# Patient Record
Sex: FEMALE | Race: WHITE | Employment: OTHER | URBAN - METROPOLITAN AREA
[De-identification: names, ages, dates, MRNs, and addresses within clinical notes are randomized per-mention and may not be internally consistent; named-entity substitution may affect disease eponyms.]

---

## 2017-08-15 ENCOUNTER — HOSPITAL ENCOUNTER (OUTPATIENT)
Dept: GENERAL RADIOLOGY | Age: 57
Discharge: HOME | End: 2017-08-15
Attending: INTERNAL MEDICINE | Admitting: INTERNAL MEDICINE

## 2017-08-15 DIAGNOSIS — R06.02 SOB (SHORTNESS OF BREATH): ICD-10-CM

## 2017-08-16 PROBLEM — G47.33 OBSTRUCTIVE SLEEP APNEA: Status: ACTIVE | Noted: 2017-08-16

## 2017-08-16 PROBLEM — J44.9 COPD, SEVERE (HCC): Status: ACTIVE | Noted: 2017-08-16

## 2017-08-16 PROBLEM — J96.10 CHRONIC RESPIRATORY FAILURE (HCC): Status: ACTIVE | Noted: 2017-08-16

## 2017-09-11 ENCOUNTER — HOSPITAL ENCOUNTER (OUTPATIENT)
Dept: PULMONOLOGY | Age: 57
Discharge: HOME | End: 2017-09-11
Attending: INTERNAL MEDICINE | Admitting: INTERNAL MEDICINE

## 2017-10-19 ENCOUNTER — HOSPITAL ENCOUNTER (OUTPATIENT)
Dept: PULMONOLOGY | Age: 57
Discharge: HOME | End: 2017-10-19
Attending: INTERNAL MEDICINE | Admitting: INTERNAL MEDICINE

## 2017-10-19 DIAGNOSIS — J44.9 CHRONIC OBSTRUCTIVE PULMONARY DISEASE (HCC): ICD-10-CM

## 2017-10-24 ENCOUNTER — HOSPITAL ENCOUNTER (OUTPATIENT)
Dept: OTHER | Age: 57
Discharge: HOME | End: 2017-12-02
Attending: INTERNAL MEDICINE | Admitting: INTERNAL MEDICINE

## 2018-01-18 ENCOUNTER — HOSPITAL ENCOUNTER (OUTPATIENT)
Dept: OTHER | Age: 58
Discharge: HOME | End: 2018-03-01
Attending: INTERNAL MEDICINE | Admitting: INTERNAL MEDICINE

## 2019-05-22 ENCOUNTER — HOSPITAL ENCOUNTER (EMERGENCY)
Age: 59
Discharge: HOME | End: 2019-05-22
Attending: EMERGENCY MEDICINE
Payer: COMMERCIAL

## 2019-05-22 ENCOUNTER — APPOINTMENT (OUTPATIENT)
Dept: GENERAL RADIOLOGY | Age: 59
End: 2019-05-22
Payer: COMMERCIAL

## 2019-05-22 VITALS
WEIGHT: 150 LBS | OXYGEN SATURATION: 92 % | RESPIRATION RATE: 18 BRPM | TEMPERATURE: 98.5 F | BODY MASS INDEX: 25.61 KG/M2 | SYSTOLIC BLOOD PRESSURE: 119 MMHG | DIASTOLIC BLOOD PRESSURE: 84 MMHG | HEART RATE: 101 BPM | HEIGHT: 64 IN

## 2019-05-22 DIAGNOSIS — J90 PLEURAL EFFUSION: ICD-10-CM

## 2019-05-22 DIAGNOSIS — J44.1 COPD EXACERBATION (HCC): Primary | ICD-10-CM

## 2019-05-22 LAB
ANION GAP SERPL CALCULATED.3IONS-SCNC: 9 MMOL/L (ref 4–16)
BASOPHILS ABSOLUTE: 0 K/CU MM
BASOPHILS RELATIVE PERCENT: 0.3 % (ref 0–1)
BUN BLDV-MCNC: 8 MG/DL (ref 6–23)
CALCIUM SERPL-MCNC: 9 MG/DL (ref 8.3–10.6)
CHLORIDE BLD-SCNC: 90 MMOL/L (ref 99–110)
CO2: 39 MMOL/L (ref 21–32)
CREAT SERPL-MCNC: 0.4 MG/DL (ref 0.6–1.1)
DIFFERENTIAL TYPE: ABNORMAL
EOSINOPHILS ABSOLUTE: 0.1 K/CU MM
EOSINOPHILS RELATIVE PERCENT: 1 % (ref 0–3)
GFR AFRICAN AMERICAN: >60 ML/MIN/1.73M2
GFR NON-AFRICAN AMERICAN: >60 ML/MIN/1.73M2
GLUCOSE BLD-MCNC: 100 MG/DL (ref 70–99)
HCT VFR BLD CALC: 42.2 % (ref 37–47)
HEMOGLOBIN: 12.6 GM/DL (ref 12.5–16)
IMMATURE NEUTROPHIL %: 0.3 % (ref 0–0.43)
LYMPHOCYTES ABSOLUTE: 1.9 K/CU MM
LYMPHOCYTES RELATIVE PERCENT: 19.1 % (ref 24–44)
MCH RBC QN AUTO: 28.5 PG (ref 27–31)
MCHC RBC AUTO-ENTMCNC: 29.9 % (ref 32–36)
MCV RBC AUTO: 95.5 FL (ref 78–100)
MONOCYTES ABSOLUTE: 1.2 K/CU MM
MONOCYTES RELATIVE PERCENT: 12.7 % (ref 0–4)
NUCLEATED RBC %: 0 %
PDW BLD-RTO: 11.7 % (ref 11.7–14.9)
PLATELET # BLD: 210 K/CU MM (ref 140–440)
PMV BLD AUTO: 10.2 FL (ref 7.5–11.1)
POTASSIUM SERPL-SCNC: 3.8 MMOL/L (ref 3.5–5.1)
RBC # BLD: 4.42 M/CU MM (ref 4.2–5.4)
SEGMENTED NEUTROPHILS ABSOLUTE COUNT: 6.5 K/CU MM
SEGMENTED NEUTROPHILS RELATIVE PERCENT: 66.6 % (ref 36–66)
SODIUM BLD-SCNC: 138 MMOL/L (ref 135–145)
TOTAL IMMATURE NEUTOROPHIL: 0.03 K/CU MM
TOTAL NUCLEATED RBC: 0 K/CU MM
WBC # BLD: 9.7 K/CU MM (ref 4–10.5)

## 2019-05-22 PROCEDURE — 71045 X-RAY EXAM CHEST 1 VIEW: CPT

## 2019-05-22 PROCEDURE — 93005 ELECTROCARDIOGRAM TRACING: CPT | Performed by: EMERGENCY MEDICINE

## 2019-05-22 PROCEDURE — 93010 ELECTROCARDIOGRAM REPORT: CPT | Performed by: INTERNAL MEDICINE

## 2019-05-22 PROCEDURE — 99285 EMERGENCY DEPT VISIT HI MDM: CPT

## 2019-05-22 PROCEDURE — 6370000000 HC RX 637 (ALT 250 FOR IP): Performed by: EMERGENCY MEDICINE

## 2019-05-22 PROCEDURE — 94640 AIRWAY INHALATION TREATMENT: CPT

## 2019-05-22 PROCEDURE — 80048 BASIC METABOLIC PNL TOTAL CA: CPT

## 2019-05-22 PROCEDURE — 85025 COMPLETE CBC W/AUTO DIFF WBC: CPT

## 2019-05-22 RX ORDER — IPRATROPIUM BROMIDE AND ALBUTEROL SULFATE 2.5; .5 MG/3ML; MG/3ML
1 SOLUTION RESPIRATORY (INHALATION) ONCE
Status: COMPLETED | OUTPATIENT
Start: 2019-05-22 | End: 2019-05-22

## 2019-05-22 RX ORDER — PREDNISONE 20 MG/1
60 TABLET ORAL ONCE
Status: COMPLETED | OUTPATIENT
Start: 2019-05-22 | End: 2019-05-22

## 2019-05-22 RX ORDER — PREDNISONE 20 MG/1
60 TABLET ORAL DAILY
Qty: 12 TABLET | Refills: 0 | Status: SHIPPED | OUTPATIENT
Start: 2019-05-22 | End: 2019-05-26

## 2019-05-22 RX ADMIN — IPRATROPIUM BROMIDE AND ALBUTEROL SULFATE 1 AMPULE: .5; 3 SOLUTION RESPIRATORY (INHALATION) at 02:45

## 2019-05-22 RX ADMIN — PREDNISONE 60 MG: 20 TABLET ORAL at 02:39

## 2019-05-22 NOTE — ED PROVIDER NOTES
per week: Not on file     Minutes per session: Not on file    Stress: Not on file   Relationships    Social connections:     Talks on phone: Not on file     Gets together: Not on file     Attends Amish service: Not on file     Active member of club or organization: Not on file     Attends meetings of clubs or organizations: Not on file     Relationship status: Not on file    Intimate partner violence:     Fear of current or ex partner: Not on file     Emotionally abused: Not on file     Physically abused: Not on file     Forced sexual activity: Not on file   Other Topics Concern    Not on file   Social History Narrative    Not on file     No current facility-administered medications for this encounter.       Current Outpatient Medications   Medication Sig Dispense Refill    predniSONE (DELTASONE) 20 MG tablet Take 3 tablets by mouth daily for 4 days 12 tablet 0    albuterol sulfate HFA (VENTOLIN HFA) 108 (90 Base) MCG/ACT inhaler Inhale 2 puffs into the lungs every 6 hours 1 Inhaler 5    doxycycline hyclate (VIBRAMYCIN) 100 MG capsule Take 1 capsule by mouth daily 10 capsule 0    tiotropium (SPIRIVA RESPIMAT) 2.5 MCG/ACT AERS inhaler Inhale 2 puffs into the lungs daily 1 Inhaler 5    mometasone-formoterol (DULERA) 100-5 MCG/ACT inhaler INHALE 2 PUFFS BY MOUTH TWICE DAILY 13 g 5    albuterol (PROVENTIL) (2.5 MG/3ML) 0.083% nebulizer solution Take 3 mLs by nebulization every 6 hours 120 vial 5    albuterol sulfate HFA (VENTOLIN HFA) 108 (90 Base) MCG/ACT inhaler Inhale 2 puffs into the lungs every 6 hours as needed for Wheezing 1 Inhaler 5    albuterol (PROVENTIL) (2.5 MG/3ML) 0.083% nebulizer solution Take 3 mLs by nebulization every 6 hours as needed for Wheezing 120 each 5    traZODone (DESYREL) 150 MG tablet       albuterol sulfate HFA (PROAIR HFA) 108 (90 Base) MCG/ACT inhaler Inhale 2 puffs into the lungs every 6 hours as needed for Wheezing      OXYGEN Inhale 2 L into the lungs continuous  CPAP Machine MISC by CPAP route nightly      buprenorphine-naloxone (SUBOXONE) 8-2 MG FILM SL film Place 1 Film under the tongue daily       No Known Allergies    Nursing Notes Reviewed    Physical Exam:  ED Triage Vitals [05/22/19 0221]   Enc Vitals Group      /84      Pulse 101      Resp 18      Temp 98.5 °F (36.9 °C)      Temp Source Oral      SpO2 92 %      Weight 150 lb (68 kg)      Height 5' 4\" (1.626 m)      Head Circumference       Peak Flow       Pain Score       Pain Loc       Pain Edu? Excl. in [address]? GENERAL APPEARANCE: Awake and alert. Cooperative. No acute distress. HEAD: Normocephalic. Atraumatic. EYES: EOM's grossly intact. Sclera anicteric. ENT: Mucous membranes are moist. Tolerates saliva. No trismus. NECK: Supple. Trachea midline. HEART: RRR. Radial pulses 2+. LUNGS: Respirations unlabored. Lung sounds diminished bilaterally  ABDOMEN: Soft. Non-tender. No guarding or rebound. EXTREMITIES: No acute deformities. No edema  SKIN: Warm and dry. NEUROLOGICAL: No gross facial drooping. Moves all 4 extremities spontaneously. PSYCHIATRIC: Normal mood.     I have reviewed and interpreted all of the currently available lab results from this visit (if applicable):  Results for orders placed or performed during the hospital encounter of 05/22/19   CBC Auto Differential   Result Value Ref Range    WBC 9.7 4.0 - 10.5 K/CU MM    RBC 4.42 4.2 - 5.4 M/CU MM    Hemoglobin 12.6 12.5 - 16.0 GM/DL    Hematocrit 42.2 37 - 47 %    MCV 95.5 78 - 100 FL    MCH 28.5 27 - 31 PG    MCHC 29.9 (L) 32.0 - 36.0 %    RDW 11.7 11.7 - 14.9 %    Platelets 514 154 - 857 K/CU MM    MPV 10.2 7.5 - 11.1 FL    Differential Type AUTOMATED DIFFERENTIAL     Segs Relative 66.6 (H) 36 - 66 %    Lymphocytes % 19.1 (L) 24 - 44 %    Monocytes % 12.7 (H) 0 - 4 %    Eosinophils % 1.0 0 - 3 %    Basophils % 0.3 0 - 1 %    Segs Absolute 6.5 K/CU MM    Lymphocytes # 1.9 K/CU MM    Monocytes # 1.2 K/CU MM    Eosinophils # 0.1 K/CU MM    Basophils # 0.0 K/CU MM    Nucleated RBC % 0.0 %    Total Nucleated RBC 0.0 K/CU MM    Total Immature Neutrophil 0.03 K/CU MM    Immature Neutrophil % 0.3 0 - 0.43 %   BMP   Result Value Ref Range    Sodium 138 135 - 145 MMOL/L    Potassium 3.8 3.5 - 5.1 MMOL/L    Chloride 90 (L) 99 - 110 mMol/L    CO2 39 (H) 21 - 32 MMOL/L    Anion Gap 9 4 - 16    BUN 8 6 - 23 MG/DL    CREATININE 0.4 (L) 0.6 - 1.1 MG/DL    Glucose 100 (H) 70 - 99 MG/DL    Calcium 9.0 8.3 - 10.6 MG/DL    GFR Non-African American >60 >60 mL/min/1.73m2    GFR African American >60 >60 mL/min/1.73m2      Radiographs (if obtained):  [] The following radiograph was interpreted by myself in the absence of a radiologist:  [x] Radiologist's Report Reviewed:    EKG (if obtained): (All EKG's are interpreted by myself in the absence of a cardiologist)12 lead EKG as interpreted by me reveals normal sinus rhythm. Axis is normal. There are no ischemic ST elevations or other suspicious ST changes;  QRS interval is narrow, QT interval is not prolonged. Final interpretation: Normal sinus rhythm. MDM:  Plan of care is discussed thoroughly with the patient and family if present. If performed, all imaging and lab work also discussed with patient. All relevant prior results and chart reviewed if available. Patient with above presentation consistent with likely COPD exacerbation. Vital signs are normal.  She is in no acute distress. Chest x-ray does show small bilateral pleural effusions but she otherwise does not have any signs or symptoms of acute heart failure. She feels better with DuoNeb and prednisone. Metabolic workup is unremarkable. Plan to discharge, follow-up with pulmonologist to discuss further care. She'll be prescribed prednisone and is agreeable with this plan of care. Clinical Impression:  1. COPD exacerbation ([address]    2.  Pleural effusion      (Please note that portions of this note may have been completed with a voice recognition program. Efforts were made to edit the dictations but occasionally words are mis-transcribed.)    MD Debbie Holloway MD  05/22/19 5364

## 2019-05-22 NOTE — ED NOTES
Pt presents to ED for c/o cough and SOB. Reports cough is productive with green sputum. Pt states cough has been present for 2 weeks but that SOB has worsened over the last 2 days. Reports she is unable to hardly walk or do ADLS due to the SOB. Pt also states has had fevers and chills over the last 2 weeks. Denies CP. Denies n/v/d. Pt has hx of COPD and is on 3L NC . Pt is on 4L NC at this time. Pt is a&ox4. GCS 15 speaking clear complete sentences. ECG complete and CCM and CPOX placed at this time.       Iona Mondragon RN  05/22/19 5185

## 2019-05-22 NOTE — ED NOTES
Bed: ED-30  Expected date:   Expected time:   Means of arrival:   Comments:  Terry Duckworth, [facility]  05/22/19 0142

## 2019-05-24 LAB
EKG ATRIAL RATE: 99 BPM
EKG DIAGNOSIS: NORMAL
EKG P AXIS: 81 DEGREES
EKG P-R INTERVAL: 144 MS
EKG Q-T INTERVAL: 342 MS
EKG QRS DURATION: 84 MS
EKG QTC CALCULATION (BAZETT): 438 MS
EKG R AXIS: 71 DEGREES
EKG T AXIS: 73 DEGREES
EKG VENTRICULAR RATE: 99 BPM

## 2019-08-31 ENCOUNTER — HOSPITAL ENCOUNTER (INPATIENT)
Age: 59
LOS: 2 days | Discharge: HOME | DRG: 133 | End: 2019-09-02
Attending: FAMILY MEDICINE | Admitting: STUDENT IN AN ORGANIZED HEALTH CARE EDUCATION/TRAINING PROGRAM
Payer: COMMERCIAL

## 2019-08-31 ENCOUNTER — APPOINTMENT (OUTPATIENT)
Dept: GENERAL RADIOLOGY | Age: 59
DRG: 133 | End: 2019-08-31
Payer: COMMERCIAL

## 2019-08-31 DIAGNOSIS — J96.21 ACUTE ON CHRONIC RESPIRATORY FAILURE WITH HYPOXIA AND HYPERCAPNIA (HCC): ICD-10-CM

## 2019-08-31 DIAGNOSIS — J44.1 COPD EXACERBATION (HCC): Primary | ICD-10-CM

## 2019-08-31 DIAGNOSIS — J96.22 ACUTE ON CHRONIC RESPIRATORY FAILURE WITH HYPOXIA AND HYPERCAPNIA (HCC): ICD-10-CM

## 2019-08-31 PROBLEM — J96.02 ACUTE RESPIRATORY FAILURE WITH HYPOXIA AND HYPERCAPNIA (HCC): Status: ACTIVE | Noted: 2019-08-31

## 2019-08-31 PROBLEM — J96.01 ACUTE RESPIRATORY FAILURE WITH HYPOXIA AND HYPERCAPNIA (HCC): Status: ACTIVE | Noted: 2019-08-31

## 2019-08-31 LAB
ALBUMIN SERPL-MCNC: 4.3 GM/DL (ref 3.4–5)
ALP BLD-CCNC: 86 IU/L (ref 40–129)
ALT SERPL-CCNC: 10 U/L (ref 10–40)
ANION GAP SERPL CALCULATED.3IONS-SCNC: 3 MMOL/L (ref 4–16)
AST SERPL-CCNC: 15 IU/L (ref 15–37)
BASE EXCESS MIXED: ABNORMAL (ref 0–2.3)
BASE EXCESS MIXED: ABNORMAL (ref 0–2.3)
BASOPHILS ABSOLUTE: 0.1 K/CU MM
BASOPHILS RELATIVE PERCENT: 0.8 % (ref 0–1)
BILIRUB SERPL-MCNC: 0.2 MG/DL (ref 0–1)
BUN BLDV-MCNC: 5 MG/DL (ref 6–23)
CALCIUM SERPL-MCNC: 9.6 MG/DL (ref 8.3–10.6)
CARBON MONOXIDE, BLOOD: 4.7 % (ref 0–5)
CHLORIDE BLD-SCNC: 90 MMOL/L (ref 99–110)
CO2 CONTENT: 55.9 MMOL/L (ref 19–24)
CO2: 48 MMOL/L (ref 21–32)
COMMENT: ABNORMAL
COMMENT: ABNORMAL
CREAT SERPL-MCNC: 0.4 MG/DL (ref 0.6–1.1)
DIFFERENTIAL TYPE: ABNORMAL
EKG ATRIAL RATE: 88 BPM
EKG DIAGNOSIS: NORMAL
EKG P AXIS: 84 DEGREES
EKG P-R INTERVAL: 144 MS
EKG Q-T INTERVAL: 382 MS
EKG QRS DURATION: 90 MS
EKG QTC CALCULATION (BAZETT): 462 MS
EKG R AXIS: 90 DEGREES
EKG T AXIS: 85 DEGREES
EKG VENTRICULAR RATE: 88 BPM
EOSINOPHILS ABSOLUTE: 0.2 K/CU MM
EOSINOPHILS RELATIVE PERCENT: 2.9 % (ref 0–3)
GFR AFRICAN AMERICAN: >60 ML/MIN/1.73M2
GFR NON-AFRICAN AMERICAN: >60 ML/MIN/1.73M2
GLUCOSE BLD-MCNC: 136 MG/DL (ref 70–99)
HCO3 ARTERIAL: 52.6 MMOL/L (ref 18–23)
HCO3 VENOUS: 53.5 MMOL/L (ref 19–25)
HCT VFR BLD CALC: 46.7 % (ref 37–47)
HEMOGLOBIN: 13.4 GM/DL (ref 12.5–16)
IMMATURE NEUTROPHIL %: 0.5 % (ref 0–0.43)
LIPASE: 17 IU/L (ref 13–60)
LYMPHOCYTES ABSOLUTE: 1.7 K/CU MM
LYMPHOCYTES RELATIVE PERCENT: 25.9 % (ref 24–44)
MAGNESIUM: 1.7 MG/DL (ref 1.8–2.4)
MCH RBC QN AUTO: 28.2 PG (ref 27–31)
MCHC RBC AUTO-ENTMCNC: 28.7 % (ref 32–36)
MCV RBC AUTO: 98.1 FL (ref 78–100)
METHEMOGLOBIN ARTERIAL: 1.2 %
MONOCYTES ABSOLUTE: 0.8 K/CU MM
MONOCYTES RELATIVE PERCENT: 11.3 % (ref 0–4)
NUCLEATED RBC %: 0 %
O2 SAT, VEN: 67.8 % (ref 50–70)
O2 SATURATION: 94.2 % (ref 96–97)
PCO2 ARTERIAL: 107 MMHG (ref 32–45)
PCO2, VEN: 122 MMHG (ref 38–52)
PDW BLD-RTO: 12.5 % (ref 11.7–14.9)
PH BLOOD: 7.3 (ref 7.34–7.45)
PH VENOUS: 7.25 (ref 7.32–7.42)
PLATELET # BLD: 169 K/CU MM (ref 140–440)
PMV BLD AUTO: 10.3 FL (ref 7.5–11.1)
PO2 ARTERIAL: 223 MMHG (ref 75–100)
PO2, VEN: 33 MMHG (ref 28–48)
POTASSIUM SERPL-SCNC: 4 MMOL/L (ref 3.5–5.1)
PRO-BNP: 77.93 PG/ML
RBC # BLD: 4.76 M/CU MM (ref 4.2–5.4)
SEGMENTED NEUTROPHILS ABSOLUTE COUNT: 3.9 K/CU MM
SEGMENTED NEUTROPHILS RELATIVE PERCENT: 58.6 % (ref 36–66)
SODIUM BLD-SCNC: 141 MMOL/L (ref 135–145)
TOTAL CK: 57 IU/L (ref 26–140)
TOTAL IMMATURE NEUTOROPHIL: 0.03 K/CU MM
TOTAL NUCLEATED RBC: 0 K/CU MM
TOTAL PROTEIN: 7.5 GM/DL (ref 6.4–8.2)
TROPONIN T: <0.01 NG/ML
WBC # BLD: 6.6 K/CU MM (ref 4–10.5)

## 2019-08-31 PROCEDURE — 84484 ASSAY OF TROPONIN QUANT: CPT

## 2019-08-31 PROCEDURE — 93010 ELECTROCARDIOGRAM REPORT: CPT | Performed by: INTERNAL MEDICINE

## 2019-08-31 PROCEDURE — 85025 COMPLETE CBC W/AUTO DIFF WBC: CPT

## 2019-08-31 PROCEDURE — 96361 HYDRATE IV INFUSION ADD-ON: CPT

## 2019-08-31 PROCEDURE — 82803 BLOOD GASES ANY COMBINATION: CPT

## 2019-08-31 PROCEDURE — 6370000000 HC RX 637 (ALT 250 FOR IP): Performed by: PHYSICIAN ASSISTANT

## 2019-08-31 PROCEDURE — 6370000000 HC RX 637 (ALT 250 FOR IP): Performed by: STUDENT IN AN ORGANIZED HEALTH CARE EDUCATION/TRAINING PROGRAM

## 2019-08-31 PROCEDURE — 6360000002 HC RX W HCPCS: Performed by: FAMILY MEDICINE

## 2019-08-31 PROCEDURE — 82805 BLOOD GASES W/O2 SATURATION: CPT

## 2019-08-31 PROCEDURE — 93005 ELECTROCARDIOGRAM TRACING: CPT | Performed by: PHYSICIAN ASSISTANT

## 2019-08-31 PROCEDURE — 2700000000 HC OXYGEN THERAPY PER DAY

## 2019-08-31 PROCEDURE — 96365 THER/PROPH/DIAG IV INF INIT: CPT

## 2019-08-31 PROCEDURE — 93005 ELECTROCARDIOGRAM TRACING: CPT | Performed by: EMERGENCY MEDICINE

## 2019-08-31 PROCEDURE — 2580000003 HC RX 258: Performed by: STUDENT IN AN ORGANIZED HEALTH CARE EDUCATION/TRAINING PROGRAM

## 2019-08-31 PROCEDURE — 94660 CPAP INITIATION&MGMT: CPT

## 2019-08-31 PROCEDURE — 99285 EMERGENCY DEPT VISIT HI MDM: CPT

## 2019-08-31 PROCEDURE — 94761 N-INVAS EAR/PLS OXIMETRY MLT: CPT

## 2019-08-31 PROCEDURE — 82550 ASSAY OF CK (CPK): CPT

## 2019-08-31 PROCEDURE — 80053 COMPREHEN METABOLIC PANEL: CPT

## 2019-08-31 PROCEDURE — 6360000002 HC RX W HCPCS: Performed by: STUDENT IN AN ORGANIZED HEALTH CARE EDUCATION/TRAINING PROGRAM

## 2019-08-31 PROCEDURE — 96375 TX/PRO/DX INJ NEW DRUG ADDON: CPT

## 2019-08-31 PROCEDURE — 2580000003 HC RX 258: Performed by: FAMILY MEDICINE

## 2019-08-31 PROCEDURE — 94640 AIRWAY INHALATION TREATMENT: CPT

## 2019-08-31 PROCEDURE — 83690 ASSAY OF LIPASE: CPT

## 2019-08-31 PROCEDURE — 71045 X-RAY EXAM CHEST 1 VIEW: CPT

## 2019-08-31 PROCEDURE — 83880 ASSAY OF NATRIURETIC PEPTIDE: CPT

## 2019-08-31 PROCEDURE — 83735 ASSAY OF MAGNESIUM: CPT

## 2019-08-31 PROCEDURE — 2060000000 HC ICU INTERMEDIATE R&B

## 2019-08-31 RX ORDER — METHYLPREDNISOLONE SODIUM SUCCINATE 125 MG/2ML
40 INJECTION, POWDER, LYOPHILIZED, FOR SOLUTION INTRAMUSCULAR; INTRAVENOUS EVERY 12 HOURS
Status: DISCONTINUED | OUTPATIENT
Start: 2019-08-31 | End: 2019-09-01

## 2019-08-31 RX ORDER — SODIUM CHLORIDE 0.9 % (FLUSH) 0.9 %
10 SYRINGE (ML) INJECTION EVERY 12 HOURS SCHEDULED
Status: DISCONTINUED | OUTPATIENT
Start: 2019-08-31 | End: 2019-08-31 | Stop reason: SDUPTHER

## 2019-08-31 RX ORDER — ONDANSETRON 2 MG/ML
4 INJECTION INTRAMUSCULAR; INTRAVENOUS EVERY 6 HOURS PRN
Status: DISCONTINUED | OUTPATIENT
Start: 2019-08-31 | End: 2019-08-31 | Stop reason: SDUPTHER

## 2019-08-31 RX ORDER — SODIUM CHLORIDE 0.9 % (FLUSH) 0.9 %
10 SYRINGE (ML) INJECTION PRN
Status: DISCONTINUED | OUTPATIENT
Start: 2019-08-31 | End: 2019-08-31 | Stop reason: SDUPTHER

## 2019-08-31 RX ORDER — IPRATROPIUM BROMIDE AND ALBUTEROL SULFATE 2.5; .5 MG/3ML; MG/3ML
2 SOLUTION RESPIRATORY (INHALATION) ONCE
Status: COMPLETED | OUTPATIENT
Start: 2019-08-31 | End: 2019-08-31

## 2019-08-31 RX ORDER — MAGNESIUM SULFATE IN WATER 40 MG/ML
2 INJECTION, SOLUTION INTRAVENOUS ONCE
Status: COMPLETED | OUTPATIENT
Start: 2019-08-31 | End: 2019-08-31

## 2019-08-31 RX ORDER — SODIUM CHLORIDE 0.9 % (FLUSH) 0.9 %
10 SYRINGE (ML) INJECTION PRN
Status: DISCONTINUED | OUTPATIENT
Start: 2019-08-31 | End: 2019-09-02 | Stop reason: HOSPADM

## 2019-08-31 RX ORDER — DIPHENHYDRAMINE HYDROCHLORIDE 50 MG/ML
25 INJECTION INTRAMUSCULAR; INTRAVENOUS ONCE
Status: DISCONTINUED | OUTPATIENT
Start: 2019-08-31 | End: 2019-08-31

## 2019-08-31 RX ORDER — 0.9 % SODIUM CHLORIDE 0.9 %
1000 INTRAVENOUS SOLUTION INTRAVENOUS ONCE
Status: COMPLETED | OUTPATIENT
Start: 2019-08-31 | End: 2019-08-31

## 2019-08-31 RX ORDER — IPRATROPIUM BROMIDE AND ALBUTEROL SULFATE 2.5; .5 MG/3ML; MG/3ML
1 SOLUTION RESPIRATORY (INHALATION)
Status: DISCONTINUED | OUTPATIENT
Start: 2019-08-31 | End: 2019-09-02 | Stop reason: HOSPADM

## 2019-08-31 RX ORDER — SODIUM CHLORIDE 0.9 % (FLUSH) 0.9 %
10 SYRINGE (ML) INJECTION EVERY 12 HOURS SCHEDULED
Status: DISCONTINUED | OUTPATIENT
Start: 2019-08-31 | End: 2019-09-02 | Stop reason: HOSPADM

## 2019-08-31 RX ORDER — DIPHENHYDRAMINE HYDROCHLORIDE 50 MG/ML
50 INJECTION INTRAMUSCULAR; INTRAVENOUS ONCE
Status: DISCONTINUED | OUTPATIENT
Start: 2019-08-31 | End: 2019-08-31

## 2019-08-31 RX ORDER — DEXAMETHASONE SODIUM PHOSPHATE 10 MG/ML
10 INJECTION, SOLUTION INTRAMUSCULAR; INTRAVENOUS ONCE
Status: COMPLETED | OUTPATIENT
Start: 2019-08-31 | End: 2019-08-31

## 2019-08-31 RX ORDER — TRAZODONE HYDROCHLORIDE 50 MG/1
150 TABLET ORAL NIGHTLY
Status: DISCONTINUED | OUTPATIENT
Start: 2019-08-31 | End: 2019-09-02 | Stop reason: HOSPADM

## 2019-08-31 RX ORDER — BUPRENORPHINE AND NALOXONE 8; 2 MG/1; MG/1
1 FILM, SOLUBLE BUCCAL; SUBLINGUAL DAILY
Status: DISCONTINUED | OUTPATIENT
Start: 2019-08-31 | End: 2019-08-31 | Stop reason: CLARIF

## 2019-08-31 RX ORDER — BUPRENORPHINE HYDROCHLORIDE AND NALOXONE HYDROCHLORIDE DIHYDRATE 8; 2 MG/1; MG/1
1 TABLET SUBLINGUAL DAILY
Status: DISCONTINUED | OUTPATIENT
Start: 2019-08-31 | End: 2019-09-02 | Stop reason: HOSPADM

## 2019-08-31 RX ORDER — ONDANSETRON 2 MG/ML
4 INJECTION INTRAMUSCULAR; INTRAVENOUS EVERY 6 HOURS PRN
Status: DISCONTINUED | OUTPATIENT
Start: 2019-08-31 | End: 2019-09-02 | Stop reason: HOSPADM

## 2019-08-31 RX ORDER — IPRATROPIUM BROMIDE AND ALBUTEROL SULFATE 2.5; .5 MG/3ML; MG/3ML
1 SOLUTION RESPIRATORY (INHALATION)
Status: DISCONTINUED | OUTPATIENT
Start: 2019-08-31 | End: 2019-08-31 | Stop reason: SDUPTHER

## 2019-08-31 RX ADMIN — METHYLPREDNISOLONE SODIUM SUCCINATE 40 MG: 125 INJECTION, POWDER, LYOPHILIZED, FOR SOLUTION INTRAMUSCULAR; INTRAVENOUS at 21:39

## 2019-08-31 RX ADMIN — IPRATROPIUM BROMIDE AND ALBUTEROL SULFATE 1 AMPULE: .5; 3 SOLUTION RESPIRATORY (INHALATION) at 16:31

## 2019-08-31 RX ADMIN — Medication 10 ML: at 22:08

## 2019-08-31 RX ADMIN — CEFTRIAXONE 1 G: 1 INJECTION, POWDER, FOR SOLUTION INTRAMUSCULAR; INTRAVENOUS at 11:58

## 2019-08-31 RX ADMIN — IPRATROPIUM BROMIDE AND ALBUTEROL SULFATE 2 AMPULE: .5; 3 SOLUTION RESPIRATORY (INHALATION) at 05:49

## 2019-08-31 RX ADMIN — ENOXAPARIN SODIUM 40 MG: 100 INJECTION SUBCUTANEOUS at 10:43

## 2019-08-31 RX ADMIN — IPRATROPIUM BROMIDE AND ALBUTEROL SULFATE 1 AMPULE: .5; 3 SOLUTION RESPIRATORY (INHALATION) at 12:29

## 2019-08-31 RX ADMIN — IPRATROPIUM BROMIDE AND ALBUTEROL SULFATE 1 AMPULE: .5; 3 SOLUTION RESPIRATORY (INHALATION) at 20:50

## 2019-08-31 RX ADMIN — SODIUM CHLORIDE, PRESERVATIVE FREE 10 ML: 5 INJECTION INTRAVENOUS at 10:44

## 2019-08-31 RX ADMIN — Medication 2 PUFF: at 20:59

## 2019-08-31 RX ADMIN — BUPRENORPHINE AND NALOXONE 1 TABLET: 8; 2 TABLET SUBLINGUAL at 11:58

## 2019-08-31 RX ADMIN — ONDANSETRON 4 MG: 2 INJECTION INTRAMUSCULAR; INTRAVENOUS at 22:08

## 2019-08-31 RX ADMIN — METHYLPREDNISOLONE SODIUM SUCCINATE 40 MG: 125 INJECTION, POWDER, LYOPHILIZED, FOR SOLUTION INTRAMUSCULAR; INTRAVENOUS at 10:43

## 2019-08-31 RX ADMIN — MAGNESIUM SULFATE HEPTAHYDRATE 2 G: 40 INJECTION, SOLUTION INTRAVENOUS at 06:00

## 2019-08-31 RX ADMIN — SODIUM CHLORIDE, PRESERVATIVE FREE 10 ML: 5 INJECTION INTRAVENOUS at 21:39

## 2019-08-31 RX ADMIN — SODIUM CHLORIDE 1000 ML: 9 INJECTION, SOLUTION INTRAVENOUS at 05:53

## 2019-08-31 RX ADMIN — TRAZODONE HYDROCHLORIDE 100 MG: 50 TABLET ORAL at 21:40

## 2019-08-31 RX ADMIN — DEXAMETHASONE SODIUM PHOSPHATE 10 MG: 10 INJECTION, SOLUTION INTRAMUSCULAR; INTRAVENOUS at 05:53

## 2019-08-31 ASSESSMENT — PAIN SCALES - GENERAL
PAINLEVEL_OUTOF10: 1
PAINLEVEL_OUTOF10: 0

## 2019-08-31 NOTE — ED NOTES
Dr. Sweetie Harrison at bedside      Peggy Cunningham, [facility]  08/31/19 3551

## 2019-08-31 NOTE — ED PROVIDER NOTES
name: Not on file    Number of children: Not on file    Years of education: Not on file    Highest education level: Not on file   Occupational History    Not on file   Social Needs    Financial resource strain: Not on file    Food insecurity:     Worry: Not on file     Inability: Not on file    Transportation needs:     Medical: Not on file     Non-medical: Not on file   Tobacco Use    Smoking status: Former Smoker     Last attempt to quit: 8/16/2016     Years since quitting: 3.0    Smokeless tobacco: Never Used   Substance and Sexual Activity    Alcohol use: Not Currently    Drug use: Not Currently    Sexual activity: Not on file   Lifestyle    Physical activity:     Days per week: Not on file     Minutes per session: Not on file    Stress: Not on file   Relationships    Social connections:     Talks on phone: Not on file     Gets together: Not on file     Attends [employer] service: Not on file     Active member of club or organization: Not on file     Attends meetings of clubs or organizations: Not on file     Relationship status: Not on file    Intimate partner violence:     Fear of current or ex partner: Not on file     Emotionally abused: Not on file     Physically abused: Not on file     Forced sexual activity: Not on file   Other Topics Concern    Not on file   Social History Narrative    Not on file     No current facility-administered medications for this encounter.       Current Outpatient Medications   Medication Sig Dispense Refill    predniSONE (DELTASONE) 10 MG tablet Take 1 tablet by mouth daily 40mg daily for 2 days, 20 mg daily for 2 days, 10 mg daily for 2 days, 5 mg daily for 2 days 15 tablet 0    mometasone-formoterol (DULERA) 100-5 MCG/ACT inhaler Inhale 2 puffs into the lungs 2 times daily 1 Inhaler 5    doxycycline hyclate (VIBRAMYCIN) 100 MG capsule Take 1 capsule by mouth daily 10 capsule 0    albuterol sulfate  (90 Base) MCG/ACT inhaler INHALE 2 PUFFS BY MOUTH Oropharynx with no exudate or erythema. Neck:  Trachea midline. Supple. No cervical lymphadenopathy  Extremity:  No swelling. Normal ROM. No calf pain or asymmetric swelling. No lower extremity edema  Heart:  Regular rate and rhythm, normal S1 & S2, no extra heart sounds. Perfusion:  Intact, capillary refill less than 2 seconds  Respiratory: Is maturing expiratory wheezing, poor air exchange, appears surprisingly comfortable despite increased oxygen requirement. Abdominal:  Normal bowel sounds. Soft. No peritoneal signs. No hepatosplenomegaly. Back:  No CVA tenderness to palpation. No bruising. No CTL tenderness to palpation or step-off  Neurological:  Alert and oriented times 3. No focal neuro deficits. Cranial nerves II through XII are grossly intact.   Perhaps a little sleepy but not somnolent or obtunded          Psychiatric:  Appropriate    I have reviewed and interpreted all of the currently available lab results from this visit (if applicable):  Results for orders placed or performed during the hospital encounter of 08/31/19   CBC Auto Differential   Result Value Ref Range    WBC 6.6 4.0 - 10.5 K/CU MM    RBC 4.76 4.2 - 5.4 M/CU MM    Hemoglobin 13.4 12.5 - 16.0 GM/DL    Hematocrit 46.7 37 - 47 %    MCV 98.1 78 - 100 FL    MCH 28.2 27 - 31 PG    MCHC 28.7 (L) 32.0 - 36.0 %    RDW 12.5 11.7 - 14.9 %    Platelets 841 841 - 346 K/CU MM    MPV 10.3 7.5 - 11.1 FL    Differential Type AUTOMATED DIFFERENTIAL     Segs Relative 58.6 36 - 66 %    Lymphocytes % 25.9 24 - 44 %    Monocytes % 11.3 (H) 0 - 4 %    Eosinophils % 2.9 0 - 3 %    Basophils % 0.8 0 - 1 %    Segs Absolute 3.9 K/CU MM    Lymphocytes Absolute 1.7 K/CU MM    Monocytes Absolute 0.8 K/CU MM    Eosinophils Absolute 0.2 K/CU MM    Basophils Absolute 0.1 K/CU MM    Nucleated RBC % 0.0 %    Total Nucleated RBC 0.0 K/CU MM    Total Immature Neutrophil 0.03 K/CU MM    Immature Neutrophil % 0.5 (H) 0 - 0.43 %   Comprehensive Metabolic

## 2019-08-31 NOTE — ED NOTES
Care assumed at this time.   Report from Jake curiel, Briana. [address], RN  08/31/19 7416

## 2019-08-31 NOTE — ED NOTES
Narrative   EXAMINATION:   ONE XRAY VIEW OF THE CHEST       8/31/2019 6:10 am       COMPARISON:   05/22/2019       HISTORY:   ORDERING SYSTEM PROVIDED HISTORY: chest pain   TECHNOLOGIST PROVIDED HISTORY:   Reason for exam:->chest pain   Reason for Exam: sob/cp       FINDINGS:   Monitor wires overlie the patient.  The lungs are without acute focal   process.  There is no effusion or pneumothorax. The cardiomediastinal   silhouette is stable.  Unchanged prominence of the pulmonary vasculature.    The osseous structures are stable.           Impression   No acute process.            Debbie Hermosillo RN  08/31/19 9162

## 2019-08-31 NOTE — H&P
History and Physical      Name:  Chery Lorenzana /Age/Sex: 1960  (66 y.o. female)   MRN & CSN:  [id number] & [id number] Admission Date/Time: 2019  5:12 AM   Location:  -A PCP: No primary care provider on file. Hospital Day: 1    Assessment and Plan:   Chery Lorenzana is a 66 y.o.  female  who presents with:     Acute on chronic respiratory failure/ COPDE   Chronic oxygen dependency, 2L    Continue bipap and supplemental oxygen  Continue abx and iv steroids  NPO while on bipap    Diet Diet NPO Effective Now Exceptions are: Ice Chips, Sips with Meds   DVT Prophylaxis [] Lovenox, []  Heparin, [] SCDs, [] No VTE prophylaxis, patient ambulating   GI Prophylaxis [] PPI, [] H2 Blocker, [] No GI prophylaxis, patient is receiving diet/Tube Feeds   Code Status Full Code   Disposition Patient requires continued admission due to bipap dependency    MDM [] Low, [] Moderate,[]  High  Patient's risk as above due to acute exacerbation of chronic illness     History of Present Illness:     Chief Complaint:   Chery Lorenzana is a 66 y.o.  female  who presents with sob. Pt S&E on ICUSD. She is on bipap and tolerating it well. She presented with worsening sob, associated wheezing and coughing. Started several days ago and has progressively worsened. No improvement with home breathing tx and exacerbated by activity. She is undergoing OP work up for need of bipap.      10-14 point ROS reviewed negative, unless as noted above    Objective: Intake/Output Summary (Last 24 hours) at 2019 1606  Last data filed at 2019 0954  Gross per 24 hour   Intake 1110 ml   Output --   Net 1110 ml      Vitals:   Vitals:    19 1500   BP: 97/62   Pulse: 78   Resp: 18   Temp: 98 °F (36.7 °C)   SpO2: 95%     Physical Exam:    GEN Awake female, laying in bed in no apparent distress. Appears given age. EYES Pupils are equally round.   No scleral discharge  HENT Atraumatic and symmetric head  NECK No apparent Marital status: Single     Spouse name: None    Number of children: None    Years of education: None    Highest education level: None   Occupational History    None   Social Needs    Financial resource strain: None    Food insecurity:     Worry: None     Inability: None    Transportation needs:     Medical: None     Non-medical: None   Tobacco Use    Smoking status: Former Smoker     Last attempt to quit: 8/16/2016     Years since quitting: 3.0    Smokeless tobacco: Never Used   Substance and Sexual Activity    Alcohol use: Not Currently    Drug use: Not Currently    Sexual activity: None   Lifestyle    Physical activity:     Days per week: None     Minutes per session: None    Stress: None   Relationships    Social connections:     Talks on phone: None     Gets together: None     Attends Restorationist service: None     Active member of club or organization: None     Attends meetings of clubs or organizations: None     Relationship status: None    Intimate partner violence:     Fear of current or ex partner: None     Emotionally abused: None     Physically abused: None     Forced sexual activity: None   Other Topics Concern    None   Social History Narrative    None       Medications:   Medications:    Infusions:   PRN Meds:       Electronically signed by Mirian Castellon DO on 8/31/2019 at 4:06 PM

## 2019-09-01 LAB
ADENOVIRUS DETECTION BY PCR: NOT DETECTED
ANION GAP SERPL CALCULATED.3IONS-SCNC: 5 MMOL/L (ref 4–16)
BASOPHILS ABSOLUTE: 0 K/CU MM
BASOPHILS RELATIVE PERCENT: 0.1 % (ref 0–1)
BORDETELLA PERTUSSIS PCR: NOT DETECTED
BUN BLDV-MCNC: 7 MG/DL (ref 6–23)
CALCIUM SERPL-MCNC: 9.1 MG/DL (ref 8.3–10.6)
CHLAMYDOPHILA PNEUMONIA PCR: NOT DETECTED
CHLORIDE BLD-SCNC: 90 MMOL/L (ref 99–110)
CO2: 42 MMOL/L (ref 21–32)
CORONAVIRUS 229E PCR: NOT DETECTED
CORONAVIRUS HKU1 PCR: NOT DETECTED
CORONAVIRUS NL63 PCR: NOT DETECTED
CORONAVIRUS OC43 PCR: NOT DETECTED
CREAT SERPL-MCNC: 0.4 MG/DL (ref 0.6–1.1)
DIFFERENTIAL TYPE: ABNORMAL
EKG ATRIAL RATE: 67 BPM
EKG DIAGNOSIS: NORMAL
EKG P AXIS: 63 DEGREES
EKG P-R INTERVAL: 162 MS
EKG Q-T INTERVAL: 412 MS
EKG QRS DURATION: 84 MS
EKG QTC CALCULATION (BAZETT): 435 MS
EKG R AXIS: 66 DEGREES
EKG T AXIS: 60 DEGREES
EKG VENTRICULAR RATE: 67 BPM
EOSINOPHILS ABSOLUTE: 0 K/CU MM
EOSINOPHILS RELATIVE PERCENT: 0 % (ref 0–3)
GFR AFRICAN AMERICAN: >60 ML/MIN/1.73M2
GFR NON-AFRICAN AMERICAN: >60 ML/MIN/1.73M2
GLUCOSE BLD-MCNC: 152 MG/DL (ref 70–99)
HCT VFR BLD CALC: 42.9 % (ref 37–47)
HEMOGLOBIN: 12.7 GM/DL (ref 12.5–16)
HUMAN METAPNEUMOVIRUS PCR: NOT DETECTED
IMMATURE NEUTROPHIL %: 0.3 % (ref 0–0.43)
INFLUENZA A BY PCR: NOT DETECTED
INFLUENZA A H1 (2009) PCR: NOT DETECTED
INFLUENZA A H1 PANDEMIC PCR: NOT DETECTED
INFLUENZA A H3 PCR: NOT DETECTED
INFLUENZA B BY PCR: NOT DETECTED
LACTATE: 0.5 MMOL/L (ref 0.4–2)
LEGIONELLA URINARY AG: NEGATIVE
LYMPHOCYTES ABSOLUTE: 0.7 K/CU MM
LYMPHOCYTES RELATIVE PERCENT: 9.9 % (ref 24–44)
MCH RBC QN AUTO: 28 PG (ref 27–31)
MCHC RBC AUTO-ENTMCNC: 29.6 % (ref 32–36)
MCV RBC AUTO: 94.5 FL (ref 78–100)
MONOCYTES ABSOLUTE: 0.1 K/CU MM
MONOCYTES RELATIVE PERCENT: 0.9 % (ref 0–4)
MYCOPLASMA PNEUMONIAE PCR: NOT DETECTED
NUCLEATED RBC %: 0 %
PARAINFLUENZA 1 PCR: NOT DETECTED
PARAINFLUENZA 2 PCR: NOT DETECTED
PARAINFLUENZA 3 PCR: NOT DETECTED
PARAINFLUENZA 4 PCR: NOT DETECTED
PDW BLD-RTO: 12.2 % (ref 11.7–14.9)
PLATELET # BLD: 168 K/CU MM (ref 140–440)
PMV BLD AUTO: 10.5 FL (ref 7.5–11.1)
POTASSIUM SERPL-SCNC: 4.4 MMOL/L (ref 3.5–5.1)
RBC # BLD: 4.54 M/CU MM (ref 4.2–5.4)
RHINOVIRUS ENTEROVIRUS PCR: NOT DETECTED
RSV PCR: NOT DETECTED
SEGMENTED NEUTROPHILS ABSOLUTE COUNT: 6 K/CU MM
SEGMENTED NEUTROPHILS RELATIVE PERCENT: 88.8 % (ref 36–66)
SODIUM BLD-SCNC: 137 MMOL/L (ref 135–145)
STREP PNEUMONIAE ANTIGEN: NORMAL
TOTAL IMMATURE NEUTOROPHIL: 0.02 K/CU MM
TOTAL NUCLEATED RBC: 0 K/CU MM
WBC # BLD: 6.8 K/CU MM (ref 4–10.5)

## 2019-09-01 PROCEDURE — 87449 NOS EACH ORGANISM AG IA: CPT

## 2019-09-01 PROCEDURE — 87581 M.PNEUMON DNA AMP PROBE: CPT

## 2019-09-01 PROCEDURE — 83605 ASSAY OF LACTIC ACID: CPT

## 2019-09-01 PROCEDURE — 6370000000 HC RX 637 (ALT 250 FOR IP): Performed by: STUDENT IN AN ORGANIZED HEALTH CARE EDUCATION/TRAINING PROGRAM

## 2019-09-01 PROCEDURE — 2700000000 HC OXYGEN THERAPY PER DAY

## 2019-09-01 PROCEDURE — 87486 CHLMYD PNEUM DNA AMP PROBE: CPT

## 2019-09-01 PROCEDURE — 2580000003 HC RX 258: Performed by: STUDENT IN AN ORGANIZED HEALTH CARE EDUCATION/TRAINING PROGRAM

## 2019-09-01 PROCEDURE — 94640 AIRWAY INHALATION TREATMENT: CPT

## 2019-09-01 PROCEDURE — 87899 AGENT NOS ASSAY W/OPTIC: CPT

## 2019-09-01 PROCEDURE — 87205 SMEAR GRAM STAIN: CPT

## 2019-09-01 PROCEDURE — 93010 ELECTROCARDIOGRAM REPORT: CPT | Performed by: INTERNAL MEDICINE

## 2019-09-01 PROCEDURE — 94660 CPAP INITIATION&MGMT: CPT

## 2019-09-01 PROCEDURE — 87633 RESP VIRUS 12-25 TARGETS: CPT

## 2019-09-01 PROCEDURE — 94761 N-INVAS EAR/PLS OXIMETRY MLT: CPT

## 2019-09-01 PROCEDURE — 1200000000 HC SEMI PRIVATE

## 2019-09-01 PROCEDURE — 80048 BASIC METABOLIC PNL TOTAL CA: CPT

## 2019-09-01 PROCEDURE — 87798 DETECT AGENT NOS DNA AMP: CPT

## 2019-09-01 PROCEDURE — 6360000002 HC RX W HCPCS: Performed by: STUDENT IN AN ORGANIZED HEALTH CARE EDUCATION/TRAINING PROGRAM

## 2019-09-01 PROCEDURE — 85025 COMPLETE CBC W/AUTO DIFF WBC: CPT

## 2019-09-01 PROCEDURE — 36415 COLL VENOUS BLD VENIPUNCTURE: CPT

## 2019-09-01 RX ORDER — VARENICLINE TARTRATE 0.5 MG/1
0.5 TABLET, FILM COATED ORAL 2 TIMES DAILY
Status: DISCONTINUED | OUTPATIENT
Start: 2019-09-01 | End: 2019-09-02 | Stop reason: HOSPADM

## 2019-09-01 RX ADMIN — SODIUM CHLORIDE, PRESERVATIVE FREE 10 ML: 5 INJECTION INTRAVENOUS at 20:57

## 2019-09-01 RX ADMIN — TRAZODONE HYDROCHLORIDE 150 MG: 50 TABLET ORAL at 20:56

## 2019-09-01 RX ADMIN — BUPRENORPHINE AND NALOXONE 1 TABLET: 8; 2 TABLET SUBLINGUAL at 08:45

## 2019-09-01 RX ADMIN — VARENICLINE TARTRATE 0.5 MG: 0.5 TABLET, FILM COATED ORAL at 15:50

## 2019-09-01 RX ADMIN — Medication 2 PUFF: at 23:14

## 2019-09-01 RX ADMIN — Medication 2 PUFF: at 08:17

## 2019-09-01 RX ADMIN — IPRATROPIUM BROMIDE AND ALBUTEROL SULFATE 1 AMPULE: .5; 3 SOLUTION RESPIRATORY (INHALATION) at 08:14

## 2019-09-01 RX ADMIN — METHYLPREDNISOLONE SODIUM SUCCINATE 40 MG: 125 INJECTION, POWDER, LYOPHILIZED, FOR SOLUTION INTRAMUSCULAR; INTRAVENOUS at 08:44

## 2019-09-01 RX ADMIN — SODIUM CHLORIDE, PRESERVATIVE FREE 10 ML: 5 INJECTION INTRAVENOUS at 08:46

## 2019-09-01 RX ADMIN — CEFTRIAXONE 1 G: 1 INJECTION, POWDER, FOR SOLUTION INTRAMUSCULAR; INTRAVENOUS at 08:45

## 2019-09-01 RX ADMIN — IPRATROPIUM BROMIDE AND ALBUTEROL SULFATE 1 AMPULE: .5; 3 SOLUTION RESPIRATORY (INHALATION) at 15:27

## 2019-09-01 RX ADMIN — IPRATROPIUM BROMIDE AND ALBUTEROL SULFATE 1 AMPULE: .5; 3 SOLUTION RESPIRATORY (INHALATION) at 11:37

## 2019-09-01 RX ADMIN — IPRATROPIUM BROMIDE AND ALBUTEROL SULFATE 1 AMPULE: .5; 3 SOLUTION RESPIRATORY (INHALATION) at 23:13

## 2019-09-01 RX ADMIN — ENOXAPARIN SODIUM 40 MG: 100 INJECTION SUBCUTANEOUS at 08:43

## 2019-09-01 ASSESSMENT — PAIN SCALES - GENERAL
PAINLEVEL_OUTOF10: 0

## 2019-09-02 VITALS
HEIGHT: 64 IN | HEART RATE: 80 BPM | OXYGEN SATURATION: 95 % | BODY MASS INDEX: 26.51 KG/M2 | SYSTOLIC BLOOD PRESSURE: 99 MMHG | WEIGHT: 155.3 LBS | RESPIRATION RATE: 18 BRPM | TEMPERATURE: 97.8 F | DIASTOLIC BLOOD PRESSURE: 59 MMHG

## 2019-09-02 LAB
GRAM SMEAR: NORMAL
Lab: NORMAL
SPECIMEN: NORMAL

## 2019-09-02 PROCEDURE — 6370000000 HC RX 637 (ALT 250 FOR IP): Performed by: STUDENT IN AN ORGANIZED HEALTH CARE EDUCATION/TRAINING PROGRAM

## 2019-09-02 PROCEDURE — 6360000002 HC RX W HCPCS: Performed by: STUDENT IN AN ORGANIZED HEALTH CARE EDUCATION/TRAINING PROGRAM

## 2019-09-02 PROCEDURE — 94660 CPAP INITIATION&MGMT: CPT

## 2019-09-02 PROCEDURE — 2700000000 HC OXYGEN THERAPY PER DAY

## 2019-09-02 PROCEDURE — 94640 AIRWAY INHALATION TREATMENT: CPT

## 2019-09-02 PROCEDURE — 2580000003 HC RX 258: Performed by: STUDENT IN AN ORGANIZED HEALTH CARE EDUCATION/TRAINING PROGRAM

## 2019-09-02 PROCEDURE — 94761 N-INVAS EAR/PLS OXIMETRY MLT: CPT

## 2019-09-02 RX ORDER — VARENICLINE TARTRATE 0.5 MG/1
0.5 TABLET, FILM COATED ORAL 2 TIMES DAILY
Qty: 60 TABLET | Refills: 0 | Status: SHIPPED | OUTPATIENT
Start: 2019-09-02 | End: 2021-04-14

## 2019-09-02 RX ADMIN — IPRATROPIUM BROMIDE AND ALBUTEROL SULFATE 1 AMPULE: .5; 3 SOLUTION RESPIRATORY (INHALATION) at 11:21

## 2019-09-02 RX ADMIN — ENOXAPARIN SODIUM 40 MG: 100 INJECTION SUBCUTANEOUS at 09:57

## 2019-09-02 RX ADMIN — SODIUM CHLORIDE, PRESERVATIVE FREE 10 ML: 5 INJECTION INTRAVENOUS at 09:58

## 2019-09-02 RX ADMIN — CEFTRIAXONE 1 G: 1 INJECTION, POWDER, FOR SOLUTION INTRAMUSCULAR; INTRAVENOUS at 09:57

## 2019-09-02 RX ADMIN — BUPRENORPHINE AND NALOXONE 1 TABLET: 8; 2 TABLET SUBLINGUAL at 09:57

## 2019-09-02 RX ADMIN — IPRATROPIUM BROMIDE AND ALBUTEROL SULFATE 1 AMPULE: .5; 3 SOLUTION RESPIRATORY (INHALATION) at 07:32

## 2019-09-02 RX ADMIN — VARENICLINE TARTRATE 0.5 MG: 0.5 TABLET, FILM COATED ORAL at 09:57

## 2019-09-02 ASSESSMENT — PAIN SCALES - GENERAL
PAINLEVEL_OUTOF10: 0

## 2019-09-02 NOTE — PROGRESS NOTES
AM assessment completed. Patient resting in bed. Bipap now off. States wore it all night. Denies pain or other needs. Call light in reach.
Discharge instructions reviewed with pt and all questions were answered. Pt was made aware to follow up with PCP and Dr Adenike Oconnell. Information to schedule appointments was provided. Education was also provided on chantix, smoking cessation, copd exacerbation, oxygen therapy, and chronic conditions. Pt verbalized understanding. IV was removed and pt was instructed to call when her ride arrives.
Placed on BIPAP per order tolerating well will continue to monitor.
peripheral edema. GI Abdomen is not distended. Rectal exam deferred.  Fraser catheter is not present. HEME/LYMPH No petechiae or ecchymoses. MSK Spontaneous movement of BL upper extremities  SKIN Normal coloration, warm, dry. NEURO Cranial nerves appear grossly intact  PSYCH Awake, alert.  Oriented x4    Medications:   Medications:    traZODone  150 mg Oral Nightly    mometasone-formoterol  2 puff Inhalation BID    enoxaparin  40 mg Subcutaneous Daily    ipratropium-albuterol  1 ampule Inhalation Q4H WA    methylPREDNISolone  40 mg Intravenous Q12H    sodium chloride flush  10 mL Intravenous 2 times per day    cefTRIAXone (ROCEPHIN) IV  1 g Intravenous Q24H    buprenorphine-naloxone  1 tablet Sublingual Daily      Infusions:   PRN Meds:   magnesium hydroxide 30 mL Daily PRN   sodium chloride flush 10 mL PRN   ondansetron 4 mg Q6H PRN         Electronically signed by Deondre Serrano DO on 9/1/2019 at 9:47 AM

## 2019-09-02 NOTE — DISCHARGE SUMMARY
Discharge Summary    Name:  Cara Springer /Age/Sex: 1960  (66 y.o. female)   MRN & CSN:  [id number] & [id number] Admission Date/Time: 2019  5:12 AM   Attending:  Brittani Johnson DO Discharging Physician: Brittani Johnson DO     HPI and Hospital Course:   Cara Springer is a 66 y.o. female who presents with: hypercapnia with acute encephalopathy   · Acute on chronic respiratory failure/ COPDE  · Chronic oxygen dependency, 3L  · Insomnia, trazodone  · KING, not yet on bipap routinely. She is to finish her OP work up for a new bipap and getting the settings established. OP f/u with abe. · Chronic pain syndrome, controlled with suboxone  · Chronic tobacco abuse, continued, wrote for welbutrin. She is going to f/u with her pcp to prior auth, if needed. The patient expressed appropriate understanding of and agreement with the discharge recommendations, medications, and plan. Medications as below.      Consults this admission:  IP CONSULT TO HOSPITALIST    Discharge Instruction:   Follow up appointments: pulm  Primary care physician:  within 2 weeks    Diet:  General/cardiac/ADA/as tolerated  Activity: {discharge activity: as tolerated  Disposition: Discharged to:   [x]Home, []HHC, []SNF, []Acute Rehab, []Hospice   Condition on discharge: Stable    Discharge Medications:      Gretchen Roa   [place] Medication Instructions [facility]:[id number]    Printed on:19 1139   Medication Information                      albuterol (PROVENTIL) (2.5 MG/3ML) 0.083% nebulizer solution  Take 3 mLs by nebulization every 6 hours as needed for Wheezing             albuterol sulfate  (90 Base) MCG/ACT inhaler  INHALE 2 PUFFS BY MOUTH EVERY 6 HOURS             buprenorphine-naloxone (SUBOXONE) 8-2 MG FILM SL film  Place 1 Film under the tongue daily             CPAP Machine MISC  by CPAP route nightly             mometasone-formoterol (DULERA) 100-5 MCG/ACT inhaler  Inhale 2 puffs into the lungs 2 times

## 2019-09-04 ENCOUNTER — HOSPITAL ENCOUNTER (OUTPATIENT)
Dept: SLEEP CENTER | Age: 59
Discharge: HOME | End: 2019-09-04
Payer: COMMERCIAL

## 2019-09-04 VITALS — HEIGHT: 64 IN | WEIGHT: 154 LBS | BODY MASS INDEX: 26.29 KG/M2

## 2019-09-04 DIAGNOSIS — G47.33 OBSTRUCTIVE SLEEP APNEA: Primary | ICD-10-CM

## 2019-09-04 PROCEDURE — 95810 POLYSOM 6/> YRS 4/> PARAM: CPT

## 2019-09-04 ASSESSMENT — SLEEP AND FATIGUE QUESTIONNAIRES
HOW LIKELY ARE YOU TO NOD OFF OR FALL ASLEEP IN A CAR, WHILE STOPPED FOR A FEW MINUTES IN TRAFFIC: 0
HOW LIKELY ARE YOU TO NOD OFF OR FALL ASLEEP WHILE LYING DOWN TO REST IN THE AFTERNOON WHEN CIRCUMSTANCES PERMIT: 3
HOW LIKELY ARE YOU TO NOD OFF OR FALL ASLEEP WHILE SITTING AND TALKING TO SOMEONE: 0
HOW LIKELY ARE YOU TO NOD OFF OR FALL ASLEEP WHEN YOU ARE A PASSENGER IN A CAR FOR AN HOUR WITHOUT A BREAK: 2
HOW LIKELY ARE YOU TO NOD OFF OR FALL ASLEEP WHILE WATCHING TV: 3
HOW LIKELY ARE YOU TO NOD OFF OR FALL ASLEEP WHILE SITTING INACTIVE IN A PUBLIC PLACE: 0
ESS TOTAL SCORE: 9
HOW LIKELY ARE YOU TO NOD OFF OR FALL ASLEEP WHILE SITTING AND READING: 1
HOW LIKELY ARE YOU TO NOD OFF OR FALL ASLEEP WHILE SITTING QUIETLY AFTER LUNCH WITHOUT ALCOHOL: 0

## 2019-09-05 NOTE — PROGRESS NOTES
9/5/2019  sleep study  for Gallo Garvey  1960 is complete. Results are pending physician review.     Electronically signed by Elizabeth Rangel on 9/5/2019 at 7:27 AM

## 2019-09-10 LAB — STATUS: NORMAL

## 2019-11-30 ENCOUNTER — APPOINTMENT (OUTPATIENT)
Dept: GENERAL RADIOLOGY | Age: 59
End: 2019-11-30
Payer: COMMERCIAL

## 2019-11-30 ENCOUNTER — HOSPITAL ENCOUNTER (EMERGENCY)
Age: 59
Discharge: HOME | End: 2019-11-30
Attending: EMERGENCY MEDICINE
Payer: COMMERCIAL

## 2019-11-30 VITALS
SYSTOLIC BLOOD PRESSURE: 110 MMHG | RESPIRATION RATE: 18 BRPM | OXYGEN SATURATION: 94 % | HEART RATE: 82 BPM | WEIGHT: 150 LBS | DIASTOLIC BLOOD PRESSURE: 67 MMHG | TEMPERATURE: 98.8 F | HEIGHT: 64 IN | BODY MASS INDEX: 25.61 KG/M2

## 2019-11-30 DIAGNOSIS — R06.02 SHORTNESS OF BREATH: Primary | ICD-10-CM

## 2019-11-30 DIAGNOSIS — R05.9 COUGH: ICD-10-CM

## 2019-11-30 LAB
ALBUMIN SERPL-MCNC: 3.5 GM/DL (ref 3.4–5)
ALP BLD-CCNC: 72 IU/L (ref 40–128)
ALT SERPL-CCNC: 10 U/L (ref 10–40)
ANION GAP SERPL CALCULATED.3IONS-SCNC: 6 MMOL/L (ref 4–16)
AST SERPL-CCNC: 19 IU/L (ref 15–37)
BASE EXCESS MIXED: ABNORMAL (ref 0–2.3)
BASOPHILS ABSOLUTE: 0 K/CU MM
BASOPHILS RELATIVE PERCENT: 0.7 % (ref 0–1)
BILIRUB SERPL-MCNC: 0.2 MG/DL (ref 0–1)
BUN BLDV-MCNC: 7 MG/DL (ref 6–23)
CALCIUM SERPL-MCNC: 8.8 MG/DL (ref 8.3–10.6)
CHLORIDE BLD-SCNC: 95 MMOL/L (ref 99–110)
CO2: 38 MMOL/L (ref 21–32)
COMMENT: ABNORMAL
CREAT SERPL-MCNC: 0.4 MG/DL (ref 0.6–1.1)
DIFFERENTIAL TYPE: ABNORMAL
EOSINOPHILS ABSOLUTE: 0.2 K/CU MM
EOSINOPHILS RELATIVE PERCENT: 4.4 % (ref 0–3)
GFR AFRICAN AMERICAN: >60 ML/MIN/1.73M2
GFR NON-AFRICAN AMERICAN: >60 ML/MIN/1.73M2
GLUCOSE BLD-MCNC: 105 MG/DL (ref 70–99)
HCO3 VENOUS: 47.5 MMOL/L (ref 19–25)
HCT VFR BLD CALC: 41.3 % (ref 37–47)
HEMOGLOBIN: 11.9 GM/DL (ref 12.5–16)
IMMATURE NEUTROPHIL %: 0.4 % (ref 0–0.43)
LYMPHOCYTES ABSOLUTE: 1.6 K/CU MM
LYMPHOCYTES RELATIVE PERCENT: 28.8 % (ref 24–44)
MCH RBC QN AUTO: 28.3 PG (ref 27–31)
MCHC RBC AUTO-ENTMCNC: 28.8 % (ref 32–36)
MCV RBC AUTO: 98.3 FL (ref 78–100)
MONOCYTES ABSOLUTE: 0.7 K/CU MM
MONOCYTES RELATIVE PERCENT: 11.9 % (ref 0–4)
NUCLEATED RBC %: 0 %
O2 SAT, VEN: 92.7 % (ref 50–70)
PCO2, VEN: 90 MMHG (ref 38–52)
PDW BLD-RTO: 12.7 % (ref 11.7–14.9)
PH VENOUS: 7.33 (ref 7.32–7.42)
PLATELET # BLD: 140 K/CU MM (ref 140–440)
PMV BLD AUTO: 10.5 FL (ref 7.5–11.1)
PO2, VEN: 144 MMHG (ref 28–48)
POTASSIUM SERPL-SCNC: 4.9 MMOL/L (ref 3.5–5.1)
PRO-BNP: 140.8 PG/ML
RBC # BLD: 4.2 M/CU MM (ref 4.2–5.4)
SEGMENTED NEUTROPHILS ABSOLUTE COUNT: 3 K/CU MM
SEGMENTED NEUTROPHILS RELATIVE PERCENT: 53.8 % (ref 36–66)
SODIUM BLD-SCNC: 139 MMOL/L (ref 135–145)
TOTAL IMMATURE NEUTOROPHIL: 0.02 K/CU MM
TOTAL NUCLEATED RBC: 0 K/CU MM
TOTAL PROTEIN: 6 GM/DL (ref 6.4–8.2)
TROPONIN T: <0.01 NG/ML
WBC # BLD: 5.5 K/CU MM (ref 4–10.5)

## 2019-11-30 PROCEDURE — 85025 COMPLETE CBC W/AUTO DIFF WBC: CPT

## 2019-11-30 PROCEDURE — 6370000000 HC RX 637 (ALT 250 FOR IP): Performed by: EMERGENCY MEDICINE

## 2019-11-30 PROCEDURE — 82805 BLOOD GASES W/O2 SATURATION: CPT

## 2019-11-30 PROCEDURE — 83880 ASSAY OF NATRIURETIC PEPTIDE: CPT

## 2019-11-30 PROCEDURE — 99285 EMERGENCY DEPT VISIT HI MDM: CPT

## 2019-11-30 PROCEDURE — 93005 ELECTROCARDIOGRAM TRACING: CPT | Performed by: EMERGENCY MEDICINE

## 2019-11-30 PROCEDURE — 84484 ASSAY OF TROPONIN QUANT: CPT

## 2019-11-30 PROCEDURE — 80053 COMPREHEN METABOLIC PANEL: CPT

## 2019-11-30 PROCEDURE — 71046 X-RAY EXAM CHEST 2 VIEWS: CPT

## 2019-11-30 RX ORDER — IPRATROPIUM BROMIDE AND ALBUTEROL SULFATE 2.5; .5 MG/3ML; MG/3ML
1 SOLUTION RESPIRATORY (INHALATION) ONCE
Status: COMPLETED | OUTPATIENT
Start: 2019-11-30 | End: 2019-11-30

## 2019-11-30 RX ORDER — AZITHROMYCIN 250 MG/1
TABLET, FILM COATED ORAL
Qty: 1 PACKET | Refills: 0 | Status: SHIPPED | OUTPATIENT
Start: 2019-11-30 | End: 2019-12-10

## 2019-11-30 RX ORDER — PREDNISONE 10 MG/1
40 TABLET ORAL DAILY
Qty: 16 TABLET | Refills: 0 | Status: SHIPPED | OUTPATIENT
Start: 2019-11-30 | End: 2019-12-04

## 2019-11-30 RX ADMIN — IPRATROPIUM BROMIDE AND ALBUTEROL SULFATE 1 AMPULE: .5; 3 SOLUTION RESPIRATORY (INHALATION) at 18:18

## 2019-12-01 PROCEDURE — 93010 ELECTROCARDIOGRAM REPORT: CPT | Performed by: INTERNAL MEDICINE

## 2019-12-03 LAB
EKG ATRIAL RATE: 79 BPM
EKG DIAGNOSIS: NORMAL
EKG P AXIS: 81 DEGREES
EKG P-R INTERVAL: 152 MS
EKG Q-T INTERVAL: 388 MS
EKG QRS DURATION: 86 MS
EKG QTC CALCULATION (BAZETT): 444 MS
EKG R AXIS: 78 DEGREES
EKG T AXIS: 74 DEGREES
EKG VENTRICULAR RATE: 79 BPM

## 2019-12-27 ENCOUNTER — APPOINTMENT (OUTPATIENT)
Dept: GENERAL RADIOLOGY | Age: 59
DRG: 140 | End: 2019-12-27
Payer: COMMERCIAL

## 2019-12-27 ENCOUNTER — HOSPITAL ENCOUNTER (INPATIENT)
Age: 59
LOS: 3 days | Discharge: HOME | DRG: 140 | End: 2019-12-30
Attending: EMERGENCY MEDICINE | Admitting: INTERNAL MEDICINE
Payer: COMMERCIAL

## 2019-12-27 DIAGNOSIS — J44.1 COPD EXACERBATION (HCC): Primary | ICD-10-CM

## 2019-12-27 DIAGNOSIS — J44.1 COPD WITH ACUTE EXACERBATION (HCC): ICD-10-CM

## 2019-12-27 DIAGNOSIS — S49.92XA INJURY OF LEFT UPPER ARM, INITIAL ENCOUNTER: ICD-10-CM

## 2019-12-27 LAB
ADENOVIRUS DETECTION BY PCR: NOT DETECTED
ALBUMIN SERPL-MCNC: 3.9 GM/DL (ref 3.4–5)
ALP BLD-CCNC: 78 IU/L (ref 40–129)
ALT SERPL-CCNC: 13 U/L (ref 10–40)
ANION GAP SERPL CALCULATED.3IONS-SCNC: 4 MMOL/L (ref 4–16)
AST SERPL-CCNC: 22 IU/L (ref 15–37)
BASE EXCESS MIXED: ABNORMAL (ref 0–2.3)
BASOPHILS ABSOLUTE: 0.1 K/CU MM
BASOPHILS RELATIVE PERCENT: 0.6 % (ref 0–1)
BILIRUB SERPL-MCNC: 0.2 MG/DL (ref 0–1)
BORDETELLA PERTUSSIS PCR: NOT DETECTED
BUN BLDV-MCNC: 6 MG/DL (ref 6–23)
CALCIUM SERPL-MCNC: 8.8 MG/DL (ref 8.3–10.6)
CHLAMYDOPHILA PNEUMONIA PCR: NOT DETECTED
CHLORIDE BLD-SCNC: 88 MMOL/L (ref 99–110)
CO2: 41 MMOL/L (ref 21–32)
COMMENT: ABNORMAL
CORONAVIRUS 229E PCR: NOT DETECTED
CORONAVIRUS HKU1 PCR: NOT DETECTED
CORONAVIRUS NL63 PCR: NOT DETECTED
CORONAVIRUS OC43 PCR: NOT DETECTED
CREAT SERPL-MCNC: 0.4 MG/DL (ref 0.6–1.1)
DIFFERENTIAL TYPE: ABNORMAL
EOSINOPHILS ABSOLUTE: 0.1 K/CU MM
EOSINOPHILS RELATIVE PERCENT: 1.5 % (ref 0–3)
GFR AFRICAN AMERICAN: >60 ML/MIN/1.73M2
GFR NON-AFRICAN AMERICAN: >60 ML/MIN/1.73M2
GLUCOSE BLD-MCNC: 149 MG/DL (ref 70–99)
HCO3 VENOUS: 45.6 MMOL/L (ref 19–25)
HCT VFR BLD CALC: 43.8 % (ref 37–47)
HEMOGLOBIN: 13.2 GM/DL (ref 12.5–16)
HUMAN METAPNEUMOVIRUS PCR: NOT DETECTED
IMMATURE NEUTROPHIL %: 0.2 % (ref 0–0.43)
INFLUENZA A BY PCR: NOT DETECTED
INFLUENZA A H1 (2009) PCR: NOT DETECTED
INFLUENZA A H1 PANDEMIC PCR: NOT DETECTED
INFLUENZA A H3 PCR: NOT DETECTED
INFLUENZA B BY PCR: NOT DETECTED
LYMPHOCYTES ABSOLUTE: 2.3 K/CU MM
LYMPHOCYTES RELATIVE PERCENT: 26.5 % (ref 24–44)
MCH RBC QN AUTO: 28.8 PG (ref 27–31)
MCHC RBC AUTO-ENTMCNC: 30.1 % (ref 32–36)
MCV RBC AUTO: 95.4 FL (ref 78–100)
MONOCYTES ABSOLUTE: 1.1 K/CU MM
MONOCYTES RELATIVE PERCENT: 12.7 % (ref 0–4)
MYCOPLASMA PNEUMONIAE PCR: NOT DETECTED
NUCLEATED RBC %: 0 %
O2 SAT, VEN: 85.3 % (ref 50–70)
PARAINFLUENZA 1 PCR: NOT DETECTED
PARAINFLUENZA 2 PCR: NOT DETECTED
PARAINFLUENZA 3 PCR: NOT DETECTED
PARAINFLUENZA 4 PCR: NOT DETECTED
PCO2, VEN: 97 MMHG (ref 38–52)
PDW BLD-RTO: 12.3 % (ref 11.7–14.9)
PH VENOUS: 7.28 (ref 7.32–7.42)
PLATELET # BLD: 172 K/CU MM (ref 140–440)
PMV BLD AUTO: 10.6 FL (ref 7.5–11.1)
PO2, VEN: 51 MMHG (ref 28–48)
POTASSIUM SERPL-SCNC: 3.8 MMOL/L (ref 3.5–5.1)
PRO-BNP: 257.9 PG/ML
RBC # BLD: 4.59 M/CU MM (ref 4.2–5.4)
RHINOVIRUS ENTEROVIRUS PCR: NOT DETECTED
RSV PCR: NOT DETECTED
SEGMENTED NEUTROPHILS ABSOLUTE COUNT: 5.2 K/CU MM
SEGMENTED NEUTROPHILS RELATIVE PERCENT: 58.5 % (ref 36–66)
SODIUM BLD-SCNC: 133 MMOL/L (ref 135–145)
TOTAL IMMATURE NEUTOROPHIL: 0.02 K/CU MM
TOTAL NUCLEATED RBC: 0 K/CU MM
TOTAL PROTEIN: 7.1 GM/DL (ref 6.4–8.2)
TROPONIN T: <0.01 NG/ML
WBC # BLD: 8.8 K/CU MM (ref 4–10.5)

## 2019-12-27 PROCEDURE — 93005 ELECTROCARDIOGRAM TRACING: CPT | Performed by: PHYSICIAN ASSISTANT

## 2019-12-27 PROCEDURE — 84484 ASSAY OF TROPONIN QUANT: CPT

## 2019-12-27 PROCEDURE — 71045 X-RAY EXAM CHEST 1 VIEW: CPT

## 2019-12-27 PROCEDURE — 2060000000 HC ICU INTERMEDIATE R&B

## 2019-12-27 PROCEDURE — 87486 CHLMYD PNEUM DNA AMP PROBE: CPT

## 2019-12-27 PROCEDURE — 94660 CPAP INITIATION&MGMT: CPT

## 2019-12-27 PROCEDURE — 82805 BLOOD GASES W/O2 SATURATION: CPT

## 2019-12-27 PROCEDURE — 87449 NOS EACH ORGANISM AG IA: CPT

## 2019-12-27 PROCEDURE — 94640 AIRWAY INHALATION TREATMENT: CPT

## 2019-12-27 PROCEDURE — 80053 COMPREHEN METABOLIC PANEL: CPT

## 2019-12-27 PROCEDURE — 6370000000 HC RX 637 (ALT 250 FOR IP): Performed by: EMERGENCY MEDICINE

## 2019-12-27 PROCEDURE — 6370000000 HC RX 637 (ALT 250 FOR IP): Performed by: INTERNAL MEDICINE

## 2019-12-27 PROCEDURE — 99285 EMERGENCY DEPT VISIT HI MDM: CPT

## 2019-12-27 PROCEDURE — 93010 ELECTROCARDIOGRAM REPORT: CPT | Performed by: INTERNAL MEDICINE

## 2019-12-27 PROCEDURE — 6360000002 HC RX W HCPCS: Performed by: INTERNAL MEDICINE

## 2019-12-27 PROCEDURE — 87633 RESP VIRUS 12-25 TARGETS: CPT

## 2019-12-27 PROCEDURE — 87581 M.PNEUMON DNA AMP PROBE: CPT

## 2019-12-27 PROCEDURE — 6370000000 HC RX 637 (ALT 250 FOR IP): Performed by: PHYSICIAN ASSISTANT

## 2019-12-27 PROCEDURE — 85025 COMPLETE CBC W/AUTO DIFF WBC: CPT

## 2019-12-27 PROCEDURE — 2580000003 HC RX 258: Performed by: INTERNAL MEDICINE

## 2019-12-27 PROCEDURE — 87798 DETECT AGENT NOS DNA AMP: CPT

## 2019-12-27 PROCEDURE — 73060 X-RAY EXAM OF HUMERUS: CPT

## 2019-12-27 PROCEDURE — 83880 ASSAY OF NATRIURETIC PEPTIDE: CPT

## 2019-12-27 PROCEDURE — 94761 N-INVAS EAR/PLS OXIMETRY MLT: CPT

## 2019-12-27 PROCEDURE — 2700000000 HC OXYGEN THERAPY PER DAY

## 2019-12-27 PROCEDURE — 87899 AGENT NOS ASSAY W/OPTIC: CPT

## 2019-12-27 RX ORDER — VARENICLINE TARTRATE 0.5 MG/1
0.5 TABLET, FILM COATED ORAL 2 TIMES DAILY
Status: DISCONTINUED | OUTPATIENT
Start: 2019-12-27 | End: 2019-12-30 | Stop reason: HOSPADM

## 2019-12-27 RX ORDER — ONDANSETRON 2 MG/ML
4 INJECTION INTRAMUSCULAR; INTRAVENOUS EVERY 6 HOURS PRN
Status: DISCONTINUED | OUTPATIENT
Start: 2019-12-27 | End: 2019-12-30 | Stop reason: HOSPADM

## 2019-12-27 RX ORDER — PREDNISONE 10 MG/1
40 TABLET ORAL DAILY
Status: DISCONTINUED | OUTPATIENT
Start: 2019-12-29 | End: 2019-12-30 | Stop reason: HOSPADM

## 2019-12-27 RX ORDER — SODIUM CHLORIDE 0.9 % (FLUSH) 0.9 %
10 SYRINGE (ML) INJECTION EVERY 12 HOURS SCHEDULED
Status: DISCONTINUED | OUTPATIENT
Start: 2019-12-27 | End: 2019-12-30 | Stop reason: HOSPADM

## 2019-12-27 RX ORDER — GUAIFENESIN 600 MG/1
600 TABLET, EXTENDED RELEASE ORAL 2 TIMES DAILY
Status: DISCONTINUED | OUTPATIENT
Start: 2019-12-27 | End: 2019-12-30 | Stop reason: HOSPADM

## 2019-12-27 RX ORDER — ALBUTEROL SULFATE 2.5 MG/3ML
2.5 SOLUTION RESPIRATORY (INHALATION)
Status: DISCONTINUED | OUTPATIENT
Start: 2019-12-27 | End: 2019-12-30 | Stop reason: HOSPADM

## 2019-12-27 RX ORDER — NICOTINE 21 MG/24HR
1 PATCH, TRANSDERMAL 24 HOURS TRANSDERMAL DAILY
Status: DISCONTINUED | OUTPATIENT
Start: 2019-12-27 | End: 2019-12-30 | Stop reason: HOSPADM

## 2019-12-27 RX ORDER — LEVOFLOXACIN 5 MG/ML
500 INJECTION, SOLUTION INTRAVENOUS EVERY 24 HOURS
Status: DISCONTINUED | OUTPATIENT
Start: 2019-12-27 | End: 2019-12-30 | Stop reason: HOSPADM

## 2019-12-27 RX ORDER — BUPRENORPHINE AND NALOXONE 8; 2 MG/1; MG/1
1 FILM, SOLUBLE BUCCAL; SUBLINGUAL DAILY
Status: DISCONTINUED | OUTPATIENT
Start: 2019-12-27 | End: 2019-12-27 | Stop reason: CLARIF

## 2019-12-27 RX ORDER — IPRATROPIUM BROMIDE AND ALBUTEROL SULFATE 2.5; .5 MG/3ML; MG/3ML
2 SOLUTION RESPIRATORY (INHALATION) ONCE
Status: COMPLETED | OUTPATIENT
Start: 2019-12-27 | End: 2019-12-27

## 2019-12-27 RX ORDER — PREDNISONE 20 MG/1
60 TABLET ORAL ONCE
Status: COMPLETED | OUTPATIENT
Start: 2019-12-27 | End: 2019-12-27

## 2019-12-27 RX ORDER — BUPRENORPHINE HYDROCHLORIDE AND NALOXONE HYDROCHLORIDE DIHYDRATE 8; 2 MG/1; MG/1
1 TABLET SUBLINGUAL DAILY
Status: DISCONTINUED | OUTPATIENT
Start: 2019-12-28 | End: 2019-12-30 | Stop reason: HOSPADM

## 2019-12-27 RX ORDER — ACETAMINOPHEN 325 MG/1
650 TABLET ORAL EVERY 4 HOURS PRN
Status: DISCONTINUED | OUTPATIENT
Start: 2019-12-27 | End: 2019-12-30 | Stop reason: HOSPADM

## 2019-12-27 RX ORDER — IPRATROPIUM BROMIDE AND ALBUTEROL SULFATE 2.5; .5 MG/3ML; MG/3ML
1 SOLUTION RESPIRATORY (INHALATION) ONCE
Status: COMPLETED | OUTPATIENT
Start: 2019-12-27 | End: 2019-12-27

## 2019-12-27 RX ORDER — IPRATROPIUM BROMIDE AND ALBUTEROL SULFATE 2.5; .5 MG/3ML; MG/3ML
1 SOLUTION RESPIRATORY (INHALATION)
Status: DISCONTINUED | OUTPATIENT
Start: 2019-12-27 | End: 2019-12-30 | Stop reason: HOSPADM

## 2019-12-27 RX ORDER — SODIUM CHLORIDE 0.9 % (FLUSH) 0.9 %
10 SYRINGE (ML) INJECTION PRN
Status: DISCONTINUED | OUTPATIENT
Start: 2019-12-27 | End: 2019-12-30 | Stop reason: HOSPADM

## 2019-12-27 RX ORDER — PREGABALIN 100 MG/1
100 CAPSULE ORAL 3 TIMES DAILY
Status: ON HOLD | COMMUNITY
End: 2022-02-14 | Stop reason: HOSPADM

## 2019-12-27 RX ORDER — METHYLPREDNISOLONE SODIUM SUCCINATE 40 MG/ML
40 INJECTION, POWDER, LYOPHILIZED, FOR SOLUTION INTRAMUSCULAR; INTRAVENOUS EVERY 6 HOURS
Status: COMPLETED | OUTPATIENT
Start: 2019-12-27 | End: 2019-12-29

## 2019-12-27 RX ORDER — TRAZODONE HYDROCHLORIDE 50 MG/1
150 TABLET ORAL NIGHTLY
Status: DISCONTINUED | OUTPATIENT
Start: 2019-12-27 | End: 2019-12-30 | Stop reason: HOSPADM

## 2019-12-27 RX ORDER — PREGABALIN 25 MG/1
25 CAPSULE ORAL 2 TIMES DAILY
Status: DISCONTINUED | OUTPATIENT
Start: 2019-12-27 | End: 2019-12-30 | Stop reason: HOSPADM

## 2019-12-27 RX ADMIN — TRAZODONE HYDROCHLORIDE 150 MG: 50 TABLET ORAL at 20:35

## 2019-12-27 RX ADMIN — METHYLPREDNISOLONE SODIUM SUCCINATE 40 MG: 40 INJECTION, POWDER, FOR SOLUTION INTRAMUSCULAR; INTRAVENOUS at 11:16

## 2019-12-27 RX ADMIN — IPRATROPIUM BROMIDE AND ALBUTEROL SULFATE 1 AMPULE: .5; 3 SOLUTION RESPIRATORY (INHALATION) at 08:55

## 2019-12-27 RX ADMIN — METHYLPREDNISOLONE SODIUM SUCCINATE 40 MG: 40 INJECTION, POWDER, FOR SOLUTION INTRAMUSCULAR; INTRAVENOUS at 18:04

## 2019-12-27 RX ADMIN — SODIUM CHLORIDE, PRESERVATIVE FREE 10 ML: 5 INJECTION INTRAVENOUS at 11:16

## 2019-12-27 RX ADMIN — METHYLPREDNISOLONE SODIUM SUCCINATE 40 MG: 40 INJECTION, POWDER, FOR SOLUTION INTRAMUSCULAR; INTRAVENOUS at 23:40

## 2019-12-27 RX ADMIN — PREDNISONE 60 MG: 20 TABLET ORAL at 07:34

## 2019-12-27 RX ADMIN — IPRATROPIUM BROMIDE AND ALBUTEROL SULFATE 1 AMPULE: .5; 3 SOLUTION RESPIRATORY (INHALATION) at 12:29

## 2019-12-27 RX ADMIN — GUAIFENESIN 600 MG: 600 TABLET, EXTENDED RELEASE ORAL at 11:16

## 2019-12-27 RX ADMIN — IPRATROPIUM BROMIDE AND ALBUTEROL SULFATE 2 AMPULE: .5; 3 SOLUTION RESPIRATORY (INHALATION) at 07:12

## 2019-12-27 RX ADMIN — PREGABALIN 25 MG: 25 CAPSULE ORAL at 20:35

## 2019-12-27 RX ADMIN — VARENICLINE TARTRATE 0.5 MG: 0.5 TABLET, FILM COATED ORAL at 20:35

## 2019-12-27 RX ADMIN — ENOXAPARIN SODIUM 40 MG: 40 INJECTION SUBCUTANEOUS at 11:16

## 2019-12-27 RX ADMIN — LEVOFLOXACIN 500 MG: 5 INJECTION, SOLUTION INTRAVENOUS at 11:16

## 2019-12-27 RX ADMIN — SODIUM CHLORIDE, PRESERVATIVE FREE 10 ML: 5 INJECTION INTRAVENOUS at 20:35

## 2019-12-27 RX ADMIN — IPRATROPIUM BROMIDE AND ALBUTEROL SULFATE 1 AMPULE: .5; 3 SOLUTION RESPIRATORY (INHALATION) at 21:13

## 2019-12-27 RX ADMIN — GUAIFENESIN 600 MG: 600 TABLET, EXTENDED RELEASE ORAL at 20:35

## 2019-12-27 RX ADMIN — IPRATROPIUM BROMIDE AND ALBUTEROL SULFATE 1 AMPULE: .5; 3 SOLUTION RESPIRATORY (INHALATION) at 16:30

## 2019-12-27 ASSESSMENT — PAIN SCALES - GENERAL: PAINLEVEL_OUTOF10: 0

## 2019-12-28 LAB
ANION GAP SERPL CALCULATED.3IONS-SCNC: 6 MMOL/L (ref 4–16)
BASE EXCESS MIXED: ABNORMAL (ref 0–2.3)
BASOPHILS ABSOLUTE: 0 K/CU MM
BASOPHILS RELATIVE PERCENT: 0 % (ref 0–1)
BUN BLDV-MCNC: 10 MG/DL (ref 6–23)
CALCIUM SERPL-MCNC: 9.2 MG/DL (ref 8.3–10.6)
CARBON MONOXIDE, BLOOD: 2.8 % (ref 0–5)
CHLORIDE BLD-SCNC: 96 MMOL/L (ref 99–110)
CO2 CONTENT: 40.8 MMOL/L (ref 19–24)
CO2: 39 MMOL/L (ref 21–32)
COMMENT: ABNORMAL
CREAT SERPL-MCNC: 0.4 MG/DL (ref 0.6–1.1)
DIFFERENTIAL TYPE: ABNORMAL
EOSINOPHILS ABSOLUTE: 0 K/CU MM
EOSINOPHILS RELATIVE PERCENT: 0 % (ref 0–3)
GFR AFRICAN AMERICAN: >60 ML/MIN/1.73M2
GFR NON-AFRICAN AMERICAN: >60 ML/MIN/1.73M2
GLUCOSE BLD-MCNC: 164 MG/DL (ref 70–99)
HCO3 ARTERIAL: 38.7 MMOL/L (ref 18–23)
HCT VFR BLD CALC: 40.4 % (ref 37–47)
HEMOGLOBIN: 12.5 GM/DL (ref 12.5–16)
IMMATURE NEUTROPHIL %: 0.5 % (ref 0–0.43)
LACTATE: 0.6 MMOL/L (ref 0.4–2)
LEGIONELLA URINARY AG: NEGATIVE
LYMPHOCYTES ABSOLUTE: 0.8 K/CU MM
LYMPHOCYTES RELATIVE PERCENT: 7.4 % (ref 24–44)
MCH RBC QN AUTO: 28.8 PG (ref 27–31)
MCHC RBC AUTO-ENTMCNC: 30.9 % (ref 32–36)
MCV RBC AUTO: 93.1 FL (ref 78–100)
METHEMOGLOBIN ARTERIAL: 1.3 %
MONOCYTES ABSOLUTE: 0.2 K/CU MM
MONOCYTES RELATIVE PERCENT: 2 % (ref 0–4)
NUCLEATED RBC %: 0 %
O2 SATURATION: 94.7 % (ref 96–97)
PCO2 ARTERIAL: 67 MMHG (ref 32–45)
PDW BLD-RTO: 12 % (ref 11.7–14.9)
PH BLOOD: 7.37 (ref 7.34–7.45)
PLATELET # BLD: 169 K/CU MM (ref 140–440)
PMV BLD AUTO: 10.7 FL (ref 7.5–11.1)
PO2 ARTERIAL: 75 MMHG (ref 75–100)
POTASSIUM SERPL-SCNC: 4.2 MMOL/L (ref 3.5–5.1)
RBC # BLD: 4.34 M/CU MM (ref 4.2–5.4)
SEGMENTED NEUTROPHILS ABSOLUTE COUNT: 10 K/CU MM
SEGMENTED NEUTROPHILS RELATIVE PERCENT: 90.1 % (ref 36–66)
SODIUM BLD-SCNC: 141 MMOL/L (ref 135–145)
STREP PNEUMONIAE ANTIGEN: NORMAL
TOTAL IMMATURE NEUTOROPHIL: 0.06 K/CU MM
TOTAL NUCLEATED RBC: 0 K/CU MM
WBC # BLD: 11.1 K/CU MM (ref 4–10.5)

## 2019-12-28 PROCEDURE — 6360000002 HC RX W HCPCS: Performed by: INTERNAL MEDICINE

## 2019-12-28 PROCEDURE — 80048 BASIC METABOLIC PNL TOTAL CA: CPT

## 2019-12-28 PROCEDURE — 2060000000 HC ICU INTERMEDIATE R&B

## 2019-12-28 PROCEDURE — 6370000000 HC RX 637 (ALT 250 FOR IP): Performed by: INTERNAL MEDICINE

## 2019-12-28 PROCEDURE — 94660 CPAP INITIATION&MGMT: CPT

## 2019-12-28 PROCEDURE — 82803 BLOOD GASES ANY COMBINATION: CPT

## 2019-12-28 PROCEDURE — 2700000000 HC OXYGEN THERAPY PER DAY

## 2019-12-28 PROCEDURE — 2580000003 HC RX 258: Performed by: INTERNAL MEDICINE

## 2019-12-28 PROCEDURE — 94640 AIRWAY INHALATION TREATMENT: CPT

## 2019-12-28 PROCEDURE — 83605 ASSAY OF LACTIC ACID: CPT

## 2019-12-28 PROCEDURE — 85025 COMPLETE CBC W/AUTO DIFF WBC: CPT

## 2019-12-28 PROCEDURE — 36415 COLL VENOUS BLD VENIPUNCTURE: CPT

## 2019-12-28 RX ADMIN — IPRATROPIUM BROMIDE AND ALBUTEROL SULFATE 1 AMPULE: .5; 3 SOLUTION RESPIRATORY (INHALATION) at 07:28

## 2019-12-28 RX ADMIN — IPRATROPIUM BROMIDE AND ALBUTEROL SULFATE 1 AMPULE: .5; 3 SOLUTION RESPIRATORY (INHALATION) at 11:03

## 2019-12-28 RX ADMIN — SALINE NASAL SPRAY 1 SPRAY: 1.5 SOLUTION NASAL at 16:43

## 2019-12-28 RX ADMIN — METHYLPREDNISOLONE SODIUM SUCCINATE 40 MG: 40 INJECTION, POWDER, FOR SOLUTION INTRAMUSCULAR; INTRAVENOUS at 17:34

## 2019-12-28 RX ADMIN — IPRATROPIUM BROMIDE AND ALBUTEROL SULFATE 1 AMPULE: .5; 3 SOLUTION RESPIRATORY (INHALATION) at 15:23

## 2019-12-28 RX ADMIN — Medication 2 PUFF: at 09:05

## 2019-12-28 RX ADMIN — PREGABALIN 25 MG: 25 CAPSULE ORAL at 20:16

## 2019-12-28 RX ADMIN — METHYLPREDNISOLONE SODIUM SUCCINATE 40 MG: 40 INJECTION, POWDER, FOR SOLUTION INTRAMUSCULAR; INTRAVENOUS at 05:54

## 2019-12-28 RX ADMIN — GUAIFENESIN 600 MG: 600 TABLET, EXTENDED RELEASE ORAL at 20:16

## 2019-12-28 RX ADMIN — VARENICLINE TARTRATE 0.5 MG: 0.5 TABLET, FILM COATED ORAL at 20:17

## 2019-12-28 RX ADMIN — SODIUM CHLORIDE, PRESERVATIVE FREE 10 ML: 5 INJECTION INTRAVENOUS at 20:17

## 2019-12-28 RX ADMIN — IPRATROPIUM BROMIDE AND ALBUTEROL SULFATE 1 AMPULE: .5; 3 SOLUTION RESPIRATORY (INHALATION) at 22:16

## 2019-12-28 RX ADMIN — THEOPHYLLINE ANHYDROUS 100 MG: 100 CAPSULE, EXTENDED RELEASE ORAL at 14:22

## 2019-12-28 RX ADMIN — THEOPHYLLINE ANHYDROUS 100 MG: 100 CAPSULE, EXTENDED RELEASE ORAL at 20:16

## 2019-12-28 RX ADMIN — METHYLPREDNISOLONE SODIUM SUCCINATE 40 MG: 40 INJECTION, POWDER, FOR SOLUTION INTRAMUSCULAR; INTRAVENOUS at 23:10

## 2019-12-28 RX ADMIN — METHYLPREDNISOLONE SODIUM SUCCINATE 40 MG: 40 INJECTION, POWDER, FOR SOLUTION INTRAMUSCULAR; INTRAVENOUS at 11:24

## 2019-12-28 RX ADMIN — TRAZODONE HYDROCHLORIDE 150 MG: 50 TABLET ORAL at 20:16

## 2019-12-28 RX ADMIN — GUAIFENESIN 600 MG: 600 TABLET, EXTENDED RELEASE ORAL at 08:48

## 2019-12-28 RX ADMIN — LEVOFLOXACIN 500 MG: 5 INJECTION, SOLUTION INTRAVENOUS at 11:24

## 2019-12-28 RX ADMIN — BUPRENORPHINE AND NALOXONE 1 TABLET: 8; 2 TABLET SUBLINGUAL at 08:48

## 2019-12-28 RX ADMIN — VARENICLINE TARTRATE 0.5 MG: 0.5 TABLET, FILM COATED ORAL at 08:48

## 2019-12-28 RX ADMIN — ENOXAPARIN SODIUM 40 MG: 40 INJECTION SUBCUTANEOUS at 08:48

## 2019-12-28 RX ADMIN — PREGABALIN 25 MG: 25 CAPSULE ORAL at 08:48

## 2019-12-28 ASSESSMENT — PAIN SCALES - GENERAL
PAINLEVEL_OUTOF10: 0
PAINLEVEL_OUTOF10: 1

## 2019-12-29 LAB
ANION GAP SERPL CALCULATED.3IONS-SCNC: 8 MMOL/L (ref 4–16)
BASE EXCESS MIXED: ABNORMAL (ref 0–2.3)
BUN BLDV-MCNC: 12 MG/DL (ref 6–23)
CALCIUM SERPL-MCNC: 9.1 MG/DL (ref 8.3–10.6)
CARBON MONOXIDE, BLOOD: 1.5 % (ref 0–5)
CHLORIDE BLD-SCNC: 96 MMOL/L (ref 99–110)
CO2 CONTENT: 40.3 MMOL/L (ref 19–24)
CO2: 36 MMOL/L (ref 21–32)
COMMENT: ABNORMAL
CREAT SERPL-MCNC: 0.4 MG/DL (ref 0.6–1.1)
GFR AFRICAN AMERICAN: >60 ML/MIN/1.73M2
GFR NON-AFRICAN AMERICAN: >60 ML/MIN/1.73M2
GLUCOSE BLD-MCNC: 161 MG/DL (ref 70–99)
HCO3 ARTERIAL: 38.4 MMOL/L (ref 18–23)
HCT VFR BLD CALC: 40.1 % (ref 37–47)
HEMOGLOBIN: 12.4 GM/DL (ref 12.5–16)
MCH RBC QN AUTO: 28.5 PG (ref 27–31)
MCHC RBC AUTO-ENTMCNC: 30.9 % (ref 32–36)
MCV RBC AUTO: 92.2 FL (ref 78–100)
METHEMOGLOBIN ARTERIAL: 1.3 %
O2 SATURATION: 95.7 % (ref 96–97)
PCO2 ARTERIAL: 62 MMHG (ref 32–45)
PDW BLD-RTO: 12.4 % (ref 11.7–14.9)
PH BLOOD: 7.4 (ref 7.34–7.45)
PLATELET # BLD: 209 K/CU MM (ref 140–440)
PMV BLD AUTO: 10.6 FL (ref 7.5–11.1)
PO2 ARTERIAL: 91 MMHG (ref 75–100)
POTASSIUM SERPL-SCNC: 4.1 MMOL/L (ref 3.5–5.1)
RBC # BLD: 4.35 M/CU MM (ref 4.2–5.4)
SODIUM BLD-SCNC: 140 MMOL/L (ref 135–145)
WBC # BLD: 14.5 K/CU MM (ref 4–10.5)

## 2019-12-29 PROCEDURE — 94761 N-INVAS EAR/PLS OXIMETRY MLT: CPT

## 2019-12-29 PROCEDURE — 80048 BASIC METABOLIC PNL TOTAL CA: CPT

## 2019-12-29 PROCEDURE — 36600 WITHDRAWAL OF ARTERIAL BLOOD: CPT

## 2019-12-29 PROCEDURE — 6360000002 HC RX W HCPCS: Performed by: INTERNAL MEDICINE

## 2019-12-29 PROCEDURE — 94640 AIRWAY INHALATION TREATMENT: CPT

## 2019-12-29 PROCEDURE — 36415 COLL VENOUS BLD VENIPUNCTURE: CPT

## 2019-12-29 PROCEDURE — 2060000000 HC ICU INTERMEDIATE R&B

## 2019-12-29 PROCEDURE — 85027 COMPLETE CBC AUTOMATED: CPT

## 2019-12-29 PROCEDURE — 82803 BLOOD GASES ANY COMBINATION: CPT

## 2019-12-29 PROCEDURE — 6370000000 HC RX 637 (ALT 250 FOR IP): Performed by: INTERNAL MEDICINE

## 2019-12-29 PROCEDURE — 2700000000 HC OXYGEN THERAPY PER DAY

## 2019-12-29 PROCEDURE — 2580000003 HC RX 258: Performed by: INTERNAL MEDICINE

## 2019-12-29 RX ORDER — LEVOFLOXACIN 500 MG/1
500 TABLET, FILM COATED ORAL DAILY
Qty: 10 TABLET | Refills: 0 | Status: SHIPPED | OUTPATIENT
Start: 2019-12-29 | End: 2020-01-08

## 2019-12-29 RX ORDER — METHYLPREDNISOLONE 4 MG/1
TABLET ORAL
Qty: 21 TABLET | Refills: 0 | Status: SHIPPED | OUTPATIENT
Start: 2019-12-29 | End: 2020-01-04

## 2019-12-29 RX ORDER — GUAIFENESIN 600 MG/1
600 TABLET, EXTENDED RELEASE ORAL 2 TIMES DAILY
Qty: 60 TABLET | Refills: 0 | Status: SHIPPED | OUTPATIENT
Start: 2019-12-29 | End: 2020-09-24 | Stop reason: ALTCHOICE

## 2019-12-29 RX ADMIN — GUAIFENESIN 600 MG: 600 TABLET, EXTENDED RELEASE ORAL at 21:16

## 2019-12-29 RX ADMIN — Medication 2 PUFF: at 08:58

## 2019-12-29 RX ADMIN — BUPRENORPHINE AND NALOXONE 1 TABLET: 8; 2 TABLET SUBLINGUAL at 08:51

## 2019-12-29 RX ADMIN — LEVOFLOXACIN 500 MG: 5 INJECTION, SOLUTION INTRAVENOUS at 12:33

## 2019-12-29 RX ADMIN — SODIUM CHLORIDE, PRESERVATIVE FREE 10 ML: 5 INJECTION INTRAVENOUS at 21:19

## 2019-12-29 RX ADMIN — SODIUM CHLORIDE, PRESERVATIVE FREE 10 ML: 5 INJECTION INTRAVENOUS at 08:51

## 2019-12-29 RX ADMIN — THEOPHYLLINE ANHYDROUS 100 MG: 100 CAPSULE, EXTENDED RELEASE ORAL at 21:16

## 2019-12-29 RX ADMIN — Medication 2 PUFF: at 20:32

## 2019-12-29 RX ADMIN — VARENICLINE TARTRATE 0.5 MG: 0.5 TABLET, FILM COATED ORAL at 08:50

## 2019-12-29 RX ADMIN — ENOXAPARIN SODIUM 40 MG: 40 INJECTION SUBCUTANEOUS at 08:52

## 2019-12-29 RX ADMIN — METHYLPREDNISOLONE SODIUM SUCCINATE 40 MG: 40 INJECTION, POWDER, FOR SOLUTION INTRAMUSCULAR; INTRAVENOUS at 06:02

## 2019-12-29 RX ADMIN — IPRATROPIUM BROMIDE AND ALBUTEROL SULFATE 1 AMPULE: .5; 3 SOLUTION RESPIRATORY (INHALATION) at 20:32

## 2019-12-29 RX ADMIN — PREGABALIN 25 MG: 25 CAPSULE ORAL at 08:50

## 2019-12-29 RX ADMIN — VARENICLINE TARTRATE 0.5 MG: 0.5 TABLET, FILM COATED ORAL at 21:16

## 2019-12-29 RX ADMIN — THEOPHYLLINE ANHYDROUS 100 MG: 100 CAPSULE, EXTENDED RELEASE ORAL at 08:50

## 2019-12-29 RX ADMIN — PREGABALIN 25 MG: 25 CAPSULE ORAL at 21:16

## 2019-12-29 RX ADMIN — IPRATROPIUM BROMIDE AND ALBUTEROL SULFATE 1 AMPULE: .5; 3 SOLUTION RESPIRATORY (INHALATION) at 12:27

## 2019-12-29 RX ADMIN — IPRATROPIUM BROMIDE AND ALBUTEROL SULFATE 1 AMPULE: .5; 3 SOLUTION RESPIRATORY (INHALATION) at 16:38

## 2019-12-29 RX ADMIN — GUAIFENESIN 600 MG: 600 TABLET, EXTENDED RELEASE ORAL at 08:51

## 2019-12-29 RX ADMIN — PREDNISONE 40 MG: 10 TABLET ORAL at 12:34

## 2019-12-29 RX ADMIN — IPRATROPIUM BROMIDE AND ALBUTEROL SULFATE 1 AMPULE: .5; 3 SOLUTION RESPIRATORY (INHALATION) at 08:56

## 2019-12-29 RX ADMIN — TRAZODONE HYDROCHLORIDE 150 MG: 50 TABLET ORAL at 21:16

## 2019-12-29 ASSESSMENT — PAIN SCALES - GENERAL
PAINLEVEL_OUTOF10: 0
PAINLEVEL_OUTOF10: 3
PAINLEVEL_OUTOF10: 0
PAINLEVEL_OUTOF10: 3

## 2019-12-30 VITALS
BODY MASS INDEX: 17.95 KG/M2 | TEMPERATURE: 98.7 F | HEART RATE: 69 BPM | HEIGHT: 64 IN | WEIGHT: 105.16 LBS | DIASTOLIC BLOOD PRESSURE: 48 MMHG | RESPIRATION RATE: 16 BRPM | OXYGEN SATURATION: 94 % | SYSTOLIC BLOOD PRESSURE: 135 MMHG

## 2019-12-30 LAB
ANION GAP SERPL CALCULATED.3IONS-SCNC: 7 MMOL/L (ref 4–16)
BUN BLDV-MCNC: 19 MG/DL (ref 6–23)
CALCIUM SERPL-MCNC: 8.3 MG/DL (ref 8.3–10.6)
CHLORIDE BLD-SCNC: 95 MMOL/L (ref 99–110)
CO2: 36 MMOL/L (ref 21–32)
CREAT SERPL-MCNC: 0.5 MG/DL (ref 0.6–1.1)
GFR AFRICAN AMERICAN: >60 ML/MIN/1.73M2
GFR NON-AFRICAN AMERICAN: >60 ML/MIN/1.73M2
GLUCOSE BLD-MCNC: 308 MG/DL (ref 70–99)
HCT VFR BLD CALC: 39.2 % (ref 37–47)
HEMOGLOBIN: 12 GM/DL (ref 12.5–16)
LV EF: 50 %
LVEF MODALITY: NORMAL
MCH RBC QN AUTO: 28.4 PG (ref 27–31)
MCHC RBC AUTO-ENTMCNC: 30.6 % (ref 32–36)
MCV RBC AUTO: 92.9 FL (ref 78–100)
PDW BLD-RTO: 12.7 % (ref 11.7–14.9)
PLATELET # BLD: 188 K/CU MM (ref 140–440)
PMV BLD AUTO: 10.4 FL (ref 7.5–11.1)
POTASSIUM SERPL-SCNC: 4.3 MMOL/L (ref 3.5–5.1)
RBC # BLD: 4.22 M/CU MM (ref 4.2–5.4)
SODIUM BLD-SCNC: 138 MMOL/L (ref 135–145)
WBC # BLD: 10.6 K/CU MM (ref 4–10.5)

## 2019-12-30 PROCEDURE — 94640 AIRWAY INHALATION TREATMENT: CPT

## 2019-12-30 PROCEDURE — 93306 TTE W/DOPPLER COMPLETE: CPT

## 2019-12-30 PROCEDURE — 2700000000 HC OXYGEN THERAPY PER DAY

## 2019-12-30 PROCEDURE — 36415 COLL VENOUS BLD VENIPUNCTURE: CPT

## 2019-12-30 PROCEDURE — 6370000000 HC RX 637 (ALT 250 FOR IP): Performed by: INTERNAL MEDICINE

## 2019-12-30 PROCEDURE — 2580000003 HC RX 258: Performed by: INTERNAL MEDICINE

## 2019-12-30 PROCEDURE — 94761 N-INVAS EAR/PLS OXIMETRY MLT: CPT

## 2019-12-30 PROCEDURE — 85027 COMPLETE CBC AUTOMATED: CPT

## 2019-12-30 PROCEDURE — 6360000002 HC RX W HCPCS: Performed by: INTERNAL MEDICINE

## 2019-12-30 PROCEDURE — 80048 BASIC METABOLIC PNL TOTAL CA: CPT

## 2019-12-30 RX ORDER — FLUCONAZOLE 100 MG/1
150 TABLET ORAL ONCE
Status: COMPLETED | OUTPATIENT
Start: 2019-12-30 | End: 2019-12-30

## 2019-12-30 RX ADMIN — IPRATROPIUM BROMIDE AND ALBUTEROL SULFATE 1 AMPULE: .5; 3 SOLUTION RESPIRATORY (INHALATION) at 07:56

## 2019-12-30 RX ADMIN — LEVOFLOXACIN 500 MG: 5 INJECTION, SOLUTION INTRAVENOUS at 13:28

## 2019-12-30 RX ADMIN — ENOXAPARIN SODIUM 40 MG: 40 INJECTION SUBCUTANEOUS at 09:14

## 2019-12-30 RX ADMIN — GUAIFENESIN 600 MG: 600 TABLET, EXTENDED RELEASE ORAL at 09:14

## 2019-12-30 RX ADMIN — PREGABALIN 25 MG: 25 CAPSULE ORAL at 09:14

## 2019-12-30 RX ADMIN — THEOPHYLLINE ANHYDROUS 100 MG: 100 CAPSULE, EXTENDED RELEASE ORAL at 09:14

## 2019-12-30 RX ADMIN — SODIUM CHLORIDE, PRESERVATIVE FREE 10 ML: 5 INJECTION INTRAVENOUS at 09:18

## 2019-12-30 RX ADMIN — FLUCONAZOLE 150 MG: 100 TABLET ORAL at 11:41

## 2019-12-30 RX ADMIN — PREDNISONE 40 MG: 10 TABLET ORAL at 09:14

## 2019-12-30 RX ADMIN — VARENICLINE TARTRATE 0.5 MG: 0.5 TABLET, FILM COATED ORAL at 09:14

## 2019-12-30 RX ADMIN — BUPRENORPHINE AND NALOXONE 1 TABLET: 8; 2 TABLET SUBLINGUAL at 09:14

## 2019-12-30 RX ADMIN — Medication 2 PUFF: at 07:59

## 2019-12-30 RX ADMIN — IPRATROPIUM BROMIDE AND ALBUTEROL SULFATE 1 AMPULE: .5; 3 SOLUTION RESPIRATORY (INHALATION) at 16:03

## 2019-12-30 RX ADMIN — MAGNESIUM HYDROXIDE 30 ML: 400 SUSPENSION ORAL at 09:13

## 2019-12-30 RX ADMIN — IPRATROPIUM BROMIDE AND ALBUTEROL SULFATE 1 AMPULE: .5; 3 SOLUTION RESPIRATORY (INHALATION) at 11:59

## 2019-12-30 ASSESSMENT — PAIN SCALES - GENERAL
PAINLEVEL_OUTOF10: 0

## 2019-12-31 LAB
EKG ATRIAL RATE: 108 BPM
EKG DIAGNOSIS: NORMAL
EKG P AXIS: 78 DEGREES
EKG P-R INTERVAL: 150 MS
EKG Q-T INTERVAL: 360 MS
EKG QRS DURATION: 86 MS
EKG QTC CALCULATION (BAZETT): 482 MS
EKG R AXIS: 71 DEGREES
EKG T AXIS: 72 DEGREES
EKG VENTRICULAR RATE: 108 BPM

## 2020-09-24 ENCOUNTER — APPOINTMENT (OUTPATIENT)
Dept: GENERAL RADIOLOGY | Age: 60
DRG: 140 | End: 2020-09-24
Payer: COMMERCIAL

## 2020-09-24 ENCOUNTER — HOSPITAL ENCOUNTER (INPATIENT)
Age: 60
LOS: 3 days | Discharge: HOME | DRG: 140 | End: 2020-09-27
Attending: INTERNAL MEDICINE | Admitting: INTERNAL MEDICINE
Payer: COMMERCIAL

## 2020-09-24 PROBLEM — J96.02 ACUTE RESPIRATORY FAILURE WITH HYPERCAPNIA (HCC): Status: ACTIVE | Noted: 2020-09-24

## 2020-09-24 LAB
ABO/RH: NORMAL
ADENOVIRUS DETECTION BY PCR: NOT DETECTED
ALBUMIN SERPL-MCNC: 3.8 GM/DL (ref 3.4–5)
ALP BLD-CCNC: 73 IU/L (ref 40–129)
ALT SERPL-CCNC: 12 U/L (ref 10–40)
ANION GAP SERPL CALCULATED.3IONS-SCNC: 7 MMOL/L (ref 4–16)
ANTIBODY SCREEN: NEGATIVE
APTT: 27.6 SECONDS (ref 25.1–37.1)
AST SERPL-CCNC: 19 IU/L (ref 15–37)
BASE EXCESS MIXED: 12.5 (ref 0–2.3)
BASE EXCESS MIXED: 13.8 (ref 0–2.3)
BASOPHILS ABSOLUTE: 0.1 K/CU MM
BASOPHILS RELATIVE PERCENT: 0.7 % (ref 0–1)
BILIRUB SERPL-MCNC: 0.2 MG/DL (ref 0–1)
BORDETELLA PARAPERTUSSIS BY PCR: NOT DETECTED
BORDETELLA PERTUSSIS PCR: NOT DETECTED
BUN BLDV-MCNC: 5 MG/DL (ref 6–23)
CALCIUM SERPL-MCNC: 9.1 MG/DL (ref 8.3–10.6)
CARBON MONOXIDE, BLOOD: 2 % (ref 0–5)
CHLAMYDOPHILA PNEUMONIA PCR: NOT DETECTED
CHLORIDE BLD-SCNC: 94 MMOL/L (ref 99–110)
CO2 CONTENT: 44.7 MMOL/L (ref 19–24)
CO2: 37 MMOL/L (ref 21–32)
COMMENT: ABNORMAL
COMMENT: ABNORMAL
CORONAVIRUS 229E PCR: NOT DETECTED
CORONAVIRUS HKU1 PCR: NOT DETECTED
CORONAVIRUS NL63 PCR: NOT DETECTED
CORONAVIRUS OC43 PCR: NOT DETECTED
CREAT SERPL-MCNC: 0.5 MG/DL (ref 0.6–1.1)
D DIMER: <200 NG/ML(DDU)
DIFFERENTIAL TYPE: ABNORMAL
EOSINOPHILS ABSOLUTE: 0.2 K/CU MM
EOSINOPHILS RELATIVE PERCENT: 2.2 % (ref 0–3)
FERRITIN: 69 NG/ML (ref 15–150)
FIBRINOGEN LEVEL: 382 MG/DL (ref 196.9–442.1)
GFR AFRICAN AMERICAN: >60 ML/MIN/1.73M2
GFR NON-AFRICAN AMERICAN: >60 ML/MIN/1.73M2
GLUCOSE BLD-MCNC: 147 MG/DL (ref 70–99)
HCO3 ARTERIAL: 42.2 MMOL/L (ref 18–23)
HCO3 VENOUS: 44.3 MMOL/L (ref 19–25)
HCT VFR BLD CALC: 38.8 % (ref 37–47)
HEMOGLOBIN: 12.1 GM/DL (ref 12.5–16)
HIGH SENSITIVE C-REACTIVE PROTEIN: 1 MG/L
HIGH SENSITIVE C-REACTIVE PROTEIN: 1.1 MG/L
HUMAN METAPNEUMOVIRUS PCR: NOT DETECTED
IMMATURE NEUTROPHIL %: 0.2 % (ref 0–0.43)
INFLUENZA A BY PCR: NOT DETECTED
INFLUENZA A H1 (2009) PCR: NOT DETECTED
INFLUENZA A H1 PANDEMIC PCR: NOT DETECTED
INFLUENZA A H3 PCR: NOT DETECTED
INFLUENZA B BY PCR: NOT DETECTED
INR BLD: 1.01 INDEX
LACTATE DEHYDROGENASE: 310 IU/L (ref 120–246)
LYMPHOCYTES ABSOLUTE: 1.6 K/CU MM
LYMPHOCYTES RELATIVE PERCENT: 18.7 % (ref 24–44)
MCH RBC QN AUTO: 29.5 PG (ref 27–31)
MCHC RBC AUTO-ENTMCNC: 31.2 % (ref 32–36)
MCV RBC AUTO: 94.6 FL (ref 78–100)
METHEMOGLOBIN ARTERIAL: 1.4 %
MONOCYTES ABSOLUTE: 0.8 K/CU MM
MONOCYTES RELATIVE PERCENT: 9.4 % (ref 0–4)
MYCOPLASMA PNEUMONIAE PCR: NOT DETECTED
NUCLEATED RBC %: 0 %
O2 SAT, VEN: 89.4 % (ref 50–70)
O2 SATURATION: 95.7 % (ref 96–97)
PARAINFLUENZA 1 PCR: NOT DETECTED
PARAINFLUENZA 2 PCR: NOT DETECTED
PARAINFLUENZA 3 PCR: NOT DETECTED
PARAINFLUENZA 4 PCR: NOT DETECTED
PCO2 ARTERIAL: 80 MMHG (ref 32–45)
PCO2, VEN: 88 MMHG (ref 38–52)
PDW BLD-RTO: 11.6 % (ref 11.7–14.9)
PH BLOOD: 7.33 (ref 7.34–7.45)
PH VENOUS: 7.31 (ref 7.32–7.42)
PLATELET # BLD: 202 K/CU MM (ref 140–440)
PMV BLD AUTO: 9.4 FL (ref 7.5–11.1)
PO2 ARTERIAL: 139 MMHG (ref 75–100)
PO2, VEN: 74 MMHG (ref 28–48)
POTASSIUM SERPL-SCNC: 3.6 MMOL/L (ref 3.5–5.1)
PRO-BNP: 120.2 PG/ML
PROCALCITONIN: 0.04
PROCALCITONIN: 0.04
PROTHROMBIN TIME: 12.2 SECONDS (ref 11.7–14.5)
RBC # BLD: 4.1 M/CU MM (ref 4.2–5.4)
RHINOVIRUS ENTEROVIRUS PCR: NOT DETECTED
RSV PCR: NOT DETECTED
SARS-COV-2, NAAT: NOT DETECTED
SEGMENTED NEUTROPHILS ABSOLUTE COUNT: 5.9 K/CU MM
SEGMENTED NEUTROPHILS RELATIVE PERCENT: 68.8 % (ref 36–66)
SODIUM BLD-SCNC: 138 MMOL/L (ref 135–145)
SOURCE: NORMAL
TOTAL IMMATURE NEUTOROPHIL: 0.02 K/CU MM
TOTAL NUCLEATED RBC: 0 K/CU MM
TOTAL PROTEIN: 6.8 GM/DL (ref 6.4–8.2)
TROPONIN T: <0.01 NG/ML
WBC # BLD: 8.5 K/CU MM (ref 4–10.5)

## 2020-09-24 PROCEDURE — 6360000002 HC RX W HCPCS: Performed by: PHYSICIAN ASSISTANT

## 2020-09-24 PROCEDURE — 87633 RESP VIRUS 12-25 TARGETS: CPT

## 2020-09-24 PROCEDURE — 85610 PROTHROMBIN TIME: CPT

## 2020-09-24 PROCEDURE — 86901 BLOOD TYPING SEROLOGIC RH(D): CPT

## 2020-09-24 PROCEDURE — 82805 BLOOD GASES W/O2 SATURATION: CPT

## 2020-09-24 PROCEDURE — 85025 COMPLETE CBC W/AUTO DIFF WBC: CPT

## 2020-09-24 PROCEDURE — 82803 BLOOD GASES ANY COMBINATION: CPT

## 2020-09-24 PROCEDURE — 87798 DETECT AGENT NOS DNA AMP: CPT

## 2020-09-24 PROCEDURE — 84484 ASSAY OF TROPONIN QUANT: CPT

## 2020-09-24 PROCEDURE — 2700000000 HC OXYGEN THERAPY PER DAY

## 2020-09-24 PROCEDURE — 87486 CHLMYD PNEUM DNA AMP PROBE: CPT

## 2020-09-24 PROCEDURE — 6370000000 HC RX 637 (ALT 250 FOR IP): Performed by: NURSE PRACTITIONER

## 2020-09-24 PROCEDURE — 86141 C-REACTIVE PROTEIN HS: CPT

## 2020-09-24 PROCEDURE — 2580000003 HC RX 258: Performed by: NURSE PRACTITIONER

## 2020-09-24 PROCEDURE — 83615 LACTATE (LD) (LDH) ENZYME: CPT

## 2020-09-24 PROCEDURE — 96374 THER/PROPH/DIAG INJ IV PUSH: CPT

## 2020-09-24 PROCEDURE — 82728 ASSAY OF FERRITIN: CPT

## 2020-09-24 PROCEDURE — 99285 EMERGENCY DEPT VISIT HI MDM: CPT

## 2020-09-24 PROCEDURE — 87899 AGENT NOS ASSAY W/OPTIC: CPT

## 2020-09-24 PROCEDURE — 85730 THROMBOPLASTIN TIME PARTIAL: CPT

## 2020-09-24 PROCEDURE — 86900 BLOOD TYPING SEROLOGIC ABO: CPT

## 2020-09-24 PROCEDURE — 87449 NOS EACH ORGANISM AG IA: CPT

## 2020-09-24 PROCEDURE — 87581 M.PNEUMON DNA AMP PROBE: CPT

## 2020-09-24 PROCEDURE — 6370000000 HC RX 637 (ALT 250 FOR IP): Performed by: PHYSICIAN ASSISTANT

## 2020-09-24 PROCEDURE — 85379 FIBRIN DEGRADATION QUANT: CPT

## 2020-09-24 PROCEDURE — 2140000000 HC CCU INTERMEDIATE R&B

## 2020-09-24 PROCEDURE — 84145 PROCALCITONIN (PCT): CPT

## 2020-09-24 PROCEDURE — U0002 COVID-19 LAB TEST NON-CDC: HCPCS

## 2020-09-24 PROCEDURE — 94640 AIRWAY INHALATION TREATMENT: CPT

## 2020-09-24 PROCEDURE — 85384 FIBRINOGEN ACTIVITY: CPT

## 2020-09-24 PROCEDURE — 94761 N-INVAS EAR/PLS OXIMETRY MLT: CPT

## 2020-09-24 PROCEDURE — 71045 X-RAY EXAM CHEST 1 VIEW: CPT

## 2020-09-24 PROCEDURE — 86850 RBC ANTIBODY SCREEN: CPT

## 2020-09-24 PROCEDURE — 83880 ASSAY OF NATRIURETIC PEPTIDE: CPT

## 2020-09-24 PROCEDURE — 80053 COMPREHEN METABOLIC PANEL: CPT

## 2020-09-24 PROCEDURE — 36415 COLL VENOUS BLD VENIPUNCTURE: CPT

## 2020-09-24 PROCEDURE — 94660 CPAP INITIATION&MGMT: CPT

## 2020-09-24 PROCEDURE — 6360000002 HC RX W HCPCS: Performed by: NURSE PRACTITIONER

## 2020-09-24 PROCEDURE — 93005 ELECTROCARDIOGRAM TRACING: CPT | Performed by: PHYSICIAN ASSISTANT

## 2020-09-24 RX ORDER — SODIUM CHLORIDE 0.9 % (FLUSH) 0.9 %
10 SYRINGE (ML) INJECTION PRN
Status: DISCONTINUED | OUTPATIENT
Start: 2020-09-24 | End: 2020-09-27 | Stop reason: HOSPADM

## 2020-09-24 RX ORDER — NICOTINE 21 MG/24HR
1 PATCH, TRANSDERMAL 24 HOURS TRANSDERMAL EVERY EVENING
Status: DISCONTINUED | OUTPATIENT
Start: 2020-09-24 | End: 2020-09-27 | Stop reason: HOSPADM

## 2020-09-24 RX ORDER — ALBUTEROL SULFATE 90 UG/1
2 AEROSOL, METERED RESPIRATORY (INHALATION) 4 TIMES DAILY
Status: DISCONTINUED | OUTPATIENT
Start: 2020-09-24 | End: 2020-09-27 | Stop reason: HOSPADM

## 2020-09-24 RX ORDER — PROMETHAZINE HYDROCHLORIDE 25 MG/1
12.5 TABLET ORAL EVERY 6 HOURS PRN
Status: DISCONTINUED | OUTPATIENT
Start: 2020-09-24 | End: 2020-09-27 | Stop reason: HOSPADM

## 2020-09-24 RX ORDER — BUDESONIDE AND FORMOTEROL FUMARATE DIHYDRATE 80; 4.5 UG/1; UG/1
2 AEROSOL RESPIRATORY (INHALATION) 2 TIMES DAILY
Status: DISCONTINUED | OUTPATIENT
Start: 2020-09-24 | End: 2020-09-27 | Stop reason: HOSPADM

## 2020-09-24 RX ORDER — TRAZODONE HYDROCHLORIDE 50 MG/1
150 TABLET ORAL NIGHTLY
Status: DISCONTINUED | OUTPATIENT
Start: 2020-09-24 | End: 2020-09-27 | Stop reason: HOSPADM

## 2020-09-24 RX ORDER — TRAMADOL HYDROCHLORIDE 50 MG/1
50 TABLET ORAL 2 TIMES DAILY
Status: ON HOLD | COMMUNITY
Start: 2020-08-17 | End: 2021-07-14 | Stop reason: HOSPADM

## 2020-09-24 RX ORDER — BUPROPION HYDROCHLORIDE 150 MG/1
300 TABLET ORAL DAILY
Status: DISCONTINUED | OUTPATIENT
Start: 2020-09-24 | End: 2020-09-27 | Stop reason: HOSPADM

## 2020-09-24 RX ORDER — FAMOTIDINE 20 MG/1
20 TABLET, FILM COATED ORAL 2 TIMES DAILY
Status: DISCONTINUED | OUTPATIENT
Start: 2020-09-24 | End: 2020-09-27 | Stop reason: HOSPADM

## 2020-09-24 RX ORDER — BUPROPION HYDROCHLORIDE 300 MG/1
300 TABLET ORAL DAILY
COMMUNITY
Start: 2020-08-28 | End: 2021-04-14

## 2020-09-24 RX ORDER — VARENICLINE TARTRATE 0.5 MG/1
0.5 TABLET, FILM COATED ORAL 2 TIMES DAILY
Status: DISCONTINUED | OUTPATIENT
Start: 2020-09-24 | End: 2020-09-27 | Stop reason: HOSPADM

## 2020-09-24 RX ORDER — ALBUTEROL SULFATE 90 UG/1
4 AEROSOL, METERED RESPIRATORY (INHALATION)
Status: DISCONTINUED | OUTPATIENT
Start: 2020-09-24 | End: 2020-09-27 | Stop reason: HOSPADM

## 2020-09-24 RX ORDER — ACETAMINOPHEN 325 MG/1
650 TABLET ORAL EVERY 6 HOURS PRN
Status: DISCONTINUED | OUTPATIENT
Start: 2020-09-24 | End: 2020-09-27 | Stop reason: HOSPADM

## 2020-09-24 RX ORDER — TRAMADOL HYDROCHLORIDE 50 MG/1
50 TABLET ORAL 2 TIMES DAILY PRN
Status: DISCONTINUED | OUTPATIENT
Start: 2020-09-24 | End: 2020-09-27 | Stop reason: HOSPADM

## 2020-09-24 RX ORDER — PREGABALIN 25 MG/1
25 CAPSULE ORAL 2 TIMES DAILY
Status: DISCONTINUED | OUTPATIENT
Start: 2020-09-24 | End: 2020-09-27 | Stop reason: HOSPADM

## 2020-09-24 RX ORDER — POLYETHYLENE GLYCOL 3350 17 G/17G
17 POWDER, FOR SOLUTION ORAL DAILY PRN
Status: DISCONTINUED | OUTPATIENT
Start: 2020-09-24 | End: 2020-09-27 | Stop reason: HOSPADM

## 2020-09-24 RX ORDER — BUPRENORPHINE AND NALOXONE 8; 2 MG/1; MG/1
1 FILM, SOLUBLE BUCCAL; SUBLINGUAL DAILY
Status: DISCONTINUED | OUTPATIENT
Start: 2020-09-24 | End: 2020-09-27 | Stop reason: HOSPADM

## 2020-09-24 RX ORDER — DEXTROAMPHETAMINE SACCHARATE, AMPHETAMINE ASPARTATE MONOHYDRATE, DEXTROAMPHETAMINE SULFATE AND AMPHETAMINE SULFATE 7.5; 7.5; 7.5; 7.5 MG/1; MG/1; MG/1; MG/1
30 CAPSULE, EXTENDED RELEASE ORAL DAILY
COMMUNITY
Start: 2020-09-08

## 2020-09-24 RX ORDER — METHYLPREDNISOLONE SODIUM SUCCINATE 40 MG/ML
40 INJECTION, POWDER, LYOPHILIZED, FOR SOLUTION INTRAMUSCULAR; INTRAVENOUS EVERY 12 HOURS
Status: DISCONTINUED | OUTPATIENT
Start: 2020-09-24 | End: 2020-09-27

## 2020-09-24 RX ORDER — SODIUM CHLORIDE 0.9 % (FLUSH) 0.9 %
10 SYRINGE (ML) INJECTION EVERY 12 HOURS SCHEDULED
Status: DISCONTINUED | OUTPATIENT
Start: 2020-09-24 | End: 2020-09-27 | Stop reason: HOSPADM

## 2020-09-24 RX ORDER — METHYLPREDNISOLONE SODIUM SUCCINATE 125 MG/2ML
60 INJECTION, POWDER, LYOPHILIZED, FOR SOLUTION INTRAMUSCULAR; INTRAVENOUS ONCE
Status: COMPLETED | OUTPATIENT
Start: 2020-09-24 | End: 2020-09-24

## 2020-09-24 RX ORDER — ACETAMINOPHEN 650 MG/1
650 SUPPOSITORY RECTAL EVERY 6 HOURS PRN
Status: DISCONTINUED | OUTPATIENT
Start: 2020-09-24 | End: 2020-09-27 | Stop reason: HOSPADM

## 2020-09-24 RX ORDER — ONDANSETRON 2 MG/ML
4 INJECTION INTRAMUSCULAR; INTRAVENOUS EVERY 6 HOURS PRN
Status: DISCONTINUED | OUTPATIENT
Start: 2020-09-24 | End: 2020-09-27 | Stop reason: HOSPADM

## 2020-09-24 RX ORDER — DEXTROAMPHETAMINE SACCHARATE, AMPHETAMINE ASPARTATE MONOHYDRATE, DEXTROAMPHETAMINE SULFATE AND AMPHETAMINE SULFATE 7.5; 7.5; 7.5; 7.5 MG/1; MG/1; MG/1; MG/1
30 CAPSULE, EXTENDED RELEASE ORAL DAILY
Status: DISCONTINUED | OUTPATIENT
Start: 2020-09-25 | End: 2020-09-27 | Stop reason: HOSPADM

## 2020-09-24 RX ADMIN — FAMOTIDINE 20 MG: 20 TABLET ORAL at 22:58

## 2020-09-24 RX ADMIN — SODIUM CHLORIDE, PRESERVATIVE FREE 10 ML: 5 INJECTION INTRAVENOUS at 22:58

## 2020-09-24 RX ADMIN — ENOXAPARIN SODIUM 40 MG: 40 INJECTION SUBCUTANEOUS at 23:00

## 2020-09-24 RX ADMIN — AZITHROMYCIN MONOHYDRATE 500 MG: 500 INJECTION, POWDER, LYOPHILIZED, FOR SOLUTION INTRAVENOUS at 22:58

## 2020-09-24 RX ADMIN — BUDESONIDE AND FORMOTEROL FUMARATE DIHYDRATE 2 PUFF: 80; 4.5 AEROSOL RESPIRATORY (INHALATION) at 20:41

## 2020-09-24 RX ADMIN — TRAZODONE HYDROCHLORIDE 150 MG: 50 TABLET ORAL at 22:59

## 2020-09-24 RX ADMIN — BUPROPION HYDROCHLORIDE 300 MG: 150 TABLET, FILM COATED, EXTENDED RELEASE ORAL at 22:59

## 2020-09-24 RX ADMIN — METHYLPREDNISOLONE SODIUM SUCCINATE 40 MG: 40 INJECTION, POWDER, FOR SOLUTION INTRAMUSCULAR; INTRAVENOUS at 23:01

## 2020-09-24 RX ADMIN — THEOPHYLLINE ANHYDROUS 100 MG: 100 CAPSULE, EXTENDED RELEASE ORAL at 22:59

## 2020-09-24 RX ADMIN — BUPRENORPHINE AND NALOXONE 1 FILM: 8; 2 FILM BUCCAL; SUBLINGUAL at 23:00

## 2020-09-24 RX ADMIN — VARENICLINE TARTRATE 0.5 MG: 0.5 TABLET, FILM COATED ORAL at 22:59

## 2020-09-24 RX ADMIN — ALBUTEROL SULFATE 2 PUFF: 90 AEROSOL, METERED RESPIRATORY (INHALATION) at 20:40

## 2020-09-24 RX ADMIN — PREGABALIN 25 MG: 25 CAPSULE ORAL at 22:59

## 2020-09-24 RX ADMIN — METHYLPREDNISOLONE SODIUM SUCCINATE 60 MG: 125 INJECTION, POWDER, FOR SOLUTION INTRAMUSCULAR; INTRAVENOUS at 12:09

## 2020-09-24 ASSESSMENT — PAIN SCALES - GENERAL
PAINLEVEL_OUTOF10: 0

## 2020-09-24 NOTE — ED NOTES
Bed: ED-15  Expected date:   Expected time:   Means of arrival:   Comments:  Pato Lara RN  09/24/20 0364

## 2020-09-24 NOTE — ED NOTES
Report attempted to RN for bed 3110. Charge RN off floor and will call back when she has a nurse assigned to that admission.       Jamarcus Wild RN  09/24/20 8875

## 2020-09-24 NOTE — ED NOTES
Bed: ED-15  Expected date:   Expected time:   Means of arrival:   Comments:  Left at [address], RN  09/24/20 5940

## 2020-09-24 NOTE — ACP (ADVANCE CARE PLANNING)
Advance Care Planning     Advance Care Planning Activator (Inpatient)  Conversation Note      Date of ACP Conversation: 9/24/2020    Conversation Conducted with: Patient with Decision Making Capacity    ACP Activator: Susan He    *When Decision Maker makes decisions on behalf of the incapacitated patient: Decision Maker is asked to consider and make decisions based on patient values, known preferences, or best interests. Health Care Decision Maker:     Current Designated Health Care Decision Maker:   Primary Decision Maker: Erica Chicas - Child - [phone] Pt did note she does not think this # is accurate but could not locate the new # on her cell phone and explained she will look for it later. (If there is a valid [place] named in the [employer]\" box in the ACP activity, but it is not visible above, be sure to open that field and then select the health care decision maker relationship (ie \"primary\") in the blank space to the right of the name.) Validate  this information as still accurate & up-to-date; edit [place] field as needed.)    Note: Assess and validate information in current ACP documents, as indicated. Care Preferences    Ventilation: \"If you were in your present state of health and suddenly became very ill and were unable to breathe on your own, what would your preference be about the use of a ventilator (breathing machine) if it were available to you? \"      Would the patient desire the use of ventilator (breathing machine)?: yes    \"If your health worsens and it becomes clear that your chance of recovery is unlikely, what would your preference be about the use of a ventilator (breathing machine) if it were available to you? \"     Would the patient desire the use of ventilator (breathing machine)?: Yes      Resuscitation  \"CPR works best to restart the heart when there is a sudden event, like a heart attack, in someone who is otherwise healthy. Unfortunately, CPR does not typically restart the heart for people who have serious health conditions or who are very sick. \"    \"In the event your heart stopped as a result of an underlying serious health condition, would you want attempts to be made to restart your heart (answer \"yes\" for attempt to resuscitate) or would you prefer a natural death (answer \"no\" for do not attempt to resuscitate)? \" yes      NOTE: If the patient has a valid advance directive AND now provides care preference(s) that are inconsistent with that prior directive, advise the patient to consider either: creating a new advance directive that complies with state-specific requirements; or, if that is not possible, orally revoking that prior directive in accordance with state-specific requirements, which must be documented in the EHR. [] Yes   [] No   Educated Patient / Moreno Neal regarding differences between Advance Directives and portable DNR orders.     Length of ACP Conversation in minutes:  9    Conversation Outcomes:  [x] ACP discussion completed  [] Existing advance directive reviewed with patient; no changes to patient's previously recorded wishes  [] New Advance Directive completed  [] Portable Do Not Rescitate prepared for Provider review and signature  [] POLST/POST/MOLST/MOST prepared for Provider review and signature      Follow-up plan:    [] Schedule follow-up conversation to continue planning  [] Referred individual to Provider for additional questions/concerns   [] Advised patient/agent/surrogate to review completed ACP document and update if needed with changes in condition, patient preferences or care setting    [x] This note routed to one or more involved healthcare providers

## 2020-09-24 NOTE — ED NOTES
Report received for pt to return to  ED at this time from [facility]. Pt refused COVID unit and hospitalist ordered rapid covid to determine admission floor.       Penny Mott RN  09/24/20 8316

## 2020-09-24 NOTE — ED PROVIDER NOTES
Patient Identification  Agata Patel is a 64 y.o. female    Chief Complaint  Shortness of Breath      HPI  (History provided by patient)  This is a 64 y.o. female who was brought in by EMS for chief complaint of shortness of breath. Onset is chronic, worse since yesterday. Patient reports yesterday she also had a fever of 102F. She has not taken any antipyretics. Has history of severe COPD, on 5 L home O2 at all times. Reports being compliant with all respiratory medications. No sick contacts. Cough is worse than usual.  No chest pain. No sputum production. REVIEW OF SYSTEMS    Constitutional:  Denies chills. + fever  HENT:  Denies sore throat or ear pain   Eyes: Denies vision changes, eye pain  Cardiovascular:  Denies chest pain, syncope  Respiratory:  + shortness of breath, cough   GI:  Denies abdominal pain, nausea, vomiting  :  Denies dysuria, discharge  Musculoskeletal:  Denies back pain, joint pain  Skin:  Denies rash, pruritis  Neurologic:  Denies headache, focal weakness, or sensory changes     See HPI and nursing notes for additional information     I have reviewed the following nursing documentation:  Allergies: No Known Allergies    Past medical history:  has a past medical history of COPD (chronic obstructive pulmonary disease) ([facility] [address] Drug addiction in remission ([facility] [address] Hep C w/o coma, chronic ([facility] [address] Pacemaker, Sleep apnea, and TIA (transient ischemic attack). Past surgical history:  has a past surgical history that includes  section and Cholecystectomy. Home medications:   Prior to Admission medications    Medication Sig Start Date End Date Taking? Authorizing Provider   amphetamine-dextroamphetamine (ADDERALL XR) 30 MG extended release capsule Take 30 mg by mouth daily.  20  Yes Historical Provider, MD   buPROPion (WELLBUTRIN XL) 300 MG extended release tablet Take 300 mg by mouth daily 20  Yes Historical Provider, MD   traMADol (ULTRAM) 50 MG tablet Take 50 mg by mouth 2 times daily. 8/17/20  Yes Historical Provider, MD   DULERA 100-5 MCG/ACT inhaler INHALE 2 PUFFS BY MOUTH 2 TIMES A DAY SHAKE WELL 8/7/20  Yes Elis Rico MD   SPIRIVA RESPIMAT 2.5 MCG/ACT AERS inhaler INHALE 2 PUFFS BY MOUTH ONCE DAILY 4/14/20  Yes Elis Rico MD   albuterol (PROVENTIL) (2.5 MG/3ML) 0.083% nebulizer solution Take 3 mLs by nebulization every 6 hours as needed for Wheezing 2/18/20  Yes Elis Rico MD   albuterol sulfate  (90 Base) MCG/ACT inhaler INHALE 2 PUFFS BY MOUTH EVERY 6 HOURS SHAKE WELL 1/20/20  Yes Elis Rico MD   theophylline (FRANCO-24) 100 MG extended release capsule Take 1 capsule by mouth 2 times daily 12/29/19  Yes Johnny Jorge MD   pregabalin (LYRICA) 100 MG capsule Take 100 mg by mouth 3 times daily. Yes Historical Provider, MD   varenicline (CHANTIX) 0.5 MG tablet Take 1 tablet by mouth 2 times daily 9/2/19  Yes Keri Bacon DO   traZODone (DESYREL) 150 MG tablet Take 150 mg by mouth nightly  7/24/17  Yes Historical Provider, MD   OXYGEN Inhale 4 L into the lungs continuous    Yes Historical Provider, MD   CPAP Machine MISC by CPAP route nightly   Yes Historical Provider, MD   buprenorphine-naloxone (SUBOXONE) 8-2 MG FILM SL film Place 1 Film under the tongue daily   Yes Historical Provider, MD       Social history:  reports that she quit smoking about 4 years ago. She has never used smokeless tobacco. She reports previous alcohol use. She reports previous drug use. Family history:    Family History   Problem Relation Age of Onset    Heart Disease Mother          Exam  /76   Pulse 82   Temp 98.1 °F (36.7 °C) (Axillary)   Resp 13   Ht 5' 4\" (1.626 m)   Wt 154 lb (69.9 kg)   SpO2 98%   BMI 26.43 kg/m²   Nursing note and vitals reviewed. Constitutional: Well developed, well nourished. No acute distress. HENT:      Head: Normocephalic and atraumatic.       Ears: External ears normal.      Nose: Nose normal.     Mouth: Membrane mucosa moist and pink. No posterior oropharynx erythema or tonsillar edema  Eyes: Anicteric sclera. No discharge, PERRL  Neck: Supple. Trachea midline. Cardiovascular: RRR, no murmurs, rubs, or gallops, radial pulses 2+ bilaterally. Pulmonary/Chest: Mildly increased effort. Occasional wheezes noted but lung sounds severely diminished throughout. No rales or stridor. Abdominal: Soft. Nontender to palpation. No distension. No guarding, rebound tenderness, or evidence of ascites. : No CVA tenderness. Musculoskeletal: Moves all extremities. No gross deformity. Neurological: Alert and oriented to person, place, and time. Normal muscle tone. Skin: Warm and dry. No rash. Psychiatric: Normal mood and affect. Behavior is normal.      EKG   Please see Dr. Meryle Edis note for EKG read. Radiographs (if obtained):  [] The following radiograph was interpreted by myself in the absence of a radiologist:   [x] Radiologist's Report Reviewed:  XR CHEST PORTABLE   Final Result   1. No radiographic finding to account for patient's shortness of breath.                 Labs  Results for orders placed or performed during the hospital encounter of 09/24/20   CBC Auto Differential   Result Value Ref Range    WBC 8.5 4.0 - 10.5 K/CU MM    RBC 4.10 (L) 4.2 - 5.4 M/CU MM    Hemoglobin 12.1 (L) 12.5 - 16.0 GM/DL    Hematocrit 38.8 37 - 47 %    MCV 94.6 78 - 100 FL    MCH 29.5 27 - 31 PG    MCHC 31.2 (L) 32.0 - 36.0 %    RDW 11.6 (L) 11.7 - 14.9 %    Platelets 558 118 - 274 K/CU MM    MPV 9.4 7.5 - 11.1 FL    Differential Type AUTOMATED DIFFERENTIAL     Segs Relative 68.8 (H) 36 - 66 %    Lymphocytes % 18.7 (L) 24 - 44 %    Monocytes % 9.4 (H) 0 - 4 %    Eosinophils % 2.2 0 - 3 %    Basophils % 0.7 0 - 1 %    Segs Absolute 5.9 K/CU MM    Lymphocytes Absolute 1.6 K/CU MM    Monocytes Absolute 0.8 K/CU MM    Eosinophils Absolute 0.2 K/CU MM    Basophils Absolute 0.1 K/CU MM    Nucleated RBC % 0.0 %    Total Nucleated RBC 0.0 K/CU MM    Total Immature Neutrophil 0.02 K/CU MM    Immature Neutrophil % 0.2 0 - 0.43 %   CMP   Result Value Ref Range    Sodium 138 135 - 145 MMOL/L    Potassium 3.6 3.5 - 5.1 MMOL/L    Chloride 94 (L) 99 - 110 mMol/L    CO2 37 (H) 21 - 32 MMOL/L    BUN 5 (L) 6 - 23 MG/DL    CREATININE 0.5 (L) 0.6 - 1.1 MG/DL    Glucose 147 (H) 70 - 99 MG/DL    Calcium 9.1 8.3 - 10.6 MG/DL    Alb 3.8 3.4 - 5.0 GM/DL    Total Protein 6.8 6.4 - 8.2 GM/DL    Total Bilirubin 0.2 0.0 - 1.0 MG/DL    ALT 12 10 - 40 U/L    AST 19 15 - 37 IU/L    Alkaline Phosphatase 73 40 - 129 IU/L    GFR Non-African American >60 >60 mL/min/1.73m2    GFR African American >60 >60 mL/min/1.73m2    Anion Gap 7 4 - 16   Troponin   Result Value Ref Range    Troponin T <0.010 <0.01 NG/ML   Blood Gas, Venous   Result Value Ref Range    pH, Navdeep 7.31 (L) 7.32 - 7.42    pCO2, Navdeep 88 (H) 38 - 52 mmHG    pO2, Navdeep 74 (H) 28 - 48 mmHG    Base Exc, Mixed 13.8 (H) 0 - 2.3    HCO3, Venous 44.3 (H) 19 - 25 MMOL/L    O2 Sat, Navdeep 89.4 (H) 50 - 70 %    Comment VBG    Brain Natriuretic Peptide   Result Value Ref Range    Pro-.2 <300 PG/ML   C-reactive protein   Result Value Ref Range    CRP, High Sensitivity 1.0 mg/L   Procalcitonin   Result Value Ref Range    Procalcitonin 0.039    COVID-19    Specimen: Nasopharyngeal Swab   Result Value Ref Range    Source UNKNOWN     SARS-CoV-2, NAAT NOT DETECTED    EKG 12 Lead   Result Value Ref Range    Ventricular Rate 94 BPM    Atrial Rate 94 BPM    P-R Interval 162 ms    QRS Duration 96 ms    Q-T Interval 376 ms    QTc Calculation (Bazett) 470 ms    P Axis 78 degrees    R Axis 67 degrees    T Axis 74 degrees    Diagnosis       Normal sinus rhythm  Normal ECG  When compared with ECG of 27-DEC-2019 07:13,  No significant change was found           MDM  Patient presents for shortness of breath, severe COPD, on 5 L constantly at home.   Here in ED she has mildly increased work of breathing, given steroids, breathing treatments, obtain VBG which reveals significant hypercarbia, placed on BiPAP with improvement. Plan is to admit for severe COPD exacerbation, chest x-ray shows no acute findings, EKG shows normal sinus rhythm, white blood cell count is normal.  Consult placed to Sammie Dean, [address] [place] [address] with hospitalist service who agreed to admit. COVID pending. In consideration of current COVID19 pandemic, with effort to minimize unnecessary provider exposure, this patient was seen at bedside by me independently. However, in compliance with current hospital [facility]/ED protocol, prior to admission I did discuss this patient case with emergency department physician, Dr. Hilda Longo. Of note, this Pt was NOT admitted to the ICU. Final Impression  1. COPD exacerbation (HCC)        Blood pressure 127/76, pulse 82, temperature 98.1 °F (36.7 °C), temperature source Axillary, resp. rate 13, height 5' 4\" (1.626 m), weight 154 lb (69.9 kg), SpO2 98 %. Disposition:  Admit to telemetry in stable condition. Patient was given scripts for the following medications. I counseled patient how to take these medications. Current Discharge Medication List          This chart was generated using the [address] [place] [address] dictation system. I created this record but it may contain dictation errors given the limitations of this technology.        Dimitrios Anne PA-C  09/24/20 8798

## 2020-09-24 NOTE — PROGRESS NOTES
Met Briana DE LEÓN at outside of [facility] and pt stated that she refused to enter the COVID unit and will leave AMA if not given another option. Dr. Joseph Lang called and alerted to pt's statements. Awaiting new orders. Since monitoring the pt is not safe to do without a room to care for the pt in, Yue Montes RN charge nurse called and she stated to send the pt back down to the ED until a decision can be made on placement.

## 2020-09-24 NOTE — CARE COORDINATION
LSW completed chart review. This pt here for sob. Pt has Hx of COPD. Pt wears 5L of NC. Pt has two breathing tx this morning. Pt has been tested for Covid-19 today. LSW spoke to this pt from door wearing mask/PPE and explained CM role. Pt wearing bi-pap and very hard to hear. Pt does not want to appear to talk and was possibly sleeping but did report she lives alone in a 1 story home. Pt does have insurance and can afford her medications. Pt does have a PCP. Pt has the following DME: seated walker w/wheels. Pt reports she quit smoking about 3 years ago. Pt does use 5L 02 @ home, inhalers and nebulizer. LSW completed ACP w/pt. Pt does not anticipate any needs upon d/c. Pt did not want to speak any longer and again it was quite difficult to hear her d/t Bi-Pap. Pt found to have Respiratory failure with hypercapnea and is being admitted. Pt requiring Empiric Azithromycin IV.

## 2020-09-24 NOTE — ED NOTES
Pt arrives via EMS for sob. Hx of COPD. Pt wears 5L of NC. Pt has two breathing tx this morning. Pt is A&O and asking for something to drink.       Noy Washington RN  09/24/20 8525

## 2020-09-24 NOTE — H&P
History and Physical      Name:  Vandana Miller /Age/Sex: 1960  (64 y.o. female)   MRN & CSN:  [id number] & [id number] Admission Date/Time: 2020 10:38 AM   Location:  ED15/ED-15 PCP: Florencia Juarez MD       Discussed patient with Dr. Lenora Ballesteros and Plan:     Vandana Miller is a 64 y.o.  female  who presents with shortness of breath, fever, cough     - Respiratory failure with hypercapnea  Acute COPD- is on 5 L at home continuous  CXR non-acute  Reports fever 102 yesterday with increased cough  Rule out covid- check covid, covid labs, resp pcr, procal, crp  Empiric Azithromycin IV  Solumedrol 40 BID IV  Cont Bipap; check ABG  Consult to PULM- follows with Dr Roper Player home regimen- theophylline, dulera, spiriva      Chronic  - Chronic pain- Cont lyrica, tramadol; has current rxs per PDMP  - Opioid abuse hx- Cont suboxone- has current rx  - HCV hx    Discussed patient with ER physician     Diet NPO while on bipap   DVT Prophylaxis [x] Lovenox, []  Heparin, [] SCDs, [] Ambulation   GI Prophylaxis [] PPI,  [] H2 Blocker,  [] Carafate,  [] Diet/Tube Feeds   Code Status Full   Disposition Patient requires continued admission due to respiratory failure   MDM [] Low, [x] Moderate,[]  High  Patient's risk as above due to      [] One or more chronic illnesses with exacerbation progression      [x] Two or more stable chronic illnesses      [x] Undiagnosed new problem with uncertain prognosis      [] Elective major surgery      []Prescription drug management       History of Present Illness:     Chief Complaint: shortness of breath, fever, cough    Vandana Miller is a 64 y.o.  female  who presents with the above complaints, onset 2 days ago. Reports fever of 102 yesterday. She as afebrile today w/o antipyretics. Denies neck stiffness, headache. Has hx of COPD- 5 L O2 dependent at home 24 hrs/day. Also reports increased cough over baseline.   At time of exam, she denies chest pain/pressure, abdominal pain, new back pain, n/v, diarrhea. Denies regular alcohol use, illicit drug use. Ten point ROS reviewed negative, unless as noted above    Objective:     No intake or output data in the 24 hours ending 20 1426     Vitals:   Vitals:    20 1042   BP: 124/86   Pulse: 90   Resp: 20   Temp: 98.8   SpO2: 95       Physical Exam:     GEN Awake female, sitting upright in bed in no apparent distress. Appears given age. EYES Pupils are equally round. No scleral erythema, discharge, or conjunctivitis. HENT Mucous membranes are moist. Oral pharynx without exudates, no evidence of thrush. NECK Supple, no apparent thyromegaly or masses. RESP Mild expiratory wheezes; no rales or rhonchi. Symmetric chest movement while on bipap  CARDIO/VASC S1/S2 auscultated. Regular rate without appreciable murmurs, rubs, or gallops. No JVD or carotid bruits. Peripheral pulses equal bilaterally and palpable. No peripheral edema. GI Abdomen is soft without significant tenderness, masses, or guarding. Bowel sounds are normoactive. Rectal exam deferred.  No costovertebral angle tenderness. Fraser catheter is not present. HEME/LYMPH No palpable cervical lymphadenopathy and no hepatosplenomegaly. No petechiae or ecchymoses. MSK No gross joint deformities. Strength equal in BLE and BUE  SKIN Normal coloration, warm, dry. NEURO Cranial nerves appear grossly intact, normal speech, no lateralizing weakness. PSYCH Awake, alert, oriented x 4. Affect appropriate. Past Medical History:        Past Medical History:   Diagnosis Date    COPD (chronic obstructive pulmonary disease) ([facility] [address]     Drug addiction in remission ([facility] [address]     recovering addict    Hep C w/o coma, chronic (HCC)     Pacemaker     Sleep apnea     TIA (transient ischemic attack)     per patient \"several mini strokes\"       PSHX:  has a past surgical history that includes  section and Cholecystectomy.     Allergies: No Known shortness of breath.  Initial encounter. FINDINGS:   Cardiac leads overlie the RA and RV.  No evidence of lead discontinuity. The   cardiomediastinal silhouette is within normal range. Lungs are clear. There   is no focal pulmonary consolidation, pleural effusion, pneumothorax, or   evidence of airspace pulmonary edema.       Impression:         1.  No radiographic finding to account for patient's shortness of breath.           Electronically signed by Anthonette Canavan, APRN - NP on 9/24/2020 at 2:26 PM

## 2020-09-24 NOTE — ED PROVIDER NOTES
ED attending EKG interpretation (I otherwise did not participate in the care of this patient)     EKG Interpretation  Interpreted by me  Compared to 12/27/19  Rhythm: normal sinus   Rate: normal 94  Axis: normal  Ectopy: none  Conduction: normal  ST Segments: no acute change  T Waves: no acute change  Clinical Impression: normal sinus rhythm, no acute change     Natalya Conway MD  09/24/20 1916

## 2020-09-24 NOTE — ED NOTES
Kieran  to pt room to lock up large amount of cash. This nurse and pt witnessed  to count and remove cash, to take cash to safe and lock up. Will return with paperwork for this.       Dontea Fernando RN  09/24/20 8756

## 2020-09-24 NOTE — ED NOTES
Pt covered with transport tent and aware she is going to covid floor. Pt removed from bipap and transport on 6 liters nasal cannula after ok from [facility] for this, pt tolerating well with sat remaining at 95-96%. Pt belongings placed onto bed and chart on back of cart with tele monitor. Upon entering ante room and waiting for [address] to assume care, pt states \"I decided I am not going into the covid unit to get exposed to that. It will kill me. I will leave against medical advise if you put me in there. \" [address] [facility] nurse to bedside, updated to pt refusal to be admitted to covid unit, calling admitting dr for new plan of care. Taylor spoke with Nupur Bowling ed charge nurse and pt to return to ed until further plan of care to be established. Pt updated.       Dontae Fernando, RN  09/24/20 4262

## 2020-09-24 NOTE — PROGRESS NOTES
Medication History  [facility]    Patient Name: Camilla Estrada 1960     Medication history has been completed by: Magy Caraballo CPhT    Source(s) of information: insurance claims, [facility]     Primary Care Physician: Mame Yeh MD     Pharmacy: [facility]    Allergies as of 09/24/2020    (No Known Allergies)        Prior to Admission medications    Medication Sig Start Date End Date Taking? Authorizing Provider   DULERA 100-5 MCG/ACT inhaler INHALE 2 PUFFS BY MOUTH 2 TIMES A DAY SHAKE [facility] 8/7/20  Yes Sadia Garcia MD   SPIRIVA RESPIMAT 2.5 MCG/ACT AERS inhaler INHALE 2 PUFFS BY MOUTH ONCE DAILY 4/14/20  Yes Sadia Garcia MD   albuterol (PROVENTIL) (2.5 MG/3ML) 0.083% nebulizer solution Take 3 mLs by nebulization every 6 hours as needed for Wheezing 2/18/20  Yes Sadia Garcia MD   albuterol sulfate  (90 Base) MCG/ACT inhaler INHALE 2 PUFFS BY MOUTH EVERY 6 HOURS [facility] 1/20/20  Yes Sadia Garcia MD   theophylline (FRANCO-24) 100 MG extended release capsule Take 1 capsule by mouth 2 times daily 12/29/19  Yes Nicole Sweeney MD   pregabalin (LYRICA) 100 MG capsule Take 100 mg by mouth 3 times daily. Yes Historical Provider, MD   varenicline (CHANTIX) 0.5 MG tablet Take 1 tablet by mouth 2 times daily 9/2/19  Yes Agustin Huizar DO   traZODone (DESYREL) 150 MG tablet Take 150 mg by mouth nightly  7/24/17  Yes Historical Provider, MD   OXYGEN Inhale 4 L into the lungs continuous    Yes Historical Provider, MD   CPAP Machine MISC by CPAP route nightly   Yes Historical Provider, MD   buprenorphine-naloxone (SUBOXONE) 8-2 MG FILM SL film Place 1 Film under the tongue daily   Yes Historical Provider, MD   amphetamine-dextroamphetamine (ADDERALL XR) 30 MG extended release capsule Take 30 mg by mouth daily.  9/8/20   Historical Provider, MD   buPROPion (WELLBUTRIN XL) 300 MG extended release tablet Take 300 mg by mouth daily 8/28/20   Historical Provider, MD   traMADol Nola Balzarine) 50

## 2020-09-25 LAB
ANION GAP SERPL CALCULATED.3IONS-SCNC: 4 MMOL/L (ref 4–16)
APTT: 31.6 SECONDS (ref 25.1–37.1)
BASOPHILS ABSOLUTE: 0 K/CU MM
BASOPHILS RELATIVE PERCENT: 0.3 % (ref 0–1)
BUN BLDV-MCNC: 7 MG/DL (ref 6–23)
CALCIUM SERPL-MCNC: 9.2 MG/DL (ref 8.3–10.6)
CHLORIDE BLD-SCNC: 98 MMOL/L (ref 99–110)
CO2: 38 MMOL/L (ref 21–32)
CREAT SERPL-MCNC: 0.5 MG/DL (ref 0.6–1.1)
D DIMER: <200 NG/ML(DDU)
DIFFERENTIAL TYPE: ABNORMAL
EOSINOPHILS ABSOLUTE: 0 K/CU MM
EOSINOPHILS RELATIVE PERCENT: 0.1 % (ref 0–3)
FIBRINOGEN LEVEL: 367 MG/DL (ref 196.9–442.1)
GFR AFRICAN AMERICAN: >60 ML/MIN/1.73M2
GFR NON-AFRICAN AMERICAN: >60 ML/MIN/1.73M2
GLUCOSE BLD-MCNC: 95 MG/DL (ref 70–99)
HCT VFR BLD CALC: 39.2 % (ref 37–47)
HEMOGLOBIN: 12.3 GM/DL (ref 12.5–16)
IMMATURE NEUTROPHIL %: 0.4 % (ref 0–0.43)
INR BLD: 1.05 INDEX
LEGIONELLA URINARY AG: NEGATIVE
LYMPHOCYTES ABSOLUTE: 1 K/CU MM
LYMPHOCYTES RELATIVE PERCENT: 13.9 % (ref 24–44)
MCH RBC QN AUTO: 29.7 PG (ref 27–31)
MCHC RBC AUTO-ENTMCNC: 31.4 % (ref 32–36)
MCV RBC AUTO: 94.7 FL (ref 78–100)
MONOCYTES ABSOLUTE: 0.2 K/CU MM
MONOCYTES RELATIVE PERCENT: 2.9 % (ref 0–4)
NUCLEATED RBC %: 0 %
PDW BLD-RTO: 11.5 % (ref 11.7–14.9)
PLATELET # BLD: 208 K/CU MM (ref 140–440)
PMV BLD AUTO: 9.6 FL (ref 7.5–11.1)
POTASSIUM SERPL-SCNC: 4.5 MMOL/L (ref 3.5–5.1)
PROTHROMBIN TIME: 12.7 SECONDS (ref 11.7–14.5)
RBC # BLD: 4.14 M/CU MM (ref 4.2–5.4)
SARS-COV-2: NOT DETECTED
SEGMENTED NEUTROPHILS ABSOLUTE COUNT: 5.9 K/CU MM
SEGMENTED NEUTROPHILS RELATIVE PERCENT: 82.4 % (ref 36–66)
SODIUM BLD-SCNC: 140 MMOL/L (ref 135–145)
SOURCE: NORMAL
STREP PNEUMONIAE ANTIGEN: NORMAL
TOTAL IMMATURE NEUTOROPHIL: 0.03 K/CU MM
TOTAL NUCLEATED RBC: 0 K/CU MM
WBC # BLD: 7.2 K/CU MM (ref 4–10.5)

## 2020-09-25 PROCEDURE — 94761 N-INVAS EAR/PLS OXIMETRY MLT: CPT

## 2020-09-25 PROCEDURE — 6370000000 HC RX 637 (ALT 250 FOR IP): Performed by: NURSE PRACTITIONER

## 2020-09-25 PROCEDURE — 2580000003 HC RX 258: Performed by: NURSE PRACTITIONER

## 2020-09-25 PROCEDURE — 85384 FIBRINOGEN ACTIVITY: CPT

## 2020-09-25 PROCEDURE — 94762 N-INVAS EAR/PLS OXIMTRY CONT: CPT

## 2020-09-25 PROCEDURE — 36415 COLL VENOUS BLD VENIPUNCTURE: CPT

## 2020-09-25 PROCEDURE — 2140000000 HC CCU INTERMEDIATE R&B

## 2020-09-25 PROCEDURE — 94640 AIRWAY INHALATION TREATMENT: CPT

## 2020-09-25 PROCEDURE — 85025 COMPLETE CBC W/AUTO DIFF WBC: CPT

## 2020-09-25 PROCEDURE — 6360000002 HC RX W HCPCS: Performed by: NURSE PRACTITIONER

## 2020-09-25 PROCEDURE — 80048 BASIC METABOLIC PNL TOTAL CA: CPT

## 2020-09-25 PROCEDURE — 2700000000 HC OXYGEN THERAPY PER DAY

## 2020-09-25 PROCEDURE — 85730 THROMBOPLASTIN TIME PARTIAL: CPT

## 2020-09-25 PROCEDURE — 85379 FIBRIN DEGRADATION QUANT: CPT

## 2020-09-25 PROCEDURE — 6370000000 HC RX 637 (ALT 250 FOR IP): Performed by: INTERNAL MEDICINE

## 2020-09-25 PROCEDURE — 85610 PROTHROMBIN TIME: CPT

## 2020-09-25 RX ADMIN — VARENICLINE TARTRATE 0.5 MG: 0.5 TABLET, FILM COATED ORAL at 08:37

## 2020-09-25 RX ADMIN — ALBUTEROL SULFATE 2 PUFF: 90 AEROSOL, METERED RESPIRATORY (INHALATION) at 11:54

## 2020-09-25 RX ADMIN — VARENICLINE TARTRATE 0.5 MG: 0.5 TABLET, FILM COATED ORAL at 20:16

## 2020-09-25 RX ADMIN — FAMOTIDINE 20 MG: 20 TABLET ORAL at 08:32

## 2020-09-25 RX ADMIN — PREGABALIN 25 MG: 25 CAPSULE ORAL at 08:32

## 2020-09-25 RX ADMIN — TIOTROPIUM BROMIDE INHALATION SPRAY 2 PUFF: 3.12 SPRAY, METERED RESPIRATORY (INHALATION) at 08:10

## 2020-09-25 RX ADMIN — FAMOTIDINE 20 MG: 20 TABLET ORAL at 20:16

## 2020-09-25 RX ADMIN — BUPRENORPHINE AND NALOXONE 1 FILM: 8; 2 FILM BUCCAL; SUBLINGUAL at 08:31

## 2020-09-25 RX ADMIN — METHYLPREDNISOLONE SODIUM SUCCINATE 40 MG: 40 INJECTION, POWDER, FOR SOLUTION INTRAMUSCULAR; INTRAVENOUS at 08:31

## 2020-09-25 RX ADMIN — SODIUM CHLORIDE, PRESERVATIVE FREE 10 ML: 5 INJECTION INTRAVENOUS at 20:18

## 2020-09-25 RX ADMIN — SODIUM CHLORIDE, PRESERVATIVE FREE 10 ML: 5 INJECTION INTRAVENOUS at 08:33

## 2020-09-25 RX ADMIN — BUDESONIDE AND FORMOTEROL FUMARATE DIHYDRATE 2 PUFF: 80; 4.5 AEROSOL RESPIRATORY (INHALATION) at 08:10

## 2020-09-25 RX ADMIN — PREGABALIN 25 MG: 25 CAPSULE ORAL at 20:16

## 2020-09-25 RX ADMIN — BUDESONIDE AND FORMOTEROL FUMARATE DIHYDRATE 2 PUFF: 80; 4.5 AEROSOL RESPIRATORY (INHALATION) at 20:56

## 2020-09-25 RX ADMIN — BUPROPION HYDROCHLORIDE 300 MG: 150 TABLET, FILM COATED, EXTENDED RELEASE ORAL at 08:31

## 2020-09-25 RX ADMIN — THEOPHYLLINE ANHYDROUS 100 MG: 100 CAPSULE, EXTENDED RELEASE ORAL at 20:16

## 2020-09-25 RX ADMIN — METHYLPREDNISOLONE SODIUM SUCCINATE 40 MG: 40 INJECTION, POWDER, FOR SOLUTION INTRAMUSCULAR; INTRAVENOUS at 20:16

## 2020-09-25 RX ADMIN — ALBUTEROL SULFATE 2 PUFF: 90 AEROSOL, METERED RESPIRATORY (INHALATION) at 20:55

## 2020-09-25 RX ADMIN — AZITHROMYCIN MONOHYDRATE 250 MG: 500 INJECTION, POWDER, LYOPHILIZED, FOR SOLUTION INTRAVENOUS at 20:17

## 2020-09-25 RX ADMIN — TRAZODONE HYDROCHLORIDE 150 MG: 50 TABLET ORAL at 20:16

## 2020-09-25 RX ADMIN — ALBUTEROL SULFATE 2 PUFF: 90 AEROSOL, METERED RESPIRATORY (INHALATION) at 08:11

## 2020-09-25 RX ADMIN — ENOXAPARIN SODIUM 40 MG: 40 INJECTION SUBCUTANEOUS at 17:21

## 2020-09-25 RX ADMIN — THEOPHYLLINE ANHYDROUS 100 MG: 100 CAPSULE, EXTENDED RELEASE ORAL at 08:32

## 2020-09-25 ASSESSMENT — PAIN SCALES - GENERAL
PAINLEVEL_OUTOF10: 0
PAINLEVEL_OUTOF10: 0

## 2020-09-25 NOTE — PLAN OF CARE
Problem: Falls - Risk of:  Goal: Will remain free from falls  Description: Will remain free from falls  9/25/2020 1010 by Elio Hylton RN  Outcome: Ongoing  9/25/2020 0757 by Gabbi Estevez RN  Outcome: Ongoing  9/25/2020 0753 by Gabbi Estevez RN  Outcome: Ongoing  9/25/2020 0100 by Stepan Roper RN  Outcome: Ongoing  Goal: Absence of physical injury  Description: Absence of physical injury  9/25/2020 1010 by Elio Hylton RN  Outcome: Ongoing  9/25/2020 0757 by Gabbi Estevez RN  Outcome: Ongoing  9/25/2020 0753 by Gabbi Estevez RN  Outcome: Ongoing  9/25/2020 0100 by Stepan Roper RN  Outcome: Ongoing

## 2020-09-25 NOTE — PROGRESS NOTES
Patient Room #: 4556/2967-M  Patient Name: Sivan Coffman NIV Evaluation / Orders  1. Notify Pulmonary Diagnostics of order to evaluate for home NIV. 2. Perform the following tests at any point before discharge. [x] Perform an Overnight Oximetry while the patient is on at least                  2 lpm of oxygen. The saturation level must be <  88% for > 5                   cumulative minutes to qualify. [x] Arterial puncture (ABG) for blood gas analysis done while awake. ·    Qualifying result is a PaCO2 >   52 mmHg    3. [x]  I have documented in a progress note that KING is not the primary cause of hypercapnia in this patient. CPAP will not effectively treat this patient hypercapnia. BIPA Therapy will benefit and more effectively treat the hypercapnia. Results Documentation    (Attach a copy of Reports)  1. ABG Results       Date _________  PaCO2 __________ mmHg on ___________ lpm oxygen    2. Oximetry Level _________% for ___________ minutes on ________ lpm oxygen    3. [] Patient Qualifies      [] Patient Does NOT qualify      Complete order below:  Diagnosis _Acute on Chronic Hypoxemic and Hypercapnic Respiratory Failure, Severe COPD_           Length of Need: [] Lifetime  Home NIV () at:      Inspiratory Setting _12_ cm H2O         Expiratory Setting __6_ cm H2O    Therapist Signature: __________________________________Date: _____________  Physician Signature:   Electronically Signed in EMR_______  Date: _____________  Physician Printed Name: Ordering User: Andrew Horton MD  Provider ID: [id number]  NPI:  [id number]

## 2020-09-25 NOTE — PROGRESS NOTES
Hospitalist Progress Note      Name:  Gianna Peterson /Age/Sex: 1960  (64 y.o. female)   MRN & CSN:  [id number] & [id number] Admission Date/Time: 2020 10:38 AM   Location:  [facility]0/3110-A PCP: Ariane Ramos MD         Hospital Day: 2    Assessment and Plan:   Gianna Peterson is a 64 y.o.  female  who presents with shortness of breath     1) Acute on chronic hypoxic and hypercapnic respiratory failure   -uses 4 L of O2 at home  -CXR: No infiltrate  -COVID 19 neg  -CRP and Procal not elevated  -continue breathing treatment, steroid and Azithromycin  -Continue BiPAP QHS  -Pulmo on board        Other Chronic conditions; medication resumed unless contraindicated     - Chronic pain- Cont lyrica, tramadol; has current rxs per PDMP  - Opioid abuse hx- Cont suboxone- has current rx  - HCV hx       Diet DIET GENERAL;   DVT Prophylaxis [] Lovenox, []  Heparin, [] SCDs, [] Ambulation   GI Prophylaxis [] PPI,  [] H2 Blocker,  [] Carafate,  [] Diet/Tube Feeds   Code Status Full Code   Disposition Home   MDM      History of Present Illness:     Patient was seen and examined  Denied any worsening SOB or cough  No fever, chills, N/V/D  No dizziness, chest pain    Ten point ROS reviewed negative, unless as noted above    Objective: Intake/Output Summary (Last 24 hours) at 2020 1008  Last data filed at 2020 0833  Gross per 24 hour   Intake 10 ml   Output 300 ml   Net -290 ml      Vitals:   Vitals:    20 0814   BP:    Pulse:    Resp: 21   Temp:    SpO2:      Physical Exam:   GEN Awake female, sitting upright in bed in no apparent distress. Appears given age. EYES Pupils are equally round. No scleral erythema, discharge, or conjunctivitis. HENT Mucous membranes are moist. Oral pharynx without exudates, no evidence of thrush. NECK Supple, no apparent thyromegaly or masses. RESP Clear to auscultation, no wheezes, rales or rhonchi.   Symmetric chest movement while on 5 L of O2.  CARDIO/VASC S1/S2 auscultated. Regular rate without appreciable murmurs, rubs, or gallops. No JVD or carotid bruits. Peripheral pulses equal bilaterally and palpable. No peripheral edema. GI Abdomen is soft without significant tenderness, masses, or guarding. Bowel sounds are normoactive. Rectal exam deferred.  No costovertebral angle tenderness. Normal appearing external genitalia. Fraser catheter is not present. HEME/LYMPH No palpable cervical lymphadenopathy and no hepatosplenomegaly. No petechiae or ecchymoses. MSK No gross joint deformities. SKIN Normal coloration, warm, dry. NEURO Cranial nerves appear grossly intact, normal speech, no lateralizing weakness. PSYCH Awake, alert, oriented x 4. Affect appropriate.     Medications:   Medications:    sodium chloride flush  10 mL Intravenous 2 times per day    enoxaparin  40 mg Subcutaneous QPM    famotidine  20 mg Oral BID    azithromycin  250 mg Intravenous Q24H    nicotine  1 patch Transdermal QPM    albuterol sulfate HFA  2 puff Inhalation 4x daily    buprenorphine-naloxone  1 Film Sublingual Daily    budesonide-formoterol  2 puff Inhalation BID    pregabalin  25 mg Oral BID    theophylline  100 mg Oral BID    traZODone  150 mg Oral Nightly    varenicline  0.5 mg Oral BID    methylPREDNISolone  40 mg Intravenous Q12H    amphetamine-dextroamphetamine  30 mg Oral Daily    buPROPion  300 mg Oral Daily    tiotropium  2 puff Inhalation Daily      Infusions:   PRN Meds: albuterol sulfate HFA, 4 puff, Q1H PRN  sodium chloride flush, 10 mL, PRN  acetaminophen, 650 mg, Q6H PRN    Or  acetaminophen, 650 mg, Q6H PRN  polyethylene glycol, 17 g, Daily PRN  promethazine, 12.5 mg, Q6H PRN    Or  ondansetron, 4 mg, Q6H PRN  traMADol, 50 mg, BID PRN          Electronically signed by Vesta Gould MD on 9/25/2020 at 10:08 AM

## 2020-09-25 NOTE — PROGRESS NOTES
09/25/20 0019   NIV Type   NIV Started/Stopped On   Equipment Type v60   Mode Bilevel   Mask Type Full face mask   Mask Size Small   Settings/Measurements   IPAP 18 cmH20   CPAP/EPAP 6 cmH2O   Resp 9   FiO2  35 %   I Time/ I Time % 1 s   Vt Exhaled 771 mL   Mask Leak (lpm) 0 lpm   Alarm Settings   Alarms On Y   Press Low Alarm 5 cmH2O   High Pressure Alarm 25 cmH2O   Delay Alarm 20 sec(s)   Apnea (secs) 20 secs   Resp Rate Low Alarm 12   High Respiratory Rate 40 br/min

## 2020-09-25 NOTE — PROGRESS NOTES
Please run overnight oxygen trend on pt tonight for Home Bipap Evaluation. Pt wears 5 LPM at home so trend must be ran on Home Oxygen order. ABG will need to be drawn after overnight trend on 5 LPM. Please document the liter flow on the ABG results.

## 2020-09-25 NOTE — CONSULTS
Inhalation, BID  pregabalin (LYRICA) capsule 25 mg, 25 mg, Oral, BID  theophylline (FRANCO-24) extended release capsule 100 mg, 100 mg, Oral, BID  traZODone (DESYREL) tablet 150 mg, 150 mg, Oral, Nightly  varenicline (CHANTIX) tablet 0.5 mg, 0.5 mg, Oral, BID  methylPREDNISolone sodium (SOLU-MEDROL) injection 40 mg, 40 mg, Intravenous, Q12H  amphetamine-dextroamphetamine (ADDERALL XR) extended release capsule 30 mg, 30 mg, Oral, Daily  buPROPion (WELLBUTRIN XL) extended release tablet 300 mg, 300 mg, Oral, Daily  traMADol (ULTRAM) tablet 50 mg, 50 mg, Oral, BID PRN  tiotropium (SPIRIVA RESPIMAT) 2.5 MCG/ACT inhaler 2 puff, 2 puff, Inhalation, Daily    No Known Allergies    Social History:    Social History     Socioeconomic History    Marital status: Single     Spouse name: Not on file    Number of children: Not on file    Years of education: Not on file    Highest education level: Not on file   Occupational History    Not on file   Social Needs    Financial resource strain: Not on file    Food insecurity     Worry: Not on file     Inability: Not on file    Transportation needs     Medical: Not on file     Non-medical: Not on file   Tobacco Use    Smoking status: Former Smoker     Last attempt to quit: 2016     Years since quittin.1    Smokeless tobacco: Never Used   Substance and Sexual Activity    Alcohol use: Not Currently    Drug use: Not Currently    Sexual activity: Not on file   Lifestyle    Physical activity     Days per week: Not on file     Minutes per session: Not on file    Stress: Not on file   Relationships    Social connections     Talks on phone: Not on file     Gets together: Not on file     Attends [employer] service: Not on file     Active member of club or organization: Not on file     Attends meetings of clubs or organizations: Not on file     Relationship status: Not on file    Intimate partner violence     Fear of current or ex partner: Not on file     Emotionally appears stated age  NECK:  Supple, symmetrical, trachea midline, no adenopathy, thyroid symmetric, not enlarged and no tenderness  CHEST: Chest expansion equal and symmetrical, no intercostal retraction. LUNGS:  no increased work of breathing, has expiratory wheezes both lungs, no crackles. CARDIOVASCULAR: S1 and S2, no edema and no JVD  ABDOMEN:  normal bowel sounds, non-distended and no masses palpated, and no tenderness to palpation. No hepatospleenomegaly  LYMPHADENOPATHY:  no axillary or supraclavicular adenopathy. No cervical adnenopathy  PSYCHIATRIC: Oriented to person place and time. No obvious depression or anxiety. MUSCULOSKELETAL: No obvious misalignment or effusion of the joints. No clubbing, cyanosis of the digits. RIGHT AND LEFT LOWER EXTREMITIES: No edema, no inflammation, no tenderness. SKIN:  normal skin color, texture, turgor and no redness, warmth, or swelling. No palpable nodules     EXAMINATION:    ONE XRAY VIEW OF THE CHEST         9/24/2020 10:59 am         COMPARISON:    December 27, 2019         HISTORY:    ORDERING SYSTEM PROVIDED HISTORY: SOB    TECHNOLOGIST PROVIDED HISTORY:    Reason for exam:->SOB    Reason for Exam: SOB    Acuity: Acute    Type of Exam: Initial    Additional signs and symptoms: na    Relevant Medical/Surgical History: COPD         Acute shortness of breath.  Initial encounter.         FINDINGS:    Cardiac leads overlie the RA and RV.  No evidence of lead discontinuity. The    cardiomediastinal silhouette is within normal range. Lungs are clear. There    is no focal pulmonary consolidation, pleural effusion, pneumothorax, or    evidence of airspace pulmonary edema.              Impression    1. No radiographic finding to account for patient's shortness of breath.           DATA:            7.33/80/139            Assessment:     1. Ac on ch hypoxemic and hypercapneic resp failure  2. Ac exac of severe copd  3. Ac bronchitis          Plan:     1. D/w pt  2.  Bd

## 2020-09-25 NOTE — PLAN OF CARE
Problem: Falls - Risk of:  Goal: Will remain free from falls  Description: Will remain free from falls  9/25/2020 0100 by Sean Handley RN  Outcome: Ongoing  9/24/2020 1902 by Gian Morales RN  Outcome: Ongoing  Goal: Absence of physical injury  Description: Absence of physical injury  9/25/2020 0100 by Sean Handley RN  Outcome: Ongoing  9/24/2020 1902 by Gian Morales RN  Outcome: Ongoing

## 2020-09-25 NOTE — PROGRESS NOTES
This note also relates to the following rows which could not be included:  Resp - Cannot attach notes to unvalidated device data       09/25/20 0428   NIV Type   NIV Started/Stopped Off   Equipment Type standby v60

## 2020-09-25 NOTE — PLAN OF CARE
Problem: Falls - Risk of:  Goal: Will remain free from falls  Description: Will remain free from falls  9/25/2020 0753 by Shane Nails RN  Outcome: Ongoing  9/25/2020 0100 by Kathia Mosley RN  Outcome: Ongoing  9/24/2020 1902 by Veronika Mistry RN  Outcome: Ongoing  Goal: Absence of physical injury  Description: Absence of physical injury  9/25/2020 0753 by Shane Nails RN  Outcome: Ongoing  9/25/2020 0100 by Kathia Mosley RN  Outcome: Ongoing  9/24/2020 1902 by Veronika Mistry RN  Outcome: Ongoing

## 2020-09-26 PROCEDURE — 6360000002 HC RX W HCPCS: Performed by: NURSE PRACTITIONER

## 2020-09-26 PROCEDURE — 2140000000 HC CCU INTERMEDIATE R&B

## 2020-09-26 PROCEDURE — 94660 CPAP INITIATION&MGMT: CPT

## 2020-09-26 PROCEDURE — 2700000000 HC OXYGEN THERAPY PER DAY

## 2020-09-26 PROCEDURE — 94761 N-INVAS EAR/PLS OXIMETRY MLT: CPT

## 2020-09-26 PROCEDURE — 2580000003 HC RX 258: Performed by: NURSE PRACTITIONER

## 2020-09-26 PROCEDURE — 93010 ELECTROCARDIOGRAM REPORT: CPT | Performed by: INTERNAL MEDICINE

## 2020-09-26 PROCEDURE — 94640 AIRWAY INHALATION TREATMENT: CPT

## 2020-09-26 PROCEDURE — 6370000000 HC RX 637 (ALT 250 FOR IP): Performed by: NURSE PRACTITIONER

## 2020-09-26 RX ADMIN — METHYLPREDNISOLONE SODIUM SUCCINATE 40 MG: 40 INJECTION, POWDER, FOR SOLUTION INTRAMUSCULAR; INTRAVENOUS at 20:00

## 2020-09-26 RX ADMIN — AZITHROMYCIN MONOHYDRATE 250 MG: 500 INJECTION, POWDER, LYOPHILIZED, FOR SOLUTION INTRAVENOUS at 21:06

## 2020-09-26 RX ADMIN — SODIUM CHLORIDE, PRESERVATIVE FREE 10 ML: 5 INJECTION INTRAVENOUS at 09:23

## 2020-09-26 RX ADMIN — ALBUTEROL SULFATE 2 PUFF: 90 AEROSOL, METERED RESPIRATORY (INHALATION) at 12:23

## 2020-09-26 RX ADMIN — THEOPHYLLINE ANHYDROUS 100 MG: 100 CAPSULE, EXTENDED RELEASE ORAL at 20:00

## 2020-09-26 RX ADMIN — TRAZODONE HYDROCHLORIDE 150 MG: 50 TABLET ORAL at 20:00

## 2020-09-26 RX ADMIN — FAMOTIDINE 20 MG: 20 TABLET ORAL at 09:09

## 2020-09-26 RX ADMIN — BUPRENORPHINE AND NALOXONE 1 FILM: 8; 2 FILM BUCCAL; SUBLINGUAL at 09:23

## 2020-09-26 RX ADMIN — ALBUTEROL SULFATE 2 PUFF: 90 AEROSOL, METERED RESPIRATORY (INHALATION) at 07:50

## 2020-09-26 RX ADMIN — THEOPHYLLINE ANHYDROUS 100 MG: 100 CAPSULE, EXTENDED RELEASE ORAL at 09:09

## 2020-09-26 RX ADMIN — VARENICLINE TARTRATE 0.5 MG: 0.5 TABLET, FILM COATED ORAL at 20:00

## 2020-09-26 RX ADMIN — FAMOTIDINE 20 MG: 20 TABLET ORAL at 20:00

## 2020-09-26 RX ADMIN — PREGABALIN 25 MG: 25 CAPSULE ORAL at 20:00

## 2020-09-26 RX ADMIN — SODIUM CHLORIDE, PRESERVATIVE FREE 10 ML: 5 INJECTION INTRAVENOUS at 19:59

## 2020-09-26 RX ADMIN — ENOXAPARIN SODIUM 40 MG: 40 INJECTION SUBCUTANEOUS at 18:45

## 2020-09-26 RX ADMIN — PREGABALIN 25 MG: 25 CAPSULE ORAL at 09:23

## 2020-09-26 RX ADMIN — TIOTROPIUM BROMIDE INHALATION SPRAY 2 PUFF: 3.12 SPRAY, METERED RESPIRATORY (INHALATION) at 07:52

## 2020-09-26 RX ADMIN — BUDESONIDE AND FORMOTEROL FUMARATE DIHYDRATE 2 PUFF: 80; 4.5 AEROSOL RESPIRATORY (INHALATION) at 07:51

## 2020-09-26 RX ADMIN — BUDESONIDE AND FORMOTEROL FUMARATE DIHYDRATE 2 PUFF: 80; 4.5 AEROSOL RESPIRATORY (INHALATION) at 20:24

## 2020-09-26 RX ADMIN — METHYLPREDNISOLONE SODIUM SUCCINATE 40 MG: 40 INJECTION, POWDER, FOR SOLUTION INTRAMUSCULAR; INTRAVENOUS at 09:17

## 2020-09-26 RX ADMIN — BUPROPION HYDROCHLORIDE 300 MG: 150 TABLET, FILM COATED, EXTENDED RELEASE ORAL at 09:16

## 2020-09-26 RX ADMIN — VARENICLINE TARTRATE 0.5 MG: 0.5 TABLET, FILM COATED ORAL at 09:09

## 2020-09-26 RX ADMIN — ALBUTEROL SULFATE 2 PUFF: 90 AEROSOL, METERED RESPIRATORY (INHALATION) at 16:02

## 2020-09-26 RX ADMIN — ALBUTEROL SULFATE 2 PUFF: 90 AEROSOL, METERED RESPIRATORY (INHALATION) at 20:24

## 2020-09-26 ASSESSMENT — PAIN SCALES - GENERAL
PAINLEVEL_OUTOF10: 0

## 2020-09-26 NOTE — PROGRESS NOTES
Hospitalist Progress Note      Name:  Cathi Mcguire /Age/Sex: 1960  (64 y.o. female)   MRN & CSN:  [id number] & [id number] Admission Date/Time: 2020 10:38 AM   Location:  [facility]0/3110-A PCP: Mary Jones, Kar[facility] Platform Day: 3    Assessment and Plan:   Glory Hyman a 64 y.o.  female  who presents with shortness of breath     1) Acute on chronic hypoxic and hypercapnic respiratory failure   -uses 4 L of O2 at home  -CXR: No infiltrate  -COVID 19 neg  -CRP and Procal not elevated  -continue breathing treatment, steroid and Azithromycin  -Continue BiPAP QHS (Patient has BiPAP at home)  -Pulmo on board        Other Chronic conditions; medication resumed unless contraindicated      - Chronic pain- Cont lyrica, tramadol; has current rxs per PDMP  - Opioid abuse hx- Cont suboxone- has current rx  - HCV hx       Diet DIET GENERAL;   DVT Prophylaxis [] Lovenox, []  Heparin, [] SCDs, [] Ambulation   GI Prophylaxis [] PPI,  [] H2 Blocker,  [] Carafate,  [] Diet/Tube Feeds   Code Status Full Code   Disposition home   MDM      History of Present Illness:     Patient was seen and examined  Denied any worsening SOB  No dizziness, palpitations  No fever, chills, N/V/D       Ten point ROS reviewed negative, unless as noted above    Objective: Intake/Output Summary (Last 24 hours) at 2020 0911  Last data filed at 2020 0142  Gross per 24 hour   Intake --   Output 550 ml   Net -550 ml      Vitals:   Vitals:    20 0815   BP: (!) 99/54   Pulse: 72   Resp: 11   Temp:    SpO2: 92%     Physical Exam:   GEN Awake female, sitting upright in bed in no apparent distress. Appears given age. EYES Pupils are equally round. No scleral erythema, discharge, or conjunctivitis. HENT Mucous membranes are moist. Oral pharynx without exudates, no evidence of thrush. NECK Supple, no apparent thyromegaly or masses. RESP Clear to auscultation, no wheezes, rales or rhonchi.   Symmetric chest movement while on 4 L of O2.  CARDIO/VASC S1/S2 auscultated. Regular rate without appreciable murmurs, rubs, or gallops. No JVD or carotid bruits. Peripheral pulses equal bilaterally and palpable. No peripheral edema. GI Abdomen is soft without significant tenderness, masses, or guarding. Bowel sounds are normoactive. Rectal exam deferred.  No costovertebral angle tenderness. Normal appearing external genitalia. Fraser catheter is not present. HEME/LYMPH No palpable cervical lymphadenopathy and no hepatosplenomegaly. No petechiae or ecchymoses. MSK No gross joint deformities. SKIN Normal coloration, warm, dry. NEURO Cranial nerves appear grossly intact, normal speech, no lateralizing weakness. PSYCH Awake, alert, oriented x 4. Affect appropriate.     Medications:   Medications:    sodium chloride flush  10 mL Intravenous 2 times per day    enoxaparin  40 mg Subcutaneous QPM    famotidine  20 mg Oral BID    azithromycin  250 mg Intravenous Q24H    nicotine  1 patch Transdermal QPM    albuterol sulfate HFA  2 puff Inhalation 4x daily    buprenorphine-naloxone  1 Film Sublingual Daily    budesonide-formoterol  2 puff Inhalation BID    pregabalin  25 mg Oral BID    theophylline  100 mg Oral BID    traZODone  150 mg Oral Nightly    varenicline  0.5 mg Oral BID    methylPREDNISolone  40 mg Intravenous Q12H    amphetamine-dextroamphetamine  30 mg Oral Daily    buPROPion  300 mg Oral Daily    tiotropium  2 puff Inhalation Daily      Infusions:   PRN Meds: albuterol sulfate HFA, 4 puff, Q1H PRN  sodium chloride flush, 10 mL, PRN  acetaminophen, 650 mg, Q6H PRN    Or  acetaminophen, 650 mg, Q6H PRN  polyethylene glycol, 17 g, Daily PRN  promethazine, 12.5 mg, Q6H PRN    Or  ondansetron, 4 mg, Q6H PRN  traMADol, 50 mg, BID PRN          Electronically signed by Jackie Patel MD on 9/26/2020 at 9:11 AM

## 2020-09-26 NOTE — PROGRESS NOTES
pulmonary      SUBJECTIVE:  She feels much better     OBJECTIVE    VITALS:  BP (!) 99/54   Pulse 72   Temp 98.5 °F (36.9 °C) (Oral)   Resp 9   Ht 5' 4\" (1.626 m)   Wt 161 lb (73 kg)   SpO2 92%   BMI 27.64 kg/m²   HEAD AND FACE EXAM:  No throat injection, no active exudate,no thrush  NECK EXAM;No JVD, no masses, symmetrical  CHEST EXAM; Expansion equal and symmetrical, no masses  LUNG EXAM; Good breath sounds bilaterally. There are expiratory wheezes both lungs, there are crackles at both lung bases  CARDIOVASCULAR EXAM: Positive S1 and S2, no S3 or S4, no clicks ,no murmurs  RIGHT AND LEFT LOWER EXTRIMITY EXAM: No edema, no swelling, no inflamation  CNS EXAM: Alert and oriented X3          LABS   Lab Results   Component Value Date    WBC 7.2 09/25/2020    HGB 12.3 (L) 09/25/2020    HCT 39.2 09/25/2020    MCV 94.7 09/25/2020     09/25/2020     Lab Results   Component Value Date    CREATININE 0.5 (L) 09/25/2020    BUN 7 09/25/2020     09/25/2020    K 4.5 09/25/2020    CL 98 (L) 09/25/2020    CO2 38 (H) 09/25/2020     Lab Results   Component Value Date    INR 1.05 09/25/2020    PROTIME 12.7 09/25/2020        No results found for: PHOS     Recent Labs     09/24/20  1930   PH 7.33*   PO2ART 139*   [id number] 80.0*   O2SAT 95.7*         Wt Readings from Last 3 Encounters:   09/26/20 161 lb (73 kg)   05/20/20 154 lb (69.9 kg)   12/30/19 105 lb 2.6 oz (47.7 kg)               ASSESMENT  Ac on ch resp failure  Ac copd        PLAN  1. Bd rx  2. Steroids  3.  Home bipap eval in progress    9/26/2020  Anand Garces M.D.

## 2020-09-27 VITALS
BODY MASS INDEX: 27.1 KG/M2 | HEART RATE: 81 BPM | RESPIRATION RATE: 23 BRPM | HEIGHT: 64 IN | SYSTOLIC BLOOD PRESSURE: 109 MMHG | OXYGEN SATURATION: 97 % | TEMPERATURE: 97.9 F | WEIGHT: 158.7 LBS | DIASTOLIC BLOOD PRESSURE: 45 MMHG

## 2020-09-27 PROCEDURE — 6360000002 HC RX W HCPCS: Performed by: NURSE PRACTITIONER

## 2020-09-27 PROCEDURE — 2580000003 HC RX 258: Performed by: NURSE PRACTITIONER

## 2020-09-27 PROCEDURE — 94640 AIRWAY INHALATION TREATMENT: CPT

## 2020-09-27 PROCEDURE — 6370000000 HC RX 637 (ALT 250 FOR IP): Performed by: NURSE PRACTITIONER

## 2020-09-27 PROCEDURE — 2700000000 HC OXYGEN THERAPY PER DAY

## 2020-09-27 PROCEDURE — 94761 N-INVAS EAR/PLS OXIMETRY MLT: CPT

## 2020-09-27 RX ORDER — METHYLPREDNISOLONE SODIUM SUCCINATE 40 MG/ML
20 INJECTION, POWDER, LYOPHILIZED, FOR SOLUTION INTRAMUSCULAR; INTRAVENOUS EVERY 12 HOURS
Status: DISCONTINUED | OUTPATIENT
Start: 2020-09-27 | End: 2020-09-27 | Stop reason: HOSPADM

## 2020-09-27 RX ORDER — METHYLPREDNISOLONE 4 MG/1
TABLET ORAL
Qty: 1 KIT | Refills: 0 | Status: SHIPPED | OUTPATIENT
Start: 2020-09-27 | End: 2020-10-03

## 2020-09-27 RX ORDER — NEBULIZER ACCESSORIES
1 KIT MISCELLANEOUS DAILY PRN
Qty: 1 KIT | Refills: 0 | Status: ON HOLD | OUTPATIENT
Start: 2020-09-27 | End: 2022-01-17 | Stop reason: HOSPADM

## 2020-09-27 RX ORDER — AZITHROMYCIN 500 MG/1
500 TABLET, FILM COATED ORAL DAILY
Qty: 3 TABLET | Refills: 0 | Status: SHIPPED | OUTPATIENT
Start: 2020-09-27 | End: 2020-09-30

## 2020-09-27 RX ORDER — METHYLPREDNISOLONE 4 MG/1
TABLET ORAL
Qty: 1 KIT | Refills: 0 | Status: SHIPPED | OUTPATIENT
Start: 2020-09-27 | End: 2020-09-27 | Stop reason: SDUPTHER

## 2020-09-27 RX ORDER — AZITHROMYCIN 500 MG/1
500 TABLET, FILM COATED ORAL DAILY
Qty: 3 TABLET | Refills: 0 | Status: SHIPPED | OUTPATIENT
Start: 2020-09-27 | End: 2020-09-27 | Stop reason: SDUPTHER

## 2020-09-27 RX ADMIN — PREGABALIN 25 MG: 25 CAPSULE ORAL at 10:30

## 2020-09-27 RX ADMIN — THEOPHYLLINE ANHYDROUS 100 MG: 100 CAPSULE, EXTENDED RELEASE ORAL at 10:30

## 2020-09-27 RX ADMIN — TIOTROPIUM BROMIDE INHALATION SPRAY 2 PUFF: 3.12 SPRAY, METERED RESPIRATORY (INHALATION) at 07:34

## 2020-09-27 RX ADMIN — FAMOTIDINE 20 MG: 20 TABLET ORAL at 10:30

## 2020-09-27 RX ADMIN — ALBUTEROL SULFATE 2 PUFF: 90 AEROSOL, METERED RESPIRATORY (INHALATION) at 07:32

## 2020-09-27 RX ADMIN — SODIUM CHLORIDE, PRESERVATIVE FREE 10 ML: 5 INJECTION INTRAVENOUS at 10:31

## 2020-09-27 RX ADMIN — VARENICLINE TARTRATE 0.5 MG: 0.5 TABLET, FILM COATED ORAL at 10:37

## 2020-09-27 RX ADMIN — BUPRENORPHINE AND NALOXONE 1 FILM: 8; 2 FILM BUCCAL; SUBLINGUAL at 10:30

## 2020-09-27 RX ADMIN — METHYLPREDNISOLONE SODIUM SUCCINATE 40 MG: 40 INJECTION, POWDER, FOR SOLUTION INTRAMUSCULAR; INTRAVENOUS at 10:30

## 2020-09-27 RX ADMIN — POLYETHYLENE GLYCOL (3350) 17 G: 17 POWDER, FOR SOLUTION ORAL at 10:37

## 2020-09-27 RX ADMIN — BUDESONIDE AND FORMOTEROL FUMARATE DIHYDRATE 2 PUFF: 80; 4.5 AEROSOL RESPIRATORY (INHALATION) at 07:33

## 2020-09-27 RX ADMIN — BUPROPION HYDROCHLORIDE 300 MG: 150 TABLET, FILM COATED, EXTENDED RELEASE ORAL at 10:30

## 2020-09-27 ASSESSMENT — PAIN SCALES - GENERAL
PAINLEVEL_OUTOF10: 0

## 2020-09-27 NOTE — DISCHARGE SUMMARY
Discharge Summary    Name:  Roxana HOGAN/Age/Sex: 1960  (64 y.o. female)   MRN & CSN:  [id number] & [id number] Admission Date/Time: 2020 10:38 AM   Attending:  Fidel Thomson MD Discharging Physician: Ronnie Bullock MD     Hospital Course:   Ursula Sam a 64 y.o.  female  who presents with shortness of breath    Patient was seen and examined  Denied any worsening SOB  No fever, chills, N/V/D  No chest pain, palpitations     Assessment and Plan  1) Acute on chronic hypoxic and hypercapnic respiratory failure   -uses 4 L of O2 at home; back to baseline  -CXR: No infiltrate  -COVID 19 neg  -CRP and Procal not elevated  -Continue breathing treatment, steroid and Azithromycin  -Continue BiPAP QHS; home BiPAP done but patient did not qualify  -Pulmo on board; for OP follow up        Other Chronic conditions; medication resumed unless contraindicated      - Chronic pain- Cont lyrica, tramadol; has current rxs per PDMP  - Opioid abuse hx- Cont suboxone- has current rx  - HCV hx  -KING on CPAP    The patient expressed appropriate understanding of and agreement with the discharge recommendations, medications, and plan.      Consults this admission:  IP CONSULT TO HOSPITALIST  IP CONSULT TO PULMONOLOGY    Discharge Instruction:   Follow up appointments: Pulm in 2 weeks  Primary care physician:  within 2 weeks    Diet:  cardiac diet   Activity: activity as tolerated  Disposition: Discharged to:   [x]Home, [][facility], []SNF, []Acute Rehab, []Hospice   Condition on discharge: Stable    Discharge Medications:      Paris Yancey   Home Medication Instructions PQ    Printed on:20 0749   Medication Information                      albuterol (PROVENTIL) (2.5 MG/3ML) 0.083% nebulizer solution  Take 3 mLs by nebulization every 6 hours as needed for Wheezing             albuterol sulfate  (90 Base) MCG/ACT inhaler  INHALE 2 PUFFS BY MOUTH EVERY 6 HOURS SHAKE WELL amphetamine-dextroamphetamine (ADDERALL XR) 30 MG extended release capsule  Take 30 mg by mouth daily. azithromycin (ZITHROMAX) 500 MG tablet  Take 1 tablet by mouth daily for 3 days             buprenorphine-naloxone (SUBOXONE) 8-2 MG FILM SL film  Place 1 Film under the tongue daily             buPROPion (WELLBUTRIN XL) 300 MG extended release tablet  Take 300 mg by mouth daily             CPAP Machine MISC  by CPAP route nightly             DULERA 100-5 MCG/ACT inhaler  INHALE 2 PUFFS BY MOUTH 2 TIMES A DAY SHAKE WELL             methylPREDNISolone (MEDROL DOSEPACK) 4 MG tablet  Take by mouth. Nebulizer MISC  Use nebulizer with medication as directed. OXYGEN  Inhale 4 L into the lungs continuous              pregabalin (LYRICA) 100 MG capsule  Take 100 mg by mouth 3 times daily. Respiratory Therapy Supplies (NEBULIZER/TUBING/MOUTHPIECE) KIT  Inhale 1 kit into the lungs daily as needed (as directed)             SPIRIVA RESPIMAT 2.5 MCG/ACT AERS inhaler  INHALE 2 PUFFS BY MOUTH ONCE DAILY             theophylline (FRANCO-24) 100 MG extended release capsule  Take 1 capsule by mouth 2 times daily             traMADol (ULTRAM) 50 MG tablet  Take 50 mg by mouth 2 times daily. traZODone (DESYREL) 150 MG tablet  Take 150 mg by mouth nightly              varenicline (CHANTIX) 0.5 MG tablet  Take 1 tablet by mouth 2 times daily                 Objective Findings at Discharge:   BP (!) 109/56   Pulse 60   Temp 98 °F (36.7 °C) (Oral)   Resp 9   Ht 5' 4\" (1.626 m)   Wt 158 lb 11.2 oz (72 kg)   SpO2 97%   BMI 27.24 kg/m²            PHYSICAL EXAM   GEN Awake female, sitting upright in bed in no apparent distress. Appears given age. EYES Pupils are equally round. No scleral erythema, discharge, or conjunctivitis. HENT Mucous membranes are moist. Oral pharynx without exudates, no evidence of thrush. NECK Supple, no apparent thyromegaly or masses.   RESP Clear

## 2020-09-27 NOTE — PROGRESS NOTES
pulmonary      SUBJECTIVE: sleeping and on bipap     OBJECTIVE    VITALS:  /76   Pulse 75   Temp 98.3 °F (36.8 °C) (Oral)   Resp 14   Ht 5' 4\" (1.626 m)   Wt 158 lb 11.2 oz (72 kg)   SpO2 97%   BMI 27.24 kg/m²   HEAD AND FACE EXAM:  No throat injection, no active exudate,no thrush  NECK EXAM;No JVD, no masses, symmetrical  CHEST EXAM; Expansion equal and symmetrical, no masses  LUNG EXAM; Good breath sounds bilaterally. There are expiratory wheezes both lungs, there are crackles at both lung bases  CARDIOVASCULAR EXAM: Positive S1 and S2, no S3 or S4, no clicks ,no murmurs  RIGHT AND LEFT LOWER EXTRIMITY EXAM: No edema, no swelling, no inflamation            LABS   Lab Results   Component Value Date    WBC 7.2 09/25/2020    HGB 12.3 (L) 09/25/2020    HCT 39.2 09/25/2020    MCV 94.7 09/25/2020     09/25/2020     Lab Results   Component Value Date    CREATININE 0.5 (L) 09/25/2020    BUN 7 09/25/2020     09/25/2020    K 4.5 09/25/2020    CL 98 (L) 09/25/2020    CO2 38 (H) 09/25/2020     Lab Results   Component Value Date    INR 1.05 09/25/2020    PROTIME 12.7 09/25/2020        No results found for: PHOS     Recent Labs     09/24/20  1930   PH 7.33*   PO2ART 139*   [id number] 80.0*   O2SAT 95.7*         Wt Readings from Last 3 Encounters:   09/27/20 158 lb 11.2 oz (72 kg)   05/20/20 154 lb (69.9 kg)   12/30/19 105 lb 2.6 oz (47.7 kg)               ASSESMENT  Ac on ch resp failure  Ac copd        PLAN  1. Cont pap  2.  Taper steroids    9/27/2020  Velia Jolley M.D.

## 2020-09-27 NOTE — PLAN OF CARE
Problem: Falls - Risk of:  Goal: Will remain free from falls  Description: Will remain free from falls  9/27/2020 1101 by Kb Sosa RN  Outcome: Completed  9/27/2020 0534 by Vicente Zamora LPN  Outcome: Ongoing  Goal: Absence of physical injury  Description: Absence of physical injury  9/27/2020 1101 by Kb Sosa RN  Outcome: Completed  9/27/2020 0534 by Vicente Zamora LPN  Outcome: Ongoing

## 2020-10-01 LAB
EKG ATRIAL RATE: 94 BPM
EKG DIAGNOSIS: NORMAL
EKG P AXIS: 78 DEGREES
EKG P-R INTERVAL: 162 MS
EKG Q-T INTERVAL: 376 MS
EKG QRS DURATION: 96 MS
EKG QTC CALCULATION (BAZETT): 470 MS
EKG R AXIS: 67 DEGREES
EKG T AXIS: 74 DEGREES
EKG VENTRICULAR RATE: 94 BPM

## 2020-11-17 ENCOUNTER — HOSPITAL ENCOUNTER (INPATIENT)
Age: 60
LOS: 2 days | Discharge: HOME | DRG: 140 | End: 2020-11-19
Attending: FAMILY MEDICINE | Admitting: INTERNAL MEDICINE
Payer: COMMERCIAL

## 2020-11-17 ENCOUNTER — APPOINTMENT (OUTPATIENT)
Dept: GENERAL RADIOLOGY | Age: 60
DRG: 140 | End: 2020-11-17
Payer: COMMERCIAL

## 2020-11-17 ENCOUNTER — APPOINTMENT (OUTPATIENT)
Dept: CT IMAGING | Age: 60
DRG: 140 | End: 2020-11-17
Payer: COMMERCIAL

## 2020-11-17 LAB
ABO/RH: NORMAL
ALBUMIN SERPL-MCNC: 3.8 GM/DL (ref 3.4–5)
ALP BLD-CCNC: 65 IU/L (ref 40–128)
ALT SERPL-CCNC: 11 U/L (ref 10–40)
ANION GAP SERPL CALCULATED.3IONS-SCNC: 6 MMOL/L (ref 4–16)
ANTIBODY SCREEN: NEGATIVE
APTT: 25.7 SECONDS (ref 25.1–37.1)
AST SERPL-CCNC: 15 IU/L (ref 15–37)
BASOPHILS ABSOLUTE: 0.1 K/CU MM
BASOPHILS RELATIVE PERCENT: 0.4 % (ref 0–1)
BILIRUB SERPL-MCNC: 0.4 MG/DL (ref 0–1)
BUN BLDV-MCNC: 8 MG/DL (ref 6–23)
CALCIUM SERPL-MCNC: 8.8 MG/DL (ref 8.3–10.6)
CHLORIDE BLD-SCNC: 95 MMOL/L (ref 99–110)
CO2: 37 MMOL/L (ref 21–32)
CREAT SERPL-MCNC: 0.5 MG/DL (ref 0.6–1.1)
D DIMER: <200 NG/ML(DDU)
DIFFERENTIAL TYPE: ABNORMAL
EOSINOPHILS ABSOLUTE: 0.2 K/CU MM
EOSINOPHILS RELATIVE PERCENT: 1.5 % (ref 0–3)
FERRITIN: 87 NG/ML (ref 15–150)
FIBRINOGEN LEVEL: 381 MG/DL (ref 196.9–442.1)
GFR AFRICAN AMERICAN: >60 ML/MIN/1.73M2
GFR NON-AFRICAN AMERICAN: >60 ML/MIN/1.73M2
GLUCOSE BLD-MCNC: 130 MG/DL (ref 70–99)
HCT VFR BLD CALC: 40.1 % (ref 37–47)
HEMOGLOBIN: 12.3 GM/DL (ref 12.5–16)
HIGH SENSITIVE C-REACTIVE PROTEIN: 4.2 MG/L
IMMATURE NEUTROPHIL %: 0.5 % (ref 0–0.43)
INR BLD: 1.01 INDEX
LACTATE DEHYDROGENASE: 213 IU/L (ref 120–246)
LACTATE: 1 MMOL/L (ref 0.4–2)
LYMPHOCYTES ABSOLUTE: 1.3 K/CU MM
LYMPHOCYTES RELATIVE PERCENT: 9.2 % (ref 24–44)
MAGNESIUM: 1.6 MG/DL (ref 1.8–2.4)
MCH RBC QN AUTO: 29 PG (ref 27–31)
MCHC RBC AUTO-ENTMCNC: 30.7 % (ref 32–36)
MCV RBC AUTO: 94.6 FL (ref 78–100)
MONOCYTES ABSOLUTE: 1.4 K/CU MM
MONOCYTES RELATIVE PERCENT: 9.9 % (ref 0–4)
NUCLEATED RBC %: 0 %
PDW BLD-RTO: 11.9 % (ref 11.7–14.9)
PLATELET # BLD: 183 K/CU MM (ref 140–440)
PMV BLD AUTO: 9.9 FL (ref 7.5–11.1)
POTASSIUM SERPL-SCNC: 3.3 MMOL/L (ref 3.5–5.1)
PROCALCITONIN: 0.04
PROTHROMBIN TIME: 12.2 SECONDS (ref 11.7–14.5)
RBC # BLD: 4.24 M/CU MM (ref 4.2–5.4)
SARS-COV-2, NAAT: NOT DETECTED
SEGMENTED NEUTROPHILS ABSOLUTE COUNT: 11.2 K/CU MM
SEGMENTED NEUTROPHILS RELATIVE PERCENT: 78.5 % (ref 36–66)
SODIUM BLD-SCNC: 138 MMOL/L (ref 135–145)
SOURCE: NORMAL
TOTAL IMMATURE NEUTOROPHIL: 0.07 K/CU MM
TOTAL NUCLEATED RBC: 0 K/CU MM
TOTAL PROTEIN: 6.5 GM/DL (ref 6.4–8.2)
TROPONIN T: <0.01 NG/ML
VITAMIN D 25-HYDROXY: 17.22 NG/ML
WBC # BLD: 14.3 K/CU MM (ref 4–10.5)

## 2020-11-17 PROCEDURE — 86850 RBC ANTIBODY SCREEN: CPT

## 2020-11-17 PROCEDURE — 93005 ELECTROCARDIOGRAM TRACING: CPT | Performed by: FAMILY MEDICINE

## 2020-11-17 PROCEDURE — 85610 PROTHROMBIN TIME: CPT

## 2020-11-17 PROCEDURE — 86141 C-REACTIVE PROTEIN HS: CPT

## 2020-11-17 PROCEDURE — 85384 FIBRINOGEN ACTIVITY: CPT

## 2020-11-17 PROCEDURE — 96375 TX/PRO/DX INJ NEW DRUG ADDON: CPT

## 2020-11-17 PROCEDURE — 85025 COMPLETE CBC W/AUTO DIFF WBC: CPT

## 2020-11-17 PROCEDURE — 6370000000 HC RX 637 (ALT 250 FOR IP): Performed by: INTERNAL MEDICINE

## 2020-11-17 PROCEDURE — 84145 PROCALCITONIN (PCT): CPT

## 2020-11-17 PROCEDURE — 96365 THER/PROPH/DIAG IV INF INIT: CPT

## 2020-11-17 PROCEDURE — 6360000004 HC RX CONTRAST MEDICATION: Performed by: FAMILY MEDICINE

## 2020-11-17 PROCEDURE — 94640 AIRWAY INHALATION TREATMENT: CPT

## 2020-11-17 PROCEDURE — 86900 BLOOD TYPING SEROLOGIC ABO: CPT

## 2020-11-17 PROCEDURE — 71046 X-RAY EXAM CHEST 2 VIEWS: CPT

## 2020-11-17 PROCEDURE — 96367 TX/PROPH/DG ADDL SEQ IV INF: CPT

## 2020-11-17 PROCEDURE — G0378 HOSPITAL OBSERVATION PER HR: HCPCS

## 2020-11-17 PROCEDURE — 2580000003 HC RX 258: Performed by: FAMILY MEDICINE

## 2020-11-17 PROCEDURE — 87449 NOS EACH ORGANISM AG IA: CPT

## 2020-11-17 PROCEDURE — 83615 LACTATE (LD) (LDH) ENZYME: CPT

## 2020-11-17 PROCEDURE — 85730 THROMBOPLASTIN TIME PARTIAL: CPT

## 2020-11-17 PROCEDURE — 83735 ASSAY OF MAGNESIUM: CPT

## 2020-11-17 PROCEDURE — 1200000000 HC SEMI PRIVATE

## 2020-11-17 PROCEDURE — 86901 BLOOD TYPING SEROLOGIC RH(D): CPT

## 2020-11-17 PROCEDURE — 80053 COMPREHEN METABOLIC PANEL: CPT

## 2020-11-17 PROCEDURE — 36415 COLL VENOUS BLD VENIPUNCTURE: CPT

## 2020-11-17 PROCEDURE — 6360000002 HC RX W HCPCS: Performed by: FAMILY MEDICINE

## 2020-11-17 PROCEDURE — 82306 VITAMIN D 25 HYDROXY: CPT

## 2020-11-17 PROCEDURE — 71260 CT THORAX DX C+: CPT

## 2020-11-17 PROCEDURE — 84484 ASSAY OF TROPONIN QUANT: CPT

## 2020-11-17 PROCEDURE — 85379 FIBRIN DEGRADATION QUANT: CPT

## 2020-11-17 PROCEDURE — 87040 BLOOD CULTURE FOR BACTERIA: CPT

## 2020-11-17 PROCEDURE — 6360000002 HC RX W HCPCS: Performed by: INTERNAL MEDICINE

## 2020-11-17 PROCEDURE — 96366 THER/PROPH/DIAG IV INF ADDON: CPT

## 2020-11-17 PROCEDURE — 2580000003 HC RX 258: Performed by: INTERNAL MEDICINE

## 2020-11-17 PROCEDURE — 6370000000 HC RX 637 (ALT 250 FOR IP): Performed by: FAMILY MEDICINE

## 2020-11-17 PROCEDURE — 87899 AGENT NOS ASSAY W/OPTIC: CPT

## 2020-11-17 PROCEDURE — U0002 COVID-19 LAB TEST NON-CDC: HCPCS

## 2020-11-17 PROCEDURE — 83605 ASSAY OF LACTIC ACID: CPT

## 2020-11-17 PROCEDURE — 99285 EMERGENCY DEPT VISIT HI MDM: CPT

## 2020-11-17 PROCEDURE — 82728 ASSAY OF FERRITIN: CPT

## 2020-11-17 RX ORDER — POLYETHYLENE GLYCOL 3350 17 G/17G
17 POWDER, FOR SOLUTION ORAL DAILY PRN
Status: DISCONTINUED | OUTPATIENT
Start: 2020-11-17 | End: 2020-11-19 | Stop reason: HOSPADM

## 2020-11-17 RX ORDER — POTASSIUM CHLORIDE 20 MEQ/1
40 TABLET, EXTENDED RELEASE ORAL ONCE
Status: COMPLETED | OUTPATIENT
Start: 2020-11-17 | End: 2020-11-17

## 2020-11-17 RX ORDER — ONDANSETRON 2 MG/ML
4 INJECTION INTRAMUSCULAR; INTRAVENOUS EVERY 6 HOURS PRN
Status: DISCONTINUED | OUTPATIENT
Start: 2020-11-17 | End: 2020-11-19 | Stop reason: HOSPADM

## 2020-11-17 RX ORDER — TRAMADOL HYDROCHLORIDE 50 MG/1
50 TABLET ORAL 2 TIMES DAILY PRN
Status: DISCONTINUED | OUTPATIENT
Start: 2020-11-17 | End: 2020-11-19 | Stop reason: HOSPADM

## 2020-11-17 RX ORDER — BUPROPION HYDROCHLORIDE 150 MG/1
300 TABLET ORAL DAILY
Status: DISCONTINUED | OUTPATIENT
Start: 2020-11-18 | End: 2020-11-19 | Stop reason: HOSPADM

## 2020-11-17 RX ORDER — ALBUTEROL SULFATE 90 UG/1
2 AEROSOL, METERED RESPIRATORY (INHALATION) 4 TIMES DAILY
Status: DISCONTINUED | OUTPATIENT
Start: 2020-11-17 | End: 2020-11-19 | Stop reason: HOSPADM

## 2020-11-17 RX ORDER — BUDESONIDE AND FORMOTEROL FUMARATE DIHYDRATE 80; 4.5 UG/1; UG/1
2 AEROSOL RESPIRATORY (INHALATION) 2 TIMES DAILY
Status: DISCONTINUED | OUTPATIENT
Start: 2020-11-17 | End: 2020-11-19 | Stop reason: HOSPADM

## 2020-11-17 RX ORDER — PANTOPRAZOLE SODIUM 40 MG/1
40 TABLET, DELAYED RELEASE ORAL
Status: DISCONTINUED | OUTPATIENT
Start: 2020-11-18 | End: 2020-11-19 | Stop reason: HOSPADM

## 2020-11-17 RX ORDER — ACETAMINOPHEN 325 MG/1
650 TABLET ORAL EVERY 6 HOURS PRN
Status: DISCONTINUED | OUTPATIENT
Start: 2020-11-17 | End: 2020-11-19 | Stop reason: HOSPADM

## 2020-11-17 RX ORDER — PREDNISONE 20 MG/1
40 TABLET ORAL DAILY
Status: DISCONTINUED | OUTPATIENT
Start: 2020-11-20 | End: 2020-11-19 | Stop reason: HOSPADM

## 2020-11-17 RX ORDER — IPRATROPIUM BROMIDE AND ALBUTEROL SULFATE 2.5; .5 MG/3ML; MG/3ML
3 SOLUTION RESPIRATORY (INHALATION) ONCE
Status: COMPLETED | OUTPATIENT
Start: 2020-11-17 | End: 2020-11-17

## 2020-11-17 RX ORDER — METHYLPREDNISOLONE SODIUM SUCCINATE 40 MG/ML
40 INJECTION, POWDER, LYOPHILIZED, FOR SOLUTION INTRAMUSCULAR; INTRAVENOUS EVERY 6 HOURS
Status: COMPLETED | OUTPATIENT
Start: 2020-11-17 | End: 2020-11-19

## 2020-11-17 RX ORDER — TRAZODONE HYDROCHLORIDE 50 MG/1
150 TABLET ORAL NIGHTLY PRN
Status: DISCONTINUED | OUTPATIENT
Start: 2020-11-17 | End: 2020-11-19 | Stop reason: HOSPADM

## 2020-11-17 RX ORDER — MAGNESIUM SULFATE IN WATER 40 MG/ML
2 INJECTION, SOLUTION INTRAVENOUS ONCE
Status: COMPLETED | OUTPATIENT
Start: 2020-11-17 | End: 2020-11-17

## 2020-11-17 RX ORDER — BUPRENORPHINE AND NALOXONE 8; 2 MG/1; MG/1
1 FILM, SOLUBLE BUCCAL; SUBLINGUAL DAILY
Status: DISCONTINUED | OUTPATIENT
Start: 2020-11-18 | End: 2020-11-19 | Stop reason: HOSPADM

## 2020-11-17 RX ORDER — 0.9 % SODIUM CHLORIDE 0.9 %
1000 INTRAVENOUS SOLUTION INTRAVENOUS ONCE
Status: COMPLETED | OUTPATIENT
Start: 2020-11-17 | End: 2020-11-17

## 2020-11-17 RX ORDER — ACETAMINOPHEN 650 MG/1
650 SUPPOSITORY RECTAL EVERY 6 HOURS PRN
Status: DISCONTINUED | OUTPATIENT
Start: 2020-11-17 | End: 2020-11-17 | Stop reason: SDUPTHER

## 2020-11-17 RX ORDER — MORPHINE SULFATE 4 MG/ML
4 INJECTION, SOLUTION INTRAMUSCULAR; INTRAVENOUS EVERY 30 MIN PRN
Status: DISCONTINUED | OUTPATIENT
Start: 2020-11-17 | End: 2020-11-17 | Stop reason: ALTCHOICE

## 2020-11-17 RX ORDER — PREGABALIN 100 MG/1
100 CAPSULE ORAL 3 TIMES DAILY
Status: DISCONTINUED | OUTPATIENT
Start: 2020-11-17 | End: 2020-11-19 | Stop reason: HOSPADM

## 2020-11-17 RX ORDER — PROMETHAZINE HYDROCHLORIDE 12.5 MG/1
12.5 TABLET ORAL EVERY 6 HOURS PRN
Status: DISCONTINUED | OUTPATIENT
Start: 2020-11-17 | End: 2020-11-19 | Stop reason: HOSPADM

## 2020-11-17 RX ORDER — ACETAMINOPHEN 325 MG/1
650 TABLET ORAL EVERY 6 HOURS PRN
Status: DISCONTINUED | OUTPATIENT
Start: 2020-11-17 | End: 2020-11-17 | Stop reason: SDUPTHER

## 2020-11-17 RX ORDER — KETOROLAC TROMETHAMINE 30 MG/ML
30 INJECTION, SOLUTION INTRAMUSCULAR; INTRAVENOUS ONCE
Status: COMPLETED | OUTPATIENT
Start: 2020-11-17 | End: 2020-11-17

## 2020-11-17 RX ORDER — DEXAMETHASONE SODIUM PHOSPHATE 10 MG/ML
10 INJECTION, SOLUTION INTRAMUSCULAR; INTRAVENOUS ONCE
Status: COMPLETED | OUTPATIENT
Start: 2020-11-17 | End: 2020-11-17

## 2020-11-17 RX ORDER — SODIUM CHLORIDE 0.9 % (FLUSH) 0.9 %
10 SYRINGE (ML) INJECTION PRN
Status: DISCONTINUED | OUTPATIENT
Start: 2020-11-17 | End: 2020-11-19 | Stop reason: HOSPADM

## 2020-11-17 RX ORDER — DEXTROAMPHETAMINE SACCHARATE, AMPHETAMINE ASPARTATE MONOHYDRATE, DEXTROAMPHETAMINE SULFATE AND AMPHETAMINE SULFATE 7.5; 7.5; 7.5; 7.5 MG/1; MG/1; MG/1; MG/1
30 CAPSULE, EXTENDED RELEASE ORAL DAILY
Status: DISCONTINUED | OUTPATIENT
Start: 2020-11-18 | End: 2020-11-19 | Stop reason: HOSPADM

## 2020-11-17 RX ORDER — ACETAMINOPHEN 650 MG/1
650 SUPPOSITORY RECTAL EVERY 6 HOURS PRN
Status: DISCONTINUED | OUTPATIENT
Start: 2020-11-17 | End: 2020-11-19 | Stop reason: HOSPADM

## 2020-11-17 RX ORDER — SODIUM CHLORIDE 0.9 % (FLUSH) 0.9 %
10 SYRINGE (ML) INJECTION EVERY 12 HOURS SCHEDULED
Status: DISCONTINUED | OUTPATIENT
Start: 2020-11-17 | End: 2020-11-19 | Stop reason: HOSPADM

## 2020-11-17 RX ADMIN — POTASSIUM CHLORIDE 40 MEQ: 1500 TABLET, EXTENDED RELEASE ORAL at 19:52

## 2020-11-17 RX ADMIN — AZITHROMYCIN MONOHYDRATE 500 MG: 500 INJECTION, POWDER, LYOPHILIZED, FOR SOLUTION INTRAVENOUS at 22:42

## 2020-11-17 RX ADMIN — TRAZODONE HYDROCHLORIDE 150 MG: 50 TABLET ORAL at 22:46

## 2020-11-17 RX ADMIN — METHYLPREDNISOLONE SODIUM SUCCINATE 40 MG: 40 INJECTION, POWDER, FOR SOLUTION INTRAMUSCULAR; INTRAVENOUS at 22:32

## 2020-11-17 RX ADMIN — SODIUM CHLORIDE 1000 ML: 9 INJECTION, SOLUTION INTRAVENOUS at 18:02

## 2020-11-17 RX ADMIN — CEFTRIAXONE 1 G: 1 INJECTION, POWDER, FOR SOLUTION INTRAMUSCULAR; INTRAVENOUS at 18:03

## 2020-11-17 RX ADMIN — PREGABALIN 100 MG: 100 CAPSULE ORAL at 22:32

## 2020-11-17 RX ADMIN — SODIUM CHLORIDE, PRESERVATIVE FREE 10 ML: 5 INJECTION INTRAVENOUS at 22:32

## 2020-11-17 RX ADMIN — IOPAMIDOL 75 ML: 755 INJECTION, SOLUTION INTRAVENOUS at 19:50

## 2020-11-17 RX ADMIN — DEXAMETHASONE SODIUM PHOSPHATE 10 MG: 10 INJECTION, SOLUTION INTRAMUSCULAR; INTRAVENOUS at 18:02

## 2020-11-17 RX ADMIN — IPRATROPIUM BROMIDE AND ALBUTEROL SULFATE 3 AMPULE: .5; 3 SOLUTION RESPIRATORY (INHALATION) at 20:00

## 2020-11-17 RX ADMIN — MAGNESIUM SULFATE HEPTAHYDRATE 2 G: 40 INJECTION, SOLUTION INTRAVENOUS at 19:52

## 2020-11-17 RX ADMIN — KETOROLAC TROMETHAMINE 30 MG: 30 INJECTION, SOLUTION INTRAMUSCULAR; INTRAVENOUS at 18:02

## 2020-11-17 ASSESSMENT — PAIN DESCRIPTION - LOCATION: LOCATION: OTHER (COMMENT)

## 2020-11-17 ASSESSMENT — PAIN DESCRIPTION - PAIN TYPE: TYPE: ACUTE PAIN

## 2020-11-17 ASSESSMENT — PAIN DESCRIPTION - DESCRIPTORS: DESCRIPTORS: ACHING

## 2020-11-17 ASSESSMENT — PAIN SCALES - GENERAL
PAINLEVEL_OUTOF10: 9
PAINLEVEL_OUTOF10: 9

## 2020-11-17 ASSESSMENT — PAIN DESCRIPTION - ORIENTATION: ORIENTATION: LEFT

## 2020-11-17 NOTE — ED PROVIDER NOTES
Triage Chief Complaint:   Other (pt states she has pain in her left lung)    [place]:  Tiana Camp is a 64 y.o. female that presents with history of oxygen dependent COPD who was started on antibiotics and prednisone 4 days ago for increased work of breathing presents with left-sided pleuritic chest pain. Patient denies fevers chills. Patient denies coryza. Patient denies anterior chest pain. She notes increased dyspnea on exertion. Denies lower extremity edema or calf pain.     ROS:  General:  No fevers, no chills, generalized weakness  Eyes:  No recent vison changes, no discharge  ENT:  No sore throat, no nasal congestion, no hearing changes  Cardiovascular: As above  Respiratory: As above  Gastrointestinal:  No pain, no nausea, no vomiting, no diarrhea  Musculoskeletal:  No muscle pain, no joint pain  Skin:  No rash, no pruritis, no easy bruising  Neurologic:  No speech problems, no headache, no extremity numbness, no extremity tingling, no extremity weakness  Psychiatric:  No anxiety, no hallucinations or delusions, no suicidal or homicidal ideation  Genitourinary:  No dysuria, no hematuria  Endocrine:  No unexpected weight gain, no unexpected weight loss  Extremities:  no edema, no pain    Past Medical History:   Diagnosis Date    COPD (chronic obstructive pulmonary disease) ([facility])     Drug addiction in remission ([facility] [address]     recovering addict    Hep C w/o coma, chronic (HCC)     Pacemaker     Sleep apnea     TIA (transient ischemic attack)     per patient \"several mini strokes\"     Past Surgical History:   Procedure Laterality Date     SECTION      CHOLECYSTECTOMY       Family History   Problem Relation Age of Onset    Heart Disease Mother      Social History     Socioeconomic History    Marital status: Single     Spouse name: Not on file    Number of children: Not on file    Years of education: Not on file    Highest education level: Not on file   Occupational History    Not on file Dispense Refill    predniSONE (DELTASONE) 10 MG tablet Take 1 tablet by mouth daily 40mg daily for 2 days, 20 mg daily for 2 days, 10 mg daily for 2 days, 5 mg daily for 2 days 15 tablet 0    doxycycline hyclate (VIBRAMYCIN) 100 MG capsule Take 1 capsule by mouth daily 10 capsule 0    Nebulizer MISC Use nebulizer with medication as directed. 1 each 0    Respiratory Therapy Supplies (NEBULIZER/TUBING/MOUTHPIECE) KIT Inhale 1 kit into the lungs daily as needed (as directed) 1 kit 0    amphetamine-dextroamphetamine (ADDERALL XR) 30 MG extended release capsule Take 30 mg by mouth daily.  buPROPion (WELLBUTRIN XL) 300 MG extended release tablet Take 300 mg by mouth daily      traMADol (ULTRAM) 50 MG tablet Take 50 mg by mouth 2 times daily.  DULERA 100-5 MCG/ACT inhaler INHALE 2 PUFFS BY MOUTH 2 TIMES A DAY SHAKE WELL 13 g 5    SPIRIVA RESPIMAT 2.5 MCG/ACT AERS inhaler INHALE 2 PUFFS BY MOUTH ONCE DAILY 4 g 5    albuterol (PROVENTIL) (2.5 MG/3ML) 0.083% nebulizer solution Take 3 mLs by nebulization every 6 hours as needed for Wheezing 120 each 5    albuterol sulfate  (90 Base) MCG/ACT inhaler INHALE 2 PUFFS BY MOUTH EVERY 6 HOURS SHAKE WELL 18 g 5    theophylline (FRANCO-24) 100 MG extended release capsule Take 1 capsule by mouth 2 times daily 60 capsule 0    pregabalin (LYRICA) 100 MG capsule Take 100 mg by mouth 3 times daily.        varenicline (CHANTIX) 0.5 MG tablet Take 1 tablet by mouth 2 times daily 60 tablet 0    traZODone (DESYREL) 150 MG tablet Take 150 mg by mouth nightly       OXYGEN Inhale 4 L into the lungs continuous       CPAP Machine MISC by CPAP route nightly      buprenorphine-naloxone (SUBOXONE) 8-2 MG FILM SL film Place 1 Film under the tongue daily       No Known Allergies    Nursing Notes Reviewed    Physical Exam:  ED Triage Vitals   Enc Vitals Group      BP 11/17/20 1606 104/68      Pulse 11/17/20 1515 99      Resp 11/17/20 1606 18      Temp 11/17/20 1515 98.1 °F (36.7 °C)      Temp Source 11/17/20 1515 Oral      SpO2 11/17/20 1515 100 %      Weight --       Height --       Head Circumference --       Peak Flow --       Pain Score --       Pain Loc --       Pain Edu? --       Excl. in [address]? --          GENERAL APPEARANCE: Awake and alert. Cooperative. No acute distress. HEAD: Normocephalic. Atraumatic. EYES: EOM's grossly intact. Sclera anicteric. PERRLA  ENT: Mucous membranes are moist. Tolerates saliva. No trismus. Dry mucous membranes. Tympanic membranes normal.  NECK: Supple. No meningismus. Trachea midline. HEART: RRR. Radial pulses 2+. LUNGS: Expiratory wheezing bilaterally. Mildly tachypneic. No retractions. On 4 L 100% oxygen saturation. ABDOMEN: Soft. Non-tender. No guarding or rebound. EXTREMITIES: No acute deformities. No lower extremity edema  SKIN: Warm and dry. NEUROLOGICAL: No gross facial drooping. Moves all 4 extremities spontaneously. PSYCHIATRIC: Normal mood.     I have reviewed and interpreted all of the currently available lab results from this visit (if applicable):  Results for orders placed or performed during the hospital encounter of 11/17/20   COVID-19    Specimen: Nasopharyngeal Swab   Result Value Ref Range    Source THROAT     SARS-CoV-2, NAAT NOT DETECTED    CBC Auto Differential   Result Value Ref Range    WBC 14.3 (H) 4.0 - 10.5 K/CU MM    RBC 4.24 4.2 - 5.4 M/CU MM    Hemoglobin 12.3 (L) 12.5 - 16.0 GM/DL    Hematocrit 40.1 37 - 47 %    MCV 94.6 78 - 100 FL    MCH 29.0 27 - 31 PG    MCHC 30.7 (L) 32.0 - 36.0 %    RDW 11.9 11.7 - 14.9 %    Platelets 777 096 - 909 K/CU MM    MPV 9.9 7.5 - 11.1 FL    Differential Type AUTOMATED DIFFERENTIAL     Segs Relative 78.5 (H) 36 - 66 %    Lymphocytes % 9.2 (L) 24 - 44 %    Monocytes % 9.9 (H) 0 - 4 %    Eosinophils % 1.5 0 - 3 %    Basophils % 0.4 0 - 1 %    Segs Absolute 11.2 K/CU MM    Lymphocytes Absolute 1.3 K/CU MM    Monocytes Absolute 1.4 K/CU MM    Eosinophils Absolute 0.2 K/CU MM Basophils Absolute 0.1 K/CU MM    Nucleated RBC % 0.0 %    Total Nucleated RBC 0.0 K/CU MM    Total Immature Neutrophil 0.07 K/CU MM    Immature Neutrophil % 0.5 (H) 0 - 0.43 %   Comprehensive Metabolic Panel w/ Reflex to MG   Result Value Ref Range    Sodium 138 135 - 145 MMOL/L    Potassium 3.3 (L) 3.5 - 5.1 MMOL/L    Chloride 95 (L) 99 - 110 mMol/L    CO2 37 (H) 21 - 32 MMOL/L    BUN 8 6 - 23 MG/DL    CREATININE 0.5 (L) 0.6 - 1.1 MG/DL    Glucose 130 (H) 70 - 99 MG/DL    Calcium 8.8 8.3 - 10.6 MG/DL    Alb 3.8 3.4 - 5.0 GM/DL    Total Protein 6.5 6.4 - 8.2 GM/DL    Total Bilirubin 0.4 0.0 - 1.0 MG/DL    ALT 11 10 - 40 U/L    AST 15 15 - 37 IU/L    Alkaline Phosphatase 65 40 - 128 IU/L    GFR Non-African American >60 >60 mL/min/1.73m2    GFR African American >60 >60 mL/min/1.73m2    Anion Gap 6 4 - 16   Protime-INR   Result Value Ref Range    Protime 12.2 11.7 - 14.5 SECONDS    INR 1.01 INDEX   APTT   Result Value Ref Range    aPTT 25.7 25.1 - 37.1 SECONDS   Fibrinogen   Result Value Ref Range    Fibrinogen 381 196.9 - 442.1 MG/DL   Procalcitonin   Result Value Ref Range    Procalcitonin 0.041    C-Reactive Protein   Result Value Ref Range    CRP, High Sensitivity 4.2 mg/L   Lactate Dehydrogenase   Result Value Ref Range     120 - 246 IU/L   Ferritin   Result Value Ref Range    Ferritin 87 15 - 150 NG/ML   D-Dimer, Quantitative   Result Value Ref Range    D-Dimer, Quant <200 <230 NG/mL(DDU)   Troponin   Result Value Ref Range    Troponin T <0.010 <0.01 NG/ML   Lactic Acid, Plasma   Result Value Ref Range    Lactate 1.0 0.4 - 2.0 mMOL/L   Vitamin D 25 Hydroxy   Result Value Ref Range    Vit D, 25-Hydroxy 17.22 (L) >20 NG/ML   Magnesium   Result Value Ref Range    Magnesium 1.6 (L) 1.8 - 2.4 mg/dl   EKG 12 lead   Result Value Ref Range    Ventricular Rate 88 BPM    Atrial Rate 88 BPM    P-R Interval 158 ms    QRS Duration 96 ms    Q-T Interval 380 ms    QTc Calculation (Bazett) 459 ms    P Axis 79 degrees R Axis 70 degrees    T Axis 77 degrees    Diagnosis       Normal sinus rhythm  Normal ECG  When compared with ECG of 24-SEP-2020 11:12,  No significant change was found        Radiographs (if obtained):  [] The following radiograph was interpreted by myself in the absence of a radiologist:   [x] Radiologist's Report Reviewed:  XR CHEST (2 VW)   Final Result   Minimal lingular atelectasis/scarring. Otherwise, no acute cardiopulmonary   abnormality. CT CHEST PULMONARY EMBOLISM W CONTRAST    (Results Pending)         EKG (if obtained): (All EKG's are interpreted by myself in the absence of a cardiologist) EKG with rate of 88. Normal sinus rhythm. Normal EKG. . QRS 96. QTc 459. Normal R wave progression. No acute ST elevation or depression. No flipped T waves. No Q waves. Normal EKG. Chart review shows recent radiographs:  Xr Chest (2 Vw)    Result Date: 11/17/2020  EXAMINATION: TWO XRAY VIEWS OF THE CHEST 11/17/2020 3:28 pm COMPARISON: 09/24/2020 HISTORY: ORDERING SYSTEM PROVIDED HISTORY: left lung pain TECHNOLOGIST PROVIDED HISTORY: Reason for exam:->left lung pain Reason for Exam: left lung pain Acuity: Unknown Type of Exam: Unknown Relevant Medical/Surgical History: copd FINDINGS: The lungs are clear other than minimal lingular atelectasis/scarring. No pneumothorax or pleural effusion is seen. Cardiac and mediastinal contours are normal.  2 lead left subclavian transvenous pacemaker appears unchanged. No acute osseous abnormality is evident. There is a chronic calcifications superolateral to the right humeral head, which can be seen in the setting of rotator cuff tendinopathy. Minimal lingular atelectasis/scarring. Otherwise, no acute cardiopulmonary abnormality. MDM:  82-year-old female with history and physical as above. Patient with concerning symptoms for potential COPD exacerbation versus Covid versus PE.   Chest x-ray with no obvious infiltrate pneumothorax pneumothorax pleural effusion cardiomegaly or other acute abnormality. Patient hemodynamically stable on 4 L nasal cannula oxygen. 1920: Patient still with left-sided inspiratory pain after Toradol. D-dimer is negative but index of suspicion is high enough so CT PE study has been ordered. Chest x-ray with no obvious infiltrate, pneumothorax, pulmonary edema, pneumothorax, pleural effusion, cardiomegaly, widened mediastinum. Patient hypokalemic. Treated with p.o. potassium and IV magnesium. On outpatient doxycycline. IV ceftriaxone added for COPD exacerbation that is failed outpatient management. Failed COPD exacerbation with outpatient treatment trial.  Toradol, fluids, Decadron, DuoNeb's will be ordered here now that Covid is negative. In total critical care time of 35 minutes with pulmonary, cardiac, infectious disease systems acute risk of decompensation and collapse as well as requiring IV electrolyte replenishment and frequent bedside repeat evaluations. This is exclusionary of any billable procedures. Clinical Impression:  1. COPD exacerbation ([address]    2. Hypokalemia    3. Hypomagnesemia      Disposition referral (if applicable):  No follow-up provider specified. Disposition medications (if applicable):  New Prescriptions    No medications on file       Comment: Please note this report has been produced using speech recognition software and may contain errors related to that system including errors in grammar, punctuation, and spelling, as well as words and phrases that may be inappropriate. If there are any questions or concerns please feel free to contact the dictating provider for clarification.       Musa Crowell MD  11/17/20 1921       Musa Crowell MD  11/17/20 [id number]

## 2020-11-17 NOTE — ED TRIAGE NOTES
Pt presents to ED by EMS from home for pain in her left lung, pt states she is currently taking a steroid and antibiotic. Pt wears oxygen 4L continuous at home.  Pt states she has a hx of these episodes

## 2020-11-18 LAB
ALBUMIN SERPL-MCNC: 3.8 GM/DL (ref 3.4–5)
ALP BLD-CCNC: 64 IU/L (ref 40–128)
ALT SERPL-CCNC: 10 U/L (ref 10–40)
ANION GAP SERPL CALCULATED.3IONS-SCNC: 5 MMOL/L (ref 4–16)
APTT: 25.6 SECONDS (ref 25.1–37.1)
AST SERPL-CCNC: 12 IU/L (ref 15–37)
BASOPHILS ABSOLUTE: 0 K/CU MM
BASOPHILS RELATIVE PERCENT: 0.1 % (ref 0–1)
BILIRUB SERPL-MCNC: 0.2 MG/DL (ref 0–1)
BUN BLDV-MCNC: 9 MG/DL (ref 6–23)
CALCIUM SERPL-MCNC: 8.7 MG/DL (ref 8.3–10.6)
CHLORIDE BLD-SCNC: 100 MMOL/L (ref 99–110)
CO2: 34 MMOL/L (ref 21–32)
CREAT SERPL-MCNC: 0.5 MG/DL (ref 0.6–1.1)
D DIMER: 250 NG/ML(DDU)
DIFFERENTIAL TYPE: ABNORMAL
EOSINOPHILS ABSOLUTE: 0 K/CU MM
EOSINOPHILS RELATIVE PERCENT: 0 % (ref 0–3)
FIBRINOGEN LEVEL: 471 MG/DL (ref 196.9–442.1)
GFR AFRICAN AMERICAN: >60 ML/MIN/1.73M2
GFR NON-AFRICAN AMERICAN: >60 ML/MIN/1.73M2
GLUCOSE BLD-MCNC: 152 MG/DL (ref 70–99)
HCT VFR BLD CALC: 39.2 % (ref 37–47)
HEMOGLOBIN: 12.3 GM/DL (ref 12.5–16)
IMMATURE NEUTROPHIL %: 0.7 % (ref 0–0.43)
INR BLD: 1.05 INDEX
LEGIONELLA URINARY AG: NEGATIVE
LYMPHOCYTES ABSOLUTE: 0.8 K/CU MM
LYMPHOCYTES RELATIVE PERCENT: 7 % (ref 24–44)
MCH RBC QN AUTO: 29.2 PG (ref 27–31)
MCHC RBC AUTO-ENTMCNC: 31.4 % (ref 32–36)
MCV RBC AUTO: 93.1 FL (ref 78–100)
MONOCYTES ABSOLUTE: 0.2 K/CU MM
MONOCYTES RELATIVE PERCENT: 1.9 % (ref 0–4)
NUCLEATED RBC %: 0 %
PDW BLD-RTO: 11.9 % (ref 11.7–14.9)
PLATELET # BLD: 185 K/CU MM (ref 140–440)
PMV BLD AUTO: 9.4 FL (ref 7.5–11.1)
POTASSIUM SERPL-SCNC: 4.9 MMOL/L (ref 3.5–5.1)
PROTHROMBIN TIME: 12.7 SECONDS (ref 11.7–14.5)
RBC # BLD: 4.21 M/CU MM (ref 4.2–5.4)
SEGMENTED NEUTROPHILS ABSOLUTE COUNT: 10.7 K/CU MM
SEGMENTED NEUTROPHILS RELATIVE PERCENT: 90.3 % (ref 36–66)
SODIUM BLD-SCNC: 139 MMOL/L (ref 135–145)
STREP PNEUMONIAE ANTIGEN: NORMAL
TOTAL IMMATURE NEUTOROPHIL: 0.08 K/CU MM
TOTAL NUCLEATED RBC: 0 K/CU MM
TOTAL PROTEIN: 6.1 GM/DL (ref 6.4–8.2)
WBC # BLD: 11.8 K/CU MM (ref 4–10.5)

## 2020-11-18 PROCEDURE — 85610 PROTHROMBIN TIME: CPT

## 2020-11-18 PROCEDURE — 6370000000 HC RX 637 (ALT 250 FOR IP): Performed by: INTERNAL MEDICINE

## 2020-11-18 PROCEDURE — 93010 ELECTROCARDIOGRAM REPORT: CPT | Performed by: INTERNAL MEDICINE

## 2020-11-18 PROCEDURE — 6360000002 HC RX W HCPCS: Performed by: INTERNAL MEDICINE

## 2020-11-18 PROCEDURE — 94640 AIRWAY INHALATION TREATMENT: CPT

## 2020-11-18 PROCEDURE — 80053 COMPREHEN METABOLIC PANEL: CPT

## 2020-11-18 PROCEDURE — G0378 HOSPITAL OBSERVATION PER HR: HCPCS

## 2020-11-18 PROCEDURE — 85379 FIBRIN DEGRADATION QUANT: CPT

## 2020-11-18 PROCEDURE — 96376 TX/PRO/DX INJ SAME DRUG ADON: CPT

## 2020-11-18 PROCEDURE — 36415 COLL VENOUS BLD VENIPUNCTURE: CPT

## 2020-11-18 PROCEDURE — 96372 THER/PROPH/DIAG INJ SC/IM: CPT

## 2020-11-18 PROCEDURE — 1200000000 HC SEMI PRIVATE

## 2020-11-18 PROCEDURE — 85025 COMPLETE CBC W/AUTO DIFF WBC: CPT

## 2020-11-18 PROCEDURE — 85730 THROMBOPLASTIN TIME PARTIAL: CPT

## 2020-11-18 PROCEDURE — 85384 FIBRINOGEN ACTIVITY: CPT

## 2020-11-18 PROCEDURE — 2580000003 HC RX 258: Performed by: INTERNAL MEDICINE

## 2020-11-18 PROCEDURE — 96366 THER/PROPH/DIAG IV INF ADDON: CPT

## 2020-11-18 RX ADMIN — SODIUM CHLORIDE, PRESERVATIVE FREE 10 ML: 5 INJECTION INTRAVENOUS at 09:07

## 2020-11-18 RX ADMIN — METHYLPREDNISOLONE SODIUM SUCCINATE 40 MG: 40 INJECTION, POWDER, FOR SOLUTION INTRAMUSCULAR; INTRAVENOUS at 21:25

## 2020-11-18 RX ADMIN — METHYLPREDNISOLONE SODIUM SUCCINATE 40 MG: 40 INJECTION, POWDER, FOR SOLUTION INTRAMUSCULAR; INTRAVENOUS at 18:04

## 2020-11-18 RX ADMIN — PREGABALIN 100 MG: 100 CAPSULE ORAL at 13:50

## 2020-11-18 RX ADMIN — PREGABALIN 100 MG: 100 CAPSULE ORAL at 09:09

## 2020-11-18 RX ADMIN — PANTOPRAZOLE SODIUM 40 MG: 40 TABLET, DELAYED RELEASE ORAL at 05:08

## 2020-11-18 RX ADMIN — SODIUM CHLORIDE, PRESERVATIVE FREE 10 ML: 5 INJECTION INTRAVENOUS at 21:26

## 2020-11-18 RX ADMIN — BUPROPION HYDROCHLORIDE 300 MG: 150 TABLET, FILM COATED, EXTENDED RELEASE ORAL at 09:08

## 2020-11-18 RX ADMIN — Medication 2 PUFF: at 11:43

## 2020-11-18 RX ADMIN — TRAZODONE HYDROCHLORIDE 150 MG: 50 TABLET ORAL at 21:26

## 2020-11-18 RX ADMIN — BUPRENORPHINE HYDROCHLORIDE, NALOXONE HYDROCHLORIDE 1 FILM: 8; 2 FILM, SOLUBLE BUCCAL; SUBLINGUAL at 09:08

## 2020-11-18 RX ADMIN — ENOXAPARIN SODIUM 40 MG: 40 INJECTION SUBCUTANEOUS at 09:09

## 2020-11-18 RX ADMIN — ALBUTEROL SULFATE 2 PUFF: 90 AEROSOL, METERED RESPIRATORY (INHALATION) at 16:15

## 2020-11-18 RX ADMIN — PREGABALIN 100 MG: 100 CAPSULE ORAL at 21:26

## 2020-11-18 RX ADMIN — AZITHROMYCIN MONOHYDRATE 500 MG: 500 INJECTION, POWDER, LYOPHILIZED, FOR SOLUTION INTRAVENOUS at 21:26

## 2020-11-18 RX ADMIN — TRAMADOL HYDROCHLORIDE 50 MG: 50 TABLET, FILM COATED ORAL at 21:26

## 2020-11-18 RX ADMIN — Medication 2 PUFF: at 16:15

## 2020-11-18 RX ADMIN — ALBUTEROL SULFATE 2 PUFF: 90 AEROSOL, METERED RESPIRATORY (INHALATION) at 11:43

## 2020-11-18 RX ADMIN — BUDESONIDE AND FORMOTEROL FUMARATE DIHYDRATE 2 PUFF: 80; 4.5 AEROSOL RESPIRATORY (INHALATION) at 11:43

## 2020-11-18 RX ADMIN — METHYLPREDNISOLONE SODIUM SUCCINATE 40 MG: 40 INJECTION, POWDER, FOR SOLUTION INTRAMUSCULAR; INTRAVENOUS at 05:08

## 2020-11-18 RX ADMIN — METHYLPREDNISOLONE SODIUM SUCCINATE 40 MG: 40 INJECTION, POWDER, FOR SOLUTION INTRAMUSCULAR; INTRAVENOUS at 09:07

## 2020-11-18 ASSESSMENT — PAIN SCALES - GENERAL
PAINLEVEL_OUTOF10: 8
PAINLEVEL_OUTOF10: 0
PAINLEVEL_OUTOF10: 0

## 2020-11-18 NOTE — ED NOTES
Report given to [facility]. Pt to be transferred to floor.       Chucky Conteh, RN  11/17/20 [id number]

## 2020-11-18 NOTE — CARE COORDINATION
CM into see pt to initiate a safe discharge plan. Cm into see, introduced self and explained role of CM. Pt is alone in room. Pt is kind, alert and oriented. Pt lives alone but until Dec she will have her sister stay with her. Pt has children that she describes as supportive. Transportation is per family. DME includes a Bipap at [facility]. Pt has home O2 per [employer], nebulizer and cane. Pt has a PCP. Pt has insurance and is able to afford her medications. Pt shared that she is on Suboxone for an opiate addiction but has been \"clean\" for 15 yrs. Pt also quit smoking 1 yr ago. CM affirmed and congratulated her on her success. Pt is working with a CM from  for an aide but she can not remember what agency. She is to call CM when she is able to get information for documentation. Discharge plan at this time is for pt to return home with her sister and pending aide care. CM provided card and encouraged to call for any needs or concern. CM is available if any needs arise.  [address]

## 2020-11-18 NOTE — ED NOTES
This nurse assuming care at this time. Pt refusing to wear gown as she came in her [facility].  Placed on cardiac monitor and food provided per pt request.      Karyn Toney RN  11/17/20 2006

## 2020-11-18 NOTE — PROGRESS NOTES
Hospitalist Progress Note      Name:  Austin Sainz /Age/Sex: 1960  (64 y.o. female)   MRN & CSN:  [id number] & [id number] Admission Date/Time: 2020  3:06 PM   Location:  2758/9809-[id number] PCP: Adelina Blackwood MD         Hospital Day: 2    Assessment and Plan:   Austin Sainz is a 64 y.o. female who presents with COPD exacerbation      #. COPD with acute exacerbation:  -pt continues to have scattered wheezes  -IV steroids, bronchodilator therapy, azithromycin.  -currently on 4LNC which is baseline home O2 .  -Sputum culture pending  -Rapid Covid negative  -CTA chest- no central filling defects, but small distal pulmonary emboli cannot be excluded. 1.9 and 1.5 cm left medial lower lobe mass, uncertain etiology. Recommended follow-up in 3 months with repeat CT.  -Consult pulmonology-Dr. Marce Wilcox, will follow with out patient for PET can  -Continue BiPAP. -consider discharge tomorrow       #.  Leukocytosis:improved  -Patient was recently treated with the p.o. prednisone.  -Monitor with repeat CBC.     #. severe COPD  -Patient is on albuterol, Spiriva, Dulera     #. chronic respiratory failure on home oxygen at 4 L/min     #. Chronic hypercapnic respiratory failure on BiPAP     #. H/o pacemaker placement-details unknown.     #. Insomnia-trazodone as needed     #. chronic pain syndrome/history of drug abuse on Suboxone     #. history of chronic hepatitis C      Diet DIET GENERAL;   DVT Prophylaxis [] Lovenox, []  Heparin, [] SCDs, [] Ambulation   GI Prophylaxis [] PPI,  [] H2 Blocker,  [] Carafate,  [] Diet/Tube Feeds   Code Status Full Code   Disposition Patient requires continued admission due to copd exacerbation         History of Present Illness:     Chief Complaint: <principal problem not specified>  Austin Sainz is a 64 y.o.  female  who presents with COPD       Ten point ROS reviewed negative, unless as noted above    Objective:        Intake/Output Summary (Last 24 hours) at 2020 1250  Last data filed at 11/18/2020 0926  Gross per 24 hour   Intake 605 ml   Output --   Net 605 ml      Vitals:   Vitals:    11/18/20 0745   BP: 119/60   Pulse: 67   Resp: 16   Temp: 98.5 °F (36.9 °C)   SpO2:      Physical Exam:   GEN Awake female, sitting upright in bed in no apparent distress. Appears given age. EYES Pupils are equally round. No scleral erythema, discharge, or conjunctivitis. HENT Mucous membranes are moist. Oral pharynx without exudates, no evidence of thrush. NECK Supple, no apparent thyromegaly or masses. RESP Scattered wheezes on all lung fields. Symmetric chest movement while on room air. CARDIO/VASC S1/S2 auscultated. Regular rate without appreciable murmurs, rubs, or gallops. No JVD or carotid bruits. Peripheral pulses equal bilaterally and palpable. No peripheral edema. GI Abdomen is soft without significant tenderness, masses, or guarding. Bowel sounds are normoactive. Rectal exam deferred.  No costovertebral angle tenderness. Normal appearing external genitalia. Fraser catheter is not present. HEME/LYMPH No palpable cervical lymphadenopathy and no hepatosplenomegaly. No petechiae or ecchymoses. MSK No gross joint deformities. SKIN Normal coloration, warm, dry. NEURO Cranial nerves appear grossly intact, normal speech, no lateralizing weakness. PSYCH Awake, alert, oriented x 4. Affect appropriate.     Medications:   Medications:    sodium chloride flush  10 mL Intravenous 2 times per day    enoxaparin  40 mg Subcutaneous Daily    methylPREDNISolone  40 mg Intravenous Q6H    Followed by   Dileep Jay ON 11/20/2020] predniSONE  40 mg Oral Daily    azithromycin  500 mg Intravenous Q24H    albuterol sulfate HFA  2 puff Inhalation 4x daily    And    ipratropium  2 puff Inhalation 4x daily    amphetamine-dextroamphetamine  30 mg Oral Daily    buprenorphine-naloxone  1 Film Sublingual Daily    buPROPion  300 mg Oral Daily    budesonide-formoterol  2 puff Inhalation BID  pregabalin  100 mg Oral TID    pantoprazole  40 mg Oral QAM AC      Infusions:   PRN Meds: sodium chloride flush, 10 mL, PRN  acetaminophen, 650 mg, Q6H PRN    Or  acetaminophen, 650 mg, Q6H PRN  polyethylene glycol, 17 g, Daily PRN  promethazine, 12.5 mg, Q6H PRN    Or  ondansetron, 4 mg, Q6H PRN  traMADol, 50 mg, BID PRN  traZODone, 150 mg, Nightly PRN          Electronically signed by Georgette Lopez MD on 11/18/2020 at 12:50 PM

## 2020-11-18 NOTE — ED NOTES
[facility] and [facility].      Oscar Lundborg, RN  11/17/20 [id number]

## 2020-11-18 NOTE — H&P
HISTORY AND PHYSICAL  (Hospitalist, Internal Medicine)  IDENTIFYING INFORMATION   PATIENT:  Domenico Vivas  MRN:  [id number]  ADMIT DATE: 11/17/2020      CHIEF COMPLAINT   Shortness of breath    HISTORY OF PRESENT ILLNESS   Domenico Vivas is a 64 y.o. female with severe COPD, chronic respiratory failure on home oxygen at 4 L/min, Chronic hypercapnic respiratory failure on BiPAP, insomnia, chronic pain syndrome, history of drug abuse on Suboxone, history of chronic hepatitis C presented to ED with complaints of shortness of breath progressively getting worse since last 1 week. Patient reported that she was treated with prednisone, doxycycline by her pulmonologist, but did not get any better. Reported cough with productive sputum. She denied any fever, chills, chest pain. Pt reported having lower back pain, left-sided, denied any weakness in bilateral lower extremities, denied radiating pain, denied any urinary complaints, denied any constipation or diarrhea. At presentation patient was noted to have /68, HR 99, RR 18, temperature 98.1, saturating 100% on 4 L of oxygen. Lab work significant for potassium 3.3, chloride 95, CO2 37, troponin less than 0.010, WBC 14.3, hemoglobin 12.3. Rapid Covid negative, chest x-ray-no acute process. CTA chest-no central filling defects, but small distal pulmonary emboli cannot be excluded. 1.9 and 1.5 cm left medial lower lobe mass, uncertain etiology. Recommended follow-up in 3 months with repeat CT. Patient received ceftriaxone, IV Decadron, DuoNeb, Toradol, magnesium sulfate, potassium chloride, NS bolus in the ER. Patient reported feeling better with the treatment, but not completely back to baseline.       PAST MEDICAL HISTORY PAST SURGICAL HISTORY   severe COPD, chronic respiratory failure on home oxygen at 4 L/min, Chronic hypercapnic respiratory failure on BiPAP, insomnia, chronic pain syndrome, history of drug abuse on Suboxone, history of chronic hepatitis C , cholecystectomy   FAMILY HISTORY SOCIAL HISTORY    Reviewed and noncontributory remote history of smoking, denies any alcohol or illicit drug abuse   MEDICATIONS ALLERGIES    Reviewed with patient-is on Adderall 30 mg daily, bupropion 300 mg daily, tramadol 50 mg twice daily, Dulera 100-5 twice daily, Spiriva daily, Lyrica 100 mg 3 times daily, Chantix, trazodone 150 mg nightly, Suboxone daily   no known drug allergies       PAST MEDICAL, SURGICAL, FAMILY, and SOCIAL HISTORY         Past Medical History:   Diagnosis Date    [facility]     Drug addiction in remission ([facility]     recovering addict    Hep C w/o coma, chronic ([facility]     Pacemaker     Sleep apnea     TIA (transient ischemic attack)     per patient \"several mini strokes\"     Past Surgical History:   Procedure Laterality Date     SECTION      CHOLECYSTECTOMY       Family History   Problem Relation Age of Onset    Heart Disease Mother      Family Hx of HTN  Family Hx as reviewed above, otherwise non-contributory  Social History     Socioeconomic History    Marital status: Single     Spouse name: None    Number of children: None    Years of education: None    Highest education level: None   Occupational History    None   Social Needs    Financial resource strain: None    Food insecurity     Worry: None     Inability: None    Transportation needs     Medical: None     Non-medical: None   Tobacco Use    Smoking status: Former Smoker     Last attempt to quit: 2016     Years since quittin.2    Smokeless tobacco: Never Used   Substance and Sexual Activity    Alcohol use: Not Currently    Drug use: Not Currently    Sexual activity: None   Lifestyle    Physical activity     Days per week: None     Minutes per session: None    Stress: None   Relationships    Social connections     Talks on phone: None     Gets together: None     Attends Hindu service: None     Active member of club or organization: None     Attends meetings of clubs or organizations: None     Relationship status: None    Intimate partner violence     Fear of current or ex partner: None     Emotionally abused: None     Physically abused: None     Forced sexual activity: None   Other Topics Concern    None   Social History Narrative    None       MEDICATIONS   Medications Prior to Admission  Not in a hospital admission. Current Medications  Current Facility-Administered Medications   Medication Dose Route Frequency Provider Last Rate Last Dose    acetaminophen (TYLENOL) tablet 650 mg  650 mg Oral Q6H PRN Amauri Vazquez MD        Or    acetaminophen (TYLENOL) suppository 650 mg  650 mg Rectal Q6H PRN Amauri Vazquez MD        morphine sulfate (PF) injection 4 mg  4 mg Intravenous Q30 Min PRN Amauri Vazquez MD        potassium chloride (KLOR-CON M) extended release tablet 40 mEq  40 mEq Oral Once Amauri Vazquez MD        magnesium sulfate 2 g in 50 mL IVPB premix  2 g Intravenous Once Amauri Vazquez MD        ipratropium-albuterol (DUONEB) nebulizer solution 3 ampule  3 ampule Inhalation Once Amauri Vazquez MD         Current Outpatient Medications   Medication Sig Dispense Refill    predniSONE (DELTASONE) 10 MG tablet Take 1 tablet by mouth daily 40mg daily for 2 days, 20 mg daily for 2 days, 10 mg daily for 2 days, 5 mg daily for 2 days 15 tablet 0    doxycycline hyclate (VIBRAMYCIN) 100 MG capsule Take 1 capsule by mouth daily 10 capsule 0    Nebulizer MISC Use nebulizer with medication as directed. 1 each 0    Respiratory Therapy Supplies (NEBULIZER/TUBING/MOUTHPIECE) KIT Inhale 1 kit into the lungs daily as needed (as directed) 1 kit 0    amphetamine-dextroamphetamine (ADDERALL XR) 30 MG extended release capsule Take 30 mg by mouth daily.       buPROPion (WELLBUTRIN XL) 300 MG extended release tablet Take 300 mg by mouth daily      traMADol (ULTRAM) 50 MG tablet Take 50 mg by mouth 2 times daily.  DULERA 100-5 MCG/ACT inhaler INHALE 2 PUFFS BY MOUTH 2 TIMES A DAY SHAKE WELL 13 g 5    SPIRIVA RESPIMAT 2.5 MCG/ACT AERS inhaler INHALE 2 PUFFS BY MOUTH ONCE DAILY 4 g 5    albuterol (PROVENTIL) (2.5 MG/3ML) 0.083% nebulizer solution Take 3 mLs by nebulization every 6 hours as needed for Wheezing 120 each 5    albuterol sulfate  (90 Base) MCG/ACT inhaler INHALE 2 PUFFS BY MOUTH EVERY 6 HOURS SHAKE WELL 18 g 5    theophylline (FRANCO-24) 100 MG extended release capsule Take 1 capsule by mouth 2 times daily 60 capsule 0    pregabalin (LYRICA) 100 MG capsule Take 100 mg by mouth 3 times daily.  varenicline (CHANTIX) 0.5 MG tablet Take 1 tablet by mouth 2 times daily 60 tablet 0    traZODone (DESYREL) 150 MG tablet Take 150 mg by mouth nightly       OXYGEN Inhale 4 L into the lungs continuous       CPAP Machine MISC by CPAP route nightly      buprenorphine-naloxone (SUBOXONE) 8-2 MG FILM SL film Place 1 Film under the tongue daily           Allergies  No Known Allergies    REVIEW OF SYSTEMS   Within above limitations. 14 point review of systems reviewed. Pertinent positive or negative as per HPI or otherwise negative per 14 point systems review. PHYSICAL EXAM     Wt Readings from Last 3 Encounters:   10/19/20 170 lb (77.1 kg)   09/27/20 158 lb 11.2 oz (72 kg)   05/20/20 154 lb (69.9 kg)       Blood pressure 119/69, pulse 99, temperature 98.1 °F (36.7 °C), temperature source Oral, resp. rate 18, SpO2 99 %. GEN  -Awake, alert, NAD.   EYES   -PERRL. HENT  -MM are moist.   RESP  -LS CTA equal bilat, no wheezes, rales or rhonchi. Symmetric chest movement. No respiratory distress noted. C/V  -S1/S2 auscultated. RRR without appreciable M/R/G. No peripheral edema. No reproducible chest wall tenderness. GI  -Abdomen is soft, non-distended, no significant tenderness.   -No CVA tenderness. Fraser catheter is not present.   MS  -B/L extremities- No gross joint radiation dose to as low as reasonably achievable. COMPARISON:   None. HISTORY:   ORDERING SYSTEM PROVIDED HISTORY: left sided pleuritic pain   TECHNOLOGIST PROVIDED HISTORY:   Reason for exam:->left sided pleuritic pain   Reason for Exam: left sided pleuritic pain   Acuity: Acute   Type of Exam: Initial     FINDINGS:   Pulmonary Arteries: This study is not optimized for evaluation of pulmonary   arteries food bolus timing.  No central filling defects are demonstrated   however the possible small distal pulmonary emboli cannot be excluded. .     Mediastinum: No evidence of mediastinal lymphadenopathy.  The heart and   pericardium demonstrate no acute abnormality.  There is no acute abnormality   of the thoracic aorta. Lungs/pleura: Calcified granuloma left lung base. .  1.9 x 1.5 cm subpleural   mass superomedial portion of the left lower lobe.  This may represent a area   of pneumonic consolidation from infectious process however of the etiologies   not excluded.  Follow-up evaluation in 3 months with a noncontrast CT of the   chest versus a PET-CT or tissue sampling as clinically indicated is   recommended     Upper Abdomen: Limited images of the upper abdomen are unremarkable. Soft Tissues/Bones: No acute bone or soft tissue abnormality.       Impression:         Study is not optimized for evaluation of pulmonary arteries.  No central   filling defects are noted but small distal pulmonary emboli cannot be   excluded. 1.9 x 1.5 cm left medial lower lobe mass.  This of uncertain etiology.    Follow-up with noncontrast CT in 3 months is recommended.  Alternatively   PET-CT or tissue sampling in high risk patients should be considered       XR CHEST (2 VW) [[id number]]  Collected: 11/17/20 1544       Order Status: Completed  Specimen: Chest  Updated: 11/17/20 1548       Narrative:         EXAMINATION:   TWO XRAY VIEWS OF THE CHEST     11/17/2020 3:28 pm     COMPARISON:   09/24/2020     HISTORY: ORDERING SYSTEM PROVIDED HISTORY: left lung pain   TECHNOLOGIST PROVIDED HISTORY:   Reason for exam:->left lung pain   Reason for Exam: left lung pain   Acuity: Unknown   Type of Exam: Unknown   Relevant Medical/Surgical History: copd     FINDINGS:   The lungs are clear other than minimal lingular atelectasis/scarring.  No   pneumothorax or pleural effusion is seen.  Cardiac and mediastinal contours   are normal.  2 lead left subclavian transvenous pacemaker appears unchanged. No acute osseous abnormality is evident. Delma Hernandez is a chronic calcifications   superolateral to the right humeral head, which can be seen in the setting of   rotator cuff tendinopathy.       Impression:         Minimal lingular atelectasis/scarring. Jonathan Hipps, no acute cardiopulmonary   abnormality.             Relevant labs and imaging reviewed    ASSESSMENT AND PLAN     #. COPD with acute exacerbation:  -IV steroids, bronchodilator therapy, azithromycin.  -Patient is at baseline requirement of oxygen.  -Sputum culture  -Rapid Covid negative  -CTA chest- no central filling defects, but small distal pulmonary emboli cannot be excluded. 1.9 and 1.5 cm left medial lower lobe mass, uncertain etiology. Recommended follow-up in 3 months with repeat CT.  -Consult pulmonology-Dr. Tootie Metcalf  -Continue BiPAP. #.  Leukocytosis:  -Patient was recently treated with the p.o. prednisone.  -Monitor with repeat CBC. #. severe COPD  -Patient is on albuterol, Spiriva, Dulera    #. chronic respiratory failure on home oxygen at 4 L/min    #. Chronic hypercapnic respiratory failure on BiPAP    #. H/o pacemaker placement-details unknown. #. Insomnia-trazodone as needed    #. chronic pain syndrome/history of drug abuse on Suboxone    #. history of chronic hepatitis C     DVT Prophylaxis: Lovenox  GI Prophylaxis: Protonix  Code Status: FULL.       Case d/w ED physician    Nahun Hinojosa MD  Hospitalist, Internal Medicine  11/17/2020 at 7:40 PM

## 2020-11-18 NOTE — CONSULTS
Subjective:   CHIEF COMPLAINT / HPI:  64year old female with severe copd and on NIV at home admitted with left chest pain and increasing sob and this is accompanied by wheeze. She has cough without any sputum production.  Her rapid covid test in negative      Past Medical History:  Past Medical History:   Diagnosis Date    [facility]     [facility]     recovering addict    Hep C w/o coma, chronic (H[facility])     Pacemaker     Sleep apnea     TIA (transient ischemic attack)     per patient \"several mini strokes\"       Past Surgical History:        Procedure Laterality Date     SECTION      CHOLECYSTECTOMY         Current Medications:    Current Facility-Administered Medications: sodium chloride flush 0.9 % injection 10 mL, 10 mL, Intravenous, 2 times per day  sodium chloride flush 0.9 % injection 10 mL, 10 mL, Intravenous, PRN  acetaminophen (TYLENOL) tablet 650 mg, 650 mg, Oral, Q6H PRN **OR** acetaminophen (TYLENOL) suppository 650 mg, 650 mg, Rectal, Q6H PRN  polyethylene glycol (GLYCOLAX) packet 17 g, 17 g, Oral, Daily PRN  promethazine (PHENERGAN) tablet 12.5 mg, 12.5 mg, Oral, Q6H PRN **OR** ondansetron (ZOFRAN) injection 4 mg, 4 mg, Intravenous, Q6H PRN  enoxaparin (LOVENOX) injection 40 mg, 40 mg, Subcutaneous, Daily  methylPREDNISolone sodium (SOLU-MEDROL) injection 40 mg, 40 mg, Intravenous, Q6H **FOLLOWED BY** [START ON 2020] predniSONE (DELTASONE) tablet 40 mg, 40 mg, Oral, Daily  azithromycin (ZITHROMAX) 500 mg in dextrose 5 % 250 mL IVPB, 500 mg, Intravenous, Q24H  albuterol sulfate  (90 Base) MCG/ACT inhaler 2 puff, 2 puff, Inhalation, 4x daily **AND** ipratropium (ATROVENT HFA) 17 MCG/ACT inhaler 2 puff, 2 puff, Inhalation, 4x daily **AND** MDI Treatment, , , 4x daily  amphetamine-dextroamphetamine (ADDERALL XR) extended release capsule 30 mg, 30 mg, Oral, Daily  buprenorphine-naloxone (SUBOXONE) 8-2 MG SL film 1 Film, 1 Film, Sublingual, Daily  buPROPion (WELLBUTRIN XL) extended release tablet 300 mg, 300 mg, Oral, Daily  budesonide-formoterol (SYMBICORT) 80-4.5 MCG/ACT inhaler 2 puff, 2 puff, Inhalation, BID  pregabalin (LYRICA) capsule 100 mg, 100 mg, Oral, TID  traMADol (ULTRAM) tablet 50 mg, 50 mg, Oral, BID PRN  traZODone (DESYREL) tablet 150 mg, 150 mg, Oral, Nightly PRN  pantoprazole (PROTONIX) tablet 40 mg, 40 mg, Oral, QAM AC    No Known Allergies    Social History:    Social History     Socioeconomic History    Marital status: Single     Spouse name: None    Number of children: None    Years of education: None    Highest education level: None   Occupational History    None   Social Needs    Financial resource strain: None    Food insecurity     Worry: None     Inability: None    Transportation needs     Medical: None     Non-medical: None   Tobacco Use    Smoking status: Former Smoker     Last attempt to quit: 2016     Years since quittin.2    Smokeless tobacco: Never Used   Substance and Sexual Activity    Alcohol use: Not Currently    Drug use: Not Currently    Sexual activity: None   Lifestyle    Physical activity     Days per week: None     Minutes per session: None    Stress: None   Relationships    Social connections     Talks on phone: None     Gets together: None     Attends Judaism service: None     Active member of club or organization: None     Attends meetings of clubs or organizations: None     Relationship status: None    Intimate partner violence     Fear of current or ex partner: None     Emotionally abused: None     Physically abused: None     Forced sexual activity: None   Other Topics Concern    None   Social History Narrative    None       Family History:   Family History   Problem Relation Age of Onset    Heart Disease Mother        Immunization:    There is no immunization history on file for this patient.       REVIEW OF SYSTEMS:    CONSTITUTIONAL:  negative for fevers, chills, diaphoresis, activity change, appetite change, fatigue, night sweats and unexpected weight change. EYES:  negative for blurred vision, eye discharge, visual disturbance and icterus  HEENT:  negative for hearing loss, tinnitus, ear drainage, sinus pressure, nasal congestion, epistaxis and snoring  RESPIRATORY:  See HPI  CARDIOVASCULAR:  negative for chest pain, palpitations, exertional chest pressure/discomfort, edema, syncope  GASTROINTESTINAL:  negative for nausea, vomiting, diarrhea, constipation, blood in stool and abdominal pain  GENITOURINARY:  negative for frequency, dysuria and hematuria  HEMATOLOGIC/LYMPHATIC:  negative for easy bruising, bleeding and lymphadenopathy  ALLERGIC/IMMUNOLOGIC:  negative for recurrent infections, angioedema, anaphylaxis and drug reactions  ENDOCRINE:  negative for weight changes and diabetic symptoms including polyuria, polydipsia and polyphagia    MUSCULOSKELETAL:  negative for  pain, joint swelling, decreased range of motion and muscle weakness  NEUROLOGICAL:  negative for headaches, slurred speech, unilateral weakness  PSYCHIATRIC/BEHAVIORAL: negative for hallucinations, behavioral problems, confusion and agitation. Objective:   PHYSICAL EXAM:      VITALS:    Vitals:    11/17/20 1831 11/17/20 2145 11/17/20 2200 11/18/20 0500   BP: 119/69 110/61  (!) 98/57   Pulse:  83  73   Resp:  18  16   Temp:  98.4 °F (36.9 °C)  98.5 °F (36.9 °C)   TempSrc:  Oral  Oral   SpO2: 99% 97%  97%   Weight:   170 lb (77.1 kg)    Height:   5' 4\" (1.626 m)          CONSTITUTIONAL:  awake, alert, cooperative, no apparent distress, and appears stated age  NECK:  Supple, symmetrical, trachea midline, no adenopathy, thyroid symmetric, not enlarged and no tenderness  CHEST: Chest expansion equal and symmetrical, no intercostal retraction. LUNGS:  no increased work of breathing, has expiratory wheezes both lungs, no crackles.   CARDIOVASCULAR: S1 and S2, no edema and no JVD  ABDOMEN: normal bowel sounds, non-distended and no masses palpated, and no tenderness to palpation. No hepatospleenomegaly  LYMPHADENOPATHY:  no axillary or supraclavicular adenopathy. No cervical adnenopathy  PSYCHIATRIC: Oriented to person place and time. No obvious depression or anxiety. MUSCULOSKELETAL: No obvious misalignment or effusion of the joints. No clubbing, cyanosis of the digits. RIGHT AND LEFT LOWER EXTREMITIES: No edema, no inflammation, no tenderness. SKIN:  normal skin color, texture, turgor and no redness, warmth, or swelling. No palpable nodules    DATA:       EXAMINATION:    CTA OF THE CHEST 11/17/2020 7:49 pm         TECHNIQUE:    CTA of the chest was performed after the administration of intravenous    contrast.  Multiplanar reformatted images are provided for review.  MIP    images are provided for review. Dose modulation, iterative reconstruction,    and/or weight based adjustment of the mA/kV was utilized to reduce the    radiation dose to as low as reasonably achievable.         COMPARISON:    None.         HISTORY:    ORDERING SYSTEM PROVIDED HISTORY: left sided pleuritic pain    TECHNOLOGIST PROVIDED HISTORY:    Reason for exam:->left sided pleuritic pain    Reason for Exam: left sided pleuritic pain    Acuity: Acute    Type of Exam: Initial         FINDINGS:    Pulmonary Arteries: This study is not optimized for evaluation of pulmonary    arteries food bolus timing.  No central filling defects are demonstrated    however the possible small distal pulmonary emboli cannot be excluded. .         Mediastinum: No evidence of mediastinal lymphadenopathy.  The heart and    pericardium demonstrate no acute abnormality.  There is no acute abnormality    of the thoracic aorta.         Lungs/pleura: Calcified granuloma left lung base. .  1.9 x 1.5 cm subpleural    mass superomedial portion of the left lower lobe.  This may represent a area    of pneumonic consolidation from infectious process however of

## 2020-11-19 VITALS
BODY MASS INDEX: 29.02 KG/M2 | OXYGEN SATURATION: 94 % | WEIGHT: 170 LBS | TEMPERATURE: 98.5 F | HEART RATE: 83 BPM | HEIGHT: 64 IN | DIASTOLIC BLOOD PRESSURE: 62 MMHG | SYSTOLIC BLOOD PRESSURE: 120 MMHG | RESPIRATION RATE: 15 BRPM

## 2020-11-19 LAB
ALBUMIN SERPL-MCNC: 3.6 GM/DL (ref 3.4–5)
ALP BLD-CCNC: 67 IU/L (ref 40–129)
ALT SERPL-CCNC: 9 U/L (ref 10–40)
ANION GAP SERPL CALCULATED.3IONS-SCNC: 9 MMOL/L (ref 4–16)
APTT: 24.8 SECONDS (ref 25.1–37.1)
AST SERPL-CCNC: 13 IU/L (ref 15–37)
BASOPHILS ABSOLUTE: 0 K/CU MM
BASOPHILS RELATIVE PERCENT: 0.1 % (ref 0–1)
BILIRUB SERPL-MCNC: 0.2 MG/DL (ref 0–1)
BUN BLDV-MCNC: 15 MG/DL (ref 6–23)
CALCIUM SERPL-MCNC: 9 MG/DL (ref 8.3–10.6)
CHLORIDE BLD-SCNC: 97 MMOL/L (ref 99–110)
CO2: 34 MMOL/L (ref 21–32)
CREAT SERPL-MCNC: 0.5 MG/DL (ref 0.6–1.1)
D DIMER: 237 NG/ML(DDU)
DIFFERENTIAL TYPE: ABNORMAL
EOSINOPHILS ABSOLUTE: 0 K/CU MM
EOSINOPHILS RELATIVE PERCENT: 0 % (ref 0–3)
FIBRINOGEN LEVEL: 396 MG/DL (ref 196.9–442.1)
GFR AFRICAN AMERICAN: >60 ML/MIN/1.73M2
GFR NON-AFRICAN AMERICAN: >60 ML/MIN/1.73M2
GLUCOSE BLD-MCNC: 146 MG/DL (ref 70–99)
HCT VFR BLD CALC: 38.9 % (ref 37–47)
HEMOGLOBIN: 12.4 GM/DL (ref 12.5–16)
IMMATURE NEUTROPHIL %: 0.9 % (ref 0–0.43)
INR BLD: 1.02 INDEX
LYMPHOCYTES ABSOLUTE: 1.2 K/CU MM
LYMPHOCYTES RELATIVE PERCENT: 5.3 % (ref 24–44)
MCH RBC QN AUTO: 30 PG (ref 27–31)
MCHC RBC AUTO-ENTMCNC: 31.9 % (ref 32–36)
MCV RBC AUTO: 94 FL (ref 78–100)
MONOCYTES ABSOLUTE: 1 K/CU MM
MONOCYTES RELATIVE PERCENT: 4.6 % (ref 0–4)
NUCLEATED RBC %: 0 %
PDW BLD-RTO: 11.9 % (ref 11.7–14.9)
PLATELET # BLD: 219 K/CU MM (ref 140–440)
PMV BLD AUTO: 10 FL (ref 7.5–11.1)
POTASSIUM SERPL-SCNC: 4.4 MMOL/L (ref 3.5–5.1)
PROTHROMBIN TIME: 12.3 SECONDS (ref 11.7–14.5)
RBC # BLD: 4.14 M/CU MM (ref 4.2–5.4)
SEGMENTED NEUTROPHILS ABSOLUTE COUNT: 19.4 K/CU MM
SEGMENTED NEUTROPHILS RELATIVE PERCENT: 89.1 % (ref 36–66)
SODIUM BLD-SCNC: 140 MMOL/L (ref 135–145)
TOTAL IMMATURE NEUTOROPHIL: 0.19 K/CU MM
TOTAL NUCLEATED RBC: 0 K/CU MM
TOTAL PROTEIN: 5.9 GM/DL (ref 6.4–8.2)
WBC # BLD: 21.8 K/CU MM (ref 4–10.5)

## 2020-11-19 PROCEDURE — 96372 THER/PROPH/DIAG INJ SC/IM: CPT

## 2020-11-19 PROCEDURE — 85730 THROMBOPLASTIN TIME PARTIAL: CPT

## 2020-11-19 PROCEDURE — G0378 HOSPITAL OBSERVATION PER HR: HCPCS

## 2020-11-19 PROCEDURE — 85379 FIBRIN DEGRADATION QUANT: CPT

## 2020-11-19 PROCEDURE — 2700000000 HC OXYGEN THERAPY PER DAY

## 2020-11-19 PROCEDURE — 94640 AIRWAY INHALATION TREATMENT: CPT

## 2020-11-19 PROCEDURE — 94761 N-INVAS EAR/PLS OXIMETRY MLT: CPT

## 2020-11-19 PROCEDURE — 6370000000 HC RX 637 (ALT 250 FOR IP): Performed by: INTERNAL MEDICINE

## 2020-11-19 PROCEDURE — 2580000003 HC RX 258: Performed by: INTERNAL MEDICINE

## 2020-11-19 PROCEDURE — 85384 FIBRINOGEN ACTIVITY: CPT

## 2020-11-19 PROCEDURE — 85610 PROTHROMBIN TIME: CPT

## 2020-11-19 PROCEDURE — 85025 COMPLETE CBC W/AUTO DIFF WBC: CPT

## 2020-11-19 PROCEDURE — 36415 COLL VENOUS BLD VENIPUNCTURE: CPT

## 2020-11-19 PROCEDURE — 80053 COMPREHEN METABOLIC PANEL: CPT

## 2020-11-19 PROCEDURE — 96376 TX/PRO/DX INJ SAME DRUG ADON: CPT

## 2020-11-19 PROCEDURE — 6360000002 HC RX W HCPCS: Performed by: INTERNAL MEDICINE

## 2020-11-19 RX ORDER — PANTOPRAZOLE SODIUM 40 MG/1
40 TABLET, DELAYED RELEASE ORAL
Qty: 30 TABLET | Refills: 3 | Status: SHIPPED | OUTPATIENT
Start: 2020-11-20 | End: 2021-05-18

## 2020-11-19 RX ORDER — AZITHROMYCIN 500 MG/1
500 TABLET, FILM COATED ORAL DAILY
Qty: 3 TABLET | Refills: 0 | Status: SHIPPED | OUTPATIENT
Start: 2020-11-19 | End: 2020-11-22

## 2020-11-19 RX ORDER — AMOXICILLIN AND CLAVULANATE POTASSIUM 875; 125 MG/1; MG/1
1 TABLET, FILM COATED ORAL 2 TIMES DAILY
Qty: 14 TABLET | Refills: 0 | Status: SHIPPED | OUTPATIENT
Start: 2020-11-19 | End: 2020-11-26

## 2020-11-19 RX ORDER — PREDNISONE 20 MG/1
20 TABLET ORAL 2 TIMES DAILY
Qty: 10 TABLET | Refills: 0 | Status: SHIPPED | OUTPATIENT
Start: 2020-11-19 | End: 2020-11-24

## 2020-11-19 RX ADMIN — METHYLPREDNISOLONE SODIUM SUCCINATE 40 MG: 40 INJECTION, POWDER, FOR SOLUTION INTRAMUSCULAR; INTRAVENOUS at 12:13

## 2020-11-19 RX ADMIN — PANTOPRAZOLE SODIUM 40 MG: 40 TABLET, DELAYED RELEASE ORAL at 06:32

## 2020-11-19 RX ADMIN — Medication 2 PUFF: at 08:13

## 2020-11-19 RX ADMIN — ENOXAPARIN SODIUM 40 MG: 40 INJECTION SUBCUTANEOUS at 08:32

## 2020-11-19 RX ADMIN — METHYLPREDNISOLONE SODIUM SUCCINATE 40 MG: 40 INJECTION, POWDER, FOR SOLUTION INTRAMUSCULAR; INTRAVENOUS at 16:01

## 2020-11-19 RX ADMIN — PREGABALIN 100 MG: 100 CAPSULE ORAL at 08:31

## 2020-11-19 RX ADMIN — BUPRENORPHINE HYDROCHLORIDE, NALOXONE HYDROCHLORIDE 1 FILM: 8; 2 FILM, SOLUBLE BUCCAL; SUBLINGUAL at 08:32

## 2020-11-19 RX ADMIN — METHYLPREDNISOLONE SODIUM SUCCINATE 40 MG: 40 INJECTION, POWDER, FOR SOLUTION INTRAMUSCULAR; INTRAVENOUS at 06:32

## 2020-11-19 RX ADMIN — BUDESONIDE AND FORMOTEROL FUMARATE DIHYDRATE 2 PUFF: 80; 4.5 AEROSOL RESPIRATORY (INHALATION) at 08:13

## 2020-11-19 RX ADMIN — BUPROPION HYDROCHLORIDE 300 MG: 150 TABLET, FILM COATED, EXTENDED RELEASE ORAL at 08:32

## 2020-11-19 RX ADMIN — SODIUM CHLORIDE, PRESERVATIVE FREE 10 ML: 5 INJECTION INTRAVENOUS at 08:39

## 2020-11-19 RX ADMIN — Medication 2 PUFF: at 11:20

## 2020-11-19 RX ADMIN — PREGABALIN 100 MG: 100 CAPSULE ORAL at 16:01

## 2020-11-19 RX ADMIN — ALBUTEROL SULFATE 2 PUFF: 90 AEROSOL, METERED RESPIRATORY (INHALATION) at 08:13

## 2020-11-19 RX ADMIN — ALBUTEROL SULFATE 2 PUFF: 90 AEROSOL, METERED RESPIRATORY (INHALATION) at 11:20

## 2020-11-19 ASSESSMENT — PAIN SCALES - GENERAL: PAINLEVEL_OUTOF10: 0

## 2020-11-19 NOTE — DISCHARGE SUMMARY
Discharge Summary    Name:  Kenyon San /Age/Sex: 1960  (64 y.o. female)   MRN & CSN:  [id number] & [id number] Admission Date/Time: 2020  3:06 PM   Attending:  Starr Cox MD Discharging Physician: Sherry Smith MD     Hospital Course:   Effie Keene a 64 y. o. female who presents with COPD exacerbation        #.  COPD with acute exacerbation:  #. Bronchitis with possible pleuritis  #. Pulmonary nodules  -pt continues to have scattered wheezes  -continue po azithromycin.  -currently on 4LNC which is baseline home O2 .  -Rapid Covid negative  -CTA chest- no central filling defects, but small distal pulmonary emboli cannot be excluded.  1.9 and 1.5 cm left medial lower lobe mass, uncertain etiology.  Recommended follow-up in 3 months with repeat CT.  -Pulmonology-Dr. Pauly Metzger, will follow with out patient for PET can  -Continue BiPAP. -for discharge today        #.  Leukocytosis:due to steroid use  -patient on IV steroids  -no fevers, chills or other infectious signs     #. severe COPD  -Patient is on albuterol, Spiriva, Dulera     #. chronic respiratory failure on home oxygen at 4 L/min     #. Chronic hypercapnic respiratory failure on BiPAP     #. H/o pacemaker placement-details unknown.     #. Insomnia-trazodone as needed     #. chronic pain syndrome/history of drug abuse on Suboxone     #. history of chronic hepatitis C      The patient expressed appropriate understanding of and agreement with the discharge recommendations, medications, and plan.      Consults this admission:  IP CONSULT TO HOSPITALIST  IP CONSULT TO PULMONOLOGY    Discharge Instruction:   Follow up appointments: PCP  Primary care physician:  within 2 weeks    Diet:  cardiac diet   Activity: activity as tolerated  Disposition: Discharged to:   [x]Home, []C, []SNF, []Acute Rehab, []Hospice   Condition on discharge: Stable    Discharge Medications:      Marcello Morelle   Home Medication Instructions [facility]:[id number]    Printed on:11/19/20 9561   Medication Information                      albuterol (PROVENTIL) (2.5 MG/3ML) 0.083% nebulizer solution  Take 3 mLs by nebulization every 6 hours as needed for Wheezing             albuterol sulfate  (90 Base) MCG/ACT inhaler  INHALE 2 PUFFS BY MOUTH EVERY 6 HOURS SHAKE WELL             amoxicillin-clavulanate (AUGMENTIN) 875-125 MG per tablet  Take 1 tablet by mouth 2 times daily for 7 days             amphetamine-dextroamphetamine (ADDERALL XR) 30 MG extended release capsule  Take 30 mg by mouth daily. azithromycin (ZITHROMAX) 500 MG tablet  Take 1 tablet by mouth daily for 3 days             buprenorphine-naloxone (SUBOXONE) 8-2 MG FILM SL film  Place 1 Film under the tongue daily             buPROPion (WELLBUTRIN XL) 300 MG extended release tablet  Take 300 mg by mouth daily             CPAP Machine MISC  by CPAP route nightly             DULERA 100-5 MCG/ACT inhaler  INHALE 2 PUFFS BY MOUTH 2 TIMES A DAY SHAKE WELL             Nebulizer MISC  Use nebulizer with medication as directed. OXYGEN  Inhale 4 L into the lungs continuous              pantoprazole (PROTONIX) 40 MG tablet  Take 1 tablet by mouth every morning (before breakfast)             predniSONE (DELTASONE) 20 MG tablet  Take 1 tablet by mouth 2 times daily for 5 days             pregabalin (LYRICA) 100 MG capsule  Take 100 mg by mouth 3 times daily. Respiratory Therapy Supplies (NEBULIZER/TUBING/MOUTHPIECE) KIT  Inhale 1 kit into the lungs daily as needed (as directed)             SPIRIVA RESPIMAT 2.5 MCG/ACT AERS inhaler  INHALE 2 PUFFS BY MOUTH ONCE DAILY             theophylline (FRANCO-24) 100 MG extended release capsule  Take 1 capsule by mouth 2 times daily             traMADol (ULTRAM) 50 MG tablet  Take 50 mg by mouth 2 times daily.              traZODone (DESYREL) 150 MG tablet  Take 150 mg by mouth nightly              varenicline (CHANTIX) 0.5 MG

## 2020-11-19 NOTE — PROGRESS NOTES
pulmonary      SUBJECTIVE:  Doing a little better     OBJECTIVE    VITALS:  BP (!) 111/53   Pulse 73   Temp 98.6 °F (37 °C) (Oral)   Resp 15   Ht 5' 4\" (1.626 m)   Wt 170 lb (77.1 kg)   SpO2 93%   BMI 29.18 kg/m²   HEAD AND FACE EXAM:  No throat injection, no active exudate,no thrush  NECK EXAM;No JVD, no masses, symmetrical  CHEST EXAM; Expansion equal and symmetrical, no masses  LUNG EXAM; Good breath sounds bilaterally. There are expiratory wheezes both lungs, there are crackles at both lung bases  CARDIOVASCULAR EXAM: Positive S1 and S2, no S3 or S4, no clicks ,no murmurs  RIGHT AND LEFT LOWER EXTRIMITY EXAM: No edema, no swelling, no inflamation  CNS EXAM: Alert and oriented X3          LABS   Lab Results   Component Value Date    WBC 11.8 (H) 11/18/2020    HGB 12.3 (L) 11/18/2020    HCT 39.2 11/18/2020    MCV 93.1 11/18/2020     11/18/2020     Lab Results   Component Value Date    CREATININE 0.5 (L) 11/18/2020    BUN 9 11/18/2020     11/18/2020    K 4.9 11/18/2020     11/18/2020    CO2 34 (H) 11/18/2020     Lab Results   Component Value Date    INR 1.05 11/18/2020    PROTIME 12.7 11/18/2020        No results found for: PHOS   No results for input(s): PH, PO2ART, FAI4AWN, HCO3, BEART, O2SAT in the last 72 hours. Wt Readings from Last 3 Encounters:   11/17/20 170 lb (77.1 kg)   10/19/20 170 lb (77.1 kg)   09/27/20 158 lb 11.2 oz (72 kg)               ASSESMENT  Ac on ch resp failure  Ac copd  Ac bronchitis  LLL 1.9 cm nodule        PLAN  1. Bd rx  2. antibx  3.  On steroids    11/19/2020  Michael Rowland M.D.

## 2020-11-22 LAB
CULTURE: NORMAL
CULTURE: NORMAL
Lab: NORMAL
Lab: NORMAL
SPECIMEN: NORMAL
SPECIMEN: NORMAL

## 2020-11-24 LAB
EKG ATRIAL RATE: 88 BPM
EKG DIAGNOSIS: NORMAL
EKG P AXIS: 79 DEGREES
EKG P-R INTERVAL: 158 MS
EKG Q-T INTERVAL: 380 MS
EKG QRS DURATION: 96 MS
EKG QTC CALCULATION (BAZETT): 459 MS
EKG R AXIS: 70 DEGREES
EKG T AXIS: 77 DEGREES
EKG VENTRICULAR RATE: 88 BPM

## 2020-12-29 ENCOUNTER — HOSPITAL ENCOUNTER (EMERGENCY)
Age: 60
Discharge: HOME | End: 2020-12-29
Attending: EMERGENCY MEDICINE
Payer: COMMERCIAL

## 2020-12-29 ENCOUNTER — APPOINTMENT (OUTPATIENT)
Dept: GENERAL RADIOLOGY | Age: 60
End: 2020-12-29
Payer: COMMERCIAL

## 2020-12-29 VITALS
DIASTOLIC BLOOD PRESSURE: 72 MMHG | SYSTOLIC BLOOD PRESSURE: 122 MMHG | TEMPERATURE: 98.8 F | BODY MASS INDEX: 28.17 KG/M2 | WEIGHT: 165 LBS | RESPIRATION RATE: 12 BRPM | HEIGHT: 64 IN | OXYGEN SATURATION: 98 % | HEART RATE: 95 BPM

## 2020-12-29 LAB
ALBUMIN SERPL-MCNC: 3.8 GM/DL (ref 3.4–5)
ALP BLD-CCNC: 82 IU/L (ref 40–129)
ALT SERPL-CCNC: 14 U/L (ref 10–40)
ANION GAP SERPL CALCULATED.3IONS-SCNC: 6 MMOL/L (ref 4–16)
AST SERPL-CCNC: 18 IU/L (ref 15–37)
BASE EXCESS MIXED: 13.9 (ref 0–2.3)
BASOPHILS ABSOLUTE: 0.1 K/CU MM
BASOPHILS RELATIVE PERCENT: 0.8 % (ref 0–1)
BILIRUB SERPL-MCNC: 0.1 MG/DL (ref 0–1)
BUN BLDV-MCNC: 8 MG/DL (ref 6–23)
CALCIUM SERPL-MCNC: 9.2 MG/DL (ref 8.3–10.6)
CHLORIDE BLD-SCNC: 94 MMOL/L (ref 99–110)
CO2: 38 MMOL/L (ref 21–32)
COMMENT: ABNORMAL
CREAT SERPL-MCNC: 0.5 MG/DL (ref 0.6–1.1)
D DIMER: <200 NG/ML(DDU)
DIFFERENTIAL TYPE: ABNORMAL
EOSINOPHILS ABSOLUTE: 0.1 K/CU MM
EOSINOPHILS RELATIVE PERCENT: 1.1 % (ref 0–3)
GFR AFRICAN AMERICAN: >60 ML/MIN/1.73M2
GFR NON-AFRICAN AMERICAN: >60 ML/MIN/1.73M2
GLUCOSE BLD-MCNC: 161 MG/DL (ref 70–99)
HCO3 VENOUS: 45.5 MMOL/L (ref 19–25)
HCT VFR BLD CALC: 40.6 % (ref 37–47)
HEMOGLOBIN: 12.5 GM/DL (ref 12.5–16)
IMMATURE NEUTROPHIL %: 1.1 % (ref 0–0.43)
LYMPHOCYTES ABSOLUTE: 3.3 K/CU MM
LYMPHOCYTES RELATIVE PERCENT: 27 % (ref 24–44)
MCH RBC QN AUTO: 29.2 PG (ref 27–31)
MCHC RBC AUTO-ENTMCNC: 30.8 % (ref 32–36)
MCV RBC AUTO: 94.9 FL (ref 78–100)
MONOCYTES ABSOLUTE: 1.4 K/CU MM
MONOCYTES RELATIVE PERCENT: 11.5 % (ref 0–4)
NUCLEATED RBC %: 0 %
O2 SAT, VEN: 92.8 % (ref 50–70)
PCO2, VEN: 99 MMHG (ref 38–52)
PDW BLD-RTO: 11.9 % (ref 11.7–14.9)
PH VENOUS: 7.27 (ref 7.32–7.42)
PLATELET # BLD: 322 K/CU MM (ref 140–440)
PMV BLD AUTO: 8.8 FL (ref 7.5–11.1)
PO2, VEN: 131 MMHG (ref 28–48)
POTASSIUM SERPL-SCNC: 4 MMOL/L (ref 3.5–5.1)
PRO-BNP: 69.94 PG/ML
RBC # BLD: 4.28 M/CU MM (ref 4.2–5.4)
SARS-COV-2, NAAT: NOT DETECTED
SEGMENTED NEUTROPHILS ABSOLUTE COUNT: 7.1 K/CU MM
SEGMENTED NEUTROPHILS RELATIVE PERCENT: 58.5 % (ref 36–66)
SODIUM BLD-SCNC: 138 MMOL/L (ref 135–145)
SOURCE: NORMAL
TOTAL IMMATURE NEUTOROPHIL: 0.14 K/CU MM
TOTAL NUCLEATED RBC: 0 K/CU MM
TOTAL PROTEIN: 7 GM/DL (ref 6.4–8.2)
TROPONIN T: <0.01 NG/ML
WBC # BLD: 12.2 K/CU MM (ref 4–10.5)

## 2020-12-29 PROCEDURE — 71045 X-RAY EXAM CHEST 1 VIEW: CPT

## 2020-12-29 PROCEDURE — 96366 THER/PROPH/DIAG IV INF ADDON: CPT

## 2020-12-29 PROCEDURE — 6360000002 HC RX W HCPCS: Performed by: EMERGENCY MEDICINE

## 2020-12-29 PROCEDURE — 85025 COMPLETE CBC W/AUTO DIFF WBC: CPT

## 2020-12-29 PROCEDURE — 6370000000 HC RX 637 (ALT 250 FOR IP): Performed by: EMERGENCY MEDICINE

## 2020-12-29 PROCEDURE — 82805 BLOOD GASES W/O2 SATURATION: CPT

## 2020-12-29 PROCEDURE — 85379 FIBRIN DEGRADATION QUANT: CPT

## 2020-12-29 PROCEDURE — 93010 ELECTROCARDIOGRAM REPORT: CPT | Performed by: INTERNAL MEDICINE

## 2020-12-29 PROCEDURE — 96375 TX/PRO/DX INJ NEW DRUG ADDON: CPT

## 2020-12-29 PROCEDURE — U0002 COVID-19 LAB TEST NON-CDC: HCPCS

## 2020-12-29 PROCEDURE — 96365 THER/PROPH/DIAG IV INF INIT: CPT

## 2020-12-29 PROCEDURE — 80053 COMPREHEN METABOLIC PANEL: CPT

## 2020-12-29 PROCEDURE — 84484 ASSAY OF TROPONIN QUANT: CPT

## 2020-12-29 PROCEDURE — 93005 ELECTROCARDIOGRAM TRACING: CPT | Performed by: EMERGENCY MEDICINE

## 2020-12-29 PROCEDURE — 83880 ASSAY OF NATRIURETIC PEPTIDE: CPT

## 2020-12-29 PROCEDURE — 99285 EMERGENCY DEPT VISIT HI MDM: CPT

## 2020-12-29 RX ORDER — MAGNESIUM SULFATE IN WATER 40 MG/ML
2 INJECTION, SOLUTION INTRAVENOUS ONCE
Status: COMPLETED | OUTPATIENT
Start: 2020-12-29 | End: 2020-12-29

## 2020-12-29 RX ORDER — DOXYCYCLINE HYCLATE 100 MG
100 TABLET ORAL ONCE
Status: COMPLETED | OUTPATIENT
Start: 2020-12-29 | End: 2020-12-29

## 2020-12-29 RX ORDER — METHYLPREDNISOLONE SODIUM SUCCINATE 125 MG/2ML
60 INJECTION, POWDER, LYOPHILIZED, FOR SOLUTION INTRAMUSCULAR; INTRAVENOUS ONCE
Status: COMPLETED | OUTPATIENT
Start: 2020-12-29 | End: 2020-12-29

## 2020-12-29 RX ORDER — DOXYCYCLINE HYCLATE 100 MG
100 TABLET ORAL 2 TIMES DAILY
Qty: 20 TABLET | Refills: 0 | Status: SHIPPED | OUTPATIENT
Start: 2020-12-29 | End: 2021-01-08

## 2020-12-29 RX ORDER — PREDNISONE 10 MG/1
60 TABLET ORAL DAILY
Qty: 24 TABLET | Refills: 0 | Status: SHIPPED | OUTPATIENT
Start: 2020-12-29 | End: 2021-01-02

## 2020-12-29 RX ADMIN — DOXYCYCLINE HYCLATE 100 MG: 100 TABLET, COATED ORAL at 05:49

## 2020-12-29 RX ADMIN — METHYLPREDNISOLONE SODIUM SUCCINATE 60 MG: 125 INJECTION, POWDER, LYOPHILIZED, FOR SOLUTION INTRAMUSCULAR; INTRAVENOUS at 03:58

## 2020-12-29 RX ADMIN — MAGNESIUM SULFATE HEPTAHYDRATE 2 G: 40 INJECTION, SOLUTION INTRAVENOUS at 03:59

## 2020-12-29 ASSESSMENT — ENCOUNTER SYMPTOMS
NAUSEA: 0
COUGH: 1
SORE THROAT: 0
WHEEZING: 0
EYE REDNESS: 0
BACK PAIN: 0
SHORTNESS OF BREATH: 1
RHINORRHEA: 0
ABDOMINAL PAIN: 0
CHEST TIGHTNESS: 1
VOMITING: 0

## 2020-12-29 NOTE — ED PROVIDER NOTES
Triage Chief Complaint:   Shortness of Breath (hx COPD 95% on 4L o2 baseline at home )    [place]:  Jagruti Ott is a 64 y.o. female that presents with what she feels like it is a \"COPD episode. \"  She states she woke up short of breath this evening. She does not feel like she is wheezing but feels like her chest is tight. She denies any sharp chest pain. She states she has a mild cough with a little bit of yellow sputum production. She denies any fevers. No lower extremity swelling. No history of blood clots. Denies any nausea vomiting. She is on 4 L of oxygen chronically at home. No known sick contacts. She did take her Dulera and breathing treatment before calling 911, but these did not improve her symptoms. ROS:   Review of Systems   Constitutional: Negative for chills and fever. HENT: Negative for congestion, rhinorrhea and sore throat. Eyes: Negative for redness and visual disturbance. Respiratory: Positive for cough, chest tightness and shortness of breath. Negative for wheezing. Cardiovascular: Negative for chest pain and leg swelling. Gastrointestinal: Negative for abdominal pain, nausea and vomiting. Genitourinary: Negative for dysuria and frequency. Musculoskeletal: Negative for arthralgias and back pain. Skin: Negative for rash and wound. Neurological: Negative for syncope and headaches. Psychiatric/Behavioral: Negative. Negative for hallucinations and suicidal ideas.        Past Medical History:   Diagnosis Date    COPD [facility]chronic obstructive [facility] disease) ([facility] [facility]     Drug addiction in remission ([facility] [address]     recovering addict    Hep C w/o coma, chronic (HCC)     Pacemaker     Sleep apnea     TIA (transient ischemic attack)     per patient \"several mini strokes\"     Past Surgical History:   Procedure Laterality Date     SECTION      CHOLECYSTECTOMY       Family History   Problem Relation Age of Onset    Heart Disease Mother      Social History Socioeconomic History    Marital status: Single     Spouse name: Not on file    Number of children: Not on file    Years of education: Not on file    Highest education level: Not on file   Occupational History    Not on file   Social Needs    Financial resource strain: Not on file    Food insecurity     Worry: Not on file     Inability: Not on file    Transportation needs     Medical: Not on file     Non-medical: Not on file   Tobacco Use    Smoking status: Former Smoker     Quit date: 2016     Years since quittin.3    Smokeless tobacco: Never Used   Substance and Sexual Activity    Alcohol use: Not Currently    Drug use: Not Currently    Sexual activity: Not on file   Lifestyle    Physical activity     Days per week: Not on file     Minutes per session: Not on file    Stress: Not on file   Relationships    Social connections     Talks on phone: Not on file     Gets together: Not on file     Attends Yazidi service: Not on file     Active member of club or organization: Not on file     Attends meetings of clubs or organizations: Not on file     Relationship status: Not on file    Intimate partner violence     Fear of current or ex partner: Not on file     Emotionally abused: Not on file     Physically abused: Not on file     Forced sexual activity: Not on file   Other Topics Concern    Not on file   Social History Narrative    Not on file     Current Facility-Administered Medications   Medication Dose Route Frequency Provider Last Rate Last Admin    doxycycline hyclate (VIBRAMYCIN) capsule 100 mg  100 mg Oral Once Christi Moseley MD         Current Outpatient Medications   Medication Sig Dispense Refill    doxycycline hyclate (VIBRA-TABS) 100 MG tablet Take 1 tablet by mouth 2 times daily for 10 days 20 tablet 0    predniSONE (DELTASONE) 10 MG tablet Take 6 tablets by mouth daily for 4 days 24 tablet 0    predniSONE (DELTASONE) 10 MG tablet Take 1 tablet by mouth daily 40mg daily for 2 days, 20 mg daily for 2 days, 10 mg daily for 2 days, 5 mg daily for 2 days 15 tablet 0    albuterol (PROVENTIL) (2.5 MG/3ML) 0.083% nebulizer solution USE ONE VIAL BY NEBULIZER EVERY 6 HOURS AS NEEDED FOR WHEEZING 360 mL 5    pantoprazole (PROTONIX) 40 MG tablet Take 1 tablet by mouth every morning (before breakfast) 30 tablet 3    Nebulizer MISC Use nebulizer with medication as directed. 1 each 0    Respiratory Therapy Supplies (NEBULIZER/TUBING/MOUTHPIECE) KIT Inhale 1 kit into the lungs daily as needed (as directed) 1 kit 0    amphetamine-dextroamphetamine (ADDERALL XR) 30 MG extended release capsule Take 30 mg by mouth daily.  buPROPion (WELLBUTRIN XL) 300 MG extended release tablet Take 300 mg by mouth daily      traMADol (ULTRAM) 50 MG tablet Take 50 mg by mouth 2 times daily.  DULERA 100-5 MCG/ACT inhaler INHALE 2 PUFFS BY MOUTH 2 TIMES A DAY SHAKE WELL 13 g 5    SPIRIVA RESPIMAT 2.5 MCG/ACT AERS inhaler INHALE 2 PUFFS BY MOUTH ONCE DAILY 4 g 5    albuterol sulfate  (90 Base) MCG/ACT inhaler INHALE 2 PUFFS BY MOUTH EVERY 6 HOURS SHAKE WELL 18 g 5    theophylline (FRANCO-24) 100 MG extended release capsule Take 1 capsule by mouth 2 times daily 60 capsule 0    pregabalin (LYRICA) 100 MG capsule Take 100 mg by mouth 3 times daily.        varenicline (CHANTIX) 0.5 MG tablet Take 1 tablet by mouth 2 times daily 60 tablet 0    traZODone (DESYREL) 150 MG tablet Take 150 mg by mouth nightly       OXYGEN Inhale 4 L into the lungs continuous       CPAP Machine MISC by CPAP route nightly      buprenorphine-naloxone (SUBOXONE) 8-2 MG FILM SL film Place 1 Film under the tongue daily       No Known Allergies    Nursing Notes Reviewed     Physical Exam:   ED Triage Vitals [12/29/20 0313]   Enc Vitals Group      BP       Pulse       Resp       Temp       Temp src       SpO2       Weight 165 lb (74.8 kg)      Height 5' 4\" (1.626 m)      Head Circumference       Peak Flow       Pain Score Pain Loc       Pain Edu? Excl. in [address]? /72   Pulse 117   Temp 98.8 °F (37.1 °C) (Oral)   Resp 24   Ht 5' 4\" (1.626 m)   Wt 165 lb (74.8 kg)   SpO2 96%   BMI 28.32 kg/m²   My pulse ox interpretation is - normal on home oxygen  Physical Exam  Vitals signs and nursing note reviewed. Constitutional:       General: She is not in acute distress. Appearance: Normal appearance. She is not toxic-appearing or diaphoretic. HENT:      Head: Normocephalic and atraumatic. Eyes:      General:         Right eye: No discharge. Left eye: No discharge. Conjunctiva/sclera: Conjunctivae normal.   Cardiovascular:      Rate and Rhythm: Regular rhythm. Tachycardia present. Pulses: Normal pulses. Radial pulses are 2+ on the right side and 2+ on the left side. Pulmonary:      Effort: Tachypnea and accessory muscle usage present. Breath sounds: Decreased air movement (diffusely) present. No wheezing or rales. Abdominal:      General: There is no distension. Tenderness: There is no abdominal tenderness. Musculoskeletal: Normal range of motion. General: No swelling or tenderness. Skin:     General: Skin is warm and dry. Neurological:      General: No focal deficit present. Mental Status: She is alert. Cranial Nerves: No cranial nerve deficit.    Psychiatric:         Mood and Affect: Mood normal.         Behavior: Behavior normal.         I have reviewed and interpreted all of the currently available lab results from this visit (if applicable):  Results for orders placed or performed during the hospital encounter of 12/29/20   CBC Auto Differential   Result Value Ref Range    WBC 12.2 (H) 4.0 - 10.5 K/CU MM    RBC 4.28 4.2 - 5.4 M/CU MM    Hemoglobin 12.5 12.5 - 16.0 GM/DL    Hematocrit 40.6 37 - 47 %    MCV 94.9 78 - 100 FL    MCH 29.2 27 - 31 PG    MCHC 30.8 (L) 32.0 - 36.0 %    RDW 11.9 11.7 - 14.9 %    Platelets 341 016 - 896 K/CU MM    MPV 8.8 7.5 - 11.1 FL    Differential Type AUTOMATED DIFFERENTIAL     Segs Relative 58.5 36 - 66 %    Lymphocytes % 27.0 24 - 44 %    Monocytes % 11.5 (H) 0 - 4 %    Eosinophils % 1.1 0 - 3 %    Basophils % 0.8 0 - 1 %    Segs Absolute 7.1 K/CU MM    Lymphocytes Absolute 3.3 K/CU MM    Monocytes Absolute 1.4 K/CU MM    Eosinophils Absolute 0.1 K/CU MM    Basophils Absolute 0.1 K/CU MM    Nucleated RBC % 0.0 %    Total Nucleated RBC 0.0 K/CU MM    Total Immature Neutrophil 0.14 K/CU MM    Immature Neutrophil % 1.1 (H) 0 - 0.43 %   Comprehensive Metabolic Panel w/ Reflex to MG   Result Value Ref Range    Sodium 138 135 - 145 MMOL/L    Potassium 4.0 3.5 - 5.1 MMOL/L    Chloride 94 (L) 99 - 110 mMol/L    CO2 38 (H) 21 - 32 MMOL/L    BUN 8 6 - 23 MG/DL    CREATININE 0.5 (L) 0.6 - 1.1 MG/DL    Glucose 161 (H) 70 - 99 MG/DL    Calcium 9.2 8.3 - 10.6 MG/DL    Alb 3.8 3.4 - 5.0 GM/DL    Total Protein 7.0 6.4 - 8.2 GM/DL    Total Bilirubin 0.1 0.0 - 1.0 MG/DL    ALT 14 10 - 40 U/L    AST 18 15 - 37 IU/L    Alkaline Phosphatase 82 40 - 129 IU/L    GFR Non-African American >60 >60 mL/min/1.73m2    GFR African American >60 >60 mL/min/1.73m2    Anion Gap 6 4 - 16   Troponin   Result Value Ref Range    Troponin T <0.010 <0.01 NG/ML   Brain Natriuretic Peptide   Result Value Ref Range    Pro-BNP 69.94 <300 PG/ML   D-Dimer, Quantitative   Result Value Ref Range    D-Dimer, Quant <200 <230 NG/mL(DDU)   COVID-19    Specimen: Nasopharyngeal Swab   Result Value Ref Range    Source THROAT     SARS-CoV-2, NAAT NOT DETECTED       Radiographs (if obtained):  [] The following radiograph was interpreted by myself in the absence of a radiologist:  [x]Radiologist's Report Reviewed:  XR CHEST PORTABLE   Final Result   Unremarkable single AP portable upright view of the chest.               EKG (if obtained): (All EKG's are interpreted by myself in the absence of a cardiologist)  Sinus tachycardia with occasional PVCs. Rate of 107.   MT interval 148, QRS 86, QTc 435. No ST elevations or depressions. Normal T waves. Impression: Sinus tachycardia, otherwise normal EKG. When compared to previous EKG from 11/17/2020, the tachycardia is new, otherwise no significant changes. MDM:  Differential diagnoses considered include but are not limited to COPD exacerbation, pneumonia, bronchitis, pneumothorax, COVID-19, pulmonary embolism, CHF exacerbation. Basic labs are obtained and show mild leukocytosis, there are otherwise unremarkable. EKG is not concerning for acute ischemia. Troponin is negative. He denies for acute coronary syndrome. BNP is within normal limits. Patient's tachycardia, she is PERC positive therefore D-dimer was obtained and is negative. I do not suspect a pulmonary embolism is causing patient symptoms. Chest x-ray shows no acute abnormalities. Covid test is negative. Patient was given steroids and magnesium upon arrival to the emergency department. After these medications she is feeling much better. Repeat exam shows improved aeration of her bilateral lungs. Patient is feeling much better no longer has tachypnea or accessory muscle usage. I believe it is reasonable to discharge her home. I suspect her symptoms are due to a COPD exacerbation. We will treat her with doxycycline and a course of steroids. Recommended close follow-up with her primary care physician. Plan of care explained to patient. Concerning signs and symptoms warranting a return visit to the Emergency Department were explained in detail. All questions and concerns were addressed to the patient's satisfaction. Patient understood and agreed with plan. I did don appropriate PPE (including N95 face mask, protective eye ware/safety glasses, gloves, hair covering, and no isolation gown), as recommended by the health facility/national standard best practice, during my bedside interactions with the patient.     The likelihood of other entities in the differential is insufficient to justify any further testing for them. This was explained to the patient. The patient was advised that persistent or worsening symptoms would requirefurther evaluation. Clinical Impression:  1. COPD exacerbation ([address]          Alondra Everett MD       Please note that portions of this note may have been complete with a voice recognition program.  Effortswere made to edit the dictations, but occasional words are mis-transcribed.           Alondra Everett MD  12/29/20 [id number]

## 2020-12-29 NOTE — ED NOTES
Bed: 04TR-04  Expected date:   Expected time:   Means of arrival:   Comments:  Medic-COPD     Lucia Duff RN  12/29/20 [id number]

## 2021-01-06 LAB
EKG ATRIAL RATE: 107 BPM
EKG DIAGNOSIS: NORMAL
EKG P AXIS: 81 DEGREES
EKG P-R INTERVAL: 148 MS
EKG Q-T INTERVAL: 326 MS
EKG QRS DURATION: 86 MS
EKG QTC CALCULATION (BAZETT): 435 MS
EKG R AXIS: 71 DEGREES
EKG T AXIS: 68 DEGREES
EKG VENTRICULAR RATE: 107 BPM

## 2021-02-22 ENCOUNTER — HOSPITAL ENCOUNTER (OUTPATIENT)
Dept: PET IMAGING | Age: 61
Discharge: HOME | End: 2021-02-22
Payer: COMMERCIAL

## 2021-02-22 DIAGNOSIS — R91.1 LUNG NODULE: ICD-10-CM

## 2021-02-22 PROCEDURE — 78815 PET IMAGE W/CT SKULL-THIGH: CPT

## 2021-02-22 PROCEDURE — 3430000000 HC RX DIAGNOSTIC RADIOPHARMACEUTICAL: Performed by: INTERNAL MEDICINE

## 2021-02-22 PROCEDURE — 2580000003 HC RX 258: Performed by: INTERNAL MEDICINE

## 2021-02-22 PROCEDURE — A9552 F18 FDG: HCPCS | Performed by: INTERNAL MEDICINE

## 2021-02-22 RX ORDER — SODIUM CHLORIDE 0.9 % (FLUSH) 0.9 %
10 SYRINGE (ML) INJECTION PRN
Status: COMPLETED | OUTPATIENT
Start: 2021-02-22 | End: 2021-02-22

## 2021-02-22 RX ORDER — FLUDEOXYGLUCOSE F 18 200 MCI/ML
12.65 INJECTION, SOLUTION INTRAVENOUS
Status: COMPLETED | OUTPATIENT
Start: 2021-02-22 | End: 2021-02-22

## 2021-02-22 RX ADMIN — SODIUM CHLORIDE, PRESERVATIVE FREE 10 ML: 5 INJECTION INTRAVENOUS at 11:43

## 2021-02-22 RX ADMIN — FLUDEOXYGLUCOSE F 18 12.65 MILLICURIE: 200 INJECTION, SOLUTION INTRAVENOUS at 11:43

## 2021-03-13 ENCOUNTER — APPOINTMENT (OUTPATIENT)
Dept: CT IMAGING | Age: 61
End: 2021-03-13
Payer: COMMERCIAL

## 2021-03-13 ENCOUNTER — HOSPITAL ENCOUNTER (EMERGENCY)
Age: 61
Discharge: HOME | End: 2021-03-13
Payer: COMMERCIAL

## 2021-03-13 VITALS
HEART RATE: 90 BPM | BODY MASS INDEX: 29.02 KG/M2 | HEIGHT: 64 IN | DIASTOLIC BLOOD PRESSURE: 55 MMHG | SYSTOLIC BLOOD PRESSURE: 130 MMHG | WEIGHT: 170 LBS | OXYGEN SATURATION: 98 % | TEMPERATURE: 98.7 F | RESPIRATION RATE: 16 BRPM

## 2021-03-13 DIAGNOSIS — J18.9 PNEUMONIA DUE TO ORGANISM: ICD-10-CM

## 2021-03-13 DIAGNOSIS — N13.30 HYDRONEPHROSIS, UNSPECIFIED HYDRONEPHROSIS TYPE: Primary | ICD-10-CM

## 2021-03-13 LAB
ALBUMIN SERPL-MCNC: 2.7 GM/DL (ref 3.4–5)
ALP BLD-CCNC: 69 IU/L (ref 40–129)
ALT SERPL-CCNC: 14 U/L (ref 10–40)
ANION GAP SERPL CALCULATED.3IONS-SCNC: 1 MMOL/L (ref 4–16)
AST SERPL-CCNC: 15 IU/L (ref 15–37)
BACTERIA: NEGATIVE /HPF
BASOPHILS ABSOLUTE: 0.1 K/CU MM
BASOPHILS RELATIVE PERCENT: 0.6 % (ref 0–1)
BILIRUB SERPL-MCNC: 0.1 MG/DL (ref 0–1)
BILIRUBIN URINE: NEGATIVE MG/DL
BLOOD, URINE: NEGATIVE
BUN BLDV-MCNC: 7 MG/DL (ref 6–23)
CALCIUM SERPL-MCNC: 8.5 MG/DL (ref 8.3–10.6)
CHLORIDE BLD-SCNC: 93 MMOL/L (ref 99–110)
CLARITY: CLEAR
CO2: 43 MMOL/L (ref 21–32)
COLOR: YELLOW
CREAT SERPL-MCNC: 0.4 MG/DL (ref 0.6–1.1)
DIFFERENTIAL TYPE: ABNORMAL
EOSINOPHILS ABSOLUTE: 0.3 K/CU MM
EOSINOPHILS RELATIVE PERCENT: 3.1 % (ref 0–3)
GFR AFRICAN AMERICAN: >60 ML/MIN/1.73M2
GFR NON-AFRICAN AMERICAN: >60 ML/MIN/1.73M2
GLUCOSE BLD-MCNC: 156 MG/DL (ref 70–99)
GLUCOSE, URINE: NEGATIVE MG/DL
HCT VFR BLD CALC: 35.5 % (ref 37–47)
HEMOGLOBIN: 10.6 GM/DL (ref 12.5–16)
IMMATURE NEUTROPHIL %: 1.1 % (ref 0–0.43)
KETONES, URINE: NEGATIVE MG/DL
LACTATE: 1 MMOL/L (ref 0.4–2)
LEUKOCYTE ESTERASE, URINE: NEGATIVE
LIPASE: 11 IU/L (ref 13–60)
LYMPHOCYTES ABSOLUTE: 2.3 K/CU MM
LYMPHOCYTES RELATIVE PERCENT: 23.6 % (ref 24–44)
MCH RBC QN AUTO: 28.3 PG (ref 27–31)
MCHC RBC AUTO-ENTMCNC: 29.9 % (ref 32–36)
MCV RBC AUTO: 94.7 FL (ref 78–100)
MONOCYTES ABSOLUTE: 1.3 K/CU MM
MONOCYTES RELATIVE PERCENT: 12.9 % (ref 0–4)
MUCUS: ABNORMAL HPF
NITRITE URINE, QUANTITATIVE: NEGATIVE
NUCLEATED RBC %: 0 %
PDW BLD-RTO: 12 % (ref 11.7–14.9)
PH, URINE: 8 (ref 5–8)
PLATELET # BLD: 308 K/CU MM (ref 140–440)
PMV BLD AUTO: 8.6 FL (ref 7.5–11.1)
POTASSIUM SERPL-SCNC: 3.5 MMOL/L (ref 3.5–5.1)
PROTEIN UA: NEGATIVE MG/DL
RBC # BLD: 3.75 M/CU MM (ref 4.2–5.4)
RBC URINE: ABNORMAL /HPF (ref 0–6)
SEGMENTED NEUTROPHILS ABSOLUTE COUNT: 5.8 K/CU MM
SEGMENTED NEUTROPHILS RELATIVE PERCENT: 58.7 % (ref 36–66)
SODIUM BLD-SCNC: 137 MMOL/L (ref 135–145)
SPECIFIC GRAVITY UA: 1 (ref 1–1.03)
SQUAMOUS EPITHELIAL: 1 /HPF
TOTAL IMMATURE NEUTOROPHIL: 0.11 K/CU MM
TOTAL NUCLEATED RBC: 0 K/CU MM
TOTAL PROTEIN: 5.9 GM/DL (ref 6.4–8.2)
UROBILINOGEN, URINE: NORMAL MG/DL (ref 0.2–1)
WBC # BLD: 9.8 K/CU MM (ref 4–10.5)
WBC UA: ABNORMAL /HPF (ref 0–5)

## 2021-03-13 PROCEDURE — 81001 URINALYSIS AUTO W/SCOPE: CPT

## 2021-03-13 PROCEDURE — 36415 COLL VENOUS BLD VENIPUNCTURE: CPT

## 2021-03-13 PROCEDURE — 74177 CT ABD & PELVIS W/CONTRAST: CPT

## 2021-03-13 PROCEDURE — 6360000002 HC RX W HCPCS: Performed by: PHYSICIAN ASSISTANT

## 2021-03-13 PROCEDURE — 6360000004 HC RX CONTRAST MEDICATION: Performed by: PHYSICIAN ASSISTANT

## 2021-03-13 PROCEDURE — 85025 COMPLETE CBC W/AUTO DIFF WBC: CPT

## 2021-03-13 PROCEDURE — 6370000000 HC RX 637 (ALT 250 FOR IP): Performed by: PHYSICIAN ASSISTANT

## 2021-03-13 PROCEDURE — 83690 ASSAY OF LIPASE: CPT

## 2021-03-13 PROCEDURE — 80053 COMPREHEN METABOLIC PANEL: CPT

## 2021-03-13 PROCEDURE — 99284 EMERGENCY DEPT VISIT MOD MDM: CPT

## 2021-03-13 PROCEDURE — 83605 ASSAY OF LACTIC ACID: CPT

## 2021-03-13 PROCEDURE — 96374 THER/PROPH/DIAG INJ IV PUSH: CPT

## 2021-03-13 RX ORDER — AZITHROMYCIN 250 MG/1
TABLET, FILM COATED ORAL
Qty: 1 PACKET | Refills: 0 | Status: SHIPPED | OUTPATIENT
Start: 2021-03-13 | End: 2021-04-14 | Stop reason: ALTCHOICE

## 2021-03-13 RX ORDER — 0.9 % SODIUM CHLORIDE 0.9 %
1000 INTRAVENOUS SOLUTION INTRAVENOUS ONCE
Status: DISCONTINUED | OUTPATIENT
Start: 2021-03-13 | End: 2021-03-13 | Stop reason: HOSPADM

## 2021-03-13 RX ORDER — ACETAMINOPHEN 500 MG
1000 TABLET ORAL ONCE
Status: COMPLETED | OUTPATIENT
Start: 2021-03-13 | End: 2021-03-13

## 2021-03-13 RX ORDER — SODIUM CHLORIDE 0.9 % (FLUSH) 0.9 %
10 SYRINGE (ML) INJECTION PRN
Status: DISCONTINUED | OUTPATIENT
Start: 2021-03-13 | End: 2021-03-13 | Stop reason: HOSPADM

## 2021-03-13 RX ORDER — ONDANSETRON 2 MG/ML
4 INJECTION INTRAMUSCULAR; INTRAVENOUS ONCE
Status: COMPLETED | OUTPATIENT
Start: 2021-03-13 | End: 2021-03-13

## 2021-03-13 RX ADMIN — ACETAMINOPHEN 1000 MG: 500 TABLET ORAL at 19:23

## 2021-03-13 RX ADMIN — ONDANSETRON 4 MG: 2 INJECTION INTRAMUSCULAR; INTRAVENOUS at 19:23

## 2021-03-13 RX ADMIN — IOPAMIDOL 75 ML: 755 INJECTION, SOLUTION INTRAVENOUS at 18:40

## 2021-03-13 ASSESSMENT — PAIN DESCRIPTION - ONSET: ONSET: ON-GOING

## 2021-03-13 ASSESSMENT — PAIN SCALES - GENERAL
PAINLEVEL_OUTOF10: 8
PAINLEVEL_OUTOF10: 9

## 2021-03-13 ASSESSMENT — PAIN DESCRIPTION - DESCRIPTORS
DESCRIPTORS: CONSTANT
DESCRIPTORS: STABBING

## 2021-03-13 NOTE — ED PROVIDER NOTES
EMERGENCY DEPARTMENT ENCOUNTER      PCP: Daniel Diop MD    [facility]    Chief Complaint   Patient presents with    Abdominal Pain     right side    Nausea       This patient was not evaluated by the attending physician. I have independently evaluated this patient. HPI    Blaine Mullins is a 64 y.o. female who presents with right-sided abdominal pain. Onset prior to arrival.  Patient states she was watching TV when she started having sharp right-sided abdominal pains. No known aggravating or alleviating factors. Patient does have associated nausea. Patient denies chest pain, shortness of breath, fever, vomiting or diarrhea. Patient denies urinary symptoms. Patient has history of cholecystectomy. REVIEW OF SYSTEMS    Constitutional:  Denies fever  HENT:  Denies sore throat or ear pain   Cardiovascular:  Denies chest pain  Respiratory:  Denies cough or shortness of breath   GI:  See HPI above  : No hematuria or dysuria. Musculoskeletal:  Denies back pain. No pain or swelling of extremities. Skin:  Denies rash  Neurologic:  Denies headache, focal weakness or sensory changes   Lymphatic:  Denies swollen glands     All other review of systems are negative  See HPI and nursing notes for additional information     PAST MEDICAL & SURGICAL HISTORY    Past Medical History:   Diagnosis Date    COPD (chronic obstructive [facility] disease) ([facility]     Drug addiction in remission ([facility]     recovering addict    Hep C w/o coma, chronic ([facility]     Pacemaker     Sleep apnea     TIA (transient ischemic attack)     per patient \"several mini strokes\"     Past Surgical History:   Procedure Laterality Date     SECTION      CHOLECYSTECTOMY         CURRENT MEDICATIONS    Current Outpatient Rx   Medication Sig Dispense Refill    azithromycin (ZITHROMAX) 250 MG tablet Z-Miki. Use as directed.  1 packet 0    predniSONE (DELTASONE) 10 MG tablet Take 1 tablet by mouth daily 40mg daily for 2 days, 20 mg daily for 2 days, 10 mg daily for 2 days, 5 mg daily for 2 days 15 tablet 0    predniSONE (DELTASONE) 10 MG tablet Take 1 tablet by mouth daily 40mg daily for 2 days, 20 mg daily for 2 days, 10 mg daily for 2 days, 5 mg daily for 2 days 15 tablet 0    DULERA 100-5 MCG/ACT inhaler INHALE 2 PUFFS BY MOUTH 2 TIMES A DAY SHAKE WELL 13 g 5    SPIRIVA RESPIMAT 2.5 MCG/ACT AERS inhaler INHALE 2 PUFFS BY MOUTH ONCE DAILY 4 g 5    predniSONE (DELTASONE) 10 MG tablet Take 1 tablet by mouth daily 40mg daily for 2 days, 20 mg daily for 2 days, 10 mg daily for 2 days, 5 mg daily for 2 days 15 tablet 0    albuterol (PROVENTIL) (2.5 MG/3ML) 0.083% nebulizer solution USE ONE VIAL BY NEBULIZER EVERY 6 HOURS AS NEEDED FOR WHEEZING 360 mL 5    pantoprazole (PROTONIX) 40 MG tablet Take 1 tablet by mouth every morning (before breakfast) 30 tablet 3    Nebulizer MISC Use nebulizer with medication as directed. 1 each 0    Respiratory Therapy Supplies (NEBULIZER/TUBING/MOUTHPIECE) KIT Inhale 1 kit into the lungs daily as needed (as directed) 1 kit 0    amphetamine-dextroamphetamine (ADDERALL XR) 30 MG extended release capsule Take 30 mg by mouth daily.  buPROPion (WELLBUTRIN XL) 300 MG extended release tablet Take 300 mg by mouth daily      traMADol (ULTRAM) 50 MG tablet Take 50 mg by mouth 2 times daily.  albuterol sulfate  (90 Base) MCG/ACT inhaler INHALE 2 PUFFS BY MOUTH EVERY 6 HOURS SHAKE WELL 18 g 5    theophylline (FRANCO-24) 100 MG extended release capsule Take 1 capsule by mouth 2 times daily 60 capsule 0    pregabalin (LYRICA) 100 MG capsule Take 100 mg by mouth 3 times daily.        varenicline (CHANTIX) 0.5 MG tablet Take 1 tablet by mouth 2 times daily 60 tablet 0    traZODone (DESYREL) 150 MG tablet Take 150 mg by mouth nightly       OXYGEN Inhale 4 L into the lungs continuous       CPAP Machine MISC by CPAP route nightly      buprenorphine-naloxone (SUBOXONE) 8-2 MG FILM SL film Place 1 Film under the tongue daily         ALLERGIES    No Known Allergies    SOCIAL AND FAMILY HISTORY    Social History     Socioeconomic History    Marital status: Single     Spouse name: None    Number of children: None    Years of education: None    Highest education level: None   Occupational History    None   Social Needs    Financial resource strain: None    Food insecurity     Worry: None     Inability: None    Transportation needs     Medical: None     Non-medical: None   Tobacco Use    Smoking status: Former Smoker     Quit date: 2016     Years since quittin.5    Smokeless tobacco: Never Used   Substance and Sexual Activity    Alcohol use: Not Currently    Drug use: Not Currently    Sexual activity: None   Lifestyle    Physical activity     Days per week: None     Minutes per session: None    Stress: None   Relationships    Social connections     Talks on phone: None     Gets together: None     Attends Yarsani service: None     Active member of club or organization: None     Attends meetings of clubs or organizations: None     Relationship status: None    Intimate partner violence     Fear of current or ex partner: None     Emotionally abused: None     Physically abused: None     Forced sexual activity: None   Other Topics Concern    None   Social History Narrative    None     Family History   Problem Relation Age of Onset    Heart Disease Mother        PHYSICAL EXAM    VITAL SIGNS: BP (!) 103/55   Pulse 90   Temp 98.7 °F (37.1 °C) (Oral)   Resp 16   Ht 5' 4\" (1.626 m)   Wt 170 lb (77.1 kg)   SpO2 98%   BMI 29.18 kg/m²   Constitutional:  Well developed, well nourished  Eyes:  Sclera nonicteric, conjunctiva moist  HENT:  Atraumatic. PERRL. EOMI.  moist mucus membranes.   Neck/Lymphatics: supple, no JVD, no swollen nodes  Respiratory:  No retractions, no accessory muscle use, normal breath sounds   Cardiovascular:  normal rate, normal rhythm, no murmurs  GI:     No gross discoloration.       -no [place]'s sign (periumbilical ecchymosis)       -no Grey-Monroy's sign (flank ecchymosis)    Bowel sounds present, no audible bruits. Soft,  no guarding,   + Moderate right upper quadrant abdominal tenderness, no rebound, no palpable pulsatile masses,   No McBurney's point tenderness   Negative Rovsing sign   Back:   No CVA tenderness to percussion.   Musculoskeletal:  No edema, no deformity  Vascular: DP pulses 2+ equal bilaterally  Integument: No rash, dry skin  Neurologic:  Alert & oriented, normal speech  Psychiatric: Cooperative, pleasant affect       LABS:  Results for orders placed or performed during the hospital encounter of 03/13/21   CBC Auto Differential   Result Value Ref Range    WBC 9.8 4.0 - 10.5 K/CU MM    RBC 3.75 (L) 4.2 - 5.4 M/CU MM    Hemoglobin 10.6 (L) 12.5 - 16.0 GM/DL    Hematocrit 35.5 (L) 37 - 47 %    MCV 94.7 78 - 100 FL    MCH 28.3 27 - 31 PG    MCHC 29.9 (L) 32.0 - 36.0 %    RDW 12.0 11.7 - 14.9 %    Platelets 244 163 - 716 K/CU MM    MPV 8.6 7.5 - 11.1 FL    Differential Type AUTOMATED DIFFERENTIAL     Segs Relative 58.7 36 - 66 %    Lymphocytes % 23.6 (L) 24 - 44 %    Monocytes % 12.9 (H) 0 - 4 %    Eosinophils % 3.1 (H) 0 - 3 %    Basophils % 0.6 0 - 1 %    Segs Absolute 5.8 K/CU MM    Lymphocytes Absolute 2.3 K/CU MM    Monocytes Absolute 1.3 K/CU MM    Eosinophils Absolute 0.3 K/CU MM    Basophils Absolute 0.1 K/CU MM    Nucleated RBC % 0.0 %    Total Nucleated RBC 0.0 K/CU MM    Total Immature Neutrophil 0.11 K/CU MM    Immature Neutrophil % 1.1 (H) 0 - 0.43 %   Comprehensive Metabolic Panel   Result Value Ref Range    Sodium 137 135 - 145 MMOL/L    Potassium 3.5 3.5 - 5.1 MMOL/L    Chloride 93 (L) 99 - 110 mMol/L    CO2 43 (H) 21 - 32 MMOL/L    BUN 7 6 - 23 MG/DL    CREATININE 0.4 (L) 0.6 - 1.1 MG/DL    Glucose 156 (H) 70 - 99 MG/DL    Calcium 8.5 8.3 - 10.6 MG/DL    Albumin 2.7 (L) 3.4 - 5.0 GM/DL    Total Protein 5.9 (L) 6.4 - 8.2 GM/DL    Total Bilirubin 0.1 0.0 - 1.0 MG/DL    ALT 14 10 - 40 U/L    AST 15 15 - 37 IU/L    Alkaline Phosphatase 69 40 - 129 IU/L    GFR Non-African American >60 >60 mL/min/1.73m2    GFR African American >60 >60 mL/min/1.73m2    Anion Gap 1 (L) 4 - 16   Lipase   Result Value Ref Range    Lipase 11 (L) 13 - 60 IU/L   Urinalysis   Result Value Ref Range    Color, UA YELLOW YELLOW    Clarity, UA CLEAR CLEAR    Glucose, Urine NEGATIVE NEGATIVE MG/DL    Bilirubin Urine NEGATIVE NEGATIVE MG/DL    Ketones, Urine NEGATIVE NEGATIVE MG/DL    Specific Gravity, UA 1.005 1.001 - 1.035    Blood, Urine NEGATIVE NEGATIVE    pH, Urine 8.0 5.0 - 8.0    Protein, UA NEGATIVE NEGATIVE MG/DL    Urobilinogen, Urine NORMAL 0.2 - 1.0 MG/DL    Nitrite Urine, Quantitative NEGATIVE NEGATIVE    Leukocyte Esterase, Urine NEGATIVE NEGATIVE    RBC, UA NONE SEEN 0 - 6 /HPF    WBC, UA NONE SEEN 0 - 5 /HPF    Bacteria, UA NEGATIVE NEGATIVE /HPF    Squam Epithel, UA 1 /HPF    Mucus, UA RARE (A) NEGATIVE HPF   Lactic Acid, Plasma   Result Value Ref Range    Lactate 1.0 0.4 - 2.0 mMOL/L           RADIOLOGY/PROCEDURES    CT ABDOMEN PELVIS W IV CONTRAST Additional Contrast? None   Final Result   1. Mild nonspecific right hydronephrosis with with grossly normal caliber of   the ureter and no obstructing stone identified. Correlate clinically to   exclude the possibility of infectious process. 2. Consolidation of the left lower lobe most compatible with pneumonia. Recommend interval follow-up to ensure complete resolution after appropriate   therapy. 3. Atherosclerotic disease. 4. Mild colonic diverticulosis without CT evidence of diverticulitis. ED COURSE & MEDICAL DECISION MAKING      Patient presents as above. Patient provide IV fluids, Tylenol, Zofran. Urinalysis unremarkable. CBC shows hemoglobin 10.6 hematocrit of 35.5. CMP shows CO2 43 which is similar to previous otherwise grossly within normal limits. Lipase 11. Lactic acid 1. CT abdomen pelvis with IV contrast shows mild nonspecific right hydronephrosis, no obvious stone identified, left lower lobe consolidation most compatible with pneumonia. Patient states she did recently complete antibiotics and steroids. Patient denies any chest pain or shortness of breath. Patient does have history of COPD and would like antibiotics and states she will follow up with her pulmonologist.  Patient provided prescription for Z-Miki. Patient states she will take over-the-counter Tylenol as needed for pain. Recommend follow-up with urologist for further evaluation and to right hydronephrosis, this may be due to kidney stone as imaging was done with contrast.    Clinical  IMPRESSION    1. Hydronephrosis, unspecified hydronephrosis type    2. Pneumonia due to organism        I discussed with patient the importance return the emergency department immediately if new or worsening symptoms develop. Comment: Please note this report has been produced using speech recognition software and may contain errors related to that system including errors in grammar, punctuation, and spelling, as well as words and phrases that may be inappropriate. If there are any questions or concerns please feel free to contact the dictating provider for clarification.       Elzbieta Welch PA-C  03/13/21 7200

## 2021-03-15 ENCOUNTER — CARE COORDINATION (OUTPATIENT)
Dept: CARE COORDINATION | Age: 61
End: 2021-03-15

## 2021-03-15 NOTE — CARE COORDINATION
Called patient due to recent ED visit for sharp right-sided abdominal pains and nausea. Unable to reach pt due to VM not being set-up. Will make a 2nd attempt to reach pt.

## 2021-03-16 ENCOUNTER — CARE COORDINATION (OUTPATIENT)
Dept: CARE COORDINATION | Age: 61
End: 2021-03-16

## 2021-03-16 NOTE — CARE COORDINATION
Call to check on pt status after recent ER visit for abdominal pain, nausea. 2nd attempt. Patient contacted regarding recent discharge and COVID-19 risk. Discussed COVID-19 related testing which was not done at this time. Test results were not done. Patient informed of results, if available? n/a     Care Transition Nurse/ Ambulatory Care Manager contacted the patient by telephone to perform post discharge assessment. Verified name and  with patient as identifiers. Patient has following risk factors of: COPD. CTN/ACM reviewed discharge instructions, medical action plan and red flags related to discharge diagnosis. Reviewed and educated them on any new and changed medications related to discharge diagnosis. Advised obtaining a 90-day supply of all daily and as-needed medications. Education provided regarding infection prevention, and signs and symptoms of COVID-19 and when to seek medical attention with patient who verbalized understanding. Discussed exposure protocols and quarantine from [address] you at higher risk for severe illness  and given an opportunity for questions and concerns. The patient agrees to contact the COVID-19 hotline [phone] or PCP office for questions related to their healthcare. CTN/ACM provided contact information for future reference. From CDC: Are you at higher risk for severe illness?  Wash your hands often.  Avoid close contact (6 feet, which is about two arm lengths) with people who are sick.  Put distance between yourself and other people if COVID-19 is spreading in your community.  Clean and disinfect frequently touched surfaces.  Avoid all cruise travel and non-essential air travel.  Call your healthcare professional if you have concerns about COVID-19 and your underlying condition or if you are sick.     For more information on steps you can take to protect yourself, see CDC's How to Protect Yourself    Pt will be further monitored by COVID Loop Team based on severity of symptoms and risk factors. Spoke with pt who confirms she is taking antibiotics as prescribed. She is awaiting a call back from urology office to schedule a f/u, provided pt with contact # for office. Pt declines additional assistance at present. Pt is enrolled in Loop. VERONIQUE CasillasN RN  Ambulatory Care Manager  [phone] office/cell  [phone] fax  [email or web address]

## 2021-03-30 ENCOUNTER — HOSPITAL ENCOUNTER (OUTPATIENT)
Age: 61
Discharge: HOME | End: 2021-03-30
Payer: COMMERCIAL

## 2021-03-30 ENCOUNTER — HOSPITAL ENCOUNTER (OUTPATIENT)
Dept: ULTRASOUND IMAGING | Age: 61
Discharge: HOME | End: 2021-03-30
Payer: COMMERCIAL

## 2021-03-30 DIAGNOSIS — R39.15 URGENCY OF URINATION: ICD-10-CM

## 2021-03-30 LAB
ALBUMIN SERPL-MCNC: 3.7 GM/DL (ref 3.4–5)
ALP BLD-CCNC: 77 IU/L (ref 40–129)
ALT SERPL-CCNC: 19 U/L (ref 10–40)
ANION GAP SERPL CALCULATED.3IONS-SCNC: 5 MMOL/L (ref 4–16)
APTT: 24.8 SECONDS (ref 25.1–37.1)
AST SERPL-CCNC: 24 IU/L (ref 15–37)
BASOPHILS ABSOLUTE: 0.1 K/CU MM
BASOPHILS RELATIVE PERCENT: 0.9 % (ref 0–1)
BILIRUB SERPL-MCNC: 0.1 MG/DL (ref 0–1)
BUN BLDV-MCNC: 10 MG/DL (ref 6–23)
CALCIUM SERPL-MCNC: 8.8 MG/DL (ref 8.3–10.6)
CHLORIDE BLD-SCNC: 95 MMOL/L (ref 99–110)
CHOLESTEROL: 239 MG/DL
CO2: 36 MMOL/L (ref 21–32)
CREAT SERPL-MCNC: 0.5 MG/DL (ref 0.6–1.1)
DIFFERENTIAL TYPE: ABNORMAL
EOSINOPHILS ABSOLUTE: 0.2 K/CU MM
EOSINOPHILS RELATIVE PERCENT: 3.6 % (ref 0–3)
GFR AFRICAN AMERICAN: >60 ML/MIN/1.73M2
GFR NON-AFRICAN AMERICAN: >60 ML/MIN/1.73M2
GLUCOSE BLD-MCNC: 115 MG/DL (ref 70–99)
HCT VFR BLD CALC: 39.9 % (ref 37–47)
HDLC SERPL-MCNC: 53 MG/DL
HEMOGLOBIN: 12.3 GM/DL (ref 12.5–16)
IMMATURE NEUTROPHIL %: 0.3 % (ref 0–0.43)
INR BLD: 0.93 INDEX
LDL CHOLESTEROL DIRECT: 169 MG/DL
LYMPHOCYTES ABSOLUTE: 1.7 K/CU MM
LYMPHOCYTES RELATIVE PERCENT: 28.9 % (ref 24–44)
MCH RBC QN AUTO: 28.2 PG (ref 27–31)
MCHC RBC AUTO-ENTMCNC: 30.8 % (ref 32–36)
MCV RBC AUTO: 91.5 FL (ref 78–100)
MONOCYTES ABSOLUTE: 0.7 K/CU MM
MONOCYTES RELATIVE PERCENT: 12.4 % (ref 0–4)
NUCLEATED RBC %: 0 %
PDW BLD-RTO: 12.4 % (ref 11.7–14.9)
PLATELET # BLD: 237 K/CU MM (ref 140–440)
PMV BLD AUTO: 9.7 FL (ref 7.5–11.1)
POTASSIUM SERPL-SCNC: 4 MMOL/L (ref 3.5–5.1)
PROTHROMBIN TIME: 11.2 SECONDS (ref 11.7–14.5)
RBC # BLD: 4.36 M/CU MM (ref 4.2–5.4)
SEGMENTED NEUTROPHILS ABSOLUTE COUNT: 3.2 K/CU MM
SEGMENTED NEUTROPHILS RELATIVE PERCENT: 53.9 % (ref 36–66)
SODIUM BLD-SCNC: 136 MMOL/L (ref 135–145)
TOTAL IMMATURE NEUTOROPHIL: 0.02 K/CU MM
TOTAL NUCLEATED RBC: 0 K/CU MM
TOTAL PROTEIN: 6.4 GM/DL (ref 6.4–8.2)
TRIGL SERPL-MCNC: 142 MG/DL
WBC # BLD: 5.9 K/CU MM (ref 4–10.5)

## 2021-03-30 PROCEDURE — 85730 THROMBOPLASTIN TIME PARTIAL: CPT

## 2021-03-30 PROCEDURE — 80053 COMPREHEN METABOLIC PANEL: CPT

## 2021-03-30 PROCEDURE — 85610 PROTHROMBIN TIME: CPT

## 2021-03-30 PROCEDURE — 76775 US EXAM ABDO BACK WALL LIM: CPT

## 2021-03-30 PROCEDURE — 85025 COMPLETE CBC W/AUTO DIFF WBC: CPT

## 2021-03-30 PROCEDURE — 36415 COLL VENOUS BLD VENIPUNCTURE: CPT

## 2021-03-30 PROCEDURE — 80061 LIPID PANEL: CPT

## 2021-03-30 PROCEDURE — 83721 ASSAY OF BLOOD LIPOPROTEIN: CPT

## 2021-04-14 ENCOUNTER — HOSPITAL ENCOUNTER (INPATIENT)
Age: 61
LOS: 2 days | Discharge: HOME | DRG: 139 | End: 2021-04-16
Attending: EMERGENCY MEDICINE | Admitting: INTERNAL MEDICINE
Payer: COMMERCIAL

## 2021-04-14 ENCOUNTER — APPOINTMENT (OUTPATIENT)
Dept: GENERAL RADIOLOGY | Age: 61
DRG: 139 | End: 2021-04-14
Payer: COMMERCIAL

## 2021-04-14 ENCOUNTER — APPOINTMENT (OUTPATIENT)
Dept: CT IMAGING | Age: 61
DRG: 139 | End: 2021-04-14
Payer: COMMERCIAL

## 2021-04-14 DIAGNOSIS — J18.9 PNEUMONIA DUE TO ORGANISM: Primary | ICD-10-CM

## 2021-04-14 DIAGNOSIS — R07.9 CHEST PAIN, UNSPECIFIED TYPE: ICD-10-CM

## 2021-04-14 PROBLEM — J96.00 ACUTE RESPIRATORY FAILURE (HCC): Status: ACTIVE | Noted: 2021-04-14

## 2021-04-14 LAB
ALBUMIN SERPL-MCNC: 3.7 GM/DL (ref 3.4–5)
ALP BLD-CCNC: 93 IU/L (ref 40–129)
ALT SERPL-CCNC: 17 U/L (ref 10–40)
ANION GAP SERPL CALCULATED.3IONS-SCNC: 3 MMOL/L (ref 4–16)
AST SERPL-CCNC: 22 IU/L (ref 15–37)
BASOPHILS ABSOLUTE: 0 K/CU MM
BASOPHILS RELATIVE PERCENT: 0.4 % (ref 0–1)
BILIRUB SERPL-MCNC: 0.2 MG/DL (ref 0–1)
BILIRUBIN DIRECT: 0.2 MG/DL (ref 0–0.3)
BILIRUBIN, INDIRECT: 0 MG/DL (ref 0–0.7)
BUN BLDV-MCNC: 11 MG/DL (ref 6–23)
CALCIUM SERPL-MCNC: 8.7 MG/DL (ref 8.3–10.6)
CHLORIDE BLD-SCNC: 97 MMOL/L (ref 99–110)
CO2: 37 MMOL/L (ref 21–32)
CREAT SERPL-MCNC: 0.5 MG/DL (ref 0.6–1.1)
D DIMER: 283 NG/ML(DDU)
DIFFERENTIAL TYPE: ABNORMAL
EOSINOPHILS ABSOLUTE: 0.3 K/CU MM
EOSINOPHILS RELATIVE PERCENT: 3 % (ref 0–3)
GFR AFRICAN AMERICAN: >60 ML/MIN/1.73M2
GFR NON-AFRICAN AMERICAN: >60 ML/MIN/1.73M2
GLUCOSE BLD-MCNC: 173 MG/DL (ref 70–99)
HCT VFR BLD CALC: 37.6 % (ref 37–47)
HEMOGLOBIN: 11.3 GM/DL (ref 12.5–16)
IMMATURE NEUTROPHIL %: 0.3 % (ref 0–0.43)
LIPASE: 14 IU/L (ref 13–60)
LYMPHOCYTES ABSOLUTE: 1.6 K/CU MM
LYMPHOCYTES RELATIVE PERCENT: 15.2 % (ref 24–44)
MCH RBC QN AUTO: 27.7 PG (ref 27–31)
MCHC RBC AUTO-ENTMCNC: 30.1 % (ref 32–36)
MCV RBC AUTO: 92.2 FL (ref 78–100)
MONOCYTES ABSOLUTE: 1.3 K/CU MM
MONOCYTES RELATIVE PERCENT: 12.1 % (ref 0–4)
NUCLEATED RBC %: 0 %
PDW BLD-RTO: 13.1 % (ref 11.7–14.9)
PLATELET # BLD: 237 K/CU MM (ref 140–440)
PMV BLD AUTO: 9.3 FL (ref 7.5–11.1)
POTASSIUM SERPL-SCNC: 4 MMOL/L (ref 3.5–5.1)
RBC # BLD: 4.08 M/CU MM (ref 4.2–5.4)
SEGMENTED NEUTROPHILS ABSOLUTE COUNT: 7.3 K/CU MM
SEGMENTED NEUTROPHILS RELATIVE PERCENT: 69 % (ref 36–66)
SODIUM BLD-SCNC: 137 MMOL/L (ref 135–145)
TOTAL IMMATURE NEUTOROPHIL: 0.03 K/CU MM
TOTAL NUCLEATED RBC: 0 K/CU MM
TOTAL PROTEIN: 6.2 GM/DL (ref 6.4–8.2)
TROPONIN T: <0.01 NG/ML
TROPONIN T: <0.01 NG/ML
WBC # BLD: 10.6 K/CU MM (ref 4–10.5)

## 2021-04-14 PROCEDURE — 6360000002 HC RX W HCPCS: Performed by: INTERNAL MEDICINE

## 2021-04-14 PROCEDURE — 94660 CPAP INITIATION&MGMT: CPT

## 2021-04-14 PROCEDURE — 85379 FIBRIN DEGRADATION QUANT: CPT

## 2021-04-14 PROCEDURE — 96374 THER/PROPH/DIAG INJ IV PUSH: CPT

## 2021-04-14 PROCEDURE — 93005 ELECTROCARDIOGRAM TRACING: CPT | Performed by: EMERGENCY MEDICINE

## 2021-04-14 PROCEDURE — 94761 N-INVAS EAR/PLS OXIMETRY MLT: CPT

## 2021-04-14 PROCEDURE — 6360000004 HC RX CONTRAST MEDICATION: Performed by: EMERGENCY MEDICINE

## 2021-04-14 PROCEDURE — 71045 X-RAY EXAM CHEST 1 VIEW: CPT

## 2021-04-14 PROCEDURE — 6370000000 HC RX 637 (ALT 250 FOR IP): Performed by: INTERNAL MEDICINE

## 2021-04-14 PROCEDURE — 2580000003 HC RX 258: Performed by: EMERGENCY MEDICINE

## 2021-04-14 PROCEDURE — 2700000000 HC OXYGEN THERAPY PER DAY

## 2021-04-14 PROCEDURE — 2580000003 HC RX 258: Performed by: INTERNAL MEDICINE

## 2021-04-14 PROCEDURE — 71275 CT ANGIOGRAPHY CHEST: CPT

## 2021-04-14 PROCEDURE — 94640 AIRWAY INHALATION TREATMENT: CPT

## 2021-04-14 PROCEDURE — 83690 ASSAY OF LIPASE: CPT

## 2021-04-14 PROCEDURE — 93010 ELECTROCARDIOGRAM REPORT: CPT | Performed by: INTERNAL MEDICINE

## 2021-04-14 PROCEDURE — 6370000000 HC RX 637 (ALT 250 FOR IP): Performed by: EMERGENCY MEDICINE

## 2021-04-14 PROCEDURE — 80053 COMPREHEN METABOLIC PANEL: CPT

## 2021-04-14 PROCEDURE — 85025 COMPLETE CBC W/AUTO DIFF WBC: CPT

## 2021-04-14 PROCEDURE — 1200000000 HC SEMI PRIVATE

## 2021-04-14 PROCEDURE — 99285 EMERGENCY DEPT VISIT HI MDM: CPT

## 2021-04-14 PROCEDURE — 6360000002 HC RX W HCPCS: Performed by: EMERGENCY MEDICINE

## 2021-04-14 PROCEDURE — 82248 BILIRUBIN DIRECT: CPT

## 2021-04-14 PROCEDURE — 84484 ASSAY OF TROPONIN QUANT: CPT

## 2021-04-14 RX ORDER — ASPIRIN 81 MG/1
324 TABLET, CHEWABLE ORAL ONCE
Status: COMPLETED | OUTPATIENT
Start: 2021-04-14 | End: 2021-04-14

## 2021-04-14 RX ORDER — ACETAMINOPHEN 650 MG/1
650 SUPPOSITORY RECTAL EVERY 6 HOURS PRN
Status: DISCONTINUED | OUTPATIENT
Start: 2021-04-14 | End: 2021-04-16 | Stop reason: HOSPADM

## 2021-04-14 RX ORDER — SODIUM CHLORIDE 0.9 % (FLUSH) 0.9 %
5-40 SYRINGE (ML) INJECTION PRN
Status: DISCONTINUED | OUTPATIENT
Start: 2021-04-14 | End: 2021-04-16 | Stop reason: HOSPADM

## 2021-04-14 RX ORDER — ACETAMINOPHEN 325 MG/1
650 TABLET ORAL EVERY 6 HOURS PRN
Status: DISCONTINUED | OUTPATIENT
Start: 2021-04-14 | End: 2021-04-16 | Stop reason: HOSPADM

## 2021-04-14 RX ORDER — ASPIRIN 81 MG/1
81 TABLET, CHEWABLE ORAL DAILY
COMMUNITY

## 2021-04-14 RX ORDER — SODIUM CHLORIDE 9 MG/ML
INJECTION, SOLUTION INTRAVENOUS CONTINUOUS
Status: DISCONTINUED | OUTPATIENT
Start: 2021-04-14 | End: 2021-04-15

## 2021-04-14 RX ORDER — BUDESONIDE AND FORMOTEROL FUMARATE DIHYDRATE 160; 4.5 UG/1; UG/1
2 AEROSOL RESPIRATORY (INHALATION) 2 TIMES DAILY
Status: DISCONTINUED | OUTPATIENT
Start: 2021-04-14 | End: 2021-04-16 | Stop reason: HOSPADM

## 2021-04-14 RX ORDER — POLYETHYLENE GLYCOL 3350 17 G/17G
17 POWDER, FOR SOLUTION ORAL DAILY PRN
Status: DISCONTINUED | OUTPATIENT
Start: 2021-04-14 | End: 2021-04-16 | Stop reason: HOSPADM

## 2021-04-14 RX ORDER — ACETAMINOPHEN 500 MG
1000 TABLET ORAL ONCE
Status: COMPLETED | OUTPATIENT
Start: 2021-04-14 | End: 2021-04-14

## 2021-04-14 RX ORDER — DOCUSATE SODIUM 100 MG/1
100 CAPSULE, LIQUID FILLED ORAL 2 TIMES DAILY PRN
Status: DISCONTINUED | OUTPATIENT
Start: 2021-04-14 | End: 2021-04-16 | Stop reason: HOSPADM

## 2021-04-14 RX ORDER — PREGABALIN 25 MG/1
100 CAPSULE ORAL 3 TIMES DAILY
Status: DISCONTINUED | OUTPATIENT
Start: 2021-04-14 | End: 2021-04-16 | Stop reason: HOSPADM

## 2021-04-14 RX ORDER — SODIUM CHLORIDE 9 MG/ML
25 INJECTION, SOLUTION INTRAVENOUS PRN
Status: DISCONTINUED | OUTPATIENT
Start: 2021-04-14 | End: 2021-04-16 | Stop reason: HOSPADM

## 2021-04-14 RX ORDER — 0.9 % SODIUM CHLORIDE 0.9 %
1000 INTRAVENOUS SOLUTION INTRAVENOUS ONCE
Status: COMPLETED | OUTPATIENT
Start: 2021-04-14 | End: 2021-04-14

## 2021-04-14 RX ORDER — BUPRENORPHINE AND NALOXONE 8; 2 MG/1; MG/1
1 FILM, SOLUBLE BUCCAL; SUBLINGUAL DAILY
Status: DISCONTINUED | OUTPATIENT
Start: 2021-04-15 | End: 2021-04-14

## 2021-04-14 RX ORDER — ASPIRIN 81 MG/1
81 TABLET, CHEWABLE ORAL DAILY
Status: DISCONTINUED | OUTPATIENT
Start: 2021-04-14 | End: 2021-04-16 | Stop reason: HOSPADM

## 2021-04-14 RX ORDER — BUPRENORPHINE AND NALOXONE 8; 2 MG/1; MG/1
1 FILM, SOLUBLE BUCCAL; SUBLINGUAL 3 TIMES DAILY
Status: DISCONTINUED | OUTPATIENT
Start: 2021-04-14 | End: 2021-04-16 | Stop reason: HOSPADM

## 2021-04-14 RX ORDER — BUDESONIDE AND FORMOTEROL FUMARATE DIHYDRATE 160; 4.5 UG/1; UG/1
2 AEROSOL RESPIRATORY (INHALATION) 2 TIMES DAILY
Status: DISCONTINUED | OUTPATIENT
Start: 2021-04-14 | End: 2021-04-14

## 2021-04-14 RX ORDER — TRAZODONE HYDROCHLORIDE 50 MG/1
150 TABLET ORAL NIGHTLY
Status: DISCONTINUED | OUTPATIENT
Start: 2021-04-14 | End: 2021-04-16 | Stop reason: HOSPADM

## 2021-04-14 RX ORDER — MAGNESIUM HYDROXIDE/ALUMINUM HYDROXICE/SIMETHICONE 120; 1200; 1200 MG/30ML; MG/30ML; MG/30ML
30 SUSPENSION ORAL ONCE
Status: COMPLETED | OUTPATIENT
Start: 2021-04-14 | End: 2021-04-14

## 2021-04-14 RX ORDER — 0.9 % SODIUM CHLORIDE 0.9 %
500 INTRAVENOUS SOLUTION INTRAVENOUS ONCE
Status: COMPLETED | OUTPATIENT
Start: 2021-04-14 | End: 2021-04-14

## 2021-04-14 RX ORDER — ONDANSETRON 2 MG/ML
4 INJECTION INTRAMUSCULAR; INTRAVENOUS EVERY 6 HOURS PRN
Status: DISCONTINUED | OUTPATIENT
Start: 2021-04-14 | End: 2021-04-16 | Stop reason: HOSPADM

## 2021-04-14 RX ORDER — FENTANYL CITRATE 50 UG/ML
50 INJECTION, SOLUTION INTRAMUSCULAR; INTRAVENOUS ONCE
Status: COMPLETED | OUTPATIENT
Start: 2021-04-14 | End: 2021-04-14

## 2021-04-14 RX ORDER — BUPROPION HYDROCHLORIDE 150 MG/1
300 TABLET ORAL DAILY
Status: DISCONTINUED | OUTPATIENT
Start: 2021-04-14 | End: 2021-04-14

## 2021-04-14 RX ORDER — VARENICLINE TARTRATE 0.5 MG/1
0.5 TABLET, FILM COATED ORAL 2 TIMES DAILY
Status: DISCONTINUED | OUTPATIENT
Start: 2021-04-14 | End: 2021-04-14

## 2021-04-14 RX ORDER — ALBUTEROL SULFATE 90 UG/1
2 AEROSOL, METERED RESPIRATORY (INHALATION) 4 TIMES DAILY
Status: DISCONTINUED | OUTPATIENT
Start: 2021-04-14 | End: 2021-04-16 | Stop reason: HOSPADM

## 2021-04-14 RX ORDER — METHYLPREDNISOLONE SODIUM SUCCINATE 125 MG/2ML
40 INJECTION, POWDER, LYOPHILIZED, FOR SOLUTION INTRAMUSCULAR; INTRAVENOUS DAILY
Status: DISCONTINUED | OUTPATIENT
Start: 2021-04-14 | End: 2021-04-16 | Stop reason: HOSPADM

## 2021-04-14 RX ORDER — PROMETHAZINE HYDROCHLORIDE 25 MG/1
12.5 TABLET ORAL EVERY 6 HOURS PRN
Status: DISCONTINUED | OUTPATIENT
Start: 2021-04-14 | End: 2021-04-16 | Stop reason: HOSPADM

## 2021-04-14 RX ORDER — SODIUM CHLORIDE 0.9 % (FLUSH) 0.9 %
5-40 SYRINGE (ML) INJECTION EVERY 12 HOURS SCHEDULED
Status: DISCONTINUED | OUTPATIENT
Start: 2021-04-14 | End: 2021-04-16 | Stop reason: HOSPADM

## 2021-04-14 RX ORDER — PANTOPRAZOLE SODIUM 40 MG/1
40 TABLET, DELAYED RELEASE ORAL
Status: DISCONTINUED | OUTPATIENT
Start: 2021-04-15 | End: 2021-04-16 | Stop reason: HOSPADM

## 2021-04-14 RX ORDER — SODIUM CHLORIDE 0.9 % (FLUSH) 0.9 %
5-40 SYRINGE (ML) INJECTION 2 TIMES DAILY
Status: DISCONTINUED | OUTPATIENT
Start: 2021-04-14 | End: 2021-04-14

## 2021-04-14 RX ORDER — ALBUTEROL SULFATE 2.5 MG/3ML
2.5 SOLUTION RESPIRATORY (INHALATION) 4 TIMES DAILY
Status: DISCONTINUED | OUTPATIENT
Start: 2021-04-14 | End: 2021-04-14

## 2021-04-14 RX ADMIN — ASPIRIN 81 MG CHEWABLE TABLET 81 MG: 81 TABLET CHEWABLE at 12:24

## 2021-04-14 RX ADMIN — AZITHROMYCIN MONOHYDRATE 500 MG: 500 INJECTION, POWDER, LYOPHILIZED, FOR SOLUTION INTRAVENOUS at 08:04

## 2021-04-14 RX ADMIN — SODIUM CHLORIDE: 9 INJECTION, SOLUTION INTRAVENOUS at 21:49

## 2021-04-14 RX ADMIN — ALBUTEROL SULFATE 2 PUFF: 90 AEROSOL, METERED RESPIRATORY (INHALATION) at 20:40

## 2021-04-14 RX ADMIN — SODIUM CHLORIDE, PRESERVATIVE FREE 10 ML: 5 INJECTION INTRAVENOUS at 07:52

## 2021-04-14 RX ADMIN — IOPAMIDOL 75 ML: 755 INJECTION, SOLUTION INTRAVENOUS at 06:46

## 2021-04-14 RX ADMIN — BUPROPION HYDROCHLORIDE 300 MG: 150 TABLET, FILM COATED, EXTENDED RELEASE ORAL at 12:08

## 2021-04-14 RX ADMIN — BUPRENORPHINE AND NALOXONE 1 FILM: 8; 2 FILM, SOLUBLE BUCCAL; SUBLINGUAL at 21:45

## 2021-04-14 RX ADMIN — ENOXAPARIN SODIUM 40 MG: 40 INJECTION SUBCUTANEOUS at 12:08

## 2021-04-14 RX ADMIN — ALUMINUM HYDROXIDE, MAGNESIUM HYDROXIDE, AND SIMETHICONE 30 ML: 200; 200; 20 SUSPENSION ORAL at 05:35

## 2021-04-14 RX ADMIN — FENTANYL CITRATE 50 MCG: 50 INJECTION, SOLUTION INTRAMUSCULAR; INTRAVENOUS at 05:35

## 2021-04-14 RX ADMIN — ASPIRIN 324 MG: 81 TABLET, CHEWABLE ORAL at 04:33

## 2021-04-14 RX ADMIN — CEFTRIAXONE SODIUM 1000 MG: 1 INJECTION, POWDER, FOR SOLUTION INTRAMUSCULAR; INTRAVENOUS at 07:51

## 2021-04-14 RX ADMIN — SODIUM CHLORIDE: 9 INJECTION, SOLUTION INTRAVENOUS at 10:30

## 2021-04-14 RX ADMIN — METHYLPREDNISOLONE SODIUM SUCCINATE 40 MG: 125 INJECTION, POWDER, FOR SOLUTION INTRAMUSCULAR; INTRAVENOUS at 12:17

## 2021-04-14 RX ADMIN — PREGABALIN 100 MG: 25 CAPSULE ORAL at 21:45

## 2021-04-14 RX ADMIN — ACETAMINOPHEN 1000 MG: 500 TABLET ORAL at 04:33

## 2021-04-14 RX ADMIN — BUPRENORPHINE AND NALOXONE 1 FILM: 8; 2 FILM, SOLUBLE BUCCAL; SUBLINGUAL at 15:17

## 2021-04-14 RX ADMIN — SODIUM CHLORIDE 500 ML: 9 INJECTION, SOLUTION INTRAVENOUS at 10:29

## 2021-04-14 RX ADMIN — ALBUTEROL SULFATE 2 PUFF: 90 AEROSOL, METERED RESPIRATORY (INHALATION) at 11:41

## 2021-04-14 RX ADMIN — PREGABALIN 100 MG: 25 CAPSULE ORAL at 12:17

## 2021-04-14 RX ADMIN — SODIUM CHLORIDE 1000 ML: 9 INJECTION, SOLUTION INTRAVENOUS at 07:51

## 2021-04-14 RX ADMIN — BUDESONIDE AND FORMOTEROL FUMARATE DIHYDRATE 2 PUFF: 160; 4.5 AEROSOL RESPIRATORY (INHALATION) at 20:42

## 2021-04-14 ASSESSMENT — PAIN DESCRIPTION - PAIN TYPE: TYPE: ACUTE PAIN

## 2021-04-14 ASSESSMENT — PAIN SCALES - GENERAL: PAINLEVEL_OUTOF10: 5

## 2021-04-14 ASSESSMENT — PAIN DESCRIPTION - ORIENTATION
ORIENTATION: RIGHT
ORIENTATION: RIGHT

## 2021-04-14 ASSESSMENT — PAIN DESCRIPTION - FREQUENCY: FREQUENCY: INTERMITTENT

## 2021-04-14 ASSESSMENT — PAIN DESCRIPTION - ONSET: ONSET: ON-GOING

## 2021-04-14 ASSESSMENT — PAIN DESCRIPTION - DESCRIPTORS: DESCRIPTORS: SHARP

## 2021-04-14 ASSESSMENT — PAIN DESCRIPTION - LOCATION: LOCATION: OTHER (COMMENT)

## 2021-04-14 NOTE — H&P
History and Physical      Name:  Seferino Tucker /Age/Sex: 1960  (64 y.o. female)   MRN & CSN:  [id number] & [id number] Admission Date/Time: 2021  3:05 AM   Location:  [address] [place] [address]-A PCP: Salma Guerrero MD       Hospital Day: 1    Assessment and Plan:   Seferino Tucker is a 64 y.o.  female  who presents with right-sided chest pain and worsening shortness of breath. #Acute on chronic respiratory failure  #COPD exacerbation  #Community-acquired pneumonia  -Patient presented with increasing oxygen requirement, normally uses 2 L nasal cannula currently on 4 L nasal cannula  -Chest x-ray consistent with infiltrates. -Ceftriaxone azithromycin  -Breathing treatment  -Symptomatic management    #Other chronic medical conditions  -KING  -hep C  -History of drug abuse: Continue Suboxone. OARRS report reviewed she is on 3 times daily dosing. Diet DIET CARDIAC;   DVT Prophylaxis [] Lovenox, []  Heparin, [] SCDs, [] Ambulation   GI Prophylaxis [] PPI,  [] H2 Blocker,  [] Carafate,  [] Diet/Tube Feeds   Code Status Full Code   Disposition Patient requires continued admission due to acute on chronic respiratory failure         History of Present Illness:     Chief Complaint: <principal problem not specified>  Seferino Tucker is a 64 y.o.  female  who presents with right-sided chest pain or shortness of breath which started about a day ago. Chest pain sharp, 7/10, intermittent, is worse with inspiration, no relieving factors. She denies associated cough, sputum production, fever or chills. She denies any orthopnea, PND or leg swelling. She reports she just got off antibiotics about a month ago following a left-sided pneumonia. She got her first Covid shot yesterday, and is scheduled for second morning few weeks. Ten point ROS reviewed negative, unless as noted above    Objective:        Intake/Output Summary (Last 24 hours) at 2021 1450  Last data filed at 2021 0751  Gross per 24 hour   Intake 10 ml   Output --   Net 10 ml      Vitals:   Vitals:    21 1315   BP: (!) 113/53   Pulse: 78   Resp: 18   Temp: 98.8 °F (37.1 °C)   SpO2:      Physical Exam:   GEN Awake female, sitting upright in bed in no apparent distress. Appears given age. EYES Pupils are equally round. No scleral erythema, discharge, or conjunctivitis. HENT Mucous membranes are moist. Oral pharynx without exudates, no evidence of thrush. NECK Supple, no apparent thyromegaly or masses. RESP scattered wheezing in all lung fields. No crackles. CARDIO/VASC S1/S2 auscultated. Regular rate without appreciable murmurs, rubs, or gallops. No JVD or carotid bruits. Peripheral pulses equal bilaterally and palpable. No peripheral edema. GI Abdomen is soft without significant tenderness, masses, or guarding. Bowel sounds are normoactive. Rectal exam deferred.  No costovertebral angle tenderness. Normal appearing external genitalia. Fraser catheter is not present. HEME/LYMPH No palpable cervical lymphadenopathy and no hepatosplenomegaly. No petechiae or ecchymoses. MSK No gross joint deformities. SKIN Normal coloration, warm, dry. NEURO Cranial nerves appear grossly intact, normal speech, no lateralizing weakness. PSYCH Awake, alert, oriented x 4. Affect appropriate. Past Medical History:      Past Medical History:   Diagnosis Date    [facility]     Drug addiction in remission ([facility]     recovering addict    Hep C w/o coma, chronic (HCC)     Pacemaker     Sleep apnea     TIA (transient ischemic attack)     per patient \"several mini strokes\"     PSHX:  has a past surgical history that includes  section and Cholecystectomy. Allergies: No Known Allergies    FAM HX: family history includes Heart Disease in her mother.   Soc HX:   Social History     Socioeconomic History    Marital status: Single     Spouse name: Not on file    Number of children: Not on file    Years of education: Not on file    Highest education level: Not on file   Occupational History    Not on file   Social Needs    Financial resource strain: Not on file    Food insecurity     Worry: Not on file     Inability: Not on file    Transportation needs     Medical: Not on file     Non-medical: Not on file   Tobacco Use    Smoking status: Former Smoker     Quit date: 2016     Years since quittin.6    Smokeless tobacco: Never Used   Substance and Sexual Activity    Alcohol use: Not Currently    Drug use: Not Currently    Sexual activity: Not on file   Lifestyle    Physical activity     Days per week: Not on file     Minutes per session: Not on file    Stress: Not on file   Relationships    Social connections     Talks on phone: Not on file     Gets together: Not on file     Attends [employer] service: Not on file     Active member of club or organization: Not on file     Attends meetings of clubs or organizations: Not on file     Relationship status: Not on file    Intimate partner violence     Fear of current or ex partner: Not on file     Emotionally abused: Not on file     Physically abused: Not on file     Forced sexual activity: Not on file   Other Topics Concern    Not on file   Social History Narrative    Not on file       Medications:   Medications:    [START ON 4/15/2021] pantoprazole  40 mg Oral QAM AC    pregabalin  100 mg Oral TID    sodium chloride flush  5-40 mL Intravenous 2 times per day    enoxaparin  40 mg Subcutaneous Daily    [START ON 4/15/2021] azithromycin  250 mg Intravenous Q24H    And    [START ON 4/15/2021] cefTRIAXone (ROCEPHIN) IV  1,000 mg Intravenous Q24H    methylPREDNISolone  40 mg Intravenous Daily    albuterol sulfate HFA  2 puff Inhalation 4x daily    budesonide-formoterol  2 puff Inhalation BID    aspirin  81 mg Oral Daily    buprenorphine-naloxone  1 Film Sublingual TID      Infusions:    sodium chloride      sodium chloride 100 mL/hr at

## 2021-04-14 NOTE — ED NOTES
Assumed care of patient from [facility]. All questions answered. Patient resting comfortably. Denies any needs at this time. Will continue to monitor.       Gabriela Marie RN  04/14/21 [id number]

## 2021-04-14 NOTE — ED TRIAGE NOTES
Patient arrived in the ED with complaints of SOB. Patients states she has COPD and takes routine medications for this.  She is currently on 4 LPM continuous via NC.

## 2021-04-14 NOTE — ED NOTES
Patient readjusted in bed. Breakfast set up for patient. Denies any further needs at this time.      Paul Rodriguez RN  04/14/21 0086

## 2021-04-14 NOTE — ED NOTES
Patient blood pressure 87/40. Paged Dr Hilario Mchugh to inform. Awaiting call back at this time.       Gabriela Marie RN  04/14/21 2401

## 2021-04-14 NOTE — ED PROVIDER NOTES
Emergency Department Encounter    Patient: Kari Melo  MRN: [id number]  : 1960  Date of Evaluation: 2021  ED Provider:  Risa Arita    Triage Chief Complaint:   Shortness of Breath    [place]:  Kari Melo is a 64 y.o. female with past history of COPD on 4 L at baseline, hepatitis C that presents via EMS for chest pain and shortness of breath. Chest pain is located on the right lower chest.  Pain is 5 out of 10. No radiation of pain. Mild cough no congestion. No fever or chills. Shortness of breath is exacerbated by activity. Chest pain is made worse with activity, touch and movement. No history of PE or DVT.     ROS - see HPI, below listed is current ROS at time of my eval:  General:  No fevers, no weakness  Eyes:  no discharge  ENT:  No sore throat, no nasal congestion  Cardiovascular:  + chest pain, no palpitations  Respiratory:  No shortness of breath, no cough, no wheezing  Gastrointestinal:  No pain, no nausea, no vomiting, no diarrhea  Musculoskeletal:  No muscle pain, no joint pain  Skin:  No rash, no pruritis  Neurologic:  No speech problems, no headache, no extremity numbness, no extremity tingling, no extremity weakness  Psychiatric:  No anxiety  Genitourinary:  No dysuria, no hematuria  Endocrine:  No unexpected weight gain, no unexpected weight loss  Extremities:  no edema, no pain    Past Medical History:   Diagnosis Date    COPD (chronic obstructive pulmonary disease) (HCC)     Drug addiction in remission ([facility] [address]     recovering addict    Hep C w/o coma, chronic (HCC)     Pacemaker     Sleep apnea     TIA (transient ischemic attack)     per patient \"several mini strokes\"     Past Surgical History:   Procedure Laterality Date     SECTION      CHOLECYSTECTOMY       Family History   Problem Relation Age of Onset    Heart Disease Mother      Social History     Socioeconomic History    Marital status: Single     Spouse name: Not on file    Number of children: Not for 2 days, 20 mg daily for 2 days, 10 mg daily for 2 days, 5 mg daily for 2 days 15 tablet 0    predniSONE (DELTASONE) 10 MG tablet Take 1 tablet by mouth daily 40mg daily for 2 days, 20 mg daily for 2 days, 10 mg daily for 2 days, 5 mg daily for 2 days 15 tablet 0    DULERA 100-5 MCG/ACT inhaler INHALE 2 PUFFS BY MOUTH 2 TIMES A DAY SHAKE WELL 13 g 5    SPIRIVA RESPIMAT 2.5 MCG/ACT AERS inhaler INHALE 2 PUFFS BY MOUTH ONCE DAILY 4 g 5    predniSONE (DELTASONE) 10 MG tablet Take 1 tablet by mouth daily 40mg daily for 2 days, 20 mg daily for 2 days, 10 mg daily for 2 days, 5 mg daily for 2 days 15 tablet 0    albuterol (PROVENTIL) (2.5 MG/3ML) 0.083% nebulizer solution USE ONE VIAL BY NEBULIZER EVERY 6 HOURS AS NEEDED FOR WHEEZING 360 mL 5    pantoprazole (PROTONIX) 40 MG tablet Take 1 tablet by mouth every morning (before breakfast) 30 tablet 3    Nebulizer MISC Use nebulizer with medication as directed. 1 each 0    Respiratory Therapy Supplies (NEBULIZER/TUBING/MOUTHPIECE) KIT Inhale 1 kit into the lungs daily as needed (as directed) 1 kit 0    amphetamine-dextroamphetamine (ADDERALL XR) 30 MG extended release capsule Take 30 mg by mouth daily.  buPROPion (WELLBUTRIN XL) 300 MG extended release tablet Take 300 mg by mouth daily      traMADol (ULTRAM) 50 MG tablet Take 50 mg by mouth 2 times daily.  albuterol sulfate  (90 Base) MCG/ACT inhaler INHALE 2 PUFFS BY MOUTH EVERY 6 HOURS SHAKE WELL 18 g 5    theophylline (FRANCO-24) 100 MG extended release capsule Take 1 capsule by mouth 2 times daily 60 capsule 0    pregabalin (LYRICA) 100 MG capsule Take 100 mg by mouth 3 times daily.        varenicline (CHANTIX) 0.5 MG tablet Take 1 tablet by mouth 2 times daily 60 tablet 0    traZODone (DESYREL) 150 MG tablet Take 150 mg by mouth nightly       OXYGEN Inhale 4 L into the lungs continuous       CPAP Machine MISC by CPAP route nightly      buprenorphine-naloxone (SUBOXONE) 8-2 MG FILM SL % 0.0 %    Total Nucleated RBC 0.0 K/CU MM    Total Immature Neutrophil 0.03 K/CU MM    Immature Neutrophil % 0.3 0 - 0.43 %   Basic Metabolic Panel w/ Reflex to MG   Result Value Ref Range    Sodium 137 135 - 145 MMOL/L    Potassium 4.0 3.5 - 5.1 MMOL/L    Chloride 97 (L) 99 - 110 mMol/L    CO2 37 (H) 21 - 32 MMOL/L    Anion Gap 3 (L) 4 - 16    BUN 11 6 - 23 MG/DL    CREATININE 0.5 (L) 0.6 - 1.1 MG/DL    Glucose 173 (H) 70 - 99 MG/DL    Calcium 8.7 8.3 - 10.6 MG/DL    GFR Non-African American >60 >60 mL/min/1.73m2    GFR African American >60 >60 mL/min/1.73m2   Hepatic Function Panel   Result Value Ref Range    Albumin 3.7 3.4 - 5.0 GM/DL    Total Bilirubin 0.2 0.0 - 1.0 MG/DL    Bilirubin, Direct 0.2 0.0 - 0.3 MG/DL    Bilirubin, Indirect 0.0 0 - 0.7 MG/DL    Alkaline Phosphatase 93 40 - 129 IU/L    AST 22 15 - 37 IU/L    ALT 17 10 - 40 U/L    Total Protein 6.2 (L) 6.4 - 8.2 GM/DL   Lipase   Result Value Ref Range    Lipase 14 13 - 60 IU/L   Troponin   Result Value Ref Range    Troponin T <0.010 <0.01 NG/ML   D-dimer, Quantitative   Result Value Ref Range    D-Dimer, Quant 283 (H) <230 NG/mL(DDU)   EKG 12 Lead   Result Value Ref Range    Ventricular Rate 87 BPM    Atrial Rate 87 BPM    P-R Interval 154 ms    QRS Duration 90 ms    Q-T Interval 372 ms    QTc Calculation (Bazett) 447 ms    P Axis 69 degrees    R Axis 48 degrees    T Axis 57 degrees    Diagnosis       Normal sinus rhythm  Normal ECG  When compared with ECG of 29-DEC-2020 03:30,  premature ventricular complexes are no longer present        Radiographs (if obtained):  Radiologist's Report Reviewed:  Xr Chest Portable    Result Date: 4/14/2021  EXAMINATION: ONE XRAY VIEW OF THE CHEST 4/14/2021 4:00 am COMPARISON: 12/29/2020 HISTORY: ORDERING SYSTEM PROVIDED HISTORY: chest pain TECHNOLOGIST PROVIDED HISTORY: Reason for exam:->chest pain Reason for Exam: chest pain Acuity: Acute Type of Exam: Initial FINDINGS: Mildly increased diffuse interstitial administration in time range)     And   azithromycin (ZITHROMAX) 500 mg in dextrose 5 % 250 mL IVPB (has no administration in time range)   aspirin chewable tablet 324 mg (324 mg Oral Given 4/14/21 0433)   acetaminophen (TYLENOL) tablet 1,000 mg (1,000 mg Oral Given 4/14/21 0433)   fentaNYL (SUBLIMAZE) injection 50 mcg (50 mcg Intravenous Given 4/14/21 0535)   aluminum & magnesium hydroxide-simethicone (MAALOX) 200-200-20 MG/5ML suspension 30 mL (30 mLs Oral Given 4/14/21 0535)   iopamidol (ISOVUE-370) 76 % injection 75 mL (75 mLs Intravenous Given 4/14/21 0646)     On follow-up patient noted to have improvement in symptoms. CTA chest PE protocol showed right upper lobe pneumonia    Patient was given Rocephin and azithromycin. Given patient's chronic hypoxic respiratory failure will admit for close follow-up and IV antibiotics. Hospitalist was agreeable. Patient was admitted in good condition with stable vitals. Clinical Impression:  1. Chest pain, unspecified type    2. Pneumonia due to organism      Disposition referral (if applicable):  No follow-up provider specified. Disposition medications (if applicable):  New Prescriptions    No medications on file     ED Provider Disposition Time  DISPOSITION        Comment: Please note this report has been produced using speech recognition software and may contain errors related to that system including errors in grammar, punctuation, and spelling, as well as words and phrases that may be inappropriate. Efforts were made to edit the dictations.         Ralph Smith MD  04/14/21 1928

## 2021-04-14 NOTE — PROGRESS NOTES
Medication History  [facility]    Patient Name: Millie Drake 1960     Medication history has been completed by: Regis Austin CPhT    Source(s) of information: patient and insurance claims     Primary Care Physician: Lamar Miles MD     Pharmacy: [facility]    Allergies as of 04/14/2021    (No Known Allergies)        Prior to Admission medications    Medication Sig Start Date End Date Taking? Authorizing Provider   aspirin 81 MG chewable tablet Take 81 mg by mouth daily   Yes Historical Provider, MD   Multiple Vitamins-Minerals (WOMENS MULTIVITAMIN PO) Take 1 tablet by mouth daily   Yes Historical Provider, MD   DULERA 100-5 MCG/ACT inhaler INHALE 2 PUFFS BY MOUTH 2 TIMES A DAY SHAKE WELL 2/3/21  Yes Colt Martin MD   SPIRIVA RESPIMAT 2.5 MCG/ACT AERS inhaler INHALE 2 PUFFS BY MOUTH ONCE DAILY 1/18/21  Yes Colt Martin MD   albuterol (PROVENTIL) (2.5 MG/3ML) 0.083% nebulizer solution USE ONE VIAL BY NEBULIZER EVERY 6 HOURS AS NEEDED FOR WHEEZING 12/18/20  Yes Colt Martin MD   pantoprazole (PROTONIX) 40 MG tablet Take 1 tablet by mouth every morning (before breakfast) 11/20/20  Yes Abdon Ball MD   Nebulizer MISC Use nebulizer with medication as directed. 9/27/20  Yes Luis Carlos Santiago MD   Respiratory Therapy Supplies (NEBULIZER/TUBING/MOUTHPIECE) KIT Inhale 1 kit into the lungs daily as needed (as directed) 9/27/20  Yes Luis Carlos Santiago MD   amphetamine-dextroamphetamine (ADDERALL XR) 30 MG extended release capsule Take 30 mg by mouth daily. 9/8/20  Yes Historical Provider, MD   traMADol (ULTRAM) 50 MG tablet Take 50 mg by mouth 2 times daily. 8/17/20  Yes Historical Provider, MD   albuterol sulfate  (90 Base) MCG/ACT inhaler INHALE 2 PUFFS BY MOUTH EVERY 6 HOURS SHAKE WELL 1/20/20  Yes Colt Martin MD   pregabalin (LYRICA) 100 MG capsule Take 100 mg by mouth 3 times daily.     Yes Historical Provider, MD   traZODone (DESYREL) 150 MG tablet Take 150 mg by mouth nightly  7/24/17  Yes Historical Provider, MD   OXYGEN Inhale 4 L into the lungs continuous    Yes Historical Provider, MD   CPAP Machine MISC by CPAP route nightly   Yes Historical Provider, MD   buprenorphine-naloxone (SUBOXONE) 8-2 MG FILM SL film Place 1 Film under the tongue 3 times daily. Yes Historical Provider, MD     Medications added or changed (ex. new medication, dosage change, interval change, formulation change):  Aspirin 81 mg daily (added)  MVI daily (added)  Suboxone dosage clarified patient takes TID (daily ordered)    Medications removed from list (include reason, ex. noncompliance, medication cost, therapy complete etc.):   Bupropion no longer taking (ordered)  Theophyline no longer taking (ordered)  Chantix no longer taking (ordered)  Prednisone therapy complete  Azithromycin therapy complete    Medications requiring reconciliation with provider:    Aspirin 81 mg daily (added)  MVI daily (added)  Suboxone dosage clarified patient takes TID (daily ordered)  Bupropion no longer taking (ordered)  Theophyline no longer taking (ordered)  Chantix no longer taking (ordered)    Comments:  Medication list reviewed with patient and insurance claims verified.     To my knowledge the above medication history is accurate as of 4/14/2021 9:00 AM.   Carl Goodell, CPhT   4/14/2021 9:00 AM

## 2021-04-14 NOTE — ED NOTES
Report given to nurse on [address]. Pt is comfortably resting at this time and waiting to be transferred.       Carolyn Powell RN  04/14/21 8265

## 2021-04-14 NOTE — ED NOTES
Ordered patient food with verbal approval from Dr Fanny Mai.        Elaina Hamilton RN  04/14/21 5566

## 2021-04-14 NOTE — ED NOTES
Bed: H-03  Expected date:   Expected time:   Means of arrival: EMS  Comments:     Nuno Duron RN  04/14/21 0109

## 2021-04-15 LAB
ANION GAP SERPL CALCULATED.3IONS-SCNC: 3 MMOL/L (ref 4–16)
BASOPHILS ABSOLUTE: 0 K/CU MM
BASOPHILS RELATIVE PERCENT: 0.3 % (ref 0–1)
BUN BLDV-MCNC: 11 MG/DL (ref 6–23)
CALCIUM SERPL-MCNC: 8.2 MG/DL (ref 8.3–10.6)
CHLORIDE BLD-SCNC: 103 MMOL/L (ref 99–110)
CO2: 35 MMOL/L (ref 21–32)
CREAT SERPL-MCNC: 0.5 MG/DL (ref 0.6–1.1)
DIFFERENTIAL TYPE: ABNORMAL
EOSINOPHILS ABSOLUTE: 0.1 K/CU MM
EOSINOPHILS RELATIVE PERCENT: 0.4 % (ref 0–3)
GFR AFRICAN AMERICAN: >60 ML/MIN/1.73M2
GFR NON-AFRICAN AMERICAN: >60 ML/MIN/1.73M2
GLUCOSE BLD-MCNC: 113 MG/DL (ref 70–99)
HCT VFR BLD CALC: 33.6 % (ref 37–47)
HEMOGLOBIN: 10.3 GM/DL (ref 12.5–16)
IMMATURE NEUTROPHIL %: 1 % (ref 0–0.43)
LYMPHOCYTES ABSOLUTE: 1.6 K/CU MM
LYMPHOCYTES RELATIVE PERCENT: 14.1 % (ref 24–44)
MCH RBC QN AUTO: 28.5 PG (ref 27–31)
MCHC RBC AUTO-ENTMCNC: 30.7 % (ref 32–36)
MCV RBC AUTO: 92.8 FL (ref 78–100)
MONOCYTES ABSOLUTE: 1 K/CU MM
MONOCYTES RELATIVE PERCENT: 8.5 % (ref 0–4)
NUCLEATED RBC %: 0 %
PDW BLD-RTO: 12.8 % (ref 11.7–14.9)
PLATELET # BLD: 211 K/CU MM (ref 140–440)
PMV BLD AUTO: 9.1 FL (ref 7.5–11.1)
POTASSIUM SERPL-SCNC: 4.8 MMOL/L (ref 3.5–5.1)
RBC # BLD: 3.62 M/CU MM (ref 4.2–5.4)
SEGMENTED NEUTROPHILS ABSOLUTE COUNT: 8.7 K/CU MM
SEGMENTED NEUTROPHILS RELATIVE PERCENT: 75.7 % (ref 36–66)
SODIUM BLD-SCNC: 141 MMOL/L (ref 135–145)
TOTAL IMMATURE NEUTOROPHIL: 0.12 K/CU MM
TOTAL NUCLEATED RBC: 0 K/CU MM
WBC # BLD: 11.5 K/CU MM (ref 4–10.5)

## 2021-04-15 PROCEDURE — 6370000000 HC RX 637 (ALT 250 FOR IP): Performed by: INTERNAL MEDICINE

## 2021-04-15 PROCEDURE — 80048 BASIC METABOLIC PNL TOTAL CA: CPT

## 2021-04-15 PROCEDURE — 85025 COMPLETE CBC W/AUTO DIFF WBC: CPT

## 2021-04-15 PROCEDURE — 2580000003 HC RX 258: Performed by: INTERNAL MEDICINE

## 2021-04-15 PROCEDURE — 94660 CPAP INITIATION&MGMT: CPT

## 2021-04-15 PROCEDURE — 36415 COLL VENOUS BLD VENIPUNCTURE: CPT

## 2021-04-15 PROCEDURE — 94640 AIRWAY INHALATION TREATMENT: CPT

## 2021-04-15 PROCEDURE — 1200000000 HC SEMI PRIVATE

## 2021-04-15 PROCEDURE — 2700000000 HC OXYGEN THERAPY PER DAY

## 2021-04-15 PROCEDURE — 94761 N-INVAS EAR/PLS OXIMETRY MLT: CPT

## 2021-04-15 PROCEDURE — 6360000002 HC RX W HCPCS: Performed by: INTERNAL MEDICINE

## 2021-04-15 RX ADMIN — POLYETHYLENE GLYCOL 3350 17 G: 17 POWDER, FOR SOLUTION ORAL at 11:36

## 2021-04-15 RX ADMIN — ALBUTEROL SULFATE 2 PUFF: 90 AEROSOL, METERED RESPIRATORY (INHALATION) at 19:45

## 2021-04-15 RX ADMIN — BUDESONIDE AND FORMOTEROL FUMARATE DIHYDRATE 2 PUFF: 160; 4.5 AEROSOL RESPIRATORY (INHALATION) at 08:44

## 2021-04-15 RX ADMIN — ALBUTEROL SULFATE 2 PUFF: 90 AEROSOL, METERED RESPIRATORY (INHALATION) at 12:42

## 2021-04-15 RX ADMIN — ENOXAPARIN SODIUM 40 MG: 40 INJECTION SUBCUTANEOUS at 09:41

## 2021-04-15 RX ADMIN — TRAZODONE HYDROCHLORIDE 150 MG: 50 TABLET ORAL at 00:35

## 2021-04-15 RX ADMIN — PREGABALIN 100 MG: 25 CAPSULE ORAL at 09:41

## 2021-04-15 RX ADMIN — BUPRENORPHINE AND NALOXONE 1 FILM: 8; 2 FILM, SOLUBLE BUCCAL; SUBLINGUAL at 22:12

## 2021-04-15 RX ADMIN — ALBUTEROL SULFATE 2 PUFF: 90 AEROSOL, METERED RESPIRATORY (INHALATION) at 08:44

## 2021-04-15 RX ADMIN — PREGABALIN 100 MG: 25 CAPSULE ORAL at 13:45

## 2021-04-15 RX ADMIN — METHYLPREDNISOLONE SODIUM SUCCINATE 40 MG: 125 INJECTION, POWDER, FOR SOLUTION INTRAMUSCULAR; INTRAVENOUS at 10:27

## 2021-04-15 RX ADMIN — BUDESONIDE AND FORMOTEROL FUMARATE DIHYDRATE 2 PUFF: 160; 4.5 AEROSOL RESPIRATORY (INHALATION) at 19:47

## 2021-04-15 RX ADMIN — ASPIRIN 81 MG CHEWABLE TABLET 81 MG: 81 TABLET CHEWABLE at 09:41

## 2021-04-15 RX ADMIN — PREGABALIN 100 MG: 25 CAPSULE ORAL at 22:09

## 2021-04-15 RX ADMIN — CEFTRIAXONE 1000 MG: 1 INJECTION, POWDER, FOR SOLUTION INTRAMUSCULAR; INTRAVENOUS at 10:27

## 2021-04-15 RX ADMIN — TRAZODONE HYDROCHLORIDE 150 MG: 50 TABLET ORAL at 22:09

## 2021-04-15 RX ADMIN — BUPRENORPHINE AND NALOXONE 1 FILM: 8; 2 FILM, SOLUBLE BUCCAL; SUBLINGUAL at 13:45

## 2021-04-15 RX ADMIN — AZITHROMYCIN MONOHYDRATE 250 MG: 500 INJECTION, POWDER, LYOPHILIZED, FOR SOLUTION INTRAVENOUS at 11:30

## 2021-04-15 RX ADMIN — BUPRENORPHINE AND NALOXONE 1 FILM: 8; 2 FILM, SOLUBLE BUCCAL; SUBLINGUAL at 09:42

## 2021-04-15 ASSESSMENT — PAIN SCALES - GENERAL: PAINLEVEL_OUTOF10: 0

## 2021-04-15 NOTE — CARE COORDINATION
Reviewed chart and spoke with pt about discharge needs plans. Pt lives alone, has a waiver aid  ([employer]?) 7hrs/day, 7 days/week. HOme O2 through [employer], family assists her with transportation. Pt has a PCP and insurance that covers medications. Discuss possible skilled [place], She states family will assist as needed,  and pt declined HC ,  stating all needs already being met. Pt states she will notify aid once she is home. CM available is any new needs arise.

## 2021-04-15 NOTE — PROGRESS NOTES
Hospitalist Progress Note      Name:  Praneeth Briggs /Age/Sex: 1960  (64 y.o. female)   MRN & CSN:  [id number] & [id number] Admission Date/Time: 2021  3:05 AM   Location:  [address] [place] [address] PCP: Odalys Montenegro MD         Hospital Day: 2    Assessment and Plan:   Praneeth Briggs is a 64 y.o.  female  who presents with right-sided chest pain and worsening shortness of breath.      #Chronic respiratory failure, oxygen requirement at baseline 4 LPM.   #COPD exacerbation  #Community-acquired pneumonia. Chest x-ray consistent with infiltrates. Plan  Continue ceftriaxone + azithromycin  Breathing treatment  Symptomatic management     #Other chronic medical conditions  -KING  -hep C  -History of drug abuse: Continue Suboxone. OARRS report reviewed she is on 3 times daily dosing. Diet DIET CARDIAC;   DVT Prophylaxis [x] Lovenox, []  Heparin, [] SCDs, [] Ambulation   GI Prophylaxis [] PPI,  [] H2 Blocker,  [] Carafate,  [] Diet/Tube Feeds   Code Status Full Code   Disposition Home by tomorrow. MDM [] Low, [x] Moderate,[]  High     History of Present Illness:   Patient seen and examined. She feels slightly better today. Ten point ROS reviewed negative, unless as noted above    Objective:   No intake or output data in the 24 hours ending 04/15/21 1051   Vitals:   Vitals:    04/15/21 0847   BP:    Pulse:    Resp:    Temp:    SpO2: 93%     Physical Exam:   GEN Awake female, sitting upright in bed. RESP Comfortable on 4 LPM oxygen via NC.  CARDIO/VASC S1/S2 auscultated. Regular rate. No peripheral edema. GI Abdomen is soft without significant tenderness, masses, or guarding. Bowel sounds are normoactive.    Fraser catheter is not present. MSK No gross joint deformities. SKIN Normal coloration, warm, dry. NEURO Normal speech, no lateralizing weakness. PSYCH Awake, alert, oriented x 3.      Medications:   Medications:    pantoprazole  40 mg Oral QAM AC    pregabalin  100 mg Oral TID    sodium chloride flush  5-40 mL Intravenous 2 times per day    enoxaparin  40 mg Subcutaneous Daily    azithromycin  250 mg Intravenous Q24H    And    cefTRIAXone (ROCEPHIN) IV  1,000 mg Intravenous Q24H    methylPREDNISolone  40 mg Intravenous Daily    albuterol sulfate HFA  2 puff Inhalation 4x daily    budesonide-formoterol  2 puff Inhalation BID    aspirin  81 mg Oral Daily    buprenorphine-naloxone  1 Film Sublingual TID    traZODone  150 mg Oral Nightly      Infusions:    sodium chloride       PRN Meds: sodium chloride flush, 5-40 mL, PRN  sodium chloride, 25 mL, PRN  promethazine, 12.5 mg, Q6H PRN    Or  ondansetron, 4 mg, Q6H PRN  polyethylene glycol, 17 g, Daily PRN  acetaminophen, 650 mg, Q6H PRN    Or  acetaminophen, 650 mg, Q6H PRN  docusate sodium, 100 mg, BID PRN        CBC   Recent Labs     04/14/21  0422 04/15/21  0632   WBC 10.6* 11.5*   HGB 11.3* 10.3*   HCT 37.6 33.6*    211      BMP   Recent Labs     04/14/21  0422 04/15/21  0632    141   K 4.0 4.8   CL 97* 103   CO2 37* 35*   BUN 11 11   CREATININE 0.5* 0.5*       Radiology report reviewed     Electronically signed by Chan Meredith MD on 4/15/2021 at 10:51 AM

## 2021-04-15 NOTE — PROGRESS NOTES
Physician Progress Note      Irasema Hummel  CSN #:                  [id number]  :                       1960  ADMIT DATE:       2021 3:05 AM  DISCH DATE:  RESPONDING  PROVIDER #:        Zack Cadena MD          QUERY TEXT:    Dear Hospitalist    Pt admitted with Pneumonia. AECOPD and has respiratory failure documented both   acute on chronic  in the H&P and chronic in the 5/15 PN ,  without   specificity . If possible, please document in progress notes and discharge   summary further specificity regarding the type and acuity of respiratory   failure: The medical record reflects the following:  Risk Factors: AECOPD, Pneumonia  Clinical Indicators:  ED and MD stated the patient on 4l nc at baseline, H&P   stated on 2lnc normally  and now 4- , needing to clarify  Treatment:$LNC cont pulsox, Zithromax. Ceftriaxone, Breathing tx, CXR,    Thank you Leopold Guan RN CDS ([phone])  Options provided:  -- Chronic respiratory failure with hypoxia  -- Chronic respiratory failure with hypercapnia  -- Acute on chronic respiratory failure with hypoxia  -- Acute on chronic respiratory failure with hypercapnia  -- Other - I will add my own diagnosis  -- Disagree - Not applicable / Not valid  -- Disagree - Clinically unable to determine / Unknown  -- Refer to Clinical Documentation Reviewer    PROVIDER RESPONSE TEXT:    This patient has chronic respiratory failure with hypoxia.     Query created by: Feng Gomez on 4/15/2021 1:33 PM      Electronically signed by:  Zack Cadena MD 4/15/2021 3:23 PM

## 2021-04-16 VITALS
RESPIRATION RATE: 16 BRPM | DIASTOLIC BLOOD PRESSURE: 59 MMHG | WEIGHT: 170 LBS | HEART RATE: 71 BPM | TEMPERATURE: 97.8 F | BODY MASS INDEX: 29.02 KG/M2 | OXYGEN SATURATION: 94 % | HEIGHT: 64 IN | SYSTOLIC BLOOD PRESSURE: 106 MMHG

## 2021-04-16 LAB
EKG ATRIAL RATE: 87 BPM
EKG DIAGNOSIS: NORMAL
EKG P AXIS: 69 DEGREES
EKG P-R INTERVAL: 154 MS
EKG Q-T INTERVAL: 372 MS
EKG QRS DURATION: 90 MS
EKG QTC CALCULATION (BAZETT): 447 MS
EKG R AXIS: 48 DEGREES
EKG T AXIS: 57 DEGREES
EKG VENTRICULAR RATE: 87 BPM

## 2021-04-16 PROCEDURE — 2580000003 HC RX 258: Performed by: INTERNAL MEDICINE

## 2021-04-16 PROCEDURE — 94660 CPAP INITIATION&MGMT: CPT

## 2021-04-16 PROCEDURE — 94761 N-INVAS EAR/PLS OXIMETRY MLT: CPT

## 2021-04-16 PROCEDURE — 2700000000 HC OXYGEN THERAPY PER DAY

## 2021-04-16 PROCEDURE — 6370000000 HC RX 637 (ALT 250 FOR IP): Performed by: INTERNAL MEDICINE

## 2021-04-16 PROCEDURE — 94640 AIRWAY INHALATION TREATMENT: CPT

## 2021-04-16 PROCEDURE — 6360000002 HC RX W HCPCS: Performed by: INTERNAL MEDICINE

## 2021-04-16 RX ORDER — LEVOFLOXACIN 750 MG/1
750 TABLET ORAL DAILY
Qty: 3 TABLET | Refills: 0 | Status: SHIPPED | OUTPATIENT
Start: 2021-04-17 | End: 2021-04-20

## 2021-04-16 RX ORDER — PREDNISONE 20 MG/1
40 TABLET ORAL DAILY
Qty: 10 TABLET | Refills: 0 | Status: SHIPPED | OUTPATIENT
Start: 2021-04-17 | End: 2021-04-22

## 2021-04-16 RX ADMIN — ENOXAPARIN SODIUM 40 MG: 40 INJECTION SUBCUTANEOUS at 09:04

## 2021-04-16 RX ADMIN — ASPIRIN 81 MG CHEWABLE TABLET 81 MG: 81 TABLET CHEWABLE at 09:03

## 2021-04-16 RX ADMIN — CEFTRIAXONE 1000 MG: 1 INJECTION, POWDER, FOR SOLUTION INTRAMUSCULAR; INTRAVENOUS at 10:31

## 2021-04-16 RX ADMIN — METHYLPREDNISOLONE SODIUM SUCCINATE 40 MG: 125 INJECTION, POWDER, FOR SOLUTION INTRAMUSCULAR; INTRAVENOUS at 09:04

## 2021-04-16 RX ADMIN — BUPRENORPHINE AND NALOXONE 1 FILM: 8; 2 FILM, SOLUBLE BUCCAL; SUBLINGUAL at 09:04

## 2021-04-16 RX ADMIN — SODIUM CHLORIDE, PRESERVATIVE FREE 10 ML: 5 INJECTION INTRAVENOUS at 09:06

## 2021-04-16 RX ADMIN — PANTOPRAZOLE SODIUM 40 MG: 40 TABLET, DELAYED RELEASE ORAL at 09:06

## 2021-04-16 RX ADMIN — BUDESONIDE AND FORMOTEROL FUMARATE DIHYDRATE 2 PUFF: 160; 4.5 AEROSOL RESPIRATORY (INHALATION) at 08:08

## 2021-04-16 RX ADMIN — ALBUTEROL SULFATE 2 PUFF: 90 AEROSOL, METERED RESPIRATORY (INHALATION) at 11:34

## 2021-04-16 RX ADMIN — PREGABALIN 100 MG: 25 CAPSULE ORAL at 09:04

## 2021-04-16 RX ADMIN — ALBUTEROL SULFATE 2 PUFF: 90 AEROSOL, METERED RESPIRATORY (INHALATION) at 08:08

## 2021-04-16 NOTE — CARE COORDINATION
Spoke with pt this am and confirmed plan remains home with home o2 and aid services. She will contact her aid. Still denying any other needs at this time.

## 2021-04-16 NOTE — PROGRESS NOTES
Patients sister, Chelsi Womack, notified of discharge and stated she will come get patient as soon as she can. Patient informed.

## 2021-04-16 NOTE — DISCHARGE SUMMARY
Discharge Summary    Name:  Rachel Angel /Age/Sex: 1960  (64 y.o. female)   MRN & CSN:  [id number] & [id number] Admission Date/Time: 2021  3:05 AM   Attending:  Natan Lane MD Discharging Physician: Natan Lane MD     Hospital Course:   Shefali Ramon a 64 y. o.  female  who presents with right-sided chest pain and worsening shortness of breath. PE was ruled out. She was managed for COPD exacerbation and right upper lobe pneumonia with scheduled bronchodilators, IV Solu-Medrol and empiric antibiotics. She improved after 2 days of hospital stay. She feels much better today and feels ready to go home. I will discharge on oral prednisone and p.o. Levaquin. I have advised her to follow-up with her primary pulmonologist [Dr. Cecil Romero in 1 week.  [place] diagnoses  #Chronic respiratory failure, oxygen requirement at baseline 4 LPM.   #COPD exacerbation  #Community-acquired pneumonia. #Pulmonary nodule, left upper lobe. Repeat CT in 6 months advised. Pulmonologist to follow-up. #Other chronic medical conditions  -KING  -hep C  -History of drug abuse: Continue Suboxone.  OARRS report reviewed she is on 3 times daily dosing. The patient expressed appropriate understanding of and agreement with the discharge recommendations, medications, and plan. Consults this admission:  IP CONSULT TO HOSPITALIST    Discharge Instruction:     Kendell Villalta MD. Schedule an appointment as soon as possible for a   visit in 1 week.     Specialty: Pulmonary Disease  Why: COPD f/u  Contact information:  [address] [place] [address]  [phone]              ]  Diet:  regular diet   Activity: activity as tolerated  Disposition: Discharged to:   [x]Home, []C, []SNF, []Acute Rehab, []Hospice  Condition on discharge: Stable    Discharge Medications:      Brian Gracia   Home Medication Instructions [id number]    Printed on:21 1112   Medication Information                      albuterol (PROVENTIL) (2.5 MG/3ML) 0.083% nebulizer solution  USE ONE VIAL BY NEBULIZER EVERY 6 HOURS AS NEEDED FOR WHEEZING             albuterol sulfate  (90 Base) MCG/ACT inhaler  INHALE 2 PUFFS BY MOUTH EVERY 6 HOURS SHAKE WELL             amphetamine-dextroamphetamine (ADDERALL XR) 30 MG extended release capsule  Take 30 mg by mouth daily. aspirin 81 MG chewable tablet  Take 81 mg by mouth daily             buprenorphine-naloxone (SUBOXONE) 8-2 MG FILM SL film  Place 1 Film under the tongue 3 times daily. CPAP Machine MISC  by CPAP route nightly             DULERA 100-5 MCG/ACT inhaler  INHALE 2 PUFFS BY MOUTH 2 TIMES A DAY SHAKE WELL             levoFLOXacin (LEVAQUIN) 750 MG tablet  Take 1 tablet by mouth daily for 3 days             Multiple Vitamins-Minerals (WOMENS MULTIVITAMIN PO)  Take 1 tablet by mouth daily             Nebulizer MISC  Use nebulizer with medication as directed. OXYGEN  Inhale 4 L into the lungs continuous              pantoprazole (PROTONIX) 40 MG tablet  Take 1 tablet by mouth every morning (before breakfast)             predniSONE (DELTASONE) 20 MG tablet  Take 2 tablets by mouth daily for 5 days             pregabalin (LYRICA) 100 MG capsule  Take 100 mg by mouth 3 times daily. Respiratory Therapy Supplies (NEBULIZER/TUBING/MOUTHPIECE) KIT  Inhale 1 kit into the lungs daily as needed (as directed)             SPIRIVA RESPIMAT 2.5 MCG/ACT AERS inhaler  INHALE 2 PUFFS BY MOUTH ONCE DAILY             traMADol (ULTRAM) 50 MG tablet  Take 50 mg by mouth 2 times daily.              traZODone (DESYREL) 150 MG tablet  Take 150 mg by mouth nightly                  Objective Findings at Discharge:   BP (!) 106/59   Pulse 71   Temp 97.8 °F (36.6 °C) (Oral)   Resp 16   Ht 5' 4\" (1.626 m)   Wt 170 lb (77.1 kg)   SpO2 94%   BMI 29.18 kg/m²            PHYSICAL EXAM   GEN    Awake

## 2021-04-16 NOTE — PROGRESS NOTES
Physician Progress Note      Sandoval Miller  CSN #:                  [id number]  :                       1960  ADMIT DATE:       2021 3:05 AM  [address] [place] DATE:        2021 1:49 PM  RESPONDING  PROVIDER #:        Nery Leger MD          QUERY TEXT:        Dear Hospitalist  Pt admitted with COPDE. Pt also noted to have Pneumonia. . If possible,   please document in the progress notes and discharge summary if you are   evaluating and/or treating any of the following:    Note: CAP and HCAP indicate where the pneumonia was acquired, not a specific   type. The medical record reflects the following:  Risk Factors: COPDE , Pneumonia  Clinical Indicators: CTA  Pneumonia in the right upper lobe. Atelectasis in   the lingula may also have superimposed pneumonia. Recommend follow-up to   resolution. .Mild to moderate bronchiectasis near the bases suggest prior   bronchial inflammation. Superimposed acute bronchitis or reactive airways   disease is suspected given mild bronchial wall thickening and patchy airway   secretions. .Minimal paraseptal emphysema. Treatment:ceftriaxone + azithromycin , breathing tx, O2 at 4L  Thank you Abrahan Huffman RN, CDS ([phone])  Options provided:  -- Gram negative pneumonia  -- Gram positive pneumonia  -- Bacterial pneumonia  -- Viral pneumonia  -- Other - I will add my own diagnosis  -- Disagree - Not applicable / Not valid  -- Disagree - Clinically unable to determine / Unknown  -- Refer to Clinical Documentation Reviewer    PROVIDER RESPONSE TEXT:    This patient has bacterial pneumonia.     Query created by: Nadege Rodriguez on 2021 9:19 AM      Electronically signed by:  Nery Leger MD 2021 3:21 PM

## 2021-05-01 ENCOUNTER — APPOINTMENT (OUTPATIENT)
Dept: GENERAL RADIOLOGY | Age: 61
DRG: 720 | End: 2021-05-01
Payer: COMMERCIAL

## 2021-05-01 ENCOUNTER — HOSPITAL ENCOUNTER (INPATIENT)
Age: 61
LOS: 6 days | Discharge: HOME | DRG: 720 | End: 2021-05-08
Attending: EMERGENCY MEDICINE | Admitting: INTERNAL MEDICINE
Payer: COMMERCIAL

## 2021-05-01 DIAGNOSIS — J18.9 SEPSIS DUE TO PNEUMONIA (HCC): Primary | ICD-10-CM

## 2021-05-01 DIAGNOSIS — A41.9 SEPSIS DUE TO PNEUMONIA (HCC): Primary | ICD-10-CM

## 2021-05-01 LAB
ALBUMIN SERPL-MCNC: 3.6 GM/DL (ref 3.4–5)
ALP BLD-CCNC: 104 IU/L (ref 40–129)
ALT SERPL-CCNC: 22 U/L (ref 10–40)
ANION GAP SERPL CALCULATED.3IONS-SCNC: 1 MMOL/L (ref 4–16)
AST SERPL-CCNC: 34 IU/L (ref 15–37)
BASE EXCESS MIXED: 15.4 (ref 0–2.3)
BASOPHILS ABSOLUTE: 0.1 K/CU MM
BASOPHILS RELATIVE PERCENT: 0.3 % (ref 0–1)
BILIRUB SERPL-MCNC: 0.4 MG/DL (ref 0–1)
BUN BLDV-MCNC: 5 MG/DL (ref 6–23)
CALCIUM SERPL-MCNC: 9.3 MG/DL (ref 8.3–10.6)
CHLORIDE BLD-SCNC: 90 MMOL/L (ref 99–110)
CO2: 43 MMOL/L (ref 21–32)
COMMENT: ABNORMAL
CREAT SERPL-MCNC: 0.5 MG/DL (ref 0.6–1.1)
DIFFERENTIAL TYPE: ABNORMAL
EOSINOPHILS ABSOLUTE: 0.3 K/CU MM
EOSINOPHILS RELATIVE PERCENT: 1.3 % (ref 0–3)
GFR AFRICAN AMERICAN: >60 ML/MIN/1.73M2
GFR NON-AFRICAN AMERICAN: >60 ML/MIN/1.73M2
GLUCOSE BLD-MCNC: 172 MG/DL (ref 70–99)
HCO3 VENOUS: 46.3 MMOL/L (ref 19–25)
HCT VFR BLD CALC: 37.9 % (ref 37–47)
HEMOGLOBIN: 11.5 GM/DL (ref 12.5–16)
IMMATURE NEUTROPHIL %: 0.6 % (ref 0–0.43)
LACTATE: 1 MMOL/L (ref 0.4–2)
LYMPHOCYTES ABSOLUTE: 1.5 K/CU MM
LYMPHOCYTES RELATIVE PERCENT: 7.5 % (ref 24–44)
MCH RBC QN AUTO: 28.5 PG (ref 27–31)
MCHC RBC AUTO-ENTMCNC: 30.3 % (ref 32–36)
MCV RBC AUTO: 93.8 FL (ref 78–100)
MONOCYTES ABSOLUTE: 2 K/CU MM
MONOCYTES RELATIVE PERCENT: 10 % (ref 0–4)
NUCLEATED RBC %: 0 %
O2 SAT, VEN: 59.1 % (ref 50–70)
PCO2, VEN: 92 MMHG (ref 38–52)
PDW BLD-RTO: 12.7 % (ref 11.7–14.9)
PH VENOUS: 7.31 (ref 7.32–7.42)
PLATELET # BLD: 225 K/CU MM (ref 140–440)
PMV BLD AUTO: 9.6 FL (ref 7.5–11.1)
PO2, VEN: 29 MMHG (ref 28–48)
POTASSIUM SERPL-SCNC: 4.4 MMOL/L (ref 3.5–5.1)
PRO-BNP: 162.7 PG/ML
RBC # BLD: 4.04 M/CU MM (ref 4.2–5.4)
SARS-COV-2, NAAT: NOT DETECTED
SEGMENTED NEUTROPHILS ABSOLUTE COUNT: 15.6 K/CU MM
SEGMENTED NEUTROPHILS RELATIVE PERCENT: 80.3 % (ref 36–66)
SODIUM BLD-SCNC: 134 MMOL/L (ref 135–145)
SOURCE: NORMAL
TOTAL IMMATURE NEUTOROPHIL: 0.11 K/CU MM
TOTAL NUCLEATED RBC: 0 K/CU MM
TOTAL PROTEIN: 7.3 GM/DL (ref 6.4–8.2)
TROPONIN T: <0.01 NG/ML
WBC # BLD: 19.4 K/CU MM (ref 4–10.5)

## 2021-05-01 PROCEDURE — 83880 ASSAY OF NATRIURETIC PEPTIDE: CPT

## 2021-05-01 PROCEDURE — 96365 THER/PROPH/DIAG IV INF INIT: CPT

## 2021-05-01 PROCEDURE — 71045 X-RAY EXAM CHEST 1 VIEW: CPT

## 2021-05-01 PROCEDURE — 94640 AIRWAY INHALATION TREATMENT: CPT

## 2021-05-01 PROCEDURE — 99285 EMERGENCY DEPT VISIT HI MDM: CPT

## 2021-05-01 PROCEDURE — 82805 BLOOD GASES W/O2 SATURATION: CPT

## 2021-05-01 PROCEDURE — 87635 SARS-COV-2 COVID-19 AMP PRB: CPT

## 2021-05-01 PROCEDURE — 2580000003 HC RX 258: Performed by: EMERGENCY MEDICINE

## 2021-05-01 PROCEDURE — 80053 COMPREHEN METABOLIC PANEL: CPT

## 2021-05-01 PROCEDURE — 36415 COLL VENOUS BLD VENIPUNCTURE: CPT

## 2021-05-01 PROCEDURE — 87040 BLOOD CULTURE FOR BACTERIA: CPT

## 2021-05-01 PROCEDURE — 6370000000 HC RX 637 (ALT 250 FOR IP): Performed by: EMERGENCY MEDICINE

## 2021-05-01 PROCEDURE — 94660 CPAP INITIATION&MGMT: CPT

## 2021-05-01 PROCEDURE — 85025 COMPLETE CBC W/AUTO DIFF WBC: CPT

## 2021-05-01 PROCEDURE — 6360000002 HC RX W HCPCS: Performed by: EMERGENCY MEDICINE

## 2021-05-01 PROCEDURE — 84484 ASSAY OF TROPONIN QUANT: CPT

## 2021-05-01 PROCEDURE — 2700000000 HC OXYGEN THERAPY PER DAY

## 2021-05-01 PROCEDURE — 93005 ELECTROCARDIOGRAM TRACING: CPT | Performed by: EMERGENCY MEDICINE

## 2021-05-01 PROCEDURE — 84145 PROCALCITONIN (PCT): CPT

## 2021-05-01 PROCEDURE — 83605 ASSAY OF LACTIC ACID: CPT

## 2021-05-01 RX ORDER — PREDNISONE 20 MG/1
60 TABLET ORAL ONCE
Status: COMPLETED | OUTPATIENT
Start: 2021-05-01 | End: 2021-05-01

## 2021-05-01 RX ORDER — IPRATROPIUM BROMIDE AND ALBUTEROL SULFATE 2.5; .5 MG/3ML; MG/3ML
1 SOLUTION RESPIRATORY (INHALATION) ONCE
Status: COMPLETED | OUTPATIENT
Start: 2021-05-01 | End: 2021-05-01

## 2021-05-01 RX ADMIN — PREDNISONE 60 MG: 20 TABLET ORAL at 22:30

## 2021-05-01 RX ADMIN — CEFEPIME HYDROCHLORIDE 2000 MG: 2 INJECTION, POWDER, FOR SOLUTION INTRAVENOUS at 23:40

## 2021-05-01 RX ADMIN — IPRATROPIUM BROMIDE AND ALBUTEROL SULFATE 1 AMPULE: .5; 3 SOLUTION RESPIRATORY (INHALATION) at 22:33

## 2021-05-01 RX ADMIN — IPRATROPIUM BROMIDE AND ALBUTEROL SULFATE 1 AMPULE: .5; 3 SOLUTION RESPIRATORY (INHALATION) at 22:32

## 2021-05-01 ASSESSMENT — PAIN DESCRIPTION - PAIN TYPE: TYPE: ACUTE PAIN

## 2021-05-01 ASSESSMENT — PAIN DESCRIPTION - ORIENTATION: ORIENTATION: LOWER

## 2021-05-02 PROBLEM — J18.9 PNEUMONIA: Status: ACTIVE | Noted: 2021-05-02

## 2021-05-02 LAB
AMPHETAMINES: ABNORMAL
BARBITURATE SCREEN URINE: NEGATIVE
BENZODIAZEPINE SCREEN, URINE: NEGATIVE
CANNABINOID SCREEN URINE: NEGATIVE
COCAINE METABOLITE: NEGATIVE
EKG ATRIAL RATE: 122 BPM
EKG DIAGNOSIS: NORMAL
EKG P AXIS: 66 DEGREES
EKG P-R INTERVAL: 140 MS
EKG Q-T INTERVAL: 310 MS
EKG QRS DURATION: 84 MS
EKG QTC CALCULATION (BAZETT): 441 MS
EKG R AXIS: 60 DEGREES
EKG T AXIS: 69 DEGREES
EKG VENTRICULAR RATE: 122 BPM
OPIATES, URINE: NEGATIVE
OXYCODONE: NEGATIVE
PHENCYCLIDINE, URINE: NEGATIVE
PROCALCITONIN: 0.08

## 2021-05-02 PROCEDURE — 2580000003 HC RX 258: Performed by: PHYSICIAN ASSISTANT

## 2021-05-02 PROCEDURE — 2700000000 HC OXYGEN THERAPY PER DAY

## 2021-05-02 PROCEDURE — 2580000003 HC RX 258: Performed by: INTERNAL MEDICINE

## 2021-05-02 PROCEDURE — 2140000000 HC CCU INTERMEDIATE R&B

## 2021-05-02 PROCEDURE — 94640 AIRWAY INHALATION TREATMENT: CPT

## 2021-05-02 PROCEDURE — 94660 CPAP INITIATION&MGMT: CPT

## 2021-05-02 PROCEDURE — 6360000002 HC RX W HCPCS: Performed by: PHYSICIAN ASSISTANT

## 2021-05-02 PROCEDURE — 6370000000 HC RX 637 (ALT 250 FOR IP): Performed by: PHYSICIAN ASSISTANT

## 2021-05-02 PROCEDURE — 96367 TX/PROPH/DG ADDL SEQ IV INF: CPT

## 2021-05-02 PROCEDURE — 6360000002 HC RX W HCPCS: Performed by: INTERNAL MEDICINE

## 2021-05-02 PROCEDURE — 93010 ELECTROCARDIOGRAM REPORT: CPT | Performed by: INTERNAL MEDICINE

## 2021-05-02 PROCEDURE — 94761 N-INVAS EAR/PLS OXIMETRY MLT: CPT

## 2021-05-02 PROCEDURE — 2580000003 HC RX 258: Performed by: EMERGENCY MEDICINE

## 2021-05-02 PROCEDURE — 6360000002 HC RX W HCPCS: Performed by: EMERGENCY MEDICINE

## 2021-05-02 PROCEDURE — 80307 DRUG TEST PRSMV CHEM ANLYZR: CPT

## 2021-05-02 RX ORDER — SODIUM CHLORIDE 9 MG/ML
25 INJECTION, SOLUTION INTRAVENOUS PRN
Status: DISCONTINUED | OUTPATIENT
Start: 2021-05-02 | End: 2021-05-08 | Stop reason: HOSPADM

## 2021-05-02 RX ORDER — BUPRENORPHINE AND NALOXONE 8; 2 MG/1; MG/1
1 FILM, SOLUBLE BUCCAL; SUBLINGUAL 3 TIMES DAILY
Status: DISCONTINUED | OUTPATIENT
Start: 2021-05-02 | End: 2021-05-08 | Stop reason: HOSPADM

## 2021-05-02 RX ORDER — TRAZODONE HYDROCHLORIDE 50 MG/1
150 TABLET ORAL NIGHTLY
Status: CANCELLED | OUTPATIENT
Start: 2021-05-02

## 2021-05-02 RX ORDER — TRAMADOL HYDROCHLORIDE 50 MG/1
50 TABLET ORAL 2 TIMES DAILY
Status: CANCELLED | OUTPATIENT
Start: 2021-05-02

## 2021-05-02 RX ORDER — ONDANSETRON 2 MG/ML
4 INJECTION INTRAMUSCULAR; INTRAVENOUS EVERY 6 HOURS PRN
Status: DISCONTINUED | OUTPATIENT
Start: 2021-05-02 | End: 2021-05-08 | Stop reason: HOSPADM

## 2021-05-02 RX ORDER — ACETAMINOPHEN 325 MG/1
650 TABLET ORAL EVERY 6 HOURS PRN
Status: DISCONTINUED | OUTPATIENT
Start: 2021-05-02 | End: 2021-05-08 | Stop reason: HOSPADM

## 2021-05-02 RX ORDER — ASPIRIN 81 MG/1
81 TABLET, CHEWABLE ORAL DAILY
Status: DISCONTINUED | OUTPATIENT
Start: 2021-05-02 | End: 2021-05-08 | Stop reason: HOSPADM

## 2021-05-02 RX ORDER — SODIUM CHLORIDE 0.9 % (FLUSH) 0.9 %
5-40 SYRINGE (ML) INJECTION PRN
Status: DISCONTINUED | OUTPATIENT
Start: 2021-05-02 | End: 2021-05-08 | Stop reason: HOSPADM

## 2021-05-02 RX ORDER — ACETAMINOPHEN 650 MG/1
650 SUPPOSITORY RECTAL EVERY 6 HOURS PRN
Status: DISCONTINUED | OUTPATIENT
Start: 2021-05-02 | End: 2021-05-08 | Stop reason: HOSPADM

## 2021-05-02 RX ORDER — SODIUM CHLORIDE 0.9 % (FLUSH) 0.9 %
5-40 SYRINGE (ML) INJECTION EVERY 12 HOURS SCHEDULED
Status: DISCONTINUED | OUTPATIENT
Start: 2021-05-02 | End: 2021-05-08 | Stop reason: HOSPADM

## 2021-05-02 RX ORDER — SODIUM CHLORIDE 9 MG/ML
INJECTION, SOLUTION INTRAVENOUS CONTINUOUS
Status: DISCONTINUED | OUTPATIENT
Start: 2021-05-02 | End: 2021-05-05

## 2021-05-02 RX ORDER — PREDNISONE 20 MG/1
40 TABLET ORAL DAILY
Status: COMPLETED | OUTPATIENT
Start: 2021-05-02 | End: 2021-05-06

## 2021-05-02 RX ORDER — PREGABALIN 100 MG/1
100 CAPSULE ORAL 3 TIMES DAILY
Status: DISCONTINUED | OUTPATIENT
Start: 2021-05-02 | End: 2021-05-08 | Stop reason: HOSPADM

## 2021-05-02 RX ORDER — BUDESONIDE AND FORMOTEROL FUMARATE DIHYDRATE 80; 4.5 UG/1; UG/1
2 AEROSOL RESPIRATORY (INHALATION) 2 TIMES DAILY
Status: DISCONTINUED | OUTPATIENT
Start: 2021-05-02 | End: 2021-05-08 | Stop reason: HOSPADM

## 2021-05-02 RX ORDER — POLYETHYLENE GLYCOL 3350 17 G/17G
17 POWDER, FOR SOLUTION ORAL DAILY PRN
Status: DISCONTINUED | OUTPATIENT
Start: 2021-05-02 | End: 2021-05-08 | Stop reason: HOSPADM

## 2021-05-02 RX ORDER — DEXTROAMPHETAMINE SACCHARATE, AMPHETAMINE ASPARTATE MONOHYDRATE, DEXTROAMPHETAMINE SULFATE AND AMPHETAMINE SULFATE 7.5; 7.5; 7.5; 7.5 MG/1; MG/1; MG/1; MG/1
30 CAPSULE, EXTENDED RELEASE ORAL DAILY
Status: DISCONTINUED | OUTPATIENT
Start: 2021-05-02 | End: 2021-05-08 | Stop reason: HOSPADM

## 2021-05-02 RX ORDER — PROMETHAZINE HYDROCHLORIDE 25 MG/1
12.5 TABLET ORAL EVERY 6 HOURS PRN
Status: DISCONTINUED | OUTPATIENT
Start: 2021-05-02 | End: 2021-05-08 | Stop reason: HOSPADM

## 2021-05-02 RX ADMIN — BUPRENORPHINE HYDROCHLORIDE, NALOXONE HYDROCHLORIDE 1 FILM: 8; 2 FILM, SOLUBLE BUCCAL; SUBLINGUAL at 14:05

## 2021-05-02 RX ADMIN — ENOXAPARIN SODIUM 40 MG: 40 INJECTION SUBCUTANEOUS at 10:09

## 2021-05-02 RX ADMIN — VANCOMYCIN HYDROCHLORIDE 1500 MG: 5 INJECTION, POWDER, LYOPHILIZED, FOR SOLUTION INTRAVENOUS at 23:52

## 2021-05-02 RX ADMIN — SODIUM CHLORIDE, PRESERVATIVE FREE 10 ML: 5 INJECTION INTRAVENOUS at 21:51

## 2021-05-02 RX ADMIN — PREDNISONE 40 MG: 20 TABLET ORAL at 10:06

## 2021-05-02 RX ADMIN — VANCOMYCIN HYDROCHLORIDE 1250 MG: 5 INJECTION, POWDER, LYOPHILIZED, FOR SOLUTION INTRAVENOUS at 00:10

## 2021-05-02 RX ADMIN — TIOTROPIUM BROMIDE INHALATION SPRAY 2 PUFF: 3.12 SPRAY, METERED RESPIRATORY (INHALATION) at 08:10

## 2021-05-02 RX ADMIN — CEFEPIME 2000 MG: 2 INJECTION, POWDER, FOR SOLUTION INTRAVENOUS at 10:06

## 2021-05-02 RX ADMIN — BUPRENORPHINE HYDROCHLORIDE, NALOXONE HYDROCHLORIDE 1 FILM: 8; 2 FILM, SOLUBLE BUCCAL; SUBLINGUAL at 21:50

## 2021-05-02 RX ADMIN — PREGABALIN 100 MG: 100 CAPSULE ORAL at 10:06

## 2021-05-02 RX ADMIN — VANCOMYCIN HYDROCHLORIDE 1500 MG: 5 INJECTION, POWDER, LYOPHILIZED, FOR SOLUTION INTRAVENOUS at 14:04

## 2021-05-02 RX ADMIN — BUPRENORPHINE HYDROCHLORIDE, NALOXONE HYDROCHLORIDE 1 FILM: 8; 2 FILM, SOLUBLE BUCCAL; SUBLINGUAL at 10:06

## 2021-05-02 RX ADMIN — SODIUM CHLORIDE: 9 INJECTION, SOLUTION INTRAVENOUS at 21:50

## 2021-05-02 RX ADMIN — PREGABALIN 100 MG: 100 CAPSULE ORAL at 21:50

## 2021-05-02 RX ADMIN — CEFEPIME 2000 MG: 2 INJECTION, POWDER, FOR SOLUTION INTRAVENOUS at 21:50

## 2021-05-02 RX ADMIN — PREGABALIN 100 MG: 100 CAPSULE ORAL at 14:05

## 2021-05-02 RX ADMIN — ASPIRIN 81 MG CHEWABLE TABLET 81 MG: 81 TABLET CHEWABLE at 10:06

## 2021-05-02 RX ADMIN — BUDESONIDE AND FORMOTEROL FUMARATE DIHYDRATE 2 PUFF: 80; 4.5 AEROSOL RESPIRATORY (INHALATION) at 08:10

## 2021-05-02 RX ADMIN — SODIUM CHLORIDE: 9 INJECTION, SOLUTION INTRAVENOUS at 06:48

## 2021-05-02 ASSESSMENT — PAIN SCALES - GENERAL
PAINLEVEL_OUTOF10: 0
PAINLEVEL_OUTOF10: 0

## 2021-05-02 NOTE — PROGRESS NOTES
Hospitalist Progress Note      Name:  Kari Melo /Age/Sex: 1960  (64 y.o. female)   MRN & CSN:  [id number] & [id number] Admission Date/Time: 2021 10:18 PM   [facility]  [facility]- PCP: Isa Covarrubias MD         Hospital Day: 2    Assessment and Plan:   Kari Melo is a 64 y.o.  female  who presents with <principal problem not specified>    > Acute on chronic hypoxic and hypercapneic respiratory failure   > pneumonia  > COPD exacerbation  - Pneumonia seen on CXR. Leukocytosis and tachycardia present.   -Requiring BIPAP in ER. rapid Covid negative. VBG shows mild acidosis with hypercapnia that appears consistent with her baseline.   - vanc and cefepime, cover for HCAP,  - steroids, bronchodilator, BIPAP PRN (uses at home at night)  - Blood cultures, procalcitonin pending  - consulted pulm     >Encephalopathy- resolved  -Likely 2/2 above  - UDS pending - patient denies illicit drug use currently      >Chronic conditions:  -KING (uses BIPAP nightly, continue)  -hep C  -History of drug abuse: Continue Suboxone  -COPD, continue home medications    Diet DIET GENERAL;   DVT Prophylaxis [] Lovenox   Code Status Full Code   Disposition  pending clinical improvement. History of Present Illness:     Pt S&E. Improving dyspnea and cough, no chest pain, no abd pain, no n/V, no F/C.     10-14 point ROS reviewed negative, unless as noted above    Objective:   No intake or output data in the 24 hours ending 21 1226   Vitals:   Vitals:    21 1000   BP: (!) 140/95   Pulse: 105   Resp: 22   Temp: 98.2 °F (36.8 °C)   SpO2:      Physical Exam:      GEN Awake female, cooperative, Obese  RESP Decreased air sounds. Symmetric chest movement . CARDIO/VASC S1/S2 auscultated. Regular rate. GI Abdomen is soft without significant tenderness, Bowel sounds are normoactive. MSK No gross joint deformities. Spontaneous movement of all extremities  SKIN Normal coloration, warm, dry.   NEURO normal speech, no lateralizing weakness. PSYCH Awake, alert, oriented x 4. Affect appropriate.     Medications:   Medications:    tiotropium  2 puff Inhalation Daily    pregabalin  100 mg Oral TID    buprenorphine-naloxone  1 Film Sublingual TID    aspirin  81 mg Oral Daily    amphetamine-dextroamphetamine  30 mg Oral Daily    sodium chloride flush  5-40 mL Intravenous 2 times per day    enoxaparin  40 mg Subcutaneous Daily    cefepime  2,000 mg Intravenous Q12H    predniSONE  40 mg Oral Daily    budesonide-formoterol  2 puff Inhalation BID    vancomycin  1,500 mg Intravenous Q12H      Infusions:    sodium chloride      sodium chloride 75 mL/hr at 05/02/21 0648     PRN Meds: sodium chloride flush, 5-40 mL, PRN  sodium chloride, 25 mL, PRN  promethazine, 12.5 mg, Q6H PRN    Or  ondansetron, 4 mg, Q6H PRN  polyethylene glycol, 17 g, Daily PRN  acetaminophen, 650 mg, Q6H PRN    Or  acetaminophen, 650 mg, Q6H PRN          Electronically signed by Augusta Rodriguez MD on 5/2/2021 at 12:26 PM

## 2021-05-02 NOTE — ED NOTES
Spoke to sister Bonds. Updated on plan of care.  will call back tomorrow morning for further updates. If pulmonology consulted,  wants staff to know patient follows Dr Eri Hamm.       Jaycee Mitchell RN  05/01/21 7402

## 2021-05-02 NOTE — ED NOTES
Report given to [facility] for bed [address] [facility], [facility]  05/02/21 3899

## 2021-05-02 NOTE — ED TRIAGE NOTES
Patient to ED with reports of SOB since Friday night. Per EMS reports patient was at 5L NC when EMS arrived with 94% O2 sat. Patient placed on 6L NC per EMS. Patient reports history of high CO2 and COPD. Patient connected to capnography by EMS and reading of 70%. RT called for BIPAP STAT per Dr Domenico Rose. EKG being completed at bedside at this time. Labs and IV being placed. Patient connected to cardiac monitors. Patient staying 94% on 6L waiting for RT.

## 2021-05-02 NOTE — ED PROVIDER NOTES
Triage Chief Complaint:   Shortness of Breath (onset friday night, 5L at home 94%, 96%  on EMS ) and Dizziness    [place]:  Marta Clifton is a 64 y.o. female that presents via EMS for difficulty breathing. Patient states that over the past day she has had worsening shortness of breath with some phlegm production. She has a history of COPD and uses 2 to 4 L nasal cannula at home. EMS reports that the patient has high CO2 readings but was not hypoxic on their arrival.  Patient states that she uses BiPAP at night and has been consistent with this. Denies any chest pain, fever, nausea, vomiting, diarrhea, abdominal pain. Denies any lower extremity pain or swelling. States that she has had some lightheadedness. ROS:  At least 14 systems reviewed and otherwise acutely negative except as in the [address]     Past Medical History:   Diagnosis Date    [facility]     Drug addiction in remission [facility]     recovering addict    Hep C w/o coma, chronic (HCC)     Pacemaker     Sleep apnea     TIA (transient ischemic attack)     per patient \"several mini strokes\"     Past Surgical History:   Procedure Laterality Date     SECTION      CHOLECYSTECTOMY       Family History   Problem Relation Age of Onset    Heart Disease Mother      Social History     Socioeconomic History    Marital status: Single     Spouse name: Not on file    Number of children: Not on file    Years of education: Not on file    Highest education level: Not on file   Occupational History    Not on file   Social Needs    Financial resource strain: Not on file    Food insecurity     Worry: Not on file     Inability: Not on file    Transportation needs     Medical: Not on file     Non-medical: Not on file   Tobacco Use    Smoking status: Former Smoker     Quit date: 2016     Years since quittin.7    Smokeless tobacco: Never Used   Substance and Sexual Activity    Alcohol use: Not Currently lungs daily as needed (as directed) 1 kit 0    amphetamine-dextroamphetamine (ADDERALL XR) 30 MG extended release capsule Take 30 mg by mouth daily.  traMADol (ULTRAM) 50 MG tablet Take 50 mg by mouth 2 times daily.  pregabalin (LYRICA) 100 MG capsule Take 100 mg by mouth 3 times daily.  traZODone (DESYREL) 150 MG tablet Take 150 mg by mouth nightly       OXYGEN Inhale 4 L into the lungs continuous       CPAP Machine MISC by CPAP route nightly      buprenorphine-naloxone (SUBOXONE) 8-2 MG FILM SL film Place 1 Film under the tongue 3 times daily. No Known Allergies    Nursing Notes Reviewed    Physical Exam:  ED Triage Vitals   Enc Vitals Group      BP       Pulse       Resp       Temp       Temp src       SpO2       Weight       Height       Head Circumference       Peak Flow       Pain Score       Pain Loc       Pain Edu? Excl. in [address]? GENERAL APPEARANCE: Drowsy but oriented x4. Cooperative. No acute distress. HEAD: Normocephalic. Atraumatic. EYES: EOM's grossly intact. Sclera anicteric. ENT: Mucous membranes are moist. Tolerates saliva. No trismus. NECK: Supple. Trachea midline. HEART: Tachycardic. Radial pulses 2+. LUNGS: Respirations mildly tachypneic with diminished lung sounds bilaterally. ABDOMEN: Soft. Non-tender. No guarding or rebound. EXTREMITIES: No acute deformities. No edema  SKIN: Warm and dry. NEUROLOGICAL: No gross facial drooping. Moves all 4 extremities spontaneously. PSYCHIATRIC: Normal mood.     I have reviewed and interpreted all of the currently available lab results from this visit (if applicable):  Results for orders placed or performed during the hospital encounter of 05/01/21   COVID-19, Rapid    Specimen: Nasopharyngeal   Result Value Ref Range    Source THROAT     SARS-CoV-2, NAAT NOT DETECTED NOT DETECTED   CBC Auto Differential   Result Value Ref Range    WBC 19.4 (H) 4.0 - 10.5 K/CU MM    RBC 4.04 (L) 4.2 - 5.4 M/CU MM    Hemoglobin 11.5 Atrial Rate 122 BPM    P-R Interval 140 ms    QRS Duration 84 ms    Q-T Interval 310 ms    QTc Calculation (Bazett) 441 ms    P Axis 66 degrees    R Axis 60 degrees    T Axis 69 degrees    Diagnosis       Sinus tachycardia  Otherwise normal ECG  When compared with ECG of 14-APR-2021 04:18,  No significant change was found        Radiographs (if obtained):  [] The following radiograph was interpreted by myself in the absence of a radiologist:  [x] Radiologist's Report Reviewed:    EKG (if obtained): (All EKG's are interpreted by myself in the absence of a cardiologist)    MDM:  Plan of care is discussed thoroughly with the patient and family if present. If performed, all imaging and lab work also discussed with patient. All relevant prior results and chart reviewed if available. Patient presents as above. Patient is drowsy on arrival with some labored respirations but is able to speak in full sentences and is oriented x4. She has diminished lung sounds bilaterally and overall presentation most consistent with likely COPD exacerbation with hypercapnic respiratory failure. Respiratory therapy was called for BiPAP with this patient. Venous blood gas was collected. Patient placed on telemetry. Low suspicion for other acute emergent process at this time. Patient doing well on multiple reevaluations. She remains somewhat drowsy but is arousable and answers questions appropriately. Chest x-ray concerning for pneumonia. She does have a leukocytosis. Lactate is normal.  Venous blood gas showed compensated respiratory acidosis. She is started on broad-spectrum antibiotics for presumed hospital-acquired pneumonia and admitted for further management. Clinical Impression:  1.  Sepsis due to pneumonia [facility])      (Please note that portions of this note may have been completed with a voice recognition program. Efforts were made to edit the dictations but occasionally words are mis-transcribed.)    Silver Saavedra MD Ligia Mensah MD  05/02/21 005

## 2021-05-02 NOTE — H&P
Hospital Medicine History & Physical      Name:  Gertrude Cobian /Age/Sex: 1960  (64 y.o. female)   MRN & CSN:  [id number] & [id number] Admission Date/Time: 2021 10:18 PM   Location:  [facility]03 PCP: Daniel Black MD       Date of Admission: 2021    Chief Complaint: Shortness of Breath (onset friday night, 5L at home 94%, 96%  on EMS ) and Dizziness      History of Present Illness: The patient is a 64 y.o. female with a history of COPD, hepatitis C, obstructive sleep apnea, TIA, history of drug abuse, recent hospitalization for pneumonia, presents for evaluation of shortness of breath. Patient is a poor historian on exam but states that she has felt poorly since discharge and has been coughing recently. She denies illicit drug use. She denies fevers or chills. No chest pain, leg swelling, abdominal pain, headache, dysuria, hematuria. Patient has had normal appetite. Patient's past medical, social, and family history have been reviewed. Patient case discussed with ED provider Dr. Scott Ruffin    Ten point ROS: reviewed negative, unless as noted in above HPI. Past Medical History:        Diagnosis Date    [facility]) [facility] [address]     Drug addiction in remission ([facility] [address]     recovering addict    Hep C w/o coma, chronic (HCC)     Pacemaker     Sleep apnea     TIA (transient ischemic attack)     per patient \"several mini strokes\"       Past Surgical History:        Procedure Laterality Date     SECTION      CHOLECYSTECTOMY         Medications Prior to Admission:    Prior to Admission medications    Medication Sig Start Date End Date Taking?  Authorizing Provider   albuterol sulfate  (90 Base) MCG/ACT inhaler USE 2 PUFFS BY MOUTH EVERY 6 HOURS AS NEEDED SHAKE WELL 21   Zabrina Resendiz MD   aspirin 81 MG chewable tablet Take 81 mg by mouth daily    Historical Provider, MD   Multiple Vitamins-Minerals (WOMENS MULTIVITAMIN PO) Take 1 tablet by mouth daily    Historical Provider, MD   DULERA 100-5 MCG/ACT inhaler INHALE 2 PUFFS BY MOUTH 2 TIMES A DAY SHAKE WELL 2/3/21   Debbie Giraldo MD   SPIRIVA RESPIMAT 2.5 MCG/ACT AERS inhaler INHALE 2 PUFFS BY MOUTH ONCE DAILY 1/18/21   Debbie Giraldo MD   albuterol (PROVENTIL) (2.5 MG/3ML) 0.083% nebulizer solution USE ONE VIAL BY NEBULIZER EVERY 6 HOURS AS NEEDED FOR WHEEZING 12/18/20   Debbie Giraldo MD   pantoprazole (PROTONIX) 40 MG tablet Take 1 tablet by mouth every morning (before breakfast) 11/20/20   Ayaka Butler MD   Nebulizer MISC Use nebulizer with medication as directed. 9/27/20   Krystal Lopez MD   Respiratory Therapy Supplies (NEBULIZER/TUBING/MOUTHPIECE) KIT Inhale 1 kit into the lungs daily as needed (as directed) 9/27/20   Krystal Lopez MD   amphetamine-dextroamphetamine (ADDERALL XR) 30 MG extended release capsule Take 30 mg by mouth daily. 9/8/20   Historical Provider, MD   traMADol (ULTRAM) 50 MG tablet Take 50 mg by mouth 2 times daily. 8/17/20   Historical Provider, MD   pregabalin (LYRICA) 100 MG capsule Take 100 mg by mouth 3 times daily. Historical Provider, MD   traZODone (DESYREL) 150 MG tablet Take 150 mg by mouth nightly  7/24/17   Historical Provider, MD   OXYGEN Inhale 4 L into the lungs continuous     Historical Provider, MD   CPAP Machine MISC by CPAP route nightly    Historical Provider, MD   buprenorphine-naloxone (SUBOXONE) 8-2 MG FILM SL film Place 1 Film under the tongue 3 times daily. Historical Provider, MD       Allergies:    Patient has no known allergies. Social History:    TOBACCO:   reports that she quit smoking about 4 years ago. She has never used smokeless tobacco.  ETOH:   reports previous alcohol use.     Family History:        Problem Relation Age of Onset    Heart Disease Mother        Vitals:   Vitals:    05/01/21 2255 05/02/21 0002 05/02/21 0004 05/02/21 0008   BP: 138/62 136/62     Pulse: 124 108  111   Resp: 18 12     Temp:   98.7 °F (37.1 °C)    TempSrc:   Oral    SpO2:  99%     Weight:       Height:           Physical Exam  GEN -lethargic.  NAD. Appears given age. EYES -PERRLA. No scleral erythema, discharge, or conjunctivitis. HENT -MM are moist. Oral pharynx without exudates, no evidence of thrush. NECK -Supple, no apparent thyromegaly or masses. RESP -bilateral rhonchi noted on exam.  Symmetric chest movement while on bipap. C/V -S1/S2 auscultated. RRR without appreciable M/R/G. No JVD or carotid bruits. Peripheral pulses equal bilaterally and palpable. Cap refill <3 sec. No peripheral edema. GI -Abdomen is soft non distended and without significant tenderness to palpation. + BS. No masses or guarding.  -No CVA/ flank tenderness. Fraser catheter is not present. LYMPH-No palpable cervical lymphadenopathy and no hepatosplenomegaly. No petechiae or ecchymoses. MS -No gross joint deformities. SKIN -Normal coloration, warm, dry. NEURO-lethargic but arousable and answers questions appropriately. Cranial nerves appear grossly intact, normal speech, no lateralizing weakness. PSYC- Appropriate affect. Imaging: Reviewed. Xr Chest Portable    Result Date: 5/1/2021  EXAMINATION: ONE XRAY VIEW OF THE CHEST 5/1/2021 10:23 pm COMPARISON: 04/14/2021 HISTORY: ORDERING SYSTEM PROVIDED HISTORY: dyspnea TECHNOLOGIST PROVIDED HISTORY: Reason for exam:->dyspnea Reason for Exam: dyspnea Acuity: Chronic Type of Exam: Initial FINDINGS: There are patchy bilateral lower lung zone airspace opacities, right greater than left. No effusion or pneumothorax. The cardiac silhouette and mediastinal contours are stable. No acute bony abnormality. Unchanged diffuse interstitial prominence. Bilateral lower lobe predominant airspace opacities are suspicious for infection.    Labs:  Reviewed  Lab Results:  CBC   Recent Labs     05/01/21 2233   WBC 19.4*   HGB 11.5*   HCT 37.9         RENAL  Recent Labs     05/01/21 2233   *   K 4.4

## 2021-05-03 LAB
ALBUMIN SERPL-MCNC: 2.9 GM/DL (ref 3.4–5)
ALP BLD-CCNC: 97 IU/L (ref 40–128)
ALT SERPL-CCNC: 16 U/L (ref 10–40)
ANION GAP SERPL CALCULATED.3IONS-SCNC: 2 MMOL/L (ref 4–16)
AST SERPL-CCNC: 15 IU/L (ref 15–37)
BILIRUB SERPL-MCNC: 0.2 MG/DL (ref 0–1)
BUN BLDV-MCNC: 18 MG/DL (ref 6–23)
CALCIUM SERPL-MCNC: 8.4 MG/DL (ref 8.3–10.6)
CHLORIDE BLD-SCNC: 99 MMOL/L (ref 99–110)
CO2: 39 MMOL/L (ref 21–32)
CREAT SERPL-MCNC: 0.5 MG/DL (ref 0.6–1.1)
CREAT SERPL-MCNC: 0.6 MG/DL (ref 0.6–1.1)
DOSE AMOUNT: NORMAL
DOSE TIME: NORMAL
GFR AFRICAN AMERICAN: >60 ML/MIN/1.73M2
GFR AFRICAN AMERICAN: >60 ML/MIN/1.73M2
GFR NON-AFRICAN AMERICAN: >60 ML/MIN/1.73M2
GFR NON-AFRICAN AMERICAN: >60 ML/MIN/1.73M2
GLUCOSE BLD-MCNC: 167 MG/DL (ref 70–99)
HCT VFR BLD CALC: 32.2 % (ref 37–47)
HEMOGLOBIN: 9.6 GM/DL (ref 12.5–16)
MCH RBC QN AUTO: 27.5 PG (ref 27–31)
MCHC RBC AUTO-ENTMCNC: 29.8 % (ref 32–36)
MCV RBC AUTO: 92.3 FL (ref 78–100)
PDW BLD-RTO: 12.7 % (ref 11.7–14.9)
PLATELET # BLD: 222 K/CU MM (ref 140–440)
PMV BLD AUTO: 9.6 FL (ref 7.5–11.1)
POTASSIUM SERPL-SCNC: 4 MMOL/L (ref 3.5–5.1)
RBC # BLD: 3.49 M/CU MM (ref 4.2–5.4)
SODIUM BLD-SCNC: 140 MMOL/L (ref 135–145)
TOTAL PROTEIN: 5.3 GM/DL (ref 6.4–8.2)
VANCOMYCIN TROUGH: 12.3 UG/ML (ref 10–20)
WBC # BLD: 16.4 K/CU MM (ref 4–10.5)

## 2021-05-03 PROCEDURE — 6370000000 HC RX 637 (ALT 250 FOR IP): Performed by: PHYSICIAN ASSISTANT

## 2021-05-03 PROCEDURE — 6360000002 HC RX W HCPCS: Performed by: PHYSICIAN ASSISTANT

## 2021-05-03 PROCEDURE — 94664 DEMO&/EVAL PT USE INHALER: CPT

## 2021-05-03 PROCEDURE — 85027 COMPLETE CBC AUTOMATED: CPT

## 2021-05-03 PROCEDURE — 80202 ASSAY OF VANCOMYCIN: CPT

## 2021-05-03 PROCEDURE — 36415 COLL VENOUS BLD VENIPUNCTURE: CPT

## 2021-05-03 PROCEDURE — 94660 CPAP INITIATION&MGMT: CPT

## 2021-05-03 PROCEDURE — 80053 COMPREHEN METABOLIC PANEL: CPT

## 2021-05-03 PROCEDURE — 2140000000 HC CCU INTERMEDIATE R&B

## 2021-05-03 PROCEDURE — 2700000000 HC OXYGEN THERAPY PER DAY

## 2021-05-03 PROCEDURE — 6360000002 HC RX W HCPCS: Performed by: INTERNAL MEDICINE

## 2021-05-03 PROCEDURE — 82565 ASSAY OF CREATININE: CPT

## 2021-05-03 PROCEDURE — 2580000003 HC RX 258: Performed by: PHYSICIAN ASSISTANT

## 2021-05-03 PROCEDURE — 2580000003 HC RX 258: Performed by: INTERNAL MEDICINE

## 2021-05-03 PROCEDURE — 94640 AIRWAY INHALATION TREATMENT: CPT

## 2021-05-03 PROCEDURE — 94761 N-INVAS EAR/PLS OXIMETRY MLT: CPT

## 2021-05-03 RX ADMIN — VANCOMYCIN HYDROCHLORIDE 1500 MG: 5 INJECTION, POWDER, LYOPHILIZED, FOR SOLUTION INTRAVENOUS at 13:21

## 2021-05-03 RX ADMIN — ACETAMINOPHEN 650 MG: 325 TABLET ORAL at 11:19

## 2021-05-03 RX ADMIN — BUPRENORPHINE HYDROCHLORIDE, NALOXONE HYDROCHLORIDE 1 FILM: 8; 2 FILM, SOLUBLE BUCCAL; SUBLINGUAL at 20:44

## 2021-05-03 RX ADMIN — BUDESONIDE AND FORMOTEROL FUMARATE DIHYDRATE 2 PUFF: 80; 4.5 AEROSOL RESPIRATORY (INHALATION) at 08:35

## 2021-05-03 RX ADMIN — PREDNISONE 40 MG: 20 TABLET ORAL at 08:16

## 2021-05-03 RX ADMIN — ENOXAPARIN SODIUM 40 MG: 40 INJECTION SUBCUTANEOUS at 08:15

## 2021-05-03 RX ADMIN — CEFEPIME 2000 MG: 2 INJECTION, POWDER, FOR SOLUTION INTRAVENOUS at 23:54

## 2021-05-03 RX ADMIN — ASPIRIN 81 MG CHEWABLE TABLET 81 MG: 81 TABLET CHEWABLE at 08:16

## 2021-05-03 RX ADMIN — BUPRENORPHINE HYDROCHLORIDE, NALOXONE HYDROCHLORIDE 1 FILM: 8; 2 FILM, SOLUBLE BUCCAL; SUBLINGUAL at 13:22

## 2021-05-03 RX ADMIN — BUPRENORPHINE HYDROCHLORIDE, NALOXONE HYDROCHLORIDE 1 FILM: 8; 2 FILM, SOLUBLE BUCCAL; SUBLINGUAL at 08:16

## 2021-05-03 RX ADMIN — PREGABALIN 100 MG: 100 CAPSULE ORAL at 20:44

## 2021-05-03 RX ADMIN — BUDESONIDE AND FORMOTEROL FUMARATE DIHYDRATE 2 PUFF: 80; 4.5 AEROSOL RESPIRATORY (INHALATION) at 22:47

## 2021-05-03 RX ADMIN — SODIUM CHLORIDE, PRESERVATIVE FREE 10 ML: 5 INJECTION INTRAVENOUS at 08:17

## 2021-05-03 RX ADMIN — PREGABALIN 100 MG: 100 CAPSULE ORAL at 08:16

## 2021-05-03 RX ADMIN — PREGABALIN 100 MG: 100 CAPSULE ORAL at 13:21

## 2021-05-03 RX ADMIN — TIOTROPIUM BROMIDE INHALATION SPRAY 2 PUFF: 3.12 SPRAY, METERED RESPIRATORY (INHALATION) at 08:33

## 2021-05-03 RX ADMIN — CEFEPIME 2000 MG: 2 INJECTION, POWDER, FOR SOLUTION INTRAVENOUS at 11:19

## 2021-05-03 ASSESSMENT — PAIN - FUNCTIONAL ASSESSMENT: PAIN_FUNCTIONAL_ASSESSMENT: ACTIVITIES ARE NOT PREVENTED

## 2021-05-03 ASSESSMENT — PAIN DESCRIPTION - FREQUENCY: FREQUENCY: CONTINUOUS

## 2021-05-03 ASSESSMENT — PAIN SCALES - GENERAL
PAINLEVEL_OUTOF10: 0

## 2021-05-03 ASSESSMENT — PAIN DESCRIPTION - ORIENTATION: ORIENTATION: MID

## 2021-05-03 NOTE — CONSULTS
Subjective:   CHIEF COMPLAINT / HPI:  64year old female admitted with worsening sob and this is accompanied by wheeze. She has cough with yellow sputum production. She denies any fever or chest pain or hemoptysis.       Past Medical History:  Past Medical History:   Diagnosis Date    [facility] disease) ([facility] [address]     [facility]     recovering addict    Hep C w/o coma, chronic (HCC)     Pacemaker     Sleep apnea     TIA (transient ischemic attack)     per patient \"several mini strokes\"       Past Surgical History:        Procedure Laterality Date     SECTION      CHOLECYSTECTOMY         Current Medications:    Current Facility-Administered Medications: tiotropium (SPIRIVA RESPIMAT) 2.5 MCG/ACT inhaler 2 puff, 2 puff, Inhalation, Daily  pregabalin (LYRICA) capsule 100 mg, 100 mg, Oral, TID  buprenorphine-naloxone (SUBOXONE) 8-2 MG SL film 1 Film, 1 Film, Sublingual, TID  aspirin chewable tablet 81 mg, 81 mg, Oral, Daily  amphetamine-dextroamphetamine (ADDERALL XR) extended release capsule 30 mg, 30 mg, Oral, Daily  sodium chloride flush 0.9 % injection 5-40 mL, 5-40 mL, Intravenous, 2 times per day  sodium chloride flush 0.9 % injection 5-40 mL, 5-40 mL, Intravenous, PRN  0.9 % sodium chloride infusion, 25 mL, Intravenous, PRN  enoxaparin (LOVENOX) injection 40 mg, 40 mg, Subcutaneous, Daily  promethazine (PHENERGAN) tablet 12.5 mg, 12.5 mg, Oral, Q6H PRN **OR** ondansetron (ZOFRAN) injection 4 mg, 4 mg, Intravenous, Q6H PRN  polyethylene glycol (GLYCOLAX) packet 17 g, 17 g, Oral, Daily PRN  acetaminophen (TYLENOL) tablet 650 mg, 650 mg, Oral, Q6H PRN **OR** acetaminophen (TYLENOL) suppository 650 mg, 650 mg, Rectal, Q6H PRN  cefepime (MAXIPIME) 2000 mg IVPB minibag, 2,000 mg, Intravenous, Q12H  0.9 % sodium chloride infusion, , Intravenous, Continuous  predniSONE (DELTASONE) tablet 40 mg, 40 mg, Oral, Daily  budesonide-formoterol (SYMBICORT) 80-4.5 MCG/ACT inhaler 2 puff, 2 puff, Inhalation, BID  vancomycin (VANCOCIN) 1,500 mg in dextrose 5 % 500 mL IVPB, 1,500 mg, Intravenous, Q12H    No Known Allergies    Social History:    Social History     Socioeconomic History    Marital status: Single     Spouse name: None    Number of children: None    Years of education: None    Highest education level: None   Occupational History    None   Social Needs    Financial resource strain: None    Food insecurity     Worry: None     Inability: None    Transportation needs     Medical: None     Non-medical: None   Tobacco Use    Smoking status: Former Smoker     Quit date: 2016     Years since quittin.7    Smokeless tobacco: Never Used   Substance and Sexual Activity    Alcohol use: Not Currently    Drug use: Not Currently    Sexual activity: None   Lifestyle    Physical activity     Days per week: None     Minutes per session: None    Stress: None   Relationships    Social connections     Talks on phone: None     Gets together: None     Attends Tenriism service: None     Active member of club or organization: None     Attends meetings of clubs or organizations: None     Relationship status: None    Intimate partner violence     Fear of current or ex partner: None     Emotionally abused: None     Physically abused: None     Forced sexual activity: None   Other Topics Concern    None   Social History Narrative    None       Family History:   Family History   Problem Relation Age of Onset    Heart Disease Mother        Immunization:    There is no immunization history on file for this patient. REVIEW OF SYSTEMS:    CONSTITUTIONAL:  negative for fevers, chills, diaphoresis, activity change, appetite change, fatigue, night sweats and unexpected weight change.    EYES:  negative for blurred vision, eye discharge, visual disturbance and icterus  HEENT:  negative for hearing loss, tinnitus, ear drainage, sinus pressure, nasal congestion, epistaxis and snoring  RESPIRATORY:  See HPI  CARDIOVASCULAR:  negative for chest pain, palpitations, exertional chest pressure/discomfort, edema, syncope  GASTROINTESTINAL:  negative for nausea, vomiting, diarrhea, constipation, blood in stool and abdominal pain  GENITOURINARY:  negative for frequency, dysuria and hematuria  HEMATOLOGIC/LYMPHATIC:  negative for easy bruising, bleeding and lymphadenopathy  ALLERGIC/IMMUNOLOGIC:  negative for recurrent infections, angioedema, anaphylaxis and drug reactions  ENDOCRINE:  negative for weight changes and diabetic symptoms including polyuria, polydipsia and polyphagia    MUSCULOSKELETAL:  negative for  pain, joint swelling, decreased range of motion and muscle weakness  NEUROLOGICAL:  negative for headaches, slurred speech, unilateral weakness  PSYCHIATRIC/BEHAVIORAL: negative for hallucinations, behavioral problems, confusion and agitation. Objective:   PHYSICAL EXAM:      VITALS:    Vitals:    05/02/21 2148 05/02/21 2255 05/03/21 0134 05/03/21 0427   BP:    (!) 98/45   Pulse: 95      Resp:  25 14    Temp:    97.7 °F (36.5 °C)   TempSrc:    Axillary   SpO2:    100%   Weight:    208 lb 8 oz (94.6 kg)   Height:             CONSTITUTIONAL:  awake, alert, cooperative, no apparent distress, and appears stated age  NECK:  Supple, symmetrical, trachea midline, no adenopathy, thyroid symmetric, not enlarged and no tenderness  CHEST: Chest expansion equal and symmetrical, no intercostal retraction. LUNGS:  no increased work of breathing, has expiratory wheezes both lungs, no crackles. CARDIOVASCULAR: S1 and S2, no edema and no JVD  ABDOMEN:  normal bowel sounds, non-distended and no masses palpated, and no tenderness to palpation. No hepatospleenomegaly  LYMPHADENOPATHY:  no axillary or supraclavicular adenopathy. No cervical adnenopathy  PSYCHIATRIC: Oriented to person place and time. No obvious depression or anxiety. MUSCULOSKELETAL: No obvious misalignment or effusion of the joints. No clubbing, cyanosis of the digits. RIGHT AND LEFT LOWER EXTREMITIES: No edema, no inflammation, no tenderness. SKIN:  normal skin color, texture, turgor and no redness, warmth, or swelling. No palpable nodules    DATA:         EXAMINATION:   ONE XRAY VIEW OF THE CHEST       5/1/2021 10:23 pm       COMPARISON:   04/14/2021       HISTORY:   ORDERING SYSTEM PROVIDED HISTORY: dyspnea   TECHNOLOGIST PROVIDED HISTORY:   Reason for exam:->dyspnea   Reason for Exam: dyspnea   Acuity: Chronic   Type of Exam: Initial       FINDINGS:   There are patchy bilateral lower lung zone airspace opacities, right greater   than left.  No effusion or pneumothorax.  The cardiac silhouette and   mediastinal contours are stable.  No acute bony abnormality.  Unchanged   diffuse interstitial prominence.           Impression   Bilateral lower lobe predominant airspace opacities are suspicious for   infection.           Order History    Open Order Details   PACS Images     Show images for XR CHEST PORTABLE             abg 7.31/92/29          Assessment:     1. Ac on ch hypoxemic and hypercapneic resp failure  2. Ac copd  3. Pneumonia  4. rashad          Plan:     1. D/w pt  2. She would like to switch from dulera and spiriva and I will do that before dc  3. Bd rx  4. Iv steroids  5. On po steroids  6. Agree with antibx  7.  Thanks will follow

## 2021-05-03 NOTE — CARE COORDINATION
CM in to see Pt to discuss discharge planning. Pt from home alone. Pt family will provide transportation at discharge. Pt has DME to include cane, walker, and home o2 supplied by [employer]. Pt has home care provided by [facility] through waiver services. Pt has insurance, pcp, and can afford medications. Pt denies any other needs at this time. Cm available if needs arise.

## 2021-05-03 NOTE — PROGRESS NOTES
[address] [facility]  consulted by Dr. Violette Cerna for monitoring and adjustment. Indication for treatment: Sepsis due to pneumonia  Goal trough: 15 mcg/mL    Pertinent Laboratory Values:   Temp Readings from Last 3 Encounters:   05/03/21 98.5 °F (36.9 °C) (Oral)   04/16/21 97.8 °F (36.6 °C) (Oral)   03/13/21 98.7 °F (37.1 °C) (Oral)     Recent Labs     05/01/21 2233 05/03/21  0310   WBC 19.4* 16.4*   LACTATE 1.0  --      Recent Labs     05/01/21 2233 05/03/21  0310   BUN 5* 18   CREATININE 0.5* 0.5*     Estimated Creatinine Clearance: 137 mL/min (A) (based on SCr of 0.5 mg/dL (L)). Intake/Output Summary (Last 24 hours) at 5/3/2021 1534  Last data filed at 5/3/2021 1326  Gross per 24 hour   Intake 2855 ml   Output --   Net 6976 ml       Pertinent Cultures:  Date    Source    Results  5/01   SARS-CoV-2, NAAT   Negative  5/01   Blood                No growth    Vancomycin level:   TROUGH:  No results for input(s): VANCOTROUGH in the last 72 hours. RANDOM:  No results for input(s): VANCORANDOM in the last 72 hours. Assessment:  · WBC and temperature: WBC slightly improved down to 16.4, pt remains afebrile  · SCr, BUN, and urine output: SCR low, no UOP data  · Day(s) of therapy:  2  · Vancomycin concentration: scheduled for 5/3 @23:30    Plan:  · Renal trends remain normal  · Continue Vancomycin 1500 mg IVPB q12h  · Check the vanco level before tonight's dose  · Pharmacy will continue to monitor patient and adjust therapy as indicated    Miya 3 5/03/21 @3077    Thank you for the consult. Maricruz Calvo Confer   5/3/2021 3:34 PM

## 2021-05-03 NOTE — PROGRESS NOTES
Hospitalist Progress Note      Name:  Marcela Kemp /Age/Sex: 1960  (64 y.o. female)   MRN & CSN:  [id number] & [id number] Admission Date/Time: 2021 10:18 PM   [facility]  [facility]- PCP: Edward Morse MD         Hospital Day: 3    Assessment and Plan:   Marcela Kemp is a 64 y.o.  female  who presents with <principal problem not specified>    > Acute on chronic hypoxic and hypercapneic respiratory failure   > pneumonia  > COPD exacerbation  - Pneumonia seen on CXR. Leukocytosis and tachycardia present.   -Requiring BIPAP in ER. rapid Covid negative. VBG shows mild acidosis with hypercapnia that appears consistent with her baseline.   - vanc and cefepime, cover for HCAP,  - steroids, bronchodilator, BIPAP PRN (uses at home at night)  - Blood cultures NTD, procalcitonin 0.076  - consulted pulm     >Encephalopathy- resolved  -Likely 2/2 above  - UDS positive for amphetamine, pt on adderall at home  - patient denies illicit drug use currently      >Chronic conditions:  -KING (uses BIPAP nightly, continue)  -hep C  -History of drug abuse: Continue Suboxone    Diet DIET GENERAL;   DVT Prophylaxis [] Lovenox   Code Status Full Code   Disposition  pending clinical improvement. History of Present Illness:     Pt S&E. Reports cough and dyspnea, no chest pain, no abd pain, no N/V, no F/C.     10-14 point ROS reviewed negative, unless as noted above    Objective: Intake/Output Summary (Last 24 hours) at 5/3/2021 0951  Last data filed at 5/3/2021 0817  Gross per 24 hour   Intake 3145 ml   Output --   Net 3145 ml      Vitals:   Vitals:    21 0833   BP:    Pulse:    Resp: 12   Temp:    SpO2: 98%     Physical Exam:      GEN Awake female, cooperative, Obese  RESP Decreased air sounds. Symmetric chest movement . CARDIO/VASC S1/S2 auscultated. Regular rate. GI Abdomen is soft without significant tenderness, Bowel sounds are normoactive. MSK No gross joint deformities.  Spontaneous movement of all extremities  SKIN Normal coloration, warm, dry. NEURO normal speech, no lateralizing weakness. PSYCH Awake, alert, oriented x 4. Affect appropriate.     Medications:   Medications:    tiotropium  2 puff Inhalation Daily    pregabalin  100 mg Oral TID    buprenorphine-naloxone  1 Film Sublingual TID    aspirin  81 mg Oral Daily    amphetamine-dextroamphetamine  30 mg Oral Daily    sodium chloride flush  5-40 mL Intravenous 2 times per day    enoxaparin  40 mg Subcutaneous Daily    cefepime  2,000 mg Intravenous Q12H    predniSONE  40 mg Oral Daily    budesonide-formoterol  2 puff Inhalation BID    vancomycin  1,500 mg Intravenous Q12H      Infusions:    sodium chloride      sodium chloride 75 mL/hr at 05/02/21 2150     PRN Meds: sodium chloride flush, 5-40 mL, PRN  sodium chloride, 25 mL, PRN  promethazine, 12.5 mg, Q6H PRN    Or  ondansetron, 4 mg, Q6H PRN  polyethylene glycol, 17 g, Daily PRN  acetaminophen, 650 mg, Q6H PRN    Or  acetaminophen, 650 mg, Q6H PRN          Electronically signed by Payal Sal MD on 5/3/2021 at 9:51 AM

## 2021-05-04 PROCEDURE — 6370000000 HC RX 637 (ALT 250 FOR IP): Performed by: PHYSICIAN ASSISTANT

## 2021-05-04 PROCEDURE — 94660 CPAP INITIATION&MGMT: CPT

## 2021-05-04 PROCEDURE — 2580000003 HC RX 258: Performed by: INTERNAL MEDICINE

## 2021-05-04 PROCEDURE — 6360000002 HC RX W HCPCS: Performed by: INTERNAL MEDICINE

## 2021-05-04 PROCEDURE — 36415 COLL VENOUS BLD VENIPUNCTURE: CPT

## 2021-05-04 PROCEDURE — 6360000002 HC RX W HCPCS: Performed by: PHYSICIAN ASSISTANT

## 2021-05-04 PROCEDURE — 2580000003 HC RX 258: Performed by: PHYSICIAN ASSISTANT

## 2021-05-04 PROCEDURE — 2140000000 HC CCU INTERMEDIATE R&B

## 2021-05-04 RX ORDER — TRAZODONE HYDROCHLORIDE 50 MG/1
150 TABLET ORAL NIGHTLY PRN
Status: DISCONTINUED | OUTPATIENT
Start: 2021-05-04 | End: 2021-05-08 | Stop reason: HOSPADM

## 2021-05-04 RX ADMIN — TRAZODONE HYDROCHLORIDE 150 MG: 50 TABLET ORAL at 22:10

## 2021-05-04 RX ADMIN — BUPRENORPHINE HYDROCHLORIDE, NALOXONE HYDROCHLORIDE 1 FILM: 8; 2 FILM, SOLUBLE BUCCAL; SUBLINGUAL at 08:30

## 2021-05-04 RX ADMIN — VANCOMYCIN HYDROCHLORIDE 1500 MG: 5 INJECTION, POWDER, LYOPHILIZED, FOR SOLUTION INTRAVENOUS at 12:00

## 2021-05-04 RX ADMIN — ASPIRIN 81 MG CHEWABLE TABLET 81 MG: 81 TABLET CHEWABLE at 08:30

## 2021-05-04 RX ADMIN — PREGABALIN 100 MG: 100 CAPSULE ORAL at 22:08

## 2021-05-04 RX ADMIN — PREGABALIN 100 MG: 100 CAPSULE ORAL at 08:30

## 2021-05-04 RX ADMIN — VANCOMYCIN HYDROCHLORIDE 1500 MG: 5 INJECTION, POWDER, LYOPHILIZED, FOR SOLUTION INTRAVENOUS at 00:29

## 2021-05-04 RX ADMIN — ENOXAPARIN SODIUM 40 MG: 40 INJECTION SUBCUTANEOUS at 08:30

## 2021-05-04 RX ADMIN — SODIUM CHLORIDE, PRESERVATIVE FREE 10 ML: 5 INJECTION INTRAVENOUS at 21:35

## 2021-05-04 RX ADMIN — CEFEPIME 2000 MG: 2 INJECTION, POWDER, FOR SOLUTION INTRAVENOUS at 22:39

## 2021-05-04 RX ADMIN — CEFEPIME 2000 MG: 2 INJECTION, POWDER, FOR SOLUTION INTRAVENOUS at 12:01

## 2021-05-04 RX ADMIN — PREGABALIN 100 MG: 100 CAPSULE ORAL at 13:22

## 2021-05-04 RX ADMIN — BUPRENORPHINE HYDROCHLORIDE, NALOXONE HYDROCHLORIDE 1 FILM: 8; 2 FILM, SOLUBLE BUCCAL; SUBLINGUAL at 13:22

## 2021-05-04 RX ADMIN — SODIUM CHLORIDE, PRESERVATIVE FREE 10 ML: 5 INJECTION INTRAVENOUS at 08:31

## 2021-05-04 RX ADMIN — BUPRENORPHINE HYDROCHLORIDE, NALOXONE HYDROCHLORIDE 1 FILM: 8; 2 FILM, SOLUBLE BUCCAL; SUBLINGUAL at 22:07

## 2021-05-04 RX ADMIN — PREDNISONE 40 MG: 20 TABLET ORAL at 08:30

## 2021-05-04 ASSESSMENT — PAIN SCALES - GENERAL
PAINLEVEL_OUTOF10: 0
PAINLEVEL_OUTOF10: 0

## 2021-05-04 NOTE — PROGRESS NOTES
pulmonary      SUBJECTIVE: on pap rx     OBJECTIVE    VITALS:  BP 95/60   Pulse 74   Temp 98.2 °F (36.8 °C) (Axillary)   Resp 14   Ht 5' 4\" (1.626 m)   Wt 220 lb 6.4 oz (100 kg)   SpO2 98%   BMI 37.83 kg/m²   HEAD AND FACE EXAM:  No throat injection, no active exudate,no thrush  NECK EXAM;No JVD, no masses, symmetrical  CHEST EXAM; Expansion equal and symmetrical, no masses  LUNG EXAM; Good breath sounds bilaterally. There are expiratory wheezes both lungs, there are crackles at both lung bases  CARDIOVASCULAR EXAM: Positive S1 and S2, no S3 or S4, no clicks ,no murmurs  RIGHT AND LEFT LOWER EXTRIMITY EXAM: No edema, no swelling, no inflamation            LABS   Lab Results   Component Value Date    WBC 16.4 (H) 05/03/2021    HGB 9.6 (L) 05/03/2021    HCT 32.2 (L) 05/03/2021    MCV 92.3 05/03/2021     05/03/2021     Lab Results   Component Value Date    CREATININE 0.6 05/03/2021    BUN 18 05/03/2021     05/03/2021    K 4.0 05/03/2021    CL 99 05/03/2021    CO2 39 (H) 05/03/2021     Lab Results   Component Value Date    INR 0.93 03/30/2021    PROTIME 11.2 (L) 03/30/2021        No results found for: PHOS   No results for input(s): PH, PO2ART, TGG9JUK, HCO3, BEART, O2SAT in the last 72 hours. Wt Readings from Last 3 Encounters:   05/04/21 220 lb 6.4 oz (100 kg)   04/14/21 170 lb (77.1 kg)   03/13/21 170 lb (77.1 kg)               ASSESMENT  Ac on ch resp failure  Ac copd  pneumonia        PLAN  1. cpm  2.  Cont pap at [facility]    5/4/2021  Dionisio Adan M.D.

## 2021-05-04 NOTE — PROGRESS NOTES
05/04/21 0423   NIV Type   NIV Started/Stopped On   Equipment Type v60   Mode Bilevel   Mask Type Full face mask   Mask Size Small   Bonnet size Small   Settings/Measurements   IPAP 12 cmH20   CPAP/EPAP 5 cmH2O   Rate Ordered 12   Resp 14   FiO2  45 %   I Time/ I Time % 1 s   Vt Exhaled 935 mL   Minute Volume 9.9 Liters   Comfort Level Good   Using Accessory Muscles No   Alarm Settings   Alarms On Y   Press Low Alarm 5 cmH2O   High Pressure Alarm 25 cmH2O   Delay Alarm 20 sec(s)   Apnea (secs) 20 secs   Resp Rate Low Alarm 12   High Respiratory Rate 40 br/min

## 2021-05-04 NOTE — PROGRESS NOTES
[facility]  consulted by Dr. Amelie Engle for monitoring and adjustment. Indication for treatment: Sepsis due to pneumonia  Goal trough: 15 mcg/mL    Pertinent Laboratory Values:   Temp Readings from Last 3 Encounters:   05/04/21 98.5 °F (36.9 °C)   04/16/21 97.8 °F (36.6 °C) (Oral)   03/13/21 98.7 °F (37.1 °C) (Oral)     Recent Labs     05/01/21 2233 05/03/21  0310   WBC 19.4* 16.4*   LACTATE 1.0  --      Recent Labs     05/01/21 2233 05/03/21  0310 05/03/21 2239   BUN 5* 18  --    CREATININE 0.5* 0.5* 0.6     Estimated Creatinine Clearance: 117 mL/min (based on SCr of 0.6 mg/dL). No intake or output data in the 24 hours ending 05/04/21 1518    Pertinent Cultures:  Date    Source    Results  5/01   SARS-CoV-2, NAAT   Negative  5/01   Blood                No growth    Vancomycin level:   TROUGH:    Recent Labs     05/03/21 2239   VANCOTROUGH 12.3     RANDOM:  No results for input(s): VANCORANDOM in the last 72 hours. Assessment:  · WBC and temperature: WBC slightly improved down to 16.4 yesterday, pt remains afebrile  · SCr, BUN, and urine output: SCR steady around 0.6, no UOP data  · Day(s) of therapy:  3  · Vancomycin concentration:  5/3 - 12.3, within acceptable range    Plan:  · Vanco level within acceptable range @12.3, expect some accumulation at steady state. · Renal trends have been normal  · Continue Vancomycin 1500 mg IVPB q12h  · Recheck the vanco level in 2 days  · Pharmacy will continue to monitor patient and adjust therapy as indicated    VANCOMYCIN CONCENTRATION SCHEDULED FOR 5/06/21 @11:30    Thank you for the consult. Ava Soriano, [address]   5/4/2021 3:18 PM

## 2021-05-04 NOTE — PROGRESS NOTES
Assessment completed (see doc flow sheet), in bed resting, bed alarm activated, call light within reach, without distress, vital signs stable (see doc flow sheet), denies pain or needs at present time, will continue to monitor and treat. Umair Castellon

## 2021-05-04 NOTE — PROGRESS NOTES
Progress Note  Date:2021       [facility]:[id number]  Patient Name:Kalee Chicas     YOB: 1960     Age:60 y.o. Feels tired. Uses Bipap at home   Subjective    Subjective:  Symptoms:  Stable. Diet:  Adequate intake. Pain:  She reports no pain. Review of Systems  Objective         Vitals Last 24 Hours:  TEMPERATURE:  Temp  Av.5 °F (36.9 °C)  Min: 98.2 °F (36.8 °C)  Max: 98.7 °F (37.1 °C)  RESPIRATIONS RANGE: Resp  Av.4  Min: 10  Max: 26  PULSE OXIMETRY RANGE: SpO2  Av %  Min: 93 %  Max: 98 %  PULSE RANGE: Pulse  Av.4  Min: 74  Max: 94  BLOOD PRESSURE RANGE: Systolic (65JKL), KME:143 , Min:95 , JVL:751   ; Diastolic (63ANQ), MELISSA:65, Min:56, Max:80    I/O (24Hr): Intake/Output Summary (Last 24 hours) at 2021 0900  Last data filed at 5/3/2021 1326  Gross per 24 hour   Intake 360 ml   Output --   Net 360 ml     Objective:  General Appearance:  Comfortable. Vital signs: (most recent): Blood pressure (!) 98/56, pulse 91, temperature 98.5 °F (36.9 °C), resp. rate 22, height 5' 4\" (1.626 m), weight 220 lb 6.4 oz (100 kg), SpO2 93 %. Vital signs are normal.    HEENT: Normal HEENT exam.    Lungs: There are decreased breath sounds. Heart: Normal rate. Abdomen: Abdomen is soft. Bowel sounds are normal.     Extremities: Decreased range of motion. Neurological: Patient is alert. Pupils:  Pupils are equal, round, and reactive to light. Skin:  Warm. Labs/Imaging/Diagnostics    Labs:  CBC:  Recent Labs     21  2233 21  0310   WBC 19.4* 16.4*   RBC 4.04* 3.49*   HGB 11.5* 9.6*   HCT 37.9 32.2*   MCV 93.8 92.3   RDW 12.7 12.7    222     CHEMISTRIES:  Recent Labs     21  2233 21  0310 219   * 140  --    K 4.4 4.0  --    CL 90* 99  --    CO2 43* 39*  --    BUN 5* 18  --    CREATININE 0.5* 0.5* 0.6   GLUCOSE 172* 167*  --      PT/INR:No results for input(s): PROTIME, INR in the last 72 hours.   APTT:No results for input(s): APTT in the last 72 hours. LIVER PROFILE:  Recent Labs     05/01/21  2233 05/03/21  0310   AST 34 15   ALT 22 16   BILITOT 0.4 0.2   ALKPHOS 104 97       Imaging Last 24 Hours:  No results found.   Assessment//Plan           Hospital Problems           Last Modified POA    Pneumonia 5/2/2021 Yes        Assessment & Plan  Acute on chronic  hypoxic and hypercapneic resp failure with COPD exacerbaiton and suspected gram pos pna  -covid neg, bld cx neg so far  -cefepime, vanc  Encephalopathy toxic  UDS pos for amphetamine  KING  d  VT prophylaxis  -lovenox    F  Electronically signed by Darius Forde MD on 5/4/21 at 9:00 AM EDT

## 2021-05-05 LAB
CREAT SERPL-MCNC: 0.5 MG/DL (ref 0.6–1.1)
GFR AFRICAN AMERICAN: >60 ML/MIN/1.73M2
GFR NON-AFRICAN AMERICAN: >60 ML/MIN/1.73M2
LEGIONELLA URINARY AG: NEGATIVE
STREP PNEUMONIAE ANTIGEN: NORMAL

## 2021-05-05 PROCEDURE — 6370000000 HC RX 637 (ALT 250 FOR IP): Performed by: PHYSICIAN ASSISTANT

## 2021-05-05 PROCEDURE — 82565 ASSAY OF CREATININE: CPT

## 2021-05-05 PROCEDURE — 2580000003 HC RX 258: Performed by: INTERNAL MEDICINE

## 2021-05-05 PROCEDURE — 94640 AIRWAY INHALATION TREATMENT: CPT

## 2021-05-05 PROCEDURE — 94761 N-INVAS EAR/PLS OXIMETRY MLT: CPT

## 2021-05-05 PROCEDURE — 2580000003 HC RX 258: Performed by: PHYSICIAN ASSISTANT

## 2021-05-05 PROCEDURE — 2700000000 HC OXYGEN THERAPY PER DAY

## 2021-05-05 PROCEDURE — 92610 EVALUATE SWALLOWING FUNCTION: CPT

## 2021-05-05 PROCEDURE — 6360000002 HC RX W HCPCS: Performed by: PHYSICIAN ASSISTANT

## 2021-05-05 PROCEDURE — 87899 AGENT NOS ASSAY W/OPTIC: CPT

## 2021-05-05 PROCEDURE — 87449 NOS EACH ORGANISM AG IA: CPT

## 2021-05-05 PROCEDURE — 2140000000 HC CCU INTERMEDIATE R&B

## 2021-05-05 PROCEDURE — 36415 COLL VENOUS BLD VENIPUNCTURE: CPT

## 2021-05-05 PROCEDURE — 6360000002 HC RX W HCPCS: Performed by: INTERNAL MEDICINE

## 2021-05-05 RX ADMIN — BUDESONIDE AND FORMOTEROL FUMARATE DIHYDRATE 2 PUFF: 80; 4.5 AEROSOL RESPIRATORY (INHALATION) at 08:34

## 2021-05-05 RX ADMIN — SODIUM CHLORIDE, PRESERVATIVE FREE 10 ML: 5 INJECTION INTRAVENOUS at 09:33

## 2021-05-05 RX ADMIN — BUPRENORPHINE HYDROCHLORIDE, NALOXONE HYDROCHLORIDE 1 FILM: 8; 2 FILM, SOLUBLE BUCCAL; SUBLINGUAL at 09:32

## 2021-05-05 RX ADMIN — BUDESONIDE AND FORMOTEROL FUMARATE DIHYDRATE 2 PUFF: 80; 4.5 AEROSOL RESPIRATORY (INHALATION) at 20:22

## 2021-05-05 RX ADMIN — BUPRENORPHINE HYDROCHLORIDE, NALOXONE HYDROCHLORIDE 1 FILM: 8; 2 FILM, SOLUBLE BUCCAL; SUBLINGUAL at 16:01

## 2021-05-05 RX ADMIN — VANCOMYCIN HYDROCHLORIDE 1500 MG: 5 INJECTION, POWDER, LYOPHILIZED, FOR SOLUTION INTRAVENOUS at 12:22

## 2021-05-05 RX ADMIN — CEFEPIME 2000 MG: 2 INJECTION, POWDER, FOR SOLUTION INTRAVENOUS at 22:13

## 2021-05-05 RX ADMIN — PREGABALIN 100 MG: 100 CAPSULE ORAL at 22:14

## 2021-05-05 RX ADMIN — TRAZODONE HYDROCHLORIDE 150 MG: 50 TABLET ORAL at 22:13

## 2021-05-05 RX ADMIN — ENOXAPARIN SODIUM 40 MG: 40 INJECTION SUBCUTANEOUS at 09:32

## 2021-05-05 RX ADMIN — PREDNISONE 40 MG: 20 TABLET ORAL at 09:32

## 2021-05-05 RX ADMIN — ASPIRIN 81 MG CHEWABLE TABLET 81 MG: 81 TABLET CHEWABLE at 09:32

## 2021-05-05 RX ADMIN — VANCOMYCIN HYDROCHLORIDE 1500 MG: 5 INJECTION, POWDER, LYOPHILIZED, FOR SOLUTION INTRAVENOUS at 00:02

## 2021-05-05 RX ADMIN — CEFEPIME 2000 MG: 2 INJECTION, POWDER, FOR SOLUTION INTRAVENOUS at 09:32

## 2021-05-05 RX ADMIN — BUPRENORPHINE HYDROCHLORIDE, NALOXONE HYDROCHLORIDE 1 FILM: 8; 2 FILM, SOLUBLE BUCCAL; SUBLINGUAL at 22:13

## 2021-05-05 RX ADMIN — SODIUM CHLORIDE, PRESERVATIVE FREE 10 ML: 5 INJECTION INTRAVENOUS at 22:14

## 2021-05-05 RX ADMIN — TIOTROPIUM BROMIDE INHALATION SPRAY 2 PUFF: 3.12 SPRAY, METERED RESPIRATORY (INHALATION) at 08:36

## 2021-05-05 RX ADMIN — PREGABALIN 100 MG: 100 CAPSULE ORAL at 16:01

## 2021-05-05 RX ADMIN — PREGABALIN 100 MG: 100 CAPSULE ORAL at 09:32

## 2021-05-05 NOTE — PROGRESS NOTES
Nurse at bedside to take vital signs and hang iv vancomycin. Placed \"hat\" under toilet seat and reminded patient that a urine specimen is needed next time she gets up. Patient agreeable. Verbalized frustration that nurse taking vitals again. Nurse reminded patient vitals are taken every 4 hours. Presently on bipap: 12/5 with back up rate 12 using 35% oxygen. Denies pain and sob.

## 2021-05-05 NOTE — PLAN OF CARE
Problem: Airway Clearance - Ineffective:  Goal: Clear lung sounds  Description: Clear lung sounds  Outcome: Ongoing     Problem: Pain:  Goal: Pain level will decrease  Description: Pain level will decrease  Outcome: Ongoing
Problem: Falls - Risk of:  Goal: Will remain free from falls  Description: Will remain free from falls  Outcome: Ongoing  Goal: Absence of physical injury  Description: Absence of physical injury  Outcome: Ongoing     Problem: Gas Exchange - Impaired:  Goal: Levels of oxygenation will improve  Description: Levels of oxygenation will improve  Outcome: Ongoing     Problem: Discharge Planning:  Goal: Discharged to appropriate level of care  Description: Discharged to appropriate level of care  Outcome: Ongoing  Goal: Participates in care planning  Description: Participates in care planning  Outcome: Ongoing     Problem: Airway Clearance - Ineffective:  Goal: Clear lung sounds  Description: Clear lung sounds  Outcome: Ongoing  Goal: Ability to maintain a clear airway will improve  Description: Ability to maintain a clear airway will improve  Outcome: Ongoing     Problem: Fluid Volume - Deficit:  Goal: Achieves intake and output within specified parameters  Description: Achieves intake and output within specified parameters  Outcome: Ongoing     Problem: Hyperthermia:  Goal: Ability to maintain a body temperature in the normal range will improve  Description: Ability to maintain a body temperature in the normal range will improve  Outcome: Ongoing     Problem: Tobacco Use:  Goal: Will participate in inpatient tobacco-use cessation counseling  Description: Will participate in inpatient tobacco-use cessation counseling  Outcome: Ongoing     Problem: Pain:  Description: Pain management should include both nonpharmacologic and pharmacologic interventions.   Goal: Pain level will decrease  Description: Pain level will decrease  Outcome: Ongoing  Goal: Control of acute pain  Description: Control of acute pain  Outcome: Ongoing  Goal: Control of chronic pain  Description: Control of chronic pain  Outcome: Ongoing
range will improve  5/5/2021 1732 by Zee Dean RN  Outcome: Ongoing  5/5/2021 0449 by Shabbir Garnica RN  Outcome: Ongoing     Problem: Tobacco Use:  Goal: Will participate in inpatient tobacco-use cessation counseling  Description: Will participate in inpatient tobacco-use cessation counseling  5/5/2021 1732 by Zee Dean RN  Outcome: Ongoing  5/5/2021 0449 by Shabbir Garnica RN  Outcome: Ongoing     Problem: Pain:  Goal: Pain level will decrease  Description: Pain level will decrease  5/5/2021 1732 by Zee Dean RN  Outcome: Ongoing  5/5/2021 0449 by Shabbir Garnica RN  Outcome: Ongoing  Goal: Control of acute pain  Description: Control of acute pain  5/5/2021 1732 by Zee Dean RN  Outcome: Ongoing  5/5/2021 0449 by Shabbir Garnica RN  Outcome: Ongoing  Goal: Control of chronic pain  Description: Control of chronic pain  5/5/2021 1732 by Zee Dean RN  Outcome: Ongoing  5/5/2021 0449 by Shabbir Garnica RN  Outcome: Ongoing

## 2021-05-05 NOTE — PROGRESS NOTES
02:25 Nurse had answered call light and assisted patient out of bed to bathroom. Pt very groggy. Patient voided only. Provided own pericare. Urine speciment obtained for strep pneumoniae and legionella and sent to lab. Assisted back to bed and reattached cardiac monitor. Iv NS remains infusing at 75 ml/hr. Iv vanco completed. 02:28 pt refused to put bipap back on saying, \" I'm allowed to take breaks\". 3 liters n/c applied. Call light and bed table placed in reach of patient. TV remains on. Continues to be free of pain and denies shortness of breath.  Lights turned out at pt request. Nurse notified respiratory therapist that patient no longer on bipap at pt request.

## 2021-05-05 NOTE — PROGRESS NOTES
([facility] [address] and Pneumonia on their problem list.  ONSET DATE: this admission    Recent Chest Xray/CT of Chest: see chart    Date of Eval: 5/5/2021  Evaluating Therapist: Serena Gustafson    Current Diet level:  Current Diet : Regular  Current Liquid Diet : Thin      Primary Complaint  Patient Complaint: pt reports difficulty swallowing liquids, onset 6 months ago    Pain:  Pain Assessment  Pain Assessment: 0-10  Pain Level: 0  Patient's Stated Pain Goal: No pain  Pain Type: Acute pain  Pain Location: Head  Pain Orientation: Mid  Pain Descriptors: Aching  Pain Frequency: Continuous  Pain Onset: On-going  Clinical Progression: Gradually worsening  Functional Pain Assessment: Activities are not prevented  Non-Pharmaceutical Pain Intervention(s): Emotional support  Response to Pain Intervention: Asleep with RR greater than 10, Patient Satisfied    Reason for Referral  Rissa Tineo was referred for a bedside swallow evaluation to assess the efficiency of her swallow function, identify signs and symptoms of aspiration and make recommendations regarding safe dietary consistencies, effective compensatory strategies, and safe eating environment. Impression  Dysphagia Diagnosis: Swallow function appears grossly intact  Dysphagia Outcome Severity Scale: Level 6: Within functional limits/Modified independence     Treatment Plan  Requires SLP Intervention: No  Duration/Frequency of Treatment: N/A          Recommended Diet and Intervention  Diet Solids Recommendation: Regular  Liquid Consistency Recommendation: Thin  Recommended Form of Meds: PO          Compensatory Swallowing Strategies  Compensatory Swallowing Strategies: Upright as possible for all oral intake;Small bites/sips    Treatment/Goals  Short-term Goals  Timeframe for Short-term Goals: N/A    General  Chart Reviewed: Yes  Behavior/Cognition: Alert; Cooperative  Respiratory Status: O2 via nasual cannula  O2 Device: Nasal cannula  Communication Observation:

## 2021-05-05 NOTE — PROGRESS NOTES
72 hours. LIVER PROFILE:  Recent Labs     05/03/21  0310   AST 15   ALT 16   BILITOT 0.2   ALKPHOS 97       Imaging Last 24 Hours:  No results found.   Assessment//Plan           Hospital Problems           Last Modified POA    Pneumonia 5/2/2021 Yes        Assessment & Plan  Acute on chronic  hypoxic and hypercapneic resp failure with COPD exacerbaiton and suspected gram pos pna  -covid neg, bld cx neg so far  -cefepime, vanc  Encephalopathy toxic  UDS pos for amphetamine  KING  DVT prophylaxis  -lovenox    Swallow eval and ambulate to check functional capacity  Electronically signed by Darius Forde MD on 5/4/21 at 9:00 AM EDT

## 2021-05-05 NOTE — PROGRESS NOTES
[facility]  consulted by Dr. Carola De León for monitoring and adjustment. Indication for treatment: Sepsis due to pneumonia  Goal trough: 15-20 mcg/mL    Pertinent Laboratory Values:   Temp Readings from Last 3 Encounters:   05/05/21 97.8 °F (36.6 °C) (Oral)   04/16/21 97.8 °F (36.6 °C) (Oral)   03/13/21 98.7 °F (37.1 °C) (Oral)     Recent Labs     05/03/21  0310   WBC 16.4*     Recent Labs     05/03/21  0310 05/03/21  2239 05/05/21  0447   BUN 18  --   --    CREATININE 0.5* 0.6 0.5*     Estimated Creatinine Clearance: 141 mL/min (A) (based on SCr of 0.5 mg/dL (L)). Intake/Output Summary (Last 24 hours) at 5/5/2021 1135  Last data filed at 5/5/2021 0641  Gross per 24 hour   Intake 1255 ml   Output 500 ml   Net 755 ml       Pertinent Cultures:  Date    Source    Results  5/01   SARS-CoV-2, NAAT   Negative  5/01   Blood                No growth    Vancomycin level:   TROUGH:    Recent Labs     05/03/21 2239   VANCOTROUGH 12.3     RANDOM:  No results for input(s): VANCORANDOM in the last 72 hours. Assessment:  · WBC and temperature: WBC -no recent labs; pt remains afebrile  · SCr, BUN, and urine output: SCR steady around 0.5; I/O net + 755ml  · Day(s) of therapy:  4  · Vancomycin concentration:  5/3 - 12.3, within acceptable range    Plan:  · Vanco level from [5/3] =within acceptable range @12.3, expect some accumulation at steady state. · Renal trends have been normal  · CONTINUE SAME DOSE AND FREQUENCY = Vancomycin 1500 mg IVPB q12h  · Recheck the Vanco level tomorrow. · Pharmacy will continue to monitor patient and adjust therapy as indicated    Miya 3 5/06/21 @11:30    Thank you for the consult.   Chelsie Jalloh MS, [facility]   5/5/2021 11:35 AM

## 2021-05-05 NOTE — PROGRESS NOTES
pulmonary      SUBJECTIVE:  Sleeping and seen earlier today     OBJECTIVE    VITALS:  BP (!) 92/52   Pulse 72   Temp 98.4 °F (36.9 °C) (Axillary)   Resp 10   Ht 5' 4\" (1.626 m)   Wt 220 lb 6.4 oz (100 kg)   SpO2 93%   BMI 37.83 kg/m²   HEAD AND FACE EXAM:  No throat injection, no active exudate,no thrush  NECK EXAM;No JVD, no masses, symmetrical  CHEST EXAM; Expansion equal and symmetrical, no masses  LUNG EXAM; Good breath sounds bilaterally. There are expiratory wheezes both lungs, there are crackles at both lung bases  CARDIOVASCULAR EXAM: Positive S1 and S2, no S3 or S4, no clicks ,no murmurs  RIGHT AND LEFT LOWER EXTRIMITY EXAM: No edema, no swelling, no inflamation            LABS   Lab Results   Component Value Date    WBC 16.4 (H) 05/03/2021    HGB 9.6 (L) 05/03/2021    HCT 32.2 (L) 05/03/2021    MCV 92.3 05/03/2021     05/03/2021     Lab Results   Component Value Date    CREATININE 0.5 (L) 05/05/2021    BUN 18 05/03/2021     05/03/2021    K 4.0 05/03/2021    CL 99 05/03/2021    CO2 39 (H) 05/03/2021     Lab Results   Component Value Date    INR 0.93 03/30/2021    PROTIME 11.2 (L) 03/30/2021        No results found for: PHOS   No results for input(s): PH, PO2ART, [id number], HCO3, BEART, O2SAT in the last 72 hours. Wt Readings from Last 3 Encounters:   05/04/21 220 lb 6.4 oz (100 kg)   04/14/21 170 lb (77.1 kg)   03/13/21 170 lb (77.1 kg)               ASSESMENT  Ac on ch resp failure  Ac copd  Pneumonia  rashad        PLAN  1. cpm  2. Cont pap at night  3.  Further arora going on    5/5/2021  Elmarie Fabry, M.D.

## 2021-05-06 LAB
CULTURE: NORMAL
CULTURE: NORMAL
DOSE AMOUNT: NORMAL
DOSE TIME: NORMAL
Lab: NORMAL
Lab: NORMAL
SPECIMEN: NORMAL
SPECIMEN: NORMAL
VANCOMYCIN TROUGH: 12.2 UG/ML (ref 10–20)

## 2021-05-06 PROCEDURE — 97162 PT EVAL MOD COMPLEX 30 MIN: CPT

## 2021-05-06 PROCEDURE — 2700000000 HC OXYGEN THERAPY PER DAY

## 2021-05-06 PROCEDURE — 36415 COLL VENOUS BLD VENIPUNCTURE: CPT

## 2021-05-06 PROCEDURE — 97116 GAIT TRAINING THERAPY: CPT

## 2021-05-06 PROCEDURE — 94640 AIRWAY INHALATION TREATMENT: CPT

## 2021-05-06 PROCEDURE — 2580000003 HC RX 258: Performed by: PHYSICIAN ASSISTANT

## 2021-05-06 PROCEDURE — 6370000000 HC RX 637 (ALT 250 FOR IP): Performed by: PHYSICIAN ASSISTANT

## 2021-05-06 PROCEDURE — 6360000002 HC RX W HCPCS: Performed by: INTERNAL MEDICINE

## 2021-05-06 PROCEDURE — 2580000003 HC RX 258: Performed by: INTERNAL MEDICINE

## 2021-05-06 PROCEDURE — 2140000000 HC CCU INTERMEDIATE R&B

## 2021-05-06 PROCEDURE — 6360000002 HC RX W HCPCS: Performed by: PHYSICIAN ASSISTANT

## 2021-05-06 PROCEDURE — 94660 CPAP INITIATION&MGMT: CPT

## 2021-05-06 PROCEDURE — 80202 ASSAY OF VANCOMYCIN: CPT

## 2021-05-06 PROCEDURE — 94761 N-INVAS EAR/PLS OXIMETRY MLT: CPT

## 2021-05-06 RX ADMIN — PREGABALIN 100 MG: 100 CAPSULE ORAL at 09:37

## 2021-05-06 RX ADMIN — BUPRENORPHINE HYDROCHLORIDE, NALOXONE HYDROCHLORIDE 1 FILM: 8; 2 FILM, SOLUBLE BUCCAL; SUBLINGUAL at 13:51

## 2021-05-06 RX ADMIN — PREGABALIN 100 MG: 100 CAPSULE ORAL at 20:27

## 2021-05-06 RX ADMIN — VANCOMYCIN HYDROCHLORIDE 1500 MG: 5 INJECTION, POWDER, LYOPHILIZED, FOR SOLUTION INTRAVENOUS at 12:35

## 2021-05-06 RX ADMIN — ASPIRIN 81 MG CHEWABLE TABLET 81 MG: 81 TABLET CHEWABLE at 09:38

## 2021-05-06 RX ADMIN — SODIUM CHLORIDE, PRESERVATIVE FREE 10 ML: 5 INJECTION INTRAVENOUS at 09:38

## 2021-05-06 RX ADMIN — CEFEPIME 2000 MG: 2 INJECTION, POWDER, FOR SOLUTION INTRAVENOUS at 12:07

## 2021-05-06 RX ADMIN — PREGABALIN 100 MG: 100 CAPSULE ORAL at 13:51

## 2021-05-06 RX ADMIN — VANCOMYCIN HYDROCHLORIDE 1500 MG: 5 INJECTION, POWDER, LYOPHILIZED, FOR SOLUTION INTRAVENOUS at 00:21

## 2021-05-06 RX ADMIN — SODIUM CHLORIDE, PRESERVATIVE FREE 10 ML: 5 INJECTION INTRAVENOUS at 20:29

## 2021-05-06 RX ADMIN — TIOTROPIUM BROMIDE INHALATION SPRAY 2 PUFF: 3.12 SPRAY, METERED RESPIRATORY (INHALATION) at 07:18

## 2021-05-06 RX ADMIN — BUDESONIDE AND FORMOTEROL FUMARATE DIHYDRATE 2 PUFF: 80; 4.5 AEROSOL RESPIRATORY (INHALATION) at 21:27

## 2021-05-06 RX ADMIN — BUDESONIDE AND FORMOTEROL FUMARATE DIHYDRATE 2 PUFF: 80; 4.5 AEROSOL RESPIRATORY (INHALATION) at 07:18

## 2021-05-06 RX ADMIN — BUPRENORPHINE HYDROCHLORIDE, NALOXONE HYDROCHLORIDE 1 FILM: 8; 2 FILM, SOLUBLE BUCCAL; SUBLINGUAL at 09:38

## 2021-05-06 RX ADMIN — BUPRENORPHINE HYDROCHLORIDE, NALOXONE HYDROCHLORIDE 1 FILM: 8; 2 FILM, SOLUBLE BUCCAL; SUBLINGUAL at 20:37

## 2021-05-06 RX ADMIN — ENOXAPARIN SODIUM 40 MG: 40 INJECTION SUBCUTANEOUS at 09:38

## 2021-05-06 RX ADMIN — PREDNISONE 40 MG: 20 TABLET ORAL at 09:37

## 2021-05-06 RX ADMIN — CEFEPIME HYDROCHLORIDE 2000 MG: 2 INJECTION, POWDER, FOR SOLUTION INTRAVENOUS at 20:28

## 2021-05-06 ASSESSMENT — PAIN SCALES - GENERAL: PAINLEVEL_OUTOF10: 0

## 2021-05-06 NOTE — CARE COORDINATION
CM in to see Pt to follow up on discharge planning. Pt plan remains home with [facility]. Pt denies any needs at this time. CM available if needs arise.

## 2021-05-06 NOTE — PROGRESS NOTES
This note also relates to the following rows which could not be included:  Resp - Cannot attach notes to unvalidated device data       05/06/21 0130   NIV Type   $NIV $Daily Charge   NIV Started/Stopped On   Equipment Type v60   Mode Bilevel   Mask Type Full face mask   Mask Size Small   Settings/Measurements   IPAP 12 cmH20   CPAP/EPAP 5 cmH2O   Rate Ordered 12   FiO2  40 %   I Time/ I Time % 1.2 s   Vt Exhaled 1492 mL   Mask Leak (lpm) 38 lpm   SpO2 95   Alarm Settings   Alarms On Y   Press Low Alarm 5 cmH2O   High Pressure Alarm 25 cmH2O   Apnea (secs) 20 secs   Resp Rate Low Alarm 12   High Respiratory Rate 40 br/min

## 2021-05-06 NOTE — PROGRESS NOTES
Progress Note  Date:2021       GZYM:[id number]  Patient Name:Kalee Chicas     YOB: 1960     Age:60 y.o. Shadi Berry in bathroom. States she gets tremors and affects her balance at times  Subjective    Subjective:  Symptoms:  Stable. Diet:  Adequate intake. Pain:  She reports no pain. Review of Systems  Objective         Vitals Last 24 Hours:  TEMPERATURE:  Temp  Av.4 °F (36.9 °C)  Min: 97.8 °F (36.6 °C)  Max: 98.7 °F (37.1 °C)  RESPIRATIONS RANGE: Resp  Av.6  Min: 11  Max: 28  PULSE OXIMETRY RANGE: SpO2  Av.1 %  Min: 91 %  Max: 96 %  PULSE RANGE: Pulse  Av  Min: 84  Max: 98  BLOOD PRESSURE RANGE: Systolic (94JBB), DOT:176 , Min:98 , EQR:092   ; Diastolic (64USH), RYC:77, Min:59, Max:118    I/O (24Hr): No intake or output data in the 24 hours ending 21 0752  Objective:  General Appearance:  Comfortable. Vital signs: (most recent): Blood pressure (!) 98/59, pulse 98, temperature 98.4 °F (36.9 °C), temperature source Axillary, resp. rate 14, height 5' 4\" (1.626 m), weight 215 lb 8 oz (97.8 kg), SpO2 95 %. Vital signs are normal.    HEENT: Normal HEENT exam.    Lungs: There are decreased breath sounds. Heart: Normal rate. Abdomen: Abdomen is soft. Bowel sounds are normal.     Extremities: Decreased range of motion. Neurological: Patient is alert. Pupils:  Pupils are equal, round, and reactive to light. Skin:  Warm. Labs/Imaging/Diagnostics    Labs:  CBC:  No results for input(s): WBC, RBC, HGB, HCT, MCV, RDW, PLT in the last 72 hours. CHEMISTRIES:  Recent Labs     21  4064 21  0447   CREATININE 0.6 0.5*     PT/INR:No results for input(s): PROTIME, INR in the last 72 hours. APTT:No results for input(s): APTT in the last 72 hours. LIVER PROFILE:  No results for input(s): AST, ALT, BILIDIR, BILITOT, ALKPHOS in the last 72 hours. Imaging Last 24 Hours:  No results found.   Assessment//Plan           Hospital Problems Last Modified POA    Pneumonia 5/2/2021 Yes        Assessment & Plan  Acute on chronic  hypoxic and hypercapneic resp failure with COPD exacerbaiton and suspected gram pos pna  -covid neg, bld cx neg  -strep and legionella neg  -cefepime, vanc  Swallow eval neg  Encephalopathy toxic  UDS pos for amphetamine  KING  DVT prophylaxis  -lovenox    Had fall   Orthostatics. PT/OT eval and also MRI brain as states falls with tremors. Never had any neuro work up.      Electronically signed by Rajinder Garza MD on 5/4/21 at 9:00 AM EDT

## 2021-05-06 NOTE — CONSULTS
[address] 1960, 3125/3125-A, 2021    History  [place]:  The encounter diagnosis was Sepsis due to pneumonia ([facility] [address] Patient  has a past medical history of COPD (chronic obstructive pulmonary disease) ([facility] [facility] Drug addiction in remission ([facility] [address] [facility] Hep C w/o coma, chronic ([facility] [address] [facility] Pacemaker, Sleep apnea, and TIA (transient ischemic attack). Patient  has a past surgical history that includes  section and Cholecystectomy. Subjective:  Patient states:  Agreeable, \"that felt good\". Pain:  No c/o. Communication with other providers:  Handoff to RN. Restrictions: General    Home Setup/Prior level of function   Lives at home alone, no JOEY, ambulatory, has an aide 7 hours per day to assist with ADL and iADLs. Examination of body systems (includes body structures/functions, activity/participation limitations):  · Observation:  Supine in bed upon arrival  · Vision:  [employer] [place]  · [facility]  · Cardiopulmonary: On 4L of O2 (baseline)  · Cognition: WFL, see OT/SLP note for further evaluation. Musculoskeletal  · ROM R/L:  WFL. · Strength R/L:  4+/5, no weakness in function and endurance. · Neuro: WFL    · Gait pattern: reciprocal, slow adrian    Mobility  · Supine to sit:  SBA  · Transfers: SBA  · Sitting balance:  SBA. · Standing balance:  SBA. · Gait: SBA    [facility] Clicks Inpatient Mobility:       Treatment:  Sup to sit: SBA    Sitting balance: good    Transfers: STS from bed and commode. Gait: ambulated x100ft, overall decreased endurance but this is likely baseline. Safety: patient left in chair with chair alarm, call light within reach, RN notified, gait belt used. Assessment:  Patient is a 63 yo female who presents with acute respiratory with COPD exacerbation, had fall in BR last night, during session is stating she is having tremors but unable to visualize any tremoring during session.  Min SOB but maintains O2 sats well, in high 90's. Rec d/c home with resuming aide services and [facility]. Complexity: Moderate  Prognosis: Good, no significant barriers to participation at this time. Plan Times per week: 2+/week, 1 week,      Equipment: none    Goals:  Short term goals  Time Frame for Short term goals: 1 week  Short term goal 1: Patient will ambulate x150ft mod I with stable O2 sats. Treatment plan:  Bed mobility, transfers, balance, gait, TA, TX. Recommendations for NURSING mobility: ambulate to BR with RW.     Time:   Time in: 0930  Time out: 0956  Timed treatment minutes: 15  Total time: 26    Electronically signed by:    Kirstie Jacinto PT  5/6/2021, 4:32 PM

## 2021-05-06 NOTE — PROGRESS NOTES
At 0445 this am, male NA helped patient to bathroom, she requested to be in bathroom by herself, the NA did crack door. He heard a loud bang and went in to check on patient, found her sitting on floor between bathchair and wall. She explained that she tried to get up and stand but her legs were \"tremoring and jerking\" and she just \"sat down\". Patient states she did not hit her head or sustain any injury's during the episode. Refused CT of head stating \" I did not hit my head or hurt myself and I dont need a CT of my head\". Informed Supervisor and Hospitalist on call tonight. Umm Meeks NP, requested Neuro checks Q4 hrs X 4 occurances.

## 2021-05-06 NOTE — PROGRESS NOTES
[address] [facility]  consulted by Dr. Christian Peterson for monitoring and adjustment. Indication for treatment: Sepsis due to pneumonia  Goal trough: 15-20 mcg/mL    Pertinent Laboratory Values:   Temp Readings from Last 3 Encounters:   05/06/21 98.4 °F (36.9 °C) (Oral)   04/16/21 97.8 °F (36.6 °C) (Oral)   03/13/21 98.7 °F (37.1 °C) (Oral)     No results for input(s): WBC, LACTATE in the last 72 hours. Recent Labs     05/03/21 2239 05/05/21  0447   CREATININE 0.6 0.5*     Estimated Creatinine Clearance: 139 mL/min (A) (based on SCr of 0.5 mg/dL (L)). No intake or output data in the 24 hours ending 05/06/21 1145    Pertinent Cultures:  Date    Source    Results  5/01   SARS-CoV-2, NAAT   Negative  5/01   Blood                No growth    Vancomycin level:   TROUGH:    Recent Labs     05/03/21 2239 05/06/21  1229   VANCOTROUGH 12.3 12.2     RANDOM:  No results for input(s): VANCORANDOM in the last 72 hours. Assessment:  · WBC and temperature: No WBC/afebrile  · SCr, BUN, and urine output: Stable  · Day(s) of therapy:  5  · Vancomycin concentration: To be collected     Plan:  · Increase vancomycin 1750 mg IVPB q12h  · Pharmacy will continue to monitor patient and adjust therapy as indicated    [address] 5/09/21 @11:30    Thank you for the consult.   Mingo Hauser PharmD, [facility]  5/6/2021 11:49 AM

## 2021-05-06 NOTE — PROGRESS NOTES
pulmonary      SUBJECTIVE: she feels better     OBJECTIVE    VITALS:  /62   Pulse 80   Temp 98.4 °F (36.9 °C) (Oral)   Resp 10   Ht 5' 4\" (1.626 m)   Wt 215 lb 8 oz (97.8 kg)   SpO2 95%   BMI 36.99 kg/m²   HEAD AND FACE EXAM:  No throat injection, no active exudate,no thrush  NECK EXAM;No JVD, no masses, symmetrical  CHEST EXAM; Expansion equal and symmetrical, no masses  LUNG EXAM; Good breath sounds bilaterally. There are expiratory wheezes both lungs, there are crackles at both lung bases  CARDIOVASCULAR EXAM: Positive S1 and S2, no S3 or S4, no clicks ,no murmurs  RIGHT AND LEFT LOWER EXTRIMITY EXAM: No edema, no swelling, no inflamation  CNS EXAM: Alert and oriented X3          LABS   Lab Results   Component Value Date    WBC 16.4 (H) 05/03/2021    HGB 9.6 (L) 05/03/2021    HCT 32.2 (L) 05/03/2021    MCV 92.3 05/03/2021     05/03/2021     Lab Results   Component Value Date    CREATININE 0.5 (L) 05/05/2021    BUN 18 05/03/2021     05/03/2021    K 4.0 05/03/2021    CL 99 05/03/2021    CO2 39 (H) 05/03/2021     Lab Results   Component Value Date    INR 0.93 03/30/2021    PROTIME 11.2 (L) 03/30/2021        No results found for: PHOS   No results for input(s): PH, PO2ART, FGJ9JNW, HCO3, BEART, O2SAT in the last 72 hours. Wt Readings from Last 3 Encounters:   05/06/21 215 lb 8 oz (97.8 kg)   04/14/21 170 lb (77.1 kg)   03/13/21 170 lb (77.1 kg)               ASSESMENT  Ac on ch resp failure  Ac copd  Pneumonia  rashad        PLAN  1. cpm  2. Cont pap rx  3.  Neurology consultation may be helpful for jerky spasmatic movement    5/6/2021  Prachi Clemens M.D.

## 2021-05-07 ENCOUNTER — APPOINTMENT (OUTPATIENT)
Dept: MRI IMAGING | Age: 61
DRG: 720 | End: 2021-05-07
Payer: COMMERCIAL

## 2021-05-07 LAB
ALBUMIN SERPL-MCNC: 3.3 GM/DL (ref 3.4–5)
ANION GAP SERPL CALCULATED.3IONS-SCNC: 4 MMOL/L (ref 4–16)
BUN BLDV-MCNC: 11 MG/DL (ref 6–23)
CALCIUM SERPL-MCNC: 8.8 MG/DL (ref 8.3–10.6)
CHLORIDE BLD-SCNC: 95 MMOL/L (ref 99–110)
CO2: 42 MMOL/L (ref 21–32)
CREAT SERPL-MCNC: 0.5 MG/DL (ref 0.6–1.1)
GFR AFRICAN AMERICAN: >60 ML/MIN/1.73M2
GFR NON-AFRICAN AMERICAN: >60 ML/MIN/1.73M2
GLUCOSE BLD-MCNC: 135 MG/DL (ref 70–99)
MYCOPLASMA PNEUMONIAE IGM: 0.21 U/L
PHOSPHORUS: 4.4 MG/DL (ref 2.5–4.9)
POTASSIUM SERPL-SCNC: 4 MMOL/L (ref 3.5–5.1)
SODIUM BLD-SCNC: 141 MMOL/L (ref 135–145)

## 2021-05-07 PROCEDURE — 6360000002 HC RX W HCPCS: Performed by: PHYSICIAN ASSISTANT

## 2021-05-07 PROCEDURE — 6360000002 HC RX W HCPCS: Performed by: INTERNAL MEDICINE

## 2021-05-07 PROCEDURE — 94640 AIRWAY INHALATION TREATMENT: CPT

## 2021-05-07 PROCEDURE — 2580000003 HC RX 258: Performed by: PHYSICIAN ASSISTANT

## 2021-05-07 PROCEDURE — 6370000000 HC RX 637 (ALT 250 FOR IP): Performed by: PHYSICIAN ASSISTANT

## 2021-05-07 PROCEDURE — 94660 CPAP INITIATION&MGMT: CPT

## 2021-05-07 PROCEDURE — 36415 COLL VENOUS BLD VENIPUNCTURE: CPT

## 2021-05-07 PROCEDURE — 80069 RENAL FUNCTION PANEL: CPT

## 2021-05-07 PROCEDURE — 2580000003 HC RX 258: Performed by: INTERNAL MEDICINE

## 2021-05-07 PROCEDURE — 2140000000 HC CCU INTERMEDIATE R&B

## 2021-05-07 RX ADMIN — ENOXAPARIN SODIUM 40 MG: 40 INJECTION SUBCUTANEOUS at 08:57

## 2021-05-07 RX ADMIN — CEFEPIME HYDROCHLORIDE 2000 MG: 2 INJECTION, POWDER, FOR SOLUTION INTRAVENOUS at 12:16

## 2021-05-07 RX ADMIN — VANCOMYCIN HYDROCHLORIDE 1750 MG: 5 INJECTION, POWDER, LYOPHILIZED, FOR SOLUTION INTRAVENOUS at 00:13

## 2021-05-07 RX ADMIN — BUPRENORPHINE HYDROCHLORIDE, NALOXONE HYDROCHLORIDE 1 FILM: 8; 2 FILM, SOLUBLE BUCCAL; SUBLINGUAL at 19:52

## 2021-05-07 RX ADMIN — TIOTROPIUM BROMIDE INHALATION SPRAY 2 PUFF: 3.12 SPRAY, METERED RESPIRATORY (INHALATION) at 08:47

## 2021-05-07 RX ADMIN — SODIUM CHLORIDE, PRESERVATIVE FREE 10 ML: 5 INJECTION INTRAVENOUS at 19:53

## 2021-05-07 RX ADMIN — BUDESONIDE AND FORMOTEROL FUMARATE DIHYDRATE 2 PUFF: 80; 4.5 AEROSOL RESPIRATORY (INHALATION) at 08:46

## 2021-05-07 RX ADMIN — BUPRENORPHINE HYDROCHLORIDE, NALOXONE HYDROCHLORIDE 1 FILM: 8; 2 FILM, SOLUBLE BUCCAL; SUBLINGUAL at 14:56

## 2021-05-07 RX ADMIN — VANCOMYCIN HYDROCHLORIDE 1750 MG: 5 INJECTION, POWDER, LYOPHILIZED, FOR SOLUTION INTRAVENOUS at 23:30

## 2021-05-07 RX ADMIN — PREGABALIN 100 MG: 100 CAPSULE ORAL at 14:56

## 2021-05-07 RX ADMIN — SODIUM CHLORIDE, PRESERVATIVE FREE 10 ML: 5 INJECTION INTRAVENOUS at 08:58

## 2021-05-07 RX ADMIN — BUDESONIDE AND FORMOTEROL FUMARATE DIHYDRATE 2 PUFF: 80; 4.5 AEROSOL RESPIRATORY (INHALATION) at 20:49

## 2021-05-07 RX ADMIN — TRAZODONE HYDROCHLORIDE 150 MG: 50 TABLET ORAL at 04:21

## 2021-05-07 RX ADMIN — CEFEPIME HYDROCHLORIDE 2000 MG: 2 INJECTION, POWDER, FOR SOLUTION INTRAVENOUS at 04:21

## 2021-05-07 RX ADMIN — PREGABALIN 100 MG: 100 CAPSULE ORAL at 08:57

## 2021-05-07 RX ADMIN — VANCOMYCIN HYDROCHLORIDE 1750 MG: 5 INJECTION, POWDER, LYOPHILIZED, FOR SOLUTION INTRAVENOUS at 12:14

## 2021-05-07 RX ADMIN — PREGABALIN 100 MG: 100 CAPSULE ORAL at 19:52

## 2021-05-07 RX ADMIN — BUPRENORPHINE HYDROCHLORIDE, NALOXONE HYDROCHLORIDE 1 FILM: 8; 2 FILM, SOLUBLE BUCCAL; SUBLINGUAL at 08:57

## 2021-05-07 RX ADMIN — CEFEPIME HYDROCHLORIDE 2000 MG: 2 INJECTION, POWDER, FOR SOLUTION INTRAVENOUS at 19:52

## 2021-05-07 RX ADMIN — ASPIRIN 81 MG CHEWABLE TABLET 81 MG: 81 TABLET CHEWABLE at 08:57

## 2021-05-07 ASSESSMENT — PAIN SCALES - GENERAL
PAINLEVEL_OUTOF10: 0
PAINLEVEL_OUTOF10: 0

## 2021-05-07 NOTE — PROGRESS NOTES
[address] [facility]  consulted by Dr. Margaret Zhang for monitoring and adjustment. Indication for treatment: Sepsis due to pneumonia  Goal trough: 15 mcg/mL    Pertinent Laboratory Values:   Temp Readings from Last 3 Encounters:   05/07/21 98.4 °F (36.9 °C) (Oral)   04/16/21 97.8 °F (36.6 °C) (Oral)   03/13/21 98.7 °F (37.1 °C) (Oral)     No results for input(s): WBC, LACTATE in the last 72 hours. Recent Labs     05/05/21  0447 05/07/21  0312   BUN  --  11   CREATININE 0.5* 0.5*     Estimated Creatinine Clearance: 139 mL/min (A) (based on SCr of 0.5 mg/dL (L)). Intake/Output Summary (Last 24 hours) at 5/7/2021 0955  Last data filed at 5/7/2021 0520  Gross per 24 hour   Intake 1180 ml   Output --   Net 1180 ml     Pertinent Cultures:  Date    Source    Results  5/01   SARS-CoV-2, NAAT   Negative  5/01   Blood                No growth    Vancomycin level:   TROUGH:    Recent Labs     05/06/21  1229   VANCOTROUGH 12.2     RANDOM:  No results for input(s): VANCORANDOM in the last 72 hours. Assessment:  · WBC and temperature: No WBC/afebrile  · SCr, BUN, and urine output: Stable  · Day(s) of therapy:  6  · Vancomycin concentration: To be collected     Plan:  · Vancomycin 1750 mg IVPB q12h  · Pharmacy will continue to monitor patient and adjust therapy as indicated    [address] 3 5/09/21 @11:30    Thank you for the consult.   Lonnie Metcalf PharmD, [facility]  5/7/2021 9:55 AM

## 2021-05-07 NOTE — PROGRESS NOTES
Assessment completed (see doc flow sheet), in bed resting, bed alarm activated, call light within reach, without distress, vital signs stable (see doc flow sheet), denies pain or needs at present time, will continue to monitor and treat. Luc Pierre

## 2021-05-07 NOTE — PROGRESS NOTES
pulmonary      SUBJECTIVE: seen early am and sleeping and no sob     OBJECTIVE    VITALS:  /63   Pulse 79   Temp 98.4 °F (36.9 °C) (Oral)   Resp 13   Ht 5' 4\" (1.626 m)   Wt 215 lb (97.5 kg)   SpO2 94%   BMI 36.90 kg/m²   HEAD AND FACE EXAM:  No throat injection, no active exudate,no thrush  NECK EXAM;No JVD, no masses, symmetrical  CHEST EXAM; Expansion equal and symmetrical, no masses  LUNG EXAM; Good breath sounds bilaterally. There are expiratory wheezes both lungs, there are crackles at both lung bases  CARDIOVASCULAR EXAM: Positive S1 and S2, no S3 or S4, no clicks ,no murmurs  RIGHT AND LEFT LOWER EXTRIMITY EXAM: No edema, no swelling, no inflamation            LABS   Lab Results   Component Value Date    WBC 16.4 (H) 05/03/2021    HGB 9.6 (L) 05/03/2021    HCT 32.2 (L) 05/03/2021    MCV 92.3 05/03/2021     05/03/2021     Lab Results   Component Value Date    CREATININE 0.5 (L) 05/07/2021    BUN 11 05/07/2021     05/07/2021    K 4.0 05/07/2021    CL 95 (L) 05/07/2021    CO2 42 (H) 05/07/2021     Lab Results   Component Value Date    INR 0.93 03/30/2021    PROTIME 11.2 (L) 03/30/2021          Lab Results   Component Value Date    PHOS 4.4 05/07/2021      No results for input(s): PH, PO2ART, URU4NJT, HCO3, BEART, O2SAT in the last 72 hours. Wt Readings from Last 3 Encounters:   05/06/21 215 lb (97.5 kg)   04/14/21 170 lb (77.1 kg)   03/13/21 170 lb (77.1 kg)               ASSESMENT  Ac on ch resp failure  Ac copd  Pneumonia  rashad        PLAN  1. cpm  2. o2 adm  3.  Neuro arora    5/7/2021  Dragan Cooper M.D.

## 2021-05-07 NOTE — PROGRESS NOTES
Progress Note  Date:2021       RZZE:[id number]  Patient Name:Kalee Chicas     YOB: 1960     Age:60 y.o. States still gets \"jerking\" and spasms which affects her activity. I have not witnessed personally during my exam  Subjective    Subjective:  Symptoms:  Stable. Diet:  Adequate intake. Pain:  She reports no pain. Review of Systems  Objective         Vitals Last 24 Hours:  TEMPERATURE:  Temp  Av.5 °F (36.9 °C)  Min: 98.2 °F (36.8 °C)  Max: 98.8 °F (37.1 °C)  RESPIRATIONS RANGE: Resp  Av.9  Min: 10  Max: 22  PULSE OXIMETRY RANGE: SpO2  Av.5 %  Min: 91 %  Max: 95 %  PULSE RANGE: Pulse  Av.2  Min: 79  Max: 97  BLOOD PRESSURE RANGE: Systolic (77OGE), PBK:163 , Min:102 , AEU:388   ; Diastolic (85ZEQ), FYX:41, Min:53, Max:116    I/O (24Hr): Intake/Output Summary (Last 24 hours) at 2021 0725  Last data filed at 2021 0520  Gross per 24 hour   Intake 1180 ml   Output --   Net 1180 ml     Objective:  General Appearance:  Comfortable. Vital signs: (most recent): Blood pressure (!) 110/53, pulse 79, temperature 98.4 °F (36.9 °C), temperature source Oral, resp. rate 13, height 5' 4\" (1.626 m), weight 215 lb (97.5 kg), SpO2 93 %. Vital signs are normal.    HEENT: Normal HEENT exam.    Lungs: There are decreased breath sounds. Heart: Normal rate. Abdomen: Abdomen is soft. Bowel sounds are normal.     Extremities: Decreased range of motion. Neurological: Patient is alert. Pupils:  Pupils are equal, round, and reactive to light. Skin:  Warm. Labs/Imaging/Diagnostics    Labs:  CBC:  No results for input(s): WBC, RBC, HGB, HCT, MCV, RDW, PLT in the last 72 hours. CHEMISTRIES:  Recent Labs     21  0447 21  0312   NA  --  141   K  --  4.0   CL  --  95*   CO2  --  42*   BUN  --  11   CREATININE 0.5* 0.5*   GLUCOSE  --  135*   PHOS  --  4.4     PT/INR:No results for input(s): PROTIME, INR in the last 72 hours.   APTT:No results for input(s): APTT in the last 72 hours. LIVER PROFILE:  No results for input(s): AST, ALT, BILIDIR, BILITOT, ALKPHOS in the last 72 hours. Imaging Last 24 Hours:  No results found.   Assessment//Plan           Hospital Problems           Last Modified POA    Pneumonia 5/2/2021 Yes        Assessment & Plan  Acute on chronic  hypoxic and hypercapneic resp failure with COPD exacerbaiton and suspected gram pos pna  -covid neg, bld cx neg  -strep and legionella neg  -cefepime, vanc  Swallow eval neg  Encephalopathy toxic  UDS pos for amphetamine  KING  Fall  -orthostatic neg  -states gets tremors/spasms which affects movement  -MRI brain  DVT prophylaxis  -lovenox        Electronically signed by Keyla Dietrich MD on 5/4/21 at 9:00 AM EDT

## 2021-05-07 NOTE — PROGRESS NOTES
Comprehensive Nutrition Assessment    Type and Reason for Visit:  Initial(los 6 d)    Nutrition Recommendations/Plan:   Continue General Diet    Nutrition Assessment:  Pt admitted with PNA. Adequate intake on General Diet, feeding self and consuming 76% to all of meals. Recent wt gain noted per Epic records. Skin intact. Pt is sleeping during room visit. Will continue to follow as low nutrition risk.     Malnutrition Assessment:  Malnutrition Status:  No malnutrition    Context:  Acute Illness       Wounds:  None       Current Nutrition Therapies:    DIET GENERAL    Anthropometric Measures:  · Height: 5' 4\" (162.6 cm)  · Current Body Weight: 215 lb (97.5 kg)   · Admission Body Weight: 209 lb 12.8 oz (95.2 kg)    · Usual Body Weight: 158 lb 11.2 oz (72 kg)(9/24/20)     · Ideal Body Weight: 120 lbs; % Ideal Body Weight 179.2 %   · BMI: 36.9  · BMI Categories: Obese Class 2 (BMI 35.0 -39.9)       Nutrition Diagnosis:   · Overweight/Obese related to excessive energy intake as evidenced by BMI    Nutrition Interventions:   Food and/or Nutrient Delivery:  Continue Current Diet  Nutrition Education/Counseling:  No recommendation at this time   Coordination of Nutrition Care:  Continue to monitor while inpatient    Goals:  Pt will consume at least 50% of her meals and supplements       Nutrition Monitoring and Evaluation:   Behavioral-Environmental Outcomes:  None Identified   Food/Nutrient Intake Outcomes:  Food and Nutrient Intake  Physical Signs/Symptoms Outcomes:  Biochemical Data, Weight     Discharge Planning:    Recommend pursue outpatient nutrition counseling     Electronically signed by Jess Carbajal RD, LD on 5/7/21 at 1:37 PM EDT    Contact: [phone]

## 2021-05-07 NOTE — PROGRESS NOTES
Pt alert and oriented x4 at shift change, no AMS, Neuro check neg. Independent to Bathroom. No new clinical manifestations at this time. NSR on tele.

## 2021-05-08 ENCOUNTER — APPOINTMENT (OUTPATIENT)
Dept: CT IMAGING | Age: 61
DRG: 720 | End: 2021-05-08
Payer: COMMERCIAL

## 2021-05-08 VITALS
TEMPERATURE: 98.4 F | HEIGHT: 64 IN | BODY MASS INDEX: 36.47 KG/M2 | DIASTOLIC BLOOD PRESSURE: 60 MMHG | RESPIRATION RATE: 11 BRPM | OXYGEN SATURATION: 96 % | HEART RATE: 83 BPM | WEIGHT: 213.6 LBS | SYSTOLIC BLOOD PRESSURE: 103 MMHG

## 2021-05-08 LAB
ALBUMIN SERPL-MCNC: 3.4 GM/DL (ref 3.4–5)
ANION GAP SERPL CALCULATED.3IONS-SCNC: 5 MMOL/L (ref 4–16)
BUN BLDV-MCNC: 11 MG/DL (ref 6–23)
CALCIUM SERPL-MCNC: 8.9 MG/DL (ref 8.3–10.6)
CHLORIDE BLD-SCNC: 93 MMOL/L (ref 99–110)
CO2: 45 MMOL/L (ref 21–32)
CREAT SERPL-MCNC: 0.5 MG/DL (ref 0.6–1.1)
GFR AFRICAN AMERICAN: >60 ML/MIN/1.73M2
GFR NON-AFRICAN AMERICAN: >60 ML/MIN/1.73M2
GLUCOSE BLD-MCNC: 118 MG/DL (ref 70–99)
PHOSPHORUS: 5.2 MG/DL (ref 2.5–4.9)
POTASSIUM SERPL-SCNC: 4.3 MMOL/L (ref 3.5–5.1)
SODIUM BLD-SCNC: 143 MMOL/L (ref 135–145)

## 2021-05-08 PROCEDURE — 6360000002 HC RX W HCPCS: Performed by: PHYSICIAN ASSISTANT

## 2021-05-08 PROCEDURE — 94660 CPAP INITIATION&MGMT: CPT

## 2021-05-08 PROCEDURE — 6370000000 HC RX 637 (ALT 250 FOR IP): Performed by: PHYSICIAN ASSISTANT

## 2021-05-08 PROCEDURE — 94761 N-INVAS EAR/PLS OXIMETRY MLT: CPT

## 2021-05-08 PROCEDURE — 6360000002 HC RX W HCPCS: Performed by: INTERNAL MEDICINE

## 2021-05-08 PROCEDURE — 80069 RENAL FUNCTION PANEL: CPT

## 2021-05-08 PROCEDURE — 2580000003 HC RX 258: Performed by: INTERNAL MEDICINE

## 2021-05-08 PROCEDURE — 36415 COLL VENOUS BLD VENIPUNCTURE: CPT

## 2021-05-08 PROCEDURE — 2580000003 HC RX 258: Performed by: PHYSICIAN ASSISTANT

## 2021-05-08 PROCEDURE — 94640 AIRWAY INHALATION TREATMENT: CPT

## 2021-05-08 PROCEDURE — 70450 CT HEAD/BRAIN W/O DYE: CPT

## 2021-05-08 PROCEDURE — 2700000000 HC OXYGEN THERAPY PER DAY

## 2021-05-08 RX ORDER — AMOXICILLIN AND CLAVULANATE POTASSIUM 875; 125 MG/1; MG/1
1 TABLET, FILM COATED ORAL 2 TIMES DAILY
Qty: 8 TABLET | Refills: 0 | Status: SHIPPED | OUTPATIENT
Start: 2021-05-08 | End: 2021-05-12

## 2021-05-08 RX ADMIN — CEFEPIME HYDROCHLORIDE 2000 MG: 2 INJECTION, POWDER, FOR SOLUTION INTRAVENOUS at 04:34

## 2021-05-08 RX ADMIN — PREGABALIN 100 MG: 100 CAPSULE ORAL at 08:37

## 2021-05-08 RX ADMIN — BUPRENORPHINE HYDROCHLORIDE, NALOXONE HYDROCHLORIDE 1 FILM: 8; 2 FILM, SOLUBLE BUCCAL; SUBLINGUAL at 08:47

## 2021-05-08 RX ADMIN — ACETAMINOPHEN 650 MG: 325 TABLET ORAL at 13:14

## 2021-05-08 RX ADMIN — ENOXAPARIN SODIUM 40 MG: 40 INJECTION SUBCUTANEOUS at 08:47

## 2021-05-08 RX ADMIN — ASPIRIN 81 MG CHEWABLE TABLET 81 MG: 81 TABLET CHEWABLE at 08:37

## 2021-05-08 RX ADMIN — SODIUM CHLORIDE, PRESERVATIVE FREE 10 ML: 5 INJECTION INTRAVENOUS at 08:48

## 2021-05-08 RX ADMIN — ACETAMINOPHEN 650 MG: 325 TABLET ORAL at 06:46

## 2021-05-08 RX ADMIN — TIOTROPIUM BROMIDE INHALATION SPRAY 2 PUFF: 3.12 SPRAY, METERED RESPIRATORY (INHALATION) at 07:27

## 2021-05-08 RX ADMIN — VANCOMYCIN HYDROCHLORIDE 1750 MG: 5 INJECTION, POWDER, LYOPHILIZED, FOR SOLUTION INTRAVENOUS at 12:03

## 2021-05-08 RX ADMIN — CEFEPIME HYDROCHLORIDE 2000 MG: 2 INJECTION, POWDER, FOR SOLUTION INTRAVENOUS at 12:03

## 2021-05-08 RX ADMIN — BUPRENORPHINE HYDROCHLORIDE, NALOXONE HYDROCHLORIDE 1 FILM: 8; 2 FILM, SOLUBLE BUCCAL; SUBLINGUAL at 14:38

## 2021-05-08 RX ADMIN — PREGABALIN 100 MG: 100 CAPSULE ORAL at 14:38

## 2021-05-08 RX ADMIN — BUDESONIDE AND FORMOTEROL FUMARATE DIHYDRATE 2 PUFF: 80; 4.5 AEROSOL RESPIRATORY (INHALATION) at 07:27

## 2021-05-08 ASSESSMENT — PAIN SCALES - GENERAL
PAINLEVEL_OUTOF10: 0
PAINLEVEL_OUTOF10: 8
PAINLEVEL_OUTOF10: 8

## 2021-05-08 NOTE — PROGRESS NOTES
Progress Note  Date:2021       [facility]:[id number]  Patient Name:Kalee Chicas     YOB: 1960     Age:60 y.o. Upset she cant get MRI brain  Subjective    Subjective:  Symptoms:  Stable. Diet:  Adequate intake. Pain:  She reports no pain. Review of Systems  Objective         Vitals Last 24 Hours:  TEMPERATURE:  Temp  Av.5 °F (36.9 °C)  Min: 98.3 °F (36.8 °C)  Max: 98.8 °F (37.1 °C)  RESPIRATIONS RANGE: Resp  Av.7  Min: 10  Max: 21  PULSE OXIMETRY RANGE: SpO2  Av.2 %  Min: 93 %  Max: 96 %  PULSE RANGE: Pulse  Av  Min: 81  Max: 88  BLOOD PRESSURE RANGE: Systolic (65IBK), NYW:053 , Min:105 , NPS:756   ; Diastolic (36NAF), IGK:32, Min:56, Max:80    I/O (24Hr): Intake/Output Summary (Last 24 hours) at 2021 0713  Last data filed at 2021 0650  Gross per 24 hour   Intake 360 ml   Output 1400 ml   Net -1040 ml     Objective:  General Appearance:  Comfortable. Vital signs: (most recent): Blood pressure (!) 109/56, pulse 85, temperature 98.8 °F (37.1 °C), temperature source Oral, resp. rate 18, height 5' 4\" (1.626 m), weight 213 lb 9.6 oz (96.9 kg), SpO2 93 %. Vital signs are normal.    HEENT: Normal HEENT exam.    Lungs: There are decreased breath sounds. Heart: Normal rate. Abdomen: Abdomen is soft. Bowel sounds are normal.     Extremities: Decreased range of motion. Neurological: Patient is alert. Pupils:  Pupils are equal, round, and reactive to light. Skin:  Warm. Labs/Imaging/Diagnostics    Labs:  CBC:  No results for input(s): WBC, RBC, HGB, HCT, MCV, RDW, PLT in the last 72 hours. CHEMISTRIES:  Recent Labs     21  0312      K 4.0   CL 95*   CO2 42*   BUN 11   CREATININE 0.5*   GLUCOSE 135*   PHOS 4.4     PT/INR:No results for input(s): PROTIME, INR in the last 72 hours. APTT:No results for input(s): APTT in the last 72 hours.   LIVER PROFILE:  No results for input(s): AST, ALT, BILIDIR, BILITOT, ALKPHOS in the last 72

## 2021-05-08 NOTE — PROGRESS NOTES
[address] [facility]  consulted by Dr. Ronald Barrientos for monitoring and adjustment. Indication for treatment: Pneumonia  Goal trough: 15 mcg/mL    Pertinent Laboratory Values:   Temp Readings from Last 3 Encounters:   05/08/21 98.7 °F (37.1 °C) (Oral)   04/16/21 97.8 °F (36.6 °C) (Oral)   03/13/21 98.7 °F (37.1 °C) (Oral)     No results for input(s): WBC, LACTATE in the last 72 hours. Recent Labs     05/07/21  0312 05/08/21  0639   BUN 11 11   CREATININE 0.5* 0.5*     Estimated Creatinine Clearance: 139 mL/min (A) (based on SCr of 0.5 mg/dL (L)). Intake/Output Summary (Last 24 hours) at 5/8/2021 1315  Last data filed at 5/8/2021 1158  Gross per 24 hour   Intake --   Output 1800 ml   Net -1800 ml     Pertinent Cultures:  Date    Source    Results  5/01   SARS-CoV-2, NAAT   Negative  5/01   Blood                No growth    Vancomycin level:   TROUGH:    Recent Labs     05/06/21  1229   VANCOTROUGH 12.2     RANDOM:  No results for input(s): VANCORANDOM in the last 72 hours. Assessment:  · WBC and temperature: No WBC/afebrile  · SCr, BUN, and urine output: stable  · Day(s) of therapy:  7  · Vancomycin concentration:    · 5/3: 12.3, on vancomycin 1500 mg q12h   · 5/6: 12.2, on vancomycin 1500 mg q12h     Plan:  · Vancomycin 1750 mg q12h  · Pharmacy will continue to monitor patient and adjust therapy as indicated    Miya 3 5/09/21 @11:30    Thank you for the consult.   Tamanna TracyD, BCPS   5/8/2021 1:15 PM

## 2021-05-08 NOTE — PROGRESS NOTES
pulmonary      SUBJECTIVE:  Sleeping and on o2     OBJECTIVE    VITALS:  /73   Pulse 76   Temp 98.7 °F (37.1 °C) (Oral)   Resp 9   Ht 5' 4\" (1.626 m)   Wt 213 lb 9.6 oz (96.9 kg)   SpO2 93%   BMI 36.66 kg/m²   HEAD AND FACE EXAM:  No throat injection, no active exudate,no thrush  NECK EXAM;No JVD, no masses, symmetrical  CHEST EXAM; Expansion equal and symmetrical, no masses  LUNG EXAM; Good breath sounds bilaterally. There are expiratory wheezes both lungs, there are crackles at both lung bases  CARDIOVASCULAR EXAM: Positive S1 and S2, no S3 or S4, no clicks ,no murmurs  RIGHT AND LEFT LOWER EXTRIMITY EXAM: No edema, no swelling, no inflamation            LABS   Lab Results   Component Value Date    WBC 16.4 (H) 05/03/2021    HGB 9.6 (L) 05/03/2021    HCT 32.2 (L) 05/03/2021    MCV 92.3 05/03/2021     05/03/2021     Lab Results   Component Value Date    CREATININE 0.5 (L) 05/08/2021    BUN 11 05/08/2021     05/08/2021    K 4.3 05/08/2021    CL 93 (L) 05/08/2021    CO2 45 (H) 05/08/2021     Lab Results   Component Value Date    INR 0.93 03/30/2021    PROTIME 11.2 (L) 03/30/2021          Lab Results   Component Value Date    PHOS 5.2 05/08/2021    PHOS 4.4 05/07/2021      No results for input(s): PH, PO2ART, WTH3GPF, HCO3, BEART, O2SAT in the last 72 hours. Wt Readings from Last 3 Encounters:   05/08/21 213 lb 9.6 oz (96.9 kg)   04/14/21 170 lb (77.1 kg)   03/13/21 170 lb (77.1 kg)               ASSESMENT  Ac on ch resp failure  Ac copd  Pneumonia  rashad        PLAN  1. o2 adm  2.  Pap rx  3. cpm    5/8/2021  Geraldo Rodrigues M.D.

## 2021-05-08 NOTE — DISCHARGE SUMMARY
Physician Discharge Summary     Patient ID:  Toshia Ricardo  [id number]  89 y.o.  1960    Admit date: 5/1/2021    Discharge date and time: No discharge date for patient encounter. Admitting Physician: Jaleel Lin MD     Discharge Physician: Anna Marie Crawley    Admission Diagnoses: Pneumonia [J18.9]  Sepsis due to pneumonia ([address] [J18.9, A41.9]    Discharge Diagnoses: acute on chronic hypoxic and hypercapneic resp failure with COPD exacerbation and suspected gram pos pna                                    Toxic encephalopathy                                     Fall    Admission Condition: fair    Discharged Condition: good    Indication for Admission: resp failure    Hospital Course: 64 y.o. female with a history of COPD, hepatitis C, obstructive sleep apnea, TIA, history of drug abuse, recent hospitalization for pneumonia, presents for evaluation of shortness of breath. Patient is a poor historian on exam but states that she has felt poorly since discharge and has been coughing recently. She denies illicit drug use. She denies fevers or chills. No chest pain, leg swelling, abdominal pain, headache, dysuria, hematuria. Patient has had normal appetite. She was admitted for resp failure due to pna and COPD exacerbatin and placed on IV abx and steroids and titrated off to oral abx. She is on chronic oxygen. She did have a fall and CT head neg. She complained that she has tremors which affected movement but I did not personally witness this and PT/OT evaluation recommended [facility]. Did order MRI brain but not a candidate with pacer and recmomended neuro outpt work up as her symptoms of tremors/spasms have been occurring for 2years and can be followed up outpt. She was stable and discharged. She did have toixc encephaloapthy on admission as on suboxone and adderall but these medications were not changed as was stable.    Consults: pulmonary/intensive care        Outstanding Order Results     No orders found from 4/2/2021 to 5/2/2021. Discharge Exam:  HEENT: Normal HEENT exam.    Lungs: There are decreased breath sounds. Heart: Normal rate. Abdomen: Abdomen is soft. Bowel sounds are normal.     Extremities: Decreased range of motion. Neurological: Patient is alert. Pupils:  Pupils are equal, round, and reactive to light. Skin:  Warm. Disposition: home    Patient Instructions:      Medication List      START taking these medications    amoxicillin-clavulanate 875-125 MG per tablet  Commonly known as: AUGMENTIN  Take 1 tablet by mouth 2 times daily for 4 days        CONTINUE taking these medications    * albuterol (2.5 MG/3ML) 0.083% nebulizer solution  Commonly known as: PROVENTIL  USE ONE VIAL BY NEBULIZER EVERY 6 HOURS AS NEEDED FOR WHEEZING     * albuterol sulfate  (90 Base) MCG/ACT inhaler  USE 2 PUFFS BY MOUTH EVERY 6 HOURS AS NEEDED SHAKE WELL     amphetamine-dextroamphetamine 30 MG extended release capsule  Commonly known as: ADDERALL XR     aspirin 81 MG chewable tablet     CPAP Machine Misc     Dulera 100-5 MCG/ACT inhaler  Generic drug: mometasone-formoterol  INHALE 2 PUFFS BY MOUTH 2 TIMES A DAY SHAKE WELL     Nebulizer Misc  Use nebulizer with medication as directed. Nebulizer/Tubing/Mouthpiece Kit  Inhale 1 kit into the lungs daily as needed (as directed)     OXYGEN     pantoprazole 40 MG tablet  Commonly known as: PROTONIX  Take 1 tablet by mouth every morning (before breakfast)     pregabalin 100 MG capsule  Commonly known as: LYRICA     Spiriva Respimat 2.5 MCG/ACT Aers inhaler  Generic drug: tiotropium  INHALE 2 PUFFS BY MOUTH ONCE DAILY     Suboxone 8-2 MG Film SL film  Generic drug: buprenorphine-naloxone     traMADol 50 MG tablet  Commonly known as: ULTRAM     traZODone 150 MG tablet  Commonly known as: DESYREL     WOMENS MULTIVITAMIN PO         * This list has 2 medication(s) that are the same as other medications prescribed for you.  Read the directions carefully, and ask your doctor or other care provider to review them with you. Where to Get Your Medications      You can get these medications from any pharmacy    Bring a paper prescription for each of these medications  · amoxicillin-clavulanate 875-125 MG per tablet         Activity: activity as tolerated  Diet: cardiac diet  Wound Care: none needed    Follow-up with PCP.   Discharge time 35min  Signed:  Venkat Morton  5/8/2021  1:10 PM

## 2021-05-09 ENCOUNTER — APPOINTMENT (OUTPATIENT)
Dept: CT IMAGING | Age: 61
End: 2021-05-09
Payer: COMMERCIAL

## 2021-05-09 ENCOUNTER — APPOINTMENT (OUTPATIENT)
Dept: GENERAL RADIOLOGY | Age: 61
End: 2021-05-09
Payer: COMMERCIAL

## 2021-05-09 ENCOUNTER — HOSPITAL ENCOUNTER (EMERGENCY)
Age: 61
Discharge: HOME | End: 2021-05-09
Payer: COMMERCIAL

## 2021-05-09 VITALS
SYSTOLIC BLOOD PRESSURE: 121 MMHG | HEART RATE: 77 BPM | RESPIRATION RATE: 18 BRPM | HEIGHT: 64 IN | WEIGHT: 213 LBS | DIASTOLIC BLOOD PRESSURE: 71 MMHG | OXYGEN SATURATION: 91 % | TEMPERATURE: 99 F | BODY MASS INDEX: 36.37 KG/M2

## 2021-05-09 DIAGNOSIS — R25.1 SHAKING: Primary | ICD-10-CM

## 2021-05-09 DIAGNOSIS — S00.83XA FOREHEAD CONTUSION, INITIAL ENCOUNTER: ICD-10-CM

## 2021-05-09 DIAGNOSIS — S09.90XA INJURY OF HEAD, INITIAL ENCOUNTER: ICD-10-CM

## 2021-05-09 LAB
ALBUMIN SERPL-MCNC: 3.6 GM/DL (ref 3.4–5)
ALP BLD-CCNC: 89 IU/L (ref 40–128)
ALT SERPL-CCNC: 20 U/L (ref 10–40)
ANION GAP SERPL CALCULATED.3IONS-SCNC: 5 MMOL/L (ref 4–16)
AST SERPL-CCNC: 21 IU/L (ref 15–37)
BACTERIA: ABNORMAL /HPF
BASOPHILS ABSOLUTE: 0.1 K/CU MM
BASOPHILS RELATIVE PERCENT: 0.9 % (ref 0–1)
BILIRUB SERPL-MCNC: 0.2 MG/DL (ref 0–1)
BILIRUBIN URINE: NEGATIVE MG/DL
BLOOD, URINE: NEGATIVE
BUN BLDV-MCNC: 15 MG/DL (ref 6–23)
CALCIUM SERPL-MCNC: 9.1 MG/DL (ref 8.3–10.6)
CHLORIDE BLD-SCNC: 90 MMOL/L (ref 99–110)
CLARITY: CLEAR
CO2: 43 MMOL/L (ref 21–32)
COLOR: YELLOW
CREAT SERPL-MCNC: 0.6 MG/DL (ref 0.6–1.1)
DIFFERENTIAL TYPE: ABNORMAL
EKG ATRIAL RATE: 91 BPM
EKG DIAGNOSIS: NORMAL
EKG P AXIS: 55 DEGREES
EKG P-R INTERVAL: 132 MS
EKG Q-T INTERVAL: 358 MS
EKG QRS DURATION: 86 MS
EKG QTC CALCULATION (BAZETT): 440 MS
EKG R AXIS: 38 DEGREES
EKG T AXIS: 70 DEGREES
EKG VENTRICULAR RATE: 91 BPM
EOSINOPHILS ABSOLUTE: 0.7 K/CU MM
EOSINOPHILS RELATIVE PERCENT: 7 % (ref 0–3)
GFR AFRICAN AMERICAN: >60 ML/MIN/1.73M2
GFR NON-AFRICAN AMERICAN: >60 ML/MIN/1.73M2
GLUCOSE BLD-MCNC: 143 MG/DL (ref 70–99)
GLUCOSE, URINE: NEGATIVE MG/DL
HCT VFR BLD CALC: 41.2 % (ref 37–47)
HEMOGLOBIN: 12 GM/DL (ref 12.5–16)
IMMATURE NEUTROPHIL %: 2.3 % (ref 0–0.43)
KETONES, URINE: NEGATIVE MG/DL
LACTATE: 1 MMOL/L (ref 0.4–2)
LEUKOCYTE ESTERASE, URINE: NEGATIVE
LYMPHOCYTES ABSOLUTE: 2.2 K/CU MM
LYMPHOCYTES RELATIVE PERCENT: 23.2 % (ref 24–44)
MCH RBC QN AUTO: 27.1 PG (ref 27–31)
MCHC RBC AUTO-ENTMCNC: 29.1 % (ref 32–36)
MCV RBC AUTO: 93.2 FL (ref 78–100)
MONOCYTES ABSOLUTE: 1.3 K/CU MM
MONOCYTES RELATIVE PERCENT: 13.8 % (ref 0–4)
MUCUS: ABNORMAL HPF
NITRITE URINE, QUANTITATIVE: NEGATIVE
NUCLEATED RBC %: 0 %
PDW BLD-RTO: 13.2 % (ref 11.7–14.9)
PH, URINE: 7 (ref 5–8)
PLATELET # BLD: 308 K/CU MM (ref 140–440)
PMV BLD AUTO: 8.4 FL (ref 7.5–11.1)
POTASSIUM SERPL-SCNC: 4.4 MMOL/L (ref 3.5–5.1)
PROTEIN UA: NEGATIVE MG/DL
RBC # BLD: 4.42 M/CU MM (ref 4.2–5.4)
RBC URINE: 1 /HPF (ref 0–6)
SEGMENTED NEUTROPHILS ABSOLUTE COUNT: 4.9 K/CU MM
SEGMENTED NEUTROPHILS RELATIVE PERCENT: 52.8 % (ref 36–66)
SODIUM BLD-SCNC: 138 MMOL/L (ref 135–145)
SPECIFIC GRAVITY UA: 1.01 (ref 1–1.03)
SQUAMOUS EPITHELIAL: 1 /HPF
TOTAL CK: 63 IU/L (ref 26–140)
TOTAL IMMATURE NEUTOROPHIL: 0.21 K/CU MM
TOTAL NUCLEATED RBC: 0 K/CU MM
TOTAL PROTEIN: 6.4 GM/DL (ref 6.4–8.2)
TRICHOMONAS: ABNORMAL /HPF
TROPONIN T: <0.01 NG/ML
UROBILINOGEN, URINE: NEGATIVE MG/DL (ref 0.2–1)
WBC # BLD: 9.3 K/CU MM (ref 4–10.5)
WBC UA: 1 /HPF (ref 0–5)

## 2021-05-09 PROCEDURE — 99285 EMERGENCY DEPT VISIT HI MDM: CPT

## 2021-05-09 PROCEDURE — 85025 COMPLETE CBC W/AUTO DIFF WBC: CPT

## 2021-05-09 PROCEDURE — 82550 ASSAY OF CK (CPK): CPT

## 2021-05-09 PROCEDURE — 93010 ELECTROCARDIOGRAM REPORT: CPT | Performed by: INTERNAL MEDICINE

## 2021-05-09 PROCEDURE — 80053 COMPREHEN METABOLIC PANEL: CPT

## 2021-05-09 PROCEDURE — 71045 X-RAY EXAM CHEST 1 VIEW: CPT

## 2021-05-09 PROCEDURE — 81001 URINALYSIS AUTO W/SCOPE: CPT

## 2021-05-09 PROCEDURE — 84484 ASSAY OF TROPONIN QUANT: CPT

## 2021-05-09 PROCEDURE — 72125 CT NECK SPINE W/O DYE: CPT

## 2021-05-09 PROCEDURE — 70450 CT HEAD/BRAIN W/O DYE: CPT

## 2021-05-09 PROCEDURE — 93005 ELECTROCARDIOGRAM TRACING: CPT | Performed by: PHYSICIAN ASSISTANT

## 2021-05-09 PROCEDURE — 83605 ASSAY OF LACTIC ACID: CPT

## 2021-05-09 NOTE — ED NOTES
Reviewed AVS and follow up appointments with patient. Discharged patient via wheelchair with family.       Monica Spencer RN  05/09/21 [id number]

## 2021-05-09 NOTE — ED PROVIDER NOTES
5/9/2021 6:10 AM EDT  Myer Angelucci was checked out to me by CHASE Christensen Che, PA-C. Please see his/her initial documentation for details of the patient's ED presentation, physical exam and completed studies. In brief, Myer Angelucci presented for tremors/shaking x1 year. Was discharged from inpatient admission yesterday for sepsis for COPD/pneumonia and encephalopathy. Had been reporting tremors while admitted with an unwitnessed fall, unable to have MRI given pacemaker. She was advised to call neurology on Monday to make an appointment however had another episode of shaking tonight that woke her up from sleep. She does have this shaking caused her to fall out of the bed and she did struck the left side of her head on the ground. Denies loss of consciousness. No vision changes vision loss nausea or vomiting. No headache. Described the tremors as generalized shaking of the body \"like a seizure\" but she is always awake during these episode. States episodes cause her to lose balance and she is continued to have falls. She has no diagnosed history of for epileptic disorder, no preceding headache dizziness chest pain palpitations. She states the tremors occur daily without prodromal symptoms. No loss of bowel or bladder control. Only on aspirin, no other anticoagulation therapy. Patient does have a baseline ox requirement of 4 L without new chest pain shortness of breath or cough. Has been compliant with her discharge medications including steroids and antibiotics. Focused physical exam: Alert and oriented x3. Resting comfortably. Unable to observe any resting or intentional extremity tremors or tongue fasciculations. 94% on home 4 L. Nonlabored breathing. Moving all extremities spontaneously. Noted contusion and swelling to the left forehead area without palpable skull depressions or deformities. No midline C-spine tenderness. Cranial nerves II through XII grossly intact.     I have reviewed and interpreted all of the currently available lab results from this visit (if applicable):  Results for orders placed or performed during the hospital encounter of 05/09/21   CBC Auto Differential   Result Value Ref Range    WBC 9.3 4.0 - 10.5 K/CU MM    RBC 4.42 4.2 - 5.4 M/CU MM    Hemoglobin 12.0 (L) 12.5 - 16.0 GM/DL    Hematocrit 41.2 37 - 47 %    MCV 93.2 78 - 100 FL    MCH 27.1 27 - 31 PG    MCHC 29.1 (L) 32.0 - 36.0 %    RDW 13.2 11.7 - 14.9 %    Platelets 835 109 - 902 K/CU MM    MPV 8.4 7.5 - 11.1 FL    Differential Type AUTOMATED DIFFERENTIAL     Segs Relative 52.8 36 - 66 %    Lymphocytes % 23.2 (L) 24 - 44 %    Monocytes % 13.8 (H) 0 - 4 %    Eosinophils % 7.0 (H) 0 - 3 %    Basophils % 0.9 0 - 1 %    Segs Absolute 4.9 K/CU MM    Lymphocytes Absolute 2.2 K/CU MM    Monocytes Absolute 1.3 K/CU MM    Eosinophils Absolute 0.7 K/CU MM    Basophils Absolute 0.1 K/CU MM    Nucleated RBC % 0.0 %    Total Nucleated RBC 0.0 K/CU MM    Total Immature Neutrophil 0.21 K/CU MM    Immature Neutrophil % 2.3 (H) 0 - 0.43 %   Comprehensive Metabolic Panel w/ Reflex to MG   Result Value Ref Range    Sodium 138 135 - 145 MMOL/L    Potassium 4.4 3.5 - 5.1 MMOL/L    Chloride 90 (L) 99 - 110 mMol/L    CO2 43 (H) 21 - 32 MMOL/L    BUN 15 6 - 23 MG/DL    CREATININE 0.6 0.6 - 1.1 MG/DL    Glucose 143 (H) 70 - 99 MG/DL    Calcium 9.1 8.3 - 10.6 MG/DL    Albumin 3.6 3.4 - 5.0 GM/DL    Total Protein 6.4 6.4 - 8.2 GM/DL    Total Bilirubin 0.2 0.0 - 1.0 MG/DL    ALT 20 10 - 40 U/L    AST 21 15 - 37 IU/L    Alkaline Phosphatase 89 40 - 128 IU/L    GFR Non-African American >60 >60 mL/min/1.73m2    GFR African American >60 >60 mL/min/1.73m2    Anion Gap 5 4 - 16   Lactic Acid, Plasma   Result Value Ref Range    Lactate 1.0 0.4 - 2.0 mMOL/L   CK   Result Value Ref Range    Total CK 63 26 - 140 IU/L   Troponin   Result Value Ref Range    Troponin T <0.010 <0.01 NG/ML   Urinalysis Reflex to Culture    Specimen: Urine   Result Value Ref Range Color, UA YELLOW YELLOW    Clarity, UA CLEAR CLEAR    Glucose, Urine NEGATIVE NEGATIVE MG/DL    Bilirubin Urine NEGATIVE NEGATIVE MG/DL    Ketones, Urine NEGATIVE NEGATIVE MG/DL    Specific Gravity, UA 1.012 1.001 - 1.035    Blood, Urine NEGATIVE NEGATIVE    pH, Urine 7.0 5.0 - 8.0    Protein, UA NEGATIVE NEGATIVE MG/DL    Urobilinogen, Urine NEGATIVE 0.2 - 1.0 MG/DL    Nitrite Urine, Quantitative NEGATIVE NEGATIVE    Leukocyte Esterase, Urine NEGATIVE NEGATIVE    RBC, UA 1 0 - 6 /HPF    WBC, UA 1 0 - 5 /HPF    Bacteria, UA RARE (A) NEGATIVE /HPF    Squam Epithel, UA 1 /HPF    Mucus, UA RARE (A) NEGATIVE HPF    Trichomonas, UA NONE SEEN NONE SEEN /HPF   EKG 12 Lead   Result Value Ref Range    Ventricular Rate 91 BPM    Atrial Rate 91 BPM    P-R Interval 132 ms    QRS Duration 86 ms    Q-T Interval 358 ms    QTc Calculation (Bazett) 440 ms    P Axis 55 degrees    R Axis 38 degrees    T Axis 70 degrees    Diagnosis       Normal sinus rhythm  Cannot rule out Anterior infarct , age undetermined  Abnormal ECG  When compared with ECG of 01-MAY-2021 22:24,  No significant change was found         Final Impression:  1. Shaking    2. Injury of head, initial encounter    3. Forehead contusion, initial encounter        Patient was signed out to me by colleague, CHASE Fleming Sheeba, pending labs, imaging and case management consult. Please see his/her note for complete HPI/physical exam and initial interpretation of completed labs/imaging studies. On my exam: Patient is awake and alert x3, resting comfortably. Noted contusion area of swelling to the left forehead without palpable skull depressions. No midline C-spine tenderness. Patient is spontaneously moving all extremities. 94% on home 4 L nasal cannula without increased work of breathing. Following all commands, protecting her airway.     Labs completed prior to my initial evaluation does show a normal white count which is downtrending normalized compared to hospital admission. Hemoglobin is also improved to 12.0. CMP without significant derangement. Patient does have baseline hypochloremia and elevated CO2 levels otherwise normal anion gap, no other electrolyte disturbance or renal dysfunction. Normal lactic and CK. Chest x-ray shows similar perihilar opacity since May 2021 without new acute ischemic changes on EKG. CT head and cervical spine reveals no evidence of acute osseous abnormality including skull fracture or intracranial process or traumatic malalignment. Incidental findings on CT head for questionable minimal cerebellar tonsillar ectopia with trace fluid within the left sphenoid sinus and minimal chronic microvascular ischemic changes. Given incidental findings on CT, I did consult with on-call neurology, Ronnie Milligan NP, who does not feel that the ectopia findings is contributory to patient's year history of shaking. She does feel patient would be appropriate for discharge home with outpatient follow-up with family doctor and possible referral for further evaluation and management. History and description of episodes do not appear consistent with an acute seizure disorder as patient is not having any active seizure-like activities at this time, do not feel that transfer is emergently indicated for continuous EEG monitoring. Case management was consulted and did evaluate patient at bedside for assistance with setting up a neurology appointment. .    At this time, patient will be discharged home with outpatient follow-up with family doctor as well as neurology. Low clinical special for malignant intracranial process, malignant tachyarrhythmia, bacteremia/sepsis, intracranial bleed or subarachnoid hemorrhage. She will continue her home medications and educated on fall prevention at home. Return warnings back to the ED discussed.         (Please note that portions of this note may have been completed with a voice recognition program. Efforts were made to edit the dictations but occasionally words are mis-transcribed.)        Diana Varner PA-C  05/09/21 [id number]

## 2021-05-09 NOTE — ED PROVIDER NOTES
Emergency [address] EMERGENCY DEPARTMENT    Patient: Rachel Angel  MRN: [id number]  : 1960  Date of Evaluation: 2021  ED Provider: Twin Matt PA-C    Chief Complaint       Chief Complaint   Patient presents with   Alberttrevin Pollock is a 64 y.o. female who presents to the emergency department for tremors. Patient states she has been having episodes of tremors/shaking for the last year. She was actually just in the hospital for a week and she states that was the first time anyone listened to her about the shaking. She states they wanted to do an MRI of her head but she has a pacemaker, so they were unable to perform one. She states she was otherwise doing better so they sent her home. She was advised to call Neurology on Monday to make an appointment. However, she states she had another episodes of shaking tonight that woke her from sleep. She describes the tremors as generalized shaking of her entire body--\"like a seizure. \"  She is always alert during the episodes. She states they cause her to lose her balance and she had several falls while in the hospital.  She states the tremors occur every single day but they are unpredictable in their timing and duration. She denies any associated headache or dizziness, chest pain or palpitations, n/v/d. After my initial examination, patient told nurse that she actually had another fall at home tonight as a result of the tremors. She did not endorse this on my examination. She is on aspirin but no other blood thinners. ROS     CONSTITUTIONAL:  Denies fever. EYES:  Denies visual changes. HEAD:  Denies headache. ENT:  Denies earache, nasal congestion, sore throat. NECK:  Denies neck pain. RESPIRATORY:  Denies any shortness of breath. CARDIOVASCULAR:  Denies chest pain. GI:  Denies nausea or vomiting. :  Denies urinary symptoms.   MUSCULOSKELETAL: Denies extremity pain or swelling. BACK:  Denies back pain. INTEGUMENT:  Denies skin changes. LYMPHATIC:  Denies lymphadenopathy. NEUROLOGIC:  Denies any numbness/tingling.  + tremors.       Past History     Past Medical History:   Diagnosis Date    [facility]) [facility]     Drug addiction in remission ([facility]     recovering addict    Hep C w/o coma, chronic (HCC)     Pacemaker     Sleep apnea     TIA (transient ischemic attack)     per patient \"several mini strokes\"     Past Surgical History:   Procedure Laterality Date     SECTION      CHOLECYSTECTOMY       Social History     Socioeconomic History    Marital status: Single     Spouse name: Not on file    Number of children: Not on file    Years of education: Not on file    Highest education level: Not on file   Occupational History    Not on file   Social Needs    Financial resource strain: Not on file    Food insecurity     Worry: Not on file     Inability: Not on file    Transportation needs     Medical: Not on file     Non-medical: Not on file   Tobacco Use    Smoking status: Former Smoker     Quit date: 2016     Years since quittin.7    Smokeless tobacco: Never Used   Substance and Sexual Activity    Alcohol use: Not Currently    Drug use: Not Currently    Sexual activity: Not on file   Lifestyle    Physical activity     Days per week: Not on file     Minutes per session: Not on file    Stress: Not on file   Relationships    Social connections     Talks on phone: Not on file     Gets together: Not on file     Attends [employer] service: Not on file     Active member of club or organization: Not on file     Attends meetings of clubs or organizations: Not on file     Relationship status: Not on file    Intimate partner violence     Fear of current or ex partner: Not on file     Emotionally abused: Not on file     Physically abused: Not on file     Forced sexual activity: Not on file   Other Topics Concern    Not on file   Social History Narrative    Not on file       Medications/Allergies     Previous Medications    ALBUTEROL (PROVENTIL) (2.5 MG/3ML) 0.083% NEBULIZER SOLUTION    USE ONE VIAL BY NEBULIZER EVERY 6 HOURS AS NEEDED FOR WHEEZING    ALBUTEROL SULFATE  (90 BASE) MCG/ACT INHALER    USE 2 PUFFS BY MOUTH EVERY 6 HOURS AS NEEDED SHAKE WELL    AMOXICILLIN-CLAVULANATE (AUGMENTIN) 875-125 MG PER TABLET    Take 1 tablet by mouth 2 times daily for 4 days    AMPHETAMINE-DEXTROAMPHETAMINE (ADDERALL XR) 30 MG EXTENDED RELEASE CAPSULE    Take 30 mg by mouth daily. ASPIRIN 81 MG CHEWABLE TABLET    Take 81 mg by mouth daily    BUPRENORPHINE-NALOXONE (SUBOXONE) 8-2 MG FILM SL FILM    Place 1 Film under the tongue 3 times daily. CPAP MACHINE MISC    by CPAP route nightly    DULERA 100-5 MCG/ACT INHALER    INHALE 2 PUFFS BY MOUTH 2 TIMES A DAY SHAKE WELL    MULTIPLE VITAMINS-MINERALS (WOMENS MULTIVITAMIN PO)    Take 1 tablet by mouth daily    NEBULIZER MISC    Use nebulizer with medication as directed. OXYGEN    Inhale 4 L into the lungs continuous     PANTOPRAZOLE (PROTONIX) 40 MG TABLET    Take 1 tablet by mouth every morning (before breakfast)    PREGABALIN (LYRICA) 100 MG CAPSULE    Take 100 mg by mouth 3 times daily. RESPIRATORY THERAPY SUPPLIES (NEBULIZER/TUBING/MOUTHPIECE) KIT    Inhale 1 kit into the lungs daily as needed (as directed)    SPIRIVA RESPIMAT 2.5 MCG/ACT AERS INHALER    INHALE 2 PUFFS BY MOUTH ONCE DAILY    TRAMADOL (ULTRAM) 50 MG TABLET    Take 50 mg by mouth 2 times daily. TRAZODONE (DESYREL) 150 MG TABLET    Take 150 mg by mouth nightly      No Known Allergies     Physical Exam       ED Triage Vitals [05/09/21 0452]   BP Temp Temp Source Pulse Resp SpO2 Height Weight   110/81 99 °F (37.2 °C) Oral 92 20 93 % 5' 4\" (1.626 m) 213 lb (96.6 kg)     GENERAL APPEARANCE:  Well-developed, well-nourished, no acute distress. HEAD:  NC/AT. Contusion to forehead.   EYES: Sclera anicteric. ENT:  Ears, nose, mouth normal.     NECK:  Supple. CARDIO:  RRR. LUNGS:   CTAB. Respirations unlabored. On nasal canula oxygen. EXTREMITIES:  No acute deformities. SKIN:  Warm and dry. NEUROLOGICAL:  Somnolent but alert and oriented. No tremor noted. Diagnostics     Labs:  Results for orders placed or performed during the hospital encounter of 05/09/21   EKG 12 Lead   Result Value Ref Range    Ventricular Rate 91 BPM    Atrial Rate 91 BPM    P-R Interval 132 ms    QRS Duration 86 ms    Q-T Interval 358 ms    QTc Calculation (Bazett) 440 ms    P Axis 55 degrees    R Axis 38 degrees    T Axis 70 degrees    Diagnosis       Normal sinus rhythm  Cannot rule out Anterior infarct , age undetermined  Abnormal ECG  When compared with ECG of 01-MAY-2021 22:24,  No significant change was found         Radiographs:  Ct Head Wo Contrast    Result Date: 5/8/2021  EXAMINATION: CT OF THE HEAD WITHOUT CONTRAST 5/8/2021 9:12 am TECHNIQUE: CT of the head was performed without the administration of intravenous contrast. Dose modulation, iterative reconstruction, and/or weight based adjustment of the mA/kV was utilized to reduce the radiation dose to as low as reasonably achievable. COMPARISON: None HISTORY: ORDERING SYSTEM PROVIDED HISTORY: reported jerking/tremors. unable to get MRI brain TECHNOLOGIST PROVIDED HISTORY: Reason for exam:->reported jerking/tremors. unable to get MRI brain Has a \"code stroke\" or \"stroke alert\" been called? ->No Reason for Exam: reported jerking/tremors. unable to get MRI brain Acuity: Acute Type of Exam: Initial FINDINGS: BRAIN/VENTRICLES:  No masses nor acute intracranial hemorrhage. Intact gray/white matter differentiation without findings of acute ischemia. No mass effect nor midline shift. Patent basilar cisterns and foramen magnum. No hydrocephalus. ORBITS:  Normal without acute abnormality.  SINUSES:  Mild to moderate mucoperiosteal thickening in the right sphenoid sinus with associated chronic bony number atrophy. Layering fluid in the left seen sinus and possibly in the right sphenoid sinus. SOFT TISSUES/SKULL:  No acute soft tissue abnormality. Minimal to mild atherosclerotic calcifications. No acute fracture. 1. No acute intracranial abnormality. 2. Possible acute sphenoid sinusitis. Procedures/EKG:   Please see attending physician's note for interpretation. ED Course and MDM   -  Patient seen and evaluated in the emergency department. -  Triage and nursing notes reviewed and incorporated. -  Old chart records reviewed and incorporated. -  Supervising physician was Dr. Ruby Marie. Patient was seen independently. -  Patient seen and evaluated, orders placed. Pending at the end of my shift. Signed out to [facility], [facility]. Please see her note for further details of patient encounter, including final disposition. In light of current events, I did utilize appropriate PPE (including N95 and surgical face mask, safety glasses, and gloves, as recommended by the health facility/national standard best practice, during my bedside interactions with the patient. Final Impression      1. Shaking    2. Injury of head, initial encounter    3.  Forehead contusion, initial encounter              Viviana Manning, PA-C  [facility], [address] [facility]  05/09/21 0440

## 2021-05-09 NOTE — CARE COORDINATION
LSW responded to a CM consult for discharge Planning. LSW reviewed chart/into room to initiate discharge planning. Pt was discharged from inpt at [facility] yesterday. (5/1/21 - 5/8/21). Pt was discharge with recommendations to follow up with Neuro. At that time Pt was also seen by PT/OT with recommendations for home with home care. Pt lives n elderly housing at the [facility]. Pt lives alone. When Pt is sick her sister will come from [place] to help take care of her. Pt also has a niece in [facility] that assists with transport and light duties. Pt is active with [facility] home care services. They provided the Pt with an aide 7 hours a day for 7 days a week. Pt stated that she has not followed up with neuro sinc she was discharged on the weekend. Pt did not know which neuro group she was going to f/u with. She just knew that she wanted someone in [facility]. LSW went to the [employer] website and located several doctors and placed the information in the discharge instructions. Pt also informed LSW that she has been going to the gym the last 3 weeks to workout. Pt is active with PCP. Pt meds have no cost to her. Pt reported having a bipap, cpap and home O2 through [employer]. Pt also has portable oxygen. Pt discharge plan is to return home and will f/u with neuro on Monday.

## 2021-05-09 NOTE — ED NOTES
Bed: ED-28  Expected date:   Expected time:   Means of arrival:   Comments:  EMS     Nancy Vigil RN  05/09/21 7487

## 2021-05-09 NOTE — ED NOTES
Sister called back and states she will be here to get patient in a little while.       Camelia Pope RN  05/09/21 2201

## 2021-05-17 ENCOUNTER — HOSPITAL ENCOUNTER (OUTPATIENT)
Age: 61
Discharge: HOME | End: 2021-05-17
Payer: COMMERCIAL

## 2021-05-17 ENCOUNTER — OFFICE VISIT (OUTPATIENT)
Dept: FAMILY MEDICINE CLINIC | Age: 61
End: 2021-05-17
Payer: COMMERCIAL

## 2021-05-17 ENCOUNTER — HOSPITAL ENCOUNTER (OUTPATIENT)
Dept: GENERAL RADIOLOGY | Age: 61
Discharge: HOME | End: 2021-05-17
Payer: COMMERCIAL

## 2021-05-17 VITALS
SYSTOLIC BLOOD PRESSURE: 130 MMHG | BODY MASS INDEX: 35.89 KG/M2 | DIASTOLIC BLOOD PRESSURE: 70 MMHG | HEART RATE: 101 BPM | HEIGHT: 64 IN | WEIGHT: 210.2 LBS | OXYGEN SATURATION: 92 %

## 2021-05-17 DIAGNOSIS — G47.33 OBSTRUCTIVE SLEEP APNEA: ICD-10-CM

## 2021-05-17 DIAGNOSIS — F90.9 ATTENTION DEFICIT HYPERACTIVITY DISORDER (ADHD), UNSPECIFIED ADHD TYPE: ICD-10-CM

## 2021-05-17 DIAGNOSIS — J18.9 RECURRENT PNEUMONIA: ICD-10-CM

## 2021-05-17 DIAGNOSIS — F19.11 H/O DRUG ABUSE (HCC): ICD-10-CM

## 2021-05-17 DIAGNOSIS — B18.2 CHRONIC HEPATITIS C WITHOUT HEPATIC COMA (HCC): ICD-10-CM

## 2021-05-17 DIAGNOSIS — R91.8 PULMONARY NODULES: ICD-10-CM

## 2021-05-17 DIAGNOSIS — J44.9 COPD, SEVERE (HCC): Primary | ICD-10-CM

## 2021-05-17 DIAGNOSIS — Z95.0 PACEMAKER: ICD-10-CM

## 2021-05-17 DIAGNOSIS — R25.1 TREMOR: ICD-10-CM

## 2021-05-17 DIAGNOSIS — R60.0 LOWER EXTREMITY EDEMA: ICD-10-CM

## 2021-05-17 DIAGNOSIS — K21.9 GASTROESOPHAGEAL REFLUX DISEASE WITHOUT ESOPHAGITIS: ICD-10-CM

## 2021-05-17 DIAGNOSIS — R53.1 WEAKNESS: ICD-10-CM

## 2021-05-17 DIAGNOSIS — G25.5 MUSCLE, JERKY MOVEMENTS (UNCONTROLLED): ICD-10-CM

## 2021-05-17 PROBLEM — J96.01 ACUTE RESPIRATORY FAILURE WITH HYPOXIA AND HYPERCAPNIA (HCC): Status: RESOLVED | Noted: 2019-08-31 | Resolved: 2021-05-17

## 2021-05-17 PROBLEM — J96.21 ACUTE ON CHRONIC RESPIRATORY FAILURE WITH HYPOXIA AND HYPERCAPNIA (HCC): Status: RESOLVED | Noted: 2019-08-31 | Resolved: 2021-05-17

## 2021-05-17 PROBLEM — J96.22 ACUTE ON CHRONIC RESPIRATORY FAILURE WITH HYPOXIA AND HYPERCAPNIA (HCC): Status: RESOLVED | Noted: 2019-08-31 | Resolved: 2021-05-17

## 2021-05-17 PROBLEM — J96.00 ACUTE RESPIRATORY FAILURE (HCC): Status: RESOLVED | Noted: 2021-04-14 | Resolved: 2021-05-17

## 2021-05-17 PROBLEM — J44.1 COPD WITH ACUTE EXACERBATION (HCC): Status: RESOLVED | Noted: 2019-12-27 | Resolved: 2021-05-17

## 2021-05-17 PROBLEM — J96.02 ACUTE RESPIRATORY FAILURE WITH HYPOXIA AND HYPERCAPNIA (HCC): Status: RESOLVED | Noted: 2019-08-31 | Resolved: 2021-05-17

## 2021-05-17 PROCEDURE — G8427 DOCREV CUR MEDS BY ELIG CLIN: HCPCS | Performed by: PHYSICIAN ASSISTANT

## 2021-05-17 PROCEDURE — 99205 OFFICE O/P NEW HI 60 MIN: CPT | Performed by: PHYSICIAN ASSISTANT

## 2021-05-17 PROCEDURE — 3023F SPIROM DOC REV: CPT | Performed by: PHYSICIAN ASSISTANT

## 2021-05-17 PROCEDURE — 71046 X-RAY EXAM CHEST 2 VIEWS: CPT

## 2021-05-17 PROCEDURE — G8417 CALC BMI ABV UP PARAM F/U: HCPCS | Performed by: PHYSICIAN ASSISTANT

## 2021-05-17 PROCEDURE — 1036F TOBACCO NON-USER: CPT | Performed by: PHYSICIAN ASSISTANT

## 2021-05-17 PROCEDURE — 1111F DSCHRG MED/CURRENT MED MERGE: CPT | Performed by: PHYSICIAN ASSISTANT

## 2021-05-17 PROCEDURE — G8926 SPIRO NO PERF OR DOC: HCPCS | Performed by: PHYSICIAN ASSISTANT

## 2021-05-17 PROCEDURE — 3017F COLORECTAL CA SCREEN DOC REV: CPT | Performed by: PHYSICIAN ASSISTANT

## 2021-05-17 ASSESSMENT — PATIENT HEALTH QUESTIONNAIRE - PHQ9
2. FEELING DOWN, DEPRESSED OR HOPELESS: 1
SUM OF ALL RESPONSES TO PHQ QUESTIONS 1-9: 2
1. LITTLE INTEREST OR PLEASURE IN DOING THINGS: 1
SUM OF ALL RESPONSES TO PHQ QUESTIONS 1-9: 2
SUM OF ALL RESPONSES TO PHQ9 QUESTIONS 1 & 2: 2

## 2021-05-17 NOTE — PROGRESS NOTES
5/17/2021     Sharon Hal    Chief Complaint   Patient presents with   Christina Paz New Doctor     pt states that she has a suboxone DR she sees in [place] and she liu been using him for a family Dr Finn Milligan states that she was in the hosp this month for pneumonia and some involuntary spasms . Pt states that she may be seeing a Neurologist . Pt states she is on Oxygen 24 hours a day .  Pacemaker Problem     pt states that she has a pacemaker . She said she has had it for 12 years and has never been checked        HPI  History was obtained from the patient. Jamal Villaseñor is a 64 y.o. female who presents today to [facility] care    Patient has medical history consisting of severe COPD/chronic respiratory failure, GERD, history of drug abuse (heroin), obstructive sleep apnea, Hep C, ADHD, pacemaker placement, recurrent pneumonia. COPD: currently seeing Dr. Farzana Herbert but feels like her COPD is not being well controlled, would like referral to new pulmonologist.  She has been in the hospital couple of times this year due to pneumonia and feels like more needs to be done to prevent this. She is no longer smoking. She most recently was in the hospital on 5/1/2021 and 5/9/2021. She notes that she was in the hospital for 7 days to treat her pneumonia. She however still feels ill, short of breath, coughing, etc.  She has follow-up with pulmonology in 2 days. Is currently on Dulera and Spiriva. GERD: Well-controlled on pantoprazole    History of drug abuse: Patient was a heroin user and has been clean for about 15 years. She is currently on Suboxone. Her Suboxone doctor also has her on Adderall for ADHD, and tramadol. KING: Patient is on BiPAP and is compliant    Pulmonary nodules: Incidental finding on her CT of the chest during her hospital stay. Follow-up recommended for November of this year. Hepatitis C: Patient states that she was evaluated by gastroenterology for this and cleared the virus on her own. Pacemaker: Patient states that [place] was [place] and was having some issues and was having some type of an arrhythmia but she does not know which one and had a pacemaker placed. States that this was 15 years ago and the pacemaker has never been evaluated or checked since then and likely the battery is dead. At all she really knows about the pacemaker. She has complaints today of tremoring, weakness, and jerking movements in her muscles. This was one of the reasons that she went to the emergency department recently. She notes that the tremoring will occur randomly in different parts of her body and is accompanied by weakness where she feels like her limbs will just give out on her. She notes involuntary muscle jerking during the daytime. They wanted to do an MRI of her brain in the hospital but cannot because of her pacemaker not being compatible. Patient also had some concerns of some swelling she is appreciated in her lower extremities since being discharged from the hospital.  Notes it mostly around her ankles and her knees bilaterally. She has noticed that it has improved over the past couple of days.     PAST MEDICAL HISTORY  Past Medical History:   Diagnosis Date    [facility]     Drug addiction in remission ([facility]     recovering addict    Hep C w/o coma, chronic (HCC)     Pacemaker     Sleep apnea     TIA (transient ischemic attack)     per patient \"several mini strokes\"       FAMILY HISTORY  Family History   Problem Relation Age of Onset   [place] Cancer Mother         unsure of type    Thyroid Cancer Mother     Alcohol Abuse Father     Other Sister         passed in car accident    Lupus Brother     Coronary Art Dis Brother        SOCIAL HISTORY  Social History     Socioeconomic History    Marital status: Single     Spouse name: None    Number of children: None    Years of education: None    Highest education level: None Normal breath sounds. Neurological:      General: No focal deficit present. Mental Status: She is alert and oriented to person, place, and time. Cranial Nerves: No cranial nerve deficit. Psychiatric:         Mood and Affect: Mood normal.         Behavior: Behavior normal.         ASSESSMENT & PLAN    1. COPD, severe ([address]  2. Recurrent pneumonia  Patient requested referral to a new pulmonologist, placing referral for Dr. Olga Turk. She will follow-up with her current pulmonologist in the meantime. She may need adjustment of her inhalers and the inhaled steroid removed to prevent pneumonia, could consider trial of Anoro. I did notify the patient that she might lose some of her pulmonary function and might be more chronically short of breath with this change. We will get repeat chest x-ray to follow-up on her recent pneumonia  - Chad Fernández MD, Pulmonology, [place]  - XR CHEST STANDARD (2 VW); Future    3. Obstructive sleep apnea  Compliant with BiPAP, referring to new pulmonologist  - Chad Fernández MD, Pulmonology, [place]    4. Pulmonary nodules  Radiologist recommended repeat in November of this year, this can be done by us or by pulmonology    5. Pacemaker  Reason of placement is not completely known by the patient. I wonder if it had something to do with her drug abuse affecting the rhythm of her heart. Will refer to cardiology to further assess and see if the pacemaker is still needed or if it should be removed. It is likely not even currently functioning.  - Janessa Webber MD, Cardiology, [place]    6. Gastroesophageal reflux disease without esophagitis  Currently controlled, continue on PPI    7. Chronic hepatitis C without hepatic coma [facility])  Patient notes that she has cleared the hepatitis C    8. Tremor  9. Weakness  10.  Muscle, jerky movements (uncontrolled)  Referred to neurology for further evaluation of this complaint  - External Referral To Neurology    11. H/O drug abuse [facility])  Patient has been clean for the past 15 years, will follow up with her substance abuse provider. 12. Attention deficit hyperactivity disorder (ADHD), unspecified ADHD type  Follow-up with her substance abuse provider for refills as needed    13. Lower extremity edema  Patient had negative BNP in the hospital and her kidney function on her most recent drawl was within normal range. I believe the edema is most likely related to the fluids and steroids they are giving her in the hospital.  It has already started to improve on its own, patient was encouraged in elevating her legs when resting and getting up and walking around as well as to help resolve the rest of the remaining edema. Time spent: not billed by time         Electronically signed by Matt Maddox PA-C on 5/17/2021    Please note that this chart was generated using dragon dictation software. Although every effort was made to ensure the accuracy of this automated transcription, some errors in transcription may have occurred.

## 2021-05-18 ENCOUNTER — INITIAL CONSULT (OUTPATIENT)
Dept: CARDIOLOGY CLINIC | Age: 61
End: 2021-05-18
Payer: COMMERCIAL

## 2021-05-18 VITALS
DIASTOLIC BLOOD PRESSURE: 72 MMHG | HEART RATE: 94 BPM | BODY MASS INDEX: 36.09 KG/M2 | SYSTOLIC BLOOD PRESSURE: 124 MMHG | WEIGHT: 211.4 LBS | HEIGHT: 64 IN

## 2021-05-18 DIAGNOSIS — J96.11 CHRONIC RESPIRATORY FAILURE WITH HYPOXIA AND HYPERCAPNIA (HCC): ICD-10-CM

## 2021-05-18 DIAGNOSIS — J43.8 OTHER EMPHYSEMA (HCC): ICD-10-CM

## 2021-05-18 DIAGNOSIS — J96.02 ACUTE RESPIRATORY FAILURE WITH HYPERCAPNIA (HCC): ICD-10-CM

## 2021-05-18 DIAGNOSIS — Z95.0 S/P PLACEMENT OF CARDIAC PACEMAKER: ICD-10-CM

## 2021-05-18 DIAGNOSIS — J96.12 CHRONIC RESPIRATORY FAILURE WITH HYPOXIA AND HYPERCAPNIA (HCC): ICD-10-CM

## 2021-05-18 DIAGNOSIS — R06.02 SOB (SHORTNESS OF BREATH): ICD-10-CM

## 2021-05-18 DIAGNOSIS — I10 ESSENTIAL HYPERTENSION: Primary | ICD-10-CM

## 2021-05-18 DIAGNOSIS — F19.11 H/O DRUG ABUSE (HCC): ICD-10-CM

## 2021-05-18 DIAGNOSIS — M79.89 LEG SWELLING: ICD-10-CM

## 2021-05-18 PROBLEM — J43.9 PULMONARY EMPHYSEMA (HCC): Status: ACTIVE | Noted: 2021-05-18

## 2021-05-18 PROCEDURE — G8417 CALC BMI ABV UP PARAM F/U: HCPCS | Performed by: INTERNAL MEDICINE

## 2021-05-18 PROCEDURE — 3023F SPIROM DOC REV: CPT | Performed by: INTERNAL MEDICINE

## 2021-05-18 PROCEDURE — G8427 DOCREV CUR MEDS BY ELIG CLIN: HCPCS | Performed by: INTERNAL MEDICINE

## 2021-05-18 PROCEDURE — 99244 OFF/OP CNSLTJ NEW/EST MOD 40: CPT | Performed by: INTERNAL MEDICINE

## 2021-05-18 PROCEDURE — G8926 SPIRO NO PERF OR DOC: HCPCS | Performed by: INTERNAL MEDICINE

## 2021-05-18 RX ORDER — MIRTAZAPINE 30 MG/1
30 TABLET, FILM COATED ORAL NIGHTLY
Status: ON HOLD | COMMUNITY
End: 2021-06-27

## 2021-05-18 NOTE — ASSESSMENT & PLAN NOTE
We will get echo and BNP level to evaluate further.   She is on 2 L of oxygen around-the-clock also get stress test Lexiscan to evaluate for ischemia as etiology

## 2021-05-18 NOTE — PATIENT INSTRUCTIONS
Please be informed that if you contact our office outside of normal business hours the physician on call cannot help with any scheduling or rescheduling issues, procedure instruction questions or any type of medication issue. We advise you for any urgent/emergency that you go to the nearest emergency room! PLEASE CALL OUR OFFICE DURING NORMAL BUSINESS HOURS    Monday - Friday   8 am to 5 pm    [address]: [phone]    [place]:  [phone]  Please hold on to these instructions the  will call you within 1-9 business days when we receive authorization from your insurance. Nuclear Stress Test    WHAT TO EXPECT:   ? You will need to confirm the test or it could be cancelled. ? This test will take approximately 2 hours: 1 hour in the AM &    1 hour in the PM. You will be given a time by the Technologist after the first part is completed to come back. ? You will be given a medication, through an IV in the hand, this will safely simulate exercise. This IV is also needed to inject the radioactive isotope unless you are able toe walk the treadmill. ? You will receive an injection in the AM & PM before the pictures. ? Using a special camera, you will have one set of pictures of your heart taken in the AM and a set of pictures in the PM.     PREPARATION FOR TEST:  ? Eat a light breakfast such as water or juice and toast.  ? If you are DIABETIC: Eat a normal breakfast with NO CAFFEINE and take your insulin as normal.   ? AVOID ALL FOODS & DRINKS containing CAFFEINE 12 HOURS PRIOR TO THE TEST: Including coffee, Tea, Yenni and other soft drinks even those labeled  caffeine free or decaffeinated.  ? HOLD THESE MEDICATIONS Persantine & Theophylline (Theodur)  24 hours prior & bring your inhaler with you.

## 2021-05-18 NOTE — LETTER
Dr. Sterling Sport  1960  [id number]    Have you had any Chest Pain that is not new? - No  Have you had any Shortness of Breath - Yes Patient has COPD  If Yes - When at rest and on exertion    Have you had any dizziness - No   Have you had any palpitations that are not new?  - No  Do you have any edema - swelling in legs    If Yes - CHECK TO SEE IF THE EDEMA IS PITTING  How long have they been having edema - Weeks    Do you have a surgery or procedure scheduled in the near future - No

## 2021-05-18 NOTE — PROGRESS NOTES
times daily.  pregabalin (LYRICA) 100 MG capsule Take 100 mg by mouth 3 times daily.  traZODone (DESYREL) 150 MG tablet Take 150 mg by mouth nightly       OXYGEN Inhale 4 L into the lungs continuous       CPAP Machine MISC by CPAP route nightly       buprenorphine-naloxone (SUBOXONE) 8-2 MG FILM SL film Place 1 Film under the tongue 3 times daily. No current facility-administered medications for this visit. Allergies:   Patient has no known allergies. Patient History:  Past Medical History:   Diagnosis Date    ADHD     [facility]     Drug addiction in remission [facility] [facility]     recovering addict    Hep C w/o coma, chronic (HCC)     Pacemaker     Sleep apnea     TIA (transient ischemic attack)     per patient \"several mini strokes\"     Past Surgical History:   Procedure Laterality Date   [facility] CHOLECYSTECTOMY  2010    done in [address] [place] [address]       Family History   Problem Relation Age of Onset   [de-identified] Cancer Mother         unsure of type    Thyroid Cancer Mother     Alcohol Abuse Father     Other Sister         passed in car accident    Lupus Brother     Coronary Art Dis Brother      Social History     Tobacco Use    Smoking status: Former Smoker     Packs/day: 1.00     Years: 40.00     Pack years: 40.00     Quit date: 2016     Years since quittin.7    Smokeless tobacco: Never Used   Substance Use Topics    Alcohol use: Not Currently        Review of Systems:   · Constitutional: No Fever or Weight Loss   · Eyes: No Decreased Vision  · ENT: No Headaches, Hearing Loss or Vertigo  · Cardiovascular: as per note above   · Respiratory: No cough or wheezing and as per note above.    · Gastrointestinal: No abdominal pain, appetite loss, blood in stools, constipation, diarrhea or heartburn  · Genitourinary: No dysuria, trouble voiding, or hematuria  · Musculoskeletal:  denies any new  joint aches , swelling  or pain   · Integumentary: No rash or pruritis  · Neurological: No TIA or stroke symptoms  · Psychiatric: No anxiety or depression  · Endocrine: No malaise, fatigue or temperature intolerance  · Hematologic/Lymphatic: No bleeding problems, blood clots or swollen lymph nodes  · Allergic/Immunologic: No nasal congestion or hives    Objective:      Physical Exam:  /72   Pulse 94   Ht 5' 4\" (1.626 m)   Wt 211 lb 6.4 oz (95.9 kg)   BMI 36.29 kg/m²   Wt Readings from Last 3 Encounters:   05/18/21 211 lb 6.4 oz (95.9 kg)   05/17/21 210 lb 3.2 oz (95.3 kg)   05/09/21 213 lb (96.6 kg)     Body mass index is 36.29 kg/m². Vitals:    05/18/21 1006   BP: 124/72   Pulse: 94        General Appearance:  No distress, conversant  Constitutional:  Well developed, Well nourished, No acute distress, Non-toxic appearance. HENT:  Normocephalic, Atraumatic, Bilateral external ears normal, Oropharynx moist, No oral exudates, Nose normal. Neck- Normal range of motion, No tenderness, Supple, No stridor,no apical-carotid delay  Eyes:  PERRL, EOMI, Conjunctiva normal, No discharge. Respiratory:  Normal breath sounds, No respiratory distress, No wheezing, No chest tenderness. ,no use of accessory muscles, NO crackles  Cardiovascular: (PMI) apex non displaced,no lifts no thrills,S1 and S2 audible, No added heart sounds, No signs of ankle edema, or volume overload, No evidence of JVD, No crackles  GI:  Bowel sounds normal, Soft, No tenderness, No masses, No gross visceromegaly   :  No costovertebral angle tenderness   Musculoskeletal:  No edema, no tenderness, no deformities.  Back- no tenderness  Integument:  Well hydrated, no rash   Lymphatic:  No lymphadenopathy noted   Neurologic:  Alert & oriented x 3, CN 2-12 normal, normal motor function, normal sensory function, no focal deficits noted   Psychiatric:  Speech and behavior appropriate       Medical decision making and Data review:  DATA:  Lab Results   Component Value Date    TROPONINT <0.010 05/09/2021     BNP:    Lab Results   Component Value Date    PROBNP 162.7 05/01/2021     PT/INR:  No results found for: PTINR  No results found for: LABA1C  Lab Results   Component Value Date    CHOL 239 (H) 03/30/2021    TRIG 142 03/30/2021    HDL 53 03/30/2021    LDLDIRECT 169 (H) 03/30/2021     Lab Results   Component Value Date    ALT 20 05/09/2021    AST 21 05/09/2021     No results for input(s): WBC, HGB, HCT, MCV, PLT in the last 72 hours. TSH: No results found for: TSH  Lab Results   Component Value Date    AST 21 05/09/2021    ALT 20 05/09/2021    BILIDIR 0.2 04/14/2021    BILITOT 0.2 05/09/2021    ALKPHOS 89 05/09/2021     Lab Results   Component Value Date    PROBNP 162.7 05/01/2021    PROBNP 69.94 12/29/2020    PROBNP 120.2 09/24/2020     No results found for: LABA1C  Lab Results   Component Value Date    WBC 9.3 05/09/2021    HGB 12.0 (L) 05/09/2021    HCT 41.2 05/09/2021     05/09/2021     All labs, medications and tests reviewed by myself including data and history from outside source , patient and available family . Assessment & Plan:      1. Essential hypertension    2. S/P placement of cardiac pacemaker    3. Acute respiratory failure with hypercapnia (HCC)    4. Chronic respiratory failure with hypoxia and hypercapnia (HCC)    5. H/O drug abuse ([address]    6. Other emphysema ([address]    7. SOB (shortness of breath)    8. Leg swelling         SOB (shortness of breath)  We will get echo and BNP level to evaluate further. She is on 2 L of oxygen around-the-clock also get stress test Lexiscan to evaluate for ischemia as etiology    S/P placement of cardiac pacemaker  We tried to interrogate her pacemaker using [employer], [employer] and [employer]ji devices but could not assess it.   Will refer to EPS service for possible further evaluation and pacemaker removal    Leg swelling  Check echo, check BNP screen for carotid artery disease due to encephalopathy and dizziness Dyslipidemia :  All available lab work was reviewed. Patient was advised to repeat lab work before next visit. Necessary orders were placed , instructions given by myself       Counseled extensively and medication compliance urged. We discussed that for the  prevention of ASCVD our  goal is aggressive risk modification. Various goals were discussed and questions answered. Continue current medications. Appropriate prescriptions are addressed and refills ordered. Questions answered and patient verbalizes understanding. Call for any problems, questions, or concerns. Greater than 60 % of time spent counseling besides reviewing data and images     Continue all other medications of all above medical condition listed as is. Return in about 1 month (around 6/18/2021). Please note this report has been partially produced using speech recognition software and may contain errors related to that system including errors in grammar, punctuation, and spelling, as well as words and phrases that may be inappropriate. If there are any questions or concerns please feel free to contact the dictating provider for clarification.

## 2021-05-18 NOTE — ASSESSMENT & PLAN NOTE
We tried to interrogate her pacemaker using [employer], [employer] and [employer] devices but could not assess it.   Will refer to EPS service for possible further evaluation and pacemaker removal

## 2021-05-23 ENCOUNTER — APPOINTMENT (OUTPATIENT)
Dept: CT IMAGING | Age: 61
DRG: 137 | End: 2021-05-23
Payer: COMMERCIAL

## 2021-05-23 ENCOUNTER — APPOINTMENT (OUTPATIENT)
Dept: GENERAL RADIOLOGY | Age: 61
DRG: 137 | End: 2021-05-23
Payer: COMMERCIAL

## 2021-05-23 ENCOUNTER — HOSPITAL ENCOUNTER (INPATIENT)
Age: 61
LOS: 3 days | Discharge: HOME | DRG: 137 | End: 2021-05-26
Attending: EMERGENCY MEDICINE | Admitting: STUDENT IN AN ORGANIZED HEALTH CARE EDUCATION/TRAINING PROGRAM
Payer: COMMERCIAL

## 2021-05-23 DIAGNOSIS — R09.02 HYPOXIA: ICD-10-CM

## 2021-05-23 DIAGNOSIS — U07.1 COVID-19: ICD-10-CM

## 2021-05-23 DIAGNOSIS — R06.89 DYSPNEA AND RESPIRATORY ABNORMALITIES: Primary | ICD-10-CM

## 2021-05-23 DIAGNOSIS — R50.9 FEVER, UNSPECIFIED FEVER CAUSE: ICD-10-CM

## 2021-05-23 DIAGNOSIS — R06.00 DYSPNEA AND RESPIRATORY ABNORMALITIES: Primary | ICD-10-CM

## 2021-05-23 DIAGNOSIS — J44.1 COPD EXACERBATION (HCC): ICD-10-CM

## 2021-05-23 PROBLEM — J96.21 ACUTE ON CHRONIC RESPIRATORY FAILURE WITH HYPOXIA AND HYPERCAPNIA (HCC): Status: ACTIVE | Noted: 2021-05-23

## 2021-05-23 PROBLEM — J96.22 ACUTE ON CHRONIC RESPIRATORY FAILURE WITH HYPOXIA AND HYPERCAPNIA (HCC): Status: ACTIVE | Noted: 2021-05-23

## 2021-05-23 LAB
ALBUMIN SERPL-MCNC: 3.3 GM/DL (ref 3.4–5)
ALP BLD-CCNC: 73 IU/L (ref 40–128)
ALT SERPL-CCNC: 13 U/L (ref 10–40)
AMPHETAMINES: NEGATIVE
ANION GAP SERPL CALCULATED.3IONS-SCNC: 9 MMOL/L (ref 4–16)
AST SERPL-CCNC: 24 IU/L (ref 15–37)
BACTERIA: NEGATIVE /HPF
BARBITURATE SCREEN URINE: NEGATIVE
BASE EXCESS MIXED: 11.5 (ref 0–2.3)
BASOPHILS ABSOLUTE: 0 K/CU MM
BASOPHILS RELATIVE PERCENT: 0.2 % (ref 0–1)
BENZODIAZEPINE SCREEN, URINE: NEGATIVE
BILIRUB SERPL-MCNC: 0.2 MG/DL (ref 0–1)
BILIRUBIN URINE: NEGATIVE MG/DL
BLOOD, URINE: NEGATIVE
BUN BLDV-MCNC: 6 MG/DL (ref 6–23)
CALCIUM SERPL-MCNC: 8.2 MG/DL (ref 8.3–10.6)
CANNABINOID SCREEN URINE: NEGATIVE
CHLORIDE BLD-SCNC: 95 MMOL/L (ref 99–110)
CLARITY: CLEAR
CO2: 33 MMOL/L (ref 21–32)
COCAINE METABOLITE: NEGATIVE
COLOR: YELLOW
COMMENT: ABNORMAL
CREAT SERPL-MCNC: 0.5 MG/DL (ref 0.6–1.1)
DIFFERENTIAL TYPE: ABNORMAL
EKG ATRIAL RATE: 108 BPM
EKG DIAGNOSIS: NORMAL
EKG P AXIS: 47 DEGREES
EKG P-R INTERVAL: 132 MS
EKG Q-T INTERVAL: 318 MS
EKG QRS DURATION: 84 MS
EKG QTC CALCULATION (BAZETT): 426 MS
EKG R AXIS: 51 DEGREES
EKG T AXIS: 62 DEGREES
EKG VENTRICULAR RATE: 108 BPM
EOSINOPHILS ABSOLUTE: 0 K/CU MM
EOSINOPHILS RELATIVE PERCENT: 0.2 % (ref 0–3)
GFR AFRICAN AMERICAN: >60 ML/MIN/1.73M2
GFR NON-AFRICAN AMERICAN: >60 ML/MIN/1.73M2
GLUCOSE BLD-MCNC: 152 MG/DL (ref 70–99)
GLUCOSE, URINE: NEGATIVE MG/DL
HCO3 VENOUS: 38.7 MMOL/L (ref 19–25)
HCT VFR BLD CALC: 38.2 % (ref 37–47)
HEMOGLOBIN: 11.8 GM/DL (ref 12.5–16)
IMMATURE NEUTROPHIL %: 0.3 % (ref 0–0.43)
KETONES, URINE: ABNORMAL MG/DL
LEUKOCYTE ESTERASE, URINE: NEGATIVE
LYMPHOCYTES ABSOLUTE: 1.4 K/CU MM
LYMPHOCYTES RELATIVE PERCENT: 23.3 % (ref 24–44)
MAGNESIUM: 1.4 MG/DL (ref 1.8–2.4)
MCH RBC QN AUTO: 28 PG (ref 27–31)
MCHC RBC AUTO-ENTMCNC: 30.9 % (ref 32–36)
MCV RBC AUTO: 90.7 FL (ref 78–100)
MONOCYTES ABSOLUTE: 0.9 K/CU MM
MONOCYTES RELATIVE PERCENT: 15.1 % (ref 0–4)
NITRITE URINE, QUANTITATIVE: NEGATIVE
NUCLEATED RBC %: 0 %
O2 SAT, VEN: 92.8 % (ref 50–70)
OPIATES, URINE: NEGATIVE
OXYCODONE: NEGATIVE
PCO2, VEN: 61 MMHG (ref 38–52)
PDW BLD-RTO: 13.2 % (ref 11.7–14.9)
PH VENOUS: 7.41 (ref 7.32–7.42)
PH, URINE: 7 (ref 5–8)
PHENCYCLIDINE, URINE: NEGATIVE
PLATELET # BLD: 233 K/CU MM (ref 140–440)
PMV BLD AUTO: 9.6 FL (ref 7.5–11.1)
PO2, VEN: 65 MMHG (ref 28–48)
POTASSIUM SERPL-SCNC: 3.7 MMOL/L (ref 3.5–5.1)
PRO-BNP: 94.74 PG/ML
PROCALCITONIN: 0.05
PROTEIN UA: NEGATIVE MG/DL
RBC # BLD: 4.21 M/CU MM (ref 4.2–5.4)
RBC URINE: 1 /HPF (ref 0–6)
SEGMENTED NEUTROPHILS ABSOLUTE COUNT: 3.7 K/CU MM
SEGMENTED NEUTROPHILS RELATIVE PERCENT: 60.9 % (ref 36–66)
SODIUM BLD-SCNC: 137 MMOL/L (ref 135–145)
SPECIFIC GRAVITY UA: 1.01 (ref 1–1.03)
SQUAMOUS EPITHELIAL: 4 /HPF
TOTAL CK: 66 IU/L (ref 26–140)
TOTAL IMMATURE NEUTOROPHIL: 0.02 K/CU MM
TOTAL NUCLEATED RBC: 0 K/CU MM
TOTAL PROTEIN: 6.2 GM/DL (ref 6.4–8.2)
TRICHOMONAS: ABNORMAL /HPF
TROPONIN T: <0.01 NG/ML
UROBILINOGEN, URINE: NEGATIVE MG/DL (ref 0.2–1)
WBC # BLD: 6 K/CU MM (ref 4–10.5)
WBC UA: 1 /HPF (ref 0–5)

## 2021-05-23 PROCEDURE — 6360000002 HC RX W HCPCS: Performed by: NURSE PRACTITIONER

## 2021-05-23 PROCEDURE — 94761 N-INVAS EAR/PLS OXIMETRY MLT: CPT

## 2021-05-23 PROCEDURE — 6370000000 HC RX 637 (ALT 250 FOR IP): Performed by: EMERGENCY MEDICINE

## 2021-05-23 PROCEDURE — 84145 PROCALCITONIN (PCT): CPT

## 2021-05-23 PROCEDURE — 71260 CT THORAX DX C+: CPT

## 2021-05-23 PROCEDURE — 87635 SARS-COV-2 COVID-19 AMP PRB: CPT

## 2021-05-23 PROCEDURE — 93005 ELECTROCARDIOGRAM TRACING: CPT | Performed by: EMERGENCY MEDICINE

## 2021-05-23 PROCEDURE — 94664 DEMO&/EVAL PT USE INHALER: CPT

## 2021-05-23 PROCEDURE — 87086 URINE CULTURE/COLONY COUNT: CPT

## 2021-05-23 PROCEDURE — 6370000000 HC RX 637 (ALT 250 FOR IP): Performed by: NURSE PRACTITIONER

## 2021-05-23 PROCEDURE — 94640 AIRWAY INHALATION TREATMENT: CPT

## 2021-05-23 PROCEDURE — 84484 ASSAY OF TROPONIN QUANT: CPT

## 2021-05-23 PROCEDURE — 6360000002 HC RX W HCPCS: Performed by: INTERNAL MEDICINE

## 2021-05-23 PROCEDURE — 1200000000 HC SEMI PRIVATE

## 2021-05-23 PROCEDURE — 96367 TX/PROPH/DG ADDL SEQ IV INF: CPT

## 2021-05-23 PROCEDURE — 2580000003 HC RX 258: Performed by: INTERNAL MEDICINE

## 2021-05-23 PROCEDURE — 6360000002 HC RX W HCPCS: Performed by: EMERGENCY MEDICINE

## 2021-05-23 PROCEDURE — 83880 ASSAY OF NATRIURETIC PEPTIDE: CPT

## 2021-05-23 PROCEDURE — 93010 ELECTROCARDIOGRAM REPORT: CPT | Performed by: INTERNAL MEDICINE

## 2021-05-23 PROCEDURE — 82550 ASSAY OF CK (CPK): CPT

## 2021-05-23 PROCEDURE — 99285 EMERGENCY DEPT VISIT HI MDM: CPT

## 2021-05-23 PROCEDURE — 80307 DRUG TEST PRSMV CHEM ANLYZR: CPT

## 2021-05-23 PROCEDURE — 85025 COMPLETE CBC W/AUTO DIFF WBC: CPT

## 2021-05-23 PROCEDURE — 96375 TX/PRO/DX INJ NEW DRUG ADDON: CPT

## 2021-05-23 PROCEDURE — 6370000000 HC RX 637 (ALT 250 FOR IP): Performed by: INTERNAL MEDICINE

## 2021-05-23 PROCEDURE — 2100000000 HC CCU R&B

## 2021-05-23 PROCEDURE — 2580000003 HC RX 258: Performed by: EMERGENCY MEDICINE

## 2021-05-23 PROCEDURE — 94660 CPAP INITIATION&MGMT: CPT

## 2021-05-23 PROCEDURE — 83735 ASSAY OF MAGNESIUM: CPT

## 2021-05-23 PROCEDURE — 87040 BLOOD CULTURE FOR BACTERIA: CPT

## 2021-05-23 PROCEDURE — 82805 BLOOD GASES W/O2 SATURATION: CPT

## 2021-05-23 PROCEDURE — 2580000003 HC RX 258: Performed by: NURSE PRACTITIONER

## 2021-05-23 PROCEDURE — 96365 THER/PROPH/DIAG IV INF INIT: CPT

## 2021-05-23 PROCEDURE — 80053 COMPREHEN METABOLIC PANEL: CPT

## 2021-05-23 PROCEDURE — 71045 X-RAY EXAM CHEST 1 VIEW: CPT

## 2021-05-23 PROCEDURE — XW033H5 INTRODUCTION OF TOCILIZUMAB INTO PERIPHERAL VEIN, PERCUTANEOUS APPROACH, NEW TECHNOLOGY GROUP 5: ICD-10-PCS | Performed by: INTERNAL MEDICINE

## 2021-05-23 PROCEDURE — 2700000000 HC OXYGEN THERAPY PER DAY

## 2021-05-23 PROCEDURE — 6360000004 HC RX CONTRAST MEDICATION: Performed by: EMERGENCY MEDICINE

## 2021-05-23 PROCEDURE — 81001 URINALYSIS AUTO W/SCOPE: CPT

## 2021-05-23 RX ORDER — ACETAMINOPHEN 325 MG/1
650 TABLET ORAL EVERY 6 HOURS PRN
Status: DISCONTINUED | OUTPATIENT
Start: 2021-05-23 | End: 2021-05-26 | Stop reason: HOSPADM

## 2021-05-23 RX ORDER — TRAMADOL HYDROCHLORIDE 50 MG/1
50 TABLET ORAL 2 TIMES DAILY
Status: DISCONTINUED | OUTPATIENT
Start: 2021-05-23 | End: 2021-05-26 | Stop reason: HOSPADM

## 2021-05-23 RX ORDER — ALBUTEROL SULFATE 90 UG/1
2 AEROSOL, METERED RESPIRATORY (INHALATION) EVERY 4 HOURS PRN
Status: DISCONTINUED | OUTPATIENT
Start: 2021-05-23 | End: 2021-05-26 | Stop reason: HOSPADM

## 2021-05-23 RX ORDER — BUDESONIDE AND FORMOTEROL FUMARATE DIHYDRATE 80; 4.5 UG/1; UG/1
2 AEROSOL RESPIRATORY (INHALATION) 2 TIMES DAILY
Status: DISCONTINUED | OUTPATIENT
Start: 2021-05-23 | End: 2021-05-26 | Stop reason: HOSPADM

## 2021-05-23 RX ORDER — MAGNESIUM SULFATE IN WATER 40 MG/ML
2000 INJECTION, SOLUTION INTRAVENOUS ONCE
Status: COMPLETED | OUTPATIENT
Start: 2021-05-23 | End: 2021-05-23

## 2021-05-23 RX ORDER — DEXTROAMPHETAMINE SACCHARATE, AMPHETAMINE ASPARTATE MONOHYDRATE, DEXTROAMPHETAMINE SULFATE AND AMPHETAMINE SULFATE 7.5; 7.5; 7.5; 7.5 MG/1; MG/1; MG/1; MG/1
30 CAPSULE, EXTENDED RELEASE ORAL DAILY
Status: DISCONTINUED | OUTPATIENT
Start: 2021-05-23 | End: 2021-05-23 | Stop reason: ALTCHOICE

## 2021-05-23 RX ORDER — GUAIFENESIN 600 MG/1
600 TABLET, EXTENDED RELEASE ORAL 2 TIMES DAILY
Status: DISCONTINUED | OUTPATIENT
Start: 2021-05-23 | End: 2021-05-26 | Stop reason: HOSPADM

## 2021-05-23 RX ORDER — SODIUM CHLORIDE 0.9 % (FLUSH) 0.9 %
5-40 SYRINGE (ML) INJECTION PRN
Status: DISCONTINUED | OUTPATIENT
Start: 2021-05-23 | End: 2021-05-26 | Stop reason: HOSPADM

## 2021-05-23 RX ORDER — SODIUM CHLORIDE 9 MG/ML
25 INJECTION, SOLUTION INTRAVENOUS PRN
Status: DISCONTINUED | OUTPATIENT
Start: 2021-05-23 | End: 2021-05-26 | Stop reason: HOSPADM

## 2021-05-23 RX ORDER — METHYLPREDNISOLONE SODIUM SUCCINATE 125 MG/2ML
60 INJECTION, POWDER, LYOPHILIZED, FOR SOLUTION INTRAMUSCULAR; INTRAVENOUS ONCE
Status: COMPLETED | OUTPATIENT
Start: 2021-05-23 | End: 2021-05-23

## 2021-05-23 RX ORDER — PREGABALIN 100 MG/1
100 CAPSULE ORAL 3 TIMES DAILY
Status: DISCONTINUED | OUTPATIENT
Start: 2021-05-23 | End: 2021-05-26 | Stop reason: HOSPADM

## 2021-05-23 RX ORDER — METHYLPREDNISOLONE SODIUM SUCCINATE 40 MG/ML
40 INJECTION, POWDER, LYOPHILIZED, FOR SOLUTION INTRAMUSCULAR; INTRAVENOUS EVERY 12 HOURS
Status: DISCONTINUED | OUTPATIENT
Start: 2021-05-23 | End: 2021-05-26 | Stop reason: HOSPADM

## 2021-05-23 RX ORDER — MAGNESIUM SULFATE 1 G/100ML
1000 INJECTION INTRAVENOUS PRN
Status: DISCONTINUED | OUTPATIENT
Start: 2021-05-23 | End: 2021-05-26 | Stop reason: HOSPADM

## 2021-05-23 RX ORDER — PROMETHAZINE HYDROCHLORIDE 25 MG/1
12.5 TABLET ORAL EVERY 6 HOURS PRN
Status: DISCONTINUED | OUTPATIENT
Start: 2021-05-23 | End: 2021-05-26 | Stop reason: HOSPADM

## 2021-05-23 RX ORDER — TRAZODONE HYDROCHLORIDE 50 MG/1
150 TABLET ORAL NIGHTLY
Status: DISCONTINUED | OUTPATIENT
Start: 2021-05-23 | End: 2021-05-26 | Stop reason: HOSPADM

## 2021-05-23 RX ORDER — SODIUM CHLORIDE 0.9 % (FLUSH) 0.9 %
5-40 SYRINGE (ML) INJECTION EVERY 12 HOURS SCHEDULED
Status: DISCONTINUED | OUTPATIENT
Start: 2021-05-23 | End: 2021-05-26 | Stop reason: HOSPADM

## 2021-05-23 RX ORDER — MIRTAZAPINE 15 MG/1
30 TABLET, FILM COATED ORAL NIGHTLY
Status: DISCONTINUED | OUTPATIENT
Start: 2021-05-23 | End: 2021-05-26 | Stop reason: HOSPADM

## 2021-05-23 RX ORDER — ALBUTEROL SULFATE 90 UG/1
2 AEROSOL, METERED RESPIRATORY (INHALATION) ONCE
Status: COMPLETED | OUTPATIENT
Start: 2021-05-23 | End: 2021-05-23

## 2021-05-23 RX ORDER — ALBUTEROL SULFATE 90 UG/1
2 AEROSOL, METERED RESPIRATORY (INHALATION) EVERY 4 HOURS PRN
Status: DISCONTINUED | OUTPATIENT
Start: 2021-05-23 | End: 2021-05-23

## 2021-05-23 RX ORDER — BUPRENORPHINE AND NALOXONE 8; 2 MG/1; MG/1
1 FILM, SOLUBLE BUCCAL; SUBLINGUAL 3 TIMES DAILY
Status: DISCONTINUED | OUTPATIENT
Start: 2021-05-23 | End: 2021-05-26 | Stop reason: HOSPADM

## 2021-05-23 RX ORDER — ASPIRIN 81 MG/1
81 TABLET, CHEWABLE ORAL DAILY
Status: DISCONTINUED | OUTPATIENT
Start: 2021-05-23 | End: 2021-05-26 | Stop reason: HOSPADM

## 2021-05-23 RX ORDER — GUAIFENESIN/DEXTROMETHORPHAN 100-10MG/5
5 SYRUP ORAL EVERY 4 HOURS PRN
Status: DISCONTINUED | OUTPATIENT
Start: 2021-05-23 | End: 2021-05-26 | Stop reason: HOSPADM

## 2021-05-23 RX ORDER — POLYETHYLENE GLYCOL 3350 17 G/17G
17 POWDER, FOR SOLUTION ORAL DAILY PRN
Status: DISCONTINUED | OUTPATIENT
Start: 2021-05-23 | End: 2021-05-26 | Stop reason: HOSPADM

## 2021-05-23 RX ORDER — ONDANSETRON 2 MG/ML
4 INJECTION INTRAMUSCULAR; INTRAVENOUS EVERY 6 HOURS PRN
Status: DISCONTINUED | OUTPATIENT
Start: 2021-05-23 | End: 2021-05-26 | Stop reason: HOSPADM

## 2021-05-23 RX ORDER — ACETAMINOPHEN 500 MG
1000 TABLET ORAL ONCE
Status: COMPLETED | OUTPATIENT
Start: 2021-05-23 | End: 2021-05-23

## 2021-05-23 RX ADMIN — AZITHROMYCIN MONOHYDRATE 500 MG: 500 INJECTION, POWDER, LYOPHILIZED, FOR SOLUTION INTRAVENOUS at 04:28

## 2021-05-23 RX ADMIN — ASPIRIN 81 MG CHEWABLE TABLET 81 MG: 81 TABLET CHEWABLE at 11:06

## 2021-05-23 RX ADMIN — TRAMADOL HYDROCHLORIDE 50 MG: 50 TABLET, FILM COATED ORAL at 21:23

## 2021-05-23 RX ADMIN — ENOXAPARIN SODIUM 30 MG: 30 INJECTION SUBCUTANEOUS at 21:23

## 2021-05-23 RX ADMIN — GUAIFENESIN 600 MG: 600 TABLET, EXTENDED RELEASE ORAL at 21:24

## 2021-05-23 RX ADMIN — PREGABALIN 100 MG: 100 CAPSULE ORAL at 15:42

## 2021-05-23 RX ADMIN — CEFTRIAXONE SODIUM 1000 MG: 1 INJECTION, POWDER, FOR SOLUTION INTRAMUSCULAR; INTRAVENOUS at 03:43

## 2021-05-23 RX ADMIN — CEFEPIME HYDROCHLORIDE 2000 MG: 2 INJECTION, POWDER, FOR SOLUTION INTRAVENOUS at 21:24

## 2021-05-23 RX ADMIN — ACETAMINOPHEN 1000 MG: 500 TABLET, FILM COATED ORAL at 03:42

## 2021-05-23 RX ADMIN — TRAZODONE HYDROCHLORIDE 150 MG: 50 TABLET ORAL at 21:23

## 2021-05-23 RX ADMIN — CEFEPIME HYDROCHLORIDE 2000 MG: 2 INJECTION, POWDER, FOR SOLUTION INTRAVENOUS at 11:05

## 2021-05-23 RX ADMIN — METHYLPREDNISOLONE SODIUM SUCCINATE 40 MG: 40 INJECTION, POWDER, LYOPHILIZED, FOR SOLUTION INTRAMUSCULAR; INTRAVENOUS at 11:08

## 2021-05-23 RX ADMIN — MAGNESIUM SULFATE IN WATER 2000 MG: 40 INJECTION, SOLUTION INTRAVENOUS at 11:43

## 2021-05-23 RX ADMIN — BUPRENORPHINE AND NALOXONE 1 FILM: 8; 2 FILM, SOLUBLE BUCCAL; SUBLINGUAL at 11:06

## 2021-05-23 RX ADMIN — SODIUM CHLORIDE, PRESERVATIVE FREE 10 ML: 5 INJECTION INTRAVENOUS at 21:22

## 2021-05-23 RX ADMIN — BUPRENORPHINE AND NALOXONE 1 FILM: 8; 2 FILM, SOLUBLE BUCCAL; SUBLINGUAL at 21:24

## 2021-05-23 RX ADMIN — TIOTROPIUM BROMIDE INHALATION SPRAY 2 PUFF: 3.12 SPRAY, METERED RESPIRATORY (INHALATION) at 16:04

## 2021-05-23 RX ADMIN — IOPAMIDOL 75 ML: 755 INJECTION, SOLUTION INTRAVENOUS at 07:31

## 2021-05-23 RX ADMIN — PREGABALIN 100 MG: 100 CAPSULE ORAL at 11:06

## 2021-05-23 RX ADMIN — ALBUTEROL SULFATE 2 PUFF: 90 AEROSOL, METERED RESPIRATORY (INHALATION) at 03:40

## 2021-05-23 RX ADMIN — ENOXAPARIN SODIUM 30 MG: 30 INJECTION SUBCUTANEOUS at 11:07

## 2021-05-23 RX ADMIN — PREGABALIN 100 MG: 100 CAPSULE ORAL at 21:23

## 2021-05-23 RX ADMIN — TOCILIZUMAB 800 MG: 20 INJECTION, SOLUTION, CONCENTRATE INTRAVENOUS at 18:18

## 2021-05-23 RX ADMIN — BUDESONIDE AND FORMOTEROL FUMARATE DIHYDRATE 2 PUFF: 80; 4.5 AEROSOL RESPIRATORY (INHALATION) at 16:04

## 2021-05-23 RX ADMIN — BUDESONIDE AND FORMOTEROL FUMARATE DIHYDRATE 2 PUFF: 80; 4.5 AEROSOL RESPIRATORY (INHALATION) at 20:35

## 2021-05-23 RX ADMIN — VANCOMYCIN HYDROCHLORIDE 1500 MG: 5 INJECTION, POWDER, LYOPHILIZED, FOR SOLUTION INTRAVENOUS at 11:43

## 2021-05-23 RX ADMIN — GUAIFENESIN 600 MG: 600 TABLET, EXTENDED RELEASE ORAL at 15:41

## 2021-05-23 RX ADMIN — Medication 2 PUFF: at 03:41

## 2021-05-23 RX ADMIN — BUPRENORPHINE AND NALOXONE 1 FILM: 8; 2 FILM, SOLUBLE BUCCAL; SUBLINGUAL at 15:42

## 2021-05-23 RX ADMIN — TRAMADOL HYDROCHLORIDE 50 MG: 50 TABLET, FILM COATED ORAL at 11:06

## 2021-05-23 RX ADMIN — METHYLPREDNISOLONE SODIUM SUCCINATE 60 MG: 125 INJECTION, POWDER, FOR SOLUTION INTRAMUSCULAR; INTRAVENOUS at 03:43

## 2021-05-23 RX ADMIN — METHYLPREDNISOLONE SODIUM SUCCINATE 40 MG: 40 INJECTION, POWDER, LYOPHILIZED, FOR SOLUTION INTRAMUSCULAR; INTRAVENOUS at 21:23

## 2021-05-23 RX ADMIN — VANCOMYCIN HYDROCHLORIDE 1500 MG: 5 INJECTION, POWDER, LYOPHILIZED, FOR SOLUTION INTRAVENOUS at 22:11

## 2021-05-23 RX ADMIN — MIRTAZAPINE 30 MG: 15 TABLET, FILM COATED ORAL at 21:23

## 2021-05-23 ASSESSMENT — ENCOUNTER SYMPTOMS
ALLERGIC/IMMUNOLOGIC NEGATIVE: 1
WHEEZING: 1
SHORTNESS OF BREATH: 1
COUGH: 1
GASTROINTESTINAL NEGATIVE: 1
EYES NEGATIVE: 1

## 2021-05-23 ASSESSMENT — PAIN DESCRIPTION - LOCATION: LOCATION: HEAD

## 2021-05-23 ASSESSMENT — PAIN SCALES - GENERAL
PAINLEVEL_OUTOF10: 0
PAINLEVEL_OUTOF10: 0
PAINLEVEL_OUTOF10: 4

## 2021-05-23 NOTE — ED NOTES
Bed: ED-27  Expected date:   Expected time:   Means of arrival:   Comments:  Medic; short of breath     Tay Delarosa RN  05/23/21 0471

## 2021-05-23 NOTE — CONSULTS
mentioned in the history of present  illness. PHYSICAL EXAMINATION:  GENERAL:  The patient is alert, oriented x3, in no acute respiratory  distress. VITAL SIGNS:  Her blood pressure is 120/56 mmHg, pulse of 82 per minute  and respiratory rate of 16 per minute. Her temperature was 99.3 degrees  Fahrenheit. Her T-max was 101.1 degrees Fahrenheit. Her saturation is  95% on 4 liters nasal cannula. HEENT:  Exam is essentially unremarkable. NECK:  There is no JVD. No lymphadenopathy. The neck is supple. LUNGS:  Exam revealed diminished breath sounds. HEART:  Exam showed normal S1, S2. There was no S3, S4 noted. ABDOMEN:  Exam is benign. There is no evidence of any organomegaly. The bowel sounds are present. NEUROLOGIC:  Exam reveals that she is awake and responsive, answering  questions appropriately, moving her extremities. RADIOLOGY DATA:  Her CAT scan of the chest showed extensive  bronchiectatic changes involving both the lungs and nodular linear  infiltrates in both lungs suggestive of pneumonia. LABORATORY DATA:  Her magnesium was 1.4. Her electrolytes showed a  sodium 137, potassium 3.7, chloride 95, carbon dioxide 33, BUN 6,  creatinine 0.5. Her proBNP was 94.74. Her COVID-19 rapid test was  positive. Her venous blood gases showed a pH of 7.41, pCO2 of 61, pO2  of 65 with 92% saturation. IMPRESSION:  1. Acute-on-chronic respiratory failure secondary to bilateral  pneumonia secondary to COVID-19.  2.  Bilateral pneumonia secondary to COVID-19.  3.  Bronchiectasis. 4.  The patient is at baseline oxygen requirement. The patient was  requiring high oxygen requirement, but now is back to baseline oxygen  requirement. 5.  History of tobacco abuse. PLAN:  1.  Solu-Medrol 40 q. 12 hours. 2.  Continue present bronchodilator regimen. 3.  Mucinex 600 mg twice a day. 4.  Continue present antibiotic therapy. 5.  Sputum for culture and sensitivity. 6.  Follow inflammatory markers.   7. The patient has already been ordered on Actemra. 8.  Supplement magnesium. 9.  As per orders.         Gilford Abts, MD    D: 05/23/2021 13:20:04       T: 05/23/2021 13:23:58     [email or web address]  Job#: [id number]     Doc#: [id number]    CC:

## 2021-05-23 NOTE — H&P
(REMERON) 30 MG tablet Take 30 mg by mouth nightly      CPAP Machine MISC by CPAP route nightly            Allergies  No Known Allergies    REVIEW OF SYSTEMS   Within above limitations. 14 point review of systems reviewed. Pertinent positive or negative as per HPI or otherwise negative per 14 point systems review. PHYSICAL EXAM     Wt Readings from Last 3 Encounters:   05/23/21 200 lb (90.7 kg)   05/18/21 211 lb 6.4 oz (95.9 kg)   05/17/21 210 lb 3.2 oz (95.3 kg)       Blood pressure 138/77, pulse 109, temperature 101.1 °F (38.4 °C), temperature source Oral, resp. rate 19, height 5' 4\" (1.626 m), weight 200 lb (90.7 kg), SpO2 (!) 89 %. General - AAO x 3  Psych - Appropriate affect/speech. No agitation  Eyes - Eye lids intact. No scleral icterus  ENT - Lips wnl. External ear clear/dry/intact. No thyromegaly on inspection  Neuro - No gross peripheral or central neuro deficits on inspection  Heart - Sinus. RRR. S1 and S2 present. No added HS/murmurs appreciated. No elevated JVD appreciated  Lung -diminished air entry. No crackles/wheezes appreciated  GI - Soft. No guarding/rigidity. No hepatosplenomegaly/ascites. BS+   - No CVA/suprapubic tenderness or palpable bladder distension  Skin -  No rash/petechiae/ecchymosis. Warm extremities  MSK - Joints with normal ROM.  No joint swellings      LABS AND IMAGING   CBC  [unfilled]    Last 3 Hemoglobin  Lab Results   Component Value Date    HGB 11.8 05/23/2021    HGB 12.0 05/09/2021    HGB 9.6 05/03/2021     Last 3 WBC/ANC  Lab Results   Component Value Date    WBC 6.0 05/23/2021    WBC 9.3 05/09/2021    WBC 16.4 05/03/2021     No components found for: GRNLOCTYABS  Last 3 Platelets  No results found for: PLATELET  Chemistry  [unfilled]  [unfilled]  Lab Results   Component Value Date     11/17/2020     09/24/2020     Coagulation Studies  Lab Results   Component Value Date    INR 0.93 03/30/2021     Liver Function Studies  Lab Results   Component Value Date    ALT 13 05/23/2021    AST 24 05/23/2021    ALKPHOS 73 05/23/2021       Recent Imaging    EXAMINATION:   ONE XRAY VIEW OF THE CHEST       5/23/2021 2:46 am       COMPARISON:   05/17/2021       HISTORY:   ORDERING SYSTEM PROVIDED HISTORY: dyspnea/covid? TECHNOLOGIST PROVIDED HISTORY:   Reason for exam:->dyspnea/covid? Reason for Exam: dyspnea/covid? Acuity: Acute   Type of Exam: Initial   Mechanism of Injury: dyspnea/covid? Relevant Medical/Surgical History: dyspnea/covid?       FINDINGS:   Unchanged background of coarsened interstitial markings with linear   atelectasis or scarring in the right mid lung zone.  No effusion or   pneumothorax.  The cardiac silhouette and mediastinal contours are stable. No acute bony abnormality.           Impression   Unchanged diffuse bilateral interstitial opacities.  Superimposed edema or   infection is difficult to exclude. Relevant labs and imaging reviewed    ASSESSMENT AND PLAN     Ms. Ramon Archibald is a 73-year-old female with history of chronic respiratory failure, O2 dependent who presents with worsening shortness of breath, cold sweats and a general feeling of not being well. A/P as follows; Acute on chronic respiratory failure with hypoxia and hypercarbia. COVID-19 pneumonia  Possible COPD exacerbation  -Admit to Covid stepdown unit. -CXR unchanged diffuse bilateral interstitial opacities, superimposed edema or infection is difficult to exclude. -Baseline O2 of 4-5 L. Currently on 8 L, wean as tolerated. -Received azithromycin and Rocephin at the ED. DC from [address] 5/8. Continue with cefepime and Vanco for treatment of HAP. -Pending procalcitonin, sputum culture, Legionella, strep antigen and blood cultures. -Daily routine COVID-19 panel  -Pending CTA chest.  -Steroids  -Anticoagulation  -Consult pharmacy forTocilizumab  -Home breathing treatments resumed  -ABGs as needed. Hypomagnesemia-mag of 1.4. Replacement ordered.     Mild

## 2021-05-23 NOTE — PROGRESS NOTES
Pharmacy Consult to dose: tocilizumab    At this time patient meets all criteria for therapy, except CRP >/= 7.5 mg/dL (75 mg/L)    Discussed with hospitalist, and based on input from pulmonology and clinical course, benefit > risk to initiate tocilizumab. [facility] weight-based tocilizumab dosing is 8 mg/kg with recommended dose of 800 mg for patients with weight > 90kg.     Thank you for the consult,    Alfredo Zafar, [address]

## 2021-05-23 NOTE — PROGRESS NOTES
Seen and evaluated at bedside. Improved, on home oxygen at 4L. States she feels better. Will continue IV abx for now, follow inflammatory markers, breathing treatments and steroids, will f/u Pulmonology recs, MRSA nares, given low CRP does not qualify for Tocolizumab therapy. Will continue to monitor. Case discussed with Pulmonology and Pharmacy; despite low CRP will still be able to give Tocolizumab therapy today. Order being placed by Pharmacy.

## 2021-05-23 NOTE — ED NOTES
Patient admitted to room 4018. Report called to Lynette Riggins RN.       Case Richardson RN  05/23/21 9680

## 2021-05-23 NOTE — PROGRESS NOTES
[address]  consulted by Dr. Nicole Murry / Calixto Davenport for monitoring and adjustment. Indication for treatment: Covid-19, possible secondary bacterial infection  Goal trough: 15 mcg/mL    Pertinent Laboratory Values:   Temp Readings from Last 3 Encounters:   05/23/21 99.3 °F (37.4 °C) (Oral)   05/09/21 99 °F (37.2 °C) (Oral)   05/08/21 98.4 °F (36.9 °C) (Oral)     Recent Labs     05/23/21  0255   WBC 6.0     Recent Labs     05/23/21  0404   BUN 6   CREATININE 0.5*     Estimated Creatinine Clearance: 134 mL/min (A) (based on SCr of 0.5 mg/dL (L)). No intake or output data in the 24 hours ending 05/23/21 1147    Pertinent Cultures:  Date    Source    Results  5/23   Sputum   Ordered  5/23   Strep/Legionella  Ordered  5/23   Blood    Ordered  5/23   MRSA nasal   Ordered    Vancomycin level:   TROUGH:  No results for input(s): VANCOTROUGH in the last 72 hours. RANDOM:  No results for input(s): VANCORANDOM in the last 72 hours. Assessment:  · WBC and temperature: WBC WNL;  Tmax = 101.1F  · SCr, BUN, and urine output: WNL  · Day(s) of therapy: 1  · Vancomycin concentration: to be collected    Plan:  · Initiate vancomycin 1500 mg IVPB q12h  · Plan to check a level prior to the 4th dose  · Pharmacy will continue to monitor patient and adjust therapy as indicated    Miya 3 5/24 @ 4983    Thank you for the consult,  Wellington Prabhakar [facility]  5/23/2021 11:47 AM

## 2021-05-23 NOTE — ED PROVIDER NOTES
[facility]      TRIAGE CHIEF COMPLAINT:   Shortness of Breath (loss of taste and smell. )      [place]:  Светлана Rucker is a 64 y.o. female that presents with complaint of increased cough shortness of breath fever. Patient lost her taste and smell yesterday has been sick for the past 3 days. Has COPD wears 4 L of oxygen all the time. Had her first dose of Pfizer vaccine 2 weeks ago has not had her second dose. Has not been tested for Covid but on arrival she was febrile hypoxic and short of breath. Denies any chest pain nausea vomiting diarrhea abdominal pain. No other questions or concerns. REVIEW OF SYSTEMS:  At least 10 systems reviewed and otherwise acutely negative except as in the [address] Review of Systems   Constitutional: Positive for chills, fatigue and fever. HENT: Negative. Eyes: Negative. Respiratory: Positive for cough, shortness of breath and wheezing. Cardiovascular: Negative. Gastrointestinal: Negative. Endocrine: Negative. Genitourinary: Negative. Musculoskeletal: Negative. Skin: Negative. Allergic/Immunologic: Negative. Neurological: Negative. Hematological: Negative. Psychiatric/Behavioral: Negative. All other systems reviewed and are negative.       Past Medical History:   Diagnosis Date    ADHD 2006    COPD [facility]chronic obstructive pulmonary disease) [facility]     Drug addiction in remission ([facility]     recovering addict    Hep C w/o coma, chronic (HCC)     Pacemaker     Sleep apnea     TIA (transient ischemic attack)     per patient \"several mini strokes\"     Past Surgical History:   Procedure Laterality Date   [facility]    CHOLECYSTECTOMY  2010    done in [address] [place] [address]  2005     Family History   Problem Relation Age of Onset   Jeremy Ceja Cancer Mother         unsure of type    Thyroid Cancer Mother     Alcohol Abuse Father     Other Sister         passed in car accident    Lupus Brother     Coronary Art Dis Brother      Social History     Socioeconomic History    Marital status: Single     Spouse name: Not on file    Number of children: Not on file    Years of education: Not on file    Highest education level: Not on file   Occupational History    Not on file   Tobacco Use    Smoking status: Former Smoker     Packs/day: 1.00     Years: 40.00     Pack years: 40.00     Quit date: 2016     Years since quittin.7    Smokeless tobacco: Never Used   Substance and Sexual Activity    Alcohol use: Not Currently    Drug use: Not Currently     Comment: heroine    Sexual activity: Not on file   Other Topics Concern    Not on file   Social History Narrative    Not on file     Social Determinants of Health     Financial Resource Strain:     Difficulty of Paying Living Expenses:    Food Insecurity:     Worried About Running Out of Food in the Last Year:     [address] in the Last Year:    Transportation Needs:     Lack of Transportation (Medical):      Lack of Transportation (Non-Medical):    Physical Activity:     Days of Exercise per Week:     Minutes of Exercise per Session:    Stress:     Feeling of Stress :    Social Connections:     Frequency of Communication with Friends and Family:     Frequency of Social Gatherings with Friends and Family:     Attends Mosque Services:     Active Member of Clubs or Organizations:     Attends Club or Organization Meetings:     Marital Status:    Intimate Partner Violence:     Fear of Current or Ex-Partner:     Emotionally Abused:     Physically Abused:     Sexually Abused:      Current Facility-Administered Medications   Medication Dose Route Frequency Provider Last Rate Last Admin    azithromycin (ZITHROMAX) 500 mg in dextrose 5 % 250 mL IVPB  500 mg Intravenous Once Reading Room,  mL/hr at 21 0428 500 mg at 21 5045     Current Outpatient Medications   Medication Sig Dispense Refill    fluticasone-umeclidin-vilant (TRELEGY ELLIPTA) 100-62.5-25 MCG/INH AEPB Inhale 1 puff into the lungs daily 1 each 5    BiPAP Machine MISC by Does not apply route Use nightly      albuterol sulfate  (90 Base) MCG/ACT inhaler USE 2 PUFFS BY MOUTH EVERY 6 HOURS AS NEEDED SHAKE WELL 18 g 5    aspirin 81 MG chewable tablet Take 81 mg by mouth daily      Multiple Vitamins-Minerals (WOMENS MULTIVITAMIN PO) Take 1 tablet by mouth daily      DULERA 100-5 MCG/ACT inhaler INHALE 2 PUFFS BY MOUTH 2 TIMES A DAY SHAKE WELL 13 g 5    SPIRIVA RESPIMAT 2.5 MCG/ACT AERS inhaler INHALE 2 PUFFS BY MOUTH ONCE DAILY 4 g 5    albuterol (PROVENTIL) (2.5 MG/3ML) 0.083% nebulizer solution USE ONE VIAL BY NEBULIZER EVERY 6 HOURS AS NEEDED FOR WHEEZING 360 mL 5    Nebulizer MISC Use nebulizer with medication as directed. 1 each 0    Respiratory Therapy Supplies (NEBULIZER/TUBING/MOUTHPIECE) KIT Inhale 1 kit into the lungs daily as needed (as directed) 1 kit 0    amphetamine-dextroamphetamine (ADDERALL XR) 30 MG extended release capsule Take 30 mg by mouth daily.  traMADol (ULTRAM) 50 MG tablet Take 50 mg by mouth 2 times daily.  pregabalin (LYRICA) 100 MG capsule Take 100 mg by mouth 3 times daily.  traZODone (DESYREL) 150 MG tablet Take 150 mg by mouth nightly       OXYGEN Inhale 4 L into the lungs continuous       buprenorphine-naloxone (SUBOXONE) 8-2 MG FILM SL film Place 1 Film under the tongue 3 times daily.       mirtazapine (REMERON) 30 MG tablet Take 30 mg by mouth nightly      CPAP Machine MISC by CPAP route nightly         No Known Allergies  Current Facility-Administered Medications   Medication Dose Route Frequency Provider Last Rate Last Admin    azithromycin (ZITHROMAX) 500 mg in dextrose 5 % 250 mL IVPB  500 mg Intravenous Once KakKstati,  mL/hr at 05/23/21 0428 500 mg at 05/23/21 0488     Current Outpatient Medications   Medication Sig Dispense Refill    fluticasone-umeclidin-vilant (TRELEGY ELLIPTA) 100-62.5-25 MCG/INH AEPB Inhale 1 puff into the lungs daily 1 each 5    BiPAP Machine MISC by Does not apply route Use nightly      albuterol sulfate  (90 Base) MCG/ACT inhaler USE 2 PUFFS BY MOUTH EVERY 6 HOURS AS NEEDED SHAKE WELL 18 g 5    aspirin 81 MG chewable tablet Take 81 mg by mouth daily      Multiple Vitamins-Minerals (WOMENS MULTIVITAMIN PO) Take 1 tablet by mouth daily      DULERA 100-5 MCG/ACT inhaler INHALE 2 PUFFS BY MOUTH 2 TIMES A DAY SHAKE WELL 13 g 5    SPIRIVA RESPIMAT 2.5 MCG/ACT AERS inhaler INHALE 2 PUFFS BY MOUTH ONCE DAILY 4 g 5    albuterol (PROVENTIL) (2.5 MG/3ML) 0.083% nebulizer solution USE ONE VIAL BY NEBULIZER EVERY 6 HOURS AS NEEDED FOR WHEEZING 360 mL 5    Nebulizer MISC Use nebulizer with medication as directed. 1 each 0    Respiratory Therapy Supplies (NEBULIZER/TUBING/MOUTHPIECE) KIT Inhale 1 kit into the lungs daily as needed (as directed) 1 kit 0    amphetamine-dextroamphetamine (ADDERALL XR) 30 MG extended release capsule Take 30 mg by mouth daily.  traMADol (ULTRAM) 50 MG tablet Take 50 mg by mouth 2 times daily.  pregabalin (LYRICA) 100 MG capsule Take 100 mg by mouth 3 times daily.  traZODone (DESYREL) 150 MG tablet Take 150 mg by mouth nightly       OXYGEN Inhale 4 L into the lungs continuous       buprenorphine-naloxone (SUBOXONE) 8-2 MG FILM SL film Place 1 Film under the tongue 3 times daily.  mirtazapine (REMERON) 30 MG tablet Take 30 mg by mouth nightly      CPAP Machine MISC by CPAP route nightly          Nursing Notes Reviewed    VITAL SIGNS:  ED Triage Vitals   Enc Vitals Group      BP       Pulse       Resp       Temp       Temp src       SpO2       Weight       Height       Head Circumference       Peak Flow       Pain Score       Pain Loc       Pain Edu? Excl. in [address]? PHYSICAL EXAM:  Physical Exam  Vitals and nursing note reviewed.    Constitutional:       General: She is not in acute distress. Appearance: Normal appearance. She is well-developed and well-groomed. She is ill-appearing. She is not toxic-appearing or diaphoretic. HENT:      Head: Normocephalic and atraumatic. Right Ear: External ear normal.      Left Ear: External ear normal.      Nose: Congestion present. No rhinorrhea. Eyes:      General: No scleral icterus. Right eye: No discharge. Left eye: No discharge. Extraocular Movements: Extraocular movements intact. Conjunctiva/sclera: Conjunctivae normal.   Neck:      Vascular: No JVD. Trachea: Phonation normal.   Cardiovascular:      Rate and Rhythm: Regular rhythm. Tachycardia present. Pulses: Normal pulses. Heart sounds: Normal heart sounds. No murmur heard. No friction rub. No gallop. Pulmonary:      Effort: Pulmonary effort is normal. No respiratory distress. Breath sounds: No stridor. Wheezing and rhonchi present. No rales. Abdominal:      General: Bowel sounds are normal. There is no distension. Palpations: Abdomen is soft. There is no mass. Tenderness: There is no abdominal tenderness. There is no guarding or rebound. Negative signs include Best's sign, Rovsing's sign and McBurney's sign. Hernia: No hernia is present. Musculoskeletal:         General: No swelling, tenderness, deformity or signs of injury. Normal range of motion. Cervical back: Full passive range of motion without pain and normal range of motion. No edema, erythema, signs of trauma, rigidity, torticollis or crepitus. No pain with movement. Normal range of motion. Right lower leg: No edema. Left lower leg: No edema. Skin:     General: Skin is warm. Coloration: Skin is not jaundiced or pale. Findings: No bruising, erythema, lesion or rash. Neurological:      General: No focal deficit present. Mental Status: She is alert and oriented to person, place, and time.       GCS: GCS eye subscore is 4. GCS verbal subscore is 5. GCS motor subscore is 6. Cranial Nerves: Cranial nerves are intact. No cranial nerve deficit, dysarthria or facial asymmetry. Sensory: Sensation is intact. No sensory deficit. Motor: Motor function is intact. No weakness, tremor, atrophy, abnormal muscle tone or seizure activity. Coordination: Coordination is intact. Coordination normal.   Psychiatric:         Mood and Affect: Mood normal.         Behavior: Behavior normal. Behavior is cooperative. Thought Content:  Thought content normal.         Judgment: Judgment normal.           I have reviewed andinterpreted all of the currently available lab results from this visit (if applicable):    Results for orders placed or performed during the hospital encounter of 05/23/21   COVID-19, Rapid    Specimen: Nasopharyngeal   Result Value Ref Range    Source THROAT     SARS-CoV-2, NAAT 630,342 (A) NOT DETECTED   CBC Auto Differential   Result Value Ref Range    WBC 6.0 4.0 - 10.5 K/CU MM    RBC 4.21 4.2 - 5.4 M/CU MM    Hemoglobin 11.8 (L) 12.5 - 16.0 GM/DL    Hematocrit 38.2 37 - 47 %    MCV 90.7 78 - 100 FL    MCH 28.0 27 - 31 PG    MCHC 30.9 (L) 32.0 - 36.0 %    RDW 13.2 11.7 - 14.9 %    Platelets 969 108 - 728 K/CU MM    MPV 9.6 7.5 - 11.1 FL    Differential Type AUTOMATED DIFFERENTIAL     Segs Relative 60.9 36 - 66 %    Lymphocytes % 23.3 (L) 24 - 44 %    Monocytes % 15.1 (H) 0 - 4 %    Eosinophils % 0.2 0 - 3 %    Basophils % 0.2 0 - 1 %    Segs Absolute 3.7 K/CU MM    Lymphocytes Absolute 1.4 K/CU MM    Monocytes Absolute 0.9 K/CU MM    Eosinophils Absolute 0.0 K/CU MM    Basophils Absolute 0.0 K/CU MM    Nucleated RBC % 0.0 %    Total Nucleated RBC 0.0 K/CU MM    Total Immature Neutrophil 0.02 K/CU MM    Immature Neutrophil % 0.3 0 - 0.43 %   Blood Gas, Venous   Result Value Ref Range    pH, Navdeep 7.41 7.32 - 7.42    pCO2, Navdeep 61 (H) 38 - 52 mmHG    pO2, Navdeep 65 (H) 28 - 48 mmHG    Base Exc, Mixed 11.5 (H) 0 - 2.3    HCO3, Venous 38.7 (H) 19 - 25 MMOL/L    O2 Sat, Navdeep 92.8 (H) 50 - 70 %    Comment VBG    Comprehensive Metabolic Panel   Result Value Ref Range    Sodium 137 135 - 145 MMOL/L    Potassium 3.7 3.5 - 5.1 MMOL/L    Chloride 95 (L) 99 - 110 mMol/L    CO2 33 (H) 21 - 32 MMOL/L    BUN 6 6 - 23 MG/DL    CREATININE 0.5 (L) 0.6 - 1.1 MG/DL    Glucose 152 (H) 70 - 99 MG/DL    Calcium 8.2 (L) 8.3 - 10.6 MG/DL    Albumin 3.3 (L) 3.4 - 5.0 GM/DL    Total Protein 6.2 (L) 6.4 - 8.2 GM/DL    Total Bilirubin 0.2 0.0 - 1.0 MG/DL    ALT 13 10 - 40 U/L    AST 24 15 - 37 IU/L    Alkaline Phosphatase 73 40 - 128 IU/L    GFR Non-African American >60 >60 mL/min/1.73m2    GFR African American >60 >60 mL/min/1.73m2    Anion Gap 9 4 - 16   CK   Result Value Ref Range    Total CK 66 26 - 140 IU/L   Magnesium   Result Value Ref Range    Magnesium 1.4 (L) 1.8 - 2.4 mg/dl   Brain Natriuretic Peptide   Result Value Ref Range    Pro-BNP 94.74 <300 PG/ML   Troponin   Result Value Ref Range    Troponin T <0.010 <0.01 NG/ML   EKG 12 Lead   Result Value Ref Range    Ventricular Rate 108 BPM    Atrial Rate 108 BPM    P-R Interval 132 ms    QRS Duration 84 ms    Q-T Interval 318 ms    QTc Calculation (Bazett) 426 ms    P Axis 47 degrees    R Axis 51 degrees    T Axis 62 degrees    Diagnosis       Sinus tachycardia  Otherwise normal ECG  When compared with ECG of 09-MAY-2021 05:21,  No significant change was found          Radiographs (if obtained):  [] The following radiograph was interpreted by myself in the absence of a radiologist:  [x] Radiologist's Report Reviewed:    CXR    XR CHEST (2 VW)    Result Date: 5/20/2021  EXAMINATION: TWO XRAY VIEWS OF THE CHEST 5/17/2021 6:14 pm COMPARISON: 05/09/2021, 11/30/2019. HISTORY: ORDERING SYSTEM PROVIDED HISTORY: Recurrent pneumonia TECHNOLOGIST PROVIDED HISTORY: Reason for exam:->f/u pneumonia Acuity: Unknown Type of Exam: Subsequent/Follow-up Additional signs and symptoms: Follow up pneumonia. Patient is using oxygen. Shortness of breath. FINDINGS: Cardiac pacer stable in positioning. Stable linear areas of scarring or atelectasis within the right lung. Lungs are slightly hyperinflated with flattening of the diaphragms. Perihilar interstitial opacities are otherwise unchanged dating back to 11/30/2019. No new pleural effusion, focal consolidation or pneumothorax. The osseous structures are stable. Stable cardiac silhouette. Stable exam.  COPD with no new acute cardiopulmonary findings. CT HEAD WO CONTRAST    Result Date: 5/9/2021  EXAMINATION: CT OF THE HEAD WITHOUT CONTRAST  5/9/2021 6:08 am TECHNIQUE: CT of the head was performed without the administration of intravenous contrast. Dose modulation, iterative reconstruction, and/or weight based adjustment of the mA/kV was utilized to reduce the radiation dose to as low as reasonably achievable. COMPARISON: 05/08/2021. HISTORY: ORDERING SYSTEM PROVIDED HISTORY: fell at home, [place] head TECHNOLOGIST PROVIDED HISTORY: Has a \"code stroke\" or \"stroke alert\" been called? ->No Reason for exam:->fell at home, hit head Decision Support Exception - unselect if not a suspected or confirmed emergency medical condition->Emergency Medical Condition (MA) Reason for Exam: head injury Acuity: Acute Type of Exam: Initial Mechanism of Injury: fall FINDINGS: BRAIN/VENTRICLES: There is no acute intracranial hemorrhage, mass effect or midline shift. No abnormal extra-axial fluid collection. The gray-white differentiation is maintained without evidence of an acute infarct. There is no evidence of hydrocephalus. There are areas of hypoattenuation in the periventricular and subcortical white matter, which is nonspecific, but may represent chronic microvasvular ischemic change. Scattered atherosclerosis of the intracranial vasculature. Perhaps minimal cerebellar tonsillar ectopia. ORBITS: The visualized portion of the orbits demonstrate no acute abnormality. 5/9/2021  EXAMINATION: CT OF THE CERVICAL SPINE WITHOUT CONTRAST 5/9/2021 6:08 am TECHNIQUE: CT of the cervical spine was performed without the administration of intravenous contrast. Multiplanar reformatted images are provided for review. Dose modulation, iterative reconstruction, and/or weight based adjustment of the mA/kV was utilized to reduce the radiation dose to as low as reasonably achievable. COMPARISON: None. HISTORY: ORDERING SYSTEM PROVIDED HISTORY: fell, hit head TECHNOLOGIST PROVIDED HISTORY: Reason for exam:->fell, hit head Decision Support Exception - unselect if not a suspected or confirmed emergency medical condition->Emergency Medical Condition (MA) Reason for Exam: fall Acuity: Acute Type of Exam: Initial Mechanism of Injury: fall FINDINGS: BONES/ALIGNMENT: There is no acute fracture or traumatic malalignment. There is minor degenerative retrolisthesis C5 on C6. Vertebral body heights are normal.  No fracture or dislocation is evident. DEGENERATIVE CHANGES: Minor age-appropriate degenerative changes are present. There may be mild bilateral bony foraminal stenosis at C5-6. SOFT TISSUES: There is no prevertebral soft tissue swelling or mass. The visualized lung apices are noted for mild emphysema. No acute abnormality of the cervical spine. Mild emphysema. Mild degenerative change with possible mild bilateral bony foraminal stenosis at C5-6. XR CHEST PORTABLE    Result Date: 5/10/2021  EXAMINATION: ONE X-RAY VIEW OF THE CHEST 5/9/2021 5:49 am COMPARISON: May 1, 2021 HISTORY: ORDERING SYSTEM PROVIDED HISTORY: episodes of shaking TECHNOLOGIST PROVIDED HISTORY: Reason for exam:->episodes of shaking Reason for Exam: episodes of shaking Acuity: Acute Type of Exam: Initial FINDINGS: Left chest wall pacer in place. Stable cardiomediastinal silhouette. The lungs show perihilar opacities, not significantly changed from the prior study. No significant pleural effusions.  No acute osseous abnormality. Similar perihilar opacities since May 1, 2021 which may represent edema or infection. XR CHEST PORTABLE    Result Date: 5/1/2021  EXAMINATION: ONE XRAY VIEW OF THE CHEST 5/1/2021 10:23 pm COMPARISON: 04/14/2021 HISTORY: ORDERING SYSTEM PROVIDED HISTORY: dyspnea TECHNOLOGIST PROVIDED HISTORY: Reason for exam:->dyspnea Reason for Exam: dyspnea Acuity: Chronic Type of Exam: Initial FINDINGS: There are patchy bilateral lower lung zone airspace opacities, right greater than left. No effusion or pneumothorax. The cardiac silhouette and mediastinal contours are stable. No acute bony abnormality. Unchanged diffuse interstitial prominence. Bilateral lower lobe predominant airspace opacities are suspicious for infection. EKG (if obtained): (All EKG's are interpreted by myself in the absence of a cardiologist)    12 lead EKG per my interpretation:  Sinus Tachycardia 108  Axis is   Normal  QTc is  426  There is no specific T wave changes appreciated. There is no specific ST wave changes appreciated. Prior EKG to compare with was not available     Total critical care time today provided was at least 40 minutes. This excludes seperately billable procedure. Critical care time provided for reviewing labs, images giving breathing treatment steroids oxygen that required close evaluation and/or intervention with concern for patient decompensation.     SIRS CRITERIA: (2 required)  Temperature <36 or >38  Yes  Heart rate >90    Yes  RR >20 or PCO2 <32   Yes  WBC >12 or <4 or >10% bands No    SEPSIS CRITERIA: (Both required)  Two or more of the above  Yes  Suspected source(s) of infection lung    ANTIBIOTIC SELECTION RATIONAL  Rocephin/Azithromycin    ANTIBIOTIC SELECTION LIMITATIONS  None    LACTIC ACID    Initial     See documentation  Follow - up if initial abnormal  See documentation    SEPTIC SHOCK CRITERIA:  SBP <90 or 40 reduction from baseline refractory to fluid resuscitation:  No  Serum Lactic Acid >4:   See documentation    FLUIDS  Saline 30 ml/kg given (over 30-60 min) No  (Septic SHOCK or lactic > 4)    FLUID ADMINISTRATION BARRIERS  Poor IV access and Significant active pulmonary edema or CHF  COVID 19 trying not to fluid overload    OTHER TREATMENT BARRIERS  None    CENTRAL LINE INSERTED? not applicable    Bashir Ferrell DO    MDM:    Patient here with cough shortness of breath fever hypoxia. Again history of COPD she wears 4 L of oxygen all the time. She states increased cough shortness of breath fevers at home. Was hypoxic per EMS despite wearing her 4 L. She has COPD. States she had her first dose of Pfizer vaccine 2 weeks ago has not had her second. Has not been tested for Covid but lost her taste and smell yesterday. Patient on arrival is febrile hypoxic will do high flow oxygen. Given breathing treatment steroids antipyretics. Work-up does show multifocal pneumonia given Rocephin azithromycin. Also gave her steroids as mentioned above. She did test positive for COVID-19 I did wear 95 mask gown and gloves, eye protection. Patient will be admitted to hospital medicine doing better with treatment as mentioned above. Again COVID-19 COPD exacerbation pneumonia admitted. Stable. CLINICAL IMPRESSION:  Final diagnoses:   Dyspnea and respiratory abnormalities   COPD exacerbation (HCC)   Hypoxia   COVID-19   Fever, unspecified fever cause       (Please note that portions of this note may have been completed with a voice recognition program. Efforts were made to edit the dictations but occasionally words aremis-transcribed.)    DISPOSITION REFERRAL (if applicable):  No follow-up provider specified.     DISPOSITION MEDICATIONS (if applicable):  New Prescriptions    No medications on file          Bashir Ferrell, [facility],   05/23/21 4789

## 2021-05-24 LAB
ALBUMIN SERPL-MCNC: 3.2 GM/DL (ref 3.4–5)
ALP BLD-CCNC: 71 IU/L (ref 40–128)
ALT SERPL-CCNC: 14 U/L (ref 10–40)
ANION GAP SERPL CALCULATED.3IONS-SCNC: 5 MMOL/L (ref 4–16)
APTT: 32.9 SECONDS (ref 25.1–37.1)
AST SERPL-CCNC: 22 IU/L (ref 15–37)
BASOPHILS ABSOLUTE: 0 K/CU MM
BASOPHILS RELATIVE PERCENT: 0 % (ref 0–1)
BILIRUB SERPL-MCNC: 0.2 MG/DL (ref 0–1)
BUN BLDV-MCNC: 11 MG/DL (ref 6–23)
CALCIUM SERPL-MCNC: 8.7 MG/DL (ref 8.3–10.6)
CHLORIDE BLD-SCNC: 98 MMOL/L (ref 99–110)
CO2: 36 MMOL/L (ref 21–32)
CREAT SERPL-MCNC: 0.5 MG/DL (ref 0.6–1.1)
CULTURE: NORMAL
D DIMER: 463 NG/ML(DDU)
DIFFERENTIAL TYPE: ABNORMAL
EOSINOPHILS ABSOLUTE: 0 K/CU MM
EOSINOPHILS RELATIVE PERCENT: 0 % (ref 0–3)
FERRITIN: 140 NG/ML (ref 15–150)
FIBRINOGEN LEVEL: 533 MG/DL (ref 196.9–442.1)
GFR AFRICAN AMERICAN: >60 ML/MIN/1.73M2
GFR NON-AFRICAN AMERICAN: >60 ML/MIN/1.73M2
GLUCOSE BLD-MCNC: 185 MG/DL (ref 70–99)
HCT VFR BLD CALC: 38 % (ref 37–47)
HEMOGLOBIN: 12 GM/DL (ref 12.5–16)
HIGH SENSITIVE C-REACTIVE PROTEIN: 95.3 MG/L
IMMATURE NEUTROPHIL %: 0.3 % (ref 0–0.43)
INR BLD: 1.03 INDEX
LACTATE DEHYDROGENASE: 236 IU/L (ref 120–246)
LEGIONELLA URINARY AG: NEGATIVE
LYMPHOCYTES ABSOLUTE: 0.8 K/CU MM
LYMPHOCYTES RELATIVE PERCENT: 13.4 % (ref 24–44)
Lab: NORMAL
MAGNESIUM: 2.2 MG/DL (ref 1.8–2.4)
MCH RBC QN AUTO: 28.2 PG (ref 27–31)
MCHC RBC AUTO-ENTMCNC: 31.6 % (ref 32–36)
MCV RBC AUTO: 89.2 FL (ref 78–100)
MONOCYTES ABSOLUTE: 0.3 K/CU MM
MONOCYTES RELATIVE PERCENT: 5.9 % (ref 0–4)
NUCLEATED RBC %: 0 %
PDW BLD-RTO: 13.2 % (ref 11.7–14.9)
PHOSPHORUS: 2.6 MG/DL (ref 2.5–4.9)
PLATELET # BLD: 207 K/CU MM (ref 140–440)
PMV BLD AUTO: 8.8 FL (ref 7.5–11.1)
POTASSIUM SERPL-SCNC: 4.4 MMOL/L (ref 3.5–5.1)
PROTHROMBIN TIME: 12.5 SECONDS (ref 11.7–14.5)
RBC # BLD: 4.26 M/CU MM (ref 4.2–5.4)
SARS-COV-2, NAAT: DETECTED
SEGMENTED NEUTROPHILS ABSOLUTE COUNT: 4.7 K/CU MM
SEGMENTED NEUTROPHILS RELATIVE PERCENT: 80.4 % (ref 36–66)
SODIUM BLD-SCNC: 139 MMOL/L (ref 135–145)
SOURCE: ABNORMAL
SPECIMEN: NORMAL
STREP PNEUMONIAE ANTIGEN: NORMAL
TOTAL IMMATURE NEUTOROPHIL: 0.02 K/CU MM
TOTAL NUCLEATED RBC: 0 K/CU MM
TOTAL PROTEIN: 6.4 GM/DL (ref 6.4–8.2)
TROPONIN T: <0.01 NG/ML
WBC # BLD: 5.8 K/CU MM (ref 4–10.5)

## 2021-05-24 PROCEDURE — 87077 CULTURE AEROBIC IDENTIFY: CPT

## 2021-05-24 PROCEDURE — 85384 FIBRINOGEN ACTIVITY: CPT

## 2021-05-24 PROCEDURE — 87070 CULTURE OTHR SPECIMN AEROBIC: CPT

## 2021-05-24 PROCEDURE — 87205 SMEAR GRAM STAIN: CPT

## 2021-05-24 PROCEDURE — 80053 COMPREHEN METABOLIC PANEL: CPT

## 2021-05-24 PROCEDURE — 2700000000 HC OXYGEN THERAPY PER DAY

## 2021-05-24 PROCEDURE — 84484 ASSAY OF TROPONIN QUANT: CPT

## 2021-05-24 PROCEDURE — 2580000003 HC RX 258: Performed by: INTERNAL MEDICINE

## 2021-05-24 PROCEDURE — 6370000000 HC RX 637 (ALT 250 FOR IP): Performed by: INTERNAL MEDICINE

## 2021-05-24 PROCEDURE — 6360000002 HC RX W HCPCS: Performed by: NURSE PRACTITIONER

## 2021-05-24 PROCEDURE — 2580000003 HC RX 258: Performed by: NURSE PRACTITIONER

## 2021-05-24 PROCEDURE — 6360000002 HC RX W HCPCS: Performed by: INTERNAL MEDICINE

## 2021-05-24 PROCEDURE — 84100 ASSAY OF PHOSPHORUS: CPT

## 2021-05-24 PROCEDURE — 94640 AIRWAY INHALATION TREATMENT: CPT

## 2021-05-24 PROCEDURE — 85025 COMPLETE CBC W/AUTO DIFF WBC: CPT

## 2021-05-24 PROCEDURE — 85610 PROTHROMBIN TIME: CPT

## 2021-05-24 PROCEDURE — 87899 AGENT NOS ASSAY W/OPTIC: CPT

## 2021-05-24 PROCEDURE — 86141 C-REACTIVE PROTEIN HS: CPT

## 2021-05-24 PROCEDURE — 85379 FIBRIN DEGRADATION QUANT: CPT

## 2021-05-24 PROCEDURE — 82728 ASSAY OF FERRITIN: CPT

## 2021-05-24 PROCEDURE — 85730 THROMBOPLASTIN TIME PARTIAL: CPT

## 2021-05-24 PROCEDURE — 1200000000 HC SEMI PRIVATE

## 2021-05-24 PROCEDURE — 83615 LACTATE (LD) (LDH) ENZYME: CPT

## 2021-05-24 PROCEDURE — 6370000000 HC RX 637 (ALT 250 FOR IP): Performed by: NURSE PRACTITIONER

## 2021-05-24 PROCEDURE — 94660 CPAP INITIATION&MGMT: CPT

## 2021-05-24 PROCEDURE — 87449 NOS EACH ORGANISM AG IA: CPT

## 2021-05-24 PROCEDURE — 94761 N-INVAS EAR/PLS OXIMETRY MLT: CPT

## 2021-05-24 PROCEDURE — 87186 SC STD MICRODIL/AGAR DIL: CPT

## 2021-05-24 PROCEDURE — 83735 ASSAY OF MAGNESIUM: CPT

## 2021-05-24 RX ORDER — AZITHROMYCIN 250 MG/1
250 TABLET, FILM COATED ORAL DAILY
Status: DISCONTINUED | OUTPATIENT
Start: 2021-05-24 | End: 2021-05-26 | Stop reason: HOSPADM

## 2021-05-24 RX ADMIN — BUPRENORPHINE AND NALOXONE 1 FILM: 8; 2 FILM, SOLUBLE BUCCAL; SUBLINGUAL at 09:30

## 2021-05-24 RX ADMIN — TRAMADOL HYDROCHLORIDE 50 MG: 50 TABLET, FILM COATED ORAL at 09:30

## 2021-05-24 RX ADMIN — PREGABALIN 100 MG: 100 CAPSULE ORAL at 09:31

## 2021-05-24 RX ADMIN — CEFEPIME HYDROCHLORIDE 2000 MG: 2 INJECTION, POWDER, FOR SOLUTION INTRAVENOUS at 09:30

## 2021-05-24 RX ADMIN — PREGABALIN 100 MG: 100 CAPSULE ORAL at 19:45

## 2021-05-24 RX ADMIN — SODIUM CHLORIDE, PRESERVATIVE FREE 10 ML: 5 INJECTION INTRAVENOUS at 09:32

## 2021-05-24 RX ADMIN — METHYLPREDNISOLONE SODIUM SUCCINATE 40 MG: 40 INJECTION, POWDER, LYOPHILIZED, FOR SOLUTION INTRAMUSCULAR; INTRAVENOUS at 20:00

## 2021-05-24 RX ADMIN — METHYLPREDNISOLONE SODIUM SUCCINATE 40 MG: 40 INJECTION, POWDER, LYOPHILIZED, FOR SOLUTION INTRAMUSCULAR; INTRAVENOUS at 09:31

## 2021-05-24 RX ADMIN — GUAIFENESIN 600 MG: 600 TABLET, EXTENDED RELEASE ORAL at 19:44

## 2021-05-24 RX ADMIN — TRAZODONE HYDROCHLORIDE 150 MG: 50 TABLET ORAL at 19:44

## 2021-05-24 RX ADMIN — ENOXAPARIN SODIUM 30 MG: 30 INJECTION SUBCUTANEOUS at 09:31

## 2021-05-24 RX ADMIN — AZITHROMYCIN MONOHYDRATE 250 MG: 250 TABLET ORAL at 13:49

## 2021-05-24 RX ADMIN — GUAIFENESIN 600 MG: 600 TABLET, EXTENDED RELEASE ORAL at 09:31

## 2021-05-24 RX ADMIN — BUPRENORPHINE AND NALOXONE 1 FILM: 8; 2 FILM, SOLUBLE BUCCAL; SUBLINGUAL at 13:49

## 2021-05-24 RX ADMIN — BUPRENORPHINE AND NALOXONE 1 FILM: 8; 2 FILM, SOLUBLE BUCCAL; SUBLINGUAL at 19:44

## 2021-05-24 RX ADMIN — TRAMADOL HYDROCHLORIDE 50 MG: 50 TABLET, FILM COATED ORAL at 19:44

## 2021-05-24 RX ADMIN — ASPIRIN 81 MG CHEWABLE TABLET 81 MG: 81 TABLET CHEWABLE at 09:31

## 2021-05-24 RX ADMIN — MIRTAZAPINE 30 MG: 15 TABLET, FILM COATED ORAL at 19:45

## 2021-05-24 RX ADMIN — PREGABALIN 100 MG: 100 CAPSULE ORAL at 13:49

## 2021-05-24 RX ADMIN — ENOXAPARIN SODIUM 30 MG: 30 INJECTION SUBCUTANEOUS at 19:43

## 2021-05-24 RX ADMIN — BUDESONIDE AND FORMOTEROL FUMARATE DIHYDRATE 2 PUFF: 80; 4.5 AEROSOL RESPIRATORY (INHALATION) at 20:30

## 2021-05-24 RX ADMIN — SODIUM CHLORIDE, PRESERVATIVE FREE 10 ML: 5 INJECTION INTRAVENOUS at 19:44

## 2021-05-24 ASSESSMENT — PAIN SCALES - GENERAL
PAINLEVEL_OUTOF10: 1
PAINLEVEL_OUTOF10: 2

## 2021-05-24 ASSESSMENT — PAIN DESCRIPTION - ORIENTATION: ORIENTATION: MID

## 2021-05-24 ASSESSMENT — PAIN DESCRIPTION - LOCATION: LOCATION: HEAD

## 2021-05-24 NOTE — PROGRESS NOTES
Hospitalist Progress Note      Name:  Marta Clifton /Age/Sex: 1960  (64 y.o. female)   MRN & CSN:  [id number] & [id number] Admission Date/Time: 2021  2:39 AM   Location:  -A PCP: Sanjay Ji, [facility] Day: 2    Assessment and Plan:   Ms. Tessa Gibbs is a 68-year-old female with history of chronic respiratory failure, O2 dependent who presents with worsening shortness of breath, cold sweats and a general feeling of not being well. A/P as follows;     #Acute on chronic respiratory failure with hypoxia and hypercarbia.: resolved  #COVID-19 pneumonia  #COPD exacerbation  -CXR unchanged diffuse bilateral interstitial opacities, superimposed edema or infection is difficult to exclude. -Baseline O2 of 4-5 L. Currently at baseline  -CT chest possible bronchopneumonia versus bronchiectasis  -Continue azithromycin for COPD exacerbation  -DC cefepime and Vanco,  -Procol 0.052  -D-dimer mildly elevated  -Steroids  -Anticoagulation  -Consult pharmacy for Tocilizumab  -Pulmonary on board     Hypomagnesemia-mag of 1.4. Monitor and replace     Mild hypocalcemia-monitor and replace as needed.     Obstructive sleep apnea-Home CPAP/BiPAP     History of drug abuse-urine drug screen negative     ADHD     -DVT Prophylaxis: lovenox     -GI Prophylaxis: protonix    #Disposition: If she continues to remain stable possible discharge in a.m. Diet DIET GENERAL;   DVT Prophylaxis [] Lovenox, []  Heparin, [] SCDs, [] Ambulation   GI Prophylaxis [] PPI,  [] H2 Blocker,  [] Carafate,  [] Diet/Tube Feeds   Code Status Full Code   Disposition Patient requires continued admission due to SOB         History of Present Illness:     Chief Complaint: <principal problem not specified>  Marta Clifton is a 64 y.o.  female  who presents with SOB       Ten point ROS reviewed negative, unless as noted above    Objective:        Intake/Output Summary (Last 24 hours) at 2021 1210  Last data filed at 2021 0356  Gross per 24 hour   Intake 820 ml   Output 900 ml   Net -80 ml      Vitals:   Vitals:    05/24/21 1000   BP: 81/69   Pulse: 72   Resp: 15   Temp: 99 °F (37.2 °C)   SpO2: 94%     Physical Exam:   GEN Awake female, sitting upright in bed in no apparent distress. Appears given age. EYES Pupils are equally round. No scleral erythema, discharge, or conjunctivitis. HENT Mucous membranes are moist. Oral pharynx without exudates, no evidence of thrush. NECK Supple, no apparent thyromegaly or masses. RESP Clear to auscultation, no wheezes, rales or rhonchi. Symmetric chest movement on NC  CARDIO/VASC S1/S2 auscultated. Regular rate without appreciable murmurs, rubs, or gallops. No JVD or carotid bruits. Peripheral pulses equal bilaterally and palpable. No peripheral edema. GI Abdomen is soft without significant tenderness, masses, or guarding. Bowel sounds are normoactive. Rectal exam deferred.  No costovertebral angle tenderness. Normal appearing external genitalia. Fraser catheter is not present. HEME/LYMPH No palpable cervical lymphadenopathy and no hepatosplenomegaly. No petechiae or ecchymoses. MSK No gross joint deformities. SKIN Normal coloration, warm, dry. NEURO Cranial nerves appear grossly intact, normal speech, no lateralizing weakness. PSYCH Awake, alert, oriented x 4. Affect appropriate.     Medications:   Medications:    aspirin  81 mg Oral Daily    buprenorphine-naloxone  1 Film Sublingual TID    mirtazapine  30 mg Oral Nightly    pregabalin  100 mg Oral TID    tiotropium  2 puff Inhalation Daily    traMADol  50 mg Oral BID    traZODone  150 mg Oral Nightly    sodium chloride flush  5-40 mL Intravenous 2 times per day    enoxaparin  30 mg Subcutaneous BID    methylPREDNISolone  40 mg Intravenous Q12H    budesonide-formoterol  2 puff Inhalation BID    guaiFENesin  600 mg Oral BID      Infusions:    sodium chloride       PRN Meds: sodium chloride flush, 5-40 mL, PRN  sodium chloride, 25 mL, PRN  promethazine, 12.5 mg, Q6H PRN   Or  ondansetron, 4 mg, Q6H PRN  polyethylene glycol, 17 g, Daily PRN  acetaminophen, 650 mg, Q6H PRN   Or  acetaminophen, 650 mg, Q6H PRN  guaiFENesin-dextromethorphan, 5 mL, Q4H PRN  magnesium sulfate, 1,000 mg, PRN  albuterol sulfate HFA, 2 puff, Q4H PRN          Electronically signed by Marc Dia MD on 5/24/2021 at 12:10 PM

## 2021-05-24 NOTE — PLAN OF CARE
Problem: Falls - Risk of:  Goal: Will remain free from falls  Description: Will remain free from falls  Outcome: Ongoing  Goal: Absence of physical injury  Description: Absence of physical injury  Outcome: Ongoing     Problem: Airway Clearance - Ineffective  Goal: Achieve or maintain patent airway  Outcome: Ongoing     Problem: Gas Exchange - Impaired  Goal: Absence of hypoxia  Outcome: Ongoing  Goal: Promote optimal lung function  Outcome: Ongoing     Problem: Breathing Pattern - Ineffective  Goal: Ability to achieve and maintain a regular respiratory rate  Outcome: Ongoing     Problem:  Body Temperature -  Risk of, Imbalanced  Goal: Ability to maintain a body temperature within defined limits  Outcome: Ongoing  Goal: Will regain or maintain usual level of consciousness  Outcome: Ongoing  Goal: Complications related to the disease process, condition or treatment will be avoided or minimized  Outcome: Ongoing     Problem: Isolation Precautions - Risk of Spread of Infection  Goal: Prevent transmission of infection  Outcome: Ongoing     Problem: Nutrition Deficits  Goal: Optimize nutritional status  Outcome: Ongoing     Problem: Risk for Fluid Volume Deficit  Goal: Maintain normal heart rhythm  Outcome: Ongoing  Goal: Maintain absence of muscle cramping  Outcome: Ongoing  Goal: Maintain normal serum potassium, sodium, calcium, phosphorus, and pH  Outcome: Ongoing     Problem: Loneliness or Risk for Loneliness  Goal: Demonstrate positive use of time alone when socialization is not possible  Outcome: Ongoing     Problem: Fatigue  Goal: Verbalize increase energy and improved vitality  Outcome: Ongoing     Problem: Patient Education: Go to Patient Education Activity  Goal: Patient/Family Education  Outcome: Ongoing

## 2021-05-24 NOTE — PROGRESS NOTES
05/24/21  0350   WBC 6.0 5.8   RBC 4.21 4.26   HGB 11.8* 12.0*   HCT 38.2 38.0    207   MCV 90.7 89.2   MCH 28.0 28.2   MCHC 30.9* 31.6*   RDW 13.2 13.2   SEGSPCT 60.9 80.4*      BMP:  Recent Labs     05/23/21  0404 05/24/21  0350    139   K 3.7 4.4   CL 95* 98*   CO2 33* 36*   BUN 6 11   CREATININE 0.5* 0.5*   CALCIUM 8.2* 8.7   GLUCOSE 152* 185*      ABG:  No results for input(s): PH, PO2ART, BYY3NTW, HCO3, BEART, O2SAT in the last 72 hours. Lab Results   Component Value Date    PROBNP 94.74 05/23/2021    PROBNP 162.7 05/01/2021    PROBNP 69.94 12/29/2020     No results found for: [facility]    Radiology Review:  Pertinent images / reports were reviewed as a part of this visit. Assessment:     Patient Active Problem List   Diagnosis    Chronic respiratory failure (HCC)    COPD, severe ([address]    Obstructive sleep apnea    Acute respiratory failure with hypercapnia (HCC)    Pneumonia    Chronic hepatitis C without hepatic coma (HCC)    S/P placement of cardiac pacemaker    Gastroesophageal reflux disease without esophagitis    H/O drug abuse ([address]    Pulmonary nodules    ADHD (attention deficit hyperactivity disorder)    Pulmonary emphysema (HCC)    SOB (shortness of breath)    Leg swelling    Acute on chronic respiratory failure with hypoxia and hypercapnia ([address]    COVID-19       Plan:   1. Overall the patient has improved. 2. Inc. activity.       Dinora Cheatham MD MD  5/24/2021  10:09 AM

## 2021-05-25 LAB
BASOPHILS ABSOLUTE: 0 K/CU MM
BASOPHILS RELATIVE PERCENT: 0.1 % (ref 0–1)
D DIMER: 309 NG/ML(DDU)
DIFFERENTIAL TYPE: ABNORMAL
EOSINOPHILS ABSOLUTE: 0 K/CU MM
EOSINOPHILS RELATIVE PERCENT: 0 % (ref 0–3)
HCT VFR BLD CALC: 36.9 % (ref 37–47)
HEMOGLOBIN: 11 GM/DL (ref 12.5–16)
HIGH SENSITIVE C-REACTIVE PROTEIN: 40.4 MG/L
IMMATURE NEUTROPHIL %: 0.4 % (ref 0–0.43)
LYMPHOCYTES ABSOLUTE: 0.9 K/CU MM
LYMPHOCYTES RELATIVE PERCENT: 11.4 % (ref 24–44)
MCH RBC QN AUTO: 26.7 PG (ref 27–31)
MCHC RBC AUTO-ENTMCNC: 29.8 % (ref 32–36)
MCV RBC AUTO: 89.6 FL (ref 78–100)
MONOCYTES ABSOLUTE: 0.6 K/CU MM
MONOCYTES RELATIVE PERCENT: 6.9 % (ref 0–4)
NUCLEATED RBC %: 0 %
PDW BLD-RTO: 13.4 % (ref 11.7–14.9)
PLATELET # BLD: 218 K/CU MM (ref 140–440)
PMV BLD AUTO: 9.2 FL (ref 7.5–11.1)
PROCALCITONIN: 0.04
RBC # BLD: 4.12 M/CU MM (ref 4.2–5.4)
SEGMENTED NEUTROPHILS ABSOLUTE COUNT: 6.5 K/CU MM
SEGMENTED NEUTROPHILS RELATIVE PERCENT: 81.2 % (ref 36–66)
TOTAL IMMATURE NEUTOROPHIL: 0.03 K/CU MM
TOTAL NUCLEATED RBC: 0 K/CU MM
WBC # BLD: 8 K/CU MM (ref 4–10.5)

## 2021-05-25 PROCEDURE — 6360000002 HC RX W HCPCS: Performed by: NURSE PRACTITIONER

## 2021-05-25 PROCEDURE — 6370000000 HC RX 637 (ALT 250 FOR IP): Performed by: NURSE PRACTITIONER

## 2021-05-25 PROCEDURE — 2700000000 HC OXYGEN THERAPY PER DAY

## 2021-05-25 PROCEDURE — 1200000000 HC SEMI PRIVATE

## 2021-05-25 PROCEDURE — 94664 DEMO&/EVAL PT USE INHALER: CPT

## 2021-05-25 PROCEDURE — 94761 N-INVAS EAR/PLS OXIMETRY MLT: CPT

## 2021-05-25 PROCEDURE — 6370000000 HC RX 637 (ALT 250 FOR IP): Performed by: INTERNAL MEDICINE

## 2021-05-25 PROCEDURE — 84145 PROCALCITONIN (PCT): CPT

## 2021-05-25 PROCEDURE — 86141 C-REACTIVE PROTEIN HS: CPT

## 2021-05-25 PROCEDURE — 94640 AIRWAY INHALATION TREATMENT: CPT

## 2021-05-25 PROCEDURE — 94660 CPAP INITIATION&MGMT: CPT

## 2021-05-25 PROCEDURE — 85025 COMPLETE CBC W/AUTO DIFF WBC: CPT

## 2021-05-25 PROCEDURE — 85379 FIBRIN DEGRADATION QUANT: CPT

## 2021-05-25 PROCEDURE — 2580000003 HC RX 258: Performed by: NURSE PRACTITIONER

## 2021-05-25 PROCEDURE — 94150 VITAL CAPACITY TEST: CPT

## 2021-05-25 RX ADMIN — AZITHROMYCIN MONOHYDRATE 250 MG: 250 TABLET ORAL at 08:30

## 2021-05-25 RX ADMIN — GUAIFENESIN 600 MG: 600 TABLET, EXTENDED RELEASE ORAL at 08:30

## 2021-05-25 RX ADMIN — GUAIFENESIN 600 MG: 600 TABLET, EXTENDED RELEASE ORAL at 21:20

## 2021-05-25 RX ADMIN — TRAZODONE HYDROCHLORIDE 150 MG: 50 TABLET ORAL at 21:20

## 2021-05-25 RX ADMIN — SODIUM CHLORIDE, PRESERVATIVE FREE 10 ML: 5 INJECTION INTRAVENOUS at 08:31

## 2021-05-25 RX ADMIN — METHYLPREDNISOLONE SODIUM SUCCINATE 40 MG: 40 INJECTION, POWDER, LYOPHILIZED, FOR SOLUTION INTRAMUSCULAR; INTRAVENOUS at 21:20

## 2021-05-25 RX ADMIN — ASPIRIN 81 MG CHEWABLE TABLET 81 MG: 81 TABLET CHEWABLE at 08:29

## 2021-05-25 RX ADMIN — BUPRENORPHINE AND NALOXONE 1 FILM: 8; 2 FILM, SOLUBLE BUCCAL; SUBLINGUAL at 21:20

## 2021-05-25 RX ADMIN — PREGABALIN 100 MG: 100 CAPSULE ORAL at 08:30

## 2021-05-25 RX ADMIN — BUDESONIDE AND FORMOTEROL FUMARATE DIHYDRATE 2 PUFF: 80; 4.5 AEROSOL RESPIRATORY (INHALATION) at 08:34

## 2021-05-25 RX ADMIN — BUPRENORPHINE AND NALOXONE 1 FILM: 8; 2 FILM, SOLUBLE BUCCAL; SUBLINGUAL at 08:30

## 2021-05-25 RX ADMIN — BUPRENORPHINE AND NALOXONE 1 FILM: 8; 2 FILM, SOLUBLE BUCCAL; SUBLINGUAL at 14:19

## 2021-05-25 RX ADMIN — PREGABALIN 100 MG: 100 CAPSULE ORAL at 21:21

## 2021-05-25 RX ADMIN — ENOXAPARIN SODIUM 30 MG: 30 INJECTION SUBCUTANEOUS at 21:20

## 2021-05-25 RX ADMIN — MIRTAZAPINE 30 MG: 15 TABLET, FILM COATED ORAL at 21:21

## 2021-05-25 RX ADMIN — TRAMADOL HYDROCHLORIDE 50 MG: 50 TABLET, FILM COATED ORAL at 21:20

## 2021-05-25 RX ADMIN — ENOXAPARIN SODIUM 30 MG: 30 INJECTION SUBCUTANEOUS at 08:30

## 2021-05-25 RX ADMIN — BUDESONIDE AND FORMOTEROL FUMARATE DIHYDRATE 2 PUFF: 80; 4.5 AEROSOL RESPIRATORY (INHALATION) at 19:03

## 2021-05-25 RX ADMIN — SODIUM CHLORIDE, PRESERVATIVE FREE 10 ML: 5 INJECTION INTRAVENOUS at 21:20

## 2021-05-25 RX ADMIN — TIOTROPIUM BROMIDE INHALATION SPRAY 2 PUFF: 3.12 SPRAY, METERED RESPIRATORY (INHALATION) at 08:34

## 2021-05-25 RX ADMIN — METHYLPREDNISOLONE SODIUM SUCCINATE 40 MG: 40 INJECTION, POWDER, LYOPHILIZED, FOR SOLUTION INTRAMUSCULAR; INTRAVENOUS at 08:29

## 2021-05-25 RX ADMIN — PREGABALIN 100 MG: 100 CAPSULE ORAL at 14:19

## 2021-05-25 ASSESSMENT — PAIN SCALES - GENERAL
PAINLEVEL_OUTOF10: 0
PAINLEVEL_OUTOF10: 5
PAINLEVEL_OUTOF10: 0

## 2021-05-25 NOTE — PLAN OF CARE
Danita Grady RN  Outcome: Ongoing  5/25/2021 0950 by Laura Cordon RN  Outcome: Ongoing     Problem: Nutrition Deficits  Goal: Optimize nutritional status  5/25/2021 1953 by Danita Grady RN  Outcome: Ongoing  5/25/2021 0950 by Laura Cordon RN  Outcome: Ongoing     Problem: Risk for Fluid Volume Deficit  Goal: Maintain normal heart rhythm  5/25/2021 1953 by Danita Grady RN  Outcome: Ongoing  5/25/2021 0950 by Laura Cordon RN  Outcome: Ongoing  Goal: Maintain absence of muscle cramping  5/25/2021 1953 by Danita Grady RN  Outcome: Ongoing  5/25/2021 0950 by Laura Cordon RN  Outcome: Ongoing  Goal: Maintain normal serum potassium, sodium, calcium, phosphorus, and pH  5/25/2021 1953 by Danita Grady RN  Outcome: Ongoing  5/25/2021 0950 by Laura Cordon RN  Outcome: Ongoing     Problem: Loneliness or Risk for Loneliness  Goal: Demonstrate positive use of time alone when socialization is not possible  5/25/2021 1953 by Danita Grady RN  Outcome: Ongoing  5/25/2021 0950 by Laura Cordon RN  Outcome: Ongoing     Problem: Fatigue  Goal: Verbalize increase energy and improved vitality  5/25/2021 1953 by Danita Grady RN  Outcome: Ongoing  5/25/2021 0950 by Laura Cordon RN  Outcome: Ongoing     Problem: Patient Education: Go to Patient Education Activity  Goal: Patient/Family Education  5/25/2021 1953 by Danita Grady RN  Outcome: Ongoing  5/25/2021 0950 by Laura Cordon RN  Outcome: Ongoing

## 2021-05-25 NOTE — PROGRESS NOTES
Adrianne Godinez filed at 5/24/2021 1756  Gross per 24 hour   Intake 720 ml   Output 1100 ml   Net -380 ml      Vitals:   Vitals:    05/25/21 0825   BP: 98/85   Pulse: 64   Resp: 15   Temp: 97.9 °F (36.6 °C)   SpO2: (!) 89%     Physical Exam:   GEN: Awake female, nontoxic appearing. Pleasant. Answers questions appropriate. EYES: No eye discharge. Ocular muscles intact. HENT: Normocephalic/atraumatic. No nasal discharge. NECK: Supple  RESP: Symmetric breath sounds. Symmetric chest expansion. No accessory muscle use  CV: Regular rate and rhythm. No pitting extremity edema. GI: Abdomen soft. Nontender. Nondistended. : No Fraser in place  MSK: No bony fractures. No gross deformities. SKIN: warm, dry, no rashes, no pressure ulcer  NEURO: Cranial nerves appear grossly intact, normal speech, no lateralizing weakness.   PSYCH: Awake, alert, oriented     Medications:   Medications:    azithromycin  250 mg Oral Daily    aspirin  81 mg Oral Daily    buprenorphine-naloxone  1 Film Sublingual TID    mirtazapine  30 mg Oral Nightly    pregabalin  100 mg Oral TID    tiotropium  2 puff Inhalation Daily    traMADol  50 mg Oral BID    traZODone  150 mg Oral Nightly    sodium chloride flush  5-40 mL Intravenous 2 times per day    enoxaparin  30 mg Subcutaneous BID    methylPREDNISolone  40 mg Intravenous Q12H    budesonide-formoterol  2 puff Inhalation BID    guaiFENesin  600 mg Oral BID      Infusions:    sodium chloride       PRN Meds: sodium chloride flush, 5-40 mL, PRN  sodium chloride, 25 mL, PRN  promethazine, 12.5 mg, Q6H PRN   Or  ondansetron, 4 mg, Q6H PRN  polyethylene glycol, 17 g, Daily PRN  acetaminophen, 650 mg, Q6H PRN   Or  acetaminophen, 650 mg, Q6H PRN  guaiFENesin-dextromethorphan, 5 mL, Q4H PRN  magnesium sulfate, 1,000 mg, PRN  albuterol sulfate HFA, 2 puff, Q4H PRN      Electronically signed by Gloria Rowland MD on 5/25/2021 at 9:47 AM

## 2021-05-25 NOTE — PROGRESS NOTES
Patient Room #: 1740/7178-I  Patient Name: Sivan Coffman NIV Evaluation / Orders  1. Notify Pulmonary Diagnostics of order to evaluate for home NIV. 2. Perform the following tests at any point before discharge. [x] Perform an Overnight Oximetry while the patient is on at least                  2 lpm of oxygen. The saturation level must be <  88% for > 5  cumulative minutes to qualify. [x] Arterial puncture (ABG) for blood gas analysis done while awake. · Qualifying result is a PaCO2 >   52 mmHg  3. [x]  I have documented in a progress note that KING is not the primary cause of hypercapnia in this patient. CPAP will not effectively treat this patient hypercapnia. BIPA Therapy will benefit and more effectively treat the hypercapnia. Results Documentation    (Attach a copy of Reports)  1. ABG Results       Date _____  PaCO2 ____ mmHg on _______ lpm oxygen    2. Oximetry Level _____% for ____ minutes on ____ lpm oxygen    3. [] Patient Qualifies      [] Patient Does NOT qualify      Complete order below:  Diagnosis _Acute on chronic respiratory failure with hypoxia and hypercapnia ([address] Severe COPD_           Length of Need: [x] Lifetime  Home NIV () at:    Inspiratory Setting _15__ cm H2O         Expiratory Setting _6__ cm H2O    Therapist Signature: _______Date: _______  Physician Signature:   Electronically Signed in EMR_______  Date: ___  Physician Printed Name: Eyal Shah MD   NPI:  [id number]_

## 2021-05-25 NOTE — PLAN OF CARE
Problem: Falls - Risk of:  Goal: Will remain free from falls  Description: Will remain free from falls  5/24/2021 2001 by Bhargavi Gallagher RN  Outcome: Ongoing  5/24/2021 1633 by Vidal Ugalde RN  Outcome: Ongoing  Goal: Absence of physical injury  Description: Absence of physical injury  5/24/2021 2001 by Bhargavi Gallagher RN  Outcome: Ongoing  5/24/2021 1633 by Vidal Ugalde RN  Outcome: Ongoing     Problem: Airway Clearance - Ineffective  Goal: Achieve or maintain patent airway  5/24/2021 2001 by Bhargavi Gallagher RN  Outcome: Ongoing  5/24/2021 1633 by Vidal Ugalde RN  Outcome: Ongoing     Problem: Gas Exchange - Impaired  Goal: Absence of hypoxia  5/24/2021 2001 by Bhargavi Gallagher RN  Outcome: Ongoing  5/24/2021 1633 by Vidal Ugalde RN  Outcome: Ongoing  Goal: Promote optimal lung function  5/24/2021 2001 by Bhargavi Gallagher RN  Outcome: Ongoing  5/24/2021 1633 by Vidal Ugalde RN  Outcome: Ongoing     Problem: Breathing Pattern - Ineffective  Goal: Ability to achieve and maintain a regular respiratory rate  5/24/2021 2001 by Bhargavi Gallagher RN  Outcome: Ongoing  5/24/2021 1633 by Vidal Ugalde RN  Outcome: Ongoing     Problem:  Body Temperature -  Risk of, Imbalanced  Goal: Ability to maintain a body temperature within defined limits  5/24/2021 2001 by Bhargavi Gallagher RN  Outcome: Ongoing  5/24/2021 1633 by Vidal gUalde RN  Outcome: Ongoing  Goal: Will regain or maintain usual level of consciousness  5/24/2021 2001 by Bhargavi Gallagher RN  Outcome: Ongoing  5/24/2021 1633 by Vidal Ugalde RN  Outcome: Ongoing  Goal: Complications related to the disease process, condition or treatment will be avoided or minimized  5/24/2021 2001 by Bhargavi Gallagher RN  Outcome: Ongoing  5/24/2021 1633 by Vidal Ugalde RN  Outcome: Ongoing     Problem: Isolation Precautions - Risk of Spread of Infection  Goal: Prevent transmission of infection  5/24/2021 2001 by Bhargavi Gallagher RN  Outcome: Ongoing  5/24/2021 1633 by Lily Ballard RN  Outcome: Ongoing     Problem: Nutrition Deficits  Goal: Optimize nutritional status  5/24/2021 2001 by Bhargavi Gallagher RN  Outcome: Ongoing  5/24/2021 1633 by Lily Ballard RN  Outcome: Ongoing     Problem: Risk for Fluid Volume Deficit  Goal: Maintain normal heart rhythm  5/24/2021 2001 by Bhargavi Gallagher RN  Outcome: Ongoing  5/24/2021 1633 by Lily Ballard RN  Outcome: Ongoing  Goal: Maintain absence of muscle cramping  5/24/2021 2001 by Bhargavi Gallagher RN  Outcome: Ongoing  5/24/2021 1633 by Lily Ballard RN  Outcome: Ongoing  Goal: Maintain normal serum potassium, sodium, calcium, phosphorus, and pH  5/24/2021 2001 by Bhargavi Gallagher RN  Outcome: Ongoing  5/24/2021 1633 by Lily Ballard RN  Outcome: Ongoing     Problem: Loneliness or Risk for Loneliness  Goal: Demonstrate positive use of time alone when socialization is not possible  5/24/2021 2001 by Bhargavi Gallagher RN  Outcome: Ongoing  5/24/2021 1633 by Lily Ballard RN  Outcome: Ongoing     Problem: Fatigue  Goal: Verbalize increase energy and improved vitality  5/24/2021 2001 by Bhargavi Gallagher RN  Outcome: Ongoing  5/24/2021 1633 by Lily Ballard RN  Outcome: Ongoing     Problem: Patient Education: Go to Patient Education Activity  Goal: Patient/Family Education  5/24/2021 2001 by Bhargavi Gallagher RN  Outcome: Ongoing  5/24/2021 1633 by Lily Ballard RN  Outcome: Ongoing

## 2021-05-25 NOTE — PROGRESS NOTES
Bipap Eval:  Patient has home o2, bipap and cpap at home  Will discontinue order for eval.  Perfect serve sent to

## 2021-05-25 NOTE — PROGRESS NOTES
pulmonary      SUBJECTIVE: her condition has stabilised and she feels fair     OBJECTIVE    VITALS:  BP 98/85   Pulse 68   Temp 97.9 °F (36.6 °C) (Oral)   Resp 12   Ht 5' 4\" (1.626 m)   Wt 207 lb 9.6 oz (94.2 kg)   SpO2 91%   BMI 35.63 kg/m²   HEAD AND FACE EXAM:  No throat injection, no active exudate,no thrush  NECK EXAM;No JVD, no masses, symmetrical  CHEST EXAM; Expansion equal and symmetrical, no masses  LUNG EXAM; Good breath sounds bilaterally. There are expiratory wheezes both lungs, there are crackles at both lung bases  CARDIOVASCULAR EXAM: Positive S1 and S2, no S3 or S4, no clicks ,no murmurs  RIGHT AND LEFT LOWER EXTRIMITY EXAM: No edema, no swelling, no inflamation  CNS EXAM: Alert and oriented X3          LABS   Lab Results   Component Value Date    WBC 8.0 05/25/2021    HGB 11.0 (L) 05/25/2021    HCT 36.9 (L) 05/25/2021    MCV 89.6 05/25/2021     05/25/2021     Lab Results   Component Value Date    CREATININE 0.5 (L) 05/24/2021    BUN 11 05/24/2021     05/24/2021    K 4.4 05/24/2021    CL 98 (L) 05/24/2021    CO2 36 (H) 05/24/2021     Lab Results   Component Value Date    INR 1.03 05/24/2021    PROTIME 12.5 05/24/2021          Lab Results   Component Value Date    PHOS 2.6 05/24/2021    PHOS 5.2 05/08/2021    PHOS 4.4 05/07/2021      No results for input(s): PH, PO2ART, PWY1BMN, HCO3, BEART, O2SAT in the last 72 hours. Wt Readings from Last 3 Encounters:   05/24/21 207 lb 9.6 oz (94.2 kg)   05/18/21 211 lb 6.4 oz (95.9 kg)   05/17/21 210 lb 3.2 oz (95.3 kg)               ASSESMENT  Ac on ch resp failure  covid pneumonia  Ac copd        PLAN  1. cpm  2. Cont o2  3.  Pap rx at night    5/25/2021  Lona Hopkins MD, M.D.

## 2021-05-25 NOTE — PLAN OF CARE
Problem: Falls - Risk of:  Goal: Will remain free from falls  Description: Will remain free from falls  5/25/2021 0950 by Car Ornelas RN  Outcome: Ongoing  5/24/2021 2001 by Bhargavi Gallagher RN  Outcome: Ongoing  Goal: Absence of physical injury  Description: Absence of physical injury  5/25/2021 0950 by Car Ornelas RN  Outcome: Ongoing  5/24/2021 2001 by Bhargavi Gallagher RN  Outcome: Ongoing     Problem: Airway Clearance - Ineffective  Goal: Achieve or maintain patent airway  5/25/2021 0950 by Car Ornelas RN  Outcome: Ongoing  5/24/2021 2001 by Bhargavi Gallagher RN  Outcome: Ongoing     Problem: Gas Exchange - Impaired  Goal: Absence of hypoxia  5/25/2021 0950 by Car Ornelas RN  Outcome: Ongoing  5/24/2021 2001 by Bhargavi Gallagher RN  Outcome: Ongoing  Goal: Promote optimal lung function  5/25/2021 0950 by Car Ornelas RN  Outcome: Ongoing  5/24/2021 2001 by Bhargavi Gallagher RN  Outcome: Ongoing     Problem: Breathing Pattern - Ineffective  Goal: Ability to achieve and maintain a regular respiratory rate  5/25/2021 0950 by Car Ornelas RN  Outcome: Ongoing  5/24/2021 2001 by Bhargavi Gallagher RN  Outcome: Ongoing     Problem:  Body Temperature -  Risk of, Imbalanced  Goal: Ability to maintain a body temperature within defined limits  5/25/2021 0950 by Car Ornelas RN  Outcome: Ongoing  5/24/2021 2001 by Bhargavi Gallagher RN  Outcome: Ongoing  Goal: Will regain or maintain usual level of consciousness  5/25/2021 0950 by Car Ornelas RN  Outcome: Ongoing  5/24/2021 2001 by Bhargavi Gallagher RN  Outcome: Ongoing  Goal: Complications related to the disease process, condition or treatment will be avoided or minimized  5/25/2021 0950 by Car Ornelas RN  Outcome: Ongoing  5/24/2021 2001 by Bhargavi Gallagher RN  Outcome: Ongoing     Problem: Isolation Precautions - Risk of Spread of Infection  Goal: Prevent transmission of infection  5/25/2021 0950 by Car Ornelas RN  Outcome: Ongoing  5/24/2021 2001 by Bhargavi Gallagher RN  Outcome: Ongoing     Problem: Nutrition Deficits  Goal: Optimize nutritional status  5/25/2021 0950 by Marcelina Lombard, RN  Outcome: Ongoing  5/24/2021 2001 by Bhargavi Galalgher RN  Outcome: Ongoing     Problem: Risk for Fluid Volume Deficit  Goal: Maintain normal heart rhythm  5/25/2021 0950 by Marcelina Lombard, RN  Outcome: Ongoing  5/24/2021 2001 by Bhargavi Gallagher RN  Outcome: Ongoing  Goal: Maintain absence of muscle cramping  5/25/2021 0950 by Marcelina Lombard, RN  Outcome: Ongoing  5/24/2021 2001 by Bhargavi Gallagher RN  Outcome: Ongoing  Goal: Maintain normal serum potassium, sodium, calcium, phosphorus, and pH  5/25/2021 0950 by Marcelina Lombard, RN  Outcome: Ongoing  5/24/2021 2001 by Bhargavi Gallagher RN  Outcome: Ongoing     Problem: Loneliness or Risk for Loneliness  Goal: Demonstrate positive use of time alone when socialization is not possible  5/25/2021 0950 by Marcelina Lombard, RN  Outcome: Ongoing  5/24/2021 2001 by Bhargavi Gallagher RN  Outcome: Ongoing     Problem: Fatigue  Goal: Verbalize increase energy and improved vitality  5/25/2021 0950 by Marcelina Lombard, RN  Outcome: Ongoing  5/24/2021 2001 by Bhargavi Gallagher RN  Outcome: Ongoing     Problem: Patient Education: Go to Patient Education Activity  Goal: Patient/Family Education  5/25/2021 0950 by Marcelina Lombard, RN  Outcome: Ongoing  5/24/2021 2001 by Bhargavi Gallagher RN  Outcome: Ongoing

## 2021-05-26 VITALS
HEART RATE: 62 BPM | SYSTOLIC BLOOD PRESSURE: 113 MMHG | DIASTOLIC BLOOD PRESSURE: 64 MMHG | TEMPERATURE: 98.8 F | OXYGEN SATURATION: 95 % | RESPIRATION RATE: 13 BRPM | HEIGHT: 64 IN | BODY MASS INDEX: 35.44 KG/M2 | WEIGHT: 207.6 LBS

## 2021-05-26 LAB
D DIMER: <200 NG/ML(DDU)
HIGH SENSITIVE C-REACTIVE PROTEIN: 13.9 MG/L

## 2021-05-26 PROCEDURE — 6360000002 HC RX W HCPCS: Performed by: NURSE PRACTITIONER

## 2021-05-26 PROCEDURE — 94761 N-INVAS EAR/PLS OXIMETRY MLT: CPT

## 2021-05-26 PROCEDURE — 86141 C-REACTIVE PROTEIN HS: CPT

## 2021-05-26 PROCEDURE — 6370000000 HC RX 637 (ALT 250 FOR IP): Performed by: INTERNAL MEDICINE

## 2021-05-26 PROCEDURE — 2700000000 HC OXYGEN THERAPY PER DAY

## 2021-05-26 PROCEDURE — 2580000003 HC RX 258: Performed by: NURSE PRACTITIONER

## 2021-05-26 PROCEDURE — 94640 AIRWAY INHALATION TREATMENT: CPT

## 2021-05-26 PROCEDURE — 6370000000 HC RX 637 (ALT 250 FOR IP): Performed by: NURSE PRACTITIONER

## 2021-05-26 PROCEDURE — 85379 FIBRIN DEGRADATION QUANT: CPT

## 2021-05-26 PROCEDURE — 94660 CPAP INITIATION&MGMT: CPT

## 2021-05-26 RX ORDER — PREDNISONE 20 MG/1
40 TABLET ORAL DAILY
Qty: 4 TABLET | Refills: 0 | Status: SHIPPED | OUTPATIENT
Start: 2021-05-26 | End: 2021-05-28

## 2021-05-26 RX ORDER — PREDNISONE 10 MG/1
10 TABLET ORAL DAILY
Qty: 1 TABLET | Refills: 0 | Status: SHIPPED | OUTPATIENT
Start: 2021-05-26 | End: 2021-05-27

## 2021-05-26 RX ORDER — PREDNISONE 10 MG/1
30 TABLET ORAL DAILY
Qty: 6 TABLET | Refills: 0 | Status: SHIPPED | OUTPATIENT
Start: 2021-05-26 | End: 2021-05-28

## 2021-05-26 RX ORDER — PREDNISONE 20 MG/1
20 TABLET ORAL DAILY
Qty: 2 TABLET | Refills: 0 | Status: SHIPPED | OUTPATIENT
Start: 2021-05-26 | End: 2021-05-28

## 2021-05-26 RX ADMIN — ENOXAPARIN SODIUM 30 MG: 30 INJECTION SUBCUTANEOUS at 08:08

## 2021-05-26 RX ADMIN — SODIUM CHLORIDE, PRESERVATIVE FREE 10 ML: 5 INJECTION INTRAVENOUS at 09:56

## 2021-05-26 RX ADMIN — METHYLPREDNISOLONE SODIUM SUCCINATE 40 MG: 40 INJECTION, POWDER, LYOPHILIZED, FOR SOLUTION INTRAMUSCULAR; INTRAVENOUS at 09:55

## 2021-05-26 RX ADMIN — PREGABALIN 100 MG: 100 CAPSULE ORAL at 08:08

## 2021-05-26 RX ADMIN — TRAMADOL HYDROCHLORIDE 50 MG: 50 TABLET, FILM COATED ORAL at 08:07

## 2021-05-26 RX ADMIN — AZITHROMYCIN MONOHYDRATE 250 MG: 250 TABLET ORAL at 08:07

## 2021-05-26 RX ADMIN — GUAIFENESIN 600 MG: 600 TABLET, EXTENDED RELEASE ORAL at 08:08

## 2021-05-26 RX ADMIN — ASPIRIN 81 MG CHEWABLE TABLET 81 MG: 81 TABLET CHEWABLE at 08:07

## 2021-05-26 RX ADMIN — BUDESONIDE AND FORMOTEROL FUMARATE DIHYDRATE 2 PUFF: 80; 4.5 AEROSOL RESPIRATORY (INHALATION) at 07:58

## 2021-05-26 RX ADMIN — BUPRENORPHINE AND NALOXONE 1 FILM: 8; 2 FILM, SOLUBLE BUCCAL; SUBLINGUAL at 08:08

## 2021-05-26 ASSESSMENT — PAIN SCALES - GENERAL: PAINLEVEL_OUTOF10: 2

## 2021-05-26 NOTE — PROGRESS NOTES
Pts PIV removed & discharge instructions given. Pt discharged & escorted via wheelchair to awaiting car.

## 2021-05-26 NOTE — DISCHARGE SUMMARY
Discharge Summary    Name:  Lucy Rufifn /Age/Sex: 1960  (64 y.o. female)   MRN & CSN:  [id number] & [id number] Admission Date/Time: 2021  2:39 AM   Attending:  Yuni Jansen MD Discharging Physician: Yuni Jansen MD     Hospital Course:   Lucy Ruffin is a 64 y.o.  female who presented to the hospital with a complaint of worsening shortness of breath which has been going on 3 to 4 days prior to hospital arrival.  She is chronically on 4 L of oxygen. COVID-19 positive on 2021. Patient was treated symptomatically with a breathing treatment and steroids. She was presently treated with a combination of ceftriaxone and cefepime. Patient has a BiPAP and CPAP at home. However, she uses the BiPAP rather than the CPAP. Patient was discharged with 7 more days of prednisone taper (40 mg x 2 days, 30 mg x 2 days, 20 mg x 2 days, 10 mg x 1 day). Patient remained on her baseline oxygen and was feeling much better at the time of discharge. Patient discharged in stable condition on return precautions provided. #.  Acute on chronic hypoxic and hypercapnic respiratory failure---chronically on 4 L of oxygen. SARS-CoV-2 positive on 2021. Blood cultures so far no growth. Strep and Legionella urine antigen negative. CT chest with extensive bronchiectatic changes as well as linear infiltrates in both lungs suggesting of bowel bronchopneumonia. Procalcitonin low at 0.052. #.  COPD exacerbation---chronically on 4 L of oxygen. Patient is no longer on Dulera or Spiriva.      #. KING---used to be on CPAP. However, she now has a BiPAP.     #.  Obesity---207 LBS. BMI 35.63    #. ADHD---on Adderall    #. Heroin abuse---in remission for about 17 years. On Suboxone. #.  S/p pacemaker placement---about more than 10 years ago in [place] per cardiology notes. Not sure why the pacemaker was placed initially.       The patient expressed appropriate understanding of and agreement with predniSONE (DELTASONE) 20 MG tablet  Take 1 tablet by mouth daily for 2 days             pregabalin (LYRICA) 100 MG capsule  Take 100 mg by mouth 3 times daily. Respiratory Therapy Supplies (NEBULIZER/TUBING/MOUTHPIECE) KIT  Inhale 1 kit into the lungs daily as needed (as directed)             traMADol (ULTRAM) 50 MG tablet  Take 50 mg by mouth 2 times daily. traZODone (DESYREL) 150 MG tablet  Take 150 mg by mouth nightly                  Objective Findings at Discharge:   /64   Pulse 62   Temp 98.8 °F (37.1 °C) (Oral)   Resp 13   Ht 5' 4\" (1.626 m)   Wt 207 lb 9.6 oz (94.2 kg)   SpO2 95%   BMI 35.63 kg/m²            PHYSICAL EXAM   GEN Awake female, laying flat in bed. Nontoxic-appearing. Pleasant. Answers questions appropriate. EYES no eye discharge. Ocular muscles intact. HENT mucous membranes moist.  No nasal discharge. NECK Supple  RESP clear to auscultation bilaterally  CARDIO/VASC regular rate and rhythm. No murmur. No pitting extremity edema. GI abdomen soft. Nontender. Nondistended.  no Fraser in place  MSK No gross joint deformities. SKIN Normal coloration, warm, dry. NEURO Cranial nerves appear grossly intact, normal speech, no lateralizing weakness.   PSYCH Awake, alert, oriented    BMP/CBC  Recent Labs     05/24/21  0350 05/25/21  0451     --    K 4.4  --    CL 98*  --    CO2 36*  --    BUN 11  --    CREATININE 0.5*  --    WBC 5.8 8.0   HCT 38.0 36.9*    218       IMAGING:  All imaging reviewed    Discharge Time of 33 minutes    Electronically signed by Penne Carrel, MD on 5/26/2021 at 10:20 AM

## 2021-05-26 NOTE — PROGRESS NOTES
CLINICAL PHARMACY NOTE: MEDS TO BEDS    Total # of Prescriptions Filled: 1   The following medications were delivered to the patient:  · Prednisone 10mg    Additional Documentation:  Combined the 4 prescriptions into 1 10mg prescription.

## 2021-05-27 ENCOUNTER — CARE COORDINATION (OUTPATIENT)
Dept: CASE MANAGEMENT | Age: 61
End: 2021-05-27

## 2021-05-27 LAB
CULTURE: ABNORMAL
CULTURE: ABNORMAL
GRAM SMEAR: ABNORMAL
Lab: ABNORMAL
SPECIMEN: ABNORMAL

## 2021-05-27 NOTE — CARE COORDINATION
[facility] Transitions Initial Follow Up Call    Call within 2 business days of discharge: Yes    Patient: Camilla Estrada Patient : 1960   MRN: [id number]  Reason for Admission: Covid19, A/C Resp Failure, Copd Exac  Discharge Date: 21 RARS: Readmission Risk Score: 30  Facility: [address] [place] [address]       Complaint Diagnosis Description Type Department Provider    21 Shortness of Breath Dyspnea and respiratory abnormalities . .. ED to Hosp-Admission (Discharged) (ADMITTED) Sanju Hutton MD; Lyn Garnett. ..        1st attempt to reach for Covid19+ Monitoring discharge call unsuccessful; no answer, no vm option. Was able to leave message on HIPAA approved [facility] voicemail requesting call back.     Jyothi Mccracken RN

## 2021-05-28 ENCOUNTER — CARE COORDINATION (OUTPATIENT)
Dept: CASE MANAGEMENT | Age: 61
End: 2021-05-28

## 2021-05-28 LAB
CULTURE: NORMAL
CULTURE: NORMAL
Lab: NORMAL
Lab: NORMAL
SPECIMEN: NORMAL
SPECIMEN: NORMAL

## 2021-05-28 NOTE — CARE COORDINATION
[facility] Transitions Initial Follow Up Call    Call within 2 business days of discharge: Yes    Patient: Chris Pearson Patient : 1960   MRN: [id number]  Reason for Admission: Covid19, A/C Resp Failure, Copd Exac  Discharge Date: 21 RARS: Readmission Risk Score: 30  Facility: [address] [place] [address]       Complaint Diagnosis Description Type Department Provider    21 Shortness of Breath Dyspnea and respiratory abnormalities . .. ED to Hosp-Admission (Discharged) (ADMITTED) Marilyn Wade MD; Cristian Kenney. .. Non-face-to-face services provided:  Scheduled appointment with PCP-see below  Obtained and reviewed discharge summary and/or continuity of care documents  Communication with home health agencies or other community services the patient is currently using[facility] [address]   Education of patient/family/caregiver/guardian to support self-management-Covid19  Assessment and support for treatment adherence and medication management-Prednisone    Care Transitions 24 Hour Call    Care Transitions Interventions       Follow Up  Future Appointments   Date Time Provider Pam Hemphill   2021  9:00 AM SCHEDULE, [facility]   2021  2:00 PM Phineas Actis [address] [facility]   2021  9:00 AM SCHEDULE, [facility]   2021 11:00 AM SCHEDULE, [facility]   2021  9:00 AM SCHEDULE, [address] [facility]   2021 11:00 AM MD Rosalina Bedoya     Challenges to be reviewed by the provider   Additional needs identified to be addressed with provider:     Patient d/c'd from [facility] 21. Dx: Covid 19 + 21, A/C Resp Failure, Copd Exac. Patient in need of virtual 7 day hosp f/u appt. Would you please reach out to Patient for scheduling. Attempted to reach your office several times w/o success, received \"system problem\" message.      Patient currently w/o HHA as ill, agency short staffing. [facility] working w/ Patient on plan       Method of communication with provider : none    CARE TRANSITION MONITORING  21 thru 21  [facility] PCP: Arnold Chase PAC    Was this a readmission? No  Patient stated reason for admission: Cough, fever, sob  Patients top risk factors for readmission: functional physical ability, ineffective coping, lack of knowledge about disease, level of motivation, medical condition-Covid19, Copd, support system and utilization of services    1014 Spoke with Patient for [facility] discharge call, verified Patient name and  as identifiers. Introduced self and explained BPCI program. Agreeable to ongoing BPCI follow up per [employer].     Phone Assessment: Patient reports ongoing generalized weakness, occasional cough w/ clear secretions, sob on exertion, body aches. Denies ac resp distress or need for MD notification as sx slowly improving. Noted to be speaking in complete unlabored sentences. Temp 97.0 last hs, has not checked today. O2 sat 97% w/ O2 at 4L continuously. Education Provided: Reviewed Covid19 red flags such as increased shortness of breath, increasing fever and signs of decompensation. Discussed increased risk of blood clots associated w/ Covid19, sx and preventative measures. Patient verbalizes understanding. Discussed exposure protocols and quarantine with CDC Guidelines What to do if you are sick with coronavirus disease .  Patient reports adhering to quarantine guidelines. Daughter has been dropping off any needed supplies or food. Advised to contact [facility]/PCP re: any additional Covid19 questions. Reports she had received first step Pfizer Covid19 vaccine in April however unable to get second dose d/t illness. AVS/Meds: Confirmed copy of AVS received, reviewed with Patient. Confirmed Patient obtained and is taking Prednisone as directed. Med education provided, questions answered, verbalizes understanding. Stressed importance of med compliance. Patient declined routine med rec review. Advised obtaining 90 day supply of routine, prn meds. Advised contacting pharmacy/md for refill needs. Resource Need Assessment:   ADLS: Requires assistance, is able to ambulate short distances, no assistive devices   DME: O2, Bipap, Cpap per [employer]; Med Neb, Pulse Ox. Reports all equipment in good working order, no supply needs. HHC/Community Assistance: Active w/ PASSPORT Waiver program --- [facility] 7hrs per day/7 days per week, meals 7 days per week B/L/D, emergency response system. 21 [facility]  [phone]    Patient reports her [facility] is also Covid19 + an unable to provide visits. Patient reports she has reached out to [facility] and [facility] Mgt w/o resolution. Reports she is able to perform basic adls such as toileting but to weak to do much more. States \"I need an aide in here now\". CTN to follow up. Hospital Follow Up Appointment: Discussed importance of 7 day hospital follow up for continuity of care. Agreeable for CTN to contact PCP for appt. Dtr assists w/ transportation    Advised to contact PCP 24/7 re: any health concerns for early outpatient intervention in an effort to avoid hospitalization. Discussed benefits of WIC, Urgent Care. Advised 911 if noting acute distress. 1031 T/C Grace- [facility]. Confirms HHA is ill and unfortunately agency is short staffed and does not have HHA to send. Reports all Patients active w/ PASSPORT for HHA are required to have back up plan for such situations. Reports Patient was advised yesterday to utilize back up plan which is her Sister who lives in the home. 1032 Message left for Ras Bello [facility]  re: above, need for HHA and request call to Patient asap to discuss plan. 1141 Call back to Patient. Discussed conversation w/ [facility] and need to utilize backup plan- Sister- until [facility] can be placed. Patient reports she lives alone; Sister only stayed w/ her for 3 months and has since returned home to [place]. Reports Dtr lives locally, is assisting as she can however works full time. Patient denies any other local support. Denies need for skilled hhc. States \"I just need my aide\". Informed Patient I will f/u w/ [facility] and request call back to her. Encouraged to continue to attempt to reach  as well as I had to leave message. Patient reports safe. Reviewed fall safety. 1145 T/C [place]- [facility]. Updated, informed Patient now lives alone, no back up support plan as Sister returned to [place]. Requested call to Patient asap to assist her with HHA needs. Informed I left message w/  as well. Ivette to call Patient asap. 1206 Attempt to reach PCP to schedule appt unsuccessful x 5, receiving \"system problem\" message. Note routed to PCP.      Bella Ovalle RN

## 2021-06-14 ENCOUNTER — CARE COORDINATION (OUTPATIENT)
Dept: CASE MANAGEMENT | Age: 61
End: 2021-06-14

## 2021-06-14 NOTE — CARE COORDINATION
[facility] Transitions Follow Up Call    2021    Patient: Jonh Phillips  Patient : 1960   MRN: [id number]  Reason for Admission: Covid19, Copd Exac, A/C Resp Failure  Discharge Date: 21 RARS: Readmission Risk Score: 30  . CARE TRANSITION MONITORING  21 thru 21  [facility] PCP: Filomena Dent PAC     Attempt to reach for follow up CT call unsuccessful; no answer, unable to leave message as neither Patient or approved EC have VM set up. T/C [facility] [phone]. Confirmed Patient's HHA no longer ill and resumed regularly scheduled visits last week. 7 hrs daily x 7 days per week.         Veronika Veras RN

## 2021-06-22 ENCOUNTER — CARE COORDINATION (OUTPATIENT)
Dept: CASE MANAGEMENT | Age: 61
End: 2021-06-22

## 2021-06-22 ENCOUNTER — TELEPHONE (OUTPATIENT)
Dept: FAMILY MEDICINE CLINIC | Age: 61
End: 2021-06-22

## 2021-06-22 NOTE — CARE COORDINATION
[facility] Transitions Follow Up Call    2021    Patient: Rissa Tineo  Patient : 1960   MRN: [id number]  Reason for Admission: Covid19, A/C Resp Failure, Copd Exac  Discharge Date: 21 RARS: Readmission Risk Score: 30    [address] [facility] 2nd attempted outreach. Left message and contact information for call back. No further outreach will be made.      Dimitrios Gutiérrez LPN  Care Coordinator

## 2021-06-22 NOTE — TELEPHONE ENCOUNTER
06/22/2021 Voice mailbox not set up yet and unable to reschedule missed appointment.   [facility] [place]

## 2021-06-26 ENCOUNTER — APPOINTMENT (OUTPATIENT)
Dept: GENERAL RADIOLOGY | Age: 61
DRG: 140 | End: 2021-06-26
Payer: COMMERCIAL

## 2021-06-26 ENCOUNTER — HOSPITAL ENCOUNTER (INPATIENT)
Age: 61
LOS: 3 days | Discharge: HOME | DRG: 140 | End: 2021-06-29
Attending: EMERGENCY MEDICINE | Admitting: HOSPITALIST
Payer: COMMERCIAL

## 2021-06-26 DIAGNOSIS — J44.1 ACUTE EXACERBATION OF CHRONIC OBSTRUCTIVE PULMONARY DISEASE (COPD) (HCC): ICD-10-CM

## 2021-06-26 DIAGNOSIS — J44.1 COPD EXACERBATION (HCC): Primary | ICD-10-CM

## 2021-06-26 DIAGNOSIS — J18.9 PNEUMONIA DUE TO INFECTIOUS ORGANISM, UNSPECIFIED LATERALITY, UNSPECIFIED PART OF LUNG: ICD-10-CM

## 2021-06-26 DIAGNOSIS — G47.33 OBSTRUCTIVE SLEEP APNEA: ICD-10-CM

## 2021-06-26 DIAGNOSIS — R06.02 SOB (SHORTNESS OF BREATH): ICD-10-CM

## 2021-06-26 DIAGNOSIS — J18.9 PNEUMONIA OF BOTH LUNGS DUE TO INFECTIOUS ORGANISM, UNSPECIFIED PART OF LUNG: ICD-10-CM

## 2021-06-26 DIAGNOSIS — J96.02 ACUTE RESPIRATORY FAILURE WITH HYPERCAPNIA (HCC): ICD-10-CM

## 2021-06-26 LAB
ALBUMIN SERPL-MCNC: 3.8 GM/DL (ref 3.4–5)
ALP BLD-CCNC: 70 IU/L (ref 40–129)
ALT SERPL-CCNC: 18 U/L (ref 10–40)
ANION GAP SERPL CALCULATED.3IONS-SCNC: 5 MMOL/L (ref 4–16)
AST SERPL-CCNC: 19 IU/L (ref 15–37)
BASOPHILS ABSOLUTE: 0.1 K/CU MM
BASOPHILS RELATIVE PERCENT: 0.3 % (ref 0–1)
BILIRUB SERPL-MCNC: 0.4 MG/DL (ref 0–1)
BUN BLDV-MCNC: 6 MG/DL (ref 6–23)
CALCIUM SERPL-MCNC: 8.8 MG/DL (ref 8.3–10.6)
CHLORIDE BLD-SCNC: 98 MMOL/L (ref 99–110)
CO2: 36 MMOL/L (ref 21–32)
CREAT SERPL-MCNC: 0.4 MG/DL (ref 0.6–1.1)
DIFFERENTIAL TYPE: ABNORMAL
EOSINOPHILS ABSOLUTE: 0.4 K/CU MM
EOSINOPHILS RELATIVE PERCENT: 2.9 % (ref 0–3)
GFR AFRICAN AMERICAN: >60 ML/MIN/1.73M2
GFR NON-AFRICAN AMERICAN: >60 ML/MIN/1.73M2
GLUCOSE BLD-MCNC: 140 MG/DL (ref 70–99)
HCT VFR BLD CALC: 41.3 % (ref 37–47)
HEMOGLOBIN: 12.6 GM/DL (ref 12.5–16)
IMMATURE NEUTROPHIL %: 0.2 % (ref 0–0.43)
LACTATE: 0.7 MMOL/L (ref 0.4–2)
LYMPHOCYTES ABSOLUTE: 1.9 K/CU MM
LYMPHOCYTES RELATIVE PERCENT: 12.5 % (ref 24–44)
MCH RBC QN AUTO: 28.3 PG (ref 27–31)
MCHC RBC AUTO-ENTMCNC: 30.5 % (ref 32–36)
MCV RBC AUTO: 92.8 FL (ref 78–100)
MONOCYTES ABSOLUTE: 1.9 K/CU MM
MONOCYTES RELATIVE PERCENT: 12.6 % (ref 0–4)
NUCLEATED RBC %: 0 %
PDW BLD-RTO: 14 % (ref 11.7–14.9)
PLATELET # BLD: 211 K/CU MM (ref 140–440)
PMV BLD AUTO: 8.9 FL (ref 7.5–11.1)
POTASSIUM SERPL-SCNC: 3.6 MMOL/L (ref 3.5–5.1)
RBC # BLD: 4.45 M/CU MM (ref 4.2–5.4)
SARS-COV-2, NAAT: NOT DETECTED
SEGMENTED NEUTROPHILS ABSOLUTE COUNT: 10.7 K/CU MM
SEGMENTED NEUTROPHILS RELATIVE PERCENT: 71.5 % (ref 36–66)
SODIUM BLD-SCNC: 139 MMOL/L (ref 135–145)
SOURCE: NORMAL
TOTAL IMMATURE NEUTOROPHIL: 0.03 K/CU MM
TOTAL NUCLEATED RBC: 0 K/CU MM
TOTAL PROTEIN: 6.6 GM/DL (ref 6.4–8.2)
TROPONIN T: <0.01 NG/ML
WBC # BLD: 14.9 K/CU MM (ref 4–10.5)

## 2021-06-26 PROCEDURE — 85025 COMPLETE CBC W/AUTO DIFF WBC: CPT

## 2021-06-26 PROCEDURE — 2580000003 HC RX 258: Performed by: EMERGENCY MEDICINE

## 2021-06-26 PROCEDURE — 6370000000 HC RX 637 (ALT 250 FOR IP): Performed by: EMERGENCY MEDICINE

## 2021-06-26 PROCEDURE — 83880 ASSAY OF NATRIURETIC PEPTIDE: CPT

## 2021-06-26 PROCEDURE — 2700000000 HC OXYGEN THERAPY PER DAY

## 2021-06-26 PROCEDURE — 96375 TX/PRO/DX INJ NEW DRUG ADDON: CPT

## 2021-06-26 PROCEDURE — 6360000002 HC RX W HCPCS: Performed by: EMERGENCY MEDICINE

## 2021-06-26 PROCEDURE — 96374 THER/PROPH/DIAG INJ IV PUSH: CPT

## 2021-06-26 PROCEDURE — 87040 BLOOD CULTURE FOR BACTERIA: CPT

## 2021-06-26 PROCEDURE — 80053 COMPREHEN METABOLIC PANEL: CPT

## 2021-06-26 PROCEDURE — 87635 SARS-COV-2 COVID-19 AMP PRB: CPT

## 2021-06-26 PROCEDURE — 99285 EMERGENCY DEPT VISIT HI MDM: CPT

## 2021-06-26 PROCEDURE — 83605 ASSAY OF LACTIC ACID: CPT

## 2021-06-26 PROCEDURE — 93005 ELECTROCARDIOGRAM TRACING: CPT | Performed by: EMERGENCY MEDICINE

## 2021-06-26 PROCEDURE — 71045 X-RAY EXAM CHEST 1 VIEW: CPT

## 2021-06-26 PROCEDURE — 94640 AIRWAY INHALATION TREATMENT: CPT

## 2021-06-26 PROCEDURE — 84484 ASSAY OF TROPONIN QUANT: CPT

## 2021-06-26 PROCEDURE — 1200000000 HC SEMI PRIVATE

## 2021-06-26 RX ORDER — PREDNISONE 20 MG/1
60 TABLET ORAL ONCE
Status: COMPLETED | OUTPATIENT
Start: 2021-06-26 | End: 2021-06-26

## 2021-06-26 RX ORDER — IPRATROPIUM BROMIDE AND ALBUTEROL SULFATE 2.5; .5 MG/3ML; MG/3ML
1 SOLUTION RESPIRATORY (INHALATION) ONCE
Status: COMPLETED | OUTPATIENT
Start: 2021-06-26 | End: 2021-06-26

## 2021-06-26 RX ORDER — AZITHROMYCIN 500 MG/1
500 INJECTION, POWDER, LYOPHILIZED, FOR SOLUTION INTRAVENOUS ONCE
Status: DISCONTINUED | OUTPATIENT
Start: 2021-06-26 | End: 2021-06-26 | Stop reason: SDUPTHER

## 2021-06-26 RX ORDER — ALBUTEROL SULFATE 2.5 MG/3ML
2.5 SOLUTION RESPIRATORY (INHALATION) ONCE
Status: COMPLETED | OUTPATIENT
Start: 2021-06-26 | End: 2021-06-26

## 2021-06-26 RX ADMIN — IPRATROPIUM BROMIDE AND ALBUTEROL SULFATE 1 AMPULE: .5; 3 SOLUTION RESPIRATORY (INHALATION) at 20:28

## 2021-06-26 RX ADMIN — PREDNISONE 60 MG: 20 TABLET ORAL at 20:56

## 2021-06-26 RX ADMIN — CEFTRIAXONE 1000 MG: 1 INJECTION, POWDER, FOR SOLUTION INTRAMUSCULAR; INTRAVENOUS at 23:11

## 2021-06-26 RX ADMIN — ALBUTEROL SULFATE 2.5 MG: 2.5 SOLUTION RESPIRATORY (INHALATION) at 20:28

## 2021-06-26 RX ADMIN — AZITHROMYCIN MONOHYDRATE 500 MG: 500 INJECTION, POWDER, LYOPHILIZED, FOR SOLUTION INTRAVENOUS at 23:11

## 2021-06-27 LAB
ANION GAP SERPL CALCULATED.3IONS-SCNC: 6 MMOL/L (ref 4–16)
BASOPHILS ABSOLUTE: 0 K/CU MM
BASOPHILS RELATIVE PERCENT: 0.3 % (ref 0–1)
BUN BLDV-MCNC: 11 MG/DL (ref 6–23)
CALCIUM SERPL-MCNC: 8.7 MG/DL (ref 8.3–10.6)
CHLORIDE BLD-SCNC: 99 MMOL/L (ref 99–110)
CO2: 35 MMOL/L (ref 21–32)
CREAT SERPL-MCNC: 0.4 MG/DL (ref 0.6–1.1)
DIFFERENTIAL TYPE: ABNORMAL
EOSINOPHILS ABSOLUTE: 0 K/CU MM
EOSINOPHILS RELATIVE PERCENT: 0.1 % (ref 0–3)
GFR AFRICAN AMERICAN: >60 ML/MIN/1.73M2
GFR NON-AFRICAN AMERICAN: >60 ML/MIN/1.73M2
GLUCOSE BLD-MCNC: 150 MG/DL (ref 70–99)
HCT VFR BLD CALC: 40 % (ref 37–47)
HEMOGLOBIN: 11.6 GM/DL (ref 12.5–16)
IMMATURE NEUTROPHIL %: 0.4 % (ref 0–0.43)
LYMPHOCYTES ABSOLUTE: 1.3 K/CU MM
LYMPHOCYTES RELATIVE PERCENT: 9.3 % (ref 24–44)
MCH RBC QN AUTO: 27.9 PG (ref 27–31)
MCHC RBC AUTO-ENTMCNC: 29 % (ref 32–36)
MCV RBC AUTO: 96.2 FL (ref 78–100)
MONOCYTES ABSOLUTE: 1.1 K/CU MM
MONOCYTES RELATIVE PERCENT: 8.3 % (ref 0–4)
NUCLEATED RBC %: 0 %
PDW BLD-RTO: 13.7 % (ref 11.7–14.9)
PLATELET # BLD: 204 K/CU MM (ref 140–440)
PMV BLD AUTO: 9.5 FL (ref 7.5–11.1)
POTASSIUM SERPL-SCNC: 4.2 MMOL/L (ref 3.5–5.1)
PRO-BNP: 68.1 PG/ML
RBC # BLD: 4.16 M/CU MM (ref 4.2–5.4)
SEGMENTED NEUTROPHILS ABSOLUTE COUNT: 11.2 K/CU MM
SEGMENTED NEUTROPHILS RELATIVE PERCENT: 81.6 % (ref 36–66)
SODIUM BLD-SCNC: 140 MMOL/L (ref 135–145)
TOTAL IMMATURE NEUTOROPHIL: 0.06 K/CU MM
TOTAL NUCLEATED RBC: 0 K/CU MM
WBC # BLD: 13.7 K/CU MM (ref 4–10.5)

## 2021-06-27 PROCEDURE — 6370000000 HC RX 637 (ALT 250 FOR IP): Performed by: HOSPITALIST

## 2021-06-27 PROCEDURE — 94640 AIRWAY INHALATION TREATMENT: CPT

## 2021-06-27 PROCEDURE — 2700000000 HC OXYGEN THERAPY PER DAY

## 2021-06-27 PROCEDURE — 1200000000 HC SEMI PRIVATE

## 2021-06-27 PROCEDURE — 85025 COMPLETE CBC W/AUTO DIFF WBC: CPT

## 2021-06-27 PROCEDURE — 80048 BASIC METABOLIC PNL TOTAL CA: CPT

## 2021-06-27 PROCEDURE — 6360000002 HC RX W HCPCS: Performed by: HOSPITALIST

## 2021-06-27 PROCEDURE — 2580000003 HC RX 258: Performed by: HOSPITALIST

## 2021-06-27 PROCEDURE — 36415 COLL VENOUS BLD VENIPUNCTURE: CPT

## 2021-06-27 PROCEDURE — 94761 N-INVAS EAR/PLS OXIMETRY MLT: CPT

## 2021-06-27 RX ORDER — SODIUM CHLORIDE 9 MG/ML
25 INJECTION, SOLUTION INTRAVENOUS PRN
Status: DISCONTINUED | OUTPATIENT
Start: 2021-06-27 | End: 2021-06-29 | Stop reason: HOSPADM

## 2021-06-27 RX ORDER — PREDNISONE 20 MG/1
40 TABLET ORAL DAILY
Status: DISCONTINUED | OUTPATIENT
Start: 2021-06-27 | End: 2021-06-29 | Stop reason: HOSPADM

## 2021-06-27 RX ORDER — AZITHROMYCIN 250 MG/1
500 TABLET, FILM COATED ORAL DAILY
Status: COMPLETED | OUTPATIENT
Start: 2021-06-27 | End: 2021-06-29

## 2021-06-27 RX ORDER — ACETAMINOPHEN 650 MG/1
650 SUPPOSITORY RECTAL EVERY 6 HOURS PRN
Status: DISCONTINUED | OUTPATIENT
Start: 2021-06-27 | End: 2021-06-29 | Stop reason: HOSPADM

## 2021-06-27 RX ORDER — ACETAMINOPHEN 325 MG/1
650 TABLET ORAL EVERY 6 HOURS PRN
Status: DISCONTINUED | OUTPATIENT
Start: 2021-06-27 | End: 2021-06-29 | Stop reason: HOSPADM

## 2021-06-27 RX ORDER — TRAMADOL HYDROCHLORIDE 50 MG/1
50 TABLET ORAL 2 TIMES DAILY
Status: DISCONTINUED | OUTPATIENT
Start: 2021-06-27 | End: 2021-06-29 | Stop reason: HOSPADM

## 2021-06-27 RX ORDER — SODIUM CHLORIDE 0.9 % (FLUSH) 0.9 %
5-40 SYRINGE (ML) INJECTION PRN
Status: DISCONTINUED | OUTPATIENT
Start: 2021-06-27 | End: 2021-06-29 | Stop reason: HOSPADM

## 2021-06-27 RX ORDER — IPRATROPIUM BROMIDE AND ALBUTEROL SULFATE 2.5; .5 MG/3ML; MG/3ML
1 SOLUTION RESPIRATORY (INHALATION)
Status: DISCONTINUED | OUTPATIENT
Start: 2021-06-27 | End: 2021-06-28

## 2021-06-27 RX ORDER — SENNA PLUS 8.6 MG/1
1 TABLET ORAL DAILY PRN
Status: DISCONTINUED | OUTPATIENT
Start: 2021-06-27 | End: 2021-06-29 | Stop reason: HOSPADM

## 2021-06-27 RX ORDER — ONDANSETRON 4 MG/1
4 TABLET, ORALLY DISINTEGRATING ORAL EVERY 8 HOURS PRN
Status: DISCONTINUED | OUTPATIENT
Start: 2021-06-27 | End: 2021-06-29 | Stop reason: HOSPADM

## 2021-06-27 RX ORDER — NICOTINE 21 MG/24HR
1 PATCH, TRANSDERMAL 24 HOURS TRANSDERMAL DAILY
Status: DISCONTINUED | OUTPATIENT
Start: 2021-06-27 | End: 2021-06-29 | Stop reason: HOSPADM

## 2021-06-27 RX ORDER — PREGABALIN 100 MG/1
100 CAPSULE ORAL 3 TIMES DAILY
Status: DISCONTINUED | OUTPATIENT
Start: 2021-06-27 | End: 2021-06-29 | Stop reason: HOSPADM

## 2021-06-27 RX ORDER — ALBUTEROL SULFATE 2.5 MG/3ML
2.5 SOLUTION RESPIRATORY (INHALATION)
Status: DISCONTINUED | OUTPATIENT
Start: 2021-06-27 | End: 2021-06-29 | Stop reason: HOSPADM

## 2021-06-27 RX ORDER — BUDESONIDE AND FORMOTEROL FUMARATE DIHYDRATE 160; 4.5 UG/1; UG/1
2 AEROSOL RESPIRATORY (INHALATION) 2 TIMES DAILY
Status: DISCONTINUED | OUTPATIENT
Start: 2021-06-27 | End: 2021-06-29 | Stop reason: HOSPADM

## 2021-06-27 RX ORDER — BUPRENORPHINE AND NALOXONE 8; 2 MG/1; MG/1
1 FILM, SOLUBLE BUCCAL; SUBLINGUAL 3 TIMES DAILY
Status: DISCONTINUED | OUTPATIENT
Start: 2021-06-27 | End: 2021-06-29 | Stop reason: HOSPADM

## 2021-06-27 RX ORDER — ONDANSETRON 2 MG/ML
4 INJECTION INTRAMUSCULAR; INTRAVENOUS EVERY 6 HOURS PRN
Status: DISCONTINUED | OUTPATIENT
Start: 2021-06-27 | End: 2021-06-29 | Stop reason: HOSPADM

## 2021-06-27 RX ORDER — MAGNESIUM HYDROXIDE/ALUMINUM HYDROXICE/SIMETHICONE 120; 1200; 1200 MG/30ML; MG/30ML; MG/30ML
30 SUSPENSION ORAL EVERY 6 HOURS PRN
Status: DISCONTINUED | OUTPATIENT
Start: 2021-06-27 | End: 2021-06-29 | Stop reason: HOSPADM

## 2021-06-27 RX ORDER — SODIUM CHLORIDE 0.9 % (FLUSH) 0.9 %
5-40 SYRINGE (ML) INJECTION EVERY 12 HOURS SCHEDULED
Status: DISCONTINUED | OUTPATIENT
Start: 2021-06-27 | End: 2021-06-29 | Stop reason: HOSPADM

## 2021-06-27 RX ORDER — ASPIRIN 81 MG/1
81 TABLET, CHEWABLE ORAL DAILY
Status: DISCONTINUED | OUTPATIENT
Start: 2021-06-27 | End: 2021-06-29 | Stop reason: HOSPADM

## 2021-06-27 RX ADMIN — TRAMADOL HYDROCHLORIDE 50 MG: 50 TABLET, FILM COATED ORAL at 02:01

## 2021-06-27 RX ADMIN — IPRATROPIUM BROMIDE AND ALBUTEROL SULFATE 1 AMPULE: .5; 3 SOLUTION RESPIRATORY (INHALATION) at 16:18

## 2021-06-27 RX ADMIN — PREGABALIN 100 MG: 100 CAPSULE ORAL at 20:30

## 2021-06-27 RX ADMIN — PREGABALIN 100 MG: 100 CAPSULE ORAL at 13:40

## 2021-06-27 RX ADMIN — PREDNISONE 40 MG: 20 TABLET ORAL at 11:23

## 2021-06-27 RX ADMIN — BUPRENORPHINE AND NALOXONE 1 FILM: 8; 2 FILM, SOLUBLE BUCCAL; SUBLINGUAL at 20:30

## 2021-06-27 RX ADMIN — BUDESONIDE AND FORMOTEROL FUMARATE DIHYDRATE 2 PUFF: 160; 4.5 AEROSOL RESPIRATORY (INHALATION) at 20:32

## 2021-06-27 RX ADMIN — AZITHROMYCIN MONOHYDRATE 500 MG: 250 TABLET ORAL at 11:23

## 2021-06-27 RX ADMIN — IPRATROPIUM BROMIDE AND ALBUTEROL SULFATE 1 AMPULE: .5; 3 SOLUTION RESPIRATORY (INHALATION) at 09:30

## 2021-06-27 RX ADMIN — IPRATROPIUM BROMIDE AND ALBUTEROL SULFATE 1 AMPULE: .5; 3 SOLUTION RESPIRATORY (INHALATION) at 20:32

## 2021-06-27 RX ADMIN — ASPIRIN 81 MG: 81 TABLET, CHEWABLE ORAL at 11:23

## 2021-06-27 RX ADMIN — CEFTRIAXONE 1000 MG: 1 INJECTION, POWDER, FOR SOLUTION INTRAMUSCULAR; INTRAVENOUS at 22:42

## 2021-06-27 RX ADMIN — ENOXAPARIN SODIUM 40 MG: 40 INJECTION SUBCUTANEOUS at 11:23

## 2021-06-27 RX ADMIN — PREGABALIN 100 MG: 100 CAPSULE ORAL at 11:23

## 2021-06-27 RX ADMIN — SODIUM CHLORIDE, PRESERVATIVE FREE 10 ML: 5 INJECTION INTRAVENOUS at 11:25

## 2021-06-27 RX ADMIN — BUPRENORPHINE AND NALOXONE 1 FILM: 8; 2 FILM, SOLUBLE BUCCAL; SUBLINGUAL at 13:40

## 2021-06-27 ASSESSMENT — PAIN SCALES - GENERAL
PAINLEVEL_OUTOF10: 0
PAINLEVEL_OUTOF10: 9
PAINLEVEL_OUTOF10: 0
PAINLEVEL_OUTOF10: 7
PAINLEVEL_OUTOF10: 0

## 2021-06-27 NOTE — ED NOTES
Pt states baseline home O2 is 4L, her concentrator only goes up to 5L but she does not think that is enough right now.      Erasmo Chang RN  06/26/21 2056

## 2021-06-27 NOTE — ED NOTES
Bed: H-03  Expected date:   Expected time:   Means of arrival:   Comments:  Ems       Jenifer Amaya [facility] [place]  06/26/21 2019

## 2021-06-27 NOTE — PLAN OF CARE
Problem: Falls - Risk of:  Goal: Will remain free from falls  Description: Will remain free from falls  Outcome: Ongoing  Goal: Absence of physical injury  Description: Absence of physical injury  Outcome: Ongoing     Problem: Pain:  Goal: Pain level will decrease  Description: Pain level will decrease  Outcome: Ongoing  Goal: Control of acute pain  Description: Control of acute pain  Outcome: Ongoing  Goal: Control of chronic pain  Description: Control of chronic pain  Outcome: Ongoing     Problem: Discharge Planning:  Goal: Discharged to appropriate level of care  Description: Discharged to appropriate level of care  Outcome: Ongoing     Problem:  Activity Intolerance:  Goal: Ability to tolerate increased activity will improve  Description: Ability to tolerate increased activity will improve  Outcome: Ongoing     Problem: Airway Clearance - Ineffective:  Goal: Ability to maintain a clear airway will improve  Description: Ability to maintain a clear airway will improve  Outcome: Ongoing

## 2021-06-27 NOTE — ED TRIAGE NOTES
60F presents from home with complains of SOB not improved by breathing treatments. Pt 76% on 6L with EMS. Pt notes she was hospitalized approximately a month ago with COVID19.

## 2021-06-27 NOTE — RT PROTOCOL NOTE
RT Inhaler-Nebulizer Bronchodilator Protocol Note    There is a bronchodilator order in the chart from a provider indicating to follow the RT Bronchodilator Protocol and there is an Initiate RT Bronchodilator Protocol order as well (see protocol at bottom of note). The findings from the last RT Protocol Assessment were as follows:  Smoking: >15 Pack years  Surgical Status: No surgery  Xray: Multiple lobe infiltrates/atelectasis/pleural effusion/edema  Respiratory Pattern: RR <12 or 21-30  Mental Status: Alert and Oriented  Breath Sounds: Scattered wheeze  Cough: Strong, productive  Activity Level: Walking unassisted  Oxygen Requirement: 29% or 3LNC - 5LNC/40%  Indication for Bronchodilator Therapy: On home bronchodilators  Bronchodilator Assessment Score: 9    Aerosolized bronchodilator medication orders have been revised according to the RT Bronchodilator Protocol. RT Inhaler-Nebulizer Bronchodilator Protocol:    Respiratory Therapist to perform RT Therapy Protocol Assessment then follow the protocol. No Indications - adjust the frequency to every 6 hours PRN wheezing or bronchospasm, if no treatments needed after 48 hours then discontinue using Per Protocol order mode. If indication present, adjust the RT bronchodilator orders based on the Bronchodilator Assessment Score as follows:    0-6 - enter or revise RT bronchodilator order to Albuterol Inhaler order with frequency of every 2 hours PRN for wheezing or increased work of breathing using Per Protocol order mode. If Albuterol Inhaler not tolerated or not effective, then discontinue the Albuterol Inhaler order and enter Albuterol Nebulizer order with same frequency and PRN reasons. Repeat RT Therapy Protocol Assessment as needed.     7-10 - discontinue any other Inpatient aerosolized bronchodilator medication orders and enter or revise two Albuterol Inhaler orders, one with BID frequency and one with frequency of every 2 hours PRN wheezing or increased work of breathing using Per Protocol order mode. Repeat RT Therapy Protocol Assessment with second treatment then BID and as needed. If Albuterol Inhaler not tolerated or not effective, then discontinue the Albuterol Inhaler orders and enter two Albuterol Nebulizer orders with same frequencies and PRN reasons. 11-13 - discontinue any other Inpatient aerosolized bronchodilator medication orders and enter DuoNeb Nebulizer orders QID frequency and an Albuterol Nebulizer order every 2 hours PRN wheezing or increased work of breathing using Per Protocol order mode. Repeat RT Therapy Protocol Assessment with second treatment then QID and as needed. Greater than 13 - discontinue any other Inpatient bronchodilator aerosolized medication orders and enter DuoNeb Nebulizer order every 4 hours frequency and Albuterol Nebulizer every 2 hours PRN wheezing or increased work of breathing using Per Protocol order mode. Repeat RT Therapy Protocol Assessment with second treatment then every 4 hours and as needed. RT to enter RT Home Evaluation for COPD & MDI Assessment order using Per Protocol order mode. On home bronchodialtors.     Electronically signed by Naseem Simental RCP on 6/27/2021 at 9:41 AM

## 2021-06-27 NOTE — H&P
History and Physical      Name:  Abiodun Joy /Age/Sex: 1960  (64 y.o. female)   MRN & CSN:  [id number] & [id number] Admission Date/Time: 2021  8:19 PM   Location:  TR03TR-03 PCP: Val Brantley, [facility] Day: 1    Assessment and Plan:   Abiodun Joy is a 64 y.o.  female  who presents with SOB, increased cough for the last several days found to have an acute COPD exacerbation. 1. Acute COPD exacerbation with LLL pneumonia and leukocytosis: pt on 4L O2 ATC as well as BIPAP at night for which she doesn't remember settings. Will cont scheduled duonebs, prednisone, ceftriaxone and azithromycin given increased sputum prod, CXR findings. Cont BIPAP, will ask RT to titrate. Monitor for any signs of hypoxia/hypercarbia. WBCs 14.9 on admission, due to underlying infection. Cont to monitor. 2. Recent COVID-19 infection: has just recently started to feel more like herself. Cont supportive care. No concerns for active infection currently given previous time course and repeat negative COVID test in ER. 3. Fibromyalgia: cont home lyrica, tramadol  4. DVT prophy: SQ lovenox  5. Pt is full code per discussion with her. Diet Regular   DVT Prophylaxis [x] Lovenox, []  Heparin, [] SCDs, [] Ambulation   GI Prophylaxis [] PPI,  [] H2 Blocker,  [] Carafate,  [] Diet/Tube Feeds   Code Status Full   Disposition Patient requires continued admission due to COPD exacerbation   MDM [] Low, [] Moderate,[x]  High  Patient's risk as above due to pneumonia, COPD exacerbation     History of Present Illness:     Chief Complaint: cough, SOB  Abiodun Joy is a 64 y.o.  female with fibromyalgia, COPD on 4L O2 ATC and BIPAP at night, COVID-19 admission last month for 5 days who presents with increasing SOB, cough productive of green sputum for the last several days. She noticed that her appetite has been poor for the last several days and that her inhalers and nebs aren't working as well at home.  She also wears O2 at 4L ATC and feels that it hasn't been enough recently. She then noticed that she was starting to have involuntary jerking which happens when her COPD starts to flare so she came to the ER for further eval. O2 sats were 90-91% on 4L on arrival. CXR showed nodular infiltrates in the LLL and she was given prednisone 60mg, duonebs, and started on CTX and azithromycin. She currently is feeling better than she did when she first arrived. Ten point ROS reviewed negative, unless as noted above    Objective:   No intake or output data in the 24 hours ending 06/26/21 2328   Vitals:   Vitals:    06/26/21 2130   BP: 116/61   Pulse: 105   Resp: 15   Temp:    SpO2: 94%     Physical Exam:   GEN Awake female, sitting upright in bed in no apparent distress. Appears given age. EYES Pupils are equally round. No scleral erythema, discharge, or conjunctivitis. HENT Mucous membranes are moist. Oral pharynx without exudates, no evidence of thrush. NECK Supple, no apparent thyromegaly or masses. RESP Good inspiratory effort, breath sounds very diminished throughout with poor air flow, scattered end exp wheezes throughout  CARDIO/VASC S1/S2 auscultated. Regular rate without appreciable murmurs, rubs, or gallops. Peripheral pulses equal bilaterally and palpable. No peripheral edema. GI Abdomen is soft without significant tenderness, masses, or guarding. Bowel sounds are normoactive. Rectal exam deferred.  No costovertebral angle tenderness. HEME/LYMPH No palpable cervical lymphadenopathy and no hepatosplenomegaly. No petechiae or ecchymoses. MSK No gross joint deformities. SKIN Normal coloration, warm, dry. NEURO Cranial nerves appear grossly intact, normal speech, no lateralizing weakness. PSYCH Awake, alert, oriented x 4. Affect appropriate.     Past Medical History:      Past Medical History:   Diagnosis Date    ADHD 2006    COPD [facility]chronic obstructive pulmonary disease) ([facility] [address]     COVID-19     Drug addiction in remission [facility])     recovering addict    Hep C w/o coma, chronic (HCC)     Pacemaker     Sleep apnea     TIA (transient ischemic attack)     per patient \"several mini strokes\"     PSHX:  has a past surgical history that includes  section (); Cholecystectomy (); and Pacemaker insertion (). Allergies: No Known Allergies    FAM HX: family history includes Alcohol Abuse in her father; Cancer in her mother; Coronary Art Dis in her brother; Lupus in her brother; Other in her sister; Thyroid Cancer in her mother. Soc HX:   Social History     Socioeconomic History    Marital status: Single     Spouse name: None    Number of children: None    Years of education: None    Highest education level: None   Occupational History    None   Tobacco Use    Smoking status: Former Smoker     Packs/day: 1.00     Years: 40.00     Pack years: 40.00     Quit date: 2016     Years since quittin.8    Smokeless tobacco: Never Used   Substance and Sexual Activity    Alcohol use: Not Currently    Drug use: Not Currently     Comment: heroine    Sexual activity: None   Other Topics Concern    None   Social History Narrative    None     Social Determinants of Health     Financial Resource Strain:     Difficulty of Paying Living Expenses:    Food Insecurity:     Worried About Running Out of Food in the Last Year:     Ran Out of Food in the Last Year:    Transportation Needs:     Lack of Transportation (Medical):      Lack of Transportation (Non-Medical):    Physical Activity:     Days of Exercise per Week:     Minutes of Exercise per Session:    Stress:     Feeling of Stress :    Social Connections:     Frequency of Communication with Friends and Family:     Frequency of Social Gatherings with Friends and Family:     Attends Mandaeism Services:     Active Member of Clubs or Organizations:     Attends Club or Organization Meetings:     Marital Status:    Intimate Partner Violence:     Fear of Current or Ex-Partner:     Emotionally Abused:     Physically Abused:     Sexually Abused:        Medications:   Medications:    cefTRIAXone (ROCEPHIN) IV  1,000 mg Intravenous Once    azithromycin  500 mg Intravenous Once      Infusions:   PRN Meds:        Electronically signed by Anshu Zhang MD on 6/26/2021 at 11:28 PM

## 2021-06-27 NOTE — PROGRESS NOTES
Hospitalist Progress Note      Name:  Wendi Lerma /Age/Sex: 1960  (64 y.o. female)   MRN & CSN:  [id number] & [id number] Admission Date/Time: 2021  8:19 PM   [facility] PCP: Omid Mercado, [facility] Day: 2    Assessment and Plan:     Wendi Lerma is a 64 y.o.  female  who presents with SOB, increased cough for the last several days found to have an acute COPD exacerbation.     1. Acute COPD exacerbation with LLL pneumonia and leukocytosis: pt on 4L O2 ATC as well as BIPAP at night for which she doesn't remember settings. Will cont scheduled duonebs, prednisone, ceftriaxone and azithromycin given increased sputum prod, CXR findings. Cont BIPAP, will ask RT to titrate. Monitor for any signs of hypoxia/hypercarbia. WBCs 14.9 on admission, due to underlying infection. Cont to monitor. 2. Recent COVID-19 infection: has just recently started to feel more like herself. Cont supportive care. No concerns for active infection currently given previous time course and repeat negative COVID test in ER. 3. Fibromyalgia: cont home lyrica, tramadol  4. DVT prophy: SQ lovenox  5. Pt is full code per discussion with her.       Diet ADULT DIET;  Regular   DVT Prophylaxis [x] Lovenox, []  Heparin, [] SCDs, [] Ambulation   GI Prophylaxis [] PPI,  [] H2 Blocker,  [] Carafate,  [] Diet/Tube Feeds   Code Status Full Code   Disposition Patient requires continued admission due to SOB   MDM [] Low, [] Moderate,[x]  High  Patient's risk as above due to medical condition, age      History of Present Illness:     Chief Complaint: SOB  Wendi Lerma is a 64 y.o.  female  who presents with      Reporting some improvement in shortness of breath, wearing nasal cannula     Ten point ROS reviewed negative, unless as noted above    Objective:   No intake or output data in the 24 hours ending 21 1836   Vitals:   Vitals:    21 1623   BP:    Pulse:    Resp:    Temp:    SpO2: 93%     Physical Exam:   GEN    Awake female, sitting upright in bed in no apparent distress. Appears given age. EYES   Pupils are equally round. No scleral erythema, discharge, or conjunctivitis. HENT  Mucous membranes are moist. Oral pharynx without exudates, no evidence of thrush. NECK  Supple, no apparent thyromegaly or masses. RESP  Good inspiratory effort, breath sounds very diminished throughout with poor air flow, scattered end exp wheezes throughout  CARDIO/VASC S1/S2 auscultated. Regular rate without appreciable murmurs, rubs, or gallops. Peripheral pulses equal bilaterally and palpable. No peripheral edema. GI        Abdomen is soft without significant tenderness, masses, or guarding. Bowel sounds are normoactive. Rectal exam deferred.        No costovertebral angle tenderness. HEME/LYMPH            No palpable cervical lymphadenopathy and no hepatosplenomegaly. No petechiae or ecchymoses. MSK    No gross joint deformities. SKIN    Normal coloration, warm, dry. NEURO           Cranial nerves appear grossly intact, normal speech, no lateralizing weakness. PSYCH            Awake, alert, oriented x 4. Affect appropriate.     Medications:   Medications:    aspirin  81 mg Oral Daily    pregabalin  100 mg Oral TID    traMADol  50 mg Oral BID    sodium chloride flush  5-40 mL Intravenous 2 times per day    enoxaparin  40 mg Subcutaneous Daily    ipratropium-albuterol  1 ampule Inhalation Q4H WA    predniSONE  40 mg Oral Daily    cefTRIAXone (ROCEPHIN) IV  1,000 mg Intravenous Q24H    azithromycin  500 mg Oral Daily    nicotine  1 patch Transdermal Daily    budesonide-formoterol  2 puff Inhalation BID    buprenorphine-naloxone  1 Film Sublingual TID      Infusions:    sodium chloride       PRN Meds: sodium chloride flush, 5-40 mL, PRN  sodium chloride, 25 mL, PRN  ondansetron, 4 mg, Q8H PRN   Or  ondansetron, 4 mg, Q6H PRN  acetaminophen, 650 mg, Q6H PRN   Or  acetaminophen, 650 mg, Q6H PRN  senna, 1 tablet, Daily PRN  albuterol, 2.5 mg, Q2H PRN  aluminum & magnesium hydroxide-simethicone, 30 mL, Q6H PRN          Electronically signed by Tania Quinteros MD on 6/27/2021 at 6:36 PM

## 2021-06-27 NOTE — ED PROVIDER NOTES
Emergency Department Encounter    Patient: Chris Pearson  MRN: [id number]  : 1960  Date of Evaluation: 2021  ED Provider:  Jean-Paul Mosley MD    Triage Chief Complaint:   Shortness of Breath (breathing treatments did not help)    [place]:  Chris Pearson is a 64 y.o. female that presents with shortness of breath, believes her COPD is flared up, mild to moderate in nature constant is wheezing breathing is tight. She has a cough from her breathing and wheezing but otherwise has not had a cough recently. No other URI symptoms. No fever. No leg pain or leg swelling. No chest pain just the tightness on breathing. She is using her home treatments without significant improvement.     ROS - see HPI, below listed is current ROS at time of my eval:  General:  No fevers, no chills  Eyes:  No recent vison changes, no discharge  ENT:  No sore throat, no nasal congestion, no hearing changes  Cardiovascular:  No chest pain, no palpitations  Respiratory:  + shortness of breath, no cough, + wheezing  Gastrointestinal:  No pain, no nausea, no vomiting, no diarrhea  Musculoskeletal:  No muscle pain, no joint pain  Skin:  No rash, no pruritis  Neurologic:  No speech problems, no headache  Psychiatric:  No anxiety  Genitourinary:  No dysuria, no hematuria  Endocrine:  No unexpected weight gain, no unexpected weight loss  Extremities:  no edema, no pain      Past Medical History:   Diagnosis Date    ADHD 2006    [facility]     COVID-19     Drug addiction in remission [facility]     recovering addict    Hep C w/o coma, chronic [facility] [facility]     Pacemaker     Sleep apnea     TIA (transient ischemic attack)     per patient \"several mini strokes\"     Past Surgical History:   Procedure Laterality Date   [facility] CHOLECYSTECTOMY  2010    done in [address] [place] [address]       Family History   Problem Relation Age of Onset    Cancer Mother         unsure of type  Thyroid Cancer Mother     Alcohol Abuse Father     Other Sister         passed in car accident    Lupus Brother     Coronary Art Dis Brother      Social History     Socioeconomic History    Marital status: Single     Spouse name: Not on file    Number of children: Not on file    Years of education: Not on file    Highest education level: Not on file   Occupational History    Not on file   Tobacco Use    Smoking status: Former Smoker     Packs/day: 1.00     Years: 40.00     Pack years: 40.00     Quit date: 2016     Years since quittin.8    Smokeless tobacco: Never Used   Substance and Sexual Activity    Alcohol use: Not Currently    Drug use: Not Currently     Comment: heroine    Sexual activity: Not on file   Other Topics Concern    Not on file   Social History Narrative    Not on file     Social Determinants of Health     Financial Resource Strain:     Difficulty of Paying Living Expenses:    Food Insecurity:     Worried About Running Out of Food in the Last Year:     [address] in the Last Year:    Transportation Needs:     Lack of Transportation (Medical):      Lack of Transportation (Non-Medical):    Physical Activity:     Days of Exercise per Week:     Minutes of Exercise per Session:    Stress:     Feeling of Stress :    Social Connections:     Frequency of Communication with Friends and Family:     Frequency of Social Gatherings with Friends and Family:     Attends Mu-ism Services:     Active Member of Clubs or Organizations:     Attends Club or Organization Meetings:     Marital Status:    Intimate Partner Violence:     Fear of Current or Ex-Partner:     Emotionally Abused:     Physically Abused:     Sexually Abused:      Current Facility-Administered Medications   Medication Dose Route Frequency Provider Last Rate Last Admin    cefTRIAXone (ROCEPHIN) 1000 mg IVPB in 50 mL D5W minibag  1,000 mg Intravenous Once Jovani Abrams MD        azithromycin (ZITHROMAX) 500 mg in dextrose 5 % 250 mL IVPB  500 mg Intravenous Once Abdulkadir Harmon MD         Current Outpatient Medications   Medication Sig Dispense Refill    predniSONE (DELTASONE) 10 MG tablet Take 1 tablet by mouth daily 40mg daily for 2 days, 20 mg daily for 2 days, 10 mg daily for 2 days, 5 mg daily for 2 days 15 tablet 0    fluticasone-umeclidin-vilant (TRELEGY ELLIPTA) 100-62.5-25 MCG/INH AEPB Inhale 1 puff into the lungs daily 1 each 5    mirtazapine (REMERON) 30 MG tablet Take 30 mg by mouth nightly      BiPAP Machine MISC by Does not apply route Use nightly      albuterol sulfate  (90 Base) MCG/ACT inhaler USE 2 PUFFS BY MOUTH EVERY 6 HOURS AS NEEDED SHAKE WELL 18 g 5    aspirin 81 MG chewable tablet Take 81 mg by mouth daily      Multiple Vitamins-Minerals (WOMENS MULTIVITAMIN PO) Take 1 tablet by mouth daily      albuterol (PROVENTIL) (2.5 MG/3ML) 0.083% nebulizer solution USE ONE VIAL BY NEBULIZER EVERY 6 HOURS AS NEEDED FOR WHEEZING 360 mL 5    Nebulizer MISC Use nebulizer with medication as directed. 1 each 0    Respiratory Therapy Supplies (NEBULIZER/TUBING/MOUTHPIECE) KIT Inhale 1 kit into the lungs daily as needed (as directed) 1 kit 0    amphetamine-dextroamphetamine (ADDERALL XR) 30 MG extended release capsule Take 30 mg by mouth daily.  traMADol (ULTRAM) 50 MG tablet Take 50 mg by mouth 2 times daily.  pregabalin (LYRICA) 100 MG capsule Take 100 mg by mouth 3 times daily.  traZODone (DESYREL) 150 MG tablet Take 150 mg by mouth nightly       OXYGEN Inhale 4 L into the lungs continuous       CPAP Machine MISC by CPAP route nightly       buprenorphine-naloxone (SUBOXONE) 8-2 MG FILM SL film Place 1 Film under the tongue 3 times daily.        No Known Allergies    Nursing Notes Reviewed    Physical Exam:  Triage VS:    ED Triage Vitals   Enc Vitals Group      BP       Pulse       Resp       Temp       Temp src       SpO2       Weight       Height       Head Circumference       Peak Flow       Pain Score       Pain Loc       Pain Edu? Excl. in [address]? My pulse ox interpretation is - normal    General appearance:  No acute distress  Skin:  Warm. Dry. No pallor. No rash. Eye:  Normal conjuctiva. no Icterus. Ears, nose, mouth and throat:  Oral mucosa moist   Heart:  Regular rate and rhythm, normal S1 & S2, no extra heart sounds, no murmurs. Perfusion:  intact  Respiratory:  inspiratory and expiratory wheezes in all lung fields, decreased air entry throughout, mild tachypnea noted, no accessory muscle use  Abdominal:  Soft. Nontender. Non distended. Extremity:  No edema or tenderness  Neurological:  Alert and oriented,  No focal neuro deficits.        I have reviewed and interpreted all of the currently available lab results from this visit (if applicable):  Results for orders placed or performed during the hospital encounter of 06/26/21   Comprehensive Metabolic Panel   Result Value Ref Range    Sodium 139 135 - 145 MMOL/L    Potassium 3.6 3.5 - 5.1 MMOL/L    Chloride 98 (L) 99 - 110 mMol/L    CO2 36 (H) 21 - 32 MMOL/L    BUN 6 6 - 23 MG/DL    CREATININE 0.4 (L) 0.6 - 1.1 MG/DL    Glucose 140 (H) 70 - 99 MG/DL    Calcium 8.8 8.3 - 10.6 MG/DL    Albumin 3.8 3.4 - 5.0 GM/DL    Total Protein 6.6 6.4 - 8.2 GM/DL    Total Bilirubin 0.4 0.0 - 1.0 MG/DL    ALT 18 10 - 40 U/L    AST 19 15 - 37 IU/L    Alkaline Phosphatase 70 40 - 129 IU/L    GFR Non-African American >60 >60 mL/min/1.73m2    GFR African American >60 >60 mL/min/1.73m2    Anion Gap 5 4 - 16   CBC Auto Differential   Result Value Ref Range    WBC 14.9 (H) 4.0 - 10.5 K/CU MM    RBC 4.45 4.2 - 5.4 M/CU MM    Hemoglobin 12.6 12.5 - 16.0 GM/DL    Hematocrit 41.3 37 - 47 %    MCV 92.8 78 - 100 FL    MCH 28.3 27 - 31 PG    MCHC 30.5 (L) 32.0 - 36.0 %    RDW 14.0 11.7 - 14.9 %    Platelets 265 610 - 292 K/CU MM    MPV 8.9 7.5 - 11.1 FL    Differential Type AUTOMATED DIFFERENTIAL     Segs Relative 71. 5 (H) 36 - 66 %    Lymphocytes % 12.5 (L) 24 - 44 %    Monocytes % 12.6 (H) 0 - 4 %    Eosinophils % 2.9 0 - 3 %    Basophils % 0.3 0 - 1 %    Segs Absolute 10.7 K/CU MM    Lymphocytes Absolute 1.9 K/CU MM    Monocytes Absolute 1.9 K/CU MM    Eosinophils Absolute 0.4 K/CU MM    Basophils Absolute 0.1 K/CU MM    Nucleated RBC % 0.0 %    Total Nucleated RBC 0.0 K/CU MM    Total Immature Neutrophil 0.03 K/CU MM    Immature Neutrophil % 0.2 0 - 0.43 %   Troponin   Result Value Ref Range    Troponin T <0.010 <0.01 NG/ML      Radiographs (if obtained):  Radiologist's Report Reviewed:  No results found. EKG (if obtained): (All EKG's are interpreted by myself in the absence of a cardiologist)  EKG shows sinus rhythm rate of 120 normal WA and QRS intervals no ST elevation no old EKG available for comparison    MDM:  Patient presenting to the emergency department with likely reactive airway disease exacerbation. She is not hypoxic presents on oxygen on record review it appears as though she is on home oxygen all the time, report from patient states she had Covid about a month ago since then she has had intermittent respiratory issues. Vital signs are not unstable she was a little bit tachypneic and borderline oxygen at night 90 to 92% with her oxygen on. Labs she has a leukocytosis of 14.9 troponin normal chest x-ray shows possible pneumonia placed on ceftriaxone and Zithromax culture sent lactate ordered she is not in septic shock or sepsis, will bring into the hospital for tuning up of her COPD and IV antibiotics for possible pneumonia. Hospitalist notified. Total critical care time provided today was 31 minutes. This excludes seperately billable procedures and family discussion time. Critical care time provided for obtaining history, conducting a physical exam, performing and monitoring interventions, ordering, collecting and interpreting tests, and establishing medical decision-making.   There was a potential for life/limb threatening pathology requiring close evaluation and intervention with concern for patient decompensation. Clinical Impression:  1. COPD exacerbation ([facility] [address]    2. Pneumonia due to infectious organism, unspecified laterality, unspecified part of lung      Disposition referral (if applicable):  No follow-up provider specified. Disposition medications (if applicable):  New Prescriptions    No medications on file     ED Provider Disposition Time  DISPOSITION Decision To Admit 06/26/2021 10:01:03 PM      Comment: Please note this report has been produced using speech recognition software and may contain errors related to that system including errors in grammar, punctuation, and spelling, as well as words and phrases that may be inappropriate. Efforts were made to edit the dictations.         Letha Roberts MD  06/26/21 1936

## 2021-06-27 NOTE — PROGRESS NOTES
Pt has home medications listed in patient bin. Patient has x4 tramadol po in pharmacy. trelegy inhaler, x2 bottles trazadone po, pregabalin po, saline nasal spray otc, naproxen po, dulera inhaler. Two person skin assessment via myself and delmi weber rn. Patient alert oriented and independent in room. o2 exceeds home baseline currently. Consults sent.

## 2021-06-28 LAB
EKG ATRIAL RATE: 120 BPM
EKG DIAGNOSIS: NORMAL
EKG P AXIS: 65 DEGREES
EKG P-R INTERVAL: 136 MS
EKG Q-T INTERVAL: 312 MS
EKG QRS DURATION: 80 MS
EKG QTC CALCULATION (BAZETT): 440 MS
EKG R AXIS: 47 DEGREES
EKG T AXIS: 71 DEGREES
EKG VENTRICULAR RATE: 120 BPM

## 2021-06-28 PROCEDURE — 6370000000 HC RX 637 (ALT 250 FOR IP): Performed by: HOSPITALIST

## 2021-06-28 PROCEDURE — 94761 N-INVAS EAR/PLS OXIMETRY MLT: CPT

## 2021-06-28 PROCEDURE — 94660 CPAP INITIATION&MGMT: CPT

## 2021-06-28 PROCEDURE — 2580000003 HC RX 258: Performed by: HOSPITALIST

## 2021-06-28 PROCEDURE — 6360000002 HC RX W HCPCS: Performed by: HOSPITALIST

## 2021-06-28 PROCEDURE — 1200000000 HC SEMI PRIVATE

## 2021-06-28 PROCEDURE — 94640 AIRWAY INHALATION TREATMENT: CPT

## 2021-06-28 PROCEDURE — 2700000000 HC OXYGEN THERAPY PER DAY

## 2021-06-28 PROCEDURE — 93010 ELECTROCARDIOGRAM REPORT: CPT | Performed by: INTERNAL MEDICINE

## 2021-06-28 RX ORDER — IPRATROPIUM BROMIDE AND ALBUTEROL SULFATE 2.5; .5 MG/3ML; MG/3ML
1 SOLUTION RESPIRATORY (INHALATION)
Status: DISCONTINUED | OUTPATIENT
Start: 2021-06-28 | End: 2021-06-29 | Stop reason: HOSPADM

## 2021-06-28 RX ADMIN — IPRATROPIUM BROMIDE AND ALBUTEROL SULFATE 1 AMPULE: .5; 3 SOLUTION RESPIRATORY (INHALATION) at 15:47

## 2021-06-28 RX ADMIN — CEFTRIAXONE 1000 MG: 1 INJECTION, POWDER, FOR SOLUTION INTRAMUSCULAR; INTRAVENOUS at 23:08

## 2021-06-28 RX ADMIN — TRAMADOL HYDROCHLORIDE 50 MG: 50 TABLET, FILM COATED ORAL at 19:49

## 2021-06-28 RX ADMIN — IPRATROPIUM BROMIDE AND ALBUTEROL SULFATE 1 AMPULE: .5; 3 SOLUTION RESPIRATORY (INHALATION) at 11:32

## 2021-06-28 RX ADMIN — IPRATROPIUM BROMIDE AND ALBUTEROL SULFATE 1 AMPULE: .5; 3 SOLUTION RESPIRATORY (INHALATION) at 08:20

## 2021-06-28 RX ADMIN — PREGABALIN 100 MG: 100 CAPSULE ORAL at 19:50

## 2021-06-28 RX ADMIN — SODIUM CHLORIDE, PRESERVATIVE FREE 10 ML: 5 INJECTION INTRAVENOUS at 19:51

## 2021-06-28 RX ADMIN — PREDNISONE 40 MG: 20 TABLET ORAL at 10:24

## 2021-06-28 RX ADMIN — PREGABALIN 100 MG: 100 CAPSULE ORAL at 14:44

## 2021-06-28 RX ADMIN — AZITHROMYCIN MONOHYDRATE 500 MG: 250 TABLET ORAL at 10:24

## 2021-06-28 RX ADMIN — IPRATROPIUM BROMIDE AND ALBUTEROL SULFATE 1 AMPULE: .5; 3 SOLUTION RESPIRATORY (INHALATION) at 21:19

## 2021-06-28 RX ADMIN — TRAMADOL HYDROCHLORIDE 50 MG: 50 TABLET, FILM COATED ORAL at 10:23

## 2021-06-28 RX ADMIN — BUPRENORPHINE AND NALOXONE 1 FILM: 8; 2 FILM, SOLUBLE BUCCAL; SUBLINGUAL at 10:25

## 2021-06-28 RX ADMIN — BUDESONIDE AND FORMOTEROL FUMARATE DIHYDRATE 2 PUFF: 160; 4.5 AEROSOL RESPIRATORY (INHALATION) at 21:20

## 2021-06-28 RX ADMIN — ENOXAPARIN SODIUM 40 MG: 40 INJECTION SUBCUTANEOUS at 10:24

## 2021-06-28 RX ADMIN — ASPIRIN 81 MG: 81 TABLET, CHEWABLE ORAL at 10:23

## 2021-06-28 RX ADMIN — BUPRENORPHINE AND NALOXONE 1 FILM: 8; 2 FILM, SOLUBLE BUCCAL; SUBLINGUAL at 14:44

## 2021-06-28 RX ADMIN — BUDESONIDE AND FORMOTEROL FUMARATE DIHYDRATE 2 PUFF: 160; 4.5 AEROSOL RESPIRATORY (INHALATION) at 08:20

## 2021-06-28 RX ADMIN — SODIUM CHLORIDE, PRESERVATIVE FREE 10 ML: 5 INJECTION INTRAVENOUS at 10:25

## 2021-06-28 RX ADMIN — PREGABALIN 100 MG: 100 CAPSULE ORAL at 10:23

## 2021-06-28 ASSESSMENT — PAIN SCALES - GENERAL
PAINLEVEL_OUTOF10: 0
PAINLEVEL_OUTOF10: 4

## 2021-06-28 NOTE — FLOWSHEET NOTE
Initial [place] visit pt has had multiple medical issues and feels Lori Suh may not make it. \" \"I am trying to be strong for my children. A lot of things have happened this year and there is some burden. \" Offered active listening, prayer, and healing according to gods will and purpose.

## 2021-06-28 NOTE — RT PROTOCOL NOTE
PRN wheezing or increased work of breathing using Per Protocol order mode. Repeat RT Therapy Protocol Assessment with second treatment then BID and as needed. If Albuterol Inhaler not tolerated or not effective, then discontinue the Albuterol Inhaler orders and enter two Albuterol Nebulizer orders with same frequencies and PRN reasons. 11-13 - discontinue any other Inpatient aerosolized bronchodilator medication orders and enter DuoNeb Nebulizer orders QID frequency and an Albuterol Nebulizer order every 2 hours PRN wheezing or increased work of breathing using Per Protocol order mode. Repeat RT Therapy Protocol Assessment with second treatment then QID and as needed. Greater than 13 - discontinue any other Inpatient bronchodilator aerosolized medication orders and enter DuoNeb Nebulizer order every 4 hours frequency and Albuterol Nebulizer every 2 hours PRN wheezing or increased work of breathing using Per Protocol order mode. Repeat RT Therapy Protocol Assessment with second treatment then every 4 hours and as needed. RT to enter RT Home Evaluation for COPD & MDI Assessment order using Per Protocol order mode.    Takes treatments at home christoph  Electronically signed by Juice Baumann RCP on 6/28/2021 at 12:01 PM

## 2021-06-28 NOTE — CARE COORDINATION
LSW reviewed chart/into room to initiate discharge planning. LSW introduced self and role of LSW. Pt is from an [facility] alone. Pt dgt provides transport. Pt [facility] through [facility] 6 days a week for 7 hours a day. Pt is on home o2 through [address]. Pt has a bipap and cpap. Pt discharge plan is to return home with no other needs.

## 2021-06-28 NOTE — PROGRESS NOTES
Hospitalist Progress Note      Name:  Austin Sainz /Age/Sex: 1960  (64 y.o. female)   MRN & CSN:  [id number] & [id number] Admission Date/Time: 2021  8:19 PM   [facility] PCP: Mami Rodrigues, [facility] Day: 3    Assessment and Plan:     Austin Sainz is a 64 y.o.  female  who presents with SOB, increased cough for the last several days found to have an acute COPD exacerbation.     1. Acute COPD exacerbation with LLL pneumonia and leukocytosis: pt on 4L O2 ATC as well as BIPAP at night for which she doesn't remember settings. Will cont scheduled duonebs, prednisone, ceftriaxone and azithromycin given increased sputum prod, CXR findings. Cont BIPAP, will ask RT to titrate. Monitor for any signs of hypoxia/hypercarbia. WBCs 14.9 on admission, due to underlying infection. Cont to monitor. 2. Recent COVID-19 infection: has just recently started to feel more like herself. Cont supportive care. No concerns for active infection currently given previous time course and repeat negative COVID test in ER. 3. Fibromyalgia: cont home lyrica, tramadol  4. DVT prophy: SQ lovenox  5. Pt is full code per discussion with her. 6. Suspect COVID long-hauler syndrome?       Diet ADULT DIET; Regular   DVT Prophylaxis [x] Lovenox, []  Heparin, [] SCDs, [] Ambulation   GI Prophylaxis [] PPI,  [] H2 Blocker,  [] Carafate,  [] Diet/Tube Feeds   Code Status Full Code   Disposition Patient requires continued admission due to SOB   MDM [] Low, [] Moderate,[x]  High  Patient's risk as above due to medical condition, age      History of Present Illness:     Chief Complaint: SOB  Austin Sainz is a 64 y.o.  female  who presents with      Reports feeling a bit better today, still feeling SOB on exertion. States she hasn't quite bounced back since having COVID a few months ago.     Ten point ROS reviewed negative, unless as noted above    Objective:   No intake or output data in the 24 hours ending 06/28/21 1942   Vitals:   Vitals:    06/28/21 1551   BP: (!) 114/54   Pulse: 81   Resp: 18   Temp: 98.2 °F (36.8 °C)   SpO2: 94%     Physical Exam:   GEN    Awake female, sitting upright in bed in no apparent distress. Appears given age. EYES   Pupils are equally round. No scleral erythema, discharge, or conjunctivitis. HENT  Mucous membranes are moist. Oral pharynx without exudates, no evidence of thrush. NECK  Supple, no apparent thyromegaly or masses. RESP  Good inspiratory effort, breath sounds very diminished throughout with poor air flow, scattered end exp wheezes throughout  CARDIO/VASC S1/S2 auscultated. Regular rate without appreciable murmurs, rubs, or gallops. Peripheral pulses equal bilaterally and palpable. No peripheral edema. GI        Abdomen is soft without significant tenderness, masses, or guarding. Bowel sounds are normoactive. Rectal exam deferred.        No costovertebral angle tenderness. HEME/LYMPH            No palpable cervical lymphadenopathy and no hepatosplenomegaly. No petechiae or ecchymoses. MSK    No gross joint deformities. SKIN    Normal coloration, warm, dry. NEURO           Cranial nerves appear grossly intact, normal speech, no lateralizing weakness. PSYCH            Awake, alert, oriented x 4. Affect appropriate.     Medications:   Medications:    ipratropium-albuterol  1 ampule Inhalation Q4H WA    aspirin  81 mg Oral Daily    pregabalin  100 mg Oral TID    traMADol  50 mg Oral BID    sodium chloride flush  5-40 mL Intravenous 2 times per day    enoxaparin  40 mg Subcutaneous Daily    predniSONE  40 mg Oral Daily    cefTRIAXone (ROCEPHIN) IV  1,000 mg Intravenous Q24H    azithromycin  500 mg Oral Daily    nicotine  1 patch Transdermal Daily    budesonide-formoterol  2 puff Inhalation BID    buprenorphine-naloxone  1 Film Sublingual TID      Infusions:    sodium chloride       PRN Meds: sodium chloride flush, 5-40 mL, PRN  sodium chloride, 25 mL, PRN  ondansetron, 4 mg, Q8H PRN   Or  ondansetron, 4 mg, Q6H PRN  acetaminophen, 650 mg, Q6H PRN   Or  acetaminophen, 650 mg, Q6H PRN  senna, 1 tablet, Daily PRN  albuterol, 2.5 mg, Q2H PRN  aluminum & magnesium hydroxide-simethicone, 30 mL, Q6H PRN          Electronically signed by Alphonse Reid MD on 6/28/2021 at 7:42 PM

## 2021-06-29 VITALS
HEART RATE: 80 BPM | HEIGHT: 64 IN | TEMPERATURE: 97.9 F | BODY MASS INDEX: 35.39 KG/M2 | RESPIRATION RATE: 16 BRPM | DIASTOLIC BLOOD PRESSURE: 56 MMHG | SYSTOLIC BLOOD PRESSURE: 118 MMHG | OXYGEN SATURATION: 94 % | WEIGHT: 207.3 LBS

## 2021-06-29 PROCEDURE — 6370000000 HC RX 637 (ALT 250 FOR IP): Performed by: HOSPITALIST

## 2021-06-29 PROCEDURE — 6370000000 HC RX 637 (ALT 250 FOR IP): Performed by: NURSE PRACTITIONER

## 2021-06-29 PROCEDURE — 2700000000 HC OXYGEN THERAPY PER DAY

## 2021-06-29 PROCEDURE — 6360000002 HC RX W HCPCS: Performed by: HOSPITALIST

## 2021-06-29 PROCEDURE — 94640 AIRWAY INHALATION TREATMENT: CPT

## 2021-06-29 PROCEDURE — 94761 N-INVAS EAR/PLS OXIMETRY MLT: CPT

## 2021-06-29 PROCEDURE — 2580000003 HC RX 258: Performed by: HOSPITALIST

## 2021-06-29 RX ORDER — TRAZODONE HYDROCHLORIDE 50 MG/1
300 TABLET ORAL NIGHTLY
Status: DISCONTINUED | OUTPATIENT
Start: 2021-06-29 | End: 2021-06-29 | Stop reason: HOSPADM

## 2021-06-29 RX ORDER — CEFDINIR 300 MG/1
300 CAPSULE ORAL 2 TIMES DAILY
Qty: 14 CAPSULE | Refills: 0 | Status: SHIPPED | OUTPATIENT
Start: 2021-06-29 | End: 2021-07-06

## 2021-06-29 RX ORDER — FLUTICASONE PROPIONATE 50 MCG
1 SPRAY, SUSPENSION (ML) NASAL DAILY
Status: DISCONTINUED | OUTPATIENT
Start: 2021-06-29 | End: 2021-06-29 | Stop reason: HOSPADM

## 2021-06-29 RX ORDER — TRAZODONE HYDROCHLORIDE 50 MG/1
300 TABLET ORAL NIGHTLY
Status: DISCONTINUED | OUTPATIENT
Start: 2021-06-29 | End: 2021-06-29

## 2021-06-29 RX ORDER — PREDNISONE 10 MG/1
10 TABLET ORAL DAILY
Qty: 15 TABLET | Refills: 0 | Status: SHIPPED | OUTPATIENT
Start: 2021-06-29 | End: 2021-06-29 | Stop reason: SDUPTHER

## 2021-06-29 RX ORDER — PREDNISONE 10 MG/1
10 TABLET ORAL DAILY
Qty: 15 TABLET | Refills: 0 | Status: ON HOLD | OUTPATIENT
Start: 2021-06-29 | End: 2021-07-14 | Stop reason: SDUPTHER

## 2021-06-29 RX ADMIN — FLUTICASONE PROPIONATE 1 SPRAY: 50 SPRAY, METERED NASAL at 10:16

## 2021-06-29 RX ADMIN — IPRATROPIUM BROMIDE AND ALBUTEROL SULFATE 1 AMPULE: .5; 3 SOLUTION RESPIRATORY (INHALATION) at 08:56

## 2021-06-29 RX ADMIN — AZITHROMYCIN MONOHYDRATE 500 MG: 250 TABLET ORAL at 10:18

## 2021-06-29 RX ADMIN — TRAMADOL HYDROCHLORIDE 50 MG: 50 TABLET, FILM COATED ORAL at 10:20

## 2021-06-29 RX ADMIN — BUPRENORPHINE AND NALOXONE 1 FILM: 8; 2 FILM, SOLUBLE BUCCAL; SUBLINGUAL at 14:33

## 2021-06-29 RX ADMIN — ASPIRIN 81 MG: 81 TABLET, CHEWABLE ORAL at 10:18

## 2021-06-29 RX ADMIN — TRAZODONE HYDROCHLORIDE 300 MG: 50 TABLET ORAL at 05:48

## 2021-06-29 RX ADMIN — BUPRENORPHINE AND NALOXONE 1 FILM: 8; 2 FILM, SOLUBLE BUCCAL; SUBLINGUAL at 10:18

## 2021-06-29 RX ADMIN — PREGABALIN 100 MG: 100 CAPSULE ORAL at 14:32

## 2021-06-29 RX ADMIN — PREGABALIN 100 MG: 100 CAPSULE ORAL at 10:20

## 2021-06-29 RX ADMIN — SODIUM CHLORIDE, PRESERVATIVE FREE 10 ML: 5 INJECTION INTRAVENOUS at 10:23

## 2021-06-29 RX ADMIN — ENOXAPARIN SODIUM 40 MG: 40 INJECTION SUBCUTANEOUS at 10:23

## 2021-06-29 RX ADMIN — IPRATROPIUM BROMIDE AND ALBUTEROL SULFATE 1 AMPULE: .5; 3 SOLUTION RESPIRATORY (INHALATION) at 16:30

## 2021-06-29 RX ADMIN — PREDNISONE 40 MG: 20 TABLET ORAL at 10:19

## 2021-06-29 RX ADMIN — BUDESONIDE AND FORMOTEROL FUMARATE DIHYDRATE 2 PUFF: 160; 4.5 AEROSOL RESPIRATORY (INHALATION) at 08:57

## 2021-06-29 ASSESSMENT — PAIN SCALES - GENERAL
PAINLEVEL_OUTOF10: 5
PAINLEVEL_OUTOF10: 0
PAINLEVEL_OUTOF10: 0

## 2021-06-29 NOTE — PROGRESS NOTES
Physician Progress Note      Aristeo Callahan  CSN #:                  [id number]  :                       1960  ADMIT DATE:       2021 8:19 PM  [address] [place] DATE:  RESPONDING  PROVIDER #:        eBnnett Marie          QUERY TEXT:    Pt admitted with COPD exacerbation. Pt noted to have increased O2   requirements. If possible, please document in the progress notes and discharge   summary if you are evaluating and/or treating any of the following: The medical record reflects the following:  Risk Factors: COPD, pneumonia  Clinical Indicators: Pt uses 4L nc at home. requiring non-rebreather mask,   then 5-6L nc. O2 sat on 6L non-rebreather 93%. Per nursing flowsheet, \" GENTILE,   dyspnea at rest, tachypneic, end expiratory wheezes. \"  Treatment: labs, imaging, duonebs, inhalers, bipap, Flonase, po Prednisone,   O2, continuous pulse ox    Thank you,  VERONIQUE HoganN, RN  Clinical   [phone]  Options provided:  -- Acute on chronic respiratory failure with hypoxia  -- Chronic respiratory failure with hypoxia  -- Respiratory failure ruled out after study  -- Other - I will add my own diagnosis  -- Disagree - Not applicable / Not valid  -- Disagree - Clinically unable to determine / Unknown  -- Refer to Clinical Documentation Reviewer    PROVIDER RESPONSE TEXT:    This patient is in acute on chronic respiratory failure with hypoxia.     Query created by: Rubi Sanchez on 2021 1:08 PM      Electronically signed by:  Bennett Marie 2021 1:52 PM

## 2021-06-29 NOTE — PLAN OF CARE
Problem: Falls - Risk of:  Goal: Will remain free from falls  Description: Will remain free from falls  6/29/2021 1242 by Patsy Villalobos RN  Outcome: Met This Shift  6/29/2021 0037 by Ayden Bee RN  Outcome: Ongoing  Goal: Absence of physical injury  Description: Absence of physical injury  6/29/2021 1242 by Patsy Villalobos RN  Outcome: Met This Shift  6/29/2021 0037 by Ayden Bee RN  Outcome: Ongoing     Problem: Pain:  Description: Pain management should include both nonpharmacologic and pharmacologic interventions. Goal: Pain level will decrease  Description: Pain level will decrease  6/29/2021 1242 by Patsy Villalobos RN  Outcome: Met This Shift  6/29/2021 0037 by Ayden Bee RN  Outcome: Ongoing  Goal: Control of acute pain  Description: Control of acute pain  6/29/2021 1242 by Patsy Villalobos RN  Outcome: Met This Shift  6/29/2021 0037 by Ayden Bee RN  Outcome: Ongoing  Goal: Control of chronic pain  Description: Control of chronic pain  6/29/2021 1242 by Patsy Villalobos RN  Outcome: Met This Shift  6/29/2021 0037 by Ayden Bee RN  Outcome: Ongoing     Problem: Discharge Planning:  Goal: Discharged to appropriate level of care  Description: Discharged to appropriate level of care  6/29/2021 1242 by Patsy Villalobos RN  Outcome: Met This Shift  6/29/2021 0037 by Ayden Bee RN  Outcome: Ongoing     Problem:  Activity Intolerance:  Goal: Ability to tolerate increased activity will improve  Description: Ability to tolerate increased activity will improve  6/29/2021 1242 by Patsy Villalobos RN  Outcome: Met This Shift  6/29/2021 0037 by Ayden Bee RN  Outcome: Ongoing     Problem: Airway Clearance - Ineffective:  Goal: Ability to maintain a clear airway will improve  Description: Ability to maintain a clear airway will improve  6/29/2021 1242 by Patsy iVllalobos RN  Outcome: Met This Shift  6/29/2021 0037 by Ayden Bee RN  Outcome: Ongoing

## 2021-07-02 LAB
CULTURE: NORMAL
CULTURE: NORMAL
Lab: NORMAL
Lab: NORMAL
SPECIMEN: NORMAL
SPECIMEN: NORMAL

## 2021-07-05 NOTE — DISCHARGE SUMMARY
Discharge Summary    Name:  Lissette Garcia /Age/Sex: 1960  (64 y.o. female)   MRN & CSN:  [id number] & [id number] Admission Date/Time: 2021  8:19 PM   Attending:  No att. providers found Discharging Physician: Desirae Denis MD     Hospital Course:   Lissette Garcia is a 64 y.o.  female  who presents with     HPI Per H&P:  Lissette Garcia is a 64 y.o.  female with fibromyalgia, COPD on 4L O2 ATC and BIPAP at night, COVID-19 admission last month for 5 days who presents with increasing SOB, cough productive of green sputum for the last several days. She noticed that her appetite has been poor for the last several days and that her inhalers and nebs aren't working as well at home. She also wears O2 at 4L ATC and feels that it hasn't been enough recently. She then noticed that she was starting to have involuntary jerking which happens when her COPD starts to flare so she came to the ER for further eval. O2 sats were 90-91% on 4L on arrival. CXR showed nodular infiltrates in the LLL and she was given prednisone 60mg, duonebs, and started on CTX and azithromycin. She currently is feeling better than she did when she first arrived. Hospital course:  Patient underwent treatment for her COPD exacerbation, and improved gradually. Patient did report that she felt like she never recovered from her COVID-19 infection. Patient is interested in pulmonary rehab on outpatient basis, and this referral was placed. Patient was discharged in stable condition with steroids and antibiotics. Discharged in stable condition. The patient expressed appropriate understanding of and agreement with the discharge recommendations, medications, and plan.      Consults this admission:  IP CONSULT TO RESPIRATORY CARE    Discharge Instruction:   Follow up appointments: Outpatient follow-up with pulmonologist for pulmonary rehab  Primary care physician:  within 2 weeks    Diet:  regular diet   Activity: activity as tolerated  Disposition: Discharged to:   [x]Home, [][facility], []SNF, []Acute Rehab, []Hospice   Condition on discharge: Stable    Discharge Medications:      Nick Serra   Home Medication Instructions TYF:[id number]    Printed on:07/04/21 2204   Medication Information                      albuterol (PROVENTIL) (2.5 MG/3ML) 0.083% nebulizer solution  USE ONE VIAL BY NEBULIZER EVERY 6 HOURS AS NEEDED FOR WHEEZING             albuterol sulfate  (90 Base) MCG/ACT inhaler  USE 2 PUFFS BY MOUTH EVERY 6 HOURS AS NEEDED SHAKE WELL             amphetamine-dextroamphetamine (ADDERALL XR) 30 MG extended release capsule  Take 30 mg by mouth daily. aspirin 81 MG chewable tablet  Take 81 mg by mouth daily             BiPAP Machine MISC  by Does not apply route Use nightly             buprenorphine-naloxone (SUBOXONE) 8-2 MG FILM SL film  Place 1 Film under the tongue 3 times daily. cefdinir (OMNICEF) 300 MG capsule  Take 1 capsule by mouth 2 times daily for 7 days             CPAP Machine MISC  by CPAP route nightly              fluticasone-umeclidin-vilant (TRELEGY ELLIPTA) 100-62.5-25 MCG/INH AEPB  Inhale 1 puff into the lungs daily             Multiple Vitamins-Minerals (WOMENS MULTIVITAMIN PO)  Take 1 tablet by mouth daily             Nebulizer MISC  Use nebulizer with medication as directed. OXYGEN  Inhale 4 L into the lungs continuous              predniSONE (DELTASONE) 10 MG tablet  Take 1 tablet by mouth daily 40mg daily for 2 days, 20 mg daily for 2 days, 10 mg daily for 2 days, 5 mg daily for 2 days             pregabalin (LYRICA) 100 MG capsule  Take 100 mg by mouth 3 times daily. Respiratory Therapy Supplies (NEBULIZER/TUBING/MOUTHPIECE) KIT  Inhale 1 kit into the lungs daily as needed (as directed)             traMADol (ULTRAM) 50 MG tablet  Take 50 mg by mouth 2 times daily.              traZODone (DESYREL) 150 MG tablet  Take 300 mg by mouth nightly Objective Findings at Discharge:   BP (!) 118/56   Pulse 80   Temp 97.9 °F (36.6 °C) (Oral)   Resp 16   Ht 5' 4\" (1.626 m)   Wt 207 lb 4.8 oz (94 kg)   SpO2 94%   BMI 35.58 kg/m²            PHYSICAL EXAM   GEN Awake female, sitting upright in bed in no apparent distress. Appears given age. EYES Pupils are equally round. No scleral erythema, discharge, or conjunctivitis. HENT Mucous membranes are moist. Oral pharynx without exudates, no evidence of thrush. NECK Supple, no apparent thyromegaly or masses. RESP Clear to auscultation, no wheezes, rales or rhonchi. Symmetric chest movement while on room air. CARDIO/VASC S1/S2 auscultated. Regular rate without appreciable murmurs, rubs, or gallops. No JVD or carotid bruits. Peripheral pulses equal bilaterally and palpable. No peripheral edema. GI Abdomen is soft without significant tenderness, masses, or guarding. Bowel sounds are normoactive. Rectal exam deferred.  No costovertebral angle tenderness. Normal appearing external genitalia. Fraser catheter is not present. HEME/LYMPH No palpable cervical lymphadenopathy and no hepatosplenomegaly. No petechiae or ecchymoses. MSK No gross joint deformities. SKIN Normal coloration, warm, dry. NEURO Cranial nerves appear grossly intact, normal speech, no lateralizing weakness. PSYCH Awake, alert, oriented x 4. Affect appropriate. BMP/CBC  No results for input(s): NA, K, CL, CO2, BUN, CREATININE, WBC, HEMOGLOBIN, HCT, PLT in the last 72 hours.     Invalid input(s): GLU      Discharge Time of 35 minutes    Electronically signed by Devi Ortiz MD on 7/4/2021 at 10:04 PM

## 2021-07-07 ENCOUNTER — TELEPHONE (OUTPATIENT)
Dept: FAMILY MEDICINE CLINIC | Age: 61
End: 2021-07-07

## 2021-07-09 ENCOUNTER — APPOINTMENT (OUTPATIENT)
Dept: GENERAL RADIOLOGY | Age: 61
DRG: 140 | End: 2021-07-09
Payer: COMMERCIAL

## 2021-07-09 ENCOUNTER — HOSPITAL ENCOUNTER (INPATIENT)
Age: 61
LOS: 5 days | Discharge: HOME | DRG: 140 | End: 2021-07-14
Attending: HOSPITALIST
Payer: COMMERCIAL

## 2021-07-09 DIAGNOSIS — J18.9 PNEUMONIA OF BOTH LUNGS DUE TO INFECTIOUS ORGANISM, UNSPECIFIED PART OF LUNG: ICD-10-CM

## 2021-07-09 DIAGNOSIS — J96.21 ACUTE ON CHRONIC RESPIRATORY FAILURE WITH HYPOXIA AND HYPERCAPNIA (HCC): ICD-10-CM

## 2021-07-09 DIAGNOSIS — J44.1 COPD EXACERBATION (HCC): Primary | ICD-10-CM

## 2021-07-09 DIAGNOSIS — J96.22 ACUTE ON CHRONIC RESPIRATORY FAILURE WITH HYPOXIA AND HYPERCAPNIA (HCC): ICD-10-CM

## 2021-07-09 LAB
ALBUMIN SERPL-MCNC: 3.7 GM/DL (ref 3.4–5)
ALP BLD-CCNC: 70 IU/L (ref 40–128)
ALT SERPL-CCNC: 20 U/L (ref 10–40)
ANION GAP SERPL CALCULATED.3IONS-SCNC: 4 MMOL/L (ref 4–16)
AST SERPL-CCNC: 20 IU/L (ref 15–37)
BASE EXCESS MIXED: 15.2 (ref 0–2.3)
BASE EXCESS MIXED: 16.1 (ref 0–2.3)
BASE EXCESS MIXED: 16.2 (ref 0–2.3)
BASE EXCESS MIXED: 16.5 (ref 0–2.3)
BASE EXCESS MIXED: 18.3 (ref 0–2.3)
BASOPHILS ABSOLUTE: 0.1 K/CU MM
BASOPHILS RELATIVE PERCENT: 0.5 % (ref 0–1)
BILIRUB SERPL-MCNC: 0.2 MG/DL (ref 0–1)
BUN BLDV-MCNC: 11 MG/DL (ref 6–23)
CALCIUM SERPL-MCNC: 9.5 MG/DL (ref 8.3–10.6)
CARBON MONOXIDE, BLOOD: 2.7 % (ref 0–5)
CARBON MONOXIDE, BLOOD: 2.9 % (ref 0–5)
CARBON MONOXIDE, BLOOD: 3.2 % (ref 0–5)
CHLORIDE BLD-SCNC: 95 MMOL/L (ref 99–110)
CO2 CONTENT: 50.2 MMOL/L (ref 19–24)
CO2 CONTENT: 51 MMOL/L (ref 19–24)
CO2 CONTENT: 52 MMOL/L (ref 19–24)
CO2: 40 MMOL/L (ref 21–32)
COMMENT: ABNORMAL
CREAT SERPL-MCNC: 0.5 MG/DL (ref 0.6–1.1)
DIFFERENTIAL TYPE: ABNORMAL
EKG ATRIAL RATE: 101 BPM
EKG DIAGNOSIS: NORMAL
EKG P AXIS: 69 DEGREES
EKG P-R INTERVAL: 142 MS
EKG Q-T INTERVAL: 340 MS
EKG QRS DURATION: 82 MS
EKG QTC CALCULATION (BAZETT): 440 MS
EKG R AXIS: 55 DEGREES
EKG T AXIS: 69 DEGREES
EKG VENTRICULAR RATE: 101 BPM
EOSINOPHILS ABSOLUTE: 0.3 K/CU MM
EOSINOPHILS RELATIVE PERCENT: 2.4 % (ref 0–3)
GFR AFRICAN AMERICAN: >60 ML/MIN/1.73M2
GFR NON-AFRICAN AMERICAN: >60 ML/MIN/1.73M2
GLUCOSE BLD-MCNC: 125 MG/DL (ref 70–99)
HCO3 ARTERIAL: 47.3 MMOL/L (ref 18–23)
HCO3 ARTERIAL: 47.9 MMOL/L (ref 18–23)
HCO3 ARTERIAL: 48.7 MMOL/L (ref 18–23)
HCO3 VENOUS: 46.3 MMOL/L (ref 19–25)
HCO3 VENOUS: 49.8 MMOL/L (ref 19–25)
HCT VFR BLD CALC: 42.4 % (ref 37–47)
HEMOGLOBIN: 12.7 GM/DL (ref 12.5–16)
IMMATURE NEUTROPHIL %: 0.6 % (ref 0–0.43)
LIPASE: 19 IU/L (ref 13–60)
LYMPHOCYTES ABSOLUTE: 2 K/CU MM
LYMPHOCYTES RELATIVE PERCENT: 16.9 % (ref 24–44)
MCH RBC QN AUTO: 28.3 PG (ref 27–31)
MCHC RBC AUTO-ENTMCNC: 30 % (ref 32–36)
MCV RBC AUTO: 94.4 FL (ref 78–100)
METHEMOGLOBIN ARTERIAL: 1.2 %
METHEMOGLOBIN ARTERIAL: 1.4 %
METHEMOGLOBIN ARTERIAL: 1.4 %
MONOCYTES ABSOLUTE: 1.1 K/CU MM
MONOCYTES RELATIVE PERCENT: 9.3 % (ref 0–4)
NUCLEATED RBC %: 0 %
O2 SAT, VEN: 91 % (ref 50–70)
O2 SAT, VEN: 94.1 % (ref 50–70)
O2 SATURATION: 92.9 % (ref 96–97)
O2 SATURATION: 92.9 % (ref 96–97)
O2 SATURATION: 94.3 % (ref 96–97)
PCO2 ARTERIAL: 102 MMHG (ref 32–45)
PCO2 ARTERIAL: 106 MMHG (ref 32–45)
PCO2 ARTERIAL: 94 MMHG (ref 32–45)
PCO2, VEN: 94 MMHG (ref 38–52)
PCO2, VEN: 99 MMHG (ref 38–52)
PDW BLD-RTO: 13.6 % (ref 11.7–14.9)
PH BLOOD: 7.27 (ref 7.34–7.45)
PH BLOOD: 7.28 (ref 7.34–7.45)
PH BLOOD: 7.31 (ref 7.34–7.45)
PH VENOUS: 7.3 (ref 7.32–7.42)
PH VENOUS: 7.31 (ref 7.32–7.42)
PLATELET # BLD: 278 K/CU MM (ref 140–440)
PMV BLD AUTO: 9.2 FL (ref 7.5–11.1)
PO2 ARTERIAL: 72 MMHG (ref 75–100)
PO2 ARTERIAL: 76 MMHG (ref 75–100)
PO2 ARTERIAL: 87 MMHG (ref 75–100)
PO2, VEN: 153 MMHG (ref 28–48)
PO2, VEN: 78 MMHG (ref 28–48)
POTASSIUM SERPL-SCNC: 4.2 MMOL/L (ref 3.5–5.1)
PRO-BNP: 44.81 PG/ML
RBC # BLD: 4.49 M/CU MM (ref 4.2–5.4)
SARS-COV-2, NAAT: NOT DETECTED
SEGMENTED NEUTROPHILS ABSOLUTE COUNT: 8.4 K/CU MM
SEGMENTED NEUTROPHILS RELATIVE PERCENT: 70.3 % (ref 36–66)
SODIUM BLD-SCNC: 139 MMOL/L (ref 135–145)
SOURCE: NORMAL
TOTAL IMMATURE NEUTOROPHIL: 0.07 K/CU MM
TOTAL NUCLEATED RBC: 0 K/CU MM
TOTAL PROTEIN: 7.2 GM/DL (ref 6.4–8.2)
TROPONIN T: <0.01 NG/ML
WBC # BLD: 11.9 K/CU MM (ref 4–10.5)

## 2021-07-09 PROCEDURE — 36600 WITHDRAWAL OF ARTERIAL BLOOD: CPT

## 2021-07-09 PROCEDURE — 6360000002 HC RX W HCPCS: Performed by: NURSE PRACTITIONER

## 2021-07-09 PROCEDURE — 82805 BLOOD GASES W/O2 SATURATION: CPT

## 2021-07-09 PROCEDURE — 93010 ELECTROCARDIOGRAM REPORT: CPT | Performed by: INTERNAL MEDICINE

## 2021-07-09 PROCEDURE — 82803 BLOOD GASES ANY COMBINATION: CPT

## 2021-07-09 PROCEDURE — 83880 ASSAY OF NATRIURETIC PEPTIDE: CPT

## 2021-07-09 PROCEDURE — 2060000000 HC ICU INTERMEDIATE R&B

## 2021-07-09 PROCEDURE — 6370000000 HC RX 637 (ALT 250 FOR IP): Performed by: PHYSICIAN ASSISTANT

## 2021-07-09 PROCEDURE — 96374 THER/PROPH/DIAG INJ IV PUSH: CPT

## 2021-07-09 PROCEDURE — 99285 EMERGENCY DEPT VISIT HI MDM: CPT

## 2021-07-09 PROCEDURE — 6370000000 HC RX 637 (ALT 250 FOR IP): Performed by: HOSPITALIST

## 2021-07-09 PROCEDURE — 2580000003 HC RX 258: Performed by: NURSE PRACTITIONER

## 2021-07-09 PROCEDURE — 85025 COMPLETE CBC W/AUTO DIFF WBC: CPT

## 2021-07-09 PROCEDURE — 71045 X-RAY EXAM CHEST 1 VIEW: CPT

## 2021-07-09 PROCEDURE — 84484 ASSAY OF TROPONIN QUANT: CPT

## 2021-07-09 PROCEDURE — 94761 N-INVAS EAR/PLS OXIMETRY MLT: CPT

## 2021-07-09 PROCEDURE — 80053 COMPREHEN METABOLIC PANEL: CPT

## 2021-07-09 PROCEDURE — 93005 ELECTROCARDIOGRAM TRACING: CPT | Performed by: PHYSICIAN ASSISTANT

## 2021-07-09 PROCEDURE — 87635 SARS-COV-2 COVID-19 AMP PRB: CPT

## 2021-07-09 PROCEDURE — 6360000002 HC RX W HCPCS: Performed by: PHYSICIAN ASSISTANT

## 2021-07-09 PROCEDURE — 2700000000 HC OXYGEN THERAPY PER DAY

## 2021-07-09 PROCEDURE — 94640 AIRWAY INHALATION TREATMENT: CPT

## 2021-07-09 PROCEDURE — 83690 ASSAY OF LIPASE: CPT

## 2021-07-09 PROCEDURE — 94660 CPAP INITIATION&MGMT: CPT

## 2021-07-09 RX ORDER — ONDANSETRON 4 MG/1
4 TABLET, ORALLY DISINTEGRATING ORAL EVERY 8 HOURS PRN
Status: DISCONTINUED | OUTPATIENT
Start: 2021-07-09 | End: 2021-07-15 | Stop reason: HOSPADM

## 2021-07-09 RX ORDER — SENNA PLUS 8.6 MG/1
1 TABLET ORAL DAILY PRN
Status: DISCONTINUED | OUTPATIENT
Start: 2021-07-09 | End: 2021-07-15 | Stop reason: HOSPADM

## 2021-07-09 RX ORDER — IPRATROPIUM BROMIDE AND ALBUTEROL SULFATE 2.5; .5 MG/3ML; MG/3ML
1 SOLUTION RESPIRATORY (INHALATION)
Status: DISCONTINUED | OUTPATIENT
Start: 2021-07-09 | End: 2021-07-12

## 2021-07-09 RX ORDER — IPRATROPIUM BROMIDE AND ALBUTEROL SULFATE 2.5; .5 MG/3ML; MG/3ML
3 SOLUTION RESPIRATORY (INHALATION)
Status: DISCONTINUED | OUTPATIENT
Start: 2021-07-09 | End: 2021-07-09

## 2021-07-09 RX ORDER — METHYLPREDNISOLONE SODIUM SUCCINATE 125 MG/2ML
60 INJECTION, POWDER, LYOPHILIZED, FOR SOLUTION INTRAMUSCULAR; INTRAVENOUS ONCE
Status: COMPLETED | OUTPATIENT
Start: 2021-07-09 | End: 2021-07-09

## 2021-07-09 RX ORDER — ACETAMINOPHEN 325 MG/1
650 TABLET ORAL EVERY 6 HOURS PRN
Status: DISCONTINUED | OUTPATIENT
Start: 2021-07-09 | End: 2021-07-15 | Stop reason: HOSPADM

## 2021-07-09 RX ORDER — METHYLPREDNISOLONE SODIUM SUCCINATE 125 MG/2ML
40 INJECTION, POWDER, LYOPHILIZED, FOR SOLUTION INTRAMUSCULAR; INTRAVENOUS EVERY 6 HOURS
Status: COMPLETED | OUTPATIENT
Start: 2021-07-09 | End: 2021-07-11

## 2021-07-09 RX ORDER — SODIUM CHLORIDE 0.9 % (FLUSH) 0.9 %
5-40 SYRINGE (ML) INJECTION EVERY 12 HOURS SCHEDULED
Status: DISCONTINUED | OUTPATIENT
Start: 2021-07-09 | End: 2021-07-15 | Stop reason: HOSPADM

## 2021-07-09 RX ORDER — ONDANSETRON 2 MG/ML
4 INJECTION INTRAMUSCULAR; INTRAVENOUS EVERY 6 HOURS PRN
Status: DISCONTINUED | OUTPATIENT
Start: 2021-07-09 | End: 2021-07-15 | Stop reason: HOSPADM

## 2021-07-09 RX ORDER — PREDNISONE 20 MG/1
40 TABLET ORAL DAILY
Status: DISCONTINUED | OUTPATIENT
Start: 2021-07-11 | End: 2021-07-15 | Stop reason: HOSPADM

## 2021-07-09 RX ORDER — SODIUM CHLORIDE 0.9 % (FLUSH) 0.9 %
5-40 SYRINGE (ML) INJECTION PRN
Status: DISCONTINUED | OUTPATIENT
Start: 2021-07-09 | End: 2021-07-15 | Stop reason: HOSPADM

## 2021-07-09 RX ORDER — ACETAMINOPHEN 650 MG/1
650 SUPPOSITORY RECTAL EVERY 6 HOURS PRN
Status: DISCONTINUED | OUTPATIENT
Start: 2021-07-09 | End: 2021-07-15 | Stop reason: HOSPADM

## 2021-07-09 RX ORDER — SODIUM CHLORIDE 9 MG/ML
25 INJECTION, SOLUTION INTRAVENOUS PRN
Status: DISCONTINUED | OUTPATIENT
Start: 2021-07-09 | End: 2021-07-15 | Stop reason: HOSPADM

## 2021-07-09 RX ADMIN — IPRATROPIUM BROMIDE AND ALBUTEROL SULFATE 1 AMPULE: .5; 3 SOLUTION RESPIRATORY (INHALATION) at 13:12

## 2021-07-09 RX ADMIN — AZITHROMYCIN MONOHYDRATE 500 MG: 500 INJECTION, POWDER, LYOPHILIZED, FOR SOLUTION INTRAVENOUS at 15:49

## 2021-07-09 RX ADMIN — METHYLPREDNISOLONE SODIUM SUCCINATE 60 MG: 125 INJECTION, POWDER, FOR SOLUTION INTRAMUSCULAR; INTRAVENOUS at 05:21

## 2021-07-09 RX ADMIN — IPRATROPIUM BROMIDE AND ALBUTEROL SULFATE 1 AMPULE: .5; 3 SOLUTION RESPIRATORY (INHALATION) at 21:00

## 2021-07-09 RX ADMIN — METHYLPREDNISOLONE SODIUM SUCCINATE 40 MG: 125 INJECTION, POWDER, LYOPHILIZED, FOR SOLUTION INTRAMUSCULAR; INTRAVENOUS at 21:21

## 2021-07-09 RX ADMIN — SODIUM CHLORIDE, PRESERVATIVE FREE 10 ML: 5 INJECTION INTRAVENOUS at 21:21

## 2021-07-09 RX ADMIN — IPRATROPIUM BROMIDE AND ALBUTEROL SULFATE 1 AMPULE: .5; 3 SOLUTION RESPIRATORY (INHALATION) at 16:19

## 2021-07-09 RX ADMIN — SODIUM CHLORIDE, PRESERVATIVE FREE 10 ML: 5 INJECTION INTRAVENOUS at 15:43

## 2021-07-09 RX ADMIN — METHYLPREDNISOLONE SODIUM SUCCINATE 40 MG: 125 INJECTION, POWDER, LYOPHILIZED, FOR SOLUTION INTRAMUSCULAR; INTRAVENOUS at 15:40

## 2021-07-09 RX ADMIN — IPRATROPIUM BROMIDE AND ALBUTEROL SULFATE 3 AMPULE: .5; 3 SOLUTION RESPIRATORY (INHALATION) at 04:29

## 2021-07-09 RX ADMIN — SODIUM CHLORIDE, PRESERVATIVE FREE 10 ML: 5 INJECTION INTRAVENOUS at 15:48

## 2021-07-09 RX ADMIN — ENOXAPARIN SODIUM 40 MG: 40 INJECTION SUBCUTANEOUS at 15:46

## 2021-07-09 RX ADMIN — METHYLPREDNISOLONE SODIUM SUCCINATE 40 MG: 125 INJECTION, POWDER, LYOPHILIZED, FOR SOLUTION INTRAMUSCULAR; INTRAVENOUS at 15:43

## 2021-07-09 ASSESSMENT — PAIN SCALES - GENERAL
PAINLEVEL_OUTOF10: 0
PAINLEVEL_OUTOF10: 0

## 2021-07-09 NOTE — ED PROVIDER NOTES
EKG is interpreted by me. EKG shows a rhythm 101 bpm, axis is nondeviated, no remarkable ST segmentations or depressions, T waves overall unremarkable, MA interval 142, QRS duration of 82, QTC of 40-40. Final impression, sinus tachycardia, nonspecific EKG.     Dangelo Coe MD  7/9/2021  5:31 AM        Dangelo Coe MD  07/09/21 4145

## 2021-07-09 NOTE — ED TRIAGE NOTES
Pt to ED with complaints of shortness of breath. Hx of COPD. Pt reports just finishing steroids.  Pt O2 sat at 65% on EMS arrival.

## 2021-07-09 NOTE — ED PROVIDER NOTES
6:04 AM EDT  [facility] Please see his/her initial documentation for details of the patient's ED presentation, physical exam and completed studies. In brief, Kavita Nguyen presented for SOB. H/o COPD. Developed SOB early this morning. Hypoxic on arrival.  On BiPAP. Doing better on this, has hypercapnia. On 4L O2 at home. Uses CPAP at night. Exam: Well-developed well-nourished adult female in no acute distress. Resting comfortably on BiPAP. Mild wheezing noted in all lung fields. Heart is regular rate and rhythm. Abdomen soft and nontender. No lower extremity edema noted.     I have reviewed and interpreted all of the currently available lab results from this visit (if applicable):  Results for orders placed or performed during the hospital encounter of 07/09/21   COVID-19, Rapid    Specimen: Nasopharyngeal   Result Value Ref Range    Source UNKNOWN     SARS-CoV-2, NAAT NOT DETECTED NOT DETECTED   CBC Auto Differential   Result Value Ref Range    WBC 11.9 (H) 4.0 - 10.5 K/CU MM    RBC 4.49 4.2 - 5.4 M/CU MM    Hemoglobin 12.7 12.5 - 16.0 GM/DL    Hematocrit 42.4 37 - 47 %    MCV 94.4 78 - 100 FL    MCH 28.3 27 - 31 PG    MCHC 30.0 (L) 32.0 - 36.0 %    RDW 13.6 11.7 - 14.9 %    Platelets 108 752 - 391 K/CU MM    MPV 9.2 7.5 - 11.1 FL    Differential Type AUTOMATED DIFFERENTIAL     Segs Relative 70.3 (H) 36 - 66 %    Lymphocytes % 16.9 (L) 24 - 44 %    Monocytes % 9.3 (H) 0 - 4 %    Eosinophils % 2.4 0 - 3 %    Basophils % 0.5 0 - 1 %    Segs Absolute 8.4 K/CU MM    Lymphocytes Absolute 2.0 K/CU MM    Monocytes Absolute 1.1 K/CU MM    Eosinophils Absolute 0.3 K/CU MM    Basophils Absolute 0.1 K/CU MM    Nucleated RBC % 0.0 %    Total Nucleated RBC 0.0 K/CU MM    Total Immature Neutrophil 0.07 K/CU MM    Immature Neutrophil % 0.6 (H) 0 - 0.43 %   Comprehensive Metabolic Panel   Result Value Ref Range    Sodium 139 135 - 145 MMOL/L    Potassium

## 2021-07-09 NOTE — ED PROVIDER NOTES
Triage Chief Complaint:   Shortness of Breath (Hx of COPD)    [place]:  Today in the ED I had the pleasure of caring for Tiana Camp who is a 64 y.o. female that presents to the emergency department complaining of shortness of breath. Patient has a history of COPD. Multidecade pack-year smoker. Quit smoking about 6 to 8 months ago. She states that prior to today. She was baseline status of health. Eating and drinking eliminating baseline. With no fever chills nausea from diarrhea abdominal change chest pain head pain headache lightheadedness dizziness. No rashes. Her fatigue. She states today she began having progressive shortness of breath throughout the day. And then tonight he got significantly worse. She took breathing treatments at home without relief. She also the put on her CPAP. However that did not seem to help. EMS was called. Upon arrival.  Patient was on 2 L of supplemental oxygen. Satting at 67% per EMS. They put her on a nonrebreather. Got her oxygen saturation up into the 90% range and brought her here to the ED for evaluation. Patient denies any chest pain head pain headache at this time. No hemoptysis no history of DVT or PE. She denies any sensation of palpitations.   ROS:  REVIEW OF SYSTEMS    At least 10 systems reviewed      All other review of systems are negative  See HPI and nursing notes for additional information       Past Medical History:   Diagnosis Date    ADHD 2006    COPD (chronic obstructive pulmonary disease) ([address]     COVID-19     Drug addiction in remission ([address]     recovering addict    Hep C w/o coma, chronic ([address]     Pacemaker     Sleep apnea     TIA (transient ischemic attack)     per patient \"several mini strokes\"     Past Surgical History:   Procedure Laterality Date   [facility] CHOLECYSTECTOMY  2010    done in [address] [place] [address]  2005     Family History   Problem Relation Age of Onset    Cancer Mother unsure of type    Thyroid Cancer Mother     Alcohol Abuse Father     Other Sister         passed in car accident    Lupus Brother     Coronary Art Dis Brother      Social History     Socioeconomic History    Marital status: Single     Spouse name: Not on file    Number of children: Not on file    Years of education: Not on file    Highest education level: Not on file   Occupational History    Not on file   Tobacco Use    Smoking status: Former Smoker     Packs/day: 1.00     Years: 40.00     Pack years: 40.00     Quit date: 2016     Years since quittin.8    Smokeless tobacco: Never Used   Substance and Sexual Activity    Alcohol use: Not Currently    Drug use: Not Currently     Comment: heroine    Sexual activity: Not on file   Other Topics Concern    Not on file   Social History Narrative    Not on file     Social Determinants of Health     Financial Resource Strain:     Difficulty of Paying Living Expenses:    Food Insecurity:     Worried About Running Out of Food in the Last Year:     [address] in the Last Year:    Transportation Needs:     Lack of Transportation (Medical):      Lack of Transportation (Non-Medical):    Physical Activity:     Days of Exercise per Week:     Minutes of Exercise per Session:    Stress:     Feeling of Stress :    Social Connections:     Frequency of Communication with Friends and Family:     Frequency of Social Gatherings with Friends and Family:     Attends [employer] Services:     Active Member of Clubs or Organizations:     Attends Club or Organization Meetings:     Marital Status:    Intimate Partner Violence:     Fear of Current or Ex-Partner:     Emotionally Abused:     Physically Abused:     Sexually Abused:      Current Facility-Administered Medications   Medication Dose Route Frequency Provider Last Rate Last Admin    methylPREDNISolone sodium (SOLU-MEDROL) injection 60 mg  60 mg Intravenous Once Letha Sanchez PA-C  ipratropium-albuterol (DUONEB) nebulizer solution 3 ampule  3 ampule Inhalation Q4H [facility]         Current Outpatient Medications   Medication Sig Dispense Refill    predniSONE (DELTASONE) 10 MG tablet Take 1 tablet by mouth daily 40mg daily for 2 days, 20 mg daily for 2 days, 10 mg daily for 2 days, 5 mg daily for 2 days 15 tablet 0    fluticasone-umeclidin-vilant (TRELEGY ELLIPTA) 100-62.5-25 MCG/INH AEPB Inhale 1 puff into the lungs daily 1 each 5    BiPAP Machine MISC by Does not apply route Use nightly      albuterol sulfate  (90 Base) MCG/ACT inhaler USE 2 PUFFS BY MOUTH EVERY 6 HOURS AS NEEDED SHAKE WELL 18 g 5    aspirin 81 MG chewable tablet Take 81 mg by mouth daily      Multiple Vitamins-Minerals (WOMENS MULTIVITAMIN PO) Take 1 tablet by mouth daily      albuterol (PROVENTIL) (2.5 MG/3ML) 0.083% nebulizer solution USE ONE VIAL BY NEBULIZER EVERY 6 HOURS AS NEEDED FOR WHEEZING 360 mL 5    Nebulizer MISC Use nebulizer with medication as directed. 1 each 0    Respiratory Therapy Supplies (NEBULIZER/TUBING/MOUTHPIECE) KIT Inhale 1 kit into the lungs daily as needed (as directed) 1 kit 0    amphetamine-dextroamphetamine (ADDERALL XR) 30 MG extended release capsule Take 30 mg by mouth daily.  traMADol (ULTRAM) 50 MG tablet Take 50 mg by mouth 2 times daily.  pregabalin (LYRICA) 100 MG capsule Take 100 mg by mouth 3 times daily.  traZODone (DESYREL) 150 MG tablet Take 300 mg by mouth nightly       OXYGEN Inhale 4 L into the lungs continuous       CPAP Machine MISC by CPAP route nightly       buprenorphine-naloxone (SUBOXONE) 8-2 MG FILM SL film Place 1 Film under the tongue 3 times daily.        No Known Allergies    Nursing Notes Reviewed    Physical Exam:  ED Triage Vitals   Enc Vitals Group      BP       Pulse       Resp       Temp       Temp src       SpO2       Weight       Height       Head Circumference       Peak Flow       Pain Score       Pain Loc       Pain Edu? Excl. in [address]? General :Patient is awake alert oriented person place and time no acute distress nontoxic appearing patient in moderate respiratory distress. HEENT: Pupils are equally round and reactive to light extraocular motors are intact conjunctivae clear sclerae white there is no injection no icterus. Nose without any rhinorrhea or epistaxis. Oral mucosa is moist no exudate buccal mucosa shows no ulcerations. Uvula is midline    Neck: Neck is supple full range of motion trachea midline thyroid nonpalpable  Cardiac: Heart regular rate rhythm no murmurs rubs clicks or gallops  Lungs: Lung sounds are distant tight with wheezing. Accessory muscles use noted with tachypnea. Nasal flaring is noted. Chest wall: There is no tenderness to palpation over the chest wall or over ribs  Abdomen: Abdomen is soft nontender nondistended. There is no firm or pulsatile masses no rebound rigidity or guarding negative Best's negative McBurney, no peritoneal signs  Suprapubic:  there is no tenderness to palpation over the external bladder   Musculoskeletal: 5 out of 5 strength in all 4 extremities full flexion extension abduction and adduction supination pronation of all extremities and all digits. No obvious muscle atrophy is noted. No focal muscle deficits are appreciated  Dermatology: Skin is warm and dry there is no obvious abscesses lacerations or lesions noted  Psych: Mentation is grossly normal cognition is grossly normal. Affect is appropriate  Neuro: Motor intact sensory intact cranial nerves II through XII are intact level of consciousness is normal cerebellar function is normal reflexes are grossly normal. No evidence of incontinence or loss of bowel or bladder no saddle anesthesia noted Lymphatic: There is no submandibular or cervical adenopathy appreciated.         I have reviewed and interpreted all of the currently available lab results from this visit (if applicable):  No results found for this visit on 07/09/21. Radiographs (if obtained):  [] The following radiograph was interpreted by myself in the absence of a radiologist:   [] Radiologist's Report Reviewed:  XR CHEST PORTABLE    (Results Pending)       EKG (if obtained):   Please See Note of attending physician for EKG interpretation. Chart review shows recent radiograph(s):  XR CHEST PORTABLE    Result Date: 6/26/2021  EXAMINATION: ONE XRAY VIEW OF THE CHEST 6/26/2021 8:29 pm COMPARISON: 05/23/2021 HISTORY: ORDERING SYSTEM PROVIDED HISTORY: Chest pain TECHNOLOGIST PROVIDED HISTORY: Reason for exam:->Chest pain Reason for Exam: SOB Acuity: Acute Type of Exam: Initial FINDINGS: There is pulmonary vascular congestion. Nodular infiltrates are also seen in the left lower lobe. No effusion is identified. The heart size is within normal limits. There is a pulse generator over the left anterior chest wall with 2 leads projecting over the heart. Mild pulmonary vascular congestion. Nodular infiltrates within the left lower lobe. Correlate for pneumonia and consider CT for further evaluation. MDM:     Interventions given this visit:   Orders Placed This Encounter   Medications    methylPREDNISolone sodium (SOLU-MEDROL) injection 60 mg    ipratropium-albuterol (DUONEB) nebulizer solution 3 ampule     Order Specific Question:   Initiate RT Bronchodilator Protocol     Answer:   Yes     [id number]  I have signed out [address] [facility] Emergency Department care to LUIS Pelaez. We discussed the pertinent history, physical exam, completed/pending test results (if applicable) and current treatment plan. Please refer to his/her chart for the patients remaining Emergency Department course and final disposition. Total critical care time today provided was at least  40  minutes. This excludes seperately billable procedure.  Critical care time provided for acute respiratory distress   that required close evaluation and/or intervention with concern for patient decompensation. I independently managed patient today in the ED    There were no vitals taken for this visit. Clinical Impression:  1. COPD exacerbation ([address]    2. Acute on chronic respiratory failure with hypoxia and hypercapnia (HCC)    3. Pneumonia of both lungs due to infectious organism, unspecified part of lung          Disposition referral (if applicable):  No follow-up provider specified. Disposition medications (if applicable):  New Prescriptions    No medications on file         Comment: Please note this report has been produced using speech recognition software and may contain errors related to that system including errors in grammar, punctuation, and spelling, as well as words and phrases that may be inappropriate. If there are any questions or concerns please feel free to contact the dictating provider for clarification.       Katherina Kocher, [address] [place] [address]  07/22/21 9118

## 2021-07-10 LAB
ALBUMIN SERPL-MCNC: 3.8 GM/DL (ref 3.4–5)
ALP BLD-CCNC: 65 IU/L (ref 40–129)
ALT SERPL-CCNC: 16 U/L (ref 10–40)
ANION GAP SERPL CALCULATED.3IONS-SCNC: 4 MMOL/L (ref 4–16)
AST SERPL-CCNC: 13 IU/L (ref 15–37)
BILIRUB SERPL-MCNC: 0.1 MG/DL (ref 0–1)
BUN BLDV-MCNC: 11 MG/DL (ref 6–23)
CALCIUM SERPL-MCNC: 9 MG/DL (ref 8.3–10.6)
CHLORIDE BLD-SCNC: 98 MMOL/L (ref 99–110)
CO2: 41 MMOL/L (ref 21–32)
CREAT SERPL-MCNC: 0.4 MG/DL (ref 0.6–1.1)
GFR AFRICAN AMERICAN: >60 ML/MIN/1.73M2
GFR NON-AFRICAN AMERICAN: >60 ML/MIN/1.73M2
GLUCOSE BLD-MCNC: 183 MG/DL (ref 70–99)
HCT VFR BLD CALC: 38.9 % (ref 37–47)
HEMOGLOBIN: 12 GM/DL (ref 12.5–16)
MCH RBC QN AUTO: 28.6 PG (ref 27–31)
MCHC RBC AUTO-ENTMCNC: 30.8 % (ref 32–36)
MCV RBC AUTO: 92.6 FL (ref 78–100)
PDW BLD-RTO: 13.2 % (ref 11.7–14.9)
PLATELET # BLD: 256 K/CU MM (ref 140–440)
PMV BLD AUTO: 9.3 FL (ref 7.5–11.1)
POTASSIUM SERPL-SCNC: 4.3 MMOL/L (ref 3.5–5.1)
RBC # BLD: 4.2 M/CU MM (ref 4.2–5.4)
SODIUM BLD-SCNC: 143 MMOL/L (ref 135–145)
TOTAL PROTEIN: 6 GM/DL (ref 6.4–8.2)
WBC # BLD: 12 K/CU MM (ref 4–10.5)

## 2021-07-10 PROCEDURE — 6370000000 HC RX 637 (ALT 250 FOR IP): Performed by: NURSE PRACTITIONER

## 2021-07-10 PROCEDURE — 2060000000 HC ICU INTERMEDIATE R&B

## 2021-07-10 PROCEDURE — 1200000000 HC SEMI PRIVATE

## 2021-07-10 PROCEDURE — 36415 COLL VENOUS BLD VENIPUNCTURE: CPT

## 2021-07-10 PROCEDURE — 6370000000 HC RX 637 (ALT 250 FOR IP): Performed by: HOSPITALIST

## 2021-07-10 PROCEDURE — 94640 AIRWAY INHALATION TREATMENT: CPT

## 2021-07-10 PROCEDURE — 85027 COMPLETE CBC AUTOMATED: CPT

## 2021-07-10 PROCEDURE — 94660 CPAP INITIATION&MGMT: CPT

## 2021-07-10 PROCEDURE — 2700000000 HC OXYGEN THERAPY PER DAY

## 2021-07-10 PROCEDURE — 2580000003 HC RX 258: Performed by: NURSE PRACTITIONER

## 2021-07-10 PROCEDURE — 6360000002 HC RX W HCPCS: Performed by: NURSE PRACTITIONER

## 2021-07-10 PROCEDURE — 94761 N-INVAS EAR/PLS OXIMETRY MLT: CPT

## 2021-07-10 PROCEDURE — 80053 COMPREHEN METABOLIC PANEL: CPT

## 2021-07-10 RX ORDER — TRAZODONE HYDROCHLORIDE 50 MG/1
150 TABLET ORAL NIGHTLY PRN
Status: DISCONTINUED | OUTPATIENT
Start: 2021-07-10 | End: 2021-07-15 | Stop reason: HOSPADM

## 2021-07-10 RX ORDER — ASPIRIN 81 MG/1
81 TABLET, CHEWABLE ORAL DAILY
Status: DISCONTINUED | OUTPATIENT
Start: 2021-07-10 | End: 2021-07-15 | Stop reason: HOSPADM

## 2021-07-10 RX ORDER — DEXTROAMPHETAMINE SACCHARATE, AMPHETAMINE ASPARTATE MONOHYDRATE, DEXTROAMPHETAMINE SULFATE AND AMPHETAMINE SULFATE 7.5; 7.5; 7.5; 7.5 MG/1; MG/1; MG/1; MG/1
30 CAPSULE, EXTENDED RELEASE ORAL DAILY
Status: DISCONTINUED | OUTPATIENT
Start: 2021-07-10 | End: 2021-07-15 | Stop reason: HOSPADM

## 2021-07-10 RX ORDER — TRAMADOL HYDROCHLORIDE 50 MG/1
50 TABLET ORAL 2 TIMES DAILY
Status: DISCONTINUED | OUTPATIENT
Start: 2021-07-10 | End: 2021-07-15 | Stop reason: HOSPADM

## 2021-07-10 RX ORDER — PREGABALIN 100 MG/1
100 CAPSULE ORAL 3 TIMES DAILY
Status: DISCONTINUED | OUTPATIENT
Start: 2021-07-10 | End: 2021-07-15 | Stop reason: HOSPADM

## 2021-07-10 RX ORDER — BUPRENORPHINE AND NALOXONE 8; 2 MG/1; MG/1
1 FILM, SOLUBLE BUCCAL; SUBLINGUAL 3 TIMES DAILY
Status: DISCONTINUED | OUTPATIENT
Start: 2021-07-10 | End: 2021-07-15 | Stop reason: HOSPADM

## 2021-07-10 RX ADMIN — PREGABALIN 100 MG: 100 CAPSULE ORAL at 13:26

## 2021-07-10 RX ADMIN — SODIUM CHLORIDE, PRESERVATIVE FREE 10 ML: 5 INJECTION INTRAVENOUS at 21:34

## 2021-07-10 RX ADMIN — IPRATROPIUM BROMIDE AND ALBUTEROL SULFATE 1 AMPULE: .5; 3 SOLUTION RESPIRATORY (INHALATION) at 11:53

## 2021-07-10 RX ADMIN — ASPIRIN 81 MG: 81 TABLET, CHEWABLE ORAL at 08:46

## 2021-07-10 RX ADMIN — SODIUM CHLORIDE, PRESERVATIVE FREE 10 ML: 5 INJECTION INTRAVENOUS at 08:47

## 2021-07-10 RX ADMIN — TRAZODONE HYDROCHLORIDE 150 MG: 50 TABLET ORAL at 21:36

## 2021-07-10 RX ADMIN — IPRATROPIUM BROMIDE AND ALBUTEROL SULFATE 1 AMPULE: .5; 3 SOLUTION RESPIRATORY (INHALATION) at 16:00

## 2021-07-10 RX ADMIN — TRAZODONE HYDROCHLORIDE 150 MG: 50 TABLET ORAL at 00:55

## 2021-07-10 RX ADMIN — PREGABALIN 100 MG: 100 CAPSULE ORAL at 21:33

## 2021-07-10 RX ADMIN — IPRATROPIUM BROMIDE AND ALBUTEROL SULFATE 1 AMPULE: .5; 3 SOLUTION RESPIRATORY (INHALATION) at 19:34

## 2021-07-10 RX ADMIN — IPRATROPIUM BROMIDE AND ALBUTEROL SULFATE 1 AMPULE: .5; 3 SOLUTION RESPIRATORY (INHALATION) at 07:42

## 2021-07-10 RX ADMIN — PREGABALIN 100 MG: 100 CAPSULE ORAL at 08:46

## 2021-07-10 RX ADMIN — METHYLPREDNISOLONE SODIUM SUCCINATE 40 MG: 125 INJECTION, POWDER, LYOPHILIZED, FOR SOLUTION INTRAMUSCULAR; INTRAVENOUS at 00:54

## 2021-07-10 RX ADMIN — AZITHROMYCIN MONOHYDRATE 500 MG: 500 INJECTION, POWDER, LYOPHILIZED, FOR SOLUTION INTRAVENOUS at 13:38

## 2021-07-10 RX ADMIN — METHYLPREDNISOLONE SODIUM SUCCINATE 40 MG: 125 INJECTION, POWDER, LYOPHILIZED, FOR SOLUTION INTRAMUSCULAR; INTRAVENOUS at 13:27

## 2021-07-10 RX ADMIN — TRAMADOL HYDROCHLORIDE 50 MG: 50 TABLET, FILM COATED ORAL at 08:49

## 2021-07-10 RX ADMIN — BUPRENORPHINE AND NALOXONE 1 FILM: 8; 2 FILM, SOLUBLE BUCCAL; SUBLINGUAL at 21:33

## 2021-07-10 RX ADMIN — ENOXAPARIN SODIUM 40 MG: 40 INJECTION SUBCUTANEOUS at 08:46

## 2021-07-10 RX ADMIN — BUPRENORPHINE AND NALOXONE 1 FILM: 8; 2 FILM, SOLUBLE BUCCAL; SUBLINGUAL at 15:21

## 2021-07-10 RX ADMIN — METHYLPREDNISOLONE SODIUM SUCCINATE 40 MG: 125 INJECTION, POWDER, LYOPHILIZED, FOR SOLUTION INTRAMUSCULAR; INTRAVENOUS at 08:45

## 2021-07-10 RX ADMIN — METHYLPREDNISOLONE SODIUM SUCCINATE 40 MG: 125 INJECTION, POWDER, LYOPHILIZED, FOR SOLUTION INTRAMUSCULAR; INTRAVENOUS at 21:33

## 2021-07-10 RX ADMIN — TRAMADOL HYDROCHLORIDE 50 MG: 50 TABLET, FILM COATED ORAL at 00:54

## 2021-07-10 RX ADMIN — TRAMADOL HYDROCHLORIDE 50 MG: 50 TABLET, FILM COATED ORAL at 21:33

## 2021-07-10 ASSESSMENT — PAIN DESCRIPTION - LOCATION: LOCATION: GENERALIZED

## 2021-07-10 ASSESSMENT — PAIN - FUNCTIONAL ASSESSMENT: PAIN_FUNCTIONAL_ASSESSMENT: ACTIVITIES ARE NOT PREVENTED

## 2021-07-10 ASSESSMENT — PAIN SCALES - GENERAL
PAINLEVEL_OUTOF10: 3
PAINLEVEL_OUTOF10: 5
PAINLEVEL_OUTOF10: 2
PAINLEVEL_OUTOF10: 0

## 2021-07-10 ASSESSMENT — PAIN DESCRIPTION - ONSET: ONSET: ON-GOING

## 2021-07-10 ASSESSMENT — PAIN DESCRIPTION - FREQUENCY: FREQUENCY: CONTINUOUS

## 2021-07-10 ASSESSMENT — PAIN DESCRIPTION - PAIN TYPE: TYPE: CHRONIC PAIN

## 2021-07-10 ASSESSMENT — PAIN DESCRIPTION - DESCRIPTORS: DESCRIPTORS: ACHING

## 2021-07-10 ASSESSMENT — PAIN DESCRIPTION - PROGRESSION: CLINICAL_PROGRESSION: NOT CHANGED

## 2021-07-10 NOTE — PROGRESS NOTES
decreased BS bilaterally, no wheezes, rales or rhonchi. Symmetric chest movement  CARDIO/VASC S1/S2 auscultated. Regular rate without appreciable murmurs, rubs, or gallops. No JVD or carotid bruits. Peripheral pulses equal bilaterally and palpable. No peripheral edema. GI Abdomen is soft without significant tenderness, masses, or guarding. Bowel sounds are normoactive. Rectal exam deferred.  No costovertebral angle tenderness. Normal appearing external genitalia. Fraser catheter is not present. HEME/LYMPH No palpable cervical lymphadenopathy and no hepatosplenomegaly. No petechiae or ecchymoses. MSK No gross joint deformities. SKIN Normal coloration, warm, dry. NEURO Cranial nerves appear grossly intact, normal speech, no lateralizing weakness. PSYCH Awake, alert, oriented x 4. Affect appropriate.     Medications:   Medications:    amphetamine-dextroamphetamine  30 mg Oral Daily    aspirin  81 mg Oral Daily    [Held by provider] buprenorphine-naloxone  1 Film Sublingual TID    pregabalin  100 mg Oral TID    traMADol  50 mg Oral BID    sodium chloride flush  5-40 mL Intravenous 2 times per day    enoxaparin  40 mg Subcutaneous Daily    methylPREDNISolone  40 mg Intravenous Q6H    Followed by   Hieu Agarwal ON 7/11/2021] predniSONE  40 mg Oral Daily    azithromycin  500 mg Intravenous Q24H    ipratropium-albuterol  1 ampule Inhalation Q4H [place]      Infusions:    sodium chloride       PRN Meds: traZODone, 150 mg, Nightly PRN  sodium chloride flush, 5-40 mL, PRN  sodium chloride, 25 mL, PRN  ondansetron, 4 mg, Q8H PRN   Or  ondansetron, 4 mg, Q6H PRN  senna, 1 tablet, Daily PRN  acetaminophen, 650 mg, Q6H PRN   Or  acetaminophen, 650 mg, Q6H PRN

## 2021-07-10 NOTE — ED NOTES
Pt states she is \"taking a break\" from bipap at this time and does not want to put it back on. Family at bedside. Pt on 6L nasal cannula with spO2 96%.      Ubaldo Walker, GENET  07/09/21 1531

## 2021-07-11 LAB
HCT VFR BLD CALC: 38 % (ref 37–47)
HEMOGLOBIN: 11.8 GM/DL (ref 12.5–16)
MCH RBC QN AUTO: 28.9 PG (ref 27–31)
MCHC RBC AUTO-ENTMCNC: 31.1 % (ref 32–36)
MCV RBC AUTO: 93.1 FL (ref 78–100)
PDW BLD-RTO: 13.6 % (ref 11.7–14.9)
PLATELET # BLD: 244 K/CU MM (ref 140–440)
PMV BLD AUTO: 9.8 FL (ref 7.5–11.1)
RBC # BLD: 4.08 M/CU MM (ref 4.2–5.4)
WBC # BLD: 17.6 K/CU MM (ref 4–10.5)

## 2021-07-11 PROCEDURE — 2060000000 HC ICU INTERMEDIATE R&B

## 2021-07-11 PROCEDURE — 2700000000 HC OXYGEN THERAPY PER DAY

## 2021-07-11 PROCEDURE — 94640 AIRWAY INHALATION TREATMENT: CPT

## 2021-07-11 PROCEDURE — 6370000000 HC RX 637 (ALT 250 FOR IP): Performed by: NURSE PRACTITIONER

## 2021-07-11 PROCEDURE — 94761 N-INVAS EAR/PLS OXIMETRY MLT: CPT

## 2021-07-11 PROCEDURE — 36415 COLL VENOUS BLD VENIPUNCTURE: CPT

## 2021-07-11 PROCEDURE — 6370000000 HC RX 637 (ALT 250 FOR IP): Performed by: HOSPITALIST

## 2021-07-11 PROCEDURE — 1200000000 HC SEMI PRIVATE

## 2021-07-11 PROCEDURE — 2580000003 HC RX 258: Performed by: NURSE PRACTITIONER

## 2021-07-11 PROCEDURE — 6360000002 HC RX W HCPCS: Performed by: NURSE PRACTITIONER

## 2021-07-11 PROCEDURE — 94150 VITAL CAPACITY TEST: CPT

## 2021-07-11 PROCEDURE — 94660 CPAP INITIATION&MGMT: CPT

## 2021-07-11 PROCEDURE — 85027 COMPLETE CBC AUTOMATED: CPT

## 2021-07-11 RX ADMIN — AZITHROMYCIN MONOHYDRATE 500 MG: 500 INJECTION, POWDER, LYOPHILIZED, FOR SOLUTION INTRAVENOUS at 13:55

## 2021-07-11 RX ADMIN — BUPRENORPHINE AND NALOXONE 1 FILM: 8; 2 FILM, SOLUBLE BUCCAL; SUBLINGUAL at 21:10

## 2021-07-11 RX ADMIN — ENOXAPARIN SODIUM 40 MG: 40 INJECTION SUBCUTANEOUS at 08:38

## 2021-07-11 RX ADMIN — BUPRENORPHINE AND NALOXONE 1 FILM: 8; 2 FILM, SOLUBLE BUCCAL; SUBLINGUAL at 13:55

## 2021-07-11 RX ADMIN — SODIUM CHLORIDE, PRESERVATIVE FREE 10 ML: 5 INJECTION INTRAVENOUS at 08:39

## 2021-07-11 RX ADMIN — IPRATROPIUM BROMIDE AND ALBUTEROL SULFATE 1 AMPULE: .5; 3 SOLUTION RESPIRATORY (INHALATION) at 20:13

## 2021-07-11 RX ADMIN — TRAMADOL HYDROCHLORIDE 50 MG: 50 TABLET, FILM COATED ORAL at 21:10

## 2021-07-11 RX ADMIN — PREDNISONE 40 MG: 20 TABLET ORAL at 13:55

## 2021-07-11 RX ADMIN — METHYLPREDNISOLONE SODIUM SUCCINATE 40 MG: 125 INJECTION, POWDER, LYOPHILIZED, FOR SOLUTION INTRAMUSCULAR; INTRAVENOUS at 01:42

## 2021-07-11 RX ADMIN — IPRATROPIUM BROMIDE AND ALBUTEROL SULFATE 1 AMPULE: .5; 3 SOLUTION RESPIRATORY (INHALATION) at 15:03

## 2021-07-11 RX ADMIN — BUPRENORPHINE AND NALOXONE 1 FILM: 8; 2 FILM, SOLUBLE BUCCAL; SUBLINGUAL at 08:37

## 2021-07-11 RX ADMIN — PREGABALIN 100 MG: 100 CAPSULE ORAL at 08:38

## 2021-07-11 RX ADMIN — IPRATROPIUM BROMIDE AND ALBUTEROL SULFATE 1 AMPULE: .5; 3 SOLUTION RESPIRATORY (INHALATION) at 12:04

## 2021-07-11 RX ADMIN — ASPIRIN 81 MG: 81 TABLET, CHEWABLE ORAL at 08:38

## 2021-07-11 RX ADMIN — PREGABALIN 100 MG: 100 CAPSULE ORAL at 21:10

## 2021-07-11 RX ADMIN — TRAZODONE HYDROCHLORIDE 150 MG: 50 TABLET ORAL at 21:10

## 2021-07-11 RX ADMIN — PREGABALIN 100 MG: 100 CAPSULE ORAL at 13:55

## 2021-07-11 RX ADMIN — SODIUM CHLORIDE, PRESERVATIVE FREE 10 ML: 5 INJECTION INTRAVENOUS at 21:10

## 2021-07-11 RX ADMIN — IPRATROPIUM BROMIDE AND ALBUTEROL SULFATE 1 AMPULE: .5; 3 SOLUTION RESPIRATORY (INHALATION) at 07:39

## 2021-07-11 ASSESSMENT — PAIN SCALES - GENERAL
PAINLEVEL_OUTOF10: 5
PAINLEVEL_OUTOF10: 0
PAINLEVEL_OUTOF10: 7

## 2021-07-11 NOTE — PROGRESS NOTES
Hospitalist Progress Note      Name:  Roger Franks /Age/Sex: 1960  (64 y.o. female)   MRN & CSN:  [id number] & [id number] Admission Date/Time: 2021  4:21 AM   Location:  [address] [place] [address] PCP: Angie Myers, [facility] Day: 3    Assessment and Plan:   Roger Franks is a 64 y.o.  female  who presents with <principal problem not specified>    1. ACUTE ON CHRONIC RESPIRATORY FAILURE WITH HYPOXIA AND HYPERCAPNIA  - Stable with improvement. Will continue biPAP and steroids requesting pulmonary consult     2. ACUTE EXACERBATION COPD  -stable see above     3. ESSENTIAL HYPERTENSION  -controlled will continue to monitor  Diet ADULT DIET; Regular   DVT Prophylaxis [x] Lovenox, []  Heparin, [] SCDs, [] Ambulation   GI Prophylaxis [] PPI,  [] H2 Blocker,  [] Carafate,  [] Diet/Tube Feeds   Code Status Full Code   Disposition Patient requires continued admission due to resp failure   MDM [] Low, [] Moderate,[]  High  Patient's risk as above due to resp failure     History of Present Illness:     Chief Complaint: <principal problem not specified>  Roger Franks is a 64 y.o.  female  who presents with resp failure    No issues overnight. Patient states she had a good night, she has been tolerating being off BiPAP intermittently, appetite good, no specific complaints     Ten point ROS reviewed negative, unless as noted above    Objective: Intake/Output Summary (Last 24 hours) at 2021 1054  Last data filed at 2021 0529  Gross per 24 hour   Intake 120 ml   Output 1525 ml   Net -1405 ml      Vitals:   Vitals:    21 0830   BP: (!) 120/57   Pulse: 60   Resp: 17   Temp: 98.1 °F (36.7 °C)   SpO2: 98%     Physical Exam:   GEN Awake female, sitting upright in bed in no apparent distress. Appears given age. EYES Pupils are equally round. No scleral erythema, discharge, or conjunctivitis. HENT Mucous membranes are moist. Oral pharynx without exudates, no evidence of thrush.   NECK Supple, no apparent thyromegaly or masses. RESP Clear with decreased BS bilaterally  Symmetric chest movement while on room air. CARDIO/VASC S1/S2 auscultated. Regular rate without appreciable murmurs, rubs, or gallops. No JVD or carotid bruits. Peripheral pulses equal bilaterally and palpable. No peripheral edema. GI Abdomen is soft without significant tenderness, masses, or guarding. Bowel sounds are normoactive. Rectal exam deferred.  No costovertebral angle tenderness. Normal appearing external genitalia. Fraser catheter is not present. HEME/LYMPH No palpable cervical lymphadenopathy and no hepatosplenomegaly. No petechiae or ecchymoses. MSK No gross joint deformities. SKIN Normal coloration, warm, dry. NEURO Cranial nerves appear grossly intact, normal speech, no lateralizing weakness. PSYCH Awake, alert, oriented x 4. Affect appropriate.     Medications:   Medications:    amphetamine-dextroamphetamine  30 mg Oral Daily    aspirin  81 mg Oral Daily    buprenorphine-naloxone  1 Film Sublingual TID    pregabalin  100 mg Oral TID    traMADol  50 mg Oral BID    sodium chloride flush  5-40 mL Intravenous 2 times per day    enoxaparin  40 mg Subcutaneous Daily    predniSONE  40 mg Oral Daily    azithromycin  500 mg Intravenous Q24H    ipratropium-albuterol  1 ampule Inhalation Q4H WA      Infusions:    sodium chloride       PRN Meds: traZODone, 150 mg, Nightly PRN  sodium chloride flush, 5-40 mL, PRN  sodium chloride, 25 mL, PRN  ondansetron, 4 mg, Q8H PRN   Or  ondansetron, 4 mg, Q6H PRN  senna, 1 tablet, Daily PRN  acetaminophen, 650 mg, Q6H PRN   Or  acetaminophen, 650 mg, Q6H PRN

## 2021-07-11 NOTE — CONSULTS
predniSONE (DELTASONE) tablet 40 mg, 40 mg, Oral, Daily  azithromycin (ZITHROMAX) 500 mg in dextrose 5 % 250 mL IVPB, 500 mg, Intravenous, Q24H  ipratropium-albuterol (DUONEB) nebulizer solution 1 ampule, 1 ampule, Inhalation, Q4H WA    No Known Allergies    Social History:    Social History     Socioeconomic History    Marital status: Single     Spouse name: Not on file    Number of children: Not on file    Years of education: Not on file    Highest education level: Not on file   Occupational History    Not on file   Tobacco Use    Smoking status: Former Smoker     Packs/day: 1.00     Years: 40.00     Pack years: 40.00     Quit date: 2016     Years since quittin.9    Smokeless tobacco: Never Used   Substance and Sexual Activity    Alcohol use: Not Currently    Drug use: Not Currently     Comment: heroine    Sexual activity: Not on file   Other Topics Concern    Not on file   Social History Narrative    Not on file     Social Determinants of Health     Financial Resource Strain:     Difficulty of Paying Living Expenses:    Food Insecurity:     Worried About Running Out of Food in the Last Year:     Ran Out of Food in the Last Year:    Transportation Needs:     Lack of Transportation (Medical):      Lack of Transportation (Non-Medical):    Physical Activity:     Days of Exercise per Week:     Minutes of Exercise per Session:    Stress:     Feeling of Stress :    Social Connections:     Frequency of Communication with Friends and Family:     Frequency of Social Gatherings with Friends and Family:     Attends Anabaptist Services:     Active Member of Clubs or Organizations:     Attends Club or Organization Meetings:     Marital Status:    Intimate Partner Violence:     Fear of Current or Ex-Partner:     Emotionally Abused:     Physically Abused:     Sexually Abused:        Family History:   Family History   Problem Relation Age of Onset    Cancer Mother         unsure of type    Thyroid Cancer Mother     Alcohol Abuse Father     Other Sister         passed in car accident    Lupus Brother     Coronary Art Dis Brother        Immunization:    There is no immunization history on file for this patient. REVIEW OF SYSTEMS:    CONSTITUTIONAL:  negative for fevers, chills, diaphoresis, activity change, appetite change, fatigue, night sweats and unexpected weight change. EYES:  negative for blurred vision, eye discharge, visual disturbance and icterus  HEENT:  negative for hearing loss, tinnitus, ear drainage, sinus pressure, nasal congestion, epistaxis and snoring  RESPIRATORY:  See HPI  CARDIOVASCULAR:  negative for chest pain, palpitations, exertional chest pressure/discomfort, edema, syncope  GASTROINTESTINAL:  negative for nausea, vomiting, diarrhea, constipation, blood in stool and abdominal pain  GENITOURINARY:  negative for frequency, dysuria and hematuria  HEMATOLOGIC/LYMPHATIC:  negative for easy bruising, bleeding and lymphadenopathy  ALLERGIC/IMMUNOLOGIC:  negative for recurrent infections, angioedema, anaphylaxis and drug reactions  ENDOCRINE:  negative for weight changes and diabetic symptoms including polyuria, polydipsia and polyphagia    MUSCULOSKELETAL:  negative for  pain, joint swelling, decreased range of motion and muscle weakness  NEUROLOGICAL:  negative for headaches, slurred speech, unilateral weakness  PSYCHIATRIC/BEHAVIORAL: negative for hallucinations, behavioral problems, confusion and agitation.      Objective:   PHYSICAL EXAM:      VITALS:    Vitals:    07/11/21 0326 07/11/21 0706 07/11/21 0830 07/11/21 1100   BP:   (!) 120/57    Pulse:  60 60 70   Resp: 10  17 14   Temp:   98.1 °F (36.7 °C)    TempSrc:   Oral    SpO2:   98% 93%   Weight:       Height:             CONSTITUTIONAL:  awake, alert, cooperative, no apparent distress, and appears stated age  NECK:  Supple, symmetrical, trachea midline, no adenopathy, thyroid symmetric, not enlarged and no tenderness  CHEST: Chest expansion equal and symmetrical, no intercostal retraction. LUNGS:  no increased work of breathing, has expiratory wheezes both lungs, no crackles. CARDIOVASCULAR: S1 and S2, no edema and no JVD  ABDOMEN:  normal bowel sounds, non-distended and no masses palpated, and no tenderness to palpation. No hepatospleenomegaly  LYMPHADENOPATHY:  no axillary or supraclavicular adenopathy. No cervical adnenopathy  PSYCHIATRIC: Oriented to person place and time. No obvious depression or anxiety. MUSCULOSKELETAL: No obvious misalignment or effusion of the joints. No clubbing, cyanosis of the digits. RIGHT AND LEFT LOWER EXTREMITIES: No edema, no inflammation, no tenderness. SKIN:  normal skin color, texture, turgor and no redness, warmth, or swelling. No palpable nodules     EXAMINATION:   ONE XRAY VIEW OF THE CHEST       7/9/2021 5:07 am       COMPARISON:   06/26/2021       HISTORY:   ORDERING SYSTEM PROVIDED HISTORY: chest pain   TECHNOLOGIST PROVIDED HISTORY:   Reason for exam:->chest pain   Reason for Exam: chest pain   Acuity: Unknown   Type of Exam: Initial       FINDINGS:   Overlapping breast tissue slightly limits evaluation.  No focal   consolidation, pleural effusion or pneumothorax.  There is central pulmonary   vascular congestion without overt pulmonary edema.  The cardiac silhouette   and mediastinal contours are stable.  No acute bony abnormality.           Impression   Central pulmonary vascular congestion without overt pulmonary edema.         DATA:        abg 7.31/94/72                Assessment:     1. Ac on ch hypoxemic and hypercapneic resp failure  2. Ac copd  3. rashad          Plan:     1. D/w pt  2. Adequate o2 adm  3. Continue bipap. 4. Check abg in am  5. Bd rx  6. Steroids  7. On zithromax  8. PT ALREADY HAS O2 AND PAP AT HOME  9.  Thanks will follow

## 2021-07-12 LAB
BASE EXCESS MIXED: 18.1 (ref 0–2.3)
CARBON MONOXIDE, BLOOD: 2 % (ref 0–5)
CO2 CONTENT: 49.2 MMOL/L (ref 19–24)
COMMENT: ABNORMAL
HCO3 ARTERIAL: 46.9 MMOL/L (ref 18–23)
HCT VFR BLD CALC: 38.1 % (ref 37–47)
HEMOGLOBIN: 11.2 GM/DL (ref 12.5–16)
MCH RBC QN AUTO: 27.5 PG (ref 27–31)
MCHC RBC AUTO-ENTMCNC: 29.4 % (ref 32–36)
MCV RBC AUTO: 93.4 FL (ref 78–100)
METHEMOGLOBIN ARTERIAL: 1.3 %
O2 SATURATION: 95.9 % (ref 96–97)
PCO2 ARTERIAL: 74 MMHG (ref 32–45)
PDW BLD-RTO: 13.9 % (ref 11.7–14.9)
PH BLOOD: 7.41 (ref 7.34–7.45)
PLATELET # BLD: 236 K/CU MM (ref 140–440)
PMV BLD AUTO: 9.9 FL (ref 7.5–11.1)
PO2 ARTERIAL: 85 MMHG (ref 75–100)
RBC # BLD: 4.08 M/CU MM (ref 4.2–5.4)
WBC # BLD: 12.2 K/CU MM (ref 4–10.5)

## 2021-07-12 PROCEDURE — 85027 COMPLETE CBC AUTOMATED: CPT

## 2021-07-12 PROCEDURE — 6370000000 HC RX 637 (ALT 250 FOR IP): Performed by: HOSPITALIST

## 2021-07-12 PROCEDURE — 2700000000 HC OXYGEN THERAPY PER DAY

## 2021-07-12 PROCEDURE — 94761 N-INVAS EAR/PLS OXIMETRY MLT: CPT

## 2021-07-12 PROCEDURE — 94150 VITAL CAPACITY TEST: CPT

## 2021-07-12 PROCEDURE — 6370000000 HC RX 637 (ALT 250 FOR IP): Performed by: INTERNAL MEDICINE

## 2021-07-12 PROCEDURE — 6370000000 HC RX 637 (ALT 250 FOR IP): Performed by: NURSE PRACTITIONER

## 2021-07-12 PROCEDURE — 1200000000 HC SEMI PRIVATE

## 2021-07-12 PROCEDURE — 36600 WITHDRAWAL OF ARTERIAL BLOOD: CPT

## 2021-07-12 PROCEDURE — 6360000002 HC RX W HCPCS: Performed by: NURSE PRACTITIONER

## 2021-07-12 PROCEDURE — 82803 BLOOD GASES ANY COMBINATION: CPT

## 2021-07-12 PROCEDURE — 2580000003 HC RX 258: Performed by: NURSE PRACTITIONER

## 2021-07-12 PROCEDURE — 94640 AIRWAY INHALATION TREATMENT: CPT

## 2021-07-12 PROCEDURE — 94660 CPAP INITIATION&MGMT: CPT

## 2021-07-12 PROCEDURE — 36415 COLL VENOUS BLD VENIPUNCTURE: CPT

## 2021-07-12 RX ORDER — IPRATROPIUM BROMIDE AND ALBUTEROL SULFATE 2.5; .5 MG/3ML; MG/3ML
1 SOLUTION RESPIRATORY (INHALATION)
Status: DISCONTINUED | OUTPATIENT
Start: 2021-07-12 | End: 2021-07-15 | Stop reason: HOSPADM

## 2021-07-12 RX ADMIN — BUPRENORPHINE AND NALOXONE 1 FILM: 8; 2 FILM, SOLUBLE BUCCAL; SUBLINGUAL at 21:29

## 2021-07-12 RX ADMIN — TRAMADOL HYDROCHLORIDE 50 MG: 50 TABLET, FILM COATED ORAL at 21:29

## 2021-07-12 RX ADMIN — BUPRENORPHINE AND NALOXONE 1 FILM: 8; 2 FILM, SOLUBLE BUCCAL; SUBLINGUAL at 16:12

## 2021-07-12 RX ADMIN — ASPIRIN 81 MG: 81 TABLET, CHEWABLE ORAL at 09:01

## 2021-07-12 RX ADMIN — IPRATROPIUM BROMIDE AND ALBUTEROL SULFATE 1 AMPULE: .5; 3 SOLUTION RESPIRATORY (INHALATION) at 19:57

## 2021-07-12 RX ADMIN — TRAZODONE HYDROCHLORIDE 150 MG: 50 TABLET ORAL at 21:29

## 2021-07-12 RX ADMIN — PREGABALIN 100 MG: 100 CAPSULE ORAL at 16:12

## 2021-07-12 RX ADMIN — SODIUM CHLORIDE, PRESERVATIVE FREE 10 ML: 5 INJECTION INTRAVENOUS at 21:29

## 2021-07-12 RX ADMIN — PREGABALIN 100 MG: 100 CAPSULE ORAL at 21:29

## 2021-07-12 RX ADMIN — IPRATROPIUM BROMIDE AND ALBUTEROL SULFATE 1 AMPULE: .5; 3 SOLUTION RESPIRATORY (INHALATION) at 15:21

## 2021-07-12 RX ADMIN — ENOXAPARIN SODIUM 40 MG: 40 INJECTION SUBCUTANEOUS at 09:02

## 2021-07-12 RX ADMIN — PREGABALIN 100 MG: 100 CAPSULE ORAL at 09:01

## 2021-07-12 RX ADMIN — BUPRENORPHINE AND NALOXONE 1 FILM: 8; 2 FILM, SOLUBLE BUCCAL; SUBLINGUAL at 09:01

## 2021-07-12 RX ADMIN — IPRATROPIUM BROMIDE AND ALBUTEROL SULFATE 1 AMPULE: .5; 3 SOLUTION RESPIRATORY (INHALATION) at 11:27

## 2021-07-12 RX ADMIN — IPRATROPIUM BROMIDE AND ALBUTEROL SULFATE 1 AMPULE: .5; 3 SOLUTION RESPIRATORY (INHALATION) at 08:06

## 2021-07-12 ASSESSMENT — PAIN SCALES - GENERAL
PAINLEVEL_OUTOF10: 0
PAINLEVEL_OUTOF10: 0
PAINLEVEL_OUTOF10: 6
PAINLEVEL_OUTOF10: 0
PAINLEVEL_OUTOF10: 0

## 2021-07-12 NOTE — FLOWSHEET NOTE
initial visit pt awake and accepted a visit. Talked about current health situation and future goals. Pt very self aware and wants to have a better quality of life. Provided active listening. Pt accepted prayer and has a positive outlook.

## 2021-07-12 NOTE — CARE COORDINATION
.Chart reviewed. The patient is to return home to her apartment at [facility] at discharge. She states she has an aide through [facility] care 7 days a week for 7 hours. Her aide helps her with all her needs including transportation. The patient states that the primary support system is her daughter who can also provide transport. The patient has a PCP and insurance. She is on 4 liters of oxygen that is supplied through [facility] and she has a bipap an c-pap at home. The patient can afford and take their medications as prescribed. The patient denies needing HHC at this time. The patient denies any other needs at this time, please call if needs arise.

## 2021-07-12 NOTE — RT PROTOCOL NOTE
RT Inhaler-Nebulizer Bronchodilator Protocol Note    There is a bronchodilator order in the chart from a provider indicating to follow the RT Bronchodilator Protocol and there is an Initiate RT Bronchodilator Protocol order as well (see protocol at bottom of note). The findings from the last RT Protocol Assessment were as follows:  Smoking: >15 Pack years  Surgical Status: No surgery  Xray: Clear  Respiratory Pattern: RR 12-20  Mental Status: Alert and Oriented  Breath Sounds: Diminished and/or crackles  Cough: Strong, spontaneous, non-productive  Activity Level: Walking unassisted  Oxygen Requirement: 29% or 3LNC - 5LNC/40%  Indication for Bronchodilator Therapy: Decreased or absent breath sounds, On home bronchodilators  Bronchodilator Assessment Score: 3    Aerosolized bronchodilator medication orders have been revised according to the RT Bronchodilator Protocol. RT Inhaler-Nebulizer Bronchodilator Protocol:    Respiratory Therapist to perform RT Therapy Protocol Assessment then follow the protocol. No Indications - adjust the frequency to every 6 hours PRN wheezing or bronchospasm, if no treatments needed after 48 hours then discontinue using Per Protocol order mode. If indication present, adjust the RT bronchodilator orders based on the Bronchodilator Assessment Score as follows:    0-6 - enter or revise RT bronchodilator order to Albuterol Inhaler order with frequency of every 2 hours PRN for wheezing or increased work of breathing using Per Protocol order mode. If Albuterol Inhaler not tolerated or not effective, then discontinue the Albuterol Inhaler order and enter Albuterol Nebulizer order with same frequency and PRN reasons. Repeat RT Therapy Protocol Assessment as needed.     7-10 - discontinue any other Inpatient aerosolized bronchodilator medication orders and enter or revise two Albuterol Inhaler orders, one with BID frequency and one with frequency of every 2 hours PRN wheezing or increased work of breathing using Per Protocol order mode. Repeat RT Therapy Protocol Assessment with second treatment then BID and as needed. If Albuterol Inhaler not tolerated or not effective, then discontinue the Albuterol Inhaler orders and enter two Albuterol Nebulizer orders with same frequencies and PRN reasons. 11-13 - discontinue any other Inpatient aerosolized bronchodilator medication orders and enter DuoNeb Nebulizer orders QID frequency and an Albuterol Nebulizer order every 2 hours PRN wheezing or increased work of breathing using Per Protocol order mode. Repeat RT Therapy Protocol Assessment with second treatment then QID and as needed. Greater than 13 - discontinue any other Inpatient bronchodilator aerosolized medication orders and enter DuoNeb Nebulizer order every 4 hours frequency and Albuterol Nebulizer every 2 hours PRN wheezing or increased work of breathing using Per Protocol order mode. Repeat RT Therapy Protocol Assessment with second treatment then every 4 hours and as needed. RT to enter RT Home Evaluation for COPD & MDI Assessment order using Per Protocol order mode.     Electronically signed by Eloy Delaney RCP on 7/12/2021 at 3:28 PM

## 2021-07-12 NOTE — PROGRESS NOTES
Hospitalist Progress Note      Name:  Yvette Romero /Age/Sex: 1960  (64 y.o. female)   MRN & CSN:  [id number] & [id number] Admission Date/Time: 2021  4:21 AM   Location:  9260/9806-U PCP: Le Ball, [facility] Day: 4    Assessment and Plan:   Yvette Romero is a 64 y.o.  female  who presents with <principal problem not specified>    1. ACUTE ON CHRONIC RESPIRATORY FAILURE WITH HYPOXIA AND HYPERCAPNIA  - some improvement today, will continue BiPAP     2. ACUTE EXACERBATION COPD  -improved, continue steroids and PRNs     3. ESSENTIAL HYPERTENSION  -controlled will continue to monitor BP    Diet ADULT DIET; Regular   DVT Prophylaxis [x] Lovenox, []  Heparin, [] SCDs, [] Ambulation   GI Prophylaxis [] PPI,  [] H2 Blocker,  [] Carafate,  [] Diet/Tube Feeds   Code Status Full Code   Disposition Patient requires continued admission due to resp failure   MDM [] Low, [] Moderate,[]  High  Patient's risk as above due to resp failure     History of Present Illness:     Chief Complaint: <principal problem not specified>  Yvette Romero is a 64 y.o.  female  who presents with resp failure COPD    Overall she is feeling better since yesterday. She continues on BiPAP and is speaking in full sentences, no distress       Ten point ROS reviewed negative, unless as noted above    Objective: Intake/Output Summary (Last 24 hours) at 2021 1017  Last data filed at 2021 2302  Gross per 24 hour   Intake 120 ml   Output --   Net 120 ml      Vitals:   Vitals:    21 0812   BP:    Pulse:    Resp: 13   Temp:    SpO2:      Physical Exam:   GEN Awake female, sitting upright in bed in no apparent distress. Appears given age. EYES Pupils are equally round. No scleral erythema, discharge, or conjunctivitis. HENT Mucous membranes are moist. Oral pharynx without exudates, no evidence of thrush. NECK Supple, no apparent thyromegaly or masses.   RESP Decreased BS bilaterally with few scattered wheezes no rales or rhonchi. Symmetric chest movement while on room air. CARDIO/VASC S1/S2 auscultated. Regular rate without appreciable murmurs, rubs, or gallops. No JVD or carotid bruits. Peripheral pulses equal bilaterally and palpable. No peripheral edema. GI Abdomen is soft without significant tenderness, masses, or guarding. Bowel sounds are normoactive. Rectal exam deferred. HEME/LYMPH No palpable cervical lymphadenopathy and no hepatosplenomegaly. No petechiae or ecchymoses. MSK No gross joint deformities. SKIN Normal coloration, warm, dry. NEURO Cranial nerves appear grossly intact, normal speech, no lateralizing weakness. PSYCH Awake, alert, oriented x 4. Affect appropriate.     Medications:   Medications:    amphetamine-dextroamphetamine  30 mg Oral Daily    aspirin  81 mg Oral Daily    buprenorphine-naloxone  1 Film Sublingual TID    pregabalin  100 mg Oral TID    traMADol  50 mg Oral BID    sodium chloride flush  5-40 mL Intravenous 2 times per day    enoxaparin  40 mg Subcutaneous Daily    predniSONE  40 mg Oral Daily    ipratropium-albuterol  1 ampule Inhalation Q4H WA      Infusions:    sodium chloride       PRN Meds: sodium chloride, 1 spray, Q2H PRN  traZODone, 150 mg, Nightly PRN  sodium chloride flush, 5-40 mL, PRN  sodium chloride, 25 mL, PRN  ondansetron, 4 mg, Q8H PRN   Or  ondansetron, 4 mg, Q6H PRN  senna, 1 tablet, Daily PRN  acetaminophen, 650 mg, Q6H PRN   Or  acetaminophen, 650 mg, Q6H PRN

## 2021-07-12 NOTE — PROGRESS NOTES
pulmonary      SUBJECTIVE: she feels better     OBJECTIVE    VITALS:  BP (!) 123/106   Pulse 75   Temp 97.7 °F (36.5 °C) (Oral)   Resp 13   Ht 5' 4\" (1.626 m)   Wt 214 lb 15.2 oz (97.5 kg)   SpO2 96%   BMI 36.90 kg/m²   HEAD AND FACE EXAM:  No throat injection, no active exudate,no thrush  NECK EXAM;No JVD, no masses, symmetrical  CHEST EXAM; Expansion equal and symmetrical, no masses  LUNG EXAM; Good breath sounds bilaterally. There are expiratory wheezes both lungs, there are crackles at both lung bases  CARDIOVASCULAR EXAM: Positive S1 and S2, no S3 or S4, no clicks ,no murmurs  RIGHT AND LEFT LOWER EXTRIMITY EXAM: No edema, no swelling, no inflamation  CNS EXAM: Alert and oriented X3          LABS   Lab Results   Component Value Date    WBC 12.2 (H) 07/12/2021    HGB 11.2 (L) 07/12/2021    HCT 38.1 07/12/2021    MCV 93.4 07/12/2021     07/12/2021     Lab Results   Component Value Date    CREATININE 0.4 (L) 07/10/2021    BUN 11 07/10/2021     07/10/2021    K 4.3 07/10/2021    CL 98 (L) 07/10/2021    CO2 41 (H) 07/10/2021     Lab Results   Component Value Date    INR 1.03 05/24/2021    PROTIME 12.5 05/24/2021          Lab Results   Component Value Date    PHOS 2.6 05/24/2021    PHOS 5.2 05/08/2021    PHOS 4.4 05/07/2021        Recent Labs     07/09/21  1300   PH 7.31*   PO2ART 72*   [id number] 94.0*   O2SAT 92.9*         Wt Readings from Last 3 Encounters:   07/12/21 214 lb 15.2 oz (97.5 kg)   06/27/21 207 lb 4.8 oz (94 kg)   05/24/21 207 lb 9.6 oz (94.2 kg)               ASSESMENT  Ac on ch resp failure  Ac copd  rashad        PLAN  1. cpm  2.  Check abg    7/12/2021  Gisel Ramirez MD, M.D.

## 2021-07-13 LAB
HCT VFR BLD CALC: 39.8 % (ref 37–47)
HEMOGLOBIN: 11.8 GM/DL (ref 12.5–16)
MCH RBC QN AUTO: 28.2 PG (ref 27–31)
MCHC RBC AUTO-ENTMCNC: 29.6 % (ref 32–36)
MCV RBC AUTO: 95 FL (ref 78–100)
PDW BLD-RTO: 13.9 % (ref 11.7–14.9)
PLATELET # BLD: 216 K/CU MM (ref 140–440)
PMV BLD AUTO: 9.7 FL (ref 7.5–11.1)
RBC # BLD: 4.19 M/CU MM (ref 4.2–5.4)
WBC # BLD: 7.6 K/CU MM (ref 4–10.5)

## 2021-07-13 PROCEDURE — 2700000000 HC OXYGEN THERAPY PER DAY

## 2021-07-13 PROCEDURE — 6370000000 HC RX 637 (ALT 250 FOR IP): Performed by: NURSE PRACTITIONER

## 2021-07-13 PROCEDURE — 36415 COLL VENOUS BLD VENIPUNCTURE: CPT

## 2021-07-13 PROCEDURE — 94660 CPAP INITIATION&MGMT: CPT

## 2021-07-13 PROCEDURE — 85027 COMPLETE CBC AUTOMATED: CPT

## 2021-07-13 PROCEDURE — 6360000002 HC RX W HCPCS: Performed by: NURSE PRACTITIONER

## 2021-07-13 PROCEDURE — 94761 N-INVAS EAR/PLS OXIMETRY MLT: CPT

## 2021-07-13 PROCEDURE — 94640 AIRWAY INHALATION TREATMENT: CPT

## 2021-07-13 PROCEDURE — 2580000003 HC RX 258: Performed by: NURSE PRACTITIONER

## 2021-07-13 PROCEDURE — 94150 VITAL CAPACITY TEST: CPT

## 2021-07-13 PROCEDURE — 1200000000 HC SEMI PRIVATE

## 2021-07-13 PROCEDURE — 6370000000 HC RX 637 (ALT 250 FOR IP): Performed by: INTERNAL MEDICINE

## 2021-07-13 PROCEDURE — 94664 DEMO&/EVAL PT USE INHALER: CPT

## 2021-07-13 RX ADMIN — ASPIRIN 81 MG: 81 TABLET, CHEWABLE ORAL at 10:55

## 2021-07-13 RX ADMIN — BUPRENORPHINE AND NALOXONE 1 FILM: 8; 2 FILM, SOLUBLE BUCCAL; SUBLINGUAL at 10:55

## 2021-07-13 RX ADMIN — ENOXAPARIN SODIUM 40 MG: 40 INJECTION SUBCUTANEOUS at 10:54

## 2021-07-13 RX ADMIN — PREDNISONE 40 MG: 20 TABLET ORAL at 10:54

## 2021-07-13 RX ADMIN — BUPRENORPHINE AND NALOXONE 1 FILM: 8; 2 FILM, SOLUBLE BUCCAL; SUBLINGUAL at 15:46

## 2021-07-13 RX ADMIN — IPRATROPIUM BROMIDE AND ALBUTEROL SULFATE 1 AMPULE: .5; 3 SOLUTION RESPIRATORY (INHALATION) at 12:04

## 2021-07-13 RX ADMIN — IPRATROPIUM BROMIDE AND ALBUTEROL SULFATE 1 AMPULE: .5; 3 SOLUTION RESPIRATORY (INHALATION) at 15:52

## 2021-07-13 RX ADMIN — PREGABALIN 100 MG: 100 CAPSULE ORAL at 10:54

## 2021-07-13 RX ADMIN — IPRATROPIUM BROMIDE AND ALBUTEROL SULFATE 1 AMPULE: .5; 3 SOLUTION RESPIRATORY (INHALATION) at 09:16

## 2021-07-13 RX ADMIN — PREGABALIN 100 MG: 100 CAPSULE ORAL at 19:59

## 2021-07-13 RX ADMIN — TRAMADOL HYDROCHLORIDE 50 MG: 50 TABLET, FILM COATED ORAL at 19:59

## 2021-07-13 RX ADMIN — TRAMADOL HYDROCHLORIDE 50 MG: 50 TABLET, FILM COATED ORAL at 10:54

## 2021-07-13 RX ADMIN — PREGABALIN 100 MG: 100 CAPSULE ORAL at 15:45

## 2021-07-13 RX ADMIN — SODIUM CHLORIDE, PRESERVATIVE FREE 10 ML: 5 INJECTION INTRAVENOUS at 10:53

## 2021-07-13 RX ADMIN — IPRATROPIUM BROMIDE AND ALBUTEROL SULFATE 1 AMPULE: .5; 3 SOLUTION RESPIRATORY (INHALATION) at 20:35

## 2021-07-13 RX ADMIN — BUPRENORPHINE AND NALOXONE 1 FILM: 8; 2 FILM, SOLUBLE BUCCAL; SUBLINGUAL at 19:59

## 2021-07-13 ASSESSMENT — PAIN - FUNCTIONAL ASSESSMENT: PAIN_FUNCTIONAL_ASSESSMENT: ACTIVITIES ARE NOT PREVENTED

## 2021-07-13 ASSESSMENT — PAIN DESCRIPTION - FREQUENCY: FREQUENCY: CONTINUOUS

## 2021-07-13 ASSESSMENT — PAIN SCALES - GENERAL
PAINLEVEL_OUTOF10: 4
PAINLEVEL_OUTOF10: 0
PAINLEVEL_OUTOF10: 0

## 2021-07-13 ASSESSMENT — PAIN DESCRIPTION - ONSET: ONSET: ON-GOING

## 2021-07-13 ASSESSMENT — PAIN DESCRIPTION - PAIN TYPE: TYPE: CHRONIC PAIN

## 2021-07-13 ASSESSMENT — PAIN DESCRIPTION - LOCATION: LOCATION: GENERALIZED

## 2021-07-13 ASSESSMENT — PAIN DESCRIPTION - DESCRIPTORS: DESCRIPTORS: CONSTANT

## 2021-07-13 NOTE — PROGRESS NOTES
pulmonary      SUBJECTIVE: comfortable breathing and currently sleeping     OBJECTIVE    VITALS:  /71   Pulse 64   Temp 98.2 °F (36.8 °C) (Oral)   Resp 11   Ht 5' 4\" (1.626 m)   Wt 221 lb 1.9 oz (100.3 kg)   SpO2 97%   BMI 37.96 kg/m²   HEAD AND FACE EXAM:  No throat injection, no active exudate,no thrush  NECK EXAM;No JVD, no masses, symmetrical  CHEST EXAM; Expansion equal and symmetrical, no masses  LUNG EXAM; Good breath sounds bilaterally. There are expiratory wheezes both lungs, there are crackles at both lung bases  CARDIOVASCULAR EXAM: Positive S1 and S2, no S3 or S4, no clicks ,no murmurs  RIGHT AND LEFT LOWER EXTRIMITY EXAM: No edema, no swelling, no inflamation            LABS   Lab Results   Component Value Date    WBC 7.6 07/13/2021    HGB 11.8 (L) 07/13/2021    HCT 39.8 07/13/2021    MCV 95.0 07/13/2021     07/13/2021     Lab Results   Component Value Date    CREATININE 0.4 (L) 07/10/2021    BUN 11 07/10/2021     07/10/2021    K 4.3 07/10/2021    CL 98 (L) 07/10/2021    CO2 41 (H) 07/10/2021     Lab Results   Component Value Date    INR 1.03 05/24/2021    PROTIME 12.5 05/24/2021          Lab Results   Component Value Date    PHOS 2.6 05/24/2021    PHOS 5.2 05/08/2021    PHOS 4.4 05/07/2021        Recent Labs     07/12/21  1130   PH 7.41   PO2ART 85   [id number] 74.0*   O2SAT 95.9*         Wt Readings from Last 3 Encounters:   07/13/21 221 lb 1.9 oz (100.3 kg)   06/27/21 207 lb 4.8 oz (94 kg)   05/24/21 207 lb 9.6 oz (94.2 kg)               ASSESMENT  Ac on ch resp failure  Ac copd  rashad        PLAN  1. cpm  2.  On bipap at present    7/13/2021  Radha Hanley MD, M.D.

## 2021-07-14 VITALS
HEIGHT: 64 IN | SYSTOLIC BLOOD PRESSURE: 105 MMHG | WEIGHT: 219.14 LBS | OXYGEN SATURATION: 94 % | RESPIRATION RATE: 24 BRPM | TEMPERATURE: 98.1 F | DIASTOLIC BLOOD PRESSURE: 43 MMHG | BODY MASS INDEX: 37.41 KG/M2 | HEART RATE: 77 BPM

## 2021-07-14 LAB
ALBUMIN SERPL-MCNC: 3.3 GM/DL (ref 3.4–5)
ALP BLD-CCNC: 58 IU/L (ref 40–128)
ALT SERPL-CCNC: 15 U/L (ref 10–40)
ANION GAP SERPL CALCULATED.3IONS-SCNC: 2 MMOL/L (ref 4–16)
AST SERPL-CCNC: 14 IU/L (ref 15–37)
BASE EXCESS MIXED: 17.3 (ref 0–2.3)
BASOPHILS ABSOLUTE: 0 K/CU MM
BASOPHILS RELATIVE PERCENT: 0.1 % (ref 0–1)
BILIRUB SERPL-MCNC: 0.2 MG/DL (ref 0–1)
BUN BLDV-MCNC: 15 MG/DL (ref 6–23)
CALCIUM SERPL-MCNC: 8.9 MG/DL (ref 8.3–10.6)
CARBON MONOXIDE, BLOOD: 2 % (ref 0–5)
CHLORIDE BLD-SCNC: 99 MMOL/L (ref 99–110)
CO2 CONTENT: 49.1 MMOL/L (ref 19–24)
CO2: 42 MMOL/L (ref 21–32)
COMMENT: 15
CREAT SERPL-MCNC: 0.4 MG/DL (ref 0.6–1.1)
DIFFERENTIAL TYPE: ABNORMAL
EOSINOPHILS ABSOLUTE: 0.2 K/CU MM
EOSINOPHILS RELATIVE PERCENT: 2.4 % (ref 0–3)
GFR AFRICAN AMERICAN: >60 ML/MIN/1.73M2
GFR NON-AFRICAN AMERICAN: >60 ML/MIN/1.73M2
GLUCOSE BLD-MCNC: 143 MG/DL (ref 70–99)
HCO3 ARTERIAL: 46.7 MMOL/L (ref 18–23)
HCT VFR BLD CALC: 39.4 % (ref 37–47)
HEMOGLOBIN: 11.9 GM/DL (ref 12.5–16)
IMMATURE NEUTROPHIL %: 0.5 % (ref 0–0.43)
LYMPHOCYTES ABSOLUTE: 2 K/CU MM
LYMPHOCYTES RELATIVE PERCENT: 20.4 % (ref 24–44)
MCH RBC QN AUTO: 28.3 PG (ref 27–31)
MCHC RBC AUTO-ENTMCNC: 30.2 % (ref 32–36)
MCV RBC AUTO: 93.6 FL (ref 78–100)
METHEMOGLOBIN ARTERIAL: 1.2 %
MONOCYTES ABSOLUTE: 1.1 K/CU MM
MONOCYTES RELATIVE PERCENT: 10.6 % (ref 0–4)
NUCLEATED RBC %: 0 %
O2 SATURATION: 95.3 % (ref 96–97)
PCO2 ARTERIAL: 79 MMHG (ref 32–45)
PDW BLD-RTO: 13.5 % (ref 11.7–14.9)
PH BLOOD: 7.38 (ref 7.34–7.45)
PLATELET # BLD: 210 K/CU MM (ref 140–440)
PMV BLD AUTO: 9.9 FL (ref 7.5–11.1)
PO2 ARTERIAL: 86 MMHG (ref 75–100)
POTASSIUM SERPL-SCNC: 4.7 MMOL/L (ref 3.5–5.1)
RBC # BLD: 4.21 M/CU MM (ref 4.2–5.4)
SEGMENTED NEUTROPHILS ABSOLUTE COUNT: 6.6 K/CU MM
SEGMENTED NEUTROPHILS RELATIVE PERCENT: 66 % (ref 36–66)
SODIUM BLD-SCNC: 143 MMOL/L (ref 135–145)
TOTAL IMMATURE NEUTOROPHIL: 0.05 K/CU MM
TOTAL NUCLEATED RBC: 0 K/CU MM
TOTAL PROTEIN: 5.7 GM/DL (ref 6.4–8.2)
WBC # BLD: 10 K/CU MM (ref 4–10.5)

## 2021-07-14 PROCEDURE — 2580000003 HC RX 258: Performed by: NURSE PRACTITIONER

## 2021-07-14 PROCEDURE — 6370000000 HC RX 637 (ALT 250 FOR IP): Performed by: NURSE PRACTITIONER

## 2021-07-14 PROCEDURE — 6370000000 HC RX 637 (ALT 250 FOR IP): Performed by: INTERNAL MEDICINE

## 2021-07-14 PROCEDURE — 82803 BLOOD GASES ANY COMBINATION: CPT

## 2021-07-14 PROCEDURE — 94150 VITAL CAPACITY TEST: CPT

## 2021-07-14 PROCEDURE — 97165 OT EVAL LOW COMPLEX 30 MIN: CPT

## 2021-07-14 PROCEDURE — 80053 COMPREHEN METABOLIC PANEL: CPT

## 2021-07-14 PROCEDURE — 94660 CPAP INITIATION&MGMT: CPT

## 2021-07-14 PROCEDURE — 2700000000 HC OXYGEN THERAPY PER DAY

## 2021-07-14 PROCEDURE — 97535 SELF CARE MNGMENT TRAINING: CPT

## 2021-07-14 PROCEDURE — 94640 AIRWAY INHALATION TREATMENT: CPT

## 2021-07-14 PROCEDURE — 6360000002 HC RX W HCPCS: Performed by: NURSE PRACTITIONER

## 2021-07-14 PROCEDURE — 85025 COMPLETE CBC W/AUTO DIFF WBC: CPT

## 2021-07-14 PROCEDURE — 36600 WITHDRAWAL OF ARTERIAL BLOOD: CPT

## 2021-07-14 PROCEDURE — 94761 N-INVAS EAR/PLS OXIMETRY MLT: CPT

## 2021-07-14 PROCEDURE — 36415 COLL VENOUS BLD VENIPUNCTURE: CPT

## 2021-07-14 RX ORDER — DOXYCYCLINE HYCLATE 100 MG
100 TABLET ORAL 2 TIMES DAILY
Qty: 10 TABLET | Refills: 0 | Status: SHIPPED | OUTPATIENT
Start: 2021-07-14 | End: 2021-07-19

## 2021-07-14 RX ORDER — PREDNISONE 10 MG/1
TABLET ORAL
Qty: 21 TABLET | Refills: 0 | Status: ON HOLD | OUTPATIENT
Start: 2021-07-15 | End: 2022-01-17 | Stop reason: HOSPADM

## 2021-07-14 RX ORDER — IPRATROPIUM BROMIDE AND ALBUTEROL SULFATE 2.5; .5 MG/3ML; MG/3ML
3 SOLUTION RESPIRATORY (INHALATION) EVERY 6 HOURS PRN
Qty: 120 ML | Refills: 0 | Status: SHIPPED | OUTPATIENT
Start: 2021-07-14 | End: 2021-10-26

## 2021-07-14 RX ORDER — SENNA PLUS 8.6 MG/1
1 TABLET ORAL DAILY PRN
Qty: 10 TABLET | Refills: 0 | Status: SHIPPED | OUTPATIENT
Start: 2021-07-14 | End: 2021-07-24

## 2021-07-14 RX ADMIN — PREGABALIN 100 MG: 100 CAPSULE ORAL at 08:50

## 2021-07-14 RX ADMIN — ASPIRIN 81 MG: 81 TABLET, CHEWABLE ORAL at 08:50

## 2021-07-14 RX ADMIN — TRAMADOL HYDROCHLORIDE 50 MG: 50 TABLET, FILM COATED ORAL at 08:51

## 2021-07-14 RX ADMIN — PREDNISONE 40 MG: 20 TABLET ORAL at 08:50

## 2021-07-14 RX ADMIN — BUPRENORPHINE AND NALOXONE 1 FILM: 8; 2 FILM, SOLUBLE BUCCAL; SUBLINGUAL at 10:37

## 2021-07-14 RX ADMIN — IPRATROPIUM BROMIDE AND ALBUTEROL SULFATE 1 AMPULE: .5; 3 SOLUTION RESPIRATORY (INHALATION) at 08:54

## 2021-07-14 RX ADMIN — ENOXAPARIN SODIUM 40 MG: 40 INJECTION SUBCUTANEOUS at 08:48

## 2021-07-14 RX ADMIN — PREGABALIN 100 MG: 100 CAPSULE ORAL at 15:10

## 2021-07-14 RX ADMIN — IPRATROPIUM BROMIDE AND ALBUTEROL SULFATE 1 AMPULE: .5; 3 SOLUTION RESPIRATORY (INHALATION) at 14:14

## 2021-07-14 RX ADMIN — SODIUM CHLORIDE, PRESERVATIVE FREE 10 ML: 5 INJECTION INTRAVENOUS at 10:38

## 2021-07-14 RX ADMIN — BUPRENORPHINE AND NALOXONE 1 FILM: 8; 2 FILM, SOLUBLE BUCCAL; SUBLINGUAL at 15:10

## 2021-07-14 ASSESSMENT — PAIN SCALES - GENERAL: PAINLEVEL_OUTOF10: 0

## 2021-07-14 NOTE — PROGRESS NOTES
Pt discharge paperwork reviewed with pt and all questions answered. Pt stated ridsharon is to arrive after 7pm with her home oxygen. Pt has paperwork and prescriptions with belongings.

## 2021-07-14 NOTE — PROGRESS NOTES
ATTENDING PHYSICIAN'S PROGRESS NOTES    Patient:  Nicol Lopez      Unit/Bed:2012/2012-A    YOB: 1960    MRN: [id number]     Acct: [de-identified]     Admit date: 7/9/2021    Patient Seen, Chart, Consults notes, Labs, Radiology studies reviewed. SUBJECTIVE:   Day 4 of stay   65 yo woman, a former smoker, chronic respiratory failure on home oxygen 4L 24/7,  who presented with worsening shortness of breath. Patient was admitted for acute on chronic respiratory failure and COPD exacerbation. She was found to have mild CO2 retention. Patient was seen and examined. She reports she improved clinically. Leukocytosis resolved. All other ROS negative except noted in HPI    Past, Family, Social History unchanged from admission. Diet:  ADULT DIET; Regular    Medications:  Scheduled Meds:   ipratropium-albuterol  1 ampule Inhalation Q4H WA    amphetamine-dextroamphetamine  30 mg Oral Daily    aspirin  81 mg Oral Daily    buprenorphine-naloxone  1 Film Sublingual TID    pregabalin  100 mg Oral TID    traMADol  50 mg Oral BID    sodium chloride flush  5-40 mL Intravenous 2 times per day    enoxaparin  40 mg Subcutaneous Daily    predniSONE  40 mg Oral Daily     Continuous Infusions:   sodium chloride       PRN Meds:sodium chloride, traZODone, sodium chloride flush, sodium chloride, ondansetron **OR** ondansetron, senna, acetaminophen **OR** acetaminophen    OBJECTIVE:    CBC:   Recent Labs     07/11/21  0606 07/12/21  0545 07/13/21  0534   WBC 17.6* 12.2* 7.6   HGB 11.8* 11.2* 11.8*    236 216     BMP:  No results for input(s): NA, K, CL, CO2, BUN, CREATININE, GLUCOSE in the last 72 hours. Calcium:No results for input(s): CALCIUM in the last 72 hours. Ionized Calcium:No results for input(s): IONCA in the last 72 hours. Magnesium:No results for input(s): MG in the last 72 hours. Phosphorus:No results for input(s): PHOS in the last 72 hours.   BNP:No results for input(s): BNP in the last 72 hours. Glucose:No results for input(s): POCGLU in the last 72 hours. HgbA1C: No results for input(s): LABA1C in the last 72 hours. INR: No results for input(s): INR in the last 72 hours. Hepatic: No results for input(s): ALKPHOS, ALT, AST, PROT, BILITOT, BILIDIR, LABALBU in the last 72 hours. Amylase and Lipase:No results for input(s): LACTA, AMYLASE in the last 72 hours. Lactic Acid: No results for input(s): LACTA in the last 72 hours. Troponin: No results for input(s): CKTOTAL, CKMB, TROPONINT in the last 72 hours. BNP: No results for input(s): BNP in the last 72 hours. Lipids: No results for input(s): CHOL, TRIG, HDL, LDLCALC in the last 72 hours. Invalid input(s): LDL  ABGs:   Lab Results   Component Value Date    PH 7.41 07/12/2021    O2SAT 95.9 07/12/2021       Radiology reports as per the Radiologist  Radiology: XR CHEST PORTABLE    Result Date: 7/9/2021  EXAMINATION: ONE XRAY VIEW OF THE CHEST 7/9/2021 5:07 am COMPARISON: 06/26/2021 HISTORY: ORDERING SYSTEM PROVIDED HISTORY: chest pain TECHNOLOGIST PROVIDED HISTORY: Reason for exam:->chest pain Reason for Exam: chest pain Acuity: Unknown Type of Exam: Initial FINDINGS: Overlapping breast tissue slightly limits evaluation. No focal consolidation, pleural effusion or pneumothorax. There is central pulmonary vascular congestion without overt pulmonary edema. The cardiac silhouette and mediastinal contours are stable. No acute bony abnormality. Central pulmonary vascular congestion without overt pulmonary edema. Physical Exam:  Vitals: /80   Pulse 94   Temp 97.9 °F (36.6 °C) (Oral)   Resp 12   Ht 5' 4\" (1.626 m)   Wt 221 lb 1.9 oz (100.3 kg)   SpO2 95%   BMI 37.96 kg/m²   24 hour intake/output:No intake or output data in the 24 hours ending 07/13/21 2247  Last 3 weights:   Wt Readings from Last 3 Encounters:   07/13/21 221 lb 1.9 oz (100.3 kg)   06/27/21 207 lb 4.8 oz (94 kg)   05/24/21 207 lb 9.6 oz (94.2 kg)       General: middle aged woman, not in acute distress; on 4L/min nasal cannula  Head: Normocephalic atraumatic  Eyes: PERRL, EOMI, anicteric sclerae  ENT: No nasal discharge, oral mucosa moist, hearing normal  Neck: Supple, trachea in midline, no thyromegaly, no JVD, no carotid bruits  Chest: Normal respiratory effort, symmetric expansion of the chest, no rales, rhonchi or wheezes  CV: S1-S2 audible, regular rate and rhythm, no murmur rub or gallop, pulses 2+ upper extremities  Abdomen: Bowel sounds present, abdomen soft, nontender nondistended  Musculoskeletal: No edema, cyanosis or clubbing. No obvious deformity, range of motion grossly within normal limit  Neurology: AAOx3, CN 2-12 grossly intact, motor and sensory grossly intact, no focal deficits  Psychiatric: Normal mood, affect is congruent with age and the environment  Skin: Warm and dry, no rash, ecchymosis or petechia in the exposed post area      DVT prophylaxis: [x] Lovenox                                 [] SCDs                                 [] SQ Heparin                                 [] Encourage ambulation           [] Already on Anticoagulation                 ASSESSMENT / PLAN :    Active Problems:    COPD with acute exacerbation ([address]  Resolved Problems:    * No resolved hospital problems. *    1. Acute on chronic respiratory failure with hypoxia and hypercapnia - some improvement today, will continue BiPAP     2.  Acute exacerbation of COPD   -improved, continue steroids, taper, nebs        3. Essential hypertension   -controlled will continue to monitor BP      Electronically signed by Bere Lr MD on 7/13/2021 at 10:47 PM    Rounding Hospitalist  Correction on subjective  Patient was found to have severe CO2 retention on admission; on ABG, the PCO2 was 106mmHg

## 2021-07-14 NOTE — CARE COORDINATION
LSW follow up with pt regarding discharge plans. Pt is active with [facility] for aide services only. OT is recommending home with HC. LSW spoke with pt regarding possible HC skilled services and pt is agreeable and would like [facility] to also provide her skilled care. LSW called [facility] and spoke with [facility] and informed her of pt needing skilled services along with her non-skilled. Linh Jamil stated they can provide those services to pt. CM will need a inpt HC order for nursing and PT/OT. If pt is discharged after hours please call [facility] [phone] and inform them of pt discharge. Fax HC order, face sheet, H&P, PT/OT notes and AVS to [phone]. Pt's plan is to go home with [facility] skilled and non-skilled services.

## 2021-07-14 NOTE — PROGRESS NOTES
Occupational Therapy    [facility] ACUTE CARE OCCUPATIONAL THERAPY EVALUATION  Cliff Chopra, 1960, -A, 2021    History  [place]:  The primary encounter diagnosis was COPD exacerbation ([address] A diagnosis of Acute on chronic respiratory failure with hypoxia and hypercapnia (HCC) was also pertinent to this visit. Patient  has a past medical history of ADHD, COPD (chronic obstructive pulmonary disease) ([address] COVID-19, Drug addiction in remission ([facility] Hep C w/o coma, chronic ([facility] [address] [facility] Pacemaker, Sleep apnea, and TIA (transient ischemic attack). Patient  has a past surgical history that includes  section (); Cholecystectomy (); and Pacemaker insertion (). Subjective:  Patient states:  \"I want to go for a walk\".     Pain:  No    Communication with other providers:  Handoff to RN  Restrictions: General Precautions, Fall Risk    Home Setup/Prior level of function  Social/Functional History  Lives With: Alone  Type of Home: Apartment  Home Layout: One level  Home Access: Level entry  Bathroom Shower/Tub: Tub/Shower unit  Bathroom Toilet: Standard  Bathroom Equipment: Shower chair  Bathroom Accessibility: Accessible  Home Equipment: Oxygen (4L of 02 at home)  Receives Help From: Personal care attendant (7 days a week 7 hours a day)  ADL Assistance: Independent  Homemaking Responsibilities: No  Ambulation Assistance: Independent (furniture walks)  Transfer Assistance: Independent  Active : Yes  Mode of Transportation: Car  Occupation: Retired  Additional Comments: Pt reports one fall in last 6 months due to dizziness    Examination of body systems (includes body structures/functions, activity/participation limitations):  · Observation:  Supine in bed upon arrival, agreeable to therapy, On BIPAP  · Vision:  Glasses  · Hearing:  [email or web address]  · Cardiopulmonary:  4L of 02 (this is at baseline, 02 saturation during session dropped ~82% after functional mobility despite 4L of 02, put BIPAP back on and w/ 2 minutes of BIPAP increased ~97%)      Body Systems and functions:  · ROM R/L:  WFL. · Strength R/L:  4+/5,   · Sensation: WFL  · Tone: Normal  · Coordination: WFL  · Perception: WNL    Activities of Daily Living (ADLs):  · Feeding: Guera (uppers/lowers)  · Grooming: SBA (in stand at sink to wash hands)  · UB bathing: Supervision  · LB bathing: SBA (washing [place] area sitting upright on standard commode)  · UB dressing: Supervision  · LB dressing: SBA (donning socks sitting EOB in figure 4 position, threadin underwear/pants in stand to prepare for toileting)  · Toileting: SBA (pt toileted on standard commode this session, see LB bathing/dressing for details)    Cognitive and Psychosocial Functioning:  · Overall cognitive status: WNL  · Affect: Normal        Mobility:  · Supine to sit: Independent  · Transfers: SBA from EOB up to stand  · Sitting balance:  Indpendent. · Standing balance:  SBA  · Functional Mobility: SBA to/from room ~100 feet w/ decreased 02 saturation. See Cardiopulmonary for details. · Toilet Transfers: SBA w/ use of grab bars to/from standard commode    · Sit to supine: Independent           AM-[facility] Daily Activity Inpatient   How much help for putting on and taking off regular lower body clothing?: A Little  How much help for Bathing?: A Little  How much help for Toileting?: A Little  How much help for putting on and taking off regular upper body clothing?: None  How much help for taking care of personal grooming?: A Little  How much help for eating meals?: None  AM-[facility] Inpatient Daily Activity Raw Score: 20  AM-[facility] Inpatient ADL T-Scale Score : 42.03  ADL Inpatient CMS 0-100% Score: 38.32  ADL Inpatient CMS G-Code Modifier : CJ    Treatment:  Self Care Training:   Cues were given for safety, sequence, UE/LE placement, visual cues, and balance.     Activities performed today included grooming, LB bathing/dressing, toileting, toilet transfer      Safety: patient left in chair with chair alarm, call light within reach, RN notified, gait belt used. Assessment:  Pt is a 63 yo female admitted from home for COPD w/ acute exacerbation. Pt at baseline is Independent for ADLs needs assistance for high level IADLs and is Independent for functional transfers/mobility. Pt currently presents w/ deficits in ADL and high level IADL independence, functional activity tolerance, dynamic sitting and standing balance and tolerance and functional transfers, BUE strength and is most limited by decreased 02 saturation during functional activities. Pt would benefit from continued acute care OT services w/ discharge to home w/ home health OT S1 w/ assist PRN  Complexity: Low  Prognosis: Good, no significant barriers to participation at this time.    Plan  Times per week: 1x+  Times per day: Daily  Current Treatment Recommendations: Strengthening, Endurance Training, Patient/Caregiver Education & Training, Equipment Evaluation, Education, & procurement, Self-Care / ADL, Balance Training, Pain Management, Home Management Training, Functional Mobility Training, Safety Education & Training, Positioning     Equipment: defer    Goals:  Pt goal: go home  Time Frame for STGs: discharge  Goal 1: Pt will perform UE ADLs Independent  Goal 2: Pt will perform LE ADLs Independent  Goal 3: Pt will perform toileting Independent  Goal 4: Pt will perform functional transfer Independent  Goal 5: Pt will perform functional mobility Independent  Goal 6: Pt will perform therex/theract in order to increase functional activity tolerance and dynamic standing balance    Treatment plan:  Pt will perform functional task in stand reaching in all 3 planes in order to increase dynamic standing balance and functional activity tolerance w/ 02 saturation remaining above 88%     Recommendations for NURSING activity: Up to chair for all 3 meals and up to standard commode for all toileting needs    Time:   Time in:  0915  Time out: 0930  Timed treatment minutes: 10 minutes  Total time: 15 minutes    Electronically signed by:    Lyla CANALES/L [id number]  9:57 AM,7/14/2021

## 2021-07-14 NOTE — DISCHARGE SUMMARY
HOSPITALIST DISCHARGE SUMMARY     Patient ID: Tiana Camp 1960                 [id number]      PCP:  German Horton PA-C    Admit date: 7/9/2021   Discharge date: 7/14/2021      Admitting Physician: No admitting provider for patient encounter. Attending Physician(s): Quynh Bailey MD  Discharge Physician: Quynh Bailey MD  Consultant(s): pulmonology    Admitting Diagnoses:   Discharge Diagnoses: Principal Problem:    COPD with acute exacerbation [facility])  Resolved Problems:    * No resolved hospital problems. *    Discharged Condition: fair  Disposition: Home with home health    Procedures: none  Complications: none    Brief History: Tiana Camp is a 64 y.o. female who presents with worsening shortness of breath. Reports onset of symptoms approximate 3 days ago and worsening. States presented to the emergency room after inability to \"catch her breath\" no relief from home bronchodilators. She called EMS and upon arrival was noted to have an oxygen saturation in the 60s. She was started on supplemental oxygen and placed on BiPAP in the emergency room and reports significant improvement. Denies productive cough or subjective fever symptoms. Denies HA, vision changes, fever, chills, sweats, CP, abdominal pain, diarrhea, constipation, or dysuria. Hospital Course:   65 yo woman, a former smoker, chronic respiratory failure on home oxygen 4L 24/7,  who presented with worsening shortness of breath. Patient was admitted for acute on chronic respiratory failure with hypercapnia and COPD exacerbation. She was found to have severe CO2 retention with PaCO2 106mmHg on admission. 1. Acute on chronic respiratory failure with hypoxia and hypercapnia   - some improvement today, will continue BiPAP  - Patient hypercapnia improved and PCO2 trended down to 70s now; on discharge, ABG on 4L /min oxygen via nasal cannula: pH 7.38, O2 sat 95.3%, PCO2 79, PO2 86, HCO3  46.7 ;  I discussed with pulmonology  07/14/2021    MCH 28.3 07/14/2021    MCHC 30.2 07/14/2021    RDW 13.5 07/14/2021     07/14/2021    MPV 9.9 07/14/2021     CBC with Differential:    Lab Results   Component Value Date    WBC 10.0 07/14/2021    RBC 4.21 07/14/2021    HGB 11.9 07/14/2021    HCT 39.4 07/14/2021     07/14/2021    MCV 93.6 07/14/2021    MCH 28.3 07/14/2021    MCHC 30.2 07/14/2021    RDW 13.5 07/14/2021    SEGSPCT 66.0 07/14/2021    LYMPHOPCT 20.4 07/14/2021    MONOPCT 10.6 07/14/2021    BASOPCT 0.1 07/14/2021    MONOSABS 1.1 07/14/2021    LYMPHSABS 2.0 07/14/2021    EOSABS 0.2 07/14/2021    BASOSABS 0.0 07/14/2021    DIFFTYPE AUTOMATED DIFFERENTIAL 07/14/2021     CMP:    Lab Results   Component Value Date     07/14/2021    K 4.7 07/14/2021    CL 99 07/14/2021    CO2 42 07/14/2021    BUN 15 07/14/2021    CREATININE 0.4 07/14/2021    GFRAA >60 07/14/2021    LABGLOM >60 07/14/2021    GLUCOSE 143 07/14/2021    PROT 5.7 07/14/2021    LABALBU 3.3 07/14/2021    CALCIUM 8.9 07/14/2021    BILITOT 0.2 07/14/2021    ALKPHOS 58 07/14/2021    AST 14 07/14/2021    ALT 15 07/14/2021     BMP:    Lab Results   Component Value Date     07/14/2021    K 4.7 07/14/2021    CL 99 07/14/2021    CO2 42 07/14/2021    BUN 15 07/14/2021    LABALBU 3.3 07/14/2021    CREATININE 0.4 07/14/2021    CALCIUM 8.9 07/14/2021    GFRAA >60 07/14/2021    LABGLOM >60 07/14/2021    GLUCOSE 143 07/14/2021     Hepatic Function Panel:    Lab Results   Component Value Date    ALKPHOS 58 07/14/2021    ALT 15 07/14/2021    AST 14 07/14/2021    PROT 5.7 07/14/2021    BILITOT 0.2 07/14/2021    BILIDIR 0.2 04/14/2021    IBILI 0.0 04/14/2021    LABALBU 3.3 07/14/2021     PT/INR:    Lab Results   Component Value Date    PROTIME 12.5 05/24/2021    INR 1.03 05/24/2021     Urine Culture:  No components found for: CURINE  Blood Culture:  No components found for: CBLOOD, CFUNGUSBL  Throat Culture:  No components found for: CTHROAT      Patient Instructions  Medications:   Kylah Spore   Home Medication Instructions UVJ:[id number]    Printed on:07/14/21 9277   Medication Information                      albuterol sulfate  (90 Base) MCG/ACT inhaler  USE 2 PUFFS BY MOUTH EVERY 6 HOURS AS NEEDED SHAKE WELL             amphetamine-dextroamphetamine (ADDERALL XR) 30 MG extended release capsule  Take 30 mg by mouth daily. aspirin 81 MG chewable tablet  Take 81 mg by mouth daily             BiPAP Machine MISC  by Does not apply route Use nightly             buprenorphine-naloxone (SUBOXONE) 8-2 MG FILM SL film  Place 1 Film under the tongue 3 times daily. CPAP Machine MISC  by CPAP route nightly              fluticasone-umeclidin-vilant (TRELEGY ELLIPTA) 100-62.5-25 MCG/INH AEPB  Inhale 1 puff into the lungs daily             ipratropium-albuterol (DUONEB) 0.5-2.5 (3) MG/3ML SOLN nebulizer solution  Inhale 3 mLs into the lungs every 6 hours as needed for Shortness of Breath             Multiple Vitamins-Minerals (WOMENS MULTIVITAMIN PO)  Take 1 tablet by mouth daily             Nebulizer MISC  Use nebulizer with medication as directed. OXYGEN  Inhale 4 L into the lungs continuous              predniSONE (DELTASONE) 10 MG tablet  40mg daily for 2 days, 30mg daily for 2 days, 20 mg daily for 2 days, 10 mg daily for 2 days, 5 mg daily for 2 days             pregabalin (LYRICA) 100 MG capsule  Take 100 mg by mouth 3 times daily.              Respiratory Therapy Supplies (NEBULIZER/TUBING/MOUTHPIECE) KIT  Inhale 1 kit into the lungs daily as needed (as directed)             senna (SENOKOT) 8.6 MG tablet  Take 1 tablet by mouth daily as needed (Constipation)             traZODone (DESYREL) 150 MG tablet  Take 300 mg by mouth nightly                 Doxycycline 100mg oral twice daily for 5 days was prescribed on discharge    Activity: activity as tolerated  Diet: cardiac diet  Wound Care: none needed  Follow-up with:   [facility] PCP within 3 days  [facility] Pulmonology within 1 week      Services: Hospitalist team service      Electronically Signed by: Kelli Reyes MD, MD.    Time spent on discharge/dictation: 40 minutes

## 2021-07-15 NOTE — ADT AUTH CERT
Chronic Obstructive Pulmonary Disease - Care Day 5 (7/13/2021) by Tatiana Guillaume RN       Review Status Review Entered   Completed 7/14/2021 12:14      Criteria Review      Care Day: 5 Care Date: 7/13/2021 Level of Care: Intermediate Care    Guideline Day 3    Clinical Status    (X) * Hemodynamic stability    7/14/2021 12:14 PM EDT by Wade Paz      stable    (X) * Mental status at baseline    7/14/2021 12:14 PM EDT by Wade Paz      A&O    ( ) * No evidence of infection, or outpatient treatment planned    ( ) * Uncompensated acidosis absent    ( ) * Oxygenation at baseline or acceptable for next level of care    ( ) * Signs and symptoms of COPD (eg, dyspnea, Tachypnea, cough, sputum production) at baseline or acceptable for next level of care    ( ) * Discharge plans and education understood    Activity    ( ) * Ambulatory or acceptable for next level of care    Routes    (X) * Oral hydration    7/14/2021 12:14 PM EDT by Wade Paz      reg diet    (X) * Oral medications or regimen acceptable for next level of care    7/14/2021 12:14 PM EDT by Wade Paz      po meds    (X) * Oral diet or acceptable for next level of care    7/14/2021 12:14 PM EDT by Wade Paz      reg diet    Medications    ( ) * Inhaled bronchodilator regimen established and feasible at next level of care    * Milestone   Additional Notes   7/13  med surg      65 yo woman, a former smoker, chronic respiratory failure on home oxygen 4L 24/7,  who presented with worsening shortness of breath. Patient was admitted for acute on chronic respiratory failure and COPD exacerbation.  She was found to have mild CO2 retention.        Diet:   ADULT DIET;  Regular       Scheduled Medications   · ipratropium-albuterol 1 ampule Inhalation Q4H WA   · amphetamine-dextroamphetamine 30 mg Oral Daily   · aspirin 81 mg Oral Daily   · buprenorphine-naloxone 1 Film Sublingual TID   · pregabalin 100 mg Oral TID   · traMADol 50 mg Oral BID   · sodium chloride flush 5-40 mL Intravenous 2 times per day   · enoxaparin 40 mg Subcutaneous Daily   · predniSONE 40 mg Oral Daily      Vitals: /80   Pulse 94   Temp 97.9 °F (36.6 °C) (Oral)   Resp 12   Ht 5' 4\" (1.626 m)   Wt 221 lb 1.9 oz (100.3 kg)   SpO2 95%   BMI 37.96 kg/m²    General: middle aged woman, not in acute distress; on 4L/min nasal cannula   Head: Normocephalic atraumatic   Eyes: PERRL, EOMI, anicteric sclerae   ENT: No nasal discharge, oral mucosa moist, hearing normal   Neck: Supple, trachea in midline, no thyromegaly, no JVD, no carotid bruits   Chest: Normal respiratory effort, symmetric expansion of the chest, no rales, rhonchi or wheezes   CV: S1-S2 audible, regular rate and rhythm, no murmur rub or gallop, pulses 2+ upper extremities   Abdomen: Bowel sounds present, abdomen soft, nontender nondistended   Musculoskeletal: No edema, cyanosis or clubbing.  No obvious deformity, range of motion grossly within normal limit   Neurology: AAOx3, CN 2-12 grossly intact, motor and sensory grossly intact, no focal deficits   Psychiatric: Normal mood, affect is congruent with age and the environment   Skin: Warm and dry, no rash, ecchymosis or petechia in the exposed post area       ASSESSMENT / PLAN :       Active Problems:     COPD with acute exacerbation ([address]   Resolved Problems:     * No resolved hospital problems. *       1. Acute on chronic respiratory failure with hypoxia and hypercapnia - some improvement today, will continue BiPAP       2. Acute exacerbation of COPD    -improved, continue steroids, taper, nebs            3. Essential hypertension    -controlled will continue to monitor BP          Pulmonary   ASSESMENT   Ac on ch resp failure   Ac copd   rashad               PLAN   1. cpm   2.  On bipap at present      WBC 7.6 K/CU MM      RBC 4.19 M/CU MM      Hemoglobin 11.8 GM/DL      Hematocrit 39.8 %      MCV 95.0 FL      MCH 28.2 PG      MCHC 29.6 %      RDW 13.9 %      Platelets 216 K/CU MM      MPV 9.7 FL                Chronic Obstructive Pulmonary Disease - Care Day 3 (7/11/2021) by Negro Horvath RN       Review Status Review Entered   Completed 7/14/2021 12:08      Criteria Review      Care Day: 3 Care Date: 7/11/2021 Level of Care: Intermediate Care    Guideline Day 3    Clinical Status    (X) * Hemodynamic stability    7/14/2021 12:08 PM EDT by Gary Sampson      stable    (X) * Mental status at baseline    7/14/2021 12:08 PM EDT by Gary Sampson      Awake, alert, oriented x 4. ( ) * No evidence of infection, or outpatient treatment planned    ( ) * Uncompensated acidosis absent    ( ) * Oxygenation at baseline or acceptable for next level of care    ( ) * Signs and symptoms of COPD (eg, dyspnea, Tachypnea, cough, sputum production) at baseline or acceptable for next level of care    ( ) * Discharge plans and education understood    Activity    ( ) * Ambulatory or acceptable for next level of care    Routes    (X) * Oral hydration    7/14/2021 12:08 PM EDT by Gary Sampson      ADULT DIET; Regular    (X) * Oral medications or regimen acceptable for next level of care    7/14/2021 12:08 PM EDT by Gary Sampson      po meds    (X) * Oral diet or acceptable for next level of care    7/14/2021 12:08 PM EDT by Gary Sampson      ADULT DIET; Regular    Medications    ( ) * Inhaled bronchodilator regimen established and feasible at next level of care    * Milestone   Additional Notes   7/11  med surg      No issues overnight. Patient states she had a good night, she has been tolerating being off BiPAP intermittently, appetite good, no specific complaints        BP: (!) 120/57   Pulse: 60   Resp: 17   Temp: 98.1 °F (36.7 °C)   SpO2: 98%       Physical Exam:   GEN    Awake female, sitting upright in bed in no apparent distress. Appears given age. EYES   Pupils are equally round.  No scleral erythema, discharge, or conjunctivitis.    HENT  Mucous membranes are moist. Oral pharynx without exudates, no evidence of thrush. NECK  Supple, no apparent thyromegaly or masses. RESP  Clear with decreased BS bilaterally  Symmetric chest movement while on room air. CARDIO/VASC           S1/S2 auscultated. Regular rate without appreciable murmurs, rubs, or gallops. No JVD or carotid bruits. Peripheral pulses equal bilaterally and palpable. No peripheral edema. GI        Abdomen is soft without significant tenderness, masses, or guarding. Bowel sounds are normoactive. Rectal exam deferred.        No costovertebral angle tenderness. Normal appearing external genitalia. Fraser catheter is not present. HEME/LYMPH            No palpable cervical lymphadenopathy and no hepatosplenomegaly. No petechiae or ecchymoses. MSK    No gross joint deformities. SKIN    Normal coloration, warm, dry. NEURO           Cranial nerves appear grossly intact, normal speech, no lateralizing weakness. PSYCH            Awake, alert, oriented x 4.  Affect appropriate.          Scheduled Medications   · amphetamine-dextroamphetamine 30 mg Oral Daily   · aspirin 81 mg Oral Daily   · buprenorphine-naloxone 1 Film Sublingual TID   · pregabalin 100 mg Oral TID   · traMADol 50 mg Oral BID   · sodium chloride flush 5-40 mL Intravenous 2 times per day   · enoxaparin 40 mg Subcutaneous Daily   · predniSONE 40 mg Oral Daily   · azithromycin 500 mg Intravenous Q24H   · ipratropium-albuterol 1 ampule Inhalation Q4H WA   Assessment and Plan:   Emilee Rose is a 64 y. oHartley Dylan presents with <principal problem not specified>       1. ACUTE ON CHRONIC RESPIRATORY FAILURE WITH HYPOXIA AND HYPERCAPNIA   - Stable with improvement. Will continue biPAP and steroids requesting pulmonary consult       2. ACUTE EXACERBATION COPD   -stable see above       3. ESSENTIAL HYPERTENSION   -controlled will continue to monitor      Pulmonology   Assessment:       1. Ac on ch hypoxemic and hypercapneic resp failure   2.  Ac copd 3. rashad                   Plan:       1. D/w pt   2. Adequate o2 adm   3. Continue bipap. 4. Check abg in am   5. Bd rx   6. Steroids   7. On zithromax   8. PT ALREADY HAS O2 AND PAP AT HOME   9.  Thanks will follow

## 2021-09-24 ENCOUNTER — HOSPITAL ENCOUNTER (OUTPATIENT)
Dept: GENERAL RADIOLOGY | Age: 61
Discharge: HOME | End: 2021-09-24
Payer: COMMERCIAL

## 2021-09-24 ENCOUNTER — HOSPITAL ENCOUNTER (OUTPATIENT)
Age: 61
Discharge: HOME | End: 2021-09-24
Payer: COMMERCIAL

## 2021-09-24 DIAGNOSIS — M19.90 CHRONIC ARTHRITIS: ICD-10-CM

## 2021-09-24 PROCEDURE — 73130 X-RAY EXAM OF HAND: CPT

## 2021-09-24 PROCEDURE — 73560 X-RAY EXAM OF KNEE 1 OR 2: CPT

## 2021-10-26 ENCOUNTER — OFFICE VISIT (OUTPATIENT)
Dept: CARDIOLOGY CLINIC | Age: 61
End: 2021-10-26
Payer: COMMERCIAL

## 2021-10-26 VITALS
DIASTOLIC BLOOD PRESSURE: 70 MMHG | SYSTOLIC BLOOD PRESSURE: 130 MMHG | HEIGHT: 64 IN | BODY MASS INDEX: 34.15 KG/M2 | WEIGHT: 200 LBS | HEART RATE: 110 BPM

## 2021-10-26 DIAGNOSIS — K21.9 GASTROESOPHAGEAL REFLUX DISEASE WITHOUT ESOPHAGITIS: ICD-10-CM

## 2021-10-26 DIAGNOSIS — F90.9 ATTENTION DEFICIT HYPERACTIVITY DISORDER (ADHD), UNSPECIFIED ADHD TYPE: ICD-10-CM

## 2021-10-26 DIAGNOSIS — J44.1 ACUTE EXACERBATION OF CHRONIC OBSTRUCTIVE PULMONARY DISEASE (COPD) (HCC): ICD-10-CM

## 2021-10-26 DIAGNOSIS — J96.22 ACUTE ON CHRONIC RESPIRATORY FAILURE WITH HYPOXIA AND HYPERCAPNIA (HCC): ICD-10-CM

## 2021-10-26 DIAGNOSIS — Z95.0 S/P PLACEMENT OF CARDIAC PACEMAKER: Primary | ICD-10-CM

## 2021-10-26 DIAGNOSIS — G47.33 OBSTRUCTIVE SLEEP APNEA: ICD-10-CM

## 2021-10-26 DIAGNOSIS — F19.11 H/O DRUG ABUSE (HCC): ICD-10-CM

## 2021-10-26 DIAGNOSIS — J44.1 COPD WITH ACUTE EXACERBATION (HCC): ICD-10-CM

## 2021-10-26 DIAGNOSIS — J43.8 OTHER EMPHYSEMA (HCC): ICD-10-CM

## 2021-10-26 DIAGNOSIS — J96.21 ACUTE ON CHRONIC RESPIRATORY FAILURE WITH HYPOXIA AND HYPERCAPNIA (HCC): ICD-10-CM

## 2021-10-26 PROCEDURE — 3023F SPIROM DOC REV: CPT | Performed by: INTERNAL MEDICINE

## 2021-10-26 PROCEDURE — 99214 OFFICE O/P EST MOD 30 MIN: CPT | Performed by: INTERNAL MEDICINE

## 2021-10-26 PROCEDURE — 3017F COLORECTAL CA SCREEN DOC REV: CPT | Performed by: INTERNAL MEDICINE

## 2021-10-26 PROCEDURE — G8484 FLU IMMUNIZE NO ADMIN: HCPCS | Performed by: INTERNAL MEDICINE

## 2021-10-26 PROCEDURE — G8417 CALC BMI ABV UP PARAM F/U: HCPCS | Performed by: INTERNAL MEDICINE

## 2021-10-26 PROCEDURE — G8427 DOCREV CUR MEDS BY ELIG CLIN: HCPCS | Performed by: INTERNAL MEDICINE

## 2021-10-26 PROCEDURE — G8926 SPIRO NO PERF OR DOC: HCPCS | Performed by: INTERNAL MEDICINE

## 2021-10-26 PROCEDURE — 1036F TOBACCO NON-USER: CPT | Performed by: INTERNAL MEDICINE

## 2021-10-26 NOTE — PATIENT INSTRUCTIONS
Please be informed that if you contact our office outside of normal business hours the physician on call cannot help with any scheduling or rescheduling issues, procedure instruction questions or any type of medication issue. We advise you for any urgent/emergency that you go to the nearest emergency room! PLEASE CALL OUR OFFICE DURING NORMAL BUSINESS HOURS    Monday - Friday   8 am to 5 pm    [facility] [address]: [phone]    [place]:  [phone]    **It is YOUR responsibilty to bring medication bottles and/or updated medication list to [address] [place] [address]. This will allow us to better serve you and all your healthcare needs**    Please hold on to these instructions the  will call you within 1-9 business days when we receive authorization from your insurance. Nuclear Stress Test    WHAT TO EXPECT:   ? You will need to confirm the test or it could be cancelled. ? This test will take approximately 2 hours: 1 hour in the AM &    1 hour in the PM. You will be given a time by the Technologist after the first part is completed to come back. ? You will be given a medication, through an IV in the hand, this will safely simulate exercise. This IV is also needed to inject the radioactive isotope unless you are able toe walk the treadmill. ? You will receive an injection in the AM & PM before the pictures. ? Using a special camera, you will have one set of pictures of your heart taken in the AM and a set of pictures in the PM.     PREPARATION FOR TEST:  ? Eat a light breakfast such as water or juice and toast.  ? If you are DIABETIC: Eat a normal breakfast with NO CAFFEINE and take your insulin as normal.   ? AVOID ALL FOODS & DRINKS containing CAFFEINE 12 HOURS PRIOR TO THE TEST: Including coffee, Tea, Yenni and other soft drinks even those labeled  caffeine free or decaffeinated.  ?  HOLD THESE MEDICATIONS Persantine & Theophylline (Theodur)  24 hours prior & bring your inhaler with you.   The physician will specify if the following Beta blockers need to be held for 24 hours prior to test:

## 2021-10-26 NOTE — PROGRESS NOTES
CARDIOLOGY   NOTE    Chief Complaint: Shortness of breath palpitations    HPI:   Derrick Bowling is a 63y.o. year old who has Past medical history as noted below. She comes in for discussion because she was recently admitted for encephalopathy and could not undergo MRI because of a history of pacemaker. She is requesting for pacemaker to be removed. She also reports shortness of breath and chest tightness. She reports that she had pacemaker placed more than 12 to 14 years ago in [place] she is a recovering drug addict on Suboxone program.  Her aide who is with her is concerned that she is having recurrent TIA she has significant bilateral leg pain and lower extremity swelling  Her symptoms are at rest she is on 2 L of oxygen around-the-clock      Current Outpatient Medications   Medication Sig Dispense Refill    mometasone-formoterol (DULERA) 100-5 MCG/ACT inhaler Inhale 2 puffs into the lungs 2 times daily 1 Inhaler 5    ipratropium-albuterol (DUONEB) 0.5-2.5 (3) MG/3ML SOLN nebulizer solution Inhale 3 mLs into the lungs every 6 hours as needed for Shortness of Breath 120 mL 0    fluticasone-umeclidin-vilant (TRELEGY ELLIPTA) 100-62.5-25 MCG/INH AEPB Inhale 1 puff into the lungs daily 1 each 5    BiPAP Machine MISC by Does not apply route Use nightly      albuterol sulfate  (90 Base) MCG/ACT inhaler USE 2 PUFFS BY MOUTH EVERY 6 HOURS AS NEEDED SHAKE WELL 18 g 5    aspirin 81 MG chewable tablet Take 81 mg by mouth daily      Multiple Vitamins-Minerals (WOMENS MULTIVITAMIN PO) Take 1 tablet by mouth daily      Nebulizer MISC Use nebulizer with medication as directed. 1 each 0    Respiratory Therapy Supplies (NEBULIZER/TUBING/MOUTHPIECE) KIT Inhale 1 kit into the lungs daily as needed (as directed) 1 kit 0    amphetamine-dextroamphetamine (ADDERALL XR) 30 MG extended release capsule Take 30 mg by mouth daily.       pregabalin (LYRICA) 100 MG capsule Take 100 mg by mouth 3 times daily.  traZODone (DESYREL) 150 MG tablet Take 300 mg by mouth nightly       OXYGEN Inhale 4 L into the lungs continuous       buprenorphine-naloxone (SUBOXONE) 8-2 MG FILM SL film Place 1 Film under the tongue 3 times daily.  predniSONE (DELTASONE) 10 MG tablet Take 1 tablet by mouth daily 40mg daily for 2 days, 20 mg daily for 2 days, 10 mg daily for 2 days, 5 mg daily for 2 days (Patient not taking: Reported on 10/26/2021) 15 tablet 0    azithromycin (ZITHROMAX) 250 MG tablet Take 1 tablet by mouth daily Take 500 mg once, then 250 mg daily for 4 days then stop (Patient not taking: Reported on 10/26/2021) 6 tablet 0    predniSONE (DELTASONE) 10 MG tablet Take 1 tablet by mouth daily 40mg daily for 2 days, 20 mg daily for 2 days, 10 mg daily for 2 days, 5 mg daily for 2 days (Patient not taking: Reported on 10/26/2021) 15 tablet 0    doxycycline hyclate (VIBRAMYCIN) 100 MG capsule Take 1 capsule by mouth daily (Patient not taking: Reported on 10/26/2021) 10 capsule 0    predniSONE (DELTASONE) 10 MG tablet 40mg daily for 2 days, 30mg daily for 2 days, 20 mg daily for 2 days, 10 mg daily for 2 days, 5 mg daily for 2 days (Patient not taking: Reported on 10/26/2021) 21 tablet 0    CPAP Machine MISC by CPAP route nightly  (Patient not taking: Reported on 10/26/2021)       No current facility-administered medications for this visit. Allergies:   Patient has no known allergies.     Patient History:  Past Medical History:   Diagnosis Date    ADHD 2006    [facility]     COVID-19     Drug addiction in remission ([facility]     recovering addict    Hep C w/o coma, chronic ([facility]     Pacemaker     Sleep apnea     TIA (transient ischemic attack)     per patient \"several mini strokes\"     Past Surgical History:   Procedure Laterality Date   [address]    CHOLECYSTECTOMY  2010    done in [address] [place] [address] History   Problem Relation Age of Onset   Andreina Zuniga Cancer Mother         unsure of type    Thyroid Cancer Mother     Alcohol Abuse Father     Other Sister         passed in car accident    Lupus Brother     Coronary Art Dis Brother      Social History     Tobacco Use    Smoking status: Former Smoker     Packs/day: 1.00     Years: 40.00     Pack years: 40.00     Quit date: 2016     Years since quittin.1    Smokeless tobacco: Never Used   Substance Use Topics    Alcohol use: Not Currently     Comment: occasional caffeine         Review of Systems:   · Constitutional: No Fever or Weight Loss   · Eyes: No Decreased Vision  · ENT: No Headaches, Hearing Loss or Vertigo  · Cardiovascular: as per note above   · Respiratory: No cough or wheezing and as per note above. · Gastrointestinal: No abdominal pain, appetite loss, blood in stools, constipation, diarrhea or heartburn  · Genitourinary: No dysuria, trouble voiding, or hematuria  · Musculoskeletal:  denies any new  joint aches , swelling  or pain   · Integumentary: No rash or pruritis  · Neurological: No TIA or stroke symptoms  · Psychiatric: No anxiety or depression  · Endocrine: No malaise, fatigue or temperature intolerance  · Hematologic/Lymphatic: No bleeding problems, blood clots or swollen lymph nodes  · Allergic/Immunologic: No nasal congestion or hives    Objective:      Physical Exam:  /70   Pulse 110   Ht 5' 4\" (1.626 m)   Wt 200 lb (90.7 kg)   BMI 34.33 kg/m²   Wt Readings from Last 3 Encounters:   10/26/21 200 lb (90.7 kg)   21 200 lb (90.7 kg)   21 219 lb 2.2 oz (99.4 kg)     Body mass index is 34.33 kg/m². Vitals:    10/26/21 1518   BP: 130/70   Pulse: 110        General Appearance:  No distress, conversant  Constitutional:  Well developed, Well nourished, No acute distress, Non-toxic appearance.    HENT:  Normocephalic, Atraumatic, Bilateral external ears normal, Oropharynx moist, No oral exudates, Nose normal. Neck- Normal range of motion, No tenderness, Supple, No stridor,no apical-carotid delay  Eyes:  PERRL, EOMI, Conjunctiva normal, No discharge. Respiratory:  Normal breath sounds, No respiratory distress, No wheezing, No chest tenderness. ,no use of accessory muscles, NO crackles  Cardiovascular: (PMI) apex non displaced,no lifts no thrills,S1 and S2 audible, No added heart sounds, No signs of ankle edema, or volume overload, No evidence of JVD, No crackles  GI:  Bowel sounds normal, Soft, No tenderness, No masses, No gross visceromegaly   :  No costovertebral angle tenderness   Musculoskeletal:  No edema, no tenderness, no deformities. Back- no tenderness  Integument:  Well hydrated, no rash   Lymphatic:  No lymphadenopathy noted   Neurologic:  Alert & oriented x 3, CN 2-12 normal, normal motor function, normal sensory function, no focal deficits noted   Psychiatric:  Speech and behavior appropriate       Medical decision making and Data review:  DATA:  Lab Results   Component Value Date    TROPONINT <0.010 07/09/2021     BNP:    Lab Results   Component Value Date    PROBNP 44.81 07/09/2021     PT/INR:  No results found for: PTINR  No results found for: LABA1C  Lab Results   Component Value Date    CHOL 239 (H) 03/30/2021    TRIG 142 03/30/2021    HDL 53 03/30/2021    LDLDIRECT 169 (H) 03/30/2021     Lab Results   Component Value Date    ALT 15 07/14/2021    AST 14 (L) 07/14/2021     No results for input(s): WBC, HGB, HCT, MCV, PLT in the last 72 hours.   TSH: No results found for: TSH  Lab Results   Component Value Date    AST 14 (L) 07/14/2021    ALT 15 07/14/2021    BILIDIR 0.2 04/14/2021    BILITOT 0.2 07/14/2021    ALKPHOS 58 07/14/2021     Lab Results   Component Value Date    PROBNP 44.81 07/09/2021    PROBNP 68.10 06/26/2021    PROBNP 94.74 05/23/2021     No results found for: LABA1C  Lab Results   Component Value Date    WBC 10.0 07/14/2021    HGB 11.9 (L) 07/14/2021    HCT 39.4 07/14/2021     07/14/2021 All labs, medications and tests reviewed by myself including data and history from outside source , patient and available family . Assessment & Plan:      1. S/P placement of cardiac pacemaker    2. Gastroesophageal reflux disease without esophagitis    3. H/O drug abuse ([facility]    4. Attention deficit hyperactivity disorder (ADHD), unspecified ADHD type    5. Other emphysema ([facility]    6. Acute on chronic respiratory failure with hypoxia and hypercapnia (HCC)    7. Acute exacerbation of chronic obstructive pulmonary disease (COPD) ([facility]    8. COPD with acute exacerbation ([facility]    9. Obstructive sleep apnea         SOB (shortness of breath)  We will get echo and BNP level to evaluate further. She is on 2 L of oxygen around-the-clock also get stress test Lexiscan to evaluate for ischemia as etiology    S/P placement of cardiac pacemaker  We tried to interrogate her pacemaker using [employer], [employer] and [employer] devices but could not assess it. Will refer to EPS service for possible further evaluation and pacemaker removal    Leg swelling  Check echo, check BNP screen for carotid artery disease due to encephalopathy and dizziness     Dyslipidemia :  All available lab work was reviewed. Patient was advised to repeat lab work before next visit. Necessary orders were placed , instructions given by myself       Counseled extensively and medication compliance urged. We discussed that for the  prevention of ASCVD our  goal is aggressive risk modification. Various goals were discussed and questions answered. Continue current medications. Appropriate prescriptions are addressed and refills ordered. Questions answered and patient verbalizes understanding. Call for any problems, questions, or concerns. Greater than 60 % of time spent counseling besides reviewing data and images     Continue all other medications of all above medical condition listed as is. No follow-ups on file.     Please note this report has been partially produced using speech recognition software and may contain errors related to that system including errors in grammar, punctuation, and spelling, as well as words and phrases that may be inappropriate. If there are any questions or concerns please feel free to contact the dictating provider for clarification.

## 2021-12-09 ENCOUNTER — TELEPHONE (OUTPATIENT)
Dept: CARDIOLOGY CLINIC | Age: 61
End: 2021-12-09

## 2022-01-05 ENCOUNTER — HOSPITAL ENCOUNTER (INPATIENT)
Age: 62
LOS: 3 days | Discharge: NURSING, REHAB OR LONG-TERM CARE | DRG: 562 | End: 2022-01-11
Attending: EMERGENCY MEDICINE | Admitting: INTERNAL MEDICINE
Payer: MEDICARE

## 2022-01-05 ENCOUNTER — APPOINTMENT (OUTPATIENT)
Dept: ULTRASOUND IMAGING | Age: 62
DRG: 562 | End: 2022-01-05
Payer: MEDICARE

## 2022-01-05 ENCOUNTER — APPOINTMENT (OUTPATIENT)
Dept: GENERAL RADIOLOGY | Age: 62
DRG: 562 | End: 2022-01-05
Payer: MEDICARE
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
## 2022-01-05 DIAGNOSIS — S82.831A CLOSED FRACTURE OF DISTAL END OF RIGHT FIBULA, UNSPECIFIED FRACTURE MORPHOLOGY, INITIAL ENCOUNTER: ICD-10-CM

## 2022-01-05 PROBLEM — J31.0 CHRONIC RHINITIS: Status: ACTIVE | Noted: 2022-01-05
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
## 2022-01-05 LAB
ALBUMIN SERPL-MCNC: 3.2 GM/DL (ref 3.4–5)
ALP BLD-CCNC: 83 IU/L (ref 40–129)
ALT SERPL-CCNC: 18 U/L (ref 10–40)
ANION GAP SERPL CALCULATED.3IONS-SCNC: 4 MMOL/L (ref 4–16)
AST SERPL-CCNC: 18 IU/L (ref 15–37)
BACTERIA: NEGATIVE /HPF
BASOPHILS ABSOLUTE: 0.1 K/CU MM
BASOPHILS RELATIVE PERCENT: 0.5 % (ref 0–1)
BILIRUB SERPL-MCNC: 0.2 MG/DL (ref 0–1)
BILIRUBIN URINE: NEGATIVE MG/DL
BLOOD, URINE: NEGATIVE
BUN BLDV-MCNC: 9 MG/DL (ref 6–23)
CALCIUM SERPL-MCNC: 8.5 MG/DL (ref 8.3–10.6)
CHLORIDE BLD-SCNC: 94 MMOL/L (ref 99–110)
CLARITY: CLEAR
CO2: 38 MMOL/L (ref 21–32)
COLOR: YELLOW
CREAT SERPL-MCNC: 0.5 MG/DL (ref 0.6–1.1)
DIFFERENTIAL TYPE: ABNORMAL
EOSINOPHILS ABSOLUTE: 0.6 K/CU MM
EOSINOPHILS RELATIVE PERCENT: 4.8 % (ref 0–3)
GFR AFRICAN AMERICAN: >60 ML/MIN/1.73M2
GFR NON-AFRICAN AMERICAN: >60 ML/MIN/1.73M2
GLUCOSE BLD-MCNC: 204 MG/DL (ref 70–99)
GLUCOSE, URINE: NEGATIVE MG/DL
HCT VFR BLD CALC: 42.8 % (ref 37–47)
HEMOGLOBIN: 12.4 GM/DL (ref 12.5–16)
IMMATURE NEUTROPHIL %: 0.5 % (ref 0–0.43)
KETONES, URINE: NEGATIVE MG/DL
LEUKOCYTE ESTERASE, URINE: ABNORMAL
LYMPHOCYTES ABSOLUTE: 1.4 K/CU MM
LYMPHOCYTES RELATIVE PERCENT: 12.5 % (ref 24–44)
MCH RBC QN AUTO: 27.2 PG (ref 27–31)
MCHC RBC AUTO-ENTMCNC: 29 % (ref 32–36)
MCV RBC AUTO: 93.9 FL (ref 78–100)
MONOCYTES ABSOLUTE: 1.2 K/CU MM
MONOCYTES RELATIVE PERCENT: 10.2 % (ref 0–4)
MUCUS: ABNORMAL HPF
NITRITE URINE, QUANTITATIVE: NEGATIVE
NUCLEATED RBC %: 0 %
PDW BLD-RTO: 12.9 % (ref 11.7–14.9)
PH, URINE: 5 (ref 5–8)
PLATELET # BLD: 224 K/CU MM (ref 140–440)
PMV BLD AUTO: 9.1 FL (ref 7.5–11.1)
POTASSIUM SERPL-SCNC: 3.8 MMOL/L (ref 3.5–5.1)
PRO-BNP: 49.04 PG/ML
PROTEIN UA: NEGATIVE MG/DL
RBC # BLD: 4.56 M/CU MM (ref 4.2–5.4)
RBC URINE: <1 /HPF (ref 0–6)
SEGMENTED NEUTROPHILS ABSOLUTE COUNT: 8.2 K/CU MM
SEGMENTED NEUTROPHILS RELATIVE PERCENT: 71.5 % (ref 36–66)
SODIUM BLD-SCNC: 136 MMOL/L (ref 135–145)
SPECIFIC GRAVITY UA: 1.01 (ref 1–1.03)
SQUAMOUS EPITHELIAL: 3 /HPF
TOTAL CK: 67 IU/L (ref 26–140)
TOTAL IMMATURE NEUTOROPHIL: 0.06 K/CU MM
TOTAL NUCLEATED RBC: 0 K/CU MM
TOTAL PROTEIN: 6.7 GM/DL (ref 6.4–8.2)
TRICHOMONAS: ABNORMAL /HPF
TROPONIN T: <0.01 NG/ML
UROBILINOGEN, URINE: NEGATIVE MG/DL (ref 0.2–1)
WBC # BLD: 11.5 K/CU MM (ref 4–10.5)
WBC UA: 1 /HPF (ref 0–5)

## 2022-01-05 PROCEDURE — 82550 ASSAY OF CK (CPK): CPT

## 2022-01-05 PROCEDURE — G0378 HOSPITAL OBSERVATION PER HR: HCPCS

## 2022-01-05 PROCEDURE — 83880 ASSAY OF NATRIURETIC PEPTIDE: CPT

## 2022-01-05 PROCEDURE — 81001 URINALYSIS AUTO W/SCOPE: CPT

## 2022-01-05 PROCEDURE — 85025 COMPLETE CBC W/AUTO DIFF WBC: CPT

## 2022-01-05 PROCEDURE — 84484 ASSAY OF TROPONIN QUANT: CPT

## 2022-01-05 PROCEDURE — 73564 X-RAY EXAM KNEE 4 OR MORE: CPT

## 2022-01-05 PROCEDURE — 6360000002 HC RX W HCPCS: Performed by: EMERGENCY MEDICINE

## 2022-01-05 PROCEDURE — 99284 EMERGENCY DEPT VISIT MOD MDM: CPT

## 2022-01-05 PROCEDURE — 96372 THER/PROPH/DIAG INJ SC/IM: CPT

## 2022-01-05 PROCEDURE — 73610 X-RAY EXAM OF ANKLE: CPT

## 2022-01-05 PROCEDURE — 93005 ELECTROCARDIOGRAM TRACING: CPT | Performed by: INTERNAL MEDICINE

## 2022-01-05 PROCEDURE — 80053 COMPREHEN METABOLIC PANEL: CPT

## 2022-01-05 PROCEDURE — 36415 COLL VENOUS BLD VENIPUNCTURE: CPT

## 2022-01-05 PROCEDURE — 93971 EXTREMITY STUDY: CPT

## 2022-01-05 PROCEDURE — 6370000000 HC RX 637 (ALT 250 FOR IP): Performed by: NURSE PRACTITIONER

## 2022-01-05 RX ORDER — PREGABALIN 50 MG/1
100 CAPSULE ORAL 3 TIMES DAILY
Status: DISCONTINUED | OUTPATIENT
Start: 2022-01-05 | End: 2022-01-11 | Stop reason: HOSPADM

## 2022-01-05 RX ORDER — BUDESONIDE AND FORMOTEROL FUMARATE DIHYDRATE 80; 4.5 UG/1; UG/1
2 AEROSOL RESPIRATORY (INHALATION) 2 TIMES DAILY
Status: DISCONTINUED | OUTPATIENT
Start: 2022-01-05 | End: 2022-01-11 | Stop reason: HOSPADM

## 2022-01-05 RX ORDER — TRAZODONE HYDROCHLORIDE 50 MG/1
200 TABLET ORAL NIGHTLY PRN
Status: DISCONTINUED | OUTPATIENT
Start: 2022-01-05 | End: 2022-01-11 | Stop reason: HOSPADM

## 2022-01-05 RX ORDER — SODIUM CHLORIDE 9 MG/ML
25 INJECTION, SOLUTION INTRAVENOUS PRN
Status: DISCONTINUED | OUTPATIENT
Start: 2022-01-05 | End: 2022-01-11 | Stop reason: HOSPADM

## 2022-01-05 RX ORDER — ONDANSETRON 2 MG/ML
4 INJECTION INTRAMUSCULAR; INTRAVENOUS EVERY 6 HOURS PRN
Status: DISCONTINUED | OUTPATIENT
Start: 2022-01-05 | End: 2022-01-11 | Stop reason: HOSPADM

## 2022-01-05 RX ORDER — KETOROLAC TROMETHAMINE 30 MG/ML
30 INJECTION, SOLUTION INTRAMUSCULAR; INTRAVENOUS ONCE
Status: COMPLETED | OUTPATIENT
Start: 2022-01-05 | End: 2022-01-05

## 2022-01-05 RX ORDER — SODIUM CHLORIDE 0.9 % (FLUSH) 0.9 %
5-40 SYRINGE (ML) INJECTION EVERY 12 HOURS SCHEDULED
Status: DISCONTINUED | OUTPATIENT
Start: 2022-01-05 | End: 2022-01-11 | Stop reason: HOSPADM

## 2022-01-05 RX ORDER — TRAMADOL HYDROCHLORIDE 50 MG/1
50 TABLET ORAL EVERY 6 HOURS PRN
Status: DISCONTINUED | OUTPATIENT
Start: 2022-01-05 | End: 2022-01-06 | Stop reason: SDUPTHER

## 2022-01-05 RX ORDER — SENNA AND DOCUSATE SODIUM 50; 8.6 MG/1; MG/1
1 TABLET, FILM COATED ORAL 2 TIMES DAILY
Status: DISCONTINUED | OUTPATIENT
Start: 2022-01-05 | End: 2022-01-11 | Stop reason: HOSPADM

## 2022-01-05 RX ORDER — ACETAMINOPHEN 650 MG/1
650 SUPPOSITORY RECTAL EVERY 6 HOURS PRN
Status: DISCONTINUED | OUTPATIENT
Start: 2022-01-05 | End: 2022-01-11 | Stop reason: HOSPADM

## 2022-01-05 RX ORDER — IPRATROPIUM BROMIDE AND ALBUTEROL SULFATE 2.5; .5 MG/3ML; MG/3ML
1 SOLUTION RESPIRATORY (INHALATION) EVERY 4 HOURS PRN
Status: DISCONTINUED | OUTPATIENT
Start: 2022-01-05 | End: 2022-01-08

## 2022-01-05 RX ORDER — ONDANSETRON 4 MG/1
4 TABLET, ORALLY DISINTEGRATING ORAL EVERY 8 HOURS PRN
Status: DISCONTINUED | OUTPATIENT
Start: 2022-01-05 | End: 2022-01-11 | Stop reason: HOSPADM

## 2022-01-05 RX ORDER — POLYETHYLENE GLYCOL 3350 17 G/17G
17 POWDER, FOR SOLUTION ORAL DAILY PRN
Status: DISCONTINUED | OUTPATIENT
Start: 2022-01-05 | End: 2022-01-11 | Stop reason: HOSPADM

## 2022-01-05 RX ORDER — ASPIRIN 81 MG/1
81 TABLET, CHEWABLE ORAL DAILY
Status: DISCONTINUED | OUTPATIENT
Start: 2022-01-06 | End: 2022-01-11 | Stop reason: HOSPADM

## 2022-01-05 RX ORDER — ACETAMINOPHEN 325 MG/1
650 TABLET ORAL EVERY 6 HOURS PRN
Status: DISCONTINUED | OUTPATIENT
Start: 2022-01-05 | End: 2022-01-11 | Stop reason: HOSPADM

## 2022-01-05 RX ORDER — TRAMADOL HYDROCHLORIDE 50 MG/1
100 TABLET ORAL EVERY 6 HOURS PRN
Status: DISCONTINUED | OUTPATIENT
Start: 2022-01-05 | End: 2022-01-06 | Stop reason: SDUPTHER

## 2022-01-05 RX ORDER — ALBUTEROL SULFATE 90 UG/1
2 AEROSOL, METERED RESPIRATORY (INHALATION) EVERY 4 HOURS PRN
Status: DISCONTINUED | OUTPATIENT
Start: 2022-01-05 | End: 2022-01-11 | Stop reason: HOSPADM

## 2022-01-05 RX ORDER — TRAMADOL HYDROCHLORIDE 50 MG/1
1 TABLET ORAL 2 TIMES DAILY
Status: ON HOLD | COMMUNITY
Start: 2021-12-20 | End: 2022-02-14 | Stop reason: HOSPADM

## 2022-01-05 RX ORDER — SODIUM CHLORIDE 0.9 % (FLUSH) 0.9 %
5-40 SYRINGE (ML) INJECTION PRN
Status: DISCONTINUED | OUTPATIENT
Start: 2022-01-05 | End: 2022-01-11 | Stop reason: HOSPADM

## 2022-01-05 RX ADMIN — KETOROLAC TROMETHAMINE 30 MG: 30 INJECTION, SOLUTION INTRAMUSCULAR; INTRAVENOUS at 21:40

## 2022-01-05 ASSESSMENT — PAIN DESCRIPTION - ORIENTATION: ORIENTATION: RIGHT;LEFT

## 2022-01-05 ASSESSMENT — PAIN DESCRIPTION - LOCATION: LOCATION: LEG

## 2022-01-05 ASSESSMENT — PAIN SCALES - GENERAL
PAINLEVEL_OUTOF10: 10
PAINLEVEL_OUTOF10: 10

## 2022-01-05 NOTE — ED PROVIDER NOTES
Emergency [address] EMERGENCY DEPARTMENT    Patient: Renée Pichardo  MRN: [id number]  : 1960  Date of Evaluation: 2022  ED Provider: Andrea Luna MD    Chief Complaint       Chief Complaint   Patient presents with   Genene [place]    Extremity Weakness     right leg    Leg Swelling     right leg     Evie Sam is a 63 y.o. female who presents to the emergency department after a fall. Patient has a history of ADHD, pacemaker placement about 12 years ago when she was using illicit substances and hep C. She states that for the last several months she has been having these episodes where she will twitch and lose strength in her arms and her legs. She is not losing consciousness. They are bilateral.  She had 1 of those episodes today when she went to stand up from her bed and her bilateral legs gave out. She has chronic pain in her bilateral lower extremities for which she takes Aleve however she currently is having increased pain in her bilateral lower extremities with ankle pain on the right side and right lower extremity swelling. She did not hit her head. She did not lose consciousness. She did not have presyncopal symptoms. She has no focal numbness weakness or tingling at this time. She has not seen a neurologist for these twitchings. She states that she saw a cardiologist here who recommended an echo and a stress test so that she could have her pacemaker possibly remove so than she would be able to get an MRI and further evaluation for these twitching episodes. ROS:     At least 10 systems reviewed and otherwise acutely negative except as in the [address]     Past History     Past Medical History:   Diagnosis Date    ADHD     [facility]     COVID-19     Drug addiction in remission ([facility] [facility]     recovering addict    Hep C w/o coma, chronic ([facility]     Pacemaker     Sleep apnea Abused: Not on file   Housing Stability:     Unable to Pay for Housing in the Last Year: Not on file    Number of Places Lived in the Last Year: Not on file    Unstable Housing in the Last Year: Not on file       Medications/Allergies     Previous Medications    ALBUTEROL SULFATE  (90 BASE) MCG/ACT INHALER    USE 2 PUFFS BY MOUTH EVERY 6 HOURS AS NEEDED SHAKE WELL    AMPHETAMINE-DEXTROAMPHETAMINE (ADDERALL XR) 30 MG EXTENDED RELEASE CAPSULE    Take 30 mg by mouth daily. ASPIRIN 81 MG CHEWABLE TABLET    Take 81 mg by mouth daily    AZITHROMYCIN (ZITHROMAX) 250 MG TABLET    Take 1 tablet by mouth daily Take 500 mg once, then 250 mg daily for 4 days then stop    BIPAP MACHINE MISC    by Does not apply route Use nightly    BUPRENORPHINE-NALOXONE (SUBOXONE) 8-2 MG FILM SL FILM    Place 1 Film under the tongue 3 times daily. CPAP MACHINE MISC    by CPAP route nightly     DOXYCYCLINE HYCLATE (VIBRAMYCIN) 100 MG CAPSULE    Take 1 capsule by mouth daily    DOXYCYCLINE HYCLATE (VIBRAMYCIN) 100 MG CAPSULE    Take 1 capsule by mouth daily    FLUTICASONE-UMECLIDIN-VILANT (TRELEGY ELLIPTA) 100-62.5-25 MCG/INH AEPB    Inhale 1 puff into the lungs daily    IPRATROPIUM-ALBUTEROL (DUONEB) 0.5-2.5 (3) MG/3ML SOLN NEBULIZER SOLUTION    Inhale 3 mLs into the lungs every 6 hours as needed for Shortness of Breath    MOMETASONE-FORMOTEROL (DULERA) 100-5 MCG/ACT INHALER    Inhale 2 puffs into the lungs 2 times daily    MULTIPLE VITAMINS-MINERALS (WOMENS MULTIVITAMIN PO)    Take 1 tablet by mouth daily    NEBULIZER MISC    Use nebulizer with medication as directed.     OXYGEN    Inhale 4 L into the lungs continuous     PREDNISONE (DELTASONE) 10 MG TABLET    40mg daily for 2 days, 30mg daily for 2 days, 20 mg daily for 2 days, 10 mg daily for 2 days, 5 mg daily for 2 days    PREDNISONE (DELTASONE) 10 MG TABLET    Take 1 tablet by mouth daily 40mg daily for 2 days, 20 mg daily for 2 days, 10 mg daily for 2 days, 5 mg daily for 2 days    PREDNISONE (DELTASONE) 10 MG TABLET    Take 1 tablet by mouth daily 40mg daily for 2 days, 20 mg daily for 2 days, 10 mg daily for 2 days, 5 mg daily for 2 days    PREDNISONE (DELTASONE) 10 MG TABLET    Take 1 tablet by mouth daily 40mg daily for 2 days, 20 mg daily for 2 days, 10 mg daily for 2 days, 5 mg daily for 2 days    PREDNISONE (DELTASONE) 10 MG TABLET    Take 1 tablet by mouth daily 40mg daily for 2 days, 20 mg daily for 2 days, 10 mg daily for 2 days, 5 mg daily for 2 days    PREDNISONE (DELTASONE) 10 MG TABLET    Take 1 tablet by mouth daily 40mg daily for 2 days, 20 mg daily for 2 days, 10 mg daily for 2 days, 5 mg daily for 2 days    PREGABALIN (LYRICA) 100 MG CAPSULE    Take 100 mg by mouth 3 times daily. RESPIRATORY THERAPY SUPPLIES (NEBULIZER/TUBING/MOUTHPIECE) KIT    Inhale 1 kit into the lungs daily as needed (as directed)    TRAZODONE (DESYREL) 150 MG TABLET    Take 300 mg by mouth nightly      No Known Allergies     Physical Exam       ED Triage Vitals   BP Temp Temp Source Pulse Resp SpO2 Height Weight   01/05/22 1648 01/05/22 1647 01/05/22 1647 01/05/22 1647 01/05/22 1647 01/05/22 1647 -- --   (!) 153/103 98.8 °F (37.1 °C) Oral 102 18 95 %       GENERAL APPEARANCE: Awake and alert. Cooperative. No acute distress. HEAD: Normocephalic. Atraumatic. EYES: Sclera anicteric. ENT: Tolerates saliva. No trismus. NECK: Supple. Trachea midline. CARDIO: RRR. Radial pulse 2+. LUNGS: Respirations unlabored. CTAB. ABDOMEN: Soft. Non-distended. Non-tender. EXTREMITIES: No acute deformities. Diffuse swelling to the right lower extremity she has pain with dorsiflexion and plantarflexion of bilateral ankles, she has intact DP and PT pulses bilaterally she has 1+ pitting edema in the right lower extremity  SKIN: Warm and dry. NEUROLOGICAL: No gross facial drooping. Moves all 4 extremities spontaneously.   She is 5 out of 5 strength in the bilateral upper extremities she has 4 out of 5 strength in the bilateral lower extremities with intact sensation of the bilateral lower extremities, she does not have a tremor or twitching at this time  PSYCHIATRIC: Normal mood.      Diagnostics   Labs:  Results for orders placed or performed during the hospital encounter of 01/05/22   CBC Auto Differential   Result Value Ref Range    WBC 11.5 (H) 4.0 - 10.5 K/CU MM    RBC 4.56 4.2 - 5.4 M/CU MM    Hemoglobin 12.4 (L) 12.5 - 16.0 GM/DL    Hematocrit 42.8 37 - 47 %    MCV 93.9 78 - 100 FL    MCH 27.2 27 - 31 PG    MCHC 29.0 (L) 32.0 - 36.0 %    RDW 12.9 11.7 - 14.9 %    Platelets 341 584 - 202 K/CU MM    MPV 9.1 7.5 - 11.1 FL    Differential Type AUTOMATED DIFFERENTIAL     Segs Relative 71.5 (H) 36 - 66 %    Lymphocytes % 12.5 (L) 24 - 44 %    Monocytes % 10.2 (H) 0 - 4 %    Eosinophils % 4.8 (H) 0 - 3 %    Basophils % 0.5 0 - 1 %    Segs Absolute 8.2 K/CU MM    Lymphocytes Absolute 1.4 K/CU MM    Monocytes Absolute 1.2 K/CU MM    Eosinophils Absolute 0.6 K/CU MM    Basophils Absolute 0.1 K/CU MM    Nucleated RBC % 0.0 %    Total Nucleated RBC 0.0 K/CU MM    Total Immature Neutrophil 0.06 K/CU MM    Immature Neutrophil % 0.5 (H) 0 - 0.43 %   Comprehensive Metabolic Panel w/ Reflex to MG   Result Value Ref Range    Sodium 136 135 - 145 MMOL/L    Potassium 3.8 3.5 - 5.1 MMOL/L    Chloride 94 (L) 99 - 110 mMol/L    CO2 38 (H) 21 - 32 MMOL/L    BUN 9 6 - 23 MG/DL    CREATININE 0.5 (L) 0.6 - 1.1 MG/DL    Glucose 204 (H) 70 - 99 MG/DL    Calcium 8.5 8.3 - 10.6 MG/DL    Albumin 3.2 (L) 3.4 - 5.0 GM/DL    Total Protein 6.7 6.4 - 8.2 GM/DL    Total Bilirubin 0.2 0.0 - 1.0 MG/DL    ALT 18 10 - 40 U/L    AST 18 15 - 37 IU/L    Alkaline Phosphatase 83 40 - 129 IU/L    GFR Non-African American >60 >60 mL/min/1.73m2    GFR African American >60 >60 mL/min/1.73m2    Anion Gap 4 4 - 16   Troponin   Result Value Ref Range    Troponin T <0.010 <0.01 NG/ML   Brain Natriuretic Peptide   Result Value Ref Range    Pro-BNP 49.04 <300 occasionally words are mis-transcribed.)    Deep Cabrera MD  [address] [facility] Jerry Sheldon MD  01/07/22 9545

## 2022-01-05 NOTE — ED NOTES
Bed: ED-01  Expected date:   Expected time:   Means of arrival:   Comments:  EMS     Delfina Ho  01/05/22 3243

## 2022-01-05 NOTE — ED TRIAGE NOTES
Pt arrived by EMS with CO falling yesterday dt unbearable pain in both of her legs. Pt sts \"the tremors have been going on for a couple of years and they dont know whats going on. \" upon arrival patients right leg is noticeably larger than the left. Pt sts \"I havent been able to walk, my home health nurse practically carried me to the bathroom. Pt rates pain in both legs 10/10.

## 2022-01-06 LAB
ALBUMIN SERPL-MCNC: 3.1 GM/DL (ref 3.4–5)
ALP BLD-CCNC: 85 IU/L (ref 40–129)
ALT SERPL-CCNC: 18 U/L (ref 10–40)
ANION GAP SERPL CALCULATED.3IONS-SCNC: 4 MMOL/L (ref 4–16)
AST SERPL-CCNC: 21 IU/L (ref 15–37)
BILIRUB SERPL-MCNC: 0.3 MG/DL (ref 0–1)
BUN BLDV-MCNC: 8 MG/DL (ref 6–23)
CALCIUM SERPL-MCNC: 8.8 MG/DL (ref 8.3–10.6)
CHLORIDE BLD-SCNC: 95 MMOL/L (ref 99–110)
CO2: 38 MMOL/L (ref 21–32)
CREAT SERPL-MCNC: 0.4 MG/DL (ref 0.6–1.1)
EKG ATRIAL RATE: 96 BPM
EKG DIAGNOSIS: NORMAL
EKG P AXIS: 76 DEGREES
EKG P-R INTERVAL: 140 MS
EKG Q-T INTERVAL: 352 MS
EKG QRS DURATION: 86 MS
EKG QTC CALCULATION (BAZETT): 444 MS
EKG R AXIS: 56 DEGREES
EKG T AXIS: 73 DEGREES
EKG VENTRICULAR RATE: 96 BPM
GFR AFRICAN AMERICAN: >60 ML/MIN/1.73M2
GFR NON-AFRICAN AMERICAN: >60 ML/MIN/1.73M2
GLUCOSE BLD-MCNC: 122 MG/DL (ref 70–99)
HCT VFR BLD CALC: 46.2 % (ref 37–47)
HEMOGLOBIN: 13.2 GM/DL (ref 12.5–16)
MCH RBC QN AUTO: 27.2 PG (ref 27–31)
MCHC RBC AUTO-ENTMCNC: 28.6 % (ref 32–36)
MCV RBC AUTO: 95.1 FL (ref 78–100)
PDW BLD-RTO: 12.8 % (ref 11.7–14.9)
PLATELET # BLD: 215 K/CU MM (ref 140–440)
PMV BLD AUTO: 9.6 FL (ref 7.5–11.1)
POTASSIUM SERPL-SCNC: 4.7 MMOL/L (ref 3.5–5.1)
RBC # BLD: 4.86 M/CU MM (ref 4.2–5.4)
SODIUM BLD-SCNC: 137 MMOL/L (ref 135–145)
TOTAL PROTEIN: 6.8 GM/DL (ref 6.4–8.2)
WBC # BLD: 11.7 K/CU MM (ref 4–10.5)

## 2022-01-06 PROCEDURE — 93010 ELECTROCARDIOGRAM REPORT: CPT | Performed by: INTERNAL MEDICINE

## 2022-01-06 PROCEDURE — 99221 1ST HOSP IP/OBS SF/LOW 40: CPT | Performed by: INTERNAL MEDICINE

## 2022-01-06 PROCEDURE — 36415 COLL VENOUS BLD VENIPUNCTURE: CPT

## 2022-01-06 PROCEDURE — G0378 HOSPITAL OBSERVATION PER HR: HCPCS

## 2022-01-06 PROCEDURE — 6370000000 HC RX 637 (ALT 250 FOR IP): Performed by: INTERNAL MEDICINE

## 2022-01-06 PROCEDURE — 94640 AIRWAY INHALATION TREATMENT: CPT

## 2022-01-06 PROCEDURE — 96372 THER/PROPH/DIAG INJ SC/IM: CPT

## 2022-01-06 PROCEDURE — 6370000000 HC RX 637 (ALT 250 FOR IP): Performed by: NURSE PRACTITIONER

## 2022-01-06 PROCEDURE — 80053 COMPREHEN METABOLIC PANEL: CPT

## 2022-01-06 PROCEDURE — 85027 COMPLETE CBC AUTOMATED: CPT

## 2022-01-06 PROCEDURE — 2580000003 HC RX 258: Performed by: NURSE PRACTITIONER

## 2022-01-06 PROCEDURE — 6360000002 HC RX W HCPCS: Performed by: NURSE PRACTITIONER

## 2022-01-06 PROCEDURE — 99221 1ST HOSP IP/OBS SF/LOW 40: CPT | Performed by: ORTHOPAEDIC SURGERY

## 2022-01-06 RX ORDER — IPRATROPIUM BROMIDE AND ALBUTEROL SULFATE 2.5; .5 MG/3ML; MG/3ML
1 SOLUTION RESPIRATORY (INHALATION) 4 TIMES DAILY
Status: DISCONTINUED | OUTPATIENT
Start: 2022-01-06 | End: 2022-01-06

## 2022-01-06 RX ORDER — BUPRENORPHINE AND NALOXONE 8; 2 MG/1; MG/1
1 FILM, SOLUBLE BUCCAL; SUBLINGUAL 3 TIMES DAILY
Status: DISCONTINUED | OUTPATIENT
Start: 2022-01-06 | End: 2022-01-11 | Stop reason: HOSPADM

## 2022-01-06 RX ORDER — DEXTROAMPHETAMINE SACCHARATE, AMPHETAMINE ASPARTATE MONOHYDRATE, DEXTROAMPHETAMINE SULFATE AND AMPHETAMINE SULFATE 7.5; 7.5; 7.5; 7.5 MG/1; MG/1; MG/1; MG/1
30 CAPSULE, EXTENDED RELEASE ORAL DAILY
Status: DISCONTINUED | OUTPATIENT
Start: 2022-01-06 | End: 2022-01-11 | Stop reason: HOSPADM

## 2022-01-06 RX ORDER — TRAMADOL HYDROCHLORIDE 50 MG/1
50 TABLET ORAL 2 TIMES DAILY PRN
Status: DISCONTINUED | OUTPATIENT
Start: 2022-01-06 | End: 2022-01-11 | Stop reason: HOSPADM

## 2022-01-06 RX ORDER — ALBUTEROL SULFATE 90 UG/1
2 AEROSOL, METERED RESPIRATORY (INHALATION) EVERY 6 HOURS PRN
Status: DISCONTINUED | OUTPATIENT
Start: 2022-01-06 | End: 2022-01-08

## 2022-01-06 RX ORDER — PREDNISONE 20 MG/1
40 TABLET ORAL DAILY
Status: COMPLETED | OUTPATIENT
Start: 2022-01-06 | End: 2022-01-10

## 2022-01-06 RX ADMIN — PREDNISONE 40 MG: 20 TABLET ORAL at 14:53

## 2022-01-06 RX ADMIN — SENNOSIDES, DOCUSATE SODIUM 1 TABLET: 8.6; 5 TABLET ORAL at 20:45

## 2022-01-06 RX ADMIN — ASPIRIN 81 MG CHEWABLE TABLET 81 MG: 81 TABLET CHEWABLE at 09:59

## 2022-01-06 RX ADMIN — SENNOSIDES, DOCUSATE SODIUM 1 TABLET: 8.6; 5 TABLET ORAL at 09:59

## 2022-01-06 RX ADMIN — TRAMADOL HYDROCHLORIDE 100 MG: 50 TABLET, COATED ORAL at 07:01

## 2022-01-06 RX ADMIN — BUDESONIDE AND FORMOTEROL FUMARATE DIHYDRATE 2 PUFF: 80; 4.5 AEROSOL RESPIRATORY (INHALATION) at 21:01

## 2022-01-06 RX ADMIN — BUPRENORPHINE AND NALOXONE 1 FILM: 8; 2 FILM, SOLUBLE BUCCAL; SUBLINGUAL at 20:45

## 2022-01-06 RX ADMIN — PREGABALIN 100 MG: 50 CAPSULE ORAL at 09:59

## 2022-01-06 RX ADMIN — SENNOSIDES, DOCUSATE SODIUM 1 TABLET: 8.6; 5 TABLET ORAL at 00:40

## 2022-01-06 RX ADMIN — PREGABALIN 100 MG: 50 CAPSULE ORAL at 20:45

## 2022-01-06 RX ADMIN — PREGABALIN 100 MG: 50 CAPSULE ORAL at 00:40

## 2022-01-06 RX ADMIN — SODIUM CHLORIDE, PRESERVATIVE FREE 10 ML: 5 INJECTION INTRAVENOUS at 20:46

## 2022-01-06 RX ADMIN — SODIUM CHLORIDE, PRESERVATIVE FREE 10 ML: 5 INJECTION INTRAVENOUS at 10:01

## 2022-01-06 RX ADMIN — TRAZODONE HYDROCHLORIDE 200 MG: 50 TABLET ORAL at 00:40

## 2022-01-06 RX ADMIN — BUPRENORPHINE AND NALOXONE 1 FILM: 8; 2 FILM, SOLUBLE BUCCAL; SUBLINGUAL at 14:54

## 2022-01-06 RX ADMIN — ENOXAPARIN SODIUM 40 MG: 100 INJECTION SUBCUTANEOUS at 10:00

## 2022-01-06 ASSESSMENT — ENCOUNTER SYMPTOMS
BACK PAIN: 0
COLOR CHANGE: 0
CHEST TIGHTNESS: 0

## 2022-01-06 ASSESSMENT — PAIN SCALES - GENERAL
PAINLEVEL_OUTOF10: 9
PAINLEVEL_OUTOF10: 9
PAINLEVEL_OUTOF10: 0

## 2022-01-06 ASSESSMENT — PAIN DESCRIPTION - LOCATION: LOCATION: LEG

## 2022-01-06 NOTE — PROGRESS NOTES
Patient stood an pivoted to bedside commode extensive one person assist. Has tremors in right hand also placed an IV in right hand.

## 2022-01-06 NOTE — H&P
History and Physical      Name:  Gracie Marin /Age/Sex: 1960  (63 y.o. female)   MRN & CSN:  [id number] & [id number] Admission Date/Time: 2022  4:44 PM   Location:  ED01/ED-01 PCP: Sheila Palomo, [place] Hospital Day: 1    Assessment and Plan:   Gracie Marin is a 63 y.o.  female  who presents with with a fall     Fall at home . Initial encounter  Acute nondisplaced fracture distal fibula   Fracture is isolated, stable   Consult orthopedics for evaluation in am    Pain control PRN   Antiemetics PRN   Consult PT/OT/ Case management     Splint, ice, pain control       Emphysema  COPD  Acute on chronic respiratory failure   Continue home O2   Continue home breathing treatments     Chronic Conditions: continue home medication as ordered    All testing  and results reviewed with patient . All questions answered. Patient and family voiced understanding    This patient was seen and examined in conjunction with Dr. Yamil Espinal. He/She was agreeable with the plan and management as dictated above. Diet ADULT DIET; Regular   DVT Prophylaxis [x] Lovenox, []  Heparin, [] SCDs, [] Ambulation  [] NOAC   GI Prophylaxis [] PPI,  [] H2 Blocker,  [] Carafate,  [x] Diet/Tube Feeds   Code Status Full Code   Disposition Patient requires continued admission due to acute nondisplaced ankle fracture    MDM [] Low, [x] Moderate,[]  High       History of Present Illness:     Chief Complaint:   Gracie Marin is a 63 y.o.  female  who presents with a fall. Patient states she went to stand up from her bed and her bilateral legs gave out. She denies loss of consciousness. Patient denied her head. Patient did not have presyncopal symptoms. She has no focal numbness start weakness or tingling at this time. Patient reports she has episodes of twitching, she saw a cardiologist who recommended an echo and stress test for these episodes however she had no witnessed episodes of twitching in the ED this admission.   Her neurologic exam is normal.  She denies headache or blurred vision. Denies chest pain or shortness of breath. Denies nausea vomiting or diarrhea. Denies numbness or tingling in her lower extremities. I explained to the patient that we will treat her for acute fracture today and she will need to continue to follow-up outpatient for her reported episodes of twitching that there are not any witnessed on exam today    I discussed this patient with the ED provider and Dr. Mickie Orourke. I did a review of patient's medical records, lab results and imaging conducted today. I personally reviewed patient's vital signs including pulse ox . Ten point ROS reviewed negative, unless as noted above    Objective:   No intake or output data in the 24 hours ending 01/05/22 2223     Labs:   Recent Labs     01/05/22  1719      K 3.8   CL 94*   CO2 38*   BUN 9   CREATININE 0.5*   WBC 11.5*   HCT 42.8          Imaging:  XR KNEE RIGHT (MIN 4 VIEWS)  Narrative: EXAMINATION:  FOUR XRAY VIEWS OF THE RIGHT KNEE    1/5/2022 7:26 pm    COMPARISON:  09/24/2021    HISTORY:  ORDERING SYSTEM PROVIDED HISTORY: ankle fracture with pain  TECHNOLOGIST PROVIDED HISTORY:  Reason for exam:->ankle fracture with pain  Reason for Exam: right knee pain     weakness    leg swelling    FINDINGS:  Bones: No acute fracture. No aggressive osseous lesion. Joints: No dislocation. Moderate narrowing medial weight-bearing  compartment. Tricompartmental spurring. Soft tissues: Improving suprapatellar effusion. Impression: No acute fracture or malalignment. Moderate tricompartmental osteoarthritis and small suprapatellar effusion.   VL DUP LOWER EXTREMITY VENOUS RIGHT  Narrative: EXAMINATION:  DUPLEX VENOUS ULTRASOUND OF THE RIGHT LOWER EXTREMITY    1/5/2022 6:11 pm    TECHNIQUE:  Duplex ultrasound using B-mode/gray scaled imaging and Doppler spectral  analysis and color flow was obtained of the deep venous structures of the  right extremity. COMPARISON:  None. HISTORY:  ORDERING SYSTEM PROVIDED HISTORY: right lower ext swelling and pain  TECHNOLOGIST PROVIDED HISTORY:  Reason for exam:->right lower ext swelling and pain  Reason for Exam: right lower ext swelling and pain    FINDINGS:  The visualized veins of the right lower extremity are patent and free of  echogenic thrombus. The veins demonstrate good compressibility with normal  color flow study and spectral analysis. Impression: No evidence of DVT in the right lower extremity. XR ANKLE RIGHT (MIN 3 VIEWS)  Narrative: EXAMINATION:  THREE XRAY VIEWS OF THE RIGHT ANKLE    1/5/2022 5:31 pm    COMPARISON:  None. HISTORY:  ORDERING SYSTEM PROVIDED HISTORY: right ankle pain after a fall  TECHNOLOGIST PROVIDED HISTORY:  Reason for exam:->right ankle pain after a fall  Reason for Exam: right ankle pain  Additional signs and symptoms: fall  Relevant Medical/Surgical History: na    FINDINGS:  Oblique nondisplaced fracture distal fibula. Anatomic alignment. No other  fractures. Soft tissue swelling laterally. Impression: Oblique nondisplaced fracture distal fibula       Vitals:   Vitals:    01/05/22 2045   BP:    Pulse: 84   Resp: 15   Temp:    SpO2: 99%     Physical Exam:   GEN Awake female, sitting upright in bed in no apparent distress. Appears given age. EYES Pupils are equally round. No scleral erythema, discharge, or conjunctivitis. HENT Mucous membranes are moist.  NECK Supple, no apparent thyromegaly or masses. RESP Clear to auscultation, no wheezes, rales or rhonchi. Respirations even and unlabored on RA. CARDIO/VASC   S1/S2 auscultated. Regular rate without appreciable murmurs, rubs, or gallops. Peripheral pulses equal bilaterally and palpable. No peripheral edema. GI Abdomen is soft without significant tenderness, masses, or guarding. Bowel sounds are normoactive.  No costovertebral angle tenderness. Fraser catheter is not present.   MSK No gross joint deformities. Diffuse swelling to the right lower extremity. Patient has pain with dorsiflexion and plantarflexion of bilateral ankles. Patient has intact pulses bilaterally, trace pitting edema bilateral lower extremity  SKIN Normal coloration, warm, dry. NEURO Cranial nerves appear grossly intact, normal speech, no lateralizing weakness. Patient does not have a tremor or twitching at this time  [facility] Awake, alert, oriented x 4. Affect appropriate. Past Medical History:      Past Medical History:   Diagnosis Date    ADHD     [facility]ch[facility] obstructive [facility] disease) [facility] [address]     COVID-19     Drug addiction in remission ([facility] [address]     recovering addict    [facility], [facility]     Pacemaker     Sleep apnea     TIA (transient ischemic attack)     per patient \"several mini strokes\"     PSHX:  has a past surgical history that includes  section (); Cholecystectomy (); and Pacemaker insertion (). Allergies: No Known Allergies    FAM HX: family history includes Alcohol Abuse in her father; Cancer in her mother; Coronary Art Dis in her brother; Lupus in her brother; Other in her sister; Thyroid Cancer in her mother.   Soc HX:   Social History     Socioeconomic History    Marital status: Single     Spouse name: None    Number of children: None    Years of education: None    Highest education level: None   Occupational History    None   Tobacco Use    Smoking status: Former Smoker     Packs/day: 1.00     Years: 40.00     Pack years: 40.00     Quit date: 2016     Years since quittin.3    Smokeless tobacco: Never Used   Substance and Sexual Activity    Alcohol use: Not Currently     Comment: occasional caffeine     Drug use: Not Currently     Comment: heroine    Sexual activity: None   Other Topics Concern    None   Social History Narrative    None     Social Determinants of Health     Financial Resource Strain:     Difficulty of Paying Living Expenses: Not on

## 2022-01-06 NOTE — ED NOTES
Bed: ED-21  Expected date:   Expected time:   Means of arrival:   Comments:  Philip 1     Rose Marie Austin RN  01/06/22 0004

## 2022-01-06 NOTE — CONSULTS
CARDIOLOGY CONSULT NOTE   Reason for consultation:  S/p fall     Referring physician:  Krish Guillory MD     Primary care physician: Eleni Johnston PA-C      Dear  Dr. Krish Guillory MD   Thanks for the consult. Chief Complaints :  Chief Complaint   Patient presents with    Fall    Extremity Weakness     right leg    Leg Swelling     right leg        History of present illness:Kalee is a 63 y. o.year old who presents after a fall she has history of pacemaker which is reached end-of-life she had several such falls before she says she loses strength in her legs does not lose  Consciousness  She has been having increased pain in both legs she was seen by me in the outpatient but has not followed up since then for echo or stress test.    Past medical history:    has a past medical history of ADHD, COPD (chronic obstructive pulmonary disease) ([facility] [address] COVID-19, Drug addiction in remission ([facility] [address] Hep C w/o coma, chronic ([facility] [address] Pacemaker, Sleep apnea, and TIA (transient ischemic attack). Past surgical history:   has a past surgical history that includes  section (); Cholecystectomy (); and Pacemaker insertion (). Social History:   reports that she quit smoking about 5 years ago. She has a 40.00 pack-year smoking history. She has never used smokeless tobacco. She reports previous alcohol use. She reports previous drug use.   Family history:   no family history of CAD, STROKE of DM at early age    No Known Allergies    amphetamine-dextroamphetamine (ADDERALL XR) extended release capsule 30 mg, Daily  buprenorphine-naloxone (SUBOXONE) 8-2 MG SL film 1 Film, TID  predniSONE (DELTASONE) tablet 40 mg, Daily  traMADol (ULTRAM) tablet 50 mg, BID PRN  albuterol sulfate  (90 Base) MCG/ACT inhaler 2 puff, Q6H PRN  albuterol sulfate  (90 Base) MCG/ACT inhaler 2 puff, Q4H PRN  aspirin chewable tablet 81 mg, Daily  ipratropium-albuterol (DUONEB) nebulizer solution 1 ampule, Q4H PRN  budesonide-formoterol (SYMBICORT) 80-4.5 MCG/ACT inhaler 2 puff, BID  pregabalin (LYRICA) capsule 100 mg, TID  sodium chloride flush 0.9 % injection 5-40 mL, 2 times per day  sodium chloride flush 0.9 % injection 5-40 mL, PRN  0.9 % sodium chloride infusion, PRN  enoxaparin (LOVENOX) injection 40 mg, Daily  ondansetron (ZOFRAN-ODT) disintegrating tablet 4 mg, Q8H PRN   Or  ondansetron (ZOFRAN) injection 4 mg, Q6H PRN  polyethylene glycol (GLYCOLAX) packet 17 g, Daily PRN  acetaminophen (TYLENOL) tablet 650 mg, Q6H PRN   Or  acetaminophen (TYLENOL) suppository 650 mg, Q6H PRN  sennosides-docusate sodium (SENOKOT-S) 8.6-50 MG tablet 1 tablet, BID  traZODone (DESYREL) tablet 200 mg, Nightly PRN      Current Facility-Administered Medications   Medication Dose Route Frequency Provider Last Rate Last Admin    amphetamine-dextroamphetamine (ADDERALL XR) extended release capsule 30 mg  30 mg Oral Daily KELLY Cuello        buprenorphine-naloxone (SUBOXONE) 8-2 MG SL film 1 Film  1 Film SubLINGual TID KELLY Cuello   1 Film at 01/06/22 1454    predniSONE (DELTASONE) tablet 40 mg  40 mg Oral Daily KELLY Cuello   40 mg at 01/06/22 1453    traMADol (ULTRAM) tablet 50 mg  50 mg Oral BID PRN KELLY Cuello        albuterol sulfate  (90 Base) MCG/ACT inhaler 2 puff  2 puff Inhalation Q6H PRN Cheryle Sol, MD        albuterol sulfate  (90 Base) MCG/ACT inhaler 2 puff  2 puff Inhalation Q4H PRN KELLY Cuello        aspirin chewable tablet 81 mg  81 mg Oral Daily KELLY Cuello   81 mg at 01/06/22 0959    ipratropium-albuterol (DUONEB) nebulizer solution 1 ampule  1 ampule Inhalation Q4H PRN KELLY Cuello        budesonide-formoterol (SYMBICORT) 80-4.5 MCG/ACT inhaler 2 puff  2 puff Inhalation BID KELLY Cuello        pregabalin (LYRICA) capsule 100 mg  100 mg Oral TID KELLY Cuello   100 mg at 01/06/22 0959    sodium chloride flush 0.9 % injection 5-40 mL  5-40 mL IntraVENous 2 times per day Ursula Christianson APRN   10 mL at 01/06/22 1001    sodium chloride flush 0.9 % injection 5-40 mL  5-40 mL IntraVENous PRN Hannah Trevino, APRN        0.9 % sodium chloride infusion  25 mL IntraVENous PRN Hannah Trevino, APRN        enoxaparin (LOVENOX) injection 40 mg  40 mg SubCUTAneous Daily Hannah Trevino, APRN   40 mg at 01/06/22 1000    ondansetron (ZOFRAN-ODT) disintegrating tablet 4 mg  4 mg Oral Q8H PRN Hannah Trevino, APRN        Or    ondansetron [facility]) injection 4 mg  4 mg IntraVENous Q6H PRN Hannah Silvae, APRN        polyethylene glycol (GLYCOLAX) packet 17 g  17 g Oral Daily PRN Hannah Trevino, APRN        acetaminophen (TYLENOL) tablet 650 mg  650 mg Oral Q6H PRN Hannah Trevino, APRN        Or    acetaminophen (TYLENOL) suppository 650 mg  650 mg Rectal Q6H PRN Hannah Trevino, APRN        sennosides-docusate sodium (SENOKOT-S) 8.6-50 MG tablet 1 tablet  1 tablet Oral BID Hannah Trevino, APRN   1 tablet at 01/06/22 9935    traZODone (DESYREL) tablet 200 mg  200 mg Oral Nightly PRN Hannah Trevino, APRN   200 mg at 01/06/22 0040     Review of Systems:   · Constitutional: No Fever or Weight Loss   · Eyes: No Decreased Vision  · ENT: No Headaches, Hearing Loss or Vertigo  · Cardiovascular: As per HPI  · Respiratory: As per HPI  · Gastrointestinal: No abdominal pain, appetite loss, blood in stools, constipation, diarrhea or heartburn  · Genitourinary: No dysuria, trouble voiding, or hematuria  · Musculoskeletal:  No gait disturbance, weakness or joint complaints  · Integumentary: No rash or pruritis  · Neurological: No TIA or stroke symptoms  · Psychiatric: No anxiety or depression  · Endocrine: No malaise, fatigue or temperature intolerance  · Hematologic/Lymphatic: No bleeding problems, blood clots or swollen lymph nodes  · Allergic/Immunologic: No nasal congestion or hives  All systems negative except as marked.         Physical Examination:    Vitals:    01/06/22 1530 01/06/22 1600 01/06/22 1632 01/06/22 1634   BP: 132/68 119/70  (!) 144/66   Pulse: 91 86  88   Resp: 12 11 12 Temp:    98.2 °F (36.8 °C)   TempSrc:    Oral   SpO2: 97% 96% 98% 99%       General Appearance:  No distress, conversant    Constitutional:  Well developed, Well nourished, No acute distress, Non-toxic appearance. HENT:  Normocephalic, Atraumatic, Bilateral external ears normal, Oropharynx moist, No oral exudates, Nose normal. Neck- Normal range of motion, No tenderness, Supple, No stridor,no apical-carotid delay  Lymphatics : no palpable lymph nodes  Eyes:  PERRL, EOMI, Conjunctiva normal, No discharge. Respiratory:  Normal breath sounds, No respiratory distress, No wheezing, No chest tenderness. ,no use of accessory muscles, crackles Present  Cardiovascular: (PMI) apex non displaced,no lifts no thrills, ankle swelling Absent  , 1+, s1 and s2 audible,Murmur. Absent , JVD not noted    Abdomen /GI:  Bowel sounds normal, Soft, No tenderness, No masses, No gross visceromegaly   :  No costovertebral angle tenderness   Musculoskeletal:  No edema, no tenderness, no deformities.  Back- no tenderness  Integument:  Well hydrated, no rash   Lymphatic:  No lymphadenopathy noted   Neurologic:  Alert & oriented x 3, CN 2-12 normal, normal motor function, normal sensory function, no focal deficits noted           Medical decision making and Data review:    Lab Review   Recent Labs     01/06/22  1124   WBC 11.7*   HGB 13.2   HCT 46.2         Recent Labs     01/06/22  1124      K 4.7   CL 95*   CO2 38*   BUN 8   CREATININE 0.4*     Recent Labs     01/06/22  1124   AST 21   ALT 18   BILITOT 0.3   ALKPHOS 85     Recent Labs     01/05/22  1719   TROPONINT <0.010       Recent Labs     01/05/22  1719   PROBNP 49.04     Lab Results   Component Value Date    INR 1.03 05/24/2021    PROTIME 12.5 05/24/2021       EKG: (reviewed by myself)    ECHO:(reviewed by myself)    Chest Xray:(reviewed by myself)  XR KNEE RIGHT (MIN 4 VIEWS)    Result Date: 1/5/2022  EXAMINATION: FOUR XRAY VIEWS OF THE RIGHT KNEE 1/5/2022 7:26 pm COMPARISON: 09/24/2021 HISTORY: ORDERING SYSTEM PROVIDED HISTORY: ankle fracture with pain TECHNOLOGIST PROVIDED HISTORY: Reason for exam:->ankle fracture with pain Reason for Exam: right knee pain     weakness    leg swelling FINDINGS: Bones: No acute fracture. No aggressive osseous lesion. Joints: No dislocation. Moderate narrowing medial weight-bearing compartment. Tricompartmental spurring. Soft tissues: Improving suprapatellar effusion. No acute fracture or malalignment. Moderate tricompartmental osteoarthritis and small suprapatellar effusion. XR ANKLE RIGHT (MIN 3 VIEWS)    Result Date: 1/5/2022  EXAMINATION: THREE XRAY VIEWS OF THE RIGHT ANKLE 1/5/2022 5:31 pm COMPARISON: None. HISTORY: ORDERING SYSTEM PROVIDED HISTORY: right ankle pain after a fall TECHNOLOGIST PROVIDED HISTORY: Reason for exam:->right ankle pain after a fall Reason for Exam: right ankle pain Additional signs and symptoms: fall Relevant Medical/Surgical History: na FINDINGS: Oblique nondisplaced fracture distal fibula. Anatomic alignment. No other fractures. Soft tissue swelling laterally. Oblique nondisplaced fracture distal fibula     VL DUP LOWER EXTREMITY VENOUS RIGHT    Result Date: 1/5/2022  EXAMINATION: DUPLEX VENOUS ULTRASOUND OF THE RIGHT LOWER EXTREMITY 1/5/2022 6:11 pm TECHNIQUE: Duplex ultrasound using B-mode/gray scaled imaging and Doppler spectral analysis and color flow was obtained of the deep venous structures of the right extremity. COMPARISON: None. HISTORY: ORDERING SYSTEM PROVIDED HISTORY: right lower ext swelling and pain TECHNOLOGIST PROVIDED HISTORY: Reason for exam:->right lower ext swelling and pain Reason for Exam: right lower ext swelling and pain FINDINGS: The visualized veins of the right lower extremity are patent and free of echogenic thrombus. The veins demonstrate good compressibility with normal color flow study and spectral analysis. No evidence of DVT in the right lower extremity. All labs, medications and tests reviewed by myself including data  from outside source , patient and available family . Continue all other medications of all above medical condition listed as is. Impression:  Active Problems:    Fall at home, initial encounter  Resolved Problems:    * No resolved hospital problems. *      Assessment: 63 y. o.year old with PMH of  has a past medical history of ADHD, COPD (chronic obstructive pulmonary disease) ([facility] [address] COVID-19, Drug addiction in remission ([facility] [address] Hep C w/o coma, chronic ([facility] [address] Pacemaker, Sleep apnea, and TIA (transient ischemic attack). Plan and Recommendations:    Recurrent falls syncope? We will request EPS evaluation for pacemaker removal and possible loop placement  Due to malleolus fracture she is unable to stand up ideally should get a tilt table or a orthostatic at least  DVT prophylaxis if no contraindication  6. Dyslipidemia: continue statins           Thank you  much for consult and giving us the opportunity in contributing in the care of this patient. Please feel free to call me for any questions.        Nabil Davis MD, 1/6/2022 6:08 PM

## 2022-01-06 NOTE — PROGRESS NOTES
Hospitalist Progress Note      Name:  Lisa Amin /Age/Sex: 1960  (63 y.o. female)   MRN & CSN:  [id number] & [id number] Admission Date/Time: 2022  4:44 PM   Location:  ED21/ED-21 PCP: Aline Daniels [facility] Day: 2    Assessment and Plan:   Lisa Amin is a 63 y.o.  female with history of substance abuse, ADHD, OD on pacemaker, COPD, chronic respiratory failure on 4 L NC home oxygen was admitted to hospital on  for fall evaluation. Subjective: Patient reported jerking and sudden lose muscle tone and fall in the past one year, progressively getting worse recently    Acute nondisplaced fracture distal fibula  Secondary to fall  -Patient lay on the bed without splint in my evaluation  -Stir up splint on right leg ordered  -pending orthopedics consult, PT/OT evaluation and case management consult    Jerking  Frequently fall  -Reported drinking and suddenly lose muscle tone frequently in the past 1 year  -Not lost consciousness, no focal neurology deficit  -Neurology consult    Acute on chronic respiratory failure  COPD exacerbation  -Currently on 5 L NC, on 4 L NC at home  -Wheezing in bilateral lungs  -Steroids and breathing treatment    History of substance abuse  -Continue Suboxone 3 times a day    History of ADHD  -Continue Adderall    DVT prophylaxis  -Lovenox 40 mg daily    Diet ADULT DIET; Regular   DVT Prophylaxis [x] Lovenox, []  Heparin, [] SCDs, [] Ambulation   GI Prophylaxis [] PPI,  [] H2 Blocker,  [] Carafate,  [] Diet/Tube Feeds   Code Status Full Code   Disposition Patient requires continued admission due to PT OT, neurology     History of Present Illness:     Chief Complaint: Frequently fall    Lisa Amin is a 63 y.o.  female with history of substance abuse on Suboxone, ADHD, overdose, on pacemaker, COPD, chronic respiratory failure on home oxygen presents with frequent fall.   Patient reported frequent \"jerking\", muscle spasm, and sudden lose muscle tone in the past 1 year. She stated the situation is progressively getting worse recently. She stood up from her bed on January 5 and suddenly her bilateral legs gave out. Patient fell and twisted right leg. Patient was found right distal fibula fracture. Patient stated she saw a cardiologist who recommended a echo and a stress test in the past.  But she did not completed. Patient denied loss of consciousness. Did not hit her head or neck. Patient is not on blood thinner. Ten point ROS reviewed negative, unless as noted above    Objective:   No intake or output data in the 24 hours ending 01/06/22 0827   Vitals:   Vitals:    01/06/22 0030   BP: (!) 111/53   Pulse: 86   Resp: 13   Temp: 97.4 °F (36.3 °C)   SpO2: 96%     Physical Exam:   GEN Awake female, sitting upright in bed in no apparent distress. Appears given age. EYES Pupils are equally round. No scleral erythema, discharge, or conjunctivitis. HENT Mucous membranes are moist. Oral pharynx without exudates, no evidence of thrush. NECK Supple, no apparent thyromegaly or masses. RESP Clear to auscultation, no wheezes, rales or rhonchi. Symmetric chest movement while on room air. CARDIO/VASC S1/S2 auscultated. Regular rate without appreciable murmurs, rubs, or gallops. No JVD or carotid bruits. Peripheral pulses equal bilaterally and palpable. No peripheral edema. GI Abdomen is soft without significant tenderness, masses, or guarding. Bowel sounds are normoactive. Rectal exam deferred.  No costovertebral angle tenderness. Normal appearing external genitalia. Fraser catheter is not present. HEME/LYMPH No palpable cervical lymphadenopathy and no hepatosplenomegaly. No petechiae or ecchymoses. MSK right ankle mild swelling, limited of range of motion due to pain, tender to palpation on lateral side, no focal neurology deficit in right toes. SKIN Normal coloration, warm, dry.   NEURO Cranial nerves appear grossly intact, normal speech, no lateralizing weakness. PSYCH Awake, alert, oriented x 4. Affect appropriate. Medications:   Medications:    aspirin  81 mg Oral Daily    budesonide-formoterol  2 puff Inhalation BID    pregabalin  100 mg Oral TID    sodium chloride flush  5-40 mL IntraVENous 2 times per day    enoxaparin  40 mg SubCUTAneous Daily    sennosides-docusate sodium  1 tablet Oral BID      Infusions:    sodium chloride       PRN Meds: albuterol sulfate HFA, 2 puff, Q4H PRN  ipratropium-albuterol, 1 ampule, Q4H PRN  sodium chloride flush, 5-40 mL, PRN  sodium chloride, 25 mL, PRN  ondansetron, 4 mg, Q8H PRN   Or  ondansetron, 4 mg, Q6H PRN  polyethylene glycol, 17 g, Daily PRN  acetaminophen, 650 mg, Q6H PRN   Or  acetaminophen, 650 mg, Q6H PRN  traMADol, 50 mg, Q6H PRN   Or  traMADol, 100 mg, Q6H PRN  traZODone, 200 mg, Nightly PRN      Patient is still admitted because PT OT, neurology consult.  The anticipated discharge is in 24 to 48 hours    Electronically signed by Jerad Johnson on 1/6/2022 at 8:27 AM

## 2022-01-06 NOTE — ED NOTES
Dian Lewis NP at bedside sts she wants pt in a stirrup splint and not posterior. Splint being placed now.       Sandra Villalta RN  01/06/22 8021

## 2022-01-06 NOTE — CONSULTS
Inpatient consult to Orthopedic Surgery  Consult performed by: Ricardo Aragon DO  Consult ordered by: KELLY Castañeda - CNP  Reason for consult: Right lateral malleolus fracture  Assessment/Recommendations:     Right lateral malleolus fracture    I discussed with her today her x-ray findings. I explained to her that she does have a nondisplaced fracture in her right ankle which will heal with conservative treatment. I will have physical therapy apply a walking boot to the right lower extremity. She can begin weightbearing as tolerated on the right leg in the walking boot. She can remove the walking boot for bathing and while at rest.  Ice and elevate as needed. Pain medication as needed. Orthopedically she is stable for discharge, follow-up in office in 6 weeks. Jose Luis Rivera is a 33-year-old female who was admitted on this occasion for multiple comorbidities including overall weakness in the lower extremities and intermittent and chronic muscle spasms. She states that she had one of her muscle spasms and she fell injuring her right ankle. She states that since that time she has been unable to bear weight on the right lower extremity. She was seen in the ED, x-rays were obtained and she was diagnosed with a right ankle fracture. She was admitted to the hospitalist for medical management and I was then consulted for evaluation treatment of her right ankle injury. She is currently complaining of dull, throbbing and aching pain along the lateral aspect of her right ankle. Patient denies any prior injury to the involved extremity/ joint, denies numbness or tingling in the involved extremity and denies fever or chills. Review of Systems   Constitutional: Negative for activity change, chills and fever. Respiratory: Negative for chest tightness. Cardiovascular: Negative for chest pain. Musculoskeletal: Positive for arthralgias, gait problem, joint swelling and myalgias.  Negative for back pain. Skin: Negative for color change, pallor, rash and wound. Neurological: Negative for weakness and numbness. Past Medical History:   Diagnosis Date    ADHD 2006    [facility]     COVID-19     Drug addiction in remission ([facility]     recovering addict    Hep C w/o coma, chronic (HCC)     Pacemaker     Sleep apnea     TIA (transient ischemic attack)     per patient \"several mini strokes\"     No current facility-administered medications on file prior to encounter. Current Outpatient Medications on File Prior to Encounter   Medication Sig Dispense Refill    traMADol (ULTRAM) 50 MG tablet Take 1 tablet by mouth 2 times daily.       predniSONE (DELTASONE) 10 MG tablet Take 1 tablet by mouth daily 40mg daily for 2 days, 20 mg daily for 2 days, 10 mg daily for 2 days, 5 mg daily for 2 days 15 tablet 0    albuterol sulfate  (90 Base) MCG/ACT inhaler USE 2 PUFFS BY MOUTH EVERY 6 HOURS AS NEEDED SHAKE WELL 18 g 5    mometasone-formoterol (DULERA) 100-5 MCG/ACT inhaler Inhale 2 puffs into the lungs 2 times daily 1 each 5    predniSONE (DELTASONE) 10 MG tablet Take 1 tablet by mouth daily 40mg daily for 2 days, 20 mg daily for 2 days, 10 mg daily for 2 days, 5 mg daily for 2 days 15 tablet 0    doxycycline hyclate (VIBRAMYCIN) 100 MG capsule Take 1 capsule by mouth daily 10 capsule 0    predniSONE (DELTASONE) 10 MG tablet Take 1 tablet by mouth daily 40mg daily for 2 days, 20 mg daily for 2 days, 10 mg daily for 2 days, 5 mg daily for 2 days 15 tablet 0    predniSONE (DELTASONE) 10 MG tablet Take 1 tablet by mouth daily 40mg daily for 2 days, 20 mg daily for 2 days, 10 mg daily for 2 days, 5 mg daily for 2 days (Patient not taking: Reported on 10/26/2021) 15 tablet 0    azithromycin (ZITHROMAX) 250 MG tablet Take 1 tablet by mouth daily Take 500 mg once, then 250 mg daily for 4 days then stop (Patient not taking: Reported on 10/26/2021) 6 tablet 0  predniSONE (DELTASONE) 10 MG tablet Take 1 tablet by mouth daily 40mg daily for 2 days, 20 mg daily for 2 days, 10 mg daily for 2 days, 5 mg daily for 2 days (Patient not taking: Reported on 10/26/2021) 15 tablet 0    doxycycline hyclate (VIBRAMYCIN) 100 MG capsule Take 1 capsule by mouth daily (Patient not taking: Reported on 10/26/2021) 10 capsule 0    ipratropium-albuterol (DUONEB) 0.5-2.5 (3) MG/3ML SOLN nebulizer solution Inhale 3 mLs into the lungs every 6 hours as needed for Shortness of Breath 120 mL 0    predniSONE (DELTASONE) 10 MG tablet 40mg daily for 2 days, 30mg daily for 2 days, 20 mg daily for 2 days, 10 mg daily for 2 days, 5 mg daily for 2 days (Patient not taking: Reported on 10/26/2021) 21 tablet 0    fluticasone-umeclidin-vilant (TRELEGY ELLIPTA) 100-62.5-25 MCG/INH AEPB Inhale 1 puff into the lungs daily 1 each 5    BiPAP Machine MISC by Does not apply route Use nightly      aspirin 81 MG chewable tablet Take 81 mg by mouth daily      Multiple Vitamins-Minerals (WOMENS MULTIVITAMIN PO) Take 1 tablet by mouth daily      Nebulizer MISC Use nebulizer with medication as directed. 1 each 0    Respiratory Therapy Supplies (NEBULIZER/TUBING/MOUTHPIECE) KIT Inhale 1 kit into the lungs daily as needed (as directed) 1 kit 0    amphetamine-dextroamphetamine (ADDERALL XR) 30 MG extended release capsule Take 30 mg by mouth daily.  pregabalin (LYRICA) 100 MG capsule Take 100 mg by mouth 3 times daily.  traZODone (DESYREL) 150 MG tablet Take 300 mg by mouth nightly       OXYGEN Inhale 4 L into the lungs continuous       CPAP Machine MISC by CPAP route nightly  (Patient not taking: Reported on 10/26/2021)      buprenorphine-naloxone (SUBOXONE) 8-2 MG FILM SL film Place 1 Film under the tongue 3 times daily.        No Known Allergies  Past Surgical History:   Procedure Laterality Date     SECTION      CHOLECYSTECTOMY      done in [address] [place] [address] Social History     Tobacco Use    Smoking status: Former Smoker     Packs/day: 1.00     Years: 40.00     Pack years: 40.00     Quit date: 2016     Years since quittin.3    Smokeless tobacco: Never Used   Substance Use Topics    Alcohol use: Not Currently     Comment: occasional caffeine     Drug use: Not Currently     Comment: heroine     Family History   Problem Relation Age of Onset   [facility] Cancer Mother         unsure of type    Thyroid Cancer Mother     Alcohol Abuse Father     Other Sister         passed in car accident    Lupus Brother     Coronary Art Dis Brother        Right Ankle Exam     Tenderness   The patient is experiencing tenderness in the lateral malleolus. Swelling: mild    Other   Erythema: absent  Sensation: normal  Pulse: present     Comments:  Right ankle-Skin intact with no erythema,or lacerations present. Mild ecchymosis along the lateral aspect of the right ankle. Full range of motion not assessed due to recent injury. Intact sensation motor function to all nerve distributions of the extremity. +2 DP  Compartment soft. Left Ankle Exam   Left ankle exam is normal.  Swelling: none    Range of Motion   The patient has normal left ankle ROM. Muscle Strength   The patient has normal left ankle strength. Other   Erythema: absent  Sensation: normal  Pulse: present      Right Knee Exam     Muscle Strength   The patient has normal right knee strength. Tenderness   The patient is experiencing no tenderness. Range of Motion   The patient has normal right knee ROM. Other   Erythema: absent  Sensation: normal  Pulse: present  Swelling: none  Effusion: no effusion present      Left Knee Exam     Muscle Strength   The patient has normal left knee strength. Tenderness   The patient is experiencing no tenderness. Range of Motion   The patient has normal left knee ROM.     Other   Erythema: absent  Sensation: normal  Pulse: present  Swelling: none  Effusion: no effusion present            /70   Pulse 86   Temp 97.4 °F (36.3 °C) (Oral)   Resp 11   SpO2 96%     XR KNEE RIGHT (MIN 4 VIEWS)    Result Date: 1/5/2022  EXAMINATION: FOUR XRAY VIEWS OF THE RIGHT KNEE 1/5/2022 7:26 pm COMPARISON: 09/24/2021 HISTORY: ORDERING SYSTEM PROVIDED HISTORY: ankle fracture with pain TECHNOLOGIST PROVIDED HISTORY: Reason for exam:->ankle fracture with pain Reason for Exam: right knee pain     weakness    leg swelling FINDINGS: Bones: No acute fracture. No aggressive osseous lesion. Joints: No dislocation. Moderate narrowing medial weight-bearing compartment. Tricompartmental spurring. Soft tissues: Improving suprapatellar effusion. No acute fracture or malalignment. Moderate tricompartmental osteoarthritis and small suprapatellar effusion. XR ANKLE RIGHT (MIN 3 VIEWS)    Result Date: 1/5/2022  EXAMINATION: THREE XRAY VIEWS OF THE RIGHT ANKLE 1/5/2022 5:31 pm COMPARISON: None. HISTORY: ORDERING SYSTEM PROVIDED HISTORY: right ankle pain after a fall TECHNOLOGIST PROVIDED HISTORY: Reason for exam:->right ankle pain after a fall Reason for Exam: right ankle pain Additional signs and symptoms: fall Relevant Medical/Surgical History: na FINDINGS: Oblique nondisplaced fracture distal fibula. Anatomic alignment. No other fractures. Soft tissue swelling laterally. Oblique nondisplaced fracture distal fibula     VL DUP LOWER EXTREMITY VENOUS RIGHT    Result Date: 1/5/2022  EXAMINATION: DUPLEX VENOUS ULTRASOUND OF THE RIGHT LOWER EXTREMITY 1/5/2022 6:11 pm TECHNIQUE: Duplex ultrasound using B-mode/gray scaled imaging and Doppler spectral analysis and color flow was obtained of the deep venous structures of the right extremity. COMPARISON: None.  HISTORY: ORDERING SYSTEM PROVIDED HISTORY: right lower ext swelling and pain TECHNOLOGIST PROVIDED HISTORY: Reason for exam:->right lower ext swelling and pain Reason for Exam: right lower ext swelling and pain FINDINGS: The visualized veins of the right lower extremity are patent and free of echogenic thrombus. The veins demonstrate good compressibility with normal color flow study and spectral analysis. No evidence of DVT in the right lower extremity.

## 2022-01-06 NOTE — ED NOTES
Pt being rude to nurse and mocking her at this time. Pt made aware RN will be back in when her attitude changes.       Mendel Andrea RN  01/06/22 [id number]

## 2022-01-07 LAB
BASE EXCESS MIXED: 22.7 (ref 0–2.3)
CARBON MONOXIDE, BLOOD: 2.7 % (ref 0–5)
CO2 CONTENT: 57.6 MMOL/L (ref 19–24)
COMMENT: ABNORMAL
ERYTHROCYTE SEDIMENTATION RATE: 67 MM/HR (ref 0–30)
FOLATE: 6.1 NG/ML (ref 3.1–17.5)
HCO3 ARTERIAL: 54.5 MMOL/L (ref 18–23)
LV EF: 53 %
LVEF MODALITY: NORMAL
METHEMOGLOBIN ARTERIAL: 1.1 %
O2 SATURATION: 90.5 % (ref 96–97)
PCO2 ARTERIAL: 101 MMHG (ref 32–45)
PH BLOOD: 7.34 (ref 7.34–7.45)
PO2 ARTERIAL: 56 MMHG (ref 75–100)
TOTAL CK: 40 IU/L (ref 26–140)
VITAMIN B-12: 604 PG/ML (ref 211–911)

## 2022-01-07 PROCEDURE — 6370000000 HC RX 637 (ALT 250 FOR IP): Performed by: NURSE PRACTITIONER

## 2022-01-07 PROCEDURE — 2700000000 HC OXYGEN THERAPY PER DAY

## 2022-01-07 PROCEDURE — 93306 TTE W/DOPPLER COMPLETE: CPT

## 2022-01-07 PROCEDURE — 2580000003 HC RX 258: Performed by: NURSE PRACTITIONER

## 2022-01-07 PROCEDURE — G0378 HOSPITAL OBSERVATION PER HR: HCPCS

## 2022-01-07 PROCEDURE — 36415 COLL VENOUS BLD VENIPUNCTURE: CPT

## 2022-01-07 PROCEDURE — 82607 VITAMIN B-12: CPT

## 2022-01-07 PROCEDURE — 97530 THERAPEUTIC ACTIVITIES: CPT

## 2022-01-07 PROCEDURE — APPSS45 APP SPLIT SHARED TIME 31-45 MINUTES: Performed by: NURSE PRACTITIONER

## 2022-01-07 PROCEDURE — 99205 OFFICE O/P NEW HI 60 MIN: CPT | Performed by: PSYCHIATRY & NEUROLOGY

## 2022-01-07 PROCEDURE — 6360000002 HC RX W HCPCS: Performed by: NURSE PRACTITIONER

## 2022-01-07 PROCEDURE — 85652 RBC SED RATE AUTOMATED: CPT

## 2022-01-07 PROCEDURE — 97162 PT EVAL MOD COMPLEX 30 MIN: CPT

## 2022-01-07 PROCEDURE — 94640 AIRWAY INHALATION TREATMENT: CPT

## 2022-01-07 PROCEDURE — 99222 1ST HOSP IP/OBS MODERATE 55: CPT | Performed by: INTERNAL MEDICINE

## 2022-01-07 PROCEDURE — 82803 BLOOD GASES ANY COMBINATION: CPT

## 2022-01-07 PROCEDURE — 82550 ASSAY OF CK (CPK): CPT

## 2022-01-07 PROCEDURE — 94761 N-INVAS EAR/PLS OXIMETRY MLT: CPT

## 2022-01-07 PROCEDURE — 36600 WITHDRAWAL OF ARTERIAL BLOOD: CPT

## 2022-01-07 PROCEDURE — 99232 SBSQ HOSP IP/OBS MODERATE 35: CPT | Performed by: INTERNAL MEDICINE

## 2022-01-07 PROCEDURE — 97535 SELF CARE MNGMENT TRAINING: CPT

## 2022-01-07 PROCEDURE — 97166 OT EVAL MOD COMPLEX 45 MIN: CPT

## 2022-01-07 PROCEDURE — 96372 THER/PROPH/DIAG INJ SC/IM: CPT

## 2022-01-07 PROCEDURE — 82746 ASSAY OF FOLIC ACID SERUM: CPT

## 2022-01-07 RX ADMIN — ONDANSETRON 4 MG: 4 TABLET, ORALLY DISINTEGRATING ORAL at 16:21

## 2022-01-07 RX ADMIN — SENNOSIDES, DOCUSATE SODIUM 1 TABLET: 8.6; 5 TABLET ORAL at 09:05

## 2022-01-07 RX ADMIN — PREGABALIN 100 MG: 50 CAPSULE ORAL at 14:59

## 2022-01-07 RX ADMIN — PREDNISONE 40 MG: 20 TABLET ORAL at 09:05

## 2022-01-07 RX ADMIN — ENOXAPARIN SODIUM 40 MG: 100 INJECTION SUBCUTANEOUS at 09:04

## 2022-01-07 RX ADMIN — SODIUM CHLORIDE, PRESERVATIVE FREE 10 ML: 5 INJECTION INTRAVENOUS at 21:45

## 2022-01-07 RX ADMIN — ASPIRIN 81 MG CHEWABLE TABLET 81 MG: 81 TABLET CHEWABLE at 09:05

## 2022-01-07 RX ADMIN — SENNOSIDES, DOCUSATE SODIUM 1 TABLET: 8.6; 5 TABLET ORAL at 21:43

## 2022-01-07 RX ADMIN — BUPRENORPHINE AND NALOXONE 1 FILM: 8; 2 FILM, SOLUBLE BUCCAL; SUBLINGUAL at 14:59

## 2022-01-07 RX ADMIN — TRAMADOL HYDROCHLORIDE 50 MG: 50 TABLET, COATED ORAL at 04:25

## 2022-01-07 RX ADMIN — ONDANSETRON 4 MG: 2 INJECTION INTRAMUSCULAR; INTRAVENOUS at 18:28

## 2022-01-07 RX ADMIN — PREGABALIN 100 MG: 50 CAPSULE ORAL at 21:44

## 2022-01-07 RX ADMIN — PREGABALIN 100 MG: 50 CAPSULE ORAL at 09:05

## 2022-01-07 RX ADMIN — BUDESONIDE AND FORMOTEROL FUMARATE DIHYDRATE 2 PUFF: 80; 4.5 AEROSOL RESPIRATORY (INHALATION) at 20:26

## 2022-01-07 RX ADMIN — BUDESONIDE AND FORMOTEROL FUMARATE DIHYDRATE 2 PUFF: 80; 4.5 AEROSOL RESPIRATORY (INHALATION) at 09:09

## 2022-01-07 RX ADMIN — BUPRENORPHINE AND NALOXONE 1 FILM: 8; 2 FILM, SOLUBLE BUCCAL; SUBLINGUAL at 11:13

## 2022-01-07 RX ADMIN — SODIUM CHLORIDE, PRESERVATIVE FREE 10 ML: 5 INJECTION INTRAVENOUS at 09:09

## 2022-01-07 ASSESSMENT — PAIN SCALES - GENERAL
PAINLEVEL_OUTOF10: 8
PAINLEVEL_OUTOF10: 2
PAINLEVEL_OUTOF10: 8

## 2022-01-07 ASSESSMENT — ENCOUNTER SYMPTOMS
NAUSEA: 0
WHEEZING: 0
CONSTIPATION: 0
SHORTNESS OF BREATH: 0
VOMITING: 0
DIARRHEA: 0
COLOR CHANGE: 0
ABDOMINAL PAIN: 0
EYE PAIN: 0
PHOTOPHOBIA: 0
CHEST TIGHTNESS: 0
BACK PAIN: 0
COUGH: 0
BLOOD IN STOOL: 0

## 2022-01-07 ASSESSMENT — PAIN DESCRIPTION - LOCATION: LOCATION: FOOT

## 2022-01-07 ASSESSMENT — PAIN DESCRIPTION - PAIN TYPE: TYPE: CHRONIC PAIN

## 2022-01-07 ASSESSMENT — PAIN DESCRIPTION - ORIENTATION: ORIENTATION: RIGHT;LEFT

## 2022-01-07 NOTE — CONSULTS
[facility] PHYSICAL THERAPY EVALUATION  Kristi Mcneal, 1960, [id number], 2022    History  [place]:  The primary encounter diagnosis was Fall from bed, initial encounter. A diagnosis of Closed fracture of distal end of right fibula, unspecified fracture morphology, initial encounter was also pertinent to this visit. Patient  has a past medical history of ADHD, COPD (chronic obstructive pulmonary disease) ([facility] COVID-19, Drug addiction in remission ([facility] Hep C w/o coma, chronic ([facility] Pacemaker, Sleep apnea, and TIA (transient ischemic attack). Patient  has a past surgical history that includes  section (); Cholecystectomy (); and Pacemaker insertion (). Subjective:  Patient states:  \"I keep having these tremors now. I can't walk my knees give out. \", \"That's what happened when I fell, my legs collapsed\", \"I want you guys to know it's not my knees it's my brain\". Pain:  Pt reports 7/10 pain R ankle. Communication with other providers:  Handoff to RN, co-eval with OT, Rama Herrera for safety. RN present to perform BP orthostatics. Restrictions: WBAT with Boot ON, fall risk, tremors, general precaution. Home Setup/Prior level of function  Social/Functional History  Lives With: Alone (has caregiver for 7 hours/day 7 days/week)  Type of Home: [address] [place] [address] Drive: One level (2nd floor apartment with elevator)  Home Access: Level entry,Elevator  Bathroom Shower/Tub: Tub/Shower unit  Bathroom Toilet: Standard  Bathroom Equipment: Shower chair  Bathroom Accessibility: Accessible  Home Equipment: 4 wheeled [facility] Help From: Personal care attendant  ADL Assistance: [facility]: Needs assistance  Homemaking Responsibilities: No (caregiver manages)  Ambulation Assistance: Independent (mod I with 4ww household distances)  Transfer Assistance: Independent  Active : Yes (daughter is primary )  Occupation:  On disability  Additional Comments: Pt endorses significant functional decline over the past couple weeks with new tremors/jerking and fall resulting in current hospital admission    Examination of body systems (includes body structures/functions, activity/participation limitations):  · Observation:  Pt is awake in semi-owusu's upon arrival. R distal LE in post-op wrapping. · Vision:  Glasses  · [facility]:  [facility]  · Cardiopulmonary:  On 4.5 L 02, Sp02 stable 89-95%  · Cognition: WFL, pt is oriented and accurate historian, see OT/SLP note for further evaluation. Musculoskeletal  · ROM R/L:  LLE WFL, RLE WFL except ankle d/t casting. · Strength R/L:  Generally LLE 4/5, RLE 4/5 ankle n/t, decreased in function and endurance. · Neuro:  Sudden loss of muscle tone during sustained contractions. Appears as \"jerking\" movement. · Gait pattern: N/t d/t safety    Mobility:  · Supine to sit:  SBA  · Transfers: Mod A +CGA for safety  · Sitting balance:  SBA. · Standing balance: Mod A+ CGA for safety. · Gait: N/t    [facility] 6 Clicks Inpatient Mobility:  AM-PAC Inpatient Mobility Raw Score : 14    Treatment:    Bed mobility: PT encourages sup>sit, provides v/c for sequencing. Pt demonstrates good ability to advance LE, requires SBA for LE/ hips to EOB and SBA for trunk to upright. PT with min increased time and effort for transfer, increased use of UE on bed rail. PT v/c for scooting to EOB, pt requires SBA only with use of UE for depression lift. End of session pt fatigued and request return to supine, SBA. Pt demonstrates adequate LE strength to advance to EOB. Sitting balance: Seated EOB pt demonstrates fair balance, intermittent UE support required for maintaining upright. Pt with ability to [facility] well in seated with SBA and no LOB. Additional seated time on BSC x5', pt with light UE support on arm rest, SUP. CGA for safety as pt performs WS for self balbina-care.       Increased time in seated for PT fitting of R walking boot, adjustment, and pt education. Boot remained on for all mobility. PT removes boot prior to return to supine. Sit<>Stand: Pt performed STS from EOB to RW  with Mod A, v/c for proper sequencing. Pt demonstrates increased time and effort to upright. RN attempts to record pt BP in standing. Pt with heavy support of UE on RW x10 seconds, pt with RALPH knee buckling and Min A x2 for controlled descent to EOB. Squat-Pivot: PT/OT instruct and demonstrate approach to squat-pivot from EOB to [facility]. Mod A plus CGA of another for safety. Return to EOB from [facility] Mod A.     Education: PT described WB restrictions for the RLE, oriented pt to walking boot, described approach to safe AMB training, discussed d/c options, pt is open to rehab stay. End of session pt left in supine with HOB raised 50*  with lines managed, call light, phone, exit alarm, tray, all needs, RN aware. Assessment:    Pt is a 62 y/o female admitted 1/5 with c/o fall with R ankle injury. Pt found to have R lateral malleolus frx and per Ortho will be treated conservatively with walking boot on for WBAT. Patient with significant h/o COPD, Hep C, pacemaker, TIA, see chart. Per pt report pt has been performing ADLs/IADLs with assistance from personal care attendant, able to perform AMB Mod I with RW at home. At this time pt appears to be functioning below baseline. Pt is now presenting with impairments in LE strength, functional endurance, safety awareness, balance, pain, new WB restrictions, gait deficits. Pt would benefit from skilled PT services in order to address impairments and promote return to PLOF. PT to recommend d/c to ARU. Complexity: Moderate  Prognosis: Good, no significant barriers to participation at this time.    Plan Times per week: 6+/week, 1 week,   Discharge Recommendations: IP Rehab  Equipment: Defer to next LOC, likely to require RW    Goals:  Short term goals  Time Frame for Short term goals: 1 week  Short term goal 1: Pt will perform STS from EOB to RW with Min A, boot on, v/c. Short term goal 2: Pt will demonstrate good carryover with placement/adjustment of walking boot, Mod I. Short term goal 3: Pt will AMB x10 ft with chair follow, RW, Min A. Treatment plan  Bed mobility, transfers, balance, gait, TA, TX    Recommendations for NURSING mobility: STS from EOB to RW with boot on, Mod A x2. Squat-pivot to [facility] with Mod x2 and boot.     Time:   Time in: 09:55  Time out: 10:42  Timed treatment minutes: 32  Total time: 52    Electronically signed by:    Eva Castellon, PT  2/7/0491, 5:31 PM  PT Lic #: [id number]

## 2022-01-07 NOTE — PROGRESS NOTES
that she quit smoking about 5 years ago. She has a 40.00 pack-year smoking history. She has never used smokeless tobacco. She reports previous alcohol use. She reports previous drug use. Family history:  family history includes Alcohol Abuse in her father; Cancer in her mother; Coronary Art Dis in her brother; Lupus in her brother; Other in her sister; Thyroid Cancer in her mother. No Known Allergies    Review of Systems   All 14 systems were reviewed and are negative  Except for the positive findings  which as documented     /60   Pulse 95   Temp 97.5 °F (36.4 °C) (Oral)   Resp 20   SpO2 95%   No intake or output data in the 24 hours ending 01/07/22 1534    Physical Exam  Vitals reviewed. Constitutional:       General: She is not in acute distress. Appearance: Normal appearance. She is obese. She is not ill-appearing. HENT:      Head: Atraumatic. Neck:      Vascular: No carotid bruit. Cardiovascular:      Rate and Rhythm: Normal rate and regular rhythm. Pulses: Normal pulses. Heart sounds: Normal heart sounds. No murmur heard. Pulmonary:      Effort: Pulmonary effort is normal. No respiratory distress. Breath sounds: Normal breath sounds. Musculoskeletal:         General: No swelling or deformity. Cervical back: Neck supple. No muscular tenderness. Neurological:      Mental Status: She is alert.          Telemetry Reviewed:   Sinus rhythm    Medications:    amphetamine-dextroamphetamine  30 mg Oral Daily    buprenorphine-naloxone  1 Film SubLINGual TID    predniSONE  40 mg Oral Daily    aspirin  81 mg Oral Daily    budesonide-formoterol  2 puff Inhalation BID    pregabalin  100 mg Oral TID    sodium chloride flush  5-40 mL IntraVENous 2 times per day    enoxaparin  40 mg SubCUTAneous Daily    sennosides-docusate sodium  1 tablet Oral BID      sodium chloride       traMADol, albuterol sulfate HFA, albuterol sulfate HFA, ipratropium-albuterol, sodium chloride flush, sodium chloride, ondansetron **OR** ondansetron, polyethylene glycol, acetaminophen **OR** acetaminophen, traZODone    Lab Data:  CBC:   Recent Labs     01/05/22  1719 01/06/22  1124   WBC 11.5* 11.7*   HGB 12.4* 13.2   HCT 42.8 46.2   MCV 93.9 95.1    215     BMP:   Recent Labs     01/05/22  1719 01/06/22  1124    137   K 3.8 4.7   CL 94* 95*   CO2 38* 38*   BUN 9 8   CREATININE 0.5* 0.4*     PT/INR: No results for input(s): PROTIME, INR in the last 72 hours. BNP:    Recent Labs     01/05/22 1719   PROBNP 49.04     TROPONIN:   Recent Labs     01/05/22 1719   TROPONINT <0.010           All labs, medications and tests reviewed by myself , continue all other medications of all above medical condition listed as is except for changes mentioned above. Thank you very much for consult , please call with questions. Electronically signed by KELLY Francisco CNP on 1/7/2022 at 3:34 PM    CARDIOLOGY ATTENDING ADDENDUM    I have seen ,spoken to  and examined this patient personally, independently of the nurse practitioner. I have spent substantiate  portion of this encounter independently myself in examining patient and developing the medical management plan . I have reviewed the hospital care given to date and reviewed all pertinent labs and imaging. The plan was developed mutually at the time of the visit with the patient,  NP   and myself. I have spoken with patient, nursing staff and provided written and verbal instructions . The above note has been reviewed and I agree with the assessment, diagnosis, and treatment plan with changes made by me as follows . My documented MDM is a substantive portion of the supervisory note. HPI:  I have reviewed the above HPI  And agree with above   Ivana Saavedra is a 63 y. o.year old who and presents with had concerns including Fall, Extremity Weakness (right leg), and Leg Swelling (right leg).   Chief Complaint   Patient presents with   [facility] Extremity Weakness     right leg    Leg Swelling     right leg     Please review addendum/changes made to note above   Interval history: She is more awake interactive today unclear etiology of recurrent passing out this time injured her leg fractured malleolus    Physical Exam:  General:  Awake, alert, NAD  Head:normal  Eye:normal  Neck:  No JVD   Chest:  Clear to auscultation, respiration easy  Cardiovascular:  S1 and S2 audible, No added heart sounds, No signs of ankle edema, or volume overload, No evidence of JVD, No crackles  Abdomen:   nontender  Extremities:  No * edema  Pulses; palpable  Neuro: grossly normal      MEDICAL DECISION MAKING;    I agree with the above plan, which was planned by myself and discussed with NP.   No further cardiac work-up at this time echo shows no abnormality  Follow-up in office      Leighann Rivera MD [address] [facility] 01/07/22

## 2022-01-07 NOTE — PROGRESS NOTES
Occupational Therapy    [facility] ACUTE CARE OCCUPATIONAL THERAPY EVALUATION    Serafin Nur, 1960, [id number], 1/7/2022    Discharge Recommendation: Inpatient Rehabilitation      History:  [place]:  The primary encounter diagnosis was Fall from bed, initial encounter. A diagnosis of Closed fracture of distal end of right fibula, unspecified fracture morphology, initial encounter was also pertinent to this visit. Subjective:  Patient states: \"Just so you know, my legs are very weak right now. \"  Pain: Pt reported 7/10 pain in Rt ankle  Communication with other providers: PT Melanie Gloria  Restrictions: General Precautions, Fall Risk, WBAT Rt LE with walking boot, 2L o2, Pulse Ox, Bed exit alarm    Home Setup/Prior level of function:  Social/Functional History  Lives With: Alone (has caregiver for 7 hours/day 7 days/week)  Type of Home: [address] [place] [address] Drive: One level (2nd floor apartment with elevator)  Home Access: Level entry,Elevator  Bathroom Shower/Tub: Tub/Shower unit  Bathroom Toilet: Standard  Bathroom Equipment: Shower chair  Bathroom Accessibility: Accessible  Home Equipment: 4 wheeled [address] [facility] Help From: Personal care attendant  ADL Assistance: [facility]: Needs assistance  Homemaking Responsibilities: No (caregiver manages)  Ambulation Assistance: Independent (mod I with 4ww household distances)  Transfer Assistance: Independent  Active : Yes (daughter is primary )  Occupation: On disability  Additional Comments: Pt endorses significant functional decline over the past couple weeks with new tremors/jerking and fall resulting in current hospital admission    Examination:  · Observation: Supine in bed upon arrival. Pleasant and agreeable to evaluation. Voiced wanting to urinate on BSC. · Vision: WFL (Glasses)  · Hearing: WFL  · Vitals: o2 sats primarily in low 90s but briefly dropped to high 80s on 2L o2.  BP of 127/76 seated EOB, 110/60 after brief static standing    Body Systems and functions:  · ROM: WNL all joints in BL UEs  · Strength: 4-/5 MMT shoulder flexors and elbow extensors, 4/5 MMT elbow flexors, wrist extensors, and grasp (generally weak for pt's age)  · Sensation: WFL in BL UEs (See PT note for LE assessment)  · Tone: Normal  · Coordination: Intermittent tremors and jerking movements observed in BL UEs    Activities of Daily Living (ADLs):  · Feeding: CGA/setup (steadying assist for bringing cup to mouth due to coordination deficits)  · Grooming: SBA/setup (seated hand hygiene with wet wipes after toileting; unable to complete in standing this date due LE weakness)  · UB bathing: CGA   · LB bathing: Mod A (reaching distal Rt LE and posterior thighs)  · UB dressing: CGA (light dynamic sitting balance with donning robe seated EOB)  · LB dressing: Mod A (dependent with donning Rt walking boot, able to don Lt sock seated EOB CGA by crossing leg into \"figure 4 position\"; would need assist for threading Rt leg through pants opening and mgmt to hips in standing)  · Toileting: Max A (pt urinated while seated on BSC; assist required for clothing mgmt both directions, able to complete seated balbina care by leaning forward with CGA for steadying)    Cognitive and Psychosocial Functioning:  · Overall cognitive status: WFL  · Affect: Normal     Balance:   · Sitting: CGA to close SBA unsupported sitting EOB (slightly unpredictable due to intermittent jerking movements)  · Standing: Min A to Mod A with RW and Rt LE walking boot for static standing x 10 seconds; LE weakness/jerking movements    Functional Mobility:  · Bed Mobility: SBA supine to sitting EOB, SBA sitting EOB to supine (HOB elevated to 30', increased time required each direction)  · Transfers:  Mod A sit to stand from bed, Mod A stand pivot transfer bed to [facility] and BSC to bed (mod cues for rocking to gain momentum/technique with each transfer)  · Ambulation: Unsafe/unable this date 2/2 LE weakness      AM-PAC 6 click short form for inpatient daily activity:   How much help from another person does the patient currently need. .. Unable  Dep A Lot  Max A A Lot   Mod A A Little  Min A A Little   CGA  SBA None   Mod I  Indep  Sup   1. Putting on and taking off regular lower body clothing? [] 1    [] 2   [x] 2   [] 3   [] 3   [] 4      2. Bathing (including washing, rinsing, drying)? [] 1   [] 2   [] 2 [x] 3 [] 3 [] 4   3. Toileting, which includes using toilet, bedpan, or urinal? [] 1    [x] 2   [] 2   [] 3   [] 3   [] 4     4. Putting on and taking off regular upper body clothing? [] 1   [] 2   [] 2   [] 3   [x] 3    [] 4      5. Taking care of personal grooming such as brushing teeth? [] 1   [] 2    [] 2 [] 3    [x] 3   [] 4      6. Eating meals? [] 1   [] 2   [] 2   [] 3   [x] 3   [] 4      Raw Score:  16    [24=0% impaired(CH), 23=1-19%(CI), 20-22=20-39%(CJ), 15-19=40-59%(CK), 10-14=60-79%(CL), 7-9=80-99%(CM), 6=100%(CN)]     Treatment:  Therapeutic Activity Training:   Therapeutic activity training was instructed today. Cues were given for safety, sequence, UE/LE placement, awareness, and balance. Activities performed today included bed mobility training, transfer training, standing tolerance with RW and Rt LE walking boot, education on role of OT, POC, WBAT status Rt LE with walking boot, wear of boot, importance of EOB/OOB activity, d/c planning and recommendations  Self Care Training:   Cues were given for safety, sequence, UE/LE placement, visual cues, and balance.     Activities performed today included UB/LB dressing tasks, toileting on BSC, hand hygiene with wet wipes      Safety Measures: Gait belt used, Left in Bed, Alarm in place    Assessment:  Pt is a 63year old female with a past medical history of ADHD, COPD (chronic obstructive pulmonary disease) ([facility] COVID-19, Drug addiction in remission ([facility] Hep C w/o coma, chronic ([facility] Pacemaker, Sleep apnea, and TIA (transient ischemic attack). Pt admitted following a fall and diagnosed with a Rt lateral malleolus fracture which is undergoing conservative treatment. Pt lives at home alone but has a caregiver for 7 hours/day for IADL tasks. Pt states she is independent with ADLs and ambulates mod I with a 4ww household distances. Pt currently presents with the above impairments, and is significantly below baseline. Recommend continued OT services in inpatient rehab setting at discharge. Complexity: Moderate  Prognosis: Good  Plan: 3x/week      Goals:  1. Pt will complete all aspects of bed mobility for EOB/OOB ADLs mod I with HOB flat  2. Pt will complete UB/LB bathing min A with setup using long handled sponge PRN  3. Pt will complete all aspects of LB dressing min A with setup using AE PRN  4. Pt will complete all functional transfers to and from bed, chair, BSC min A/good safety awareness  5. Pt will ambulate 5 ft bed to [facility] for toileting mod A x 2 with RW  6. Pt will complete all aspects of toileting task mod A on BSC  7. Pt will complete oral hygiene/grooming routine in unsupported sitting EOB with supervision/setup  8.  Pt will complete ther ex/ther act with focus on UE strengthening and static/dynamic sitting balance training in unsupported sitting EOB      Time:   Time in: 1000  Time out: 1041  Timed treatment minutes: 26  Total time: 41      Electronically signed by:    PARKER Hale/L, [address] [place] [phone]

## 2022-01-07 NOTE — CONSULTS
Neurology Service Consult Note  [facility]  Patient Name: Nayeli Morrissey  : 1960        Subjective:   Reason for consult: \" Sudden loss of muscle tone in the past 1 year\"  Patient seen and examined. Chart reviewed in detail. 63 y.o. -female with PMH ADHD, COPD, drug addiction, hepatitis C, pacemaker, COVID-19 infection, and sleep apnea presenting to [facility] on 2022 for a fall status post bilateral legs giving out. Patient did not lose consciousness or hit her head and denied having any presyncopal symptoms on presentation. As a result of patient's fall she was admitted for an acute nondisplaced fracture to the distal fibula in which Ortho is following. Neurology being consulted for sudden loss of muscle tone in the past 1 year. On exam, patient is sitting upright in bed alert and oriented x4. She denies numbness/tingling, vision or speech changes, unilateral weakness. Exam is nonfocal and nonlateralizing. She reports that jerking sensation to bilateral upper and lower extremities has been occurring for approximately 1 year. During my exam I did have the opportunity to witness some of the jerking motions that are consistent with reverse myoclonus patient does have a history of COPD and wears a BiPAP at home. Patient reports compliance and that she wears it every night. I am curious to know her CO2 levels at the time of the jerking sensations throughout the year and at present time as this could be secondary to a metabolic disturbance. Prior work-up includes CBC, shows WBC 11.7, CMP, UA consistent for trace leukocyte esterase    Prior work-up further includes CMP, liver profile which shows albumin 3.1, CBC shows WBC 11.7, UA shows positive for trace leukocyte esterase.       Past Medical History:   Diagnosis Date    ADHD 2006    [facility]) ([facility]     COVID-19     Drug addiction in remission ([facility] recovering addict    Hep C w/o coma, chronic (HCC)     Pacemaker     Sleep apnea     TIA (transient ischemic attack)     per patient \"several mini strokes\"    :   Past Surgical History:   Procedure Laterality Date   [facility]    CHOLECYSTECTOMY  2010    done in [address] [place] [address]       Medications:  Scheduled Meds:   amphetamine-dextroamphetamine  30 mg Oral Daily    buprenorphine-naloxone  1 Film SubLINGual TID    predniSONE  40 mg Oral Daily    aspirin  81 mg Oral Daily    budesonide-formoterol  2 puff Inhalation BID    pregabalin  100 mg Oral TID    sodium chloride flush  5-40 mL IntraVENous 2 times per day    enoxaparin  40 mg SubCUTAneous Daily    sennosides-docusate sodium  1 tablet Oral BID     Continuous Infusions:   sodium chloride       PRN Meds:.traMADol, albuterol sulfate HFA, albuterol sulfate HFA, ipratropium-albuterol, sodium chloride flush, sodium chloride, ondansetron **OR** ondansetron, polyethylene glycol, acetaminophen **OR** acetaminophen, traZODone    No Known Allergies  Social History     Socioeconomic History    Marital status: Single     Spouse name: Not on file    Number of children: Not on file    Years of education: Not on file    Highest education level: Not on file   Occupational History    Not on file   Tobacco Use    Smoking status: Former Smoker     Packs/day: 1.00     Years: 40.00     Pack years: 40.00     Quit date: 2016     Years since quittin.3    Smokeless tobacco: Never Used   Substance and Sexual Activity    Alcohol use: Not Currently     Comment: occasional caffeine     Drug use: Not Currently     Comment: heroine    Sexual activity: Not on file   Other Topics Concern    Not on file   Social History Narrative    Not on file     Social Determinants of Health     Financial Resource Strain:     Difficulty of Paying Living Expenses: Not on file   Food Insecurity:     Worried About Running Out of Food in the Last Year: Not on file    Ran Out of Food in the Last Year: Not on file   Transportation Needs:     Lack of Transportation (Medical): Not on file    Lack of Transportation (Non-Medical):  Not on file   Physical Activity:     Days of Exercise per Week: Not on file    Minutes of Exercise per Session: Not on file   Stress:     Feeling of Stress : Not on file   Social Connections:     Frequency of Communication with Friends and Family: Not on file    Frequency of Social Gatherings with Friends and Family: Not on file    Attends Scientologist Services: Not on file    Active Member of [address] [place] [address] [employer] or Organizations: Not on file    Attends Club or Organization Meetings: Not on file    Marital Status: Not on file   Intimate Partner Violence:     Fear of Current or Ex-Partner: Not on file    Emotionally Abused: Not on file    Physically Abused: Not on file    Sexually Abused: Not on file   Housing Stability:     Unable to Pay for Housing in the Last Year: Not on file    Number of [place] in the Last Year: Not on file    Unstable Housing in the Last Year: Not on file      Family History   Problem Relation Age of Onset    Cancer Mother         unsure of type    Thyroid Cancer Mother     Alcohol Abuse Father     Other Sister         passed in car accident    Lupus Brother     Coronary Art Dis Brother          ROS (10 systems)  In addition to that documented in the HPI above, the additional ROS was obtained:  Constitutional: Denies fevers or chills  Eyes: Denies vision changes  ENMT: Denies sore throat  CV: Denies chest pain  Resp: Denies SOB  GI: Denies vomiting or diarrhea  : Denies painful urination  MSK: Denies recent trauma  Skin: Denies new rashes  Neuro: Denies new numbness or tingling or weakness  Endocrine: Denies unexpected weight loss  Heme: Denies bleeding disorders    Physical Exam:      Vitals:    01/06/22 2124 01/07/22 0630 01/07/22 0845 01/07/22 1000   BP: (!) 143/65 (!) 113/58 120/60 122/79 Pulse: 97 96 79 95   Resp: 14 15 16 20   Temp: 99.1 °F (37.3 °C) 97.5 °F (36.4 °C) 97.5 °F (36.4 °C)    TempSrc: Oral Oral Oral    SpO2: 90% 96% 95%        Wt Readings from Last 3 Encounters:   10/26/21 200 lb (90.7 kg)   07/19/21 200 lb (90.7 kg)   07/14/21 219 lb 2.2 oz (99.4 kg)     Temp Readings from Last 3 Encounters:   01/07/22 97.5 °F (36.4 °C) (Oral)   07/14/21 98.1 °F (36.7 °C) (Oral)   06/29/21 97.9 °F (36.6 °C) (Oral)     BP Readings from Last 3 Encounters:   01/07/22 122/79   10/26/21 130/70   07/14/21 (!) 105/43     Pulse Readings from Last 3 Encounters:   01/07/22 95   10/26/21 110   07/19/21 142        Gen: A&O x 4, NAD, cooperative  HEENT: NC/AT, EOMI, PERRL, mmm, neck supple, no meningeal signs; Heart: ST  Lungs: Respirations Unlabored  Ext: no edema, no calf tenderness b/l  Psych: normal mood and affect  Skin: no rashes or lesions    NEUROLOGIC EXAM:    Mental Status: A&O to self, location, month and year, NAD, speech clear, language fluent, repetition and naming intact, follows commands appropriately    Cranial Nerve Exam:   CN II-XII: PERRL, VFF, no nystagmus, no gaze paresis, sensation V1-V3 intact b/l, muscles of facial expression symmetric; hearing intact to conversational tone, palate elevates symmetrically, shoulder elevation symmetric and tongue protrudes midline with movement side to side.     Motor Exam:       Strength 5/5 UE's/LE's b/l  Tone and bulk normal   No pronator drift    Deep Tendon Reflexes: 2/4 biceps, triceps, brachioradialis, patellar, and achilles b/l; flexor plantar responses b/l    Sensation: Intact light touch/vibration UE's/LE's b/l    Coordination/Cerebellum:       Tremors--involuntary jerks of upper and lower extremities      Rapidly alternating movements: no dysdiadochokinesia b/l                Heel-to-Shin: no dysmetria b/l      Finger-to-Nose: no dysmetria b/l    Gait and stance:      Gait: deferred      LABS:        CBC:   Recent Labs     01/06/22  1124   WBC 11.7*   RBC 4.86   HGB 13.2   HCT 46.2      MCV 95.1     BMP:    Recent Labs     01/06/22  1124      K 4.7   CL 95*   CO2 38*   BUN 8   CREATININE 0.4*   GLUCOSE 122*   CALCIUM 8.8       IMAGING:    No new neuroimaging today. Above imaging reviewed in detail. ASSESSMENT/PLAN:   82-year-old female patient with past medical history stated above presented to [address] 2 days ago for a fall secondary to buckling of legs, in which patient at that time denies LOC, hitting head or presented with any syncopal symptoms complaints. Neurology has been consulted for jerking of the legs, in particular \"sudden loss of muscle tone in the past year. .  Patient does have history of drug abuse and hepatitis C, and COPD with home BiPAP. Will obtain ABG to rule out metabolic etiology secondary to possible hypercapnia  1. Involuntary jerks likely negative myoclonus (asterixis) secondary to possible hypercapnia  -- ABG to rule out metabolic etiology secondary to possible hypercapnia  -Recommend PT/OT further recommendations  -Vit. B-12 ordered  -Ammonia level WNL.  -2D echo ending  --ESR ordered  --CK ordered  Further recommendations pending ordered diagnostic    Patient discussed with attending physician Dr. Carola Moore    Thank you for allowing us to participate in the care of your patient. If there are any questions regarding evaluation please feel free to contact us. KELLY Bullard - CNP, 1/7/2022      ------------------------------------    Attending Note:  I have rounded on this patient with Wendy Dawn NP on 1/7/2022. I have reviewed the chart and we have discussed this case in detail. The patient was seen and examined by myself. Pertinent labs and imaging have been personally reviewed. Our findings and impressions were discussed with the patient. I concur with the Nurse Practitioner's assessment and plan.     Ashley Mak,

## 2022-01-07 NOTE — CARE COORDINATION
LSW reviewed chart. Call to pt room. Pt did not answer. Per chart review, Pt is from home with aides from [facility]. LSW reviewed the recommendations from therapy for ARU. CM will follow up to see if the pt would like to go to ARU.

## 2022-01-07 NOTE — CONSULTS
Electrophysiology Consult Note      Reason for consultation:  Remove pacemaker    Chief complaint :  Sp fall    Referring physician: Edilma Lund      Primary care physician: Krissy Fernandes PA-C      History of Present Illness:     Patient is a 51-year-old female with a history of COPD, drug addiction in remission, pacemaker which is end-of-life for several years, sleep apnea presented to the hospital s/p fall. Patient reports she has feeling leg jerking like motion in the hands and she is all aware of the surroundings when this happens and suddenly she feels her legs give away and she falls. She denies passing out at any time during this episode. Patient denies any chest pain, shortness of breath, palpitations. Patient reports she was not getting an MRI because of the pacemaker. Patient has been having increasing pain in both legs. Patient has seen Dr. Wilber Aceveod in October but failed to follow-up with him again      Patient reports that she initially had a pacemaker 20+ years ago when she was addicted to drugs and she was on methadone. When she was obtunded they gave her naloxone and that did something to her heart and then next thing she knows that she had a pacemaker.   Patient reports that she has not used the pacemaker for years and pacemaker has been tired for several years and she has not needed it and wants to see if it can come out        Pastmedical history:   Past Medical History:   Diagnosis Date    ADHD 2006    [facility]     COVID-19     Drug addiction in remission ([facility]     recovering addict    Hep C w/o coma, chronic ([facility]     Pacemaker     Sleep apnea     TIA (transient ischemic attack)     per patient \"several mini strokes\"       Surgical history :   Past Surgical History:   Procedure Laterality Date   [address]  2010    done in [address] [place] [address]  2005 Family history:   Family History   Problem Relation Age of Onset   [place] Cancer Mother         unsure of type    Thyroid Cancer Mother     Alcohol Abuse Father     Other Sister         passed in car accident    Lupus Brother     Coronary Art Dis Brother        Social history :  reports that she quit smoking about 5 years ago. She has a 40.00 pack-year smoking history. She has never used smokeless tobacco. She reports previous alcohol use. She reports previous drug use. No Known Allergies    No current facility-administered medications on file prior to encounter. Current Outpatient Medications on File Prior to Encounter   Medication Sig Dispense Refill    traMADol (ULTRAM) 50 MG tablet Take 1 tablet by mouth 2 times daily.       predniSONE (DELTASONE) 10 MG tablet Take 1 tablet by mouth daily 40mg daily for 2 days, 20 mg daily for 2 days, 10 mg daily for 2 days, 5 mg daily for 2 days 15 tablet 0    albuterol sulfate  (90 Base) MCG/ACT inhaler USE 2 PUFFS BY MOUTH EVERY 6 HOURS AS NEEDED SHAKE WELL 18 g 5    mometasone-formoterol (DULERA) 100-5 MCG/ACT inhaler Inhale 2 puffs into the lungs 2 times daily 1 each 5    predniSONE (DELTASONE) 10 MG tablet Take 1 tablet by mouth daily 40mg daily for 2 days, 20 mg daily for 2 days, 10 mg daily for 2 days, 5 mg daily for 2 days 15 tablet 0    doxycycline hyclate (VIBRAMYCIN) 100 MG capsule Take 1 capsule by mouth daily 10 capsule 0    predniSONE (DELTASONE) 10 MG tablet Take 1 tablet by mouth daily 40mg daily for 2 days, 20 mg daily for 2 days, 10 mg daily for 2 days, 5 mg daily for 2 days 15 tablet 0    predniSONE (DELTASONE) 10 MG tablet Take 1 tablet by mouth daily 40mg daily for 2 days, 20 mg daily for 2 days, 10 mg daily for 2 days, 5 mg daily for 2 days (Patient not taking: Reported on 10/26/2021) 15 tablet 0    azithromycin (ZITHROMAX) 250 MG tablet Take 1 tablet by mouth daily Take 500 mg once, then 250 mg daily for 4 days then stop (Patient not taking: Reported on 10/26/2021) 6 tablet 0    predniSONE (DELTASONE) 10 MG tablet Take 1 tablet by mouth daily 40mg daily for 2 days, 20 mg daily for 2 days, 10 mg daily for 2 days, 5 mg daily for 2 days (Patient not taking: Reported on 10/26/2021) 15 tablet 0    doxycycline hyclate (VIBRAMYCIN) 100 MG capsule Take 1 capsule by mouth daily (Patient not taking: Reported on 10/26/2021) 10 capsule 0    ipratropium-albuterol (DUONEB) 0.5-2.5 (3) MG/3ML SOLN nebulizer solution Inhale 3 mLs into the lungs every 6 hours as needed for Shortness of Breath 120 mL 0    predniSONE (DELTASONE) 10 MG tablet 40mg daily for 2 days, 30mg daily for 2 days, 20 mg daily for 2 days, 10 mg daily for 2 days, 5 mg daily for 2 days (Patient not taking: Reported on 10/26/2021) 21 tablet 0    fluticasone-umeclidin-vilant (TRELEGY ELLIPTA) 100-62.5-25 MCG/INH AEPB Inhale 1 puff into the lungs daily 1 each 5    BiPAP Machine MISC by Does not apply route Use nightly      aspirin 81 MG chewable tablet Take 81 mg by mouth daily      Multiple Vitamins-Minerals (WOMENS MULTIVITAMIN PO) Take 1 tablet by mouth daily      Nebulizer MISC Use nebulizer with medication as directed. 1 each 0    Respiratory Therapy Supplies (NEBULIZER/TUBING/MOUTHPIECE) KIT Inhale 1 kit into the lungs daily as needed (as directed) 1 kit 0    amphetamine-dextroamphetamine (ADDERALL XR) 30 MG extended release capsule Take 30 mg by mouth daily.  pregabalin (LYRICA) 100 MG capsule Take 100 mg by mouth 3 times daily.  traZODone (DESYREL) 150 MG tablet Take 300 mg by mouth nightly       OXYGEN Inhale 4 L into the lungs continuous       CPAP Machine MISC by CPAP route nightly  (Patient not taking: Reported on 10/26/2021)      buprenorphine-naloxone (SUBOXONE) 8-2 MG FILM SL film Place 1 Film under the tongue 3 times daily. Review of Systems:   Review of Systems   Constitutional: Positive for fatigue.  Negative for activity change, chills and fever. HENT: Negative for congestion, ear pain and tinnitus. Eyes: Negative for photophobia, pain and visual disturbance. Respiratory: Negative for cough, chest tightness, shortness of breath and wheezing. Cardiovascular: Negative for chest pain, palpitations and leg swelling. Gastrointestinal: Negative for abdominal pain, blood in stool, constipation, diarrhea, nausea and vomiting. Endocrine: Negative for cold intolerance and heat intolerance. Genitourinary: Negative for dysuria, flank pain and hematuria. Musculoskeletal: Positive for arthralgias. Negative for back pain, myalgias and neck stiffness. Skin: Negative for color change and rash. Allergic/Immunologic: Negative for food allergies. Neurological: Positive for tremors and weakness. Negative for dizziness, light-headedness, numbness and headaches. Hematological: Does not bruise/bleed easily. Psychiatric/Behavioral: Negative for agitation, behavioral problems and confusion. Examination:      Vitals:    01/07/22 0630 01/07/22 0845 01/07/22 1000 01/07/22 1015   BP: (!) 113/58 120/60 122/79 110/60   Pulse: 96 79 95    Resp: 15 16 20    Temp: 97.5 °F (36.4 °C) 97.5 °F (36.4 °C)     TempSrc: Oral Oral     SpO2: 96% 95%          There is no height or weight on file to calculate BMI. Physical Exam  Constitutional:       General: She is not in acute distress. Appearance: She is not ill-appearing or diaphoretic. HENT:      Head: Normocephalic and atraumatic. Right Ear: External ear normal.      Left Ear: External ear normal.      Nose: No congestion. Mouth/Throat:      Mouth: Mucous membranes are moist.   Eyes:      Extraocular Movements: Extraocular movements intact. Conjunctiva/sclera: Conjunctivae normal.      Pupils: Pupils are equal, round, and reactive to light. Cardiovascular:      Rate and Rhythm: Normal rate and regular rhythm. Heart sounds: No murmur heard.       Pulmonary:      Effort: Pulmonary effort is normal. No respiratory distress. Breath sounds: Normal breath sounds. No wheezing or rhonchi. Abdominal:      General: Abdomen is flat. Bowel sounds are normal. There is no distension. Palpations: Abdomen is soft. Tenderness: There is no abdominal tenderness. Musculoskeletal:         General: Tenderness present. Cervical back: Normal range of motion. No tenderness. Left lower leg: No edema. Comments: Right leg in wrap   Skin:     General: Skin is warm. Neurological:      General: No focal deficit present. Mental Status: She is alert and oriented to person, place, and time. Cranial Nerves: No cranial nerve deficit. Psychiatric:         Mood and Affect: Mood normal.           CBC:   Lab Results   Component Value Date    WBC 11.7 01/06/2022    HGB 13.2 01/06/2022    HCT 46.2 01/06/2022     01/06/2022     Lipids:  Lab Results   Component Value Date    CHOL 239 (H) 03/30/2021    TRIG 142 03/30/2021    HDL 53 03/30/2021    LDLDIRECT 169 (H) 03/30/2021     PT/INR:   Lab Results   Component Value Date    INR 1.03 05/24/2021        BMP:    Lab Results   Component Value Date     01/06/2022    K 4.7 01/06/2022    CL 95 (L) 01/06/2022    CO2 38 (H) 01/06/2022    BUN 8 01/06/2022     CMP:   Lab Results   Component Value Date    AST 21 01/06/2022    PROT 6.8 01/06/2022    BILITOT 0.3 01/06/2022    ALKPHOS 85 01/06/2022     TSH:  No results found for: TSH    EKGINTERPRETATION - EKG Interpretation:        IMPRESSION / RECOMMENDATIONS:       Pacemaker with end of battery life for years  S/p fall  COPD  Obesity  - BMI of 34    Pacemaker is end of battery life for years. 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 Patient denies has been treated for years and she has not used it.     Patient is s/p fall but this is not related to syncope. Patient is being assessed for the falls with leg weakness    I discussed with the patient that pacemaker being chronic -  it would be a high risk for extraction as probably by this time the leads are calcified. Pacemaker is not causing any difficulty at this time and it is also not working so unless there is high indication for removal I would recommend to hold off on it given the high risk nature of the procedure in a chronic device. The risks including, but not limited to, the risks of vascular injury, bleeding, infection, device malfunction, lead dislodgement, lead malfunction, radiation exposure, injury to cardiac and surrounding structures (including pneumothorax), inappropriate shocks, SVC tear, RV avulsion stroke, myocardial infarction and death were discussed in detail. After discussion with the patient patient agreed to hold off for now. She wants to know what is happening with her falls and COPD. Pulmonary physician has told her that she is retaining too much carbon dioxide and that could be the reason so she wants to know that before considering anything else which is completely reasonable. I can follow her as an outpatient    I do not see a need for loop recorder but if arrhythmia is a concern can start with an event monitor as an outpatient. She is in sinus rhythm here without any arrhythmia and unlikely her description suggest any arrhythmia at this time      Thanks again for allowing me to participate in care of this patient. Please call me if you have any questions. With best regards. Gary Leone MD, 1/7/2022 1:52 PM     Please note this report has been partially produced using speech recognition software and may contain errors related to that system including errors in grammar, punctuation, and spelling, as well as words and phrases that may be inappropriate.  If there are any questions or concerns please feel free to contact the dictating provider for clarification.

## 2022-01-07 NOTE — PROGRESS NOTES
Hospitalist Progress Note      Name:  Cherise Boyd /Age/Sex: 1960  (63 y.o. female)   MRN & CSN:  [id number] & [id number] Admission Date/Time: 2022  4:44 PM   Location:  9864/6874-G PCP: Ariadna Donaldson, [facility] Day: 3    Assessment and Plan:   Cherise Boyd is a 63 y.o.  female with history of substance abuse, ADHD, OD on pacemaker, COPD, chronic respiratory failure on 4 L NC home oxygen was admitted to hospital on  for fall evaluation. Patient was found acute nondisplaced fracture in the distal fibula. Cardiology and EP consulted, recommended outpatient follow-up. PT OT recommended to discharge to ARU. Case management is on board for discharge planning.     Subjective:  Patient stated her bilateral legs are weak. She has tremor in bilateral hands. Patient stated she has difficulty to hold cups.     Acute nondisplaced fracture distal fibula  Secondary to fall  -Patient lay on the bed without splint in my evaluation  -Appreciate orthopedics input: On walking boot, follow-up in office in 6 weeks  -Appreciate PT input: Patient would benefit from skilled PT service.   Recommend to discharge to ARU.  -Case management is on board     Jerking  Frequently fall  -Reported jerking and suddenly lose muscle tone frequently in the past 1 year  -Not lost consciousness, no focal neurology deficit  -Pending neurology consult    Presyncope?  -History of pacemaker over 20 years  -Appreciate cardiology input: EPS evaluation for pacemaker removal and possible loop placement.  sign off  -Appreciate EP input: No need for loop placement, outpatient follow-up for pacemaker removal, will consider event monitor if patient has arrhythmia.     Acute on chronic respiratory failure  COPD exacerbation  -Currently on 5 L NC, on 4 L NC at home  -Wheezing in bilateral lungs  -Steroids and breathing treatment     History of substance abuse  -Continue Suboxone 3 times a day     History of ADHD  -Continue Adderall     DVT prophylaxis  -Lovenox 40 mg daily    Diet ADULT DIET; Regular   DVT Prophylaxis [x] Lovenox, []  Heparin, [] SCDs, [] Ambulation   GI Prophylaxis [x] PPI,  [] H2 Blocker,  [] Carafate,  [] Diet/Tube Feeds   Code Status Full Code   Disposition Patient requires continued admission due to ARU placement     History of Present Illness:     Chief Complaint: Frequently fall    Jewels Harmon is a 63 y.o.  female with history of substance abuse on Suboxone, ADHD, overdose, on pacemaker, COPD, chronic respiratory failure on home oxygen presents with frequent fall. Patient reported frequent \"jerking\", muscle spasm, and sudden lose muscle tone in the past 1 year. She stated the situation is progressively getting worse recently. She stood up from her bed on January 5 and suddenly her bilateral legs gave out. Patient fell and twisted right leg. Patient was found right distal fibula fracture. Patient stated she saw a cardiologist who recommended a echo and a stress test in the past.  But she did not completed. Patient denied loss of consciousness. Did not hit her head or neck. Patient is not on blood thinner. Ten point ROS reviewed negative, unless as noted above    Objective:   No intake or output data in the 24 hours ending 01/07/22 1552   Vitals:   Vitals:    01/07/22 1015   BP: 110/60   Pulse:    Resp:    Temp:    SpO2:      Physical Exam:   GEN Awake female, sitting upright in bed in no apparent distress. Appears given age. EYES Pupils are equally round. No scleral erythema, discharge, or conjunctivitis. HENT Mucous membranes are moist. Oral pharynx without exudates, no evidence of thrush. NECK Supple, no apparent thyromegaly or masses. RESP Clear to auscultation, no wheezes, rales or rhonchi. Symmetric chest movement while on room air. CARDIO/VASC S1/S2 auscultated. Regular rate without appreciable murmurs, rubs, or gallops. No JVD or carotid bruits.  Peripheral pulses equal bilaterally and palpable. No peripheral edema. GI Abdomen is soft without significant tenderness, masses, or guarding. Bowel sounds are normoactive. Rectal exam deferred.  No costovertebral angle tenderness. Normal appearing external genitalia. Fraser catheter is not present. HEME/LYMPH No palpable cervical lymphadenopathy and no hepatosplenomegaly. No petechiae or ecchymoses. MSK splint is on right lower extremity, no neurovascular deficit in toes. SKIN Normal coloration, warm, dry. NEURO Cranial nerves appear grossly intact, normal speech, no lateralizing weakness. PSYCH Awake, alert, oriented x 4. Affect appropriate. Medications:   Medications:    amphetamine-dextroamphetamine  30 mg Oral Daily    buprenorphine-naloxone  1 Film SubLINGual TID    predniSONE  40 mg Oral Daily    aspirin  81 mg Oral Daily    budesonide-formoterol  2 puff Inhalation BID    pregabalin  100 mg Oral TID    sodium chloride flush  5-40 mL IntraVENous 2 times per day    enoxaparin  40 mg SubCUTAneous Daily    sennosides-docusate sodium  1 tablet Oral BID      Infusions:    sodium chloride       PRN Meds: traMADol, 50 mg, BID PRN  albuterol sulfate HFA, 2 puff, Q6H PRN  albuterol sulfate HFA, 2 puff, Q4H PRN  ipratropium-albuterol, 1 ampule, Q4H PRN  sodium chloride flush, 5-40 mL, PRN  sodium chloride, 25 mL, PRN  ondansetron, 4 mg, Q8H PRN   Or  ondansetron, 4 mg, Q6H PRN  polyethylene glycol, 17 g, Daily PRN  acetaminophen, 650 mg, Q6H PRN   Or  acetaminophen, 650 mg, Q6H PRN  traZODone, 200 mg, Nightly PRN          Patient is still admitted because ARU placement.  The anticipated discharge is in  24 to 48 hours     Electronically signed by Justina Zazueta on 1/7/2022 at 3:52 PM

## 2022-01-08 LAB
BASE EXCESS MIXED: 24.3 (ref 0–2.3)
CARBON MONOXIDE, BLOOD: 2.7 % (ref 0–5)
CO2 CONTENT: 58.9 MMOL/L (ref 19–24)
COMMENT: ABNORMAL
HCO3 ARTERIAL: 55.9 MMOL/L (ref 18–23)
METHEMOGLOBIN ARTERIAL: 1.3 %
O2 SATURATION: 89.8 % (ref 96–97)
PCO2 ARTERIAL: 99 MMHG (ref 32–45)
PH BLOOD: 7.36 (ref 7.34–7.45)
PO2 ARTERIAL: 57 MMHG (ref 75–100)

## 2022-01-08 PROCEDURE — 36600 WITHDRAWAL OF ARTERIAL BLOOD: CPT

## 2022-01-08 PROCEDURE — 6360000002 HC RX W HCPCS: Performed by: NURSE PRACTITIONER

## 2022-01-08 PROCEDURE — 82803 BLOOD GASES ANY COMBINATION: CPT

## 2022-01-08 PROCEDURE — 6370000000 HC RX 637 (ALT 250 FOR IP): Performed by: NURSE PRACTITIONER

## 2022-01-08 PROCEDURE — 94664 DEMO&/EVAL PT USE INHALER: CPT

## 2022-01-08 PROCEDURE — 99232 SBSQ HOSP IP/OBS MODERATE 35: CPT | Performed by: CLINICAL NURSE SPECIALIST

## 2022-01-08 PROCEDURE — 94761 N-INVAS EAR/PLS OXIMETRY MLT: CPT

## 2022-01-08 PROCEDURE — 2580000003 HC RX 258: Performed by: NURSE PRACTITIONER

## 2022-01-08 PROCEDURE — 96372 THER/PROPH/DIAG INJ SC/IM: CPT

## 2022-01-08 PROCEDURE — 1200000000 HC SEMI PRIVATE

## 2022-01-08 PROCEDURE — 2700000000 HC OXYGEN THERAPY PER DAY

## 2022-01-08 PROCEDURE — 94640 AIRWAY INHALATION TREATMENT: CPT

## 2022-01-08 RX ORDER — ALBUTEROL SULFATE 90 UG/1
2 AEROSOL, METERED RESPIRATORY (INHALATION) 4 TIMES DAILY
Status: DISCONTINUED | OUTPATIENT
Start: 2022-01-08 | End: 2022-01-11 | Stop reason: HOSPADM

## 2022-01-08 RX ORDER — IPRATROPIUM BROMIDE AND ALBUTEROL SULFATE 2.5; .5 MG/3ML; MG/3ML
1 SOLUTION RESPIRATORY (INHALATION)
Status: DISCONTINUED | OUTPATIENT
Start: 2022-01-08 | End: 2022-01-08

## 2022-01-08 RX ADMIN — ALBUTEROL SULFATE 2 PUFF: 90 AEROSOL, METERED RESPIRATORY (INHALATION) at 21:54

## 2022-01-08 RX ADMIN — Medication 2 PUFF: at 21:55

## 2022-01-08 RX ADMIN — PREDNISONE 40 MG: 20 TABLET ORAL at 08:22

## 2022-01-08 RX ADMIN — PREGABALIN 100 MG: 50 CAPSULE ORAL at 22:19

## 2022-01-08 RX ADMIN — BUDESONIDE AND FORMOTEROL FUMARATE DIHYDRATE 2 PUFF: 80; 4.5 AEROSOL RESPIRATORY (INHALATION) at 08:49

## 2022-01-08 RX ADMIN — ENOXAPARIN SODIUM 40 MG: 100 INJECTION SUBCUTANEOUS at 08:22

## 2022-01-08 RX ADMIN — SODIUM CHLORIDE, PRESERVATIVE FREE 10 ML: 5 INJECTION INTRAVENOUS at 22:20

## 2022-01-08 RX ADMIN — Medication 2 PUFF: at 13:07

## 2022-01-08 RX ADMIN — SODIUM CHLORIDE, PRESERVATIVE FREE 10 ML: 5 INJECTION INTRAVENOUS at 10:16

## 2022-01-08 RX ADMIN — BUPRENORPHINE AND NALOXONE 1 FILM: 8; 2 FILM, SOLUBLE BUCCAL; SUBLINGUAL at 22:19

## 2022-01-08 RX ADMIN — PREGABALIN 100 MG: 50 CAPSULE ORAL at 08:22

## 2022-01-08 RX ADMIN — ASPIRIN 81 MG CHEWABLE TABLET 81 MG: 81 TABLET CHEWABLE at 08:22

## 2022-01-08 RX ADMIN — TRAZODONE HYDROCHLORIDE 200 MG: 50 TABLET ORAL at 22:19

## 2022-01-08 RX ADMIN — SENNOSIDES, DOCUSATE SODIUM 1 TABLET: 8.6; 5 TABLET ORAL at 08:22

## 2022-01-08 RX ADMIN — BUDESONIDE AND FORMOTEROL FUMARATE DIHYDRATE 2 PUFF: 80; 4.5 AEROSOL RESPIRATORY (INHALATION) at 21:54

## 2022-01-08 RX ADMIN — BUPRENORPHINE AND NALOXONE 1 FILM: 8; 2 FILM, SOLUBLE BUCCAL; SUBLINGUAL at 08:23

## 2022-01-08 RX ADMIN — ALBUTEROL SULFATE 2 PUFF: 90 AEROSOL, METERED RESPIRATORY (INHALATION) at 13:06

## 2022-01-08 RX ADMIN — BUPRENORPHINE AND NALOXONE 1 FILM: 8; 2 FILM, SOLUBLE BUCCAL; SUBLINGUAL at 16:12

## 2022-01-08 RX ADMIN — PREGABALIN 100 MG: 50 CAPSULE ORAL at 16:12

## 2022-01-08 NOTE — CARE COORDINATION
Referral discussed with Dr Klarissa Ramirez. He woould like updated therapy notes to verify her tolerance for ARU therapy program's intensity once she has responded to new bipap treatment. No precert needed. Once medically stable for ARU admission, a negative rapid covid will be needed.

## 2022-01-08 NOTE — PROGRESS NOTES
Attempted 2 x, Pt refused tx 1/8. Nursing stated that pt has been very sleepy today and would only sit up to take meds in the AM. When therapist came back pt had been placed on BIPAP and nursing stated that she had been jerking and was not appropriate for therapy today.   Stephen Gutierrez PTA

## 2022-01-08 NOTE — CARE COORDINATION
CM attempted to talk with pt regarding discharge plan. Pt unable to talk at this time, states that she is having \"jerking. \" CM spoke with pts RN who stated that pt was just placed on bipap. CM called pts sister, Sacha Holguin, who is listed as pts primary emergency contact. ARMANDO explained to Sacha Holguin that therapy recommended ARU.  Sacha Holguin is in agreement for referral to ARU.     2:25 PM Voicemail left for [facility] admissions, to check on status of referral.

## 2022-01-08 NOTE — PROGRESS NOTES
Hospitalist Progress Note      Name:  Sophia Bell /Age/Sex: 1960  (63 y.o. female)   MRN & CSN:  [id number] & [id number] Admission Date/Time: 2022  4:44 PM   Location:  9523/7809-B PCP: Cynthia Matthews, [facility] Day: 4    Assessment and Plan:   Christina Murray a 63 y.o.  female with history of substance abuse, ADHD, OD on pacemaker, COPD, chronic respiratory failure on 4 L NC home oxygen was admitted to hospital on  for fall evaluation.   Patient was found acute nondisplaced fracture in the distal fibula. Cardiology and EP consulted, recommended outpatient follow-up. PT OT recommended to discharge to ARU. Case management is on board for discharge planning.     Subjective:   Patient stated eventually she had a good sleep last night. Patient still feel bilateral legs weak. She is stable on 5 L NC.     Acute nondisplaced fracture distal fibula  Secondary to fall  -Patient lay on the bed without splint in my evaluation  -Appreciate orthopedics input: On walking boot, follow-up in office in 6 weeks  -Appreciate PT input: Patient would benefit from skilled PT service. Recommend to discharge to ARU.  -Case management is on board: ARU referral placed     Jerking  Frequently fall  -Reported jerking and suddenly lose muscle tone frequently in the past 1 year  -Not lost consciousness, no focal neurology deficit  -Appreciate neurology input: Likely due to hypercapnia with CO2 101.   -Increasing breathing treatment, encourage BiPAP at nighttime, repeat ABG today.     Presyncope?  -History of pacemaker over 20 years  -Appreciate cardiology input: EPS evaluation for pacemaker removal and possible loop placement.  sign off  -Appreciate EP input: No need for loop placement, outpatient follow-up for pacemaker removal, will consider event monitor if patient has arrhythmia.     Acute on chronic respiratory failure  COPD exacerbation  -Currently on 5 L NC, on 4 L NC at home  -Wheezing in bilateral lungs  -Steroids and breathing treatment     History of substance abuse  -Continue Suboxone 3 times a day     History of ADHD  -Continue Adderall     DVT prophylaxis  -Lovenox 40 mg daily       Diet ADULT DIET; Regular   DVT Prophylaxis [x] Lovenox, []  Heparin, [] SCDs, [] Ambulation   GI Prophylaxis [x] PPI,  [] H2 Blocker,  [] Carafate,  [] Diet/Tube Feeds   Code Status Full Code   Disposition Patient requires continued admission due to      History of Present Illness:     Chief Complaint:Frequently fall     Johanny westfall 63 y.o.  female with history of substance abuse on Suboxone, ADHD, overdose, on pacemaker, COPD, chronic respiratory failure on home oxygen presents with frequent fall.  Patient reported frequent \"jerking\", muscle spasm, and sudden lose muscle tone in the past 1 year. Scar Christian stated the situation is progressively getting worse recently. Scar Christian stood up from her bed on January 5 and suddenly her bilateral legs gave out.  Patient fell and twisted right leg.  Patient was found right distal fibula fracture.  Patient stated she saw a cardiologist who recommended a echo and a stress test in the past.  But she did not completed.  Patient denied loss of consciousness.  Did not hit her head or neck.  Patient is not on blood thinner. Ten point ROS reviewed negative, unless as noted above    Objective:   No intake or output data in the 24 hours ending 01/08/22 1110   Vitals:   Vitals:    01/08/22 0849   BP:    Pulse:    Resp: 18   Temp:    SpO2: 91%     Physical Exam:   GEN Awake female, sitting upright in bed in no apparent distress. Appears given age. EYES Pupils are equally round. No scleral erythema, discharge, or conjunctivitis. HENT Mucous membranes are moist. Oral pharynx without exudates, no evidence of thrush. NECK Supple, no apparent thyromegaly or masses. RESP wheezing in bilateral lungs  CARDIO/VASC S1/S2 auscultated.  Regular rate without appreciable murmurs, rubs, or gallops. No JVD or carotid bruits. Peripheral pulses equal bilaterally and palpable. No peripheral edema. GI Abdomen is soft without significant tenderness, masses, or guarding. Bowel sounds are normoactive. Rectal exam deferred.  No costovertebral angle tenderness. Normal appearing external genitalia. Fraser catheter is not present. HEME/LYMPH No palpable cervical lymphadenopathy and no hepatosplenomegaly. No petechiae or ecchymoses. MSK No gross joint deformities. SKIN Normal coloration, warm, dry. NEURO Cranial nerves appear grossly intact, normal speech, no lateralizing weakness. PSYCH Awake, alert, oriented x 4. Affect appropriate. Medications:   Medications:    amphetamine-dextroamphetamine  30 mg Oral Daily    buprenorphine-naloxone  1 Film SubLINGual TID    predniSONE  40 mg Oral Daily    aspirin  81 mg Oral Daily    budesonide-formoterol  2 puff Inhalation BID    pregabalin  100 mg Oral TID    sodium chloride flush  5-40 mL IntraVENous 2 times per day    enoxaparin  40 mg SubCUTAneous Daily    sennosides-docusate sodium  1 tablet Oral BID      Infusions:    sodium chloride       PRN Meds: traMADol, 50 mg, BID PRN  albuterol sulfate HFA, 2 puff, Q6H PRN  albuterol sulfate HFA, 2 puff, Q4H PRN  ipratropium-albuterol, 1 ampule, Q4H PRN  sodium chloride flush, 5-40 mL, PRN  sodium chloride, 25 mL, PRN  ondansetron, 4 mg, Q8H PRN   Or  ondansetron, 4 mg, Q6H PRN  polyethylene glycol, 17 g, Daily PRN  acetaminophen, 650 mg, Q6H PRN   Or  acetaminophen, 650 mg, Q6H PRN  traZODone, 200 mg, Nightly PRN          Patient is still admitted because ARU.  The anticipated discharge is in 24 to 48 hours    Electronically signed by Dalton Ellington on 1/8/2022 at 11:10 AM

## 2022-01-08 NOTE — PROGRESS NOTES
Neurology Progress Note  [facility]  Patient Name: Moise Cabrera     : 1960      Subjective:   C C: jerking movements of extremities. The patient was seen and examined. Chart reviewed in detail. Patient resting upon entering. Continues to have asterixis of UE/LE's. Her CO2 is significantly elevated at 101. Discussed with patient this is likely cause and should improve when her levels come down. Objective:   Scheduled Meds:   amphetamine-dextroamphetamine  30 mg Oral Daily    buprenorphine-naloxone  1 Film SubLINGual TID    predniSONE  40 mg Oral Daily    aspirin  81 mg Oral Daily    budesonide-formoterol  2 puff Inhalation BID    pregabalin  100 mg Oral TID    sodium chloride flush  5-40 mL IntraVENous 2 times per day    enoxaparin  40 mg SubCUTAneous Daily    sennosides-docusate sodium  1 tablet Oral BID     Continuous Infusions:   sodium chloride       PRN Meds:.traMADol, albuterol sulfate HFA, albuterol sulfate HFA, ipratropium-albuterol, sodium chloride flush, sodium chloride, ondansetron **OR** ondansetron, polyethylene glycol, acetaminophen **OR** acetaminophen, traZODone    Vital Signs:  Vitals:    22 2130 22 0800 22 0815 22 0849   BP: (!) 109/57  121/82    Pulse: 98 96 96    Resp: 18  16 18   Temp: 99.4 °F (37.4 °C)  98.9 °F (37.2 °C)    TempSrc: Oral  Oral    SpO2: 95%  (!) 89% 91%         General: drowsy but O x 4, NAD, cooperative  HEENT: NC/AT, EOMI, PERRL, mmm, neck supple  Extremities: no edema, no calf tenderness b/l    Neurological Exam:         Mental Status:  Drowsy but oriented to self, location, and year.  NAD, speech clear, language fluent, repetition and naming intact, follows commands appropriately     Cranial Nerves:  CN II-XII intact     Sensation: intact LT/temp UE/LE's b/l     Motor: 5/5 UE/LE's b/l, tone and bulk normal, no pronator drift     Reflexes: 2/4 biceps, triceps, brachioradialis, patellar, and achilles bilaterally; flexor plantar responses bilaterally     Coordination:       Tremors-- asterixis to UE/LE            Gait/Station: Deferred    Labs:   Recent Labs     01/05/22  1719 01/06/22  1124   WBC 11.5* 11.7*    137   K 3.8 4.7   CL 94* 95*   CO2 38* 38*   BUN 9 8   CREATININE 0.5* 0.4*   GLUCOSE 204* 122*   ,Last TSH [email or web address] Free T4 [email or web address]  Last Liver Function Results:  @LABRCNTIP(ALT:3,AST:3,BILITOTAL:3,BILIDIR:3,ALKPHOS:3)@     Imaging Studies:   No pertinent brain imaging obtained. Assessment/Plan:   82-year-old female patient with past medical history stated above presented to [address] 2 days ago for a fall secondary to buckling of legs, in which patient at that time denies LOC, hitting head or presented with any syncopal symptoms complaints. Neurology has been consulted for jerking of the legs, in particular \"sudden loss of muscle tone in the past year. .  This continues on exam today  1. Involuntary jerks likely negative myoclonus (asterixis) secondary to hypercapnia. Expect this to improve with correction of her CO2  -- CO2 101  -Recommend PT/OT further recommendations  -Vit. B-12 normal  -Ammonia level WNL.  -2D echo ending  --ESR normal  --CK normal    Nothing further from our standpoint. We will sign off. Patient was discussed with Attending Physician Dr. Johanna Rueda.  Larry EL-CNS  Neurology

## 2022-01-09 ENCOUNTER — APPOINTMENT (OUTPATIENT)
Dept: GENERAL RADIOLOGY | Age: 62
DRG: 562 | End: 2022-01-09
Payer: MEDICARE

## 2022-01-09 LAB
BASE EXCESS MIXED: 24.2 (ref 0–2.3)
CARBON MONOXIDE, BLOOD: 1.8 % (ref 0–5)
CO2 CONTENT: 58.4 MMOL/L (ref 19–24)
COMMENT: ABNORMAL
HCO3 ARTERIAL: 55.5 MMOL/L (ref 18–23)
METHEMOGLOBIN ARTERIAL: 1.2 %
O2 SATURATION: 35.3 % (ref 96–97)
PCO2 ARTERIAL: 96 MMHG (ref 32–45)
PH BLOOD: 7.37 (ref 7.34–7.45)
PO2 ARTERIAL: 21 MMHG (ref 75–100)
SARS-COV-2, NAAT: NOT DETECTED
SOURCE: NORMAL

## 2022-01-09 PROCEDURE — 1200000000 HC SEMI PRIVATE

## 2022-01-09 PROCEDURE — 6370000000 HC RX 637 (ALT 250 FOR IP): Performed by: NURSE PRACTITIONER

## 2022-01-09 PROCEDURE — 82803 BLOOD GASES ANY COMBINATION: CPT

## 2022-01-09 PROCEDURE — 2580000003 HC RX 258: Performed by: NURSE PRACTITIONER

## 2022-01-09 PROCEDURE — 87635 SARS-COV-2 COVID-19 AMP PRB: CPT

## 2022-01-09 PROCEDURE — 73610 X-RAY EXAM OF ANKLE: CPT

## 2022-01-09 PROCEDURE — 94761 N-INVAS EAR/PLS OXIMETRY MLT: CPT

## 2022-01-09 PROCEDURE — 2700000000 HC OXYGEN THERAPY PER DAY

## 2022-01-09 PROCEDURE — 6360000002 HC RX W HCPCS: Performed by: NURSE PRACTITIONER

## 2022-01-09 PROCEDURE — 94640 AIRWAY INHALATION TREATMENT: CPT

## 2022-01-09 PROCEDURE — 36600 WITHDRAWAL OF ARTERIAL BLOOD: CPT

## 2022-01-09 RX ADMIN — ALBUTEROL SULFATE 2 PUFF: 90 AEROSOL, METERED RESPIRATORY (INHALATION) at 16:17

## 2022-01-09 RX ADMIN — Medication 2 PUFF: at 19:27

## 2022-01-09 RX ADMIN — Medication 2 PUFF: at 16:18

## 2022-01-09 RX ADMIN — BUPRENORPHINE AND NALOXONE 1 FILM: 8; 2 FILM, SOLUBLE BUCCAL; SUBLINGUAL at 15:39

## 2022-01-09 RX ADMIN — SENNOSIDES, DOCUSATE SODIUM 1 TABLET: 8.6; 5 TABLET ORAL at 11:16

## 2022-01-09 RX ADMIN — BUPRENORPHINE AND NALOXONE 1 FILM: 8; 2 FILM, SOLUBLE BUCCAL; SUBLINGUAL at 11:15

## 2022-01-09 RX ADMIN — BUDESONIDE AND FORMOTEROL FUMARATE DIHYDRATE 2 PUFF: 80; 4.5 AEROSOL RESPIRATORY (INHALATION) at 11:07

## 2022-01-09 RX ADMIN — PREGABALIN 100 MG: 50 CAPSULE ORAL at 15:39

## 2022-01-09 RX ADMIN — ASPIRIN 81 MG CHEWABLE TABLET 81 MG: 81 TABLET CHEWABLE at 11:16

## 2022-01-09 RX ADMIN — TRAZODONE HYDROCHLORIDE 200 MG: 50 TABLET ORAL at 20:44

## 2022-01-09 RX ADMIN — BUDESONIDE AND FORMOTEROL FUMARATE DIHYDRATE 2 PUFF: 80; 4.5 AEROSOL RESPIRATORY (INHALATION) at 19:27

## 2022-01-09 RX ADMIN — SENNOSIDES, DOCUSATE SODIUM 1 TABLET: 8.6; 5 TABLET ORAL at 20:44

## 2022-01-09 RX ADMIN — ENOXAPARIN SODIUM 40 MG: 100 INJECTION SUBCUTANEOUS at 11:15

## 2022-01-09 RX ADMIN — SODIUM CHLORIDE, PRESERVATIVE FREE 10 ML: 5 INJECTION INTRAVENOUS at 11:33

## 2022-01-09 RX ADMIN — ALBUTEROL SULFATE 2 PUFF: 90 AEROSOL, METERED RESPIRATORY (INHALATION) at 19:26

## 2022-01-09 RX ADMIN — BUPRENORPHINE AND NALOXONE 1 FILM: 8; 2 FILM, SOLUBLE BUCCAL; SUBLINGUAL at 20:44

## 2022-01-09 RX ADMIN — PREGABALIN 100 MG: 50 CAPSULE ORAL at 20:44

## 2022-01-09 RX ADMIN — PREDNISONE 40 MG: 20 TABLET ORAL at 11:15

## 2022-01-09 RX ADMIN — ALBUTEROL SULFATE 2 PUFF: 90 AEROSOL, METERED RESPIRATORY (INHALATION) at 11:08

## 2022-01-09 RX ADMIN — SODIUM CHLORIDE, PRESERVATIVE FREE 10 ML: 5 INJECTION INTRAVENOUS at 20:50

## 2022-01-09 RX ADMIN — PREGABALIN 100 MG: 50 CAPSULE ORAL at 11:15

## 2022-01-09 ASSESSMENT — PAIN SCALES - GENERAL
PAINLEVEL_OUTOF10: 8
PAINLEVEL_OUTOF10: 7

## 2022-01-09 ASSESSMENT — PAIN DESCRIPTION - LOCATION: LOCATION: FOOT

## 2022-01-09 ASSESSMENT — PAIN DESCRIPTION - DESCRIPTORS: DESCRIPTORS: ACHING

## 2022-01-09 ASSESSMENT — PAIN DESCRIPTION - PAIN TYPE: TYPE: ACUTE PAIN

## 2022-01-09 ASSESSMENT — PAIN DESCRIPTION - ORIENTATION: ORIENTATION: RIGHT;LEFT

## 2022-01-09 NOTE — PROGRESS NOTES
Hospitalist Progress Note      Name:  Jim Jennings /Age/Sex: 1960  (63 y.o. female)   MRN & CSN:  [id number] & [id number] Admission Date/Time: 2022  4:44 PM   Location:  5103/7553-K PCP: Shadi Galeano, [facility] Day: 5    Assessment and Plan:   Augie westfall 63 y.o.  female with history of substance abuse, ADHD, OD on pacemaker, COPD, chronic respiratory failure on 4 L NC home oxygen was admitted to hospital on  for fall evaluation.   Patient was found acute nondisplaced fracture in the distal fibula.  Cardiology and EP consulted, recommended outpatient follow-up.  PT OT recommended to discharge to ARU.  Case management is on board for discharge planning. Subjective:    Patient was talking with her friend on the phone. Patient stated she still had jerking activity in extremities. Patient is stable on 5 L NC. She was wearing BiPAP at night. Patient is medically stable for ARU. We will order COVID test today.     Acute nondisplaced fracture distal fibula  Secondary to fall  -Patient lay on the bed without splint in my evaluation  -Appreciate orthopedics input: On walking boot, follow-up in office in 6 weeks  -Appreciate PT input: Patient would benefit from skilled PT service.  Recommend to discharge to ARU.  -Case management is on board: Pending ARU admission.     Jerking  Frequently fall  -Reported jerking and suddenly lose muscle tone frequently in the past 1 year  -Not lost consciousness, no focal neurology deficit  -Appreciate neurology input: Likely due to hypercapnia with CO2 101.   -Increasing breathing treatment, encourage BiPAP at nighttime, repeat ABG showed decreasing CO2.     Presyncope?  -History of pacemaker over 20 years  -Appreciate cardiology input: EPS evaluation for pacemaker removal and possible loop placement.  sign off  -Appreciate EP input: No need for loop placement, outpatient follow-up for pacemaker removal, will consider event monitor if patient has arrhythmia.     Acute on chronic respiratory failure  COPD exacerbation  -Currently on 5 L NC, on 4 L NC at home  -Wheezing in bilateral lungs  -Steroids and breathing treatment     History of substance abuse  -Continue Suboxone 3 times a day     History of ADHD  -Continue Adderall     DVT prophylaxis  -Lovenox 40 mg daily       Diet ADULT DIET; Regular   DVT Prophylaxis [x] Lovenox, []  Heparin, [] SCDs, [] Ambulation   GI Prophylaxis [] PPI,  [] H2 Blocker,  [] Carafate,  [] Diet/Tube Feeds   Code Status Full Code   Disposition Patient requires continued admission due to pending ARU admission     History of Present Illness:     Chief Complaint: Frequent fall, right ankle fracture    Car westfall 63 y.o.  female with history of substance abuse on Suboxone, ADHD, overdose, on pacemaker, COPD, chronic respiratory failure on home oxygen presents with frequent fall.  Patient reported frequent \"jerking\", muscle spasm, and sudden lose muscle tone in the past 1 year. Alice Hylton stated the situation is progressively getting worse recently. Alice Hylton stood up from her bed on January 5 and suddenly her bilateral legs gave out.  Patient fell and twisted right leg.  Patient was found right distal fibula fracture.  Patient stated she saw a cardiologist who recommended a echo and a stress test in the past.  But she did not completed.  Patient denied loss of consciousness.  Did not hit her head or neck.  Patient is not on blood thinner. Ten point ROS reviewed negative, unless as noted above    Objective:   No intake or output data in the 24 hours ending 01/09/22 1130   Vitals:   Vitals:    01/09/22 1115   BP: (!) 117/57   Pulse: 87   Resp: 16   Temp: 98.2 °F (36.8 °C)   SpO2: (!) 85%     Physical Exam:   GEN Awake female, sitting upright in bed in no apparent distress. Appears given age. EYES Pupils are equally round. No scleral erythema, discharge, or conjunctivitis.   HENT Mucous membranes are moist. Oral pharynx without exudates, no evidence of thrush. NECK Supple, no apparent thyromegaly or masses. RESP wheezing in bilateral lungs  CARDIO/VASC S1/S2 auscultated. Regular rate without appreciable murmurs, rubs, or gallops. No JVD or carotid bruits. Peripheral pulses equal bilaterally and palpable. No peripheral edema. GI Abdomen is soft without significant tenderness, masses, or guarding. Bowel sounds are normoactive. Rectal exam deferred.  No costovertebral angle tenderness. Normal appearing external genitalia. Fraser catheter is not present. HEME/LYMPH No palpable cervical lymphadenopathy and no hepatosplenomegaly. No petechiae or ecchymoses. MSK No gross joint deformities. SKIN Normal coloration, warm, dry. NEURO Cranial nerves appear grossly intact, normal speech, no lateralizing weakness. PSYCH Awake, alert, oriented x 4. Affect appropriate. Medications:   Medications:    albuterol sulfate HFA  2 puff Inhalation 4x daily    ipratropium  2 puff Inhalation 4x daily    amphetamine-dextroamphetamine  30 mg Oral Daily    buprenorphine-naloxone  1 Film SubLINGual TID    predniSONE  40 mg Oral Daily    aspirin  81 mg Oral Daily    budesonide-formoterol  2 puff Inhalation BID    pregabalin  100 mg Oral TID    sodium chloride flush  5-40 mL IntraVENous 2 times per day    enoxaparin  40 mg SubCUTAneous Daily    sennosides-docusate sodium  1 tablet Oral BID      Infusions:    sodium chloride       PRN Meds: traMADol, 50 mg, BID PRN  albuterol sulfate HFA, 2 puff, Q4H PRN  sodium chloride flush, 5-40 mL, PRN  sodium chloride, 25 mL, PRN  ondansetron, 4 mg, Q8H PRN   Or  ondansetron, 4 mg, Q6H PRN  polyethylene glycol, 17 g, Daily PRN  acetaminophen, 650 mg, Q6H PRN   Or  acetaminophen, 650 mg, Q6H PRN  traZODone, 200 mg, Nightly PRN          Patient is still admitted because ARU placement. The anticipated discharge is in 24 to 48 hours.     Electronically signed by Jerad Johnson on 1/9/2022 at 11:30 AM

## 2022-01-09 NOTE — PROGRESS NOTES
Patient found with Spo2 of 80% on 4L n/c. RTS called and told this RN to increase O2 to 6L and they would come when they could. This RN called tele monitor as well and was told that her continuous SPO2 wasn't set up on their side. They corrected the issue. Patient now with SpO2 of 94% on 6L. Patient is and wasn't in any distress.

## 2022-01-10 PROCEDURE — 97116 GAIT TRAINING THERAPY: CPT

## 2022-01-10 PROCEDURE — 97530 THERAPEUTIC ACTIVITIES: CPT

## 2022-01-10 PROCEDURE — 97535 SELF CARE MNGMENT TRAINING: CPT

## 2022-01-10 PROCEDURE — 6370000000 HC RX 637 (ALT 250 FOR IP): Performed by: NURSE PRACTITIONER

## 2022-01-10 PROCEDURE — 97110 THERAPEUTIC EXERCISES: CPT

## 2022-01-10 PROCEDURE — 6360000002 HC RX W HCPCS: Performed by: NURSE PRACTITIONER

## 2022-01-10 PROCEDURE — 94761 N-INVAS EAR/PLS OXIMETRY MLT: CPT

## 2022-01-10 PROCEDURE — 2580000003 HC RX 258: Performed by: NURSE PRACTITIONER

## 2022-01-10 PROCEDURE — 2700000000 HC OXYGEN THERAPY PER DAY

## 2022-01-10 PROCEDURE — 94640 AIRWAY INHALATION TREATMENT: CPT

## 2022-01-10 PROCEDURE — 1200000000 HC SEMI PRIVATE

## 2022-01-10 RX ADMIN — PREGABALIN 100 MG: 50 CAPSULE ORAL at 20:49

## 2022-01-10 RX ADMIN — Medication 2 PUFF: at 15:27

## 2022-01-10 RX ADMIN — BUPRENORPHINE AND NALOXONE 1 FILM: 8; 2 FILM, SOLUBLE BUCCAL; SUBLINGUAL at 15:17

## 2022-01-10 RX ADMIN — SENNOSIDES, DOCUSATE SODIUM 1 TABLET: 8.6; 5 TABLET ORAL at 10:27

## 2022-01-10 RX ADMIN — BUPRENORPHINE AND NALOXONE 1 FILM: 8; 2 FILM, SOLUBLE BUCCAL; SUBLINGUAL at 20:49

## 2022-01-10 RX ADMIN — ALBUTEROL SULFATE 2 PUFF: 90 AEROSOL, METERED RESPIRATORY (INHALATION) at 15:28

## 2022-01-10 RX ADMIN — PREGABALIN 100 MG: 50 CAPSULE ORAL at 15:17

## 2022-01-10 RX ADMIN — Medication 2 PUFF: at 11:27

## 2022-01-10 RX ADMIN — ALBUTEROL SULFATE 2 PUFF: 90 AEROSOL, METERED RESPIRATORY (INHALATION) at 11:27

## 2022-01-10 RX ADMIN — PREGABALIN 100 MG: 50 CAPSULE ORAL at 10:26

## 2022-01-10 RX ADMIN — SODIUM CHLORIDE, PRESERVATIVE FREE 10 ML: 5 INJECTION INTRAVENOUS at 20:51

## 2022-01-10 RX ADMIN — ENOXAPARIN SODIUM 40 MG: 100 INJECTION SUBCUTANEOUS at 10:28

## 2022-01-10 RX ADMIN — PREDNISONE 40 MG: 20 TABLET ORAL at 10:25

## 2022-01-10 RX ADMIN — SODIUM CHLORIDE, PRESERVATIVE FREE 10 ML: 5 INJECTION INTRAVENOUS at 10:50

## 2022-01-10 RX ADMIN — TRAMADOL HYDROCHLORIDE 50 MG: 50 TABLET, COATED ORAL at 11:24

## 2022-01-10 RX ADMIN — ASPIRIN 81 MG CHEWABLE TABLET 81 MG: 81 TABLET CHEWABLE at 10:24

## 2022-01-10 ASSESSMENT — PAIN SCALES - GENERAL: PAINLEVEL_OUTOF10: 10

## 2022-01-10 NOTE — CARE COORDINATION
Continue to follow for possible ARU placement.  Awaiting PT/OT for today per Dr. Ochoa Mediate request.

## 2022-01-10 NOTE — PROGRESS NOTES
Occupational Therapy  . Occupational Therapy Treatment Note      Name: Jewels Harmon MRN: [id number] :   1960   Date:  1/10/2022   Admission Date: 2022 Room:  [facility]     Primary Problem:      Restrictions/Precautions:    General precautions, fall risk    Walking boot, WBAT RLE    Communication with other providers:  cotx with PTA Alma for safety and endurance plus CM updated note. Subjective:  Patient states:  I could use the bathroom  Pain: none stated (location, type, intensity)    Objective:    Observation:  patient asleep and supine. Agreeable to therapy. Motivated to get home. C/o \"numbness\" in L thigh to knee. Objective Measures: Ace wrap, Walking boot    Treatment, including education:    ADL activity training was instructed today. Cues were given for safety, sequence, UE/LE placement, visual cues, and balance. Activities performed today included dressing, toileting, hand hygiene, and grooming. toileting- Min A on BSC. Balbina care in squat position CGA for safety, Lb dressing- CGA for brief hike, Min A for threading around walking boot. SBA to don/doff L sock in figure 4. DEP to don/doff walking boot. Feeding- SET UP     Therapeutic activity training was instructed today. Cues were given for safety, sequence, UE/LE placement, awareness, and balance. Activities performed today included bed mobility training, sup-sit, sit-stand, SPT. Supine to EOB- Min A for trunk with increased time and effort. Scooting hips to EOB- CGA. patient sat EOB x10 min with good balance. Stand to [address]- 1st trial- Min x2 plus max cues to push from bed, to apply as much weight on RLE as tolerated. 2nd trial- Min x2 plus cues. Patient SPT to [facility]. Cues to weight bear RLE. Patient wanting to scoot RLE, cues to take steps. Sit to [facility]- Min x2 for safe and slow descent. Cues to reach back for [facility]. Sitting balance on  BSC- good.    Partial stand/forward lean for balbina care- CGA x2 for safety. SPT back to EOB - Min x2 plus cues to take steps and weight bear. Side steps to [address] x2 plus cues. Sit to EOB- Min x2 for safe and slow descent. Return supine- Min A for BLE. Patient Long sitting in bed performing figure 4 method for donning socks. Patient educated on role of OT , benefits of OT and rationale for therapeutic intervention. What to expect at ARU    All therapeutic intervention performed c emphasis on dynamic balance / standing tolerance to increase strength, endurance and activity tolerance for increased [place] c ADL tasks and func transfers / mobility. Patient left safely in bed at end of session, with call light in reach, alarm on and nursing aware. Gait belt was used for func transfers / mobility.           Assessment / Impression:    Patient's tolerance of treatment: well  Adverse Reaction: none  Significant change in status and impact:  none  Barriers to improvement: weakness, SOB      Plan for Next Session:    Continue with OT POC      Time in:  1559  Time out:  1640  Timed treatment minutes:  41  Total treatment time:  41      Electronically signed by:    KAREN Antoine COTA/L [id number]    1/10/2022, 4:37 PM

## 2022-01-10 NOTE — PROGRESS NOTES
Hospitalist Progress Note      Name:  Kimberlee Valdze /Age/Sex: 1960  (63 y.o. female)   MRN & CSN:  [id number] & [id number] Admission Date/Time: 2022  4:44 PM   Location:  1007/2194-J PCP: Eleni Johnston, [facility] Day: 6    Assessment and Plan:   Kai Mascorro a 63 y.o.  female with history of substance abuse, ADHD, OD on pacemaker, COPD, chronic respiratory failure on 4 L NC home oxygen was admitted to hospital on  for fall evaluation.   Patient was found acute nondisplaced fracture in the distal fibula.  Cardiology and EP consulted, recommended outpatient follow-up. Sandie Lorenzana referral placed. Dr. Kimberley Sosa requested update PT OT evaluation.     Subjective:   Patient reported better shortness of breath today. She is on 4 L NC. This morning. Waiting for PT OT evaluation today. Pending ARU.     Acute nondisplaced fracture distal fibula  Secondary to fall  -Patient lay on the bed without splint in my evaluation  -Appreciate orthopedics input: On walking boot, follow-up in office in 6 weeks  -Appreciate PT input: Patient would benefit from skilled PT service.  Recommend to discharge to ARU. -PT OT reevaluation needed     Jerking  Frequently fall  -Reported jerking and suddenly lose muscle tone frequently in the past 1 year  -Not lost consciousness, no focal neurology deficit  -Appreciate neurology input: Likely due to hypercapnia with CO2 101.   -Increasing breathing treatment, encourage BiPAP at nighttime, repeat ABG showed decreasing CO2.     Presyncope?  -History of pacemaker over 20 years  -Appreciate cardiology input: EPS evaluation for pacemaker removal and possible loop placement.  sign off  -Appreciate EP input: No need for loop placement, outpatient follow-up for pacemaker removal, will consider event monitor if patient has arrhythmia.     Acute on chronic respiratory failure  COPD exacerbation  -Currently on 5 L NC, on 4 L NC at home  -Wheezing in bilateral lungs  -Steroids and breathing treatment     History of substance abuse  -Continue Suboxone 3 times a day     History of ADHD  -Continue Adderall     DVT prophylaxis  -Lovenox 40 mg daily    Diet ADULT DIET; Regular   DVT Prophylaxis [x] Lovenox, []  Heparin, [] SCDs, [] Ambulation   GI Prophylaxis [] PPI,  [] H2 Blocker,  [] Carafate,  [] Diet/Tube Feeds   Code Status Full Code   Disposition Patient requires continued admission due to ARU     History of Present Illness:     Chief Complaint: Frequent fall, right ankle fracture    Jelani Mage a 63 y.o.  female with history of substance abuse on Suboxone, ADHD, overdose, on pacemaker, COPD, chronic respiratory failure on home oxygen presents with frequent fall.  Patient reported frequent \"jerking\", muscle spasm, and sudden lose muscle tone in the past 1 year. Ct Pack stated the situation is progressively getting worse recently. Ct Pack stood up from her bed on January 5 and suddenly her bilateral legs gave out.  Patient fell and twisted right leg.  Patient was found right distal fibula fracture.  Patient stated she saw a cardiologist who recommended a echo and a stress test in the past.  But she did not completed.  Patient denied loss of consciousness.  Did not hit her head or neck.  Patient is not on blood thinner. Ten point ROS reviewed negative, unless as noted above    Objective: Intake/Output Summary (Last 24 hours) at 1/10/2022 1410  Last data filed at 1/10/2022 0208  Gross per 24 hour   Intake 100 ml   Output 650 ml   Net -550 ml      Vitals:   Vitals:    01/10/22 0200   BP: 123/75   Pulse: 79   Resp: 22   Temp: 98.4 °F (36.9 °C)   SpO2: 90%     Physical Exam:   GEN Awake female, sitting upright in bed in no apparent distress. Appears given age. EYES Pupils are equally round. No scleral erythema, discharge, or conjunctivitis. HENT Mucous membranes are moist. Oral pharynx without exudates, no evidence of thrush.   NECK Supple, no apparent thyromegaly or masses. RESP very mild wheezing in bilateral lungs. On 4 L NC  CARDIO/VASC S1/S2 auscultated. Regular rate without appreciable murmurs, rubs, or gallops. No JVD or carotid bruits. Peripheral pulses equal bilaterally and palpable. No peripheral edema. GI Abdomen is soft without significant tenderness, masses, or guarding. Bowel sounds are normoactive. Rectal exam deferred.  No costovertebral angle tenderness. Normal appearing external genitalia. Fraser catheter is not present. HEME/LYMPH No palpable cervical lymphadenopathy and no hepatosplenomegaly. No petechiae or ecchymoses. MSK No gross joint deformities. SKIN Normal coloration, warm, dry. NEURO Cranial nerves appear grossly intact, normal speech, no lateralizing weakness. PSYCH Awake, alert, oriented x 4. Affect appropriate. Medications:   Medications:    albuterol sulfate HFA  2 puff Inhalation 4x daily    ipratropium  2 puff Inhalation 4x daily    amphetamine-dextroamphetamine  30 mg Oral Daily    buprenorphine-naloxone  1 Film SubLINGual TID    aspirin  81 mg Oral Daily    budesonide-formoterol  2 puff Inhalation BID    pregabalin  100 mg Oral TID    sodium chloride flush  5-40 mL IntraVENous 2 times per day    enoxaparin  40 mg SubCUTAneous Daily    sennosides-docusate sodium  1 tablet Oral BID      Infusions:    sodium chloride       PRN Meds: traMADol, 50 mg, BID PRN  albuterol sulfate HFA, 2 puff, Q4H PRN  sodium chloride flush, 5-40 mL, PRN  sodium chloride, 25 mL, PRN  ondansetron, 4 mg, Q8H PRN   Or  ondansetron, 4 mg, Q6H PRN  polyethylene glycol, 17 g, Daily PRN  acetaminophen, 650 mg, Q6H PRN   Or  acetaminophen, 650 mg, Q6H PRN  traZODone, 200 mg, Nightly PRN          Patient is still admitted because PT OT ARU.  The anticipated discharge is in 24 to 48 hours    Electronically signed by Christen Bains on 1/10/2022 at 2:10 PM

## 2022-01-10 NOTE — CARE COORDINATION
Call to Mena Perez, ARU admissions, to check status of referral. Updated PT/OT notes needed for determination for ARU. Whiteboard placed for therapy to see pt.

## 2022-01-11 ENCOUNTER — HOSPITAL ENCOUNTER (INPATIENT)
Age: 62
LOS: 6 days | Discharge: HOME HEALTH | DRG: 560 | End: 2022-01-17
Attending: PHYSICAL MEDICINE & REHABILITATION | Admitting: PHYSICAL MEDICINE & REHABILITATION
Payer: MEDICARE

## 2022-01-11 VITALS
RESPIRATION RATE: 20 BRPM | TEMPERATURE: 98.8 F | BODY MASS INDEX: 32.24 KG/M2 | OXYGEN SATURATION: 94 % | DIASTOLIC BLOOD PRESSURE: 54 MMHG | WEIGHT: 187.8 LBS | HEART RATE: 62 BPM | SYSTOLIC BLOOD PRESSURE: 115 MMHG

## 2022-01-11 PROBLEM — S82.61XA CLOSED DISPLACED FRACTURE OF LATERAL MALLEOLUS OF RIGHT FIBULA: Status: ACTIVE | Noted: 2022-01-11

## 2022-01-11 PROCEDURE — 99223 1ST HOSP IP/OBS HIGH 75: CPT | Performed by: PHYSICAL MEDICINE & REHABILITATION

## 2022-01-11 PROCEDURE — 94640 AIRWAY INHALATION TREATMENT: CPT

## 2022-01-11 PROCEDURE — 94761 N-INVAS EAR/PLS OXIMETRY MLT: CPT

## 2022-01-11 PROCEDURE — 97110 THERAPEUTIC EXERCISES: CPT

## 2022-01-11 PROCEDURE — 1280000000 HC REHAB R&B

## 2022-01-11 PROCEDURE — 2580000003 HC RX 258: Performed by: NURSE PRACTITIONER

## 2022-01-11 PROCEDURE — 6360000002 HC RX W HCPCS: Performed by: NURSE PRACTITIONER

## 2022-01-11 PROCEDURE — 2700000000 HC OXYGEN THERAPY PER DAY

## 2022-01-11 PROCEDURE — 6370000000 HC RX 637 (ALT 250 FOR IP): Performed by: NURSE PRACTITIONER

## 2022-01-11 PROCEDURE — 6370000000 HC RX 637 (ALT 250 FOR IP): Performed by: HOSPITALIST

## 2022-01-11 PROCEDURE — 97116 GAIT TRAINING THERAPY: CPT

## 2022-01-11 PROCEDURE — 97530 THERAPEUTIC ACTIVITIES: CPT

## 2022-01-11 RX ORDER — SODIUM CHLORIDE 0.9 % (FLUSH) 0.9 %
5-40 SYRINGE (ML) INJECTION PRN
Status: DISCONTINUED | OUTPATIENT
Start: 2022-01-11 | End: 2022-01-11

## 2022-01-11 RX ORDER — ACETAMINOPHEN 325 MG/1
650 TABLET ORAL EVERY 6 HOURS PRN
Status: CANCELLED | OUTPATIENT
Start: 2022-01-11

## 2022-01-11 RX ORDER — SENNA AND DOCUSATE SODIUM 50; 8.6 MG/1; MG/1
1 TABLET, FILM COATED ORAL 2 TIMES DAILY
Status: CANCELLED | OUTPATIENT
Start: 2022-01-11

## 2022-01-11 RX ORDER — ALBUTEROL SULFATE 90 UG/1
2 AEROSOL, METERED RESPIRATORY (INHALATION) EVERY 4 HOURS PRN
Status: CANCELLED | OUTPATIENT
Start: 2022-01-11

## 2022-01-11 RX ORDER — SODIUM CHLORIDE 0.9 % (FLUSH) 0.9 %
5-40 SYRINGE (ML) INJECTION EVERY 12 HOURS SCHEDULED
Status: CANCELLED | OUTPATIENT
Start: 2022-01-11

## 2022-01-11 RX ORDER — ACETAMINOPHEN 325 MG/1
650 TABLET ORAL EVERY 6 HOURS PRN
Status: DISCONTINUED | OUTPATIENT
Start: 2022-01-11 | End: 2022-01-11

## 2022-01-11 RX ORDER — ALBUTEROL SULFATE 90 UG/1
2 AEROSOL, METERED RESPIRATORY (INHALATION) 4 TIMES DAILY
Status: CANCELLED | OUTPATIENT
Start: 2022-01-11

## 2022-01-11 RX ORDER — PREGABALIN 100 MG/1
100 CAPSULE ORAL 3 TIMES DAILY
Status: DISCONTINUED | OUTPATIENT
Start: 2022-01-11 | End: 2022-01-17 | Stop reason: HOSPADM

## 2022-01-11 RX ORDER — POLYETHYLENE GLYCOL 3350 17 G/17G
17 POWDER, FOR SOLUTION ORAL DAILY PRN
Status: CANCELLED | OUTPATIENT
Start: 2022-01-11

## 2022-01-11 RX ORDER — TRAZODONE HYDROCHLORIDE 50 MG/1
200 TABLET ORAL NIGHTLY PRN
Status: CANCELLED | OUTPATIENT
Start: 2022-01-11

## 2022-01-11 RX ORDER — DEXTROAMPHETAMINE SACCHARATE, AMPHETAMINE ASPARTATE MONOHYDRATE, DEXTROAMPHETAMINE SULFATE AND AMPHETAMINE SULFATE 7.5; 7.5; 7.5; 7.5 MG/1; MG/1; MG/1; MG/1
30 CAPSULE, EXTENDED RELEASE ORAL DAILY
Status: DISCONTINUED | OUTPATIENT
Start: 2022-01-12 | End: 2022-01-17 | Stop reason: HOSPADM

## 2022-01-11 RX ORDER — ACETAMINOPHEN 650 MG/1
650 SUPPOSITORY RECTAL EVERY 6 HOURS PRN
Status: DISCONTINUED | OUTPATIENT
Start: 2022-01-11 | End: 2022-01-11

## 2022-01-11 RX ORDER — TRAZODONE HYDROCHLORIDE 50 MG/1
200 TABLET ORAL NIGHTLY PRN
Status: DISCONTINUED | OUTPATIENT
Start: 2022-01-11 | End: 2022-01-17 | Stop reason: HOSPADM

## 2022-01-11 RX ORDER — ALBUTEROL SULFATE 90 UG/1
2 AEROSOL, METERED RESPIRATORY (INHALATION) EVERY 4 HOURS PRN
Status: DISCONTINUED | OUTPATIENT
Start: 2022-01-11 | End: 2022-01-17 | Stop reason: HOSPADM

## 2022-01-11 RX ORDER — ACETAMINOPHEN 325 MG/1
650 TABLET ORAL EVERY 4 HOURS PRN
Status: DISCONTINUED | OUTPATIENT
Start: 2022-01-11 | End: 2022-01-17 | Stop reason: HOSPADM

## 2022-01-11 RX ORDER — ALBUTEROL SULFATE 90 UG/1
2 AEROSOL, METERED RESPIRATORY (INHALATION) 4 TIMES DAILY
Status: DISCONTINUED | OUTPATIENT
Start: 2022-01-11 | End: 2022-01-13

## 2022-01-11 RX ORDER — ONDANSETRON 4 MG/1
4 TABLET, ORALLY DISINTEGRATING ORAL EVERY 8 HOURS PRN
Status: CANCELLED | OUTPATIENT
Start: 2022-01-11

## 2022-01-11 RX ORDER — TRAMADOL HYDROCHLORIDE 50 MG/1
50 TABLET ORAL 2 TIMES DAILY PRN
Status: CANCELLED | OUTPATIENT
Start: 2022-01-11

## 2022-01-11 RX ORDER — SODIUM CHLORIDE 0.9 % (FLUSH) 0.9 %
5-40 SYRINGE (ML) INJECTION PRN
Status: CANCELLED | OUTPATIENT
Start: 2022-01-11

## 2022-01-11 RX ORDER — SENNA AND DOCUSATE SODIUM 50; 8.6 MG/1; MG/1
1 TABLET, FILM COATED ORAL 2 TIMES DAILY
Status: DISCONTINUED | OUTPATIENT
Start: 2022-01-11 | End: 2022-01-17 | Stop reason: HOSPADM

## 2022-01-11 RX ORDER — DEXTROAMPHETAMINE SACCHARATE, AMPHETAMINE ASPARTATE MONOHYDRATE, DEXTROAMPHETAMINE SULFATE AND AMPHETAMINE SULFATE 7.5; 7.5; 7.5; 7.5 MG/1; MG/1; MG/1; MG/1
30 CAPSULE, EXTENDED RELEASE ORAL DAILY
Status: CANCELLED | OUTPATIENT
Start: 2022-01-11

## 2022-01-11 RX ORDER — ASPIRIN 81 MG/1
81 TABLET, CHEWABLE ORAL DAILY
Status: DISCONTINUED | OUTPATIENT
Start: 2022-01-12 | End: 2022-01-17 | Stop reason: HOSPADM

## 2022-01-11 RX ORDER — BUPRENORPHINE AND NALOXONE 8; 2 MG/1; MG/1
1 FILM, SOLUBLE BUCCAL; SUBLINGUAL 3 TIMES DAILY
Status: CANCELLED | OUTPATIENT
Start: 2022-01-11

## 2022-01-11 RX ORDER — BUDESONIDE AND FORMOTEROL FUMARATE DIHYDRATE 80; 4.5 UG/1; UG/1
2 AEROSOL RESPIRATORY (INHALATION) 2 TIMES DAILY
Status: CANCELLED | OUTPATIENT
Start: 2022-01-11

## 2022-01-11 RX ORDER — ONDANSETRON 2 MG/ML
4 INJECTION INTRAMUSCULAR; INTRAVENOUS EVERY 6 HOURS PRN
Status: DISCONTINUED | OUTPATIENT
Start: 2022-01-11 | End: 2022-01-11

## 2022-01-11 RX ORDER — SODIUM CHLORIDE 0.9 % (FLUSH) 0.9 %
5-40 SYRINGE (ML) INJECTION EVERY 12 HOURS SCHEDULED
Status: DISCONTINUED | OUTPATIENT
Start: 2022-01-11 | End: 2022-01-11

## 2022-01-11 RX ORDER — POLYETHYLENE GLYCOL 3350 17 G/17G
17 POWDER, FOR SOLUTION ORAL DAILY PRN
Status: DISCONTINUED | OUTPATIENT
Start: 2022-01-11 | End: 2022-01-11

## 2022-01-11 RX ORDER — ONDANSETRON 2 MG/ML
4 INJECTION INTRAMUSCULAR; INTRAVENOUS EVERY 6 HOURS PRN
Status: CANCELLED | OUTPATIENT
Start: 2022-01-11

## 2022-01-11 RX ORDER — TRAMADOL HYDROCHLORIDE 50 MG/1
50 TABLET ORAL 2 TIMES DAILY PRN
Status: DISCONTINUED | OUTPATIENT
Start: 2022-01-11 | End: 2022-01-11

## 2022-01-11 RX ORDER — HYDROCODONE BITARTRATE AND ACETAMINOPHEN 5; 325 MG/1; MG/1
1 TABLET ORAL EVERY 6 HOURS PRN
Status: DISCONTINUED | OUTPATIENT
Start: 2022-01-11 | End: 2022-01-11 | Stop reason: HOSPADM

## 2022-01-11 RX ORDER — ASPIRIN 81 MG/1
81 TABLET, CHEWABLE ORAL DAILY
Status: CANCELLED | OUTPATIENT
Start: 2022-01-11

## 2022-01-11 RX ORDER — TRAMADOL HYDROCHLORIDE 50 MG/1
50 TABLET ORAL EVERY 6 HOURS PRN
Status: DISCONTINUED | OUTPATIENT
Start: 2022-01-11 | End: 2022-01-17 | Stop reason: HOSPADM

## 2022-01-11 RX ORDER — ONDANSETRON 4 MG/1
4 TABLET, ORALLY DISINTEGRATING ORAL EVERY 8 HOURS PRN
Status: DISCONTINUED | OUTPATIENT
Start: 2022-01-11 | End: 2022-01-17 | Stop reason: HOSPADM

## 2022-01-11 RX ORDER — BUDESONIDE AND FORMOTEROL FUMARATE DIHYDRATE 80; 4.5 UG/1; UG/1
2 AEROSOL RESPIRATORY (INHALATION) 2 TIMES DAILY
Status: DISCONTINUED | OUTPATIENT
Start: 2022-01-11 | End: 2022-01-17 | Stop reason: HOSPADM

## 2022-01-11 RX ORDER — PREGABALIN 100 MG/1
100 CAPSULE ORAL 3 TIMES DAILY
Status: CANCELLED | OUTPATIENT
Start: 2022-01-11

## 2022-01-11 RX ORDER — POLYETHYLENE GLYCOL 3350 17 G/17G
17 POWDER, FOR SOLUTION ORAL DAILY PRN
Status: DISCONTINUED | OUTPATIENT
Start: 2022-01-11 | End: 2022-01-17 | Stop reason: HOSPADM

## 2022-01-11 RX ORDER — DEXTROAMPHETAMINE SACCHARATE, AMPHETAMINE ASPARTATE MONOHYDRATE, DEXTROAMPHETAMINE SULFATE AND AMPHETAMINE SULFATE 7.5; 7.5; 7.5; 7.5 MG/1; MG/1; MG/1; MG/1
30 CAPSULE, EXTENDED RELEASE ORAL DAILY
Status: DISCONTINUED | OUTPATIENT
Start: 2022-01-11 | End: 2022-01-11

## 2022-01-11 RX ORDER — BUPRENORPHINE AND NALOXONE 8; 2 MG/1; MG/1
1 FILM, SOLUBLE BUCCAL; SUBLINGUAL 3 TIMES DAILY
Status: DISCONTINUED | OUTPATIENT
Start: 2022-01-11 | End: 2022-01-17 | Stop reason: HOSPADM

## 2022-01-11 RX ADMIN — ALBUTEROL SULFATE 2 PUFF: 90 AEROSOL, METERED RESPIRATORY (INHALATION) at 12:23

## 2022-01-11 RX ADMIN — ASPIRIN 81 MG CHEWABLE TABLET 81 MG: 81 TABLET CHEWABLE at 10:58

## 2022-01-11 RX ADMIN — Medication 2 PUFF: at 08:33

## 2022-01-11 RX ADMIN — SODIUM CHLORIDE, PRESERVATIVE FREE 10 ML: 5 INJECTION INTRAVENOUS at 11:00

## 2022-01-11 RX ADMIN — ENOXAPARIN SODIUM 40 MG: 100 INJECTION SUBCUTANEOUS at 10:58

## 2022-01-11 RX ADMIN — BUPRENORPHINE AND NALOXONE 1 FILM: 8; 2 FILM, SOLUBLE BUCCAL; SUBLINGUAL at 15:38

## 2022-01-11 RX ADMIN — PREGABALIN 100 MG: 50 CAPSULE ORAL at 10:58

## 2022-01-11 RX ADMIN — BUDESONIDE AND FORMOTEROL FUMARATE DIHYDRATE 2 PUFF: 80; 4.5 AEROSOL RESPIRATORY (INHALATION) at 00:22

## 2022-01-11 RX ADMIN — PREGABALIN 100 MG: 50 CAPSULE ORAL at 15:38

## 2022-01-11 RX ADMIN — ALBUTEROL SULFATE 2 PUFF: 90 AEROSOL, METERED RESPIRATORY (INHALATION) at 08:33

## 2022-01-11 RX ADMIN — Medication 2 PUFF: at 12:24

## 2022-01-11 RX ADMIN — SENNOSIDES AND DOCUSATE SODIUM 1 TABLET: 50; 8.6 TABLET ORAL at 22:58

## 2022-01-11 RX ADMIN — TRAZODONE HYDROCHLORIDE 200 MG: 50 TABLET ORAL at 22:59

## 2022-01-11 RX ADMIN — ALBUTEROL SULFATE 2 PUFF: 90 AEROSOL, METERED RESPIRATORY (INHALATION) at 00:22

## 2022-01-11 RX ADMIN — ONDANSETRON 4 MG: 4 TABLET, ORALLY DISINTEGRATING ORAL at 23:02

## 2022-01-11 RX ADMIN — PREGABALIN 100 MG: 100 CAPSULE ORAL at 22:58

## 2022-01-11 RX ADMIN — BUPRENORPHINE AND NALOXONE 1 FILM: 8; 2 FILM, SOLUBLE BUCCAL; SUBLINGUAL at 10:59

## 2022-01-11 RX ADMIN — BUDESONIDE AND FORMOTEROL FUMARATE DIHYDRATE 2 PUFF: 80; 4.5 AEROSOL RESPIRATORY (INHALATION) at 08:33

## 2022-01-11 RX ADMIN — Medication 2 PUFF: at 00:22

## 2022-01-11 RX ADMIN — BUPRENORPHINE AND NALOXONE 1 FILM: 8; 2 FILM, SOLUBLE BUCCAL; SUBLINGUAL at 22:58

## 2022-01-11 ASSESSMENT — PAIN SCALES - GENERAL: PAINLEVEL_OUTOF10: 0

## 2022-01-11 NOTE — PROGRESS NOTES
Progress Note  Date:2022       BBTW:[id number]-Y  Patient Name:Kalee Chicas     YOB: 1960     Age:61 y.o. Feels ok  Subjective    Subjective:  Symptoms:  Stable. Diet:  Adequate intake. Pain:  She complains of pain that is mild. Review of Systems  Objective         Vitals Last 24 Hours:  TEMPERATURE:  Temp  Av.5 °F (36.9 °C)  Min: 98.2 °F (36.8 °C)  Max: 98.8 °F (37.1 °C)  RESPIRATIONS RANGE: Resp  Av  Min: 18  Max: 18  PULSE OXIMETRY RANGE: SpO2  Av %  Min: 93 %  Max: 95 %  PULSE RANGE: Pulse  Av.5  Min: 62  Max: 79  BLOOD PRESSURE RANGE: Systolic (58YYV), ZFD:257 , Min:115 , CHRISTOS:159   ; Diastolic (82YRX), HFY:34, Min:54, Max:70    I/O (24Hr): Intake/Output Summary (Last 24 hours) at 2022 0945  Last data filed at 1/10/2022 2047  Gross per 24 hour   Intake --   Output 450 ml   Net -450 ml     Objective:  General Appearance:  Comfortable. Vital signs: (most recent): Blood pressure (!) 115/54, pulse 62, temperature 98.8 °F (37.1 °C), temperature source Oral, resp. rate 20, weight 187 lb 12.8 oz (85.2 kg), SpO2 94 %. Vital signs are normal.    HEENT: Normal HEENT exam.    Lungs:  Normal effort. Heart: Normal rate. Abdomen: Abdomen is soft. Bowel sounds are normal.     Extremities: Decreased range of motion. Neurological: Patient is alert. Pupils:  Pupils are equal, round, and reactive to light. Skin:  Warm. Labs/Imaging/Diagnostics    Labs:  CBC:No results for input(s): WBC, RBC, HGB, HCT, MCV, RDW, PLT in the last 72 hours. CHEMISTRIES:No results for input(s): NA, K, CL, CO2, BUN, CREATININE, GLUCOSE, CA, PHOS, MG in the last 72 hours. PT/INR:No results for input(s): PROTIME, INR in the last 72 hours. APTT:No results for input(s): APTT in the last 72 hours. LIVER PROFILE:No results for input(s): AST, ALT, BILIDIR, BILITOT, ALKPHOS in the last 72 hours.     Imaging Last 24 Hours:  XR ANKLE LEFT (MIN 3 VIEWS)    Result Date: 1/9/2022  EXAMINATION: THREE XRAY VIEWS OF THE LEFT ANKLE 1/9/2022 9:51 pm COMPARISON: None. HISTORY: ORDERING SYSTEM PROVIDED HISTORY: swelling/pain TECHNOLOGIST PROVIDED HISTORY: Reason for exam:->swelling/pain Reason for Exam: left ankle pain, and bruising FINDINGS: Distal fibula and lateral malleolus are unremarkable. Tibial plafond and base of the medial malleolus are intact. There is a small deformity the very tip of the medial malleolus suggesting a small avulsion injury. On the lateral views in ossification along the dorsal edge of the distal talus which could be acute or chronic. The talar dome is intact. Plantar calcaneal spur formation is also present. Lucency at the tip the medial malleolus suggesting a tiny avulsion fracture. Ossification along the dorsum of the distal talus could be acute or chronic.      Assessment//Plan           Hospital Problems           Last Modified POA    Fall at home, initial encounter 1/5/2022 Yes    Asterixis 1/8/2022 Yes    Hypercapnia 1/8/2022 Yes        Assessment & Plan  Acute nondisplaced fx distal fibula 2/2 fall  -walking boot and f/u 6 wks  -ARU  Myoclonic jerking  -due to hyperpacapn   Presyncope  Pacer not working and difficult to extract  -event montor outpt  Acute on chornic resp failure with COPD exacerbion  -baseline 4L  Mod PCM  DVT prophylaxis  -lovneox      Discharge to ARU  Electronically signed by Maya Brannon MD on 1/11/22 at 9:45 AM EST

## 2022-01-11 NOTE — CARE COORDINATION
Spoke with patient and wife regarding pre-cert process and discussed ARU. Explained to patient the required 3 hours of therapy a day. Also explained the average length of stay is 11 days, could be longer or shorter depending on recommendations of therapy and Dr. Yvrose Chester. Patient expresses her understanding and states she's agreeable to admit to ARU. Presented updated therapy notes to Dr. Yvrose Chester. Patient meets criteria and is approved to come to ARU. Patient able to admit once medically stable and after ARU Medical Director and  sign the pre-admission screen (PAS). COVID negative test noted on 1/9/22. Patient will not need a new COVID test ordered. Notified Dr. Alejandro Pierce of approval.  Per Dr. Alejandro Pierce he will see patient and notify this CL if patient is medically ready for discharge. Notified Lavinia Garvey of approval.     1300:  Per Dr. Alejandro Pierce patient medically ready for discharge to ARU. Notified ARU charge nurse.

## 2022-01-11 NOTE — PLAN OF CARE
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]     Near syncope [R55]     Moderate protein-calorie malnutrition (HCC) [E44.0]     Traumatic closed nondisplaced fracture of distal end of right fibula, initial encounter [S82.831A] 2022       Rehabilitation Diagnosis:     Displaced fracture of lateral malleolus of right fibula, initial encounter for closed fracture [S82.61XA]  Closed displaced fracture of lateral malleolus of right fibula, initial encounter [S82.61XA]          CARE PLAN     NURSING:  Enzo Goldmann while on this unit will:      Bowel and Bladder   [x] Be continent of bowel and bladder      [x] Have an adequate number of bowel movements   [] Urinate with no urinary retention >300ml in bladder   [] Bladder Scan: (details)   [] Complete bladder protocol with chinchilla removal   [] Initiate Bladder Program to toilet every ___ hours   [] Initiate Bowel Program to toilet every ___hours   [] Bladder training    [] Bowel training  Pulmonary   [x] Maintain O2 SATs at 92% or greater  Pain Management   [x] Have pain managed while on ARU        [] Be pain free by discharge    [] Medication Management and Education  Maintenance of Skin Integrity/Wound Management   [x] Have no skin breakdown while on ARU   [] Have improved skin integrity via wound measurements   [] Have no signs/symptoms of infection via infection protection and monitoring at the          wound site  Fall Prevention   [x] Be free from injury during hospitalization via fall prevention measures     [] Disease management and Education  Precautions   [x] Weight Bearing Precautions   [] Swallowing Precautions   [x] Monitoring of Risks of Complications   [] DVT Prophylaxis    [] Fluid/electrolyte/Nutrition Management    [] Complete education with patient/family with understanding demonstrated for          in-room safety with transfers to bed, toilet, wheelchair, shower as well as                bathroom activities and hygiene. [] Adjustment   [] Other:   Nursing interventions may include bowel/bladder training, education for medical assistive devices, medication education, O2 saturation management, energy conservation, stress management techniques, fall prevention, alarms protocol, seating and positioning, skin/wound care, pressure relief instruction,dressing changes,  infection protection, DVT prophylaxis, and/or assistance with in room safety with transfers to bed, toilet, wheelchair, shower as well as bathroom activities and hygiene. Patient/caregiver education for:   [x] Disease/sustained injury/management      [x] Medication Use   [] Surgical intervention   [x] Safety/Precautions   [x] Body mechanics and or joint protection   [x] Health maintenance         PHYSICAL THERAPY:  Goals:                  Short term goals  Time Frame for Short term goals: 7-10 tx days:  Short term goal 1: Pt will complete sit->sup Ind. Short term goal 2: Pt will complete OOB transfers using 2WW, R boot, and O2 line management Mod Ind. Short term goal 3: Pt will ambulate 10 ft over level and uneven surfaces and 50 ft with turns using 2WW and R boot with SBA-Sup. Short term goal 4: Pt will ascend/descend 2\"/4\" curb step and 4-5 steps (4\" step height) using railings following appropriate step-to pattern with Min A. Short term goal 5: Pt will propel manual w/c >/=150 ft Mod Ind. Short term goal 6: Pt will complete object retrieval in standing with 1UE support on 2WW and using reacher Mod Ind.                These goals were reviewed with this patient at the time of assessment and Wilfred Braxton is in agreement. Plan of Care: Pt to be seen 5 days per week for a minimum of 60 minutes for 7-10 days. Current Treatment Recommendations: Strengthening,Gait Training,Patient/Caregiver Education & Training,Stair training,Equipment Evaluation, Education, & procurement,Balance Training,Pain Management,Functional Mobility Training,Endurance Training,Home Exercise Program,Positioning,Transfer Training,Wheelchair Mobility Training,Safety Education & Training community reintegration,animal assisted therapy, and concurrent/group therapy.     PT IRF-MARIAELENA scores and goals for initial assessment:   Bed Mobility:   Sit to Lying  Assistance Needed: Supervision or touching assistance  Comment: SBA without use of bed features  CARE Score: 4  Discharge Goal: Independent  Roll Left and Right  Assistance Needed: Independent  Comment: Ind without use of bed features  CARE Score: 6  Discharge Goal: Independent  Lying to Sitting on Side of Bed  Assistance Needed: Independent  Comment: Ind without use of bed features  CARE Score: 6  Discharge Goal: Independent    Transfers:    Sit to Stand  Assistance Needed: Supervision or touching assistance  Comment: CGA using 2WW and R boot in place  CARE Score: 4  Discharge Goal: Independent  Chair/Bed-to-Chair Transfer  Assistance Needed: Supervision or touching assistance  Comment: CGA using 2WW and R boot in place with additional assist on O2 line management  CARE Score: 4  Discharge Goal: Independent     Car Transfer  Assistance Needed: Partial/moderate assistance  Comment: Min A (required steadying assist on trunk to sit and stand) using 2WW and R boot in place; required additional assist with O2 line management  CARE Score: 3  Discharge Goal: Independent    Ambulation:    Walking Ability  Does the Patient Walk?: Yes     Walk 10 Feet  Assistance Needed: Partial/moderate assistance  Comment: Min A using 2WW and R boot; required additional assist with O2 line management and w/c follow of the same person due to pt's unpredictable balance and strength  CARE Score: 3  Discharge Goal: Supervision or touching assistance     Walk 50 Feet with Two Turns  Comment: max tolerance today was 12 ft using 2WW and R boot with Min A  Reason if not Attempted: Not attempted due to medical condition or safety concerns  CARE Score: 88  Discharge Goal: Supervision or touching assistance     Walk 150 Feet  Comment: pt was not performing this at baseline; limited potential to progressing to this mobility task due to Hx of COPD and high fall risk (Hx of unpredictable \"spasms\" that result to collapsing of both legs per pt's report)  Reason if not Attempted: Not attempted due to medical condition or safety concerns  CARE Score: 88  Discharge Goal: Not Attempted     Walking 10 Feet on Uneven Surfaces  Reason if not Attempted: Not attempted due to medical condition or safety concerns  CARE Score: 88  Discharge Goal: Supervision or touching assistance     1 Step (Curb)  Comment: pt was not performing this at baseline but has potential to be trained in a controlled environment  Reason if not Attempted: Not attempted due to medical condition or safety concerns  CARE Score: 88  Discharge Goal: Partial/moderate assistance     4 Steps  Comment: pt was not usually performing this at baseline but has potential to be trained in a controlled environment  Reason if not Attempted: Not attempted due to medical condition or safety concerns  CARE Score: 88  Discharge Goal: Partial/moderate assistance     12 Steps  Comment: pt was not performing this at baseline; limited potential to progressing to this mobility task due to Hx of COPD  Reason if not Attempted: Not attempted due to medical condition or safety concerns  CARE Score: 88  Discharge Goal: Not Attempted    Gait Deviations: []None []Slow adrian  [] Increased LEE  [] Staggers []Deviated Path  [] Decreased step length  [] Decreased step height  []Decreased arm swing  [] Shuffles  [] Decreased head and trunk rotation  []other:        Wheelchair:  w/c Ability: Wheelchair Ability  Uses a Wheelchair and/or Scooter?: Yes  Wheel 50 Feet with Two Turns  Assistance Needed: Supervision or touching assistance  Comment: SBA-Sup using BUE  CARE Score: 4  Discharge Goal: Independent  Wheel 150 Feet  Assistance Needed: Supervision or touching assistance  Comment: SBA-Sup using BUE  CARE Score: 4  Discharge Goal: Independent          Balance:        Object: Picking Up Object  Assistance Needed: Supervision or touching assistance  Comment: CGA with LUE support on 2WW, R boot in place, and using reacher  CARE Score: 4  Discharge Goal: Independent  OCCUPATIONAL THERAPY:  Goals:             Short term goals  Time Frame for Short term goals: STGs=LTGs :  Long term goals  Time Frame for Long term goals : ~1 week or until d/c  Long term goal 1: Pt will complete grooming tasks Ind  Long term goal 2: Pt will complete total body bathing mod I  Long term goal 3: Pt will complete UB dressing Ind  Long term goal 4: Pt will complete LB dressing Ind  Long term goal 5: Pt will doff/don footwear including walking boot Ind  Long term goals 6: Pt will complete toileting mod I  Long term goal 7: Pt will complete functional transfers (bed, chair, toilet, shower) c DME PRN and mod I  Long term goal 8: Pt will perform therex/therax to facilitate increased strength/endurance/ax tolerance (c emphasis on dynamic standing balance/tolerance >5 mins, BUE endurance) c SBA  Long term goal 9: Pt will complete simple homemaking tasks c DME PRN and mod I :    These goals were reviewed with this patient at the time of assessment and Anna Wheatley is in agreement    Plan of Care:  Pt to be seen 5 days per week for a minimum of 60 minutes for 7 days.           Plan  Times per day: Daily  Current Treatment Recommendations: Balance Training,Functional Mobility [facility] Education & Training,Patient/Caregiver Education & Training,Equipment Evaluation, Education, & procurement,Self-Care / ADL,Home Management Training         cognitive training, home management, energy conservation training, community reintegration, splint fabrication, patient/caregiver education and training, animal assisted therapy, and concurrent and/or group therapy. OT IRF-MARIAELENA scores and goals for initial assessment:    ADLs:    Eating: Eating  Assistance Needed: Independent  CARE Score: 6  Discharge Goal: Independent       Oral Hygiene: Oral Hygiene  Assistance Needed: Setup or clean-up assistance  Comment: seated  CARE Score: 5  Discharge Goal: Independent    UB/LB Bathing: Shower/Bathe Self  Assistance Needed: Supervision or touching assistance  Comment: CGA while briefly attempting perineal hygiene in stance however educated pt since boot is not on she should not put weight on R foot; pt then redirected to complete task seated  CARE Score: 4    UB Dressing: Upper Body Dressing  Assistance Needed: Supervision or touching assistance  Comment: pt used W/C for setup with cue to lock brakes, able to don shirt  CARE Score: 4         LB Dressing: Lower Body Dressing  Assistance Needed: Partial/moderate assistance  Comment: able to thread BLEs in underwear and pants, ranged from CGA-min A for balance during pants management + encouragement to perform at best effort  CARE Score: 3  Discharge Goal: Independent    Donning and Alfred Footwear: Putting On/Taking Off Footwear  Assistance Needed: Partial/moderate assistance  Comment: able to doff/don L sock, required partial assist to doff/don R walking boot but pt engaged in cueing/education  CARE Score: 3  Discharge Goal: Independent      Toileting: Toileting Hygiene  Assistance Needed: Supervision or touching assistance  Comment: CGA-SBA  CARE Score: 4  Discharge Goal: Independent      Toilet Transfers:    Toilet Transfer  Assistance Needed: Supervision or touching assistance  Comment: CGA c RW, grab bars, walking boot  CARE Score: 4  Discharge Goal: Independent      SPEECH THERAPY: (If ordered)  Plan of Care and Goals:   LTG                                                            LTG:                           Treatments may include speech/language/communication therapy, cognitive training, animal assisted therapy, group therapy, education, and/or dysphagia therapy based on the above goals. Co-treats where appropriate with PT or OT to facilitate patient goals in functional tasks. These goals were reviewed with this patient at the time of assessment and Rodney Alexander is in agreement. CASE MANAGEMENT:  Goals:   Assist patient/family with discharge planning, patient/family counseling, assistance in obtaining recommended equipment and other services, and coordination with insurance during ARU stay. Patient Goals:   Return to maximum level of independent function. Nutrition goal:Patient will consume at least 75% at meals during stay        Activities Prior to Admit:   Homemaking Responsibilities: No  Active : Yes  Mode of Transportation: Family,Other,Car (caregiver)  Occupation: On disability            Intensity of Therapy  Rodney Alexander will be seen a minimum of 3 hours of therapy per day/a minimum of 5 out of 7 days per week. [] In this rare instance due to the nature of this patient's medical involvement, this patient will be seen 15 hours per week (900 minutes within a 7 day period). Treatments may include therapeutic exercises, gait training, neuromuscular re-ed, transfer training, community reintegration, bed mobility, w/c mobility and training, self care, home mgmt, cognitive training, energy conservation,dysphagia tx, speech/language/communication therapy, group therapy, and patient/family education.  In addition, dietician/nutritionist may monitor calorie count as well as intake and collaboratively work with SLP on dietary upgrades. Neuropsychology/Psychology may evaluate and provide necessary support. Group therapy as appropriate to facilitate improved endurance, STR, COORD, function, safety, transfers, awareness and insight into deficits, problem solving, memory, and social interaction and engagement. Medical issues being managed closely and that require 24 hour availability of a physician:   [] Swallowing Precautions                                     [x] Weight bearing precautions   [] Wound Care                             [x] Infection Prevention   [x] DVT Prophylaxis/assessment              [x] Monitoring for complications    [x] Fall Precautions/Prevention                         [x] Fluid/Electrolyte/Nutrition Balance   [] Voice Protection                           [x] Medication Management   [x] Respiratory                   [x] Pain Mgmt   [x] Bowel/Bladder Fx    Medical Prognosis: [] Good  [x] Fair    [] Guarded   Total expected IRF days 7                                            Physician anticipated functional outcomes:  FWW and HHC OT/PT and supervision.   Rehab Goals:   [] Return to premorbid function of_______________________________.    [] Independent   [] Mod I  [x] Supervision  [] CGA   [] Min A   [] Mod A  Level for ambulation []without assistive device  [x] with assistive device        [] Independent   [] Mod I  [x] Supervision [] CGA   [] Min A   [] Mod A  Level for transfers []without assistive device  [x] with assistive device         [] Independent   [] Mod I  [x] Supervision [] CGA   [] Min A   [] Mod A Level with ADL's []without assistive device   [x] with assistive device     ___________________     Level with cognitive skills requiring [] No assist [x] Supervision  [] Active Assist/Cues     [] Maximize level of mobility and ADL's to decrease burden on caregiver    IPOC brief synthesis of Preadmission Screen, Post-Admission Evaluation, and Therapy Evaluations:  Acute inpatient rehabilitation with occupational and physical therapy 180 minutes 5 out of every 7 days. Will address basic and  advancing mobility with self-care instruction and adaptive equipment training. Caregiver education will be offered. Expected length of stay  prior to a supervised level of function for discharge home with a walker and [facility] OT/PT is 2 weeks.     Additional recommendation:     1. Right distal fibula fracture with gait disturbance: The patient requires daily occupational and physical therapy. She will be allowed weightbearing provided she uses a walking boot on the right lower limb. We must emphasize pain management, aggressive pulmonary measures and adaptive equipment training. Caregiver education, DVT prophylaxis and bowel and bladder retraining. Orthopedic follow-up as an outpatient. 2. DVT prophylaxis: Lovenox 40 mg subcu daily. I must monitor her hemoglobin and platelet count periodically while on this medication. Weightbearing activities will be allowed within restrictions and pursued daily. GI prophylaxis is available. 3. Chronic pain syndrome: Her home Suboxone use will be prescribed 3 times daily. She is on acetaminophen, Lyrica and tramadol for breakthrough pain. Cryotherapy and outpatient follow-up with her pain management physician. 4. COPD exacerbation with hypercapnia: Albuterol, Atrovent and Symbicort inhalers. Aggressive pulmonary hygiene measures. Monitoring O2 saturations at rest and with activity. Outpatient follow-up with pulmonary medicine. 5. Cardiac arrhythmia: Evidently her pacer is failing and she needs outpatient follow-up with cardiology to address this issue. Vital signs are checked at rest and with activity. 6. Protein calorie malnutrition: Encouraging consistent oral intake of solids and liquids. Oral supplements. Establish bowel regularity with oral agents.   Follow-up with her PCP.     Anticipated discharge destination:    []

## 2022-01-11 NOTE — DISCHARGE SUMMARY
Physician Discharge Summary     Patient ID:  Viky Carlson  [id number]  10 y.o.  1960    Admit date: 1/5/2022    Discharge date and time: No discharge date for patient encounter. Admitting Physician: Cortez Morrissey MD     Discharge Physician: Gaviota Rucker    Admission Diagnoses: Fall from bed, initial encounter [W06. XXXA]  Fall at home, initial encounter [I08. XXXA, Y92.009]  Closed fracture of distal end of right fibula, unspecified fracture morphology, initial encounter [S82.831A]    Discharge Diagnoses: acute nondisplaced fx distal fibula 2/2 fall                                          Myoclonic jerking due to hypercapnea                                          Presyncope                                          Acute on chronic resp failure with COPD exacerbation(baseline 4L)                                         Mod PCM    Admission Condition: fair    Discharged Condition: good    Indication for Admission: fall    Hospital Course:   63 y.o.  female  who presents with a fall. Patient states she went to stand up from her bed and her bilateral legs gave out. She denies loss of consciousness. Patient denied her head. Patient did not have presyncopal symptoms. She has no focal numbness start weakness or tingling at this time. Patient reports she has episodes of twitching, she saw a cardiologist who recommended an echo and stress test for these episodes however she had no witnessed episodes of twitching in the ED this admission. Her neurologic exam is normal.  She denies headache or blurred vision. Denies chest pain or shortness of breath. Denies nausea vomiting or diarrhea. Denies numbness or tingling in her lower extremities.   I explained to the patient that we will treat her for acute fracture today and she will need to continue to follow-up outpatient for her reported episodes of twitching that there are not any witnessed on exam today    She was admitted for presyncope and had nondisplced fx distal fibular and ortho recommened walking boot and f/u in 6 wks. Cardiology consulted for presyncope and had a pacer not working but difficult to extract as chronic and concern for calified wires and would cause more risk then benefit to remove it. At discharge she may need an event monitor. Neuro consulted as she had myocolonic jerking and not a seizure but more consistent from hypercapnea from acute on chronic resp failure with COPD. She was deconditioned and discharged to ARU  Consults: cardiology, neurology and orthopedic surgery        Outstanding Order Results     No orders found from 12/7/2021 to 1/6/2022. Discharge Exam:  HEENT: Normal HEENT exam.    Lungs:  Normal effort. Heart: Normal rate. Abdomen: Abdomen is soft. Bowel sounds are normal.     Extremities: Decreased range of motion. Neurological: Patient is alert. Pupils:  Pupils are equal, round, and reactive to light. Skin:  Warm. Disposition: ARU    Patient Instructions:   No current facility-administered medications for this encounter. No current outpatient medications on file.      Facility-Administered Medications Ordered in Other Encounters   Medication Dose Route Frequency Provider Last Rate Last Admin    albuterol sulfate  (90 Base) MCG/ACT inhaler 2 puff  2 puff Inhalation BID LAUREANO Escobar MD   2 puff at 01/15/22 0801    ipratropium (ATROVENT HFA) 17 MCG/ACT inhaler 2 puff  2 puff Inhalation BID C Christopher Escobar MD   2 puff at 01/15/22 0802    ondansetron (ZOFRAN-ODT) disintegrating tablet 4 mg  4 mg Oral Q8H PRN Jody Rivera MD   4 mg at 01/11/22 2302    sennosides-docusate sodium (SENOKOT-S) 8.6-50 MG tablet 1 tablet  1 tablet Oral BID Jody Rivera MD   1 tablet at 01/15/22 0822    albuterol sulfate  (90 Base) MCG/ACT inhaler 2 puff  2 puff Inhalation Q4H PRN Jody Rivera MD   2 puff at 01/14/22 1630    aspirin chewable tablet 81 mg  81 mg Oral Daily Jody Rivera MD   81 mg at 01/15/22 0822    budesonide-formoterol (SYMBICORT) 80-4.5 MCG/ACT inhaler 2 puff  2 puff Inhalation BID Shirley Kenney MD   2 puff at 01/15/22 0802    buprenorphine-naloxone (SUBOXONE) 8-2 MG SL film 1 Film  1 Film SubLINGual TID Shirley Kennye MD   1 Film at 01/15/22 0913    pregabalin (LYRICA) capsule 100 mg  100 mg Oral TID Shirley Kenney MD   100 mg at 01/15/22 0821    sodium chloride (OCEAN, BABY AYR) 0.65 % nasal spray 1 spray  1 spray Each Nostril Q4H PRN Shirley Kenney MD        traZODone (DESYREL) tablet 200 mg  200 mg Oral Nightly PRN Shirley Kenney MD   200 mg at 01/14/22 2032    amphetamine-dextroamphetamine (ADDERALL XR) extended release capsule 30 mg  30 mg Oral Daily Shirley Kenney MD   30 mg at 01/15/22 0917    traMADol (ULTRAM) tablet 50 mg  50 mg Oral Q6H PRN C Magdalene Sinclair MD   50 mg at 01/15/22 0606    acetaminophen (TYLENOL) tablet 650 mg  650 mg Oral Q4H PRN C Magdalene Sinclair MD   650 mg at 01/12/22 0523    enoxaparin (LOVENOX) injection 40 mg  40 mg SubCUTAneous Daily C Magdalene Sinclair MD   40 mg at 01/15/22 5167    polyethylene glycol (GLYCOLAX) packet 17 g  17 g Oral Daily PRN C Magdalene Sinclair MD        magnesium hydroxide (MILK OF MAGNESIA) 400 MG/5ML suspension 30 mL  30 mL Oral Daily PRN C Magdalene Sinclair MD         Activity: activity as tolerated  Diet: cardiac diet  Wound Care: as directed    Follow-up with PCP  Discharge time 35min  Signed:  Shirley Kenney MD  1/11/2022  2:15 PM

## 2022-01-11 NOTE — PROGRESS NOTES
Physical Therapy  Name: Dimitrios Morales MRN: [id number] :   1960   Date:  2022   Admission Date: 2022 Room:  [address] [place] [address]   Restrictions/Precautions:        WBAT with walking boot on the R  Communication with other providers:   RN states pt is ok to see for therapy  History, copied from caty MOHAN:  The primary encounter diagnosis was Fall from bed, initial encounter. A diagnosis of Closed fracture of distal end of right fibula, unspecified fracture morphology, initial encounter was also pertinent to this visit. Patient  has a past medical history of ADHD, COPD (chronic obstructive pulmonary disease) ([facility] [address] COVID-19, Drug addiction in remission ([facility] [address] Hep C w/o coma, chronic ([facility] [address] Pacemaker, Sleep apnea, and TIA (transient ischemic attack). Patient  has a past surgical history that includes  section (); Cholecystectomy (); and Pacemaker insertion (). Subjective:  Patient states:  She has had diarrhea all night long but is willing to do anything with therapy  Pain:   Location, Type, Intensity (0/10 to 10/10):  0/10 at rest, c/o pain in her R foot with boot on   Objective:    Observation:  Pt was resting in the bed  Treatment, including education/measures:  Supine Exercises: Ankle pumps x 10 on the L and toe flex/ext on R  Quad sets x 10 with 5 sec hold  Glute sets x 10 with 5 sec hold  Heel slides x 10  Hip abd x 10  Hip IR/ER x 10  SAQ's x 10  Pillow squeezes x 10  Therapeutic Exercise:  Therapeutic exercises were instructed today. Cues were given for technique, safety, recruitment, and rationale. Cues were verbal and/or tactile.   Transfers with line management of tele  Rolling: Ind  Pt was able to sit up in bed and was instructed in donning and doffing her Boot with min a of 1  Supine to sit :SBA VC's for hand placement  Pt states she has to use the BSC  Scooting :SBA  Sit to stand :CGA to min A with boot on  Stand to sit :CGA to min A with boot on  Gait:  Pt amb with RW for 5 ft x 2 with CGA, bed to [facility] placed further than yesterday  Gait Comments: with VC's' and TC's for B LE placement, walker placement and sequence throughout ambulation; with VC's and TC's to maintain upright posture in order to avoid COM shifting outside of LEE; with VC's for PLB throughout ambulation  Sit to supine :SBA VC's for hand placement  Safety  Patient left safely in the bed d/t no room chair available , with call light/phone in reach. Gait belt was used for transfers and gait.  All pt needs were met  Assessment / Impression:     Patient's tolerance of treatment:  Good, pt motivated, c/o pain with boot on   Adverse Reaction: none  Significant change in status and impact:  none  Barriers to improvement:B knees buckling, tremors     Plan for Next Session:    Will cont to work towards pt's goals per her tolerance  Time in:  1030  Time out:  1125  Timed treatment minutes:  55  Total treatment time:  54  Previously filed items:  Social/Functional History  Lives With: Alone (has caregiver for 7 hours/day 7 days/week)  Type of Home: [address] [place] [address] Drive: One level (2nd floor apartment with elevator)  Home Access: Level entry,Elevator  Bathroom Shower/Tub: Tub/Shower unit  Bathroom Toilet: Standard  Bathroom Equipment: Shower chair  Bathroom Accessibility: Accessible  Home Equipment: 4 wheeled [address] [facility] Help From: Personal care attendant  ADL Assistance: [address]: Needs assistance  Homemaking Responsibilities: No (caregiver manages)  Ambulation Assistance: Independent (mod I with 4ww household distances)  Transfer Assistance: Independent  Active : Yes (daughter is primary )  Occupation: On disability  Additional Comments: Pt endorses significant functional decline over the past couple weeks with new tremors/jerking and fall resulting in current hospital admission  Short term goals  Time Frame for Short term goals: 1 week  Short term goal 1: Pt will perform STS from EOB to RW with Min A, boot on, v/c. Short term goal 2: Pt will demonstrate good carryover with placement/adjustment of walking boot, Mod I. Short term goal 3: Pt will AMB x10 ft with chair follow, RW, Min A. Electronically signed by:     Chastity Reid PTA  1/11/2022, 11:18 AM

## 2022-01-12 PROBLEM — S82.831A: Status: ACTIVE | Noted: 2022-01-11

## 2022-01-12 PROCEDURE — 1280000000 HC REHAB R&B

## 2022-01-12 PROCEDURE — 2700000000 HC OXYGEN THERAPY PER DAY

## 2022-01-12 PROCEDURE — 6370000000 HC RX 637 (ALT 250 FOR IP): Performed by: HOSPITALIST

## 2022-01-12 PROCEDURE — 6370000000 HC RX 637 (ALT 250 FOR IP): Performed by: PHYSICAL MEDICINE & REHABILITATION

## 2022-01-12 PROCEDURE — 94761 N-INVAS EAR/PLS OXIMETRY MLT: CPT

## 2022-01-12 PROCEDURE — 97542 WHEELCHAIR MNGMENT TRAINING: CPT

## 2022-01-12 PROCEDURE — 99232 SBSQ HOSP IP/OBS MODERATE 35: CPT | Performed by: PHYSICAL MEDICINE & REHABILITATION

## 2022-01-12 PROCEDURE — 94150 VITAL CAPACITY TEST: CPT

## 2022-01-12 PROCEDURE — 97535 SELF CARE MNGMENT TRAINING: CPT

## 2022-01-12 PROCEDURE — 94640 AIRWAY INHALATION TREATMENT: CPT

## 2022-01-12 PROCEDURE — 97116 GAIT TRAINING THERAPY: CPT

## 2022-01-12 PROCEDURE — 97163 PT EVAL HIGH COMPLEX 45 MIN: CPT

## 2022-01-12 PROCEDURE — 94664 DEMO&/EVAL PT USE INHALER: CPT

## 2022-01-12 PROCEDURE — 97530 THERAPEUTIC ACTIVITIES: CPT

## 2022-01-12 PROCEDURE — 97166 OT EVAL MOD COMPLEX 45 MIN: CPT

## 2022-01-12 PROCEDURE — 6360000002 HC RX W HCPCS: Performed by: PHYSICAL MEDICINE & REHABILITATION

## 2022-01-12 RX ADMIN — PREGABALIN 100 MG: 100 CAPSULE ORAL at 08:18

## 2022-01-12 RX ADMIN — PREGABALIN 100 MG: 100 CAPSULE ORAL at 20:03

## 2022-01-12 RX ADMIN — BUPRENORPHINE AND NALOXONE 1 FILM: 8; 2 FILM, SOLUBLE BUCCAL; SUBLINGUAL at 14:12

## 2022-01-12 RX ADMIN — TRAZODONE HYDROCHLORIDE 200 MG: 50 TABLET ORAL at 20:25

## 2022-01-12 RX ADMIN — ALBUTEROL SULFATE 2 PUFF: 90 AEROSOL, METERED RESPIRATORY (INHALATION) at 11:56

## 2022-01-12 RX ADMIN — SENNOSIDES AND DOCUSATE SODIUM 1 TABLET: 50; 8.6 TABLET ORAL at 08:18

## 2022-01-12 RX ADMIN — ASPIRIN 81 MG CHEWABLE TABLET 81 MG: 81 TABLET CHEWABLE at 08:18

## 2022-01-12 RX ADMIN — BUPRENORPHINE AND NALOXONE 1 FILM: 8; 2 FILM, SOLUBLE BUCCAL; SUBLINGUAL at 20:03

## 2022-01-12 RX ADMIN — BUPRENORPHINE AND NALOXONE 1 FILM: 8; 2 FILM, SOLUBLE BUCCAL; SUBLINGUAL at 08:18

## 2022-01-12 RX ADMIN — ALBUTEROL SULFATE 2 PUFF: 90 AEROSOL, METERED RESPIRATORY (INHALATION) at 15:57

## 2022-01-12 RX ADMIN — ALBUTEROL SULFATE 2 PUFF: 90 AEROSOL, METERED RESPIRATORY (INHALATION) at 08:01

## 2022-01-12 RX ADMIN — ACETAMINOPHEN 650 MG: 325 TABLET ORAL at 06:09

## 2022-01-12 RX ADMIN — DEXTROAMPHETAMINE SACCHARATE, AMPHETAMINE ASPARTATE MONOHYDRATE, DEXTROAMPHETAMINE SULFATE AND AMPHETAMINE SULFATE 30 MG: 7.5; 7.5; 7.5; 7.5 CAPSULE, EXTENDED RELEASE ORAL at 08:31

## 2022-01-12 RX ADMIN — ENOXAPARIN SODIUM 40 MG: 100 INJECTION SUBCUTANEOUS at 08:18

## 2022-01-12 RX ADMIN — PREGABALIN 100 MG: 100 CAPSULE ORAL at 14:12

## 2022-01-12 RX ADMIN — SENNOSIDES AND DOCUSATE SODIUM 1 TABLET: 50; 8.6 TABLET ORAL at 20:03

## 2022-01-12 RX ADMIN — BUDESONIDE AND FORMOTEROL FUMARATE DIHYDRATE 2 PUFF: 80; 4.5 AEROSOL RESPIRATORY (INHALATION) at 08:03

## 2022-01-12 ASSESSMENT — PAIN DESCRIPTION - PROGRESSION: CLINICAL_PROGRESSION: NOT CHANGED

## 2022-01-12 ASSESSMENT — PAIN DESCRIPTION - ONSET
ONSET: ON-GOING
ONSET: ON-GOING

## 2022-01-12 ASSESSMENT — PAIN DESCRIPTION - DESCRIPTORS
DESCRIPTORS: ACHING;DISCOMFORT
DESCRIPTORS: ACHING;DISCOMFORT;CONSTANT

## 2022-01-12 ASSESSMENT — PAIN SCALES - GENERAL
PAINLEVEL_OUTOF10: 9
PAINLEVEL_OUTOF10: 0
PAINLEVEL_OUTOF10: 8

## 2022-01-12 ASSESSMENT — PAIN DESCRIPTION - LOCATION
LOCATION: FOOT
LOCATION: FOOT

## 2022-01-12 ASSESSMENT — PAIN DESCRIPTION - FREQUENCY
FREQUENCY: CONTINUOUS
FREQUENCY: CONTINUOUS

## 2022-01-12 ASSESSMENT — PAIN DESCRIPTION - ORIENTATION
ORIENTATION: RIGHT
ORIENTATION: RIGHT

## 2022-01-12 ASSESSMENT — PAIN DESCRIPTION - PAIN TYPE
TYPE: SURGICAL PAIN
TYPE: ACUTE PAIN

## 2022-01-12 NOTE — PROGRESS NOTES
Occupational Therapy                              [facility] ARU OCCUPATIONAL THERAPY EVALUATION    Chart Review:  Past Medical History:   Diagnosis Date    ADHD 2006    [facility] (chronic obstructive pulmonary disease) ([facility] [address]     COVID-19     Drug addiction in remission ([facility] [address]     recovering addict    Hep C w/o coma, chronic ([facility] [address]     Pacemaker     Sleep apnea     TIA (transient ischemic attack)     per patient \"several mini strokes\"     Past Surgical History:   Procedure Laterality Date   [address] CHOLECYSTECTOMY  2010    done in [address] [place] [address]  2005     Social History:  Social/Functional History  Lives With: Alone (has caregiver during day up to afternoon (7 hours) x 7 days via waiver program)  Type of Home: Apartment (2nd floor)  Home Layout: One level  Home Access: Level entry,Elevator  Bathroom Shower/Tub: Tub/Shower unit (gets down to tub and gets to shower chair to initiate dressing)  Bathroom Toilet: Standard  Bathroom Equipment: Shower chair  Home Equipment: [address] wheeled walker (4L O2 24/7)  ADL Assistance: Independent  Homemaking Responsibilities: No  Meal Prep Responsibility: No (caregiver does)  Laundry Responsibility: No (caregiver does)  Cleaning Responsibility: No (caregiver does)  Bill Paying/Finance Responsibility: Primary  Shopping Responsibility: Primary (did online shopping and groceries were delivered)  [address]: Secondary (dogs)  Health Care Management: Primary  Ambulation Assistance: Independent (Mod Ind using 3EJ (short distances in the home only at baseline; pt attributes limitation to COPD and depression))  Transfer Assistance: Independent  Active : Yes  Mode of Transportation: Family,Other,Car (caregiver)  Occupation: On disability  Type of occupation: , cleaning business  Additional Comments: 3 falls in the past year (recent fall with injury requiring this hospitalization); sleeps in regular, flat bed; on 4L O2 at baseline but reports Hx of deviated septum that contributes to her breathing difficulty    Restrictions:  Restrictions/Precautions  Restrictions/Precautions: Weight Bearing,General Precautions  Implants present? : Pacemaker (pt states it is not working)  Lower Extremity Weight Bearing Restrictions  Right Lower Extremity Weight Bearing: Weight Bearing As Tolerated (with boot)     Position Activity Restriction  Other position/activity restrictions: 5L O2 on eval (used 4L at baseline)         Pain Level: 8  Pain Location: Foot    Objective:  Observation/Palpation  Posture: Good  Observation: Pt in high fowlers on approach, pleasant and agreeable to evaluation. Mild SOB on 5L02  Scar: Splint with ACE wrap intact to RLE             Vision  Vision: Impaired  Vision Exceptions: Wears glasses for reading  Vision - Basic Assessment  Prior Vision: Wears glasses only for reading  Patient Visual Report: No visual complaint reported. Hearing  Hearing: Exceptions to [facility]  Hearing Exceptions: Hard of hearing/hearing concerns,No hearing aid    ROM:      LUE AROM (degrees)  LUE AROM : WFL     Left Hand AROM (degrees)  Left Hand AROM: WFL     RUE AROM (degrees)  RUE AROM : WFL     Right Hand AROM (degrees)  Right Hand AROM: WFL    Strength:    LUE Strength  Gross LUE Strength: WFL  L Hand General: 4+/5  LUE Strength Comment: 4+/5 grossly  RUE Strength  Gross RUE Strength: WFL  R Hand General: 4+/5  RUE Strength Comment: 4+/5 grossly    Quality of Movement:   Tone RUE  RUE Tone: Normotonic  Tone LUE  LUE Tone: Normotonic  Coordination  Movements Are Fluid And Coordinated: Yes       Sensation:    Sensation  Overall Sensation Status: Impaired (acute numbness on L hip down to L knee)     ADLs:  Eating: Eating  Assistance Needed: Independent  CARE Score: 6  Discharge Goal: Independent       Oral Hygiene: Oral Hygiene  Assistance Needed: Setup or clean-up assistance  Comment: seated  CARE Score: 5  Discharge Goal: Independent    UB/LB Bathing: Shower/Bathe Self  Assistance Needed: Supervision or touching assistance  Comment: CGA while briefly attempting perineal hygiene in stance however educated pt since boot is not on she should not put weight on R foot; pt then redirected to complete task seated  CARE Score: 4    UB Dressing: Upper Body Dressing  Assistance Needed: Supervision or touching assistance  Comment: pt used W/C for setup with cue to lock brakes, able to don shirt  CARE Score: 4         LB Dressing:   Lower Body Dressing  Assistance Needed: Partial/moderate assistance  Comment: able to thread BLEs in underwear and pants, ranged from CGA-min A for balance during pants management + encouragement to perform at best effort  CARE Score: 3  Discharge Goal: Independent    Donning and [place] Footwear: Putting On/Taking Off Footwear  Assistance Needed: Partial/moderate assistance  Comment: able to doff/don L sock, required partial assist to doff/don R walking boot but pt engaged in cueing/education  CARE Score: 3  Discharge Goal: Independent      Toileting: Toileting Hygiene  Assistance Needed: Supervision or touching assistance  Comment: CGA-SBA  CARE Score: 4  Discharge Goal: Independent      Bed Mobility:    Bed mobility  Supine to Sit: Supervision  Comment: Pt agreeable to sit in recliner at end of evaluation and educated to keep BLEs elevated    Transfers:    Transfers  Stand Pivot Transfers: Contact guard assistance  Sit to stand: Contact guard assistance  Stand to sit: Contact guard assistance  Transfer Comments: Min cues for hand placement     Shower Transfers  Shower - Transfer Type: To and From  Shower - Transfer To:  Shower seat without back  Shower - Technique: Stand pivot  Shower Transfers: Contact Guard  Shower Transfers Comments: Cues for grab bar use     Toilet Transfer  Assistance Needed: Supervision or touching assistance  Comment: CGA c RW, grab bars, walking boot  CARE Score: 4  Discharge Goal: Independent    Functional Mobility:    Balance  Sitting Balance: Supervision  Standing Balance: Minimal assistance (CGA-min A)  Standing Balance  Time: 2 stands 1-2 mins each  Activity: ADLs  Comment: Pt more unsteady with 0 UE support, also some impulsivity contributing  Functional Mobility  Functional - Mobility Device: Rolling Walker  Activity: To/from bathroom  Assist Level: Minimal assistance  Functional Mobility Comments: with walking boot     Cognition:  Cognition  Overall Cognitive Status: Exceptions  Attention Span: Difficulty dividing attention  Cognition Comment: Pt rushes and is impulsive, also decreased divided attention but has ADHD diagnosis as well as h/o drug abuse; likely baseline behaviors    Perception:  Perception  Overall Perceptual Status: WFL      Assessment:     Performance deficits / Impairments: Decreased functional mobility ,Decreased ADL status,Decreased safe awareness,Decreased endurance,Decreased sensation,Decreased balance,Decreased high-level IADLs    The patient is a 63year old female admitted onto ARU after hospitalization for R ankle pain and increased swelling; xray showed R lateral malleolus fx. Pt is WBAT RLE when wearing a walking boot but also has a splint with ACE wrap. No surgery recommended at this time, c outpatient follow up in 6 weeks. Pt also reporting episodes of muscle twitching/jerking for the past several months; suspected this could be 2* hypercapnia as pt has COPD and pt had elevated C02 levels. PTA, pt lives alone and is Ind c ADLs and mod I c functional transfers/mobility using a [id number]; pt has an aide that assists with IADLs. Today, pt participated well in evaluation with min encouragement 2* mild self limiting behaviors. Pt did require education on sequencing during ADL completion to avoid performing multiple stands without the walking boot on as this was pt's initial plan.  The QI, MMT, and ROM standardized assessments were used this date to determine the above performance deficits, which compromise pt's ability to safely complete ADLs/IADLs/mobility. Pt will benefit from ARU OT services to increase functional performance and return to PLOF. Decision Making: Medium Complexity  Clinical Presentation:  Evolving c changing characteristics (I.e. pain)  Patient education:   ARU procotol, Role of O.T., O.T. plan of care  []   Patient goal was established and reviewed in Rehab[facility]er with patient and/or family this date.   REQUIRES OT FOLLOW UP: Yes  Discharge Recommendations:  Home c assist, [facility] OT  Equipment Recommendations:  Likely none, but use of shower chair instead of bathing in tub    Goals:     Short term goals  Time Frame for Short term goals: STGs=LTGs  Long term goals  Time Frame for Long term goals : ~1 week or until d/c  Long term goal 1: Pt will complete grooming tasks Ind  Long term goal 2: Pt will complete total body bathing mod I  Long term goal 3: Pt will complete UB dressing Ind  Long term goal 4: Pt will complete LB dressing Ind  Long term goal 5: Pt will doff/don footwear including walking boot Ind  Long term goals 6: Pt will complete toileting mod I  Long term goal 7: Pt will complete functional transfers (bed, chair, toilet, shower) c DME PRN and mod I  Long term goal 8: Pt will perform therex/therax to facilitate increased strength/endurance/ax tolerance (c emphasis on dynamic standing balance/tolerance >5 mins, BUE endurance) c SBA  Long term goal 9: Pt will complete simple homemaking tasks c DME PRN and mod I    Plan:    Pt will be seen at least 60 minutes per day for a minimum of 5 days per week, plus group therapy as appropriate  Current Treatment Recommendations: Balance Training,Functional Mobility Training,Endurance Training,Pain Management,Safety Education & Training,Patient/Caregiver Education & Training,Equipment Evaluation, Education, & procurement,Self-Care / ADL,Home Management Training    OT Individual Minutes  Time In: 1300  Time Out: 1425  Minutes: 85                Number of Minutes/Billable Intervention      OT Evaluation 20   Therapeutic Exercise    ADL Self-care 65   Neuro Re-Ed    Therapeutic Activity    Group    Other:    TOTAL 85     Electronically signed by:    Fox Stevenson MS, OTR/L  License #[id number]  1/12/2022, 2:48 PM

## 2022-01-12 NOTE — PATIENT CARE CONFERENCE
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 Constant care aide daily. Extensive DME PTA    PHYSICAL THERAPY (Updated in QI)  Short term goals  Time Frame for Short term goals: 7-10 tx days:  Short term goal 1: Pt will complete sit->sup Ind. Short term goal 2: Pt will complete OOB transfers using 2WW, R boot, and O2 line management Mod Ind. Short term goal 3: Pt will ambulate 10 ft over level and uneven surfaces and 50 ft with turns using 2WW and R boot with SBA-Sup. Short term goal 4: Pt will ascend/descend 2\"/4\" curb step and 4-5 steps (4\" step height) using railings following appropriate step-to pattern with Min A. Short term goal 5: Pt will propel manual w/c >/=150 ft Mod Ind. Short term goal 6: Pt will complete object retrieval in standing with 1UE support on 2WW and using reacher Mod Ind. Impairments/deficits, barriers:     Body structures, Functions, Activity limitations: Decreased functional mobility ,Decreased cognition,Decreased ADL status,Decreased endurance,Decreased sensation,Decreased strength,Decreased balance,Increased pain,Decreased safe awareness     Prognosis: Guarded,Good  Decision Making: High Complexity  Clinical Presentation: unstable/unpredictable characteristics  Equipment needed at discharge: manual w/c with swing away leg rests, 2WW       PT IRF-MARIAELENA scores since initial assessment  Bed Mobility:   Sit to Lying  Assistance Needed: Supervision or touching assistance  Comment: SBA without use of bed features  CARE Score: 4  Discharge Goal: Independent    Roll Left and Right  Assistance Needed: Independent  Comment: Ind without use of bed features  CARE Score: 6  Discharge Goal: Independent    Lying to Sitting on Side of Bed  Assistance Needed: Independent  Comment: Ind without use of bed features  CARE Score: 6  Discharge Goal: Independent    Transfers:    Sit to Stand  Assistance Needed: Supervision or touching assistance  Comment: SBA using 2WW and R walking boot  CARE Score: 4  Discharge Goal: Independent    Chair/Bed-to-Chair Transfer  Assistance Needed: Supervision or touching assistance  Comment: SBA using 2WW and R walking boot  CARE Score: 4  Discharge Goal: Independent         Car Transfer  Assistance Needed: Partial/moderate assistance  Comment: Min A (required steadying assist on trunk to sit and stand) using 2WW and R boot in place; required additional assist with O2 line management  CARE Score: 3  Discharge Goal: Independent    Ambulation:    Walking Ability  Does the Patient Walk?: Yes     Walk 10 Feet  Assistance Needed: Supervision or touching assistance  Comment: SBA using 2WW and R walking boot; on 6L O2  CARE Score: 4  Discharge Goal: Supervision or touching assistance     Walk 50 Feet with Two Turns  Assistance Needed: Supervision or touching assistance  Comment: SBA using 2WW and R walking boot; on 6L O2  Reason if not Attempted: Not attempted due to medical condition or safety concerns  CARE Score: 4  Discharge Goal: Supervision or touching assistance     Walk 150 Feet  Comment: pt was not performing this at baseline; limited potential to progressing to this mobility task due to Hx of COPD and high fall risk (Hx of unpredictable \"spasms\" that result to collapsing of both legs per pt's report)  Reason if not Attempted: Not attempted due to medical condition or safety concerns  CARE Score: 88  Discharge Goal: Not Attempted     Walking 10 Feet on Uneven Surfaces  Reason if not Attempted: Not attempted due to medical condition or safety concerns  CARE Score: 88  Discharge Goal: Supervision or touching assistance     1 Step (Curb)  Comment: pt was not performing this at baseline but has potential to be trained in a controlled environment  Reason if not Attempted: Not attempted due to medical condition or safety concerns  CARE Score: 88  Discharge Goal: Partial/moderate assistance     4 Steps  Comment: pt was not usually performing this at baseline but has potential to be trained in a controlled environment  Reason if not Attempted: Not attempted due to medical condition or safety concerns  CARE Score: 88  Discharge Goal: Partial/moderate assistance     12 Steps  Comment: pt was not performing this at baseline; limited potential to progressing to this mobility task due to Hx of COPD  Reason if not Attempted: Not attempted due to medical condition or safety concerns  CARE Score: 88  Discharge Goal: Not Attempted           Wheelchair:  w/c Ability: Wheelchair Ability  Uses a Wheelchair and/or Scooter?: Yes    Wheel 50 Feet with Two Turns  Assistance Needed: Independent  Comment: Mod Ind using BUE  CARE Score: 6  Discharge Goal: Independent    Wheel 150 Feet  Assistance Needed: Independent  Comment:  Mod Ind using BUE  CARE Score: 6  Discharge Goal: Independent              Balance:        Object: Picking Up Object  Assistance Needed: Supervision or touching assistance  Comment: CGA with LUE support on 2WW, R boot in place, and using reacher  CARE Score: 4  Discharge Goal: Independent    Fall Risk: []  Yes  []  No    OCCUPATIONAL THERAPY  (Updated in QI)  Short term goals  Time Frame for Short term goals: STGs=LTGs :   Long term goals  Time Frame for Long term goals : ~1 week or until d/c  Long term goal 1: Pt will complete grooming tasks Ind  Long term goal 2: Pt will complete total body bathing mod I  Long term goal 3: Pt will complete UB dressing Ind  Long term goal 4: Pt will complete LB dressing Ind  Long term goal 5: Pt will doff/don footwear including walking boot Ind  Long term goals 6: Pt will complete toileting mod I  Long term goal 7: Pt will complete functional transfers (bed, chair, toilet, shower) c DME PRN and mod I  Long term goal 8: Pt will perform therex/therax to facilitate increased strength/endurance/ax tolerance (c emphasis on dynamic standing balance/tolerance >5 mins, BUE endurance) c SBA  Long term goal 9: Pt will complete simple homemaking tasks c DME PRN and mod I :                                       OT IRF-MARIAELENA scores and goals since initial assessment:    ADLs:    Eating: Eating  Assistance Needed: Independent  CARE Score: 6  Discharge Goal: Independent       Oral Hygiene: Oral Hygiene  Assistance Needed: Setup or clean-up assistance  Comment: seated  CARE Score: 5  Discharge Goal: Independent    UB/LB Bathing: Shower/Bathe Self  Assistance Needed: Supervision or touching assistance  Comment: CGA while briefly attempting perineal hygiene in stance however educated pt since boot is not on she should not put weight on R foot; pt then redirected to complete task seated  CARE Score: 4    UB Dressing: Upper Body Dressing  Assistance Needed: Supervision or touching assistance  Comment: pt used W/C for setup with cue to lock brakes, able to don shirt  CARE Score: 4         LB Dressing: Lower Body Dressing  Assistance Needed: Partial/moderate assistance  Comment: able to thread BLEs in underwear and pants, ranged from CGA-min A for balance during pants management + encouragement to perform at best effort  CARE Score: 3  Discharge Goal: Independent    Donning and [place] Footwear: Putting On/Taking Off Footwear  Assistance Needed: Partial/moderate assistance  Comment: able to doff/don L sock, required partial assist to doff/don R walking boot but pt engaged in cueing/education  CARE Score: 3  Discharge Goal: Independent      Toileting: Toileting Hygiene  Assistance Needed: Supervision or touching assistance  Comment: CGA-SBA  CARE Score: 4  Discharge Goal: Independent      Toilet Transfers:    Toilet Transfer  Assistance Needed: Supervision or touching assistance  Comment: CGA c RW, grab bars, walking boot  CARE Score: 4  Discharge Goal: Independent      Impairments/deficits, barriers: Pain, decreased balance, endurance, aerobic capacity, attention/anxiety but is receptive to education  Assessment  Performance deficits / Impairments: Decreased functional mobility ,Decreased ADL status,Decreased safe awareness,Decreased endurance,Decreased sensation,Decreased balance,Decreased high-level IADLs  Decision Making: Medium Complexity  REQUIRES OT FOLLOW UP: Yes  Equipment needed at discharge: Possible BSC for over toilet, use of shower chair      COGNITIVE FUNCTION/SPEECH THERAPY (AS INDICATED)  LTG                              Nursing Current Medical Status:   [x] Is continent of bowel and bladder     [] Is incontinent of bowel and bladder    [x] Has had an adequate number of bowel movements   [] Urinates with no urinary retention >300ml in bladder   [] Targeting bladder protocol with chinchilla removal   [x] Maintaining O2 SATs at 92% or greater   [x] Has pain managed while on ARU         [x] Has had no skin breakdown while on ARU   [] Has improved skin integrity via wound measurements   [] Has no signs/symptoms of infection at the wound site   [] Pressure wounds Stage/Location:    [] Arrived on unit with pressure wound  [x] Has been free from injury during hospitalization   [] Has experienced a fall during hospitalization  [] Ongoing education with patient/family with understanding demonstrated for:  [] Receives IV Fluids  [] Other:        NUTRITION  Weight: 203 lb 9.6 oz (92.4 kg) / Body mass index is 34.95 kg/m². Current diet: ADULT DIET; Regular  Intake: Meal intake usually %      Medical improvements/barriers: Ambulating 50' SBA, potential for some advance gait training, LE boot, O2 portable with weaning        Team goals for next treatment period/Intervention for current barriers:   [x] Pt will increase activity tolerance for daily tasks. [] Pt will improve bed mobility with reduced assist.  [x] Pt will improve safety in fx tasks with reduced cues/assist  [x] Pt will improve transfers with reduced assist  [x] Pt will improve toileting with reduced assist  [x] Pt will improve ADL's with use of adaptive equipment with reduced assist  [x] Pt will improve pain mgmt for maximum participation in tx program  [] Pt will improve communication to get basic needs met on unit  [] Pt will improve swallowing for safe diet advancement with use of strategies  []  Plan for discharge to home. Patient Strengths: Motivated, Cooperative and Pleasant    Justification for Continued Stay  Based on my medical assessment of the patient and review of information from the interdisciplinary team as part of this weekly team conference, the patient continues to meet the following criteria for IRF level of care:   The patient requires active and ongoing intervention of multiple therapy disciplines   The patient requires and intensive rehabilitation therapy program   The patient requires continued physician supervision by a rehabilitation physician   The patient requires 24 hours rehab nursing care   The patient requires an intensive and coordinated interdisciplinary team approach to the delivery of rehabilitative care.      Assessment/Plan   [x]  The patient is making good progression towards their long term goals and is actively participating in and has a reasonable expectation to continue to benefit from the intensive rehabilitation therapy program   []  The estimated discharge date has been changed from initial team conference due to:   []  The estimated discharge destination has been changed from initial team conference due to:         Ongoing tx following discharge: [x][facility] OT, PT    []OUTPATIENT     [] No Further Treatment     [] Family/Caregiver Training  []  Transitional Living Arrangement   [] Home Assessment (date  )     [] Family Conference   []  Therapeutic Pass       []  Other: (specify)    Estimated Discharge Date: 1/17/22    Estimated Discharge Destination: []home alone   [x]home alone with assist prn  []Continuous supervision []Return home with s/o/spouse/family   [] Assisted living    []SNF     Team members participating in today's conference. [x] Chidi Abbott, Medical Director  [x] Arland Alpers,    [] Gabe Boucher, GENET Nurse Mgr [] Vinod Simon RN Nurse Supervisor   [x]  Yaya Herman, PT  []  Salud Yates, PT  [x] Ban Yanes, OT   [] Leanne Wolfe, OT   [x]  Ag Toney, SLP    []  Jaz Serna, SLP   [] Jennifer Leon, SLP   []  Jacklyn Carpio, SHELBY     [] Petrona Maradiaga Mgr   [x]Trinidad Santos,     [x] Walter Vargas RN [] Gabriella Diehl RN     [] Tatum Taylor, GENET    [] Evert Jiang RN    [x] Sanchez Johnson RN  [] Jailyn Valverde, GENET    [] Mikel Tom, GENET   []    I have led this Team Conference and agree with the plan, Marisol Licona MD, 1/13/2022, 12:45 PM  Goals have been updated to reflect recent status.     Team conference note transcribed this date by: Layla Bolaños [place], [id number] [facility], Therapy Coordinator'

## 2022-01-12 NOTE — PROGRESS NOTES
Physical Therapy    [facility] ARU PHYSICAL THERAPY EVALUATION    Chart Review:  Past Medical History:   Diagnosis Date    ADHD 2006    [facility]     COVID-19     Drug addiction in remission ([facility]     recovering addict    [facility] C w/o coma, chronic ([facility]     Pacemaker     Sleep apnea     TIA (transient ischemic attack)     per patient \"several mini strokes\"     Past Surgical History:   Procedure Laterality Date   [facility]    CHOLECYSTECTOMY  2010    done in [address] [place] [address]  2005     Fall History: 3 falls in the past year (recent fall with injury requiring this hospitalization);   Social History:  Social/Functional History  Lives With: Alone (has caregiver during day up to afternoon (7 hours) x 7 days via waiver program)  Type of Home: Apartment (2nd floor)  Home Layout: One level  Home Access: Level entry,Elevator  Bathroom Shower/Tub: Tub/Shower unit (gets down to tub and gets to shower chair to initiate dressing)  Bathroom Toilet: Standard  Bathroom Equipment: Shower chair  Home Equipment: Alert Button,Oxygen,4 wheeled walker (4L O2 24/7)  ADL Assistance: [address] Responsibilities: No  Meal Prep Responsibility: No (caregiver does)  Laundry Responsibility: No (caregiver does)  Cleaning Responsibility: No (caregiver does)  Bill Paying/Finance Responsibility: Primary  Shopping Responsibility: Primary (did online shopping and groceries were delivered)  Dependent Care Responsibility: Secondary (dogs)  Health Care Management: Primary  Ambulation Assistance: Independent (Mod Ind using 6TD (short distances in the home only at baseline; pt attributes limitation to COPD and depression))  Transfer Assistance: Independent  Active : Yes  Mode of Transportation: Family,Other,Car (caregiver)  Occupation: On disability  Type of occupation: , cleaning business  Additional Comments: 3 falls in the past year (recent fall with injury requiring this hospitalization); sleeps in regular, flat bed; on 4L O2 at baseline but reports Hx of deviated septum that contributes to her breathing difficulty    Restrictions:  Restrictions/Precautions  Restrictions/Precautions: Weight Bearing,General Precautions  Implants present? : Pacemaker (pt states it is not working)  Position Activity Restriction  Other position/activity restrictions: 5L O2 on eval (used 4L at baseline)    Subjective: Pt seated at EOB, alert, eating breakfast, asks \"Did I have any surgery on this foot? \" Pt also reports acute numbness on L hip down to L knee since her fall. \"It's numb and burning. \"    Pain Level: 5/10; burning pain on L gluteal area down to knee   Pain Location: RLE    Objective:  Orientation  Overall Orientation Status: Within Functional Limits  Orientation Level: Oriented X4        Vision  Vision: Impaired  Vision Exceptions: Wears glasses for reading  Hearing  Hearing: Exceptions to [facility]  Hearing Exceptions: Hard of hearing/hearing concerns,No hearing aid    ROM:      AROM RLE (degrees)  RLE AROM: WFL  RLE General AROM: R lower leg down to foot wrapped in ace wrap with splint support on lateral aspect and heel     AROM LLE (degrees)  LLE AROM : WFL                 Strength:    Strength RLE  Comment: at least 3/5  Strength LLE  Strength LLE: WFL  Comment: grossly 3+/5              Bed Mobility:   Lying to Sitting on Side of Bed  Assistance Needed: Independent  Physical Assistance Level: No physical assistance  Comment: Ind without use of bed features  CARE Score: 6  Discharge Goal: Independent  Roll Left and Right  Assistance Needed: Independent  Comment: Ind without use of bed features  CARE Score: 6  Discharge Goal: Independent  Sit to Lying  Assistance Needed: Supervision or touching assistance  Comment: SBA without use of bed features  CARE Score: 4  Discharge Goal: Independent    Transfers:    Sit to Stand  Assistance Needed: Supervision or touching assistance  Comment: CGA using 2WW and R boot in place  CARE Score: 4  Discharge Goal: Independent  Chair/Bed-to-Chair Transfer  Assistance Needed: Supervision or touching assistance  Comment: CGA using 2WW and R boot in place with additional assist on O2 line management  CARE Score: 4  Discharge Goal: Independent     Car Transfer  Assistance Needed: Partial/moderate assistance  Comment: Min A (required steadying assist on trunk to sit and stand) using 2WW and R boot in place; required additional assist with O2 line management  CARE Score: 3  Discharge Goal: Independent    Ambulation:   Device used PTA: 4WW   Walking Ability  Does the Patient Walk?: Yes     Walk 10 Feet  Assistance Needed: Partial/moderate assistance  Comment: Min A using 2WW and R boot; required additional assist with O2 line management and w/c follow of the same person due to pt's unpredictable balance and strength  CARE Score: 3  Discharge Goal: Supervision or touching assistance     Walk 50 Feet with Two Turns  Comment: max tolerance today was 12 ft using 2WW and R boot with Min A  Reason if not Attempted: Not attempted due to medical condition or safety concerns  CARE Score: 88  Discharge Goal: Supervision or touching assistance     Walk 150 Feet  Comment: pt was not performing this at baseline; limited potential to progressing to this mobility task due to Hx of COPD and high fall risk (Hx of unpredictable \"spasms\" that result to collapsing of both legs per pt's report)  Reason if not Attempted: Not attempted due to medical condition or safety concerns  CARE Score: 88  Discharge Goal: Not Attempted     Walking 10 Feet on Uneven Surfaces  Reason if not Attempted: Not attempted due to medical condition or safety concerns  CARE Score: 88  Discharge Goal: Supervision or touching assistance     1 Step (Curb)  Comment: pt was not performing this at baseline but has potential to be trained in a controlled environment  Reason if not Attempted: Not attempted due to medical condition or safety concerns  CARE Score: 88  Discharge Goal: Partial/moderate assistance     4 Steps  Comment: pt was not usually performing this at baseline but has potential to be trained in a controlled environment  Reason if not Attempted: Not attempted due to medical condition or safety concerns  CARE Score: 88  Discharge Goal: Partial/moderate assistance     12 Steps  Comment: pt was not performing this at baseline; limited potential to progressing to this mobility task due to Hx of COPD  Reason if not Attempted: Not attempted due to medical condition or safety concerns  CARE Score: 88  Discharge Goal: Not Attempted    Gait Deviations: []None [x]Slow adrian  [] Increased LEE  [x] Staggers due to leg length difference []Deviated Path  [] Decreased step length  [x] Decreased R step height  []Decreased arm swing  [] Shuffles  [] Decreased head and trunk rotation  []other:        Wheelchair:  w/c Ability: Wheelchair Ability  Uses a Wheelchair and/or Scooter?: Yes  Wheel 50 Feet with Two Turns  Assistance Needed: Supervision or touching assistance  Comment: SBA-Sup using BUE  CARE Score: 4  Discharge Goal: Independent  Wheel 150 Feet  Assistance Needed: Supervision or touching assistance  Comment: SBA-Sup using BUE  CARE Score: 4  Discharge Goal: Independent          Balance:        Object: Picking Up Object  Assistance Needed: Supervision or touching assistance  Comment: CGA with LUE support on 2WW, R boot in place, and using reacher  CARE Score: 4  Discharge Goal: Independent    Assessment:   The patient is a 63year old female admitted onto ARU after hospitalization for fall at home and sustained R nondisplaced right distal fibula fracture and is being treated conservatively with use of walking boot and allowed weightbearing as tolerated. Pt also with COPD exacerbation with myoclonic jerking possibly associated to CO2 retention.  Pt required use of 5-6L O2 and still had desaturation with activities but gradually increased to the low 90s when resting. She was able to tolerate use of walking boot more today as PT adjusted it prior to use for comfort. She had pain on BLE but pain type was different wherein it was more aching and localized on RLE as expected versus burning and numb on LLE that will require continued observation if it persists or worsens as this will be a significant barrier to gait training. Pt's gait quality is expected to be impaired with use of walking boot and so continued training on use of it would be important to increase her safety and mobility. It is anticipated that pt will require continued assist on discharge from her caregiver and will benefit from use of w/c for long distances. Body structures, Functions, Activity limitations: Decreased functional mobility ,Decreased cognition,Decreased ADL status,Decreased endurance,Decreased sensation,Decreased strength,Decreased balance,Increased pain,Decreased safe awareness     Prognosis: Guarded,Good  Decision Making: High Complexity  Clinical Presentation: unstable/unpredictable characteristics      Patient education:   ARU schedule, ARU expectations for participation, plan of care, importance of use of R walking boot   Treatment Initiated:  Functional mobility training, gait training, patient education  Barriers to Improvement:  Acute on chronic Pain, mood, pulmonary limitations, unpredictable occurrence and intensity of \"spasms\", status of LLE (burning and numb)  Discharge Recommendations:  [facility] PT, CGT   Equipment Recommendations: manual w/c with swing away leg rests, 2WW     Goals:  Patient Goals   Patient goals : to go home  Short term goals  Time Frame for Short term goals: 7-10 tx days:  Short term goal 1: Pt will complete sit->sup Ind. Short term goal 2: Pt will complete OOB transfers using 2WW, R boot, and O2 line management Mod Ind.   Short term goal 3: Pt will ambulate 10 ft over level and uneven surfaces and 50 ft with turns using 2WW and R boot with SBA-Sup. Short term goal 4: Pt will ascend/descend 2\"/4\" curb step and 4-5 steps (4\" step height) using railings following appropriate step-to pattern with Min A. Short term goal 5: Pt will propel manual w/c >/=150 ft Mod Ind. Short term goal 6: Pt will complete object retrieval in standing with 1UE support on 2WW and using reacher Mod Ind.      Plan:    REQUIRES PT FOLLOW UP: Yes  Pt will be seen at least 60 minutes per day for a minimum of 5 days per week, plus group therapy as appropriate  Plan  Times per day: Daily  Current Treatment Recommendations: Strengthening,Gait Training,Patient/Caregiver Education & Training,Stair training,Equipment Evaluation, Education, & procurement,Balance Training,Pain Management,Functional Mobility Training,Endurance Training,Home Exercise Program,Positioning,Transfer Training,Wheelchair Mobility Training,Safety Education & Training    PT Individual Minutes  Time In: 0830  Time Out: 5325  Minutes: 97   Variance: +7  Reason: extra time required to complete tasks         Timed Code Treatment Minutes: 82 Minutes    Number of Minutes/Billable Intervention      PT Evaluation 15   Gait Training 15   Therapeutic Exercise    Neuro Re-Ed    Therapeutic Activity 37   Wheelchair Propulsion 30   Group    Other:    TOTAL 97       Electronically signed by:    Bandar Selby PT  1/12/2022, 16:58

## 2022-01-12 NOTE — PROGRESS NOTES
2 person skin assessment performed by myself and Kortney Jackson LPN. See admission flow sheet for findings.

## 2022-01-12 NOTE — CARE COORDINATION
Case Management Admission Note      Patient:Kalee Brasher Dose      BRL:[id number]/[id number]  AXS:[id number]  Rehab Dx/Hx: Displaced fracture of lateral malleolus of right fibula, initial encounter for closed fracture [S82.61XA]  Closed displaced fracture of lateral malleolus of right fibula, initial encounter Terrence Colon    Chief Complaint:   Past Medical History:   Diagnosis Date    ADHD 2006    [facility]     COVID-19     [facility]     recovering addict    [facility], [facility]     Pacemaker     Sleep apnea     TIA (transient ischemic attack)     per patient \"several mini strokes\"     Past Surgical History:   Procedure Laterality Date   [facility] CHOLECYSTECTOMY  2010    done in [address] [place] [address]  2005     No Known Allergies  Precautions: falls    Date of Admit: 1/11/2022  Room #: 1007/1007-A      Current functional status at time of admit:        Home Living/DME Available:                             IADL Hx:                       Spouse: none  Family:  none    Comments:  Patient plans dc accessible apartment alone. She has constant care aide 7hrs/7days. Patient has no dc concerns. Agreeable to skilled [facility] at discharge. Thinks she has all DME unless she needs a WC. Whiteboard updated. Patient anxious to dc home.   Hoda Luna, 1/12/2022, 8:36 AM

## 2022-01-12 NOTE — H&P
Praneeth Briggs    : 1960  [facility]  MRN: [id number]              History and physical      Admitting diagnosis: Right distal fibula fracture ([address])    Comorbid diagnoses impacting rehabilitation: Uncontrolled pain, generalized weakness, gait disturbance, myoclonus, chronic pain syndrome, near syncope event, COPD exacerbation, moderate protein calorie malnutrition    Chief complaint: Right leg pain and chronic spinal pains. History of present illness: The patient is a 74-year-old right-hand-dominant female with a history of COPD and chronic pain syndrome. She has a history of drug abuse and is currently maintained on Suboxone. She presented to our ED on 2022 after suffering a fall at home. She reports this was another in a series of falls over the last 2 months. She described episodic weakness of her legs with give way that does not involve any change in mental alertness. No seizure activity was witnessed. She denied any particular palpitations or dizziness before the injury. She has severe right leg pain and could not stand. In our ED she had a nondisplaced right distal fibula fracture. Orthopedics was consulted and she was placed in a walking boot and allowed weightbearing as tolerated. She has very poor weightbearing tolerance and severe pain. Additionally she was seen by cardiology and neurology for her near syncope. She is felt to have a failing pacemaker and apparently it would be very complicated to removed. This will be followed up as an outpatient. She currently has not been able to breathe well during activity due to a COPD exacerbation. She is having myoclonic jerking possibly associated with CO2 retention. She has not been able to do her own toileting, transfers and self-care and cannot return directly home. She requires inpatient rehabilitation to address these issues at this time. Review of systems: Right leg pain. Lower back pain.   Some positional dizziness. Minimal dyspnea. No change in vision, speech or swallowing. Occasional bowel movement. Some urinary urgency without dysuria. The remainder of their review of systems was negative except as mentioned in the history of present illness. Social History: Lives With: Alone (has caregiver for 7 hours/day 7 days/week)  Type of Home: [address] [place] [address] Drive: One level (2nd floor apartment with elevator)  Home Access: Level entry,Elevator  Bathroom Shower/Tub: Tub/Shower unit  Bathroom Toilet: Standard  Bathroom Equipment: Shower chair  Bathroom Accessibility: Accessible  Home Equipment: 4 wheeled [address] [facility] Help From: Personal care attendant  ADL Assistance: [address]: Needs assistance  Homemaking Responsibilities: No (caregiver manages)  Ambulation Assistance: Independent (mod I with 4ww household distances)  Transfer Assistance: Independent  Active : Yes (daughter is primary )  Occupation: On disability  Additional Comments: Pt endorses significant functional decline over the past couple weeks with new tremors/jerking and fall resulting in current hospital admission       She reports that she quit smoking about 5 years ago. She has a 40.00 pack-year smoking history. She has never used smokeless tobacco. She reports previous alcohol use. She reports previous drug use. Prior (baseline) level of function: Contact-guard assistance with some personal care in the recent weeks. Current level of function: Moderate physical assistance needed for mobility and self-care. Allergies:  Patient has no known allergies.     Past Medical History:   Past Medical History:   Diagnosis Date    ADHD 2006    [facility] obstructive [facility] disease[facility]     COVID-19     Drug addiction in remission ([facility]     recovering addict    Hep C w/o coma, chronic (HCC)     Pacemaker     Sleep apnea     TIA (transient ischemic attack)     per patient \"several mini strokes\"        Past Surgical History:     Past Surgical History:   Procedure Laterality Date   [place]    CHOLECYSTECTOMY  2010    done in [address] [place] [address]       Current Medications:     Current Facility-Administered Medications:     ondansetron (ZOFRAN-ODT) disintegrating tablet 4 mg, 4 mg, Oral, Q8H PRN **OR** [DISCONTINUED] ondansetron (ZOFRAN) injection 4 mg, 4 mg, IntraVENous, Q6H PRN, Rohan Denise MD    sennosides-docusate sodium (SENOKOT-S) 8.6-50 MG tablet 1 tablet, 1 tablet, Oral, BID, Rohan Denise MD, 1 tablet at 01/11/22 2258    albuterol sulfate  (90 Base) MCG/ACT inhaler 2 puff, 2 puff, Inhalation, Q4H PRN, Rohan Denise MD    albuterol sulfate  (90 Base) MCG/ACT inhaler 2 puff, 2 puff, Inhalation, 4x daily, Rohan Denise MD    [START ON 1/12/2022] aspirin chewable tablet 81 mg, 81 mg, Oral, Daily, Rohan Denise MD    budesonide-formoterol (SYMBICORT) 80-4.5 MCG/ACT inhaler 2 puff, 2 puff, Inhalation, BID, Rohan Denise MD    buprenorphine-naloxone (SUBOXONE) 8-2 MG SL film 1 Film, 1 Film, SubLINGual, TID, Rohan Denise MD, 1 Film at 01/11/22 2258    ipratropium (ATROVENT HFA) 17 MCG/ACT inhaler 2 puff, 2 puff, Inhalation, 4x daily, Rohan Denise MD    pregabalin (LYRICA) capsule 100 mg, 100 mg, Oral, TID, Rohan Denise MD, 100 mg at 01/11/22 2258    sodium chloride (OCEAN, BABY AYR) 0.65 % nasal spray 1 spray, 1 spray, Each Nostril, Q4H PRN, Rohan Denise MD    traZODone (DESYREL) tablet 200 mg, 200 mg, Oral, Nightly PRN, Rohan Denise MD, 200 mg at 01/11/22 2259    [START ON 1/12/2022] amphetamine-dextroamphetamine (ADDERALL XR) extended release capsule 30 mg, 30 mg, Oral, Daily, Rohan Denise MD    traMADol (ULTRAM) tablet 50 mg, 50 mg, Oral, Q6H PRN, LAUREANO Ambriz MD    acetaminophen (TYLENOL) tablet 650 mg, 650 mg, Oral, Q4H PRN, LAUREANO Ambriz MD    [START ON 1/12/2022] enoxaparin (LOVENOX) injection 40 mg, 40 mg, SubCUTAneous, DailyLAUREANO MD    polyethylene glycol [facility]) packet 17 g, 17 g, Oral, Daily PRN, LAUREANO Lang MD    magnesium hydroxide (MILK OF MAGNESIA) 400 MG/5ML suspension 30 mL, 30 mL, Oral, Daily PRN, LAUREANO Lang MD    Family History:   Family History   Problem Relation Age of Onset    Cancer Mother         unsure of type    Thyroid Cancer Mother     Alcohol Abuse Father     Other Sister         passed in car accident    Lupus Brother     Coronary Art Dis Brother        Exam:    Blood pressure 118/70, pulse 79, temperature 98.9 °F (37.2 °C), temperature source Oral, resp. rate 22, SpO2 90 %. General: Patient was seen semiupright in bed. She is distracted by her pain and a little groggy but able to answer direct questions. She shows poor insight and reasoning. No agitation or distress. HEENT: No signs of trauma to her head or neck. Guarded cervical rotation bilaterally. Visual fields seem full. MMM. Speech clear. Neck supple without adenopathy or JVD. Pulmonary: Symmetric air exchange without wheezes or rales. Cardiac: Regular rate and rhythm. Mature pacer site in the left upper chest is nontender. Abdomen: Patient's abdomen was soft and nondistended. Bowel sounds were present throughout. There was no rebound, guarding or masses noted. Spinal exam: Diffuse percussion tenderness. Decreased lordosis. No open areas of the skin. Upper extremities: Guarded active range of motion of both upper limbs. She can bring both hands up to meet mine. She lacks reflexes but has low tone. No tremor or clonus. Lower extremities: Right lower limb is immobilized in a boot. Toes are mobile. Upper trimline is not constricting. Left calf is soft and heel is clear. Give way with MMT across left knee and ankle. No signs of DVT. Sitting balance was good. Standing balance was poor.     Lab Results   Component Value Date    WBC 11.7 (H) 01/06/2022    HGB 13.2 01/06/2022 HCT 46.2 01/06/2022    MCV 95.1 01/06/2022     01/06/2022     Lab Results   Component Value Date    INR 1.03 05/24/2021    INR 0.93 03/30/2021    INR 1.02 11/19/2020    PROTIME 12.5 05/24/2021    PROTIME 11.2 (L) 03/30/2021    PROTIME 12.3 11/19/2020     Lab Results   Component Value Date    CREATININE 0.4 (L) 01/06/2022    BUN 8 01/06/2022     01/06/2022    K 4.7 01/06/2022    CL 95 (L) 01/06/2022    CO2 38 (H) 01/06/2022     Lab Results   Component Value Date    ALT 18 01/06/2022    AST 21 01/06/2022    ALKPHOS 85 01/06/2022    BILITOT 0.3 01/06/2022         Impression: 82-year-old female with a history of drug and alcohol abuse and chronic pain syndrome maintained on Suboxone who was developed progressive weakness in the lower limbs with myoclonic jerking resulting in a fall that broke her right ankle. She has been diagnosed with hypercapnia due to COPD under treatment and she did not need surgery for her ankle. She may be weightbearing within the walking boot. Strengths for the patient: Davida cosby, some experience with adaptive equipment and an accessible home. Limitations/barriers for the patient: She lives alone, her history of drug alcohol abuse and her medical frailness. Recommendation: Acute inpatient rehabilitation with occupational and physical therapy 180 minutes 5 out of every 7 days. Will address basic and  advancing mobility with self-care instruction and adaptive equipment training. Caregiver education will be offered. Expected length of stay  prior to a supervised level of function for discharge home with a walker and C OT/PT is 2 weeks. Additional recommendation:    1. Right distal fibula fracture with gait disturbance: The patient requires daily occupational and physical therapy. She will be allowed weightbearing provided she uses a walking boot on the right lower limb.   We must emphasize pain management, aggressive pulmonary measures and adaptive equipment training. Caregiver education, DVT prophylaxis and bowel and bladder retraining. Orthopedic follow-up as an outpatient. 2. DVT prophylaxis: Lovenox 40 mg subcu daily. I must monitor her hemoglobin and platelet count periodically while on this medication. Weightbearing activities will be allowed within restrictions and pursued daily. GI prophylaxis is available. 3. Chronic pain syndrome: Her home Suboxone use will be prescribed 3 times daily. She is on acetaminophen, Lyrica and tramadol for breakthrough pain. Cryotherapy and outpatient follow-up with her pain management physician. 4. COPD exacerbation with hypercapnia: Albuterol, Atrovent and Symbicort inhalers. Aggressive pulmonary hygiene measures. Monitoring O2 saturations at rest and with activity. Outpatient follow-up with pulmonary medicine. 5. Cardiac arrhythmia: Evidently her pacer is failing and she needs outpatient follow-up with cardiology to address this issue. Vital signs are checked at rest and with activity. 6. Protein calorie malnutrition: Encouraging consistent oral intake of solids and liquids. Oral supplements. Establish bowel regularity with oral agents. Follow-up with her PCP. I personally performed a history and physical on this patient within 24 hours of admission to the rehab unit. I have reviewed the preadmission screening and concur with its findings without change. A detailed plan of care will be established by hospital day 4 and I attest the patient is appropriate for inpatient rehabilitation at this time. I have compared the patient's current functional status noted during my history and physical with that of the preadmission screen and I have found no significant differences.

## 2022-01-12 NOTE — PROGRESS NOTES
Monitoring and Evaluation:   Behavioral-Environmental Outcomes:  None Identified   Food/Nutrient Intake Outcomes:  Food and Nutrient Intake  Physical Signs/Symptoms Outcomes:  Biochemical Data,Meal Time Behavior,Skin,Weight     Discharge Planning:    Continue current diet     Electronically signed by Taras Hankins RD, LD on 1/12/22 at 12:23 PM EST    Contact: [phone]

## 2022-01-12 NOTE — PROGRESS NOTES
Jeff Val    : 1960  Acct #: [de-identified]  MRN: [id number]              PM&R Progress Note      Admitting diagnosis: Right distal fibula fracture ( [address])     Comorbid diagnoses impacting rehabilitation: Uncontrolled pain, generalized weakness, gait disturbance, myoclonus, chronic pain syndrome, near syncope event, COPD exacerbation, moderate protein calorie malnutrition     Chief complaint: Minimal right ankle pain. Some flatus today but no bowel movement since yesterday. Prior (baseline) level of function: Independent.     Current level of function:         Current  IRF-MARIAELENA and Goals:   Occupational Therapy:    Short term goals  Time Frame for Short term goals: STGs=LTGs :   Long term goals  Time Frame for Long term goals : ~1 week or until d/c  Long term goal 1: Pt will complete grooming tasks Ind  Long term goal 2: Pt will complete total body bathing mod I  Long term goal 3: Pt will complete UB dressing Ind  Long term goal 4: Pt will complete LB dressing Ind  Long term goal 5: Pt will doff/don footwear including walking boot Ind  Long term goals 6: Pt will complete toileting mod I  Long term goal 7: Pt will complete functional transfers (bed, chair, toilet, shower) c DME PRN and mod I  Long term goal 8: Pt will perform therex/therax to facilitate increased strength/endurance/ax tolerance (c emphasis on dynamic standing balance/tolerance >5 mins, BUE endurance) c SBA  Long term goal 9: Pt will complete simple homemaking tasks c DME PRN and mod I :                                       Eating: Eating  Assistance Needed: Independent  CARE Score: 6  Discharge Goal: Independent       Oral Hygiene: Oral Hygiene  Assistance Needed: Setup or clean-up assistance  Comment: seated  CARE Score: 5  Discharge Goal: Independent    UB/LB Bathing: Shower/Bathe Self  Assistance Needed: Supervision or touching assistance  Comment: CGA while briefly attempting perineal hygiene in stance however educated pt since boot is not on she should not put weight on R foot; pt then redirected to complete task seated  CARE Score: 4    UB Dressing: Upper Body Dressing  Assistance Needed: Supervision or touching assistance  Comment: pt used W/C for setup with cue to lock brakes, able to don shirt  CARE Score: 4         LB Dressing: Lower Body Dressing  Assistance Needed: Partial/moderate assistance  Comment: able to thread BLEs in underwear and pants, ranged from CGA-min A for balance during pants management + encouragement to perform at best effort  CARE Score: 3  Discharge Goal: Independent    Donning and [place] Footwear: Putting On/Taking Off Footwear  Assistance Needed: Partial/moderate assistance  Comment: able to doff/don L sock, required partial assist to doff/don R walking boot but pt engaged in cueing/education  CARE Score: 3  Discharge Goal: Independent      Toileting: Toileting Hygiene  Assistance Needed: Supervision or touching assistance  Comment: CGA-SBA  CARE Score: 4  Discharge Goal: Independent      Toilet Transfers: Toilet Transfer  Assistance Needed: Supervision or touching assistance  Comment: CGA c RW, grab bars, walking boot  CARE Score: 4  Discharge Goal: Independent    Physical Therapy:   Short term goals  Time Frame for Short term goals: 7-10 tx days:  Short term goal 1: Pt will complete sit->sup Ind.   Short term goal 2: Pt will complete OOB transfers using 2WW, R boot, an            Bed Mobility:   Sit to Lying  Assistance Needed: Supervision or touching assistance  Comment: SBA without use of bed features  CARE Score: 4  Discharge Goal: Independent  Roll Left and Right  Assistance Needed: Independent  Comment: Ind without use of bed features  CARE Score: 6  Discharge Goal: Independent  Lying to Sitting on Side of Bed  Assistance Needed: Independent  Comment: Ind without use of bed features  CARE Score: 6  Discharge Goal: Independent    Transfers:    Sit to Stand  Assistance Needed: Supervision or touching assistance  Comment: CGA using 2WW and R boot in place  CARE Score: 4  Discharge Goal: Independent  Chair/Bed-to-Chair Transfer  Assistance Needed: Supervision or touching assistance  Comment: CGA using 2WW and R boot in place with additional assist on O2 line management  CARE Score: 4  Discharge Goal: Independent     Car Transfer  Assistance Needed: Partial/moderate assistance  Comment: Min A (required steadying assist on trunk to sit and stand) using 2WW and R boot in place; required additional assist with O2 line management  CARE Score: 3  Discharge Goal: Independent    Ambulation:    Walking Ability  Does the Patient Walk?: Yes     Walk 10 Feet  Assistance Needed: Partial/moderate assistance  Comment: Min A using 2WW and R boot; required additional assist with O2 line management and w/c follow of the same person due to pt's unpredictable balance and strength  CARE Score: 3  Discharge Goal: Supervision or touching assistance     Walk 50 Feet with Two Turns  Reason if not Attempted: Not attempted due to medical condition or safety concerns  CARE Score: 88  Discharge Goal: Supervision or touching assistance     Walk 150 Feet  Comment: pt was not performing this at baseline; limited potential to progressing to this mobility task due to Hx of COPD and high fall risk (Hx of unpredictable \"spasms\" that result to collapsing of both legs per pt's report)  Reason if not Attempted: Not attempted due to medical condition or safety concerns  CARE Score: 88  Discharge Goal: Not Attempted     Walking 10 Feet on Uneven Surfaces  Reason if not Attempted: Not attempted due to medical condition or safety concerns  CARE Score: 88  Discharge Goal: Supervision or touching assistance                         Wheelchair:  w/c Ability:                  Balance:        Object:      I      Exam:    Blood pressure (!) 131/110, pulse 112, temperature 99.1 °F (37.3 °C), temperature source Oral, resp.  rate 18, height 5' 4\" (1.626 m), weight 203 lb 9.6 oz (92.4 kg), SpO2 95 %. General: Observed significant therapy. Bright and alert and smiling. HEENT: Neck supple. MMM. Symmetric facial expression. Pulmonary: Shallow respirations without wheezes or rales. Cardiac: RRR. Abdomen: Patient's abdomen is soft and nondistended. Bowel sounds were present throughout. There was no rebound, guarding or masses noted. Upper extremities: Moves both upper limbs though functional range of motion with fair  strength. No tremor or clonus. Lower extremities: Right ankle and lower leg secured in a walking boot. Toes are mobile. Trimline is not constricting. No signs of DVT. Sitting balance was good. Standing balance was poor. Lab Results   Component Value Date    WBC 11.7 (H) 01/06/2022    HGB 13.2 01/06/2022    HCT 46.2 01/06/2022    MCV 95.1 01/06/2022     01/06/2022     Lab Results   Component Value Date    INR 1.03 05/24/2021    INR 0.93 03/30/2021    INR 1.02 11/19/2020    PROTIME 12.5 05/24/2021    PROTIME 11.2 (L) 03/30/2021    PROTIME 12.3 11/19/2020     Lab Results   Component Value Date    CREATININE 0.4 (L) 01/06/2022    BUN 8 01/06/2022     01/06/2022    K 4.7 01/06/2022    CL 95 (L) 01/06/2022    CO2 38 (H) 01/06/2022     Lab Results   Component Value Date    ALT 18 01/06/2022    AST 21 01/06/2022    ALKPHOS 85 01/06/2022    BILITOT 0.3 01/06/2022       Expected length of stay  prior to a supervised level of function for discharge home with a walker and C OT/PT is 2 weeks. Recommendations:    1. Right distal fibula fracture with gait disturbance: Developing the routine for her daily occupational and physical therapy. Weight bearing allowed as long as she uses a walking boot on the right lower limb. We must emphasize pain management, aggressive pulmonary measures and adaptive equipment training. Planning on caregiver education, DVT prophylaxis and bowel and bladder retraining.   Orthopedic follow-up as an outpatient. Verbal cues and moderate physical assistance for transfers. 2. DVT prophylaxis: Lovenox 40 mg subcu daily. I must monitor her hemoglobin and platelet count periodically while on this medication. Weightbearing activities are allowed within restrictions and pursued daily. GI prophylaxis is available. No signs of acute blood loss. 3. Chronic pain syndrome: Continuing Suboxone 3 times a day. She is benefiting from acetaminophen, Lyrica and tramadol for breakthrough pain. Cryotherapy and outpatient follow-up with her pain management physician. 4. COPD exacerbation with hypercapnia: Albuterol, Atrovent and Symbicort inhalers. Aggressive pulmonary hygiene measures. Monitoring O2 saturations at rest and with activity. Outpatient follow-up with pulmonary medicine. 5. Cardiac arrhythmia: Evidently her pacer is failing and she needs outpatient follow-up with cardiology to address this issue. Vital signs are checked at rest and with activity. 6. Protein calorie malnutrition: Encouraging consistent oral intake of solids and liquids. Oral supplements. Establish bowel regularity with oral agents. Follow-up with her PCP.

## 2022-01-12 NOTE — PLAN OF CARE
4 Eyes Skin Assessment     NAME:  Criss Lombard  YOB: 1960  MEDICAL RECORD NUMBER:  [id number]    The patient is being assess for  {Reason for Assessment:19291}    I agree that 2 RN's have performed a thorough Head to Toe Skin Assessment on the patient. ALL assessment sites listed below have been assessed.       Areas assessed by both nurses:    Entire body        Does the Patient have a Wound? no       Clif Prevention initiated:  yes   Wound Care Orders initiated:  no    Pressure Injury (Stage 3,4, Unstageable, DTI, NWPT, and Complex wounds) if present place consult order under [de-identified] no    New and Established Ostomies if present place consult order under :no      Nurse 1 eSignature: Myles Grewal RN    **SHARE this note so that the co-signing nurse is able to place an eSignature**    Nurse 2 eSignature: {Esignature:[id number]}

## 2022-01-13 ENCOUNTER — TELEPHONE (OUTPATIENT)
Dept: CARDIOLOGY CLINIC | Age: 62
End: 2022-01-13

## 2022-01-13 PROCEDURE — 6370000000 HC RX 637 (ALT 250 FOR IP): Performed by: HOSPITALIST

## 2022-01-13 PROCEDURE — 94640 AIRWAY INHALATION TREATMENT: CPT

## 2022-01-13 PROCEDURE — 97530 THERAPEUTIC ACTIVITIES: CPT

## 2022-01-13 PROCEDURE — 6360000002 HC RX W HCPCS: Performed by: PHYSICAL MEDICINE & REHABILITATION

## 2022-01-13 PROCEDURE — 1280000000 HC REHAB R&B

## 2022-01-13 PROCEDURE — 99233 SBSQ HOSP IP/OBS HIGH 50: CPT | Performed by: PHYSICAL MEDICINE & REHABILITATION

## 2022-01-13 PROCEDURE — 97535 SELF CARE MNGMENT TRAINING: CPT

## 2022-01-13 PROCEDURE — 94761 N-INVAS EAR/PLS OXIMETRY MLT: CPT

## 2022-01-13 PROCEDURE — 2700000000 HC OXYGEN THERAPY PER DAY

## 2022-01-13 PROCEDURE — 97116 GAIT TRAINING THERAPY: CPT

## 2022-01-13 PROCEDURE — 97542 WHEELCHAIR MNGMENT TRAINING: CPT

## 2022-01-13 RX ORDER — ALBUTEROL SULFATE 90 UG/1
2 AEROSOL, METERED RESPIRATORY (INHALATION) 2 TIMES DAILY
Status: DISCONTINUED | OUTPATIENT
Start: 2022-01-13 | End: 2022-01-17 | Stop reason: HOSPADM

## 2022-01-13 RX ADMIN — ALBUTEROL SULFATE 2 PUFF: 90 AEROSOL, METERED RESPIRATORY (INHALATION) at 08:07

## 2022-01-13 RX ADMIN — PREGABALIN 100 MG: 100 CAPSULE ORAL at 08:47

## 2022-01-13 RX ADMIN — SENNOSIDES AND DOCUSATE SODIUM 1 TABLET: 50; 8.6 TABLET ORAL at 08:47

## 2022-01-13 RX ADMIN — SENNOSIDES AND DOCUSATE SODIUM 1 TABLET: 50; 8.6 TABLET ORAL at 20:48

## 2022-01-13 RX ADMIN — BUDESONIDE AND FORMOTEROL FUMARATE DIHYDRATE 2 PUFF: 80; 4.5 AEROSOL RESPIRATORY (INHALATION) at 21:44

## 2022-01-13 RX ADMIN — BUPRENORPHINE AND NALOXONE 1 FILM: 8; 2 FILM, SOLUBLE BUCCAL; SUBLINGUAL at 14:53

## 2022-01-13 RX ADMIN — ENOXAPARIN SODIUM 40 MG: 100 INJECTION SUBCUTANEOUS at 08:47

## 2022-01-13 RX ADMIN — BUPRENORPHINE AND NALOXONE 1 FILM: 8; 2 FILM, SOLUBLE BUCCAL; SUBLINGUAL at 20:49

## 2022-01-13 RX ADMIN — BUPRENORPHINE AND NALOXONE 1 FILM: 8; 2 FILM, SOLUBLE BUCCAL; SUBLINGUAL at 08:47

## 2022-01-13 RX ADMIN — ASPIRIN 81 MG CHEWABLE TABLET 81 MG: 81 TABLET CHEWABLE at 08:47

## 2022-01-13 RX ADMIN — DEXTROAMPHETAMINE SACCHARATE, AMPHETAMINE ASPARTATE MONOHYDRATE, DEXTROAMPHETAMINE SULFATE AND AMPHETAMINE SULFATE 30 MG: 7.5; 7.5; 7.5; 7.5 CAPSULE, EXTENDED RELEASE ORAL at 09:45

## 2022-01-13 RX ADMIN — TRAZODONE HYDROCHLORIDE 200 MG: 50 TABLET ORAL at 20:53

## 2022-01-13 RX ADMIN — ALBUTEROL SULFATE 2 PUFF: 90 AEROSOL, METERED RESPIRATORY (INHALATION) at 21:45

## 2022-01-13 RX ADMIN — PREGABALIN 100 MG: 100 CAPSULE ORAL at 20:48

## 2022-01-13 RX ADMIN — PREGABALIN 100 MG: 100 CAPSULE ORAL at 14:52

## 2022-01-13 ASSESSMENT — PAIN SCALES - GENERAL
PAINLEVEL_OUTOF10: 0

## 2022-01-13 NOTE — TELEPHONE ENCOUNTER
Attempted to call pt to cancel appt tomorrow for Yocasta Washington because he seen her in hospital to discuss why she was coming in tomorrow.

## 2022-01-13 NOTE — PROGRESS NOTES
Physical Rehabilitation: OCCUPATIONAL THERAPY     [x] daily progress note       [] discharge       Patient Name:  Serafin Nur   :  1960 MRN: [id number]  Room:  [address] [place] [address] Date of Admission: 2022  Rehabilitation Diagnosis:   Displaced fracture of lateral malleolus of right fibula, initial encounter for closed fracture [S82.61XA]  Closed displaced fracture of lateral malleolus of right fibula, initial encounter Temitope Keith       Date 2022       Day of ARU Week:  3   Time IN/OUT 8258-3533/[id number]   Individual Tx Minutes 15/   Group Tx Minutes    Co-Treat Minutes    Concurrent Tx Minutes    TOTAL Tx Time Mins 70   Variance Time    Variance Time []   Refusal due to:     []   Medical hold/reason:    []   Illness   []   Off Unit for test/procedure  []   Extra time needed to complete task  []   Therapeutic need  []   Other (specify):   Restrictions Restrictions/Precautions: Weight Bearing,General Precautions         Communication with other providers: [x]   OK to see per nursing:     []   Spoke with team member regarding:      Subjective observations and cognitive status: Approached pt room to notify afternoon session may start a bit early. Pt call light on. Pt states, \"Oh good lord, I have got to go to the bathroom it is urgent. I don't know what took so long, but, I have got to go. \"      Pt sitting in semi owusu position upon arrival. Pt pleasant and agreeable to OT tx. When asked what main concerns pt has when going home pt states, \"I need to be able to get this boot on and off alone and getting in the tub. \"      Pain level/location:   4 /10       Location: LE   Discharge recommendations  Anticipated discharge date:    Destination: []home alone   [x]home alone w assist prn   [] home w/ family    [] Continuous supervision       []SNF    [] Assisted living     [] Other:   Continued therapy: []HHC OT  []OUTPATIENT  OT   [] No Further OT  Equipment needs: BSC and TTB      Serafin Nur requires a 3 in 1 bedside commode due to being unable to use the toilet within the home  And confined to one level of the home. [employer] requires the assistance of a tub transfer bench to successfully and safely complete bathing activities necessary due to reduced balance, endurance, need for ADL assist, and LE weakness and reduced ROM. These tasks cannot be safely completed without this device and would place [employer] at greater risk of falls. Toileting:   IND for seated hygiene; CGA for pants management         Toilet Transfers:   CGA c RW  Device Used:    [x]   Standard Toilet         [x]   Grab Bars           []  Bedside Commode       []   Elevated Toilet          []   Other:        Bed Mobility:           []   Pt received out of bed   Rolling R/L:  Sup   Scooting:  Sup  Supine --> Sit:  Sup  Sit --> Supine:  Sup    Transfers:    Sit--> Stand:  CGA c VCs for hand placement   Stand --> Sit:   CGA  Stand-Pivot:   CGA  Other:    Assistive device required for transfer:   RW      Functional Mobility:    Assistance:  Amb EOB<> toilet c CGA. Pt self propelled w/c room<>ADL apt @ Min A in tight spaces. Pt completed to increase UE endurance to further IND c (I)ADLs and functional transfers/mobility  Device:   [x]   Rolling Walker     []   Standard Walker [x]   Wheelchair        []   [employer]       []   4-Wheeled Frank Newness         []   Cardiac Walker       []   Other:          Additional Therapeutic activities/exercises completed this date:     [x]   ADL Training: Pt educated on various strategies for donning/doffing boot. Pt attempted 3 strategies to find the best one for them. Once strategy was finalized pt donned/doffed boot 3 times requiring VCs for first 2 and IND on 3. []   Balance/Postural training     [x]   Bed/Transfer Training: Pt educated on TTB transfer. Pt demoed fair+ understanding of education completed w/c <>TTB x2 c VCs for hand placement and safety.     []   Endurance Training   [] Neuromuscular Re-ed   []   Nu-step:  Time:        Level:         #Steps:       []   Rebounder:    []  Seated     []  Standing        []   Supine Ther Ex (reps/sets):     []   Seated Ther Ex (reps/sets):     []   Standing Ther Ex (reps/sets):     []   Other:      Comments:      Patient/Caregiver Education and Training:   []   [employer] Equipment Use  [x]   Bed Mobility/Transfer Technique/Safety  [x]   Energy Conservation Tips  []   Family training  [x]   Postural Awareness  [x]   Safety During Functional Activities  [x]   Reinforced Patient's Precautions   []   Progress was updated and reviewed in Rehabtracker with patient and/or family this         date. Treatment Plan for Next Session: Cont OT POC     Assessment: This pt demonstrated a positive  response to today's treatment as evidenced by increased IND c donning boot. The patient is making  progress toward established goals as evidenced by QI scores. Ongoing deficits are observed in the areas of out of bed and ax tolerance and continued focus on this is recommended.        Treatment/Activity Tolerance:   [x] Tolerated treatment with no adverse effects    [] Patient limited by fatigue  [] Patient limited by pain   [] Patient limited by medical complications:    [] Adverse reaction to Tx:   [] Significant change in status    Safety:       [x]  bed alarm set    []  chair alarm set    []  Pt refused alarms                []  Telesitter activated      [x]  Gait belt used during tx session      []other:       Number of Minutes/Billable Intervention  Therapeutic Exercise    ADL Self-care 15+25   Neuro Re-Ed    Therapeutic Activity 30   Group    Other:    TOTAL 70       Social History  Social/Functional History  Lives With: Alone (has caregiver during day up to afternoon (7 hours) x 7 days via waiver program)  Type of Home: Apartment (2nd floor)  Home Layout: One level  Home Access: Level entry,Elevator  Bathroom Shower/Tub: Tub/Shower unit (gets down to tub and gets to shower chair to initiate dressing)  Bathroom Toilet: Standard  Bathroom Equipment: Shower chair  Home Equipment: Alert Button,Oxygen,4 wheeled walker (4L O2 24/7)  ADL Assistance: [address] Responsibilities: No  Meal Prep Responsibility: No (caregiver does)  Laundry Responsibility: No (caregiver does)  Cleaning Responsibility: No (caregiver does)  Bill Paying/Finance Responsibility: Primary  Shopping Responsibility: Primary (did online shopping and groceries were delivered)  Dependent Care Responsibility: Secondary (dogs)  Health Care Management: Primary  Ambulation Assistance: Independent (Mod Ind using 7EC (short distances in the home only at baseline; pt attributes limitation to COPD and depression))  Transfer Assistance: Independent  Active : Yes  Mode of Transportation: Family,Other,Car (caregiver)  Occupation: On disability  Type of occupation: , [address] [place] [address]  Additional Comments: 3 falls in the past year (recent fall with injury requiring this hospitalization); sleeps in regular, flat bed; on 4L O2 at baseline but reports Hx of deviated septum that contributes to her breathing difficulty    Objective                                                                                    Goals:  (Update in navigator)  Short term goals  Time Frame for Short term goals: STGs=LTGs:  Long term goals  Time Frame for Long term goals : ~1 week or until d/c  Long term goal 1: Pt will complete grooming tasks Ind  Long term goal 2: Pt will complete total body bathing mod I  Long term goal 3: Pt will complete UB dressing Ind  Long term goal 4: Pt will complete LB dressing Ind  Long term goal 5: Pt will doff/don footwear including walking boot Ind  Long term goals 6: Pt will complete toileting mod I  Long term goal 7: Pt will complete functional transfers (bed, chair, toilet, shower) c DME PRN and mod I  Long term goal 8: Pt will perform therex/therax to facilitate increased strength/endurance/ax tolerance (c emphasis on dynamic standing balance/tolerance >5 mins, BUE endurance) c SBA  Long term goal 9: Pt will complete simple homemaking tasks c DME PRN and mod I:        Plan of Care                                                                              Times per week: 5 days per week for a minimum of 60 minutes/day plus group as appropriate for 60 minutes.   Treatment to include Plan  Times per day: Daily  Current Treatment Recommendations: Balance Training,Functional Mobility Training,Endurance Training,Pain Management,Safety Education & Training,Patient/Caregiver Education & Training,Equipment Evaluation, Education, & procurement,Self-Care / ADL,Home Management Training    Electronically signed by   KAREN Flores,  1/13/2022, 12:18 PM

## 2022-01-13 NOTE — CARE COORDINATION
Praneeth Briggs was evaluated today and a DME order was entered for a standard wheelchair because she requires this to successfully complete daily living tasks of ambulating. A standard manual wheelchair is necessary due to patient's impaired ambulation and mobility restrictions and would be unable to resolve these daily living tasks using a cane or walker. The patient is capable of using a standard wheelchair safely in their home and can maneuver within their home with adequate access. There is a caregiver available to provide necessary assistance. The need for this equipment was discussed with the patient and she understands, is in agreement, and has not expressed an unwillingness to use the wheelchair. Praneeth Briggs can self propel a wheelchair and needs anti-rollback device because of ramps.

## 2022-01-13 NOTE — FLOWSHEET NOTE
[x] daily progress note       [] discharge       Patient Name:  Saúl Lebron   :  1960 MRN: [id number]  Room:  [address] [place] [address] Date of Admission: 2022  Rehabilitation Diagnosis:   Displaced fracture of lateral malleolus of right fibula, initial encounter for closed fracture [S82.61XA]  Closed displaced fracture of lateral malleolus of right fibula, initial encounter [S82.61XA]       Date 2022       Day of ARU Week:  3   Time IN/-1000  8933-6703   Individual Tx Minutes 110   TOTAL Tx Time Mins 110   Variance Time -10 minutes (CLAY (Lilian Giles) reported obtaining 10 extra minutes during OT session. Restrictions Restrictions/Precautions  Restrictions/Precautions: Weight Bearing,General Precautions  Implants present? : Pacemaker (pt states it is not working)  Position Activity Restriction  Other position/activity restrictions: 5L O2 on eval (used 4L at baseline)   Communication with other providers: [x]   OK to see per nursing:     []   Spoke with team member regarding:      Subjective observations and cognitive status: AM session: pt seen in semi-owusu's position in bed at beginning of treatment. Agreeable to therapy. Pt on 5 L of O2. Pt was on 6 L of O2 while out of bed d/t O2 tank not providing the function of 5 L of continuous flow O2. O2 sats were 92-98% after gait training. PM session: pt seen sitting up on toilet with GENET Sanz) present. Pt agreeable to therapy. Pain level/location:    6/10       Location: [facility]    Discharge recommendations  Anticipated discharge date:Expected Discharge Date: 22    Destination: []? ???home alone   [x]? ? ??home alone with assist PRN     []???? home w/ family      []???? Continuous supervision  []????SNF    []???? Assisted living     []???? Other:   Continued therapy: [x]????C PT  []????OUTPATIENT  PT   []???? No Further PT  Equipment needs: Kalee Chicas requires the assistance of a wheeled walker to successfully ambulate from room to room at home to allow completion of daily living tasks such as: bathing, toileting, dressing and grooming.  A wheeled walker is necessary due to the patient's unsteady gait, upper body weakness, inability to  a standard walker.  This patient can ambulate only by pushing a walker instead of using a lesser assistive device such as a cane or crutch.      Wade Chicas requires a standard wheelchair with anti-tippers to successfully complete daily living tasks of eating, bathing, toileting, personal cares, ambulating and grooming.  A standard wheelchair with anti-tippers is necessary due to the patient's impaired ambulation and mobility restrictions.  The patient would not be able to resolve these daily living tasks using a cane or walker.   Anti-tippers are needed for (for improved safety)  The patient can self-propel a standard wheelchair with anti-tippers safely in their home and can maneuver within their home with adequate access.  The need for this equipment was discussed with the patient and he understands, is in agreement, and has not expressed an unwillingness to use the wheelchair.      There is a caregiver available to provide necessary assistance.    Expected length of need is 10 mos.    Yaniv Chicas does not have any infectious diagnoses. Bed Mobility:            Rolling R/L:  Mod I with bed rail   Scooting: mod I with bed rail  Lying --> Sit: mod I with bed rail   Sit --> lying:   Mod I with bed rail     Transfers:    Sit--> Stand:  SBA  Stand --> Sit:   SBA  Chair-->Bed/Bed --> Chair:   SBA  Toilet Transfer: SBA with grab bar   Assistive device required for transfer:   RW and walking boot on right LE     Gait:    Distance: AM session: 26'+50'+34'  Assistance:  SBA  Device:  RW and walking boot on right LE   Gait Quality:  Step-to pattern     Wheelchair Propulsion:  Distance:  AM session: 380'+82'; PM session: 80'+80'   Assistance: mod I   Extremities Used:   BUEs  Type:    [x]  Manual        [] Electric    Stairs (PM session)  # Completed: 6   Assistance:  CGA  Supportive Device:  2 rails and right LE walking boot     Uneven Surfaces: (PM session)       Assistance:  SBA  Device:  RW and right LE walking boot  Surfaces Completed:   [x]  Carpeted Surface with bean bags beneath      []  Throw rugs       []  Ramp       []  Outdoor pavements        []  Grass             []  Loose gravel        []  Other:       PM session: Pt amb up/down 4\" curb step CGA with RW and right LE walking boot. Pt required min vcs for proper walker safety and positioning. Additional Therapeutic activities/exercises completed this date:     []   Nu-step:  Time:        Level:         #Steps:       []   Rebounder:    []  Seated     []  Standing        [x]   Balance training: PM session: pt stood at toilet and performed pull-up and pull-down of pants and briefs with SBA for balance. []   Postural training    []   Supine ther ex (reps/sets):     []   Seated ther ex (reps/sets):     []   Standing ther ex (reps/sets):     []   Picking up object from floor (standing):                   []   Reacher used   []   Other:   []   Other:    Patient/Caregiver Education and Training:   [x]   Bed Mobility/Transfer technique/safety  [x]   Gait technique/sequencing  [x]   Proper use of assistive device  [x]   Advanced mobility safety and technique    []   Reinforced patient's precautions/mobility while maintaining precautions  []   Postural awareness  []   Family training    Treatment Plan for Next Session: gait; transfers; advanced gait; stair training; exercises    Assessment: This pt demonstrated a positive response to today's treatment as evidenced by improved mobility. The patient is making progress toward established goals as evidenced by QI scores. Ongoing deficits are observed in the areas of strength, balance, endurance, and safety and continued focus on this is recommended.      Treatment/Activity Tolerance:   [x] Tolerated treatment with no adverse effects    [] Patient limited by fatigue  [] Patient limited by pain   [] Patient limited by medical complications:    [] Adverse reaction to Tx:   [] Significant change in status    Safety:       [x]  bed alarm set    []  chair alarm set    []  Pt refused alarms                []  Telesitter activated      [x]  Gait belt used during tx session      [x]other: After AM session: pt left in semi-owusu's position in bed with call light at end of treatment. After PM session: Pt left in semi-owusu's position in bed with call light at end of treatment.         Number of Minutes/Billable Intervention  Gait Training 50   Therapeutic Exercise    Neuro Re-Ed    Therapeutic Activity 30   Wheelchair Propulsion 30   Group    Other:    TOTAL 110         Social History  Social/Functional History  Lives With: Alone (has caregiver during day up to afternoon (7 hours) x 7 days via waiver program)  Type of Home: Apartment (2nd floor)  Home Layout: One level  Home Access: Level entry,Elevator  Bathroom Shower/Tub: Tub/Shower unit (gets down to tub and gets to shower chair to initiate dressing)  Bathroom Toilet: Standard  Bathroom Equipment: Shower chair  Home Equipment: Alert Button,Oxygen,4 wheeled walker (4L O2 24/7)  ADL Assistance: Independent  Homemaking Responsibilities: No  Meal Prep Responsibility: No (caregiver does)  Laundry Responsibility: No (caregiver does)  Cleaning Responsibility: No (caregiver does)  Bill Paying/Finance Responsibility: Primary  Shopping Responsibility: Primary (did online shopping and groceries were delivered)  Dependent Care Responsibility: Secondary (dogs)  Health Care Management: Primary  Ambulation Assistance: Independent (Mod Ind using 2WS (short distances in the home only at baseline; pt attributes limitation to COPD and depression))  Transfer Assistance: Independent  Active : Yes  Mode of Transportation: Family,Other,Car (caregiver)  Occupation: On disability  Type of occupation: , cleaning business  Additional Comments: 3 falls in the past year (recent fall with injury requiring this hospitalization); sleeps in regular, flat bed; on 4L O2 at baseline but reports Hx of deviated septum that contributes to her breathing difficulty    Objective                                                                                    Goals:  (Update in navigator)  Short term goals  Time Frame for Short term goals: 7-10 tx days:  Short term goal 1: Pt will complete sit->sup Ind. Short term goal 2: Pt will complete OOB transfers using 2WW, R boot, and O2 line management Mod Ind. Short term goal 3: Pt will ambulate 10 ft over level and uneven surfaces and 50 ft with turns using 2WW and R boot with SBA-Sup. Short term goal 4: Pt will ascend/descend 2\"/4\" curb step and 4-5 steps (4\" step height) using railings following appropriate step-to pattern with Min A. Short term goal 5: Pt will propel manual w/c >/=150 ft Mod Ind. Short term goal 6: Pt will complete object retrieval in standing with 1UE support on 2WW and using reacher Mod Ind.:   :        Plan of Care                                                                              Times per week: 5 days per week for a minimum of 60 minutes/day plus group as appropriate for 60 minutes.   Treatment to include Current Treatment Recommendations: Strengthening,Gait Training,Patient/Caregiver Education & Training,Stair training,Equipment Evaluation, Education, & procurement,Balance Training,Pain Management,Functional Mobility [facility] Exercise Program,Positioning,Transfer Training,Wheelchair Mobility Training,Safety Education & Training    Electronically signed by   Leila Gambino, [id number]  1/13/2022, 9:04 AM

## 2022-01-13 NOTE — CARE COORDINATION
Patient reviewed at today's care conference. Patient will dc home alone 1/17. [address] pt/ot in addition to daily waiver services. WC/BSC/RW. Patient has medical appt and will need AM therapy prior to her discharge. Boot compliance at discharge is a concern. Case t met briefly with patient during her PT session. Pleased with 1/17 discharge. Her appt is at 1330 1/17. Reviewed that she'll have morning therapy then discharge ARU directly to her 1330 appt. She states he daughter will provide transportation. DME orders faxed to Keyanna castellanos at [facility].

## 2022-01-14 PROCEDURE — 94761 N-INVAS EAR/PLS OXIMETRY MLT: CPT

## 2022-01-14 PROCEDURE — 97110 THERAPEUTIC EXERCISES: CPT

## 2022-01-14 PROCEDURE — 99232 SBSQ HOSP IP/OBS MODERATE 35: CPT | Performed by: PHYSICAL MEDICINE & REHABILITATION

## 2022-01-14 PROCEDURE — 97530 THERAPEUTIC ACTIVITIES: CPT

## 2022-01-14 PROCEDURE — 2700000000 HC OXYGEN THERAPY PER DAY

## 2022-01-14 PROCEDURE — 6370000000 HC RX 637 (ALT 250 FOR IP): Performed by: PHYSICAL MEDICINE & REHABILITATION

## 2022-01-14 PROCEDURE — 6360000002 HC RX W HCPCS: Performed by: PHYSICAL MEDICINE & REHABILITATION

## 2022-01-14 PROCEDURE — 97116 GAIT TRAINING THERAPY: CPT

## 2022-01-14 PROCEDURE — 94640 AIRWAY INHALATION TREATMENT: CPT

## 2022-01-14 PROCEDURE — 6370000000 HC RX 637 (ALT 250 FOR IP): Performed by: HOSPITALIST

## 2022-01-14 PROCEDURE — 97542 WHEELCHAIR MNGMENT TRAINING: CPT

## 2022-01-14 PROCEDURE — 1280000000 HC REHAB R&B

## 2022-01-14 RX ADMIN — PREGABALIN 100 MG: 100 CAPSULE ORAL at 20:32

## 2022-01-14 RX ADMIN — TRAMADOL HYDROCHLORIDE 50 MG: 50 TABLET, COATED ORAL at 05:38

## 2022-01-14 RX ADMIN — BUPRENORPHINE AND NALOXONE 1 FILM: 8; 2 FILM, SOLUBLE BUCCAL; SUBLINGUAL at 20:32

## 2022-01-14 RX ADMIN — ALBUTEROL SULFATE 2 PUFF: 90 AEROSOL, METERED RESPIRATORY (INHALATION) at 11:22

## 2022-01-14 RX ADMIN — BUPRENORPHINE AND NALOXONE 1 FILM: 8; 2 FILM, SOLUBLE BUCCAL; SUBLINGUAL at 13:24

## 2022-01-14 RX ADMIN — BUDESONIDE AND FORMOTEROL FUMARATE DIHYDRATE 2 PUFF: 80; 4.5 AEROSOL RESPIRATORY (INHALATION) at 08:13

## 2022-01-14 RX ADMIN — TRAZODONE HYDROCHLORIDE 200 MG: 50 TABLET ORAL at 20:32

## 2022-01-14 RX ADMIN — ALBUTEROL SULFATE 2 PUFF: 90 AEROSOL, METERED RESPIRATORY (INHALATION) at 16:30

## 2022-01-14 RX ADMIN — PREGABALIN 100 MG: 100 CAPSULE ORAL at 08:31

## 2022-01-14 RX ADMIN — BUPRENORPHINE AND NALOXONE 1 FILM: 8; 2 FILM, SOLUBLE BUCCAL; SUBLINGUAL at 08:32

## 2022-01-14 RX ADMIN — ENOXAPARIN SODIUM 40 MG: 100 INJECTION SUBCUTANEOUS at 08:32

## 2022-01-14 RX ADMIN — DEXTROAMPHETAMINE SACCHARATE, AMPHETAMINE ASPARTATE MONOHYDRATE, DEXTROAMPHETAMINE SULFATE AND AMPHETAMINE SULFATE 30 MG: 7.5; 7.5; 7.5; 7.5 CAPSULE, EXTENDED RELEASE ORAL at 10:23

## 2022-01-14 RX ADMIN — SENNOSIDES AND DOCUSATE SODIUM 1 TABLET: 50; 8.6 TABLET ORAL at 08:31

## 2022-01-14 RX ADMIN — ASPIRIN 81 MG CHEWABLE TABLET 81 MG: 81 TABLET CHEWABLE at 08:31

## 2022-01-14 RX ADMIN — SENNOSIDES AND DOCUSATE SODIUM 1 TABLET: 50; 8.6 TABLET ORAL at 20:32

## 2022-01-14 RX ADMIN — ALBUTEROL SULFATE 2 PUFF: 90 AEROSOL, METERED RESPIRATORY (INHALATION) at 08:10

## 2022-01-14 RX ADMIN — BUDESONIDE AND FORMOTEROL FUMARATE DIHYDRATE 2 PUFF: 80; 4.5 AEROSOL RESPIRATORY (INHALATION) at 17:48

## 2022-01-14 RX ADMIN — PREGABALIN 100 MG: 100 CAPSULE ORAL at 13:23

## 2022-01-14 RX ADMIN — ALBUTEROL SULFATE 2 PUFF: 90 AEROSOL, METERED RESPIRATORY (INHALATION) at 17:49

## 2022-01-14 ASSESSMENT — PAIN DESCRIPTION - ORIENTATION: ORIENTATION: RIGHT

## 2022-01-14 ASSESSMENT — PAIN DESCRIPTION - PAIN TYPE: TYPE: ACUTE PAIN

## 2022-01-14 ASSESSMENT — PAIN SCALES - GENERAL: PAINLEVEL_OUTOF10: 8

## 2022-01-14 ASSESSMENT — PAIN DESCRIPTION - FREQUENCY: FREQUENCY: INTERMITTENT

## 2022-01-14 ASSESSMENT — PAIN DESCRIPTION - LOCATION: LOCATION: FOOT

## 2022-01-14 ASSESSMENT — PAIN DESCRIPTION - DESCRIPTORS: DESCRIPTORS: ACHING;DISCOMFORT

## 2022-01-14 ASSESSMENT — PAIN DESCRIPTION - ONSET: ONSET: ON-GOING

## 2022-01-14 ASSESSMENT — PAIN DESCRIPTION - PROGRESSION: CLINICAL_PROGRESSION: NOT CHANGED

## 2022-01-14 NOTE — CARE COORDINATION
Case mgt met with patient in room. Patient agreeable to [address] pt/ot through constant care - her waiver provider. Agreeable to WC/RW/BSC. Patient asking about a rollator. Case mgt educated patient that she needs a more stable device at this time, but she might be a candidate once she progresses with [address] therapies.     Case mgt requested WC smart phrase from Nael Gambino dated 1/13 or earlier

## 2022-01-14 NOTE — FLOWSHEET NOTE
[x] daily progress note       [] discharge       Patient Name:  Serafin Nur   :  1960 MRN: [id number]  Room:  [address] [place] [address] Date of Admission: 2022  Rehabilitation Diagnosis:   Displaced fracture of lateral malleolus of right fibula, initial encounter for closed fracture [S82.61XA]  Closed displaced fracture of lateral malleolus of right fibula, initial encounter Temitope Keith       Date 2022       Day of ARU Week:  4   Time IN/-1000  4732-1534   Individual Tx Minutes 120   TOTAL Tx Time Mins 120   Restrictions Restrictions/Precautions  Restrictions/Precautions: Weight Bearing,General Precautions  Implants present? : Pacemaker (pt states it is not working)  Position Activity Restriction  Other position/activity restrictions: 5L O2 on eval (used 4L at baseline)   Communication with other providers: [x]   OK to see per nursing:     []   Spoke with team member regarding:      Subjective observations and cognitive status: AM session: pt seen in semi-owusu's position in bed at beginning of treatment. Agreeable to therapy. Pt on 5 L of O2. Pt reported that her O2 was in the 80% range earlier and RN increased it briefly to allow O2 sats to reach WNL. PM session: Pt seen in semi-owusu's position in bed at beginning of treatment. Agreeable to therapy. Pt on 5 L of O2. Pain level/location:  8 /10       Location: [facility]    Discharge recommendations  Anticipated discharge date:Expected Discharge Date: 22    Destination: []?home alone   [x]? home alone with assist PRN     []? home w/ family      []? Continuous supervision  []? SNF    []? Assisted living     []? Other:   Continued therapy: [x]? Penny Corral PT  []? OUTPATIENT  PT   []? No Further PT  Equipment needs: Serafin Nur requires the assistance of a wheeled walker to successfully ambulate from room to room at home to allow completion of daily living tasks such as: bathing, toileting, dressing and grooming.   A wheeled walker is necessary due to the patient's unsteady gait, upper body weakness, inability to  a standard walker. This patient can ambulate only by pushing a walker instead of using a lesser assistive device such as a cane or crutch. Saúl Lebron requires a standard wheelchair with anti-tippers to successfully complete daily living tasks of eating, bathing, toileting, personal cares, ambulating and grooming. A standard wheelchair with anti-tippers is necessary due to the patient's impaired ambulation and mobility restrictions. The patient would not be able to resolve these daily living tasks using a cane or walker. Anti-tippers are needed for (for improved safety)  The patient can self-propel a standard wheelchair with anti-tippers safely in their home and can maneuver within their home with adequate access. The need for this equipment was discussed with the patient and he understands, is in agreement, and has not expressed an unwillingness to use the wheelchair. There is a caregiver available to provide necessary assistance. Expected length of need is 10 mos. Saúl Lebron does not have any infectious diagnoses. Bed Mobility:            Rolling R/L:  Mod I   Scooting: Mod I   Lying --> Sit:  Mod I   Sit --> lying: Mod I   With bed rail     Transfers:    Sit--> Stand:  Supervision   Stand --> Sit:   Supervision   Chair-->Bed/Bed --> Chair:   Supervision   Toilet Transfer: supervision with grab bar   Assistive device required for transfer:  RW   Pt able to manage O2 line.      Gait:    Distance:  AM session: 48' with two turns  Assistance: Supervision; pt did well with managing O2 line  Device:  RW with right LE walking boot  Gait Quality:  Reciprocal pattern     Gait:    Distance:  AM session: session: 89'+75'; PM session: 28'+53' (limited by pain this afternoon)  Assistance:  Supervision with O2 line assist  Device:  RW with right LE walking boot   Gait Quality:  Reciprocal pattern; O2 sats at 89% after gait training. Vcs for pursed lip breathing and O2 sats increased to 93%. Wheelchair Propulsion:  Distance: AM session: 10'; PM session: 274'+26'   Assistance: Mod I   Extremities Used:   ALONZOEs  Type:    [x]  Manual        []  Electric    Additional Therapeutic activities/exercises completed this date:     []   Nu-step:  Time:        Level:         #Steps:       []   Rebounder:    []  Seated     []  Standing        [x]   Balance training: AM and PM: pt stood at toilet and pulled pants/briefs up/down with supervision for balance with RW.          []   Postural training    [x]   Supine ther ex (reps/sets): PM session: heel slides x 10 reps; quad sets x 20 reps; buttock squeezes x 20 reps (for strengthening)     []   Seated ther ex (reps/sets):     []   Standing ther ex (reps/sets):     []   Picking up object from floor (standing):                   []   Reacher used   []   Other:   []   Other:    Patient/Caregiver Education and Training:   [x]   Bed Mobility/Transfer technique/safety  [x]   Gait technique/sequencing  [x]   Proper use of assistive device  []   Advanced mobility safety and technique  []   Reinforced patient's precautions/mobility while maintaining precautions  []   Postural awareness  []   Family training    Treatment Plan for Next Session: gait; transfers; advanced gait; stair training; exercises       Assessment: This pt demonstrated a positive response to today's treatment as evidenced by improved mobility. The patient is making progress toward established goals as evidenced by QI scores. Ongoing deficits are observed in the areas of strength, balance, endurance, and safety and continued focus on this is recommended.      Treatment/Activity Tolerance:   [x] Tolerated treatment with no adverse effects    [] Patient limited by fatigue  [] Patient limited by pain   [] Patient limited by medical complications:    [] Adverse reaction to Tx:   [] Significant change in status    Safety:       [x]  bed alarm set    []  chair alarm set    []  Pt refused alarms                []  Telesitter activated      [x]  Gait belt used during tx session      [x]other: After AM session: pt left in semi-owusu's position in bed with call light at end of treatment. After PM session: pt left in semi-owusu's position in bed with call light at end of treatment.       Number of Minutes/Billable Intervention  Gait Training 45   Therapeutic Exercise 15   Neuro Re-Ed    Therapeutic Activity 30   Wheelchair Propulsion 30   Group    Other:    TOTAL 120         Social History  Social/Functional History  Lives With: Alone (has caregiver during day up to afternoon (7 hours) x 7 days via waiver program)  Type of Home: Apartment (2nd floor)  Home Layout: One level  Home Access: Level entry,Elevator  Bathroom Shower/Tub: Tub/Shower unit (gets down to tub and gets to shower chair to initiate dressing)  Bathroom Toilet: Standard  Bathroom Equipment: Shower chair  Home Equipment: Alert Button,Oxygen,4 wheeled walker (4L O2 24/7)  ADL Assistance: [address] Responsibilities: No  Meal Prep Responsibility: No (caregiver does)  Laundry Responsibility: No (caregiver does)  Cleaning Responsibility: No (caregiver does)  Bill Paying/Finance Responsibility: Primary  Shopping Responsibility: Primary (did online shopping and groceries were delivered)  Dependent Care Responsibility: Secondary (dogs)  Health Care Management: Primary  Ambulation Assistance: Independent (Mod Ind using 1QD (short distances in the home only at baseline; pt attributes limitation to COPD and depression))  Transfer Assistance: Independent  Active : Yes  Mode of Transportation: Family,Other,Car (caregiver)  Occupation: On disability  Type of occupation: , cleaning business  Additional Comments: 3 falls in the past year (recent fall with injury requiring this hospitalization); sleeps in regular, flat bed; on 4L O2 at baseline but reports Hx of deviated septum that contributes to her breathing difficulty    Objective                                                                                    Goals:  (Update in navigator)  Short term goals  Time Frame for Short term goals: 7-10 tx days:  Short term goal 1: Pt will complete sit->sup Ind. Short term goal 2: Pt will complete OOB transfers using 2WW, R boot, and O2 line management Mod Ind. Short term goal 3: Pt will ambulate 10 ft over level and uneven surfaces and 50 ft with turns using 2WW and R boot with SBA-Sup. Short term goal 4: Pt will ascend/descend 2\"/4\" curb step and 4-5 steps (4\" step height) using railings following appropriate step-to pattern with Min A. Short term goal 5: Pt will propel manual w/c >/=150 ft Mod Ind. Short term goal 6: Pt will complete object retrieval in standing with 1UE support on 2WW and using reacher Mod Ind.:   :        Plan of Care                                                                              Times per week: 5 days per week for a minimum of 60 minutes/day plus group as appropriate for 60 minutes.   Treatment to include Current Treatment Recommendations: Strengthening,Gait Training,Patient/Caregiver Education & Training,Stair training,Equipment Evaluation, Education, & procurement,Balance Training,Pain Management,Functional Mobility Youlanda Layer Exercise Program,Positioning,Transfer Training,Wheelchair Mobility Training,Safety Education & Training    Electronically signed by   Daniel Anderson, [id number]  1/14/2022, 9:00 AM

## 2022-01-14 NOTE — PROGRESS NOTES
Occupational Therapy  Physical Rehabilitation: OCCUPATIONAL THERAPY     [x] daily progress note       [] discharge       Patient Name:  Sharon Hong   :  1960 MRN: [id number]  Room:  [address] [place] [address] Date of Admission: 2022  Rehabilitation Diagnosis:   Displaced fracture of lateral malleolus of right fibula, initial encounter for closed fracture [S82.61XA]  Closed displaced fracture of lateral malleolus of right fibula, initial encounter Kwame Matty       Date 2022       Day of ARU Week:  4   Time IN/OUT 6241-8888   Individual Tx Minutes 60   Group Tx Minutes    Co-Treat Minutes    Concurrent Tx Minutes    TOTAL Tx Time Mins 60   Variance Time    Variance Time []   Refusal due to:     []   Medical hold/reason:    []   Illness   []   Off Unit for test/procedure  []   Extra time needed to complete task  []   Therapeutic need  []   Other (specify):   Restrictions Restrictions/Precautions: Weight Bearing,General Precautions         Communication with other providers: [x]   OK to see per nursing:     []   Spoke with team member regarding:      Subjective observations and cognitive status: Pt resting in bed on approach. Pt pleasant and agreeable to therapy session. Pain level/location:    6/10       Location:    Discharge recommendations  Anticipated discharge date:    Destination: []?home alone   [x]? home alone w assist prn   []? home w/ family    []? Continuous supervision       []? SNF    []? Assisted living     []? Other:   Continued therapy: []?C OT  []? OUTPATIENT  OT   []? No Further OT  Equipment needs: TTB  Sharon Hong requires the assistance of a tub transfer bench to successfully and safely complete bathing activities necessary due to reduced balance, endurance, need for ADL assist, and LE weakness and reduced ROM. These tasks cannot be safely completed without this device and would place Sharon Hong at greater risk of falls.      Toileting:   Declined need        Toilet Transfers: NA   Device Used:    []   Standard Toilet         []   Grab Bars           []  Bedside Commode       []   Elevated Toilet          []   Other:        Bed Mobility:           []   Pt received out of bed   Supine --> Sit:  Mod I   Sit --> Supine: Mod I     Transfers:    Sit--> Stand:  Supervision   Stand --> Sit:   Supervision   Stand-Pivot:   Supervision   Other:    Assistive device required for transfer:   RW       Functional Mobility: To increase BUE strength and endurance for functional transfers and ADLs, Pt self propelled WC 64 ft +234ft    Assistance: Mod I   Device:   []   Rolling Walker     []   Standard Walker [x]   Wheelchair        []   [employer]       []   4-Wheeled Kathrynn Broach         []   Cardiac Walker       []   Other:          Additional Therapeutic activities/exercises completed this date:     []   ADL Training   []   Balance/Postural training     []   Bed/Transfer Training   []   Endurance Training   []   Neuromuscular Re-ed   []   Nu-step:  Time:        Level:         #Steps:       []   Rebounder:    []  Seated     []  Standing        []   Supine Ther Ex (reps/sets):     [x]   Seated Ther Ex (reps/sets): To increase UB strength for functional transfers and ADLs, Pt completed 6 sets of 10 reps c 38.8# on rickmartín.    []   Standing Ther Ex (reps/sets):     []   Other:      Comments:      Patient/Caregiver Education and Training:   []   [employer] Equipment Use  []   Bed Mobility/Transfer Technique/Safety  []   Energy Conservation Tips  []   Family training  []   Postural Awareness  []   Safety During Functional Activities  []   Reinforced Patient's Precautions   []   Progress was updated and reviewed in Rehabtracker with patient and/or family this         date. Treatment Plan for Next Session: Continue OT POC       Assessment: This pt demonstrated a positive response to today's treatment as evidenced by actively engaged in therapy session.   The patient is making good progress toward established goals as evidenced by QI scores. Ongoing deficits are observed in the areas of balance, functional transfers, UB strength for ADLs and continued focus on this is recommended.        Treatment/Activity Tolerance:   [x] Tolerated treatment with no adverse effects    [] Patient limited by fatigue  [] Patient limited by pain   [] Patient limited by medical complications:    [] Adverse reaction to Tx:   [] Significant change in status    Safety:       [x]  bed alarm set    []  chair alarm set    []  Pt refused alarms                []  Telesitter activated      [x]  Gait belt used during tx session      []other:       Number of Minutes/Billable Intervention  Therapeutic Exercise 25   ADL Self-care    Neuro Re-Ed    Therapeutic Activity 35   Group    Other:    TOTAL 60       Social History  Social/Functional History  Lives With: Alone (has caregiver during day up to afternoon (7 hours) x 7 days via waiver program)  Type of Home: Apartment (2nd floor)  Home Layout: One level  Home Access: Level entry,Elevator  Bathroom Shower/Tub: Tub/Shower unit (gets down to tub and gets to shower chair to initiate dressing)  Bathroom Toilet: Standard  Bathroom Equipment: Shower chair  Home Equipment: Alert Button,Oxygen,4 wheeled walker (4L O2 24/7)  ADL Assistance: Independent  Homemaking Responsibilities: No  Meal Prep Responsibility: No (caregiver does)  Laundry Responsibility: No (caregiver does)  Cleaning Responsibility: No (caregiver does)  Bill Paying/Finance Responsibility: Primary  Shopping Responsibility: Primary (did online shopping and groceries were delivered)  Dependent Care Responsibility: Secondary (dogs)  Health Care Management: Primary  Ambulation Assistance: Independent (Mod Ind using 7YJ (short distances in the home only at baseline; pt attributes limitation to COPD and depression))  Transfer Assistance: Independent  Active : Yes  Mode of Transportation: Family,Other,Car (caregiver)  Occupation: On disability  Type of occupation: , cleaning business  Additional Comments: 3 falls in the past year (recent fall with injury requiring this hospitalization); sleeps in regular, flat bed; on 4L O2 at baseline but reports Hx of deviated septum that contributes to her breathing difficulty    Objective                                                                                    Goals:  (Update in navigator)  Short term goals  Time Frame for Short term goals: STGs=LTGs:  Long term goals  Time Frame for Long term goals : ~1 week or until d/c  Long term goal 1: Pt will complete grooming tasks Ind  Long term goal 2: Pt will complete total body bathing mod I  Long term goal 3: Pt will complete UB dressing Ind  Long term goal 4: Pt will complete LB dressing Ind  Long term goal 5: Pt will doff/don footwear including walking boot Ind  Long term goals 6: Pt will complete toileting mod I  Long term goal 7: Pt will complete functional transfers (bed, chair, toilet, shower) c DME PRN and mod I  Long term goal 8: Pt will perform therex/therax to facilitate increased strength/endurance/ax tolerance (c emphasis on dynamic standing balance/tolerance >5 mins, BUE endurance) c SBA  Long term goal 9: Pt will complete simple homemaking tasks c DME PRN and mod I:        Plan of Care                                                                              Times per week: 5 days per week for a minimum of 60 minutes/day plus group as appropriate for 60 minutes.   Treatment to include Plan  Times per day: Daily  Current Treatment Recommendations: Balance Training,Functional Mobility Training,Endurance Training,Pain Management,Safety Education & Training,Patient/Caregiver Education & Training,Equipment Evaluation, Education, & procurement,Self-Care / ADL,Home Management Training    Electronically signed by   KAREN Stuart,  1/14/2022, 2:12 PM

## 2022-01-15 PROCEDURE — 97112 NEUROMUSCULAR REEDUCATION: CPT

## 2022-01-15 PROCEDURE — 97110 THERAPEUTIC EXERCISES: CPT

## 2022-01-15 PROCEDURE — 97530 THERAPEUTIC ACTIVITIES: CPT

## 2022-01-15 PROCEDURE — 97535 SELF CARE MNGMENT TRAINING: CPT

## 2022-01-15 PROCEDURE — 97116 GAIT TRAINING THERAPY: CPT

## 2022-01-15 PROCEDURE — 1280000000 HC REHAB R&B

## 2022-01-15 PROCEDURE — 94640 AIRWAY INHALATION TREATMENT: CPT

## 2022-01-15 PROCEDURE — 2700000000 HC OXYGEN THERAPY PER DAY

## 2022-01-15 PROCEDURE — 6370000000 HC RX 637 (ALT 250 FOR IP): Performed by: PHYSICAL MEDICINE & REHABILITATION

## 2022-01-15 PROCEDURE — 6360000002 HC RX W HCPCS: Performed by: PHYSICAL MEDICINE & REHABILITATION

## 2022-01-15 PROCEDURE — 6370000000 HC RX 637 (ALT 250 FOR IP): Performed by: HOSPITALIST

## 2022-01-15 RX ADMIN — SENNOSIDES AND DOCUSATE SODIUM 1 TABLET: 50; 8.6 TABLET ORAL at 08:22

## 2022-01-15 RX ADMIN — DEXTROAMPHETAMINE SACCHARATE, AMPHETAMINE ASPARTATE MONOHYDRATE, DEXTROAMPHETAMINE SULFATE AND AMPHETAMINE SULFATE 30 MG: 7.5; 7.5; 7.5; 7.5 CAPSULE, EXTENDED RELEASE ORAL at 09:17

## 2022-01-15 RX ADMIN — PREGABALIN 100 MG: 100 CAPSULE ORAL at 08:21

## 2022-01-15 RX ADMIN — BUDESONIDE AND FORMOTEROL FUMARATE DIHYDRATE 2 PUFF: 80; 4.5 AEROSOL RESPIRATORY (INHALATION) at 19:17

## 2022-01-15 RX ADMIN — BUPRENORPHINE AND NALOXONE 1 FILM: 8; 2 FILM, SOLUBLE BUCCAL; SUBLINGUAL at 20:58

## 2022-01-15 RX ADMIN — PREGABALIN 100 MG: 100 CAPSULE ORAL at 20:58

## 2022-01-15 RX ADMIN — BUPRENORPHINE AND NALOXONE 1 FILM: 8; 2 FILM, SOLUBLE BUCCAL; SUBLINGUAL at 09:13

## 2022-01-15 RX ADMIN — ENOXAPARIN SODIUM 40 MG: 100 INJECTION SUBCUTANEOUS at 08:22

## 2022-01-15 RX ADMIN — ALBUTEROL SULFATE 2 PUFF: 90 AEROSOL, METERED RESPIRATORY (INHALATION) at 08:01

## 2022-01-15 RX ADMIN — TRAZODONE HYDROCHLORIDE 200 MG: 50 TABLET ORAL at 21:07

## 2022-01-15 RX ADMIN — ASPIRIN 81 MG CHEWABLE TABLET 81 MG: 81 TABLET CHEWABLE at 08:22

## 2022-01-15 RX ADMIN — BUPRENORPHINE AND NALOXONE 1 FILM: 8; 2 FILM, SOLUBLE BUCCAL; SUBLINGUAL at 13:22

## 2022-01-15 RX ADMIN — BUDESONIDE AND FORMOTEROL FUMARATE DIHYDRATE 2 PUFF: 80; 4.5 AEROSOL RESPIRATORY (INHALATION) at 08:02

## 2022-01-15 RX ADMIN — TRAMADOL HYDROCHLORIDE 50 MG: 50 TABLET, COATED ORAL at 13:22

## 2022-01-15 RX ADMIN — SENNOSIDES AND DOCUSATE SODIUM 1 TABLET: 50; 8.6 TABLET ORAL at 20:58

## 2022-01-15 RX ADMIN — PREGABALIN 100 MG: 100 CAPSULE ORAL at 13:22

## 2022-01-15 RX ADMIN — ALBUTEROL SULFATE 2 PUFF: 90 AEROSOL, METERED RESPIRATORY (INHALATION) at 19:15

## 2022-01-15 RX ADMIN — TRAMADOL HYDROCHLORIDE 50 MG: 50 TABLET, COATED ORAL at 06:06

## 2022-01-15 ASSESSMENT — PAIN DESCRIPTION - ONSET
ONSET: ON-GOING
ONSET: ON-GOING

## 2022-01-15 ASSESSMENT — PAIN SCALES - GENERAL
PAINLEVEL_OUTOF10: 0
PAINLEVEL_OUTOF10: 9
PAINLEVEL_OUTOF10: 5
PAINLEVEL_OUTOF10: 8

## 2022-01-15 ASSESSMENT — PAIN DESCRIPTION - PROGRESSION
CLINICAL_PROGRESSION: NOT CHANGED
CLINICAL_PROGRESSION: GRADUALLY IMPROVING

## 2022-01-15 ASSESSMENT — PAIN DESCRIPTION - LOCATION
LOCATION: FOOT;ANKLE
LOCATION: FOOT

## 2022-01-15 ASSESSMENT — PAIN DESCRIPTION - DESCRIPTORS
DESCRIPTORS: ACHING;DISCOMFORT
DESCRIPTORS: ACHING;DISCOMFORT;CONSTANT

## 2022-01-15 ASSESSMENT — PAIN - FUNCTIONAL ASSESSMENT: PAIN_FUNCTIONAL_ASSESSMENT: PREVENTS OR INTERFERES SOME ACTIVE ACTIVITIES AND ADLS

## 2022-01-15 ASSESSMENT — PAIN DESCRIPTION - FREQUENCY
FREQUENCY: INTERMITTENT
FREQUENCY: CONTINUOUS

## 2022-01-15 ASSESSMENT — PAIN DESCRIPTION - PAIN TYPE
TYPE: ACUTE PAIN
TYPE: ACUTE PAIN

## 2022-01-15 ASSESSMENT — PAIN DESCRIPTION - ORIENTATION
ORIENTATION: RIGHT
ORIENTATION: RIGHT

## 2022-01-15 NOTE — PROGRESS NOTES
Physical Therapy  [x] daily progress note       [] discharge       Patient Name:  Jim Jennings   :  1960 MRN: [id number]  Room:  [address] [place] [address] Date of Admission: 2022  Rehabilitation Diagnosis:   Displaced fracture of lateral malleolus of right fibula, initial encounter for closed fracture [S82.61XA]  Closed displaced fracture of lateral malleolus of right fibula, initial encounter Mayra Springer       Date 1/15/2022       Day of ARU Week:  5   Time IN/-930   Individual Tx Minutes 60   Group Tx Minutes    Co-Treat Minutes    Concurrent Tx Minutes    TOTAL Tx Time Mins 60   Variance Time    Variance Time []   Refusal due to:     []   Medical hold/reason:    []   Illness   []   Off Unit for test/procedure  []   Extra time needed to complete task  []   Therapeutic need  []   Other (specify):   Restrictions Restrictions/Precautions  Restrictions/Precautions: Weight Bearing,General Precautions  Implants present? : Pacemaker (pt states it is not working)  Position Activity Restriction  Other position/activity restrictions: 5L O2 on eval (used 4L at baseline)   Communication with other providers: [x]   OK to see per nursing:     []   Spoke with team member regarding:      Subjective observations and cognitive status: Pt observed sitting in semi-owusu's position finishing breakfast upon PTA arrival. Pt on 4L O2, sats @ 97%   Pain level/location:   510       Location: [facility]   Discharge recommendations  Anticipated discharge date:Expected Discharge Date: 22    Destination: []??home alone   [x]? ?home alone with assist PRN     []? ? home w/ family      []? ? Continuous supervision  []? ?SNF    []? ? Assisted living     []? ? Other:   Continued therapy: [x]? ?[facility]  []??OUTPATIENT  PT   []? ? No Further PT  Equipment needs: Jim Jennings requires the assistance of a wheeled walker to successfully ambulate from room to room at home to allow completion of daily living tasks such as: bathing, toileting, dressing and grooming. A wheeled walker is necessary due to the patient's unsteady gait, upper body weakness, inability to  a standard walker. This patient can ambulate only by pushing a walker instead of using a lesser assistive device such as a cane or crutch.      Elan Amador requires a standard wheelchair with anti-tippers to successfully complete daily living tasks of eating, bathing, toileting, personal cares, ambulating and grooming.  A standard wheelchair with anti-tippers is necessary due to the patient's impaired ambulation and mobility restrictions.  The patient would not be able to resolve these daily living tasks using a cane or walker. Anti-tippers are needed for (for improved safety)  The patient can self-propel a standard wheelchair with anti-tippers safely in their home and can maneuver within their home with adequate access.  The need for this equipment was discussed with the patient and he understands, is in agreement, and has not expressed an unwillingness to use the wheelchair.      There is a caregiver available to provide necessary assistance. Expected length of need is 10 mos. Elan Amador does not have any infectious diagnoses.          Bed Mobility:           []   Pt received out of bed   Rolling R/L:  MI  Scooting:  MI   Lying --> Sit:  MI c bed features  Sit --> lying:  NT    Transfers:    Sit--> Stand:  sup  Stand --> Sit:   sup  Chair-->Bed/Bed --> Chair:   sup  Car Transfers:  NT   Toilet Transfer (if applicable):  Sup c min vc's for improved O2 cord management  Other:    Assistive device required for transfer:   fww    Gait:    Distance:  2x10', 1x69', 1x55'   Assistance:  sup  Device:  fww  Gait Quality:  Slow step to [employer]: for strengthening  Distance:  75'x2  Assistance:  MI  Extremities Used:   BUE's  Type:    [x]  Manual        []  Electric      Additional Therapeutic activities/exercises completed this date:     [x]   Nu-step:  Time:3min, 2min Level: 3      #Steps:       []   Rebounder:    []  Seated     []  Standing        [x]   Balance training :static balance training for toileting, pulling up/down pants and @ sink to perform grooming (wash face, brush teeth, brush hair) performed in standing c fww- varying 0-1 UE support- demo's fair- static balance        []   Postural training    [x]   Supine ther ex (reps/sets)heel slides x 10 reps;  buttock squeezes x 20 reps (for strengthening)  :     []   Seated ther ex (reps/sets):  NOEMI Colón 2 x10 reps ea BL   []   Standing ther ex (reps/sets):     []   Picking up object from floor (standing):                   []   Reacher used   []   Other:   []   Other:    Comments:      Patient/Caregiver Education and Training:   [x]   Bed Mobility/Transfer technique/safety  [x]   Gait technique/sequencing  [x]   Proper use of assistive device  []   Advanced mobility safety and technique  []   Reinforced patient's precautions/mobility while maintaining precautions  []   Postural awareness  []   Family training  []   Progress was updated and reviewed in Rehabtracker with patient and/or family this date. Treatment Plan for Next Session: progress gait, transfers, balance and activity tolerance      Assessment: This pt demonstrated a positive   response to today's treatment as evidenced by tolerance to progressed TE and gait. The patient is making steady progress toward established goals as evidenced by QI scores. Ongoing deficits are observed in the areas of strength, balance, endurance and safety and continued focus on above mentioned is recommended.      Treatment/Activity Tolerance:   [x] Tolerated treatment with no adverse effects    [] Patient limited by fatigue  [] Patient limited by pain   [] Patient limited by medical complications:    [] Adverse reaction to Tx:   [] Significant change in status    Safety:       []  bed alarm set    [x]  chair alarm set    []  Pt refused alarms                [] Telesitter activated      [x]  Gait belt used during tx session      []other:         Number of Minutes/Billable Intervention  Gait Training 22   Therapeutic Exercise 18   Neuro Re-Ed 10   Therapeutic Activity 10   Wheelchair Propulsion    Group    Other:    TOTAL 60         Social History  Social/Functional History  Lives With: Alone (has caregiver during day up to afternoon (7 hours) x 7 days via waiver program)  Type of Home: Apartment (2nd floor)  Home Layout: One level  Home Access: Level entry,Elevator  Bathroom Shower/Tub: Tub/Shower unit (gets down to tub and gets to shower chair to initiate dressing)  Bathroom Toilet: Standard  Bathroom Equipment: Shower chair  Home Equipment: Alert Button,Oxygen,4 wheeled walker (4L O2 24/7)  ADL Assistance: Independent  Homemaking Responsibilities: No  Meal Prep Responsibility: No (caregiver does)  Laundry Responsibility: No (caregiver does)  Cleaning Responsibility: No (caregiver does)  Bill Paying/Finance Responsibility: Primary  Shopping Responsibility: Primary (did online shopping and groceries were delivered)  Dependent Care Responsibility: Secondary (dogs)  Health Care Management: Primary  Ambulation Assistance: Independent (Mod Ind using 4HE (short distances in the home only at baseline; pt attributes limitation to COPD and depression))  Transfer Assistance: Independent  Active : Yes  Mode of Transportation: Family,Other,Car (caregiver)  Occupation: On disability  Type of occupation: , cleaning business  Additional Comments: 3 falls in the past year (recent fall with injury requiring this hospitalization); sleeps in regular, flat bed; on 4L O2 at baseline but reports Hx of deviated septum that contributes to her breathing difficulty    Objective                                                                                    Goals:  (Update in navigator)  Short term goals  Time Frame for Short term goals: 7-10 tx days:  Short term goal 1: Pt will complete sit->sup Ind. Short term goal 2: Pt will complete OOB transfers using 2WW, R boot, and O2 line management Mod Ind. Short term goal 3: Pt will ambulate 10 ft over level and uneven surfaces and 50 ft with turns using 2WW and R boot with SBA-Sup. Short term goal 4: Pt will ascend/descend 2\"/4\" curb step and 4-5 steps (4\" step height) using railings following appropriate step-to pattern with Min A. Short term goal 5: Pt will propel manual w/c >/=150 ft Mod Ind. Short term goal 6: Pt will complete object retrieval in standing with 1UE support on 2WW and using reacher Mod Ind.:   :        Plan of Care                                                                              Times per week: 5 days per week for a minimum of 60 minutes/day plus group as appropriate for 60 minutes.   Treatment to include Current Treatment Recommendations: Strengthening,Gait Training,Patient/Caregiver Education & Training,Stair training,Equipment Evaluation, Education, & procurement,Balance Training,Pain Management,Functional Mobility  Exercise Program,Positioning,Transfer Training,Wheelchair Mobility Training,Safety Education & Training    Electronically signed by   Freddie Lang PTA, [id number]  1/15/2022, 8:30 AM

## 2022-01-15 NOTE — PROGRESS NOTES
Dimitrios Morales    : 1960  Acct #: [de-identified]  MRN: [id number]              PM&R Progress Note      Admitting diagnosis: Right distal fibula fracture ( [address])     Comorbid diagnoses impacting rehabilitation: Uncontrolled pain, generalized weakness, gait disturbance, myoclonus, chronic pain syndrome, near syncope event, COPD exacerbation, moderate protein calorie malnutrition    Chief complaint: Anxious about getting out of the hospital after the weekend. Overall reports her pain is controlled. Prior (baseline) level of function: Independent.     Current level of function:         Current  IRF-MARIAELENA and Goals:   Occupational Therapy:    Short term goals  Time Frame for Short term goals: STGs=LTGs :   Long term goals  Time Frame for Long term goals : ~1 week or until d/c  Long term goal 1: Pt will complete grooming tasks Ind  Long term goal 2: Pt will complete total body bathing mod I  Long term goal 3: Pt will complete UB dressing Ind  Long term goal 4: Pt will complete LB dressing Ind  Long term goal 5: Pt will doff/don footwear including walking boot Ind  Long term goals 6: Pt will complete toileting mod I  Long term goal 7: Pt will complete functional transfers (bed, chair, toilet, shower) c DME PRN and mod I  Long term goal 8: Pt will perform therex/therax to facilitate increased strength/endurance/ax tolerance (c emphasis on dynamic standing balance/tolerance >5 mins, BUE endurance) c SBA  Long term goal 9: Pt will complete simple homemaking tasks c DME PRN and mod I :                                       Eating: Eating  Assistance Needed: Independent  CARE Score: 6  Discharge Goal: Independent       Oral Hygiene: Oral Hygiene  Assistance Needed: Setup or clean-up assistance  Comment: seated  CARE Score: 5  Discharge Goal: Independent    UB/LB Bathing: Shower/Bathe Self  Assistance Needed: Supervision or touching assistance  Comment: CGA while briefly attempting perineal hygiene in stance however educated pt since boot is not on she should not put weight on R foot; pt then redirected to complete task seated  CARE Score: 4    UB Dressing: Upper Body Dressing  Assistance Needed: Supervision or touching assistance  Comment: pt used W/C for setup with cue to lock brakes, able to don shirt  CARE Score: 4         LB Dressing: Lower Body Dressing  Assistance Needed: Partial/moderate assistance  Comment: able to thread BLEs in underwear and pants, ranged from CGA-min A for balance during pants management + encouragement to perform at best effort  CARE Score: 3  Discharge Goal: Independent    Donning and [place] Footwear: Putting On/Taking Off Footwear  Assistance Needed: Partial/moderate assistance  Comment: able to doff/don L sock, required partial assist to doff/don R walking boot but pt engaged in cueing/education  CARE Score: 3  Discharge Goal: Independent      Toileting: Toileting Hygiene  Assistance Needed: Supervision or touching assistance  Comment: CGA-SBA  CARE Score: 4  Discharge Goal: Independent      Toilet Transfers: Toilet Transfer  Assistance Needed: Supervision or touching assistance  Comment: CGA c RW, grab bars, walking boot  CARE Score: 4  Discharge Goal: Independent    Physical Therapy:   Short term goals  Time Frame for Short term goals: 7-10 tx days:  Short term goal 1: Pt will complete sit->sup Ind. Short term goal 2: Pt will complete OOB transfers using 2WW, R boot, and O2 line management Mod Ind. Short term goal 3: Pt will ambulate 10 ft over level and uneven surfaces and 50 ft with turns using 2WW and R boot with SBA-Sup. Short term goal 4: Pt will ascend/descend 2\"/4\" curb step and 4-5 steps (4\" step height) using railings following appropriate step-to pattern with Min A. Short term goal 5: Pt will propel manual w/c >/=150 ft Mod Ind. Short term goal 6: Pt will complete object retrieval in standing with 1UE support on 2WW and using reacher Mod Ind.             Bed Mobility:   Sit to Lying  Assistance Needed: Supervision or touching assistance  Comment: SBA without use of bed features  CARE Score: 4  Discharge Goal: Independent  Roll Left and Right  Assistance Needed: Independent  Comment: Ind without use of bed features  CARE Score: 6  Discharge Goal: Independent  Lying to Sitting on Side of Bed  Assistance Needed: Independent  Comment: Ind without use of bed features  CARE Score: 6  Discharge Goal: Independent    Transfers:    Sit to Stand  Assistance Needed: Supervision or touching assistance  Comment: SBA using 2WW and R walking boot  CARE Score: 4  Discharge Goal: Independent  Chair/Bed-to-Chair Transfer  Assistance Needed: Supervision or touching assistance  Comment: SBA using 2WW and R walking boot  CARE Score: 4  Discharge Goal: Independent     Car Transfer  Assistance Needed: Partial/moderate assistance  Comment: Min A (required steadying assist on trunk to sit and stand) using 2WW and R boot in place; required additional assist with O2 line management  CARE Score: 3  Discharge Goal: Independent    Ambulation:    Walking Ability  Does the Patient Walk?: Yes     Walk 10 Feet  Assistance Needed: Supervision or touching assistance  Comment: SBA using 2WW and R walking boot; on 6L O2  CARE Score: 4  Discharge Goal: Supervision or touching assistance     Walk 50 Feet with Two Turns  Assistance Needed: Supervision or touching assistance  Comment: SBA using 2WW and R walking boot; on 6L O2  Reason if not Attempted: Not attempted due to medical condition or safety concerns  CARE Score: 4  Discharge Goal: Supervision or touching assistance     Walk 150 Feet  Comment: pt was not performing this at baseline; limited potential to progressing to this mobility task due to Hx of COPD and high fall risk (Hx of unpredictable \"spasms\" that result to collapsing of both legs per pt's report)  Reason if not Attempted: Not attempted due to medical condition or safety concerns  CARE Score: 88  Discharge Goal: Not Attempted     Walking 10 Feet on Uneven Surfaces  Reason if not Attempted: Not attempted due to medical condition or safety concerns  CARE Score: 88  Discharge Goal: Supervision or touching assistance     1 Step (Curb)  Comment: pt was not performing this at baseline but has potential to be trained in a controlled environment  Reason if not Attempted: Not attempted due to medical condition or safety concerns  CARE Score: 88  Discharge Goal: Partial/moderate assistance     4 Steps  Comment: pt was not usually performing this at baseline but has potential to be trained in a controlled environment  Reason if not Attempted: Not attempted due to medical condition or safety concerns  CARE Score: 88  Discharge Goal: Partial/moderate assistance     12 Steps  Comment: pt was not performing this at baseline; limited potential to progressing to this mobility task due to Hx of COPD  Reason if not Attempted: Not attempted due to medical condition or safety concerns  CARE Score: 88  Discharge Goal: Not Attempted       Wheelchair:  w/c Ability: Wheelchair Ability  Uses a Wheelchair and/or Scooter?: Yes  Wheel 50 Feet with Two Turns  Assistance Needed: Independent  Comment: Mod Ind using BUE  CARE Score: 6  Discharge Goal: Independent  Wheel 150 Feet  Assistance Needed: Independent  Comment: Mod Ind using BUE  CARE Score: 6  Discharge Goal: Independent          Balance:        Object: Picking Up Object  Assistance Needed: Supervision or touching assistance  Comment: CGA with LUE support on 2WW, R boot in place, and using reacher  CARE Score: 4  Discharge Goal: Independent    I      Exam:    Blood pressure 101/86, pulse 96, temperature 97.3 °F (36.3 °C), resp. rate 20, height 5' 4\" (1.626 m), weight 203 lb 9.6 oz (92.4 kg), SpO2 92 %. General: Sitting up in a wheelchair. Moving with her arms. Alert. HEENT: MMM. No JVD. Pulmonary: Shallow but clear. Cardiac: RRR.     Abdomen: Patient's abdomen is soft and nondistended. Bowel sounds were present throughout. There was no rebound, guarding or masses noted. Upper extremities: No new bruising or swelling. Able to grasp the wheel rims with confidence. Lower extremities: Right lower leg immobilized as before. Upper trimline is none constricting. Toes are mobile. Left heel is clear. Sitting balance was good. Standing balance was fair-.    Lab Results   Component Value Date    WBC 11.7 (H) 01/06/2022    HGB 13.2 01/06/2022    HCT 46.2 01/06/2022    MCV 95.1 01/06/2022     01/06/2022     Lab Results   Component Value Date    INR 1.03 05/24/2021    INR 0.93 03/30/2021    INR 1.02 11/19/2020    PROTIME 12.5 05/24/2021    PROTIME 11.2 (L) 03/30/2021    PROTIME 12.3 11/19/2020     Lab Results   Component Value Date    CREATININE 0.4 (L) 01/06/2022    BUN 8 01/06/2022     01/06/2022    K 4.7 01/06/2022    CL 95 (L) 01/06/2022    CO2 38 (H) 01/06/2022     Lab Results   Component Value Date    ALT 18 01/06/2022    AST 21 01/06/2022    ALKPHOS 85 01/06/2022    BILITOT 0.3 01/06/2022       Expected length of stay  prior to a supervised level of function for discharge home with a walker and [address] OT/PT is January 17, 2022. Recommendations:    1. Right distal fibula fracture with gait disturbance:   Anticipate transitioning to home care therapies after the weekend with discharge home Monday. Poor insight and problem solving demonstrated as well. Generally she is engaging in the daily occupational and physical therapy.  Weight bearing allowed as long as she uses a walking boot on the right lower limb. She generally perseverates on the pain management. Benefiting from aggressive pulmonary measures and adaptive equipment training.    Planning on caregiver education, DVT prophylaxis and bowel and bladder retraining.  Orthopedic follow-up as an outpatient.  Verbal cues and CGA- minimum physical assistance for transfers.   2. DVT prophylaxis: Lovenox 40 mg subcu daily.  I must monitor her hemoglobin and platelet count periodically while on this medication.  Weightbearing activities are allowed within restrictions and pursued daily.  GI prophylaxis is available.  No clinical signs of blood loss. 3. Chronic pain syndrome: Continuing Suboxone 3 times a day. Ct Pack is benefiting from acetaminophen, Lyrica and tramadol for breakthrough pain.  Cryotherapy and outpatient follow-up with her pain management physician this coming Monday. 4. COPD exacerbation with hypercapnia: Albuterol, Atrovent and Symbicort inhalers.  Aggressive pulmonary hygiene measures.  Monitoring O2 saturations at rest and with activity.  Outpatient follow-up with pulmonary medicine. 5. Cardiac arrhythmia: Evidently her pacer is failing and she needs outpatient follow-up with cardiology to address this issue.  Vital signs are checked at rest and with activity. No perceived palpitations. 6. Protein calorie malnutrition: Encouraging consistent oral intake of solids and liquids.  Oral supplements.  Working to establish bowel regularity with oral agents.  Follow-up with her PCP.

## 2022-01-15 NOTE — PROGRESS NOTES
Physical Rehabilitation: OCCUPATIONAL THERAPY     [x] daily progress note       [] discharge       Patient Name:  Jerilyn Fong   :  1960 MRN: [id number]  Room:  [address] [place] [address] Date of Admission: 2022  Rehabilitation Diagnosis:   Displaced fracture of lateral malleolus of right fibula, initial encounter for closed fracture [S82.61XA]  Closed displaced fracture of lateral malleolus of right fibula, initial encounter Tessa Christine       Date 1/15/2022       Day of ARU Week:  5   Time IN/-1030/[id number]   Individual Tx Minutes 60+70   Group Tx Minutes    Co-Treat Minutes    Concurrent Tx Minutes    TOTAL Tx Time Mins 130   Variance Time    Variance Time []   Refusal due to:     []   Medical hold/reason:    []   Illness   []   Off Unit for test/procedure  []   Extra time needed to complete task  []   Therapeutic need  []   Other (specify):   Restrictions Restrictions/Precautions: Weight Bearing,General Precautions         Communication with other providers: [x]   OK to see per nursing:     []   Spoke with team member regarding:      Subjective observations and cognitive status: AM Session: Pt seated in w/c upon arrival. Pt states, \"I am not doing so good today. I was deprived of O2 this morning I think it must have came unhooked. \"    PM Session: Pt in semi owusu position upon arrival with PCA. PCA left room and pt pleasant and agreeable to OT PM session. Pain level/location:   5 /10       Location: BLE   Discharge recommendations  Anticipated discharge date:    Destination: []??home alone   [x]? ?home alone w assist prn   []? ? home w/ family    []? ? Continuous supervision       []? ?SNF    []? ? Assisted living     []? ? Other:   Continued therapy: [x]? ?[facility] OT  []??OUTPATIENT  OT   []?? No Further OT  Equipment needs: TTB  Yaniv Chicas requires the assistance of a tub transfer bench to successfully and safely complete bathing activities necessary due to reduced balance, endurance, need for ADL assist, and LE weakness and reduced ROM.  These tasks cannot be safely completed without this device and would place Kalee Chicas at greater risk of falls. Enzo Goldmann requires a drop arm bedside commode due to being confined to one level of the home and is physically incapable of utilizing regular toilet facilities. Drop arm required to facilitate transferring. Current body weight is Weight: 203 lb 9.6 oz (92.4 kg). ADLs: AM Session:    Eating: Eating  Assistance Needed: Independent  Comment: per pt  CARE Score: 6  Discharge Goal: Independent       Oral Hygiene: Oral Hygiene  Assistance Needed: Independent  Comment: seated sink side c mouth wash, teeth were left at home  CARE Score: 6  Discharge Goal: Independent    UB/LB Bathing: Shower/Bathe Self  Assistance Needed: Supervision or touching assistance  Comment: SBA when in stance to clean rear; majority completed seated on bench in shower  CARE Score: 4    UB Dressing: Upper Body Dressing  Assistance Needed: Setup or clean-up assistance  Comment: Set up  CARE Score: 5         LB Dressing: Lower Body Dressing  Assistance Needed: Partial/moderate assistance  Comment: Partial A to don/dof pants over boot; pt instructed on alt method  CARE Score: 3  Discharge Goal: Independent    Donning and [employer] Footwear: Putting On/Taking Off Footwear  Assistance Needed: Setup or clean-up assistance  Comment: able to don/dof sock on LLE @ set up  CARE Score: 5  Discharge Goal: Independent      Denied need in AM/PM Session  Toileting:  Toileting Hygiene  Assistance Needed: Supervision or touching assistance  Comment: CGA-SBA  CARE Score: 4  Discharge Goal: Independent      Toilet Transfers:  N/A in AM/PM Session  Toilet Transfer  Assistance Needed: Supervision or touching assistance  Comment: CGA c RW, grab bars, walking boot  CARE Score: 4  Discharge Goal: Independent        Bed Mobility:           [x]   Pt received out of bed; pt got back in bed after shower  Rolling R/L:  Sup  Scooting: Mod I  Supine --> Sit:  Mod I c use of bed rails  Sit --> Supine: Mod I c use of bed rails    Transfers:    Sit--> Stand:  SBA  Stand --> Sit:   SBA c VCs for controled decent  Stand-Pivot:   SBA  Other:    Assistive device required for transfer:   Grab bars and RW      Functional Mobility:    Assistance:  AM Session:Maneuvering w/c throughout room and bathroom Ind   PM Session: Pt self propelled w/c from room<>ADL apt @ Sup c 1 RB d/t SOB. Device:   []   Rolling Walker     []   Standard Walker [x]   Wheelchair        []   Maya Dys       []   4-Wheeled Brianne          []   Cardiac Walker       []   Other:        Homemaking Tasks:   Pt able to don pillow cases on pillows and shake out bed sheet to cover bed c Partial A from w/c level. Additional Therapeutic activities/exercises completed this date:     [x]   ADL Training   []   Balance/Postural training     [x]   Bed/Transfer Training: PM Session: Pt instructed on bed mobility and transfers in non-hospital bed utilizing FWW. Pt completed 3 transfers @ SBA. First transfer from w/c>EOB, taking boot off,  supine> rolling L/R> supine> EOB,boot back on. Second transfer EOB>standing (VCs for hand placement) c FWW>sitting EOB, removing boot, >supine>scooting up in bed>EOB, boot back on,>standing. Third transfer standing>EOB boot off>supine>EOB boot on>w/c utilizing FWW. Frequent SRB required throughout transfers d/t fatigue and SOB. Pt able to manage O2 line c min VCs. Pt completed 2 TTB transfers utilizing FWW and grab bars @ SBA c VCs for hand placement and safety. Pt verbally walked through home shower transfer utilizing shower chair (instead of bench) c grab bars and assistance from home health aid.       []   Endurance Training   []   Neuromuscular Re-ed   []   Nu-step:  Time:        Level:         #Steps:       []   Rebounder:    []  Seated     []  Standing        []   Supine Ther Ex (reps/sets):     [x]   Seated Ther Ex (reps/sets):  Pt educated/instructed pt on BUE therband HEP in all planes and all joints to increase BUE strength and endurance to further IND c (I)ADLs and functional transfers/mobility for d/c to home. Pt demoed fair+ understanding of HEP. []   Standing Ther Ex (reps/sets):     []   Other:      Comments: All ax completed to prepare pt for safe d/c to home on Monday 1/17     Patient/Caregiver Education and Training:   []   Adaptive Equipment Use  [x]   Bed Mobility/Transfer Technique/Safety  [x]   Energy Conservation Tips  []   Family training  [x]   Postural Awareness  [x]   Safety During Functional Activities  [x]   Reinforced Patient's Precautions   []   Progress was updated and reviewed in Rehabtracker with patient and/or family this         date. Treatment Plan for Next Session: Prepare for d/c      Assessment: This pt demonstrated a positive  response to today's treatment as evidenced by good participation in OT session. The patient is making progress toward established goals as evidenced by QI scores. Ongoing deficits are observed in the areas of ax tolerance and continued focus on this is recommended.        Treatment/Activity Tolerance:   [x] Tolerated treatment with no adverse effects    [] Patient limited by fatigue  [] Patient limited by pain   [] Patient limited by medical complications:    [] Adverse reaction to Tx:   [] Significant change in status    Safety:       [x]  bed alarm set    []  chair alarm set    []  Pt refused alarms                []  Telesitter activated      [x]  Gait belt used during tx session      []other:       Number of Minutes/Billable Intervention  Therapeutic Exercise 15   ADL Self-care 60   Neuro Re-Ed    Therapeutic Activity 55   Group    Other:    TOTAL 130       Social History  Social/Functional History  Lives With: Alone (has caregiver during day up to afternoon (7 hours) x 7 days via waiver program)  Type of Home: Apartment (2nd floor)  Home Layout: One level  Home Access: Level entry,Elevator  Bathroom Shower/Tub: Tub/Shower unit (gets down to tub and gets to shower chair to initiate dressing)  Bathroom Toilet: Standard  Bathroom Equipment: Shower chair  Home Equipment: Alert Button,Oxygen,4 wheeled walker (4L O2 24/7)  ADL Assistance: [facility] Responsibilities: No  Meal Prep Responsibility: No (caregiver does)  Laundry Responsibility: No (caregiver does)  Cleaning Responsibility: No (caregiver does)  Bill Paying/Finance Responsibility: Primary  Shopping Responsibility: Primary (did online shopping and groceries were delivered)  Dependent Care Responsibility: Secondary (dogs)  Health Care Management: Primary  Ambulation Assistance: Independent (Mod Ind using 0SG (short distances in the home only at baseline; pt attributes limitation to COPD and depression))  Transfer Assistance: Independent  Active : Yes  Mode of Transportation: Family,Other,Car (caregiver)  Occupation: On disability  Type of occupation: , [address] [place] [address]  Additional Comments: 3 falls in the past year (recent fall with injury requiring this hospitalization); sleeps in regular, flat bed; on 4L O2 at baseline but reports Hx of deviated septum that contributes to her breathing difficulty    Objective                                                                                    Goals:  (Update in navigator)  Short term goals  Time Frame for Short term goals: STGs=LTGs:  Long term goals  Time Frame for Long term goals : ~1 week or until d/c  Long term goal 1: Pt will complete grooming tasks Ind  Long term goal 2: Pt will complete total body bathing mod I  Long term goal 3: Pt will complete UB dressing Ind  Long term goal 4: Pt will complete LB dressing Ind  Long term goal 5: Pt will doff/don footwear including walking boot Ind  Long term goals 6: Pt will complete toileting mod I  Long term goal 7: Pt will complete functional transfers (bed, chair, toilet, shower) c DME PRN and mod I  Long term goal 8: Pt will perform therex/therax to facilitate increased strength/endurance/ax tolerance (c emphasis on dynamic standing balance/tolerance >5 mins, BUE endurance) c SBA  Long term goal 9: Pt will complete simple homemaking tasks c DME PRN and mod I:        Plan of Care                                                                              Times per week: 5 days per week for a minimum of 60 minutes/day plus group as appropriate for 60 minutes.   Treatment to include Plan  Times per day: Daily  Current Treatment Recommendations: Balance Training,Functional Mobility Training,Endurance Training,Pain Management,Safety Education & Training,Patient/Caregiver Education & Training,Equipment Evaluation, Education, & procurement,Self-Care / ADL,Home Management Training    Electronically signed by   KAREN Martins,  1/15/2022, 10:27 AM

## 2022-01-16 PROCEDURE — 94150 VITAL CAPACITY TEST: CPT

## 2022-01-16 PROCEDURE — 94761 N-INVAS EAR/PLS OXIMETRY MLT: CPT

## 2022-01-16 PROCEDURE — 6370000000 HC RX 637 (ALT 250 FOR IP): Performed by: HOSPITALIST

## 2022-01-16 PROCEDURE — 6360000002 HC RX W HCPCS: Performed by: PHYSICAL MEDICINE & REHABILITATION

## 2022-01-16 PROCEDURE — 2700000000 HC OXYGEN THERAPY PER DAY

## 2022-01-16 PROCEDURE — 6370000000 HC RX 637 (ALT 250 FOR IP): Performed by: PHYSICAL MEDICINE & REHABILITATION

## 2022-01-16 PROCEDURE — 94640 AIRWAY INHALATION TREATMENT: CPT

## 2022-01-16 PROCEDURE — 1280000000 HC REHAB R&B

## 2022-01-16 RX ADMIN — ALBUTEROL SULFATE 2 PUFF: 90 AEROSOL, METERED RESPIRATORY (INHALATION) at 09:00

## 2022-01-16 RX ADMIN — BUDESONIDE AND FORMOTEROL FUMARATE DIHYDRATE 2 PUFF: 80; 4.5 AEROSOL RESPIRATORY (INHALATION) at 09:03

## 2022-01-16 RX ADMIN — BUPRENORPHINE AND NALOXONE 1 FILM: 8; 2 FILM, SOLUBLE BUCCAL; SUBLINGUAL at 13:38

## 2022-01-16 RX ADMIN — TRAZODONE HYDROCHLORIDE 200 MG: 50 TABLET ORAL at 21:32

## 2022-01-16 RX ADMIN — PREGABALIN 100 MG: 100 CAPSULE ORAL at 08:16

## 2022-01-16 RX ADMIN — TRAMADOL HYDROCHLORIDE 50 MG: 50 TABLET, COATED ORAL at 16:26

## 2022-01-16 RX ADMIN — PREGABALIN 100 MG: 100 CAPSULE ORAL at 21:32

## 2022-01-16 RX ADMIN — ASPIRIN 81 MG CHEWABLE TABLET 81 MG: 81 TABLET CHEWABLE at 08:16

## 2022-01-16 RX ADMIN — SENNOSIDES AND DOCUSATE SODIUM 1 TABLET: 50; 8.6 TABLET ORAL at 08:16

## 2022-01-16 RX ADMIN — SENNOSIDES AND DOCUSATE SODIUM 1 TABLET: 50; 8.6 TABLET ORAL at 21:32

## 2022-01-16 RX ADMIN — ENOXAPARIN SODIUM 40 MG: 100 INJECTION SUBCUTANEOUS at 08:15

## 2022-01-16 RX ADMIN — PREGABALIN 100 MG: 100 CAPSULE ORAL at 13:38

## 2022-01-16 ASSESSMENT — PAIN SCALES - GENERAL
PAINLEVEL_OUTOF10: 0
PAINLEVEL_OUTOF10: 6
PAINLEVEL_OUTOF10: 0

## 2022-01-17 VITALS
DIASTOLIC BLOOD PRESSURE: 75 MMHG | OXYGEN SATURATION: 96 % | HEIGHT: 64 IN | SYSTOLIC BLOOD PRESSURE: 114 MMHG | RESPIRATION RATE: 16 BRPM | HEART RATE: 88 BPM | WEIGHT: 209.2 LBS | BODY MASS INDEX: 35.71 KG/M2 | TEMPERATURE: 98.8 F

## 2022-01-17 PROCEDURE — 97116 GAIT TRAINING THERAPY: CPT

## 2022-01-17 PROCEDURE — 94761 N-INVAS EAR/PLS OXIMETRY MLT: CPT

## 2022-01-17 PROCEDURE — 2700000000 HC OXYGEN THERAPY PER DAY

## 2022-01-17 PROCEDURE — 97530 THERAPEUTIC ACTIVITIES: CPT

## 2022-01-17 PROCEDURE — 94640 AIRWAY INHALATION TREATMENT: CPT

## 2022-01-17 PROCEDURE — 6370000000 HC RX 637 (ALT 250 FOR IP): Performed by: PHYSICAL MEDICINE & REHABILITATION

## 2022-01-17 PROCEDURE — 97542 WHEELCHAIR MNGMENT TRAINING: CPT

## 2022-01-17 PROCEDURE — 6360000002 HC RX W HCPCS: Performed by: PHYSICAL MEDICINE & REHABILITATION

## 2022-01-17 PROCEDURE — 97535 SELF CARE MNGMENT TRAINING: CPT

## 2022-01-17 PROCEDURE — 99239 HOSP IP/OBS DSCHRG MGMT >30: CPT | Performed by: PHYSICAL MEDICINE & REHABILITATION

## 2022-01-17 PROCEDURE — 6370000000 HC RX 637 (ALT 250 FOR IP): Performed by: HOSPITALIST

## 2022-01-17 RX ORDER — SENNA AND DOCUSATE SODIUM 50; 8.6 MG/1; MG/1
1 TABLET, FILM COATED ORAL 2 TIMES DAILY
COMMUNITY
Start: 2022-01-17

## 2022-01-17 RX ADMIN — PREGABALIN 100 MG: 100 CAPSULE ORAL at 13:54

## 2022-01-17 RX ADMIN — ASPIRIN 81 MG CHEWABLE TABLET 81 MG: 81 TABLET CHEWABLE at 09:10

## 2022-01-17 RX ADMIN — BUPRENORPHINE AND NALOXONE 1 FILM: 8; 2 FILM, SOLUBLE BUCCAL; SUBLINGUAL at 13:54

## 2022-01-17 RX ADMIN — DEXTROAMPHETAMINE SACCHARATE, AMPHETAMINE ASPARTATE MONOHYDRATE, DEXTROAMPHETAMINE SULFATE AND AMPHETAMINE SULFATE 30 MG: 7.5; 7.5; 7.5; 7.5 CAPSULE, EXTENDED RELEASE ORAL at 10:06

## 2022-01-17 RX ADMIN — SENNOSIDES AND DOCUSATE SODIUM 1 TABLET: 50; 8.6 TABLET ORAL at 09:09

## 2022-01-17 RX ADMIN — TRAMADOL HYDROCHLORIDE 50 MG: 50 TABLET, COATED ORAL at 10:06

## 2022-01-17 RX ADMIN — ENOXAPARIN SODIUM 40 MG: 100 INJECTION SUBCUTANEOUS at 09:10

## 2022-01-17 RX ADMIN — BUPRENORPHINE AND NALOXONE 1 FILM: 8; 2 FILM, SOLUBLE BUCCAL; SUBLINGUAL at 09:09

## 2022-01-17 RX ADMIN — ALBUTEROL SULFATE 2 PUFF: 90 AEROSOL, METERED RESPIRATORY (INHALATION) at 03:55

## 2022-01-17 RX ADMIN — PREGABALIN 100 MG: 100 CAPSULE ORAL at 09:09

## 2022-01-17 ASSESSMENT — PAIN DESCRIPTION - FREQUENCY: FREQUENCY: CONTINUOUS

## 2022-01-17 ASSESSMENT — PAIN DESCRIPTION - ONSET: ONSET: UNABLE TO ASSESS

## 2022-01-17 ASSESSMENT — PAIN DESCRIPTION - ORIENTATION: ORIENTATION: RIGHT

## 2022-01-17 ASSESSMENT — PAIN DESCRIPTION - PROGRESSION: CLINICAL_PROGRESSION: GRADUALLY IMPROVING

## 2022-01-17 ASSESSMENT — PAIN SCALES - GENERAL
PAINLEVEL_OUTOF10: 0
PAINLEVEL_OUTOF10: 3
PAINLEVEL_OUTOF10: 8

## 2022-01-17 ASSESSMENT — PAIN DESCRIPTION - LOCATION: LOCATION: FOOT;ANKLE

## 2022-01-17 ASSESSMENT — PAIN DESCRIPTION - PAIN TYPE: TYPE: ACUTE PAIN

## 2022-01-17 ASSESSMENT — PAIN DESCRIPTION - DESCRIPTORS: DESCRIPTORS: ACHING;CONSTANT;DISCOMFORT

## 2022-01-17 NOTE — PROGRESS NOTES
Occupational Therapy    Physical Rehabilitation: OCCUPATIONAL THERAPY     [x] daily progress note       [x] discharge       Patient Name:  Uli Mccann   :  1960 MRN: [id number]  Room:  [address] [place] [address] Date of Admission: 2022  Rehabilitation Diagnosis:   Displaced fracture of lateral malleolus of right fibula, initial encounter for closed fracture [S82.61XA]  Closed displaced fracture of lateral malleolus of right fibula, initial encounter Florencia Middleton       Date 2022       Day of ARU Week:  7   Time IN//1015   Individual Tx Minutes 45   Group Tx Minutes    Co-Treat Minutes    Concurrent Tx Minutes    TOTAL Tx Time Mins 45   Variance Time    Variance Time []   Refusal due to:     []   Medical hold/reason:    []   Illness   []   Off Unit for test/procedure  []   Extra time needed to complete task  []   Therapeutic need  []   Other (specify):   Restrictions Restrictions/Precautions: Weight Bearing,General Precautions         Communication with other providers: [x]   OK to see per nursing:     []   Spoke with team member regarding:      Subjective observations and cognitive status: Pt in bed on approach, agreeable to tx session, but a bit short during conversation. \"I've just got a lot on my mind about going home today\". Equipment delivered during tx session, adjusted walker to appropriate height and transferred walker bag in prep for discharge home. Pain level/location:   7 /10       Location:  LLE, pt thought she had received Tramadol but according to [facility] had not, contacted RN who provided medication towards end of session   Discharge recommendations  Anticipated discharge date:    Destination: []???home alone   [x]? ??home alone w assist prn   []? ?? home w/ family    []? ?? Continuous supervision       []? ??SNF    []??? Assisted living     []? ?? Other:   Continued therapy: [x]? ? ? [facility] OT  []???OUTPATIENT  OT   []??? No Further OT  Equipment needs: LJ Chicas requires the assistance of a tub transfer bench to successfully and safely complete bathing activities necessary due to reduced balance, endurance, need for ADL assist, and LE weakness and reduced ROM.  These tasks cannot be safely completed without this device and would place Kalee Chicas at greater risk of falls. Ovi Horton requires a drop arm bedside commode due to being confined to one level of the home and is physically incapable of utilizing regular toilet facilities.  Drop arm required to facilitate transferring. Current body weight is Weight: 203 lb 9.6 oz (92.4 kg). ADLs:     Eating: Eating  Assistance Needed: Independent  Comment: x  CARE Score: 6  Discharge Goal: Independent       Oral Hygiene: Oral Hygiene  Assistance Needed: Independent  Comment: seated  CARE Score: 6  Discharge Goal: Independent    UB/LB Bathing: Shower/Bathe Self  Assistance Needed: Independent  Comment: opted for sinkside bathing instead of full shower today; Ind  CARE Score: 6    UB Dressing: Upper Body Dressing  Assistance Needed: Independent  Comment: using W/C for setup, able to doff/don pullover top and manage 02 line  CARE Score: 6         LB Dressing: Lower Body Dressing  Assistance Needed: Supervision or touching assistance  Comment: required cue for sequencing to avoid standing without boot on since this is the only way she is allowed to put weight on her RLE. Pt then completed remainder of task  CARE Score: 4  Discharge Goal: Independent    Donning and [place] Footwear: Putting On/Taking Off Footwear  Assistance Needed: Independent  Comment: able to doff/don L sock (declined shoe 2* edema); also able to doff/don R walking boot with min increased time but no cueing  CARE Score: 6  Discharge Goal: Independent      Toileting: Toileting Hygiene  Assistance Needed: Independent  Comment: mod I c RW  CARE Score: 6  Discharge Goal: Independent      Toilet Transfers:      Toilet Transfer  Assistance Needed: Independent  Comment: mod I c RW, grab bars, good 02 line management during task  CARE Score: 6  Discharge Goal: Independent  Device Used:    []   Standard Toilet         [x]   Grab Bars           []  Bedside Commode       []   Elevated Toilet          []   Other:        Bed Mobility:           []   Pt received out of bed   Supine --> Sit:  Mod I        Transfers:    Sit--> Stand: Mod I   Stand --> Sit:   Mod I   Stand-Pivot: Mod I   Other:    Assistive device required for transfer:   RW      Functional Mobility:    Assistance: Mod I within room using both RW and W/C  Device:   [x]   Rolling Walker     []   Standard Alesha Dove [x]   Wheelchair        []   [employer]       []   4-Wheeled Alesha Dove         []   Cardiac Walker       []   Other:           Additional Therapeutic activities/exercises completed this date:     [x]   ADL Training   [x]   Balance/Postural training     [x]   Bed/Transfer Training   [x]   Endurance Training   []   Neuromuscular Re-ed   []   Nu-step:  Time:        Level:         #Steps:       []   Rebounder:    []  Seated     []  Standing        []   Supine Ther Ex (reps/sets):     []   Seated Ther Ex (reps/sets):     []   Standing Ther Ex (reps/sets):     []   Other:      Comments: All intervention performed to increase pt's endurance, ax tolerance, balance,   and I c ADLs/IADLs and functional transfers/mobility. Patient/Caregiver Education and Training:   []   [employer] Equipment Use  [x]   Bed Mobility/Transfer Technique/Safety  []   Energy Conservation Tips  []   Family training  [x]   Postural Awareness  [x]   Safety During Functional Activities  [x]   Reinforced Patient's Precautions   []   Progress was updated and reviewed in Rehabtracker with patient and/or family this         date. Treatment Plan for Next Session: Planned d/c from ARU later this date    Assessment:   This pt demonstrated a positive response to today's treatment as evidenced by meeting most OT goals, although pt did require one cue for improved weightbearing/boot compliance.   Pt will benefit from [facility] OT follow up       Treatment/Activity Tolerance:   [x] Tolerated treatment with no adverse effects    [] Patient limited by fatigue  [] Patient limited by pain   [] Patient limited by medical complications:    [] Adverse reaction to Tx:   [] Significant change in status    Safety:       []  bed alarm set    [x]  chair alarm set    []  Pt refused alarms                []  Telesitter activated      [x]  Gait belt used during tx session      []other:       Number of Minutes/Billable Intervention  Therapeutic Exercise    ADL Self-care 45   Neuro Re-Ed    Therapeutic Activity    Group    Other:    TOTAL 45       Social History  Social/Functional History  Lives With: Alone (has caregiver during day up to afternoon (7 hours) x 7 days via waiver program)  Type of Home: Apartment (2nd floor)  Home Layout: One level  Home Access: Level entry,Elevator  Bathroom Shower/Tub: Tub/Shower unit (gets down to tub and gets to shower chair to initiate dressing)  Bathroom Toilet: Standard  Bathroom Equipment: Shower chair  Home Equipment: Alert Button,Oxygen,4 wheeled walker (4L O2 24/7)  ADL Assistance: Independent  Homemaking Responsibilities: No  Meal Prep Responsibility: No (caregiver does)  Laundry Responsibility: No (caregiver does)  Cleaning Responsibility: No (caregiver does)  Bill Paying/Finance Responsibility: Primary  Shopping Responsibility: Primary (did online shopping and groceries were delivered)  Dependent Care Responsibility: Secondary (dogs)  Health Care Management: Primary  Ambulation Assistance: Independent (Mod Ind using 5WA (short distances in the home only at baseline; pt attributes limitation to COPD and depression))  Transfer Assistance: Independent  Active : Yes  Mode of Transportation: Family,Other,Car (caregiver)  Occupation: On disability  Type of occupation: , cleaning business  Additional Comments: 3 falls in the past year (recent fall with injury requiring this hospitalization); sleeps in regular, flat bed; on 4L O2 at baseline but reports Hx of deviated septum that contributes to her breathing difficulty    Objective                                                                                    Goals:  (Update in navigator)  Short term goals  Time Frame for Short term goals: STGs=LTGs:  Long term goals  Time Frame for Long term goals : ~1 week or until d/c  Long term goal 1: Pt will complete grooming tasks Ind  Long term goal 2: Pt will complete total body bathing mod I  Long term goal 3: Pt will complete UB dressing Ind  Long term goal 4: Pt will complete LB dressing Ind  Long term goal 5: Pt will doff/don footwear including walking boot Ind  Long term goals 6: Pt will complete toileting mod I  Long term goal 7: Pt will complete functional transfers (bed, chair, toilet, shower) c DME PRN and mod I  Long term goal 8: Pt will perform therex/therax to facilitate increased strength/endurance/ax tolerance (c emphasis on dynamic standing balance/tolerance >5 mins, BUE endurance) c SBA  Long term goal 9: Pt will complete simple homemaking tasks c DME PRN and mod I:        Plan of Care                                                                              Times per week: 5 days per week for a minimum of 60 minutes/day plus group as appropriate for 60 minutes. Treatment to include Plan  Times per day: Daily  Current Treatment Recommendations: Balance Training,Functional Mobility Training,Endurance [employer] Management,Safety Education & Training,Patient/Caregiver Education & Training,Equipment Evaluation, Education, & procurement,Self-Care / ADL,Home Management Training    Electronically signed by   Camilo Proctor MS, OTR/L  License #[id number]  1/17/2022, 10:29 AM

## 2022-01-17 NOTE — CARE COORDINATION
[facility] notes and orders faxed to [facility].           ARU  Discharge Summary    D/C Date: 1/17/22    Patient discharged to:  Home [place] [id number]    Transported by: daughter    Referrals made to: [facility] Penny Corral, MARIEL    Additional information:  Active with constant care PTA    Caregiver training: none requested

## 2022-01-17 NOTE — FLOWSHEET NOTE
upper body weakness, inability to  a standard walker.  This patient can ambulate only by pushing a walker instead of using a lesser assistive device such as a cane or crutch.      Juan Chicas requires a standard wheelchair with anti-tippers to successfully complete daily living tasks of eating, bathing, toileting, personal cares, ambulating and grooming.  A standard wheelchair with anti-tippers is necessary due to the patient's impaired ambulation and mobility restrictions.  The patient would not be able to resolve these daily living tasks using a cane or walker.   Anti-tippers are needed for (for improved safety)  The patient can self-propel a standard wheelchair with anti-tippers safely in their home and can maneuver within their home with adequate access.  The need for this equipment was discussed with the patient and he understands, is in agreement, and has not expressed an unwillingness to use the wheelchair.      There is a caregiver available to provide necessary assistance.    Expected length of need is 10 mos.    Weston Chicas does not have any infectious diagnoses. Bed Mobility:           [x]   Pt received out of bed   Rolling R/L:  Independent   Scooting:  Independent   Lying --> Sit:  Independent   Sit --> lying:  Independent   Pt practiced in flat bed without rails. Pt managed O2 line independently. Transfers:    Sit--> Stand: Mod I   Stand --> Sit:   Mod I   Chair-->Bed/Bed --> Chair:   Mod I   Car Transfers: Mod I   Assistive device required for transfer:   RW and right walking boot  Pt managed O2 line independently. Gait:    Walk 10 feet: Mod I   Walk 50 feet with 2 turns: Mod I   Walk 150 feet:  Not attempted d/t safety concerns   Device:  RW and right walking boot  Gait Quality:  Step-to pattern; antalgic. Pt managed O2 line independently.      Wheelchair Propulsion:  Wheel 50 feet with 2 turns: mod I   Wheel 150 feet: mod I   Extremities Used:   BUEs   Type:    [x] Manual        []  Electric    Stairs   Curb: CGA  Supportive Device:  RW and right walking boot  Height:   4\"  Pt required min vcs for walker safety and proper positioning. Vcs for proper sequencing. Pt required assist with O2 line management. 4 steps:  SBA  12 steps:  Not attempted d/t safety concerns   Supportive Device:  2 rails and right walking boot  Vcs for proper sequencing. Pt required assist with O2 line management. Uneven Surfaces:       Assistance: SBA  Device: RW and right walking boot   Surfaces Completed:   [x]  Carpeted Surface with bean bags beneath      []  Throw rugs       []  Ramp       []  Outdoor pavements        []  Grass             []  Loose gravel        []  Other:     Pt required assist with O2 line management. Vcs to stay closer to the walker. Picking Up Object From Floor (must be standing position):  Assistance: Mod I with RW and right walking boot  [x]   Reacher used    Patient/Caregiver Education and Training:   [x]   Bed Mobility/Transfer technique/safety  [x]   Gait technique/sequencing  [x]   Proper use of assistive device  [x]   Advanced mobility safety and technique  []   Reinforced patient's precautions/mobility while maintaining precautions  []   Postural awareness  []   Family training    Treatment Plan for Next Session: pt to discharge today from ARU. Assessment:    Treatment/Activity Tolerance:   [x] Tolerated treatment with no adverse effects    [] Patient limited by fatigue  [] Patient limited by pain   [] Patient limited by medical complications:    [] Adverse reaction to Tx:   [] Significant change in status    Safety:       [x]  bed alarm set    []  chair alarm set    []  Pt refused alarms                []  Telesitter activated      [x]  Gait belt used during tx session      [x]other: pt left in semi-owusu's position in bed with call light at end of treatment.          Number of Minutes/Billable Intervention  Gait Training 30   Therapeutic Exercise Neuro Re-Ed    Therapeutic Activity 15   Wheelchair Propulsion 11   Group    Other:    TOTAL 56         Social History  Social/Functional History  Lives With: Alone (has caregiver during day up to afternoon (7 hours) x 7 days via waiver program)  Type of Home: Apartment (2nd floor)  Home Layout: One level  Home Access: Level entry,Elevator  Bathroom Shower/Tub: Tub/Shower unit (gets down to tub and gets to shower chair to initiate dressing)  Bathroom Toilet: Standard  Bathroom Equipment: Shower chair  Home Equipment: Alert Button,Oxygen,4 wheeled walker (4L O2 24/7)  ADL Assistance: [address] Responsibilities: No  Meal Prep Responsibility: No (caregiver does)  Laundry Responsibility: No (caregiver does)  Cleaning Responsibility: No (caregiver does)  Bill Paying/Finance Responsibility: Primary  Shopping Responsibility: Primary (did online shopping and groceries were delivered)  Dependent Care Responsibility: Secondary (dogs)  Health Care Management: Primary  Ambulation Assistance: Independent (Mod Ind using 4BC (short distances in the home only at baseline; pt attributes limitation to COPD and depression))  Transfer Assistance: Independent  Active : Yes  Mode of Transportation: Family,Other,Car (caregiver)  Occupation: On disability  Type of occupation: , cleaning business  Additional Comments: 3 falls in the past year (recent fall with injury requiring this hospitalization); sleeps in regular, flat bed; on 4L O2 at baseline but reports Hx of deviated septum that contributes to her breathing difficulty    Objective                                                                                    Goals:  (Update in navigator)  Short term goals  Time Frame for Short term goals: 7-10 tx days:  Short term goal 1: Pt will complete sit->sup Ind. Short term goal 2: Pt will complete OOB transfers using 2WW, R boot, and O2 line management Mod Ind.   Short term goal 3: Pt will ambulate 10 ft over level and uneven surfaces and 50 ft with turns using 2WW and R boot with SBA-Sup. Short term goal 4: Pt will ascend/descend 2\"/4\" curb step and 4-5 steps (4\" step height) using railings following appropriate step-to pattern with Min A. Short term goal 5: Pt will propel manual w/c >/=150 ft Mod Ind. Short term goal 6: Pt will complete object retrieval in standing with 1UE support on 2WW and using reacher Mod Ind.:   :        Plan of Care                                                                              Times per week: 5 days per week for a minimum of 60 minutes/day plus group as appropriate for 60 minutes.   Treatment to include Current Treatment Recommendations: Strengthening,Gait Training,Patient/Caregiver Education & Training,Stair training,Equipment Evaluation, Education, & procurement,Balance Training,Pain Management,Functional Mobility Youlanda Layer Exercise Program,Positioning,Transfer Training,Wheelchair Mobility Training,Safety Education & Training    Electronically signed by   Daniel Anderson, [id number]  1/17/2022, 9:46 AM

## 2022-02-06 ENCOUNTER — APPOINTMENT (OUTPATIENT)
Dept: GENERAL RADIOLOGY | Age: 62
DRG: 189 | End: 2022-02-06
Payer: MEDICARE

## 2022-02-06 ENCOUNTER — APPOINTMENT (OUTPATIENT)
Dept: ULTRASOUND IMAGING | Age: 62
DRG: 189 | End: 2022-02-06
Payer: MEDICARE

## 2022-02-06 ENCOUNTER — APPOINTMENT (OUTPATIENT)
Dept: CT IMAGING | Age: 62
DRG: 189 | End: 2022-02-06
Payer: MEDICARE

## 2022-02-06 ENCOUNTER — HOSPITAL ENCOUNTER (INPATIENT)
Age: 62
LOS: 8 days | Discharge: HOME HEALTH | DRG: 189 | End: 2022-02-14
Attending: EMERGENCY MEDICINE | Admitting: INTERNAL MEDICINE
Payer: MEDICARE

## 2022-02-06 DIAGNOSIS — R09.02 HYPOXEMIA: ICD-10-CM

## 2022-02-06 DIAGNOSIS — J44.1 COPD EXACERBATION (HCC): Primary | ICD-10-CM

## 2022-02-06 LAB
ADENOVIRUS DETECTION BY PCR: NOT DETECTED
ALBUMIN SERPL-MCNC: 3.6 GM/DL (ref 3.4–5)
ALP BLD-CCNC: 119 IU/L (ref 40–128)
ALT SERPL-CCNC: 16 U/L (ref 10–40)
ANION GAP SERPL CALCULATED.3IONS-SCNC: 10 MMOL/L (ref 4–16)
AST SERPL-CCNC: 32 IU/L (ref 15–37)
BASE EXCESS MIXED: 13 (ref 0–2.3)
BASE EXCESS MIXED: 15.7 (ref 0–2.3)
BASE EXCESS MIXED: 9 (ref 0–2.3)
BASOPHILS ABSOLUTE: 0 K/CU MM
BASOPHILS RELATIVE PERCENT: 0.2 % (ref 0–1)
BILIRUB SERPL-MCNC: 0.4 MG/DL (ref 0–1)
BORDETELLA PARAPERTUSSIS BY PCR: NOT DETECTED
BORDETELLA PERTUSSIS PCR: NOT DETECTED
BUN BLDV-MCNC: 8 MG/DL (ref 6–23)
CALCIUM SERPL-MCNC: 8.6 MG/DL (ref 8.3–10.6)
CARBON MONOXIDE, BLOOD: 1.7 % (ref 0–5)
CARBON MONOXIDE, BLOOD: 1.7 % (ref 0–5)
CARBON MONOXIDE, BLOOD: 2 % (ref 0–5)
CHLAMYDOPHILA PNEUMONIA PCR: NOT DETECTED
CHLORIDE BLD-SCNC: 90 MMOL/L (ref 99–110)
CO2 CONTENT: 43.2 MMOL/L (ref 19–24)
CO2 CONTENT: 45.2 MMOL/L (ref 19–24)
CO2 CONTENT: 49.2 MMOL/L (ref 19–24)
CO2: 35 MMOL/L (ref 21–32)
COMMENT: ABNORMAL
COMMENT: ABNORMAL
CORONAVIRUS 229E PCR: NOT DETECTED
CORONAVIRUS HKU1 PCR: NOT DETECTED
CORONAVIRUS NL63 PCR: NOT DETECTED
CORONAVIRUS OC43 PCR: NOT DETECTED
CREAT SERPL-MCNC: 0.4 MG/DL (ref 0.6–1.1)
D DIMER: 745 NG/ML(DDU)
DIFFERENTIAL TYPE: ABNORMAL
EOSINOPHILS ABSOLUTE: 0.1 K/CU MM
EOSINOPHILS RELATIVE PERCENT: 0.4 % (ref 0–3)
GFR AFRICAN AMERICAN: >60 ML/MIN/1.73M2
GFR NON-AFRICAN AMERICAN: >60 ML/MIN/1.73M2
GLUCOSE BLD-MCNC: 207 MG/DL (ref 70–99)
HCO3 ARTERIAL: 40.3 MMOL/L (ref 18–23)
HCO3 ARTERIAL: 42.7 MMOL/L (ref 18–23)
HCO3 ARTERIAL: 46.4 MMOL/L (ref 18–23)
HCT VFR BLD CALC: 46.3 % (ref 37–47)
HEMOGLOBIN: 13.3 GM/DL (ref 12.5–16)
HIGH SENSITIVE C-REACTIVE PROTEIN: 291.9 MG/L
HUMAN METAPNEUMOVIRUS PCR: ABNORMAL
IMMATURE NEUTROPHIL %: 0.5 % (ref 0–0.43)
INFLUENZA A BY PCR: NOT DETECTED
INFLUENZA A H1 (2009) PCR: NOT DETECTED
INFLUENZA A H1 PANDEMIC PCR: NOT DETECTED
INFLUENZA A H3 PCR: NOT DETECTED
INFLUENZA B BY PCR: NOT DETECTED
LACTATE: 1.3 MMOL/L (ref 0.4–2)
LYMPHOCYTES ABSOLUTE: 4.1 K/CU MM
LYMPHOCYTES RELATIVE PERCENT: 23.8 % (ref 24–44)
MAGNESIUM: 1.5 MG/DL (ref 1.8–2.4)
MCH RBC QN AUTO: 26.9 PG (ref 27–31)
MCHC RBC AUTO-ENTMCNC: 28.7 % (ref 32–36)
MCV RBC AUTO: 93.5 FL (ref 78–100)
METHEMOGLOBIN ARTERIAL: 1 %
METHEMOGLOBIN ARTERIAL: 1.1 %
METHEMOGLOBIN ARTERIAL: 1.6 %
MONOCYTES ABSOLUTE: 1.6 K/CU MM
MONOCYTES RELATIVE PERCENT: 9.3 % (ref 0–4)
MYCOPLASMA PNEUMONIAE PCR: NOT DETECTED
NUCLEATED RBC %: 0 %
O2 SATURATION: 94.9 % (ref 96–97)
O2 SATURATION: 96.5 % (ref 96–97)
O2 SATURATION: 97.2 % (ref 96–97)
PARAINFLUENZA 1 PCR: NOT DETECTED
PARAINFLUENZA 2 PCR: NOT DETECTED
PARAINFLUENZA 3 PCR: NOT DETECTED
PARAINFLUENZA 4 PCR: NOT DETECTED
PCO2 ARTERIAL: 81 MMHG (ref 32–45)
PCO2 ARTERIAL: 90 MMHG (ref 32–45)
PCO2 ARTERIAL: 94 MMHG (ref 32–45)
PDW BLD-RTO: 13.2 % (ref 11.7–14.9)
PH BLOOD: 7.24 (ref 7.34–7.45)
PH BLOOD: 7.32 (ref 7.34–7.45)
PH BLOOD: 7.33 (ref 7.34–7.45)
PLATELET # BLD: 200 K/CU MM (ref 140–440)
PMV BLD AUTO: 10.2 FL (ref 7.5–11.1)
PO2 ARTERIAL: 222 MMHG (ref 75–100)
PO2 ARTERIAL: 328 MMHG (ref 75–100)
PO2 ARTERIAL: 83 MMHG (ref 75–100)
POTASSIUM SERPL-SCNC: 3.5 MMOL/L (ref 3.5–5.1)
PRO-BNP: 87.19 PG/ML
PROCALCITONIN: 0.09
RBC # BLD: 4.95 M/CU MM (ref 4.2–5.4)
RHINOVIRUS ENTEROVIRUS PCR: NOT DETECTED
RSV PCR: NOT DETECTED
SARS-COV-2, NAAT: NOT DETECTED
SARS-COV-2: NOT DETECTED
SEGMENTED NEUTROPHILS ABSOLUTE COUNT: 11.2 K/CU MM
SEGMENTED NEUTROPHILS RELATIVE PERCENT: 65.8 % (ref 36–66)
SODIUM BLD-SCNC: 135 MMOL/L (ref 135–145)
SOURCE: NORMAL
TOTAL IMMATURE NEUTOROPHIL: 0.08 K/CU MM
TOTAL NUCLEATED RBC: 0 K/CU MM
TOTAL PROTEIN: 6.8 GM/DL (ref 6.4–8.2)
TROPONIN T: <0.01 NG/ML
WBC # BLD: 17 K/CU MM (ref 4–10.5)

## 2022-02-06 PROCEDURE — 2700000000 HC OXYGEN THERAPY PER DAY

## 2022-02-06 PROCEDURE — 85025 COMPLETE CBC W/AUTO DIFF WBC: CPT

## 2022-02-06 PROCEDURE — 86141 C-REACTIVE PROTEIN HS: CPT

## 2022-02-06 PROCEDURE — 6370000000 HC RX 637 (ALT 250 FOR IP): Performed by: EMERGENCY MEDICINE

## 2022-02-06 PROCEDURE — 83880 ASSAY OF NATRIURETIC PEPTIDE: CPT

## 2022-02-06 PROCEDURE — 6370000000 HC RX 637 (ALT 250 FOR IP): Performed by: NURSE PRACTITIONER

## 2022-02-06 PROCEDURE — 94640 AIRWAY INHALATION TREATMENT: CPT

## 2022-02-06 PROCEDURE — 82803 BLOOD GASES ANY COMBINATION: CPT

## 2022-02-06 PROCEDURE — 2580000003 HC RX 258: Performed by: NURSE PRACTITIONER

## 2022-02-06 PROCEDURE — 96367 TX/PROPH/DG ADDL SEQ IV INF: CPT

## 2022-02-06 PROCEDURE — 84145 PROCALCITONIN (PCT): CPT

## 2022-02-06 PROCEDURE — 83735 ASSAY OF MAGNESIUM: CPT

## 2022-02-06 PROCEDURE — 6360000002 HC RX W HCPCS: Performed by: NURSE PRACTITIONER

## 2022-02-06 PROCEDURE — 2140000000 HC CCU INTERMEDIATE R&B

## 2022-02-06 PROCEDURE — 85379 FIBRIN DEGRADATION QUANT: CPT

## 2022-02-06 PROCEDURE — 71045 X-RAY EXAM CHEST 1 VIEW: CPT

## 2022-02-06 PROCEDURE — 99285 EMERGENCY DEPT VISIT HI MDM: CPT

## 2022-02-06 PROCEDURE — 93005 ELECTROCARDIOGRAM TRACING: CPT | Performed by: EMERGENCY MEDICINE

## 2022-02-06 PROCEDURE — 94660 CPAP INITIATION&MGMT: CPT

## 2022-02-06 PROCEDURE — 96375 TX/PRO/DX INJ NEW DRUG ADDON: CPT

## 2022-02-06 PROCEDURE — 87449 NOS EACH ORGANISM AG IA: CPT

## 2022-02-06 PROCEDURE — 87040 BLOOD CULTURE FOR BACTERIA: CPT

## 2022-02-06 PROCEDURE — 87899 AGENT NOS ASSAY W/OPTIC: CPT

## 2022-02-06 PROCEDURE — 36600 WITHDRAWAL OF ARTERIAL BLOOD: CPT

## 2022-02-06 PROCEDURE — 6360000002 HC RX W HCPCS: Performed by: EMERGENCY MEDICINE

## 2022-02-06 PROCEDURE — 6360000004 HC RX CONTRAST MEDICATION: Performed by: EMERGENCY MEDICINE

## 2022-02-06 PROCEDURE — 71275 CT ANGIOGRAPHY CHEST: CPT

## 2022-02-06 PROCEDURE — 87635 SARS-COV-2 COVID-19 AMP PRB: CPT

## 2022-02-06 PROCEDURE — 96366 THER/PROPH/DIAG IV INF ADDON: CPT

## 2022-02-06 PROCEDURE — 83605 ASSAY OF LACTIC ACID: CPT

## 2022-02-06 PROCEDURE — 93970 EXTREMITY STUDY: CPT

## 2022-02-06 PROCEDURE — 96365 THER/PROPH/DIAG IV INF INIT: CPT

## 2022-02-06 PROCEDURE — 84484 ASSAY OF TROPONIN QUANT: CPT

## 2022-02-06 PROCEDURE — 2580000003 HC RX 258: Performed by: EMERGENCY MEDICINE

## 2022-02-06 PROCEDURE — 0202U NFCT DS 22 TRGT SARS-COV-2: CPT

## 2022-02-06 PROCEDURE — 80053 COMPREHEN METABOLIC PANEL: CPT

## 2022-02-06 RX ORDER — SODIUM CHLORIDE 0.9 % (FLUSH) 0.9 %
5-40 SYRINGE (ML) INJECTION PRN
Status: DISCONTINUED | OUTPATIENT
Start: 2022-02-06 | End: 2022-02-15 | Stop reason: HOSPADM

## 2022-02-06 RX ORDER — ONDANSETRON 2 MG/ML
4 INJECTION INTRAMUSCULAR; INTRAVENOUS EVERY 6 HOURS PRN
Status: DISCONTINUED | OUTPATIENT
Start: 2022-02-06 | End: 2022-02-15 | Stop reason: HOSPADM

## 2022-02-06 RX ORDER — BUPRENORPHINE AND NALOXONE 8; 2 MG/1; MG/1
1 FILM, SOLUBLE BUCCAL; SUBLINGUAL 3 TIMES DAILY
Status: DISCONTINUED | OUTPATIENT
Start: 2022-02-06 | End: 2022-02-06

## 2022-02-06 RX ORDER — ONDANSETRON 4 MG/1
4 TABLET, ORALLY DISINTEGRATING ORAL EVERY 8 HOURS PRN
Status: DISCONTINUED | OUTPATIENT
Start: 2022-02-06 | End: 2022-02-15 | Stop reason: HOSPADM

## 2022-02-06 RX ORDER — MAGNESIUM SULFATE IN WATER 40 MG/ML
2000 INJECTION, SOLUTION INTRAVENOUS ONCE
Status: COMPLETED | OUTPATIENT
Start: 2022-02-06 | End: 2022-02-06

## 2022-02-06 RX ORDER — LEVOFLOXACIN 5 MG/ML
500 INJECTION, SOLUTION INTRAVENOUS ONCE
Status: COMPLETED | OUTPATIENT
Start: 2022-02-06 | End: 2022-02-06

## 2022-02-06 RX ORDER — ACETAMINOPHEN 650 MG/1
650 SUPPOSITORY RECTAL EVERY 6 HOURS PRN
Status: DISCONTINUED | OUTPATIENT
Start: 2022-02-06 | End: 2022-02-15 | Stop reason: HOSPADM

## 2022-02-06 RX ORDER — MAGNESIUM SULFATE IN WATER 40 MG/ML
INJECTION, SOLUTION INTRAVENOUS
Status: DISPENSED
Start: 2022-02-06 | End: 2022-02-06

## 2022-02-06 RX ORDER — METHYLPREDNISOLONE SODIUM SUCCINATE 125 MG/2ML
80 INJECTION, POWDER, LYOPHILIZED, FOR SOLUTION INTRAMUSCULAR; INTRAVENOUS ONCE
Status: DISCONTINUED | OUTPATIENT
Start: 2022-02-06 | End: 2022-02-06

## 2022-02-06 RX ORDER — TRAZODONE HYDROCHLORIDE 50 MG/1
300 TABLET ORAL NIGHTLY
Status: DISCONTINUED | OUTPATIENT
Start: 2022-02-06 | End: 2022-02-15 | Stop reason: HOSPADM

## 2022-02-06 RX ORDER — BUPRENORPHINE AND NALOXONE 8; 2 MG/1; MG/1
1 FILM, SOLUBLE BUCCAL; SUBLINGUAL 2 TIMES DAILY
Status: DISCONTINUED | OUTPATIENT
Start: 2022-02-06 | End: 2022-02-15 | Stop reason: HOSPADM

## 2022-02-06 RX ORDER — SENNA AND DOCUSATE SODIUM 50; 8.6 MG/1; MG/1
1 TABLET, FILM COATED ORAL 2 TIMES DAILY
Status: DISCONTINUED | OUTPATIENT
Start: 2022-02-06 | End: 2022-02-15 | Stop reason: HOSPADM

## 2022-02-06 RX ORDER — POLYETHYLENE GLYCOL 3350 17 G/17G
17 POWDER, FOR SOLUTION ORAL DAILY PRN
Status: DISCONTINUED | OUTPATIENT
Start: 2022-02-06 | End: 2022-02-15 | Stop reason: HOSPADM

## 2022-02-06 RX ORDER — SODIUM CHLORIDE 0.9 % (FLUSH) 0.9 %
5-40 SYRINGE (ML) INJECTION EVERY 12 HOURS SCHEDULED
Status: DISCONTINUED | OUTPATIENT
Start: 2022-02-06 | End: 2022-02-15 | Stop reason: HOSPADM

## 2022-02-06 RX ORDER — BUDESONIDE AND FORMOTEROL FUMARATE DIHYDRATE 160; 4.5 UG/1; UG/1
2 AEROSOL RESPIRATORY (INHALATION) 2 TIMES DAILY
Status: DISCONTINUED | OUTPATIENT
Start: 2022-02-06 | End: 2022-02-15 | Stop reason: HOSPADM

## 2022-02-06 RX ORDER — PREGABALIN 50 MG/1
100 CAPSULE ORAL 3 TIMES DAILY
Status: DISCONTINUED | OUTPATIENT
Start: 2022-02-06 | End: 2022-02-15 | Stop reason: HOSPADM

## 2022-02-06 RX ORDER — ASPIRIN 81 MG/1
81 TABLET, CHEWABLE ORAL DAILY
Status: DISCONTINUED | OUTPATIENT
Start: 2022-02-06 | End: 2022-02-15 | Stop reason: HOSPADM

## 2022-02-06 RX ORDER — ALBUTEROL SULFATE 90 UG/1
2 AEROSOL, METERED RESPIRATORY (INHALATION) 2 TIMES DAILY
Status: DISCONTINUED | OUTPATIENT
Start: 2022-02-06 | End: 2022-02-15 | Stop reason: HOSPADM

## 2022-02-06 RX ORDER — ACETAMINOPHEN 325 MG/1
650 TABLET ORAL EVERY 6 HOURS PRN
Status: DISCONTINUED | OUTPATIENT
Start: 2022-02-06 | End: 2022-02-15 | Stop reason: HOSPADM

## 2022-02-06 RX ORDER — IPRATROPIUM BROMIDE AND ALBUTEROL SULFATE 2.5; .5 MG/3ML; MG/3ML
3 SOLUTION RESPIRATORY (INHALATION) ONCE
Status: COMPLETED | OUTPATIENT
Start: 2022-02-06 | End: 2022-02-06

## 2022-02-06 RX ORDER — PREDNISONE 20 MG/1
40 TABLET ORAL DAILY
Status: DISCONTINUED | OUTPATIENT
Start: 2022-02-08 | End: 2022-02-13

## 2022-02-06 RX ORDER — DEXTROAMPHETAMINE SACCHARATE, AMPHETAMINE ASPARTATE MONOHYDRATE, DEXTROAMPHETAMINE SULFATE AND AMPHETAMINE SULFATE 7.5; 7.5; 7.5; 7.5 MG/1; MG/1; MG/1; MG/1
30 CAPSULE, EXTENDED RELEASE ORAL DAILY
Status: DISCONTINUED | OUTPATIENT
Start: 2022-02-06 | End: 2022-02-15 | Stop reason: HOSPADM

## 2022-02-06 RX ORDER — SODIUM CHLORIDE 9 MG/ML
25 INJECTION, SOLUTION INTRAVENOUS PRN
Status: DISCONTINUED | OUTPATIENT
Start: 2022-02-06 | End: 2022-02-15 | Stop reason: HOSPADM

## 2022-02-06 RX ORDER — 0.9 % SODIUM CHLORIDE 0.9 %
500 INTRAVENOUS SOLUTION INTRAVENOUS ONCE
Status: COMPLETED | OUTPATIENT
Start: 2022-02-06 | End: 2022-02-06

## 2022-02-06 RX ORDER — METHYLPREDNISOLONE SODIUM SUCCINATE 40 MG/ML
40 INJECTION, POWDER, LYOPHILIZED, FOR SOLUTION INTRAMUSCULAR; INTRAVENOUS EVERY 6 HOURS
Status: COMPLETED | OUTPATIENT
Start: 2022-02-06 | End: 2022-02-08

## 2022-02-06 RX ADMIN — METHYLPREDNISOLONE SODIUM SUCCINATE 40 MG: 40 INJECTION, POWDER, FOR SOLUTION INTRAMUSCULAR; INTRAVENOUS at 17:17

## 2022-02-06 RX ADMIN — METHYLPREDNISOLONE SODIUM SUCCINATE 40 MG: 40 INJECTION, POWDER, FOR SOLUTION INTRAMUSCULAR; INTRAVENOUS at 09:54

## 2022-02-06 RX ADMIN — PREGABALIN 100 MG: 50 CAPSULE ORAL at 21:31

## 2022-02-06 RX ADMIN — MAGNESIUM SULFATE 2000 MG: 2 INJECTION INTRAVENOUS at 05:59

## 2022-02-06 RX ADMIN — ALBUTEROL SULFATE 2 PUFF: 90 AEROSOL, METERED RESPIRATORY (INHALATION) at 21:28

## 2022-02-06 RX ADMIN — METHYLPREDNISOLONE SODIUM SUCCINATE 40 MG: 40 INJECTION, POWDER, FOR SOLUTION INTRAMUSCULAR; INTRAVENOUS at 21:29

## 2022-02-06 RX ADMIN — PREGABALIN 100 MG: 50 CAPSULE ORAL at 13:35

## 2022-02-06 RX ADMIN — SODIUM CHLORIDE 500 ML: 9 INJECTION, SOLUTION INTRAVENOUS at 07:06

## 2022-02-06 RX ADMIN — ASPIRIN 81 MG 81 MG: 81 TABLET ORAL at 09:54

## 2022-02-06 RX ADMIN — BUDESONIDE AND FORMOTEROL FUMARATE DIHYDRATE 2 PUFF: 160; 4.5 AEROSOL RESPIRATORY (INHALATION) at 21:28

## 2022-02-06 RX ADMIN — PREGABALIN 100 MG: 50 CAPSULE ORAL at 09:54

## 2022-02-06 RX ADMIN — BUPRENORPHINE HYDROCHLORIDE, NALOXONE HYDROCHLORIDE 1 FILM: 8; 2 FILM, SOLUBLE BUCCAL; SUBLINGUAL at 09:54

## 2022-02-06 RX ADMIN — TRAZODONE HYDROCHLORIDE 300 MG: 50 TABLET ORAL at 21:29

## 2022-02-06 RX ADMIN — CEFTRIAXONE SODIUM 1000 MG: 1 INJECTION, POWDER, FOR SOLUTION INTRAMUSCULAR; INTRAVENOUS at 09:55

## 2022-02-06 RX ADMIN — SODIUM CHLORIDE, PRESERVATIVE FREE 10 ML: 5 INJECTION INTRAVENOUS at 21:31

## 2022-02-06 RX ADMIN — IPRATROPIUM BROMIDE AND ALBUTEROL SULFATE 3 AMPULE: .5; 3 SOLUTION RESPIRATORY (INHALATION) at 05:50

## 2022-02-06 RX ADMIN — ENOXAPARIN SODIUM 40 MG: 100 INJECTION SUBCUTANEOUS at 09:54

## 2022-02-06 RX ADMIN — CEFEPIME 2000 MG: 2 INJECTION, POWDER, FOR SOLUTION INTRAVENOUS at 07:07

## 2022-02-06 RX ADMIN — SENNOSIDES AND DOCUSATE SODIUM 1 TABLET: 50; 8.6 TABLET ORAL at 09:54

## 2022-02-06 RX ADMIN — LEVOFLOXACIN 500 MG: 5 INJECTION, SOLUTION INTRAVENOUS at 07:12

## 2022-02-06 RX ADMIN — IOPAMIDOL 80 ML: 755 INJECTION, SOLUTION INTRAVENOUS at 07:59

## 2022-02-06 RX ADMIN — SODIUM CHLORIDE, PRESERVATIVE FREE 10 ML: 5 INJECTION INTRAVENOUS at 09:56

## 2022-02-06 NOTE — ED NOTES
Bed: ED-27  Expected date:   Expected time:   Means of arrival:   Comments:  Trauma 2     Lor Meza RN  02/06/22 0299

## 2022-02-06 NOTE — ED PROVIDER NOTES
CHIEF COMPLAINT  Chief Complaint   Patient presents with    Shortness of Breath       HPI  Wilfred Braxton is a 63 y.o. female with history of COPD, hepatitis C, sleep apnea who presents 3 days of worsening shortness of breath which acutely worsened overnight tonight. Signs and symptoms are rated as severe, she was significantly hypoxic and brought in by EMS. She received 125 mg of Solu-Medrol and a breathing treatment per EMS. She does have a immobilizer on her right foot, she is denying any pain, fever. She states she is vaccinated for COVID. She denies any abdominal pain, vomiting, diarrhea. Further history is limited by patient's respiratory status.       REVIEW OF SYSTEMS  Review of Systems   History obtained from EMS, patient, limited by patient respiratory status    SOB limits ROS    PAST MEDICAL HISTORY  Past Medical History:   Diagnosis Date    ADHD     [facility] [address] [facility]     COVID-19     Drug addiction in remission ([facility] [address]     recovering addict    Hep C w/o coma, chronic (HCC)     Pacemaker     Sleep apnea     TIA (transient ischemic attack)     per patient \"several mini strokes\"       FAMILY HISTORY  Family History   Problem Relation Age of Onset    Cancer Mother         unsure of type    Thyroid Cancer Mother     Alcohol Abuse Father     Other Sister         passed in car accident    Lupus Brother     Coronary Art Dis Brother        SOCIAL HISTORY  Social History     Socioeconomic History    Marital status: Single     Spouse name: Not on file    Number of children: Not on file    Years of education: Not on file    Highest education level: Not on file   Occupational History    Not on file   Tobacco Use    Smoking status: Former Smoker     Packs/day: 1.00     Years: 40.00     Pack years: 40.00     Quit date: 2016     Years since quittin.4    Smokeless tobacco: Never Used   Substance and Sexual Activity    Alcohol use: Not Currently Comment: occasional caffeine     Drug use: Not Currently     Comment: heroine    Sexual activity: Not on file   Other Topics Concern    Not on file   Social History Narrative    Not on file     Social Determinants of Health     Financial Resource Strain:     Difficulty of Paying Living Expenses: Not on file   Food Insecurity:     Worried About Running Out of Food in the Last Year: Not on file    Devendra of Food in the Last Year: Not on file   Transportation Needs:     Lack of Transportation (Medical): Not on file    Lack of Transportation (Non-Medical): Not on file   Physical Activity:     Days of Exercise per Week: Not on file    Minutes of Exercise per Session: Not on file   Stress:     Feeling of Stress : Not on file   Social Connections:     Frequency of Communication with Friends and Family: Not on file    Frequency of Social Gatherings with Friends and Family: Not on file    Attends Buddhist Services: Not on file    Active Member of [address] [place] [address] or Organizations: Not on file    Attends Club or Organization Meetings: Not on file    Marital Status: Not on file   Intimate Partner Violence:     Fear of Current or Ex-Partner: Not on file    Emotionally Abused: Not on file    Physically Abused: Not on file    Sexually Abused: Not on file   Housing Stability:     Unable to Pay for Housing in the Last Year: Not on file    Number of [place] in the Last Year: Not on file    Unstable Housing in the Last Year: Not on file       SURGICAL HISTORY  Past Surgical History:   Procedure Laterality Date   [address]  2010    done in [place] [id number]  No current facility-administered medications on file prior to encounter.      Current Outpatient Medications on File Prior to Encounter   Medication Sig Dispense Refill    SYMBICORT 160-4.5 MCG/ACT AERO Inhale 2 puffs into the lungs 2 times daily 1 each 5    predniSONE (DELTASONE) 10 MG tablet Take 1 tablet by mouth daily 40mg daily for 2 days, 20 mg daily for 2 days, 10 mg daily for 2 days, 5 mg daily for 2 days 15 tablet 0    doxycycline hyclate (VIBRAMYCIN) 100 MG capsule Take 1 capsule by mouth daily 10 capsule 0    sennosides-docusate sodium (SENOKOT-S) 8.6-50 MG tablet Take 1 tablet by mouth 2 times daily      traMADol (ULTRAM) 50 MG tablet Take 1 tablet by mouth 2 times daily.  albuterol sulfate  (90 Base) MCG/ACT inhaler USE 2 PUFFS BY MOUTH EVERY 6 HOURS AS NEEDED SHAKE WELL 18 g 5    mometasone-formoterol (DULERA) 100-5 MCG/ACT inhaler Inhale 2 puffs into the lungs 2 times daily 1 each 5    ipratropium-albuterol (DUONEB) 0.5-2.5 (3) MG/3ML SOLN nebulizer solution Inhale 3 mLs into the lungs every 6 hours as needed for Shortness of Breath 120 mL 0    fluticasone-umeclidin-vilant (TRELEGY ELLIPTA) 100-62.5-25 MCG/INH AEPB Inhale 1 puff into the lungs daily 1 each 5    BiPAP Machine MISC by Does not apply route Use nightly      aspirin 81 MG chewable tablet Take 81 mg by mouth daily      Multiple Vitamins-Minerals (WOMENS MULTIVITAMIN PO) Take 1 tablet by mouth daily      amphetamine-dextroamphetamine (ADDERALL XR) 30 MG extended release capsule Take 30 mg by mouth daily.  pregabalin (LYRICA) 100 MG capsule Take 100 mg by mouth 3 times daily.  traZODone (DESYREL) 150 MG tablet Take 300 mg by mouth nightly       OXYGEN Inhale 4 L into the lungs continuous       buprenorphine-naloxone (SUBOXONE) 8-2 MG FILM SL film Place 1 Film under the tongue 3 times daily.            ALLERGIES  No Known Allergies    PHYSICAL EXAM  VITAL SIGNS: /78   Pulse 118   Temp 98.1 °F (36.7 °C) (Axillary)   Resp 17   Wt 200 lb (90.7 kg)   SpO2 98%   BMI 34.33 kg/m²   Constitutional: Well developed, Well nourished, resting in bed, bilevel in place  HENT: Normocephalic, Atraumatic, Bilateral external ears normal, Oropharynx moist, No oral exudates, Nose normal.   Eyes: PERRL, EOMI, Conjunctiva normal, No discharge. Neck: Normal range of motion, Supple, No stridor. Cardiovascular: Tachycardic heart rate, Normal rhythm, No murmurs, No rubs, No gallops. Thorax & Lungs: Diminished breath sounds, No respiratory distress, expiratory wheezing, No chest tenderness. Abdomen: Bowel sounds normal, Soft, No tenderness, no guarding, no rebound, No masses, No pulsatile masses. Skin: Warm, Dry, No erythema, No rash. Extremities: Intact distal pulses, No edema, No tenderness, No cyanosis, No clubbing. In place right lower extremity  Musculoskeletal: Good gross range of motion in all major joints. No major deformities noted. Neurologic: Alert & oriented x 3, Normal gross motor function, Normal gross sensory function, No focal deficits noted. Psychiatric: Affect flat    EKG  EKG Interpretation    Interpreted by emergency department physician from February 6 at 553    Rhythm: normal sinus   Rate: tachycardia  Axis: normal  Ectopy: none  Conduction: normal  ST Segments: no acute change  T Waves: no acute change  Q Waves: none    Clinical Impression: Sinus tachycardia with a rate of 134    Jv Ricardo MD      RADIOLOGY/PROCEDURES/LABS  Last Imaging results   CTA PULMONARY W CONTRAST   Preliminary Result   Respiratory motion artifact limits assessment of the lung parenchyma and   pulmonary arteries. Within this limitation, no pulmonary embolism is   identified to the segmental level. No evidence of right heart strain. Patchy nodular opacities throughout both lungs appears to have increased from   the prior chest CT. This could represent an infectious/inflammatory etiology. Similar bilateral hilar adenopathy. Mildly increased mediastinal adenopathy. RECOMMENDATIONS:   Consider a follow-up chest CT in 3-6 months. VL DUP LOWER EXTREMITY VENOUS BILATERAL   Preliminary Result   No evidence of DVT in either lower extremity.       The right calf veins are not adequately assessed due to overlying material.         XR CHEST PORTABLE   Preliminary Result   Diffuse interstitial opacities with cephalization of pulmonary vasculature. Findings suggest interstitial pulmonary edema versus atypical pneumonia.              Imaging reviewed by myself    Labs Reviewed   CBC WITH AUTO DIFFERENTIAL - Abnormal; Notable for the following components:       Result Value    WBC 17.0 (*)     MCH 26.9 (*)     MCHC 28.7 (*)     Lymphocytes % 23.8 (*)     Monocytes % 9.3 (*)     Immature Neutrophil % 0.5 (*)     All other components within normal limits   COMPREHENSIVE METABOLIC PANEL W/ REFLEX TO MG FOR LOW K - Abnormal; Notable for the following components:    Chloride 90 (*)     CO2 35 (*)     CREATININE 0.4 (*)     Glucose 207 (*)     All other components within normal limits   BLOOD GAS, ARTERIAL - Abnormal; Notable for the following components:    pH, Bld 7.24 (*)     pCO2, Arterial 94.0 (*)     pO2, Arterial 328 (*)     Base Exc, Mixed 9 (*)     HCO3, Arterial 40.3 (*)     CO2 Content 43.2 (*)     O2 Sat 97.2 (*)     All other components within normal limits   D-DIMER, QUANTITATIVE - Abnormal; Notable for the following components:    D-Dimer, Quant 745 (*)     All other components within normal limits   MAGNESIUM - Abnormal; Notable for the following components:    Magnesium 1.5 (*)     All other components within normal limits   BLOOD GAS, ARTERIAL - Abnormal; Notable for the following components:    pH, Bld 7.33 (*)     pCO2, Arterial 81.0 (*)     pO2, Arterial 222 (*)     Base Exc, Mixed 13 (*)     HCO3, Arterial 42.7 (*)     CO2 Content 45.2 (*)     Methemoglobin, Arterial 1.6 (*)     All other components within normal limits   COVID-19, RAPID   CULTURE, BLOOD 2   CULTURE, BLOOD 1   TROPONIN   BRAIN NATRIURETIC PEPTIDE   LACTIC ACID, PLASMA         Medications   magnesium sulfate 2 GM/50ML infusion (  Not Given 2/6/22 0600)   0.9 % sodium chloride bolus (500 mLs IntraVENous New Bag 2/6/22 0706)   ipratropium-albuterol (DUONEB) nebulizer solution 3 ampule (3 ampules Inhalation Given 2/6/22 0550)   magnesium sulfate 2000 mg in 50 mL IVPB premix (0 mg IntraVENous Stopped 2/6/22 0810)   cefepime (MAXIPIME) 2000 mg IVPB minibag (0 mg IntraVENous Stopped 2/6/22 0810)     And   levoFLOXacin (LEVAQUIN) 500 MG/100ML infusion 500 mg (500 mg IntraVENous New Bag 2/6/22 0712)   iopamidol (ISOVUE-370) 76 % injection 80 mL (80 mLs IntraVENous Given 2/6/22 0759)       COURSE & MEDICAL DECISION MAKING  Pertinent Labs & Imaging studies reviewed. (See chart for details)    61-year-old female presents with COPD exacerbation causing hypoxic and hypercapnic, acute on chronic, failure. She did respond well to bilevel ventilation in the department, she is feeling improved on the BiPAP. It appears that she has been hospitalized for multiple similar events in the past, her CT PE does not clearly demonstrate PE and her DVT ultrasound does not clearly demonstrate DVT. This was obtained as she does have an immobilization device on her right lower extremity and was hypoxic. She received steroids prior to arrival here, here she was treated with small dose IV fluids, magnesium, bronchodilators and she'll be started on antibiotics based on the changes on her chest CT. Her ABG is improving while on bilevel ventilation but she will require hospitalization and a further evaluation and respiratory management. CRITICAL CARE NOTE:  There was a high probability of clinically significant life-threatening deterioration of the patient's condition requiring my urgent intervention due to repeat evaluation, titration of bilevel support, respiratory care. Repeat evaluations, management of significant hypoxia, completion of work-up was performed to address this. Total critical care time is 32 minutes.     This includes vital sign monitoring, pulse oximetry monitoring, telemetry monitoring, clinical response to the IV medications, reviewing the nursing notes, consultation time, dictation/documentation time, and interpretation of the lab work. This time excludes time spent performing procedures and separately billable procedures and family discussion time. FINAL IMPRESSION  Problem List Items Addressed This Visit     None      Visit Diagnoses     COPD exacerbation ([facility] [address]    -  Primary    Hypoxemia          1.    2.   3.    Patient gave me permission to discuss medical history, care, and plan with those present in the room.   Electronically signed by: Patricia Benton MD, 2/6/2022  MD Patricia Hall MD  02/06/22 8260

## 2022-02-06 NOTE — ED NOTES
Patient transported back to room via stretcher by this RN and RT.     Elvira Singh RN  02/06/22 6439

## 2022-02-06 NOTE — ED NOTES
Pt brought in by EMS from home for SOB, pt on 4 L NC at home, and bipap at night. Pt states she was on bipap when she began having SOB, pt was 66% SpO2 on bipap on arrival to ED, respiratory placed pt on bipap, pt given 2 g Mag, EMS also gave solumedrol in route. EMS placed 20 g left hand and we placed another 20g L FA.  Pt AAO X [id number], RN  02/06/22 0804

## 2022-02-06 NOTE — ED NOTES
[id number] paged hospitalist     Helen Tom  02/06/22 [id number] Alice Maher NP with [facility]S group returned call      Helen Tom  02/06/22 9339

## 2022-02-06 NOTE — ED NOTES
Assumed care of patient from [facility]. All questions answered at this time.       Shalini Katz RN  02/06/22 5216

## 2022-02-06 NOTE — RT PROTOCOL NOTE
RT Inhaler-Nebulizer Bronchodilator Protocol Note    There is a bronchodilator order in the chart from a provider indicating to follow the RT Bronchodilator Protocol and there is an Initiate RT Inhaler-Nebulizer Bronchodilator Protocol order as well (see protocol at bottom of note). CXR Findings:  XR CHEST PORTABLE    Result Date: 2/6/2022  Diffuse interstitial opacities with cephalization of the pulmonary vasculature. Findings suggest interstitial pulmonary edema versus atypical pneumonia. The findings from the last RT Protocol Assessment were as follows:   History Pulmonary Disease: Chronic pulmonary disease  Respiratory Pattern: Regular pattern and RR 12-20 bpm  Breath Sounds: Slightly diminished and/or crackles  Cough: Strong, spontaneous, non-productive  Indication for Bronchodilator Therapy:    Bronchodilator Assessment Score: 4    Aerosolized bronchodilator medication orders have been revised according to the RT Inhaler-Nebulizer Bronchodilator Protocol below. Respiratory Therapist to perform RT Therapy Protocol Assessment initially then follow the protocol. Repeat RT Therapy Protocol Assessment PRN for score 0-3 or on second treatment, BID, and PRN for scores above 3. No Indications - adjust the frequency to every 6 hours PRN wheezing or bronchospasm, if no treatments needed after 48 hours then discontinue using Per Protocol order mode. If indication present, adjust the RT bronchodilator orders based on the Bronchodilator Assessment Score as indicated below. Use Inhaler orders unless patient has one or more of the following: on home nebulizer, not able to hold breath for 10 seconds, is not alert and oriented, cannot activate and use MDI correctly, or respiratory rate 25 breaths per minute or more, then use the equivalent nebulizer order(s) with same Frequency and PRN reasons based on the score.   If a patient is on this medication at home then do not decrease Frequency below that used at home. 0-3 - enter or revise RT bronchodilator order(s) to equivalent RT Bronchodilator order with Frequency of every 4 hours PRN for wheezing or increased work of breathing using Per Protocol order mode. 4-6 - enter or revise RT Bronchodilator order(s) to two equivalent RT bronchodilator orders with one order with BID Frequency and one order with Frequency of every 4 hours PRN wheezing or increased work of breathing using Per Protocol order mode. 7-10 - enter or revise RT Bronchodilator order(s) to two equivalent RT bronchodilator orders with one order with TID Frequency and one order with Frequency of every 4 hours PRN wheezing or increased work of breathing using Per Protocol order mode. 11-13 - enter or revise RT Bronchodilator order(s) to one equivalent RT bronchodilator order with QID Frequency and an Albuterol order with Frequency of every 4 hours PRN wheezing or increased work of breathing using Per Protocol order mode. Greater than 13 - enter or revise RT Bronchodilator order(s) to one equivalent RT bronchodilator order with every 4 hours Frequency and an Albuterol order with Frequency of every 2 hours PRN wheezing or increased work of breathing using Per Protocol order mode. RT to enter RT Home Evaluation for COPD & MDI Assessment order using Per Protocol order mode.     Electronically signed by Tangela Garcia RCP on 2/6/2022 at 2:59 PM

## 2022-02-06 NOTE — H&P
History and Physical      Name:  Annie Cunha /Age/Sex: 1960  (63 y.o. female)   MRN & CSN:  [id number] & [id number] Admission Date/Time: 2022  5:44 AM   Location:   PCP: Peace Chaves MD       Admitting Physicians : Dr. Latonia Wagoner is a 63 y.o.  female  who presents with Shortness of Breath    Assessment and Plan:   Acute on chronic respiratory failure with hypoxia and hypercapnia secondary to COPD exacerbation/Human Metapneumovirus  PCR positive for  Human Metapneumovirus. CXR concerning for interstitial pulmonary edema VS atypical pneumonia. Ph 7.24, HCO3: 42.7, and PC02: 81 . CTA pulmonary with no evidence of PE. Bilateral US Doppler negative for DVT.   -Received Cefepime and Levaquin IV in ED, would hold   -BIPAP  -Solumedrol IV  -Symptomatic management  -Repeat ABG's    Acute hypomagnesemia  -Received 2 g of magnesium in ED  -Monitor and replete  -Telemetry    OUD  -On Suboxone  BID    ADHD  -Continue Adderal    KING  -Non adherent to CPAP    Obesity class 1 with BMI 34.3  -Educated about lifestyle changes    Chronic Hepatitis without coma       DVT-PPX: Lovenox  Diet: General      Medications:   Medications:    magnesium sulfate        aspirin  81 mg Oral Daily    traZODone  300 mg Oral Nightly    budesonide-formoterol  2 puff Inhalation BID    sennosides-docusate sodium  1 tablet Oral BID    pregabalin  100 mg Oral TID    sodium chloride flush  5-40 mL IntraVENous 2 times per day    enoxaparin  40 mg SubCUTAneous Daily    ipratropium  0.5 mg Nebulization Q4H WA    methylPREDNISolone  40 mg IntraVENous Q6H    Followed by   Shahida Urias ON 2022] predniSONE  40 mg Oral Daily    cefTRIAXone (ROCEPHIN) IV  1,000 mg IntraVENous Q24H    azithromycin  500 mg IntraVENous Q24H    buprenorphine-naloxone  1 Film SubLINGual BID    amphetamine-dextroamphetamine  30 mg Oral Daily      Infusions:    sodium chloride       PRN Meds: sodium chloride flush, 5-40 mL, PRN  sodium chloride, 25 mL, PRN  ondansetron, 4 mg, Q8H PRN   Or  ondansetron, 4 mg, Q6H PRN  polyethylene glycol, 17 g, Daily PRN  acetaminophen, 650 mg, Q6H PRN   Or  acetaminophen, 650 mg, Q6H PRN        Current Facility-Administered Medications:     magnesium sulfate 2 GM/50ML infusion, , , ,     aspirin chewable tablet 81 mg, 81 mg, Oral, Daily, KELLY Jansen - CNP, 81 mg at 02/06/22 0954    traZODone (DESYREL) tablet 300 mg, 300 mg, Oral, Nightly, KELLY Carranza CNP    budesonide-formoterol (SYMBICORT) 160-4.5 MCG/ACT inhaler 2 puff, 2 puff, Inhalation, BID, KELLY Carranza CNP    sennosides-docusate sodium (SENOKOT-S) 8.6-50 MG tablet 1 tablet, 1 tablet, Oral, BID, KELLY Jansen - CNP, 1 tablet at 02/06/22 0954    pregabalin (LYRICA) capsule 100 mg, 100 mg, Oral, TID, KELLY Jansen - CNP, 100 mg at 02/06/22 0954    sodium chloride flush 0.9 % injection 5-40 mL, 5-40 mL, IntraVENous, 2 times per day, KELLY Jansen - CNP, 10 mL at 02/06/22 0956    sodium chloride flush 0.9 % injection 5-40 mL, 5-40 mL, IntraVENous, PRN, KELLY Jansen - CNP    0.9 % sodium chloride infusion, 25 mL, IntraVENous, PRN, KELLY Jansen - CNP    ondansetron (ZOFRAN-ODT) disintegrating tablet 4 mg, 4 mg, Oral, Q8H PRN **OR** ondansetron (ZOFRAN) injection 4 mg, 4 mg, IntraVENous, Q6H PRN, KELLY Jansen - CNP    polyethylene glycol (GLYCOLAX) packet 17 g, 17 g, Oral, Daily PRN, KELLY Jansen - CNP    enoxaparin (LOVENOX) injection 40 mg, 40 mg, SubCUTAneous, Daily, KELLY Jansen CNP, 40 mg at 02/06/22 0954    acetaminophen (TYLENOL) tablet 650 mg, 650 mg, Oral, Q6H PRN **OR** acetaminophen (TYLENOL) suppository 650 mg, 650 mg, Rectal, Q6H PRN, KELLY Jansen CNP    ipratropium (ATROVENT) 0.02 % nebulizer solution 0.5 mg, 0.5 mg, Nebulization, Q4H WA, KELLY Jansen CNP    methylPREDNISolone sodium each, Rfl: 5    BiPAP Machine MISC, by Does not apply route Use nightly, Disp: , Rfl:     aspirin 81 MG chewable tablet, Take 81 mg by mouth daily, Disp: , Rfl:     Multiple Vitamins-Minerals (WOMENS MULTIVITAMIN PO), Take 1 tablet by mouth daily, Disp: , Rfl:     amphetamine-dextroamphetamine (ADDERALL XR) 30 MG extended release capsule, Take 30 mg by mouth daily. , Disp: , Rfl:     pregabalin (LYRICA) 100 MG capsule, Take 100 mg by mouth 3 times daily. , Disp: , Rfl:     traZODone (DESYREL) 150 MG tablet, Take 300 mg by mouth nightly , Disp: , Rfl:     OXYGEN, Inhale 4 L into the lungs continuous , Disp: , Rfl:     buprenorphine-naloxone (SUBOXONE) 8-2 MG FILM SL film, Place 1 Film under the tongue 3 times daily. , Disp: , Rfl:     History of present illness     Chief Complaint: Shortness of Breath      Saúl Lebron is a 63 y.o.  female  who presents with worsening shortness of breath that has been going on for the past 3 days. Patient has a history of COPD and is on 2 L of oxygen and BiPAP at home. She follows up with Dr. Andrew Burgess. Patient was found to be hypoxic by EMS and was given breathing treatment and 125 mg of Solu-Medrol. She has right foot immobilizer from recent injury. She has received both for her Covid vaccine. Denies fever, chills, hemoptysis, chest pain, abdominal pain, N/V/D. History of ADHD, OUD, KING, Obesity, and Hepatitis. Review of Systems   Ten point ROS reviewed and negative, unless as noted below. GENERAL:  Denies fever, chills, night sweats, or changes in weight. EYES:  Denies recent visual changes. ENT:  Denies ear pain, hearing loss or tinnitus  RESP:  Denies any cough, dyspnea, or wheezing. CV:  Denies any chest pain with exertion or at rest, palpitations, syncope, or edema.   GI:  Denies any dysphagia, nausea, vomiting, abdominal pain, heartburn, changes in bowel habit, melena or rectal bleeding  MUSCULOSKELETAL:  Denies any joint swelling, joint pain, or loss of range of motion. NEURO:  Denies any headaches, tremors, dizziness, vertigo, memory loss, confusion, weakness, numbness or tingling. PSYCH:  Denies any sleeping problems, history of abuse, marital discord. HEME/LYMPHATIC/IMMUNO:  Denies , bruising, bleeding abnormalities   ENDO:  Denies any heat or cold intolerance, panemiaolyuria or polydipsia. Objective:   No intake or output data in the 24 hours ending 22 1151   Vitals:   Vitals:    22 1115   BP: 125/75   Pulse: 91   Resp: 17   Temp:    SpO2:      Physical Exam:   Gen:  awake, alert, cooperative, mild distress. Ill-appearing. Obesity. EYES:Lids and lashes normal, pupils equal, round ,extra ocular muscles intact, sclera clear, conjunctiva normal  ENT:  Normocephalic, oral pharynx with moist mucus membranes  NECK:  Supple, symmetrical, trachea midline, no adenopathy,  LUNGS: Diminished bilaterally with diffuse wheezing noted. Hypoxic on BiPAP  CARDIOVASCULAR: Tachycardic, normal S1 and S2,no murmur noted, peripheral pulses 2+, no pitting edema  ABDOMEN: Normal BS, Non tender, non distended, no HSM noted. MUSCULOSKELETAL: Immobilizer on right foot. Sensation intact. NEUROLOGIC: AOx 3,  Cranial nerves II-XII are grossly intact. Motor is 5 out of 5 bilaterally. Sensory is intact, no lateralizing findings. SKIN:  no bruising or bleeding, normal skin color, turgor, no redness, warmth, or swelling      Past Medical History:      Past Medical History:   Diagnosis Date    ADHD     [facility]     COVID-19     Drug addiction in remission ([facility]     recovering addict    Hep C w/o coma, chronic ([facility]     Pacemaker     Sleep apnea     TIA (transient ischemic attack)     per patient \"several mini strokes\"     PSHX:  has a past surgical history that includes  section (); Cholecystectomy (); and Pacemaker insertion ().     Allergies: No Known Allergies    FAM HX: family history includes Alcohol Abuse in her father; Cancer in her mother; Coronary Art Dis in her brother; Lupus in her brother; Other in her sister; Thyroid Cancer in her mother. Family history reviewed and is essentially negative unless as stated above. Soc HX:   Social History     Socioeconomic History    Marital status: Single     Spouse name: Not on file    Number of children: Not on file    Years of education: Not on file    Highest education level: Not on file   Occupational History    Not on file   Tobacco Use    Smoking status: Former Smoker     Packs/day: 1.00     Years: 40.00     Pack years: 40.00     Quit date: 2016     Years since quittin.4    Smokeless tobacco: Never Used   Substance and Sexual Activity    Alcohol use: Not Currently     Comment: occasional caffeine     Drug use: Not Currently     Comment: heroine    Sexual activity: Not on file   Other Topics Concern    Not on file   Social History Narrative    Not on file     Social Determinants of Health     Financial Resource Strain:     Difficulty of Paying Living Expenses: Not on file   Food Insecurity:     Worried About Running Out of Food in the Last Year: Not on file    Devendra of Food in the Last Year: Not on file   Transportation Needs:     Lack of Transportation (Medical): Not on file    Lack of Transportation (Non-Medical):  Not on file   Physical Activity:     Days of Exercise per Week: Not on file    Minutes of Exercise per Session: Not on file   Stress:     Feeling of Stress : Not on file   Social Connections:     Frequency of Communication with Friends and Family: Not on file    Frequency of Social Gatherings with Friends and Family: Not on file    Attends Evangelical Services: Not on file    Active Member of Clubs or Organizations: Not on file    Attends Club or Organization Meetings: Not on file    Marital Status: Not on file   Intimate Partner Violence:     Fear of Current or Ex-Partner: Not on file   Ranulfo Emotionally Abused: Not on file    Physically Abused: Not on file    Sexually Abused: Not on file   Housing Stability:     Unable to Pay for Housing in the Last Year: Not on file    Number of Places Lived in the Last Year: Not on file    Unstable Housing in the Last Year: Not on file       Electronically signed by KELLY Mo CNP on 2/6/2022 at 11:51 AM

## 2022-02-07 LAB
BASE EXCESS MIXED: 15.4 (ref 0–2.3)
CARBON MONOXIDE, BLOOD: 2.1 % (ref 0–5)
CO2 CONTENT: 46.7 MMOL/L (ref 19–24)
COMMENT: ABNORMAL
EKG ATRIAL RATE: 134 BPM
EKG DIAGNOSIS: NORMAL
EKG P AXIS: 74 DEGREES
EKG P-R INTERVAL: 144 MS
EKG Q-T INTERVAL: 282 MS
EKG QRS DURATION: 92 MS
EKG QTC CALCULATION (BAZETT): 421 MS
EKG R AXIS: 71 DEGREES
EKG T AXIS: 55 DEGREES
EKG VENTRICULAR RATE: 134 BPM
ESTIMATED AVERAGE GLUCOSE: 128 MG/DL
GLUCOSE BLD-MCNC: 134 MG/DL (ref 70–99)
GLUCOSE BLD-MCNC: 199 MG/DL (ref 70–99)
GLUCOSE BLD-MCNC: 386 MG/DL (ref 70–99)
HBA1C MFR BLD: 6.1 % (ref 4.2–6.3)
HCO3 ARTERIAL: 44.4 MMOL/L (ref 18–23)
HCT VFR BLD CALC: 39.7 % (ref 37–47)
HEMOGLOBIN: 11.5 GM/DL (ref 12.5–16)
LEGIONELLA URINARY AG: NEGATIVE
MCH RBC QN AUTO: 26.9 PG (ref 27–31)
MCHC RBC AUTO-ENTMCNC: 29 % (ref 32–36)
MCV RBC AUTO: 93 FL (ref 78–100)
METHEMOGLOBIN ARTERIAL: 0.9 %
O2 SATURATION: 97.4 % (ref 96–97)
PCO2 ARTERIAL: 75 MMHG (ref 32–45)
PDW BLD-RTO: 13.1 % (ref 11.7–14.9)
PH BLOOD: 7.38 (ref 7.34–7.45)
PLATELET # BLD: 190 K/CU MM (ref 140–440)
PMV BLD AUTO: 10.2 FL (ref 7.5–11.1)
PO2 ARTERIAL: 169 MMHG (ref 75–100)
RBC # BLD: 4.27 M/CU MM (ref 4.2–5.4)
STREP PNEUMONIAE ANTIGEN: NORMAL
WBC # BLD: 8 K/CU MM (ref 4–10.5)

## 2022-02-07 PROCEDURE — 94761 N-INVAS EAR/PLS OXIMETRY MLT: CPT

## 2022-02-07 PROCEDURE — 2140000000 HC CCU INTERMEDIATE R&B

## 2022-02-07 PROCEDURE — 2580000003 HC RX 258: Performed by: NURSE PRACTITIONER

## 2022-02-07 PROCEDURE — 83036 HEMOGLOBIN GLYCOSYLATED A1C: CPT

## 2022-02-07 PROCEDURE — 6370000000 HC RX 637 (ALT 250 FOR IP): Performed by: NURSE PRACTITIONER

## 2022-02-07 PROCEDURE — 2700000000 HC OXYGEN THERAPY PER DAY

## 2022-02-07 PROCEDURE — 82962 GLUCOSE BLOOD TEST: CPT

## 2022-02-07 PROCEDURE — 36600 WITHDRAWAL OF ARTERIAL BLOOD: CPT

## 2022-02-07 PROCEDURE — 94640 AIRWAY INHALATION TREATMENT: CPT

## 2022-02-07 PROCEDURE — 94660 CPAP INITIATION&MGMT: CPT

## 2022-02-07 PROCEDURE — 85027 COMPLETE CBC AUTOMATED: CPT

## 2022-02-07 PROCEDURE — 99222 1ST HOSP IP/OBS MODERATE 55: CPT | Performed by: INTERNAL MEDICINE

## 2022-02-07 PROCEDURE — 36415 COLL VENOUS BLD VENIPUNCTURE: CPT

## 2022-02-07 PROCEDURE — 6360000002 HC RX W HCPCS: Performed by: NURSE PRACTITIONER

## 2022-02-07 PROCEDURE — 6370000000 HC RX 637 (ALT 250 FOR IP): Performed by: INTERNAL MEDICINE

## 2022-02-07 PROCEDURE — 93010 ELECTROCARDIOGRAM REPORT: CPT | Performed by: INTERNAL MEDICINE

## 2022-02-07 RX ORDER — GUAIFENESIN 600 MG/1
600 TABLET, EXTENDED RELEASE ORAL 2 TIMES DAILY
Status: DISCONTINUED | OUTPATIENT
Start: 2022-02-07 | End: 2022-02-15 | Stop reason: HOSPADM

## 2022-02-07 RX ORDER — DEXTROSE MONOHYDRATE 50 MG/ML
100 INJECTION, SOLUTION INTRAVENOUS PRN
Status: DISCONTINUED | OUTPATIENT
Start: 2022-02-07 | End: 2022-02-15 | Stop reason: HOSPADM

## 2022-02-07 RX ORDER — NICOTINE POLACRILEX 4 MG
15 LOZENGE BUCCAL PRN
Status: DISCONTINUED | OUTPATIENT
Start: 2022-02-07 | End: 2022-02-15 | Stop reason: HOSPADM

## 2022-02-07 RX ORDER — DEXTROSE MONOHYDRATE 25 G/50ML
12.5 INJECTION, SOLUTION INTRAVENOUS PRN
Status: DISCONTINUED | OUTPATIENT
Start: 2022-02-07 | End: 2022-02-07 | Stop reason: RX

## 2022-02-07 RX ADMIN — METHYLPREDNISOLONE SODIUM SUCCINATE 40 MG: 40 INJECTION, POWDER, FOR SOLUTION INTRAMUSCULAR; INTRAVENOUS at 03:55

## 2022-02-07 RX ADMIN — SENNOSIDES AND DOCUSATE SODIUM 1 TABLET: 50; 8.6 TABLET ORAL at 09:37

## 2022-02-07 RX ADMIN — GUAIFENESIN 600 MG: 600 TABLET, EXTENDED RELEASE ORAL at 11:53

## 2022-02-07 RX ADMIN — ASPIRIN 81 MG 81 MG: 81 TABLET ORAL at 09:38

## 2022-02-07 RX ADMIN — BUPRENORPHINE AND NALOXONE 1 FILM: 8; 2 FILM, SOLUBLE BUCCAL; SUBLINGUAL at 09:42

## 2022-02-07 RX ADMIN — METHYLPREDNISOLONE SODIUM SUCCINATE 40 MG: 40 INJECTION, POWDER, FOR SOLUTION INTRAMUSCULAR; INTRAVENOUS at 09:38

## 2022-02-07 RX ADMIN — SODIUM CHLORIDE, PRESERVATIVE FREE 10 ML: 5 INJECTION INTRAVENOUS at 03:44

## 2022-02-07 RX ADMIN — METHYLPREDNISOLONE SODIUM SUCCINATE 40 MG: 40 INJECTION, POWDER, FOR SOLUTION INTRAMUSCULAR; INTRAVENOUS at 15:42

## 2022-02-07 RX ADMIN — BUDESONIDE AND FORMOTEROL FUMARATE DIHYDRATE 2 PUFF: 160; 4.5 AEROSOL RESPIRATORY (INHALATION) at 08:03

## 2022-02-07 RX ADMIN — ENOXAPARIN SODIUM 40 MG: 100 INJECTION SUBCUTANEOUS at 09:38

## 2022-02-07 RX ADMIN — ALBUTEROL SULFATE 2 PUFF: 90 AEROSOL, METERED RESPIRATORY (INHALATION) at 08:00

## 2022-02-07 RX ADMIN — SODIUM CHLORIDE, PRESERVATIVE FREE 10 ML: 5 INJECTION INTRAVENOUS at 09:43

## 2022-02-07 RX ADMIN — PREGABALIN 100 MG: 50 CAPSULE ORAL at 09:37

## 2022-02-07 ASSESSMENT — PAIN SCALES - GENERAL
PAINLEVEL_OUTOF10: 0

## 2022-02-07 NOTE — PROGRESS NOTES
Hospitalist Progress Note      PCP: Malorie Dejesus MD    Date of Admission: 2022    Chief Complaint on Admission: SOB    Pt Seen/Examined and Chart Reviewed. Admitting dx acute on chronic resp failure, viral pneumonia    SUBJECTIVE:     Just came up from ED, on bipap, able to eat with NC this am, on 10 L NC, son at bedside, right ankle fracture in splint      OBJECTIVE:   Vitals    TEMPERATURE:  Current - Temp: 98.3 °F (36.8 °C); Max - Temp  Av.4 °F (36.3 °C)  Min: 96.3 °F (35.7 °C)  Max: 98.3 °F (36.8 °C)  RESPIRATIONS RANGE: Resp  Avg: 15.1  Min: 10  Max: 27  PULSE RANGE: Pulse  Av  Min: 62  Max: 106  BLOOD PRESSURE RANGE:  Systolic (54AOV), PNX:589 , Min:95 , JESSICA:528   ; Diastolic (62MEA), BSL:87, Min:57, Max:71    PULSE OXIMETRY RANGE: SpO2  Av.7 %  Min: 85 %  Max: 99 %  24HR INTAKE/OUTPUT:    Intake/Output Summary (Last 24 hours) at 2022 1455  Last data filed at 2022 1249  Gross per 24 hour   Intake 10 ml   Output 900 ml   Net -890 ml       Exam:    General appearance: Well, no apparent distress, appears stated age and cooperative. HEENT Normal cephalic, atraumatic without obvious deformity. Pupils equal, round, and reactive to light. Extra ocular muscles intact. Conjunctivae/corneas clear. Neck: Supple, No jugular venous distention/bruits. Trachea midline without thyromegaly or adenopathy with full range of motion. Lungs: bilateral coarse rhonchi, exp diffuse bilateral wheezing  Heart: Regular rate and rhythm with Normal S1/S2 without  murmurs, rubs or gallops, point of maximum impulse non-displaced  Abdomen: Soft, non-tender or non-distended without rigidity or guarding and positive bowel sounds all four quadrants. Extremities: No clubbing, cyanosis, or edema bilaterally. Full range of motion without deformity   Skin: Skin color, texture, turgor normal. No rashes or lesions.   Neurologic: Alert and oriented X 3,  neurovascularly intact with sensory/motor intact upper extremities/lower extremities, bilaterally. Cranial nerves:II-XII intact, grossly non-focal.  Mental status: Alert, oriented, thought content appropriate. Data    Recent Labs     02/06/22  0547 02/07/22  0710   WBC 17.0* 8.0   HGB 13.3 11.5*   HCT 46.3 39.7    190      Recent Labs     02/06/22  0547      K 3.5   CL 90*   CO2 35*   BUN 8   CREATININE 0.4*     Recent Labs     02/06/22  0547   AST 32   ALT 16   BILITOT 0.4   ALKPHOS 119     No results for input(s): INR in the last 72 hours. No results for input(s): CKTOTAL, CKMB, CKMBINDEX, TROPONINI in the last 72 hours. Imaging/Test Results    No DVR  Chest CT no PE but hilar and mediastinal LAD, f/u CT 3 months        Consults:     IP CONSULT TO HOSPITALIST  IP CONSULT TO PULMONOLOGY    Allergies  Patient has no known allergies.     Medications      Scheduled Meds:   guaiFENesin  600 mg Oral BID    insulin lispro  0-6 Units SubCUTAneous TID WC    insulin lispro  0-3 Units SubCUTAneous Nightly    aspirin  81 mg Oral Daily    traZODone  300 mg Oral Nightly    budesonide-formoterol  2 puff Inhalation BID    sennosides-docusate sodium  1 tablet Oral BID    pregabalin  100 mg Oral TID    sodium chloride flush  5-40 mL IntraVENous 2 times per day    enoxaparin  40 mg SubCUTAneous Daily    methylPREDNISolone  40 mg IntraVENous Q6H    Followed by   Tawanda Giron ON 2/8/2022] predniSONE  40 mg Oral Daily    buprenorphine-naloxone  1 Film SubLINGual BID    amphetamine-dextroamphetamine  30 mg Oral Daily    albuterol sulfate HFA  2 puff Inhalation BID    ipratropium  2 puff Inhalation BID       Infusions:   dextrose      sodium chloride         PRN Meds:  glucose, glucagon (rDNA), dextrose, dextrose bolus (hypoglycemia) **OR** dextrose bolus (hypoglycemia), sodium chloride flush, sodium chloride, ondansetron **OR** ondansetron, polyethylene glycol, acetaminophen **OR** acetaminophen    ASSESSMENT AND PLAN      Active Hospital Problems Diagnosis Date Noted    Acute on chronic respiratory failure with hypoxia and hypercapnia (HCC) [P84.03, J96.22] 05/23/2021       Acute on chronic respiratory failure with hypoxia and hypercapnia secondary to COPD exacerbation/Human Metapneumovirus  Atypical viral pneumonia     CTA pulmonary with no evidence of PE. Bilateral US Doppler negative for DVT. -Received Cefepime and Levaquin IV in ED, likely viral etiology, will hold antibiotics for now, f/u pulm recs, pulm consulted  -BIPAP  -Solumedrol IV  -Symptomatic management  -mucinex, nebs  Check urine strep/legionella  Chest CT with Patchy nodular opacities throughout both lungs, likely infectious etiology   bilateral hilar adenopathy.   increased mediastinal adenopathy. Follow up chest CT in 3 months  Echo with EF 55%     Acute hypomagnesemia  -Received 2 g of magnesium in ED  -Monitor and replete  -Telemetry    S/p pacer--cardio follow up     Chronic pain syndrome--OUD  -On Suboxone  BID     ADHD  -Continue Adderal     KING  -Non adherent to CPAP     Obesity class 1 with BMI 34.3  -Educated about lifestyle changes     Chronic Hepatitis without coma       hyperglycemia--check a1c, SSI, d/t IV steroids    Recent right ankle fracture s/p ORIF--f/u Dr. Angy Larsen on 2/14, WBAT with RLE walking boot, PT/OT consulted, recent stay in ARU    PT/OT Eval Status: ordered    DVT Prophylaxis: SQ lovenox  Diet: ADULT DIET;  Regular  Code Status: Full Code      Dispo - IV steroids, bipap, f/u pulm consult recs, pt follows with Dr. Rodney Villagomez, wean o2 as tolerated on 60% fio2 and 10L, PT/OT, likely [facility], f/u chest CT in 3 months, likely d/c 3-4 days pending clinical improvement, step down with tele    Diana Khan MD

## 2022-02-07 NOTE — PLAN OF CARE
Problem: Falls - Risk of:  Goal: Will remain free from falls  Description: Will remain free from falls  Outcome: Met This Shift  Goal: Absence of physical injury  Description: Absence of physical injury  Outcome: Met This Shift     Problem: OXYGENATION/RESPIRATORY FUNCTION  Goal: Patient will maintain patent airway  Outcome: Met This Shift  Goal: Patient will achieve/maintain normal respiratory rate/effort  Description: Respiratory rate and effort will be within normal limits for the patient  Outcome: Met This Shift     Problem: SKIN INTEGRITY  Goal: Skin integrity is maintained or improved  Outcome: Met This Shift     Problem: NUTRITION  Goal: Nutritional status is improving  Outcome: Met This Shift

## 2022-02-07 NOTE — ED NOTES
paulina wanting to be given a break from [facility].   Placed on high flow O2 @ 10liters     Alaina Cash RN  02/07/22 6302

## 2022-02-07 NOTE — CARE COORDINATION
.CM met with pt for d/c planning. Introduced self and updated white board. Pt lives alone in an apt building. Her daughter and aide provide her transportation. She has a PCP, has insurance, and is able to afford her medication. Pt has home O2, a bi-pap, c-pap, and BSC. She is on the Waiver program thru the [facility]. She has an aide 7 days a week for 7 hrs a day from [facility]. Pt denies any new d/c needs at this time. D/c plan is home alone with [facility]. Notify CM if any new d/c needs arise.   TE     Notify [facility] Penny Corral at [phone] and fax the AVS, Penny Corral order & AVS to [phone] when pt is discharged.  MINERVA

## 2022-02-07 NOTE — PROGRESS NOTES
Patient admitted from ED  A&Ox4, drowsy, but arousable  VSS, SpO2 96% 10L NC  Telemetry monitor placed, Bed alarm activated  Will con't to monitor.

## 2022-02-07 NOTE — ED NOTES
While rounding on patient, pt found with oxygen canula out of nose and a 02 sat of 85%. Changed 02 canula to high flow and increased pt to 9L.    Pt 02  increased to 96%     Nidia Herring RN  02/06/22 550 Kelsy Murray RN  02/06/22 2029

## 2022-02-08 ENCOUNTER — APPOINTMENT (OUTPATIENT)
Dept: GENERAL RADIOLOGY | Age: 62
DRG: 189 | End: 2022-02-08
Payer: MEDICARE

## 2022-02-08 LAB
BASE EXCESS MIXED: 19 (ref 0–2.3)
BASE EXCESS MIXED: 23.6 (ref 0–2.3)
CARBON MONOXIDE, BLOOD: 1.7 % (ref 0–5)
CARBON MONOXIDE, BLOOD: 1.8 % (ref 0–5)
CO2 CONTENT: 55.7 MMOL/L (ref 19–24)
CO2 CONTENT: 56.6 MMOL/L (ref 19–24)
COMMENT: ABNORMAL
COMMENT: ABNORMAL
GLUCOSE BLD-MCNC: 114 MG/DL (ref 70–99)
GLUCOSE BLD-MCNC: 179 MG/DL (ref 70–99)
GLUCOSE BLD-MCNC: 192 MG/DL (ref 70–99)
GLUCOSE BLD-MCNC: 192 MG/DL (ref 70–99)
HCO3 ARTERIAL: 52.1 MMOL/L (ref 18–23)
HCO3 ARTERIAL: 53.9 MMOL/L (ref 18–23)
METHEMOGLOBIN ARTERIAL: 1.3 %
METHEMOGLOBIN ARTERIAL: 1.4 %
O2 SATURATION: 94.3 % (ref 96–97)
O2 SATURATION: 96.1 % (ref 96–97)
PCO2 ARTERIAL: 116 MMHG (ref 32–45)
PCO2 ARTERIAL: 87 MMHG (ref 32–45)
PH BLOOD: 7.26 (ref 7.34–7.45)
PH BLOOD: 7.4 (ref 7.34–7.45)
PO2 ARTERIAL: 109 MMHG (ref 75–100)
PO2 ARTERIAL: 76 MMHG (ref 75–100)

## 2022-02-08 PROCEDURE — 6370000000 HC RX 637 (ALT 250 FOR IP): Performed by: INTERNAL MEDICINE

## 2022-02-08 PROCEDURE — 82962 GLUCOSE BLOOD TEST: CPT

## 2022-02-08 PROCEDURE — 71045 X-RAY EXAM CHEST 1 VIEW: CPT

## 2022-02-08 PROCEDURE — 94660 CPAP INITIATION&MGMT: CPT

## 2022-02-08 PROCEDURE — 6360000002 HC RX W HCPCS: Performed by: NURSE PRACTITIONER

## 2022-02-08 PROCEDURE — 99232 SBSQ HOSP IP/OBS MODERATE 35: CPT | Performed by: INTERNAL MEDICINE

## 2022-02-08 PROCEDURE — 2700000000 HC OXYGEN THERAPY PER DAY

## 2022-02-08 PROCEDURE — 94640 AIRWAY INHALATION TREATMENT: CPT

## 2022-02-08 PROCEDURE — 82803 BLOOD GASES ANY COMBINATION: CPT

## 2022-02-08 PROCEDURE — 6370000000 HC RX 637 (ALT 250 FOR IP): Performed by: NURSE PRACTITIONER

## 2022-02-08 PROCEDURE — 2580000003 HC RX 258: Performed by: NURSE PRACTITIONER

## 2022-02-08 PROCEDURE — 2140000000 HC CCU INTERMEDIATE R&B

## 2022-02-08 PROCEDURE — 36600 WITHDRAWAL OF ARTERIAL BLOOD: CPT

## 2022-02-08 RX ORDER — LEVOFLOXACIN 500 MG/1
500 TABLET, FILM COATED ORAL DAILY
Status: COMPLETED | OUTPATIENT
Start: 2022-02-08 | End: 2022-02-14

## 2022-02-08 RX ADMIN — SODIUM CHLORIDE, PRESERVATIVE FREE 10 ML: 5 INJECTION INTRAVENOUS at 00:27

## 2022-02-08 RX ADMIN — SODIUM CHLORIDE, PRESERVATIVE FREE 10 ML: 5 INJECTION INTRAVENOUS at 08:34

## 2022-02-08 RX ADMIN — SODIUM CHLORIDE, PRESERVATIVE FREE 10 ML: 5 INJECTION INTRAVENOUS at 22:55

## 2022-02-08 RX ADMIN — LEVOFLOXACIN 500 MG: 500 TABLET, FILM COATED ORAL at 17:39

## 2022-02-08 RX ADMIN — METHYLPREDNISOLONE SODIUM SUCCINATE 40 MG: 40 INJECTION, POWDER, FOR SOLUTION INTRAMUSCULAR; INTRAVENOUS at 05:03

## 2022-02-08 RX ADMIN — TRAZODONE HYDROCHLORIDE 300 MG: 50 TABLET ORAL at 00:30

## 2022-02-08 RX ADMIN — PREDNISONE 40 MG: 20 TABLET ORAL at 09:42

## 2022-02-08 RX ADMIN — BUPRENORPHINE AND NALOXONE 0.5 FILM: 8; 2 FILM, SOLUBLE BUCCAL; SUBLINGUAL at 00:30

## 2022-02-08 RX ADMIN — METHYLPREDNISOLONE SODIUM SUCCINATE 40 MG: 40 INJECTION, POWDER, FOR SOLUTION INTRAMUSCULAR; INTRAVENOUS at 00:27

## 2022-02-08 RX ADMIN — SENNOSIDES AND DOCUSATE SODIUM 1 TABLET: 50; 8.6 TABLET ORAL at 22:56

## 2022-02-08 RX ADMIN — BUPRENORPHINE AND NALOXONE 1 FILM: 8; 2 FILM, SOLUBLE BUCCAL; SUBLINGUAL at 22:57

## 2022-02-08 RX ADMIN — ALBUTEROL SULFATE 2 PUFF: 90 AEROSOL, METERED RESPIRATORY (INHALATION) at 20:18

## 2022-02-08 RX ADMIN — GUAIFENESIN 600 MG: 600 TABLET, EXTENDED RELEASE ORAL at 00:30

## 2022-02-08 RX ADMIN — SODIUM CHLORIDE, PRESERVATIVE FREE 10 ML: 5 INJECTION INTRAVENOUS at 05:03

## 2022-02-08 RX ADMIN — GUAIFENESIN 600 MG: 600 TABLET, EXTENDED RELEASE ORAL at 22:56

## 2022-02-08 RX ADMIN — ENOXAPARIN SODIUM 40 MG: 100 INJECTION SUBCUTANEOUS at 08:35

## 2022-02-08 RX ADMIN — ASPIRIN 81 MG 81 MG: 81 TABLET ORAL at 09:42

## 2022-02-08 RX ADMIN — PREGABALIN 100 MG: 50 CAPSULE ORAL at 00:30

## 2022-02-08 ASSESSMENT — PAIN SCALES - GENERAL
PAINLEVEL_OUTOF10: 0
PAINLEVEL_OUTOF10: 8
PAINLEVEL_OUTOF10: 0

## 2022-02-08 ASSESSMENT — PAIN DESCRIPTION - PAIN TYPE: TYPE: CHRONIC PAIN

## 2022-02-08 ASSESSMENT — PAIN DESCRIPTION - LOCATION: LOCATION: GENERALIZED

## 2022-02-08 ASSESSMENT — PAIN DESCRIPTION - DESCRIPTORS: DESCRIPTORS: ACHING

## 2022-02-08 ASSESSMENT — PAIN DESCRIPTION - ORIENTATION: ORIENTATION: RIGHT;LEFT

## 2022-02-08 NOTE — PROGRESS NOTES
Pulmonary and Critical Care  Progress Note      VITALS:  BP (!) 101/59   Pulse 63   Temp 97.6 °F (36.4 °C) (Axillary)   Resp 14   Ht 5' 4\" (1.626 m)   Wt 201 lb 4.5 oz (91.3 kg)   SpO2 96%   BMI 34.55 kg/m²     Subjective:   CHIEF COMPLAINT :SOB     HPI:                The patient is sitting in the bed. She is on the BIPAP. She is in mild resp distress    Objective:   PHYSICAL EXAM:    LUNGS:Occasional basal crackles  Abd-soft, BS+,NT  Ext- no pedal edema  CVS-s1s2, no murmurs      DATA:    CBC:  Recent Labs     02/06/22  0547 02/07/22  0710   WBC 17.0* 8.0   RBC 4.95 4.27   HGB 13.3 11.5*   HCT 46.3 39.7    190   MCV 93.5 93.0   MCH 26.9* 26.9*   MCHC 28.7* 29.0*   RDW 13.2 13.1   SEGSPCT 65.8  --       BMP:  Recent Labs     02/06/22  0547      K 3.5   CL 90*   CO2 35*   BUN 8   CREATININE 0.4*   CALCIUM 8.6   GLUCOSE 207*      ABG:  Recent Labs     02/06/22  2045 02/06/22  2300 02/08/22  0845   PH 7.32* 7.38 7.26*   PO2ART 83 169* 109*   FZW2HUE 90.0* 75.0* 116.0*   O2SAT 94.9* 97.4* 96.1     BNP  No results found for: BNP   D-Dimer:  Lab Results   Component Value Date    DDIMER 745 (H) 02/06/2022      1.  Radiology: None      Assessment/Plan     Patient Active Problem List    Diagnosis Date Noted    Uncontrolled pain     Generalized weakness     Gait disturbance     Myoclonus     Near syncope     Moderate protein-calorie malnutrition ([address]     Traumatic closed nondisplaced fracture of distal end of right fibula, initial encounter 01/11/2022    Asterixis     Hypercapnia     Chronic rhinitis 01/05/2022    Fall at home, initial encounter 01/05/2022    COPD exacerbation ([address] 07/09/2021    Acute exacerbation of chronic obstructive pulmonary disease (COPD) ([address] 06/26/2021    Acute on chronic respiratory failure with hypoxia and hypercapnia ([facility] 05/23/2021    COVID-19 05/23/2021    [facility]monary emph[facility]sema [facility] 05/18/2021    SOB (shortness of breath) 05/18/2021    Leg swelling

## 2022-02-08 NOTE — PROGRESS NOTES
Hospitalist Progress Note       2/8/2022 4:44 PM  Admit Date: 2/6/2022    PCP: Kristine Herrera MD     Assessment and Plan:   1. Acute on chronic hypoxic and hypercapnic respiratory failure: Due to COPD exacerbation. Elevated D-dimer but chest CTA-no PE but patchy nodular opacities. Ultrasound-no DVT. Continue BiPAP. Pulmonary consult appreciated. 2. Acute exacerbation of COPD: Aggressive nebulization treatment, prednisone and Levaquin. 3. Acute metabolic encephalopathy: This is due to hypercapnia. Continue BiPAP and avoid oversedation. 4. Chronic pain syndrome: On Suboxone. Chronic problems include history of pacemaker in situ, ADHD, obesity and obstructive sleep apnea (not compliant with CPAP). DVT prophylaxis: Lovenox  Disposition: She lives alone. She is on Medicare services.     Patient Active Problem List:     Chronic respiratory failure (HCC)     COPD, severe ([address]     Obstructive sleep apnea     Acute respiratory failure with hypercapnia (HCC)     Pneumonia     Chronic hepatitis C without hepatic coma (HCC)     S/P placement of cardiac pacemaker     Gastroesophageal reflux disease without esophagitis     H/O drug abuse (HCC)     Pulmonary nodules     ADHD (attention deficit hyperactivity disorder)     Pulmonary emphysema (HCC)     SOB (shortness of breath)     Leg swelling     Acute on chronic respiratory failure with hypoxia and hypercapnia (HCC)     COVID-19     Acute exacerbation of chronic obstructive pulmonary disease (COPD) ([address]     COPD exacerbation (HCC)     Chronic rhinitis     Fall at home, initial encounter     Asterixis     Hypercapnia     Traumatic closed nondisplaced fracture of distal end of right fibula, initial encounter     Uncontrolled pain     Generalized weakness     Gait disturbance     Myoclonus     Near syncope     Moderate protein-calorie malnutrition (HCC)      Subjective:     Chief Complaint   Patient presents with    Shortness of Breath       F/U:  Interval History: Discussed with the nursing staff. Patient is lethargic this morning and ABG revealed worsening hypercapnia-placed on BiPAP. Objective: Intake/Output Summary (Last 24 hours) at 2/8/2022 1644  Last data filed at 2/8/2022 0946  Gross per 24 hour   Intake 20 ml   Output 450 ml   Net -430 ml      Vitals:   Vitals:    02/08/22 1523   BP: 124/73   Pulse: 69   Resp: 15   Temp:    SpO2: 95%     Physical Exam:  General: In respiratory distress-on BiPAP. Eyes: Not pale. Anicteric. Neck: No neck mass or thyromegaly  Neurology: Drowsy, but arousable. She follows command. Cardiovascular: Regular. No murmur no S3  Respiratory: Reduced air entry bilaterally. No wheezes. Abdomen: Soft. No tenderness. No palpable mass  Extremities: No pedal edema. The feet are warm.     Electronically signed by Lorin Tijerina MD on 2/8/2022 at 4:44 PM  [facility] Hospitalist

## 2022-02-08 NOTE — PROGRESS NOTES
Patient is a lot more lethargic/confused today since yesterday shift. Patient wore BIPAP all day yesterday but refused last night. Hospitalist notified.

## 2022-02-08 NOTE — PROGRESS NOTES
Patient encouraged to wear Bi-Pap @ HS but She refuses. States, \"let's try one night w/o it\" Patient educated on need to wear Bi-Pap.  Patient still refused

## 2022-02-08 NOTE — PLAN OF CARE
Problem: Falls - Risk of:  Goal: Will remain free from falls  Description: Will remain free from falls  Outcome: Met This Shift  Goal: Absence of physical injury  Description: Absence of physical injury  Outcome: Met This Shift     Problem: OXYGENATION/RESPIRATORY FUNCTION  Goal: Patient will maintain patent airway  Outcome: Met This Shift  Goal: Patient will achieve/maintain normal respiratory rate/effort  Description: Respiratory rate and effort will be within normal limits for the patient  Outcome: Met This Shift     Problem: SKIN INTEGRITY  Goal: Skin integrity is maintained or improved  Outcome: Met This Shift     Problem: NUTRITION  Goal: Nutritional status is improving  Outcome: Met This Shift     Problem: Pain:  Goal: Pain level will decrease  Description: Pain level will decrease  Outcome: Met This Shift  Goal: Control of acute pain  Description: Control of acute pain  Outcome: Met This Shift  Goal: Control of chronic pain  Description: Control of chronic pain  Outcome: Met This Shift

## 2022-02-09 LAB
BASE EXCESS MIXED: 20.8 (ref 0–2.3)
CARBON MONOXIDE, BLOOD: 2 % (ref 0–5)
CO2 CONTENT: 54.8 MMOL/L (ref 19–24)
COMMENT: ABNORMAL
GLUCOSE BLD-MCNC: 160 MG/DL (ref 70–99)
GLUCOSE BLD-MCNC: 165 MG/DL (ref 70–99)
GLUCOSE BLD-MCNC: 93 MG/DL (ref 70–99)
GLUCOSE BLD-MCNC: 97 MG/DL (ref 70–99)
HCO3 ARTERIAL: 51.9 MMOL/L (ref 18–23)
METHEMOGLOBIN ARTERIAL: 1.1 %
O2 SATURATION: 94.3 % (ref 96–97)
PCO2 ARTERIAL: 94 MMHG (ref 32–45)
PH BLOOD: 7.35 (ref 7.34–7.45)
PO2 ARTERIAL: 77 MMHG (ref 75–100)

## 2022-02-09 PROCEDURE — 6370000000 HC RX 637 (ALT 250 FOR IP): Performed by: INTERNAL MEDICINE

## 2022-02-09 PROCEDURE — 94660 CPAP INITIATION&MGMT: CPT

## 2022-02-09 PROCEDURE — 82962 GLUCOSE BLOOD TEST: CPT

## 2022-02-09 PROCEDURE — 82803 BLOOD GASES ANY COMBINATION: CPT

## 2022-02-09 PROCEDURE — 94640 AIRWAY INHALATION TREATMENT: CPT

## 2022-02-09 PROCEDURE — 94761 N-INVAS EAR/PLS OXIMETRY MLT: CPT

## 2022-02-09 PROCEDURE — 6370000000 HC RX 637 (ALT 250 FOR IP): Performed by: NURSE PRACTITIONER

## 2022-02-09 PROCEDURE — 2580000003 HC RX 258: Performed by: NURSE PRACTITIONER

## 2022-02-09 PROCEDURE — 6360000002 HC RX W HCPCS: Performed by: NURSE PRACTITIONER

## 2022-02-09 PROCEDURE — 99232 SBSQ HOSP IP/OBS MODERATE 35: CPT | Performed by: INTERNAL MEDICINE

## 2022-02-09 PROCEDURE — 2700000000 HC OXYGEN THERAPY PER DAY

## 2022-02-09 PROCEDURE — 2140000000 HC CCU INTERMEDIATE R&B

## 2022-02-09 PROCEDURE — 36600 WITHDRAWAL OF ARTERIAL BLOOD: CPT

## 2022-02-09 RX ORDER — LORAZEPAM 0.5 MG/1
0.5 TABLET ORAL EVERY 6 HOURS PRN
Status: DISCONTINUED | OUTPATIENT
Start: 2022-02-09 | End: 2022-02-15 | Stop reason: HOSPADM

## 2022-02-09 RX ORDER — LORAZEPAM 2 MG/ML
0.5 INJECTION INTRAMUSCULAR ONCE
Status: COMPLETED | OUTPATIENT
Start: 2022-02-09 | End: 2022-02-09

## 2022-02-09 RX ADMIN — ENOXAPARIN SODIUM 40 MG: 100 INJECTION SUBCUTANEOUS at 10:38

## 2022-02-09 RX ADMIN — PREGABALIN 100 MG: 50 CAPSULE ORAL at 10:34

## 2022-02-09 RX ADMIN — GUAIFENESIN 600 MG: 600 TABLET, EXTENDED RELEASE ORAL at 20:04

## 2022-02-09 RX ADMIN — LORAZEPAM 0.5 MG: 2 INJECTION INTRAMUSCULAR; INTRAVENOUS at 03:31

## 2022-02-09 RX ADMIN — ALBUTEROL SULFATE 2 PUFF: 90 AEROSOL, METERED RESPIRATORY (INHALATION) at 07:57

## 2022-02-09 RX ADMIN — PREDNISONE 40 MG: 20 TABLET ORAL at 10:34

## 2022-02-09 RX ADMIN — PREGABALIN 100 MG: 50 CAPSULE ORAL at 16:57

## 2022-02-09 RX ADMIN — BUDESONIDE AND FORMOTEROL FUMARATE DIHYDRATE 2 PUFF: 160; 4.5 AEROSOL RESPIRATORY (INHALATION) at 07:57

## 2022-02-09 RX ADMIN — BUPRENORPHINE AND NALOXONE 1 FILM: 8; 2 FILM, SOLUBLE BUCCAL; SUBLINGUAL at 10:35

## 2022-02-09 RX ADMIN — SENNOSIDES AND DOCUSATE SODIUM 1 TABLET: 50; 8.6 TABLET ORAL at 20:04

## 2022-02-09 RX ADMIN — PREGABALIN 100 MG: 50 CAPSULE ORAL at 20:04

## 2022-02-09 RX ADMIN — LEVOFLOXACIN 500 MG: 500 TABLET, FILM COATED ORAL at 10:33

## 2022-02-09 RX ADMIN — ASPIRIN 81 MG 81 MG: 81 TABLET ORAL at 10:34

## 2022-02-09 RX ADMIN — GUAIFENESIN 600 MG: 600 TABLET, EXTENDED RELEASE ORAL at 10:33

## 2022-02-09 RX ADMIN — BUDESONIDE AND FORMOTEROL FUMARATE DIHYDRATE 2 PUFF: 160; 4.5 AEROSOL RESPIRATORY (INHALATION) at 19:41

## 2022-02-09 RX ADMIN — SODIUM CHLORIDE, PRESERVATIVE FREE 10 ML: 5 INJECTION INTRAVENOUS at 20:05

## 2022-02-09 RX ADMIN — BUPRENORPHINE AND NALOXONE 1 FILM: 8; 2 FILM, SOLUBLE BUCCAL; SUBLINGUAL at 20:05

## 2022-02-09 RX ADMIN — SODIUM CHLORIDE, PRESERVATIVE FREE 10 ML: 5 INJECTION INTRAVENOUS at 03:31

## 2022-02-09 RX ADMIN — TRAZODONE HYDROCHLORIDE 300 MG: 50 TABLET ORAL at 20:04

## 2022-02-09 RX ADMIN — ALBUTEROL SULFATE 2 PUFF: 90 AEROSOL, METERED RESPIRATORY (INHALATION) at 19:39

## 2022-02-09 RX ADMIN — SODIUM CHLORIDE, PRESERVATIVE FREE 10 ML: 5 INJECTION INTRAVENOUS at 10:44

## 2022-02-09 ASSESSMENT — PAIN SCALES - GENERAL
PAINLEVEL_OUTOF10: 0
PAINLEVEL_OUTOF10: 0

## 2022-02-09 NOTE — PROGRESS NOTES
Patient currently on BIPAP. Patient tried to refuse but let us put on bipap.   Patient resting comfortably on Bipap

## 2022-02-09 NOTE — PLAN OF CARE
Problem: Falls - Risk of:  Goal: Will remain free from falls  Description: Will remain free from falls  Outcome: Met This Shift  Goal: Absence of physical injury  Description: Absence of physical injury  Outcome: Met This Shift     Problem: OXYGENATION/RESPIRATORY FUNCTION  Goal: Patient will maintain patent airway  Outcome: Met This Shift     Problem: SKIN INTEGRITY  Goal: Skin integrity is maintained or improved  Outcome: Met This Shift     Problem: NUTRITION  Goal: Nutritional status is improving  Outcome: Met This Shift     Problem: Pain:  Goal: Pain level will decrease  Description: Pain level will decrease  Outcome: Met This Shift  Goal: Control of acute pain  Description: Control of acute pain  Outcome: Met This Shift  Goal: Control of chronic pain  Description: Control of chronic pain  Outcome: Met This Shift     Problem: Skin Integrity:  Goal: Will show no infection signs and symptoms  Description: Will show no infection signs and symptoms  Outcome: Met This Shift  Goal: Absence of new skin breakdown  Description: Absence of new skin breakdown  Outcome: Met This Shift     Problem: Confusion - Acute:  Goal: Absence of continued neurological deterioration signs and symptoms  Description: Absence of continued neurological deterioration signs and symptoms  Outcome: Met This Shift     Problem: Discharge Planning:  Goal: Ability to perform activities of daily living will improve  Description: Ability to perform activities of daily living will improve  Outcome: Met This Shift  Goal: Participates in care planning  Description: Participates in care planning  Outcome: Met This Shift     Problem: Injury - Risk of, Physical Injury:  Goal: Will remain free from falls  Description: Will remain free from falls  Outcome: Met This Shift  Goal: Absence of physical injury  Description: Absence of physical injury  Outcome: Met This Shift     Problem: Mood - Altered:  Goal: Absence of abusive behavior  Description: Absence of abusive behavior  Outcome: Met This Shift  Goal: Verbalizations of feeling emotionally comfortable while being cared for will increase  Description: Verbalizations of feeling emotionally comfortable while being cared for will increase  Outcome: Met This Shift     Problem: Psychomotor Activity - Altered:  Goal: Absence of psychomotor disturbance signs and symptoms  Description: Absence of psychomotor disturbance signs and symptoms  Outcome: Met This Shift     Problem: Sensory Perception - Impaired:  Goal: Demonstrations of improved sensory functioning will increase  Description: Demonstrations of improved sensory functioning will increase  Outcome: Met This Shift  Goal: Decrease in sensory misperception frequency  Description: Decrease in sensory misperception frequency  Outcome: Met This Shift  Goal: Able to refrain from responding to false sensory perceptions  Description: Able to refrain from responding to false sensory perceptions  Outcome: Met This Shift  Goal: Demonstrates accurate environmental perceptions  Description: Demonstrates accurate environmental perceptions  Outcome: Met This Shift  Goal: Able to distinguish between reality-based and nonreality-based thinking  Description: Able to distinguish between reality-based and nonreality-based thinking  Outcome: Met This Shift  Goal: Able to interrupt nonreality-based thinking  Description: Able to interrupt nonreality-based thinking  Outcome: Met This Shift     Problem: Sleep Pattern Disturbance:  Goal: Appears well-rested  Description: Appears well-rested  Outcome: Met This Shift     Problem: OXYGENATION/RESPIRATORY FUNCTION  Goal: Patient will achieve/maintain normal respiratory rate/effort  Description: Respiratory rate and effort will be within normal limits for the patient  Outcome: Ongoing  Note: Patient refusing to wear Bi-Pap @ times, removes Nasal cannula as well. SOB w/ Exertion.       Problem: Confusion - Acute:  Goal: Mental status will be

## 2022-02-09 NOTE — PROGRESS NOTES
Patient was on 7 liters when this nurse came into the room sats were 98 bumper her down to 7 then 3 before leaving room  Patient put back on bipap due to ABG's and she did not want breakfast right now

## 2022-02-09 NOTE — FLOWSHEET NOTE
Rounding visit. Patient said that she is being fearful of death and dying. This  engaged patient on a short theological reflection on death oand dying. Then, offered prayer support. Patient appreciated pastoral care provided. Patient's daughter was present during the visit.

## 2022-02-09 NOTE — PROGRESS NOTES
02/09/22 0806   NIV Type   $NIV $Daily Charge   NIV Started/Stopped On   Equipment Type v60   Mode Bilevel   Mask Type Full face mask   Mask Size Medium   Settings/Measurements   IPAP 15 cmH20   CPAP/EPAP 5 cmH2O   Rate Ordered 12   Resp 26   Insp Rise Time (%) 1 %   FiO2  40 %   I Time/ I Time % 1 s   Vt Exhaled 479 mL   Minute Volume 9.5 Liters   Mask Leak (lpm) 0 lpm   Comfort Level Good   Using Accessory Muscles No   SpO2 95   Alarm Settings   Alarms On Y   Press Low Alarm 3 cmH2O   High Pressure Alarm 20 cmH2O   Apnea (secs) 20 secs   Resp Rate Low Alarm 13   High Respiratory Rate 40 br/min

## 2022-02-09 NOTE — PROGRESS NOTES
Patient remains anxious, very restless. Intermittent confusion. Wants to take off Bi-Pap. Patient encouraged to keep mask on. Relaxation techniques attempted. Primary service contacted for anti Anxiety meds. Orders placed for Ativan 0.5mg IV x1  Patient refusing to wear Bi-Pap.  Placed on High Flow NC @ 7L

## 2022-02-09 NOTE — PROGRESS NOTES
Pulmonary and Critical Care  Progress Note      VITALS:  /70   Pulse 85   Temp 98 °F (36.7 °C) (Oral)   Resp 26   Ht 5' 4\" (1.626 m)   Wt 201 lb 4.5 oz (91.3 kg)   SpO2 94%   BMI 34.55 kg/m²     Subjective:   CHIEF COMPLAINT :SOB     HPI:                The patient is sitting in the bed. She is in mild resp distress    Objective:   PHYSICAL EXAM:    LUNGS:Occasional basal crackles  Abd-soft, BS+,NT  Ext- no pedal edema  CVS-s1s2, no murmurs      DATA:    CBC:  Recent Labs     02/07/22  0710   WBC 8.0   RBC 4.27   HGB 11.5*   HCT 39.7      MCV 93.0   MCH 26.9*   MCHC 29.0*   RDW 13.1      BMP:  No results for input(s): NA, K, CL, CO2, BUN, CREATININE, CALCIUM, GLUCOSE in the last 72 hours. ABG:  Recent Labs     02/08/22  0845 02/08/22  2200 02/09/22  0600   PH 7.26* 7.40 7.35   PO2ART 109* 76 77   OYP3ESF 116.0* 87.0* 94.0*   O2SAT 96.1 94.3* 94.3*     BNP  No results found for: BNP   D-Dimer:  Lab Results   Component Value Date    DDIMER 745 (H) 02/06/2022      Radiology:   . Diminished bilateral pulmonary infiltrates compared with prior study. Possibly related to atypical/viral pneumonia. 2. Pulmonary sequela typical of that seen with smoking, including COPD;   correlate with clinical history. 3. Calcific atherosclerotic disease aorta     1.        Assessment/Plan     Patient Active Problem List    Diagnosis Date Noted    Uncontrolled pain     Generalized weakness     Gait disturbance     Myoclonus     Near syncope     Moderate protein-calorie malnutrition ([address]     Traumatic closed nondisplaced fracture of distal end of right fibula, initial encounter 01/11/2022    Asterixis     Hypercapnia     Chronic rhinitis 01/05/2022    Fall at home, initial encounter 01/05/2022    COPD exacerbation ([address] 07/09/2021    Acute exacerbation of chronic obstructive pulmonary disease (COPD) ([address] 06/26/2021    Acute on chronic respiratory failure with hypoxia and hypercapnia ([address] 05/23/2021  COVID-19 05/23/2021    Pulmonary emphysema ([facility] 05/18/2021    SOB (shortness of breath) 05/18/2021    Leg swelling 05/18/2021    Chronic hepatitis C without hepatic coma ([facility] 05/17/2021     Overview Note:     Patient saw GI specialists and cleared it on her own      Gastroesophageal reflux disease without esophagitis 05/17/2021    H/O drug abuse ([address] 05/17/2021     Overview Note:     Prior heroine      Pulmonary nodules 05/17/2021     Overview Note:     Needs f/u 11/2021      ADHD (attention deficit hyperactivity disorder) 05/17/2021    Pneumonia 05/02/2021    Acute respiratory failure with hypercapnia ([facility] 09/24/2020    Chronic respiratory failure ([facility] 08/16/2017    COPD, severe ([address] 08/16/2017    Obstructive sleep apnea 08/16/2017     Overview Note:     Uses biPAP at night      S/P placement of cardiac pacemaker 2005     Overview Note:     Has never had replacement battery. Acute on Chronic Hypoxic Hypercapneic resp failure  Chronic Metabolic Alkalosis  AECOPD  Bilateral Pneumonia sec to Human Metapneumovirus  Bilateral Pulmonary nodules  Obesity  KING       1. Abx  2. Prednisone taper in am  3. Keep sats > 92%  4. BIPAP  5. ICS  6. OB  7.  C/w present management    Electronically signed by Xena Vazquez MD on 2/9/2022 at 10:41 AM

## 2022-02-09 NOTE — PROGRESS NOTES
Hospitalist Progress Note       2/9/2022 2:53 PM  Admit Date: 2/6/2022    PCP: Gabriella Galvan MD     Assessment and Plan:   1. Acute on chronic hypoxic and hypercapnic respiratory failure: Due to COPD exacerbation. Elevated D-dimer but chest CTA-no PE but patchy nodular opacities. Ultrasound-no DVT. Continue BiPAP. Pulmonary consult appreciated. 2. Acute exacerbation of COPD: Aggressive nebulization treatment, prednisone and Levaquin. 3. Acute metabolic encephalopathy: This is due to hypercapnia. Continue BiPAP and avoid oversedation. 4. Chronic pain syndrome: On Suboxone. Chronic problems include history of pacemaker in situ, ADHD, obesity and obstructive sleep apnea (not compliant with CPAP). DVT prophylaxis: Lovenox  Disposition: She lives alone. [facility]     Patient Active Problem List:     Chronic respiratory failure (HCC)     COPD, severe ([address]     Obstructive sleep apnea     Acute respiratory failure with hypercapnia (HCC)     Pneumonia     Chronic hepatitis C without hepatic coma (HCC)     S/P placement of cardiac pacemaker     Gastroesophageal reflux disease without esophagitis     H/O drug abuse (HCC)     Pulmonary nodules     ADHD (attention deficit hyperactivity disorder)     Pulmonary emphysema (HCC)     SOB (shortness of breath)     Leg swelling     Acute on chronic respiratory failure with hypoxia and hypercapnia (HCC)     COVID-19     Acute exacerbation of chronic obstructive pulmonary disease (COPD) ([address]     COPD exacerbation (HCC)     Chronic rhinitis     Fall at home, initial encounter     Asterixis     Hypercapnia     Traumatic closed nondisplaced fracture of distal end of right fibula, initial encounter     Uncontrolled pain     Generalized weakness     Gait disturbance     Myoclonus     Near syncope     Moderate protein-calorie malnutrition (HCC)      Subjective:     Chief Complaint   Patient presents with    Shortness of Breath       F/U:  Interval History: Discussed with the nursing staff. She is still lethargic but more awake this morning. She is more communicative. She does not understand why she is sleeping excessively. Objective: Intake/Output Summary (Last 24 hours) at 2/9/2022 1453  Last data filed at 2/9/2022 0331  Gross per 24 hour   Intake 220 ml   Output 250 ml   Net -30 ml      Vitals:   Vitals:    02/09/22 0807   BP:    Pulse: 85   Resp:    Temp:    SpO2:      Physical Exam:  General: In respiratory distress-on BiPAP. Eyes: Not pale. Anicteric. Neck: No neck mass or thyromegaly  Neurology: Drowsy, but arousable. She follows command. Cardiovascular: Regular. No murmur no S3  Respiratory: Reduced air entry bilaterally. No wheezes. Abdomen: Soft. No tenderness. No palpable mass  Extremities: Right leg is in a cast.  No left leg pedal edema and the foot is warm.     Electronically signed by Jeromy Myers MD on 2/9/2022 at 2:53 PM  [facility]

## 2022-02-10 LAB
GLUCOSE BLD-MCNC: 104 MG/DL (ref 70–99)
GLUCOSE BLD-MCNC: 132 MG/DL (ref 70–99)
GLUCOSE BLD-MCNC: 185 MG/DL (ref 70–99)
GLUCOSE BLD-MCNC: 204 MG/DL (ref 70–99)
GLUCOSE BLD-MCNC: 240 MG/DL (ref 70–99)

## 2022-02-10 PROCEDURE — 2580000003 HC RX 258: Performed by: NURSE PRACTITIONER

## 2022-02-10 PROCEDURE — 2700000000 HC OXYGEN THERAPY PER DAY

## 2022-02-10 PROCEDURE — 99231 SBSQ HOSP IP/OBS SF/LOW 25: CPT | Performed by: INTERNAL MEDICINE

## 2022-02-10 PROCEDURE — 82962 GLUCOSE BLOOD TEST: CPT

## 2022-02-10 PROCEDURE — 97530 THERAPEUTIC ACTIVITIES: CPT

## 2022-02-10 PROCEDURE — 6370000000 HC RX 637 (ALT 250 FOR IP): Performed by: NURSE PRACTITIONER

## 2022-02-10 PROCEDURE — 6360000002 HC RX W HCPCS: Performed by: NURSE PRACTITIONER

## 2022-02-10 PROCEDURE — 6370000000 HC RX 637 (ALT 250 FOR IP): Performed by: INTERNAL MEDICINE

## 2022-02-10 PROCEDURE — 97535 SELF CARE MNGMENT TRAINING: CPT

## 2022-02-10 PROCEDURE — 94640 AIRWAY INHALATION TREATMENT: CPT

## 2022-02-10 PROCEDURE — 94761 N-INVAS EAR/PLS OXIMETRY MLT: CPT

## 2022-02-10 PROCEDURE — 97166 OT EVAL MOD COMPLEX 45 MIN: CPT

## 2022-02-10 PROCEDURE — 2140000000 HC CCU INTERMEDIATE R&B

## 2022-02-10 PROCEDURE — 97162 PT EVAL MOD COMPLEX 30 MIN: CPT

## 2022-02-10 RX ADMIN — PREGABALIN 100 MG: 50 CAPSULE ORAL at 09:16

## 2022-02-10 RX ADMIN — ALBUTEROL SULFATE 2 PUFF: 90 AEROSOL, METERED RESPIRATORY (INHALATION) at 19:41

## 2022-02-10 RX ADMIN — LEVOFLOXACIN 500 MG: 500 TABLET, FILM COATED ORAL at 09:16

## 2022-02-10 RX ADMIN — PREGABALIN 100 MG: 50 CAPSULE ORAL at 13:59

## 2022-02-10 RX ADMIN — BUPRENORPHINE AND NALOXONE 1 FILM: 8; 2 FILM, SOLUBLE BUCCAL; SUBLINGUAL at 08:57

## 2022-02-10 RX ADMIN — PREGABALIN 100 MG: 50 CAPSULE ORAL at 20:07

## 2022-02-10 RX ADMIN — PREDNISONE 40 MG: 20 TABLET ORAL at 09:16

## 2022-02-10 RX ADMIN — BUDESONIDE AND FORMOTEROL FUMARATE DIHYDRATE 2 PUFF: 160; 4.5 AEROSOL RESPIRATORY (INHALATION) at 19:41

## 2022-02-10 RX ADMIN — SODIUM CHLORIDE, PRESERVATIVE FREE 10 ML: 5 INJECTION INTRAVENOUS at 20:07

## 2022-02-10 RX ADMIN — ENOXAPARIN SODIUM 40 MG: 100 INJECTION SUBCUTANEOUS at 09:16

## 2022-02-10 RX ADMIN — LORAZEPAM 0.5 MG: 0.5 TABLET ORAL at 13:31

## 2022-02-10 RX ADMIN — GUAIFENESIN 600 MG: 600 TABLET, EXTENDED RELEASE ORAL at 20:07

## 2022-02-10 RX ADMIN — ASPIRIN 81 MG 81 MG: 81 TABLET ORAL at 09:16

## 2022-02-10 RX ADMIN — ALBUTEROL SULFATE 2 PUFF: 90 AEROSOL, METERED RESPIRATORY (INHALATION) at 08:05

## 2022-02-10 RX ADMIN — ACETAMINOPHEN 650 MG: 325 TABLET ORAL at 09:03

## 2022-02-10 RX ADMIN — BUDESONIDE AND FORMOTEROL FUMARATE DIHYDRATE 2 PUFF: 160; 4.5 AEROSOL RESPIRATORY (INHALATION) at 08:05

## 2022-02-10 RX ADMIN — BUPRENORPHINE AND NALOXONE 1 FILM: 8; 2 FILM, SOLUBLE BUCCAL; SUBLINGUAL at 20:07

## 2022-02-10 RX ADMIN — SODIUM CHLORIDE, PRESERVATIVE FREE 10 ML: 5 INJECTION INTRAVENOUS at 09:20

## 2022-02-10 RX ADMIN — TRAZODONE HYDROCHLORIDE 300 MG: 50 TABLET ORAL at 20:06

## 2022-02-10 RX ADMIN — GUAIFENESIN 600 MG: 600 TABLET, EXTENDED RELEASE ORAL at 09:16

## 2022-02-10 RX ADMIN — LORAZEPAM 0.5 MG: 0.5 TABLET ORAL at 21:15

## 2022-02-10 RX ADMIN — SENNOSIDES AND DOCUSATE SODIUM 1 TABLET: 50; 8.6 TABLET ORAL at 09:16

## 2022-02-10 ASSESSMENT — PAIN SCALES - GENERAL
PAINLEVEL_OUTOF10: 0
PAINLEVEL_OUTOF10: 10

## 2022-02-10 ASSESSMENT — PAIN DESCRIPTION - PROGRESSION
CLINICAL_PROGRESSION: GRADUALLY IMPROVING

## 2022-02-10 NOTE — PLAN OF CARE
Problem: Falls - Risk of:  Goal: Will remain free from falls  Description: Will remain free from falls  2/10/2022 1534 by Juanita Napoles  Outcome: Ongoing  2/10/2022 1409 by Clayton Melchor  Outcome: Ongoing  2/10/2022 1333 by Clayton Melchor  Outcome: Ongoing  Goal: Absence of physical injury  Description: Absence of physical injury  2/10/2022 1534 by Juanita Napoles  Outcome: Ongoing  2/10/2022 1409 by Clayton Melchor  Outcome: Ongoing  2/10/2022 1333 by Clayton Melchor  Outcome: Ongoing     Problem: OXYGENATION/RESPIRATORY FUNCTION  Goal: Patient will maintain patent airway  Outcome: Ongoing  Goal: Patient will achieve/maintain normal respiratory rate/effort  Description: Respiratory rate and effort will be within normal limits for the patient  Outcome: Ongoing     Problem: SKIN INTEGRITY  Goal: Skin integrity is maintained or improved  Outcome: Ongoing     Problem: NUTRITION  Goal: Nutritional status is improving  Outcome: Ongoing     Problem: Pain:  Goal: Pain level will decrease  Description: Pain level will decrease  Outcome: Ongoing  Goal: Control of acute pain  Description: Control of acute pain  Outcome: Ongoing  Goal: Control of chronic pain  Description: Control of chronic pain  Outcome: Ongoing     Problem: Skin Integrity:  Goal: Will show no infection signs and symptoms  Description: Will show no infection signs and symptoms  Outcome: Ongoing  Goal: Absence of new skin breakdown  Description: Absence of new skin breakdown  Outcome: Ongoing

## 2022-02-10 NOTE — CONSULTS
[address] 1960, [id number], 2/10/2022    History  [place]:  The primary encounter diagnosis was COPD exacerbation ([facility] A diagnosis of Hypoxemia was also pertinent to this visit. Patient  has a past medical history of ADHD, COPD (chronic obstructive pulmonary disease) ([facility] COVID-19, Drug addiction in remission ([facility] Hep C w/o coma, chronic ([facility] Pacemaker, Sleep apnea, and TIA (transient ischemic attack). Patient  has a past surgical history that includes  section (); Cholecystectomy (); and Pacemaker insertion (). Subjective:  Patient states:  \"You girls helped with my anxiety\". Pain:  No c/o. Communication with other providers:  Handoff to RN, co-eval with OT, [address] [place] [address] for safety. Discussed transfer with GENET Pabon.   Restrictions: Fall risk, confusion, on 5L 02, exit alarm, tele, Sp02, WBAT RLE with boot    Home Setup/Prior level of function    Lives With: Alone (has caregiver for 7 hours/day 7 days/week)  Type of Home: [address] [place] [address] Drive: One level (2nd floor apartment with elevator)  Home Access: Level entry,Elevator  Bathroom Shower/Tub: Tub/Shower unit  Bathroom Toilet: Standard  Bathroom Equipment: Shower chair  Bathroom Accessibility: Accessible  Home Equipment: 4 wheeled walker,Alert [employer] Help From: Personal care attendant  ADL Assistance: Independent  Homemaking Assistance: Needs assistance  Homemaking Responsibilities: No (caregiver manages)  Ambulation Assistance: Independent (mod I with 4ww household distances)  Transfer Assistance: Independent  Active : Yes (daughter is primary )  Occupation: On disability  Additional Comments: Pt endorses significant functional decline over the past couple weeks with new tremors/jerking and fall resulting in current hospital admission    Examination of body systems (includes body structures/functions, activity/participation limitations):  · Observation:  Pt is resting in semi fowlers on HFNC upon arrival, min dried bleeding anterior cast.  · [place]:  [facility] [place]  · Hearing:  [facility] [place]  · Cardiopulmonary: On 5L 02. Baseline 4L 02 per pt. Sp02 varies 87-94%. · Cognition: Min , see OT/SLP note for further evaluation. Musculoskeletal  · ROM R/L:  WFL LLE, R distal LE limited d/t casting- ankle ROM n/t. R Knee and hip WFL. · Strength R/L:  Generally 4-/5 with poor quality muscle contraction d/t tremors, decreased in function and endurance. · Neuro:  Tremors, decreased balance, h/o TIA     · Gait pattern: Pt demonstrates step-to pattern with RLE boot, RW, frequent knee buckling and UE tremors, decreased adrian and foot clearance. Mobility:  · Supine to sit:  Min A  · Transfers: Min   · Sitting balance:  SBA-SUP. · Standing balance:  Min-Mod.    · Gait: Min-Mod    [facility] 6 Clicks Inpatient Mobility:  AM-PAC Inpatient Mobility Raw Score : 15    Treatment:    Bed mobility: PT encourages sup>sit, provides v/c for sequencing. Pt demonstrates fair ability to advance LE, requires Min A for LE/ hips to EOB and Min for trunk to upright. PT v/c for scooting to EOB, pt requires increased time and cues for use of bed rail for support, increased effort for feet to floor, CGA. Sitting balance: Seated EOB pt demonstrates initial increased postural sway, fair- balance, BUE support required for maintaining upright. Pt tolerates seated EOB ~10 minutes during PT assessment, placement and adjustment of walking boot and L shoe Max A. Additional seated balance on [facility] x5' with SBA progressing to nearby SUP, pt with consistent use of UE support. PT assessed pt UE tremors in seated. Pt holds B UE in 90* shoulder flexion, intermittent loss of tone/inability to maintain flexion affects BUE with RUE appearing more significant>LUE. Sit<>Stand: Pt performed STS from EOB to RW  with Bess, v/c for proper sequencing.  Pt demonstrates fair time to upright, min increased cues for UE placement with AD. SPT from EOB to [facility] with Mod A +CGA for advancement of RW and stabilizing of trunk, additional CGA for safety posteriorly as pt knees buckle frequently, Max cues for sequence. Return to seated [facility] pt demonstrates fair- control, Min A required, v/c for safe sequencing. SPT from [facility]>EOB with RW and Mod A for safety reasons, x2 min rest, f/b SPT EOB>recliner, Mod A required throughout. Gait: Gait deferred d/t pt lethargy and safety concerns with LE buckling. Education: PT educated pt on safety with AD, therapy role, discussed pt PLOF vs current LOF, therapy options. End of session pt left in recliner with LE elevated, tray in place,  with lines managed, call light, phone, exit alarm, tray, all needs, RN aware and present. PT entered room a second time d/t pt yelling out \"help\". Pt appears anxious up in chair, PT calms pt, assists with repositioning chair, pt promptly falls asleep. Assessment:    Pt is a 64 y/o female admitted 2/6 with c/o  SOB. Patient with significant h/o COPD, Hep C, pacemaker, TIA home 02, lateral malleolus frx with walking boot, see chart. Per pt report pt has been performing ADLs/IADLs with assist from caregiver daily. Pt reports she has been AMB short distances with RW. At this time pt appears to be functioning below baseline. Pt is now presenting with impairments in muscle contraction quality, LE strength, functional endurance, safety awareness, balance, cognition, gait and mobility. Pt would benefit from skilled PT services in order to address impairments and promote return to PLOF. PT to recommend d/c to ARU for rehab stay. Complexity: Moderate  Prognosis: Good, no significant barriers to participation at this time.   Plan Times per week: 3-4+/week, 1 week,   Discharge Recommendations: IP Rehab  Equipment: Defer to next LOC    Goals:  Short term goals  Time Frame for Short term goals: 1 week  Short term goal 1: Pt will perform sup<>sit with SBA, min cues. Short term goal 2: Pt will tolerate midline standing balance with RW, CGA x3' with cues for posture. Short term goal 3: Pt will perform STS from BR commode to RW with CGA, v/c. Short term goal 4: Pt will AMB x10 ft with chair follow, Min A, RW. Treatment plan:  Bed mobility, transfers, balance, gait, TA, TX    Recommendations for NURSING mobility: SPT with RLE boot, RW, Min A x2 for safety.     Time:   Time in: 14:12  Time out: 14:42  Timed treatment minutes: 24  Total time: 30    Electronically signed by:    Jose Eduardo Cao, PT  0/95/7070, 8:23 PM  PT Lic #: [id number]

## 2022-02-10 NOTE — PLAN OF CARE
Problem: Injury - Risk of, Physical Injury:  Goal: Absence of physical injury  Description: Absence of physical injury  2/10/2022 1409 by Edu Gonzalez  Outcome: Ongoing  2/10/2022 1333 by Edu Gonzalez  Outcome: Ongoing     Problem: Injury - Risk of, Physical Injury:  Goal: Will remain free from falls  Description: Will remain free from falls  2/10/2022 1409 by Edu Gonzalez  Outcome: Ongoing  2/10/2022 1333 by Edu Gonzalez  Outcome: Ongoing     Problem: Falls - Risk of:  Goal: Absence of physical injury  Description: Absence of physical injury  2/10/2022 1409 by Edu Gonzalez  Outcome: Ongoing  2/10/2022 1333 by Edu Gonzalez  Outcome: Ongoing     Problem: Falls - Risk of:  Goal: Will remain free from falls  Description: Will remain free from falls  2/10/2022 1409 by Edu Gonzalez  Outcome: Ongoing  2/10/2022 1333 by Edu Gonzalez  Outcome: Ongoing

## 2022-02-10 NOTE — PLAN OF CARE
Problem: Falls - Risk of:  Goal: Will remain free from falls  Description: Will remain free from falls  Outcome: Ongoing  Goal: Absence of physical injury  Description: Absence of physical injury  Outcome: Ongoing     Problem: OXYGENATION/RESPIRATORY FUNCTION  Goal: Patient will maintain patent airway  Outcome: Ongoing  Goal: Patient will achieve/maintain normal respiratory rate/effort  Description: Respiratory rate and effort will be within normal limits for the patient  Outcome: Ongoing     Problem: SKIN INTEGRITY  Goal: Skin integrity is maintained or improved  Outcome: Ongoing     Problem: NUTRITION  Goal: Nutritional status is improving  Outcome: Ongoing

## 2022-02-10 NOTE — PROGRESS NOTES
Hospitalist Progress Note       2/10/2022 11:40 AM  Admit Date: 2/6/2022    PCP: Malorie Dejesus MD     Assessment and Plan:   1. Acute on chronic hypoxic and hypercapnic respiratory failure: Due to COPD exacerbation. Elevated D-dimer but chest CTA-no PE but patchy nodular opacities. Ultrasound-no DVT. Continue BiPAP. Pulmonary consult appreciated. 2. Acute exacerbation of COPD: Aggressive nebulization treatment, prednisone and Levaquin. 3. Acute metabolic encephalopathy: This is due to hypercapnia. Continue BiPAP and avoid oversedation. Patient has improved. Consult PT/OT. 4. Chronic pain syndrome: On Suboxone. Chronic problems include history of pacemaker in situ, ADHD, obesity and obstructive sleep apnea (not compliant with CPAP). DVT prophylaxis: Lovenox  Disposition: She lives alone. She has [facility].     Patient Active Problem List:     Chronic respiratory failure (HCC)     COPD, severe ([address]     Obstructive sleep apnea     Acute respiratory failure with hypercapnia (HCC)     Pneumonia     Chronic hepatitis C without hepatic coma (HCC)     S/P placement of cardiac pacemaker     Gastroesophageal reflux disease without esophagitis     H/O drug abuse (HCC)     Pulmonary nodules     ADHD (attention deficit hyperactivity disorder)     Pulmonary emphysema (HCC)     SOB (shortness of breath)     Leg swelling     Acute on chronic respiratory failure with hypoxia and hypercapnia (HCC)     COVID-19     Acute exacerbation of chronic obstructive pulmonary disease (COPD) ([address]     COPD exacerbation (HCC)     Chronic rhinitis     Fall at home, initial encounter     Asterixis     Hypercapnia     Traumatic closed nondisplaced fracture of distal end of right fibula, initial encounter     Uncontrolled pain     Generalized weakness     Gait disturbance     Myoclonus     Near syncope     Moderate protein-calorie malnutrition (HCC)      Subjective:     Chief Complaint   Patient presents with    Shortness of Breath       F/U:  Interval History: Discussed with the nursing staff. She is more awake this morning. She is able to communicate and answer most of the questions. She is requesting for more pain medication but I have told her that we will not give more narcotics to avoid altered mental status. Objective: Intake/Output Summary (Last 24 hours) at 2/10/2022 1140  Last data filed at 2/9/2022 2013  Gross per 24 hour   Intake 120 ml   Output 500 ml   Net -380 ml      Vitals:   Vitals:    02/10/22 1030   BP: 103/82   Pulse: 94   Resp: 16   Temp: 98.6 °F (37 °C)   SpO2: 93%     Physical Exam:  General: In respiratory distress-on BiPAP. Eyes: Not pale. Anicteric. Neck: No neck mass or thyromegaly  Neurology: Awake and alert. Able to communicate. She follows command. Cardiovascular: Regular. No murmur no S3  Respiratory: Reduced air entry bilaterally. No wheezes. Abdomen: Soft. No tenderness. No palpable mass  Extremities: Right leg is in a cast.  No left leg pedal edema and the foot is warm.     Electronically signed by Juan Daniel Malik MD on 2/10/2022 at 11:40 AM  Rounding Hospitalist

## 2022-02-10 NOTE — CARE COORDINATION
Pt has home O2, a bi-pap, c-pap, and BSC. She is on the Waiver program thru the [employer]. She has an aide 7 days a week for 7 hrs a day from [facility]. Pt denies any new d/c needs at this time. D/c plan is home alone with [facility]. Notify CM if any new d/c needs arise. PT/OT ordered, CM will continue to follow.   TE      Notify [facility] at [phone] and fax the [facility] order & AVS to [phone] when pt is discharged.  MINERVA

## 2022-02-10 NOTE — PROGRESS NOTES
Occupational Therapy    formerly [facility] ACUTE CARE OCCUPATIONAL THERAPY EVALUATION  Maria Luisa Em, 1960, [id number], 2/10/2022    History  [place]:  The primary encounter diagnosis was COPD exacerbation ([address] A diagnosis of Hypoxemia was also pertinent to this visit. Patient  has a past medical history of ADHD, COPD (chronic obstructive pulmonary disease) ([address] COVID-19, Drug addiction in remission ([facility] Hep C w/o coma, chronic ([facility] Pacemaker, Sleep apnea, and TIA (transient ischemic attack). Patient  has a past surgical history that includes  section (); Cholecystectomy (); and Pacemaker insertion (). Subjective:  Patient states:  \"I felt comforted working with you guys\". Pain:  No.    Communication with other providers:  Handoff to RN, co-eval with PT. Restrictions: General Precautions, Fall Risk, RLE walking boot WBAT    Home Setup/Prior level of function    Social/Functional History  Lives With: Alone (has caregiver for 7 hours/day 7 days/week)  Type of Home: [address] [place] [address] Drive: One level (2nd floor apartment with elevator)  Home Access: Level entry,Elevator  Bathroom Shower/Tub: Tub/Shower unit  Bathroom Toilet: Standard  Bathroom Equipment: Shower chair  Bathroom Accessibility: Accessible  Home Equipment: 4 wheeled [address] [facility] Help From: Personal care attendant  ADL Assistance: [facility]: Needs assistance  Homemaking Responsibilities: No (caregiver manages)  Ambulation Assistance: Independent (mod I with 4ww household distances)  Transfer Assistance: Independent  Active : Yes (daughter is primary )  Occupation: On disability      Examination of body systems (includes body structures/functions, activity/participation limitations):  · Observation:  Supine in bed upon arrival, agreeable to therapy  · Vision:  Glasses  · [facility]  · Cardiopulmonary:  5L of 02.  Lowest observed 02 saturation ~87% after performing functional mobility, increased ~93% w/ 1 minute of pursed lip breathing      Body Systems and functions:  · ROM R/L:  WFL. · Strength R/L:  4-/5,   · Sensation: WFL  · Tone: Normal  · Coordination: Tremors in BUE  · Perception: WNL    Activities of Daily Living (ADLs):  · Feeding: Guera  · Grooming: CGA (washing hands/face w/ warm washcloth)  · UB bathing: Supervision  · LB bathing: maxA (washing balbina area/buttocks in stand after toileting)  · UB dressing: CGA  · LB dressing: maxA (donning depends in sit up to knees, in stand to thread over buttocks)  · Toileting: maxA (pt toileted on BS, see LB bathing/dressing for details)    Cognitive and Psychosocial Functioning:  · Overall cognitive status: WFL  · Affect: Normal        Mobility:  · Supine to sit:  Yasmine  · Transfers: Yasmine from EOB up to Rw  · Sitting balance:  SBA. · Standing balance:  Yasmine w/ RW  · Functional Mobility: Yasmine w/ RW and multiple LOB and moments of knee buckling requiring modA to correct (See PT notes for details). · Toilet Transfers: modA to/from [address] [place] [address] Daily Activity Inpatient   How much help for putting on and taking off regular lower body clothing?: Total  How much help for Bathing?: A Lot  How much help for Toileting?: Total  How much help for putting on and taking off regular upper body clothing?: None  How much help for taking care of personal grooming?: A Little  How much help for eating meals?: None  AM-[facility] Inpatient Daily Activity Raw Score: 15  AM-PAC Inpatient ADL T-Scale Score : 34.69  ADL Inpatient CMS 0-100% Score: 56.46  ADL Inpatient CMS G-Code Modifier : CK    Treatment:  Self Care Training:   Cues were given for safety, sequence, UE/LE placement, visual cues, and balance. Activities performed today included grooming, LB bathing/dressing, toileting, toilet transfer    Safety: patient left in chair with chair alarm, call light within reach, RN notified, gait belt used.     Assessment:  Pt is a 63

## 2022-02-11 LAB
CULTURE: NORMAL
GLUCOSE BLD-MCNC: 107 MG/DL (ref 70–99)
GLUCOSE BLD-MCNC: 120 MG/DL (ref 70–99)
GLUCOSE BLD-MCNC: 128 MG/DL (ref 70–99)
GLUCOSE BLD-MCNC: 192 MG/DL (ref 70–99)
GLUCOSE BLD-MCNC: 303 MG/DL (ref 70–99)
Lab: NORMAL
SPECIMEN: NORMAL

## 2022-02-11 PROCEDURE — 6360000002 HC RX W HCPCS: Performed by: NURSE PRACTITIONER

## 2022-02-11 PROCEDURE — 2700000000 HC OXYGEN THERAPY PER DAY

## 2022-02-11 PROCEDURE — 6370000000 HC RX 637 (ALT 250 FOR IP): Performed by: INTERNAL MEDICINE

## 2022-02-11 PROCEDURE — 94640 AIRWAY INHALATION TREATMENT: CPT

## 2022-02-11 PROCEDURE — 97535 SELF CARE MNGMENT TRAINING: CPT

## 2022-02-11 PROCEDURE — 2580000003 HC RX 258: Performed by: NURSE PRACTITIONER

## 2022-02-11 PROCEDURE — 6370000000 HC RX 637 (ALT 250 FOR IP): Performed by: NURSE PRACTITIONER

## 2022-02-11 PROCEDURE — 82962 GLUCOSE BLOOD TEST: CPT

## 2022-02-11 PROCEDURE — 94761 N-INVAS EAR/PLS OXIMETRY MLT: CPT

## 2022-02-11 PROCEDURE — 99231 SBSQ HOSP IP/OBS SF/LOW 25: CPT | Performed by: INTERNAL MEDICINE

## 2022-02-11 PROCEDURE — 97530 THERAPEUTIC ACTIVITIES: CPT

## 2022-02-11 PROCEDURE — 2140000000 HC CCU INTERMEDIATE R&B

## 2022-02-11 RX ADMIN — SODIUM CHLORIDE, PRESERVATIVE FREE 10 ML: 5 INJECTION INTRAVENOUS at 10:11

## 2022-02-11 RX ADMIN — ACETAMINOPHEN 650 MG: 325 TABLET ORAL at 04:03

## 2022-02-11 RX ADMIN — PREDNISONE 40 MG: 20 TABLET ORAL at 09:20

## 2022-02-11 RX ADMIN — PREGABALIN 100 MG: 50 CAPSULE ORAL at 13:36

## 2022-02-11 RX ADMIN — LORAZEPAM 0.5 MG: 0.5 TABLET ORAL at 21:44

## 2022-02-11 RX ADMIN — BUPRENORPHINE AND NALOXONE 1 FILM: 8; 2 FILM, SOLUBLE BUCCAL; SUBLINGUAL at 13:35

## 2022-02-11 RX ADMIN — SODIUM CHLORIDE, PRESERVATIVE FREE 10 ML: 5 INJECTION INTRAVENOUS at 21:45

## 2022-02-11 RX ADMIN — GUAIFENESIN 600 MG: 600 TABLET, EXTENDED RELEASE ORAL at 21:44

## 2022-02-11 RX ADMIN — SENNOSIDES AND DOCUSATE SODIUM 1 TABLET: 50; 8.6 TABLET ORAL at 09:20

## 2022-02-11 RX ADMIN — SENNOSIDES AND DOCUSATE SODIUM 1 TABLET: 50; 8.6 TABLET ORAL at 21:44

## 2022-02-11 RX ADMIN — BUPRENORPHINE AND NALOXONE 1 FILM: 8; 2 FILM, SOLUBLE BUCCAL; SUBLINGUAL at 21:44

## 2022-02-11 RX ADMIN — PREGABALIN 100 MG: 50 CAPSULE ORAL at 21:45

## 2022-02-11 RX ADMIN — ASPIRIN 81 MG 81 MG: 81 TABLET ORAL at 09:21

## 2022-02-11 RX ADMIN — LORAZEPAM 0.5 MG: 0.5 TABLET ORAL at 10:00

## 2022-02-11 RX ADMIN — GUAIFENESIN 600 MG: 600 TABLET, EXTENDED RELEASE ORAL at 09:20

## 2022-02-11 RX ADMIN — LEVOFLOXACIN 500 MG: 500 TABLET, FILM COATED ORAL at 09:21

## 2022-02-11 RX ADMIN — BUDESONIDE AND FORMOTEROL FUMARATE DIHYDRATE 2 PUFF: 160; 4.5 AEROSOL RESPIRATORY (INHALATION) at 08:03

## 2022-02-11 RX ADMIN — ENOXAPARIN SODIUM 40 MG: 100 INJECTION SUBCUTANEOUS at 09:21

## 2022-02-11 RX ADMIN — TRAZODONE HYDROCHLORIDE 300 MG: 50 TABLET ORAL at 21:45

## 2022-02-11 RX ADMIN — PREGABALIN 100 MG: 50 CAPSULE ORAL at 09:20

## 2022-02-11 RX ADMIN — ALBUTEROL SULFATE 2 PUFF: 90 AEROSOL, METERED RESPIRATORY (INHALATION) at 08:03

## 2022-02-11 ASSESSMENT — PAIN DESCRIPTION - PROGRESSION
CLINICAL_PROGRESSION: GRADUALLY IMPROVING

## 2022-02-11 ASSESSMENT — PAIN SCALES - GENERAL
PAINLEVEL_OUTOF10: 7
PAINLEVEL_OUTOF10: 6
PAINLEVEL_OUTOF10: 0

## 2022-02-11 NOTE — PROGRESS NOTES
Comprehensive Nutrition Assessment    Type and Reason for Visit:  Initial (los 5 d)    Nutrition Recommendations/Plan:   · Continue regular diet with snacks as desires   · May offer low calorie, high protein oral nutrition supplement as needed    Nutrition Assessment:  Pt was admitted with acute on chronic hypoxemic respiratory failure secondary to COPD exacerbation. H/O COPD and Covid-19. Has been feeding self with fair po intake, consuming about half of her meals. Mild wt loss noted recently. Will continue to follow as moderate nutrition risk. Malnutrition Assessment:  Malnutrition Status:  No malnutrition    Context:  Chronic Illness       Estimated Daily Nutrient Needs:  Energy (kcal):  2781-2515 ([place] Je[place]); Weight Used for Energy Requirements:  Current     Protein (g):  54-65 (1-1.2 g/kg IBW); Weight Used for Protein Requirements:  Ideal        Fluid (ml/day):  7497-2101; Method Used for Fluid Requirements:  1 ml/kcal      Nutrition Related Findings:  Mg 1.5, A1C 6.1, Glu 107-128      Wounds:  None       Current Nutrition Therapies:    ADULT DIET; Regular    Anthropometric Measures:  · Height: 5' 4\" (162.6 cm)  · Current Body Weight: 200 lb 2.8 oz (90.8 kg)   · Admission Body Weight: 201 lb 4.5 oz (91.3 kg)    · Usual Body Weight: 207 lb 9.6 oz (94.2 kg) (5/23/21)     · Ideal Body Weight: 120 lbs; % Ideal Body Weight 166.8 %   · BMI: 34.3  · BMI Categories: Obese Class 1 (BMI 30.0-34. 9)       Nutrition Diagnosis:   · Predicted inadequate energy intake related to impaired respiratory function as evidenced by intake 26-50%,intake 51-75%    Nutrition Interventions:   Food and/or Nutrient Delivery:  Continue Current Diet,Start Oral Nutrition Supplement  Nutrition Education/Counseling:  No recommendation at this time   Coordination of Nutrition Care:  Continue to monitor while inpatient    Goals:  Pt will consume at least 75% of her meals and supplements       Nutrition Monitoring and Evaluation: Behavioral-Environmental Outcomes:  None Identified   Food/Nutrient Intake Outcomes:  Food and Nutrient Intake,Supplement Intake  Physical Signs/Symptoms Outcomes:  Biochemical Data,Meal Time Behavior,Weight     Discharge Planning:    No discharge needs at this time     Electronically signed by Blaise Patterson RD, LD on 2/11/22 at 12:38 PM EST    Contact: [phone]

## 2022-02-11 NOTE — PLAN OF CARE
Nutrition Problem #1: Predicted inadequate energy intake  Intervention: Food and/or Nutrient Delivery: Continue Current Diet,Start Oral Nutrition Supplement  Nutritional Goals: Pt will consume at least 75% of her meals and supplements

## 2022-02-11 NOTE — PROGRESS NOTES
Hospitalist Progress Note       2/11/2022 2:42 PM  Admit Date: 2/6/2022    PCP: Tiera Morales MD     Assessment and Plan:   1. Acute on chronic hypoxic and hypercapnic respiratory failure: Due to COPD exacerbation. She uses 4 L/min of oxygen and BiPAP nightly at home. Elevated D-dimer but chest CTA-no PE but patchy nodular opacities. Ultrasound-no DVT. Continue BiPAP and oxygen. Pulmonary consult appreciated. 2. Acute exacerbation of COPD: Aggressive nebulization treatment, prednisone and Levaquin. 3. Acute metabolic encephalopathy: This is due to hypercapnia. Continue BiPAP and avoid oversedation. Patient has improved. Consult PT/OT. 4. Chronic pain syndrome: On Suboxone. Chronic problems include history of pacemaker in situ, ADHD, obesity and obstructive sleep apnea (not compliant with CPAP). DVT prophylaxis: Lovenox  Disposition: She lives alone. She has [place] [address] services. Possible discharge to ARU.     Patient Active Problem List:     Chronic respiratory failure (HCC)     COPD, severe ([address]     Obstructive sleep apnea     Acute respiratory failure with hypercapnia (HCC)     Pneumonia     Chronic hepatitis C without hepatic coma (HCC)     S/P placement of cardiac pacemaker     Gastroesophageal reflux disease without esophagitis     H/O drug abuse (HCC)     Pulmonary nodules     ADHD (attention deficit hyperactivity disorder)     Pulmonary emphysema (HCC)     SOB (shortness of breath)     Leg swelling     Acute on chronic respiratory failure with hypoxia and hypercapnia (HCC)     COVID-19     Acute exacerbation of chronic obstructive pulmonary disease (COPD) ([facility]     COPD exacerbation (HCC)     Chronic rhinitis     Fall at home, initial encounter     Asterixis     Hypercapnia     Traumatic closed nondisplaced fracture of distal end of right fibula, initial encounter     Uncontrolled pain     Generalized weakness     Gait disturbance     Myoclonus     Near syncope     Moderate protein-calorie malnutrition ([facility]      Subjective:     Chief Complaint   Patient presents with    Shortness of Breath       F/U:  Interval History: Discussed with her nurse. Patient is feeling much better. Occasionally, she has insomnia at night. Objective: Intake/Output Summary (Last 24 hours) at 2/11/2022 1442  Last data filed at 2/10/2022 2157  Gross per 24 hour   Intake --   Output 675 ml   Net -675 ml      Vitals:   Vitals:    02/11/22 0808   BP:    Pulse:    Resp: 21   Temp:    SpO2: 98%     Physical Exam:  General: Not in respiratory distress. Eyes: Not pale. Anicteric. Neck: No neck mass or thyromegaly  Neurology: Awake and alert. Able to communicate. She follows command. Cardiovascular: Regular. No murmur no S3  Respiratory: Reduced air entry bilaterally. No wheezes. Abdomen: Soft. No tenderness. No palpable mass  Extremities: Right leg is in a cast.  No left leg pedal edema and the foot is warm.     Electronically signed by Anette Baltazar MD on 2/11/2022 at 2:42 PM  [facility] Hospitalist

## 2022-02-11 NOTE — CARE COORDINATION
Received referral for ARU. Will review patients clinicals and PT/OT notes. Thank you for the referral.     [phone]:  Presented clinicals and therapy notes to Dr. Kathy Mukherjee. Patient with recent admission to ARU. Per Dr. Kathy Mukherjee patient not ARU appropriate. Notified Theodora LOZOYA

## 2022-02-11 NOTE — PLAN OF CARE
Problem: Falls - Risk of:  Goal: Will remain free from falls  Description: Will remain free from falls  2/10/2022 2251 by Alison Lane RN  Outcome: Ongoing  2/10/2022 1534 by Jeri Selby  Outcome: Ongoing  2/10/2022 1409 by Dorie Davila  Outcome: Ongoing  2/10/2022 1333 by Dorie Davila  Outcome: Ongoing  Goal: Absence of physical injury  Description: Absence of physical injury  2/10/2022 2251 by Alison Lane RN  Outcome: Ongoing  2/10/2022 1534 by Jeri Selby  Outcome: Ongoing  2/10/2022 1409 by Dorie Davila  Outcome: Ongoing  2/10/2022 1333 by Dorie Davila  Outcome: Ongoing     Problem: OXYGENATION/RESPIRATORY FUNCTION  Goal: Patient will maintain patent airway  2/10/2022 2251 by Alison Lane RN  Outcome: Ongoing  2/10/2022 1534 by Jeri Selby  Outcome: Ongoing  Goal: Patient will achieve/maintain normal respiratory rate/effort  Description: Respiratory rate and effort will be within normal limits for the patient  2/10/2022 2251 by Alison Lane RN  Outcome: Ongoing  2/10/2022 1534 by Jeri Selby  Outcome: Ongoing     Problem: SKIN INTEGRITY  Goal: Skin integrity is maintained or improved  2/10/2022 2251 by Alison Lane RN  Outcome: Ongoing  2/10/2022 1534 by Jeri Selby  Outcome: Ongoing

## 2022-02-11 NOTE — PROGRESS NOTES
Occupational Therapy  . Occupational Therapy Treatment Note      Name: Elan Amador MRN: [id number] :   1960   Date:  2022   Admission Date: 2022 Room:  [facility]     Primary Problem:  The primary encounter diagnosis was COPD exacerbation ([facility] A diagnosis of Hypoxemia was also pertinent to this visit. Patient  has a past medical history of ADHD, COPD (chronic obstructive pulmonary disease) ([facility] COVID-19, Drug addiction in remission ([facility] Hep C w/o coma, chronic ([facility] Pacemaker, Sleep apnea, and TIA (transient ischemic attack). Patient  has a past surgical history that includes  section (); Cholecystectomy (); and Pacemaker insertion (). Restrictions/Precautions:    General Precautions, Fall Risk, RLE walking boot WBAT    Communication with other providers:  .Per chart review and Nurse Lois, patient is appropriate for therapeutic intervention. Subjective:  Patient states:  \"I have to use the toilet! I think I might have pooped my pants. \"   Pain: 8/10, headache (location, type, intensity)    Objective:    Observation:  Pt received in supine, exhibiting impulsivity for getting out of bed, required cues for safe body positioning and to wait for gait belt. Nurse present for toileting ADL for second person assist and safety. Pt A&O x4 c minimal increased time to respond, found to have some incontinence of BM, completed large BM in bedside commode. Pt required cues for re-direction and safety related to impulsivity and sequencing for transfers  throughout Tx session. .  Objective Measures:  Telemetry, HR 95, RR 19, O2 sat 95% on 6L O2 NC>    Treatment, including education:  Therapeutic Activity Training:   Therapeutic activity training was instructed today. Cues were given for safety, sequence, UE/LE placement, awareness, and balance. Activities performed today included bed mobility training, sup-sit, sit-stand, SPT.     Supine<>sit: SBA + cues for safe body positioning    Scooting: SBA + cues for safe body positioning    Sit to stands: Mod A x2 c RW + cues for safe body positioning / hand placement. Stand to sits: Mod A / Nguyen Charles A x2 c RW + cues as above    SPT: See Toilet Transfer below, use of bedside commode    Sitting Balance / Tolerance: SBA progressing to Sup seated EOB, required occasional cues for safe body positioning during scooting to / from EOB. Tolerated >10 minutes. Self Care Training:   Cues were given for safety, sequence, UE/LE placement, visual cues, and balance. Activities performed today included dressing, toileting, hand hygiene, and grooming. Toileting: Dep for hygiene s/p BM / clothing mgmt s/p incontinence of BM  Toilet Transfer: Mod A x2 c RW + cues for safe body positioning and sequencing, including hand placement, mgmt of lines EOB<>BSC. LB Dressing: Max A, assist to thread BLE and for clothing hike posteriorly while pt managed front of pull up. Grooming: Sup seated EOB c tray table in front of pt c cues for initiation to wash face and perform hair care. Pt deferred oral care at this time. .All therapeutic intervention performed c emphasis on dynamic balance / sitting and standing tolerance to inc strength, endurance and act tolerance for inc Indep c ADL tasks, func transfers / mobility. Safety  Patient safely supine in bed c alarm set at end of session, with call light/phone in reach, and nursing aware. Gait belt was used for func transfers / mobility. Assessment / Impression:    Patient's tolerance of treatment: Well  Adverse Reaction: None  Significant change in status and impact:  None  Barriers to improvement: Endurance, strength      Plan for Next Session:    Continue per OT POC per patient's tolerance c emphasis on functional transfers during ADL tasks.      Time in:  1523  Time out:  1548  Timed treatment minutes:  25  Total treatment time:  25      Electronically signed by:    Juan David March, CLAY/L  2/11/2022, 2:35 PM    Goals:  Pt goal: go home  Time Frame for STGs: discharge  Goal 1: Pt will perform UE ADLs Independent  Goal 2: Pt will perform LE ADLs Guera w/ AD  Goal 3: Pt will perform toileting Guera  Goal 4: Pt will perform functional transfer w/ AD Guera  Goal 5: Pt will perform functional mobility w/ AD Guera  Goal 6: Pt will perform therex/theract in order to increase functional activity tolerance and dynamic standing balance

## 2022-02-11 NOTE — PROGRESS NOTES
Pulmonary and Critical Care  Progress Note      VITALS:  BP (!) 158/94   Pulse 79   Temp 98.3 °F (36.8 °C) (Oral)   Resp 21   Ht 5' 4\" (1.626 m)   Wt 200 lb 2.8 oz (90.8 kg)   SpO2 98%   BMI 34.36 kg/m²     Subjective:   CHIEF COMPLAINT :SOB     HPI:                The patient is sleepy but arousable. She is not in acute resp distress    Objective:   PHYSICAL EXAM:    LUNGS:Occasional basal crackles  Abd-soft, BS+,NT  Ext- no pedal edema  CVS-s1s2, no murmurs      DATA:    CBC:  No results for input(s): WBC, RBC, HGB, HCT, PLT, MCV, MCH, MCHC, RDW, NRBC, SEGSPCT, BANDSPCT in the last 72 hours. BMP:  No results for input(s): NA, K, CL, CO2, BUN, CREATININE, CALCIUM, GLUCOSE in the last 72 hours. ABG:  Recent Labs     02/08/22  2200 02/09/22  0600   PH 7.40 7.35   PO2ART 76 77   UIB5QPS 87.0* 94.0*   O2SAT 94.3* 94.3*     BNP  No results found for: BNP   D-Dimer:  Lab Results   Component Value Date    DDIMER 745 (H) 02/06/2022      1.  Radiology: None      Assessment/Plan     Patient Active Problem List    Diagnosis Date Noted    Uncontrolled pain     Generalized weakness     Gait disturbance     Myoclonus     Near syncope     Moderate protein-calorie malnutrition ([address]     Traumatic closed nondisplaced fracture of distal end of right fibula, initial encounter 01/11/2022    Asterixis     Hypercapnia     Chronic rhinitis 01/05/2022    Fall at home, initial encounter 01/05/2022    COPD exacerbation ([address] 07/09/2021    Acute exacerbation of chronic obstructive pulmonary disease (COPD) ([address] 06/26/2021    Acute on chronic respiratory failure with hypoxia and hypercapnia ([address] 05/23/2021    COVID-19 05/23/2021    Pulmonary emphysema ([address] 05/18/2021    SOB (shortness of breath) 05/18/2021    Leg swelling 05/18/2021    Chronic hepatitis C without hepatic coma ([address] 05/17/2021     Overview Note:     Patient saw GI specialists and cleared it on her own      Gastroesophageal reflux disease without esophagitis 05/17/2021    H/O drug abuse ([facility] 05/17/2021     Overview Note:     Prior heroine      Pulmonary nodules 05/17/2021     Overview Note:     Needs f/u 11/2021      ADHD (attention deficit hyperactivity disorder) 05/17/2021    Pneumonia 05/02/2021    Acute respiratory failure with hypercapnia (HCC) 09/24/2020    Chronic respiratory failure ([facility] 08/16/2017    COPD, severe ([facility] 08/16/2017    Obstructive sleep apnea 08/16/2017     Overview Note:     Uses biPAP at night      S/P placement of cardiac pacemaker 2005     Overview Note:     Has never had replacement battery. Acute on Chronic Hypoxic Hypercapneic resp failure- improving  Chronic Metabolic Alkalosis  AECOPD  Bilateral Pneumonia sec to Human Metapneumovirus  Bilateral Pulmonary nodules  Obesity  KING  Anxiety- improving       1. BIPAP qhs and prn  2. Abx  3. F/u C&S  4. Prednisone taper  5. ICS  6. OOB  7. Keep sats > 92%  8. Await placement  9.  C.w present management    Electronically signed by Kelly Polanco MD on 2/11/2022 at 9:00 AM

## 2022-02-11 NOTE — PROGRESS NOTES
Pulmonary and Critical Care  Progress Note      VITALS:  BP (!) 169/80   Pulse 103   Temp 98.3 °F (36.8 °C) (Oral)   Resp 18   Ht 5' 4\" (1.626 m)   Wt 201 lb 4.5 oz (91.3 kg)   SpO2 94%   BMI 34.55 kg/m²     Subjective:   CHIEF COMPLAINT :SOB   HPI:                The patient is sitting in the bed. She is less anxious today. She is not in acute resp distress    Objective:   PHYSICAL EXAM:    LUNGS:Occasional basal crackles  Abd-soft, BS+,NT  Ext- no pedal edema  CVS-s1s2, no murmurs      DATA:    CBC:  No results for input(s): WBC, RBC, HGB, HCT, PLT, MCV, MCH, MCHC, RDW, NRBC, SEGSPCT, BANDSPCT in the last 72 hours. BMP:  No results for input(s): NA, K, CL, CO2, BUN, CREATININE, CALCIUM, GLUCOSE in the last 72 hours. ABG:  Recent Labs     02/08/22  0845 02/08/22  2200 02/09/22  0600   PH 7.26* 7.40 7.35   PO2ART 109* 76 77   CEY2CRV 116.0* 87.0* 94.0*   O2SAT 96.1 94.3* 94.3*     BNP  No results found for: BNP   D-Dimer:  Lab Results   Component Value Date    DDIMER 745 (H) 02/06/2022      1.  Radiology: None      Assessment/Plan     Patient Active Problem List    Diagnosis Date Noted    Uncontrolled pain     Generalized weakness     Gait disturbance     Myoclonus     Near syncope     Moderate protein-calorie malnutrition ([address]     Traumatic closed nondisplaced fracture of distal end of right fibula, initial encounter 01/11/2022    Asterixis     Hypercapnia     Chronic rhinitis 01/05/2022    Fall at home, initial encounter 01/05/2022    COPD exacerbation ([address] 07/09/2021    Acute exacerbation of chronic obstructive pulmonary disease (COPD) ([address] 06/26/2021    Acute on chronic respiratory failure with hypoxia and hypercapnia ([address] 05/23/2021    COVID-19 05/23/2021    Pulmonary emphysema ([address] 05/18/2021    SOB (shortness of breath) 05/18/2021    Leg swelling 05/18/2021    Chronic hepatitis C without hepatic coma ([facility] 05/17/2021     Overview Note:     Patient saw GI specialists and cleared it on her own      Gastroesophageal reflux disease without esophagitis 05/17/2021    H/O drug abuse ([facility] 05/17/2021     Overview Note:     Prior heroine      Pulmonary nodules 05/17/2021     Overview Note:     Needs f/u 11/2021      ADHD (attention deficit hyperactivity disorder) 05/17/2021    Pneumonia 05/02/2021    Acute respiratory failure with hypercapnia ([facility] 09/24/2020    Chronic respiratory failure ([facility] 08/16/2017    COPD, severe ([facility] 08/16/2017    Obstructive sleep apnea 08/16/2017     Overview Note:     Uses biPAP at night      S/P placement of cardiac pacemaker 2005     Overview Note:     Has never had replacement battery. Acute on Chronic Hypoxic Hypercapneic resp failure  Chronic Metabolic Alkalosis  AECOPD  Bilateral Pneumonia sec to Human Metapneumovirus  Bilateral Pulmonary nodules  Obesity  KING  Anxiety- improving     1. Abx   2. BIPAP  3. Prednisone taper  4. ICS  5. OOB  6. Await placement  7.  C.w present management    Electronically signed by Gomez Wise MD on 2/10/2022 at 9:50 PM

## 2022-02-11 NOTE — CARE COORDINATION
Met with pt for f/u visit to discuss the PT recommendations for ARU. Pt is agreeable to go to ARU if approved. Referral made to [facility]. She will review clinicals and will notify CM of decision to accept or not. Pt has Medicare and would not require a pre-cert.   TE

## 2022-02-11 NOTE — CARE COORDINATION
Notified pt of ARU denial.  Discussed SNF with pt and she is agreeable. SNF list provided. Placed on CM weekend f/u list for CM to f/u for SNF choice.   TE

## 2022-02-11 NOTE — PROGRESS NOTES
Informed by student nurse that pt was being assisted to bathroom by daughter when she lost her balance and almost fell. Daughter stated that she caught her and assisted her to the floor where she then helped her sit back on the toilet. Daughter stated that she did not hit her head or sustain any injuries. All vitals stable. Pt denies any pain. Informed charge nurse and nurse practitioner. Will continue to monitor.  Bed alarm set and instructed pt and daughter to call when she needed to use bathroom and that staff would assist.

## 2022-02-11 NOTE — PLAN OF CARE
Problem: Falls - Risk of:  Goal: Will remain free from falls  Description: Will remain free from falls  Outcome: Met This Shift  Goal: Absence of physical injury  Description: Absence of physical injury  Outcome: Met This Shift     Problem: OXYGENATION/RESPIRATORY FUNCTION  Goal: Patient will maintain patent airway  Outcome: Met This Shift  Goal: Patient will achieve/maintain normal respiratory rate/effort  Description: Respiratory rate and effort will be within normal limits for the patient  Outcome: Met This Shift     Problem: SKIN INTEGRITY  Goal: Skin integrity is maintained or improved  Outcome: Met This Shift     Problem: NUTRITION  Goal: Nutritional status is improving  Outcome: Met This Shift     Problem: Pain:  Description: Pain management should include both nonpharmacologic and pharmacologic interventions.   Goal: Pain level will decrease  Description: Pain level will decrease  Outcome: Met This Shift  Goal: Control of acute pain  Description: Control of acute pain  Outcome: Met This Shift  Goal: Control of chronic pain  Description: Control of chronic pain  Outcome: Met This Shift     Problem: Skin Integrity:  Goal: Will show no infection signs and symptoms  Description: Will show no infection signs and symptoms  Outcome: Met This Shift  Goal: Absence of new skin breakdown  Description: Absence of new skin breakdown  Outcome: Met This Shift     Problem: Confusion - Acute:  Goal: Absence of continued neurological deterioration signs and symptoms  Description: Absence of continued neurological deterioration signs and symptoms  Outcome: Met This Shift  Goal: Mental status will be restored to baseline  Description: Mental status will be restored to baseline  Outcome: Met This Shift     Problem: Discharge Planning:  Goal: Ability to perform activities of daily living will improve  Description: Ability to perform activities of daily living will improve  Outcome: Met This Shift  Goal: Participates in care planning  Description: Participates in care planning  Outcome: Met This Shift     Problem: Injury - Risk of, Physical Injury:  Goal: Will remain free from falls  Description: Will remain free from falls  Outcome: Met This Shift  Goal: Absence of physical injury  Description: Absence of physical injury  Outcome: Met This Shift     Problem: Mood - Altered:  Goal: Mood stable  Description: Mood stable  Outcome: Met This Shift  Goal: Absence of abusive behavior  Description: Absence of abusive behavior  Outcome: Met This Shift  Goal: Verbalizations of feeling emotionally comfortable while being cared for will increase  Description: Verbalizations of feeling emotionally comfortable while being cared for will increase  Outcome: Met This Shift     Problem: Psychomotor Activity - Altered:  Goal: Absence of psychomotor disturbance signs and symptoms  Description: Absence of psychomotor disturbance signs and symptoms  Outcome: Met This Shift     Problem: Sensory Perception - Impaired:  Goal: Demonstrations of improved sensory functioning will increase  Description: Demonstrations of improved sensory functioning will increase  Outcome: Met This Shift  Goal: Decrease in sensory misperception frequency  Description: Decrease in sensory misperception frequency  Outcome: Met This Shift  Goal: Able to refrain from responding to false sensory perceptions  Description: Able to refrain from responding to false sensory perceptions  Outcome: Met This Shift  Goal: Demonstrates accurate environmental perceptions  Description: Demonstrates accurate environmental perceptions  Outcome: Met This Shift  Goal: Able to distinguish between reality-based and nonreality-based thinking  Description: Able to distinguish between reality-based and nonreality-based thinking  Outcome: Met This Shift  Goal: Able to interrupt nonreality-based thinking  Description: Able to interrupt nonreality-based thinking  Outcome: Met This Shift     Problem: Sleep Pattern Disturbance:  Goal: Appears well-rested  Description: Appears well-rested  Outcome: Met This Shift     Problem: Nutrition  Goal: Optimal nutrition therapy  Outcome: Met This Shift

## 2022-02-12 LAB
GLUCOSE BLD-MCNC: 103 MG/DL (ref 70–99)
GLUCOSE BLD-MCNC: 121 MG/DL (ref 70–99)
GLUCOSE BLD-MCNC: 215 MG/DL (ref 70–99)
GLUCOSE BLD-MCNC: 305 MG/DL (ref 70–99)

## 2022-02-12 PROCEDURE — 94640 AIRWAY INHALATION TREATMENT: CPT

## 2022-02-12 PROCEDURE — 94660 CPAP INITIATION&MGMT: CPT

## 2022-02-12 PROCEDURE — 6360000002 HC RX W HCPCS: Performed by: NURSE PRACTITIONER

## 2022-02-12 PROCEDURE — 99231 SBSQ HOSP IP/OBS SF/LOW 25: CPT | Performed by: INTERNAL MEDICINE

## 2022-02-12 PROCEDURE — 2580000003 HC RX 258: Performed by: NURSE PRACTITIONER

## 2022-02-12 PROCEDURE — 82962 GLUCOSE BLOOD TEST: CPT

## 2022-02-12 PROCEDURE — 97530 THERAPEUTIC ACTIVITIES: CPT

## 2022-02-12 PROCEDURE — 6370000000 HC RX 637 (ALT 250 FOR IP): Performed by: INTERNAL MEDICINE

## 2022-02-12 PROCEDURE — 6370000000 HC RX 637 (ALT 250 FOR IP): Performed by: NURSE PRACTITIONER

## 2022-02-12 PROCEDURE — 2700000000 HC OXYGEN THERAPY PER DAY

## 2022-02-12 PROCEDURE — 1200000000 HC SEMI PRIVATE

## 2022-02-12 PROCEDURE — 94761 N-INVAS EAR/PLS OXIMETRY MLT: CPT

## 2022-02-12 RX ADMIN — PREGABALIN 100 MG: 50 CAPSULE ORAL at 21:46

## 2022-02-12 RX ADMIN — SENNOSIDES AND DOCUSATE SODIUM 1 TABLET: 50; 8.6 TABLET ORAL at 08:21

## 2022-02-12 RX ADMIN — LORAZEPAM 0.5 MG: 0.5 TABLET ORAL at 23:40

## 2022-02-12 RX ADMIN — PREDNISONE 40 MG: 20 TABLET ORAL at 08:21

## 2022-02-12 RX ADMIN — PREGABALIN 100 MG: 50 CAPSULE ORAL at 08:20

## 2022-02-12 RX ADMIN — BUPRENORPHINE AND NALOXONE 1 FILM: 8; 2 FILM, SOLUBLE BUCCAL; SUBLINGUAL at 21:46

## 2022-02-12 RX ADMIN — LORAZEPAM 0.5 MG: 0.5 TABLET ORAL at 08:20

## 2022-02-12 RX ADMIN — ALBUTEROL SULFATE 2 PUFF: 90 AEROSOL, METERED RESPIRATORY (INHALATION) at 19:42

## 2022-02-12 RX ADMIN — ENOXAPARIN SODIUM 40 MG: 100 INJECTION SUBCUTANEOUS at 08:21

## 2022-02-12 RX ADMIN — PREGABALIN 100 MG: 50 CAPSULE ORAL at 14:19

## 2022-02-12 RX ADMIN — ACETAMINOPHEN 650 MG: 325 TABLET ORAL at 08:20

## 2022-02-12 RX ADMIN — GUAIFENESIN 600 MG: 600 TABLET, EXTENDED RELEASE ORAL at 08:21

## 2022-02-12 RX ADMIN — SENNOSIDES AND DOCUSATE SODIUM 1 TABLET: 50; 8.6 TABLET ORAL at 21:46

## 2022-02-12 RX ADMIN — TRAZODONE HYDROCHLORIDE 300 MG: 50 TABLET ORAL at 21:46

## 2022-02-12 RX ADMIN — LORAZEPAM 0.5 MG: 0.5 TABLET ORAL at 14:19

## 2022-02-12 RX ADMIN — GUAIFENESIN 600 MG: 600 TABLET, EXTENDED RELEASE ORAL at 21:46

## 2022-02-12 RX ADMIN — BUDESONIDE AND FORMOTEROL FUMARATE DIHYDRATE 2 PUFF: 160; 4.5 AEROSOL RESPIRATORY (INHALATION) at 19:44

## 2022-02-12 RX ADMIN — BUDESONIDE AND FORMOTEROL FUMARATE DIHYDRATE 2 PUFF: 160; 4.5 AEROSOL RESPIRATORY (INHALATION) at 07:47

## 2022-02-12 RX ADMIN — SODIUM CHLORIDE, PRESERVATIVE FREE 10 ML: 5 INJECTION INTRAVENOUS at 21:47

## 2022-02-12 RX ADMIN — SODIUM CHLORIDE, PRESERVATIVE FREE 10 ML: 5 INJECTION INTRAVENOUS at 09:07

## 2022-02-12 RX ADMIN — LEVOFLOXACIN 500 MG: 500 TABLET, FILM COATED ORAL at 08:21

## 2022-02-12 RX ADMIN — ASPIRIN 81 MG 81 MG: 81 TABLET ORAL at 08:20

## 2022-02-12 RX ADMIN — BUPRENORPHINE AND NALOXONE 1 FILM: 8; 2 FILM, SOLUBLE BUCCAL; SUBLINGUAL at 08:21

## 2022-02-12 RX ADMIN — ALBUTEROL SULFATE 2 PUFF: 90 AEROSOL, METERED RESPIRATORY (INHALATION) at 07:47

## 2022-02-12 ASSESSMENT — PAIN SCALES - GENERAL
PAINLEVEL_OUTOF10: 1
PAINLEVEL_OUTOF10: 4
PAINLEVEL_OUTOF10: 0
PAINLEVEL_OUTOF10: 0
PAINLEVEL_OUTOF10: 4
PAINLEVEL_OUTOF10: 0

## 2022-02-12 ASSESSMENT — PAIN DESCRIPTION - PROGRESSION
CLINICAL_PROGRESSION: GRADUALLY IMPROVING
CLINICAL_PROGRESSION: GRADUALLY IMPROVING

## 2022-02-12 NOTE — PROGRESS NOTES
Pulmonary and Critical Care  Progress Note      VITALS:  BP (!) 143/77   Pulse 99   Temp 98.1 °F (36.7 °C) (Oral)   Resp 20   Ht 5' 4\" (1.626 m)   Wt 200 lb 2.8 oz (90.8 kg)   SpO2 97%   BMI 34.36 kg/m²     Subjective:   CHIEF COMPLAINT :SOB     HPI:                The patient is lying in the bed. She is not in acute resp distress    Objective:   PHYSICAL EXAM:    LUNGS:Occasional basal crackles  Abd-soft, BS+,NT  Ext- no pedal edema  CVS-s1s2, no murmurs      DATA:    CBC:  No results for input(s): WBC, RBC, HGB, HCT, PLT, MCV, MCH, MCHC, RDW, NRBC, SEGSPCT, BANDSPCT in the last 72 hours. BMP:  No results for input(s): NA, K, CL, CO2, BUN, CREATININE, CALCIUM, GLUCOSE in the last 72 hours. ABG:  No results for input(s): PH, PO2ART, AFJ5FBV, HCO3, BEART, O2SAT in the last 72 hours. BNP  No results found for: BNP   D-Dimer:  Lab Results   Component Value Date    DDIMER 745 (H) 02/06/2022      1.  Radiology: None      Assessment/Plan     Patient Active Problem List    Diagnosis Date Noted    Uncontrolled pain     Generalized weakness     Gait disturbance     Myoclonus     Near syncope     Moderate protein-calorie malnutrition ([address]     Traumatic closed nondisplaced fracture of distal end of right fibula, initial encounter 01/11/2022    Asterixis     Hypercapnia     Chronic rhinitis 01/05/2022    Fall at home, initial encounter 01/05/2022    COPD exacerbation ([address] 07/09/2021    Acute exacerbation of chronic obstructive pulmonary disease (COPD) ([address] 06/26/2021    Acute on chronic respiratory failure with hypoxia and hypercapnia ([address] 05/23/2021    COVID-19 05/23/2021    Pulmonary emphysema ([address] 05/18/2021    SOB (shortness of breath) 05/18/2021    Leg swelling 05/18/2021    Chronic hepatitis C without hepatic coma ([address] 05/17/2021     Overview Note:     Patient saw GI specialists and cleared it on her own      Gastroesophageal reflux disease without esophagitis 05/17/2021    H/O drug abuse ([facility] 05/17/2021     Overview Note:     Prior heroine      Pulmonary nodules 05/17/2021     Overview Note:     Needs f/u 11/2021      ADHD (attention deficit hyperactivity disorder) 05/17/2021    Pneumonia 05/02/2021    Acute respiratory failure with hypercapnia ([facility] 09/24/2020    Chronic respiratory failure ([facility] 08/16/2017    COPD, severe ([facility] 08/16/2017    Obstructive sleep apnea 08/16/2017     Overview Note:     Uses biPAP at night      S/P placement of cardiac pacemaker 2005     Overview Note:     Has never had replacement battery. Acute on Chronic Hypoxic Hypercapneic resp failure- improving  Chronic Metabolic Alkalosis  AECOPD  Bilateral Pneumonia sec to Human Metapneumovirus  Bilateral Pulmonary nodules  Obesity  KING  Anxiety- improving       1. Abx  2. Prednisone taper  3. BIPAP  4. ICS  5. OOB  6. Keep sats > 92%  7. Await placement  8.  C.w present management    Electronically signed by Faith Rick MD on 2/12/2022 at 11:33 AM

## 2022-02-12 NOTE — PROGRESS NOTES
Physical Therapy    Physical Therapy Treatment Note  Name: Serafin Nur MRN: [id number] :   1960   Date:  2022   Admission Date: 2022 Room:  [address] [place] [address]   Restrictions/Precautions:         General Precautions, Fall Risk, RLE walking boot WBAT  Communication with other providers:  Nurse reports pt ok to see for therap   Subjective:  Patient states: \" I'm anxious. I need my medication. I have tremors and shaking and its from COPD and smoke. All of it. That's why I shake. \"  Pain:   Location, Type, Intensity (0/10 to 10/10): None reported   Objective:    Observation:  Pt seated EOB and experiencing min/mod tremors  Treatment, including education/measures:  Pt presented in room and was agreeable to therapy. She was able to sit upright and perform deep breathing to decrease anxiety and prepare for therapy. She reported that she was not feeling well but reported she felt safe to transfer to recliner. She performed STS with SBA and slightly elevated EOB. She was able to pivot to line up with chair. Her legs then buckled and she fell into chair, with therapist slowing her descent. Pt reports that her knees buckling has been a problem since her dx of COPD. She was able to adjust in the chair with SBA. Consulted with nurse regarding patients tremors and knee buckling. Suspect this is behavioral. Suggest co treat for any standing or gait activities. Assessment / Impression:       Patient's tolerance of treatment:  Good    Adverse Reaction: none  Significant change in status and impact:  None   Barriers to improvement:  Self limiting behavior, strength, fatigue. Plan for Next Session:    Continue working towards all goals   Time in:  1:50  Time out:  2:21  Timed treatment minutes:  41  Total treatment time:  39    Previously filed items:     Short term goals  Time Frame for Short term goals: 1 week  Short term goal 1: Pt will perform sup<>sit with SBA, min cues.   Short term goal 2: Pt will tolerate midline standing balance with RW, CGA x3' with cues for posture. Short term goal 3: Pt will perform STS from BR commode to RW with CGA, v/c. Short term goal 4: Pt will AMB x10 ft with chair follow, Min A, RW.        Electronically signed by:    Camelia Gutierrez PTA  2/12/2022, 2:29 PM

## 2022-02-12 NOTE — PROGRESS NOTES
Hospitalist Progress Note       2/12/2022 12:06 PM  Admit Date: 2/6/2022    PCP: Maximus Urias MD     Assessment and Plan:   1. Acute on chronic hypoxic and hypercapnic respiratory failure: Due to COPD exacerbation. She uses 4 L/min of oxygen and BiPAP nightly at home. Elevated D-dimer but chest CTA-no PE but patchy nodular opacities. Ultrasound-no DVT. Continue BiPAP and oxygen. Pulmonary consult appreciated. 2. Acute exacerbation of COPD: Aggressive nebulization treatment, prednisone and Levaquin. 3. Acute metabolic encephalopathy: This is due to hypercapnia. Continue BiPAP and avoid oversedation. Patient has improved. Consult PT/OT. 4. Chronic pain syndrome: On Suboxone. Chronic problems include history of pacemaker in situ, ADHD, obesity and obstructive sleep apnea (not compliant with CPAP). DVT prophylaxis: Lovenox  Disposition: She lives alone. She has [place] [address] services.   Awaiting ECF    Patient Active Problem List:     Chronic respiratory failure (HCC)     COPD, severe ([address]     Obstructive sleep apnea     Acute respiratory failure with hypercapnia (HCC)     Pneumonia     Chronic hepatitis C without hepatic coma (HCC)     S/P placement of cardiac pacemaker     Gastroesophageal reflux disease without esophagitis     H/O drug abuse (HCC)     Pulmonary nodules     ADHD (attention deficit hyperactivity disorder)     Pulmonary emphysema (HCC)     SOB (shortness of breath)     Leg swelling     Acute on chronic respiratory failure with hypoxia and hypercapnia (HCC)     COVID-19     Acute exacerbation of chronic obstructive pulmonary disease (COPD) ([facility]     COPD exacerbation (HCC)     Chronic rhinitis     Fall at home, initial encounter     Asterixis     Hypercapnia     Traumatic closed nondisplaced fracture of distal end of right fibula, initial encounter     Uncontrolled pain     Generalized weakness     Gait disturbance     Myoclonus     Near syncope     Moderate protein-calorie malnutrition [facility])      Subjective:     Chief Complaint   Patient presents with    Shortness of Breath       F/U:  Interval History: Discussed with her nurse. She is feeling well. She has no new complaints. Objective: Intake/Output Summary (Last 24 hours) at 2/12/2022 1206  Last data filed at 2/12/2022 0913  Gross per 24 hour   Intake 200 ml   Output 600 ml   Net -400 ml      Vitals:   Vitals:    02/12/22 0830   BP: (!) 143/77   Pulse: 99   Resp: 20   Temp:    SpO2:      Physical Exam:  General: Not in respiratory distress. Generally weak. Eyes: Not pale. Anicteric. Neck: No neck mass or thyromegaly  Neurology: Awake and alert. Able to communicate. She follows command. Cardiovascular: Regular. No murmur no S3  Respiratory: Reduced air entry bilaterally. No wheezes. Abdomen: Soft. No tenderness. No palpable mass  Extremities: Right leg is in a cast.  No left leg pedal edema and the foot is warm.     Electronically signed by Long Garcia MD on 2/12/2022 at 12:06 PM  [facility] [place] Hospitalist

## 2022-02-12 NOTE — PLAN OF CARE
Problem: Falls - Risk of:  Goal: Will remain free from falls  Description: Will remain free from falls  2/12/2022 0306 by Ian Sol RN  Outcome: Ongoing  2/11/2022 1518 by Brandon Chapman RN  Outcome: Met This Shift  Goal: Absence of physical injury  Description: Absence of physical injury  2/12/2022 0306 by Ian Sol RN  Outcome: Ongoing  2/11/2022 1518 by Brandon Chapman RN  Outcome: Met This Shift     Problem: OXYGENATION/RESPIRATORY FUNCTION  Goal: Patient will maintain patent airway  2/12/2022 0306 by Ian Sol RN  Outcome: Ongoing  2/11/2022 1518 by Brandon Chapman RN  Outcome: Met This Shift  Goal: Patient will achieve/maintain normal respiratory rate/effort  Description: Respiratory rate and effort will be within normal limits for the patient  2/12/2022 0306 by Ian Sol RN  Outcome: Ongoing  2/11/2022 1518 by Brandon Chapman RN  Outcome: Met This Shift     Problem: SKIN INTEGRITY  Goal: Skin integrity is maintained or improved  2/12/2022 0306 by Ian Sol RN  Outcome: Ongoing  2/11/2022 1518 by Brandon Chapman RN  Outcome: Met This Shift     Problem: NUTRITION  Goal: Nutritional status is improving  2/12/2022 0306 by Ian Sol RN  Outcome: Ongoing  2/11/2022 1518 by Brandon Chapman RN  Outcome: Met This Shift     Problem: Pain:  Goal: Pain level will decrease  Description: Pain level will decrease  2/12/2022 0306 by Ian Sol RN  Outcome: Ongoing  2/11/2022 1518 by Brandon Chapman RN  Outcome: Met This Shift  Goal: Control of acute pain  Description: Control of acute pain  2/12/2022 0306 by Ian Sol RN  Outcome: Ongoing  2/11/2022 1518 by Brandon Chapman RN  Outcome: Met This Shift  Goal: Control of chronic pain  Description: Control of chronic pain  2/12/2022 0306 by Ian Sol RN  Outcome: Ongoing  2/11/2022 1518 by Brandon Chapman RN  Outcome: Met This Shift     Problem: Skin Integrity:  Goal: Will show no infection signs and symptoms  Description: Will show no infection signs and symptoms  2/12/2022 0306 by Destiny Oropeza RN  Outcome: Ongoing  2/11/2022 1518 by Sen Sahni RN  Outcome: Met This Shift  Goal: Absence of new skin breakdown  Description: Absence of new skin breakdown  2/12/2022 0306 by Destiny Oropeza RN  Outcome: Ongoing  2/11/2022 1518 by Sen Sahni RN  Outcome: Met This Shift     Problem: Confusion - Acute:  Goal: Absence of continued neurological deterioration signs and symptoms  Description: Absence of continued neurological deterioration signs and symptoms  2/12/2022 0306 by Destiny Oropeza RN  Outcome: Ongoing  2/11/2022 1518 by Sen Sahni RN  Outcome: Met This Shift  Goal: Mental status will be restored to baseline  Description: Mental status will be restored to baseline  2/12/2022 0306 by Destiny Oropeza RN  Outcome: Ongoing  2/11/2022 1518 by Sen Sahni RN  Outcome: Met This Shift     Problem: Discharge Planning:  Goal: Ability to perform activities of daily living will improve  Description: Ability to perform activities of daily living will improve  2/12/2022 0306 by Destiny Oropeza RN  Outcome: Ongoing  2/11/2022 1518 by Sen Sahni RN  Outcome: Met This Shift  Goal: Participates in care planning  Description: Participates in care planning  2/12/2022 0306 by Destiny Oropeza RN  Outcome: Ongoing  2/11/2022 1518 by Sen Sahni RN  Outcome: Met This Shift     Problem: Injury - Risk of, Physical Injury:  Goal: Will remain free from falls  Description: Will remain free from falls  2/12/2022 0306 by Destiny Oropeza RN  Outcome: Ongoing  2/11/2022 1518 by Sen Sahni RN  Outcome: Met This Shift  Goal: Absence of physical injury  Description: Absence of physical injury  2/12/2022 0306 by Destiny Oropeza RN  Outcome: Ongoing  2/11/2022 1518 by Sen Sahni RN  Outcome: Met This Shift     Problem: Mood - Altered:  Goal: Mood stable  Description: Mood stable  2/12/2022 0306 by Destiny Oropeza RN  Outcome: Ongoing  2/11/2022 1518 by Sen Sahni RN  Outcome: Met This Shift  Goal: Absence of abusive behavior  Description: Absence of abusive behavior  2/12/2022 0306 by Jayden Banerjee RN  Outcome: Ongoing  2/11/2022 1518 by Risa Mckeon RN  Outcome: Met This Shift  Goal: Verbalizations of feeling emotionally comfortable while being cared for will increase  Description: Verbalizations of feeling emotionally comfortable while being cared for will increase  2/12/2022 0306 by Jayden Banerjee RN  Outcome: Ongoing  2/11/2022 1518 by Risa Mckeon RN  Outcome: Met This Shift     Problem: Psychomotor Activity - Altered:  Goal: Absence of psychomotor disturbance signs and symptoms  Description: Absence of psychomotor disturbance signs and symptoms  2/12/2022 0306 by Jayden Banerjee RN  Outcome: Ongoing  2/11/2022 1518 by Risa Mckeon RN  Outcome: Met This Shift     Problem: Sensory Perception - Impaired:  Goal: Demonstrations of improved sensory functioning will increase  Description: Demonstrations of improved sensory functioning will increase  2/12/2022 0306 by Jayden Banerjee RN  Outcome: Ongoing  2/11/2022 1518 by Risa Mckeon RN  Outcome: Met This Shift  Goal: Decrease in sensory misperception frequency  Description: Decrease in sensory misperception frequency  2/12/2022 0306 by Jayden Banerjee RN  Outcome: Ongoing  2/11/2022 1518 by Risa Mckeon RN  Outcome: Met This Shift  Goal: Able to refrain from responding to false sensory perceptions  Description: Able to refrain from responding to false sensory perceptions  2/12/2022 0306 by Jayden Banerjee RN  Outcome: Ongoing  2/11/2022 1518 by Risa Mckeon RN  Outcome: Met This Shift  Goal: Demonstrates accurate environmental perceptions  Description: Demonstrates accurate environmental perceptions  2/12/2022 0306 by Jayden Banerjee RN  Outcome: Ongoing  2/11/2022 1518 by Risa Mckeon RN  Outcome: Met This Shift  Goal: Able to distinguish between reality-based and nonreality-based thinking  Description: Able to distinguish between reality-based and nonreality-based thinking  2/12/2022 0306 by Kelly Becker RN  Outcome: Ongoing  2/11/2022 1518 by Clari Miller RN  Outcome: Met This Shift  Goal: Able to interrupt nonreality-based thinking  Description: Able to interrupt nonreality-based thinking  2/12/2022 0306 by Kelly Becker RN  Outcome: Ongoing  2/11/2022 1518 by Clari Miller RN  Outcome: Met This Shift     Problem: Sleep Pattern Disturbance:  Goal: Appears well-rested  Description: Appears well-rested  2/12/2022 0306 by Kelly Becker RN  Outcome: Ongoing  2/11/2022 1518 by Clari Miller RN  Outcome: Met This Shift     Problem: Nutrition  Goal: Optimal nutrition therapy  2/12/2022 0306 by Kelly Becker RN  Outcome: Ongoing  2/11/2022 1518 by Clari Miller RN  Outcome: Met This Shift

## 2022-02-12 NOTE — CARE COORDINATION
CM attempted multiple times to call the patient's room phone for follow-up conversation regarding SNF choice but there has been no answer. CM spoke with the patient's nurse who advised that the patient has been medicated with Ativan & Lyrica and is sleeping at this time. CM will attempt to follow-up with patient at later time.

## 2022-02-13 LAB
BASE EXCESS MIXED: 20.1 (ref 0–2.3)
CARBON MONOXIDE, BLOOD: 2.1 % (ref 0–5)
CO2 CONTENT: 51.8 MMOL/L (ref 19–24)
COMMENT: ABNORMAL
GLUCOSE BLD-MCNC: 100 MG/DL (ref 70–99)
GLUCOSE BLD-MCNC: 125 MG/DL (ref 70–99)
GLUCOSE BLD-MCNC: 147 MG/DL (ref 70–99)
GLUCOSE BLD-MCNC: 191 MG/DL (ref 70–99)
HCO3 ARTERIAL: 49.4 MMOL/L (ref 18–23)
METHEMOGLOBIN ARTERIAL: 1.3 %
O2 SATURATION: 92.1 % (ref 96–97)
PCO2 ARTERIAL: 78 MMHG (ref 32–45)
PH BLOOD: 7.41 (ref 7.34–7.45)
PO2 ARTERIAL: 60 MMHG (ref 75–100)

## 2022-02-13 PROCEDURE — 99231 SBSQ HOSP IP/OBS SF/LOW 25: CPT | Performed by: INTERNAL MEDICINE

## 2022-02-13 PROCEDURE — 6360000002 HC RX W HCPCS: Performed by: NURSE PRACTITIONER

## 2022-02-13 PROCEDURE — 2700000000 HC OXYGEN THERAPY PER DAY

## 2022-02-13 PROCEDURE — 82962 GLUCOSE BLOOD TEST: CPT

## 2022-02-13 PROCEDURE — 6370000000 HC RX 637 (ALT 250 FOR IP): Performed by: NURSE PRACTITIONER

## 2022-02-13 PROCEDURE — 94664 DEMO&/EVAL PT USE INHALER: CPT

## 2022-02-13 PROCEDURE — 82803 BLOOD GASES ANY COMBINATION: CPT

## 2022-02-13 PROCEDURE — 6370000000 HC RX 637 (ALT 250 FOR IP): Performed by: INTERNAL MEDICINE

## 2022-02-13 PROCEDURE — 1200000000 HC SEMI PRIVATE

## 2022-02-13 PROCEDURE — 36600 WITHDRAWAL OF ARTERIAL BLOOD: CPT

## 2022-02-13 PROCEDURE — 94761 N-INVAS EAR/PLS OXIMETRY MLT: CPT

## 2022-02-13 PROCEDURE — 94640 AIRWAY INHALATION TREATMENT: CPT

## 2022-02-13 PROCEDURE — 2580000003 HC RX 258: Performed by: NURSE PRACTITIONER

## 2022-02-13 PROCEDURE — 94660 CPAP INITIATION&MGMT: CPT

## 2022-02-13 RX ADMIN — SENNOSIDES AND DOCUSATE SODIUM 1 TABLET: 50; 8.6 TABLET ORAL at 08:09

## 2022-02-13 RX ADMIN — BUDESONIDE AND FORMOTEROL FUMARATE DIHYDRATE 2 PUFF: 160; 4.5 AEROSOL RESPIRATORY (INHALATION) at 07:45

## 2022-02-13 RX ADMIN — LEVOFLOXACIN 500 MG: 500 TABLET, FILM COATED ORAL at 08:09

## 2022-02-13 RX ADMIN — BUDESONIDE AND FORMOTEROL FUMARATE DIHYDRATE 2 PUFF: 160; 4.5 AEROSOL RESPIRATORY (INHALATION) at 20:11

## 2022-02-13 RX ADMIN — BUPRENORPHINE AND NALOXONE 1 FILM: 8; 2 FILM, SOLUBLE BUCCAL; SUBLINGUAL at 21:02

## 2022-02-13 RX ADMIN — GUAIFENESIN 600 MG: 600 TABLET, EXTENDED RELEASE ORAL at 08:09

## 2022-02-13 RX ADMIN — PREDNISONE 40 MG: 20 TABLET ORAL at 08:09

## 2022-02-13 RX ADMIN — SENNOSIDES AND DOCUSATE SODIUM 1 TABLET: 50; 8.6 TABLET ORAL at 21:02

## 2022-02-13 RX ADMIN — BUPRENORPHINE AND NALOXONE 1 FILM: 8; 2 FILM, SOLUBLE BUCCAL; SUBLINGUAL at 10:59

## 2022-02-13 RX ADMIN — ASPIRIN 81 MG 81 MG: 81 TABLET ORAL at 08:09

## 2022-02-13 RX ADMIN — GUAIFENESIN 600 MG: 600 TABLET, EXTENDED RELEASE ORAL at 21:01

## 2022-02-13 RX ADMIN — ENOXAPARIN SODIUM 40 MG: 100 INJECTION SUBCUTANEOUS at 08:12

## 2022-02-13 RX ADMIN — ALBUTEROL SULFATE 2 PUFF: 90 AEROSOL, METERED RESPIRATORY (INHALATION) at 07:45

## 2022-02-13 RX ADMIN — ALBUTEROL SULFATE 2 PUFF: 90 AEROSOL, METERED RESPIRATORY (INHALATION) at 20:09

## 2022-02-13 RX ADMIN — LORAZEPAM 0.5 MG: 0.5 TABLET ORAL at 21:02

## 2022-02-13 RX ADMIN — SODIUM CHLORIDE, PRESERVATIVE FREE 10 ML: 5 INJECTION INTRAVENOUS at 08:11

## 2022-02-13 ASSESSMENT — PAIN SCALES - GENERAL
PAINLEVEL_OUTOF10: 0

## 2022-02-13 NOTE — PROGRESS NOTES
Pulmonary and Critical Care  Progress Note      VITALS:  BP (!) 151/67   Pulse 80   Temp 98.1 °F (36.7 °C)   Resp 20   Ht 5' 4\" (1.626 m)   Wt 195 lb (88.5 kg)   SpO2 95%   BMI 33.47 kg/m²     Subjective:   CHIEF COMPLAINT :SOB     HPI:                The patient is sitting in the bed. She is not in acute resp distress    Objective:   PHYSICAL EXAM:    LUNGS:Occasional basal crackles  Abd-soft, BS+,NT  Ext- no pedal edema  CVS-s1s2, no murmurs      DATA:    CBC:  No results for input(s): WBC, RBC, HGB, HCT, PLT, MCV, MCH, MCHC, RDW, NRBC, SEGSPCT, BANDSPCT in the last 72 hours. BMP:  No results for input(s): NA, K, CL, CO2, BUN, CREATININE, CALCIUM, GLUCOSE in the last 72 hours. ABG:  No results for input(s): PH, PO2ART, XYJ8ORP, HCO3, BEART, O2SAT in the last 72 hours. BNP  No results found for: BNP   D-Dimer:  Lab Results   Component Value Date    DDIMER 745 (H) 02/06/2022      1.  Radiology: None      Assessment/Plan     Patient Active Problem List    Diagnosis Date Noted    Uncontrolled pain     Generalized weakness     Gait disturbance     Myoclonus     Near syncope     Moderate protein-calorie malnutrition ([address]     Traumatic closed nondisplaced fracture of distal end of right fibula, initial encounter 01/11/2022    Asterixis     Hypercapnia     Chronic rhinitis 01/05/2022    Fall at home, initial encounter 01/05/2022    COPD exacerbation ([address] 07/09/2021    Acute exacerbation of chronic obstructive pulmonary disease (COPD) ([address] 06/26/2021    Acute on chronic respiratory failure with hypoxia and hypercapnia ([address] 05/23/2021    COVID-19 05/23/2021    Pulmonary emphysema ([address] 05/18/2021    SOB (shortness of breath) 05/18/2021    Leg swelling 05/18/2021    Chronic hepatitis C without hepatic coma ([address] 05/17/2021     Overview Note:     Patient saw GI specialists and cleared it on her own      Gastroesophageal reflux disease without esophagitis 05/17/2021    H/O drug abuse ([facility] 05/17/2021     Overview Note:     Prior heroine      Pulmonary nodules 05/17/2021     Overview Note:     Needs f/u 11/2021      ADHD (attention deficit hyperactivity disorder) 05/17/2021    Pneumonia 05/02/2021    Acute respiratory failure with hypercapnia ([facility] 09/24/2020    Chronic respiratory failure ([facility] 08/16/2017    COPD, severe ([facility] 08/16/2017    Obstructive sleep apnea 08/16/2017     Overview Note:     Uses biPAP at night      S/P placement of cardiac pacemaker 2005     Overview Note:     Has never had replacement battery. Acute on Chronic Hypoxic Hypercapneic resp failure- improving  Chronic Metabolic Alkalosis  AECOPD  Bilateral Pneumonia sec to Human Metapneumovirus  Bilateral Pulmonary nodules  Obesity  KING  Anxiety- improving       1. Prednisone taper  2. Abx  3. BIPAP prn  4. ICS  5. OOB  6. Keep sats > 92%  7. Await placement  8.  C/w present management    Electronically signed by Raymond Zimmerman MD on 2/13/2022 at 12:33 PM

## 2022-02-13 NOTE — PROGRESS NOTES
Hospitalist Progress Note       2/13/2022 1:05 PM  Admit Date: 2/6/2022    PCP: Papo Dobson MD     Assessment and Plan:   1. Acute on chronic hypoxic and hypercapnic respiratory failure: Due to COPD exacerbation. She uses 4 L/min of oxygen and BiPAP nightly at home. Elevated D-dimer but chest CTA-no PE but patchy nodular opacities. Ultrasound-no DVT. Continue BiPAP and oxygen. Pulmonary consult appreciated. 2. Acute exacerbation of COPD: Aggressive nebulization treatment, prednisone and Levaquin. 3. Acute metabolic encephalopathy: This is due to hypercapnia. Continue BiPAP and avoid oversedation. Patient has improved. Consult PT/OT. 4. Chronic pain syndrome: On Suboxone. Chronic problems include history of pacemaker in situ, ADHD, obesity and obstructive sleep apnea (not compliant with CPAP). DVT prophylaxis: Lovenox  Disposition: She lives alone. She has [facility] services.   Awaiting ECF    Patient Active Problem List:     Chronic respiratory failure (HCC)     COPD, severe ([address]     Obstructive sleep apnea     Acute respiratory failure with hypercapnia (HCC)     Pneumonia     Chronic hepatitis C without hepatic coma (HCC)     S/P placement of cardiac pacemaker     Gastroesophageal reflux disease without esophagitis     H/O drug abuse (HCC)     Pulmonary nodules     ADHD (attention deficit hyperactivity disorder)     Pulmonary emphysema (HCC)     SOB (shortness of breath)     Leg swelling     Acute on chronic respiratory failure with hypoxia and hypercapnia (HCC)     COVID-19     Acute exacerbation of chronic obstructive pulmonary disease (COPD) ([address]     COPD exacerbation (HCC)     Chronic rhinitis     Fall at home, initial encounter     Asterixis     Hypercapnia     Traumatic closed nondisplaced fracture of distal end of right fibula, initial encounter     Uncontrolled pain     Generalized weakness     Gait disturbance     Myoclonus     Near syncope     Moderate protein-calorie malnutrition [facility])      Subjective:     Chief Complaint   Patient presents with    Shortness of Breath       F/U:  Interval History: Discussed with her nurse. Patient is drowsy this morning and says she does not want to take trazodone and Lyrica anymore. Objective: Intake/Output Summary (Last 24 hours) at 2/13/2022 1305  Last data filed at 2/12/2022 1905  Gross per 24 hour   Intake 200 ml   Output 400 ml   Net -200 ml      Vitals:   Vitals:    02/13/22 0746   BP:    Pulse:    Resp: 20   Temp:    SpO2: 95%     Physical Exam:  General: Not in respiratory distress. Generally weak. Eyes: Not pale. Anicteric. Neck: No neck mass or thyromegaly  Neurology: Drowsy but able to communicate well. She follows command. Cardiovascular: Regular. No murmur no S3  Respiratory: Reduced air entry bilaterally. No wheezes. Abdomen: Soft. No tenderness. No palpable mass  Extremities: Right leg is in a cast.  No left leg pedal edema and the foot is warm.     Electronically signed by Mare Cedillo MD on 2/13/2022 at 1:05 PM  [facility] Hospitalist

## 2022-02-13 NOTE — CARE COORDINATION
CM spoke with the patient on the phone to follow-up regarding SNF choice. Patient now states that she does not want to go to a SNF and plans to return home upon discharge. CM spoke with the patient regarding [facility] services, she declined [facility] as well and and does not feel that she needs PT/OT. Patient stated that she receives services through the waiver program and receives personal aide services 7 days/week 7 hours/day. Patient is unable to identify any other needs at this time. Patient expressed frustration with her continued symptoms of \"shaking and spazzing\" and stated \"I don't need PT/OT, I need someone to tell me why I'm shaking and spazzing\". Patient plans to return home upon discharge and is unable to identify any other needs at this time. CM available if needs arise.

## 2022-02-13 NOTE — PLAN OF CARE
Problem: Falls - Risk of:  Goal: Will remain free from falls  Description: Will remain free from falls  Outcome: Ongoing  Goal: Absence of physical injury  Description: Absence of physical injury  Outcome: Ongoing     Problem: OXYGENATION/RESPIRATORY FUNCTION  Goal: Patient will maintain patent airway  Outcome: Ongoing  Goal: Patient will achieve/maintain normal respiratory rate/effort  Description: Respiratory rate and effort will be within normal limits for the patient  Outcome: Ongoing     Problem: SKIN INTEGRITY  Goal: Skin integrity is maintained or improved  Outcome: Ongoing     Problem: NUTRITION  Goal: Nutritional status is improving  Outcome: Ongoing     Problem: Pain:  Goal: Pain level will decrease  Description: Pain level will decrease  Outcome: Ongoing  Goal: Control of acute pain  Description: Control of acute pain  Outcome: Ongoing  Goal: Control of chronic pain  Description: Control of chronic pain  Outcome: Ongoing     Problem: Skin Integrity:  Goal: Will show no infection signs and symptoms  Description: Will show no infection signs and symptoms  Outcome: Ongoing  Goal: Absence of new skin breakdown  Description: Absence of new skin breakdown  Outcome: Ongoing     Problem: Confusion - Acute:  Goal: Absence of continued neurological deterioration signs and symptoms  Description: Absence of continued neurological deterioration signs and symptoms  Outcome: Ongoing  Goal: Mental status will be restored to baseline  Description: Mental status will be restored to baseline  Outcome: Ongoing     Problem: Discharge Planning:  Goal: Ability to perform activities of daily living will improve  Description: Ability to perform activities of daily living will improve  Outcome: Ongoing  Goal: Participates in care planning  Description: Participates in care planning  Outcome: Ongoing  Goal: Discharged to appropriate level of care  Description: Discharged to appropriate level of care  Outcome: Ongoing     Problem: Injury - Risk of, Physical Injury:  Goal: Will remain free from falls  Description: Will remain free from falls  Outcome: Ongoing  Goal: Absence of physical injury  Description: Absence of physical injury  Outcome: Ongoing     Problem: Mood - Altered:  Goal: Mood stable  Description: Mood stable  Outcome: Ongoing  Goal: Absence of abusive behavior  Description: Absence of abusive behavior  Outcome: Ongoing  Goal: Verbalizations of feeling emotionally comfortable while being cared for will increase  Description: Verbalizations of feeling emotionally comfortable while being cared for will increase  Outcome: Ongoing     Problem: Psychomotor Activity - Altered:  Goal: Absence of psychomotor disturbance signs and symptoms  Description: Absence of psychomotor disturbance signs and symptoms  Outcome: Ongoing     Problem: Sensory Perception - Impaired:  Goal: Demonstrations of improved sensory functioning will increase  Description: Demonstrations of improved sensory functioning will increase  Outcome: Ongoing  Goal: Decrease in sensory misperception frequency  Description: Decrease in sensory misperception frequency  Outcome: Ongoing  Goal: Able to refrain from responding to false sensory perceptions  Description: Able to refrain from responding to false sensory perceptions  Outcome: Ongoing  Goal: Demonstrates accurate environmental perceptions  Description: Demonstrates accurate environmental perceptions  Outcome: Ongoing  Goal: Able to distinguish between reality-based and nonreality-based thinking  Description: Able to distinguish between reality-based and nonreality-based thinking  Outcome: Ongoing  Goal: Able to interrupt nonreality-based thinking  Description: Able to interrupt nonreality-based thinking  Outcome: Ongoing     Problem: Sleep Pattern Disturbance:  Goal: Appears well-rested  Description: Appears well-rested  Outcome: Ongoing     Problem: Nutrition  Goal: Optimal nutrition therapy  Outcome: Ongoing Problem:  Activity Intolerance:  Goal: Ability to tolerate increased activity will improve  Description: Ability to tolerate increased activity will improve  Outcome: Ongoing     Problem: Airway Clearance - Ineffective:  Goal: Ability to maintain a clear airway will improve  Description: Ability to maintain a clear airway will improve  Outcome: Ongoing     Problem: Breathing Pattern - Ineffective:  Goal: Ability to achieve and maintain a regular respiratory rate will improve  Description: Ability to achieve and maintain a regular respiratory rate will improve  Outcome: Ongoing     Problem: Gas Exchange - Impaired:  Goal: Levels of oxygenation will improve  Description: Levels of oxygenation will improve  Outcome: Ongoing

## 2022-02-14 VITALS
WEIGHT: 195 LBS | HEIGHT: 64 IN | BODY MASS INDEX: 33.29 KG/M2 | OXYGEN SATURATION: 98 % | HEART RATE: 67 BPM | RESPIRATION RATE: 14 BRPM | TEMPERATURE: 99.3 F | SYSTOLIC BLOOD PRESSURE: 102 MMHG | DIASTOLIC BLOOD PRESSURE: 49 MMHG

## 2022-02-14 LAB
GLUCOSE BLD-MCNC: 108 MG/DL (ref 70–99)
GLUCOSE BLD-MCNC: 95 MG/DL (ref 70–99)

## 2022-02-14 PROCEDURE — 6360000002 HC RX W HCPCS: Performed by: NURSE PRACTITIONER

## 2022-02-14 PROCEDURE — 6370000000 HC RX 637 (ALT 250 FOR IP): Performed by: INTERNAL MEDICINE

## 2022-02-14 PROCEDURE — 97542 WHEELCHAIR MNGMENT TRAINING: CPT

## 2022-02-14 PROCEDURE — 2700000000 HC OXYGEN THERAPY PER DAY

## 2022-02-14 PROCEDURE — 2580000003 HC RX 258: Performed by: NURSE PRACTITIONER

## 2022-02-14 PROCEDURE — 97116 GAIT TRAINING THERAPY: CPT

## 2022-02-14 PROCEDURE — 99231 SBSQ HOSP IP/OBS SF/LOW 25: CPT | Performed by: INTERNAL MEDICINE

## 2022-02-14 PROCEDURE — 94761 N-INVAS EAR/PLS OXIMETRY MLT: CPT

## 2022-02-14 PROCEDURE — 97110 THERAPEUTIC EXERCISES: CPT

## 2022-02-14 PROCEDURE — 94640 AIRWAY INHALATION TREATMENT: CPT

## 2022-02-14 PROCEDURE — 6370000000 HC RX 637 (ALT 250 FOR IP): Performed by: NURSE PRACTITIONER

## 2022-02-14 PROCEDURE — 82962 GLUCOSE BLOOD TEST: CPT

## 2022-02-14 RX ORDER — PREDNISONE 10 MG/1
TABLET ORAL
Qty: 18 TABLET | Refills: 0 | Status: SHIPPED | OUTPATIENT
Start: 2022-02-14

## 2022-02-14 RX ADMIN — ALBUTEROL SULFATE 2 PUFF: 90 AEROSOL, METERED RESPIRATORY (INHALATION) at 07:53

## 2022-02-14 RX ADMIN — SODIUM CHLORIDE, PRESERVATIVE FREE 10 ML: 5 INJECTION INTRAVENOUS at 10:12

## 2022-02-14 RX ADMIN — ACETAMINOPHEN 650 MG: 325 TABLET ORAL at 10:20

## 2022-02-14 RX ADMIN — GUAIFENESIN 600 MG: 600 TABLET, EXTENDED RELEASE ORAL at 10:12

## 2022-02-14 RX ADMIN — LEVOFLOXACIN 500 MG: 500 TABLET, FILM COATED ORAL at 10:12

## 2022-02-14 RX ADMIN — PREDNISONE 30 MG: 20 TABLET ORAL at 10:12

## 2022-02-14 RX ADMIN — BUDESONIDE AND FORMOTEROL FUMARATE DIHYDRATE 2 PUFF: 160; 4.5 AEROSOL RESPIRATORY (INHALATION) at 07:53

## 2022-02-14 RX ADMIN — ALBUTEROL SULFATE 2 PUFF: 90 AEROSOL, METERED RESPIRATORY (INHALATION) at 15:32

## 2022-02-14 RX ADMIN — BUPRENORPHINE AND NALOXONE 1 FILM: 8; 2 FILM, SOLUBLE BUCCAL; SUBLINGUAL at 10:13

## 2022-02-14 RX ADMIN — ASPIRIN 81 MG 81 MG: 81 TABLET ORAL at 10:12

## 2022-02-14 RX ADMIN — ENOXAPARIN SODIUM 40 MG: 100 INJECTION SUBCUTANEOUS at 10:11

## 2022-02-14 RX ADMIN — SENNOSIDES AND DOCUSATE SODIUM 1 TABLET: 50; 8.6 TABLET ORAL at 10:12

## 2022-02-14 ASSESSMENT — PAIN SCALES - GENERAL
PAINLEVEL_OUTOF10: 0
PAINLEVEL_OUTOF10: 0
PAINLEVEL_OUTOF10: 3

## 2022-02-14 NOTE — CARE COORDINATION
Reviewed chart and discussed pt in IDR. Pt's discharge plan  Is home with [facility]. Discharge order in, faxed info to [facility]. Received call from pt's sister who lives out of state. She is concerned about pt going home, would like her to have 24/7 help at home. Explained that she has wavier aids daily but 24/7 would need to be self pay. We discussed pt refusing SNU, she is calling pt in room to discuss. Also called/faxed info to Estrada Almanzar at [facility], they are also able to add nursing and therapy as long as they get orders. 1430 Spoke with pt at length about SNU vs home. 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 He son is coming to stay with her and daughter also will help out. Pt states she has aids 7hr/day, 7 days /week. She is wanting nursing PT and OT also. PS [facility] Dr Jennifer Guerra [facility].

## 2022-02-14 NOTE — DISCHARGE SUMMARY
Cherise Boyd 1960 [id number]  PCP:  Hoa Richey MD    Admit date: 2/6/2022  Admitting Physician: Donley Kocher, MD    Discharge date: 2/14/2022 Discharge Physician: Lorenzo Coleman MD      Reason for admission:   Chief Complaint   Patient presents with    Shortness of Breath     Present on Admission:   Acute on chronic respiratory failure with hypoxia and hypercapnia ([facility]   COPD exacerbation ([facility]       Discharge Diagnoses Include:  1. Acute on chronic hypoxic and hypercapnic respiratory failure  2. Acute exacerbation of COPD  3. Acute metabolic encephalopathy  4. Chronic pain syndrome    Hospital Course:  Cherise Boyd is a 63 y.o.  female  who presents with worsening shortness of breath that has been going on for the past 3 days. Patient has a history of COPD and is on 2 L of oxygen and BiPAP at home. She follows up with Dr. Fuad Godwin. Patient was found to be hypoxic by EMS and was given breathing treatment and 125 mg of Solu-Medrol. She has right foot immobilizer from recent injury. She has received both for her Covid vaccine. Denies fever, chills, hemoptysis, chest pain, abdominal pain, N/V/D. History of ADHD, OUD, KING, Obesity, and Hepatitis. Admitted as:  1. Acute on chronic hypoxic and hypercapnic respiratory failure: Due to COPD exacerbation. She uses 4 L/min of oxygen and BiPAP nightly at home. Pulmonology consulted. Elevated D-dimer but chest CTA-no PE but patchy nodular opacities. Ultrasound-no DVT. Patient improved and was discharged home BiPAP and oxygen.      2. Acute exacerbation of COPD: Responded to aggressive nebulization treatment, prednisone and Levaquin. Discharged on tapering dose of prednisone.     3. Acute metabolic encephalopathy: This is due to hypercapnia. Continue BiPAP and avoid oversedation-discontinued trazodone and Lyrica. Patient improved. Consulted PT/OT.     4.  Chronic pain syndrome: On Suboxone.     Chronic problems include history of pacemaker in situ, ADHD, obesity and obstructive sleep apnea (not compliant with CPAP). Pt was personally examined by me on the day of discharge with the following findings:     She is feeling better. She is ready to go home. She declines to go to ECF. Vital signs: Noted  General: Not in respiratory distress. Generally weak. Eyes: Not pale. Anicteric. Neck: No neck mass or thyromegaly  Neurology:  Awake and alert. Oriented x3. She follows command. Cardiovascular: Regular. No murmur no S3  Respiratory: Reduced air entry bilaterally. No wheezes. Abdomen: Soft. No tenderness. No palpable mass  Extremities: Right leg is in a cast.  No left leg pedal edema and the foot is warm. Procedures: None    Significant Diagnostic Studies at discharge:   CBC:   Lab Results   Component Value Date    WBC 8.0 02/07/2022    RBC 4.27 02/07/2022    HGB 11.5 02/07/2022    HCT 39.7 02/07/2022    MCV 93.0 02/07/2022    MCH 26.9 02/07/2022    MCHC 29.0 02/07/2022    RDW 13.1 02/07/2022     02/07/2022    MPV 10.2 02/07/2022     BMP:    Lab Results   Component Value Date     02/06/2022    K 3.5 02/06/2022    CL 90 02/06/2022    CO2 35 02/06/2022    BUN 8 02/06/2022    LABALBU 3.6 02/06/2022    CREATININE 0.4 02/06/2022    CALCIUM 8.6 02/06/2022    GFRAA >60 02/06/2022    LABGLOM >60 02/06/2022    GLUCOSE 207 02/06/2022       Patient Instructions:     Medication List      CHANGE how you take these medications    predniSONE 10 MG tablet  Commonly known as: DELTASONE  Prednisone 30 mg daily-Taper by 10 mg every 3 days to 0 mg.   What changed:   · how much to take  · how to take this  · when to take this  · additional instructions        CONTINUE taking these medications    albuterol sulfate  (90 Base) MCG/ACT inhaler  USE 2 PUFFS BY MOUTH EVERY 6 HOURS AS NEEDED SHAKE WELL     amphetamine-dextroamphetamine 30 MG extended release capsule  Commonly known as: ADDERALL XR     aspirin 81 MG chewable tablet     BiPAP Machine Misc     ipratropium-albuterol 0.5-2.5 (3) MG/3ML Soln nebulizer solution  Commonly known as: DUONEB  Inhale 3 mLs into the lungs every 6 hours as needed for Shortness of Breath     OXYGEN     sennosides-docusate sodium 8.6-50 MG tablet  Commonly known as: SENOKOT-S  Take 1 tablet by mouth 2 times daily     Suboxone 8-2 MG Film SL film  Generic drug: buprenorphine-naloxone     Symbicort 160-4.5 MCG/ACT Aero  Generic drug: budesonide-formoterol  Inhale 2 puffs into the lungs 2 times daily     WOMENS MULTIVITAMIN PO        STOP taking these medications    doxycycline hyclate 100 MG capsule  Commonly known as: VIBRAMYCIN     mometasone-formoterol 100-5 MCG/ACT inhaler  Commonly known as: Dulera     pregabalin 100 MG capsule  Commonly known as: LYRICA     traMADol 50 MG tablet  Commonly known as: ULTRAM     traZODone 150 MG tablet  Commonly known as: DESYREL     Trelegy Ellipta 100-62.5-25 MCG/INH Aepb  Generic drug: fluticasone-umeclidin-vilant           Where to Get Your Medications      These medications were sent to [address] [place] [address], [facility]    Phone: [phone]   · predniSONE 10 MG tablet          Code Status: Full Code     Consults:   IP CONSULT TO HOSPITALIST  IP CONSULT TO PULMONOLOGY  IP CONSULT TO HOME CARE NEEDS    Diet: regular diet    Activity: activity as tolerated   Work:    Discharged Condition: stable    Prognosis: Guarded    Disposition: home with home health care services. Follow-up with   1. PCP within   5-7    Days  2. Pulmonology clinic    Follow up labs: None       Discharge Physician Signed: Electronically signed by Marc Mejia MD on 2/14/2022 at 2:43 PM    The patient was seen and examined on day of discharge and this discharge summary is in conjunction with any daily progress note from day of discharge.   Time spent on discharge in the examination, evaluation, counseling and review of medications and discharge plan: >30 minutes

## 2022-02-14 NOTE — PROGRESS NOTES
Physical Therapy  Name: Viky Carlson MRN: [id number] :   1960   Date:  2022   Admission Date: 2022 Room:  [address] [place] [address]   Restrictions/Precautions:        General Precautions, Fall Risk, RLE walking boot WBAT  Communication with other providers:  Danie Patterson RN states pt is ok to see for therapy  Subjective:  Patient states:  She does not feel good today but she will get up in the chair. Pt concerned about today being 6 weeks in the cast and wanted to know about seeing ortho. Pain:   Location, Type, Intensity (0/10 to 10/10):  No c/o pain  Objective:    Observation:  Pt is laying in bed  Treatment, including education/measures:  Transfers with line management of O2  Rolling: SBA  Supine to sit :SBA  Scooting :SBA  Sit to stand :CGA, needing VC's for hand placement  Stand to sit :CGA, needing VC's for hand placement   Gait:  Pt amb with RW for 5 steps from the bed to chair with CG assist  Pt needed VC's for hand placement, WB status in RLE, and sequence  Gait Comments: with VC's' and TC's for B LE placement, walker placement and sequence throughout ambulation; with VC's and TC's to maintain upright posture in order to avoid COM shifting outside of LEE; with VC's for PLB throughout ambulation  Sitting Exercises:  Glute sets x 10  LAQ's x 10  Marching x 10   Pillow squeezes x 10  Hip Abd x 10  Pt needed rest breaks between ex's d/t SOB  Therapeutic Exercise:  Therapeutic exercises were instructed today. Cues were given for technique, safety, recruitment, and rationale. Cues were verbal and/or tactile. Safety  Patient left safely in the chair, with call light/phone in reach with alarm applied. Gait belt and mask were used for transfers and gait.   Assessment / Impression:     Patient's tolerance of treatment:  Good, pt was motivated to do ex's   Adverse Reaction: none  Significant change in status and impact:  none  Barriers to improvement:  Weakness, endurance  Plan for Next Session:    Will cont to work towards pt's goals per her tolerance  Time in:  1335  Time out:  1413  Timed treatment minutes:  38  Total treatment time:  45    Previously filed items:     Short term goals  Time Frame for Short term goals: 1 week  Short term goal 1: Pt will perform sup<>sit with SBA, min cues. Short term goal 2: Pt will tolerate midline standing balance with RW, CGA x3' with cues for posture. Short term goal 3: Pt will perform STS from BR commode to RW with CGA, v/c. Short term goal 4: Pt will AMB x10 ft with chair follow, Min A, RW. Electronically signed by:    Patricia Iverson, Student Physical Therapist Assistant  I have read and agree with the above documentation [facility].  Dell PTA  2/14/2022, 2:09 PM

## 2022-02-14 NOTE — PROGRESS NOTES
Outpatient Pharmacy Progress Note for Meds-to-Beds    Total number of Prescriptions Filled: 1  The following medications were dispensed to the patient during the discharge process:  Prednisone 10 mg    Additional Documentation:  Medication(s) were delivered to the patient's room prior to discharge      Thank you for letting us serve your patients.   [facility]    [address], [address] [facility]    Phone: [phone]    Fax: [phone]

## 2022-02-14 NOTE — PROGRESS NOTES
Pulmonary and Critical Care  Progress Note      VITALS:  BP (!) 102/49   Pulse 67   Temp 99.3 °F (37.4 °C) (Oral)   Resp 14   Ht 5' 4\" (1.626 m)   Wt 195 lb (88.5 kg)   SpO2 97%   BMI 33.47 kg/m²     Subjective:   CHIEF COMPLAINT :SOB     HPI:                The patient is sitting in the bed. She is not in acute resp distress    Objective:   PHYSICAL EXAM:    LUNGS:Occasional basal crackles  Abd-soft, BS+,NT  Ext- no pedal edema  CVS-s1s2, no murmurs      DATA:    CBC:  No results for input(s): WBC, RBC, HGB, HCT, PLT, MCV, MCH, MCHC, RDW, NRBC, SEGSPCT, BANDSPCT in the last 72 hours. BMP:  No results for input(s): NA, K, CL, CO2, BUN, CREATININE, CALCIUM, GLUCOSE in the last 72 hours. ABG:  Recent Labs     02/13/22  1100   PH 7.41   PO2ART 60*   [id number] 78.0*   O2SAT 92.1*     BNP  No results found for: BNP   D-Dimer:  Lab Results   Component Value Date    DDIMER 745 (H) 02/06/2022      1.  Radiology: None      Assessment/Plan     Patient Active Problem List    Diagnosis Date Noted    Uncontrolled pain     Generalized weakness     Gait disturbance     Myoclonus     Near syncope     Moderate protein-calorie malnutrition ([address]     Traumatic closed nondisplaced fracture of distal end of right fibula, initial encounter 01/11/2022    Asterixis     Hypercapnia     Chronic rhinitis 01/05/2022    Fall at home, initial encounter 01/05/2022    COPD exacerbation ([address] 07/09/2021    Acute exacerbation of chronic obstructive pulmonary disease (COPD) ([address] 06/26/2021    Acute on chronic respiratory failure with hypoxia and hypercapnia ([address] 05/23/2021    COVID-19 05/23/2021    Pulmonary emphysema ([address] 05/18/2021    SOB (shortness of breath) 05/18/2021    Leg swelling 05/18/2021    Chronic hepatitis C without hepatic coma ([address] 05/17/2021     Overview Note:     Patient saw GI specialists and cleared it on her own      Gastroesophageal reflux disease without esophagitis 05/17/2021    H/O drug abuse ([facility] 05/17/2021     Overview Note:     Prior heroine      Pulmonary nodules 05/17/2021     Overview Note:     Needs f/u 11/2021      ADHD (attention deficit hyperactivity disorder) 05/17/2021    Pneumonia 05/02/2021    Acute respiratory failure with hypercapnia ([facility] 09/24/2020    Chronic respiratory failure ([facility] 08/16/2017    COPD, severe ([facility] 08/16/2017    Obstructive sleep apnea 08/16/2017     Overview Note:     Uses biPAP at night      S/P placement of cardiac pacemaker 2005     Overview Note:     Has never had replacement battery. Acute on Chronic Hypoxic Hypercapneic resp failure- improving  Chronic Metabolic Alkalosis  AECOPD  Bilateral Pneumonia sec to Human Metapneumovirus  Bilateral Pulmonary nodules  Obesity  KING  Anxiety- improving       1. BIPAP prn  2. Abx  3. ICS  4. OOB  5. Keep sats > 925  6. Await placement  7.  C/w present management    Electronically signed by Jaclyn Mesa MD on 2/14/2022 at 10:19 AM

## 2022-02-16 NOTE — DISCHARGE SUMMARY
Prior (baseline) level of function: Independent. Current level of function:         Current  IRF-MARIAELENA and Goals:   Occupational Therapy:    Short term goals  Time Frame for Short term goals: STGs=LTGs :   Long term goals  Time Frame for Long term goals : ~1 week or until d/c  Long term goal 1: Pt will complete grooming tasks Ind  Long term goal 2: Pt will complete total body bathing mod I  Long term goal 3: Pt will complete UB dressing Ind  Long term goal 4: Pt will complete LB dressing Ind  Long term goal 5: Pt will doff/don footwear including walking boot Ind  Long term goals 6: Pt will complete toileting mod I  Long term goal 7: Pt will complete functional transfers (bed, chair, toilet, shower) c DME PRN and mod I  Long term goal 8: Pt will perform therex/therax to facilitate increased strength/endurance/ax tolerance (c emphasis on dynamic standing balance/tolerance >5 mins, BUE endurance) c SBA  Long term goal 9: Pt will complete simple homemaking tasks c DME PRN and mod I :                                       Eating: Eating  Assistance Needed: Independent  Comment: x  CARE Score: 6  Discharge Goal: Independent       Oral Hygiene: Oral Hygiene  Assistance Needed: Independent  Comment: seated  CARE Score: 6  Discharge Goal: Independent    UB/LB Bathing: Shower/Bathe Self  Assistance Needed: Independent  Comment: opted for sinkside bathing instead of full shower today; Ind  CARE Score: 6    UB Dressing: Upper Body Dressing  Assistance Needed: Independent  Comment: using W/C for setup, able to doff/don pullover top and manage 02 line  CARE Score: 6         LB Dressing: Lower Body Dressing  Assistance Needed: Supervision or touching assistance  Comment: required cue for sequencing to avoid standing without boot on since this is the only way she is allowed to put weight on her RLE.   Pt then completed remainder of task  CARE Score: 4  Discharge Goal: Independent    Donning and Mosses Footwear: Putting RW and right walking boot. Pt managed O2 line independently. CARE Score: 6  Discharge Goal: Independent  Chair/Bed-to-Chair Transfer  Assistance Needed: Independent  Comment: Mod I with RW and right walking boot. Pt managed O2 line independently. CARE Score: 6  Discharge Goal: Independent     Car Transfer  Assistance Needed: Independent  Comment: mod I with RW and right walking boot. Pt managed O2 line independently. CARE Score: 6  Discharge Goal: Independent    Ambulation:    Walking Ability  Does the Patient Walk?: Yes     Walk 10 Feet  Assistance Needed: Independent  Comment: mod I with RW and right walking boot. Pt managed O2 line independently. CARE Score: 6  Discharge Goal: Supervision or touching assistance     Walk 50 Feet with Two Turns  Assistance Needed: Independent  Comment: mod I with RW and right walking boot. Pt managed O2 line independently. Reason if not Attempted: Not attempted due to medical condition or safety concerns  CARE Score: 6  Discharge Goal: Supervision or touching assistance     Walk 150 Feet  Comment: pt was not performing this at baseline; limited potential to progressing to this mobility task due to Hx of COPD and high fall risk (Hx of unpredictable \"spasms\" that result to collapsing of both legs per pt's report)  Reason if not Attempted: Not attempted due to medical condition or safety concerns  CARE Score: 88  Discharge Goal: Not Attempted     Walking 10 Feet on Uneven Surfaces  Assistance Needed: Supervision or touching assistance  Comment: SBA with RW and right walking boot. Pt required assist with O2 line management. Reason if not Attempted: Not attempted due to medical condition or safety concerns  CARE Score: 4  Discharge Goal: Supervision or touching assistance     1 Step (Curb)  Assistance Needed: Supervision or touching assistance  Comment: CGA with RW and right walking boot. Pt required assist with O2 line management.   Reason if not Attempted: Not attempted due to medical condition or safety concerns  CARE Score: 4  Discharge Goal: Partial/moderate assistance     4 Steps  Assistance Needed: Supervision or touching assistance  Comment: SBA with 2 rails and right walking boot. Pt required assist with O2 line management. Reason if not Attempted: Not attempted due to medical condition or safety concerns  CARE Score: 4  Discharge Goal: Partial/moderate assistance     12 Steps  Comment: pt was not performing this at baseline; limited potential to progressing to this mobility task due to Hx of COPD  Reason if not Attempted: Not attempted due to medical condition or safety concerns  CARE Score: 88  Discharge Goal: Not Attempted       Wheelchair:  w/c Ability: Wheelchair Ability  Uses a Wheelchair and/or Scooter?: Yes  Wheel 50 Feet with Two Turns  Assistance Needed: Independent  Comment: mod I with BUEs  CARE Score: 6  Discharge Goal: Independent  Wheel 150 Feet  Assistance Needed: Independent  Comment: mod I with BUEs  CARE Score: 6  Discharge Goal: Independent          Balance:        Object: Picking Up Object  Assistance Needed: Independent  Comment: Mod I with RW, right walking boot, and reacher  CARE Score: 6  Discharge Goal: Independent    I      Exam:    Blood pressure 114/75, pulse 88, temperature 98.8 °F (37.1 °C), resp. rate 16, height 5' 4\" (1.626 m), weight 209 lb 3.2 oz (94.9 kg), SpO2 96 %. General: Up in a bedside chair with legs elevated. Receptive to discharge instructions. Oriented x3. Poor reasoning and insight though. HEENT: Neck supple. MMM. No JVD. Pulmonary: Unlabored and clear. Cardiac: Regular rate and rhythm. Abdomen: Patient's abdomen is soft and nondistended. Bowel sounds were present throughout. There was no rebound, guarding or masses noted. Upper extremities: Able to bring both hands up to meet mine. Fair  strength and dexterity. No new bruising. Lower extremities: Right lower leg immobilized as before. Upper trimline is none constricting. Toes are mobile. Left heel is clear. Sitting balance was good. Standing balance was fair-.    Lab Results   Component Value Date    WBC 8.0 02/07/2022    HGB 11.5 (L) 02/07/2022    HCT 39.7 02/07/2022    MCV 93.0 02/07/2022     02/07/2022     Lab Results   Component Value Date    INR 1.03 05/24/2021    INR 0.93 03/30/2021    INR 1.02 11/19/2020    PROTIME 12.5 05/24/2021    PROTIME 11.2 (L) 03/30/2021    PROTIME 12.3 11/19/2020     Lab Results   Component Value Date    CREATININE 0.4 (L) 02/06/2022    BUN 8 02/06/2022     02/06/2022    K 3.5 02/06/2022    CL 90 (L) 02/06/2022    CO2 35 (H) 02/06/2022     Lab Results   Component Value Date    ALT 16 02/06/2022    AST 32 02/06/2022    ALKPHOS 119 02/06/2022    BILITOT 0.4 02/06/2022         The patient presented to the ARU with the above history requiring a multidisciplinary treatment plan including close medical supervision by the Bharathi Gibbs Director. The patient participated in the prescribed therapy treatment plan with reasonable compliance and progressive tolerance. They avoided significant medical complications. By the time of discharge the patient had become safer with adaptive equipment to transfer and toilet with a wheelchair with the supervision of family/caregivers. The patient was tolerating an oral diet without choking/coughing and was back to their baseline with regards to bowel and bladder control. Discharge instructions were reviewed with the patient. The patient is to follow up with the orthopedist and her PCP in 1 week. No driving. [facility] PT/OT will be arranged.        Medication List      START taking these medications    sennosides-docusate sodium 8.6-50 MG tablet  Commonly known as: SENOKOT-S  Take 1 tablet by mouth 2 times daily        CONTINUE taking these medications    albuterol sulfate  (90 Base) MCG/ACT inhaler  USE 2 PUFFS BY MOUTH EVERY 6 HOURS AS NEEDED SHAKE WELL amphetamine-dextroamphetamine 30 MG extended release capsule  Commonly known as: ADDERALL XR     aspirin 81 MG chewable tablet     BiPAP Machine Misc     ipratropium-albuterol 0.5-2.5 (3) MG/3ML Soln nebulizer solution  Commonly known as: DUONEB  Inhale 3 mLs into the lungs every 6 hours as needed for Shortness of Breath     OXYGEN     Suboxone 8-2 MG Film SL film  Generic drug: buprenorphine-naloxone     WOMENS MULTIVITAMIN PO        STOP taking these medications    azithromycin 250 MG tablet  Commonly known as: Zithromax     CPAP Machine Misc     doxycycline hyclate 100 MG capsule  Commonly known as: VIBRAMYCIN     mometasone-formoterol 100-5 MCG/ACT inhaler  Commonly known as: Dulera     Nebulizer Misc     Nebulizer/Tubing/Mouthpiece Kit     predniSONE 10 MG tablet  Commonly known as: DELTASONE     pregabalin 100 MG capsule  Commonly known as: LYRICA     traMADol 50 MG tablet  Commonly known as: ULTRAM     traZODone 150 MG tablet  Commonly known as: DESYREL     Trelegy Ellipta 100-62.5-25 MCG/INH Aepb  Generic drug: fluticasone-umeclidin-vilant           Where to Get Your Medications      You can get these medications from any pharmacy    You don't need a prescription for these medications  · sennosides-docusate sodium 8.6-50 MG tablet         CONDITION ON DISCHARGE: Stable. The prognosis is fair for further improvements in ADL's and safety with adapted gait/transfers. Record review, patient exam, discharge instructions, medication reconciliation and summary for this discharge visit took more than 30 minutes.

## 2022-02-17 ENCOUNTER — HOSPITAL ENCOUNTER (OUTPATIENT)
Age: 62
Discharge: HOME | End: 2022-02-17
Payer: MEDICARE

## 2022-02-17 PROCEDURE — U0005 INFEC AGEN DETEC AMPLI PROBE: HCPCS

## 2022-02-17 PROCEDURE — U0003 INFECTIOUS AGENT DETECTION BY NUCLEIC ACID (DNA OR RNA); SEVERE ACUTE RESPIRATORY SYNDROME CORONAVIRUS 2 (SARS-COV-2) (CORONAVIRUS DISEASE [COVID-19]), AMPLIFIED PROBE TECHNIQUE, MAKING USE OF HIGH THROUGHPUT TECHNOLOGIES AS DESCRIBED BY CMS-2020-01-R: HCPCS

## 2022-02-18 LAB
SARS-COV-2: NOT DETECTED
SOURCE: NORMAL

## 2022-02-23 ENCOUNTER — HOSPITAL ENCOUNTER (OUTPATIENT)
Dept: PULMONOLOGY | Age: 62
Discharge: HOME | End: 2022-02-23

## 2022-02-23 DIAGNOSIS — J44.9 CHRONIC OBSTRUCTIVE PULMONARY DISEASE, UNSPECIFIED COPD TYPE (HCC): ICD-10-CM

## 2022-02-23 NOTE — PROGRESS NOTES
Pt arrived for appointment. She recently got out of the hospital for pneumonia. She is still on steriods for 5 more day. Asked her to reschedule pft 7 days after stopping steriods.

## 2022-03-10 ENCOUNTER — OFFICE VISIT (OUTPATIENT)
Dept: ORTHOPEDIC SURGERY | Age: 62
End: 2022-03-10
Payer: MEDICARE

## 2022-03-10 VITALS
HEART RATE: 94 BPM | OXYGEN SATURATION: 98 % | RESPIRATION RATE: 16 BRPM | BODY MASS INDEX: 33.29 KG/M2 | HEIGHT: 64 IN | WEIGHT: 195 LBS

## 2022-03-10 DIAGNOSIS — S82.831A: Primary | ICD-10-CM

## 2022-03-10 PROCEDURE — 99203 OFFICE O/P NEW LOW 30 MIN: CPT | Performed by: PHYSICIAN ASSISTANT

## 2022-03-10 NOTE — PATIENT INSTRUCTIONS
Weightbearing as tolerated, work on range of motion of the ankle. Soap and water over the small ulceration over the medial malleolus but do not use peroxide on this and do not cover it with bacitracin. Follow-up as needed.

## 2022-03-10 NOTE — PROGRESS NOTES
Review of Systems   Constitutional: Negative. HENT: Negative. Eyes: Negative. Respiratory: Negative. Cardiovascular: Negative. Gastrointestinal: Negative. Genitourinary: Negative. Musculoskeletal: Positive for arthralgias and myalgias. Skin: Negative. Neurological: Negative. Psychiatric/Behavioral: Negative. Narciso Banerjee is a 63 y.o. female who presents the office today to have her right ankle evaluated. Patient states that about 6 weeks ago or so she injured the right ankle and was placed into a splint because of a fracture of her distal fibula but she was unable to get to our office because she got sick and had to spend time in the hospital.  She states that the splint was on her ankle for the whole time until it just basically fell off the ankle. She states that she does have pain in the ankle but rates it only at a 4/10. She is also concerned about a small ulceration on the medial side of the ankle because she is not sure where it came from.       Past Medical History:   Diagnosis Date    ADHD 2006    [facility]     COVID-19     Drug addiction in remission ([facility]     recovering addict    Hep C w/o coma, chronic (HCC)     Pacemaker     Sleep apnea     TIA (transient ischemic attack)     per patient \"several mini strokes\"       Past Surgical History:   Procedure Laterality Date   [place]    CHOLECYSTECTOMY  2010    done in [address] [place] [address]  2005       Family History   Problem Relation Age of Onset   [place] Cancer Mother         unsure of type    Thyroid Cancer Mother     Alcohol Abuse Father     Other Sister         passed in car accident    Lupus Brother     Coronary Art Dis Brother        Social History     Socioeconomic History    Marital status: Single     Spouse name: None    Number of children: None    Years of education: None    Highest education level: None   Occupational History    None   Tobacco Use    Smoking status: Former Smoker     Packs/day: 1.00     Years: 40.00     Pack years: 40.00     Quit date: 2016     Years since quittin.5    Smokeless tobacco: Never Used   Substance and Sexual Activity    Alcohol use: Not Currently     Comment: occasional caffeine     Drug use: Not Currently     Comment: heroine    Sexual activity: None   Other Topics Concern    None   Social History Narrative    None     Social Determinants of Health     Financial Resource Strain:     Difficulty of Paying Living Expenses: Not on file   Food Insecurity:     Worried About Running Out of Food in the Last Year: Not on file    Devendra of Food in the Last Year: Not on file   Transportation Needs:     Lack of Transportation (Medical): Not on file    Lack of Transportation (Non-Medical): Not on file   Physical Activity:     Days of Exercise per Week: Not on file    Minutes of Exercise per Session: Not on file   Stress:     Feeling of Stress : Not on file   Social Connections:     Frequency of Communication with Friends and Family: Not on file    Frequency of Social Gatherings with Friends and Family: Not on file    Attends Hindu Services: Not on file    Active Member of [address] [place] [address] [employer] or Organizations: Not on file    Attends Club or Organization Meetings: Not on file    Marital Status: Not on file   Intimate Partner Violence:     Fear of Current or Ex-Partner: Not on file    Emotionally Abused: Not on file    Physically Abused: Not on file    Sexually Abused: Not on file   Housing Stability:     Unable to Pay for Housing in the Last Year: Not on file    Number of [place] in the Last Year: Not on file    Unstable Housing in the Last Year: Not on file       Current Outpatient Medications   Medication Sig Dispense Refill    predniSONE (DELTASONE) 10 MG tablet Prednisone 30 mg daily-Taper by 10 mg every 3 days to 0 mg.  18 tablet 0    SYMBICORT 160-4.5 MCG/ACT AERO Inhale 2 puffs into the lungs 2 times daily 1 each 5    sennosides-docusate sodium (SENOKOT-S) 8.6-50 MG tablet Take 1 tablet by mouth 2 times daily      albuterol sulfate  (90 Base) MCG/ACT inhaler USE 2 PUFFS BY MOUTH EVERY 6 HOURS AS NEEDED SHAKE WELL 18 g 5    BiPAP Machine MISC by Does not apply route Use nightly      aspirin 81 MG chewable tablet Take 81 mg by mouth daily      Multiple Vitamins-Minerals (WOMENS MULTIVITAMIN PO) Take 1 tablet by mouth daily      amphetamine-dextroamphetamine (ADDERALL XR) 30 MG extended release capsule Take 30 mg by mouth daily.  OXYGEN Inhale 4 L into the lungs continuous       buprenorphine-naloxone (SUBOXONE) 8-2 MG FILM SL film Place 1 Film under the tongue 3 times daily.  ipratropium-albuterol (DUONEB) 0.5-2.5 (3) MG/3ML SOLN nebulizer solution Inhale 3 mLs into the lungs every 6 hours as needed for Shortness of Breath 120 mL 0     No current facility-administered medications for this visit. No Known Allergies    Review of Systems:  See above      Physical Exam:   Pulse 94   Resp 16   Ht 5' 4\" (1.626 m)   Wt 195 lb (88.5 kg)   SpO2 98%   BMI 33.47 kg/m²        Gait is Antalgic. Patient is able to bear weight on the right ankle. Gen/Psych:Examination reveals a pleasant individual in no acute distress. The patient is oriented to time, place and person. The patient's mood and affect are appropriate. Patient appears well nourished. Body habitus is overweight     Lymph:  No lymphedema in bilateral lower extremities     Skin intact in bilateral lower extremities with no ulcerations, lesions, rash, erythema. Vascular: There are no significant varicosities in bilateral lower extremities, sensation intact to light touch over bilateral lower extremities. right ankle exam:  Inspection: There is a small subcentimeter partial-thickness ulceration of the skin on the medial malleolus.   There is no surrounding erythema, no active drainage. Palpation: Mild tenderness to palpation along the distal fibula. Range of motion   Dorsiflexion 5 degrees   Plantarflexion 40 degrees   Eversion 5 degrees   Inversion 5 degrees     Outsiderecord review: ER records, x-rays, hospital notes    Imaging studies:  3 views of the right ankle taken and reviewed in the office today show oblique fracture of the right distal fibula with fracture callus seen on the superior aspect of the fracture site and in the inferior aspect consistent with healing fracture. The ankle mortise is intact with no evidence of medial clear space widening or talar tilt. The official read and interpretation of these x-rays will be done by the the [facility]         Impression:    Diagnosis Orders   1. Traumatic closed nondisplaced fracture of distal end of right fibula, initial encounter Improving            Plan:    Patient Instructions   Weightbearing as tolerated, work on range of motion of the ankle. Soap and water over the small ulceration over the medial malleolus but do not use peroxide on this and do not cover it with bacitracin. Follow-up as needed.

## 2022-03-11 ASSESSMENT — ENCOUNTER SYMPTOMS
GASTROINTESTINAL NEGATIVE: 1
RESPIRATORY NEGATIVE: 1
EYES NEGATIVE: 1

## 2022-03-23 ENCOUNTER — HOSPITAL ENCOUNTER (OUTPATIENT)
Age: 62
Discharge: HOME | End: 2022-03-23
Payer: MEDICARE

## 2022-03-23 ENCOUNTER — HOSPITAL ENCOUNTER (OUTPATIENT)
Dept: GENERAL RADIOLOGY | Age: 62
Discharge: HOME | End: 2022-03-23
Payer: MEDICARE

## 2022-03-23 DIAGNOSIS — R05.9 COUGH: ICD-10-CM

## 2022-03-23 PROCEDURE — 71046 X-RAY EXAM CHEST 2 VIEWS: CPT

## 2022-03-25 ENCOUNTER — HOSPITAL ENCOUNTER (EMERGENCY)
Age: 62
Discharge: HOME | End: 2022-03-26
Attending: EMERGENCY MEDICINE
Payer: MEDICARE

## 2022-03-25 ENCOUNTER — APPOINTMENT (OUTPATIENT)
Dept: GENERAL RADIOLOGY | Age: 62
End: 2022-03-25
Payer: MEDICARE

## 2022-03-25 DIAGNOSIS — J18.9 PNEUMONIA OF RIGHT LOWER LOBE DUE TO INFECTIOUS ORGANISM: ICD-10-CM

## 2022-03-25 DIAGNOSIS — J44.1 COPD EXACERBATION (HCC): Primary | ICD-10-CM

## 2022-03-25 LAB
BASE EXCESS MIXED: 10.4 (ref 0–2.3)
BASOPHILS ABSOLUTE: 0 K/CU MM
BASOPHILS RELATIVE PERCENT: 0.3 % (ref 0–1)
DIFFERENTIAL TYPE: ABNORMAL
EOSINOPHILS ABSOLUTE: 0 K/CU MM
EOSINOPHILS RELATIVE PERCENT: 0.3 % (ref 0–3)
HCO3 VENOUS: 40.3 MMOL/L (ref 19–25)
HCT VFR BLD CALC: 38.9 % (ref 37–47)
HEMOGLOBIN: 12.1 GM/DL (ref 12.5–16)
IMMATURE NEUTROPHIL %: 0.5 % (ref 0–0.43)
LYMPHOCYTES ABSOLUTE: 1.6 K/CU MM
LYMPHOCYTES RELATIVE PERCENT: 13.9 % (ref 24–44)
MCH RBC QN AUTO: 27.4 PG (ref 27–31)
MCHC RBC AUTO-ENTMCNC: 31.1 % (ref 32–36)
MCV RBC AUTO: 88 FL (ref 78–100)
MONOCYTES ABSOLUTE: 1.6 K/CU MM
MONOCYTES RELATIVE PERCENT: 13.8 % (ref 0–4)
NUCLEATED RBC %: 0 %
O2 SAT, VEN: 91.7 % (ref 50–70)
PCO2, VEN: 82 MMHG (ref 38–52)
PDW BLD-RTO: 13.5 % (ref 11.7–14.9)
PH VENOUS: 7.3 (ref 7.32–7.42)
PLATELET # BLD: 315 K/CU MM (ref 140–440)
PMV BLD AUTO: 9.5 FL (ref 7.5–11.1)
PO2, VEN: 71 MMHG (ref 28–48)
RBC # BLD: 4.42 M/CU MM (ref 4.2–5.4)
SEGMENTED NEUTROPHILS ABSOLUTE COUNT: 8.3 K/CU MM
SEGMENTED NEUTROPHILS RELATIVE PERCENT: 71.2 % (ref 36–66)
TOTAL IMMATURE NEUTOROPHIL: 0.06 K/CU MM
TOTAL NUCLEATED RBC: 0 K/CU MM
WBC # BLD: 11.6 K/CU MM (ref 4–10.5)

## 2022-03-25 PROCEDURE — 85025 COMPLETE CBC W/AUTO DIFF WBC: CPT

## 2022-03-25 PROCEDURE — 6370000000 HC RX 637 (ALT 250 FOR IP): Performed by: EMERGENCY MEDICINE

## 2022-03-25 PROCEDURE — 6360000002 HC RX W HCPCS: Performed by: EMERGENCY MEDICINE

## 2022-03-25 PROCEDURE — 2700000000 HC OXYGEN THERAPY PER DAY

## 2022-03-25 PROCEDURE — 94660 CPAP INITIATION&MGMT: CPT

## 2022-03-25 PROCEDURE — 96374 THER/PROPH/DIAG INJ IV PUSH: CPT

## 2022-03-25 PROCEDURE — 80048 BASIC METABOLIC PNL TOTAL CA: CPT

## 2022-03-25 PROCEDURE — 87804 INFLUENZA ASSAY W/OPTIC: CPT

## 2022-03-25 PROCEDURE — 82805 BLOOD GASES W/O2 SATURATION: CPT

## 2022-03-25 PROCEDURE — 99285 EMERGENCY DEPT VISIT HI MDM: CPT

## 2022-03-25 PROCEDURE — 87635 SARS-COV-2 COVID-19 AMP PRB: CPT

## 2022-03-25 PROCEDURE — 84484 ASSAY OF TROPONIN QUANT: CPT

## 2022-03-25 PROCEDURE — 71045 X-RAY EXAM CHEST 1 VIEW: CPT

## 2022-03-25 PROCEDURE — 83605 ASSAY OF LACTIC ACID: CPT

## 2022-03-25 PROCEDURE — 93005 ELECTROCARDIOGRAM TRACING: CPT | Performed by: EMERGENCY MEDICINE

## 2022-03-25 PROCEDURE — 94640 AIRWAY INHALATION TREATMENT: CPT

## 2022-03-25 RX ORDER — IPRATROPIUM BROMIDE AND ALBUTEROL SULFATE 2.5; .5 MG/3ML; MG/3ML
1 SOLUTION RESPIRATORY (INHALATION) ONCE
Status: COMPLETED | OUTPATIENT
Start: 2022-03-25 | End: 2022-03-25

## 2022-03-25 RX ORDER — DEXAMETHASONE SODIUM PHOSPHATE 10 MG/ML
6 INJECTION, SOLUTION INTRAMUSCULAR; INTRAVENOUS ONCE
Status: COMPLETED | OUTPATIENT
Start: 2022-03-25 | End: 2022-03-25

## 2022-03-25 RX ADMIN — IPRATROPIUM BROMIDE AND ALBUTEROL SULFATE 1 AMPULE: .5; 2.5 SOLUTION RESPIRATORY (INHALATION) at 23:19

## 2022-03-25 RX ADMIN — DEXAMETHASONE SODIUM PHOSPHATE 6 MG: 10 INJECTION, SOLUTION INTRAMUSCULAR; INTRAVENOUS at 23:55

## 2022-03-26 VITALS
RESPIRATION RATE: 20 BRPM | HEART RATE: 104 BPM | WEIGHT: 200 LBS | SYSTOLIC BLOOD PRESSURE: 121 MMHG | OXYGEN SATURATION: 95 % | TEMPERATURE: 99.2 F | HEIGHT: 64 IN | DIASTOLIC BLOOD PRESSURE: 63 MMHG | BODY MASS INDEX: 34.15 KG/M2

## 2022-03-26 LAB
ANION GAP SERPL CALCULATED.3IONS-SCNC: 10 MMOL/L (ref 4–16)
BASE EXCESS MIXED: 10.6 (ref 0–2.3)
BUN BLDV-MCNC: 6 MG/DL (ref 6–23)
CALCIUM SERPL-MCNC: 8.9 MG/DL (ref 8.3–10.6)
CHLORIDE BLD-SCNC: 95 MMOL/L (ref 99–110)
CO2: 34 MMOL/L (ref 21–32)
CREAT SERPL-MCNC: 0.4 MG/DL (ref 0.6–1.1)
EKG ATRIAL RATE: 111 BPM
EKG DIAGNOSIS: NORMAL
EKG P AXIS: 83 DEGREES
EKG P-R INTERVAL: 152 MS
EKG Q-T INTERVAL: 358 MS
EKG QRS DURATION: 94 MS
EKG QTC CALCULATION (BAZETT): 486 MS
EKG R AXIS: 66 DEGREES
EKG T AXIS: 71 DEGREES
EKG VENTRICULAR RATE: 111 BPM
GFR AFRICAN AMERICAN: >60 ML/MIN/1.73M2
GFR NON-AFRICAN AMERICAN: >60 ML/MIN/1.73M2
GLUCOSE BLD-MCNC: 125 MG/DL (ref 70–99)
HCO3 VENOUS: 39.2 MMOL/L (ref 19–25)
LACTATE: 0.7 MMOL/L (ref 0.4–2)
O2 SAT, VEN: 95.4 % (ref 50–70)
PCO2, VEN: 71 MMHG (ref 38–52)
PH VENOUS: 7.35 (ref 7.32–7.42)
PO2, VEN: 133 MMHG (ref 28–48)
POTASSIUM SERPL-SCNC: 3.4 MMOL/L (ref 3.5–5.1)
RAPID INFLUENZA  B AGN: NEGATIVE
RAPID INFLUENZA A AGN: NEGATIVE
SARS-COV-2, NAAT: NOT DETECTED
SODIUM BLD-SCNC: 139 MMOL/L (ref 135–145)
SOURCE: NORMAL
TROPONIN T: <0.01 NG/ML

## 2022-03-26 PROCEDURE — 96368 THER/DIAG CONCURRENT INF: CPT

## 2022-03-26 PROCEDURE — 96365 THER/PROPH/DIAG IV INF INIT: CPT

## 2022-03-26 PROCEDURE — 87040 BLOOD CULTURE FOR BACTERIA: CPT

## 2022-03-26 PROCEDURE — 82805 BLOOD GASES W/O2 SATURATION: CPT

## 2022-03-26 PROCEDURE — 2580000003 HC RX 258: Performed by: EMERGENCY MEDICINE

## 2022-03-26 PROCEDURE — 93010 ELECTROCARDIOGRAM REPORT: CPT | Performed by: INTERNAL MEDICINE

## 2022-03-26 PROCEDURE — 2500000003 HC RX 250 WO HCPCS: Performed by: EMERGENCY MEDICINE

## 2022-03-26 PROCEDURE — 6360000002 HC RX W HCPCS: Performed by: EMERGENCY MEDICINE

## 2022-03-26 RX ORDER — PREDNISONE 50 MG/1
50 TABLET ORAL DAILY
Qty: 5 TABLET | Refills: 0 | Status: SHIPPED | OUTPATIENT
Start: 2022-03-26 | End: 2022-03-31

## 2022-03-26 RX ORDER — DOXYCYCLINE HYCLATE 100 MG
100 TABLET ORAL 2 TIMES DAILY
Qty: 14 TABLET | Refills: 0 | Status: SHIPPED | OUTPATIENT
Start: 2022-03-26 | End: 2022-04-02

## 2022-03-26 RX ORDER — CEFDINIR 300 MG/1
300 CAPSULE ORAL 2 TIMES DAILY
Qty: 14 CAPSULE | Refills: 0 | Status: SHIPPED | OUTPATIENT
Start: 2022-03-26 | End: 2022-04-02

## 2022-03-26 RX ADMIN — DOXYCYCLINE 100 MG: 100 INJECTION, POWDER, LYOPHILIZED, FOR SOLUTION INTRAVENOUS at 00:47

## 2022-03-26 RX ADMIN — CEFTRIAXONE SODIUM 1000 MG: 1 INJECTION, POWDER, FOR SOLUTION INTRAMUSCULAR; INTRAVENOUS at 00:42

## 2022-03-26 NOTE — ED TRIAGE NOTES
Patient arrives via medic c/o SOB for a few hours. Patient states she has been battling a COPD exacerbation for a few days. Patient normally on 4L NC. Medic increased O2 to 6L NC. Patient took 3 breathing treatments at home and one enroute with no relieve.

## 2022-03-26 NOTE — ED PROVIDER NOTES
CHIEF COMPLAINT    Chief Complaint   Patient presents with    Shortness of Breath     HPI  Moise Cabrera is a 63 y.o. female with history of TIA, pacemaker placement, COPD, chronic respiratory failure with oxygen dependency who presents to the ED via EMS with complaints of increasing shortness of breath and cough. Patient says over the last 2 days she has been experiencing productive cough with yellow-colored sputum as well as worsening shortness of breath and wheezing. She has been wearing her home 4 L nasal cannula oxygen as well as using nebulizers and inhalers with minimal improvement. She does wear BiPAP at night as well. Symptoms are constant. She rates the shortness of breath as moderate to severe and exacerbated with exertion. She is unaware of any recent fevers. She has been vaccinated against COVID-19 as well as influenza. Patient denies chest pain, dizziness, lightheadedness, nausea, vomiting. REVIEW OF SYSTEMS  Constitutional: No fever, chills or recent illness. Eye: No visual changes  HENT: No earache or sore throat. Resp: Complains of cough and shortness of breath  Cardio: No chest pain or palpitations. GI: No abdominal pain, nausea, vomiting, constipation or diarrhea. No melena. : No dysuria, urgency or frequency. Endocrine: No heat intolerance, no cold intolerance, no polydipsia   Lymphatics: No adenopathy  Musculoskeletal: No new muscle aches or joint pain. Neuro: No headaches. Psych: No homicidal or suicidal thoughts  Skin: No rash, No itching. ?  ?   PAST MEDICAL HISTORY  Past Medical History:   Diagnosis Date    ADHD 2006    [facility]     COVID-19     Drug addiction in remission ([facility] [facility]     recovering addict    Hep C w/o coma, chronic (HCC)     Pacemaker     Sleep apnea     TIA (transient ischemic attack)     per patient \"several mini strokes\"     FAMILY HISTORY  Family History   Problem Relation Age of Onset    Cancer Mother unsure of type    Thyroid Cancer Mother     Alcohol Abuse Father     Other Sister         passed in car accident    Lupus Brother     Coronary Art Dis Brother      SOCIAL HISTORY  Social History     Socioeconomic History    Marital status:      Spouse name: Not on file    Number of children: Not on file    Years of education: Not on file    Highest education level: Not on file   Occupational History    Not on file   Tobacco Use    Smoking status: Former Smoker     Packs/day: 1.00     Years: 40.00     Pack years: 40.00     Quit date: 2016     Years since quittin.6    Smokeless tobacco: Never Used   Substance and Sexual Activity    Alcohol use: Not Currently     Comment: occasional caffeine     Drug use: Not Currently     Comment: heroine    Sexual activity: Not on file   Other Topics Concern    Not on file   Social History Narrative    Not on file     Social Determinants of Health     Financial Resource Strain:     Difficulty of Paying Living Expenses: Not on file   Food Insecurity:     Worried About [employer] in the Last Year: Not on file    Dev[employer] in the Last Year: Not on file   Transportation Needs:     Lack of Transportation (Medical): Not on file    Lack of Transportation (Non-Medical):  Not on file   Physical Activity:     Days of Exercise per Week: Not on file    Minutes of Exercise per Session: Not on file   Stress:     Feeling of Stress : Not on file   Social Connections:     Frequency of Communication with Friends and Family: Not on file    Frequency of Social Gatherings with Friends and Family: Not on file    Attends Restorationism Services: Not on file    Active Member of Clubs or Organizations: Not on file    Attends Club or Organization Meetings: Not on file    Marital Status: Not on file   Intimate Partner Violence:     Fear of Current or Ex-Partner: Not on file    Emotionally Abused: Not on file    Physically Abused: Not on file   Jeremy Ceja Sexually Abused: Not on file   Housing Stability:     Unable to Pay for Housing in the Last Year: Not on file    Number of Places Lived in the Last Year: Not on file    Unstable Housing in the Last Year: Not on file       SURGICAL HISTORY  Past Surgical History:   Procedure Laterality Date   [address]  2010    done in [address] [place] [address]  Previous Medications    ALBUTEROL SULFATE  (90 BASE) MCG/ACT INHALER    USE 2 PUFFS BY MOUTH EVERY 6 HOURS AS NEEDED SHAKE WELL    AMPHETAMINE-DEXTROAMPHETAMINE (ADDERALL XR) 30 MG EXTENDED RELEASE CAPSULE    Take 30 mg by mouth daily. ASPIRIN 81 MG CHEWABLE TABLET    Take 81 mg by mouth daily    BIPAP MACHINE MISC    by Does not apply route Use nightly    BUPRENORPHINE-NALOXONE (SUBOXONE) 8-2 MG FILM SL FILM    Place 1 Film under the tongue 3 times daily. IPRATROPIUM-ALBUTEROL (DUONEB) 0.5-2.5 (3) MG/3ML SOLN NEBULIZER SOLUTION    Inhale 3 mLs into the lungs every 6 hours as needed for Shortness of Breath    MULTIPLE VITAMINS-MINERALS (WOMENS MULTIVITAMIN PO)    Take 1 tablet by mouth daily    OXYGEN    Inhale 4 L into the lungs continuous     PREDNISONE (DELTASONE) 10 MG TABLET    Prednisone 30 mg daily-Taper by 10 mg every 3 days to 0 mg. SENNOSIDES-DOCUSATE SODIUM (SENOKOT-S) 8.6-50 MG TABLET    Take 1 tablet by mouth 2 times daily    SYMBICORT 160-4.5 MCG/ACT AERO    Inhale 2 puffs into the lungs 2 times daily     ALLERGIES  No Known Allergies    Nursing notes reviewed by myself for past medical history, family history, social history, surgical history, current medications, and allergies. PHYSICAL EXAM  VITAL SIGNS: Triage VS:    ED Triage Vitals   Enc Vitals Group      BP       Pulse       Resp       Temp       Temp src       SpO2       Weight       Height       Head Circumference       Peak Flow       Pain Score       Pain Loc       Pain Edu? Excl.  in [address]? Constitutional: Well developed, Well nourished, nontoxic appearing, appears mildly uncomfortable secondary to dyspnea  HENT: Normocephalic, Atraumatic, Bilateral external ears normal, Oropharynx moist, No oral exudates, Nose normal.   Eyes: Conjunctiva normal, No discharge. No scleral icterus. Neck: Normal range of motion, No tenderness, Supple. Lymphatic: No lymphadenopathy noted. Cardiovascular: Tachycardic, Normal rhythm, No murmurs, gallops or rubs. Thorax & Lungs: Tachypnea with some mild conversational dyspnea. She has some intercostal retractions as well as diminished breath sounds bilaterally with expiratory wheezing. Abdomen: Soft, No tenderness, No masses, No pulsatile masses, No distention, Normal bowel sounds  Skin: Warm, Dry, Pink, No mottling, No erythema, No rash. Back: No tenderness, No CVA tenderness. Extremities: No edema, No tenderness, No cyanosis, Normal perfusion, No clubbing. Musculoskeletal: Good range of motion in all major joints as observed. No major deformities noted. Neurologic: Alert & oriented x 3, Normal motor function, Normal sensory function, CN II-XII grossly intact as tested, No focal deficits noted. Psychiatric: Affect normal, Judgment normal, Mood normal.   EKG  Per my interpretation demonstrates sinus tachycardia at a rate of 111 bpm.  Normal axis. Prolonged QTc interval 486 ms.  No acute ST segment changes  RADIOLOGY  Labs Reviewed   CBC WITH AUTO DIFFERENTIAL - Abnormal; Notable for the following components:       Result Value    WBC 11.6 (*)     Hemoglobin 12.1 (*)     MCHC 31.1 (*)     Segs Relative 71.2 (*)     Lymphocytes % 13.9 (*)     Monocytes % 13.8 (*)     Immature Neutrophil % 0.5 (*)     All other components within normal limits   BASIC METABOLIC PANEL - Abnormal; Notable for the following components:    Potassium 3.4 (*)     Chloride 95 (*)     CO2 34 (*)     CREATININE 0.4 (*)     Glucose 125 (*)     All other components within normal limits BLOOD GAS, VENOUS - Abnormal; Notable for the following components:    pH, Navdeep 7.30 (*)     pCO2, Navdeep 82 (*)     pO2, Navdeep 71 (*)     Base Exc, Mixed 10.4 (*)     HCO3, Venous 40.3 (*)     O2 Sat, Navdeep 91.7 (*)     All other components within normal limits   BLOOD GAS, VENOUS - Abnormal; Notable for the following components:    pCO2, Navdeep 71 (*)     pO2, Navdeep 133 (*)     Base Exc, Mixed 10.6 (*)     HCO3, Venous 39.2 (*)     O2 Sat, Navdeep 95.4 (*)     All other components within normal limits   COVID-19, RAPID   RAPID INFLUENZA A/B ANTIGENS   CULTURE, BLOOD 1   CULTURE, BLOOD 2   TROPONIN   LACTIC ACID     I personally reviewed the images. The radiologist's interpretation reveals:  Last Imaging results   XR CHEST PORTABLE   Final Result   Left lung base airspace opacity. Follow-up to assure resolution following   medical treatment course recommended             MEDS GIVEN IN ED:  Medications   doxycycline (VIBRAMYCIN) 100 mg in dextrose 5 % 100 mL IVPB (100 mg IntraVENous New Bag 3/26/22 0047)   dexamethasone (PF) (DECADRON) injection 6 mg (6 mg IntraVENous Given 3/25/22 5505)   ipratropium-albuterol (DUONEB) nebulizer solution 1 ampule (1 ampule Inhalation Given 3/25/22 2319)   cefTRIAXone (ROCEPHIN) 1000 mg IVPB in 50 mL D5W minibag (1,000 mg IntraVENous New Bag 3/26/22 0042)     COURSE & MEDICAL DECISION MAKING  79-year-old female presents to emergency department with complaints of increasing shortness of breath with cough over the last 2 days. Initial vital signs demonstrate tachycardia with heart rate of 113. She is saturating the low 90s on her home 4 L nasal cannula oxygen but does have tachypnea with some intercostal retractions and wheezing. Initially DuoNeb therapy was ordered in addition to Decadron but her work of breathing increased and therefore she was placed on BiPAP. CBC, BMP, EKG, troponin, chest x-ray, COVID-19 testing, and influenza testing ordered.   Patient's EKG is without acute ischemic changes and troponin is nonelevated. CBC demonstrates leukocytosis with white blood count of 11.6. BMP shows elevated CO2 which is baseline for the patient. COVID-19 testing and influenza testing are negative. Initial VBG demonstrates hypercapnic respiratory acidosis with pH 7.30 and PCO2 of 82. Patient was on BiPAP for nearly 1 hour and repeat VBG shows PCO2 71 with pH of 7.35. She has BiPAP at home and states she would prefer to return home if possible this evening. We discussed her chest x-ray here does demonstrate evidence of left lower lobe pneumonia and she was provided the dose of Rocephin and doxycycline here. Doxycycline was selected over azithromycin due to her prolonged QTc interval.  She is saturating at 94% on her home 4 L nasal cannula oxygen. Her tachycardia has improved. At this time we discussed strict return precautions and she was discharged with a prescription for prednisone, doxycycline, and Omnicef. Critical Care Time: I have personally provided 45 minutes of critical care time (excluding separately listed billable procedures) through obtaining a history, examining the patient, reviewing relevant medical records, discussing care with family and/or other providers, performing multiple reassessments and directing care. Amount and/or Complexity of Data Reviewed  Clinical lab tests: reviewed  Decide to obtain previous medical records or to obtain history from someone other than the patient: yes       -  Patient seen and evaluated in the emergency department. -  Triage and nursing notes reviewed and incorporated. -  Old chart records reviewed and incorporated. -  Work-up included:  See above  -  Results discussed with patient. Appropriate PPE utilized as indicated for entire patient encounter? Time of Disposition: See timeline  ?   New Prescriptions    CEFDINIR (OMNICEF) 300 MG CAPSULE    Take 1 capsule by mouth 2 times daily for 7 days    DOXYCYCLINE HYCLATE (VIBRA-TABS) 100 MG TABLET    Take 1 tablet by mouth 2 times daily for 7 days    PREDNISONE (DELTASONE) 50 MG TABLET    Take 1 tablet by mouth daily for 5 days     FINAL IMPRESSION  1. COPD exacerbation ([facility] [address] [facility]    2. Pneumonia of right lower lobe due to infectious organism        Electronically signed by:  [address], DO, 3/26/2022         [address], DO  03/26/22 0120

## 2022-03-31 LAB
CULTURE: NORMAL
CULTURE: NORMAL
Lab: NORMAL
Lab: NORMAL
SPECIMEN: NORMAL
SPECIMEN: NORMAL

## 2022-04-05 ENCOUNTER — HOSPITAL ENCOUNTER (OUTPATIENT)
Dept: CT IMAGING | Age: 62
Discharge: HOME | End: 2022-04-05
Payer: MEDICARE

## 2022-04-05 DIAGNOSIS — J18.9 PNEUMONIA OF LEFT LOWER LOBE DUE TO INFECTIOUS ORGANISM: ICD-10-CM

## 2022-04-05 PROCEDURE — 71250 CT THORAX DX C-: CPT

## 2022-04-07 ENCOUNTER — OFFICE VISIT (OUTPATIENT)
Dept: ORTHOPEDIC SURGERY | Age: 62
End: 2022-04-07
Payer: MEDICARE

## 2022-04-07 VITALS — BODY MASS INDEX: 30.56 KG/M2 | HEIGHT: 64 IN | WEIGHT: 179 LBS | RESPIRATION RATE: 16 BRPM

## 2022-04-07 DIAGNOSIS — M17.11 ARTHRITIS OF KNEE, RIGHT: Primary | ICD-10-CM

## 2022-04-07 PROCEDURE — 99213 OFFICE O/P EST LOW 20 MIN: CPT | Performed by: PHYSICIAN ASSISTANT

## 2022-04-07 RX ORDER — PREGABALIN 100 MG/1
CAPSULE ORAL
COMMUNITY
Start: 2022-03-21

## 2022-04-07 RX ORDER — TRAMADOL HYDROCHLORIDE 50 MG/1
TABLET ORAL
COMMUNITY
Start: 2022-03-17

## 2022-04-07 RX ORDER — TRAZODONE HYDROCHLORIDE 100 MG/1
100 TABLET ORAL NIGHTLY
COMMUNITY

## 2022-04-07 ASSESSMENT — ENCOUNTER SYMPTOMS
EYES NEGATIVE: 1
GASTROINTESTINAL NEGATIVE: 1
RESPIRATORY NEGATIVE: 1

## 2022-04-07 NOTE — PATIENT INSTRUCTIONS
Reddie brace given in office  Continue to weight bear as tolerated  Continue range of motion  Ice and elevate as needed  Tylenol or Motrin for pain  Follow up as needed

## 2022-04-07 NOTE — PROGRESS NOTES
Review of Systems   Constitutional: Negative. HENT: Negative. Eyes: Negative. Respiratory: Negative. Cardiovascular: Negative. Gastrointestinal: Negative. Genitourinary: Negative. Musculoskeletal: Positive for arthralgias and myalgias. Skin: Negative. Neurological: Negative. Psychiatric/Behavioral: Negative. HPI:  Tim Luque is a 63 y.o. female presents the office recommended right knee pain primarily along the medial aspect of the right knee. She states that the knee has been bothering her for some time and she has tried some topical joint rub as well as over-the-counter anti-inflammatories. She states that she is also having some left hip pain and numbness along the lateral aspect of hip. She rates her knee pain today at a 4/10. Past Medical History:   Diagnosis Date    ADHD 2006    [facility] disease) ([facility] [address] [facility]     COVID-19     Drug addiction in remission ([facility] [address]     recovering addict    Hep C w/o coma, chronic (HCC)     Pacemaker     Sleep apnea     TIA (transient ischemic attack)     per patient \"several mini strokes\"       Past Surgical History:   Procedure Laterality Date   [facility]    CHOLECYSTECTOMY  2010    done in [address] [place] [address]         Family History   Problem Relation Age of Onset   [place] Cancer Mother         unsure of type    Thyroid Cancer Mother     Alcohol Abuse Father     Other Sister         passed in car accident    Lupus Brother     Coronary Art Dis Brother        Social History     Socioeconomic History    Marital status:       Spouse name: None    Number of children: None    Years of education: None    Highest education level: None   Occupational History    None   Tobacco Use    Smoking status: Former Smoker     Packs/day: 1.00     Years: 40.00     Pack years: 40.00     Quit date: 2016     Years since quittin.6    Smokeless tobacco: Never Used   Substance and Sexual Activity    Alcohol use: Not Currently     Comment: occasional caffeine     Drug use: Not Currently     Comment: heroine    Sexual activity: None   Other Topics Concern    None   Social History Narrative    None     Social Determinants of Health     Financial Resource Strain:     Difficulty of Paying Living Expenses: Not on file   Food Insecurity:     Worried About Running Out of Food in the Last Year: Not on file    Devendra of Food in the Last Year: Not on file   Transportation Needs:     Lack of Transportation (Medical): Not on file    Lack of Transportation (Non-Medical):  Not on file   Physical Activity:     Days of Exercise per Week: Not on file    Minutes of Exercise per Session: Not on file   Stress:     Feeling of Stress : Not on file   Social Connections:     Frequency of Communication with Friends and Family: Not on file    Frequency of Social Gatherings with Friends and Family: Not on file    Attends Jew Services: Not on file    Active Member of [address] [place] [address] or Organizations: Not on file    Attends Club or Organization Meetings: Not on file    Marital Status: Not on file   Intimate Partner Violence:     Fear of Current or Ex-Partner: Not on file    Emotionally Abused: Not on file    Physically Abused: Not on file    Sexually Abused: Not on file   Housing Stability:     Unable to Pay for Housing in the Last Year: Not on file    Number of [place] in the Last Year: Not on file    Unstable Housing in the Last Year: Not on file       Current Outpatient Medications   Medication Sig Dispense Refill    pregabalin (LYRICA) 100 MG capsule Take 1 capsule (100 mg) by mouth 3 times per day      traMADol (ULTRAM) 50 MG tablet As needed      traZODone (DESYREL) 100 MG tablet Take 100 mg by mouth nightly      SYMBICORT 160-4.5 MCG/ACT AERO Inhale 2 puffs into the lungs 2 times daily 1 each 5    sennosides-docusate sodium (SENOKOT-S) 8.6-50 MG tablet Take 1 tablet by mouth 2 times daily      albuterol sulfate  (90 Base) MCG/ACT inhaler USE 2 PUFFS BY MOUTH EVERY 6 HOURS AS NEEDED SHAKE WELL 18 g 5    BiPAP Machine MISC by Does not apply route Use nightly      aspirin 81 MG chewable tablet Take 81 mg by mouth daily      Multiple Vitamins-Minerals (WOMENS MULTIVITAMIN PO) Take 1 tablet by mouth daily      amphetamine-dextroamphetamine (ADDERALL XR) 30 MG extended release capsule Take 30 mg by mouth daily.  OXYGEN Inhale 4 L into the lungs continuous       buprenorphine-naloxone (SUBOXONE) 8-2 MG FILM SL film Place 1 Film under the tongue 3 times daily.  predniSONE (DELTASONE) 10 MG tablet Prednisone 30 mg daily-Taper by 10 mg every 3 days to 0 mg. (Patient not taking: Reported on 4/7/2022) 18 tablet 0    ipratropium-albuterol (DUONEB) 0.5-2.5 (3) MG/3ML SOLN nebulizer solution Inhale 3 mLs into the lungs every 6 hours as needed for Shortness of Breath 120 mL 0     No current facility-administered medications for this visit. No Known Allergies    Review of Systems:  See above      Physical Exam:   Resp 16   Ht 5' 4\" (1.626 m)   Wt 179 lb (81.2 kg)   BMI 30.73 kg/m²        Gait is Normal. The patient can bear weight on the injured extremity. Gen/Psych:Examination reveals a pleasant individual in no acute distress. The patient is oriented to time, place and person. The patient's mood and affect are appropriate. Patient appears well nourished. Body habitus is overweight     Lymph:  no lymphedema in bilateral lower extremities     Skin intact in bilateral lower extremities with no ulcerations, lesions, rash, erythema. Vascular: There are no varicosities in bilateral lower extremities, sensation intact to light touch over bilateral lower extremities.       right leg/knee exam:  Leg alignment:     neutral  Quadriceps/hamstring atrophy:   no  Knee effusion:    no   Knee erythema:   no  ROM:     Full, 0-120 degrees  Varus laxity at 0 and 30 deg's: no  Valguslaxity at 0 and 30 deg's: no  Recurvatum:    no  Tenderness at:   Medial joint line    Bilateral Knee strength is 5/5 flexion and extension  There is + L2-S1 motor and sensory function in bilateral lower extremities    Outside record review: office notes     Imaging studies:  4 views of the right knee taken reviewed in the office today show moderate to severe degenerative change primarily affecting the medial compartment of the right knee with there is approximately 75% joint space loss and osteophyte formation of the medial tibial plateau. No acute fractures or dislocations noted. The official read and interpretation of these x-rays will be done by the the [facility]         Impression:     Diagnosis Orders   1.  Arthritis of knee, right             Plan:    Patient Instructions   Reddie brace given in office  Continue to weight bear as tolerated  Continue range of motion  Ice and elevate as needed  Tylenol or Motrin for pain  Follow up as needed

## 2022-04-12 ENCOUNTER — HOSPITAL ENCOUNTER (OUTPATIENT)
Dept: PULMONOLOGY | Age: 62
Discharge: HOME | End: 2022-04-12
Payer: MEDICARE

## 2022-04-12 DIAGNOSIS — J44.9 CHRONIC OBSTRUCTIVE PULMONARY DISEASE, UNSPECIFIED COPD TYPE (HCC): ICD-10-CM

## 2022-04-12 LAB
DLCO %PRED: 41 %
DLCO PRED: NORMAL
DLCO/VA %PRED: NORMAL
DLCO/VA PRED: NORMAL
DLCO/VA: NORMAL
DLCO: NORMAL
EXPIRATORY TIME-POST: NORMAL
EXPIRATORY TIME: NORMAL
FEF 25-75% %CHNG: NORMAL
FEF 25-75% %PRED-POST: NORMAL
FEF 25-75% %PRED-PRE: NORMAL
FEF 25-75% PRED: NORMAL
FEF 25-75%-POST: NORMAL
FEF 25-75%-PRE: NORMAL
FEV1 %PRED-POST: 27 %
FEV1 %PRED-PRE: 27 %
FEV1 PRED: NORMAL
FEV1-POST: NORMAL
FEV1-PRE: NORMAL
FEV1/FVC %PRED-POST: NORMAL
FEV1/FVC %PRED-PRE: NORMAL
FEV1/FVC PRED: NORMAL
FEV1/FVC-POST: 43 %
FEV1/FVC-PRE: 44 %
FVC %PRED-POST: NORMAL
FVC %PRED-PRE: NORMAL
FVC PRED: NORMAL
FVC-POST: NORMAL
FVC-PRE: NORMAL
GAW %PRED: NORMAL
GAW PRED: NORMAL
GAW: NORMAL
IC %PRED: NORMAL
IC PRED: NORMAL
IC: NORMAL
MEP: NORMAL
MIP: NORMAL
MVV %PRED-PRE: NORMAL
MVV PRED: NORMAL
MVV-PRE: NORMAL
PEF %PRED-POST: NORMAL
PEF %PRED-PRE: NORMAL
PEF PRED: NORMAL
PEF%CHNG: NORMAL
PEF-POST: NORMAL
PEF-PRE: NORMAL
RAW %PRED: NORMAL
RAW PRED: NORMAL
RAW: NORMAL
RV %PRED: NORMAL
RV PRED: NORMAL
RV: NORMAL
SVC %PRED: NORMAL
SVC PRED: NORMAL
SVC: NORMAL
TLC %PRED: NORMAL %
TLC PRED: NORMAL
TLC: NORMAL
VA %PRED: NORMAL
VA PRED: NORMAL
VA: NORMAL
VTG %PRED: NORMAL
VTG PRED: NORMAL
VTG: NORMAL

## 2022-04-12 PROCEDURE — 94727 GAS DIL/WSHOT DETER LNG VOL: CPT

## 2022-04-12 PROCEDURE — 94726 PLETHYSMOGRAPHY LUNG VOLUMES: CPT

## 2022-04-12 PROCEDURE — 94060 EVALUATION OF WHEEZING: CPT

## 2022-04-12 PROCEDURE — 94729 DIFFUSING CAPACITY: CPT

## 2022-04-12 ASSESSMENT — PULMONARY FUNCTION TESTS
FEV1/FVC_PRE: 44
FEV1_PERCENT_PREDICTED_POST: 27
FEV1_PERCENT_PREDICTED_PRE: 27
FEV1/FVC_POST: 43

## 2022-05-16 ENCOUNTER — PROCEDURE VISIT (OUTPATIENT)
Dept: ORTHOPEDIC SURGERY | Age: 62
End: 2022-05-16
Payer: MEDICARE

## 2022-05-16 VITALS
SYSTOLIC BLOOD PRESSURE: 118 MMHG | DIASTOLIC BLOOD PRESSURE: 77 MMHG | HEIGHT: 64 IN | BODY MASS INDEX: 30.75 KG/M2 | RESPIRATION RATE: 16 BRPM | OXYGEN SATURATION: 94 % | HEART RATE: 92 BPM | WEIGHT: 180.1 LBS

## 2022-05-16 DIAGNOSIS — M17.11 ARTHRITIS OF KNEE, RIGHT: Primary | ICD-10-CM

## 2022-05-16 PROCEDURE — 20610 DRAIN/INJ JOINT/BURSA W/O US: CPT | Performed by: PHYSICIAN ASSISTANT

## 2022-05-16 ASSESSMENT — ENCOUNTER SYMPTOMS
EYES NEGATIVE: 1
GASTROINTESTINAL NEGATIVE: 1
RESPIRATORY NEGATIVE: 1

## 2022-05-16 NOTE — PROGRESS NOTES
Noah Sun is a 63 y.o. female returning to the office complaining of pain in the right knee. She states that the pain is located along the medial aspect of the knee. She was given a readie brace at her last visit ans states that she wears the brace a few times a week with minimal relief. She states that her left knee has started to show the same symptoms. She states she would like to discuss steroid injections. Impression   Tricompartmental degenerative changes with small-to-moderate joint effusion.

## 2022-05-16 NOTE — PATIENT INSTRUCTIONS
Continue to weight bear as tolerated  Continue range of motion   Ice and elevate as needed  Tylenol or motrin for pain   Injection given today in office   Rest for 24-48 hours  Follow up as needed    Dr. Connie Kelley - [phone]

## 2022-06-01 ENCOUNTER — HOSPITAL ENCOUNTER (OUTPATIENT)
Dept: CARDIAC REHAB | Age: 62
Setting detail: THERAPIES SERIES
Discharge: HOME | End: 2022-06-01

## 2022-06-01 PROCEDURE — 94626 PHY/QHP OP PULM RHB W/MNTR: CPT

## 2022-06-06 ENCOUNTER — HOSPITAL ENCOUNTER (OUTPATIENT)
Dept: CARDIAC REHAB | Age: 62
Setting detail: THERAPIES SERIES
Discharge: HOME | End: 2022-06-06

## 2022-06-06 PROCEDURE — 94626 PHY/QHP OP PULM RHB W/MNTR: CPT

## 2022-12-21 ENCOUNTER — HOSPITAL ENCOUNTER (OUTPATIENT)
Dept: CARDIAC REHAB | Age: 62
Setting detail: THERAPIES SERIES
Discharge: HOME | End: 2022-12-21

## 2023-03-01 ENCOUNTER — APPOINTMENT (OUTPATIENT)
Dept: GENERAL RADIOLOGY | Age: 63
DRG: 193 | End: 2023-03-01
Payer: MEDICARE

## 2023-03-01 ENCOUNTER — HOSPITAL ENCOUNTER (INPATIENT)
Age: 63
LOS: 14 days | Discharge: HOME | DRG: 193 | End: 2023-03-15
Attending: EMERGENCY MEDICINE | Admitting: STUDENT IN AN ORGANIZED HEALTH CARE EDUCATION/TRAINING PROGRAM
Payer: MEDICARE

## 2023-03-01 DIAGNOSIS — J18.9 PNEUMONIA DUE TO INFECTIOUS ORGANISM, UNSPECIFIED LATERALITY, UNSPECIFIED PART OF LUNG: ICD-10-CM

## 2023-03-01 DIAGNOSIS — J96.01 ACUTE RESPIRATORY FAILURE WITH HYPOXIA AND HYPERCARBIA (HCC): ICD-10-CM

## 2023-03-01 DIAGNOSIS — J96.02 ACUTE RESPIRATORY FAILURE WITH HYPOXIA AND HYPERCARBIA (HCC): ICD-10-CM

## 2023-03-01 DIAGNOSIS — R06.89 HYPERCARBIA: ICD-10-CM

## 2023-03-01 DIAGNOSIS — J44.1 COPD EXACERBATION (HCC): Primary | ICD-10-CM

## 2023-03-01 LAB
ALBUMIN SERPL-MCNC: 3.9 GM/DL (ref 3.4–5)
ALP BLD-CCNC: 82 IU/L (ref 40–129)
ALT SERPL-CCNC: 20 U/L (ref 10–40)
ANION GAP SERPL CALCULATED.3IONS-SCNC: 6 MMOL/L (ref 4–16)
AST SERPL-CCNC: 25 IU/L (ref 15–37)
B PARAP IS1001 DNA NPH QL NAA+NON-PROBE: NOT DETECTED
B PERT.PT PRMT NPH QL NAA+NON-PROBE: NOT DETECTED
BASE EXCESS MIXED: 15.5 (ref 0–2.3)
BASOPHILS ABSOLUTE: 0 K/CU MM
BASOPHILS RELATIVE PERCENT: 0.3 % (ref 0–1)
BILIRUB SERPL-MCNC: 0.1 MG/DL (ref 0–1)
BUN SERPL-MCNC: 13 MG/DL (ref 6–23)
C PNEUM DNA NPH QL NAA+NON-PROBE: NOT DETECTED
CALCIUM SERPL-MCNC: 9 MG/DL (ref 8.3–10.6)
CHLORIDE BLD-SCNC: 92 MMOL/L (ref 99–110)
CO2: 39 MMOL/L (ref 21–32)
COMMENT: ABNORMAL
CREAT SERPL-MCNC: 0.6 MG/DL (ref 0.6–1.1)
DIFFERENTIAL TYPE: ABNORMAL
EKG ATRIAL RATE: 103 BPM
EKG DIAGNOSIS: NORMAL
EKG P AXIS: 73 DEGREES
EKG P-R INTERVAL: 158 MS
EKG Q-T INTERVAL: 352 MS
EKG QRS DURATION: 92 MS
EKG QTC CALCULATION (BAZETT): 461 MS
EKG R AXIS: 19 DEGREES
EKG T AXIS: 67 DEGREES
EKG VENTRICULAR RATE: 103 BPM
EOSINOPHILS ABSOLUTE: 0.2 K/CU MM
EOSINOPHILS RELATIVE PERCENT: 2.2 % (ref 0–3)
FLUAV H1 2009 PAN RNA NPH NAA+NON-PROBE: NOT DETECTED
FLUAV H1 RNA NPH QL NAA+NON-PROBE: NOT DETECTED
FLUAV H3 RNA NPH QL NAA+NON-PROBE: NOT DETECTED
FLUAV RNA NPH QL NAA+NON-PROBE: NOT DETECTED
FLUBV RNA NPH QL NAA+NON-PROBE: NOT DETECTED
GFR SERPL CREATININE-BSD FRML MDRD: >60 ML/MIN/1.73M2
GLUCOSE BLD-MCNC: 278 MG/DL (ref 70–99)
GLUCOSE SERPL-MCNC: 164 MG/DL (ref 70–99)
HADV DNA NPH QL NAA+NON-PROBE: NOT DETECTED
HCO3 VENOUS: 48.6 MMOL/L (ref 19–25)
HCOV 229E RNA NPH QL NAA+NON-PROBE: NOT DETECTED
HCOV HKU1 RNA NPH QL NAA+NON-PROBE: NOT DETECTED
HCOV NL63 RNA NPH QL NAA+NON-PROBE: NOT DETECTED
HCOV OC43 RNA NPH QL NAA+NON-PROBE: NOT DETECTED
HCT VFR BLD CALC: 41.7 % (ref 37–47)
HEMOGLOBIN: 12.5 GM/DL (ref 12.5–16)
HMPV RNA NPH QL NAA+NON-PROBE: NOT DETECTED
HPIV1 RNA NPH QL NAA+NON-PROBE: NOT DETECTED
HPIV2 RNA NPH QL NAA+NON-PROBE: NOT DETECTED
HPIV3 RNA NPH QL NAA+NON-PROBE: NOT DETECTED
HPIV4 RNA NPH QL NAA+NON-PROBE: NOT DETECTED
IMMATURE NEUTROPHIL %: 0.5 % (ref 0–0.43)
LACTATE: 1.4 MMOL/L (ref 0.5–1.9)
LYMPHOCYTES ABSOLUTE: 1.3 K/CU MM
LYMPHOCYTES RELATIVE PERCENT: 12.9 % (ref 24–44)
M PNEUMO DNA NPH QL NAA+NON-PROBE: NOT DETECTED
MAGNESIUM: 1.8 MG/DL (ref 1.8–2.4)
MCH RBC QN AUTO: 28.2 PG (ref 27–31)
MCHC RBC AUTO-ENTMCNC: 30 % (ref 32–36)
MCV RBC AUTO: 93.9 FL (ref 78–100)
MONOCYTES ABSOLUTE: 0.9 K/CU MM
MONOCYTES RELATIVE PERCENT: 9 % (ref 0–4)
NUCLEATED RBC %: 0 %
O2 SAT, VEN: 92.8 % (ref 50–70)
PCO2, VEN: 116 MMHG (ref 38–52)
PDW BLD-RTO: 12.4 % (ref 11.7–14.9)
PH VENOUS: 7.23 (ref 7.32–7.42)
PLATELET # BLD: 189 K/CU MM (ref 140–440)
PMV BLD AUTO: 9.4 FL (ref 7.5–11.1)
PO2, VEN: 90 MMHG (ref 28–48)
POTASSIUM SERPL-SCNC: 4.2 MMOL/L (ref 3.5–5.1)
PRO-BNP: 45.18 PG/ML
RAPID INFLUENZA  B AGN: NEGATIVE
RAPID INFLUENZA A AGN: NEGATIVE
RBC # BLD: 4.44 M/CU MM (ref 4.2–5.4)
RSV RNA NPH QL NAA+NON-PROBE: NOT DETECTED
RV+EV RNA NPH QL NAA+NON-PROBE: NOT DETECTED
SARS-COV-2 RDRP RESP QL NAA+PROBE: NOT DETECTED
SARS-COV-2 RNA NPH QL NAA+NON-PROBE: NOT DETECTED
SEGMENTED NEUTROPHILS ABSOLUTE COUNT: 7.5 K/CU MM
SEGMENTED NEUTROPHILS RELATIVE PERCENT: 75.1 % (ref 36–66)
SODIUM BLD-SCNC: 137 MMOL/L (ref 135–145)
SOURCE: NORMAL
TOTAL IMMATURE NEUTOROPHIL: 0.05 K/CU MM
TOTAL NUCLEATED RBC: 0 K/CU MM
TOTAL PROTEIN: 7.4 GM/DL (ref 6.4–8.2)
TROPONIN T: <0.01 NG/ML
WBC # BLD: 10 K/CU MM (ref 4–10.5)

## 2023-03-01 PROCEDURE — 6360000002 HC RX W HCPCS: Performed by: EMERGENCY MEDICINE

## 2023-03-01 PROCEDURE — 83880 ASSAY OF NATRIURETIC PEPTIDE: CPT

## 2023-03-01 PROCEDURE — 99285 EMERGENCY DEPT VISIT HI MDM: CPT

## 2023-03-01 PROCEDURE — 6370000000 HC RX 637 (ALT 250 FOR IP): Performed by: NURSE PRACTITIONER

## 2023-03-01 PROCEDURE — 6360000002 HC RX W HCPCS: Performed by: NURSE PRACTITIONER

## 2023-03-01 PROCEDURE — 93010 ELECTROCARDIOGRAM REPORT: CPT | Performed by: INTERNAL MEDICINE

## 2023-03-01 PROCEDURE — 2580000003 HC RX 258: Performed by: NURSE PRACTITIONER

## 2023-03-01 PROCEDURE — 71045 X-RAY EXAM CHEST 1 VIEW: CPT

## 2023-03-01 PROCEDURE — 84484 ASSAY OF TROPONIN QUANT: CPT

## 2023-03-01 PROCEDURE — 80053 COMPREHEN METABOLIC PANEL: CPT

## 2023-03-01 PROCEDURE — 93005 ELECTROCARDIOGRAM TRACING: CPT | Performed by: EMERGENCY MEDICINE

## 2023-03-01 PROCEDURE — 6370000000 HC RX 637 (ALT 250 FOR IP): Performed by: EMERGENCY MEDICINE

## 2023-03-01 PROCEDURE — 94640 AIRWAY INHALATION TREATMENT: CPT

## 2023-03-01 PROCEDURE — 96374 THER/PROPH/DIAG INJ IV PUSH: CPT

## 2023-03-01 PROCEDURE — 83735 ASSAY OF MAGNESIUM: CPT

## 2023-03-01 PROCEDURE — 94761 N-INVAS EAR/PLS OXIMETRY MLT: CPT

## 2023-03-01 PROCEDURE — 87635 SARS-COV-2 COVID-19 AMP PRB: CPT

## 2023-03-01 PROCEDURE — 2140000000 HC CCU INTERMEDIATE R&B

## 2023-03-01 PROCEDURE — 94660 CPAP INITIATION&MGMT: CPT

## 2023-03-01 PROCEDURE — 2580000003 HC RX 258: Performed by: EMERGENCY MEDICINE

## 2023-03-01 PROCEDURE — 82962 GLUCOSE BLOOD TEST: CPT

## 2023-03-01 PROCEDURE — 83605 ASSAY OF LACTIC ACID: CPT

## 2023-03-01 PROCEDURE — 2700000000 HC OXYGEN THERAPY PER DAY

## 2023-03-01 PROCEDURE — 96375 TX/PRO/DX INJ NEW DRUG ADDON: CPT

## 2023-03-01 PROCEDURE — 87040 BLOOD CULTURE FOR BACTERIA: CPT

## 2023-03-01 PROCEDURE — 0202U NFCT DS 22 TRGT SARS-COV-2: CPT

## 2023-03-01 PROCEDURE — 82805 BLOOD GASES W/O2 SATURATION: CPT

## 2023-03-01 PROCEDURE — 87804 INFLUENZA ASSAY W/OPTIC: CPT

## 2023-03-01 PROCEDURE — 6360000002 HC RX W HCPCS: Performed by: STUDENT IN AN ORGANIZED HEALTH CARE EDUCATION/TRAINING PROGRAM

## 2023-03-01 PROCEDURE — 85025 COMPLETE CBC W/AUTO DIFF WBC: CPT

## 2023-03-01 RX ORDER — BUPRENORPHINE AND NALOXONE 8; 2 MG/1; MG/1
1 FILM, SOLUBLE BUCCAL; SUBLINGUAL 3 TIMES DAILY
Status: DISCONTINUED | OUTPATIENT
Start: 2023-03-01 | End: 2023-03-15 | Stop reason: HOSPADM

## 2023-03-01 RX ORDER — PREGABALIN 100 MG/1
100 CAPSULE ORAL 3 TIMES DAILY
Status: DISCONTINUED | OUTPATIENT
Start: 2023-03-01 | End: 2023-03-15 | Stop reason: HOSPADM

## 2023-03-01 RX ORDER — ONDANSETRON 4 MG/1
4 TABLET, ORALLY DISINTEGRATING ORAL EVERY 8 HOURS PRN
Status: DISCONTINUED | OUTPATIENT
Start: 2023-03-01 | End: 2023-03-15 | Stop reason: HOSPADM

## 2023-03-01 RX ORDER — METHYLPREDNISOLONE SODIUM SUCCINATE 125 MG/2ML
125 INJECTION, POWDER, LYOPHILIZED, FOR SOLUTION INTRAMUSCULAR; INTRAVENOUS ONCE
Status: COMPLETED | OUTPATIENT
Start: 2023-03-01 | End: 2023-03-01

## 2023-03-01 RX ORDER — QUETIAPINE FUMARATE 100 MG/1
100 TABLET, FILM COATED ORAL NIGHTLY
Status: DISCONTINUED | OUTPATIENT
Start: 2023-03-01 | End: 2023-03-06

## 2023-03-01 RX ORDER — ACETAMINOPHEN 650 MG/1
650 SUPPOSITORY RECTAL EVERY 6 HOURS PRN
Status: DISCONTINUED | OUTPATIENT
Start: 2023-03-01 | End: 2023-03-15 | Stop reason: HOSPADM

## 2023-03-01 RX ORDER — IPRATROPIUM BROMIDE AND ALBUTEROL SULFATE 2.5; .5 MG/3ML; MG/3ML
1 SOLUTION RESPIRATORY (INHALATION)
Status: DISCONTINUED | OUTPATIENT
Start: 2023-03-01 | End: 2023-03-01

## 2023-03-01 RX ORDER — SENNA AND DOCUSATE SODIUM 50; 8.6 MG/1; MG/1
1 TABLET, FILM COATED ORAL 2 TIMES DAILY
Status: DISCONTINUED | OUTPATIENT
Start: 2023-03-01 | End: 2023-03-15 | Stop reason: HOSPADM

## 2023-03-01 RX ORDER — PREDNISONE 20 MG/1
40 TABLET ORAL DAILY
Status: DISCONTINUED | OUTPATIENT
Start: 2023-03-04 | End: 2023-03-05

## 2023-03-01 RX ORDER — ALBUTEROL SULFATE 2.5 MG/3ML
2.5 SOLUTION RESPIRATORY (INHALATION) ONCE
Status: DISCONTINUED | OUTPATIENT
Start: 2023-03-01 | End: 2023-03-01

## 2023-03-01 RX ORDER — AMITRIPTYLINE HYDROCHLORIDE 75 MG/1
75 TABLET, FILM COATED ORAL NIGHTLY
COMMUNITY
Start: 2023-02-07

## 2023-03-01 RX ORDER — IPRATROPIUM BROMIDE AND ALBUTEROL SULFATE 2.5; .5 MG/3ML; MG/3ML
3 SOLUTION RESPIRATORY (INHALATION)
Status: DISCONTINUED | OUTPATIENT
Start: 2023-03-01 | End: 2023-03-01

## 2023-03-01 RX ORDER — ONDANSETRON 2 MG/ML
4 INJECTION INTRAMUSCULAR; INTRAVENOUS EVERY 6 HOURS PRN
Status: DISCONTINUED | OUTPATIENT
Start: 2023-03-01 | End: 2023-03-15 | Stop reason: HOSPADM

## 2023-03-01 RX ORDER — TRAMADOL HYDROCHLORIDE 50 MG/1
50 TABLET ORAL 2 TIMES DAILY PRN
Status: DISCONTINUED | OUTPATIENT
Start: 2023-03-01 | End: 2023-03-15 | Stop reason: HOSPADM

## 2023-03-01 RX ORDER — SODIUM CHLORIDE 0.9 % (FLUSH) 0.9 %
5-40 SYRINGE (ML) INJECTION PRN
Status: DISCONTINUED | OUTPATIENT
Start: 2023-03-01 | End: 2023-03-15 | Stop reason: HOSPADM

## 2023-03-01 RX ORDER — MAGNESIUM SULFATE IN WATER 40 MG/ML
2000 INJECTION, SOLUTION INTRAVENOUS ONCE
Status: COMPLETED | OUTPATIENT
Start: 2023-03-01 | End: 2023-03-01

## 2023-03-01 RX ORDER — ASPIRIN 81 MG/1
81 TABLET, CHEWABLE ORAL DAILY
Status: DISCONTINUED | OUTPATIENT
Start: 2023-03-02 | End: 2023-03-15 | Stop reason: HOSPADM

## 2023-03-01 RX ORDER — QUETIAPINE FUMARATE 50 MG/1
100 TABLET, FILM COATED ORAL NIGHTLY
COMMUNITY
Start: 2023-02-08

## 2023-03-01 RX ORDER — METHYLPREDNISOLONE SODIUM SUCCINATE 40 MG/ML
40 INJECTION, POWDER, LYOPHILIZED, FOR SOLUTION INTRAMUSCULAR; INTRAVENOUS EVERY 6 HOURS
Status: COMPLETED | OUTPATIENT
Start: 2023-03-01 | End: 2023-03-03

## 2023-03-01 RX ORDER — ACETAMINOPHEN 325 MG/1
650 TABLET ORAL EVERY 6 HOURS PRN
Status: DISCONTINUED | OUTPATIENT
Start: 2023-03-01 | End: 2023-03-15 | Stop reason: HOSPADM

## 2023-03-01 RX ORDER — IPRATROPIUM BROMIDE AND ALBUTEROL SULFATE 2.5; .5 MG/3ML; MG/3ML
1 SOLUTION RESPIRATORY (INHALATION) 4 TIMES DAILY
Status: DISCONTINUED | OUTPATIENT
Start: 2023-03-01 | End: 2023-03-10

## 2023-03-01 RX ORDER — ENOXAPARIN SODIUM 100 MG/ML
40 INJECTION SUBCUTANEOUS DAILY
Status: DISCONTINUED | OUTPATIENT
Start: 2023-03-01 | End: 2023-03-09

## 2023-03-01 RX ORDER — ALBUTEROL SULFATE 90 UG/1
2 AEROSOL, METERED RESPIRATORY (INHALATION) ONCE
Status: DISCONTINUED | OUTPATIENT
Start: 2023-03-01 | End: 2023-03-01

## 2023-03-01 RX ORDER — BUDESONIDE AND FORMOTEROL FUMARATE DIHYDRATE 80; 4.5 UG/1; UG/1
2 AEROSOL RESPIRATORY (INHALATION) 2 TIMES DAILY
Status: DISCONTINUED | OUTPATIENT
Start: 2023-03-01 | End: 2023-03-15 | Stop reason: HOSPADM

## 2023-03-01 RX ORDER — DEXTROAMPHETAMINE SACCHARATE, AMPHETAMINE ASPARTATE MONOHYDRATE, DEXTROAMPHETAMINE SULFATE AND AMPHETAMINE SULFATE 7.5; 7.5; 7.5; 7.5 MG/1; MG/1; MG/1; MG/1
30 CAPSULE, EXTENDED RELEASE ORAL DAILY
Status: DISCONTINUED | OUTPATIENT
Start: 2023-03-02 | End: 2023-03-15 | Stop reason: HOSPADM

## 2023-03-01 RX ORDER — IPRATROPIUM BROMIDE AND ALBUTEROL SULFATE 2.5; .5 MG/3ML; MG/3ML
3 SOLUTION RESPIRATORY (INHALATION) ONCE
Status: COMPLETED | OUTPATIENT
Start: 2023-03-01 | End: 2023-03-01

## 2023-03-01 RX ORDER — POLYETHYLENE GLYCOL 3350 17 G/17G
17 POWDER, FOR SOLUTION ORAL DAILY PRN
Status: DISCONTINUED | OUTPATIENT
Start: 2023-03-01 | End: 2023-03-15 | Stop reason: HOSPADM

## 2023-03-01 RX ORDER — TRAZODONE HYDROCHLORIDE 50 MG/1
300 TABLET ORAL NIGHTLY
Status: DISCONTINUED | OUTPATIENT
Start: 2023-03-01 | End: 2023-03-06

## 2023-03-01 RX ORDER — SODIUM CHLORIDE 0.9 % (FLUSH) 0.9 %
5-40 SYRINGE (ML) INJECTION EVERY 12 HOURS SCHEDULED
Status: DISCONTINUED | OUTPATIENT
Start: 2023-03-01 | End: 2023-03-15 | Stop reason: HOSPADM

## 2023-03-01 RX ORDER — SODIUM CHLORIDE 9 MG/ML
INJECTION, SOLUTION INTRAVENOUS PRN
Status: DISCONTINUED | OUTPATIENT
Start: 2023-03-01 | End: 2023-03-15 | Stop reason: HOSPADM

## 2023-03-01 RX ADMIN — METHYLPREDNISOLONE SODIUM SUCCINATE 125 MG: 125 INJECTION, POWDER, FOR SOLUTION INTRAMUSCULAR; INTRAVENOUS at 15:00

## 2023-03-01 RX ADMIN — METHYLPREDNISOLONE SODIUM SUCCINATE 40 MG: 40 INJECTION, POWDER, LYOPHILIZED, FOR SOLUTION INTRAMUSCULAR; INTRAVENOUS at 21:18

## 2023-03-01 RX ADMIN — AZITHROMYCIN MONOHYDRATE 500 MG: 500 INJECTION, POWDER, LYOPHILIZED, FOR SOLUTION INTRAVENOUS at 18:15

## 2023-03-01 RX ADMIN — CEFTRIAXONE SODIUM 1000 MG: 1 INJECTION, POWDER, FOR SOLUTION INTRAMUSCULAR; INTRAVENOUS at 16:38

## 2023-03-01 RX ADMIN — ENOXAPARIN SODIUM 40 MG: 100 INJECTION SUBCUTANEOUS at 21:33

## 2023-03-01 RX ADMIN — IPRATROPIUM BROMIDE AND ALBUTEROL SULFATE 3 ML: 2.5; .5 SOLUTION RESPIRATORY (INHALATION) at 14:46

## 2023-03-01 RX ADMIN — BUDESONIDE AND FORMOTEROL FUMARATE DIHYDRATE 2 PUFF: 80; 4.5 AEROSOL RESPIRATORY (INHALATION) at 20:15

## 2023-03-01 RX ADMIN — CEFEPIME HYDROCHLORIDE 2000 MG: 2 INJECTION, POWDER, FOR SOLUTION INTRAVENOUS at 19:22

## 2023-03-01 RX ADMIN — MAGNESIUM SULFATE 2000 MG: 2 INJECTION INTRAVENOUS at 21:33

## 2023-03-01 RX ADMIN — SENNOSIDES AND DOCUSATE SODIUM 1 TABLET: 50; 8.6 TABLET ORAL at 21:18

## 2023-03-01 RX ADMIN — IPRATROPIUM BROMIDE AND ALBUTEROL SULFATE 1 AMPULE: 2.5; .5 SOLUTION RESPIRATORY (INHALATION) at 20:14

## 2023-03-01 RX ADMIN — AMITRIPTYLINE HYDROCHLORIDE 75 MG: 50 TABLET, FILM COATED ORAL at 21:17

## 2023-03-01 ASSESSMENT — ENCOUNTER SYMPTOMS
DIARRHEA: 0
NAUSEA: 0
VOMITING: 0

## 2023-03-01 NOTE — ED TRIAGE NOTES
Per EMS pt c/o SOB, states she was confused on arrival, with low O2 sats. Placed on bipap, then given treatment & O2 sat went up as pt started to become more alert.

## 2023-03-01 NOTE — ED NOTES
Medication History  [facility]    Patient Name: Rama Eric 1960     Medication history has been completed by: Rohini Anderson CPhT    Source(s) of information: insurance claims and retail pharmacy    Primary Care Physician: Macario Adams MD     Pharmacy: [facility]    Allergies as of 03/01/2023    (No Known Allergies)        Prior to Admission medications    Medication Sig Start Date End Date Taking? Authorizing Provider   amitriptyline (ELAVIL) 75 MG tablet Take 75 mg by mouth nightly 2/7/23   Historical Provider, MD   QUEtiapine (SEROQUEL) 50 MG tablet Take 100 mg by mouth nightly 2/8/23   Historical Provider, MD Amauri Brown 100-5 MCG/ACT inhaler Inhale 2 puffs into the lungs 2 times daily 2/9/23   Marce Foss MD   [facility] 160-4.5 MCG/ACT AERO Inhale 2 puffs into the lungs 2 times daily SHAKE WELL 7/1/22   Marce Foss MD   pregabalin (LYRICA) 100 MG capsule Take 1 capsule (100 mg) by mouth 3 times per day 3/21/22   Historical Provider, MD   traMADol (ULTRAM) 50 MG tablet Take 50 mg by mouth 2 times daily as needed.  As needed 3/17/22   Historical Provider, MD   traZODone (DESYREL) 150 MG tablet Take 300 mg by mouth nightly    Historical Provider, MD   sennosides-docusate sodium (SENOKOT-S) 8.6-50 MG tablet Take 1 tablet by mouth 2 times daily 1/17/22   C Richelle Aase, MD   albuterol sulfate  (90 Base) MCG/ACT inhaler USE 2 PUFFS BY MOUTH EVERY 6 HOURS AS NEEDED SHAKE WELL 11/29/21   Marce Foss MD   ipratropium-albuterol (DUONEB) 0.5-2.5 (3) MG/3ML SOLN nebulizer solution Inhale 3 mLs into the lungs every 6 hours as needed for Shortness of Breath 7/14/21 10/26/21  Maggie Perze MD   BiPAP Machine MISC by Does not apply route Use nightly    Historical Provider, MD   aspirin 81 MG chewable tablet Take 81 mg by mouth daily    Historical Provider, MD   Multiple Vitamins-Minerals (WOMENS MULTIVITAMIN PO) Take 1 tablet by mouth daily    Historical Provider, MD amphetamine-dextroamphetamine (ADDERALL XR) 30 MG extended release capsule Take 30 mg by mouth daily. 9/8/20   Wanda Alberto MD   OXYGEN Inhale 4 L into the lungs continuous     Historical Provider, MD   buprenorphine-naloxone (SUBOXONE) 8-2 MG FILM SL film Place 1 Film under the tongue 3 times daily. Wanda Alberto MD     Medications added or changed (ex. new medication, dosage change, interval change, formulation change):  Amitriptyline (added)  Seroquel (added)  Trazodone dosage [change from 100 mg to 300 mg at HS    Medications removed from list (include reason, ex. noncompliance, medication cost, therapy complete etc.):   Dulera duplicate  Azithromycin therapy complete  Prednisone therapy complete  Albuterol inhaler flagged for review, Last fill date 04/2022  Symbicort flagged for review, last fill date 5/2021  Duoneb flagged for review, Last fill date 07/2021    Comments:  Unable to assess patient. Reached out to daughter with message left to return call. Medication list per insurance claims and reached out to retail pharmacy for verification of medications picked up in February 2023. Medications active per claims.     To my knowledge the above medication history is accurate as of 3/1/2023 4:27 PM.   Irina Galeano CPhT   3/1/2023 4:27 PM

## 2023-03-01 NOTE — ED PROVIDER NOTES
[address]      Pt Name: Aba Bravo  MRN: [id number]  [place] 1960  Date of evaluation: 3/1/2023  Provider: Ervin Leggett MD  Insurance:  Payor: Keya Chowdhury / Plan: Usama Gilliam COMPLETE / Product Type: *No Product type* /   Current Code Status   Code Status: Full Code     CHIEF COMPLAINT       Chief Complaint   Patient presents with    Shortness of Breath         HISTORY OF PRESENT ILLNESS    HPI    Nursing Notes were reviewed. This is a 63 y.o. female who presents to the emergency department with with increasing shortness of breath, lethargy, weakness over the past 2 to 3 days. The patient says that she has been having worsening shortness of breath and using her breathing treatments at home for her COPD, however, she has had ongoing difficulty breathing that has gotten much worse today. The patient is extremely somnolent but she is arousable and able to answer questions. The patient says that she has been feeling extremely tired, weak and lethargic, as well over the past 1 to 2 days. She denies substernal chest pain. She denies nausea or vomiting. She denies recent fevers. She denies new cough.     PAST MEDICAL HISTORY     Past Medical History:   Diagnosis Date    ADHD 2006    COPD (chronic obstructive pulmonary disease) ([facility] [address]     COVID-19     Drug addiction in remission ([facility] [address]     recovering addict    Hep C w/o coma, chronic ([facility] [address]     Pacemaker     Sleep apnea     TIA (transient ischemic attack)     per patient \"several mini strokes\"         SURGICAL HISTORY       Past Surgical History:   Procedure Laterality Date    [address]  2010    done in [facility]       Current Discharge Medication List        CONTINUE these medications which have NOT CHANGED    Details   amitriptyline (ELAVIL) 75 MG tablet Take 75 mg by mouth nightly      QUEtiapine (SEROQUEL) 50 MG tablet Take 100 mg by mouth nightly      DULERA 100-5 MCG/ACT inhaler Inhale 2 puffs into the lungs 2 times daily  Qty: 1 each, Refills: 5      SYMBICORT 160-4.5 MCG/ACT AERO Inhale 2 puffs into the lungs 2 times daily SHAKE WELL  Qty: 10.2 g, Refills: 5    Comments: This prescription was filled on 6/8/2022. Any refills authorized will be placed on file. pregabalin (LYRICA) 100 MG capsule Take 1 capsule (100 mg) by mouth 3 times per day      traMADol (ULTRAM) 50 MG tablet Take 50 mg by mouth 2 times daily as needed for Pain. As needed      traZODone (DESYREL) 150 MG tablet Take 300 mg by mouth nightly      sennosides-docusate sodium (SENOKOT-S) 8.6-50 MG tablet Take 1 tablet by mouth 2 times daily      albuterol sulfate  (90 Base) MCG/ACT inhaler USE 2 PUFFS BY MOUTH EVERY 6 HOURS AS NEEDED SHAKE WELL  Qty: 18 g, Refills: 5    Associated Diagnoses: COPD, severe (HCC)      ipratropium-albuterol (DUONEB) 0.5-2.5 (3) MG/3ML SOLN nebulizer solution Inhale 3 mLs into the lungs every 6 hours as needed for Shortness of Breath  Qty: 120 mL, Refills: 0      BiPAP Machine MISC by Does not apply route Use nightly      aspirin 81 MG chewable tablet Take 81 mg by mouth daily      Multiple Vitamins-Minerals (WOMENS MULTIVITAMIN PO) Take 1 tablet by mouth daily      amphetamine-dextroamphetamine (ADDERALL XR) 30 MG extended release capsule Take 30 mg by mouth daily. OXYGEN Inhale 4 L into the lungs continuous       buprenorphine-naloxone (SUBOXONE) 8-2 MG FILM SL film Place 1 Film under the tongue 3 times daily. ALLERGIES     Patient has no known allergies.     FAMILY HISTORY       Family History   Problem Relation Age of Onset    Cancer Mother         unsure of type    Thyroid Cancer Mother     Alcohol Abuse Father     Other Sister         passed in car accident    Lupus Brother     Coronary Art Dis Brother           SOCIAL HISTORY       Social History     Socioeconomic History    Marital status:    Tobacco Use    Smoking status: Former     Packs/day: 1.00     Years: 40.00     Pack years: 40.00     Types: Cigarettes     Quit date: 2016     Years since quittin.5    Smokeless tobacco: Never   Substance and Sexual Activity    Alcohol use: Not Currently     Comment: occasional caffeine     Drug use: Not Currently     Comment: heroine       PHYSICAL EXAM       ED Triage Vitals   BP Temp Temp src Pulse Resp SpO2 Height Weight   -- -- -- -- 23 1422 23 1423 -- --       22 100 %         Physical Exam  Vitals reviewed. Constitutional:       Appearance: She is obese. HENT:      Head: Normocephalic and atraumatic. Eyes:      Extraocular Movements: Extraocular movements intact. Cardiovascular:      Rate and Rhythm: Normal rate and regular rhythm. Pulmonary:      Effort: Pulmonary effort is normal.      Breath sounds: Examination of the right-upper field reveals wheezing. Examination of the left-upper field reveals wheezing. Examination of the right-middle field reveals wheezing. Examination of the left-middle field reveals wheezing. Examination of the right-lower field reveals decreased breath sounds. Examination of the left-lower field reveals decreased breath sounds. Decreased breath sounds and wheezing present. Skin:     General: Skin is warm and dry. Neurological:      General: No focal deficit present. Mental Status: She is easily aroused. She is lethargic. Motor: Weakness present. Vitals:    Vitals:    23 1727 23 1807 23 1830 23   BP: (!) 185/101  129/77    Pulse: 86  90 100   Resp: 11 28 11 16   Temp:   98.5 °F (36.9 °C)    TempSrc:   Axillary    SpO2: 92%  95% 96%   Weight:       Height:           DIAGNOSTIC RESULTS     EKG: All EKG's are interpreted by the Emergency Department Physician who either signs or Co-signs this chart in the absence of a cardiologist.    Sinus tachycardia. Ventricular to 103. AK is 158. QRS 92.   QTc is 461.  There are no abnormal ST elevations or depressions.  There is no ventricular ectopy.  There were no clinically significant changes from prior EKG from February 2022    RADIOLOGY:   Non-plain film images such as CT, Ultrasound and MRI are read by the radiologist. Plain radiographic images are visualized and preliminarily interpreted by the emergency physician with the below findings:    Interpretation per the Radiologist below, if available at the time of this note:    XR CHEST PORTABLE   Final Result   Findings consistent with diffuse bilateral pneumonitis/atypical pneumonia   and/or congestive heart failure.  Findings may be accentuated by underlying   chronic lung disease.  No detectable pleural effusion or large area pulmonary   consolidation.             LABS:  Labs Reviewed   CBC WITH AUTO DIFFERENTIAL - Abnormal; Notable for the following components:       Result Value    MCHC 30.0 (*)     Segs Relative 75.1 (*)     Lymphocytes % 12.9 (*)     Monocytes % 9.0 (*)     Immature Neutrophil % 0.5 (*)     All other components within normal limits   COMPREHENSIVE METABOLIC PANEL - Abnormal; Notable for the following components:    Chloride 92 (*)     CO2 39 (*)     Glucose 164 (*)     All other components within normal limits   BLOOD GAS, VENOUS - Abnormal; Notable for the following components:    pH, Navdeep 7.23 (*)     pCO2, Navdeep 116 (*)     pO2, Navdeep 90 (*)     Base Exc, Mixed 15.5 (*)     HCO3, Venous 48.6 (*)     O2 Sat, Navdeep 92.8 (*)     All other components within normal limits   POCT GLUCOSE - Abnormal; Notable for the following components:    POC Glucose 278 (*)     All other components within normal limits   COVID-19, RAPID   RAPID INFLUENZA A/B ANTIGENS   RESPIRATORY PANEL, MOLECULAR, WITH COVID-19   CULTURE, BLOOD 1   CULTURE, BLOOD 1   MYCOPLASMA PNEUMONIAE PCR   CULTURE, MRSA, SCREENING   STREP PNEUMONIAE ANTIGEN   LEGIONELLA ANTIGEN, URINE   MAGNESIUM   TROPONIN   BRAIN NATRIURETIC PEPTIDE   LACTIC  ACID   URINALYSIS WITH REFLEX TO CULTURE   URINE DRUG SCREEN   COMPREHENSIVE METABOLIC PANEL W/ REFLEX TO MG FOR LOW K   CBC WITH AUTO DIFFERENTIAL       All other labs were within normal range or not returned as of this dictation.    MEDICAL DECISION MAKING     Medications   sodium chloride flush 0.9 % injection 5-40 mL (5 mLs IntraVENous Not Given 3/1/23 2134)   sodium chloride flush 0.9 % injection 5-40 mL (has no administration in time range)   0.9 % sodium chloride infusion (has no administration in time range)   ondansetron (ZOFRAN-ODT) disintegrating tablet 4 mg (has no administration in time range)     Or   ondansetron (ZOFRAN) injection 4 mg (has no administration in time range)   polyethylene glycol (GLYCOLAX) packet 17 g (has no administration in time range)   enoxaparin (LOVENOX) injection 40 mg (40 mg SubCUTAneous Given 3/1/23 2133)   acetaminophen (TYLENOL) tablet 650 mg (has no administration in time range)     Or   acetaminophen (TYLENOL) suppository 650 mg (has no administration in time range)   ipratropium-albuterol (DUONEB) nebulizer solution 1 ampule (1 ampule Inhalation Given 3/1/23 2014)   methylPREDNISolone sodium (SOLU-MEDROL) injection 40 mg (40 mg IntraVENous Given 3/1/23 2118)     Followed by   predniSONE (DELTASONE) tablet 40 mg (has no administration in time range)   azithromycin (ZITHROMAX) 500 mg in sodium chloride 0.9 % 250 mL IVPB ([id number]) (0 mg IntraVENous Stopped 3/1/23 1910)   amitriptyline (ELAVIL) tablet 75 mg (75 mg Oral Given 3/1/23 2117)   amphetamine-dextroamphetamine (ADDERALL XR) extended release capsule 30 mg (Patient Supplied) (has no administration in time range)   aspirin chewable tablet 81 mg (has no administration in time range)   buprenorphine-naloxone (SUBOXONE) 8-2 MG SL film 1 Film ( SubLINGual Automatically Held 3/4/23 2100)   budesonide-formoterol (SYMBICORT) 80-4.5 MCG/ACT inhaler 2 puff (2 puffs Inhalation Given 3/1/23 2015)   sennosides-docusate sodium  (SENOKOT-S) 8.6-50 MG tablet 1 tablet (1 tablet Oral Given 3/1/23 2118)   traMADol (ULTRAM) tablet 50 mg ( Oral Held by provider 3/1/23 1836)   traZODone (DESYREL) tablet 300 mg ( Oral Automatically Held 3/4/23 2100)   pregabalin (LYRICA) capsule 100 mg ( Oral Automatically Held 3/4/23 2100)   QUEtiapine (SEROQUEL) tablet 100 mg ( Oral Automatically Held 3/4/23 2100)   cefepime (MAXIPIME) 2,000 mg in sodium chloride 0.9 % 50 mL IVPB (mini-bag) (0 mg IntraVENous Stopped 3/1/23 2108)     Followed by   cefepime (MAXIPIME) 2,000 mg in sodium chloride 0.9 % 50 mL IVPB (mini-bag) (has no administration in time range)   methylPREDNISolone sodium (SOLU-MEDROL) injection 125 mg (125 mg IntraVENous Given 3/1/23 1500)   ipratropium-albuterol (DUONEB) nebulizer solution 3 mL (3 mLs Inhalation Given 3/1/23 1446)   cefTRIAXone (ROCEPHIN) 1,000 mg in sodium chloride 0.9 % 50 mL IVPB (mini-bag) (0 mg IntraVENous Stopped 3/1/23 1738)   magnesium sulfate 2000 mg in 50 mL IVPB premix (0 mg IntraVENous Stopped 3/1/23 2136)             [place] Coma Scale  Eye Opening: Spontaneous  Best Verbal Response: Oriented  Best Motor Response: Obeys commands  [place]uggins Coma Scale Score: 15                     CIWA Assessment  BP: 129/77  Heart Rate: 100                 MDM  This is a 63 y.o. female who presents to the emergency department with with increasing shortness of breath, lethargy, weakness over the past 2 to 3 days. The patient says that she has been having worsening shortness of breath and using her breathing treatments at home for her COPD, however, she has had ongoing difficulty breathing that has gotten much worse today. The patient is extremely somnolent but she is arousable and able to answer questions. The patient says that she has been feeling extremely tired, weak and lethargic, as well over the past 1 to 2 days. She denies substernal chest pain. She denies nausea or vomiting. She denies recent fevers.   She denies new cough.    Sodium and potassium are normal indicating a low likelihood of hypo or hypernatremia, as well as hypo or hyperkalemia. BUN and creatinine are normal indicating a low likelihood of acute kidney injury or renal failure. Liver function enzymes are normal indicating a low likelihood of acute cholecystitis or hepatitis. Hemoglobin hematocrit are normal indicating no evidence of significant anemia. ECG shows no ST elevations and cardiac enzymes are normal indicating a low likelihood of STEMI or NSTEMI    Chest x-ray shows findings concerning for pneumonia. VBG demonstrates a respiratory acidosis with a marked hypercarbia greater than 100. The patient was placed on BiPAP on arrival here in the emergency department for respiratory support. She was given IV antibiotics as well as IV fluid resuscitation    The patient will be admitted to the hospital for further evaluation by medicine and pulmonology. Clinical Diagnoses Addressed  1. COPD exacerbation ([address]    2. Hypercarbia    3. Acute respiratory failure with hypoxia and hypercarbia (HCC)    4. Pneumonia due to infectious organism, unspecified laterality, unspecified part of lung            CRITICAL CARE TIME   Total Critical Care time was 20 minutes, excluding separately reportable procedures. There was a high probability of clinically significant/life threatening deterioration in the patient's condition which required my urgent intervention. CONSULTS:  IP CONSULT TO PULMONOLOGY    PROCEDURES:  Unless otherwise noted below, none     Procedures      FINAL IMPRESSION      1. COPD exacerbation ([address]    2. Hypercarbia    3. Acute respiratory failure with hypoxia and hypercarbia (HCC)    4. Pneumonia due to infectious organism, unspecified laterality, unspecified part of lung          DISPOSITION/PLAN   DISPOSITION Admitted 03/01/2023 05:02:53 PM      PATIENT REFERRED TO:  No follow-up provider specified.     DISCHARGE MEDICATIONS:  Current Discharge Medication List        Controlled Substances Monitoring:     No flowsheet data found.     Chelle Granados MD (electronically signed)  Attending Emergency Physician            Chelle Granados MD  03/01/23 [id number]

## 2023-03-01 NOTE — ED NOTES
ED TO INPATIENT SBAR HANDOFF    Patient Name: Mariaelena Javier   :  1960  63 y.o. MRN:  [id number]  Preferred Name  Claudia Joy  ED Room #:  TR01/01TR-01  Family/Caregiver Present no   Restraints no   Sitter no   Sepsis Risk Score Sepsis Risk Score: 0.79    Situation  Code Status: Prior No additional code details. Allergies: Patient has no known allergies. Weight: Patient Vitals for the past 96 hrs (Last 3 readings):   Weight   23 1423 180 lb (81.6 kg)     Arrived from: home  Chief Complaint:   Chief Complaint   Patient presents with    Shortness of [place] Problem/Diagnosis:  Principal Problem:    Acute respiratory failure with hypercapnia ([address]  Resolved Problems:    * No resolved hospital problems. *    Imaging:   XR CHEST PORTABLE   Final Result   Findings consistent with diffuse bilateral pneumonitis/atypical pneumonia   and/or congestive heart failure. Findings may be accentuated by underlying   chronic lung disease. No detectable pleural effusion or large area pulmonary   consolidation.            Abnormal labs:   Abnormal Labs Reviewed   CBC WITH AUTO DIFFERENTIAL - Abnormal; Notable for the following components:       Result Value    MCHC 30.0 (*)     Segs Relative 75.1 (*)     Lymphocytes % 12.9 (*)     Monocytes % 9.0 (*)     Immature Neutrophil % 0.5 (*)     All other components within normal limits   COMPREHENSIVE METABOLIC PANEL - Abnormal; Notable for the following components:    Chloride 92 (*)     CO2 39 (*)     Glucose 164 (*)     All other components within normal limits   BLOOD GAS, VENOUS - Abnormal; Notable for the following components:    pH, Navdeep 7.23 (*)     pCO2, Navdeep 116 (*)     pO2, Navdeep 90 (*)     Base Exc, Mixed 15.5 (*)     HCO3, Venous 48.6 (*)     O2 Sat, Navdeep 92.8 (*)     All other components within normal limits   POCT GLUCOSE - Abnormal; Notable for the following components:    POC Glucose 278 (*)     All other components within normal limits     Critical values: no     Abnormal Assessment Findings: COPD, needs BiPAP    Background  History:   Past Medical History:   Diagnosis Date   • ADHD 2006   • COPD [facility]chronic obstructive pulmonary disease) ([facility]    • COVID-19    • Drug addiction in remission (HCC)     recovering addict   • Hep C w/o coma, chronic (HCC)    • Pacemaker    • Sleep apnea    • TIA (transient ischemic attack)     per patient \"several mini strokes\"       Assessment    Vitals/MEWS:        Vitals:    03/01/23 1423 03/01/23 1427 03/01/23 1431 03/01/23 1519   BP:  130/69 132/76    Pulse:  (!) 103 (!) 104    Resp:  18 14    Temp:    98.1 °F (36.7 °C)   SpO2: 100% 100% 100%    Weight: 180 lb (81.6 kg)      Height: 5' 4\" (1.626 m)        FiO2 (%): placed on BiPAP  O2 Flow Rate: O2 Device: PAP (positive airway pressure)    Cardiac Rhythm:   Pain Assessment: 0/10 [x] Verbal [] Danielson Woo Scale  Pain Scale: Pain Assessment  Pain Assessment: None - Denies Pain  Last documented pain score (0-10 scale)    Last documented pain medication administered: none  Mental Status: oriented, alert, coherent, logical, thought processes intact and able to concentrate and follow conversation  NIH Score: NIH     C-SSRS: Risk of Suicide: No Risk  Bedside swallow:    [place] Coma Scale (GCS): [place] Coma Scale  Eye Opening: Spontaneous  Best Verbal Response: Oriented  Best Motor Response: Obeys commands  [place] Coma Scale Score: 15  Active LDA's:   Peripheral IV 03/01/23 Left Wrist (Active)   Site Assessment Clean, dry & intact 03/01/23 1425   Line Status Blood return noted;Normal saline locked 03/01/23 1425   Phlebitis Assessment No symptoms 03/01/23 1425   Infiltration Assessment 0 03/01/23 1425     PO Status: Regular  Pertinent or High Risk Medications/Drips: no   o If Yes, please provide details: none  Pending Blood Product Administration: no     You may also review the ED PT Care Timeline found under the Summary Nursing Index tab.    Recommendation    Pending orders none  Plan  for Discharge (if known): Additional Comments: Patient arrives to ED with complaints of SOB, Patient alert, drowsy, put on BiPAP per orders, Patient able to make needs known.    If any further questions, please call Sending RN at [phone]      Electronically signed by: Electronically signed by Merrill Campos RN on 3/1/2023 at 5:21 PM       Merrill Campos RN  03/01/23 [id number]

## 2023-03-01 NOTE — H&P
V2.0  History and Physical      Name:  Katelin Andrade /Age/Sex: 1960  (63 y.o. female)   MRN & CSN:  [id number] & [id number] Encounter Date/Time: 3/1/2023 5:17 PM EST   Location:   PCP: Marjorie Christine MD       Hospital Day: 1    Assessment and Plan:   Katelin Andrade is a 63 y.o. female with a pmh of severe COPD with home O2 who presents with Acute respiratory failure with hypercapnia [facility])    Hospital Problems             Last Modified POA    * (Principal) Acute respiratory failure with hypercapnia ([facility] [address] 3/1/2023 Yes       Acute on chronic respiratory failure with hypercapnia and hypoxia  Severe COPD with COPD exacerbation  Acute Respiratory acidosis with hypercapnia  Obstructive sleep apnea with a chest BiPAP   -Admit to inpatient, stepdown   -Rapid Covid test negative, rapid influenza negative, respiratory PCR pending   -Denies fevers, chills, lactate 1.4, white count 10.0   -Cough productive with thick yellow sputum, cefepime and azithromycin   -Start methylprednisone IV every 6 hours with prednisone taper   -Schedule DuoNeb treatments   -Guaifenesin cough syrup   VBG in ED 7.23, CO2 116, PO2 90, HCO3 48.2 consistent with acute respiratory acidosis with hypercapnia   Patient tolerating BiPAP well 15/5, 50%, waking up, more alert. We will keep sitter at bedside. Hold all sedating meds. Strict n.p.o. status to avoid intubation. Blood cultures x2, send UA urine culture, check MRSA swab nares, check respiratory PCR, check urine for strep and Legionella   Chest x-ray with diffuse bronchovascular marking prominence, diffuse bilateral pneumonitis/atypical pneumonia. Findings consistent with underlying COPD  .   No detectable areas of consolidation or pleural effusion   Telemetry monitoring, continuous pulse ox   Continue home inhalers,   Wears 4L home O2 at baseline and BiPAP nightly-history of noncompliance with CPAP   Follows with Dr. Luis Tesfaye, consulted     Acute metabolic encephalopathy   Secondary to hypercapnia. Continue BiPAP. Avoid oversedation   Hold home trazodone, Lyrica, Suboxone, improving with BiPAP    Chronic pain syndrome,   Holding medications as noted above, resume as mental status improves, able to follow commands  Chronic problems include history of pacemaker in situ, ADHD, obesity and obstructive sleep apnea (not compliant with CPAP). Disposition:   Current Living situation: Home  Expected Disposition: Likely same   Estimated D/C: 3 days     Diet Diet NPO   DVT Prophylaxis [x] Lovenox, []  Heparin, [] SCDs, [] Ambulation,  [] Eliquis, [] Xarelto, [] Coumadin   Code Status Prior   Surrogate Decision Maker/ POA      History from:     patient, electronic medical record    History of Present Illness:     Chief Complaint: LI Mejias is a 63 y.o. female with pmh of severe COPD with home O2 on 4 L nasal cannula at baseline who presented to the ED via EMS with complaints of shortness of breath, dyspnea. Patient was found to be hypoxic on EMS arrival, was placed on BiPAP and transported to ED for evaluation. Unable to complete full review of symptoms secondary to respiratory distress, patient being on BiPAP. Patient is able to wake up intermittently and states she does feel like it is a COPD D exacerbation. Denies fevers or chills. Denies nausea vomiting diarrhea. Review of Systems: Need 10 Elements   Review of Systems   Constitutional:  Negative for chills and fever. Gastrointestinal:  Negative for diarrhea, nausea and vomiting.      ROS limited 2/2 respiratory distress    Objective:   No intake or output data in the 24 hours ending 03/01/23 1733   Vitals:   Vitals:    03/01/23 1702 03/01/23 1707 03/01/23 1711 03/01/23 1727   BP: (!) 166/124 (!) 185/124 (!) 171/123 (!) 185/101   Pulse: 89 87 89 86   Resp: 13 11 13 11   Temp:       SpO2: 91% 91% 92% 92%   Weight:       Height:           Medications Prior to Admission     Prior to Admission medications Medication Sig Start Date End Date Taking? Authorizing Provider   amitriptyline (ELAVIL) 75 MG tablet Take 75 mg by mouth nightly 2/7/23   Historical Provider, MD   QUEtiapine (SEROQUEL) 50 MG tablet Take 100 mg by mouth nightly 2/8/23   Historical Provider, MD Tessa Nayak 100-5 MCG/ACT inhaler Inhale 2 puffs into the lungs 2 times daily 2/9/23   Kate Elizondo MD   [facility] 160-4.5 MCG/ACT AERO Inhale 2 puffs into the lungs 2 times daily SHAKE WELL 7/1/22   Kate Elizondo MD   pregabalin (LYRICA) 100 MG capsule Take 1 capsule (100 mg) by mouth 3 times per day 3/21/22   Historical Provider, MD   traMADol (ULTRAM) 50 MG tablet Take 50 mg by mouth 2 times daily as needed for Pain. As needed 3/17/22   Historical Provider, MD   traZODone (DESYREL) 150 MG tablet Take 300 mg by mouth nightly    Historical Provider, MD   sennosides-docusate sodium (SENOKOT-S) 8.6-50 MG tablet Take 1 tablet by mouth 2 times daily 1/17/22   C Lyn Callaway MD   albuterol sulfate  (90 Base) MCG/ACT inhaler USE 2 PUFFS BY MOUTH EVERY 6 HOURS AS NEEDED SHAKE WELL 11/29/21   Kate Elizondo MD   ipratropium-albuterol (DUONEB) 0.5-2.5 (3) MG/3ML SOLN nebulizer solution Inhale 3 mLs into the lungs every 6 hours as needed for Shortness of Breath 7/14/21 10/26/21  Brady Dangelo MD   BiPAP Machine MISC by Does not apply route Use nightly    Historical Provider, MD   aspirin 81 MG chewable tablet Take 81 mg by mouth daily    Historical Provider, MD   Multiple Vitamins-Minerals (WOMENS MULTIVITAMIN PO) Take 1 tablet by mouth daily    Historical Provider, MD   amphetamine-dextroamphetamine (ADDERALL XR) 30 MG extended release capsule Take 30 mg by mouth daily. 9/8/20   Bimal Toth MD   OXYGEN Inhale 4 L into the lungs continuous     Historical Provider, MD   buprenorphine-naloxone (SUBOXONE) 8-2 MG FILM SL film Place 1 Film under the tongue 3 times daily.     Bimal Toth MD       Physical Exam: Need 8 Elements   Physical Exam General: Somnolent, on BiPAP  Eyes: EOMI  ENT: neck supple  Cardiovascular: Regular rate. Respiratory: Diminished throughout, unlabored,  Gastrointestinal: Soft, non tender  Genitourinary: no suprapubic tenderness  Musculoskeletal: No edema  Skin: warm, dry  Neuro: Somnolent, arouses to vigorous tactile stimuli  Psych: Unable to assess at this time      Past Medical History:   PMHx   Past Medical History:   Diagnosis Date    ADHD 2006    [facility]ch[facility] pulmonary disease) [facility] [address]     COVID-19     [facility]     recovering addict    Hep C w[facility]o coma, chronic ([facility]     Pacemaker     Sleep apnea     TIA (transient ischemic attack)     per patient \"several mini strokes\"     PSHX:  has a past surgical history that includes  section (); Cholecystectomy (); and Pacemaker insertion (). Allergies: No Known Allergies  Fam HX:  family history includes Alcohol Abuse in her father; Cancer in her mother; Coronary Art Dis in her brother; Lupus in her brother; Other in her sister; Thyroid Cancer in her mother. Soc HX:   Social History     Socioeconomic History    Marital status:     Tobacco Use    Smoking status: Former     Packs/day: 1.00     Years: 40.00     Pack years: 40.00     Types: Cigarettes     Quit date: 2016     Years since quittin.5    Smokeless tobacco: Never   Substance and Sexual Activity    Alcohol use: Not Currently     Comment: occasional caffeine     Drug use: Not Currently     Comment: heroine       Medications:   Medications:    azithromycin  500 mg IntraVENous Q24H      Infusions:   PRN Meds:      Labs      CBC:   Recent Labs     23  1422   WBC 10.0   HGB 12.5        BMP:    Recent Labs     23  1422      K 4.2   CL 92*   CO2 39*   BUN 13   CREATININE 0.6   GLUCOSE 164*     Hepatic:   Recent Labs     23  1422   AST 25   ALT 20   BILITOT 0.1   ALKPHOS 82     Lipids:   Lab Results   Component Value Date/Time    CHOL 239 03/30/2021 04:35 PM    HDL 53 03/30/2021 04:35 PM    TRIG 142 03/30/2021 04:35 PM     Hemoglobin A1C:   Lab Results   Component Value Date/Time    LABA1C 6.1 02/07/2022 07:10 AM     TSH: No results found for: TSH  Troponin:   Lab Results   Component Value Date/Time    TROPONINT <0.010 03/01/2023 02:22 PM    TROPONINT <0.010 03/25/2022 11:10 PM    TROPONINT <0.010 02/06/2022 05:47 AM     Lactic Acid: No results for input(s): LACTA in the last 72 hours. BNP:   Recent Labs     03/01/23 1422   PROBNP 45.18     UA:  Lab Results   Component Value Date/Time    NITRU NEGATIVE 01/05/2022 08:25 PM    COLORU YELLOW 01/05/2022 08:25 PM    WBCUA 1 01/05/2022 08:25 PM    RBCUA <1 01/05/2022 08:25 PM    MUCUS RARE 01/05/2022 08:25 PM    TRICHOMONAS NONE SEEN 01/05/2022 08:25 PM    BACTERIA NEGATIVE 01/05/2022 08:25 PM    CLARITYU CLEAR 01/05/2022 08:25 PM    SPECGRAV 1.009 01/05/2022 08:25 PM    LEUKOCYTESUR TRACE 01/05/2022 08:25 PM    UROBILINOGEN NEGATIVE 01/05/2022 08:25 PM    BILIRUBINUR NEGATIVE 01/05/2022 08:25 PM    BLOODU NEGATIVE 01/05/2022 08:25 PM    KETUA NEGATIVE 01/05/2022 08:25 PM     Urine Cultures: No results found for: Sabrina Glover  Blood Cultures: No results found for: BC  No results found for: BLOODCULT2  Organism: No results found for: ORG    Imaging/Diagnostics Last 24 Hours   XR CHEST PORTABLE    Result Date: 3/1/2023  EXAMINATION: ONE XRAY VIEW OF THE CHEST 3/1/2023 2:46 pm COMPARISON: 03/25/2022 at 2328 hours HISTORY: ORDERING SYSTEM PROVIDED HISTORY: SOB TECHNOLOGIST PROVIDED HISTORY: Reason for exam:->SOB Reason for Exam: sob FINDINGS: Diffuse bronchovascular marking prominence, borderline cardiomegaly. Findings are consistent with congestive heart failure with associated pulmonary edema and/or diffuse bilateral pneumonitis/atypical pneumonia. Findings may be accentuated by underlying chronic lung disease. No large areas of pulmonary airspace consolidation.   No detectable pleural effusion, pneumothorax or mediastinal widening. Findings consistent with diffuse bilateral pneumonitis/atypical pneumonia and/or congestive heart failure. Findings may be accentuated by underlying chronic lung disease. No detectable pleural effusion or large area pulmonary consolidation.        Personally reviewed Lab Studies, Imaging, and discussed case with  Dr Graciela Desai    Electronically signed by KELLY Ortiz CNP on 3/1/2023 at 5:33 PM

## 2023-03-01 NOTE — PROGRESS NOTES
Pt placed on BiPAP 15/5 50%. Patient given x3 duonebs in line with bipap per Dr. Lewis Yoon verbal order.

## 2023-03-02 LAB
ALBUMIN SERPL-MCNC: 3.6 GM/DL (ref 3.4–5)
ALP BLD-CCNC: 72 IU/L (ref 40–128)
ALT SERPL-CCNC: 19 U/L (ref 10–40)
ANION GAP SERPL CALCULATED.3IONS-SCNC: 3 MMOL/L (ref 4–16)
AST SERPL-CCNC: 22 IU/L (ref 15–37)
BASE EXCESS MIXED: 18.1 (ref 0–2.3)
BASOPHILS ABSOLUTE: 0 K/CU MM
BASOPHILS RELATIVE PERCENT: 0.1 % (ref 0–1)
BILIRUB SERPL-MCNC: 0.2 MG/DL (ref 0–1)
BUN SERPL-MCNC: 10 MG/DL (ref 6–23)
CALCIUM SERPL-MCNC: 8.7 MG/DL (ref 8.3–10.6)
CARBON MONOXIDE, BLOOD: 2.4 % (ref 0–5)
CHLORIDE BLD-SCNC: 96 MMOL/L (ref 99–110)
CO2 CONTENT: 50.5 MMOL/L (ref 19–24)
CO2: 41 MMOL/L (ref 21–32)
COMMENT: ABNORMAL
CREAT SERPL-MCNC: 0.4 MG/DL (ref 0.6–1.1)
DIFFERENTIAL TYPE: ABNORMAL
EOSINOPHILS ABSOLUTE: 0 K/CU MM
EOSINOPHILS RELATIVE PERCENT: 0 % (ref 0–3)
GFR SERPL CREATININE-BSD FRML MDRD: >60 ML/MIN/1.73M2
GLUCOSE SERPL-MCNC: 169 MG/DL (ref 70–99)
HCO3 ARTERIAL: 48 MMOL/L (ref 18–23)
HCT VFR BLD CALC: 38.8 % (ref 37–47)
HEMOGLOBIN: 11.7 GM/DL (ref 12.5–16)
IMMATURE NEUTROPHIL %: 1.3 % (ref 0–0.43)
LYMPHOCYTES ABSOLUTE: 1.1 K/CU MM
LYMPHOCYTES RELATIVE PERCENT: 12.8 % (ref 24–44)
MCH RBC QN AUTO: 28.2 PG (ref 27–31)
MCHC RBC AUTO-ENTMCNC: 30.2 % (ref 32–36)
MCV RBC AUTO: 93.5 FL (ref 78–100)
METHEMOGLOBIN ARTERIAL: 1.5 %
MONOCYTES ABSOLUTE: 0.2 K/CU MM
MONOCYTES RELATIVE PERCENT: 2.1 % (ref 0–4)
NUCLEATED RBC %: 0 %
O2 SATURATION: 92.5 % (ref 96–97)
PCO2 ARTERIAL: 83 MMHG (ref 32–45)
PDW BLD-RTO: 12 % (ref 11.7–14.9)
PH BLOOD: 7.37 (ref 7.34–7.45)
PLATELET # BLD: 176 K/CU MM (ref 140–440)
PMV BLD AUTO: 9.7 FL (ref 7.5–11.1)
PO2 ARTERIAL: 67 MMHG (ref 75–100)
POTASSIUM SERPL-SCNC: 4.5 MMOL/L (ref 3.5–5.1)
RBC # BLD: 4.15 M/CU MM (ref 4.2–5.4)
SEGMENTED NEUTROPHILS ABSOLUTE COUNT: 7.3 K/CU MM
SEGMENTED NEUTROPHILS RELATIVE PERCENT: 83.7 % (ref 36–66)
SODIUM BLD-SCNC: 140 MMOL/L (ref 135–145)
TOTAL IMMATURE NEUTOROPHIL: 0.11 K/CU MM
TOTAL NUCLEATED RBC: 0 K/CU MM
TOTAL PROTEIN: 6.3 GM/DL (ref 6.4–8.2)
WBC # BLD: 8.7 K/CU MM (ref 4–10.5)

## 2023-03-02 PROCEDURE — 2580000003 HC RX 258: Performed by: NURSE PRACTITIONER

## 2023-03-02 PROCEDURE — 94640 AIRWAY INHALATION TREATMENT: CPT

## 2023-03-02 PROCEDURE — 85025 COMPLETE CBC W/AUTO DIFF WBC: CPT

## 2023-03-02 PROCEDURE — 2700000000 HC OXYGEN THERAPY PER DAY

## 2023-03-02 PROCEDURE — 80053 COMPREHEN METABOLIC PANEL: CPT

## 2023-03-02 PROCEDURE — 94761 N-INVAS EAR/PLS OXIMETRY MLT: CPT

## 2023-03-02 PROCEDURE — 99222 1ST HOSP IP/OBS MODERATE 55: CPT | Performed by: INTERNAL MEDICINE

## 2023-03-02 PROCEDURE — 2140000000 HC CCU INTERMEDIATE R&B

## 2023-03-02 PROCEDURE — 6370000000 HC RX 637 (ALT 250 FOR IP): Performed by: STUDENT IN AN ORGANIZED HEALTH CARE EDUCATION/TRAINING PROGRAM

## 2023-03-02 PROCEDURE — 36600 WITHDRAWAL OF ARTERIAL BLOOD: CPT

## 2023-03-02 PROCEDURE — 6370000000 HC RX 637 (ALT 250 FOR IP): Performed by: NURSE PRACTITIONER

## 2023-03-02 PROCEDURE — 94660 CPAP INITIATION&MGMT: CPT

## 2023-03-02 PROCEDURE — 82803 BLOOD GASES ANY COMBINATION: CPT

## 2023-03-02 PROCEDURE — 36415 COLL VENOUS BLD VENIPUNCTURE: CPT

## 2023-03-02 PROCEDURE — 6360000002 HC RX W HCPCS: Performed by: NURSE PRACTITIONER

## 2023-03-02 RX ORDER — FLUTICASONE PROPIONATE 50 MCG
1 SPRAY, SUSPENSION (ML) NASAL DAILY
Status: DISCONTINUED | OUTPATIENT
Start: 2023-03-02 | End: 2023-03-15 | Stop reason: HOSPADM

## 2023-03-02 RX ADMIN — FLUTICASONE PROPIONATE 1 SPRAY: 50 SPRAY, METERED NASAL at 14:40

## 2023-03-02 RX ADMIN — SENNOSIDES AND DOCUSATE SODIUM 1 TABLET: 50; 8.6 TABLET ORAL at 22:21

## 2023-03-02 RX ADMIN — CEFEPIME 2000 MG: 2 INJECTION, POWDER, FOR SOLUTION INTRAVENOUS at 09:47

## 2023-03-02 RX ADMIN — PREGABALIN 100 MG: 100 CAPSULE ORAL at 22:22

## 2023-03-02 RX ADMIN — METHYLPREDNISOLONE SODIUM SUCCINATE 40 MG: 40 INJECTION, POWDER, LYOPHILIZED, FOR SOLUTION INTRAMUSCULAR; INTRAVENOUS at 14:40

## 2023-03-02 RX ADMIN — METHYLPREDNISOLONE SODIUM SUCCINATE 40 MG: 40 INJECTION, POWDER, LYOPHILIZED, FOR SOLUTION INTRAMUSCULAR; INTRAVENOUS at 03:56

## 2023-03-02 RX ADMIN — IPRATROPIUM BROMIDE AND ALBUTEROL SULFATE 1 AMPULE: 2.5; .5 SOLUTION RESPIRATORY (INHALATION) at 21:21

## 2023-03-02 RX ADMIN — BUPRENORPHINE AND NALOXONE 1 FILM: 8; 2 FILM BUCCAL; SUBLINGUAL at 14:40

## 2023-03-02 RX ADMIN — IPRATROPIUM BROMIDE AND ALBUTEROL SULFATE 1 AMPULE: 2.5; .5 SOLUTION RESPIRATORY (INHALATION) at 15:39

## 2023-03-02 RX ADMIN — SODIUM CHLORIDE, PRESERVATIVE FREE 10 ML: 5 INJECTION INTRAVENOUS at 09:43

## 2023-03-02 RX ADMIN — CEFEPIME 2000 MG: 2 INJECTION, POWDER, FOR SOLUTION INTRAVENOUS at 18:47

## 2023-03-02 RX ADMIN — METHYLPREDNISOLONE SODIUM SUCCINATE 40 MG: 40 INJECTION, POWDER, LYOPHILIZED, FOR SOLUTION INTRAMUSCULAR; INTRAVENOUS at 22:22

## 2023-03-02 RX ADMIN — BUPRENORPHINE AND NALOXONE 1 FILM: 8; 2 FILM BUCCAL; SUBLINGUAL at 09:42

## 2023-03-02 RX ADMIN — ASPIRIN 81 MG CHEWABLE TABLET 81 MG: 81 TABLET CHEWABLE at 09:42

## 2023-03-02 RX ADMIN — CEFEPIME 2000 MG: 2 INJECTION, POWDER, FOR SOLUTION INTRAVENOUS at 04:03

## 2023-03-02 RX ADMIN — METHYLPREDNISOLONE SODIUM SUCCINATE 40 MG: 40 INJECTION, POWDER, LYOPHILIZED, FOR SOLUTION INTRAMUSCULAR; INTRAVENOUS at 09:42

## 2023-03-02 RX ADMIN — TRAZODONE HYDROCHLORIDE 300 MG: 50 TABLET ORAL at 22:21

## 2023-03-02 RX ADMIN — BUDESONIDE AND FORMOTEROL FUMARATE DIHYDRATE 2 PUFF: 80; 4.5 AEROSOL RESPIRATORY (INHALATION) at 07:43

## 2023-03-02 RX ADMIN — SENNOSIDES AND DOCUSATE SODIUM 1 TABLET: 50; 8.6 TABLET ORAL at 09:42

## 2023-03-02 RX ADMIN — IPRATROPIUM BROMIDE AND ALBUTEROL SULFATE 1 AMPULE: 2.5; .5 SOLUTION RESPIRATORY (INHALATION) at 07:42

## 2023-03-02 RX ADMIN — AZITHROMYCIN MONOHYDRATE 500 MG: 500 INJECTION, POWDER, LYOPHILIZED, FOR SOLUTION INTRAVENOUS at 16:50

## 2023-03-02 RX ADMIN — ENOXAPARIN SODIUM 40 MG: 100 INJECTION SUBCUTANEOUS at 09:43

## 2023-03-02 RX ADMIN — IPRATROPIUM BROMIDE AND ALBUTEROL SULFATE 1 AMPULE: 2.5; .5 SOLUTION RESPIRATORY (INHALATION) at 11:36

## 2023-03-02 RX ADMIN — BUDESONIDE AND FORMOTEROL FUMARATE DIHYDRATE 2 PUFF: 80; 4.5 AEROSOL RESPIRATORY (INHALATION) at 21:21

## 2023-03-02 ASSESSMENT — PAIN DESCRIPTION - PAIN TYPE: TYPE: CHRONIC PAIN

## 2023-03-02 ASSESSMENT — PAIN DESCRIPTION - LOCATION: LOCATION: BACK

## 2023-03-02 ASSESSMENT — PAIN - FUNCTIONAL ASSESSMENT: PAIN_FUNCTIONAL_ASSESSMENT: ACTIVITIES ARE NOT PREVENTED

## 2023-03-02 ASSESSMENT — PAIN DESCRIPTION - FREQUENCY: FREQUENCY: INTERMITTENT

## 2023-03-02 ASSESSMENT — PAIN SCALES - GENERAL: PAINLEVEL_OUTOF10: 3

## 2023-03-02 ASSESSMENT — PAIN DESCRIPTION - ORIENTATION: ORIENTATION: MID;LOWER

## 2023-03-02 ASSESSMENT — PAIN DESCRIPTION - ONSET: ONSET: GRADUAL

## 2023-03-02 ASSESSMENT — PAIN DESCRIPTION - DESCRIPTORS: DESCRIPTORS: ACHING

## 2023-03-02 NOTE — PROGRESS NOTES
V2.0  [facility] [place] [facility]      Name:  Ernestina Ramirez /Age/Sex: 1960  (63 y.o. female)   MRN & CSN:  [id number] & [id number] Encounter Date/Time: 3/2/2023 11:24 AM EST    Location:  7891/5251-P PCP: Houston Zamarripa MD       Hospital Day: 2    Assessment and Plan:   Ernestina Ramirez is a 63 y.o. female who presents with Acute respiratory failure with hypercapnia ([address]      Plan:    Acute hypoxic and hypercapnic respiratory failure  -Suspect COPD exacerbation as the etiology. May have a component of pneumonia with given increased sputum production and change in color. Chest x-ray equivocal.  -Started on corticosteroids, cefepime and azithromycin.  -Blood gas improved significantly after BiPAP and the above treatments. Patient not taken off BiPAP and will use nightly, during naps. -Blood cultures are pending. Severe COPD exacerbation  -Treatments as above with corticosteroids and nebulizers.  -Patient reports her BiPAP at home and not be functioning properly and that she is getting a  to come take a look at it. Acute metabolic encephalopathy  -Likely in the setting of hypercarbia. -Okay to continue with home trazodone, Lyrica and Suboxone as metabolic encephalopathy has now resolved with with improvement in hypercarbia. Chronic pain  -Continue home Suboxone. ADHD  Continue home Adderall. Comment: Please note this report has been produced using speech recognition software and may contain errors related to that system including errors in grammar, punctuation, and spelling, as well as words and phrases that may be inappropriate. If there are any questions or concerns please feel free to contact the dictating provider for clarification. Diet ADULT DIET;  Regular   DVT Prophylaxis [x] Lovenox, []  Heparin, [] SCDs, [] Ambulation,  [] Eliquis, [] Xarelto  [] Coumadin   Code Status Full Code   Disposition From: Home  Expected Disposition: Home  Estimated Date of Discharge: 1 to 2 days  Patient requires continued admission due to COPD exacerbation   Surrogate Decision Maker/ POA Child Christal     Subjective:     Chief Complaint: Shortness of Breath     Reports significant improvement in shortness of breath since she first came in. Reports the BiPAP is helping. Denies any chest pain, nausea, vomiting, lightheadedness or dizziness. Reports her cough is of green sputum production and her usual cough is white sputum production. Review of Systems:    Review of Systems  As per interval history    Objective:   No intake or output data in the 24 hours ending 03/02/23 1124     Vitals:   Vitals:    03/02/23 1012   BP:    Pulse:    Resp: 18   Temp:    SpO2:        Physical Exam:   Physical Exam  Constitutional:       General: She is not in acute distress. HENT:      Mouth/Throat:      Mouth: Mucous membranes are moist.      Pharynx: No posterior oropharyngeal erythema. Eyes:      General: No scleral icterus. Pupils: Pupils are equal, round, and reactive to light. Cardiovascular:      Rate and Rhythm: Normal rate and regular rhythm. Heart sounds: No murmur heard. Pulmonary:      Effort: Pulmonary effort is normal.      Breath sounds: Wheezing (Faint wheezing bilaterally.) present. No rales. Comments: Not in any acute respiratory distress. Speaking full sentences. Abdominal:      Palpations: Abdomen is soft. Tenderness: There is no abdominal tenderness. Musculoskeletal:         General: Normal range of motion. Right lower leg: No edema. Left lower leg: No edema. Skin:     General: Skin is warm. Neurological:      General: No focal deficit present. Mental Status: She is alert and oriented to person, place, and time. Cranial Nerves: No cranial nerve deficit. Motor: No weakness.    Psychiatric:         Mood and Affect: Mood normal.       Medications:   Medications:    sodium chloride flush  5-40 mL IntraVENous 2 times per day enoxaparin  40 mg SubCUTAneous Daily    ipratropium-albuterol  1 ampule Inhalation 4x daily    methylPREDNISolone  40 mg IntraVENous Q6H    Followed by    Walt Tamayo ON 3/4/2023] predniSONE  40 mg Oral Daily    azithromycin  500 mg IntraVENous Q24H    amitriptyline  75 mg Oral Nightly    amphetamine-dextroamphetamine  30 mg Oral Daily    aspirin  81 mg Oral Daily    buprenorphine-naloxone  1 Film SubLINGual TID    budesonide-formoterol  2 puff Inhalation BID    sennosides-docusate sodium  1 tablet Oral BID    [Held by provider] traZODone  300 mg Oral Nightly    pregabalin  100 mg Oral TID    [Held by provider] QUEtiapine  100 mg Oral Nightly    cefepime  2,000 mg IntraVENous Q8H      Infusions:    sodium chloride       PRN Meds: sodium chloride flush, 5-40 mL, PRN  sodium chloride, , PRN  ondansetron, 4 mg, Q8H PRN   Or  ondansetron, 4 mg, Q6H PRN  polyethylene glycol, 17 g, Daily PRN  acetaminophen, 650 mg, Q6H PRN   Or  acetaminophen, 650 mg, Q6H PRN  [Held by provider] traMADol, 50 mg, BID PRN        Labs      Recent Results (from the past 24 hour(s))   POCT Glucose    Collection Time: 03/01/23  2:11 PM   Result Value Ref Range    POC Glucose 278 (H) 70 - 99 MG/DL   CBC with Auto Differential    Collection Time: 03/01/23  2:22 PM   Result Value Ref Range    WBC 10.0 4.0 - 10.5 K/CU MM    RBC 4.44 4.2 - 5.4 M/CU MM    Hemoglobin 12.5 12.5 - 16.0 GM/DL    Hematocrit 41.7 37 - 47 %    MCV 93.9 78 - 100 FL    MCH 28.2 27 - 31 PG    MCHC 30.0 (L) 32.0 - 36.0 %    RDW 12.4 11.7 - 14.9 %    Platelets 863 703 - 974 K/CU MM    MPV 9.4 7.5 - 11.1 FL    Differential Type AUTOMATED DIFFERENTIAL     Segs Relative 75.1 (H) 36 - 66 %    Lymphocytes % 12.9 (L) 24 - 44 %    Monocytes % 9.0 (H) 0 - 4 %    Eosinophils % 2.2 0 - 3 %    Basophils % 0.3 0 - 1 %    Segs Absolute 7.5 K/CU MM    Lymphocytes Absolute 1.3 K/CU MM    Monocytes Absolute 0.9 K/CU MM    Eosinophils Absolute 0.2 K/CU MM    Basophils Absolute 0.0 K/CU MM Nucleated RBC % 0.0 %    Total Nucleated RBC 0.0 K/CU MM    Total Immature Neutrophil 0.05 K/CU MM    Immature Neutrophil % 0.5 (H) 0 - 0.43 %   CMP    Collection Time: 03/01/23  2:22 PM   Result Value Ref Range    Sodium 137 135 - 145 MMOL/L    Potassium 4.2 3.5 - 5.1 MMOL/L    Chloride 92 (L) 99 - 110 mMol/L    CO2 39 (H) 21 - 32 MMOL/L    BUN 13 6 - 23 MG/DL    Creatinine 0.6 0.6 - 1.1 MG/DL    Est, Glom Filt Rate >60 >60 mL/min/1.73m2    Glucose 164 (H) 70 - 99 MG/DL    Calcium 9.0 8.3 - 10.6 MG/DL    Albumin 3.9 3.4 - 5.0 GM/DL    Total Protein 7.4 6.4 - 8.2 GM/DL    Total Bilirubin 0.1 0.0 - 1.0 MG/DL    ALT 20 10 - 40 U/L    AST 25 15 - 37 IU/L    Alkaline Phosphatase 82 40 - 129 IU/L    Anion Gap 6 4 - 16   Magnesium    Collection Time: 03/01/23  2:22 PM   Result Value Ref Range    Magnesium 1.8 1.8 - 2.4 mg/dl   Troponin    Collection Time: 03/01/23  2:22 PM   Result Value Ref Range    Troponin T <0.010 <0.01 NG/ML   Brain Natriuretic Peptide    Collection Time: 03/01/23  2:22 PM   Result Value Ref Range    Pro-BNP 45.18 <300 PG/ML   Lactic Acid    Collection Time: 03/01/23  2:22 PM   Result Value Ref Range    Lactate 1.4 0.5 - 1.9 mMOL/L   Blood Gas, Venous    Collection Time: 03/01/23  2:30 PM   Result Value Ref Range    pH, Navdeep 7.23 (L) 7.32 - 7.42    pCO2, Navdeep 116 (H) 38 - 52 mmHG    pO2, Navdeep 90 (H) 28 - 48 mmHG    Base Exc, Mixed 15.5 (H) 0 - 2.3    HCO3, Venous 48.6 (H) 19 - 25 MMOL/L    O2 Sat, Navdeep 92.8 (H) 50 - 70 %    Comment VBG    EKG 12 Lead    Collection Time: 03/01/23  2:33 PM   Result Value Ref Range    Ventricular Rate 103 BPM    Atrial Rate 103 BPM    P-R Interval 158 ms    QRS Duration 92 ms    Q-T Interval 352 ms    QTc Calculation (Bazett) 461 ms    P Axis 73 degrees    R Axis 19 degrees    T Axis 67 degrees    Diagnosis       Sinus tachycardia  Otherwise normal ECG  When compared with ECG of 25-MAR-2022 23:51,  No significant change was found  Confirmed by [id number] [address], SAYED ([id number]) on 3/1/2023 6:59:43 PM     COVID-19, Rapid    Collection Time: 03/01/23  2:35 PM    Specimen: Nasopharyngeal   Result Value Ref Range    Source UNKNOWN     SARS-CoV-2, NAAT NOT DETECTED NOT DETECTED   Rapid Flu Swab    Collection Time: 03/01/23  2:35 PM    Specimen: Nasopharyngeal   Result Value Ref Range    Rapid Influenza A Ag NEGATIVE NEGATIVE    Rapid Influenza B Ag NEGATIVE NEGATIVE   Respiratory Panel, Molecular, with COVID-19 (Restricted: peds pts or suitable admitted adults)    Collection Time: 03/01/23  5:33 PM    Specimen: Nasopharyngeal   Result Value Ref Range    Adenovirus Detection by PCR NOT DETECTED NOT DETECTED    Coronavirus 229E PCR NOT DETECTED NOT DETECTED    Coronavirus HKU1 PCR NOT DETECTED NOT DETECTED    Coronavirus NL63 PCR NOT DETECTED NOT DETECTED    Coronavirus OC43 PCR NOT DETECTED NOT DETECTED    SARS-CoV-2 NOT DETECTED NOT DETECTED    Human Metapneumovirus PCR NOT DETECTED NOT DETECTED    Rhinovirus Enterovirus PCR NOT DETECTED NOT DETECTED    Influenza A by PCR NOT DETECTED NOT DETECTED    Influenza A H1 Pandemic PCR NOT DETECTED NOT DETECTED    Influenza A H1 (2009) PCR NOT DETECTED NOT DETECTED    Influenza A H3 PCR NOT DETECTED NOT DETECTED    Influenza B by PCR NOT DETECTED NOT DETECTED    Parainfluenza 1 PCR NOT DETECTED NOT DETECTED    Parainfluenza 2 PCR NOT DETECTED NOT DETECTED    Parainfluenza 3 PCR NOT DETECTED NOT DETECTED    Parainfluenza 4 PCR NOT DETECTED NOT DETECTED    RSV PCR NOT DETECTED NOT DETECTED    Bordetella parapertussis by PCR NOT DETECTED NOT DETECTED    B Pertussis by PCR NOT DETECTED NOT DETECTED    Chlamydophila Pneumonia PCR NOT DETECTED NOT DETECTED    Mycoplasma pneumo by PCR NOT DETECTED NOT DETECTED   Comprehensive Metabolic Panel w/ Reflex to MG    Collection Time: 03/02/23  5:39 AM   Result Value Ref Range    Sodium 140 135 - 145 MMOL/L    Potassium 4.5 3.5 - 5.1 MMOL/L    Chloride 96 (L) 99 - 110 mMol/L    CO2 41 (H) 21 - 32 MMOL/L    BUN 10 6 - 23 MG/DL    Creatinine 0.4 (L) 0.6 - 1.1 MG/DL    Est, Glom Filt Rate >60 >60 mL/min/1.73m2    Glucose 169 (H) 70 - 99 MG/DL    Calcium 8.7 8.3 - 10.6 MG/DL    Albumin 3.6 3.4 - 5.0 GM/DL    Total Protein 6.3 (L) 6.4 - 8.2 GM/DL    Total Bilirubin 0.2 0.0 - 1.0 MG/DL    ALT 19 10 - 40 U/L    AST 22 15 - 37 IU/L    Alkaline Phosphatase 72 40 - 128 IU/L    Anion Gap 3 (L) 4 - 16   CBC with Auto Differential    Collection Time: 03/02/23  5:39 AM   Result Value Ref Range    WBC 8.7 4.0 - 10.5 K/CU MM    RBC 4.15 (L) 4.2 - 5.4 M/CU MM    Hemoglobin 11.7 (L) 12.5 - 16.0 GM/DL    Hematocrit 38.8 37 - 47 %    MCV 93.5 78 - 100 FL    MCH 28.2 27 - 31 PG    MCHC 30.2 (L) 32.0 - 36.0 %    RDW 12.0 11.7 - 14.9 %    Platelets 417 285 - 727 K/CU MM    MPV 9.7 7.5 - 11.1 FL    Differential Type AUTOMATED DIFFERENTIAL     Segs Relative 83.7 (H) 36 - 66 %    Lymphocytes % 12.8 (L) 24 - 44 %    Monocytes % 2.1 0 - 4 %    Eosinophils % 0.0 0 - 3 %    Basophils % 0.1 0 - 1 %    Segs Absolute 7.3 K/CU MM    Lymphocytes Absolute 1.1 K/CU MM    Monocytes Absolute 0.2 K/CU MM    Eosinophils Absolute 0.0 K/CU MM    Basophils Absolute 0.0 K/CU MM    Nucleated RBC % 0.0 %    Total Nucleated RBC 0.0 K/CU MM    Total Immature Neutrophil 0.11 K/CU MM    Immature Neutrophil % 1.3 (H) 0 - 0.43 %   Blood gas, arterial    Collection Time: 03/02/23  7:45 AM   Result Value Ref Range    pH, Bld 7.37 7.34 - 7.45    pCO2, Arterial 83.0 (H) 32 - 45 MMHG    pO2, Arterial 67 (L) 75 - 100 MMHG    Base Exc, Mixed 18.1 (H) 0 - 2.3    HCO3, Arterial 48.0 (H) 18 - 23 MMOL/L    CO2 Content 50.5 (H) 19 - 24 MMOL/L    O2 Sat 92.5 (L) 96 - 97 %    Carbon Monoxide, Blood 2.4 0 - 5 %    Methemoglobin, Arterial 1.5 (H) <1.5 %    Comment BIPA 15 OVER 5 FIO2 35         Imaging/Diagnostics Last 24 Hours   XR CHEST PORTABLE    Result Date: 3/1/2023  EXAMINATION: ONE XRAY VIEW OF THE CHEST 3/1/2023 2:46 pm COMPARISON: 03/25/2022 at 2328 hours HISTORY: [id number] [facility] HISTORY: SOB TECHNOLOGIST PROVIDED HISTORY: Reason for exam:->SOB Reason for Exam: sob FINDINGS: Diffuse bronchovascular marking prominence, borderline cardiomegaly. Findings are consistent with congestive heart failure with associated pulmonary edema and/or diffuse bilateral pneumonitis/atypical pneumonia. Findings may be accentuated by underlying chronic lung disease. No large areas of pulmonary airspace consolidation.  No detectable pleural effusion, pneumothorax or mediastinal widening.     Findings consistent with diffuse bilateral pneumonitis/atypical pneumonia and/or congestive heart failure.  Findings may be accentuated by underlying chronic lung disease.  No detectable pleural effusion or large area pulmonary consolidation.       Electronically signed by Tray Greene MD on 3/2/2023 at 11:24 AM

## 2023-03-02 NOTE — PLAN OF CARE
Problem: Discharge Planning  Goal: Discharge to home or other facility with appropriate resources  3/2/2023 1051 by Yash Campbell, RN  Outcome: Progressing  Flowsheets (Taken 3/2/2023 1000)  Discharge to home or other facility with appropriate resources:   Identify barriers to discharge with patient and caregiver   Arrange for needed discharge resources and transportation as appropriate   Identify discharge learning needs (meds, wound care, etc)   Arrange for interpreters to assist at discharge as needed   Refer to discharge planning if patient needs post-hospital services based on physician order or complex needs related to functional status, cognitive ability or social support system  3/2/2023 0456 by Sandoval Pressley RN  Outcome: Progressing  Flowsheets (Taken 3/1/2023 2000)  Discharge to home or other facility with appropriate resources:   Identify barriers to discharge with patient and caregiver   Arrange for needed discharge resources and transportation as appropriate   Identify discharge learning needs (meds, wound care, etc)   Arrange for interpreters to assist at discharge as needed   Refer to discharge planning if patient needs post-hospital services based on physician order or complex needs related to functional status, cognitive ability or social support system     Problem: ABCDS Injury Assessment  Goal: Absence of physical injury  Outcome: Progressing

## 2023-03-02 NOTE — CARE COORDINATION
CM - Room # TR-1 [facility] criteria for Acute Respiratory Failure with Hypercapnia  reviewed at this time, criteria supports the INPATIENT admission  presented

## 2023-03-02 NOTE — CONSULTS
Pulmonary Consult Note      Reason for Consult: COPD exacerbation on bipap   Requesting Physician: Enzo Johnson MD    Subjective:   CHIEF COMPLAINT :SOB    Patient Active Problem List    Diagnosis Date Noted    Uncontrolled pain     Generalized weakness     Gait disturbance     Myoclonus     Near syncope     Moderate protein-calorie malnutrition ([address]     Traumatic closed nondisplaced fracture of distal end of right fibula, initial encounter 01/11/2022    Asterixis     Hypercapnia     Chronic rhinitis 01/05/2022    Fall at home, initial encounter 01/05/2022    COPD exacerbation ([address] 07/09/2021    Acute exacerbation of chronic obstructive pulmonary disease (COPD) ([address] 06/26/2021    Acute on chronic respiratory failure with hypoxia and hypercapnia ([address] 05/23/2021    COVID-19 05/23/2021    Pulmonary emphysema ([address] 05/18/2021    SOB (shortness of breath) 05/18/2021    Leg swelling 05/18/2021    Chronic hepatitis C without hepatic coma ([address] 05/17/2021     Overview Note:     Patient saw GI specialists and cleared it on her own      Gastroesophageal reflux disease without esophagitis 05/17/2021    H/O drug abuse ([address] 05/17/2021     Overview Note:     Prior heroine      Pulmonary nodules 05/17/2021     Overview Note:     Needs f/u 11/2021      ADHD (attention deficit hyperactivity disorder) 05/17/2021    Pneumonia 05/02/2021    Acute respiratory failure with hypercapnia ([address] 09/24/2020    Chronic respiratory failure ([address] 08/16/2017    COPD, severe ([address] 08/16/2017    Obstructive sleep apnea 08/16/2017     Overview Note:     Uses biPAP at night      S/P placement of cardiac pacemaker 2005     Overview Note:     Has never had replacement battery.           HPI:                The patient is a 63 y.o. female with significant past medical history of ADHD, OUD, KING, Obesity, and Hepatitis, COPD-40 pk yr smoking  presents with complaints of SOB x 3 days associated with cough, phlegm, no hemoptysis, no loss of weight,. Low grade fever, no sick exposure, no recent travel, no headaches, no n/v, no abd pain, no diarrhea, no dysuria. Her CXR showed suspected atypical pneumonia. Her resp PCR is negative. She is getting Abx, Inhalers and Solumedrol and also using BIPAP. At this time she is lying in the bed. She is in mild resp distress. Past Medical History:      Diagnosis Date    ADHD 2006    COPD (chronic obstructive pulmonary disease) [facility]     COVID-19     Drug addiction in remission ([facility]     recovering addict    Hep C w/o coma, chronic ([facility]     Pacemaker     Sleep apnea     TIA (transient ischemic attack)     per patient \"several mini strokes\"      Past Surgical History:        Procedure Laterality Date    [address]  2010    done in [address] [place] [address]  2005     Current Medications:     sodium chloride flush  5-40 mL IntraVENous 2 times per day    enoxaparin  40 mg SubCUTAneous Daily    ipratropium-albuterol  1 ampule Inhalation 4x daily    methylPREDNISolone  40 mg IntraVENous Q6H    Followed by    Nanci Dent ON 3/4/2023] predniSONE  40 mg Oral Daily    azithromycin  500 mg IntraVENous Q24H    amitriptyline  75 mg Oral Nightly    [START ON 3/2/2023] amphetamine-dextroamphetamine  30 mg Oral Daily    [START ON 3/2/2023] aspirin  81 mg Oral Daily    [Held by provider] buprenorphine-naloxone  1 Film SubLINGual TID    budesonide-formoterol  2 puff Inhalation BID    sennosides-docusate sodium  1 tablet Oral BID    [Held by provider] traZODone  300 mg Oral Nightly    [Held by provider] pregabalin  100 mg Oral TID    [Held by provider] QUEtiapine  100 mg Oral Nightly    [START ON 3/2/2023] cefepime  2,000 mg IntraVENous Q8H    magnesium sulfate  2,000 mg IntraVENous Once     Allergies:    Social History:    TOBACCO:   reports that she quit smoking about 6 years ago. She has a 40.00 pack-year smoking history.  She has never used smokeless tobacco.  ETOH:   reports that she does not currently use alcohol. Patient currently lives independently    Family History:       Problem Relation Age of Onset    Cancer Mother         unsure of type    Thyroid Cancer Mother     Alcohol Abuse Father     Other Sister         passed in car accident    Lupus Brother     Coronary Art Dis Brother        REVIEW OF SYSTEMS:    CONSTITUTIONAL:  negative for fevers, chills, diaphoresis, activity change, appetite change, fatigue, night sweats and unexpected weight change. EYES:  negative for blurred vision, eye discharge, visual disturbance and icterus  HEENT:  negative for hearing loss, tinnitus, ear drainage, sinus pressure, nasal congestion, epistaxis and snoring  RESPIRATORY:  See HPI  CARDIOVASCULAR:  negative for chest pain, palpitations, exertional chest pressure/discomfort, edema, syncope  GASTROINTESTINAL:  negative for nausea, vomiting, diarrhea, constipation, blood in stool and abdominal pain  GENITOURINARY:  negative for frequency, dysuria, urinary incontinence, decreased urine volume, and hematuria  HEMATOLOGIC/LYMPHATIC:  negative for easy bruising, bleeding and lymphadenopathy  ALLERGIC/IMMUNOLOGIC:  negative for recurrent infections, angioedema, anaphylaxis and drug reactions  ENDOCRINE:  negative for weight changes and diabetic symptoms including polyuria, polydipsia and polyphagia  MUSCULOSKELETAL:  negative for  pain, joint swelling, decreased range of motion and muscle weakness  NEUROLOGICAL:  negative for headaches, slurred speech, unilateral weakness  PSYCHIATRIC/BEHAVIORAL: negative for hallucinations, behavioral problems, confusion and agitation.      Objective:   PHYSICAL EXAM:      VITALS:  /77   Pulse 100   Temp 98.5 °F (36.9 °C) (Axillary)   Resp 16   Ht 5' 4\" (1.626 m)   Wt 180 lb (81.6 kg)   SpO2 96%   BMI 30.90 kg/m²   24HR INTAKE/OUTPUT:  No intake or output data in the 24 hours ending 03/01/23 2126  CURRENT PULSE OXIMETRY:  SpO2: 96 %  24HR PULSE OXIMETRY RANGE:  SpO2 Av.8 %  Min: 89 %  Max: 100 %    CONSTITUTIONAL:  awake, alert, cooperative, no apparent distress, and appears stated age  NECK:  Supple, symmetrical, trachea midline, no adenopathy, thyroid symmetric, not enlarged and no tenderness, skin normal  LUNGS:  Occasional basal crackles  CARDIOVASCULAR:  normal S1 and S2, no edema and no JVD  ABDOMEN:  normal bowel sounds, non-distended and no masses palpated, and no tenderness to palpation. No hepatospleenomegaly  LYMPHADENOPATHY:  no axillary or supraclavicular adenopathy. No cervical adnenopathy  PSYCHIATRIC: Oriented to person place and time. No obvious depression or anxiety.  MUSCULOSKELETAL: No obvious misalignment or effusion of the joints. No clubbing, cyanosis of the digits.  SKIN:  normal skin color, texture, turgor and no redness, warmth, or swelling. No palpable nodules    DATA:    Old records have been reviewed  CBC with Differential:    Lab Results   Component Value Date/Time    WBC 10.0 2023 02:22 PM    RBC 4.44 2023 02:22 PM    HGB 12.5 2023 02:22 PM    HCT 41.7 2023 02:22 PM     2023 02:22 PM    MCV 93.9 2023 02:22 PM    MCH 28.2 2023 02:22 PM    MCHC 30.0 2023 02:22 PM    RDW 12.4 2023 02:22 PM    SEGSPCT 75.1 2023 02:22 PM    LYMPHOPCT 12.9 2023 02:22 PM    MONOPCT 9.0 2023 02:22 PM    BASOPCT 0.3 2023 02:22 PM    MONOSABS 0.9 2023 02:22 PM    LYMPHSABS 1.3 2023 02:22 PM    EOSABS 0.2 2023 02:22 PM    BASOSABS 0.0 2023 02:22 PM    DIFFTYPE AUTOMATED DIFFERENTIAL 2023 02:22 PM     BMP:    Lab Results   Component Value Date/Time     2023 02:22 PM    K 4.2 2023 02:22 PM    CL 92 2023 02:22 PM    CO2 39 2023 02:22 PM    BUN 13 2023 02:22 PM    CREATININE 0.6 2023 02:22 PM    CALCIUM 9.0 2023 02:22 PM    GFRAA >60 2022 11:10 PM    LABGLOM >60 2023 02:22 PM    GLUCOSE 164 2023  02:22 PM     Hepatic Function Panel:    Lab Results   Component Value Date/Time    ALKPHOS 82 03/01/2023 02:22 PM    ALT 20 03/01/2023 02:22 PM    AST 25 03/01/2023 02:22 PM    PROT 7.4 03/01/2023 02:22 PM    BILITOT 0.1 03/01/2023 02:22 PM    BILIDIR 0.2 04/14/2021 04:22 AM    IBILI 0.0 04/14/2021 04:22 AM     ABG:    Lab Results   Component Value Date/Time    HUJ1LSC 49.4 02/13/2022 11:00 AM    YQN4FKD 78.0 02/13/2022 11:00 AM    PO2ART 60 02/13/2022 11:00 AM       Cultures:   Pending      Radiology Review:    Findings consistent with diffuse bilateral pneumonitis/atypical pneumonia   and/or congestive heart failure. Findings may be accentuated by underlying   chronic lung disease. No detectable pleural effusion or large area pulmonary   consolidation.              Assessment/Plan       Patient Active Problem List    Diagnosis Date Noted    Uncontrolled pain     Generalized weakness     Gait disturbance     Myoclonus     Near syncope     Moderate protein-calorie malnutrition (HCC)     Traumatic closed nondisplaced fracture of distal end of right fibula, initial encounter 01/11/2022    Asterixis     Hypercapnia     Chronic rhinitis 01/05/2022    Fall at home, initial encounter 01/05/2022    COPD exacerbation ([address] 07/09/2021    Acute exacerbation of chronic obstructive pulmonary disease (COPD) ([address] 06/26/2021    Acute on chronic respiratory failure with hypoxia and hypercapnia ([address] 05/23/2021    COVID-19 05/23/2021    Pulmonary emphysema ([address] 05/18/2021    SOB (shortness of breath) 05/18/2021    Leg swelling 05/18/2021    Chronic hepatitis C without hepatic coma ([address] 05/17/2021     Overview Note:     Patient saw GI specialists and cleared it on her own      Gastroesophageal reflux disease without esophagitis 05/17/2021    H/O drug abuse ([address] 05/17/2021     Overview Note:     Prior heroine      Pulmonary nodules 05/17/2021     Overview Note:     Needs f/u 11/2021      ADHD (attention deficit hyperactivity disorder) 05/17/2021    Pneumonia 05/02/2021    Acute respiratory failure with hypercapnia (HCC) 09/24/2020    Chronic respiratory failure ([facility] 08/16/2017    COPD, severe ([facility] 08/16/2017    Obstructive sleep apnea 08/16/2017     Overview Note:     Uses biPAP at night      S/P placement of cardiac pacemaker 2005     Overview Note:     Has never had replacement battery.        Acute on Chronic Hypoxic Hypercapneic resp failure  Chronic Metabolic Alkalosis  COPD on Home O2  Atypical Pneumonia  Obesity  KING  Pulmonary HTN  Chronic Pain Syndrome    PLAN  Empiric Abx  F/u C&S  Inhalers  Solumedrol  Keep sats > 92%  BIPAP qhs and prn  DVT and GI Prophylaxis  CXR in am  ICS  OOB  OP follow up  OP_ PFT's  OP PSG  C/w present management    Electronically signed by Kwaku Pereira MD on 3/1/2023 at 9:26 PM

## 2023-03-02 NOTE — PROGRESS NOTES
4 Eyes Skin Assessment     NAME:  Shelley Villalobos  YOB: 1960  MEDICAL RECORD NUMBER:  [id number]    The patient is being assessed for  Admission    I agree that One RN has performed a thorough Head to Toe Skin Assessment on the patient. ALL assessment sites listed below have been assessed. Areas assessed by both nurses:    Head, Face, Ears, Shoulders, Back, Chest, Arms, Elbows, Hands, Sacrum. Buttock, Coccyx, Ischium, and Legs. Feet and Heels        Does the Patient have a Wound?  Other ***small alan size skinned knee       Clif Prevention initiated by RN: No   Wound Care Orders initiated by RN: No    Pressure Injury (Stage 3,4, Unstageable, DTI, NWPT, and Complex wounds) if present, place referral order by RN under : No    New and Established Ostomies, if present place, referral order under : No      Nurse 1 eSignature: Electronically signed by Yasir Taylor RN on 3/1/23 at 7:02 PM EST    **SHARE this note so that the co-signing nurse can place an eSignature**    Nurse 2 eSignature: {Esignature:[id number]}

## 2023-03-02 NOTE — PROGRESS NOTES
03/02/23 0858   Encounter Summary   Encounter Overview/Reason  Spiritual/Emotional Needs   Service Provided For: Patient   Referral/Consult From: Patient   Support System Children;Family members   Last Encounter  03/02/23  (Patient asked for blessing and prayer for her health and new grandchild. Had prayer and blessings.)   Complexity of Encounter Low   Begin Time 0852   End Time  0859   Total Time Calculated 7 min   Encounter    Type Initial Screen/Assessment   Spiritual/Emotional needs   Type Spiritual Support   Assessment/Intervention/Outcome   Assessment Coping;Peaceful   Intervention Active listening;Discussed belief system/Jainism practices/lizy;Discussed illness injury and its impact; Discussed relationship with God;Empowerment;Life review/Legacy;Prayer (assurance of)/[place];Sustaining Presence/Ministry of presence   Outcome Engaged in conversation;Encouraged;Expressed feelings of Leighann, Peace and/or Love;Expressed Gratitude   Plan and Referrals   Plan/Referrals Continue to visit, (comment)  (Patient request prayer and  visits)

## 2023-03-02 NOTE — PLAN OF CARE
Problem: Discharge Planning  Goal: Discharge to home or other facility with appropriate resources  Outcome: Progressing  Flowsheets (Taken 3/1/2023 2000)  Discharge to home or other facility with appropriate resources:   Identify barriers to discharge with patient and caregiver   Arrange for needed discharge resources and transportation as appropriate   Identify discharge learning needs (meds, wound care, etc)   Arrange for interpreters to assist at discharge as needed   Refer to discharge planning if patient needs post-hospital services based on physician order or complex needs related to functional status, cognitive ability or social support system

## 2023-03-03 ENCOUNTER — APPOINTMENT (OUTPATIENT)
Dept: GENERAL RADIOLOGY | Age: 63
DRG: 193 | End: 2023-03-03
Payer: MEDICARE

## 2023-03-03 LAB
AMPHETAMINES: ABNORMAL
BARBITURATE SCREEN URINE: NEGATIVE
BENZODIAZEPINE SCREEN, URINE: NEGATIVE
BILIRUBIN URINE: NEGATIVE MG/DL
BLOOD, URINE: NEGATIVE
CANNABINOID SCREEN URINE: NEGATIVE
CLARITY: CLEAR
COCAINE METABOLITE: NEGATIVE
COLOR: YELLOW
COMMENT UA: ABNORMAL
GLUCOSE, URINE: 500 MG/DL
KETONES, URINE: NEGATIVE MG/DL
LEUKOCYTE ESTERASE, URINE: NEGATIVE
NITRITE URINE, QUANTITATIVE: NEGATIVE
OPIATES, URINE: NEGATIVE
OXYCODONE: NEGATIVE
PH, URINE: 6.5 (ref 5–8)
PHENCYCLIDINE, URINE: NEGATIVE
PROTEIN UA: NEGATIVE MG/DL
SPECIFIC GRAVITY UA: 1.01 (ref 1–1.03)
UROBILINOGEN, URINE: 0.2 MG/DL (ref 0.2–1)

## 2023-03-03 PROCEDURE — 6370000000 HC RX 637 (ALT 250 FOR IP): Performed by: NURSE PRACTITIONER

## 2023-03-03 PROCEDURE — 1200000000 HC SEMI PRIVATE

## 2023-03-03 PROCEDURE — 6360000002 HC RX W HCPCS: Performed by: NURSE PRACTITIONER

## 2023-03-03 PROCEDURE — 2580000003 HC RX 258: Performed by: NURSE PRACTITIONER

## 2023-03-03 PROCEDURE — 2700000000 HC OXYGEN THERAPY PER DAY

## 2023-03-03 PROCEDURE — 81003 URINALYSIS AUTO W/O SCOPE: CPT

## 2023-03-03 PROCEDURE — 94761 N-INVAS EAR/PLS OXIMETRY MLT: CPT

## 2023-03-03 PROCEDURE — 94660 CPAP INITIATION&MGMT: CPT

## 2023-03-03 PROCEDURE — 87081 CULTURE SCREEN ONLY: CPT

## 2023-03-03 PROCEDURE — 87449 NOS EACH ORGANISM AG IA: CPT

## 2023-03-03 PROCEDURE — 87899 AGENT NOS ASSAY W/OPTIC: CPT

## 2023-03-03 PROCEDURE — 99232 SBSQ HOSP IP/OBS MODERATE 35: CPT | Performed by: INTERNAL MEDICINE

## 2023-03-03 PROCEDURE — 80307 DRUG TEST PRSMV CHEM ANLYZR: CPT

## 2023-03-03 PROCEDURE — 94640 AIRWAY INHALATION TREATMENT: CPT

## 2023-03-03 PROCEDURE — 71045 X-RAY EXAM CHEST 1 VIEW: CPT

## 2023-03-03 RX ADMIN — BUPRENORPHINE AND NALOXONE 1 FILM: 8; 2 FILM BUCCAL; SUBLINGUAL at 08:17

## 2023-03-03 RX ADMIN — IPRATROPIUM BROMIDE AND ALBUTEROL SULFATE 1 AMPULE: 2.5; .5 SOLUTION RESPIRATORY (INHALATION) at 07:29

## 2023-03-03 RX ADMIN — METHYLPREDNISOLONE SODIUM SUCCINATE 40 MG: 40 INJECTION, POWDER, LYOPHILIZED, FOR SOLUTION INTRAMUSCULAR; INTRAVENOUS at 14:09

## 2023-03-03 RX ADMIN — PREGABALIN 100 MG: 100 CAPSULE ORAL at 14:09

## 2023-03-03 RX ADMIN — PREGABALIN 100 MG: 100 CAPSULE ORAL at 08:16

## 2023-03-03 RX ADMIN — SODIUM CHLORIDE, PRESERVATIVE FREE 10 ML: 5 INJECTION INTRAVENOUS at 08:18

## 2023-03-03 RX ADMIN — IPRATROPIUM BROMIDE AND ALBUTEROL SULFATE 1 AMPULE: 2.5; .5 SOLUTION RESPIRATORY (INHALATION) at 23:15

## 2023-03-03 RX ADMIN — SODIUM CHLORIDE 25 ML: 9 INJECTION, SOLUTION INTRAVENOUS at 21:56

## 2023-03-03 RX ADMIN — BUDESONIDE AND FORMOTEROL FUMARATE DIHYDRATE 2 PUFF: 80; 4.5 AEROSOL RESPIRATORY (INHALATION) at 19:52

## 2023-03-03 RX ADMIN — CEFEPIME 2000 MG: 2 INJECTION, POWDER, FOR SOLUTION INTRAVENOUS at 05:49

## 2023-03-03 RX ADMIN — BUPRENORPHINE AND NALOXONE 1 FILM: 8; 2 FILM BUCCAL; SUBLINGUAL at 14:09

## 2023-03-03 RX ADMIN — SENNOSIDES AND DOCUSATE SODIUM 1 TABLET: 50; 8.6 TABLET ORAL at 21:46

## 2023-03-03 RX ADMIN — PREGABALIN 100 MG: 100 CAPSULE ORAL at 21:46

## 2023-03-03 RX ADMIN — SENNOSIDES AND DOCUSATE SODIUM 1 TABLET: 50; 8.6 TABLET ORAL at 08:16

## 2023-03-03 RX ADMIN — AZITHROMYCIN MONOHYDRATE 500 MG: 500 INJECTION, POWDER, LYOPHILIZED, FOR SOLUTION INTRAVENOUS at 17:26

## 2023-03-03 RX ADMIN — AMITRIPTYLINE HYDROCHLORIDE 75 MG: 50 TABLET, FILM COATED ORAL at 21:46

## 2023-03-03 RX ADMIN — ASPIRIN 81 MG CHEWABLE TABLET 81 MG: 81 TABLET CHEWABLE at 08:15

## 2023-03-03 RX ADMIN — METHYLPREDNISOLONE SODIUM SUCCINATE 40 MG: 40 INJECTION, POWDER, LYOPHILIZED, FOR SOLUTION INTRAMUSCULAR; INTRAVENOUS at 05:48

## 2023-03-03 RX ADMIN — CEFEPIME 2000 MG: 2 INJECTION, POWDER, FOR SOLUTION INTRAVENOUS at 21:57

## 2023-03-03 RX ADMIN — FLUTICASONE PROPIONATE 1 SPRAY: 50 SPRAY, METERED NASAL at 08:18

## 2023-03-03 RX ADMIN — SODIUM CHLORIDE, PRESERVATIVE FREE 10 ML: 5 INJECTION INTRAVENOUS at 21:45

## 2023-03-03 RX ADMIN — CEFEPIME 2000 MG: 2 INJECTION, POWDER, FOR SOLUTION INTRAVENOUS at 14:18

## 2023-03-03 RX ADMIN — METHYLPREDNISOLONE SODIUM SUCCINATE 40 MG: 40 INJECTION, POWDER, LYOPHILIZED, FOR SOLUTION INTRAMUSCULAR; INTRAVENOUS at 08:15

## 2023-03-03 RX ADMIN — ENOXAPARIN SODIUM 40 MG: 100 INJECTION SUBCUTANEOUS at 08:16

## 2023-03-03 RX ADMIN — TRAZODONE HYDROCHLORIDE 300 MG: 50 TABLET ORAL at 21:46

## 2023-03-03 RX ADMIN — SODIUM CHLORIDE: 9 INJECTION, SOLUTION INTRAVENOUS at 14:16

## 2023-03-03 RX ADMIN — BUDESONIDE AND FORMOTEROL FUMARATE DIHYDRATE 2 PUFF: 80; 4.5 AEROSOL RESPIRATORY (INHALATION) at 07:29

## 2023-03-03 RX ADMIN — IPRATROPIUM BROMIDE AND ALBUTEROL SULFATE 1 AMPULE: 2.5; .5 SOLUTION RESPIRATORY (INHALATION) at 19:45

## 2023-03-03 ASSESSMENT — PAIN SCALES - GENERAL: PAINLEVEL_OUTOF10: 0

## 2023-03-03 NOTE — PROGRESS NOTES
V2.0  [facility] Hospitalist Progress Note      Name:  Weston Romero /Age/Sex: 1960  (63 y.o. female)   MRN & CSN:  [id number] & [id number] Encounter Date/Time: 3/3/2023 11:24 AM EST    Location:  5039/6216-L PCP: Annabell Apley, MD       Hospital Day: 3    Assessment and Plan:   Weston Romero is a 63 y.o. female who presents with Acute respiratory failure with hypercapnia ([address]      Plan:    Acute hypoxic and hypercapnic respiratory failure  -Suspect COPD exacerbation as the etiology. May have a component of pneumonia with given increased sputum production and change in color. Chest x-ray equivocal.  -Started on corticosteroids, cefepime and azithromycin.  -Blood gas improved significantly after BiPAP and the above treatments. Patient not taken off BiPAP and will use nightly, during naps. -Blood cultures with 1 out of 4 gram variable bacilli. Will await speciation. Severe COPD exacerbation  -Treatments as above with corticosteroids and nebulizers. Is a little wheezy this morning. Awaiting breathing treatment.  -Patient reports her BiPAP at home and not be functioning properly and that she is getting a  to come take a look at it. Acute metabolic encephalopathy  -Likely in the setting of hypercarbia. -Okay to continue with home trazodone, Lyrica and Suboxone as metabolic encephalopathy has now resolved with with improvement in hypercarbia. Chronic pain  -Continue home Suboxone. ADHD  Continue home Adderall. Comment: Please note this report has been produced using speech recognition software and may contain errors related to that system including errors in grammar, punctuation, and spelling, as well as words and phrases that may be inappropriate. If there are any questions or concerns please feel free to contact the dictating provider for clarification. Diet ADULT DIET;  Regular   DVT Prophylaxis [x] Lovenox, []  Heparin, [] SCDs, [] Ambulation,  [] Eliquis, [] Xarelto  [] Coumadin   Code Status Full Code   Disposition From: Home  Expected Disposition: Home  Estimated Date of Discharge: 1 to 2 days  Patient requires continued admission due to COPD exacerbation   Surrogate Decision Maker/ POA Child Christal     Subjective:     Chief Complaint: Shortness of Breath     Feels okay however reports she is a little out of breath this morning but reports she is awaiting breathing treatment. Reports the BiPAP is helping. Denies any chest pain, nausea, vomiting, lightheadedness or dizziness. Still has some green cough. Review of Systems:    Review of Systems  As per interval history    Objective: Intake/Output Summary (Last 24 hours) at 3/3/2023 0920  Last data filed at 3/2/2023 2000  Gross per 24 hour   Intake 840 ml   Output 500 ml   Net 340 ml          Vitals:   Vitals:    03/03/23 0800   BP: (!) 143/76   Pulse: 94   Resp: 12   Temp: 98.6 °F (37 °C)   SpO2: 94%       Physical Exam:   Physical Exam  Constitutional:       General: She is not in acute distress. HENT:      Mouth/Throat:      Mouth: Mucous membranes are moist.      Pharynx: No posterior oropharyngeal erythema. Eyes:      General: No scleral icterus. Pupils: Pupils are equal, round, and reactive to light. Cardiovascular:      Rate and Rhythm: Normal rate and regular rhythm. Heart sounds: No murmur heard. Pulmonary:      Effort: Pulmonary effort is normal.      Breath sounds: Wheezing (Faint wheezing bilaterally.) present. No rales. Comments: Not in any acute respiratory distress. Speaking full sentences. Abdominal:      Palpations: Abdomen is soft. Tenderness: There is no abdominal tenderness. Musculoskeletal:         General: Normal range of motion. Right lower leg: No edema. Left lower leg: No edema. Skin:     General: Skin is warm. Neurological:      General: No focal deficit present. Mental Status: She is alert and oriented to person, place, and time. Cranial Nerves: No cranial nerve deficit. Motor: No weakness. Psychiatric:         Mood and Affect: Mood normal.       Medications:   Medications:    fluticasone  1 spray Each Nostril Daily    sodium chloride flush  5-40 mL IntraVENous 2 times per day    enoxaparin  40 mg SubCUTAneous Daily    ipratropium-albuterol  1 ampule Inhalation 4x daily    methylPREDNISolone  40 mg IntraVENous Q6H    Followed by    Maldonado Mendenhall ON 3/4/2023] predniSONE  40 mg Oral Daily    azithromycin  500 mg IntraVENous Q24H    amitriptyline  75 mg Oral Nightly    amphetamine-dextroamphetamine  30 mg Oral Daily    aspirin  81 mg Oral Daily    buprenorphine-naloxone  1 Film SubLINGual TID    budesonide-formoterol  2 puff Inhalation BID    sennosides-docusate sodium  1 tablet Oral BID    traZODone  300 mg Oral Nightly    pregabalin  100 mg Oral TID    [Held by provider] QUEtiapine  100 mg Oral Nightly    cefepime  2,000 mg IntraVENous Q8H      Infusions:    sodium chloride       PRN Meds: sodium chloride flush, 5-40 mL, PRN  sodium chloride, , PRN  ondansetron, 4 mg, Q8H PRN   Or  ondansetron, 4 mg, Q6H PRN  polyethylene glycol, 17 g, Daily PRN  acetaminophen, 650 mg, Q6H PRN   Or  acetaminophen, 650 mg, Q6H PRN  [Held by provider] traMADol, 50 mg, BID PRN      Labs      No results found for this or any previous visit (from the past 24 hour(s)). Imaging/Diagnostics Last 24 Hours   XR CHEST PORTABLE    Result Date: 3/1/2023  EXAMINATION: ONE XRAY VIEW OF THE CHEST 3/1/2023 2:46 pm COMPARISON: 03/25/2022 at 2328 hours HISTORY: ORDERING SYSTEM PROVIDED HISTORY: SOB TECHNOLOGIST PROVIDED HISTORY: Reason for exam:->SOB Reason for Exam: sob FINDINGS: Diffuse bronchovascular marking prominence, borderline cardiomegaly. Findings are consistent with congestive heart failure with associated pulmonary edema and/or diffuse bilateral pneumonitis/atypical pneumonia. Findings may be accentuated by underlying chronic lung disease.  No large areas of pulmonary airspace consolidation. No detectable pleural effusion, pneumothorax or mediastinal widening. Findings consistent with diffuse bilateral pneumonitis/atypical pneumonia and/or congestive heart failure. Findings may be accentuated by underlying chronic lung disease. No detectable pleural effusion or large area pulmonary consolidation.        Electronically signed by Oksana Loja MD on 3/3/2023 at 9:20 AM

## 2023-03-03 NOTE — PROGRESS NOTES
03/03/23 0345   NIV Type   NIV Started/Stopped On   Mode Bilevel   Mask Type Full face mask   Mask Size Medium   Bonnet size Medium   Settings/Measurements   PIP Observed 15 cm H20   IPAP 15 cmH20   CPAP/EPAP 5 cmH2O   Vt (Measured) 750 mL   Rate Ordered 12   Resp 16   FiO2  35 %   I Time/ I Time % 1 s   Minute Volume (L/min) 11.8 Liters   Comfort Level Good   Using Accessory Muscles No   Patient's Home Machine No   Alarm Settings   Alarms On Y   Low Pressure (cmH2O) 5 cmH2O   High Pressure (cmH2O) 25 cmH2O   Delay Alarm 20 sec(s)   Apnea (secs) 20 secs   RR Low (bpm) 12   RR High (bpm) 40 br/min

## 2023-03-03 NOTE — CARE COORDINATION
.Case Management Assessment  Initial Evaluation    Date/Time of Evaluation: 3/3/2023 8:46 AM  Assessment Completed by: Theodora Mohr RN    If patient is discharged prior to next notation, then this note serves as note for discharge by case management.    Patient Name: Kalee Chicas                   YOB: 1960  Diagnosis: Acute respiratory failure with hypercapnia (HCC) [J96.02]                   Date / Time: 3/1/2023  2:07 PM    Patient Admission Status: Inpatient   Readmission Risk (Low < 19, Mod (19-27), High > 27): Readmission Risk Score: 9.7    Current PCP: Chu Caban MD  PCP verified by CM? Yes    Chart Reviewed: Yes      History Provided by: Patient  Patient Orientation: Alert and Oriented    Patient Cognition: Alert    Hospitalization in the last 30 days (Readmission):  No    If yes, Readmission Assessment in CM Navigator will be completed.    Advance Directives:      Code Status: Full Code   Patient's Primary Decision Maker is:  (SELF)    Primary Decision Maker: Erica Chicas - Child - [phone]    Discharge Planning:    Patient lives with: Children Type of Home: House  Primary Care Giver: Self  Patient Support Systems include: Family Members, Home Care Staff, Homemaker   Current Financial resources: Medicaid, Medicare  Current community resources: ECF/Home Care  Current services prior to admission: None            Current DME:              Type of Home Care services:  None    ADLS  Prior functional level: Independent in ADLs/IADLs, Assistance with the following:, Cooking, Housework, Shopping  Current functional level: Independent in ADLs/IADLs, Assistance with the following:, Cooking, Housework, Shopping    PT AM-PAC:   /24  OT AM-PAC:   /24    Family can provide assistance at DC: Yes  Would you like Case Management to discuss the discharge plan with any other family members/significant others, and if so, who? No  Plans to Return to Present Housing: Yes  Other Identified  Issues/Barriers to RETURNING to current housing: NONE  Potential Assistance needed at discharge: N/A            Potential DME:    Patient expects to discharge to: Unknown  Plan for transportation at discharge:      Financial    Payor: Madeleine Griggs / Plan: Gabrielle García COMPLETE / Product Type: *No Product type* /     Does insurance require precert for SNF: Yes    Potential assistance Purchasing Medications:    Meds-to-Beds request: Yes      [address] [facility] [address], [address] [facility] [phone]  [address] [facility] [address]  Phone: [phone] Fax: [address], [address] [facility] [place] - [address] [facility] 300 [facility] [phone]  [address] [place] [address] 80  [place] [address]  Phone: [phone] Fax: 924 [facility] [address], [address] [place] [address] [phone] - f [address] [place] [address]  Phone: [phone] Fax: 1400 [facility] 115  [address] [facility] [address]  Phone: [phone] Fax: [phone] [address] [phone] Opal Waed [phone]  [address] [facility] [address]  Phone: [phone] Fax: [phone]    [address] [place] [address] Road, [address] [place] [address] [address] [place] [address] [phone] - F [phone]  [address] [place] [address]  Phone: [phone] Fax: [phone]      Notes:    Factors facilitating achievement of predicted outcomes: Family support, Cooperative, Pleasant, Good insight into deficits, Has needed Durable Medical Equipment at home, and Has homemaker services    Barriers to discharge: Medical complications    Additional Case Management Notes: . CM met with pt for d/c planning. Introduced self and updated white board. Pt lives alone in a senior [facility] building and is independent with ADL's. Pt drives, has a PCP, has insurance, and is able to afford her medication. Pt is on 4L home O2 24/7 provided by Dena Severin. Pt uses a bi-pap at . She has a standard walker, rollator, shower seat, and a wc. Pt states that she has an aide for 5 hrs daily to help with household chores and shopping only from [facility]. Pt states that she would like to have a new Med Neb, notified  via sticky note. Pt denies any other d/c needs at this time. D/c plan is home alone with [facility]. Notify CM if any new d/c needs arise. Will order Med Neb if Dr is agreeable to order. TE    The Plan for Transition of Care is related to the following treatment goals of Acute respiratory failure with hypercapnia ([facility] [X04.47]    IF APPLICABLE: The Patient and/or patient representative Cony Samuel and her family were provided with a choice of provider and agrees with the discharge plan. Freedom of choice list with basic dialogue that supports the patient's individualized plan of care/goals and shares the quality data associated with the providers was provided to:     Patient Representative Name:       The Patient and/or Patient Representative Agree with the Discharge Plan?       Jinny Johnson RN  Case Management Department

## 2023-03-03 NOTE — CARE COORDINATION
CM met with pt for d/c planning. Introduced self and updated white board. Pt lives alone in a senior [facility] building and is independent with ADL's. Pt drives, has a PCP, has insurance, and is able to afford her medication. Pt is on 4L home O2 24/7 provided by Nikolay Madrigal. Pt uses a bi-pap at . She has a standard walker, rollator, shower seat, and a wc. Pt states that she has an aide for 5 hrs daily to help with household chores and shopping only from [facility]. Pt states that she would like to have a new Med Neb, notified  via sticky note. Pt denies any other d/c needs at this time. D/c plan is home alone with [facility]. Notify CM if any new d/c needs arise. Will order Med Neb if  is agreeable to order. TE    NOTIFY [facility] -[phone] AND FAX THE AVS, H&P, AND HHC ORDERS -[phone] WHEN PT IS DISCHARGED.   MINERVA

## 2023-03-03 NOTE — PLAN OF CARE
Problem: Discharge Planning  Goal: Discharge to home or other facility with appropriate resources  3/3/2023 0025 by Bradley Edmonds RN  Outcome: Progressing  Flowsheets (Taken 3/2/2023 2000)  Discharge to home or other facility with appropriate resources:   Identify barriers to discharge with patient and caregiver   Arrange for needed discharge resources and transportation as appropriate   Identify discharge learning needs (meds, wound care, etc)   Arrange for interpreters to assist at discharge as needed   Refer to discharge planning if patient needs post-hospital services based on physician order or complex needs related to functional status, cognitive ability or social support system  3/2/2023 1051 by Gino Bingham RN  Outcome: Progressing  Flowsheets (Taken 3/2/2023 1000)  Discharge to home or other facility with appropriate resources:   Identify barriers to discharge with patient and caregiver   Arrange for needed discharge resources and transportation as appropriate   Identify discharge learning needs (meds, wound care, etc)   Arrange for interpreters to assist at discharge as needed   Refer to discharge planning if patient needs post-hospital services based on physician order or complex needs related to functional status, cognitive ability or social support system     Problem: ABCDS Injury Assessment  Goal: Absence of physical injury  3/3/2023 0025 by Bradley Edmonds RN  Outcome: Progressing  3/2/2023 1051 by Gino Bingham RN  Outcome: Progressing

## 2023-03-03 NOTE — PROGRESS NOTES
Pulmonary and Critical Care  Progress Note      VITALS:  /72   Pulse 96   Temp 98.2 °F (36.8 °C) (Oral)   Resp 16   Ht 5' 4\" (1.626 m)   Wt 160 lb 14.4 oz (73 kg)   SpO2 91%   BMI 27.62 kg/m²     Subjective:   CHIEF COMPLAINT :SOB     HPI:                The patient is a 63 y.o. female is lying in the bed. She is in mild resp distress    Objective:   PHYSICAL EXAM:    LUNGS:Occasional basal crackles  Abd-soft, BS+, NT  Ext- no pedal edema  CVS-s1s2, no murmurs      DATA:    CBC:  Recent Labs     03/01/23  1422 03/02/23  0539   WBC 10.0 8.7   RBC 4.44 4.15*   HGB 12.5 11.7*   HCT 41.7 38.8    176   MCV 93.9 93.5   MCH 28.2 28.2   MCHC 30.0* 30.2*   RDW 12.4 12.0   SEGSPCT 75.1* 83.7*      BMP:  Recent Labs     03/01/23  1422 03/02/23  0539    140   K 4.2 4.5   CL 92* 96*   CO2 39* 41*   BUN 13 10   CREATININE 0.6 0.4*   CALCIUM 9.0 8.7   GLUCOSE 164* 169*      ABG:  Recent Labs     03/02/23  0745   PH 7.37   PO2ART 67*   YFA5EJT 83.0*   O2SAT 92.5*     BNP  No results found for: BNP   D-Dimer:  Lab Results   Component Value Date    DDIMER 745 (H) 02/06/2022      Radiology:   Improved aeration in the lung bases, with minimal basilar airspace disease   remaining.          Assessment/Plan     Patient Active Problem List    Diagnosis Date Noted    Uncontrolled pain     Generalized weakness     Gait disturbance     Myoclonus     Near syncope     Moderate protein-calorie malnutrition (HCC)     Traumatic closed nondisplaced fracture of distal end of right fibula, initial encounter 01/11/2022    Asterixis     Hypercapnia     Chronic rhinitis 01/05/2022    Fall at home, initial encounter 01/05/2022    COPD exacerbation ([facility] [address] 07/09/2021    Acute exacerbation of chronic obstructive pulmonary disease (COPD) ([facility] [address] 06/26/2021    Acute on chronic respiratory failure with hypoxia and hypercapnia ([address] 05/23/2021    COVID-19 05/23/2021    Pulmonary emphysema ([facility] 05/18/2021    SOB (shortness of breath) 05/18/2021    Leg swelling 05/18/2021    Chronic hepatitis C without hepatic coma ([facility] [address] 05/17/2021     Overview Note:     Patient saw GI specialists and cleared it on her own      Gastroesophageal reflux disease without esophagitis 05/17/2021    H/O drug abuse ([facility] 05/17/2021     Overview Note:     Prior heroine      Pulmonary nodules 05/17/2021     Overview Note:     Needs f/u 11/2021      ADHD (attention deficit hyperactivity disorder) 05/17/2021    Pneumonia 05/02/2021    Acute respiratory failure with hypercapnia ([facility] [facility] 09/24/2020    Chronic respiratory failure ([facility] 08/16/2017    COPD, severe ([facility] [facility] 08/16/2017    Obstructive sleep apnea 08/16/2017     Overview Note:     Uses biPAP at night      S/P placement of cardiac pacemaker 2005     Overview Note:     Has never had replacement battery.        Acute on Chronic Hypoxic Hypercapneic resp failure- improving   Chronic Metabolic Alkalosis  COPD on Home O2  Atypical Pneumonia  Obesity  KING  Pulmonary HTN  Chronic Pain Syndrome     Abx  F/u C&S  Inhalers  Prednisone taper in  am  ICS  OOB  BIPAP qhs and prn  Keep sats > 92%  DVT and GI Prophylaxis  C/w present management    Electronically signed by Raina Gillette MD on 3/3/2023 at 12:11 PM

## 2023-03-03 NOTE — PROGRESS NOTES
03/03/23 0045   NIV Type   NIV Started/Stopped On   Equipment Type v60   Mode Bilevel   Mask Type Full face mask   Mask Size Medium   Bonnet size Medium   Settings/Measurements   PIP Observed 16 cm H20   IPAP 15 cmH20   CPAP/EPAP 5 cmH2O   Vt (Measured) 657 mL   Rate Ordered 12   Resp 17   FiO2  35 %   I Time/ I Time % 1 s   Minute Volume (L/min) 10.3 Liters   Mask Leak (lpm) 0 lpm   Comfort Level Good   Using Accessory Muscles No   SpO2 92   Patient's Home Machine No   Alarm Settings   Alarms On Y   Low Pressure (cmH2O) 5 cmH2O   High Pressure (cmH2O) 25 cmH2O   Delay Alarm 20 sec(s)   Apnea (secs) 20 secs   RR Low (bpm) 12   RR High (bpm) 40 br/min   Patient Observation   Observations Pt is sleeping

## 2023-03-03 NOTE — PROGRESS NOTES
4 Eyes Skin Assessment     NAME:  Arnoldo Núñez  YOB: 1960  MEDICAL RECORD NUMBER:  [id number]    The patient is being assessed for  Transfer to New Unit    I agree that One RN has performed a thorough Head to Toe Skin Assessment on the patient. ALL assessment sites listed below have been assessed. Areas assessed by both nurses:    Head, Face, Ears, Shoulders, Back, Chest, Arms, Elbows, Hands, Sacrum. Buttock, Coccyx, Ischium, and Legs. Feet and Heels        Does the Patient have a Wound?  No noted wound(s)       Clif Prevention initiated by RN: No   Wound Care Orders initiated by RN: No    Pressure Injury (Stage 3,4, Unstageable, DTI, NWPT, and Complex wounds) if present, place referral order by RN under : No    New and Established Ostomies, if present place, referral order under : No      Nurse 1 eSignature: Electronically signed by Elizabet Nieves RN on 3/3/23 at 3:25 PM EST    **SHARE this note so that the co-signing nurse can place an eSignature**    Nurse 2 eSignature: {Esignature:[id number]}

## 2023-03-04 LAB
ANION GAP SERPL CALCULATED.3IONS-SCNC: 5 MMOL/L (ref 4–16)
BASOPHILS ABSOLUTE: 0 K/CU MM
BASOPHILS RELATIVE PERCENT: 0.3 % (ref 0–1)
BUN SERPL-MCNC: 17 MG/DL (ref 6–23)
CALCIUM SERPL-MCNC: 8.5 MG/DL (ref 8.3–10.6)
CHLORIDE BLD-SCNC: 97 MMOL/L (ref 99–110)
CO2: 40 MMOL/L (ref 21–32)
CREAT SERPL-MCNC: 0.5 MG/DL (ref 0.6–1.1)
DIFFERENTIAL TYPE: ABNORMAL
EOSINOPHILS ABSOLUTE: 0.1 K/CU MM
EOSINOPHILS RELATIVE PERCENT: 0.6 % (ref 0–3)
GFR SERPL CREATININE-BSD FRML MDRD: >60 ML/MIN/1.73M2
GLUCOSE SERPL-MCNC: 265 MG/DL (ref 70–99)
HCT VFR BLD CALC: 41.4 % (ref 37–47)
HEMOGLOBIN: 12.3 GM/DL (ref 12.5–16)
IMMATURE NEUTROPHIL %: 1.4 % (ref 0–0.43)
L PNEUMO AG UR QL IA: NEGATIVE
LYMPHOCYTES ABSOLUTE: 1.9 K/CU MM
LYMPHOCYTES RELATIVE PERCENT: 16.3 % (ref 24–44)
MAGNESIUM: 2.1 MG/DL (ref 1.8–2.4)
MCH RBC QN AUTO: 28 PG (ref 27–31)
MCHC RBC AUTO-ENTMCNC: 29.7 % (ref 32–36)
MCV RBC AUTO: 94.1 FL (ref 78–100)
MONOCYTES ABSOLUTE: 1.3 K/CU MM
MONOCYTES RELATIVE PERCENT: 11.1 % (ref 0–4)
NUCLEATED RBC %: 0 %
PDW BLD-RTO: 12.4 % (ref 11.7–14.9)
PLATELET # BLD: 192 K/CU MM (ref 140–440)
PMV BLD AUTO: 9.8 FL (ref 7.5–11.1)
POTASSIUM SERPL-SCNC: 3.5 MMOL/L (ref 3.5–5.1)
RBC # BLD: 4.4 M/CU MM (ref 4.2–5.4)
S PNEUM AG CSF QL: NORMAL
SEGMENTED NEUTROPHILS ABSOLUTE COUNT: 8 K/CU MM
SEGMENTED NEUTROPHILS RELATIVE PERCENT: 70.3 % (ref 36–66)
SODIUM BLD-SCNC: 142 MMOL/L (ref 135–145)
TOTAL IMMATURE NEUTOROPHIL: 0.16 K/CU MM
TOTAL NUCLEATED RBC: 0 K/CU MM
WBC # BLD: 11.4 K/CU MM (ref 4–10.5)

## 2023-03-04 PROCEDURE — 94150 VITAL CAPACITY TEST: CPT

## 2023-03-04 PROCEDURE — 94660 CPAP INITIATION&MGMT: CPT

## 2023-03-04 PROCEDURE — 36415 COLL VENOUS BLD VENIPUNCTURE: CPT

## 2023-03-04 PROCEDURE — 94640 AIRWAY INHALATION TREATMENT: CPT

## 2023-03-04 PROCEDURE — 6370000000 HC RX 637 (ALT 250 FOR IP): Performed by: NURSE PRACTITIONER

## 2023-03-04 PROCEDURE — 94667 MNPJ CHEST WALL 1ST: CPT

## 2023-03-04 PROCEDURE — 80048 BASIC METABOLIC PNL TOTAL CA: CPT

## 2023-03-04 PROCEDURE — 99232 SBSQ HOSP IP/OBS MODERATE 35: CPT | Performed by: INTERNAL MEDICINE

## 2023-03-04 PROCEDURE — 83735 ASSAY OF MAGNESIUM: CPT

## 2023-03-04 PROCEDURE — 6360000002 HC RX W HCPCS: Performed by: NURSE PRACTITIONER

## 2023-03-04 PROCEDURE — 2580000003 HC RX 258: Performed by: NURSE PRACTITIONER

## 2023-03-04 PROCEDURE — 94761 N-INVAS EAR/PLS OXIMETRY MLT: CPT

## 2023-03-04 PROCEDURE — 1200000000 HC SEMI PRIVATE

## 2023-03-04 PROCEDURE — 2700000000 HC OXYGEN THERAPY PER DAY

## 2023-03-04 PROCEDURE — 85025 COMPLETE CBC W/AUTO DIFF WBC: CPT

## 2023-03-04 RX ADMIN — CEFEPIME 2000 MG: 2 INJECTION, POWDER, FOR SOLUTION INTRAVENOUS at 05:42

## 2023-03-04 RX ADMIN — AMITRIPTYLINE HYDROCHLORIDE 75 MG: 50 TABLET, FILM COATED ORAL at 22:15

## 2023-03-04 RX ADMIN — ENOXAPARIN SODIUM 40 MG: 100 INJECTION SUBCUTANEOUS at 09:39

## 2023-03-04 RX ADMIN — BUPRENORPHINE AND NALOXONE 1 FILM: 8; 2 FILM BUCCAL; SUBLINGUAL at 09:40

## 2023-03-04 RX ADMIN — PREGABALIN 100 MG: 100 CAPSULE ORAL at 09:39

## 2023-03-04 RX ADMIN — IPRATROPIUM BROMIDE AND ALBUTEROL SULFATE 1 AMPULE: 2.5; .5 SOLUTION RESPIRATORY (INHALATION) at 11:30

## 2023-03-04 RX ADMIN — SENNOSIDES AND DOCUSATE SODIUM 1 TABLET: 50; 8.6 TABLET ORAL at 09:39

## 2023-03-04 RX ADMIN — SENNOSIDES AND DOCUSATE SODIUM 1 TABLET: 50; 8.6 TABLET ORAL at 22:17

## 2023-03-04 RX ADMIN — PREDNISONE 40 MG: 20 TABLET ORAL at 09:39

## 2023-03-04 RX ADMIN — IPRATROPIUM BROMIDE AND ALBUTEROL SULFATE 1 AMPULE: 2.5; .5 SOLUTION RESPIRATORY (INHALATION) at 08:07

## 2023-03-04 RX ADMIN — BUDESONIDE AND FORMOTEROL FUMARATE DIHYDRATE 2 PUFF: 80; 4.5 AEROSOL RESPIRATORY (INHALATION) at 08:07

## 2023-03-04 RX ADMIN — SODIUM CHLORIDE: 9 INJECTION, SOLUTION INTRAVENOUS at 22:27

## 2023-03-04 RX ADMIN — SODIUM CHLORIDE, PRESERVATIVE FREE 10 ML: 5 INJECTION INTRAVENOUS at 22:22

## 2023-03-04 RX ADMIN — PREGABALIN 100 MG: 100 CAPSULE ORAL at 22:15

## 2023-03-04 RX ADMIN — SODIUM CHLORIDE: 9 INJECTION, SOLUTION INTRAVENOUS at 05:41

## 2023-03-04 RX ADMIN — IPRATROPIUM BROMIDE AND ALBUTEROL SULFATE 1 AMPULE: 2.5; .5 SOLUTION RESPIRATORY (INHALATION) at 15:09

## 2023-03-04 RX ADMIN — CEFEPIME 2000 MG: 2 INJECTION, POWDER, FOR SOLUTION INTRAVENOUS at 22:27

## 2023-03-04 RX ADMIN — TRAZODONE HYDROCHLORIDE 300 MG: 50 TABLET ORAL at 22:15

## 2023-03-04 RX ADMIN — AZITHROMYCIN MONOHYDRATE 500 MG: 500 INJECTION, POWDER, LYOPHILIZED, FOR SOLUTION INTRAVENOUS at 22:30

## 2023-03-04 RX ADMIN — BUPRENORPHINE AND NALOXONE 1 FILM: 8; 2 FILM BUCCAL; SUBLINGUAL at 14:46

## 2023-03-04 RX ADMIN — ASPIRIN 81 MG CHEWABLE TABLET 81 MG: 81 TABLET CHEWABLE at 09:39

## 2023-03-04 RX ADMIN — PREGABALIN 100 MG: 100 CAPSULE ORAL at 14:46

## 2023-03-04 RX ADMIN — CEFEPIME 2000 MG: 2 INJECTION, POWDER, FOR SOLUTION INTRAVENOUS at 14:47

## 2023-03-04 ASSESSMENT — ENCOUNTER SYMPTOMS
ABDOMINAL PAIN: 0
BACK PAIN: 0
COUGH: 0
SINUS PRESSURE: 0
NAUSEA: 0
WHEEZING: 0
COLOR CHANGE: 0
DIARRHEA: 0
SORE THROAT: 0
SHORTNESS OF BREATH: 1
VOMITING: 0
CONSTIPATION: 0
SINUS PAIN: 0

## 2023-03-04 NOTE — PROGRESS NOTES
Pulmonary and Critical Care  Progress Note      VITALS:  BP (!) 111/54   Pulse (!) 102   Temp 98.4 °F (36.9 °C) (Oral)   Resp 21   Ht 5' 4\" (1.626 m)   Wt 160 lb 14.4 oz (73 kg)   SpO2 91%   BMI 27.62 kg/m²     Subjective:   CHIEF COMPLAINT :SOB     HPI:                The patient is a 63 y.o. female is sleepy but arousable. She is not in acute resp distress    Objective:   PHYSICAL EXAM:    LUNGS:Occasional basal crackles  Abd-soft, BS+, NT  Ext- no pedal edema  CVS-s1s2, no murmurs      DATA:    CBC:  Recent Labs     03/01/23  1422 03/02/23  0539 03/04/23  1157   WBC 10.0 8.7 11.4*   RBC 4.44 4.15* 4.40   HGB 12.5 11.7* 12.3*   HCT 41.7 38.8 41.4    176 192   MCV 93.9 93.5 94.1   MCH 28.2 28.2 28.0   MCHC 30.0* 30.2* 29.7*   RDW 12.4 12.0 12.4   SEGSPCT 75.1* 83.7* 70.3*      BMP:  Recent Labs     03/01/23  1422 03/02/23  0539    140   K 4.2 4.5   CL 92* 96*   CO2 39* 41*   BUN 13 10   CREATININE 0.6 0.4*   CALCIUM 9.0 8.7   GLUCOSE 164* 169*      ABG:  Recent Labs     03/02/23  0745   PH 7.37   PO2ART 67*   VDK1GSL 83.0*   O2SAT 92.5*     BNP  No results found for: BNP   D-Dimer:  Lab Results   Component Value Date    DDIMER 745 (H) 02/06/2022      Radiology: none      Assessment/Plan     Patient Active Problem List    Diagnosis Date Noted    Uncontrolled pain     Generalized weakness     Gait disturbance     Myoclonus     Near syncope     Moderate protein-calorie malnutrition (HCC)     Traumatic closed nondisplaced fracture of distal end of right fibula, initial encounter 01/11/2022    Asterixis     Hypercapnia     Chronic rhinitis 01/05/2022    Fall at home, initial encounter 01/05/2022    COPD exacerbation ([facility] 07/09/2021    Acute exacerbation of chronic obstructive pulmonary disease (COPD) ([facility] 06/26/2021    Acute on chronic respiratory failure with hypoxia and hypercapnia ([facility] 05/23/2021    COVID-19 05/23/2021    Pulmonary emphysema ([facility] 05/18/2021    SOB (shortness of breath) 05/18/2021    Leg swelling 05/18/2021    Chronic hepatitis C without hepatic coma ([facility] [address] 05/17/2021     Overview Note:     Patient saw GI specialists and cleared it on her own      Gastroesophageal reflux disease without esophagitis 05/17/2021    H/O drug abuse ([facility] 05/17/2021     Overview Note:     Prior heroine      Pulmonary nodules 05/17/2021     Overview Note:     Needs f/u 11/2021      ADHD (attention deficit hyperactivity disorder) 05/17/2021    Pneumonia 05/02/2021    Acute respiratory failure with hypercapnia ([facility] 09/24/2020    Chronic respiratory failure ([facility] 08/16/2017    COPD, severe ([facility] 08/16/2017    Obstructive sleep apnea 08/16/2017     Overview Note:     Uses biPAP at night      S/P placement of cardiac pacemaker 2005     Overview Note:     Has never had replacement battery.        Acute on Chronic Hypoxic Hypercapneic resp failure- improving   Chronic Metabolic Alkalosis  COPD on Home O2  Atypical Pneumonia  Obesity  KING  Pulmonary HTN  Chronic Pain Syndrome     Inhalers  ICS  OOB  PT/OT  Prednisone taper  PO Abx  BIPAP qhs and prn  Keep sats > 92%  DVT and GI Prophylaxis  Await placement  C.w present management    Electronically signed by Miesha Alejo MD on 3/4/2023 at 12:41 PM

## 2023-03-04 NOTE — PROGRESS NOTES
V2.0  [facility] Hospitalist Progress Note      Name:  Luis Daniel Mccoy /Age/Sex: 1960  (63 y.o. female)   MRN & CSN:  [id number] & [id number] Encounter Date/Time: 3/4/2023 11:24 AM EST    Location:  [address] [place] [address]-A PCP: Luisana Hernandez MD       Hospital Day: 4    Assessment and Plan:   Luis Daniel Mccoy is a 63 y.o. female who presents with Acute respiratory failure with hypercapnia ([address]      Plan:    Acute hypoxic and hypercapnic respiratory failure  -Suspect COPD exacerbation as the etiology. May have a component of pneumonia with given increased sputum production and change in color. Chest x-ray equivocal.  -Started on corticosteroids, cefepime and azithromycin.  -Blood gas improved significantly after BiPAP and the above treatments.    -Blood cultures with 1 out of 4 gram variable bacilli. Will await speciation. Severe COPD exacerbation  -Treatments as above with corticosteroids and nebulizers.   -Patient reports her BiPAP at home and not be functioning properly and that she is getting a  to come take a look at it. Will benefit from a BiPAP arranged prior to discharge. Acute metabolic encephalopathy  -Likely in the setting of hypercarbia. -Okay to continue with home trazodone, Lyrica and Suboxone as metabolic encephalopathy has now resolved with with improvement in hypercarbia. Chronic pain  -Continue home Suboxone. ADHD  Continue home Adderall. Comment: Please note this report has been produced using speech recognition software and may contain errors related to that system including errors in grammar, punctuation, and spelling, as well as words and phrases that may be inappropriate. If there are any questions or concerns please feel free to contact the dictating provider for clarification. Diet ADULT DIET;  Regular   DVT Prophylaxis [x] Lovenox, []  Heparin, [] SCDs, [] Ambulation,  [] Eliquis, [] Xarelto  [] Coumadin   Code Status Full Code   Disposition From: Home  Expected Disposition: Home  Estimated Date of Discharge: 1 to 2 days  Patient requires continued admission due to COPD exacerbation   Surrogate Decision Maker/ POHAN Dejesus Christal     Subjective:     Chief Complaint: Shortness of Breath     Patient states that she did have some more shortness of breath but overall still having it today at the right direction. States that the BiPAP is extremely helpful. We are still awaiting final blood culture results and determine if the patient indeed has a pathogen or if it was a contaminant. Review of Systems:    Review of Systems   Constitutional:  Negative for activity change, appetite change, chills, fatigue and fever. HENT:  Negative for congestion, sinus pressure, sinus pain and sore throat. Eyes:  Negative for visual disturbance. Respiratory:  Positive for shortness of breath. Negative for cough and wheezing. Cardiovascular:  Negative for chest pain, palpitations and leg swelling. Gastrointestinal:  Negative for abdominal pain, constipation, diarrhea, nausea and vomiting. Endocrine: Negative for polydipsia and polyuria. Genitourinary:  Negative for dysuria. Musculoskeletal:  Negative for arthralgias and back pain. Skin:  Negative for color change. Neurological:  Negative for dizziness, weakness and headaches. Psychiatric/Behavioral:  Negative for agitation, behavioral problems and confusion. As per interval history    Objective:   No intake or output data in the 24 hours ending 03/04/23 1147       Vitals:   Vitals:    03/04/23 1132   BP:    Pulse: (!) 102   Resp: 21   Temp:    SpO2: 91%       Physical Exam:   Physical Exam  Constitutional:       General: She is not in acute distress. HENT:      Mouth/Throat:      Mouth: Mucous membranes are moist.      Pharynx: No posterior oropharyngeal erythema. Eyes:      General: No scleral icterus. Pupils: Pupils are equal, round, and reactive to light.    Cardiovascular:      Rate and Rhythm: Normal rate and regular rhythm. Heart sounds: No murmur heard. Pulmonary:      Effort: Pulmonary effort is normal.      Breath sounds: Wheezing (Faint wheezing bilaterally.) present. No rales. Comments: Not in any acute respiratory distress. Speaking full sentences. Abdominal:      Palpations: Abdomen is soft. Tenderness: There is no abdominal tenderness. Musculoskeletal:         General: Normal range of motion. Right lower leg: No edema. Left lower leg: No edema. Skin:     General: Skin is warm. Neurological:      General: No focal deficit present. Mental Status: She is alert and oriented to person, place, and time. Cranial Nerves: No cranial nerve deficit. Motor: No weakness.    Psychiatric:         Mood and Affect: Mood normal.       Medications:   Medications:    fluticasone  1 spray Each Nostril Daily    sodium chloride flush  5-40 mL IntraVENous 2 times per day    enoxaparin  40 mg SubCUTAneous Daily    ipratropium-albuterol  1 ampule Inhalation 4x daily    predniSONE  40 mg Oral Daily    azithromycin  500 mg IntraVENous Q24H    amitriptyline  75 mg Oral Nightly    amphetamine-dextroamphetamine  30 mg Oral Daily    aspirin  81 mg Oral Daily    buprenorphine-naloxone  1 Film SubLINGual TID    budesonide-formoterol  2 puff Inhalation BID    sennosides-docusate sodium  1 tablet Oral BID    traZODone  300 mg Oral Nightly    pregabalin  100 mg Oral TID    [Held by provider] QUEtiapine  100 mg Oral Nightly    cefepime  2,000 mg IntraVENous Q8H      Infusions:    sodium chloride 25 mL/hr at 03/04/23 0541     PRN Meds: sodium chloride flush, 5-40 mL, PRN  sodium chloride, , PRN  ondansetron, 4 mg, Q8H PRN   Or  ondansetron, 4 mg, Q6H PRN  polyethylene glycol, 17 g, Daily PRN  acetaminophen, 650 mg, Q6H PRN   Or  acetaminophen, 650 mg, Q6H PRN  [Held by provider] traMADol, 50 mg, BID PRN      Labs      Recent Results (from the past 24 hour(s))   Urinalysis with Reflex to Culture    Collection Time: 03/03/23  7:37 PM    Specimen: Urine   Result Value Ref Range    Color, UA YELLOW YELLOW    Clarity, UA CLEAR CLEAR    Glucose, Urine 500 (A) NEGATIVE MG/DL    Bilirubin Urine NEGATIVE NEGATIVE MG/DL    Ketones, Urine NEGATIVE NEGATIVE MG/DL    Specific Gravity, UA 1.015 1.001 - 1.035    Blood, Urine NEGATIVE NEGATIVE    pH, Urine 6.5 5.0 - 8.0    Protein, UA NEGATIVE NEGATIVE MG/DL    Urobilinogen, Urine 0.2 0.2 - 1.0 MG/DL    Nitrite Urine, Quantitative NEGATIVE NEGATIVE    Leukocyte Esterase, Urine NEGATIVE NEGATIVE    Urinalysis Comments       Microscopic exam not performed based on chemical results unless requested in original order. Drug screen multi urine    Collection Time: 03/03/23  7:37 PM   Result Value Ref Range    Cannabinoid Scrn, Ur NEGATIVE NEGATIVE    Amphetamines UNCONFIRMED POSITIVE (A) NEGATIVE    Cocaine Metabolite NEGATIVE NEGATIVE    Benzodiazepine Screen, Urine NEGATIVE NEGATIVE    Barbiturate Screen, Ur NEGATIVE NEGATIVE    Opiates, Urine NEGATIVE NEGATIVE    Phencyclidine, Urine NEGATIVE NEGATIVE    Oxycodone NEGATIVE NEGATIVE          Imaging/Diagnostics Last 24 Hours   XR CHEST PORTABLE    Result Date: 3/1/2023  EXAMINATION: ONE XRAY VIEW OF THE CHEST 3/1/2023 2:46 pm COMPARISON: 03/25/2022 at 2328 hours HISTORY: ORDERING SYSTEM PROVIDED HISTORY: SOB TECHNOLOGIST PROVIDED HISTORY: Reason for exam:->SOB Reason for Exam: sob FINDINGS: Diffuse bronchovascular marking prominence, borderline cardiomegaly. Findings are consistent with congestive heart failure with associated pulmonary edema and/or diffuse bilateral pneumonitis/atypical pneumonia. Findings may be accentuated by underlying chronic lung disease. No large areas of pulmonary airspace consolidation. No detectable pleural effusion, pneumothorax or mediastinal widening. Findings consistent with diffuse bilateral pneumonitis/atypical pneumonia and/or congestive heart failure.   Findings may be accentuated by underlying chronic lung disease. No detectable pleural effusion or large area pulmonary consolidation.        Electronically signed by Dena Champagne MD on 3/4/2023 at 11:47 AM

## 2023-03-05 LAB
ANION GAP SERPL CALCULATED.3IONS-SCNC: 4 MMOL/L (ref 4–16)
BUN SERPL-MCNC: 15 MG/DL (ref 6–23)
CALCIUM SERPL-MCNC: 8.3 MG/DL (ref 8.3–10.6)
CHLORIDE BLD-SCNC: 100 MMOL/L (ref 99–110)
CO2: 39 MMOL/L (ref 21–32)
CREAT SERPL-MCNC: 0.4 MG/DL (ref 0.6–1.1)
CULTURE: ABNORMAL
CULTURE: ABNORMAL
CULTURE: NORMAL
DIFFERENTIAL TYPE: ABNORMAL
GFR SERPL CREATININE-BSD FRML MDRD: >60 ML/MIN/1.73M2
GLUCOSE SERPL-MCNC: 142 MG/DL (ref 70–99)
HCT VFR BLD CALC: 41.1 % (ref 37–47)
HEMOGLOBIN: 11.9 GM/DL (ref 12.5–16)
LYMPHOCYTES ABSOLUTE: 1.1 K/CU MM
LYMPHOCYTES RELATIVE PERCENT: 10 % (ref 24–44)
Lab: ABNORMAL
Lab: NORMAL
MCH RBC QN AUTO: 27.7 PG (ref 27–31)
MCHC RBC AUTO-ENTMCNC: 29 % (ref 32–36)
MCV RBC AUTO: 95.6 FL (ref 78–100)
METAMYELOCYTES ABSOLUTE COUNT: 0.11 K/CU MM
METAMYELOCYTES PERCENT: 1 %
MONOCYTES ABSOLUTE: 1.8 K/CU MM
MONOCYTES RELATIVE PERCENT: 17 % (ref 0–4)
PDW BLD-RTO: 12.4 % (ref 11.7–14.9)
PLATELET # BLD: 212 K/CU MM (ref 140–440)
PMV BLD AUTO: 9.7 FL (ref 7.5–11.1)
POTASSIUM SERPL-SCNC: 4 MMOL/L (ref 3.5–5.1)
RBC # BLD: 4.3 M/CU MM (ref 4.2–5.4)
SEGMENTED NEUTROPHILS ABSOLUTE COUNT: 7.5 K/CU MM
SEGMENTED NEUTROPHILS RELATIVE PERCENT: 72 % (ref 36–66)
SODIUM BLD-SCNC: 143 MMOL/L (ref 135–145)
SPECIMEN: ABNORMAL
SPECIMEN: NORMAL
WBC # BLD: 10.5 K/CU MM (ref 4–10.5)

## 2023-03-05 PROCEDURE — 99232 SBSQ HOSP IP/OBS MODERATE 35: CPT | Performed by: INTERNAL MEDICINE

## 2023-03-05 PROCEDURE — 6370000000 HC RX 637 (ALT 250 FOR IP): Performed by: NURSE PRACTITIONER

## 2023-03-05 PROCEDURE — 85027 COMPLETE CBC AUTOMATED: CPT

## 2023-03-05 PROCEDURE — 2580000003 HC RX 258: Performed by: NURSE PRACTITIONER

## 2023-03-05 PROCEDURE — 6360000002 HC RX W HCPCS: Performed by: NURSE PRACTITIONER

## 2023-03-05 PROCEDURE — 94150 VITAL CAPACITY TEST: CPT

## 2023-03-05 PROCEDURE — 94761 N-INVAS EAR/PLS OXIMETRY MLT: CPT

## 2023-03-05 PROCEDURE — 2700000000 HC OXYGEN THERAPY PER DAY

## 2023-03-05 PROCEDURE — 94640 AIRWAY INHALATION TREATMENT: CPT

## 2023-03-05 PROCEDURE — 94664 DEMO&/EVAL PT USE INHALER: CPT

## 2023-03-05 PROCEDURE — 36415 COLL VENOUS BLD VENIPUNCTURE: CPT

## 2023-03-05 PROCEDURE — 80048 BASIC METABOLIC PNL TOTAL CA: CPT

## 2023-03-05 PROCEDURE — 6370000000 HC RX 637 (ALT 250 FOR IP): Performed by: STUDENT IN AN ORGANIZED HEALTH CARE EDUCATION/TRAINING PROGRAM

## 2023-03-05 PROCEDURE — 1200000000 HC SEMI PRIVATE

## 2023-03-05 PROCEDURE — 85007 BL SMEAR W/DIFF WBC COUNT: CPT

## 2023-03-05 RX ADMIN — QUETIAPINE FUMARATE 100 MG: 100 TABLET ORAL at 21:13

## 2023-03-05 RX ADMIN — CEFEPIME 2000 MG: 2 INJECTION, POWDER, FOR SOLUTION INTRAVENOUS at 13:59

## 2023-03-05 RX ADMIN — SODIUM CHLORIDE 25 ML: 9 INJECTION, SOLUTION INTRAVENOUS at 13:58

## 2023-03-05 RX ADMIN — SENNOSIDES AND DOCUSATE SODIUM 1 TABLET: 50; 8.6 TABLET ORAL at 21:13

## 2023-03-05 RX ADMIN — IPRATROPIUM BROMIDE AND ALBUTEROL SULFATE 1 AMPULE: 2.5; .5 SOLUTION RESPIRATORY (INHALATION) at 15:53

## 2023-03-05 RX ADMIN — BUPRENORPHINE AND NALOXONE 1 FILM: 8; 2 FILM BUCCAL; SUBLINGUAL at 10:29

## 2023-03-05 RX ADMIN — PREDNISONE 40 MG: 20 TABLET ORAL at 10:29

## 2023-03-05 RX ADMIN — CEFEPIME 2000 MG: 2 INJECTION, POWDER, FOR SOLUTION INTRAVENOUS at 05:50

## 2023-03-05 RX ADMIN — BUPRENORPHINE AND NALOXONE 1 FILM: 8; 2 FILM BUCCAL; SUBLINGUAL at 13:59

## 2023-03-05 RX ADMIN — IPRATROPIUM BROMIDE AND ALBUTEROL SULFATE 1 AMPULE: 2.5; .5 SOLUTION RESPIRATORY (INHALATION) at 12:02

## 2023-03-05 RX ADMIN — PREGABALIN 100 MG: 100 CAPSULE ORAL at 13:59

## 2023-03-05 RX ADMIN — PREGABALIN 100 MG: 100 CAPSULE ORAL at 21:13

## 2023-03-05 RX ADMIN — SODIUM CHLORIDE 25 ML: 9 INJECTION, SOLUTION INTRAVENOUS at 23:42

## 2023-03-05 RX ADMIN — ASPIRIN 81 MG CHEWABLE TABLET 81 MG: 81 TABLET CHEWABLE at 10:30

## 2023-03-05 RX ADMIN — TRAZODONE HYDROCHLORIDE 300 MG: 50 TABLET ORAL at 21:13

## 2023-03-05 RX ADMIN — BUDESONIDE AND FORMOTEROL FUMARATE DIHYDRATE 2 PUFF: 80; 4.5 AEROSOL RESPIRATORY (INHALATION) at 07:04

## 2023-03-05 RX ADMIN — FLUTICASONE PROPIONATE 1 SPRAY: 50 SPRAY, METERED NASAL at 10:37

## 2023-03-05 RX ADMIN — SENNOSIDES AND DOCUSATE SODIUM 1 TABLET: 50; 8.6 TABLET ORAL at 10:30

## 2023-03-05 RX ADMIN — SODIUM CHLORIDE: 9 INJECTION, SOLUTION INTRAVENOUS at 05:49

## 2023-03-05 RX ADMIN — BUPRENORPHINE AND NALOXONE 1 FILM: 8; 2 FILM BUCCAL; SUBLINGUAL at 21:08

## 2023-03-05 RX ADMIN — SODIUM CHLORIDE, PRESERVATIVE FREE 10 ML: 5 INJECTION INTRAVENOUS at 21:14

## 2023-03-05 RX ADMIN — PREGABALIN 100 MG: 100 CAPSULE ORAL at 10:30

## 2023-03-05 RX ADMIN — IPRATROPIUM BROMIDE AND ALBUTEROL SULFATE 1 AMPULE: 2.5; .5 SOLUTION RESPIRATORY (INHALATION) at 07:03

## 2023-03-05 RX ADMIN — ENOXAPARIN SODIUM 40 MG: 100 INJECTION SUBCUTANEOUS at 10:30

## 2023-03-05 RX ADMIN — CEFEPIME 2000 MG: 2 INJECTION, POWDER, FOR SOLUTION INTRAVENOUS at 23:43

## 2023-03-05 ASSESSMENT — ENCOUNTER SYMPTOMS
DIARRHEA: 0
SINUS PRESSURE: 0
CONSTIPATION: 0
NAUSEA: 0
SINUS PAIN: 0
WHEEZING: 0
BACK PAIN: 0
COUGH: 0
VOMITING: 0
COLOR CHANGE: 0
SHORTNESS OF BREATH: 1
ABDOMINAL PAIN: 0
SORE THROAT: 0

## 2023-03-05 NOTE — PROGRESS NOTES
Pulmonary and Critical Care  Progress Note      VITALS:  BP (!) 130/46   Pulse (!) 112   Temp 98.6 °F (37 °C) (Oral)   Resp 22   Ht 5' 4\" (1.626 m)   Wt 160 lb 14.4 oz (73 kg)   SpO2 91%   BMI 27.62 kg/m²     Subjective:   CHIEF COMPLAINT :SOB     HPI:                The patient is a 63 y.o. female is lying in the bed. She is not in acute resp distress    Objective:   PHYSICAL EXAM:    LUNGS:Occasional basal crackles  Abd-soft, BS+, NT  Ext- no pedal edema  CVS-s1s2, no murmurs      DATA:    CBC:  Recent Labs     03/04/23  1157 03/05/23  0007   WBC 11.4* 10.5   RBC 4.40 4.30   HGB 12.3* 11.9*   HCT 41.4 41.1    212   MCV 94.1 95.6   MCH 28.0 27.7   MCHC 29.7* 29.0*   RDW 12.4 12.4   SEGSPCT 70.3* 72.0*      BMP:  Recent Labs     03/04/23  1157 03/05/23  0007    143   K 3.5 4.0   CL 97* 100   CO2 40* 39*   BUN 17 15   CREATININE 0.5* 0.4*   CALCIUM 8.5 8.3   GLUCOSE 265* 142*      ABG:  No results for input(s): PH, PO2ART, YKP7BYG, HCO3, BEART, O2SAT in the last 72 hours.   BNP  No results found for: BNP   D-Dimer:  Lab Results   Component Value Date    DDIMER 745 (H) 02/06/2022      Radiology: None      Assessment/Plan     Patient Active Problem List    Diagnosis Date Noted    Uncontrolled pain     Generalized weakness     Gait disturbance     Myoclonus     Near syncope     Moderate protein-calorie malnutrition (HCC)     Traumatic closed nondisplaced fracture of distal end of right fibula, initial encounter 01/11/2022    Asterixis     Hypercapnia     Chronic rhinitis 01/05/2022    Fall at home, initial encounter 01/05/2022    COPD exacerbation ([facility] 07/09/2021    Acute exacerbation of chronic obstructive pulmonary disease (COPD) ([facility] 06/26/2021    Acute on chronic respiratory failure with hypoxia and hypercapnia ([facility] 05/23/2021    COVID-19 05/23/2021    Pulmonary emphysema ([address] 05/18/2021    SOB (shortness of breath) 05/18/2021    Leg swelling 05/18/2021    Chronic hepatitis C without hepatic coma ([facility] 05/17/2021     Overview Note:     Patient saw GI specialists and cleared it on her own      Gastroesophageal reflux disease without esophagitis 05/17/2021    H/O drug abuse ([facility] 05/17/2021     Overview Note:     Prior heroine      Pulmonary nodules 05/17/2021     Overview Note:     Needs f/u 11/2021      ADHD (attention deficit hyperactivity disorder) 05/17/2021    Pneumonia 05/02/2021    Acute respiratory failure with hypercapnia ([facility] 09/24/2020    Chronic respiratory failure ([facility] 08/16/2017    COPD, severe ([facility] 08/16/2017    Obstructive sleep apnea 08/16/2017     Overview Note:     Uses biPAP at night      S/P placement of cardiac pacemaker 2005     Overview Note:     Has never had replacement battery.        Acute on Chronic Hypoxic Hypercapneic resp failure- improving   Chronic Metabolic Alkalosis  COPD on Home O2  Atypical Pneumonia  Obesity  KING  Pulmonary HTN  Chronic Pain Syndrome     PO Abx  F/u C&S  Inhalers  Prednisone taper  ICS  OOB  Acapella  BIPAP qhs and prn  Await placement  C/w present management    Electronically signed by Kwaku Pereira MD on 3/5/2023 at 12:28 PM

## 2023-03-05 NOTE — PROGRESS NOTES
V2.0  [facility] Hospitalist Progress Note      Name:  Shelley Villalobos /Age/Sex: 1960  (63 y.o. female)   MRN & CSN:  [id number] & [id number] Encounter Date/Time: 3/5/2023 11:24 AM EST    [facility] PCP: Jakub Peraza MD       Hospital Day: 5    Assessment and Plan:   Shelley Villalobos is a 63 y.o. female who presents with Acute respiratory failure with hypercapnia ([address]      Plan:    Acute hypoxic and hypercapnic respiratory failure  -Suspect COPD exacerbation as the etiology. May have a component of pneumonia with given increased sputum production and change in color. Chest x-ray equivocal.  -Started on corticosteroids, cefepime and azithromycin.  -Blood gas improved significantly after BiPAP and the above treatments.    -Blood cultures with 1 out of 4 gram variable bacilli. Likely contaminant    Severe COPD exacerbation  -Treatments as above with corticosteroids and nebulizers.   -Patient reports her BiPAP at home and not be functioning properly and that she is getting a  to come take a look at it. Will benefit from a BiPAP arranged prior to discharge. Acute metabolic encephalopathy  -Likely in the setting of hypercarbia. -Okay to continue with home trazodone, Lyrica and Suboxone as metabolic encephalopathy has now resolved with with improvement in hypercarbia. Chronic pain  -Continue home Suboxone. ADHD  Continue home Adderall. Auditory hallucinations  Major depressive, in remission. Severe  Patient does have major depressive disorder and takes amitriptyline, Seroquel, and trazodone. Patient had her Seroquel held due to altered mentation requiring BiPAP on presentation. She did develop auditory hallucinations. States this is the first time this ever happened. They were friendly voices and did not instruct her to do anything harmful or dangerous. Even so, patient states that scary. She is also on high-dose steroids.   Continue amitriptyline 75 mg  We will resume nightly Seroquel 100 mg  Consult placed to psychiatry: Patient very interested in seeing psychiatry as she has never had an experience like this before and it scared her. Patient completed her steroid course on 3/5/2023. Will be osteitis moving forward. Comment: Please note this report has been produced using speech recognition software and may contain errors related to that system including errors in grammar, punctuation, and spelling, as well as words and phrases that may be inappropriate. If there are any questions or concerns please feel free to contact the dictating provider for clarification. Diet ADULT DIET; Regular   DVT Prophylaxis [x] Lovenox, []  Heparin, [] SCDs, [] Ambulation,  [] Eliquis, [] Xarelto  [] Coumadin   Code Status Full Code   Disposition From: Home  Expected Disposition: Home  Estimated Date of Discharge: 1 to 2 days  Patient requires continued admission due to COPD exacerbation   Surrogate Decision Maker/ KYLIE Siegel     Subjective:     Chief Complaint: Shortness of Breath     Patient breathing has almost returned to baseline. Today though she did have some auditory hallucinations. She has never had anything like this in the past.  On long conversation the patient is very interested in speaking with psychiatry about this. High probability that the combination of high-dose steroids and holding her Seroquel as blood pressure at risk. She completed steroids today and will be resumed on Seroquel tonight. Review of Systems:    Review of Systems   Constitutional:  Negative for activity change, appetite change, chills, fatigue and fever. HENT:  Negative for congestion, sinus pressure, sinus pain and sore throat. Eyes:  Negative for visual disturbance. Respiratory:  Positive for shortness of breath. Negative for cough and wheezing. Cardiovascular:  Negative for chest pain, palpitations and leg swelling.    Gastrointestinal:  Negative for abdominal pain, constipation, diarrhea, nausea and vomiting.   Endocrine: Negative for polydipsia and polyuria.   Genitourinary:  Negative for dysuria.   Musculoskeletal:  Negative for arthralgias and back pain.   Skin:  Negative for color change.   Neurological:  Negative for dizziness, weakness and headaches.   Psychiatric/Behavioral:  Negative for agitation, behavioral problems and confusion.    As per interval history    Objective:     Intake/Output Summary (Last 24 hours) at 3/5/2023 1207  Last data filed at 3/5/2023 0328  Gross per 24 hour   Intake 240 ml   Output --   Net 240 ml          Vitals:   Vitals:    03/05/23 1204   BP:    Pulse:    Resp:    Temp:    SpO2: 91%       Physical Exam:   Physical Exam  Constitutional:       General: She is not in acute distress.  HENT:      Mouth/Throat:      Mouth: Mucous membranes are moist.      Pharynx: No posterior oropharyngeal erythema.   Eyes:      General: No scleral icterus.     Pupils: Pupils are equal, round, and reactive to light.   Cardiovascular:      Rate and Rhythm: Normal rate and regular rhythm.      Heart sounds: No murmur heard.  Pulmonary:      Effort: Pulmonary effort is normal.      Breath sounds: Wheezing (Faint wheezing bilaterally.) present. No rales.      Comments: Not in any acute respiratory distress.  Speaking full sentences.  Abdominal:      Palpations: Abdomen is soft.      Tenderness: There is no abdominal tenderness.   Musculoskeletal:         General: Normal range of motion.      Right lower leg: No edema.      Left lower leg: No edema.   Skin:     General: Skin is warm.   Neurological:      General: No focal deficit present.      Mental Status: She is alert and oriented to person, place, and time.      Cranial Nerves: No cranial nerve deficit.      Motor: No weakness.   Psychiatric:         Mood and Affect: Mood normal.       Medications:   Medications:    fluticasone  1 spray Each Nostril Daily    sodium chloride flush  5-40 mL IntraVENous 2 times per  day    enoxaparin  40 mg SubCUTAneous Daily    ipratropium-albuterol  1 ampule Inhalation 4x daily    amitriptyline  75 mg Oral Nightly    amphetamine-dextroamphetamine  30 mg Oral Daily    aspirin  81 mg Oral Daily    buprenorphine-naloxone  1 Film SubLINGual TID    budesonide-formoterol  2 puff Inhalation BID    sennosides-docusate sodium  1 tablet Oral BID    traZODone  300 mg Oral Nightly    pregabalin  100 mg Oral TID    QUEtiapine  100 mg Oral Nightly    cefepime  2,000 mg IntraVENous Q8H      Infusions:    sodium chloride 25 mL/hr at 03/05/23 0549     PRN Meds: sodium chloride flush, 5-40 mL, PRN  sodium chloride, , PRN  ondansetron, 4 mg, Q8H PRN   Or  ondansetron, 4 mg, Q6H PRN  polyethylene glycol, 17 g, Daily PRN  acetaminophen, 650 mg, Q6H PRN   Or  acetaminophen, 650 mg, Q6H PRN  [Held by provider] traMADol, 50 mg, BID PRN      Labs      Recent Results (from the past 24 hour(s))   Basic Metabolic Panel w/ Reflex to MG    Collection Time: 03/05/23 12:07 AM   Result Value Ref Range    Sodium 143 135 - 145 MMOL/L    Potassium 4.0 3.5 - 5.1 MMOL/L    Chloride 100 99 - 110 mMol/L    CO2 39 (H) 21 - 32 MMOL/L    Anion Gap 4 4 - 16    BUN 15 6 - 23 MG/DL    Creatinine 0.4 (L) 0.6 - 1.1 MG/DL    Est, Glom Filt Rate >60 >60 mL/min/1.73m2    Glucose 142 (H) 70 - 99 MG/DL    Calcium 8.3 8.3 - 10.6 MG/DL   CBC with Auto Differential    Collection Time: 03/05/23 12:07 AM   Result Value Ref Range    WBC 10.5 4.0 - 10.5 K/CU MM    RBC 4.30 4.2 - 5.4 M/CU MM    Hemoglobin 11.9 (L) 12.5 - 16.0 GM/DL    Hematocrit 41.1 37 - 47 %    MCV 95.6 78 - 100 FL    MCH 27.7 27 - 31 PG    MCHC 29.0 (L) 32.0 - 36.0 %    RDW 12.4 11.7 - 14.9 %    Platelets 816 361 - 835 K/CU MM    MPV 9.7 7.5 - 11.1 FL    Metamyelocytes Relative 1 (H) 0.0 %    Segs Relative 72.0 (H) 36 - 66 %    Lymphocytes % 10.0 (L) 24 - 44 %    Monocytes % 17.0 (H) 0 - 4 %    Metamyelocytes Absolute 0.11 K/CU MM    Segs Absolute 7.5 K/CU MM    Lymphocytes Absolute 1.1 K/CU MM    Monocytes Absolute 1.8 K/CU MM    Differential Type MANUAL DIFFERENTIAL           Imaging/Diagnostics Last 24 Hours   XR CHEST PORTABLE    Result Date: 3/1/2023  EXAMINATION: ONE XRAY VIEW OF THE CHEST 3/1/2023 2:46 pm COMPARISON: 03/25/2022 at 2328 hours HISTORY: ORDERING SYSTEM PROVIDED HISTORY: SOB TECHNOLOGIST PROVIDED HISTORY: Reason for exam:->SOB Reason for Exam: sob FINDINGS: Diffuse bronchovascular marking prominence, borderline cardiomegaly. Findings are consistent with congestive heart failure with associated pulmonary edema and/or diffuse bilateral pneumonitis/atypical pneumonia. Findings may be accentuated by underlying chronic lung disease. No large areas of pulmonary airspace consolidation. No detectable pleural effusion, pneumothorax or mediastinal widening. Findings consistent with diffuse bilateral pneumonitis/atypical pneumonia and/or congestive heart failure. Findings may be accentuated by underlying chronic lung disease. No detectable pleural effusion or large area pulmonary consolidation.        Electronically signed by Viri Naylor MD on 3/5/2023 at 12:07 PM

## 2023-03-06 PROBLEM — F05 DELIRIUM DUE TO ANOTHER MEDICAL CONDITION: Status: ACTIVE | Noted: 2023-03-06

## 2023-03-06 PROBLEM — F31.32 BIPOLAR AFFECTIVE DISORDER, CURRENTLY DEPRESSED, MODERATE (HCC): Status: ACTIVE | Noted: 2023-03-06

## 2023-03-06 LAB
ANION GAP SERPL CALCULATED.3IONS-SCNC: 0 MMOL/L (ref 4–16)
BASOPHILS ABSOLUTE: 0 K/CU MM
BASOPHILS RELATIVE PERCENT: 0.4 % (ref 0–1)
BUN SERPL-MCNC: 17 MG/DL (ref 6–23)
CALCIUM SERPL-MCNC: 8.3 MG/DL (ref 8.3–10.6)
CHLORIDE BLD-SCNC: 98 MMOL/L (ref 99–110)
CO2: 44 MMOL/L (ref 21–32)
CREAT SERPL-MCNC: 0.4 MG/DL (ref 0.6–1.1)
CULTURE: NORMAL
DIFFERENTIAL TYPE: ABNORMAL
EOSINOPHILS ABSOLUTE: 0.1 K/CU MM
EOSINOPHILS RELATIVE PERCENT: 0.8 % (ref 0–3)
GFR SERPL CREATININE-BSD FRML MDRD: >60 ML/MIN/1.73M2
GLUCOSE SERPL-MCNC: 145 MG/DL (ref 70–99)
HCT VFR BLD CALC: 37.7 % (ref 37–47)
HEMOGLOBIN: 11.1 GM/DL (ref 12.5–16)
IMMATURE NEUTROPHIL %: 2.4 % (ref 0–0.43)
LYMPHOCYTES ABSOLUTE: 1.5 K/CU MM
LYMPHOCYTES RELATIVE PERCENT: 17.1 % (ref 24–44)
Lab: NORMAL
MCH RBC QN AUTO: 28.2 PG (ref 27–31)
MCHC RBC AUTO-ENTMCNC: 29.4 % (ref 32–36)
MCV RBC AUTO: 95.7 FL (ref 78–100)
MONOCYTES ABSOLUTE: 1.2 K/CU MM
MONOCYTES RELATIVE PERCENT: 13.4 % (ref 0–4)
NUCLEATED RBC %: 0.2 %
PDW BLD-RTO: 12.2 % (ref 11.7–14.9)
PLATELET # BLD: 171 K/CU MM (ref 140–440)
PMV BLD AUTO: 9.6 FL (ref 7.5–11.1)
POTASSIUM SERPL-SCNC: 4 MMOL/L (ref 3.5–5.1)
RBC # BLD: 3.94 M/CU MM (ref 4.2–5.4)
SEGMENTED NEUTROPHILS ABSOLUTE COUNT: 5.9 K/CU MM
SEGMENTED NEUTROPHILS RELATIVE PERCENT: 65.9 % (ref 36–66)
SODIUM BLD-SCNC: 142 MMOL/L (ref 135–145)
SPECIMEN: NORMAL
TOTAL IMMATURE NEUTOROPHIL: 0.21 K/CU MM
TOTAL NUCLEATED RBC: 0 K/CU MM
WBC # BLD: 8.9 K/CU MM (ref 4–10.5)

## 2023-03-06 PROCEDURE — 85025 COMPLETE CBC W/AUTO DIFF WBC: CPT

## 2023-03-06 PROCEDURE — 97162 PT EVAL MOD COMPLEX 30 MIN: CPT

## 2023-03-06 PROCEDURE — 97112 NEUROMUSCULAR REEDUCATION: CPT

## 2023-03-06 PROCEDURE — 1200000000 HC SEMI PRIVATE

## 2023-03-06 PROCEDURE — 36415 COLL VENOUS BLD VENIPUNCTURE: CPT

## 2023-03-06 PROCEDURE — 6370000000 HC RX 637 (ALT 250 FOR IP): Performed by: NURSE PRACTITIONER

## 2023-03-06 PROCEDURE — 80048 BASIC METABOLIC PNL TOTAL CA: CPT

## 2023-03-06 PROCEDURE — 6360000002 HC RX W HCPCS: Performed by: NURSE PRACTITIONER

## 2023-03-06 PROCEDURE — 97530 THERAPEUTIC ACTIVITIES: CPT

## 2023-03-06 PROCEDURE — 94640 AIRWAY INHALATION TREATMENT: CPT

## 2023-03-06 PROCEDURE — 99232 SBSQ HOSP IP/OBS MODERATE 35: CPT | Performed by: INTERNAL MEDICINE

## 2023-03-06 PROCEDURE — 94150 VITAL CAPACITY TEST: CPT

## 2023-03-06 PROCEDURE — 2580000003 HC RX 258: Performed by: NURSE PRACTITIONER

## 2023-03-06 PROCEDURE — 99221 1ST HOSP IP/OBS SF/LOW 40: CPT | Performed by: NURSE PRACTITIONER

## 2023-03-06 PROCEDURE — 2700000000 HC OXYGEN THERAPY PER DAY

## 2023-03-06 PROCEDURE — 97166 OT EVAL MOD COMPLEX 45 MIN: CPT

## 2023-03-06 PROCEDURE — 97535 SELF CARE MNGMENT TRAINING: CPT

## 2023-03-06 RX ORDER — ESCITALOPRAM OXALATE 10 MG/1
5 TABLET ORAL DAILY
Status: DISCONTINUED | OUTPATIENT
Start: 2023-03-06 | End: 2023-03-09

## 2023-03-06 RX ADMIN — SODIUM CHLORIDE: 9 INJECTION, SOLUTION INTRAVENOUS at 09:42

## 2023-03-06 RX ADMIN — BUPRENORPHINE AND NALOXONE 1 FILM: 8; 2 FILM BUCCAL; SUBLINGUAL at 21:39

## 2023-03-06 RX ADMIN — ASPIRIN 81 MG CHEWABLE TABLET 81 MG: 81 TABLET CHEWABLE at 09:32

## 2023-03-06 RX ADMIN — ENOXAPARIN SODIUM 40 MG: 100 INJECTION SUBCUTANEOUS at 09:33

## 2023-03-06 RX ADMIN — ESCITALOPRAM OXALATE 5 MG: 10 TABLET ORAL at 11:51

## 2023-03-06 RX ADMIN — BUPRENORPHINE AND NALOXONE 1 FILM: 8; 2 FILM BUCCAL; SUBLINGUAL at 09:32

## 2023-03-06 RX ADMIN — IPRATROPIUM BROMIDE AND ALBUTEROL SULFATE 1 AMPULE: 2.5; .5 SOLUTION RESPIRATORY (INHALATION) at 14:36

## 2023-03-06 RX ADMIN — IPRATROPIUM BROMIDE AND ALBUTEROL SULFATE 1 AMPULE: 2.5; .5 SOLUTION RESPIRATORY (INHALATION) at 10:38

## 2023-03-06 RX ADMIN — IPRATROPIUM BROMIDE AND ALBUTEROL SULFATE 1 AMPULE: 2.5; .5 SOLUTION RESPIRATORY (INHALATION) at 07:03

## 2023-03-06 RX ADMIN — PREGABALIN 100 MG: 100 CAPSULE ORAL at 09:32

## 2023-03-06 RX ADMIN — PREGABALIN 100 MG: 100 CAPSULE ORAL at 21:39

## 2023-03-06 RX ADMIN — CEFEPIME 2000 MG: 2 INJECTION, POWDER, FOR SOLUTION INTRAVENOUS at 09:42

## 2023-03-06 RX ADMIN — BUDESONIDE AND FORMOTEROL FUMARATE DIHYDRATE 2 PUFF: 80; 4.5 AEROSOL RESPIRATORY (INHALATION) at 07:04

## 2023-03-06 RX ADMIN — SODIUM CHLORIDE, PRESERVATIVE FREE 10 ML: 5 INJECTION INTRAVENOUS at 21:39

## 2023-03-06 RX ADMIN — PREGABALIN 100 MG: 100 CAPSULE ORAL at 15:00

## 2023-03-06 RX ADMIN — CEFEPIME 2000 MG: 2 INJECTION, POWDER, FOR SOLUTION INTRAVENOUS at 23:02

## 2023-03-06 RX ADMIN — SODIUM CHLORIDE: 9 INJECTION, SOLUTION INTRAVENOUS at 16:53

## 2023-03-06 RX ADMIN — BUPRENORPHINE AND NALOXONE 1 FILM: 8; 2 FILM BUCCAL; SUBLINGUAL at 16:50

## 2023-03-06 RX ADMIN — BUDESONIDE AND FORMOTEROL FUMARATE DIHYDRATE 2 PUFF: 80; 4.5 AEROSOL RESPIRATORY (INHALATION) at 20:37

## 2023-03-06 RX ADMIN — SENNOSIDES AND DOCUSATE SODIUM 1 TABLET: 50; 8.6 TABLET ORAL at 09:33

## 2023-03-06 RX ADMIN — IPRATROPIUM BROMIDE AND ALBUTEROL SULFATE 1 AMPULE: 2.5; .5 SOLUTION RESPIRATORY (INHALATION) at 20:36

## 2023-03-06 RX ADMIN — CEFEPIME 2000 MG: 2 INJECTION, POWDER, FOR SOLUTION INTRAVENOUS at 15:00

## 2023-03-06 RX ADMIN — SENNOSIDES AND DOCUSATE SODIUM 1 TABLET: 50; 8.6 TABLET ORAL at 21:39

## 2023-03-06 RX ADMIN — QUETIAPINE FUMARATE 150 MG: 100 TABLET ORAL at 21:39

## 2023-03-06 ASSESSMENT — PAIN SCALES - GENERAL
PAINLEVEL_OUTOF10: 3
PAINLEVEL_OUTOF10: 5

## 2023-03-06 ASSESSMENT — PAIN DESCRIPTION - ORIENTATION: ORIENTATION: MID;LOWER

## 2023-03-06 ASSESSMENT — ENCOUNTER SYMPTOMS
VOMITING: 0
DIARRHEA: 0
SINUS PAIN: 0
WHEEZING: 0
COLOR CHANGE: 0
BACK PAIN: 0
SHORTNESS OF BREATH: 1
SORE THROAT: 0
CONSTIPATION: 0
SINUS PRESSURE: 0
ABDOMINAL PAIN: 0
COUGH: 0
NAUSEA: 0

## 2023-03-06 ASSESSMENT — PAIN DESCRIPTION - ONSET: ONSET: ON-GOING

## 2023-03-06 ASSESSMENT — PAIN DESCRIPTION - FREQUENCY: FREQUENCY: CONTINUOUS

## 2023-03-06 ASSESSMENT — PAIN DESCRIPTION - DESCRIPTORS: DESCRIPTORS: ACHING

## 2023-03-06 ASSESSMENT — PAIN DESCRIPTION - PAIN TYPE: TYPE: CHRONIC PAIN

## 2023-03-06 ASSESSMENT — PAIN DESCRIPTION - LOCATION: LOCATION: BACK

## 2023-03-06 ASSESSMENT — PAIN - FUNCTIONAL ASSESSMENT: PAIN_FUNCTIONAL_ASSESSMENT: ACTIVITIES ARE NOT PREVENTED

## 2023-03-06 NOTE — PROGRESS NOTES
V2.0  [facility] [place]      Name:  Rose Barajas /Age/Sex: 1960  (63 y.o. female)   MRN & CSN:  [id number] & [id number] Encounter Date/Time: 3/6/2023 11:24 AM EST    Location:  [address] [place] [address]-A PCP: Jerilyn Castellon MD       Hospital Day: 6    Assessment and Plan:   Rose Barajas is a 63 y.o. female who presents with Acute respiratory failure with hypercapnia ([address]      Plan:    Acute hypoxic and hypercapnic respiratory failure  -Suspect COPD exacerbation as the etiology. May have a component of pneumonia with given increased sputum production and change in color. Chest x-ray equivocal.  -Started on corticosteroids, cefepime and azithromycin.  -Blood gas improved significantly after BiPAP and the above treatments.    -Blood cultures with 1 out of 4 gram variable bacilli. Likely contaminant    Severe COPD exacerbation  -Treatments as above with corticosteroids and nebulizers.   -Patient reports her BiPAP at home and not be functioning properly and that she is getting a  to come take a look at it. Will benefit from a BiPAP arranged prior to discharge. Acute metabolic encephalopathy  -Likely in the setting of hypercarbia. -Okay to continue with home trazodone, Lyrica and Suboxone as metabolic encephalopathy has now resolved with with improvement in hypercarbia. Chronic pain  -Continue home Suboxone. ADHD  Continue home Adderall. Auditory hallucinations  Major depressive, in remission. Severe  Patient does have major depressive disorder and takes amitriptyline, Seroquel, and trazodone. Patient had her Seroquel held due to altered mentation requiring BiPAP on presentation. She did develop auditory hallucinations. States this is the first time this ever happened. They were friendly voices and did not instruct her to do anything harmful or dangerous. Even so, patient states that scary. She is also on high-dose steroids.   Discontinue amitriptyline 75 mg  Increase nightly Seroquel to 150 mg  Start Lexapro 5 mg  Consult placed to psychiatry and managing meds  Patient completed her steroid course on 3/5/2023. Avoid steroids in the near term    Comment: Please note this report has been produced using speech recognition software and may contain errors related to that system including errors in grammar, punctuation, and spelling, as well as words and phrases that may be inappropriate. If there are any questions or concerns please feel free to contact the dictating provider for clarification. Diet ADULT DIET; Regular   DVT Prophylaxis [x] Lovenox, []  Heparin, [] SCDs, [] Ambulation,  [] Eliquis, [] Xarelto  [] Coumadin   Code Status Full Code   Disposition From: Home  Expected Disposition: Home  Estimated Date of Discharge: 1 to 2 days  Patient requires continued admission due to COPD exacerbation   Surrogate Decision Maker/ POHAN Siegel     Subjective:     Chief Complaint: Shortness of Breath     Patient breathing has almost returned to baseline. Today though she did have some auditory hallucinations. She has never had anything like this in the past.  On long conversation the patient is very interested in speaking with psychiatry about this. High probability that the combination of high-dose steroids and holding her Seroquel as blood pressure at risk. She completed steroids today and will be resumed on Seroquel tonight. Review of Systems:    Review of Systems   Constitutional:  Negative for activity change, appetite change, chills, fatigue and fever. HENT:  Negative for congestion, sinus pressure, sinus pain and sore throat. Eyes:  Negative for visual disturbance. Respiratory:  Positive for shortness of breath. Negative for cough and wheezing. Cardiovascular:  Negative for chest pain, palpitations and leg swelling. Gastrointestinal:  Negative for abdominal pain, constipation, diarrhea, nausea and vomiting.    Endocrine: Negative for polydipsia and polyuria. Genitourinary:  Negative for dysuria. Musculoskeletal:  Negative for arthralgias and back pain. Skin:  Negative for color change. Neurological:  Negative for dizziness, weakness and headaches. Psychiatric/Behavioral:  Positive for hallucinations. Negative for agitation, behavioral problems and confusion. As per interval history    Objective:   No intake or output data in the 24 hours ending 03/06/23 1206       Vitals:   Vitals:    03/06/23 1002   BP:    Pulse:    Resp: 14   Temp:    SpO2:        Physical Exam:   Physical Exam  Constitutional:       General: She is not in acute distress. HENT:      Mouth/Throat:      Mouth: Mucous membranes are moist.      Pharynx: No posterior oropharyngeal erythema. Eyes:      General: No scleral icterus. Pupils: Pupils are equal, round, and reactive to light. Cardiovascular:      Rate and Rhythm: Normal rate and regular rhythm. Heart sounds: No murmur heard. Pulmonary:      Effort: Pulmonary effort is normal.      Breath sounds: Wheezing (Faint wheezing bilaterally.) present. No rales. Comments: Not in any acute respiratory distress. Speaking full sentences. Abdominal:      Palpations: Abdomen is soft. Tenderness: There is no abdominal tenderness. Musculoskeletal:         General: Normal range of motion. Right lower leg: No edema. Left lower leg: No edema. Skin:     General: Skin is warm. Neurological:      General: No focal deficit present. Mental Status: She is alert and oriented to person, place, and time. Cranial Nerves: No cranial nerve deficit. Motor: No weakness.    Psychiatric:         Mood and Affect: Mood normal.       Medications:   Medications:    QUEtiapine  150 mg Oral Nightly    escitalopram  5 mg Oral Daily    fluticasone  1 spray Each Nostril Daily    sodium chloride flush  5-40 mL IntraVENous 2 times per day    enoxaparin  40 mg SubCUTAneous Daily ipratropium-albuterol  1 ampule Inhalation 4x daily    amphetamine-dextroamphetamine  30 mg Oral Daily    aspirin  81 mg Oral Daily    buprenorphine-naloxone  1 Film SubLINGual TID    budesonide-formoterol  2 puff Inhalation BID    sennosides-docusate sodium  1 tablet Oral BID    pregabalin  100 mg Oral TID    cefepime  2,000 mg IntraVENous Q8H      Infusions:    sodium chloride 25 mL/hr at 03/06/23 0942     PRN Meds: sodium chloride flush, 5-40 mL, PRN  sodium chloride, , PRN  ondansetron, 4 mg, Q8H PRN   Or  ondansetron, 4 mg, Q6H PRN  polyethylene glycol, 17 g, Daily PRN  acetaminophen, 650 mg, Q6H PRN   Or  acetaminophen, 650 mg, Q6H PRN  [Held by provider] traMADol, 50 mg, BID PRN      Labs      Recent Results (from the past 24 hour(s))   Basic Metabolic Panel w/ Reflex to MG    Collection Time: 03/06/23 12:32 AM   Result Value Ref Range    Sodium 142 135 - 145 MMOL/L    Potassium 4.0 3.5 - 5.1 MMOL/L    Chloride 98 (L) 99 - 110 mMol/L    CO2 44 (H) 21 - 32 MMOL/L    Anion Gap 0 (L) 4 - 16    BUN 17 6 - 23 MG/DL    Creatinine 0.4 (L) 0.6 - 1.1 MG/DL    Est, Glom Filt Rate >60 >60 mL/min/1.73m2    Glucose 145 (H) 70 - 99 MG/DL    Calcium 8.3 8.3 - 10.6 MG/DL   CBC with Auto Differential    Collection Time: 03/06/23 12:32 AM   Result Value Ref Range    WBC 8.9 4.0 - 10.5 K/CU MM    RBC 3.94 (L) 4.2 - 5.4 M/CU MM    Hemoglobin 11.1 (L) 12.5 - 16.0 GM/DL    Hematocrit 37.7 37 - 47 %    MCV 95.7 78 - 100 FL    MCH 28.2 27 - 31 PG    MCHC 29.4 (L) 32.0 - 36.0 %    RDW 12.2 11.7 - 14.9 %    Platelets 369 992 - 624 K/CU MM    MPV 9.6 7.5 - 11.1 FL    Differential Type AUTOMATED DIFFERENTIAL     Segs Relative 65.9 36 - 66 %    Lymphocytes % 17.1 (L) 24 - 44 %    Monocytes % 13.4 (H) 0 - 4 %    Eosinophils % 0.8 0 - 3 %    Basophils % 0.4 0 - 1 %    Segs Absolute 5.9 K/CU MM    Lymphocytes Absolute 1.5 K/CU MM    Monocytes Absolute 1.2 K/CU MM    Eosinophils Absolute 0.1 K/CU MM    Basophils Absolute 0.0 K/CU MM Nucleated RBC % 0.2 %    Total Nucleated RBC 0.0 K/CU MM    Total Immature Neutrophil 0.21 K/CU MM    Immature Neutrophil % 2.4 (H) 0 - 0.43 %          Imaging/Diagnostics Last 24 Hours   XR CHEST PORTABLE    Result Date: 3/1/2023  EXAMINATION: ONE XRAY VIEW OF THE CHEST 3/1/2023 2:46 pm COMPARISON: 03/25/2022 at 2328 hours HISTORY: ORDERING SYSTEM PROVIDED HISTORY: SOB TECHNOLOGIST PROVIDED HISTORY: Reason for exam:->SOB Reason for Exam: sob FINDINGS: Diffuse bronchovascular marking prominence, borderline cardiomegaly. Findings are consistent with congestive heart failure with associated pulmonary edema and/or diffuse bilateral pneumonitis/atypical pneumonia. Findings may be accentuated by underlying chronic lung disease. No large areas of pulmonary airspace consolidation. No detectable pleural effusion, pneumothorax or mediastinal widening. Findings consistent with diffuse bilateral pneumonitis/atypical pneumonia and/or congestive heart failure. Findings may be accentuated by underlying chronic lung disease. No detectable pleural effusion or large area pulmonary consolidation.        Electronically signed by Enzo Johnson MD on 3/6/2023 at 12:06 PM

## 2023-03-06 NOTE — PROGRESS NOTES
Patient states that she has been hallucinating real bad tonight. She states that she has missed her daily dose of adderall since she was admitted to this hospital.    This nurse instructed the patient to call her daughter so she can bring the patient's own supply of adderall tomorrow. Said medication is marked as \"patient-supplied\" in the [facility]

## 2023-03-06 NOTE — PROGRESS NOTES
[address] [facility] ACUTE CARE OCCUPATIONAL THERAPY EVALUATION    Noy Deleon, 1960, 1106/1106-A, 3/6/2023    Discharge Recommendation: SNF    History:  [place]:  The primary encounter diagnosis was COPD exacerbation ([facility] Diagnoses of Hypercarbia, Acute respiratory failure with hypoxia and hypercarbia ([facility] and Pneumonia due to infectious organism, unspecified laterality, unspecified part of lung were also pertinent to this visit. Patient  has a past medical history of ADHD, COPD (chronic obstructive pulmonary disease) ([facility] COVID-19, Drug addiction in remission ([facility] Hep C w/o coma, chronic ([facility] Pacemaker, Sleep apnea, and TIA (transient ischemic attack). Patient  has a past surgical history that includes  section (); Cholecystectomy (); and Pacemaker insertion (). Subjective:  Patient states: \"This isn't something I need therapy for. \"  Pain: \"8 or 10\"/10 (B feet)  Communication with other providers: coeval with PT Cliff  Restrictions: general precautions, fall risk, cog/hallucinations    Home Setup/Prior level of function:    Social/Functional History  Lives With: Alone (has caregiver [grandson] for 7 hours/day 7 days/week)  Type of Home: [address] [place] [address] Drive: One level (2nd floor apartment with elevator)  Home Access: Level entry,Elevator  Bathroom Shower/Tub: Tub/Shower unit  Bathroom Toilet: Standard  Bathroom Equipment: Shower chair  Bathroom Accessibility: Accessible  Home Equipment: 4 wheeled [address] [facility] Help From: Personal care attendant  ADL Assistance: Needs assistance for [employer] tasks  Homemaking Assistance: Needs assistance  Homemaking Responsibilities: No (grandson manages)  Ambulation Assistance: Independent (mod I with 4ww household distances)  Transfer Assistance: Independent  Active : Yes (daughter is primary )  Occupation: On disability  4L O2 at baseline    Examination:  Observation: Pt was semi fowlers in bed resting upon arrival, agreeable to session. Tele, 4.5L O2  Vision: WFL, glasses  Hearing: WFL  Objective Measures: stable    Body Systems and functions:  ROM: WFL in BUE  Strength: 4-/5 in BUE  Sensation: WFL  Tone:  Intermittent spasming of BUE and BLE, pt states this happens with her COPD exacerbations  Coordination: movements fluid and coordinated  Activity Tolerance: poor    Activities of Daily Living (ADLs):  Feeding: setup/SBA  Grooming: min A standing at sink  Toileting: mod A  UB dressing/bathing: min A  LB dressing/bathing: max A    *pt ADL function inferred from gross functional assessment of mobility, balance, posture, safety awareness, activity tolerance (unless otherwise indicated)    Cognitive and Psychosocial Functioning:  Overall cognitive status: A/Ox4, WNL. Endorses hallucinations when she's sleeping, states they do not happen when she's awake and that the voices she hears are friendly. Pt with poor safety awareness and insight into deficits  Affect: pleasant    AM-PAC 6 click short form for inpatient daily activity:   How much help from another person does the patient currently need. .. Unable  Dep A Lot  Max A A Lot   Mod A A Little  Min A A Little   CGA  SBA None   Mod I  Indep  Sup   1. Putting on and taking off regular lower body clothing? [] 1    [x] 2   [] 2   [] 3   [] 3   [] 4      2. Bathing (including washing, rinsing, drying)? [] 1   [] 2   [x] 2 [] 3 [] 3 [] 4   3. Toileting, which includes using toilet, bedpan, or urinal? [] 1    [] 2   [x] 2   [] 3   [] 3   [] 4     4. Putting on and taking off regular upper body clothing? [] 1   [] 2   [] 2   [x] 3   [] 3    [] 4      5. Taking care of personal grooming such as brushing teeth? [] 1   [] 2    [] 2 [x] 3    [] 3   [] 4      6. Eating meals?    [] 1   [] 2   [] 2   [] 3   [x] 3   [] 4        Raw Score:  15      24/24 = unimpaired  23/24 = 1-20% impaired   20/24-22/24 = 21-40% impaired  15/24-19/24 = 41-59% impaired   10/24-14/24 = 60%-79% impaired  7/24-9/24 = 80%-99% impaired  6/24 = 100% impaired     Treatment:  Self Care Training (9 minutes):   Self care training was performed today. Cues were given for safety, sequence, UE/LE placement, visual cues, and balance. Activities performed today included     Grooming- min A standing at sink, pt tolerating standing for several minutes with B knee buckling intermittently, several cues for upright posture and tending to balance    Therapeutic Activity Training (15 minutes): Therapeutic activity training was instructed today. Cues were given for safety, sequence, UE/LE placement, visual cues, and balance. Activities performed today included     Supine to sit EOB- SBA    Seated balance- SBA    STS from EOB- min A    SPT to chair- min A using RW    Stand to sit- CGA    Pt chair wheeled up to sink as pt refuses further functional mobility. STS from chair- CGA    Stand to sit- CGA    Pt positioned for comfort in recliner. Pt with several questions about discharge disposition, though is not open to [facility] on need for rehab as she states several times that she has no needs for therapy services after discharge. Edu for insight into deficits, current level of activity tolerance and assistance required, and safety awareness with limited carryover. Education: Role of OT, OT POC, discharge needs, safety, benefits of EOB/OOB activity, AD/DME needs, Home safety  Safety Measures: Gait belt used for safety of pt and therapist, Left in recliner, Alarm in place, call light and phone within reach, lines managed    Assessment:  Pt is a 63 yr old female from home alone who presents with respiratory failure. Prior to admission, pt was requiring assistance for LB dressing tasks but ind for remainder of ADLs, mod I with mobility using rollator. Pt currently CGA-min A using RW for limited mobility and SBA-max A for ADLs. Pt also presents with generalized weakness, activity tolerance, and impaired mobility.  Pt states that she has daughter that can stay with her after discharge, but there is concern for pt safety is she were to return home as she was not able to tolerate functional mobility today with several instances of B knee buckling and spasms of BUE. Pt seems adamant to return home. Pt would benefit from continued IP OT services during their stay and discharge to SNF.     Complexity: mod  Prognosis: fair  Barriers: safety/insight, deconditioning, comorbidities, resp status    Plan:  Plan: 3+/week    Treatment to include: Strengthening, ROM, Balance Training, Functional Mobility Training, Endurance Training, Gait Training, Pain Management, Safety Education and Training, Patient+Caregiver Education and Training, Equipment Evaluation Education and Procurement, Positioning, Self Care Training, Home Management Training, Coordination Training, neuromuscular re-education    Pt will complete therapeutic exercise/activity to increase independence in ADL/IADL function    Pt will practice functional transfers and mobility with AD for increased safety and independence    Pt Would Benefit from Continued Edu on none  Adaptive Equipment Recommendations: none    Goals:  Time frame for goals: 2 weeks    Pt will complete grooming tasks with mod I standing at sink  Pt will complete toileting tasks with mod I using standard commode  Pt will complete UB dressing and bathing tasks with ind  Pt will complete LB dressing and bathing tasks with mod I  Pt will complete functional mobility to bathroom and back with mod I using RW  Pt will complete 10 minutes of therapeutic exercise/activity with good tolerance and 0 rest breaks    Time:   Time in: 0949  Time out: 0923  Treatment Minutes: 24  Evaluation Minutes: 10  Total time: 34    Electronically signed by:      LOLA Gaines  3/6/2023, 8:53 AM

## 2023-03-06 NOTE — CONSULTS
Initial Psychiatric History and Physical    John Loredo  [id number]  3/1/2023  03/06/23    ID: Patient is a 63 yrs y.o. female    CC:COPD exacerbation    [facility] Notes, Patient and nursing. HPI: Patient is a 63year old female with PMHx of  COPD, chronic pain, who presents to [facility] ED via EMS due to SOB, and weakness requiring BIPAP in the ED. She wears 4L of )2 at baseline. Consults include pulmonology. Psychiatry consulted by Dr Earl Prakash due to [facility] having auditory hallucinations. Her seroquel was on hold 2/2 AMS from hypercarbia. Now improved and resumed 3/5/23. Met with patient at bedside. She is alert and oriented x 4. Denies SI/HI. Denies visual hallucinations but endorsing auditory hallucinations around bedtime which may be 2/2 COPD exacerbation. Has hx of taking elavil, trazodone 300 mg po at bedtime and seroquel 100 mg po at bedtime prescribed by her PCP. Seroquel was started 4 months ago and due to depression elavil was a recent addition. Per review of bipolar disorder, she reports some bubba that lasts 12 hours and with decreased need of sleep x several days. During that time she will have an increase in goals, cleaning house for example. She has a hx one psychiatric admission that was due to \"mental issues\" and possibly bubba? She does not remember further details. Last bubba was one week ago. Reports a hx of ADHD with Adderall rx by PCP. Noted body would jerk- at same time also noted patient appears to become more sleepy but then wakes herself up. This has been ongoing for \"years\". Insight and judgment are appropriate. Past Psychiatric History:   ? Date- [facility] due to \"mental issues\" and possible bubba.     Current Medications:  Seroquel 100 mg po hs  Trazodone 300 mg po at bedtime- stopped on 3/6/23- high dose, not effective  Elavil 75 mg po at bedtime- stopped 3/6/23 not effective  Suboxone 8 -2 mg SL tid     PDMP Monitoring:    Last PDMP Kevin as Reviewed:  Review User Review Instant Review Result   Philly Aleman 3/6/2023 10:00 AM Reviewed PDMP [1]                    Substance Use history  Tobacco- quit couple of years ago with several relapses. Was smoking 2ppd. Started at age 15. Recreational drugs- denies  Etoh- denies  Caffeine- one cup of coffee a week    Social History  Patient was  and is now . Her two sons are in half-way ( one for 10 years and the other for 5 years. She has a daughter who is helpful. Currently she lives in senior apartments.       Family Psychiatric History:   Family History   Problem Relation Age of Onset    Cancer Mother         unsure of type    Thyroid Cancer Mother     Alcohol Abuse Father     Other Sister         passed in car accident    Lupus Brother     Coronary Art Dis Brother         Allergies:  No Known Allergies     OBJECTIVE  Vital Signs:  Vitals:    03/06/23 0303   BP: 137/84   Pulse: 84   Resp: 12   Temp: 98.5 °F (36.9 °C)   SpO2: 94%       Labs:  Recent Results (from the past 48 hour(s))   Basic Metabolic Panel w/ Reflex to MG    Collection Time: 03/04/23 11:57 AM   Result Value Ref Range    Sodium 142 135 - 145 MMOL/L    Potassium 3.5 3.5 - 5.1 MMOL/L    Chloride 97 (L) 99 - 110 mMol/L    CO2 40 (H) 21 - 32 MMOL/L    Anion Gap 5 4 - 16    BUN 17 6 - 23 MG/DL    Creatinine 0.5 (L) 0.6 - 1.1 MG/DL    Est, Glom Filt Rate >60 >60 mL/min/1.73m2    Glucose 265 (H) 70 - 99 MG/DL    Calcium 8.5 8.3 - 10.6 MG/DL   CBC with Auto Differential    Collection Time: 03/04/23 11:57 AM   Result Value Ref Range    WBC 11.4 (H) 4.0 - 10.5 K/CU MM    RBC 4.40 4.2 - 5.4 M/CU MM    Hemoglobin 12.3 (L) 12.5 - 16.0 GM/DL    Hematocrit 41.4 37 - 47 %    MCV 94.1 78 - 100 FL    MCH 28.0 27 - 31 PG    MCHC 29.7 (L) 32.0 - 36.0 %    RDW 12.4 11.7 - 14.9 %    Platelets 268 867 - 963 K/CU MM    MPV 9.8 7.5 - 11.1 FL    Differential Type AUTOMATED DIFFERENTIAL     Segs Relative 70.3 (H) 36 - 66 %    Lymphocytes % 16.3 (L) 24 - 44 %    Monocytes % 11.1 (H) 0 - 4 %    Eosinophils % 0.6 0 - 3 %    Basophils % 0.3 0 - 1 %    Segs Absolute 8.0 K/CU MM    Lymphocytes Absolute 1.9 K/CU MM    Monocytes Absolute 1.3 K/CU MM    Eosinophils Absolute 0.1 K/CU MM    Basophils Absolute 0.0 K/CU MM    Nucleated RBC % 0.0 %    Total Nucleated RBC 0.0 K/CU MM    Total Immature Neutrophil 0.16 K/CU MM    Immature Neutrophil % 1.4 (H) 0 - 0.43 %   Magnesium    Collection Time: 03/04/23 11:57 AM   Result Value Ref Range    Magnesium 2.1 1.8 - 2.4 mg/dl   Basic Metabolic Panel w/ Reflex to MG    Collection Time: 03/05/23 12:07 AM   Result Value Ref Range    Sodium 143 135 - 145 MMOL/L    Potassium 4.0 3.5 - 5.1 MMOL/L    Chloride 100 99 - 110 mMol/L    CO2 39 (H) 21 - 32 MMOL/L    Anion Gap 4 4 - 16    BUN 15 6 - 23 MG/DL    Creatinine 0.4 (L) 0.6 - 1.1 MG/DL    Est, Glom Filt Rate >60 >60 mL/min/1.73m2    Glucose 142 (H) 70 - 99 MG/DL    Calcium 8.3 8.3 - 10.6 MG/DL   CBC with Auto Differential    Collection Time: 03/05/23 12:07 AM   Result Value Ref Range    WBC 10.5 4.0 - 10.5 K/CU MM    RBC 4.30 4.2 - 5.4 M/CU MM    Hemoglobin 11.9 (L) 12.5 - 16.0 GM/DL    Hematocrit 41.1 37 - 47 %    MCV 95.6 78 - 100 FL    MCH 27.7 27 - 31 PG    MCHC 29.0 (L) 32.0 - 36.0 %    RDW 12.4 11.7 - 14.9 %    Platelets 057 480 - 938 K/CU MM    MPV 9.7 7.5 - 11.1 FL    Metamyelocytes Relative 1 (H) 0.0 %    Segs Relative 72.0 (H) 36 - 66 %    Lymphocytes % 10.0 (L) 24 - 44 %    Monocytes % 17.0 (H) 0 - 4 %    Metamyelocytes Absolute 0.11 K/CU MM    Segs Absolute 7.5 K/CU MM    Lymphocytes Absolute 1.1 K/CU MM    Monocytes Absolute 1.8 K/CU MM    Differential Type MANUAL DIFFERENTIAL    Basic Metabolic Panel w/ Reflex to MG    Collection Time: 03/06/23 12:32 AM   Result Value Ref Range    Sodium 142 135 - 145 MMOL/L    Potassium 4.0 3.5 - 5.1 MMOL/L    Chloride 98 (L) 99 - 110 mMol/L    CO2 44 (H) 21 - 32 MMOL/L    Anion Gap 0 (L) 4 - 16    BUN 17 6 - 23 MG/DL Creatinine 0.4 (L) 0.6 - 1.1 MG/DL    Est, Glom Filt Rate >60 >60 mL/min/1.73m2    Glucose 145 (H) 70 - 99 MG/DL    Calcium 8.3 8.3 - 10.6 MG/DL   CBC with Auto Differential    Collection Time: 03/06/23 12:32 AM   Result Value Ref Range    WBC 8.9 4.0 - 10.5 K/CU MM    RBC 3.94 (L) 4.2 - 5.4 M/CU MM    Hemoglobin 11.1 (L) 12.5 - 16.0 GM/DL    Hematocrit 37.7 37 - 47 %    MCV 95.7 78 - 100 FL    MCH 28.2 27 - 31 PG    MCHC 29.4 (L) 32.0 - 36.0 %    RDW 12.2 11.7 - 14.9 %    Platelets 589 742 - 719 K/CU MM    MPV 9.6 7.5 - 11.1 FL    Differential Type AUTOMATED DIFFERENTIAL     Segs Relative 65.9 36 - 66 %    Lymphocytes % 17.1 (L) 24 - 44 %    Monocytes % 13.4 (H) 0 - 4 %    Eosinophils % 0.8 0 - 3 %    Basophils % 0.4 0 - 1 %    Segs Absolute 5.9 K/CU MM    Lymphocytes Absolute 1.5 K/CU MM    Monocytes Absolute 1.2 K/CU MM    Eosinophils Absolute 0.1 K/CU MM    Basophils Absolute 0.0 K/CU MM    Nucleated RBC % 0.2 %    Total Nucleated RBC 0.0 K/CU MM    Total Immature Neutrophil 0.21 K/CU MM    Immature Neutrophil % 2.4 (H) 0 - 0.43 %       Review of Systems:  Reports of no current cardiovascular, respiratory, gastrointestinal, genitourinary, integumentary, neurological, muscuoskeletal, or immunological symptoms today. PSYCHIATRIC: See HPI above.     PSYCHIATRIC EXAMINATION / MENTAL STATUS EXAM    CONSTITUTIONAL:    Vitals:   Vitals:    03/06/23 0303   BP: 137/84   Pulse: 84   Resp: 12   Temp: 98.5 °F (36.9 °C)   SpO2: 94%      General appearance: [] appears age, [x]  appears older than stated age,               [x]  adequately dressed and groomed, [] disheveled,               []  in no acute distress, [] appears mildly distressed, [] other           MUSCULOSKELETAL:   Gait:   [] normal, [] antalgic, [] unsteady, [x] gait not evaluated   Station:             [] erect, [x] sitting, [] recumbent, [] other        Strength/tone:  [x] muscle strength and tone appear consistent with age and condition     [] atrophy      [] abnormal movements     Vitals: Blood pressure 137/84, pulse 84, temperature 98.5 °F (36.9 °C), temperature source Oral, resp. rate 12, height 5' 4\" (1.626 m), weight 179 lb 12.8 oz (81.6 kg), SpO2 94 %. CONSTITUTIONAL:    Appearance: appears stated age. alert and oriented to person, place, time & situation. no acute distress. Adequate grooming and hygeine. Good eye contact. No prominent physical abnormalities. Attitude: Manner is cooperative and pleasant  Motor: Noted body jerks at times, no psychomotor agitation, retardation    Speech: Clearly articulated; normal rate, volume, tone & amount. Language: intact understanding and production  Mood: depressed  Affect: flat non-labile, congruent with mood and content of speech  Thought Production: Spontaneous. Thought Form: Coherent, linear, logical & goal-directed. No tangentiality or circumstantiality. No flight of ideas or loosening of associations. Thought Content/Perceptions: No SUNNY, noted AH, denies VH   no delusion  Insight: good  Judgment adequate  Memory: Immediate, recent, and remote appear intact, though not formally tested. Attention: maintained throughout interview  Fund of knowledge: Average  Gait/Balance: GILDA    Impression:   Delirum due to medical  COPD exacerbation  Bipolar affective disorder, depressed  ADHD    Problem List:   Acute respiratory failure with hypercapnia (HCC)    Plan:   Recommend dc trazodone as not helpful at this time. 2/2 tolerance   Recommend increasing seroquel 150 mg po at bedtime to address hallucinations- and for mood stabilizer  Start lexapro 5 mg po daily.   Dc elavil 75 po at  bedtime as patient feels it has added to her bipolar symptoms  Would not recommend restarting adderall at this time as not necessary during hospitalization  Psychiatry will continue to follow  Thank you for this consult  PS to Dr Baudilio Rodriguez    Electronically signed by Jenna Handy, KELLY - PAMELA on 3/6/2023 at 8:32 AM

## 2023-03-06 NOTE — PROGRESS NOTES
Physical Therapy  [facility] ACUTE CARE PHYSICAL THERAPY EVALUATION  Lucas De La O, 1960, 1106/1106-A, 3/6/2023    History  [place]:  The primary encounter diagnosis was COPD exacerbation ([address] Diagnoses of Hypercarbia, Acute respiratory failure with hypoxia and hypercarbia ([address] and Pneumonia due to infectious organism, unspecified laterality, unspecified part of lung were also pertinent to this visit. Patient  has a past medical history of ADHD, COPD (chronic obstructive pulmonary disease) ([address] COVID-19, Drug addiction in remission ([address] Hep C w/o coma, chronic ([address] Pacemaker, Sleep apnea, and TIA (transient ischemic attack). Patient  has a past surgical history that includes  section (); Cholecystectomy (); and Pacemaker insertion (). Assessment:  Pt presents 5 days s/p admission for COPD exacerbation, productive cough, hallucinations, fall since admission 2/2 legs giving way. Pt is from home alone, in 1 level apt, w/ elevator and ramped entrance, assist for home mgmt and ADL's, inc hx of falls. Pt is primarily limited by endurance and balance, additionally demonstrates impaired strength, functional mobility, safety awareness, cognition. Inc hx of falls, endurance and strength limiting OOB - unable to safely attempt ambulation on this date, endurance impacting volume of activity. Recommending SNF. Complexity: Moderate  Prognosis: Fair +, cognition/safety awareness, balance w/ hx of falls, endurance  Plan 2-3+/week, 1 week  Equipment: pend to next LOC    Recommendations for NURSING mobility: SPT    Subjective:  Patient states:  \"I don't think this is something therapy can help w/. This happens every time I have a COPD exacerbation, I think need a CT\"    Pain:  8/10 BL feet.     Communication with other providers:  Handoff to RN, co-eval with HAWK Braden  Restrictions: fall risk; general precautions    Home Setup/Prior level of function  Lives With: Alone (has caregiver [grandson] for 7 hours/day 7 days/week)  Type of Home: [address] [place] [address] Drive: One level (2nd floor apartment with elevator)  Home Access: Level entry,Elevator  Bathroom Shower/Tub: Tub/Shower unit  Bathroom Toilet: Standard  Bathroom Equipment: Shower chair  Bathroom Accessibility: Accessible  Home Equipment: 4 wheeled [address] Help From: Personal care attendant  ADL Assistance: Needs assistance for [employer] tasks  Homemaking Assistance: Needs assistance  Homemaking Responsibilities: No (grandson manages)  Ambulation Assistance: Independent (mod I with 4ww household distances)  Transfer Assistance: Independent  Active : Yes (daughter is primary )  Occupation: On disability  4L O2 at baseline    Examination of body systems (includes body structures/functions, activity/participation limitations):  Observation:  Supine, awake, alert, agreeable. She is hesitant to participate in [address] [place] [address] 2/2 recent falls, denying need for therapy and lacking understanding how therapy can assist. Tele, BP cuff, IV, bed alarm in place upon arrival  Posture: good  Vision:  glasses  Hearing:  [facility]  Cardiopulmonary:  WFL  Cognition: A&O x 4 ; confused, follows commands w/ repetition and clarification, distractible, memory appears intact, fair safety awareness, min cues needed for sequencing/setup, mod cues needed for initiation, lacking insight into deficits and how therapy can assist    Musculoskeletal  ROM R/L: AROM WFL. Strength R/L:  grossly 4-/5  Sensation: WFL  Tone: normotonic  Coordination: WFL  Proprioception: WFL    Mobility:  Supine to sit:  SBA  Transfers: min A to steady during completion w/ shaky and inconsistent LE stability  Sitting balance:  good. Standing balance:  fair ; reliant on UE support 2/2 shaky BLE.     Gait: 2 - 3 ft stand step using FWW at min A ; steady, moderate UE support, deferred ambulation w/ safety concerns and recent fall last PM    [facility] 6 Clicks Inpatient Mobility: AM-PAC Inpatient Mobility Raw Score : 16    Goals:  Pt Goals: return to PLOF  Short Term Goals  Time Frame for Short Term Goals: 1 week  Short Term Goal 1: pt will complete bed mobility at mod ind  Short Term Goal 2: pt will complete transfers at [place]  Short Term Goal 3: pt will ambulate 50 ft using LRAD at [facility]       Treatment plan:  Bed mobility, functional mobility, transfers, balance, gait, TA, TX, strength activities, neuro    Treatment:  Neuro-Muscular re-education:  Cues were given for position, posture, kinesthetic sense, safety, recruitment, and rationale. Cues were verbal and/or tactile.     Positioned for comfort and pressure relief  Safety: patient left in chair, call light within reach, RN notified, gait belt used, all needs met    Time:   Time in: 0849  Time out: 0922  Timed treatment minutes: 23  Total time + eval: 33    Electronically signed by:    Kenny Ferrell PT, DPT  3/6/2023, 12:44 PM

## 2023-03-06 NOTE — PROGRESS NOTES
Pulmonary and Critical Care  Progress Note      VITALS:  /84   Pulse 84   Temp 98.5 °F (36.9 °C) (Oral)   Resp 12   Ht 5' 4\" (1.626 m)   Wt 179 lb 12.8 oz (81.6 kg)   SpO2 94%   BMI 30.86 kg/m²     Subjective:   CHIEF COMPLAINT :SOB     HPI:                The patient is a 63 y.o. female is lying in the bed. She is not in acute resp distress    Objective:   PHYSICAL EXAM:    LUNGS:Occasional basal crackles  Abd-soft, BS+, NT  Ext- no pedal edema  CVS-s1s2, no murmurs      DATA:    CBC:  Recent Labs     03/04/23  1157 03/05/23  0007 03/06/23  0032   WBC 11.4* 10.5 8.9   RBC 4.40 4.30 3.94*   HGB 12.3* 11.9* 11.1*   HCT 41.4 41.1 37.7    212 171   MCV 94.1 95.6 95.7   MCH 28.0 27.7 28.2   MCHC 29.7* 29.0* 29.4*   RDW 12.4 12.4 12.2   SEGSPCT 70.3* 72.0* 65.9      BMP:  Recent Labs     03/04/23  1157 03/05/23  0007 03/06/23  0032    143 142   K 3.5 4.0 4.0   CL 97* 100 98*   CO2 40* 39* 44*   BUN 17 15 17   CREATININE 0.5* 0.4* 0.4*   CALCIUM 8.5 8.3 8.3   GLUCOSE 265* 142* 145*      ABG:  No results for input(s): PH, PO2ART, ZKU2REY, HCO3, BEART, O2SAT in the last 72 hours.   BNP  No results found for: BNP   D-Dimer:  Lab Results   Component Value Date    DDIMER 745 (H) 02/06/2022      Radiology: None      Assessment/Plan     Patient Active Problem List    Diagnosis Date Noted    Delirium due to another medical condition 03/06/2023     Priority: Medium    Bipolar affective disorder, currently depressed, moderate ([address] 03/06/2023     Priority: Medium    Uncontrolled pain     Generalized weakness     Gait disturbance     Myoclonus     Near syncope     Moderate protein-calorie malnutrition ([address]     Traumatic closed nondisplaced fracture of distal end of right fibula, initial encounter 01/11/2022    Asterixis     Hypercapnia     Chronic rhinitis 01/05/2022    Fall at home, initial encounter 01/05/2022    COPD exacerbation ([facility] 07/09/2021    Acute exacerbation of chronic obstructive pulmonary disease (COPD) ([facility] 06/26/2021    Acute on chronic respiratory failure with hypoxia and hypercapnia ([address] 05/23/2021    COVID-19 05/23/2021    Pulmonary emphysema ([address] 05/18/2021    SOB (shortness of breath) 05/18/2021    Leg swelling 05/18/2021    Chronic hepatitis C without hepatic coma ([address] 05/17/2021     Overview Note:     Patient saw GI specialists and cleared it on her own      Gastroesophageal reflux disease without esophagitis 05/17/2021    H/O drug abuse ([address] 05/17/2021     Overview Note:     Prior heroine      Pulmonary nodules 05/17/2021     Overview Note:     Needs f/u 11/2021      ADHD (attention deficit hyperactivity disorder) 05/17/2021    Pneumonia 05/02/2021    Acute respiratory failure with hypercapnia ([address] 09/24/2020    Chronic respiratory failure ([address] 08/16/2017    COPD, severe ([address] 08/16/2017    Obstructive sleep apnea 08/16/2017     Overview Note:     Uses biPAP at night      S/P placement of cardiac pacemaker 2005     Overview Note:     Has never had replacement battery.        Acute on Chronic Hypoxic Hypercapneic resp failure- improving   Chronic Metabolic Alkalosis  COPD on Home O2  Atypical Pneumonia  Obesity  KING  Pulmonary HTN  Chronic Pain Syndrome     Abx  F/u C&S  Prednisone taper  Inhalers  ICS  OOB  BIPAP qhs and prn  Keep sats > 92%  Await placement  C.w present management    Electronically signed by Arielle Carlos MD on 3/6/2023 at 10:08 AM

## 2023-03-06 NOTE — PROGRESS NOTES
POST FALL MANAGEMENT    Gomez Camacho  MEDICAL RECORD NUMBER:  [id number]  AGE: 63 y.o. GENDER: female  : 1960  TODAYS DATE:  3/6/2023    Details     Fall Occurred: Yes    Was the Fall Witnessed:  Yes       Brief Review of Event:witnessed and assisted fall to the floor after legs gave out on pt. Who found the patient: assisted fall, witnessed by nurse      Where was the patient at the time of the fall: ambulating to bathroom      Patient Comments: my ankles gave out       Date Fall Occurred:  2023 . Time Fall Occurred: 2:20a.m.      Assessment     Post Fall Head to Toe Assessment Completed: Yes    Post Fall Predictive Analytic Score Reviewed: Yes     Post Fall Vitals Completed: Yes    Post Fall Neuro Checks Completed: Yes    Injury Occurred(if yes, describe injury):  no           Did the Patient Experience:(Check Francisco Gil all that apply)    [] Patient hit head  [] Loss of consciousness  [] Change in mental status following the fall  [x] Patient is on an anticoagulant medication      CT Performed:  no    Follow-up     Persons Notified of Fall:  (Provide names of persons notified)   [x] Physician:   [] KAMARI:  [x] Nursing Supervisior:  [x] Manager:  [] Pharmacist:  [] Family:  [] Other:      Electronically signed by Beryle Fail, RN 3/6/2023 at 3:25 AM

## 2023-03-07 LAB
ANION GAP SERPL CALCULATED.3IONS-SCNC: 3 MMOL/L (ref 4–16)
BASOPHILS ABSOLUTE: 0 K/CU MM
BASOPHILS RELATIVE PERCENT: 0.4 % (ref 0–1)
BUN SERPL-MCNC: 15 MG/DL (ref 6–23)
CALCIUM SERPL-MCNC: 8.2 MG/DL (ref 8.3–10.6)
CHLORIDE BLD-SCNC: 95 MMOL/L (ref 99–110)
CO2: 43 MMOL/L (ref 21–32)
CREAT SERPL-MCNC: 0.4 MG/DL (ref 0.6–1.1)
DIFFERENTIAL TYPE: ABNORMAL
EOSINOPHILS ABSOLUTE: 0.5 K/CU MM
EOSINOPHILS RELATIVE PERCENT: 7.5 % (ref 0–3)
GFR SERPL CREATININE-BSD FRML MDRD: >60 ML/MIN/1.73M2
GLUCOSE SERPL-MCNC: 129 MG/DL (ref 70–99)
HCT VFR BLD CALC: 39.8 % (ref 37–47)
HEMOGLOBIN: 11.7 GM/DL (ref 12.5–16)
IMMATURE NEUTROPHIL %: 2.3 % (ref 0–0.43)
LYMPHOCYTES ABSOLUTE: 1.6 K/CU MM
LYMPHOCYTES RELATIVE PERCENT: 22.7 % (ref 24–44)
MCH RBC QN AUTO: 27.9 PG (ref 27–31)
MCHC RBC AUTO-ENTMCNC: 29.4 % (ref 32–36)
MCV RBC AUTO: 94.8 FL (ref 78–100)
MONOCYTES ABSOLUTE: 0.8 K/CU MM
MONOCYTES RELATIVE PERCENT: 11.8 % (ref 0–4)
NUCLEATED RBC %: 0 %
PDW BLD-RTO: 12.6 % (ref 11.7–14.9)
PLATELET # BLD: 151 K/CU MM (ref 140–440)
PMV BLD AUTO: 9.4 FL (ref 7.5–11.1)
POTASSIUM SERPL-SCNC: 4.2 MMOL/L (ref 3.5–5.1)
RBC # BLD: 4.2 M/CU MM (ref 4.2–5.4)
SEGMENTED NEUTROPHILS ABSOLUTE COUNT: 3.9 K/CU MM
SEGMENTED NEUTROPHILS RELATIVE PERCENT: 55.3 % (ref 36–66)
SODIUM BLD-SCNC: 141 MMOL/L (ref 135–145)
TOTAL IMMATURE NEUTOROPHIL: 0.16 K/CU MM
TOTAL NUCLEATED RBC: 0 K/CU MM
WBC # BLD: 7.1 K/CU MM (ref 4–10.5)

## 2023-03-07 PROCEDURE — 36415 COLL VENOUS BLD VENIPUNCTURE: CPT

## 2023-03-07 PROCEDURE — 6370000000 HC RX 637 (ALT 250 FOR IP): Performed by: NURSE PRACTITIONER

## 2023-03-07 PROCEDURE — 80048 BASIC METABOLIC PNL TOTAL CA: CPT

## 2023-03-07 PROCEDURE — 94761 N-INVAS EAR/PLS OXIMETRY MLT: CPT

## 2023-03-07 PROCEDURE — 94640 AIRWAY INHALATION TREATMENT: CPT

## 2023-03-07 PROCEDURE — 1200000000 HC SEMI PRIVATE

## 2023-03-07 PROCEDURE — 2700000000 HC OXYGEN THERAPY PER DAY

## 2023-03-07 PROCEDURE — 85025 COMPLETE CBC W/AUTO DIFF WBC: CPT

## 2023-03-07 PROCEDURE — 6360000002 HC RX W HCPCS: Performed by: NURSE PRACTITIONER

## 2023-03-07 PROCEDURE — 2580000003 HC RX 258: Performed by: NURSE PRACTITIONER

## 2023-03-07 PROCEDURE — 99232 SBSQ HOSP IP/OBS MODERATE 35: CPT | Performed by: INTERNAL MEDICINE

## 2023-03-07 RX ADMIN — CEFEPIME 2000 MG: 2 INJECTION, POWDER, FOR SOLUTION INTRAVENOUS at 23:22

## 2023-03-07 RX ADMIN — SODIUM CHLORIDE, PRESERVATIVE FREE 10 ML: 5 INJECTION INTRAVENOUS at 10:22

## 2023-03-07 RX ADMIN — SENNOSIDES AND DOCUSATE SODIUM 1 TABLET: 50; 8.6 TABLET ORAL at 21:40

## 2023-03-07 RX ADMIN — BUPRENORPHINE AND NALOXONE 1 FILM: 8; 2 FILM BUCCAL; SUBLINGUAL at 15:43

## 2023-03-07 RX ADMIN — QUETIAPINE FUMARATE 150 MG: 100 TABLET ORAL at 21:40

## 2023-03-07 RX ADMIN — PREGABALIN 100 MG: 100 CAPSULE ORAL at 15:43

## 2023-03-07 RX ADMIN — SODIUM CHLORIDE, PRESERVATIVE FREE 10 ML: 5 INJECTION INTRAVENOUS at 21:41

## 2023-03-07 RX ADMIN — CEFEPIME 2000 MG: 2 INJECTION, POWDER, FOR SOLUTION INTRAVENOUS at 09:41

## 2023-03-07 RX ADMIN — BUPRENORPHINE AND NALOXONE 1 FILM: 8; 2 FILM BUCCAL; SUBLINGUAL at 21:40

## 2023-03-07 RX ADMIN — PREGABALIN 100 MG: 100 CAPSULE ORAL at 09:49

## 2023-03-07 RX ADMIN — FLUTICASONE PROPIONATE 1 SPRAY: 50 SPRAY, METERED NASAL at 09:50

## 2023-03-07 RX ADMIN — BUPRENORPHINE AND NALOXONE 1 FILM: 8; 2 FILM BUCCAL; SUBLINGUAL at 09:49

## 2023-03-07 RX ADMIN — IPRATROPIUM BROMIDE AND ALBUTEROL SULFATE 1 AMPULE: 2.5; .5 SOLUTION RESPIRATORY (INHALATION) at 07:47

## 2023-03-07 RX ADMIN — BUDESONIDE AND FORMOTEROL FUMARATE DIHYDRATE 2 PUFF: 80; 4.5 AEROSOL RESPIRATORY (INHALATION) at 07:49

## 2023-03-07 RX ADMIN — PREGABALIN 100 MG: 100 CAPSULE ORAL at 21:30

## 2023-03-07 RX ADMIN — ESCITALOPRAM OXALATE 5 MG: 10 TABLET ORAL at 09:49

## 2023-03-07 RX ADMIN — IPRATROPIUM BROMIDE AND ALBUTEROL SULFATE 1 AMPULE: 2.5; .5 SOLUTION RESPIRATORY (INHALATION) at 14:33

## 2023-03-07 RX ADMIN — SENNOSIDES AND DOCUSATE SODIUM 1 TABLET: 50; 8.6 TABLET ORAL at 09:49

## 2023-03-07 RX ADMIN — ENOXAPARIN SODIUM 40 MG: 100 INJECTION SUBCUTANEOUS at 09:50

## 2023-03-07 RX ADMIN — CEFEPIME 2000 MG: 2 INJECTION, POWDER, FOR SOLUTION INTRAVENOUS at 15:42

## 2023-03-07 RX ADMIN — BUDESONIDE AND FORMOTEROL FUMARATE DIHYDRATE 2 PUFF: 80; 4.5 AEROSOL RESPIRATORY (INHALATION) at 19:28

## 2023-03-07 RX ADMIN — ASPIRIN 81 MG CHEWABLE TABLET 81 MG: 81 TABLET CHEWABLE at 09:49

## 2023-03-07 RX ADMIN — IPRATROPIUM BROMIDE AND ALBUTEROL SULFATE 1 AMPULE: 2.5; .5 SOLUTION RESPIRATORY (INHALATION) at 19:29

## 2023-03-07 RX ADMIN — IPRATROPIUM BROMIDE AND ALBUTEROL SULFATE 1 AMPULE: 2.5; .5 SOLUTION RESPIRATORY (INHALATION) at 10:44

## 2023-03-07 ASSESSMENT — PAIN DESCRIPTION - FREQUENCY: FREQUENCY: CONTINUOUS

## 2023-03-07 ASSESSMENT — ENCOUNTER SYMPTOMS
EYES NEGATIVE: 1
GASTROINTESTINAL NEGATIVE: 1
RESPIRATORY NEGATIVE: 1

## 2023-03-07 ASSESSMENT — PAIN DESCRIPTION - ONSET: ONSET: ON-GOING

## 2023-03-07 ASSESSMENT — PAIN - FUNCTIONAL ASSESSMENT: PAIN_FUNCTIONAL_ASSESSMENT: ACTIVITIES ARE NOT PREVENTED

## 2023-03-07 ASSESSMENT — PAIN DESCRIPTION - PAIN TYPE: TYPE: CHRONIC PAIN

## 2023-03-07 ASSESSMENT — PAIN DESCRIPTION - LOCATION: LOCATION: SHOULDER

## 2023-03-07 ASSESSMENT — PAIN DESCRIPTION - DESCRIPTORS: DESCRIPTORS: ACHING;DISCOMFORT

## 2023-03-07 ASSESSMENT — PAIN DESCRIPTION - ORIENTATION: ORIENTATION: LEFT;POSTERIOR

## 2023-03-07 ASSESSMENT — PAIN SCALES - GENERAL: PAINLEVEL_OUTOF10: 7

## 2023-03-07 NOTE — PROGRESS NOTES
Pulmonary and Critical Care  Progress Note      VITALS:  /69   Pulse 100   Temp 99.5 °F (37.5 °C) (Oral)   Resp 17   Ht 5' 4\" (1.626 m)   Wt (S) 282 lb 8 oz (128.1 kg)   SpO2 93%   BMI 48.49 kg/m²     Subjective:   CHIEF COMPLAINT :SOB     HPI:                The patient is a 63 y.o. female is sleepy with the BIPAP. She is not in acute resp distress    Objective:   PHYSICAL EXAM:    LUNGS:Occasional basal crackles  Abd-soft, BS+, NT  Ext- no pedal edema  CVS-s1s2, no murmurs      DATA:    CBC:  Recent Labs     03/05/23 0007 03/06/23 0032 03/07/23  0118   WBC 10.5 8.9 7.1   RBC 4.30 3.94* 4.20   HGB 11.9* 11.1* 11.7*   HCT 41.1 37.7 39.8    171 151   MCV 95.6 95.7 94.8   MCH 27.7 28.2 27.9   MCHC 29.0* 29.4* 29.4*   RDW 12.4 12.2 12.6   SEGSPCT 72.0* 65.9 55.3      BMP:  Recent Labs     03/05/23 0007 03/06/23 0032 03/07/23  0118    142 141   K 4.0 4.0 4.2    98* 95*   CO2 39* 44* 43*   BUN 15 17 15   CREATININE 0.4* 0.4* 0.4*   CALCIUM 8.3 8.3 8.2*   GLUCOSE 142* 145* 129*      ABG:  No results for input(s): PH, PO2ART, QYQ0KGR, HCO3, BEART, O2SAT in the last 72 hours.   BNP  No results found for: BNP   D-Dimer:  Lab Results   Component Value Date    DDIMER 745 (H) 02/06/2022      Radiology: none      Assessment/Plan     Patient Active Problem List    Diagnosis Date Noted    Delirium due to another medical condition 03/06/2023     Priority: Medium    Bipolar affective disorder, currently depressed, moderate ([address] 03/06/2023     Priority: Medium    Uncontrolled pain     Generalized weakness     Gait disturbance     Myoclonus     Near syncope     Moderate protein-calorie malnutrition ([address]     Traumatic closed nondisplaced fracture of distal end of right fibula, initial encounter 01/11/2022    Asterixis     Hypercapnia     Chronic rhinitis 01/05/2022    Fall at home, initial encounter 01/05/2022    COPD exacerbation ([facility] 07/09/2021    Acute exacerbation of chronic obstructive pulmonary disease (COPD) ([address] 06/26/2021    Acute on chronic respiratory failure with hypoxia and hypercapnia ([address] 05/23/2021    COVID-19 05/23/2021    Pulmonary emphysema (HCC) 05/18/2021    SOB (shortness of breath) 05/18/2021    Leg swelling 05/18/2021    Chronic hepatitis C without hepatic coma ([address] 05/17/2021     Overview Note:     Patient saw GI specialists and cleared it on her own      Gastroesophageal reflux disease without esophagitis 05/17/2021    H/O drug abuse ([address] 05/17/2021     Overview Note:     Prior heroine      Pulmonary nodules 05/17/2021     Overview Note:     Needs f/u 11/2021      ADHD (attention deficit hyperactivity disorder) 05/17/2021    Pneumonia 05/02/2021    Acute respiratory failure with hypercapnia ([address] 09/24/2020    Chronic respiratory failure ([facility] 08/16/2017    COPD, severe ([address] 08/16/2017    Obstructive sleep apnea 08/16/2017     Overview Note:     Uses biPAP at night      S/P placement of cardiac pacemaker 2005     Overview Note:     Has never had replacement battery.        Acute on Chronic Hypoxic Hypercapneic resp failure- improving   Chronic Metabolic Alkalosis  COPD on Home O2  Atypical Pneumonia  Obesity  KING  Pulmonary HTN  Chronic Pain Syndrome     Prednisone taper  Inhalers  ICS  OOB  PT/OT  Keep sats > 92%  BIPAP qhs and prn  Await placement  C/w present management    Electronically signed by Marcia Johnson MD on 3/7/2023 at 12:43 PM

## 2023-03-07 NOTE — PROGRESS NOTES
V2.0  [facility] [place] Progress Note      Name:  Arash Corona /Age/Sex: 1960  (63 y.o. female)   MRN & CSN:  [id number] & [id number] Encounter Date/Time: 3/7/2023 7:27 AM EST    Location:  [address] [place] [address] PCP: Juaquin Dunaway MD       Hospital Day: 7    Assessment and Plan:   Arash Corona is a 63 y.o. female with pmh of ADHD, MDD, chronic pain and COPD on home O2 who presents with Acute respiratory failure with hypercapnia ([facility]      Plan:  #Acute on chronic hypoxic and hypercapnic respiratory failure  #Severe COPD exacerbation  --Suspect COPD exacerbation as the etiology. May have a component of pneumonia with given increased sputum production and change in color. Chest x-ray equivocal.  --patient was treated with steroids, now discontinued, and completed azithromycin  --Will discontinue cefepime, urinary antigens for legionella and strep pneumo negative. MRSA neg  --Blood cultures with 1 out of 4 gram variable bacilli. Likely contaminant  --Continue breathing treatments as needed  --Pulm on board, recs appreciated  --At [facility] on 4L of O2, currently at baseline, BiPAP at bedtime, confirm home apparatus is fixed     #Acute metabolic encephalopathy - Resolved  --Likely in the setting of hypercarbia. #Chronic pain  --Continue home Suboxone. ADHD  --Psych recommend holding adderall during hospitalization     #Auditory hallucinations  #Major depressive, in remission. Severe  Patient does have major depressive disorder and takes amitriptyline, Seroquel, and trazodone. Patient had her Seroquel held due to altered mentation requiring BiPAP on presentation. She did develop auditory hallucinations. States this is the first time this ever happened. They were friendly voices and did not instruct her to do anything harmful or dangerous. Even so, patient states that scary. --Psych on board, appreciate recs. DC trazodone, increase seroquel to 150mg, start lexapro 5mg and DC elavil.     Diet ADULT DIET; Regular   DVT Prophylaxis [x] Lovenox, []  Heparin, [] SCDs, [] Ambulation,  [] Eliquis, [] Xarelto  [] Coumadin   Code Status Full Code   Disposition From: Home  Expected Disposition: Home with Penny Corral  Estimated Date of Discharge: 1-2 days  Patient requires continued admission due to COPD exacerbation   Surrogate Decision Maker/ POA      Subjective:     Chief Complaint: Shortness of Breath       Patient seen and examined at bedside. She is endorsing continued lower ext edema which she claims is new for her. She reports that she is currently at baseline for O2 use and is feeling better this AM    Review of Systems:    Review of Systems   Constitutional: Negative. HENT: Negative. Eyes: Negative. Respiratory: Negative. Cardiovascular:  Positive for leg swelling. Gastrointestinal: Negative. Genitourinary: Negative. Musculoskeletal: Negative. Skin: Negative. Neurological: Negative. Psychiatric/Behavioral: Negative. Objective: Intake/Output Summary (Last 24 hours) at 3/7/2023 0727  Last data filed at 3/7/2023 0130  Gross per 24 hour   Intake 863.75 ml   Output --   Net 863.75 ml        Vitals:   Vitals:    03/07/23 0629   BP:    Pulse: 83   Resp: 15   Temp:    SpO2:        Physical Exam:   General: NAD, sitting up in the bedside going through her ADLs with help from tech. Eyes: EOMI  ENT: Moist mucous membranes  Cardiovascular: Tachycardic. Respiratory: Clear to auscultation, no wheezing appreciated. On 4L of O2 via NC  Gastrointestinal: Soft, non tender  Genitourinary: No suprapubic tenderness  Musculoskeletal: B/L lower ext edema  Skin: Warm, dry  Neuro: Alert. Psych: Mood appropriate.      Medications:   Medications:    QUEtiapine  150 mg Oral Nightly    escitalopram  5 mg Oral Daily    fluticasone  1 spray Each Nostril Daily    sodium chloride flush  5-40 mL IntraVENous 2 times per day    enoxaparin  40 mg SubCUTAneous Daily    ipratropium-albuterol  1 ampule Inhalation 4x daily    amphetamine-dextroamphetamine  30 mg Oral Daily    aspirin  81 mg Oral Daily    buprenorphine-naloxone  1 Film SubLINGual TID    budesonide-formoterol  2 puff Inhalation BID    sennosides-docusate sodium  1 tablet Oral BID    pregabalin  100 mg Oral TID    cefepime  2,000 mg IntraVENous Q8H      Infusions:    sodium chloride 12.5 mL/hr at 03/07/23 0130     PRN Meds: sodium chloride flush, 5-40 mL, PRN  sodium chloride, , PRN  ondansetron, 4 mg, Q8H PRN   Or  ondansetron, 4 mg, Q6H PRN  polyethylene glycol, 17 g, Daily PRN  acetaminophen, 650 mg, Q6H PRN   Or  acetaminophen, 650 mg, Q6H PRN  [Held by provider] traMADol, 50 mg, BID PRN        Labs      Recent Results (from the past 24 hour(s))   Basic Metabolic Panel w/ Reflex to MG    Collection Time: 03/07/23  1:18 AM   Result Value Ref Range    Sodium 141 135 - 145 MMOL/L    Potassium 4.2 3.5 - 5.1 MMOL/L    Chloride 95 (L) 99 - 110 mMol/L    CO2 43 (H) 21 - 32 MMOL/L    Anion Gap 3 (L) 4 - 16    BUN 15 6 - 23 MG/DL    Creatinine 0.4 (L) 0.6 - 1.1 MG/DL    Est, Glom Filt Rate >60 >60 mL/min/1.73m2    Glucose 129 (H) 70 - 99 MG/DL    Calcium 8.2 (L) 8.3 - 10.6 MG/DL   CBC with Auto Differential    Collection Time: 03/07/23  1:18 AM   Result Value Ref Range    WBC 7.1 4.0 - 10.5 K/CU MM    RBC 4.20 4.2 - 5.4 M/CU MM    Hemoglobin 11.7 (L) 12.5 - 16.0 GM/DL    Hematocrit 39.8 37 - 47 %    MCV 94.8 78 - 100 FL    MCH 27.9 27 - 31 PG    MCHC 29.4 (L) 32.0 - 36.0 %    RDW 12.6 11.7 - 14.9 %    Platelets 311 265 - 872 K/CU MM    MPV 9.4 7.5 - 11.1 FL    Differential Type AUTOMATED DIFFERENTIAL     Segs Relative 55.3 36 - 66 %    Lymphocytes % 22.7 (L) 24 - 44 %    Monocytes % 11.8 (H) 0 - 4 %    Eosinophils % 7.5 (H) 0 - 3 %    Basophils % 0.4 0 - 1 %    Segs Absolute 3.9 K/CU MM    Lymphocytes Absolute 1.6 K/CU MM    Monocytes Absolute 0.8 K/CU MM    Eosinophils Absolute 0.5 K/CU MM    Basophils Absolute 0.0 K/CU MM    Nucleated RBC % 0.0 % Total Nucleated RBC 0.0 K/CU MM    Total Immature Neutrophil 0.16 K/CU MM    Immature Neutrophil % 2.3 (H) 0 - 0.43 %        Imaging/Diagnostics Last 24 Hours   No results found.     Electronically signed by Libertad Johnson MD on 3/7/2023 at 7:27 AM

## 2023-03-08 LAB
ALBUMIN SERPL-MCNC: 3.4 GM/DL (ref 3.4–5)
ALP BLD-CCNC: 79 IU/L (ref 40–128)
ALT SERPL-CCNC: 71 U/L (ref 10–40)
ANION GAP SERPL CALCULATED.3IONS-SCNC: 5 MMOL/L (ref 4–16)
AST SERPL-CCNC: 68 IU/L (ref 15–37)
BILIRUB SERPL-MCNC: 0.3 MG/DL (ref 0–1)
BUN SERPL-MCNC: 12 MG/DL (ref 6–23)
CALCIUM SERPL-MCNC: 8.4 MG/DL (ref 8.3–10.6)
CHLORIDE BLD-SCNC: 93 MMOL/L (ref 99–110)
CO2: 39 MMOL/L (ref 21–32)
CREAT SERPL-MCNC: 0.5 MG/DL (ref 0.6–1.1)
GFR SERPL CREATININE-BSD FRML MDRD: >60 ML/MIN/1.73M2
GLUCOSE BLD-MCNC: 151 MG/DL (ref 70–99)
GLUCOSE BLD-MCNC: 179 MG/DL (ref 70–99)
GLUCOSE SERPL-MCNC: 224 MG/DL (ref 70–99)
POTASSIUM SERPL-SCNC: 4.8 MMOL/L (ref 3.5–5.1)
SODIUM BLD-SCNC: 137 MMOL/L (ref 135–145)
TOTAL PROTEIN: 5.8 GM/DL (ref 6.4–8.2)

## 2023-03-08 PROCEDURE — 94640 AIRWAY INHALATION TREATMENT: CPT

## 2023-03-08 PROCEDURE — 36415 COLL VENOUS BLD VENIPUNCTURE: CPT

## 2023-03-08 PROCEDURE — 2700000000 HC OXYGEN THERAPY PER DAY

## 2023-03-08 PROCEDURE — 6360000002 HC RX W HCPCS: Performed by: NURSE PRACTITIONER

## 2023-03-08 PROCEDURE — 6370000000 HC RX 637 (ALT 250 FOR IP): Performed by: NURSE PRACTITIONER

## 2023-03-08 PROCEDURE — 97535 SELF CARE MNGMENT TRAINING: CPT

## 2023-03-08 PROCEDURE — 97116 GAIT TRAINING THERAPY: CPT

## 2023-03-08 PROCEDURE — 80053 COMPREHEN METABOLIC PANEL: CPT

## 2023-03-08 PROCEDURE — 97112 NEUROMUSCULAR REEDUCATION: CPT

## 2023-03-08 PROCEDURE — 94668 MNPJ CHEST WALL SBSQ: CPT

## 2023-03-08 PROCEDURE — 97530 THERAPEUTIC ACTIVITIES: CPT

## 2023-03-08 PROCEDURE — 99231 SBSQ HOSP IP/OBS SF/LOW 25: CPT | Performed by: INTERNAL MEDICINE

## 2023-03-08 PROCEDURE — 2580000003 HC RX 258: Performed by: NURSE PRACTITIONER

## 2023-03-08 PROCEDURE — 1200000000 HC SEMI PRIVATE

## 2023-03-08 PROCEDURE — 82962 GLUCOSE BLOOD TEST: CPT

## 2023-03-08 RX ORDER — INSULIN LISPRO 100 [IU]/ML
0-8 INJECTION, SOLUTION INTRAVENOUS; SUBCUTANEOUS
Status: DISCONTINUED | OUTPATIENT
Start: 2023-03-08 | End: 2023-03-15

## 2023-03-08 RX ORDER — CHOLECALCIFEROL (VITAMIN D3) 125 MCG
10 CAPSULE ORAL
Status: COMPLETED | OUTPATIENT
Start: 2023-03-08 | End: 2023-03-09

## 2023-03-08 RX ORDER — DEXTROSE MONOHYDRATE 100 MG/ML
INJECTION, SOLUTION INTRAVENOUS CONTINUOUS PRN
Status: DISCONTINUED | OUTPATIENT
Start: 2023-03-08 | End: 2023-03-15 | Stop reason: HOSPADM

## 2023-03-08 RX ORDER — INSULIN LISPRO 100 [IU]/ML
0-4 INJECTION, SOLUTION INTRAVENOUS; SUBCUTANEOUS NIGHTLY
Status: DISCONTINUED | OUTPATIENT
Start: 2023-03-08 | End: 2023-03-15

## 2023-03-08 RX ADMIN — IPRATROPIUM BROMIDE AND ALBUTEROL SULFATE 1 AMPULE: 2.5; .5 SOLUTION RESPIRATORY (INHALATION) at 09:10

## 2023-03-08 RX ADMIN — BUPRENORPHINE AND NALOXONE 1 FILM: 8; 2 FILM BUCCAL; SUBLINGUAL at 16:32

## 2023-03-08 RX ADMIN — IPRATROPIUM BROMIDE AND ALBUTEROL SULFATE 1 AMPULE: 2.5; .5 SOLUTION RESPIRATORY (INHALATION) at 15:00

## 2023-03-08 RX ADMIN — IPRATROPIUM BROMIDE AND ALBUTEROL SULFATE 1 AMPULE: 2.5; .5 SOLUTION RESPIRATORY (INHALATION) at 12:08

## 2023-03-08 RX ADMIN — SENNOSIDES AND DOCUSATE SODIUM 1 TABLET: 50; 8.6 TABLET ORAL at 22:26

## 2023-03-08 RX ADMIN — QUETIAPINE FUMARATE 150 MG: 100 TABLET ORAL at 22:26

## 2023-03-08 RX ADMIN — ENOXAPARIN SODIUM 40 MG: 100 INJECTION SUBCUTANEOUS at 08:25

## 2023-03-08 RX ADMIN — BUDESONIDE AND FORMOTEROL FUMARATE DIHYDRATE 2 PUFF: 80; 4.5 AEROSOL RESPIRATORY (INHALATION) at 12:07

## 2023-03-08 RX ADMIN — BUPRENORPHINE AND NALOXONE 1 FILM: 8; 2 FILM BUCCAL; SUBLINGUAL at 08:50

## 2023-03-08 RX ADMIN — ESCITALOPRAM OXALATE 5 MG: 10 TABLET ORAL at 08:26

## 2023-03-08 RX ADMIN — SODIUM CHLORIDE, PRESERVATIVE FREE 10 ML: 5 INJECTION INTRAVENOUS at 22:28

## 2023-03-08 RX ADMIN — BUDESONIDE AND FORMOTEROL FUMARATE DIHYDRATE 2 PUFF: 80; 4.5 AEROSOL RESPIRATORY (INHALATION) at 19:32

## 2023-03-08 RX ADMIN — FLUTICASONE PROPIONATE 1 SPRAY: 50 SPRAY, METERED NASAL at 08:24

## 2023-03-08 RX ADMIN — ASPIRIN 81 MG CHEWABLE TABLET 81 MG: 81 TABLET CHEWABLE at 08:26

## 2023-03-08 RX ADMIN — CEFEPIME 2000 MG: 2 INJECTION, POWDER, FOR SOLUTION INTRAVENOUS at 07:36

## 2023-03-08 RX ADMIN — IPRATROPIUM BROMIDE AND ALBUTEROL SULFATE 1 AMPULE: 2.5; .5 SOLUTION RESPIRATORY (INHALATION) at 19:31

## 2023-03-08 RX ADMIN — ACETAMINOPHEN 650 MG: 325 TABLET ORAL at 23:27

## 2023-03-08 RX ADMIN — SENNOSIDES AND DOCUSATE SODIUM 1 TABLET: 50; 8.6 TABLET ORAL at 08:26

## 2023-03-08 RX ADMIN — ACETAMINOPHEN 650 MG: 325 TABLET ORAL at 14:20

## 2023-03-08 ASSESSMENT — ENCOUNTER SYMPTOMS
RESPIRATORY NEGATIVE: 1
GASTROINTESTINAL NEGATIVE: 1
EYES NEGATIVE: 1

## 2023-03-08 ASSESSMENT — PAIN SCALES - GENERAL: PAINLEVEL_OUTOF10: 0

## 2023-03-08 NOTE — PROGRESS NOTES
V2.0  [facility] [place] Hospitalist Progress Note      Name:  Megan Mulligan /Age/Sex: 1960  (63 y.o. female)   MRN & CSN:  [id number] & [id number] Encounter Date/Time: 3/8/2023 7:27 AM EST    Location:  [address] [place] [address] PCP: Edel Weems MD       Hospital Day: 8    Assessment and Plan:   Megan Mulligan is a 63 y.o. female with pmh of ADHD, MDD, chronic pain and COPD on home O2 who presents with Acute respiratory failure with hypercapnia ([facility]    Patient pending PT/OT ARMANDO byrne to discuss the option of SNF with patient    Plan:  #Acute on chronic hypoxic and hypercapnic respiratory failure - Resolved  #Severe COPD exacerbation - Resolved  --Suspect COPD exacerbation as the etiology. May have a component of pneumonia with given increased sputum production and change in color. Chest x-ray equivocal.  --Patient was treated with steroids, now discontinued, and completed azithromycin  --Will discontinue cefepime, urinary antigens for legionella and strep pneumo negative. MRSA neg  --Blood cultures with 1 out of 4 gram variable bacilli. Likely contaminant  --Continue breathing treatments as needed  --Pulm on board, recs appreciated  --At baseline on 4L of O2, currently at baseline, BiPAP at bedtime, confirmed from patient that her home apparatus is fixed     #Acute metabolic encephalopathy - Resolved  --Likely in the setting of hypercarbia. #Chronic pain  --Continue home Suboxone. ADHD  --Psych recommend holding adderall during hospitalization     #Auditory hallucinations  #Major depressive, in remission. Severe  Patient does have major depressive disorder and takes amitriptyline, Seroquel, and trazodone. Patient had her Seroquel held due to altered mentation requiring BiPAP on presentation. She did develop auditory hallucinations. States this is the first time this ever happened. They were friendly voices and did not instruct her to do anything harmful or dangerous. Even so, patient states that scary. --Psych on board, appreciate recs. DC trazodone, increase seroquel to 150mg, start lexapro 5mg and DC elavil. Diet ADULT DIET; Regular   DVT Prophylaxis [x] Lovenox, []  Heparin, [] SCDs, [] Ambulation,  [] Eliquis, [] Xarelto  [] Coumadin   Code Status Full Code   Disposition From: Home  Expected Disposition: Home with Penny Corral  Estimated Date of Discharge: 1-2 days  Patient requires continued admission due to need for eval for placement   Surrogate Decision Maker/ POA      Subjective:     Chief Complaint: Shortness of Breath       Patient seen and examined at bedside. This a.m., patient states she does not feel good and would not like to go home. She is endorsing headaches, muscle spasms that are chronic as well as just an overall sense of not feeling ready to go home. Reports that she feels shaky when she is up and would like to be evaluated by physical therapy. Explained to her that we can have physical therapy evaluate her but that from a medical standpoint of view, she is cleared for DC. Review of Systems:    Review of Systems   Constitutional: Negative. HENT: Negative. Eyes: Negative. Respiratory: Negative. Cardiovascular: Negative. Gastrointestinal: Negative. Genitourinary: Negative. Musculoskeletal:         Chronic spasms   Skin: Negative. Neurological: Negative. Psychiatric/Behavioral: Negative. Objective: Intake/Output Summary (Last 24 hours) at 3/8/2023 0739  Last data filed at 3/8/2023 0636  Gross per 24 hour   Intake 50 ml   Output 700 ml   Net -650 ml          Vitals:   Vitals:    03/08/23 0332   BP: 119/62   Pulse: 84   Resp: 12   Temp: 97.9 °F (36.6 °C)   SpO2: 95%       Physical Exam:   General: NAD, asleep but easily awakens to verbal stimuli  Eyes: EOMI  ENT: Moist mucous membranes  Cardiovascular: Tachycardic. Respiratory: Clear to auscultation, no wheezing appreciated.  On 4L of O2 via NC  Gastrointestinal: Soft, non tender  Genitourinary: No suprapubic tenderness  Musculoskeletal: B/L non-pitting lower ext edema  Skin: Warm, dry  Neuro: Alert. Psych: Mood appropriate. Medications:   Medications:    QUEtiapine  150 mg Oral Nightly    escitalopram  5 mg Oral Daily    fluticasone  1 spray Each Nostril Daily    sodium chloride flush  5-40 mL IntraVENous 2 times per day    enoxaparin  40 mg SubCUTAneous Daily    ipratropium-albuterol  1 ampule Inhalation 4x daily    [Held by provider] amphetamine-dextroamphetamine  30 mg Oral Daily    aspirin  81 mg Oral Daily    buprenorphine-naloxone  1 Film SubLINGual TID    budesonide-formoterol  2 puff Inhalation BID    sennosides-docusate sodium  1 tablet Oral BID    pregabalin  100 mg Oral TID    cefepime  2,000 mg IntraVENous Q8H      Infusions:    sodium chloride 12.5 mL/hr at 03/07/23 0130     PRN Meds: sodium chloride flush, 5-40 mL, PRN  sodium chloride, , PRN  ondansetron, 4 mg, Q8H PRN   Or  ondansetron, 4 mg, Q6H PRN  polyethylene glycol, 17 g, Daily PRN  acetaminophen, 650 mg, Q6H PRN   Or  acetaminophen, 650 mg, Q6H PRN  [Held by provider] traMADol, 50 mg, BID PRN      Labs      No results found for this or any previous visit (from the past 24 hour(s)). Imaging/Diagnostics Last 24 Hours   No results found.     Electronically signed by Roxie Chopra MD on 3/8/2023 at 7:28 AM

## 2023-03-08 NOTE — PROGRESS NOTES
Physical Therapy    Physical Therapy Treatment Note  Name: Kyle Banerjee MRN: [id number] :   1960   Date:  3/8/2023   Admission Date: 3/1/2023 Room:  [address] [place] [address]   Restrictions/Precautions:  fall risk; general precautions; cotx for safety  Communication with other providers:  cotx w/ Heather Medina ; RN handoff  Subjective:  Patient states:  \"I think it's more my brain right now\"  Pain:   Location, Type, Intensity (0/10 to 10/10):  denies  Objective:    Observation:  Supine, awake, alert, agreeable. She is fearful of falling t/o ; intermittently poor balance requiring heavy assist to correct. Pocket tele, bed alarm in place. Treatment, including education/measures:  Supine <-> sit: SBA  Seated balance: fair +  STS: min A for completion  Standing balance: fair regressing to poor intermittently w/ LOB  Stand <-> sit: min A for control  Positioned for comfort and pressure relief ; all needs met, left in chair, chair alarmed, call light in reach    Neuro-Muscular re-education:  Cues were given for position, posture, kinesthetic sense, safety, recruitment, and rationale. Cues were verbal and/or tactile. Seated and standing static and dynamic balance during completion of ADLs, positional transitions, in preparation for OOB    Gait Training:  Cues were given for safety, sequence, device management, balance, posture, awareness, path. 10 ft + 10 ft using FWW at min A w/ intermittent max A to prevent LOB ; cont w/ BL knee buckling spasms, endurance impacting time on task, fwd postural and heavy UE reliance, unsteady and guarded quality    Assessment / Impression:    Pt remains stressed and concerns over additional neurological contribution ; she endorses several significant life changes recently and may benefit from psych assist to address comorbid contributing factors. Endurance, strength, functional mobility, balance cont to be impaired ; she would benefit from subacute therapy services.  Will cont to follow.     Patient's tolerance of treatment:  fair +  Barriers to improvement:  comorbid conditions, endurance, safety awareness, functional mobility  Plan for Next Session:    Cont w/ est DC plan (SNF)  Progress functional mobility, transfer training, ambulation w/ 2 person assist    Time in:  1407  Time out:  1438  Timed treatment minutes:  27  Total treatment time:  31    Previously filed items:     Patient Goals   Patient Goals : return to PLOF     Short Term Goals  Time Frame for Short Term Goals: 1 week  Short Term Goal 1: pt will complete bed mobility at mod ind  Short Term Goal 2: [facility]  Short Term Goal 3: pt will ambulate 50 ft using LRAD at [facility]    Electronically signed by:    Sal Owusu PT, DPT  3/8/2023, 4:01 PM

## 2023-03-08 NOTE — CARE COORDINATION
This RN case manager to bedside to speak with patient regarding DC plan. Patient states she now thinks she needs to go to a SNF due to weakness. I have provided patient with a SNF list and she will give me her choices. I have asked her to pick 3 choices.

## 2023-03-08 NOTE — PROGRESS NOTES
Pulmonary and Critical Care  Progress Note      VITALS:  /70   Pulse 84   Temp 97.9 °F (36.6 °C) (Oral)   Resp 16   Ht 5' 4\" (1.626 m)   Wt (S) 282 lb 8 oz (128.1 kg)   SpO2 91%   BMI 48.49 kg/m²     Subjective:   CHIEF COMPLAINT :SOB     HPI:                The patient is a 63 y.o. female is lying in the bed. She is sleepy but arousable. She is not in acute resp distress    Objective:   PHYSICAL EXAM:    LUNGS:Occasional basal crackles  Abd-soft, BS+, NT  Ext- no pedal edema  CVS-s1s2, no murmurs      DATA:    CBC:  Recent Labs     03/06/23 0032 03/07/23  0118   WBC 8.9 7.1   RBC 3.94* 4.20   HGB 11.1* 11.7*   HCT 37.7 39.8    151   MCV 95.7 94.8   MCH 28.2 27.9   MCHC 29.4* 29.4*   RDW 12.2 12.6   SEGSPCT 65.9 55.3      BMP:  Recent Labs     03/06/23 0032 03/07/23  0118 03/08/23  0920    141 137   K 4.0 4.2 4.8   CL 98* 95* 93*   CO2 44* 43* 39*   BUN 17 15 12   CREATININE 0.4* 0.4* 0.5*   CALCIUM 8.3 8.2* 8.4   GLUCOSE 145* 129* 224*      ABG:  No results for input(s): PH, PO2ART, TLN6ZCA, HCO3, BEART, O2SAT in the last 72 hours.   BNP  No results found for: BNP   D-Dimer:  Lab Results   Component Value Date    DDIMER 745 (H) 02/06/2022      Radiology: none      Assessment/Plan     Patient Active Problem List    Diagnosis Date Noted    Delirium due to another medical condition 03/06/2023     Priority: Medium    Bipolar affective disorder, currently depressed, moderate ([facility] 03/06/2023     Priority: Medium    Uncontrolled pain     Generalized weakness     Gait disturbance     Myoclonus     Near syncope     Moderate protein-calorie malnutrition (HCC)     Traumatic closed nondisplaced fracture of distal end of right fibula, initial encounter 01/11/2022    Asterixis     Hypercapnia     Chronic rhinitis 01/05/2022    Fall at home, initial encounter 01/05/2022    COPD exacerbation ([facility] 07/09/2021    Acute exacerbation of chronic obstructive pulmonary disease (COPD) ([facility] 06/26/2021    Acute on chronic respiratory failure with hypoxia and hypercapnia ([address] 05/23/2021    COVID-19 05/23/2021    Pulmonary emphysema (HCC) 05/18/2021    SOB (shortness of breath) 05/18/2021    Leg swelling 05/18/2021    Chronic hepatitis C without hepatic coma ([address] 05/17/2021     Overview Note:     Patient saw GI specialists and cleared it on her own      Gastroesophageal reflux disease without esophagitis 05/17/2021    H/O drug abuse ([address] 05/17/2021     Overview Note:     Prior heroine      Pulmonary nodules 05/17/2021     Overview Note:     Needs f/u 11/2021      ADHD (attention deficit hyperactivity disorder) 05/17/2021    Pneumonia 05/02/2021    Acute respiratory failure with hypercapnia ([address] 09/24/2020    Chronic respiratory failure ([address] 08/16/2017    COPD, severe ([address] 08/16/2017    Obstructive sleep apnea 08/16/2017     Overview Note:     Uses biPAP at night      S/P placement of cardiac pacemaker 2005     Overview Note:     Has never had replacement battery.        Acute on Chronic Hypoxic Hypercapneic resp failure- improving   Chronic Metabolic Alkalosis  COPD on Home O2  Atypical Pneumonia  Obesity  KING  Pulmonary HTN  Chronic Pain Syndrome          Inhalers  ICS  OOB  PT/OT  BIPAP qhs and pn  Keep sats > 92%  Await placement  C/w present management    Electronically signed by Gabi Richards MD on 3/8/2023 at 11:25 AM

## 2023-03-08 NOTE — PROGRESS NOTES
Occupational Therapy  . Occupational Therapy Treatment Note    Name: Rubina Mascorro MRN: [id number] :   1960   Date:  3/8/2023   Admission Date: 3/1/2023 Room:  [address] [place] [address]-A     Primary Problem:   The primary encounter diagnosis was COPD exacerbation ([facility] Diagnoses of Hypercarbia, Acute respiratory failure with hypoxia and hypercarbia ([facility] and Pneumonia due to infectious organism, unspecified laterality, unspecified part of lung were also pertinent to this visit. Patient  has a past medical history of ADHD, COPD (chronic obstructive pulmonary disease) ([facility] COVID-19, Drug addiction in remission ([facility] Hep C w/o coma, chronic ([facility] Pacemaker, Sleep apnea, and TIA (transient ischemic attack). Patient  has a past surgical history that includes  section (); Cholecystectomy (); and Pacemaker insertion (). Restrictions/Precautions:          General precautions, fall risk      Communication with other providers: cotx with PT Cliff for assist and safety as well as patient tolerance with therapy. Subjective:  Patient states:   are you here to take me to therapy. Pain:   Location, Type, Intensity (0/10 to 10/10):  denied    Objective:    Observation: patient agreeable and pleasant. At times she has delayed responses as well as demos word searching with increased time. States her L side feels different and weaker than right side. Did have trouble demos key pinch when prompted  Objective Measures:  tele, 02-4L but does desat in [de-identified] with exertion. Treatment, including education:    ADL activity training was instructed today. Cues were given for safety, sequence, UE/LE placement, visual cues, and balance. Activities performed today included dressing, toileting, hand hygiene, and grooming. Facial hygiene- Min A seated at sink (patient able to place wash rag in sink- but unable to reach inside sink to grab wash cloth)    Oral care- SBA seated at sink.      Lb dressing- DEP for socks EOB. Feeding- at this time patient is CGA-Min A intermittently- patient states she has been dropping her food and utensils. Patient able to demo drinking from straw while EOB but had poor . Therapeutic activity training was instructed today. Cues were given for safety, sequence, UE/LE placement, awareness, and balance. Activities performed today included bed mobility training, sup-sit, sit-stand, SPT. Supine to EOB- SBA    Scooting hips forward- SBA with effort. EOB sitting balance- SBA with intermittent CGA for patient being unbalanced mostly d/t having twitch like movements and spasms in BUE/BLE. While EOB patient demo BUE shoulder flex/ext as well as BLE straight leg raises with no difficulty. Did demo  B hands   targeting pincer, lateral and key pinch with some difficulty on B hands however more difficulty on L side versus R. Stand to [facility] form recliner- Min A +CGA of another for safety and tremor movements. Patient initially hunched over with cues for upright,. Patient states this position helps her during her \"episodes\"    Functional mobility- Min/Mod x2 intermittently- patient tolerated very short HH distances (x8 feet x2) patient demos knee buckling throughout ambulation with cues for safety and walker management. Intermittently demos BUE spasms as well    Standing balance at sink- poor. Patient tolerated x1 min. X1 major LOB noted needing Max A for correction. Knee buckling throughout. Cues for Bues to support on  sink. Bues noted to have some spasms during. BSC brought up to patient for safety and to sit to complete task    Sit to Bsc and recliner- Min A for safe and slow descent. Patient educated on role of OT , benefits of OT and rationale for therapeutic intervention. Benefit of OOB/EOB activities, benefit of movement.  AE/AD, WS/EC,     All therapeutic intervention performed c emphasis on dynamic balance / standing tolerance to increase strength, endurance and activity tolerance for increased Dexter c ADL tasks and func transfers / mobility. Patient left safely in bedside chair at end of session, with call light in reach, alarm on and nursing aware. Gait belt was used for func transfers / mobility. Assessment / Impression:    Patient's tolerance of treatment: fair +  Adverse Reaction: None  Significant change in status and impact: Increased spasms and twitching    Barriers to improvement: weakness, involuntary movement.        Plan for Next Session:    Continue with OT POC      Time in:  1407  Time out:  1438  Timed treatment minutes:  31  Total treatment time:  31      Electronically signed by:    KAREN Sanchez COTA/L [id number]    3/8/2023, 2:45 PM

## 2023-03-09 LAB
ALBUMIN SERPL-MCNC: 3.4 GM/DL (ref 3.4–5)
ALP BLD-CCNC: 79 IU/L (ref 40–129)
ALT SERPL-CCNC: 70 U/L (ref 10–40)
AST SERPL-CCNC: 51 IU/L (ref 15–37)
BILIRUB SERPL-MCNC: 0.2 MG/DL (ref 0–1)
BILIRUBIN DIRECT: 0.2 MG/DL (ref 0–0.3)
BILIRUBIN, INDIRECT: 0 MG/DL (ref 0–0.7)
GLUCOSE BLD-MCNC: 103 MG/DL (ref 70–99)
GLUCOSE BLD-MCNC: 117 MG/DL (ref 70–99)
GLUCOSE BLD-MCNC: 118 MG/DL (ref 70–99)
GLUCOSE BLD-MCNC: 138 MG/DL (ref 70–99)
TOTAL PROTEIN: 5.7 GM/DL (ref 6.4–8.2)

## 2023-03-09 PROCEDURE — 36415 COLL VENOUS BLD VENIPUNCTURE: CPT

## 2023-03-09 PROCEDURE — 6370000000 HC RX 637 (ALT 250 FOR IP): Performed by: NURSE PRACTITIONER

## 2023-03-09 PROCEDURE — 94660 CPAP INITIATION&MGMT: CPT

## 2023-03-09 PROCEDURE — 97530 THERAPEUTIC ACTIVITIES: CPT

## 2023-03-09 PROCEDURE — 94668 MNPJ CHEST WALL SBSQ: CPT

## 2023-03-09 PROCEDURE — 94761 N-INVAS EAR/PLS OXIMETRY MLT: CPT

## 2023-03-09 PROCEDURE — 94640 AIRWAY INHALATION TREATMENT: CPT

## 2023-03-09 PROCEDURE — 97112 NEUROMUSCULAR REEDUCATION: CPT

## 2023-03-09 PROCEDURE — 80076 HEPATIC FUNCTION PANEL: CPT

## 2023-03-09 PROCEDURE — 94150 VITAL CAPACITY TEST: CPT

## 2023-03-09 PROCEDURE — 97116 GAIT TRAINING THERAPY: CPT

## 2023-03-09 PROCEDURE — 99231 SBSQ HOSP IP/OBS SF/LOW 25: CPT | Performed by: INTERNAL MEDICINE

## 2023-03-09 PROCEDURE — 2580000003 HC RX 258: Performed by: NURSE PRACTITIONER

## 2023-03-09 PROCEDURE — 1200000000 HC SEMI PRIVATE

## 2023-03-09 PROCEDURE — 6360000002 HC RX W HCPCS: Performed by: NURSE PRACTITIONER

## 2023-03-09 PROCEDURE — 99233 SBSQ HOSP IP/OBS HIGH 50: CPT | Performed by: NURSE PRACTITIONER

## 2023-03-09 PROCEDURE — 82962 GLUCOSE BLOOD TEST: CPT

## 2023-03-09 PROCEDURE — 2700000000 HC OXYGEN THERAPY PER DAY

## 2023-03-09 PROCEDURE — 97535 SELF CARE MNGMENT TRAINING: CPT

## 2023-03-09 PROCEDURE — 6370000000 HC RX 637 (ALT 250 FOR IP): Performed by: FAMILY MEDICINE

## 2023-03-09 RX ORDER — ENOXAPARIN SODIUM 100 MG/ML
30 INJECTION SUBCUTANEOUS 2 TIMES DAILY
Status: DISCONTINUED | OUTPATIENT
Start: 2023-03-09 | End: 2023-03-15 | Stop reason: HOSPADM

## 2023-03-09 RX ORDER — ESCITALOPRAM OXALATE 10 MG/1
10 TABLET ORAL DAILY
Status: DISCONTINUED | OUTPATIENT
Start: 2023-03-10 | End: 2023-03-15 | Stop reason: HOSPADM

## 2023-03-09 RX ORDER — HYDROXYZINE HYDROCHLORIDE 25 MG/1
25 TABLET, FILM COATED ORAL NIGHTLY PRN
Status: DISCONTINUED | OUTPATIENT
Start: 2023-03-09 | End: 2023-03-15 | Stop reason: HOSPADM

## 2023-03-09 RX ORDER — QUETIAPINE FUMARATE 100 MG/1
200 TABLET, FILM COATED ORAL NIGHTLY
Status: DISCONTINUED | OUTPATIENT
Start: 2023-03-09 | End: 2023-03-15 | Stop reason: HOSPADM

## 2023-03-09 RX ADMIN — SENNOSIDES AND DOCUSATE SODIUM 1 TABLET: 50; 8.6 TABLET ORAL at 09:00

## 2023-03-09 RX ADMIN — ENOXAPARIN SODIUM 30 MG: 100 INJECTION SUBCUTANEOUS at 09:01

## 2023-03-09 RX ADMIN — FLUTICASONE PROPIONATE 1 SPRAY: 50 SPRAY, METERED NASAL at 09:27

## 2023-03-09 RX ADMIN — SODIUM CHLORIDE, PRESERVATIVE FREE 10 ML: 5 INJECTION INTRAVENOUS at 22:24

## 2023-03-09 RX ADMIN — BUPRENORPHINE AND NALOXONE 1 FILM: 8; 2 FILM BUCCAL; SUBLINGUAL at 22:21

## 2023-03-09 RX ADMIN — SODIUM CHLORIDE, PRESERVATIVE FREE 5 ML: 5 INJECTION INTRAVENOUS at 09:27

## 2023-03-09 RX ADMIN — Medication 10 MG: at 00:03

## 2023-03-09 RX ADMIN — HYDROXYZINE HYDROCHLORIDE 25 MG: 25 TABLET, FILM COATED ORAL at 22:22

## 2023-03-09 RX ADMIN — SENNOSIDES AND DOCUSATE SODIUM 1 TABLET: 50; 8.6 TABLET ORAL at 22:21

## 2023-03-09 RX ADMIN — IPRATROPIUM BROMIDE AND ALBUTEROL SULFATE 1 AMPULE: 2.5; .5 SOLUTION RESPIRATORY (INHALATION) at 14:42

## 2023-03-09 RX ADMIN — ESCITALOPRAM OXALATE 5 MG: 10 TABLET ORAL at 09:00

## 2023-03-09 RX ADMIN — IPRATROPIUM BROMIDE AND ALBUTEROL SULFATE 1 AMPULE: 2.5; .5 SOLUTION RESPIRATORY (INHALATION) at 07:51

## 2023-03-09 RX ADMIN — BUPRENORPHINE AND NALOXONE 1 FILM: 8; 2 FILM BUCCAL; SUBLINGUAL at 09:00

## 2023-03-09 RX ADMIN — ENOXAPARIN SODIUM 30 MG: 100 INJECTION SUBCUTANEOUS at 22:21

## 2023-03-09 RX ADMIN — ASPIRIN 81 MG CHEWABLE TABLET 81 MG: 81 TABLET CHEWABLE at 08:59

## 2023-03-09 RX ADMIN — QUETIAPINE FUMARATE 200 MG: 100 TABLET ORAL at 22:21

## 2023-03-09 RX ADMIN — IPRATROPIUM BROMIDE AND ALBUTEROL SULFATE 1 AMPULE: 2.5; .5 SOLUTION RESPIRATORY (INHALATION) at 11:38

## 2023-03-09 RX ADMIN — BUPRENORPHINE AND NALOXONE 1 FILM: 8; 2 FILM BUCCAL; SUBLINGUAL at 17:28

## 2023-03-09 RX ADMIN — BUDESONIDE AND FORMOTEROL FUMARATE DIHYDRATE 2 PUFF: 80; 4.5 AEROSOL RESPIRATORY (INHALATION) at 07:51

## 2023-03-09 ASSESSMENT — PAIN SCALES - GENERAL: PAINLEVEL_OUTOF10: 0

## 2023-03-09 ASSESSMENT — ENCOUNTER SYMPTOMS
GASTROINTESTINAL NEGATIVE: 1
RESPIRATORY NEGATIVE: 1
EYES NEGATIVE: 1

## 2023-03-09 NOTE — CARE COORDINATION
This RN case manager to the bedside. Patient would like referrals sent to [facility]. #1 [facility]  # 2 [place]  #3 [facility]    Referral sent to .

## 2023-03-09 NOTE — PROGRESS NOTES
Occupational Therapy Treatment Note  Name: Aba Bravo MRN: [id number] :   1960   Date:  3/9/2023   Admission Date: 3/1/2023 Room:  [address] [place] [address]-A     Primary Problem:    U[place]:  The primary encounter diagnosis was COPD exacerbation ([facility] Diagnoses of Hypercarbia, Acute respiratory failure with hypoxia and hypercarbia ([facility] and Pneumonia due to infectious organism, unspecified laterality, unspecified part of lung were also pertinent to this visit. Patient  has a past medical history of ADHD, COPD (chronic obstructive pulmonary disease) ([facility] COVID-19, Drug addiction in remission ([facility] Hep C w/o coma, chronic ([facility] Pacemaker, Sleep apnea, and TIA (transient ischemic attack). Patient  has a past surgical history that includes  section (); Cholecystectomy (); and Pacemaker insertion (). Communication with other providers:  cotx with PT Cliff for safety concerns, pt tolerance of session. Handoff to psych NP for concerns    Subjective:  Patient states:  \"I really want to get healthy, I really do. I have to give myself a second chance. \"  Pain: L shoulder pain, doesn't rate  Restrictions: general precautions, fall risk, cog/psych, int B knee buckling    Objective:    Observation:  pt was seated EOB upon arrival, agreeable to session. Tele, PIV. Pt much more active, optimistic, excitable, and interactive today with new plans and outlook. Reports that she has not been sleeping. Concern for bubba type qualities. Objective Measures:  O2 unstable today, mid 80s-90s    Treatment, including education:  Self Care Training (25 minutes):   Self care training was performed today. Cues were given for safety, sequence, UE/LE placement, visual cues, and balance. Activities performed today included     Toilet transfer- CGA.  Inc time d/t pt requesting \"a good pee\"    Toileting- SBA seated, CGA in stance    Grooming- CGA standing at sink, SBA seated on BSC in front of sink, fast pace d/t impending fatigue and anxiety while standing    UB bathing- quick wipe down standing at sink CGA    *inc time required for rest break in between tasks d/t fatigue, SOB, desat    Therapeutic Activity Training (13 minutes): Therapeutic activity training was instructed today. Cues were given for safety, sequence, UE/LE placement, visual cues, and balance. Activities performed today included     Seated balance- SBA    STSx3- min A progressing to CGA    Standing balance- min A-int CGA. Tolerates better today, no knee buckling but still very shaky overall in extremities and quickly fatigues and desats    Amb to bathroom and back, to window door and back- CGA-min A using RW    Stand to sit- CGA    Pt positioned for comfort in recliner with needs met. Education: Role of OT, OT POC, safety, benefits of EOB/OOB activity, rationale for treatment  Safety Measures: Gait belt used for safety of pt and therapist, Left in recliner, Alarm in place, call light and phone within reach, lines managed    Assessment / Impression:    Pt able to tolerate more OOB today, cont to be shaky overall with poor activity tolerance/O2 requirements.     Patient's tolerance of treatment: fair+  Adverse Reaction: desat, SOB, fatigue  Significant change in status and impact: mood/affect shift  Barriers to improvement: comorbidities, deconditioning, resp status, psych/cog/safety    Plan for Next Session:    Continue OT POC    Time in:  6353  Time out:  1259  Timed treatment minutes:  38  Total treatment time:  40    Electronically signed by:    Moreen Bumpers, OT, OTR/L  3/9/2023, 2:00 PM

## 2023-03-09 NOTE — PROGRESS NOTES
Physical Therapy    Physical Therapy Treatment Note  Name: Jade Milligan MRN: [id number] :   1960   Date:  3/9/2023   Admission Date: 3/1/2023 Room:  [address] [place] [address]   Restrictions/Precautions:  fall risk; general precautions; inconsistent balance qualities  Communication with other providers:  Psych NP handoff  Subjective:  Patient states:  \"I just want to get back to being healthy\"  Pain:   Location, Type, Intensity (0/10 to 10/10):  L shoulder pain that she does not rate  Objective:    Observation:  Seated EOB, awake, alert, agreeable. Pt is pleasant and participatory ; she appears more excitable, talkative, endorsing no sleep - concerns communicated to psych NP. Tele in place  Treatment, including education/measures:  Sit <-> stand: min A progressing to CGA w/ time on task and attention to set-up  Standing balance: fair - progressing to fair ; initial min A to attend to shaky BLE qualities improving w/ time on task  Stand <-> sit: CGA to steady during completion ; 5 reps in session  Seated balance: good    Neuro-Muscular re-education:  Cues were given for position, posture, kinesthetic sense, safety, recruitment, and rationale. Cues were verbal and/or tactile. Standing and seated balance during completion of ADL's w/ OT assist ; pt attending to balance and posture as pt performs dynamic reaching, reaching across mid-line    Gait Training:  Cues were given for safety, sequence, device management, balance, posture, awareness, path. 10 ft + 3 ft + 12 ft + 30 ft using FWW at [facility] w/ intermittent min A ; dec adrian, moderate UE support, improved attention to posture, dec step length, shaky BLE qualities, reciprocal     Assessment / Impression:    Pt is feeling better, she is more interactive in session and tolerating inc activity. She remains limited by inconsistent balance qualities, endurance w/ noted desat, recent worsening of mood disorder. Will cont to follow.     Patient's tolerance of treatment: fair +  Barriers to improvement:  comorbid/psych conditions, endurance  Plan for Next Session:    Cont w/ est DC plan (SNF)  Progress transfer training, ambulation distance, balance/NMR    Time in:  1220  Time out:  1300  Timed treatment minutes:  38  Total treatment time:  40    Previously filed items:     Patient Goals   Patient Goals : return to PLOF     Short Term Goals  Time Frame for Short Term Goals: 1 week  Short Term Goal 1: pt will complete bed mobility at [facility]  Short Term Goal 2: pt will complete transfers at [facility]  Short Term Goal 3: pt will ambulate 50 ft using LRAD at [facility] [place]    Electronically signed by:    Maria De Jesus Mar PT, DPT  3/9/2023, 1:12 PM

## 2023-03-09 NOTE — PROGRESS NOTES
V2.0  [facility] [place]      Name:  Bernabe Solis /Age/Sex: 1960  (63 y.o. female)   MRN & CSN:  [id number] & [id number] Encounter Date/Time: 3/9/2023 7:27 AM EST    Location:  [address] [place] [address] PCP: Randy Khan MD       Hospital Day: 9    Assessment and Plan:   Bernabe Solis is a 63 y.o. female with pmh of ADHD, MDD, chronic pain and COPD on home O2 who presents with Acute respiratory failure with hypercapnia ([facility]    Patient agreeable to SNF,  to help with placement. Patient is medically stable. Plan:  #Acute on chronic hypoxic and hypercapnic respiratory failure - Resolved  #Severe COPD exacerbation - Resolved  --Suspect COPD exacerbation as the etiology. May have a component of pneumonia with given increased sputum production and change in color. Chest x-ray equivocal.  --Patient was treated with steroids, now discontinued, and completed azithromycin  Cefepime discontinued, urinary antigens for legionella and strep pneumo negative. MRSA neg  --Blood cultures with 1 out of 4 gram variable bacilli. Likely contaminant  --Continue breathing treatments as needed  --Pulm on board, recs appreciated  --At baseline on 4L of O2, currently at baseline, BiPAP at bedtime, confirmed from patient that her home apparatus is fixed     #Acute metabolic encephalopathy - Resolved  --Likely in the setting of hypercarbia. #Chronic pain  --Continue home Suboxone. ADHD  --Psych recommend holding adderall during hospitalization     #Auditory hallucinations  #Major depressive, in remission. Severe  Patient does have major depressive disorder and takes amitriptyline, Seroquel, and trazodone. Patient had her Seroquel held due to altered mentation requiring BiPAP on presentation. She did develop auditory hallucinations. States this is the first time this ever happened. They were friendly voices and did not instruct her to do anything harmful or dangerous.   Even so, patient states that mark.    --Psych on board, appreciate recs. DC trazodone, increase seroquel to 150mg, start lexapro 5mg and DC elavil. Diet ADULT DIET; Regular   DVT Prophylaxis [x] Lovenox, []  Heparin, [] SCDs, [] Ambulation,  [] Eliquis, [] Xarelto  [] Coumadin   Code Status Full Code   Disposition From: Home  Expected Disposition: Home with [facility]  Estimated Date of Discharge: 1-2 days  Patient requires continued admission due to need for placement   Surrogate Decision Maker/ POA      Subjective:     Chief Complaint: Shortness of Breath       Patient seen and examined at bedside. Reports that she is deciding for SNF placement at this time and states that he did work with PT yesterday and feels better than she did prior. Review of Systems:    Review of Systems   Constitutional: Negative. HENT: Negative. Eyes: Negative. Respiratory: Negative. Cardiovascular: Negative. Gastrointestinal: Negative. Genitourinary: Negative. Musculoskeletal:         Chronic spasms   Skin: Negative. Neurological: Negative. Psychiatric/Behavioral: Negative. Objective: Intake/Output Summary (Last 24 hours) at 3/9/2023 0742  Last data filed at 3/8/2023 2228  Gross per 24 hour   Intake 10 ml   Output 300 ml   Net -290 ml          Vitals:   Vitals:    03/09/23 0327   BP: (!) 145/84   Pulse: (!) 108   Resp: 18   Temp: 98.1 °F (36.7 °C)   SpO2: 93%       Physical Exam:   General: NAD, taking morning meds  Eyes: EOMI  ENT: Moist mucous membranes  Cardiovascular: Regular rate. Respiratory: Clear to auscultation, no wheezing appreciated. On 4L of O2 via NC  Gastrointestinal: Soft, non tender  Genitourinary: No suprapubic tenderness  Musculoskeletal: B/L mild pitting lower ext edema  Skin: Warm, dry  Neuro: Alert. Psych: Mood appropriate.      Medications:   Medications:    insulin lispro  0-8 Units SubCUTAneous TID WC    insulin lispro  0-4 Units SubCUTAneous Nightly    QUEtiapine  150 mg Oral Nightly    escitalopram  5 mg Oral Daily    fluticasone  1 spray Each Nostril Daily    sodium chloride flush  5-40 mL IntraVENous 2 times per day    enoxaparin  40 mg SubCUTAneous Daily    ipratropium-albuterol  1 ampule Inhalation 4x daily    [Held by provider] amphetamine-dextroamphetamine  30 mg Oral Daily    aspirin  81 mg Oral Daily    buprenorphine-naloxone  1 Film SubLINGual TID    budesonide-formoterol  2 puff Inhalation BID    sennosides-docusate sodium  1 tablet Oral BID    pregabalin  100 mg Oral TID      Infusions:    dextrose      sodium chloride 12.5 mL/hr at 03/07/23 0130     PRN Meds: glucose, 4 tablet, PRN  dextrose bolus, 125 mL, PRN   Or  dextrose bolus, 250 mL, PRN  glucagon (rDNA), 1 mg, PRN  dextrose, , Continuous PRN  sodium chloride flush, 5-40 mL, PRN  sodium chloride, , PRN  ondansetron, 4 mg, Q8H PRN   Or  ondansetron, 4 mg, Q6H PRN  polyethylene glycol, 17 g, Daily PRN  acetaminophen, 650 mg, Q6H PRN   Or  acetaminophen, 650 mg, Q6H PRN  [Held by provider] traMADol, 50 mg, BID PRN      Labs      Recent Results (from the past 24 hour(s))   Comprehensive Metabolic Panel    Collection Time: 03/08/23  9:20 AM   Result Value Ref Range    Sodium 137 135 - 145 MMOL/L    Potassium 4.8 3.5 - 5.1 MMOL/L    Chloride 93 (L) 99 - 110 mMol/L    CO2 39 (H) 21 - 32 MMOL/L    BUN 12 6 - 23 MG/DL    Creatinine 0.5 (L) 0.6 - 1.1 MG/DL    Est, Glom Filt Rate >60 >60 mL/min/1.73m2    Glucose 224 (H) 70 - 99 MG/DL    Calcium 8.4 8.3 - 10.6 MG/DL    Albumin 3.4 3.4 - 5.0 GM/DL    Total Protein 5.8 (L) 6.4 - 8.2 GM/DL    Total Bilirubin 0.3 0.0 - 1.0 MG/DL    ALT 71 (H) 10 - 40 U/L    AST 68 (H) 15 - 37 IU/L    Alkaline Phosphatase 79 40 - 128 IU/L    Anion Gap 5 4 - 16   POCT Glucose    Collection Time: 03/08/23  4:46 PM   Result Value Ref Range    POC Glucose 179 (H) 70 - 99 MG/DL   POCT Glucose    Collection Time: 03/08/23  8:47 PM   Result Value Ref Range    POC Glucose 151 (H) 70 - 99 MG/DL   Hepatic Function Panel    Collection Time: 03/09/23  2:22 AM   Result Value Ref Range    Albumin 3.4 3.4 - 5.0 GM/DL    Total Bilirubin 0.2 0.0 - 1.0 MG/DL    Bilirubin, Direct 0.2 0.0 - 0.3 MG/DL    Bilirubin, Indirect 0.0 0 - 0.7 MG/DL    Alkaline Phosphatase 79 40 - 129 IU/L    AST 51 (H) 15 - 37 IU/L    ALT 70 (H) 10 - 40 U/L    Total Protein 5.7 (L) 6.4 - 8.2 GM/DL   POCT Glucose    Collection Time: 03/09/23  7:04 AM   Result Value Ref Range    POC Glucose 117 (H) 70 - 99 MG/DL          Imaging/Diagnostics Last 24 Hours   No results found.     Electronically signed by Tameka Jay MD on 3/9/2023 at 7:42 AM

## 2023-03-09 NOTE — PROGRESS NOTES
Pulmonary and Critical Care  Progress Note      VITALS:  BP (!) 145/84   Pulse (!) 103   Temp 98.1 °F (36.7 °C) (Axillary)   Resp 15   Ht 5' 4\" (1.626 m)   Wt 280 lb (127 kg)   SpO2 94%   BMI 48.06 kg/m²     Subjective:   CHIEF COMPLAINT :SOB     HPI:                The patient is a 63 y.o. female is sleepy but arousable. She is not in acute resp distress    Objective:   PHYSICAL EXAM:    LUNGS:Occasional basal crackles  Abd-soft, BS+, NT  Ext- no pedal edema  CVS-s1s2, no murmurs      DATA:    CBC:  Recent Labs     03/07/23  0118   WBC 7.1   RBC 4.20   HGB 11.7*   HCT 39.8      MCV 94.8   MCH 27.9   MCHC 29.4*   RDW 12.6   SEGSPCT 55.3      BMP:  Recent Labs     03/07/23  0118 03/08/23  0920    137   K 4.2 4.8   CL 95* 93*   CO2 43* 39*   BUN 15 12   CREATININE 0.4* 0.5*   CALCIUM 8.2* 8.4   GLUCOSE 129* 224*      ABG:  No results for input(s): PH, PO2ART, GBD6JHZ, HCO3, BEART, O2SAT in the last 72 hours.   BNP  No results found for: BNP   D-Dimer:  Lab Results   Component Value Date    DDIMER 745 (H) 02/06/2022      Radiology: None      Assessment/Plan     Patient Active Problem List    Diagnosis Date Noted    Delirium due to another medical condition 03/06/2023     Priority: Medium    Bipolar affective disorder, currently depressed, moderate ([facility] 03/06/2023     Priority: Medium    Uncontrolled pain     Generalized weakness     Gait disturbance     Myoclonus     Near syncope     Moderate protein-calorie malnutrition (HCC)     Traumatic closed nondisplaced fracture of distal end of right fibula, initial encounter 01/11/2022    Asterixis     Hypercapnia     Chronic rhinitis 01/05/2022    Fall at home, initial encounter 01/05/2022    COPD exacerbation ([facility] 07/09/2021    Acute exacerbation of chronic obstructive pulmonary disease (COPD) ([facility] 06/26/2021    Acute on chronic respiratory failure with hypoxia and hypercapnia ([facility] 05/23/2021    COVID-19 05/23/2021    Pulmonary emphysema ([facility] 05/18/2021    SOB (shortness of breath) 05/18/2021    Leg swelling 05/18/2021    Chronic hepatitis C without hepatic coma ([address] 05/17/2021     Overview Note:     Patient saw GI specialists and cleared it on her own      Gastroesophageal reflux disease without esophagitis 05/17/2021    H/O drug abuse ([address] 05/17/2021     Overview Note:     Prior heroine      Pulmonary nodules 05/17/2021     Overview Note:     Needs f/u 11/2021      ADHD (attention deficit hyperactivity disorder) 05/17/2021    Pneumonia 05/02/2021    Acute respiratory failure with hypercapnia ([address] 09/24/2020    Chronic respiratory failure ([address] 08/16/2017    COPD, severe ([address] 08/16/2017    Obstructive sleep apnea 08/16/2017     Overview Note:     Uses biPAP at night      S/P placement of cardiac pacemaker 2005     Overview Note:     Has never had replacement battery.      Acute on Chronic Hypoxic Hypercapneic resp failure- improving   Chronic Metabolic Alkalosis  COPD on Home O2  Atypical Pneumonia  Obesity  KING  Pulmonary HTN  Chronic Pain Syndrome       BIPAP qhs and prn  ICS  OOB  PT/OT  Inhalers  Keep sats > 92%  Await placement  C/w present management    Electronically signed by Bird Aranda MD on 3/9/2023 at 8:58 AM

## 2023-03-09 NOTE — PROGRESS NOTES
Psychiatric Progress Note    William Philip  [id number]  03/09/23    CHIEF COMPLAINT: \"Did you talk to PT? \"    HPI: William Philip Patient is a 63year old female with PMHx of  COPD, chronic pain, who presents to [facility] ED via EMS due to SOB, and weakness requiring BIPAP in the ED. She wears 4L of )2 at baseline. Consults include pulmonology. Psychiatry consulted by Dr Marilin Brasher due to [facility] having auditory hallucinations. Her seroquel was on hold 2/2 AMS from hypercarbia. Now improved and resumed 3/5/23. PT recommending psychiatry return to patient as psychiatric components/stressors  are interacting/interfering with her physical therapy and limiting her outcomes and safety. Met with patient at bedside. She is alert and oriented x 4. Denies SI/HI. Denies any more auditory hallucinations. Denies visual hallucinations. She was excited to ask \"Have you talked to PT today? I was very excited for them to come and my attitude was lets do this! \" \"I even was able to go into the bathroom on my own today. \" Worked on cognitive behavioral therapy today. Discussed with patient how her thoughts  are effecting how she feels and then the behaviors are also linked. Patient was able to link her \"fear of falling\" to her thoughts and why she is afraid to fall which was 2/2 bilateral ankle fractures. Discussed also that patient is not sleeping well. Her mood is better though. She wants to go to SNF to continue getting stronger. Notice patient was relaxed and no jerks were observed. Her mood was very positive and strong.     No Known Allergies    Medications Prior to Admission: amitriptyline (ELAVIL) 75 MG tablet, Take 75 mg by mouth nightly  QUEtiapine (SEROQUEL) 50 MG tablet, Take 100 mg by mouth nightly  DULERA 100-5 MCG/ACT inhaler, Inhale 2 puffs into the lungs 2 times daily  SYMBICORT 160-4.5 MCG/ACT AERO, Inhale 2 puffs into the lungs 2 times daily SHAKE WELL  pregabalin (LYRICA) 100 MG capsule, Take 1 capsule (100 mg) by mouth 3 times per day  traMADol (ULTRAM) 50 MG tablet, Take 50 mg by mouth 2 times daily as needed for Pain. As needed  traZODone (DESYREL) 150 MG tablet, Take 300 mg by mouth nightly  sennosides-docusate sodium (SENOKOT-S) 8.6-50 MG tablet, Take 1 tablet by mouth 2 times daily  albuterol sulfate  (90 Base) MCG/ACT inhaler, USE 2 PUFFS BY MOUTH EVERY 6 HOURS AS NEEDED SHAKE WELL  ipratropium-albuterol (DUONEB) 0.5-2.5 (3) MG/3ML SOLN nebulizer solution, Inhale 3 mLs into the lungs every 6 hours as needed for Shortness of Breath  BiPAP Machine MISC, by Does not apply route Use nightly  aspirin 81 MG chewable tablet, Take 81 mg by mouth daily  Multiple Vitamins-Minerals (WOMENS MULTIVITAMIN PO), Take 1 tablet by mouth daily  amphetamine-dextroamphetamine (ADDERALL XR) 30 MG extended release capsule, Take 30 mg by mouth daily. OXYGEN, Inhale 4 L into the lungs continuous   buprenorphine-naloxone (SUBOXONE) 8-2 MG FILM SL film, Place 1 Film under the tongue 3 times daily.     Past Medical History:   Diagnosis Date    ADHD 2006    COPD (chronic obstructive pulmonary disease) ([facility] [address]     COVID-19     Drug addiction in remission ([facility] [address]     recovering addict    Hep C w/o coma, chronic (HCC)     Pacemaker     Sleep apnea     TIA (transient ischemic attack)     per patient \"several mini strokes\"        Patient Active Problem List   Diagnosis    Chronic respiratory failure (HCC)    COPD, severe (HCC)    Obstructive sleep apnea    Acute respiratory failure with hypercapnia (HCC)    Pneumonia    Chronic hepatitis C without hepatic coma (HCC)    S/P placement of cardiac pacemaker    Gastroesophageal reflux disease without esophagitis    H/O drug abuse (HCC)    Pulmonary nodules    ADHD (attention deficit hyperactivity disorder)    Pulmonary emphysema (HCC)    SOB (shortness of breath)    Leg swelling    Acute on chronic respiratory failure with hypoxia and hypercapnia (HCC)    COVID-19    Acute exacerbation of chronic obstructive pulmonary disease (COPD) (HCC)    COPD exacerbation (HCC)    Chronic rhinitis    Fall at home, initial encounter    Asterixis    Hypercapnia    Traumatic closed nondisplaced fracture of distal end of right fibula, initial encounter    Uncontrolled pain    Generalized weakness    Gait disturbance    Myoclonus    Near syncope    Moderate protein-calorie malnutrition (HCC)    Delirium due to another medical condition    Bipolar affective disorder, currently depressed, moderate ([facility] [address]       Review of Systems    OBJECTIVE  Vital Signs:  Vitals:    03/09/23 0809   BP:    Pulse: (!) 103   Resp: 15   Temp:    SpO2: 94%       Labs:  Recent Results (from the past 48 hour(s))   Comprehensive Metabolic Panel    Collection Time: 03/08/23  9:20 AM   Result Value Ref Range    Sodium 137 135 - 145 MMOL/L    Potassium 4.8 3.5 - 5.1 MMOL/L    Chloride 93 (L) 99 - 110 mMol/L    CO2 39 (H) 21 - 32 MMOL/L    BUN 12 6 - 23 MG/DL    Creatinine 0.5 (L) 0.6 - 1.1 MG/DL    Est, Glom Filt Rate >60 >60 mL/min/1.73m2    Glucose 224 (H) 70 - 99 MG/DL    Calcium 8.4 8.3 - 10.6 MG/DL    Albumin 3.4 3.4 - 5.0 GM/DL    Total Protein 5.8 (L) 6.4 - 8.2 GM/DL    Total Bilirubin 0.3 0.0 - 1.0 MG/DL    ALT 71 (H) 10 - 40 U/L    AST 68 (H) 15 - 37 IU/L    Alkaline Phosphatase 79 40 - 128 IU/L    Anion Gap 5 4 - 16   POCT Glucose    Collection Time: 03/08/23  4:46 PM   Result Value Ref Range    POC Glucose 179 (H) 70 - 99 MG/DL   POCT Glucose    Collection Time: 03/08/23  8:47 PM   Result Value Ref Range    POC Glucose 151 (H) 70 - 99 MG/DL   Hepatic Function Panel    Collection Time: 03/09/23  2:22 AM   Result Value Ref Range    Albumin 3.4 3.4 - 5.0 GM/DL    Total Bilirubin 0.2 0.0 - 1.0 MG/DL    Bilirubin, Direct 0.2 0.0 - 0.3 MG/DL    Bilirubin, Indirect 0.0 0 - 0.7 MG/DL    Alkaline Phosphatase 79 40 - 129 IU/L    AST 51 (H) 15 - 37 IU/L    ALT 70 (H) 10 - 40 U/L    Total Protein 5.7 (L) 6.4 - 8.2 GM/DL   POCT Glucose    Collection Time: 03/09/23  7:04 AM   Result Value Ref Range    POC Glucose 117 (H) 70 - 99 MG/DL       PSYCHIATRIC ASSESSMENT / MENTAL STATUS EXAM:   Vitals: Blood pressure (!) 145/84, pulse (!) 103, temperature 98.1 °F (36.7 °C), temperature source Axillary, resp. rate 15, height 5' 4\" (1.626 m), weight 280 lb (127 kg), SpO2 94 %. CONSTITUTIONAL:    Appearance: appears stated age. alert and oriented to person, place, time & situation. no acute distress. Adequate grooming and hygeine. Good eye contact. No prominent physical abnormalities. Attitude: Manner is cooperative and pleasant  Motor: No psychomotor agitation, retardation or abnormal movements noted  Speech: Clearly articulated; normal rate, volume, tone & amount. Language: intact understanding and production  Mood: improving  Affect: euthymic, full range, non-labile, congruent with mood and content of speech  Thought Production: Spontaneous. Thought Form: Coherent, linear, logical & goal-directed. No tangentiality or circumstantiality. No flight of ideas or loosening of associations. Thought Content/Perceptions: No SUNNY, no AVH, no delusion  Insight: improving  Judgment- adequate  Memory: Immediate, recent, and remote appear intact, though not formally tested. Attention: maintained throughout interview  Fund of knowledge: Average  Gait/Balance: GILDA         IMPRESSION:   Delirum due to medical  COPD exacerbation  Bipolar affective disorder, depressed  ADHD         PLAN:  Continue to not utilize trazodone as not helpful at this time. 2/2 tolerance   Recommend increasing seroquel 200 mg po at bedtime to address  for mood stabilizer  Increase Lexapro 10 mg po daily. Will leave atarax 25 mg po prn at bedtime if unable to sleep after night time meds have been provided.   Dc elavil 75 po at  bedtime as patient feels it has added to her bipolar symptoms  Would not recommend restarting adderall at this time as not necessary during hospitalization  Psychiatry will continue to follow  Thank you for this consult  PS to Dr Nadeen Cameron regarding recommendations  Psychiatric management:medication initiation and titration, group and individual therapy, safe and theraputic environment. Status of problem/condition: ?Improving  Medical co-morbidities: Management per [facility] group, appreciate assistance  Legal Status:Pending  Disposition:estimated LOS: Pending. The treatment team reviewed with the patient the diagnosis and treatment recommendations to include the risks, benefits, and side effects of chosen medications. The patient verbalized understanding and agreed with the treatment regimen as outlined above. The patient was encouraged to participate in groups. Medical records, Labs, Diagnotic tests reviewed  q15 min safety checks for safety  Interval History. Review current labs  Continue current medications  Supportive Therapy Provided  Pt had an opportunity to ask questions and address concerns  Pt encouraged to continue therapy group or individual.  Pt was in agreement with treatment plan. The risks benefits and side effects of medications were discussed with the patient, including alternatives and no treatment.     Electronically signed by KELLY Michel CNP on 3/9/2023 at 8:53 AM

## 2023-03-10 ENCOUNTER — APPOINTMENT (OUTPATIENT)
Dept: GENERAL RADIOLOGY | Age: 63
DRG: 193 | End: 2023-03-10
Payer: MEDICARE

## 2023-03-10 PROBLEM — J96.02 ACUTE RESPIRATORY FAILURE WITH HYPERCAPNIA (HCC): Status: RESOLVED | Noted: 2020-09-24 | Resolved: 2023-03-10

## 2023-03-10 LAB
ALBUMIN SERPL-MCNC: 3.5 GM/DL (ref 3.4–5)
ALP BLD-CCNC: 83 IU/L (ref 40–129)
ALT SERPL-CCNC: 65 U/L (ref 10–40)
ANION GAP SERPL CALCULATED.3IONS-SCNC: 5 MMOL/L (ref 4–16)
AST SERPL-CCNC: 41 IU/L (ref 15–37)
BILIRUB SERPL-MCNC: 0.3 MG/DL (ref 0–1)
BUN SERPL-MCNC: 8 MG/DL (ref 6–23)
CALCIUM SERPL-MCNC: 9.1 MG/DL (ref 8.3–10.6)
CHLORIDE BLD-SCNC: 93 MMOL/L (ref 99–110)
CO2: 43 MMOL/L (ref 21–32)
CREAT SERPL-MCNC: 0.3 MG/DL (ref 0.6–1.1)
GFR SERPL CREATININE-BSD FRML MDRD: >60 ML/MIN/1.73M2
GLUCOSE BLD-MCNC: 120 MG/DL (ref 70–99)
GLUCOSE BLD-MCNC: 150 MG/DL (ref 70–99)
GLUCOSE BLD-MCNC: 154 MG/DL (ref 70–99)
GLUCOSE BLD-MCNC: 156 MG/DL (ref 70–99)
GLUCOSE SERPL-MCNC: 123 MG/DL (ref 70–99)
HCT VFR BLD CALC: 40.6 % (ref 37–47)
HEMOGLOBIN: 12.2 GM/DL (ref 12.5–16)
MAGNESIUM: 1.7 MG/DL (ref 1.8–2.4)
MCH RBC QN AUTO: 28.2 PG (ref 27–31)
MCHC RBC AUTO-ENTMCNC: 30 % (ref 32–36)
MCV RBC AUTO: 93.8 FL (ref 78–100)
PDW BLD-RTO: 12.5 % (ref 11.7–14.9)
PHOSPHORUS: 4 MG/DL (ref 2.5–4.9)
PLATELET # BLD: 147 K/CU MM (ref 140–440)
PMV BLD AUTO: 10 FL (ref 7.5–11.1)
POTASSIUM SERPL-SCNC: 4.4 MMOL/L (ref 3.5–5.1)
RBC # BLD: 4.33 M/CU MM (ref 4.2–5.4)
SODIUM BLD-SCNC: 141 MMOL/L (ref 135–145)
TOTAL PROTEIN: 6 GM/DL (ref 6.4–8.2)
WBC # BLD: 6.6 K/CU MM (ref 4–10.5)

## 2023-03-10 PROCEDURE — 94150 VITAL CAPACITY TEST: CPT

## 2023-03-10 PROCEDURE — 85027 COMPLETE CBC AUTOMATED: CPT

## 2023-03-10 PROCEDURE — 36415 COLL VENOUS BLD VENIPUNCTURE: CPT

## 2023-03-10 PROCEDURE — 1200000000 HC SEMI PRIVATE

## 2023-03-10 PROCEDURE — 6360000002 HC RX W HCPCS: Performed by: STUDENT IN AN ORGANIZED HEALTH CARE EDUCATION/TRAINING PROGRAM

## 2023-03-10 PROCEDURE — 84100 ASSAY OF PHOSPHORUS: CPT

## 2023-03-10 PROCEDURE — 83735 ASSAY OF MAGNESIUM: CPT

## 2023-03-10 PROCEDURE — 94664 DEMO&/EVAL PT USE INHALER: CPT

## 2023-03-10 PROCEDURE — 6360000002 HC RX W HCPCS: Performed by: NURSE PRACTITIONER

## 2023-03-10 PROCEDURE — 80053 COMPREHEN METABOLIC PANEL: CPT

## 2023-03-10 PROCEDURE — 6370000000 HC RX 637 (ALT 250 FOR IP): Performed by: NURSE PRACTITIONER

## 2023-03-10 PROCEDURE — 82962 GLUCOSE BLOOD TEST: CPT

## 2023-03-10 PROCEDURE — 94660 CPAP INITIATION&MGMT: CPT

## 2023-03-10 PROCEDURE — 73030 X-RAY EXAM OF SHOULDER: CPT

## 2023-03-10 PROCEDURE — 2580000003 HC RX 258: Performed by: NURSE PRACTITIONER

## 2023-03-10 PROCEDURE — 6370000000 HC RX 637 (ALT 250 FOR IP): Performed by: INTERNAL MEDICINE

## 2023-03-10 PROCEDURE — 94640 AIRWAY INHALATION TREATMENT: CPT

## 2023-03-10 PROCEDURE — 94761 N-INVAS EAR/PLS OXIMETRY MLT: CPT

## 2023-03-10 PROCEDURE — 94668 MNPJ CHEST WALL SBSQ: CPT

## 2023-03-10 PROCEDURE — 2700000000 HC OXYGEN THERAPY PER DAY

## 2023-03-10 RX ORDER — QUETIAPINE FUMARATE 200 MG/1
200 TABLET, FILM COATED ORAL NIGHTLY
Qty: 60 TABLET | Refills: 2 | OUTPATIENT
Start: 2023-03-10

## 2023-03-10 RX ORDER — ESCITALOPRAM OXALATE 10 MG/1
10 TABLET ORAL DAILY
Qty: 30 TABLET | Refills: 3 | OUTPATIENT
Start: 2023-03-11

## 2023-03-10 RX ORDER — ALBUTEROL SULFATE 90 UG/1
2 AEROSOL, METERED RESPIRATORY (INHALATION) 4 TIMES DAILY
Status: DISCONTINUED | OUTPATIENT
Start: 2023-03-10 | End: 2023-03-10 | Stop reason: SDUPTHER

## 2023-03-10 RX ORDER — IPRATROPIUM BROMIDE AND ALBUTEROL SULFATE 2.5; .5 MG/3ML; MG/3ML
1 SOLUTION RESPIRATORY (INHALATION) EVERY 4 HOURS PRN
Status: DISCONTINUED | OUTPATIENT
Start: 2023-03-10 | End: 2023-03-15 | Stop reason: HOSPADM

## 2023-03-10 RX ORDER — ALBUTEROL SULFATE 90 UG/1
2 AEROSOL, METERED RESPIRATORY (INHALATION) 4 TIMES DAILY
Status: DISCONTINUED | OUTPATIENT
Start: 2023-03-10 | End: 2023-03-15 | Stop reason: HOSPADM

## 2023-03-10 RX ORDER — MAGNESIUM SULFATE IN WATER 40 MG/ML
2000 INJECTION, SOLUTION INTRAVENOUS ONCE
Status: COMPLETED | OUTPATIENT
Start: 2023-03-10 | End: 2023-03-10

## 2023-03-10 RX ADMIN — ESCITALOPRAM OXALATE 10 MG: 10 TABLET ORAL at 09:14

## 2023-03-10 RX ADMIN — BUPRENORPHINE AND NALOXONE 1 FILM: 8; 2 FILM BUCCAL; SUBLINGUAL at 22:51

## 2023-03-10 RX ADMIN — BUDESONIDE AND FORMOTEROL FUMARATE DIHYDRATE 2 PUFF: 80; 4.5 AEROSOL RESPIRATORY (INHALATION) at 18:43

## 2023-03-10 RX ADMIN — IPRATROPIUM BROMIDE AND ALBUTEROL SULFATE 1 AMPULE: 2.5; .5 SOLUTION RESPIRATORY (INHALATION) at 15:15

## 2023-03-10 RX ADMIN — MAGNESIUM SULFATE HEPTAHYDRATE 2000 MG: 2 INJECTION, SOLUTION INTRAVENOUS at 10:36

## 2023-03-10 RX ADMIN — SODIUM CHLORIDE, PRESERVATIVE FREE 10 ML: 5 INJECTION INTRAVENOUS at 09:15

## 2023-03-10 RX ADMIN — IPRATROPIUM BROMIDE AND ALBUTEROL SULFATE 1 AMPULE: 2.5; .5 SOLUTION RESPIRATORY (INHALATION) at 11:30

## 2023-03-10 RX ADMIN — FLUTICASONE PROPIONATE 1 SPRAY: 50 SPRAY, METERED NASAL at 09:14

## 2023-03-10 RX ADMIN — SENNOSIDES AND DOCUSATE SODIUM 1 TABLET: 50; 8.6 TABLET ORAL at 09:14

## 2023-03-10 RX ADMIN — ENOXAPARIN SODIUM 30 MG: 100 INJECTION SUBCUTANEOUS at 09:14

## 2023-03-10 RX ADMIN — ACETAMINOPHEN 650 MG: 325 TABLET ORAL at 00:13

## 2023-03-10 RX ADMIN — ASPIRIN 81 MG CHEWABLE TABLET 81 MG: 81 TABLET CHEWABLE at 09:14

## 2023-03-10 RX ADMIN — IPRATROPIUM BROMIDE AND ALBUTEROL SULFATE 1 AMPULE: 2.5; .5 SOLUTION RESPIRATORY (INHALATION) at 07:15

## 2023-03-10 RX ADMIN — BUDESONIDE AND FORMOTEROL FUMARATE DIHYDRATE 2 PUFF: 80; 4.5 AEROSOL RESPIRATORY (INHALATION) at 01:35

## 2023-03-10 RX ADMIN — ENOXAPARIN SODIUM 30 MG: 100 INJECTION SUBCUTANEOUS at 22:55

## 2023-03-10 RX ADMIN — IPRATROPIUM BROMIDE AND ALBUTEROL SULFATE 1 AMPULE: 2.5; .5 SOLUTION RESPIRATORY (INHALATION) at 01:35

## 2023-03-10 RX ADMIN — BUPRENORPHINE AND NALOXONE 1 FILM: 8; 2 FILM BUCCAL; SUBLINGUAL at 09:14

## 2023-03-10 RX ADMIN — QUETIAPINE FUMARATE 200 MG: 100 TABLET ORAL at 22:52

## 2023-03-10 RX ADMIN — BUPRENORPHINE AND NALOXONE 1 FILM: 8; 2 FILM BUCCAL; SUBLINGUAL at 15:26

## 2023-03-10 RX ADMIN — ALBUTEROL SULFATE 2 PUFF: 90 AEROSOL, METERED RESPIRATORY (INHALATION) at 18:43

## 2023-03-10 ASSESSMENT — PAIN SCALES - WONG BAKER
WONGBAKER_NUMERICALRESPONSE: 8
WONGBAKER_NUMERICALRESPONSE: 8
WONGBAKER_NUMERICALRESPONSE: 0
WONGBAKER_NUMERICALRESPONSE: 8
WONGBAKER_NUMERICALRESPONSE: 0;8
WONGBAKER_NUMERICALRESPONSE: 8

## 2023-03-10 ASSESSMENT — ENCOUNTER SYMPTOMS
EYES NEGATIVE: 1
RESPIRATORY NEGATIVE: 1
GASTROINTESTINAL NEGATIVE: 1

## 2023-03-10 ASSESSMENT — PAIN DESCRIPTION - DESCRIPTORS
DESCRIPTORS: ACHING;CRAMPING
DESCRIPTORS: ACHING;CRAMPING

## 2023-03-10 ASSESSMENT — PAIN DESCRIPTION - ORIENTATION
ORIENTATION: LEFT;POSTERIOR
ORIENTATION: LEFT
ORIENTATION: LEFT

## 2023-03-10 ASSESSMENT — PAIN DESCRIPTION - ONSET
ONSET: ON-GOING
ONSET: ON-GOING

## 2023-03-10 ASSESSMENT — PAIN SCALES - GENERAL
PAINLEVEL_OUTOF10: 8
PAINLEVEL_OUTOF10: 2
PAINLEVEL_OUTOF10: 5
PAINLEVEL_OUTOF10: 8

## 2023-03-10 ASSESSMENT — PAIN DESCRIPTION - LOCATION
LOCATION: SHOULDER
LOCATION: ARM
LOCATION: ARM

## 2023-03-10 ASSESSMENT — PAIN DESCRIPTION - FREQUENCY
FREQUENCY: CONTINUOUS
FREQUENCY: CONTINUOUS

## 2023-03-10 ASSESSMENT — PAIN - FUNCTIONAL ASSESSMENT
PAIN_FUNCTIONAL_ASSESSMENT: PREVENTS OR INTERFERES SOME ACTIVE ACTIVITIES AND ADLS
PAIN_FUNCTIONAL_ASSESSMENT: ACTIVITIES ARE NOT PREVENTED
PAIN_FUNCTIONAL_ASSESSMENT: ACTIVITIES ARE NOT PREVENTED

## 2023-03-10 ASSESSMENT — PAIN DESCRIPTION - PAIN TYPE
TYPE: CHRONIC PAIN
TYPE: CHRONIC PAIN

## 2023-03-10 NOTE — DISCHARGE INSTR - COC
Continuity of Care Form    Patient Name: William Philip   :  1960  MRN:  [id number]    Admit date:  3/1/2023  Discharge date:  ***    Code Status Order: Full Code   Advance Directives:     Admitting Physician:  Sharon Jackson MD  PCP: Poly Wright MD    Discharging Nurse: [facility] Unit/Room#: 1106/1106-A  Discharging Unit Phone Number: ***    Emergency Contact:   Extended Emergency Contact Information  Primary Emergency Contact: Quinten Weir [address] Phone: [phone]  Work Phone: [phone]  Mobile Phone: [phone]  Relation: Child  Secondary Emergency Contact: Pam Leon Phone: [phone]  Work Phone: [phone]  Mobile Phone: [phone]  Relation: Brother/Sister    Past Surgical History:  Past Surgical History:   Procedure Laterality Date    [address]  2010    done in [address] [place] [address]         Immunization History: There is no immunization history on file for this patient. Active Problems:  Patient Active Problem List   Diagnosis Code    Chronic respiratory failure (HCC) J96.10    COPD, severe (HCC) J44.9    Obstructive sleep apnea G47.33    Pneumonia J18.9    Chronic hepatitis C without hepatic coma (HCC) B18.2    S/P placement of cardiac pacemaker Z95.0    Gastroesophageal reflux disease without esophagitis K21.9    H/O drug abuse ([facility] [address] F19.11    Pulmonary nodules R91.8    ADHD (attention deficit hyperactivity disorder) F90.9    Pulmonary emphysema (HCC) J43.9    SOB (shortness of breath) R06.02    Leg swelling M79.89    Acute on chronic respiratory failure with hypoxia and hypercapnia (HCC) J96.21, J96.22    COVID-19 U07.1    Acute exacerbation of chronic obstructive pulmonary disease (COPD) ([address] J44.1    COPD exacerbation ([address] J44.1    Chronic rhinitis J31.0    Fall at home, initial encounter W19. XXXA, Y92.009    Asterixis R27.8    Hypercapnia R06.89    Traumatic closed nondisplaced fracture of distal end of right fibula, initial encounter S82.831A    Uncontrolled pain R52    Generalized weakness R53.1    Gait disturbance R26.9    Myoclonus G25.3    Near syncope R55    Moderate protein-calorie malnutrition (HCC) E44.0    Delirium due to another medical condition F05    Bipolar affective disorder, currently depressed, moderate ([facility] [address] F31.32       Isolation/Infection:   Isolation            No Isolation          Patient Infection Status       Infection Onset Added Last Indicated Last Indicated By Review Planned Expiration Resolved Resolved By    None active    Resolved    COVID-19 (Rule Out) 23 Respiratory Panel, Molecular, with COVID-19 (Restricted: peds pts or suitable admitted adults) (Ordered)   23 Rule-Out Test Resulted    COVID-19 (Rule Out) 23 COVID-19, Rapid (Ordered)   23 Rule-Out Test Resulted    COVID-19 (Rule Out) 22 COVID-19, Rapid (Ordered)   22 Rule-Out Test Resulted    COVID-19 (Rule Out) 22 COVID-19 (Ordered)   22 Rule-Out Test Resulted    COVID-19 (Rule Out) 22 Respiratory Panel, Molecular, with COVID-19 (Restricted: peds pts or suitable admitted adults) (Ordered)   22 Rule-Out Test Resulted    COVID-19 (Rule Out) 22 COVID-19, Rapid (Ordered)   22 Rule-Out Test Resulted    COVID-19 (Rule Out) 21 COVID-19, Rapid (Ordered)   21 Rule-Out Test Resulted    COVID-19 (Rule Out) 21 COVID-19, Rapid (Ordered)   21 Rule-Out Test Resulted    COVID-19 21 COVID-19, Rapid   21     COVID-19 (Rule Out) 21 COVID-19, Rapid (Ordered)   21 Rule-Out Test Resulted    COVID-19 (Rule Out) 21 COVID-19, Rapid (Ordered)   21 Rule-Out Test Resulted    COVID-19 (Rule Out) 12/29/20 12/29/20 12/29/20 COVID-19 (Ordered)   12/29/20 Rule-Out Test Resulted    COVID-19 (Rule Out) 11/17/20 11/17/20 11/17/20 COVID-19 (Ordered)   11/17/20 Rule-Out Test Resulted    COVID-19 (Rule Out) 09/24/20 09/24/20 09/24/20 COVID-19 (Ordered)   09/24/20 Rule-Out Test Resulted            Nurse Assessment:  Last Vital Signs: /82   Pulse 98   Temp 98.2 °F (36.8 °C) (Oral)   Resp 12   Ht 5' 4\" (1.626 m)   Wt 280 lb (127 kg)   SpO2 93%   BMI 48.06 kg/m²     Last documented pain score (0-10 scale): Pain Level: 8  Last Weight:   Wt Readings from Last 1 Encounters:   03/08/23 280 lb (127 kg)     Mental Status:  {IP PT MENTAL STATUS:79438}    IV Access:  { ASHUTOSH IV ACCESS:437662586}    Nursing Mobility/ADLs:  Walking   {CHP DME DUNE:531390214}  Transfer  {CHP DME WXAY:287772941}  Bathing  {CHP DME WTAV:682947438}  Dressing  {CHP DME XJIN:251799426}  Toileting  {CHP DME RGKD:533981991}  Feeding  {P DME RPAM:661248621}  Med Admin  {P DME BQWB:844647186}  Med Delivery   { ASHUTOSH MED Delivery:693299275}    Wound Care Documentation and Therapy:        Elimination:  Continence: Bowel: {YES / OV:56036}  Bladder: {YES / IX:44130}  Urinary Catheter: {Urinary Catheter:681499866}   Colostomy/Ileostomy/Ileal Conduit: {YES / TW:38840}       Date of Last BM: ***  No intake or output data in the 24 hours ending 03/10/23 1209  I/O last 3 completed shifts:   In: 10 [I.V.:10]  Out: -     Safety Concerns:     [address] Safety Concerns:051769022}    Impairments/Disabilities:      [address] Impairments/Disabilities:166026580}    Nutrition Therapy:  Current Nutrition Therapy:   [address] ASHUTOSH Diet List:963493574}    Routes of Feeding: {CHP DME Other Feedings:343673727}  Liquids: {Slp liquid thickness:00698}  Daily Fluid Restriction: {CHP DME Yes amt example:728889847}  Last Modified Barium Swallow with Video (Video Swallowing Test): {Done Not Done HOQV:662815211}    Treatments at the Time of Hospital Discharge: Respiratory Treatments: ***  Oxygen Therapy:  {Therapy; copd oxygen:05627}  Ventilator:    {MH CC Vent FYDL:488959797}    Rehab Therapies: {THERAPEUTIC INTERVENTION:3075025888}  Weight Bearing Status/Restrictions: 50Kenzie CORNEJO Weight Bearin}  Other Medical Equipment (for information only, NOT a DME order):  {EQUIPMENT:069300745}  Other Treatments: ***    Patient's personal belongings (please select all that are sent with patient):  {CHP DME Belongings:557060699}    RN SIGNATURE:  {Esignature:115480517}    CASE MANAGEMENT/SOCIAL WORK SECTION    Inpatient Status Date: ***    Readmission Risk Assessment Score:  Readmission Risk              Risk of Unplanned Readmission:  19           Discharging to Facility/ Agency   Name:   Address:  Phone:  Fax:    Dialysis Facility (if applicable)   Name:  Address:  Dialysis Schedule:  Phone:  Fax:    / signature: {Esignature:095688992}    PHYSICIAN SECTION    Prognosis: {Prognosis:1142691038}    Condition at Discharge: 50[address] Hawkins Patient Condition:829205807}    Rehab Potential (if transferring to Rehab): {Prognosis:7875068668}    Recommended Labs or Other Treatments After Discharge: ***    Physician Certification: I certify the above information and transfer of Megan Mulligan  is necessary for the continuing treatment of the diagnosis listed and that she requires {Admit to Appropriate Level of Care:05488} for {GREATER/LESS:172361009} 30 days.      Update Admission H&P: {CHP DME Changes in PTGUV:393483283}    PHYSICIAN SIGNATURE:  {Esignature:164475445}

## 2023-03-10 NOTE — PROGRESS NOTES
Pulmonary and Critical Care  Progress Note      VITALS:  /82   Pulse 98   Temp 98.2 °F (36.8 °C) (Oral)   Resp 12   Ht 5' 4\" (1.626 m)   Wt 280 lb (127 kg)   SpO2 93%   BMI 48.06 kg/m²     Subjective:   CHIEF COMPLAINT :SOB     HPI:                The patient is a 62 y.o. female is sleepy but arousable. She is not in acute reps distress    Objective:   PHYSICAL EXAM:    LUNGS:Occasional basal crackles  Abd-soft, BS+, NT  Ext- no pedal edema  CVS-s1s2, no murmurs      DATA:    CBC:  Recent Labs     03/10/23  0006   WBC 6.6   RBC 4.33   HGB 12.2*   HCT 40.6      MCV 93.8   MCH 28.2   MCHC 30.0*   RDW 12.5      BMP:  Recent Labs     03/08/23  0920 03/10/23  0006    141   K 4.8 4.4   CL 93* 93*   CO2 39* 43*   BUN 12 8   CREATININE 0.5* 0.3*   CALCIUM 8.4 9.1   GLUCOSE 224* 123*      ABG:  No results for input(s): PH, PO2ART, GOY3JXQ, HCO3, BEART, O2SAT in the last 72 hours.  BNP  No results found for: BNP   D-Dimer:  Lab Results   Component Value Date    DDIMER 745 (H) 02/06/2022      Radiology: None      Assessment/Plan     Patient Active Problem List    Diagnosis Date Noted    Delirium due to another medical condition 03/06/2023     Priority: Medium    Bipolar affective disorder, currently depressed, moderate ([facility]) 03/06/2023     Priority: Medium    Uncontrolled pain     Generalized weakness     Gait disturbance     Myoclonus     Near syncope     Moderate protein-calorie malnutrition (HCC)     Traumatic closed nondisplaced fracture of distal end of right fibula, initial encounter 01/11/2022    Asterixis     Hypercapnia     Chronic rhinitis 01/05/2022    Fall at home, initial encounter 01/05/2022    COPD exacerbation ([facility]) 07/09/2021    Acute exacerbation of chronic obstructive pulmonary disease (COPD) ([facility]) 06/26/2021    Acute on chronic respiratory failure with hypoxia and hypercapnia ([facility]) 05/23/2021    COVID-19 05/23/2021    Pulmonary emphysema ([facility]) 05/18/2021    SOB (shortness of  breath) 05/18/2021    Leg swelling 05/18/2021    Chronic hepatitis C without hepatic coma ([facility] [address] 05/17/2021     Overview Note:     Patient saw GI specialists and cleared it on her own      Gastroesophageal reflux disease without esophagitis 05/17/2021    H/O drug abuse ([facility] [address] 05/17/2021     Overview Note:     Prior heroine      Pulmonary nodules 05/17/2021     Overview Note:     Needs f/u 11/2021      ADHD (attention deficit hyperactivity disorder) 05/17/2021    Pneumonia 05/02/2021    Chronic respiratory failure ([facility] [address] 08/16/2017    COPD, severe ([facility] [address] 08/16/2017    Obstructive sleep apnea 08/16/2017     Overview Note:     Uses biPAP at night      S/P placement of cardiac pacemaker 2005     Overview Note:     Has never had replacement battery.        Acute on Chronic Hypoxic Hypercapneic resp failure- improving   Chronic Metabolic Alkalosis  COPD on Home O2  Atypical Pneumonia  Obesity  KING  Pulmonary HTN  Chronic Pain Syndrome     Inhalers  ICS  OOB  BIPAP qhs and prn  Keep sats > 92%  PT/OT  Await placement  C.w present management    Electronically signed by Chon Callaway MD on 3/10/2023 at 12:16 PM

## 2023-03-10 NOTE — PROGRESS NOTES
03/10/23 0715   NIV Type   NIV Started/Stopped On   Equipment Type v60   Mode Bilevel   Mask Type Full face mask   Mask Size Medium   Settings/Measurements   PIP Observed 20 cm H20   IPAP 15 cmH20   CPAP/EPAP 5 cmH2O   Vt (Measured) 1089 mL   Rate Ordered 12   Resp 11   Insp Rise Time (%) 2 %   FiO2  35 %   I Time/ I Time % 1 s   Minute Volume (L/min) 8.2 Liters   Mask Leak (lpm) 46 lpm   Comfort Level Good   Using Accessory Muscles No   SpO2 96   Patient's Home Machine No   Electrical Safety Check Performed Yes   Alarm Settings   Alarms On Y   Low Pressure (cmH2O) 3 cmH2O   High Pressure (cmH2O) 20 cmH2O   Delay Alarm 20 sec(s)   Apnea (secs) 20 secs   RR Low (bpm) 13   RR High (bpm) 40 br/min

## 2023-03-10 NOTE — PROGRESS NOTES
Comprehensive Nutrition Assessment    Type and Reason for Visit:  Initial (length of stay)    Nutrition Recommendations/Plan:   Continue regular diet at this time   Will continue to follow up during stay      Malnutrition Assessment:  Malnutrition Status:  Insufficient data (03/10/23 1159)    Context:  Chronic Illness       Nutrition Assessment:    Admit with shortness of breath, weakness, respiratory failure with hx COPD. Able to tolerate regular diet during stay. Limited po data, 50-75% per records. Breakfast tray observed today, ate 100% of meal and 3 cartons of milk. Signficant variance in documented weights during stay, inaccurate weights. Patient reports good appetite and intake during stay. Will follow at low nutrition risk at this time    Nutrition Related Findings:    resting in bed in dark room with nursing in for care, reports eating well during stay,  hx severe depression, chronic pain, COPD, ADHD Wound Type: None       Current Nutrition Intake & Therapies:    Average Meal Intake: 51-75%, %  Average Supplements Intake: None Ordered  ADULT DIET; Regular    Anthropometric Measures:  Height: 5' 4\" (162.6 cm)  Ideal Body Weight (IBW): 120 lbs (55 kg)    Admission Body Weight: 160 lb 15 oz (73 kg) (bed scale)  Current Body Weight: 279 lb 15.8 oz (127 kg) (? weight accuracy), 233.3 % IBW. Weight Source: Bed Scale  Current BMI (kg/m2): 48  Usual Body Weight: 179 lb (81.2 kg) (hx MD office May 2022)  % Weight Change (Calculated): 56.4  Weight Adjustment For: No Adjustment                 BMI Categories: Overweight (BMI 25.0-29. 9) (based on admit/usual weight)    Estimated Daily Nutrient Needs:  Energy Requirements Based On: Kcal/kg  Weight Used for Energy Requirements: Usual  Energy (kcal/day): 1825 (25 navneet/kg)  Weight Used for Protein Requirements: Usual  Protein (g/day): 73-87 (1-1.2 g/kg)  Method Used for Fluid Requirements: 1 ml/kcal  Fluid (ml/day): 1800    Nutrition Diagnosis:   No nutrition diagnosis at this time related to   as evidenced by      Nutrition Interventions:   Food and/or Nutrient Delivery: Continue Current Diet  Nutrition Education/Counseling: No recommendation at this time  Coordination of Nutrition Care: Continue to monitor while inpatient       Goals:     Goals: PO intake 75% or greater, by next RD assessment       Nutrition Monitoring and Evaluation:   Behavioral-Environmental Outcomes: None Identified  Food/Nutrient Intake Outcomes: Food and Nutrient Intake  Physical Signs/Symptoms Outcomes: Biochemical Data, Meal Time Behavior, Skin, Weight    Discharge Planning:    Continue current diet     Darron Boucher RD, LD  Contact: [phone]

## 2023-03-10 NOTE — PROGRESS NOTES
V2.0  [facility] Hospitalist Progress Note      Name:  Rose Barajas /Age/Sex: 1960  (63 y.o. female)   MRN & CSN:  [id number] & [id number] Encounter Date/Time: 3/10/2023 7:27 AM EST    Location:  [address] [place] [address] PCP: Jerilyn Castellon MD       Hospital Day: 10    Assessment and Plan:   Rose Barajas is a 63 y.o. female with pmh of ADHD, MDD, chronic pain and COPD on home O2 who presents with Acute respiratory failure with hypercapnia ([facility]    Patient agreeable to SNF,  to help with placement. Patient is medically stable. Plan:  #Acute on chronic hypoxic and hypercapnic respiratory failure - Resolved  #Severe COPD exacerbation - Resolved  --Suspect COPD exacerbation as the etiology. May have a component of pneumonia with given increased sputum production and change in color. Chest x-ray equivocal.  --Patient was treated with steroids, now discontinued, and completed azithromycin. Cefepime discontinued, urinary antigens for legionella and strep pneumo negative. MRSA neg  --Blood cultures with 1 out of 4 gram variable bacilli. Likely contaminant  --Continue breathing treatments as needed  --Pulm on board, recs appreciated  --At baseline on 4L of O2, currently at baseline, BiPAP at bedtime, confirmed from patient that her home apparatus is fixed     #Acute metabolic encephalopathy - Resolved  --Likely in the setting of hypercarbia. #Chronic pain  --Continue home Suboxone. ADHD  --Psych recommend holding adderall during hospitalization     #Auditory hallucinations  #Major depressive, in remission. Severe  Patient does have major depressive disorder and takes amitriptyline, Seroquel, and trazodone. Patient had her Seroquel held due to altered mentation requiring BiPAP on presentation. She did develop auditory hallucinations. States this is the first time this ever happened. They were friendly voices and did not instruct her to do anything harmful or dangerous.   Even so, patient states that scary.    --Psych on board, appreciate recs. Changes made as of yesterday, lexapro increased to 10mg PO, seroquel to 200mg, atarax 25mg PRN at bedtime. Trazodone and elavil remain discontinued. Diet ADULT DIET; Regular   DVT Prophylaxis [x] Lovenox, []  Heparin, [] SCDs, [] Ambulation,  [] Eliquis, [] Xarelto  [] Coumadin   Code Status Full Code   Disposition From: Home  Expected Disposition: Home with Penny Corral  Estimated Date of Discharge: 1-2 days  Patient requires continued admission due to need for placement   Surrogate Decision Maker/ POA      Subjective:     Chief Complaint: Shortness of Breath       Patient seen and examined at bedside. Reports that she is doing much better and looking forward to working with PT and going to SNF. She is endorsing pain in her left shoulder, has reportedly had fall even while in hospital.    Review of Systems:    Review of Systems   Constitutional: Negative. HENT: Negative. Eyes: Negative. Respiratory: Negative. Cardiovascular: Negative. Gastrointestinal: Negative. Genitourinary: Negative. Musculoskeletal:         Left shoulder pain  Chronic spasms   Skin: Negative. Neurological: Negative. Psychiatric/Behavioral: Negative. Objective:   No intake or output data in the 24 hours ending 03/10/23 0742       Vitals:   Vitals:    03/10/23 0433   BP: 124/77   Pulse:    Resp:    Temp:    SpO2:        Physical Exam:   General: NAD, on the edge of the bed waiting for breakfast  Eyes: EOMI  ENT: Moist mucous membranes  Cardiovascular: Regular rate. Respiratory: Clear to auscultation, no wheezing appreciated. On 4L of O2 via NC  Gastrointestinal: Soft, non tender  Genitourinary: No suprapubic tenderness  Musculoskeletal: Tenderness to palpation of L shoulder, bruise on the posterior surface of the shoulder  Skin: Warm, dry  Neuro: Alert. Psych: Mood appropriate.      Medications:   Medications:    magnesium sulfate  2,000 mg IntraVENous Once    enoxaparin 30 mg SubCUTAneous BID    escitalopram  10 mg Oral Daily    QUEtiapine  200 mg Oral Nightly    insulin lispro  0-8 Units SubCUTAneous TID WC    insulin lispro  0-4 Units SubCUTAneous Nightly    fluticasone  1 spray Each Nostril Daily    sodium chloride flush  5-40 mL IntraVENous 2 times per day    ipratropium-albuterol  1 ampule Inhalation 4x daily    [Held by provider] amphetamine-dextroamphetamine  30 mg Oral Daily    aspirin  81 mg Oral Daily    buprenorphine-naloxone  1 Film SubLINGual TID    budesonide-formoterol  2 puff Inhalation BID    sennosides-docusate sodium  1 tablet Oral BID    pregabalin  100 mg Oral TID      Infusions:    dextrose      sodium chloride 12.5 mL/hr at 03/07/23 0130     PRN Meds: hydrOXYzine HCl, 25 mg, Nightly PRN  glucose, 4 tablet, PRN  dextrose bolus, 125 mL, PRN   Or  dextrose bolus, 250 mL, PRN  glucagon (rDNA), 1 mg, PRN  dextrose, , Continuous PRN  sodium chloride flush, 5-40 mL, PRN  sodium chloride, , PRN  ondansetron, 4 mg, Q8H PRN   Or  ondansetron, 4 mg, Q6H PRN  polyethylene glycol, 17 g, Daily PRN  acetaminophen, 650 mg, Q6H PRN   Or  acetaminophen, 650 mg, Q6H PRN  [Held by provider] traMADol, 50 mg, BID PRN      Labs      Recent Results (from the past 24 hour(s))   POCT Glucose    Collection Time: 03/09/23 11:31 AM   Result Value Ref Range    POC Glucose 103 (H) 70 - 99 MG/DL   POCT Glucose    Collection Time: 03/09/23  4:15 PM   Result Value Ref Range    POC Glucose 138 (H) 70 - 99 MG/DL   POCT Glucose    Collection Time: 03/09/23  8:36 PM   Result Value Ref Range    POC Glucose 118 (H) 70 - 99 MG/DL   Comprehensive Metabolic Panel    Collection Time: 03/10/23 12:06 AM   Result Value Ref Range    Sodium 141 135 - 145 MMOL/L    Potassium 4.4 3.5 - 5.1 MMOL/L    Chloride 93 (L) 99 - 110 mMol/L    CO2 43 (H) 21 - 32 MMOL/L    BUN 8 6 - 23 MG/DL    Creatinine 0.3 (L) 0.6 - 1.1 MG/DL    Est, Glom Filt Rate >60 >60 mL/min/1.73m2    Glucose 123 (H) 70 - 99 MG/DL    Calcium 9.1 8.3 - 10.6 MG/DL    Albumin 3.5 3.4 - 5.0 GM/DL    Total Protein 6.0 (L) 6.4 - 8.2 GM/DL    Total Bilirubin 0.3 0.0 - 1.0 MG/DL    ALT 65 (H) 10 - 40 U/L    AST 41 (H) 15 - 37 IU/L    Alkaline Phosphatase 83 40 - 129 IU/L    Anion Gap 5 4 - 16   CBC    Collection Time: 03/10/23 12:06 AM   Result Value Ref Range    WBC 6.6 4.0 - 10.5 K/CU MM    RBC 4.33 4.2 - 5.4 M/CU MM    Hemoglobin 12.2 (L) 12.5 - 16.0 GM/DL    Hematocrit 40.6 37 - 47 %    MCV 93.8 78 - 100 FL    MCH 28.2 27 - 31 PG    MCHC 30.0 (L) 32.0 - 36.0 %    RDW 12.5 11.7 - 14.9 %    Platelets 065 808 - 174 K/CU MM    MPV 10.0 7.5 - 11.1 FL   Magnesium    Collection Time: 03/10/23 12:06 AM   Result Value Ref Range    Magnesium 1.7 (L) 1.8 - 2.4 mg/dl   Phosphorus    Collection Time: 03/10/23 12:06 AM   Result Value Ref Range    Phosphorus 4.0 2.5 - 4.9 MG/DL   POCT Glucose    Collection Time: 03/10/23  6:20 AM   Result Value Ref Range    POC Glucose 120 (H) 70 - 99 MG/DL          Imaging/Diagnostics Last 24 Hours   No results found.     Electronically signed by Nery Turpin MD on 3/10/2023 at 7:42 AM

## 2023-03-10 NOTE — DISCHARGE INSTRUCTIONS
Please follow up with your PCP and pulmonologist on discharge  Continue to use your BiPAP at bedtime

## 2023-03-11 LAB
ANION GAP SERPL CALCULATED.3IONS-SCNC: 4 MMOL/L (ref 4–16)
BUN SERPL-MCNC: 10 MG/DL (ref 6–23)
CALCIUM SERPL-MCNC: 9.2 MG/DL (ref 8.3–10.6)
CHLORIDE BLD-SCNC: 94 MMOL/L (ref 99–110)
CO2: 43 MMOL/L (ref 21–32)
CREAT SERPL-MCNC: 0.4 MG/DL (ref 0.6–1.1)
GFR SERPL CREATININE-BSD FRML MDRD: >60 ML/MIN/1.73M2
GLUCOSE BLD-MCNC: 108 MG/DL (ref 70–99)
GLUCOSE BLD-MCNC: 130 MG/DL (ref 70–99)
GLUCOSE BLD-MCNC: 136 MG/DL (ref 70–99)
GLUCOSE BLD-MCNC: 151 MG/DL (ref 70–99)
GLUCOSE SERPL-MCNC: 151 MG/DL (ref 70–99)
MAGNESIUM: 2 MG/DL (ref 1.8–2.4)
POTASSIUM SERPL-SCNC: 4.8 MMOL/L (ref 3.5–5.1)
SODIUM BLD-SCNC: 141 MMOL/L (ref 135–145)

## 2023-03-11 PROCEDURE — 2580000003 HC RX 258: Performed by: NURSE PRACTITIONER

## 2023-03-11 PROCEDURE — 94664 DEMO&/EVAL PT USE INHALER: CPT

## 2023-03-11 PROCEDURE — 80048 BASIC METABOLIC PNL TOTAL CA: CPT

## 2023-03-11 PROCEDURE — 94150 VITAL CAPACITY TEST: CPT

## 2023-03-11 PROCEDURE — 2700000000 HC OXYGEN THERAPY PER DAY

## 2023-03-11 PROCEDURE — 97530 THERAPEUTIC ACTIVITIES: CPT

## 2023-03-11 PROCEDURE — 6370000000 HC RX 637 (ALT 250 FOR IP): Performed by: NURSE PRACTITIONER

## 2023-03-11 PROCEDURE — 94640 AIRWAY INHALATION TREATMENT: CPT

## 2023-03-11 PROCEDURE — 94660 CPAP INITIATION&MGMT: CPT

## 2023-03-11 PROCEDURE — 94761 N-INVAS EAR/PLS OXIMETRY MLT: CPT

## 2023-03-11 PROCEDURE — 82962 GLUCOSE BLOOD TEST: CPT

## 2023-03-11 PROCEDURE — 6360000002 HC RX W HCPCS: Performed by: NURSE PRACTITIONER

## 2023-03-11 PROCEDURE — 94669 MECHANICAL CHEST WALL OSCILL: CPT

## 2023-03-11 PROCEDURE — 83735 ASSAY OF MAGNESIUM: CPT

## 2023-03-11 PROCEDURE — 36415 COLL VENOUS BLD VENIPUNCTURE: CPT

## 2023-03-11 PROCEDURE — 97110 THERAPEUTIC EXERCISES: CPT

## 2023-03-11 PROCEDURE — 1200000000 HC SEMI PRIVATE

## 2023-03-11 RX ADMIN — FLUTICASONE PROPIONATE 1 SPRAY: 50 SPRAY, METERED NASAL at 08:25

## 2023-03-11 RX ADMIN — SODIUM CHLORIDE, PRESERVATIVE FREE 10 ML: 5 INJECTION INTRAVENOUS at 08:25

## 2023-03-11 RX ADMIN — BUDESONIDE AND FORMOTEROL FUMARATE DIHYDRATE 2 PUFF: 80; 4.5 AEROSOL RESPIRATORY (INHALATION) at 21:03

## 2023-03-11 RX ADMIN — SENNOSIDES AND DOCUSATE SODIUM 1 TABLET: 50; 8.6 TABLET ORAL at 08:25

## 2023-03-11 RX ADMIN — ALBUTEROL SULFATE 2 PUFF: 90 AEROSOL, METERED RESPIRATORY (INHALATION) at 11:05

## 2023-03-11 RX ADMIN — BUPRENORPHINE AND NALOXONE 1 FILM: 8; 2 FILM BUCCAL; SUBLINGUAL at 16:50

## 2023-03-11 RX ADMIN — ESCITALOPRAM OXALATE 10 MG: 10 TABLET ORAL at 08:26

## 2023-03-11 RX ADMIN — BUPRENORPHINE AND NALOXONE 1 FILM: 8; 2 FILM BUCCAL; SUBLINGUAL at 08:26

## 2023-03-11 RX ADMIN — ENOXAPARIN SODIUM 30 MG: 100 INJECTION SUBCUTANEOUS at 20:39

## 2023-03-11 RX ADMIN — SENNOSIDES AND DOCUSATE SODIUM 1 TABLET: 50; 8.6 TABLET ORAL at 20:39

## 2023-03-11 RX ADMIN — SODIUM CHLORIDE, PRESERVATIVE FREE 10 ML: 5 INJECTION INTRAVENOUS at 20:43

## 2023-03-11 RX ADMIN — BUPRENORPHINE AND NALOXONE 1 FILM: 8; 2 FILM BUCCAL; SUBLINGUAL at 20:40

## 2023-03-11 RX ADMIN — ASPIRIN 81 MG CHEWABLE TABLET 81 MG: 81 TABLET CHEWABLE at 08:25

## 2023-03-11 RX ADMIN — ALBUTEROL SULFATE 2 PUFF: 90 AEROSOL, METERED RESPIRATORY (INHALATION) at 21:03

## 2023-03-11 RX ADMIN — ENOXAPARIN SODIUM 30 MG: 100 INJECTION SUBCUTANEOUS at 08:26

## 2023-03-11 RX ADMIN — BUDESONIDE AND FORMOTEROL FUMARATE DIHYDRATE 2 PUFF: 80; 4.5 AEROSOL RESPIRATORY (INHALATION) at 07:22

## 2023-03-11 RX ADMIN — ALBUTEROL SULFATE 2 PUFF: 90 AEROSOL, METERED RESPIRATORY (INHALATION) at 07:23

## 2023-03-11 RX ADMIN — QUETIAPINE FUMARATE 200 MG: 100 TABLET ORAL at 20:39

## 2023-03-11 ASSESSMENT — PAIN SCALES - WONG BAKER
WONGBAKER_NUMERICALRESPONSE: 0
WONGBAKER_NUMERICALRESPONSE: 8
WONGBAKER_NUMERICALRESPONSE: 8
WONGBAKER_NUMERICALRESPONSE: 0
WONGBAKER_NUMERICALRESPONSE: 8
WONGBAKER_NUMERICALRESPONSE: 0
WONGBAKER_NUMERICALRESPONSE: 8
WONGBAKER_NUMERICALRESPONSE: 0
WONGBAKER_NUMERICALRESPONSE: 0
WONGBAKER_NUMERICALRESPONSE: 8

## 2023-03-11 ASSESSMENT — ENCOUNTER SYMPTOMS
RESPIRATORY NEGATIVE: 1
GASTROINTESTINAL NEGATIVE: 1
EYES NEGATIVE: 1

## 2023-03-11 ASSESSMENT — PAIN SCALES - GENERAL: PAINLEVEL_OUTOF10: 0

## 2023-03-11 NOTE — PROGRESS NOTES
Pulmonary and Critical Care  Progress Note      VITALS:  BP (!) 101/42   Pulse 86   Temp 98.1 °F (36.7 °C) (Oral)   Resp 18   Ht 5' 4\" (1.626 m)   Wt 280 lb (127 kg)   SpO2 92%   BMI 48.06 kg/m²     Subjective:   CHIEF COMPLAINT :SOB     HPI:                The patient is a 63 y.o. female is lying in the bed. She is not in acute resp distress. Objective:   PHYSICAL EXAM:    LUNGS:Occasional basal crackles  Abd-soft, BS+, NT  Ext- no pedal edema  CVS-s1s2, no murmurs      DATA:    CBC:  Recent Labs     03/10/23  0006   WBC 6.6   RBC 4.33   HGB 12.2*   HCT 40.6      MCV 93.8   MCH 28.2   MCHC 30.0*   RDW 12.5      BMP:  Recent Labs     03/10/23  0006 03/11/23  0006    141   K 4.4 4.8   CL 93* 94*   CO2 43* 43*   BUN 8 10   CREATININE 0.3* 0.4*   CALCIUM 9.1 9.2   GLUCOSE 123* 151*      ABG:  No results for input(s): PH, PO2ART, HLO8DCK, HCO3, BEART, O2SAT in the last 72 hours.   BNP  No results found for: BNP   D-Dimer:  Lab Results   Component Value Date    DDIMER 745 (H) 02/06/2022      Radiology: None      Assessment/Plan     Patient Active Problem List    Diagnosis Date Noted    Delirium due to another medical condition 03/06/2023     Priority: Medium    Bipolar affective disorder, currently depressed, moderate ([facility] 03/06/2023     Priority: Medium    Uncontrolled pain     Generalized weakness     Gait disturbance     Myoclonus     Near syncope     Moderate protein-calorie malnutrition (HCC)     Traumatic closed nondisplaced fracture of distal end of right fibula, initial encounter 01/11/2022    Asterixis     Hypercapnia     Chronic rhinitis 01/05/2022    Fall at home, initial encounter 01/05/2022    COPD exacerbation ([facility] 07/09/2021    Acute exacerbation of chronic obstructive pulmonary disease (COPD) ([facility] 06/26/2021    Acute on chronic respiratory failure with hypoxia and hypercapnia ([facility] 05/23/2021    COVID-19 05/23/2021    [facility] 05/18/2021    SOB (shortness of breath) 05/18/2021    Leg swelling 05/18/2021    Chronic hepatitis C without hepatic coma ([facility] 05/17/2021     Overview Note:     Patient saw GI specialists and cleared it on her own      Gastroesophageal reflux disease without esophagitis 05/17/2021    H/O drug abuse ([facility] 05/17/2021     Overview Note:     Prior heroine      Pulmonary nodules 05/17/2021     Overview Note:     Needs f/u 11/2021      ADHD (attention deficit hyperactivity disorder) 05/17/2021    Pneumonia 05/02/2021    Chronic respiratory failure ([facility] 08/16/2017    [facility] 08/16/2017    Obstructive sleep apnea 08/16/2017     Overview Note:     Uses biPAP at night      S/P placement of cardiac pacemaker 2005     Overview Note:     Has never had replacement battery.        Acute on Chronic Hypoxic Hypercapneic resp failure- improving   Chronic Metabolic Alkalosis  COPD on Home O2  Atypical Pneumonia  Obesity  KING  Pulmonary HTN  Chronic Pain Syndrome     BIPAP qhs and prn  ICS  OOB  Inhalers  PT/OT  Keep sats > 92%  Await placement  C.w present management    Electronically signed by Enmanuel Camacho MD on 3/11/2023 at 11:07 AM

## 2023-03-11 NOTE — PROGRESS NOTES
03/11/23 0722   Oxygen Therapy/Pulse Ox   O2 Therapy Oxygen   $Oxygen $Daily Charge   O2 Device Nasal cannula   O2 Flow Rate (L/min) 4 L/min   Heart Rate 88   Resp 18   SpO2 93 %   $Pulse Oximeter $Spot check (multiple/continuous)   Blood Gas  Performed?  No   Patient uses 4LNC at home continuous and wears BIPAP at home as well

## 2023-03-11 NOTE — PROGRESS NOTES
Occupational Therapy  . Occupational Therapy Treatment Note    Name: Jade Milligan MRN: [id number] :   1960   Date:  3/11/2023   Admission Date: 3/1/2023 Room:  [address] [place] [address]-     Primary Problem:  The primary encounter diagnosis was [facility] exacerbation [facility] Diagnoses of Hypercarbia, Acute respiratory failure with hypoxia and hypercarbia ([facility] and Pneumonia due to infectious organism, unspecified laterality, unspecified part of lung were also pertinent to this visit. Patient  has a past medical history of ADHD, COPD (chronic obstructive pulmonary disease) ([facility] COVID-19, Drug addiction in remission ([facility] Hep C w/o coma, chronic ([place] [id number].), Pacemaker, Sleep apnea, and TIA (transient ischemic attack). Patient  has a past surgical history that includes  section (); Cholecystectomy (); and Pacemaker insertion (). Restrictions/Precautions:          general precautions, fall risk, cog/hallucinations    Communication with other providers: Per chart review and Nurse Maria Alejandra Scale, patient is appropriate for therapeutic intervention. Nurse reports pt is typically two person assist for transfers for safety d/t BLE \"giving out\". Subjective:  Patient states:  \"I just had a shower and the nurse aide walked me to the bathroom. \" Pt reports \"feeling much better. \" Pt reports fatigue, agreeable to sit EOB for exercises. Pain:   Location, Type, Intensity (0/10 to 10/10):  LUE, \"chronic constant pain\", 7/10    Objective:    Observation: Pt received semi-fowlers, A&O x2.   Objective Measures:  Telemetry, 5L O2 (pt reports a little up because of shower, reports normally 4L), , O2 sat 92%, RR 16    Treatment, including education:  Therapeutic Activity Training:   Therapeutic activity training was instructed today. Cues were given for safety, sequence, UE/LE placement, awareness, and balance. Activities performed today included bed mobility training, sup-sit, sit-stand, SPT.   Supine<>sit: Sup c bed features  Scooting: SBA to scoot along EOB multiple trials  Sitting balance / tolerance: Good, SBA seated EOB >30 minutes, no loss of balance     Therapeutic Exercise:  Cues were given for technique, safety, recruitment, and rationale. Cues were verbal and/or tactile. Pt Sup + vc's for technique to perform BUE and BLE AROM exercises to include: shoulder flex/extension, internal/external rotation, chest presses, bicep curls, rowing, and supination/pronation, hip flex/extension, ab/adduction, knee flex/ extension, and B ankle pumps x10-12 reps each. Cues for PLB technique and rest breaks PRN. All exercises performed in unsupported sitting EOB. All therapeutic intervention performed c emphasis on dynamic balance / sitting tolerance to inc strength, endurance and act tolerance for inc Indep c ADL tasks, func transfers / mobility. Safety  Patient safely in bed c alarm re-set at end of session, with call light/phone in reach, and nursing aware. Assessment / Impression:    Patient's tolerance of treatment: Well  Adverse Reaction: None  Significant change in status and impact: Improved from initial evaluation  Barriers to improvement: None noted      Plan for Next Session:    Continue per OT POC c plan to address functional transfers / mobility during ADL tasks.      Time in:  1342  Time out:  1420  Timed treatment minutes:  38  Total treatment time:  38      Electronically signed by:    ROSANA Mcfarland  3/11/2023, 2:20 PM    Goals:  Time frame for goals: 2 weeks     Pt will complete grooming tasks with mod I standing at sink  Pt will complete toileting tasks with mod I using standard commode  Pt will complete UB dressing and bathing tasks with ind  Pt will complete LB dressing and bathing tasks with mod I  Pt will complete functional mobility to bathroom and back with mod I using RW  Pt will complete 10 minutes of therapeutic exercise/activity with good tolerance and 0 rest breaks

## 2023-03-11 NOTE — PROGRESS NOTES
V2.0  [facility] Hospitalist Progress Note      Name:  Gabriella Cabrera /Age/Sex: 1960  (63 y.o. female)   MRN & CSN:  [id number] & [id number] Encounter Date/Time: 3/11/2023 7:27 AM EST    Location:  [address] [place] [address]-A PCP: Jaquelin Leger MD       Hospital Day: 11    Assessment and Plan:   Gabriella Cabrera is a 63 y.o. female with pmh of ADHD, MDD, chronic pain and COPD on home O2 who presents with Acute respiratory failure with hypercapnia ([facility]    Patient pending placement, CM on board. Patient is medically stable. Plan:  #Acute on chronic hypoxic and hypercapnic respiratory failure - Resolved  #Severe COPD exacerbation - Resolved  --Patient was treated with steroids, now discontinued, and completed azithromycin. Cefepime discontinued, urinary antigens for legionella and strep pneumo negative. MRSA neg  --Blood cultures with 1 out of 4 gram variable bacilli. Likely contaminant. She remains without hallmarks for infection. --Continue breathing treatments as needed  --Pulm on board, recs appreciated  --At baseline on 4L of O2, currently at baseline, BiPAP at bedtime, confirmed from patient that her home apparatus is fixed  --Pending placement     #Acute metabolic encephalopathy - Resolved  --Likely in the setting of hypercarbia. #Diarrhea  --Complaining of diarrhea in house  --Will obtain GI PCR panel, no concern for C.diff at this time    #Left shoulder pain  --She reported pain that she says came on after she had multiple falls  --Xray negative for acute osseus abnormalities, OA in the left shoulder  --She will need outpatient follow up     #Chronic pain  --Continue home Suboxone. ADHD  --Psych recommend holding adderall during hospitalization     #Auditory hallucinations  #Major depressive, in remission. Severe  Patient does have major depressive disorder and takes amitriptyline, Seroquel, and trazodone. Patient had her Seroquel held due to altered mentation requiring BiPAP on presentation.   She did develop auditory hallucinations. States this is the first time this ever happened. They were friendly voices and did not instruct her to do anything harmful or dangerous. Even so, patient states that scary. --Psych on board, appreciate recs. Changes made to her regimen, lexapro increased to 10mg PO, seroquel to 200mg, atarax 25mg PRN at bedtime. Trazodone and elavil remain discontinued. Diet ADULT DIET; Regular   DVT Prophylaxis [x] Lovenox, []  Heparin, [] SCDs, [] Ambulation,  [] Eliquis, [] Xarelto  [] Coumadin   Code Status Full Code   Disposition From: Home  Expected Disposition: SNF  Estimated Date of Discharge: 1-2 days  Patient requires continued admission due to need for placement   Surrogate Decision Maker/ POA      Subjective:     Chief Complaint: Shortness of Breath       Patient seen and examined at bedside. She endorses doing well and reports she is compliant with her BiPAP at bedtime. Looking forward to discharge and states she did well with physical therapy on her last session. Review of Systems:    Review of Systems   Constitutional: Negative. HENT: Negative. Eyes: Negative. Respiratory: Negative. Cardiovascular: Negative. Gastrointestinal: Negative. Genitourinary: Negative. Musculoskeletal:         Left shoulder pain  Chronic spasms   Skin: Negative. Neurological: Negative. Psychiatric/Behavioral: Negative. Objective:   No intake or output data in the 24 hours ending 03/11/23 0744       Vitals:   Vitals:    03/11/23 0431   BP: 99/61   Pulse: 82   Resp: 16   Temp: 98.1 °F (36.7 °C)   SpO2: (!) 89%       Physical Exam:   General: NAD, lying on her left side in bed  Eyes: EOMI  ENT: Moist mucous membranes  Cardiovascular: Regular rate. Respiratory: Clear to auscultation, no wheezing appreciated.  On 4L of O2 via NC  Gastrointestinal: Soft, non tender  Genitourinary: No suprapubic tenderness  Musculoskeletal: Tenderness to palpation of L shoulder, bruise on the posterior surface of the shoulder  Skin: Warm, dry  Neuro: Alert. Psych: Mood appropriate.      Medications:   Medications:    albuterol sulfate HFA  2 puff Inhalation 4x daily    enoxaparin  30 mg SubCUTAneous BID    escitalopram  10 mg Oral Daily    QUEtiapine  200 mg Oral Nightly    insulin lispro  0-8 Units SubCUTAneous TID WC    insulin lispro  0-4 Units SubCUTAneous Nightly    fluticasone  1 spray Each Nostril Daily    sodium chloride flush  5-40 mL IntraVENous 2 times per day    [Held by provider] amphetamine-dextroamphetamine  30 mg Oral Daily    aspirin  81 mg Oral Daily    buprenorphine-naloxone  1 Film SubLINGual TID    budesonide-formoterol  2 puff Inhalation BID    sennosides-docusate sodium  1 tablet Oral BID    pregabalin  100 mg Oral TID      Infusions:    dextrose      sodium chloride 12.5 mL/hr at 03/07/23 0130     PRN Meds: ipratropium-albuterol, 1 ampule, Q4H PRN  hydrOXYzine HCl, 25 mg, Nightly PRN  glucose, 4 tablet, PRN  dextrose bolus, 125 mL, PRN   Or  dextrose bolus, 250 mL, PRN  glucagon (rDNA), 1 mg, PRN  dextrose, , Continuous PRN  sodium chloride flush, 5-40 mL, PRN  sodium chloride, , PRN  ondansetron, 4 mg, Q8H PRN   Or  ondansetron, 4 mg, Q6H PRN  polyethylene glycol, 17 g, Daily PRN  acetaminophen, 650 mg, Q6H PRN   Or  acetaminophen, 650 mg, Q6H PRN  [Held by provider] traMADol, 50 mg, BID PRN      Labs      Recent Results (from the past 24 hour(s))   POCT Glucose    Collection Time: 03/10/23 11:12 AM   Result Value Ref Range    POC Glucose 156 (H) 70 - 99 MG/DL   POCT Glucose    Collection Time: 03/10/23  4:52 PM   Result Value Ref Range    POC Glucose 150 (H) 70 - 99 MG/DL   POCT Glucose    Collection Time: 03/10/23  8:18 PM   Result Value Ref Range    POC Glucose 154 (H) 70 - 99 MG/DL   Basic Metabolic Panel    Collection Time: 03/11/23 12:06 AM   Result Value Ref Range    Sodium 141 135 - 145 MMOL/L    Potassium 4.8 3.5 - 5.1 MMOL/L    Chloride 94 (L) 99 - 110 mMol/L    CO2 43 (H) 21 - 32 MMOL/L    Anion Gap 4 4 - 16    BUN 10 6 - 23 MG/DL    Creatinine 0.4 (L) 0.6 - 1.1 MG/DL    Est, Glom Filt Rate >60 >60 mL/min/1.73m2    Glucose 151 (H) 70 - 99 MG/DL    Calcium 9.2 8.3 - 10.6 MG/DL   POCT Glucose    Collection Time: 03/11/23  6:48 AM   Result Value Ref Range    POC Glucose 108 (H) 70 - 99 MG/DL          Imaging/Diagnostics Last 24 Hours   No results found.     Electronically signed by Gracie Christianson MD on 3/11/2023 at 7:44 AM

## 2023-03-12 LAB
ASTROVIRUS PCR: NOT DETECTED
C CAYETANENSIS DNA SPEC QL NAA+PROBE: NOT DETECTED
CAMPY SP DNA.DIARRHEA STL QL NAA+PROBE: NOT DETECTED
CRYPTOSP DNA SPEC QL NAA+PROBE: NOT DETECTED
E COLI DNA SPEC QL NAA+PROBE: NOT DETECTED
E COLI ENTEROAGGREGATIVE PCR: NOT DETECTED
E COLI ENTEROPATHOGENIC PCR: NOT DETECTED
E COLI ENTEROTOXIGENIC PCR: NOT DETECTED
E COLI O157H7 DNA SPEC QL NAA+PROBE: NOT DETECTED
E HISTOLYT DNA SPEC QL NAA+PROBE: NOT DETECTED
EC STX1+STX2 + H7 FLIC SPEC NAA+PROBE: NOT DETECTED
G LAMBLIA DNA SPEC QL NAA+PROBE: NOT DETECTED
GLUCOSE BLD-MCNC: 128 MG/DL (ref 70–99)
GLUCOSE BLD-MCNC: 144 MG/DL (ref 70–99)
GLUCOSE BLD-MCNC: 170 MG/DL (ref 70–99)
GLUCOSE BLD-MCNC: 183 MG/DL (ref 70–99)
HADV DNA SPEC QL NAA+PROBE: NOT DETECTED
NOROVIRUS RNA SPEC QL NAA+PROBE: NOT DETECTED
P SHIGELLOIDES DNA STL QL NAA+PROBE: NOT DETECTED
RV RNA SPEC QL NAA+PROBE: NOT DETECTED
SALMONELLA DNA SPEC QL NAA+PROBE: NOT DETECTED
SAPOVIRUS PCR: NOT DETECTED
V CHOLERAE DNA SPEC QL NAA+PROBE: NOT DETECTED
VIBRIO DNA SPEC NAA+PROBE: NOT DETECTED
YERSINIA DNA SPEC NAA+PROBE: NOT DETECTED

## 2023-03-12 PROCEDURE — 94669 MECHANICAL CHEST WALL OSCILL: CPT

## 2023-03-12 PROCEDURE — 82962 GLUCOSE BLOOD TEST: CPT

## 2023-03-12 PROCEDURE — 6360000002 HC RX W HCPCS: Performed by: NURSE PRACTITIONER

## 2023-03-12 PROCEDURE — 94150 VITAL CAPACITY TEST: CPT

## 2023-03-12 PROCEDURE — 6370000000 HC RX 637 (ALT 250 FOR IP): Performed by: NURSE PRACTITIONER

## 2023-03-12 PROCEDURE — 1200000000 HC SEMI PRIVATE

## 2023-03-12 PROCEDURE — 87507 IADNA-DNA/RNA PROBE TQ 12-25: CPT

## 2023-03-12 PROCEDURE — 94761 N-INVAS EAR/PLS OXIMETRY MLT: CPT

## 2023-03-12 PROCEDURE — 2700000000 HC OXYGEN THERAPY PER DAY

## 2023-03-12 PROCEDURE — 94640 AIRWAY INHALATION TREATMENT: CPT

## 2023-03-12 PROCEDURE — 94660 CPAP INITIATION&MGMT: CPT

## 2023-03-12 PROCEDURE — 2580000003 HC RX 258: Performed by: NURSE PRACTITIONER

## 2023-03-12 RX ADMIN — ACETAMINOPHEN 650 MG: 325 TABLET ORAL at 17:09

## 2023-03-12 RX ADMIN — SENNOSIDES AND DOCUSATE SODIUM 1 TABLET: 50; 8.6 TABLET ORAL at 20:21

## 2023-03-12 RX ADMIN — ALBUTEROL SULFATE 2 PUFF: 90 AEROSOL, METERED RESPIRATORY (INHALATION) at 20:10

## 2023-03-12 RX ADMIN — BUPRENORPHINE AND NALOXONE 1 FILM: 8; 2 FILM BUCCAL; SUBLINGUAL at 14:47

## 2023-03-12 RX ADMIN — ESCITALOPRAM OXALATE 10 MG: 10 TABLET ORAL at 09:24

## 2023-03-12 RX ADMIN — ENOXAPARIN SODIUM 30 MG: 100 INJECTION SUBCUTANEOUS at 09:25

## 2023-03-12 RX ADMIN — ALBUTEROL SULFATE 2 PUFF: 90 AEROSOL, METERED RESPIRATORY (INHALATION) at 07:27

## 2023-03-12 RX ADMIN — BUDESONIDE AND FORMOTEROL FUMARATE DIHYDRATE 2 PUFF: 80; 4.5 AEROSOL RESPIRATORY (INHALATION) at 20:10

## 2023-03-12 RX ADMIN — BUDESONIDE AND FORMOTEROL FUMARATE DIHYDRATE 2 PUFF: 80; 4.5 AEROSOL RESPIRATORY (INHALATION) at 07:27

## 2023-03-12 RX ADMIN — ASPIRIN 81 MG CHEWABLE TABLET 81 MG: 81 TABLET CHEWABLE at 09:25

## 2023-03-12 RX ADMIN — SODIUM CHLORIDE, PRESERVATIVE FREE 10 ML: 5 INJECTION INTRAVENOUS at 09:25

## 2023-03-12 RX ADMIN — BUPRENORPHINE AND NALOXONE 1 FILM: 8; 2 FILM BUCCAL; SUBLINGUAL at 20:21

## 2023-03-12 RX ADMIN — QUETIAPINE FUMARATE 200 MG: 100 TABLET ORAL at 20:21

## 2023-03-12 RX ADMIN — SENNOSIDES AND DOCUSATE SODIUM 1 TABLET: 50; 8.6 TABLET ORAL at 09:24

## 2023-03-12 RX ADMIN — SODIUM CHLORIDE, PRESERVATIVE FREE 10 ML: 5 INJECTION INTRAVENOUS at 20:22

## 2023-03-12 RX ADMIN — ENOXAPARIN SODIUM 30 MG: 100 INJECTION SUBCUTANEOUS at 20:22

## 2023-03-12 RX ADMIN — BUPRENORPHINE AND NALOXONE 1 FILM: 8; 2 FILM BUCCAL; SUBLINGUAL at 09:25

## 2023-03-12 ASSESSMENT — PAIN SCALES - GENERAL
PAINLEVEL_OUTOF10: 8
PAINLEVEL_OUTOF10: 7

## 2023-03-12 ASSESSMENT — PAIN - FUNCTIONAL ASSESSMENT: PAIN_FUNCTIONAL_ASSESSMENT: ACTIVITIES ARE NOT PREVENTED

## 2023-03-12 ASSESSMENT — ENCOUNTER SYMPTOMS
GASTROINTESTINAL NEGATIVE: 1
RESPIRATORY NEGATIVE: 1
EYES NEGATIVE: 1

## 2023-03-12 ASSESSMENT — PAIN DESCRIPTION - LOCATION
LOCATION: ARM
LOCATION: ARM

## 2023-03-12 ASSESSMENT — PAIN DESCRIPTION - ORIENTATION
ORIENTATION: LEFT
ORIENTATION: LEFT

## 2023-03-12 ASSESSMENT — PAIN DESCRIPTION - PAIN TYPE: TYPE: CHRONIC PAIN

## 2023-03-12 ASSESSMENT — PAIN DESCRIPTION - DESCRIPTORS
DESCRIPTORS: ACHING
DESCRIPTORS: ACHING

## 2023-03-12 ASSESSMENT — PAIN SCALES - WONG BAKER
WONGBAKER_NUMERICALRESPONSE: 0

## 2023-03-12 NOTE — PROGRESS NOTES
V2.0  [facility] Hospitalist Progress Note      Name:  Ludivina Smilye /Age/Sex: 1960  (63 y.o. female)   MRN & CSN:  [id number] & [id number] Encounter Date/Time: 3/12/2023 7:27 AM EST    Location:  [address] [place], [place] [address] PCP: Lizy Manjarrez MD       Hospital Day: 12    Assessment and Plan:   Ludivina Smiley is a 63 y.o. female with pmh of ADHD, MDD, chronic pain and COPD on home O2 who presents with Acute respiratory failure with hypercapnia ([facility] [address]    Patient pending placement, CM on board. Patient is medically stable. Plan:  #Acute on chronic hypoxic and hypercapnic respiratory failure - Resolved  #Severe COPD exacerbation - Resolved  --Patient was treated with steroids, now discontinued, and completed azithromycin. Cefepime discontinued, urinary antigens for legionella and strep pneumo negative. MRSA neg  --Blood cultures with 1 out of 4 gram variable bacilli. Likely contaminant. She remains without hallmarks for infection. --Continue breathing treatments as needed  --Pulm on board, recs appreciated  --At baseline on 4L of O2, currently at baseline, BiPAP at bedtime, confirmed from patient that her home apparatus is fixed  --Pending placement     #Acute metabolic encephalopathy - Resolved  --Likely in the setting of hypercarbia. #Diarrhea - resolving  --Initially complaining of diarrhea in house, appears to be resolving, --GI PCR pending, if sample gets sent    #Left shoulder pain - chronic   --She reported pain that she says came on after she had multiple falls  --Xray negative for acute osseus abnormalities, OA in the left shoulder  --She will need outpatient follow up     #Chronic pain  --Continue home Suboxone. ADHD  --Psych recommend holding adderall during hospitalization     #Auditory hallucinations  #Major depressive, in remission. Severe  Patient does have major depressive disorder and takes amitriptyline, Seroquel, and trazodone.   Patient had her Seroquel held due to altered mentation requiring BiPAP on presentation.  She did develop auditory hallucinations.  States this is the first time this ever happened.  They were friendly voices and did not instruct her to do anything harmful or dangerous.  Even so, patient states that scary.    --Psych on board, appreciate recs. Changes made to her regimen, lexapro increased to 10mg PO, seroquel to 200mg, atarax 25mg PRN at bedtime. Trazodone and elavil remain discontinued.    Diet ADULT DIET; Regular   DVT Prophylaxis [x] Lovenox, []  Heparin, [] SCDs, [] Ambulation,  [] Eliquis, [] Xarelto  [] Coumadin   Code Status Full Code   Disposition From: Home  Expected Disposition: SNF  Estimated Date of Discharge: 1-2 days  Patient requires continued admission due to need for placement   Surrogate Decision Maker/ POA      Subjective:     Chief Complaint: Shortness of Breath     Patient seen and examined at bedside.  She endorses doing well and reports she is compliant with her BiPAP at bedtime.  Working more with PT and feeling stronger. She's awaiting placement.    Review of Systems:    Review of Systems   Constitutional: Negative.    HENT: Negative.     Eyes: Negative.    Respiratory: Negative.     Cardiovascular: Negative.    Gastrointestinal: Negative.    Genitourinary: Negative.    Musculoskeletal:         Left shoulder pain  Chronic spasms   Skin: Negative.    Neurological: Negative.    Psychiatric/Behavioral: Negative.       Objective:   No intake or output data in the 24 hours ending 03/12/23 0739       Vitals:   Vitals:    03/12/23 0728   BP:    Pulse:    Resp:    Temp:    SpO2: 91%       Physical Exam:   General: NAD, lying on her right side in bed  Eyes: EOMI  ENT: Moist mucous membranes  Cardiovascular: Regular rate.  Respiratory: Clear to auscultation, no wheezing appreciated. On 4L of O2 via NC  Gastrointestinal: Soft, non tender  Genitourinary: No suprapubic tenderness  Musculoskeletal: Unchanged tenderness to palpation of L shoulder, bruise on  the posterior surface of the shoulder  Skin: Warm, dry  Neuro: Alert. Psych: Mood appropriate.      Medications:   Medications:    albuterol sulfate HFA  2 puff Inhalation 4x daily    enoxaparin  30 mg SubCUTAneous BID    escitalopram  10 mg Oral Daily    QUEtiapine  200 mg Oral Nightly    insulin lispro  0-8 Units SubCUTAneous TID WC    insulin lispro  0-4 Units SubCUTAneous Nightly    fluticasone  1 spray Each Nostril Daily    sodium chloride flush  5-40 mL IntraVENous 2 times per day    [Held by provider] amphetamine-dextroamphetamine  30 mg Oral Daily    aspirin  81 mg Oral Daily    buprenorphine-naloxone  1 Film SubLINGual TID    budesonide-formoterol  2 puff Inhalation BID    sennosides-docusate sodium  1 tablet Oral BID    pregabalin  100 mg Oral TID      Infusions:    dextrose      sodium chloride 12.5 mL/hr at 03/07/23 0130     PRN Meds: ipratropium-albuterol, 1 ampule, Q4H PRN  hydrOXYzine HCl, 25 mg, Nightly PRN  glucose, 4 tablet, PRN  dextrose bolus, 125 mL, PRN   Or  dextrose bolus, 250 mL, PRN  glucagon (rDNA), 1 mg, PRN  dextrose, , Continuous PRN  sodium chloride flush, 5-40 mL, PRN  sodium chloride, , PRN  ondansetron, 4 mg, Q8H PRN   Or  ondansetron, 4 mg, Q6H PRN  polyethylene glycol, 17 g, Daily PRN  acetaminophen, 650 mg, Q6H PRN   Or  acetaminophen, 650 mg, Q6H PRN  [Held by provider] traMADol, 50 mg, BID PRN      Labs      Recent Results (from the past 24 hour(s))   POCT Glucose    Collection Time: 03/11/23  6:48 AM   Result Value Ref Range    POC Glucose 108 (H) 70 - 99 MG/DL   POCT Glucose    Collection Time: 03/11/23 11:31 AM   Result Value Ref Range    POC Glucose 151 (H) 70 - 99 MG/DL   POCT Glucose    Collection Time: 03/11/23  3:51 PM   Result Value Ref Range    POC Glucose 130 (H) 70 - 99 MG/DL   POCT Glucose    Collection Time: 03/11/23  8:45 PM   Result Value Ref Range    POC Glucose 136 (H) 70 - 99 MG/DL   POCT Glucose    Collection Time: 03/12/23  6:46 AM   Result Value Ref Range POC Glucose 183 (H) 70 - 99 MG/DL          Imaging/Diagnostics Last 24 Hours   No results found.     Electronically signed by Karla Olvera MD on 3/12/2023 at 7:39 AM

## 2023-03-12 NOTE — PROGRESS NOTES
Pulmonary and Critical Care  Progress Note      VITALS:  /70   Pulse 91   Temp 98.1 °F (36.7 °C) (Oral)   Resp 12   Ht 5' 4\" (1.626 m)   Wt 280 lb (127 kg)   SpO2 91%   BMI 48.06 kg/m²     Subjective:   CHIEF COMPLAINT :SOB     HPI:                The patient is a 63 y.o. female is walking in the room. She is not in acute resp distress    Objective:   PHYSICAL EXAM:    LUNGS:Occasional basal crackles  Abd-soft, BS+, NT  Ext- no pedal edema  CVS-s1s2, no murmurs      DATA:    CBC:  Recent Labs     03/10/23  0006   WBC 6.6   RBC 4.33   HGB 12.2*   HCT 40.6      MCV 93.8   MCH 28.2   MCHC 30.0*   RDW 12.5      BMP:  Recent Labs     03/10/23  0006 03/11/23  0006    141   K 4.4 4.8   CL 93* 94*   CO2 43* 43*   BUN 8 10   CREATININE 0.3* 0.4*   CALCIUM 9.1 9.2   GLUCOSE 123* 151*      ABG:  No results for input(s): PH, PO2ART, MBC7YTY, HCO3, BEART, O2SAT in the last 72 hours.   BNP  No results found for: BNP   D-Dimer:  Lab Results   Component Value Date    DDIMER 745 (H) 02/06/2022      Radiology: None      Assessment/Plan     Patient Active Problem List    Diagnosis Date Noted    Delirium due to another medical condition 03/06/2023     Priority: Medium    Bipolar affective disorder, currently depressed, moderate ([address] 03/06/2023     Priority: Medium    Uncontrolled pain     Generalized weakness     Gait disturbance     Myoclonus     Near syncope     Moderate protein-calorie malnutrition (HCC)     Traumatic closed nondisplaced fracture of distal end of right fibula, initial encounter 01/11/2022    Asterixis     Hypercapnia     Chronic rhinitis 01/05/2022    Fall at home, initial encounter 01/05/2022    COPD exacerbation ([address] 07/09/2021    Acute exacerbation of chronic obstructive pulmonary disease (COPD) ([address] 06/26/2021    Acute on chronic respiratory failure with hypoxia and hypercapnia ([address] 05/23/2021    COVID-19 05/23/2021    Pulmonary emphysema ([address] 05/18/2021    SOB (shortness of breath) 05/18/2021    Leg swelling 05/18/2021    Chronic hepatitis C without hepatic coma ([facility] [address] 05/17/2021     Overview Note:     Patient saw GI specialists and cleared it on her own      Gastroesophageal reflux disease without esophagitis 05/17/2021    H/O drug abuse ([facility] [address] 05/17/2021     Overview Note:     Prior heroine      Pulmonary nodules 05/17/2021     Overview Note:     Needs f/u 11/2021      ADHD (attention deficit hyperactivity disorder) 05/17/2021    Pneumonia 05/02/2021    Chronic respiratory failure ([facility] [address] 08/16/2017    COPD, severe ([facility] [address] 08/16/2017    Obstructive sleep apnea 08/16/2017     Overview Note:     Uses biPAP at night      S/P placement of cardiac pacemaker 2005     Overview Note:     Has never had replacement battery.        Acute on Chronic Hypoxic Hypercapneic resp failure- improving   Chronic Metabolic Alkalosis  COPD on Home O2  Atypical Pneumonia  Obesity  KING  Pulmonary HTN  Chronic Pain Syndrome     ICS  OOB  BIPAP qhs and prn  Inhalers  PT/OT  Keep sats > 92%  Await placement  OP follow up  C/w present management  Sign off for now    Electronically signed by Asad Walker MD on 3/12/2023 at 11:23 AM

## 2023-03-12 NOTE — PROGRESS NOTES
03/12/23 0525   NIV Type   $NIV $Daily Charge   NIV Started/Stopped On   Equipment Type v60   Mode Bilevel   Mask Type Full face mask   Mask Size Medium   Bonnet size Medium   Settings/Measurements   PIP Observed 20 cm H20   IPAP 15 cmH20   CPAP/EPAP 5 cmH2O   Vt (Measured) 1288 mL   Rate Ordered 12   Resp 12   FiO2  35 %   I Time/ I Time % 1 s   Minute Volume (L/min) 17.9 Liters   Mask Leak (lpm) 31 lpm   Comfort Level Good   Using Accessory Muscles No   SpO2 90   Patient's Home Machine No   Alarm Settings   Alarms On Y   Low Pressure (cmH2O) 5 cmH2O   High Pressure (cmH2O) 25 cmH2O   Delay Alarm 20 sec(s)   Apnea (secs) 20 secs   RR Low (bpm) 10   RR High (bpm) 40 br/min   Patient Observation   Observations RRT increased O2 to 40% due to low SaO2.

## 2023-03-12 NOTE — PROGRESS NOTES
03/11/23 2333   NIV Type   NIV Started/Stopped On   Equipment Type v60   Mode Bilevel   Mask Type Full face mask   Mask Size Medium   Bonnet size Medium   Settings/Measurements   PIP Observed 15 cm H20   IPAP 15 cmH20   CPAP/EPAP 5 cmH2O   Vt (Measured) 717 mL   Rate Ordered 12   Resp 14   FiO2  35 %   I Time/ I Time % 1 s   Minute Volume (L/min) 9.6 Liters   Mask Leak (lpm) 0 lpm   Comfort Level Good   Using Accessory Muscles No   SpO2 92   Patient's Home Machine No   Alarm Settings   Alarms On Y   Low Pressure (cmH2O) 5 cmH2O   High Pressure (cmH2O) 25 cmH2O   Delay Alarm 20 sec(s)   Apnea (secs) 20 secs   RR Low (bpm) 13   RR High (bpm) 40 br/min   Patient Observation   Observations Pt is sleeping

## 2023-03-13 LAB
GLUCOSE BLD-MCNC: 113 MG/DL (ref 70–99)
GLUCOSE BLD-MCNC: 125 MG/DL (ref 70–99)
GLUCOSE BLD-MCNC: 141 MG/DL (ref 70–99)
GLUCOSE BLD-MCNC: 149 MG/DL (ref 70–99)

## 2023-03-13 PROCEDURE — 94669 MECHANICAL CHEST WALL OSCILL: CPT

## 2023-03-13 PROCEDURE — 6370000000 HC RX 637 (ALT 250 FOR IP): Performed by: NURSE PRACTITIONER

## 2023-03-13 PROCEDURE — 97535 SELF CARE MNGMENT TRAINING: CPT

## 2023-03-13 PROCEDURE — 82962 GLUCOSE BLOOD TEST: CPT

## 2023-03-13 PROCEDURE — 6360000002 HC RX W HCPCS: Performed by: NURSE PRACTITIONER

## 2023-03-13 PROCEDURE — 94660 CPAP INITIATION&MGMT: CPT

## 2023-03-13 PROCEDURE — 94640 AIRWAY INHALATION TREATMENT: CPT

## 2023-03-13 PROCEDURE — 2700000000 HC OXYGEN THERAPY PER DAY

## 2023-03-13 PROCEDURE — 94761 N-INVAS EAR/PLS OXIMETRY MLT: CPT

## 2023-03-13 PROCEDURE — 97530 THERAPEUTIC ACTIVITIES: CPT

## 2023-03-13 PROCEDURE — 1200000000 HC SEMI PRIVATE

## 2023-03-13 PROCEDURE — 2580000003 HC RX 258: Performed by: NURSE PRACTITIONER

## 2023-03-13 RX ADMIN — ALBUTEROL SULFATE 2 PUFF: 90 AEROSOL, METERED RESPIRATORY (INHALATION) at 16:39

## 2023-03-13 RX ADMIN — BUDESONIDE AND FORMOTEROL FUMARATE DIHYDRATE 2 PUFF: 80; 4.5 AEROSOL RESPIRATORY (INHALATION) at 19:28

## 2023-03-13 RX ADMIN — BUPRENORPHINE AND NALOXONE 1 FILM: 8; 2 FILM BUCCAL; SUBLINGUAL at 17:02

## 2023-03-13 RX ADMIN — ALBUTEROL SULFATE 2 PUFF: 90 AEROSOL, METERED RESPIRATORY (INHALATION) at 08:12

## 2023-03-13 RX ADMIN — ALBUTEROL SULFATE 2 PUFF: 90 AEROSOL, METERED RESPIRATORY (INHALATION) at 19:28

## 2023-03-13 RX ADMIN — ESCITALOPRAM OXALATE 10 MG: 10 TABLET ORAL at 11:13

## 2023-03-13 RX ADMIN — FLUTICASONE PROPIONATE 1 SPRAY: 50 SPRAY, METERED NASAL at 11:13

## 2023-03-13 RX ADMIN — BUDESONIDE AND FORMOTEROL FUMARATE DIHYDRATE 2 PUFF: 80; 4.5 AEROSOL RESPIRATORY (INHALATION) at 08:13

## 2023-03-13 RX ADMIN — QUETIAPINE FUMARATE 200 MG: 100 TABLET ORAL at 20:29

## 2023-03-13 RX ADMIN — SODIUM CHLORIDE, PRESERVATIVE FREE 10 ML: 5 INJECTION INTRAVENOUS at 11:31

## 2023-03-13 RX ADMIN — SENNOSIDES AND DOCUSATE SODIUM 1 TABLET: 50; 8.6 TABLET ORAL at 20:29

## 2023-03-13 RX ADMIN — SENNOSIDES AND DOCUSATE SODIUM 1 TABLET: 50; 8.6 TABLET ORAL at 11:13

## 2023-03-13 RX ADMIN — ALBUTEROL SULFATE 2 PUFF: 90 AEROSOL, METERED RESPIRATORY (INHALATION) at 12:33

## 2023-03-13 RX ADMIN — BUPRENORPHINE AND NALOXONE 1 FILM: 8; 2 FILM BUCCAL; SUBLINGUAL at 20:30

## 2023-03-13 RX ADMIN — ENOXAPARIN SODIUM 30 MG: 100 INJECTION SUBCUTANEOUS at 20:30

## 2023-03-13 RX ADMIN — BUPRENORPHINE AND NALOXONE 1 FILM: 8; 2 FILM BUCCAL; SUBLINGUAL at 11:13

## 2023-03-13 RX ADMIN — ENOXAPARIN SODIUM 30 MG: 100 INJECTION SUBCUTANEOUS at 11:14

## 2023-03-13 RX ADMIN — ASPIRIN 81 MG CHEWABLE TABLET 81 MG: 81 TABLET CHEWABLE at 11:13

## 2023-03-13 ASSESSMENT — PAIN DESCRIPTION - LOCATION
LOCATION: ARM
LOCATION: ARM

## 2023-03-13 ASSESSMENT — ENCOUNTER SYMPTOMS
EYES NEGATIVE: 1
RESPIRATORY NEGATIVE: 1
GASTROINTESTINAL NEGATIVE: 1

## 2023-03-13 ASSESSMENT — PAIN SCALES - GENERAL
PAINLEVEL_OUTOF10: 8

## 2023-03-13 ASSESSMENT — PAIN DESCRIPTION - ORIENTATION
ORIENTATION: LEFT
ORIENTATION: LEFT

## 2023-03-13 ASSESSMENT — PAIN SCALES - WONG BAKER
WONGBAKER_NUMERICALRESPONSE: 0
WONGBAKER_NUMERICALRESPONSE: 0

## 2023-03-13 ASSESSMENT — PAIN DESCRIPTION - DESCRIPTORS
DESCRIPTORS: THROBBING
DESCRIPTORS: THROBBING

## 2023-03-13 ASSESSMENT — PAIN - FUNCTIONAL ASSESSMENT
PAIN_FUNCTIONAL_ASSESSMENT: ACTIVITIES ARE NOT PREVENTED
PAIN_FUNCTIONAL_ASSESSMENT: ACTIVITIES ARE NOT PREVENTED

## 2023-03-13 ASSESSMENT — PAIN DESCRIPTION - PAIN TYPE: TYPE: CHRONIC PAIN

## 2023-03-13 ASSESSMENT — PAIN DESCRIPTION - ONSET: ONSET: ON-GOING

## 2023-03-13 ASSESSMENT — PAIN DESCRIPTION - FREQUENCY
FREQUENCY: CONTINUOUS
FREQUENCY: CONTINUOUS

## 2023-03-13 NOTE — PROGRESS NOTES
Occupational Therapy  . Occupational Therapy Treatment Note    Name: William Philip MRN: [id number] :   1960   Date:  3/13/2023   Admission Date: 3/1/2023 Room:  [address] [place] [address]-A     Primary Problem:  The primary encounter diagnosis was COPD exacerbation ([facility] [address] Diagnoses of Hypercarbia, Acute respiratory failure with hypoxia and hypercarbia ([facility] [address] and Pneumonia due to infectious organism, unspecified laterality, unspecified part of lung were also pertinent to this visit. Restrictions/Precautions:          general precautions, fall risk, cog/hallucinations    Communication with other providers: Per chart review, patient is appropriate for therapeutic intervention. Nurse Mendel Sanon. Subjective:  Patient states:  \"I would love to get to the toilet and then sit and wash up at the sink. \" Pt reports has let nursing know of pain in LUE  Pain:   Location, Type, Intensity (0/10 to 10/10):  9/10, LUE    Objective:    Observation: Pt received semi-fowlers in bed, A&O x3, actively participated in Tx session. Objective Measures:  Telemetry, HR 97, O2 sat 91% on 4L NC, RR 11    Treatment, including education:  Therapeutic Activity Training:   Therapeutic activity training was instructed today. Cues were given for safety, sequence, UE/LE placement, awareness, and balance. Activities performed today included bed mobility training, sup-sit, sit-stand, SPT. SBA for supine to sit, scooting to EOB. Sit<>stands: SBA c RW + min cues for safe body positioning, hand placement, performed >6 trials    Standing balance / tolerance: SBA c RW    SPT: See Toilet Transfer    Functional Mobility: SBA c RW inside room to access toilet / sink and return to chair    Self Care Training:   Cues were given for safety, sequence, UE/LE placement, visual cues, and balance.     Activities performed today included UB/LB dressing tasks, toileting, hand hygiene at sink    Toilet Transfer: Intermittent CGA / SBA c RW + min vc's for safe body positioning, cues for keeping walker close  Toileting: SBA during hygiene and clothing mgmt up / down    Bathing: SBA during stand for wash of [place] area, Sup seated to wash UB. UB Dressing: Sup seated to doff / don shirt / gown  LB Dressing: SBA for stand while hiking clothing (pull up and pants), B  socks    Grooming: Sup seated c pt identifying need for energy conservation while performing face washing, hand hygiene, hair and oral care. Assessment / Impression:    Patient's tolerance of treatment: Well  Adverse Reaction: None  Significant change in status and impact: Improved from initial evaluation  Barriers to improvement: None noted      Plan for Next Session:    Continue per OT POC per patient's tolerance c emphasis on standing tolerance during functional tasks / transfers / mobility.     Time in:  1455  Time out:  1535  Timed treatment minutes:  40  Total treatment time:  40      Electronically signed by:    ROSANA Cid  3/13/2023, 3:35 PM    Goals:  Time frame for goals: 2 weeks     Pt will complete grooming tasks with mod I standing at sink  Pt will complete toileting tasks with mod I using standard commode  Pt will complete UB dressing and bathing tasks with ind  Pt will complete LB dressing and bathing tasks with mod I  Pt will complete functional mobility to bathroom and back with mod I using RW  Pt will complete 10 minutes of therapeutic exercise/activity with good tolerance and 0 rest breaks

## 2023-03-13 NOTE — PROGRESS NOTES
03/13/23 0406   NIV Type   $NIV $Daily Charge   NIV Started/Stopped On   Equipment Type v60   Mode Bilevel   Mask Type Full face mask   Mask Size Medium   Bonnet size Medium   Settings/Measurements   PIP Observed 16 cm H20   IPAP 15 cmH20   CPAP/EPAP 5 cmH2O   Vt (Measured) 566 mL   Rate Ordered 12   Resp 13   FiO2  40 %   I Time/ I Time % 1 s   Minute Volume (L/min) 7.7 Liters   Mask Leak (lpm) 5 lpm   Comfort Level Good   Using Accessory Muscles No   SpO2 92   Patient's Home Machine No   Alarm Settings   Alarms On Y   Low Pressure (cmH2O) 5 cmH2O   High Pressure (cmH2O) 25 cmH2O   Delay Alarm 20 sec(s)   Apnea (secs) 20 secs   RR Low (bpm) 10   RR High (bpm) 40 br/min   Patient Observation   Observations Pt is sleeping

## 2023-03-13 NOTE — PROGRESS NOTES
V2.0  [facility] Hospitalist Progress Note      Name:  Savanna Jorge /Age/Sex: 1960  (63 y.o. female)   MRN & CSN:  [id number] & [id number] Encounter Date/Time: 3/13/2023 7:27 AM EST    [facility]  [facility] PCP: Hector Handley MD       Hospital Day: 13    Assessment and Plan:   Savanna Jorge is a 63 y.o. female with pmh of ADHD, MDD, chronic pain and COPD on home O2 who presents with Acute respiratory failure with hypercapnia ([facility] [address]    Patient pending placement, CM on board. Patient is medically stable. No changes to POC    Plan:  #Acute on chronic hypoxic and hypercapnic respiratory failure - Resolved  #Severe COPD exacerbation - Resolved  --Patient was treated with steroids, now discontinued, and completed azithromycin. Cefepime discontinued, urinary antigens for legionella and strep pneumo negative. MRSA neg  --Blood cultures with 1 out of 4 gram variable bacilli. Likely contaminant. She remains without hallmarks for infection. --Continue breathing treatments as needed  --Pulm on board, recs appreciated  --At baseline on 4L of O2, currently at baseline, BiPAP at bedtime, confirmed from patient that her home apparatus is fixed  --Pending placement     #Acute metabolic encephalopathy - Resolved  --Likely in the setting of hypercarbia. #Diarrhea - resolved  --Initially complaining of diarrhea in house, appears to be resolving, --GI PCR negative, if diarrhea continues, will consider loperamide    #Left shoulder pain - chronic   --She reported pain that she says came on after she had multiple falls  --Xray negative for acute osseus abnormalities, OA in the left shoulder  --She will need outpatient follow up     #Chronic pain  --Continue home Suboxone. ADHD  --Psych recommend holding adderall during hospitalization     #Auditory hallucinations  #Major depressive, in remission. Severe  Patient does have major depressive disorder and takes amitriptyline, Seroquel, and trazodone.   Patient had her Seroquel held due to altered mentation requiring BiPAP on presentation. She did develop auditory hallucinations. States this is the first time this ever happened. They were friendly voices and did not instruct her to do anything harmful or dangerous. Even so, patient states that scary. --Psych on board, appreciate recs. Changes made to her regimen, lexapro increased to 10mg PO, seroquel to 200mg, atarax 25mg PRN at bedtime. Trazodone and elavil remain discontinued. Diet ADULT DIET; Regular   DVT Prophylaxis [x] Lovenox, []  Heparin, [] SCDs, [] Ambulation,  [] Eliquis, [] Xarelto  [] Coumadin   Code Status Full Code   Disposition From: Home  Expected Disposition: SNF  Estimated Date of Discharge: 1-2 days  Patient requires continued admission due to need for placement   Surrogate Decision Maker/ POA      Subjective:     Chief Complaint: Shortness of Breath     Patient seen and examined at bedside. She endorses doing well and reports she is compliant with her BiPAP at bedtime. Working more with PT and feeling stronger. She's awaiting placement. Review of Systems:    Review of Systems   Constitutional: Negative. HENT: Negative. Eyes: Negative. Respiratory: Negative. Cardiovascular: Negative. Gastrointestinal: Negative. Genitourinary: Negative. Musculoskeletal:         Left shoulder pain  Chronic spasms   Skin: Negative. Neurological: Negative. Psychiatric/Behavioral: Negative. Objective: Intake/Output Summary (Last 24 hours) at 3/13/2023 0737  Last data filed at 3/12/2023 1837  Gross per 24 hour   Intake 480 ml   Output --   Net 480 ml          Vitals:   Vitals:    03/13/23 0406   BP:    Pulse:    Resp: 13   Temp:    SpO2:        Physical Exam:   General: NAD, lying in bed  Eyes: EOMI  ENT: Moist mucous membranes  Cardiovascular: Regular rate. Respiratory: Clear to auscultation, no wheezing appreciated.  On 4L of O2 via NC  Gastrointestinal: Soft, non tender  Genitourinary: No suprapubic tenderness  Musculoskeletal: No edema, unchanged tenderness to palpation of L shoulder, bruise on the posterior surface of the shoulder  Skin: Warm, dry  Neuro: Alert. Psych: Mood appropriate.      Medications:   Medications:    albuterol sulfate HFA  2 puff Inhalation 4x daily    enoxaparin  30 mg SubCUTAneous BID    escitalopram  10 mg Oral Daily    QUEtiapine  200 mg Oral Nightly    insulin lispro  0-8 Units SubCUTAneous TID WC    insulin lispro  0-4 Units SubCUTAneous Nightly    fluticasone  1 spray Each Nostril Daily    sodium chloride flush  5-40 mL IntraVENous 2 times per day    [Held by provider] amphetamine-dextroamphetamine  30 mg Oral Daily    aspirin  81 mg Oral Daily    buprenorphine-naloxone  1 Film SubLINGual TID    budesonide-formoterol  2 puff Inhalation BID    sennosides-docusate sodium  1 tablet Oral BID    pregabalin  100 mg Oral TID      Infusions:    dextrose      sodium chloride 12.5 mL/hr at 03/07/23 0130     PRN Meds: ipratropium-albuterol, 1 ampule, Q4H PRN  hydrOXYzine HCl, 25 mg, Nightly PRN  glucose, 4 tablet, PRN  dextrose bolus, 125 mL, PRN   Or  dextrose bolus, 250 mL, PRN  glucagon (rDNA), 1 mg, PRN  dextrose, , Continuous PRN  sodium chloride flush, 5-40 mL, PRN  sodium chloride, , PRN  ondansetron, 4 mg, Q8H PRN   Or  ondansetron, 4 mg, Q6H PRN  polyethylene glycol, 17 g, Daily PRN  acetaminophen, 650 mg, Q6H PRN   Or  acetaminophen, 650 mg, Q6H PRN  [Held by provider] traMADol, 50 mg, BID PRN      Labs      Recent Results (from the past 24 hour(s))   POCT Glucose    Collection Time: 03/12/23 11:26 AM   Result Value Ref Range    POC Glucose 128 (H) 70 - 99 MG/DL   GI Disease Panel PCR    Collection Time: 03/12/23  3:00 PM    Specimen: Stool   Result Value Ref Range    Campylobacter PCR NOT DETECTED NOT DETECTED    Plesiomonas Shigelloides PCR NOT DETECTED NOT DETECTED    Salmonella PCR NOT DETECTED NOT DETECTED    Vibrio PCR NOT DETECTED NOT DETECTED    Vibrio Cholerae PCR NOT DETECTED NOT DETECTED    Yersinia Enterocolitica PCR NOT DETECTED NOT DETECTED    E Coli Enteroaggregative PCR NOT DETECTED NOT DETECTED    E Coli Enteropathogenic PCR NOT DETECTED NOT DETECTED    E Coli Enterotoxigenic PCR NOT DETECTED NOT DETECTED    E Coli Shiga Like Toxin PCR NOT DETECTED NOT DETECTED    E Coli O157 PCR NOT DETECTED NOT DETECTED    E Coli Shigella/Enteroinvasive PCR NOT DETECTED NOT DETECTED    Cryptosporidium PCR NOT DETECTED NOT DETECTED    Cyclospora Cayetanensis PCR NOT DETECTED NOT DETECTED    Entamoeba Histolytica PCR NOT DETECTED NOT DETECTED    Giardia Lamblia PCR NOT DETECTED NOT DETECTED    Adenovirus F 40 41 PCR NOT DETECTED NOT DETECTED    Astrovirus PCR NOT DETECTED NOT DETECTED    Norovirus GI GII PCR NOT DETECTED NOT DETECTED    Rotavirus A PCR NOT DETECTED NOT DETECTED    Sapovirus PCR NOT DETECTED NOT DETECTED   POCT Glucose    Collection Time: 03/12/23  4:20 PM   Result Value Ref Range    POC Glucose 144 (H) 70 - 99 MG/DL   POCT Glucose    Collection Time: 03/12/23  9:03 PM   Result Value Ref Range    POC Glucose 170 (H) 70 - 99 MG/DL   POCT Glucose    Collection Time: 03/13/23  6:59 AM   Result Value Ref Range    POC Glucose 113 (H) 70 - 99 MG/DL          Imaging/Diagnostics Last 24 Hours   No results found.     Electronically signed by Anna Francisco MD on 3/13/2023 at 7:37 AM

## 2023-03-13 NOTE — CARE COORDINATION
This RN case manager was advised by Tabatha Estrada with [facility] that they cannot accept patient at this time due to Covid outbreak. Referral sent to next choice of [facility].

## 2023-03-13 NOTE — PROGRESS NOTES
03/12/23 2330   NIV Type   NIV Started/Stopped On   Equipment Type v60   Mode Bilevel   Mask Type Full face mask   Mask Size Medium   Bonnet size Medium   Settings/Measurements   PIP Observed 20 cm H20   IPAP 15 cmH20   CPAP/EPAP 5 cmH2O   Vt (Measured) 780 mL   Rate Ordered 12   Resp 13   FiO2  40 %   I Time/ I Time % 1 s   Minute Volume (L/min) 10.4 Liters   Mask Leak (lpm) 14 lpm   Comfort Level Good   Using Accessory Muscles No   SpO2 96   Patient's Home Machine No   Alarm Settings   Alarms On Y   Low Pressure (cmH2O) 5 cmH2O   High Pressure (cmH2O) 25 cmH2O   Delay Alarm 20 sec(s)   Apnea (secs) 20 secs   RR Low (bpm) 10   RR High (bpm) 40 br/min   Patient Observation   Observations Pt sleeping

## 2023-03-14 LAB
GLUCOSE BLD-MCNC: 109 MG/DL (ref 70–99)
GLUCOSE BLD-MCNC: 148 MG/DL (ref 70–99)
GLUCOSE BLD-MCNC: 156 MG/DL (ref 70–99)
GLUCOSE BLD-MCNC: 95 MG/DL (ref 70–99)

## 2023-03-14 PROCEDURE — 6360000002 HC RX W HCPCS: Performed by: NURSE PRACTITIONER

## 2023-03-14 PROCEDURE — 94761 N-INVAS EAR/PLS OXIMETRY MLT: CPT

## 2023-03-14 PROCEDURE — 97535 SELF CARE MNGMENT TRAINING: CPT

## 2023-03-14 PROCEDURE — 6370000000 HC RX 637 (ALT 250 FOR IP): Performed by: NURSE PRACTITIONER

## 2023-03-14 PROCEDURE — 2580000003 HC RX 258: Performed by: NURSE PRACTITIONER

## 2023-03-14 PROCEDURE — 1200000000 HC SEMI PRIVATE

## 2023-03-14 PROCEDURE — 97116 GAIT TRAINING THERAPY: CPT

## 2023-03-14 PROCEDURE — 94660 CPAP INITIATION&MGMT: CPT

## 2023-03-14 PROCEDURE — 94669 MECHANICAL CHEST WALL OSCILL: CPT

## 2023-03-14 PROCEDURE — 82962 GLUCOSE BLOOD TEST: CPT

## 2023-03-14 PROCEDURE — 94640 AIRWAY INHALATION TREATMENT: CPT

## 2023-03-14 PROCEDURE — 94150 VITAL CAPACITY TEST: CPT

## 2023-03-14 PROCEDURE — 2700000000 HC OXYGEN THERAPY PER DAY

## 2023-03-14 RX ADMIN — ALBUTEROL SULFATE 2 PUFF: 90 AEROSOL, METERED RESPIRATORY (INHALATION) at 19:13

## 2023-03-14 RX ADMIN — SODIUM CHLORIDE, PRESERVATIVE FREE 10 ML: 5 INJECTION INTRAVENOUS at 08:50

## 2023-03-14 RX ADMIN — HYDROXYZINE HYDROCHLORIDE 25 MG: 25 TABLET, FILM COATED ORAL at 21:33

## 2023-03-14 RX ADMIN — ASPIRIN 81 MG CHEWABLE TABLET 81 MG: 81 TABLET CHEWABLE at 08:50

## 2023-03-14 RX ADMIN — ALBUTEROL SULFATE 2 PUFF: 90 AEROSOL, METERED RESPIRATORY (INHALATION) at 08:34

## 2023-03-14 RX ADMIN — SODIUM CHLORIDE, PRESERVATIVE FREE 10 ML: 5 INJECTION INTRAVENOUS at 21:30

## 2023-03-14 RX ADMIN — BUDESONIDE AND FORMOTEROL FUMARATE DIHYDRATE 2 PUFF: 80; 4.5 AEROSOL RESPIRATORY (INHALATION) at 08:35

## 2023-03-14 RX ADMIN — BUPRENORPHINE AND NALOXONE 1 FILM: 8; 2 FILM BUCCAL; SUBLINGUAL at 21:29

## 2023-03-14 RX ADMIN — ESCITALOPRAM OXALATE 10 MG: 10 TABLET ORAL at 08:50

## 2023-03-14 RX ADMIN — BUDESONIDE AND FORMOTEROL FUMARATE DIHYDRATE 2 PUFF: 80; 4.5 AEROSOL RESPIRATORY (INHALATION) at 19:13

## 2023-03-14 RX ADMIN — SENNOSIDES AND DOCUSATE SODIUM 1 TABLET: 50; 8.6 TABLET ORAL at 08:58

## 2023-03-14 RX ADMIN — FLUTICASONE PROPIONATE 1 SPRAY: 50 SPRAY, METERED NASAL at 08:58

## 2023-03-14 RX ADMIN — QUETIAPINE FUMARATE 200 MG: 100 TABLET ORAL at 21:29

## 2023-03-14 RX ADMIN — ENOXAPARIN SODIUM 30 MG: 100 INJECTION SUBCUTANEOUS at 08:50

## 2023-03-14 RX ADMIN — BUPRENORPHINE AND NALOXONE 1 FILM: 8; 2 FILM BUCCAL; SUBLINGUAL at 08:55

## 2023-03-14 RX ADMIN — ENOXAPARIN SODIUM 30 MG: 100 INJECTION SUBCUTANEOUS at 21:29

## 2023-03-14 RX ADMIN — BUPRENORPHINE AND NALOXONE 1 FILM: 8; 2 FILM BUCCAL; SUBLINGUAL at 14:46

## 2023-03-14 ASSESSMENT — ENCOUNTER SYMPTOMS
COLOR CHANGE: 0
GASTROINTESTINAL NEGATIVE: 1
SINUS PRESSURE: 0
RESPIRATORY NEGATIVE: 1
ABDOMINAL PAIN: 0
BACK PAIN: 0
NAUSEA: 0
SHORTNESS OF BREATH: 0
SINUS PAIN: 0
WHEEZING: 0
VOMITING: 0
SORE THROAT: 0
COUGH: 0
CONSTIPATION: 0
DIARRHEA: 0
EYES NEGATIVE: 1

## 2023-03-14 ASSESSMENT — PAIN SCALES - WONG BAKER: WONGBAKER_NUMERICALRESPONSE: 6

## 2023-03-14 ASSESSMENT — PAIN SCALES - GENERAL: PAINLEVEL_OUTOF10: 5

## 2023-03-14 NOTE — PROGRESS NOTES
V2.0  [facility] Hospitalist Progress Note      Name:  Kalee Chicas /Age/Sex: 1960  (62 y.o. female)   MRN & CSN:  [id number] & [id number] Encounter Date/Time: 3/14/2023 7:27 AM EST    [facility] Caban [facility]       Hospital Day: 14    Assessment and Plan:   Kalee Cihcas is a 62 y.o. female with pmh of ADHD, MDD, chronic pain and COPD on home O2 who presents with Acute respiratory failure with hypercapnia (HCC)    Patient pending placement, CM on board. Patient is medically stable.    Plan:  #Acute on chronic hypoxic and hypercapnic respiratory failure - Resolved  #Severe COPD exacerbation - Resolved  --Patient was treated with steroids, now discontinued, and completed azithromycin. Cefepime discontinued, urinary antigens for legionella and strep pneumo negative. MRSA neg  --Blood cultures with 1 out of 4 gram variable bacilli.  Likely contaminant. She remains without hallmarks for infection.   --Continue breathing treatments as needed  --Pulm on board, recs appreciated  --At baseline on 4L of O2, currently at baseline, BiPAP at night  --Pending placement     #Acute metabolic encephalopathy - Resolved  --Likely in the setting of hypercarbia.     #Diarrhea - resolving  --Initially complaining of diarrhea in house, appears to be resolving, --GI PCR pending, if sample gets sent    #Left shoulder pain - chronic   --She reported pain that she says came on after she had multiple falls  --Xray negative for acute osseus abnormalities, OA in the left shoulder  --She will need outpatient follow up     #Chronic pain  --Continue home Suboxone.     ADHD  --Psych recommend holding adderall during hospitalization     #Auditory hallucinations  #Major depressive, in remission.  Severe  Patient does have major depressive disorder and takes amitriptyline, Seroquel, and trazodone.  Patient had her Seroquel held due to altered mentation requiring BiPAP on presentation.  She did develop auditory  hallucinations. States this is the first time this ever happened. They were friendly voices and did not instruct her to do anything harmful or dangerous. Even so, patient states that scary. -- Psych on board,   -- Lexapro increased to 10mg PO, seroquel to 200mg, atarax 25mg PRN at bedtime. -- Trazodone and elavil remain discontinued. Diet ADULT DIET; Regular   DVT Prophylaxis [x] Lovenox, []  Heparin, [] SCDs, [] Ambulation,  [] Eliquis, [] Xarelto  [] Coumadin   Code Status Full Code   Disposition From: Home  Expected Disposition: SNF  Estimated Date of Discharge: 1-2 days  Patient requires continued admission due to need for placement   Surrogate Decision Maker/ POA      Subjective:     Chief Complaint: Shortness of Breath     Patient states that she is doing okay. Is awaiting placement and is looking forward to doing more physical therapy to regain her strength. Review of Systems:    Review of Systems   Constitutional: Negative. Negative for activity change, appetite change, chills, fatigue and fever. HENT: Negative. Negative for congestion, sinus pressure, sinus pain and sore throat. Eyes: Negative. Negative for visual disturbance. Respiratory: Negative. Negative for cough, shortness of breath and wheezing. Cardiovascular: Negative. Negative for chest pain, palpitations and leg swelling. Gastrointestinal: Negative. Negative for abdominal pain, constipation, diarrhea, nausea and vomiting. Endocrine: Negative for polydipsia and polyuria. Genitourinary: Negative. Negative for dysuria. Musculoskeletal:  Negative for arthralgias and back pain. Left shoulder pain  Chronic spasms   Skin: Negative. Negative for color change. Neurological: Negative. Negative for dizziness, weakness and headaches. Psychiatric/Behavioral: Negative. Negative for agitation, behavioral problems and confusion.       Objective:   No intake or output data in the 24 hours ending 03/14/23 8708 Vitals:   Vitals:    03/14/23 0925   BP:    Pulse:    Resp: 20   Temp:    SpO2:        Physical Exam:   General: NAD, lying on her right side in bed  Eyes: EOMI  ENT: Moist mucous membranes  Cardiovascular: Regular rate. Respiratory: Clear to auscultation, no wheezing appreciated. On 4L of O2 via NC  Gastrointestinal: Soft, non tender  Genitourinary: No suprapubic tenderness  Musculoskeletal: Unchanged tenderness to palpation of L shoulder, bruise on the posterior surface of the shoulder  Skin: Warm, dry  Neuro: Alert. Psych: Mood appropriate.      Medications:   Medications:    albuterol sulfate HFA  2 puff Inhalation 4x daily    enoxaparin  30 mg SubCUTAneous BID    escitalopram  10 mg Oral Daily    QUEtiapine  200 mg Oral Nightly    insulin lispro  0-8 Units SubCUTAneous TID WC    insulin lispro  0-4 Units SubCUTAneous Nightly    fluticasone  1 spray Each Nostril Daily    sodium chloride flush  5-40 mL IntraVENous 2 times per day    [Held by provider] amphetamine-dextroamphetamine  30 mg Oral Daily    aspirin  81 mg Oral Daily    buprenorphine-naloxone  1 Film SubLINGual TID    budesonide-formoterol  2 puff Inhalation BID    sennosides-docusate sodium  1 tablet Oral BID    pregabalin  100 mg Oral TID      Infusions:    dextrose      sodium chloride 12.5 mL/hr at 03/07/23 0130     PRN Meds: ipratropium-albuterol, 1 ampule, Q4H PRN  hydrOXYzine HCl, 25 mg, Nightly PRN  glucose, 4 tablet, PRN  dextrose bolus, 125 mL, PRN   Or  dextrose bolus, 250 mL, PRN  glucagon (rDNA), 1 mg, PRN  dextrose, , Continuous PRN  sodium chloride flush, 5-40 mL, PRN  sodium chloride, , PRN  ondansetron, 4 mg, Q8H PRN   Or  ondansetron, 4 mg, Q6H PRN  polyethylene glycol, 17 g, Daily PRN  acetaminophen, 650 mg, Q6H PRN   Or  acetaminophen, 650 mg, Q6H PRN  [Held by provider] traMADol, 50 mg, BID PRN      Labs      Recent Results (from the past 24 hour(s))   POCT Glucose    Collection Time: 03/13/23  5:00 PM   Result Value Ref Range POC Glucose 141 (H) 70 - 99 MG/DL   POCT Glucose    Collection Time: 03/13/23  8:59 PM   Result Value Ref Range    POC Glucose 125 (H) 70 - 99 MG/DL   POCT Glucose    Collection Time: 03/14/23  6:50 AM   Result Value Ref Range    POC Glucose 95 70 - 99 MG/DL   POCT Glucose    Collection Time: 03/14/23 11:46 AM   Result Value Ref Range    POC Glucose 148 (H) 70 - 99 MG/DL          Imaging/Diagnostics Last 24 Hours   No results found.     Electronically signed by Sylvia Green MD on 3/14/2023 at 1:32 PM

## 2023-03-14 NOTE — PROGRESS NOTES
Occupational Therapy Treatment Note  Name: Antoinette Bah MRN: [id number] :   1960   Date:  3/14/2023   Admission Date: 3/1/2023 Room:  [address] [place] [address]-A     Primary Problem:    [place]:  The primary encounter diagnosis was COPD exacerbation ([facility] Diagnoses of Hypercarbia, Acute respiratory failure with hypoxia and hypercarbia ([facility] and Pneumonia due to infectious organism, unspecified laterality, unspecified part of lung were also pertinent to this visit. Patient  has a past medical history of ADHD, COPD (chronic obstructive pulmonary disease) ([facility] COVID-19, Drug addiction in remission ([facility] Hep C w/o coma, chronic ([facility] Pacemaker, Sleep apnea, and TIA (transient ischemic attack). Patient  has a past surgical history that includes  section (); Cholecystectomy (); and Pacemaker insertion (). Communication with other providers:  cotx with PT Cliff for pt tolerance, safety    Subjective:  Patient states:  \"It's been a week since we talked about rehab placement\" (pt frustrated with extended length of stay)  Pain: endorses general discomfort/malaise, doesn't rate  Restrictions: general precautions, fall risk, monitor O2    Objective:    Observation:  pt was semi fowlers in bed upon arrival, agreeable to session  Objective Measures:  O2 high 70s-mid 80s with mobility, recovers within minutes with cues for energy conservation and pursed lip breathing into low 90s    Treatment, including education:  Self Care Training (15 minutes):   Self care training was performed today. Cues were given for safety, sequence, UE/LE placement, visual cues, and balance. Activities performed today included     Supine to sit- SBA    Seated balance- SBA    STS- CGA    Amb to bathroom- CGA using RW    Grooming- setup/CGA standing at sink, fatigues quickly and completes grooming tasks seated with SBA. Demo washing face, brushing teeth, brushing hair, hand hygiene.      STS- CGA    Pt ambulating functional distance with PT Cliff in hallway with multiple seated rest breaks and chair follow from OT (unbilled time). Pt with O2 sat instability during mobility. Education: Role of OT, OT POC, safety, benefits of EOB/OOB activity, rationale for treatment, energy conservation, pursed lip breathing, insight into deficits  Safety Measures: Gait belt used for safety of pt and therapist, Left in recliner, Alarm in place, call light and phone within reach, lines managed    Assessment / Impression:    Pt anxious to go home today, states she thinks that [facility] [address] may be good option for her however pt with cont poor activity tolerance and impaired endurance- would cont to benefit from rehab placement at d/c.     Patient's tolerance of treatment: fair+  Adverse Reaction: SOB, fatigue  Significant change in status and impact:  none  Barriers to improvement: deconditioning, comorbidities, insight/safety    Plan for Next Session:    Continue OT POC    Time in:  1014  Time out:  1037  Timed treatment minutes:  15  Total treatment time:  23    Electronically signed by:    Cole Harrison OT, OTR/L  3/14/2023, 2:24 PM

## 2023-03-14 NOTE — PROGRESS NOTES
Physical Therapy    Physical Therapy Treatment Note  Name: Kathi Zhang MRN: [id number] :   1960   Date:  3/14/2023   Admission Date: 3/1/2023 Room:  [address] [place] [address]   Restrictions/Precautions: fall risk; general precautions  Communication with other providers:  cotx w/ OT Asiya  Subjective:  Patient states:  \"I got bills to pay, a dog to care for, things to do\"  Pain:   Location, Type, Intensity (0/10 to 10/10):  endorses general discomfort that she does not rate  Objective:    Observation:  Supine, awake, alert, agreeable. She is hopeful to return home, improving towards est goals. Tele, pulse ox, 4 L of nc O2, IV in place  Treatment, including education/measures:  Supine <-> sit: SBA  Seated balance: good  Sit <-> stand: SBA  Standing balance: fair +   Stand <-> sit: CGA  Left in recliner, chair alarmed, call light in reach, gait belt in place for OOB  Provided inc education regarding assist in return to function, current level of function, endurance and capabilities at current level    Gait Training:  Cues were given for safety, sequence, device management, balance, posture, awareness, path. 10 ft + 10 ft + 85 ft + 75 ft + 25 ft using FWW at CGA ; dec adrian, dec step length, reciprocal, inc path deviations, pt attending to sway ; endurance impacting distance w/ desat into high 70's - low 80's    Assessment / Impression:    Pt is ambulating great inc distance and tolerating fairly w/ mod cues for pacing ; cont w/ desat into high 70's to low 80's w/ onset on 4 L of nc O2. Pt is restless and hopeful to return home, demonstrating lack of insight into need for pacing, PLB, further attention to gradual inc activity to promote return to PLOF. Would greatly benefit from subacute therapy.     Patient's tolerance of treatment:  fair +  Barriers to improvement:  endurance, safety awareness, strength  Plan for Next Session:    Cont w/ est DC plan (SNF)  Progress ambulation, functional strength, functional mobility    Time in:  1014  Time out:  1037  Timed treatment minutes:  23  Total treatment time:  23    Previously filed items:     Patient Goals   Patient Goals : return to PLOF     Short Term Goals  Time Frame for Short Term Goals: 1 week  Short Term Goal 1: pt will complete bed mobility at mod ind  Short Term Goal 2: pt will complete transfers at [facility]  Short Term Goal 3: pt will ambulate 50 ft using LRAD at [facility]    Electronically signed by:    Cat Landon PT, DPT  3/14/2023, 12:09 PM

## 2023-03-14 NOTE — CARE COORDINATION
Patient has been approved by [facility]. Will need family to drop off Bipap to [facility]. Nola Pisano advised that precert need initiated. RITO of tomorrow.

## 2023-03-15 VITALS
HEIGHT: 64 IN | OXYGEN SATURATION: 96 % | WEIGHT: 270.9 LBS | BODY MASS INDEX: 46.25 KG/M2 | DIASTOLIC BLOOD PRESSURE: 62 MMHG | TEMPERATURE: 98.2 F | HEART RATE: 80 BPM | SYSTOLIC BLOOD PRESSURE: 104 MMHG | RESPIRATION RATE: 16 BRPM

## 2023-03-15 LAB — GLUCOSE BLD-MCNC: 133 MG/DL (ref 70–99)

## 2023-03-15 PROCEDURE — 6370000000 HC RX 637 (ALT 250 FOR IP): Performed by: NURSE PRACTITIONER

## 2023-03-15 PROCEDURE — 2580000003 HC RX 258: Performed by: NURSE PRACTITIONER

## 2023-03-15 PROCEDURE — 2700000000 HC OXYGEN THERAPY PER DAY

## 2023-03-15 PROCEDURE — 94669 MECHANICAL CHEST WALL OSCILL: CPT

## 2023-03-15 PROCEDURE — 94640 AIRWAY INHALATION TREATMENT: CPT

## 2023-03-15 PROCEDURE — 82962 GLUCOSE BLOOD TEST: CPT

## 2023-03-15 PROCEDURE — 94761 N-INVAS EAR/PLS OXIMETRY MLT: CPT

## 2023-03-15 PROCEDURE — 94150 VITAL CAPACITY TEST: CPT

## 2023-03-15 PROCEDURE — 6360000002 HC RX W HCPCS: Performed by: NURSE PRACTITIONER

## 2023-03-15 PROCEDURE — 94660 CPAP INITIATION&MGMT: CPT

## 2023-03-15 RX ADMIN — SODIUM CHLORIDE, PRESERVATIVE FREE 10 ML: 5 INJECTION INTRAVENOUS at 08:58

## 2023-03-15 RX ADMIN — BUPRENORPHINE AND NALOXONE 1 FILM: 8; 2 FILM BUCCAL; SUBLINGUAL at 15:55

## 2023-03-15 RX ADMIN — ESCITALOPRAM OXALATE 10 MG: 10 TABLET ORAL at 08:59

## 2023-03-15 RX ADMIN — BUPRENORPHINE AND NALOXONE 1 FILM: 8; 2 FILM BUCCAL; SUBLINGUAL at 08:58

## 2023-03-15 RX ADMIN — ALBUTEROL SULFATE 2 PUFF: 90 AEROSOL, METERED RESPIRATORY (INHALATION) at 11:31

## 2023-03-15 RX ADMIN — ENOXAPARIN SODIUM 30 MG: 100 INJECTION SUBCUTANEOUS at 08:58

## 2023-03-15 RX ADMIN — ASPIRIN 81 MG CHEWABLE TABLET 81 MG: 81 TABLET CHEWABLE at 08:59

## 2023-03-15 RX ADMIN — SENNOSIDES AND DOCUSATE SODIUM 1 TABLET: 50; 8.6 TABLET ORAL at 08:59

## 2023-03-15 RX ADMIN — ALBUTEROL SULFATE 2 PUFF: 90 AEROSOL, METERED RESPIRATORY (INHALATION) at 08:00

## 2023-03-15 RX ADMIN — BUDESONIDE AND FORMOTEROL FUMARATE DIHYDRATE 2 PUFF: 80; 4.5 AEROSOL RESPIRATORY (INHALATION) at 08:01

## 2023-03-15 RX ADMIN — ALBUTEROL SULFATE 2 PUFF: 90 AEROSOL, METERED RESPIRATORY (INHALATION) at 15:22

## 2023-03-15 RX ADMIN — FLUTICASONE PROPIONATE 1 SPRAY: 50 SPRAY, METERED NASAL at 08:59

## 2023-03-15 ASSESSMENT — ENCOUNTER SYMPTOMS
DIARRHEA: 0
ABDOMINAL PAIN: 0
NAUSEA: 0
GASTROINTESTINAL NEGATIVE: 1
SINUS PRESSURE: 0
COUGH: 0
COLOR CHANGE: 0
VOMITING: 0
RESPIRATORY NEGATIVE: 1
CONSTIPATION: 0
WHEEZING: 0
SHORTNESS OF BREATH: 0
SORE THROAT: 0
BACK PAIN: 0
SINUS PAIN: 0
EYES NEGATIVE: 1

## 2023-03-15 ASSESSMENT — PAIN DESCRIPTION - ONSET: ONSET: ON-GOING

## 2023-03-15 ASSESSMENT — PAIN DESCRIPTION - LOCATION: LOCATION: BACK

## 2023-03-15 ASSESSMENT — PAIN SCALES - GENERAL
PAINLEVEL_OUTOF10: 8
PAINLEVEL_OUTOF10: 6

## 2023-03-15 ASSESSMENT — PAIN DESCRIPTION - ORIENTATION: ORIENTATION: MID;LOWER

## 2023-03-15 ASSESSMENT — PAIN DESCRIPTION - DESCRIPTORS: DESCRIPTORS: ACHING;DISCOMFORT

## 2023-03-15 ASSESSMENT — PAIN DESCRIPTION - PAIN TYPE: TYPE: CHRONIC PAIN

## 2023-03-15 ASSESSMENT — PAIN DESCRIPTION - FREQUENCY: FREQUENCY: CONTINUOUS

## 2023-03-15 ASSESSMENT — PAIN - FUNCTIONAL ASSESSMENT: PAIN_FUNCTIONAL_ASSESSMENT: PREVENTS OR INTERFERES SOME ACTIVE ACTIVITIES AND ADLS

## 2023-03-15 NOTE — PROGRESS NOTES
Physical Therapy  Attempted to see patient. She reported that she is being discharged today and will be following up with home health. No DC order in computer as of this note.

## 2023-03-15 NOTE — PROGRESS NOTES
V2.0  [facility] Hospitalist Progress Note      Name:  Nate Barragan /Age/Sex: 1960  (63 y.o. female)   MRN & CSN:  [id number] & [id number] Encounter Date/Time: 3/15/2023 7:27 AM EST    Location:  [address] [place] [address]-A PCP: Nallely Ray MD       Hospital Day: 15    Assessment and Plan:   Nate Barragan is a 63 y.o. female with pmh of ADHD, MDD, chronic pain and COPD on home O2 who presents with Acute respiratory failure with hypercapnia ([facility]    Patient pending placement, CM on board. Patient is medically stable. Plan:  #Acute on chronic hypoxic and hypercapnic respiratory failure - Resolved  #Severe COPD exacerbation - Resolved  --Patient was treated with steroids, now discontinued, and completed azithromycin. Cefepime discontinued, urinary antigens for legionella and strep pneumo negative. MRSA neg  --Blood cultures with 1 out of 4 gram variable bacilli. Likely contaminant. She remains without hallmarks for infection. --Continue breathing treatments as needed  --Pulm on board, recs appreciated  --At baseline on 4L of O2, currently at baseline, BiPAP at night  --Pending placement     #Acute metabolic encephalopathy - Resolved  --Likely in the setting of hypercarbia. #Diarrhea - resolving  --Initially complaining of diarrhea in house, appears to be resolving, --GI PCR pending, if sample gets sent    #Left shoulder pain - chronic   --She reported pain that she says came on after she had multiple falls  --Xray negative for acute osseus abnormalities, OA in the left shoulder  --She will need outpatient follow up     #Chronic pain  --Continue home Suboxone. ADHD  --Psych recommend holding adderall during hospitalization     #Auditory hallucinations  #Major depressive, in remission. Severe  Patient does have major depressive disorder and takes amitriptyline, Seroquel, and trazodone. Patient had her Seroquel held due to altered mentation requiring BiPAP on presentation.   She did develop auditory hallucinations. States this is the first time this ever happened. They were friendly voices and did not instruct her to do anything harmful or dangerous. Even so, patient states that scary. -- Psych on board,   -- Lexapro increased to 10mg PO, seroquel to 200mg, atarax 25mg PRN at bedtime. -- Trazodone and elavil remain discontinued. Diet ADULT DIET; Regular   DVT Prophylaxis [x] Lovenox, []  Heparin, [] SCDs, [] Ambulation,  [] Eliquis, [] Xarelto  [] Coumadin   Code Status Full Code   Disposition From: Home  Expected Disposition: SNF  Estimated Date of Discharge: 1-2 days  Patient requires continued admission due to need for placement   Surrogate Decision Maker/ POA      Subjective:     Chief Complaint: Shortness of Breath     Patient awaiting pre-CERT. Hopeful that will come through today or tomorrow. If not the patient would prefer to get out of the hospital and have therapy in the home setting. Her desire is to do inpatient at a skilled facility, but insurance is taking a while. Review of Systems:    Review of Systems   Constitutional: Negative. Negative for activity change, appetite change, chills, fatigue and fever. HENT: Negative. Negative for congestion, sinus pressure, sinus pain and sore throat. Eyes: Negative. Negative for visual disturbance. Respiratory: Negative. Negative for cough, shortness of breath and wheezing. Cardiovascular: Negative. Negative for chest pain, palpitations and leg swelling. Gastrointestinal: Negative. Negative for abdominal pain, constipation, diarrhea, nausea and vomiting. Endocrine: Negative for polydipsia and polyuria. Genitourinary: Negative. Negative for dysuria. Musculoskeletal:  Negative for arthralgias and back pain. Left shoulder pain  Chronic spasms   Skin: Negative. Negative for color change. Neurological: Negative. Negative for dizziness, weakness and headaches. Psychiatric/Behavioral: Negative.   Negative for agitation, behavioral problems and confusion. Objective: Intake/Output Summary (Last 24 hours) at 3/15/2023 1118  Last data filed at 3/15/2023 0852  Gross per 24 hour   Intake 240 ml   Output --   Net 240 ml          Vitals:   Vitals:    03/15/23 0845   BP: 110/62   Pulse: 82   Resp: 18   Temp: 98.4 °F (36.9 °C)   SpO2: 95%       Physical Exam:   General: NAD, lying on her right side in bed  Eyes: EOMI  ENT: Moist mucous membranes  Cardiovascular: Regular rate. Respiratory: Clear to auscultation, no wheezing appreciated. On 4L of O2 via NC  Gastrointestinal: Soft, non tender  Genitourinary: No suprapubic tenderness  Musculoskeletal: Unchanged tenderness to palpation of L shoulder, bruise on the posterior surface of the shoulder  Skin: Warm, dry  Neuro: Alert. Psych: Mood appropriate.      Medications:   Medications:    albuterol sulfate HFA  2 puff Inhalation 4x daily    enoxaparin  30 mg SubCUTAneous BID    escitalopram  10 mg Oral Daily    QUEtiapine  200 mg Oral Nightly    fluticasone  1 spray Each Nostril Daily    sodium chloride flush  5-40 mL IntraVENous 2 times per day    [Held by provider] amphetamine-dextroamphetamine  30 mg Oral Daily    aspirin  81 mg Oral Daily    buprenorphine-naloxone  1 Film SubLINGual TID    budesonide-formoterol  2 puff Inhalation BID    sennosides-docusate sodium  1 tablet Oral BID    pregabalin  100 mg Oral TID      Infusions:    dextrose      sodium chloride 12.5 mL/hr at 03/07/23 0130     PRN Meds: ipratropium-albuterol, 1 ampule, Q4H PRN  hydrOXYzine HCl, 25 mg, Nightly PRN  glucose, 4 tablet, PRN  dextrose bolus, 125 mL, PRN   Or  dextrose bolus, 250 mL, PRN  glucagon (rDNA), 1 mg, PRN  dextrose, , Continuous PRN  sodium chloride flush, 5-40 mL, PRN  sodium chloride, , PRN  ondansetron, 4 mg, Q8H PRN   Or  ondansetron, 4 mg, Q6H PRN  polyethylene glycol, 17 g, Daily PRN  acetaminophen, 650 mg, Q6H PRN   Or  acetaminophen, 650 mg, Q6H PRN  [Held by provider] traMADol, 50 mg, BID PRN      Labs      Recent Results (from the past 24 hour(s))   POCT Glucose    Collection Time: 03/14/23 11:46 AM   Result Value Ref Range    POC Glucose 148 (H) 70 - 99 MG/DL   POCT Glucose    Collection Time: 03/14/23  4:16 PM   Result Value Ref Range    POC Glucose 109 (H) 70 - 99 MG/DL   POCT Glucose    Collection Time: 03/14/23  8:45 PM   Result Value Ref Range    POC Glucose 156 (H) 70 - 99 MG/DL   POCT Glucose    Collection Time: 03/15/23  6:46 AM   Result Value Ref Range    POC Glucose 133 (H) 70 - 99 MG/DL          Imaging/Diagnostics Last 24 Hours   No results found.     Electronically signed by Martínez Zhang MD on 3/15/2023 at 11:18 AM

## 2023-03-15 NOTE — PROGRESS NOTES
Attending provider advised for patient to stay while pre-cert pending for rehab treatment. This nurse explained risks and benefits for stay. Patient stated she will be with her daughter and feels she is safe and back to baseline. AMA form signed, provider notified. Daughter here to take patient home.

## 2023-03-19 NOTE — DISCHARGE SUMMARY
V2.0  Discharge Summary    Name:  Kyle Banerjee /Age/Sex: 1960 (63 y.o. female)   Admit Date: 3/1/2023  Discharge Date: 3/14/23    MRN & CSN:  [id number] & [id number] Encounter Date and Time 3/19/23 3:10 PM EDT    Attending:  No att. providers found Discharging Provider: Joyce Sheehan MD       Hospital Course:     Brief HPI: Kyle Banerjee is a 63 y.o. female  ADHD, MDD, chronic pain and COPD on home O2 who presents  complaints of shortness of breath, dyspnea. Patient was found to be hypoxic on EMS arrival, was placed on BiPAP and transported to ED for evaluation. Unable to complete full review of symptoms secondary to respiratory distress, patient being on BiPAP. Patient is able to wake up intermittently and states she does feel like it is a COPD exacerbation. Patient was able to be brought back almost to baseline, but did continue to desaturate with minimal exertion. PT and OT saw the patient and was advised that she go to a facility for an additional rehabilitation. The patient stay was extended multiple times as it was difficult to get insurance approval for SNF. Ultimately, the patient decided to leave AMA and go home as she was frustrated that she had been in the hospital for 2 weeks. Patient to the risks prior to signing out AMA including hypoxia with exertion and the possibility of exacerbated COPD. Patient does have a tendency to hypercapnia and is at the risk of this recurring was there which could ultimately lead to altered mental status and death if she did not have proper medical attention). Patient expressed understanding and felt safe with family support to go home. She signed out [address] Brief Problem Based Course:     #Acute on chronic hypoxic and hypercapnic respiratory failure -secondary to Severe COPD exacerbation - Resolved  Patient required BiPAP and multiple treatments. She was treated with steroids, antibiotics, and breathing treatments. Her breathing was able to be brought closer to baseline. She at the time where she is [address] did desaturate quite rapidly with exertion. Highly recommend that the patient continue to use BiPAP at night. She does have a BiPAP machine at home. Without using this she is at high risk to follow-up hypercapnia again. #Acute metabolic encephalopathy - Resolved  --Likely in the setting of hypercarbia. #Left shoulder pain - chronic   --She reported pain that she says came on after she had multiple falls. Xray negative for acute osseus abnormalities other than showing osteoarthritis of the left shoulder. Recommend continue to follow-up as outpatient. #Chronic pain  --Continue home Suboxone. ADHD  --Psych recommend holding adderall during hospitalization     #Auditory hallucinations  #Major depressive, in remission. Severe  Patient does have major depressive disorder and takes amitriptyline, Seroquel, and trazodone. Patient had her Seroquel held due to altered mentation requiring BiPAP on presentation. She did develop auditory hallucinations. States this is the first time this ever happened. They were friendly voices and did not instruct her to do anything harmful or dangerous. Even so, patient states that scary. Psychiatry is on board and she was started on Seroquel, Lexapro, as well as Atarax as needed at bedtime. Her trazodone and Elavil were discontinued. Her auditory hallucinations subsided and she was from a mood standpoint back to baseline at the time she signed out AMA. The patient expressed appropriate understanding of, and agreement with the discharge recommendations, medications, and plan. Consults this admission:  IP CONSULT TO PULMONOLOGY  IP CONSULT TO PSYCHIATRY    Discharge Diagnosis:     Acute respiratory failure with hypercapnia [facility])      Discharge Instruction:   Follow up appointments: Recommend following up with primary care and pulmonology.   Does not have appointments that she was signed out [facility]  Primary care physician: Luisana Hernandez MD within 2 weeks  Diet: regular diet   Activity: Medical recommendation was inpatient SNF with rehab.   Disposition: Discharged to:   []Home, [][facility], []SNF, []Acute Rehab, [x] AGAINST MEDICAL ADVICE  Condition on discharge: Unstable for discharge to home  Labs and Tests to be Followed up as an outpatient by PCP or Specialist:       Discharge Medications:        Medication List        CONTINUE taking these medications      BiPAP Machine Misc            STOP taking these medications      azithromycin 250 MG tablet  Commonly known as: Zithromax     predniSONE 10 MG tablet  Commonly known as: Echo Sullivan your doctor about these medications      albuterol sulfate  (90 Base) MCG/ACT inhaler  Commonly known as: PROVENTIL;VENTOLIN;PROAIR  USE 2 PUFFS BY MOUTH EVERY 6 HOURS AS NEEDED SHAKE WELL     amitriptyline 75 MG tablet  Commonly known as: ELAVIL     amphetamine-dextroamphetamine 30 MG extended release capsule  Commonly known as: ADDERALL XR     aspirin 81 MG chewable tablet     buprenorphine-naloxone 8-2 MG Film SL film  Commonly known as: SUBOXONE     Dulera 100-5 MCG/ACT inhaler  Generic drug: mometasone-formoterol  Inhale 2 puffs into the lungs 2 times daily  Ask about: Which instructions should I use?     ipratropium-albuterol 0.5-2.5 (3) MG/3ML Soln nebulizer solution  Commonly known as: DUONEB  Inhale 3 mLs into the lungs every 6 hours as needed for Shortness of Breath     OXYGEN     pregabalin 100 MG capsule  Commonly known as: LYRICA     QUEtiapine 50 MG tablet  Commonly known as: SEROQUEL     sennosides-docusate sodium 8.6-50 MG tablet  Commonly known as: SENOKOT-S  Take 1 tablet by mouth 2 times daily     Symbicort 160-4.5 MCG/ACT Aero  Generic drug: budesonide-formoterol  Inhale 2 puffs into the lungs 2 times daily SHAKE WELL     traMADol 50 MG tablet  Commonly known as: ULTRAM     traZODone 150 MG tablet  Commonly known as: DESYREL     WOMENS MULTIVITAMIN PO             Objective Findings at Discharge:   /62   Pulse 80   Temp 98.2 °F (36.8 °C) (Oral)   Resp 16   Ht 5' 4\" (1.626 m)   Wt 270 lb 14.4 oz (122.9 kg)   SpO2 96%   BMI 46.50 kg/m²       Physical Exam:     Physical exam not performed as I was out of the hospital with the patient signed out AMA. There was a physical exam done and can be see my progress note earlier in the day of discharge. Labs and Imaging   No results found. CBC: No results for input(s): WBC, HGB, PLT in the last 72 hours. BMP:  No results for input(s): NA, K, CL, CO2, BUN, CREATININE, GLUCOSE in the last 72 hours. Hepatic: No results for input(s): AST, ALT, ALB, BILITOT, ALKPHOS in the last 72 hours. Lipids:   Lab Results   Component Value Date/Time    CHOL 239 03/30/2021 04:35 PM    HDL 53 03/30/2021 04:35 PM    TRIG 142 03/30/2021 04:35 PM     Hemoglobin A1C:   Lab Results   Component Value Date/Time    LABA1C 6.1 02/07/2022 07:10 AM     TSH: No results found for: TSH  Troponin:   Lab Results   Component Value Date/Time    TROPONINT <0.010 03/01/2023 02:22 PM    TROPONINT <0.010 03/25/2022 11:10 PM    TROPONINT <0.010 02/06/2022 05:47 AM     Lactic Acid: No results for input(s): LACTA in the last 72 hours. BNP: No results for input(s): PROBNP in the last 72 hours.   UA:  Lab Results   Component Value Date/Time    NITRU NEGATIVE 03/03/2023 07:37 PM    COLORU YELLOW 03/03/2023 07:37 PM    WBCUA 1 01/05/2022 08:25 PM    RBCUA <1 01/05/2022 08:25 PM    MUCUS RARE 01/05/2022 08:25 PM    TRICHOMONAS NONE SEEN 01/05/2022 08:25 PM    BACTERIA NEGATIVE 01/05/2022 08:25 PM    CLARITYU CLEAR 03/03/2023 07:37 PM    SPECGRAV 1.015 03/03/2023 07:37 PM    LEUKOCYTESUR NEGATIVE 03/03/2023 07:37 PM    UROBILINOGEN 0.2 03/03/2023 07:37 PM    BILIRUBINUR NEGATIVE 03/03/2023 07:37 PM    BLOODU NEGATIVE 03/03/2023 07:37 PM    KETUA NEGATIVE 03/03/2023 07:37 PM     Urine Cultures: No results found for: LABURIN  Blood Cultures: No results found for: BC  No results found for: BLOODCULT2  Organism: No results found for: ORG    Comment: Please note this report has been produced using speech recognition software and may contain errors related to that system including errors in grammar, punctuation, and spelling, as well as words and phrases that may be inappropriate. If there are any questions or concerns please feel free to contact the dictating provider for clarification.      Time Spent Discharging patient 35 minutes    Electronically signed by Sylvia Green MD on 3/19/2023 at 3:10 PM

## 2023-07-12 NOTE — CARE COORDINATION
Patient now wants to go home. She is established with [facility]. Please see instructions in summary.    Detail Level: Detailed Detail Level: Generalized

## 2023-08-20 ENCOUNTER — APPOINTMENT (OUTPATIENT)
Dept: GENERAL RADIOLOGY | Age: 63
End: 2023-08-20
Payer: MEDICARE

## 2023-08-20 ENCOUNTER — HOSPITAL ENCOUNTER (INPATIENT)
Age: 63
LOS: 4 days | Discharge: HOME | End: 2023-08-24
Attending: EMERGENCY MEDICINE | Admitting: STUDENT IN AN ORGANIZED HEALTH CARE EDUCATION/TRAINING PROGRAM
Payer: MEDICARE

## 2023-08-20 DIAGNOSIS — J96.21 ACUTE ON CHRONIC RESPIRATORY FAILURE WITH HYPOXEMIA (HCC): ICD-10-CM

## 2023-08-20 DIAGNOSIS — J44.1 COPD EXACERBATION (HCC): Primary | ICD-10-CM

## 2023-08-20 LAB
ALBUMIN SERPL-MCNC: 3.9 GM/DL (ref 3.4–5)
ALP BLD-CCNC: 71 IU/L (ref 40–129)
ALT SERPL-CCNC: 32 U/L (ref 10–40)
ANION GAP SERPL CALCULATED.3IONS-SCNC: 7 MMOL/L (ref 4–16)
AST SERPL-CCNC: 30 IU/L (ref 15–37)
B PARAP IS1001 DNA NPH QL NAA+NON-PROBE: NOT DETECTED
B PERT.PT PRMT NPH QL NAA+NON-PROBE: NOT DETECTED
BASE EXCESS MIXED: 15 (ref 0–2.3)
BASE EXCESS MIXED: 15.3 (ref 0–2.3)
BASOPHILS ABSOLUTE: 0.1 K/CU MM
BASOPHILS RELATIVE PERCENT: 0.6 % (ref 0–1)
BILIRUB SERPL-MCNC: 0.2 MG/DL (ref 0–1)
BUN SERPL-MCNC: 7 MG/DL (ref 6–23)
C PNEUM DNA NPH QL NAA+NON-PROBE: NOT DETECTED
CALCIUM SERPL-MCNC: 9.3 MG/DL (ref 8.3–10.6)
CHLORIDE BLD-SCNC: 95 MMOL/L (ref 99–110)
CO2: 37 MMOL/L (ref 21–32)
COMMENT: ABNORMAL
CREAT SERPL-MCNC: 0.5 MG/DL (ref 0.6–1.1)
DIFFERENTIAL TYPE: ABNORMAL
EKG ATRIAL RATE: 112 BPM
EKG DIAGNOSIS: NORMAL
EKG P AXIS: 77 DEGREES
EKG P-R INTERVAL: 154 MS
EKG Q-T INTERVAL: 350 MS
EKG QRS DURATION: 82 MS
EKG QTC CALCULATION (BAZETT): 477 MS
EKG R AXIS: 48 DEGREES
EKG T AXIS: 68 DEGREES
EKG VENTRICULAR RATE: 112 BPM
EOSINOPHILS ABSOLUTE: 0.5 K/CU MM
EOSINOPHILS RELATIVE PERCENT: 4.7 % (ref 0–3)
FLUAV H1 2009 PAN RNA NPH NAA+NON-PROBE: NOT DETECTED
FLUAV H1 RNA NPH QL NAA+NON-PROBE: NOT DETECTED
FLUAV H3 RNA NPH QL NAA+NON-PROBE: NOT DETECTED
FLUAV RNA NPH QL NAA+NON-PROBE: NOT DETECTED
FLUBV RNA NPH QL NAA+NON-PROBE: NOT DETECTED
GFR SERPL CREATININE-BSD FRML MDRD: >60 ML/MIN/1.73M2
GLUCOSE SERPL-MCNC: 187 MG/DL (ref 70–99)
HADV DNA NPH QL NAA+NON-PROBE: NOT DETECTED
HCO3 VENOUS: 42.8 MMOL/L (ref 19–25)
HCO3 VENOUS: 45.3 MMOL/L (ref 19–25)
HCOV 229E RNA NPH QL NAA+NON-PROBE: NOT DETECTED
HCOV HKU1 RNA NPH QL NAA+NON-PROBE: NOT DETECTED
HCOV NL63 RNA NPH QL NAA+NON-PROBE: NOT DETECTED
HCOV OC43 RNA NPH QL NAA+NON-PROBE: NOT DETECTED
HCT VFR BLD CALC: 43.8 % (ref 37–47)
HEMOGLOBIN: 13 GM/DL (ref 12.5–16)
HMPV RNA NPH QL NAA+NON-PROBE: NOT DETECTED
HPIV1 RNA NPH QL NAA+NON-PROBE: NOT DETECTED
HPIV2 RNA NPH QL NAA+NON-PROBE: NOT DETECTED
HPIV3 RNA NPH QL NAA+NON-PROBE: NOT DETECTED
HPIV4 RNA NPH QL NAA+NON-PROBE: NOT DETECTED
IMMATURE NEUTROPHIL %: 0.2 % (ref 0–0.43)
LYMPHOCYTES ABSOLUTE: 4.6 K/CU MM
LYMPHOCYTES RELATIVE PERCENT: 43 % (ref 24–44)
M PNEUMO DNA NPH QL NAA+NON-PROBE: NOT DETECTED
MCH RBC QN AUTO: 28 PG (ref 27–31)
MCHC RBC AUTO-ENTMCNC: 29.7 % (ref 32–36)
MCV RBC AUTO: 94.2 FL (ref 78–100)
MONOCYTES ABSOLUTE: 1.3 K/CU MM
MONOCYTES RELATIVE PERCENT: 11.8 % (ref 0–4)
NUCLEATED RBC %: 0 %
O2 SAT, VEN: 94.8 % (ref 50–70)
O2 SAT, VEN: 95.1 % (ref 50–70)
PCO2, VEN: 66 MMHG (ref 38–52)
PCO2, VEN: 84 MMHG (ref 38–52)
PDW BLD-RTO: 12.3 % (ref 11.7–14.9)
PH VENOUS: 7.34 (ref 7.32–7.42)
PH VENOUS: 7.42 (ref 7.32–7.42)
PLATELET # BLD: 211 K/CU MM (ref 140–440)
PMV BLD AUTO: 10 FL (ref 7.5–11.1)
PO2, VEN: 158 MMHG (ref 28–48)
PO2, VEN: 172 MMHG (ref 28–48)
POTASSIUM SERPL-SCNC: 4.5 MMOL/L (ref 3.5–5.1)
PRO-BNP: 46.8 PG/ML
RBC # BLD: 4.65 M/CU MM (ref 4.2–5.4)
RSV RNA NPH QL NAA+NON-PROBE: NOT DETECTED
RV+EV RNA NPH QL NAA+NON-PROBE: NOT DETECTED
SARS-COV-2 RNA NPH QL NAA+NON-PROBE: NOT DETECTED
SEGMENTED NEUTROPHILS ABSOLUTE COUNT: 4.2 K/CU MM
SEGMENTED NEUTROPHILS RELATIVE PERCENT: 39.7 % (ref 36–66)
SODIUM BLD-SCNC: 139 MMOL/L (ref 135–145)
TOTAL IMMATURE NEUTOROPHIL: 0.02 K/CU MM
TOTAL NUCLEATED RBC: 0 K/CU MM
TOTAL PROTEIN: 7 GM/DL (ref 6.4–8.2)
TROPONIN T: <0.01 NG/ML
WBC # BLD: 10.6 K/CU MM (ref 4–10.5)

## 2023-08-20 PROCEDURE — 94660 CPAP INITIATION&MGMT: CPT

## 2023-08-20 PROCEDURE — 94664 DEMO&/EVAL PT USE INHALER: CPT

## 2023-08-20 PROCEDURE — 6370000000 HC RX 637 (ALT 250 FOR IP): Performed by: EMERGENCY MEDICINE

## 2023-08-20 PROCEDURE — 0202U NFCT DS 22 TRGT SARS-COV-2: CPT

## 2023-08-20 PROCEDURE — 6370000000 HC RX 637 (ALT 250 FOR IP): Performed by: STUDENT IN AN ORGANIZED HEALTH CARE EDUCATION/TRAINING PROGRAM

## 2023-08-20 PROCEDURE — 6360000002 HC RX W HCPCS: Performed by: EMERGENCY MEDICINE

## 2023-08-20 PROCEDURE — 99285 EMERGENCY DEPT VISIT HI MDM: CPT

## 2023-08-20 PROCEDURE — 82805 BLOOD GASES W/O2 SATURATION: CPT

## 2023-08-20 PROCEDURE — 83880 ASSAY OF NATRIURETIC PEPTIDE: CPT

## 2023-08-20 PROCEDURE — 6360000002 HC RX W HCPCS: Performed by: STUDENT IN AN ORGANIZED HEALTH CARE EDUCATION/TRAINING PROGRAM

## 2023-08-20 PROCEDURE — 94640 AIRWAY INHALATION TREATMENT: CPT

## 2023-08-20 PROCEDURE — 85025 COMPLETE CBC W/AUTO DIFF WBC: CPT

## 2023-08-20 PROCEDURE — 2060000000 HC ICU INTERMEDIATE R&B

## 2023-08-20 PROCEDURE — 80053 COMPREHEN METABOLIC PANEL: CPT

## 2023-08-20 PROCEDURE — 2580000003 HC RX 258: Performed by: STUDENT IN AN ORGANIZED HEALTH CARE EDUCATION/TRAINING PROGRAM

## 2023-08-20 PROCEDURE — 96365 THER/PROPH/DIAG IV INF INIT: CPT

## 2023-08-20 PROCEDURE — 5A09357 ASSISTANCE WITH RESPIRATORY VENTILATION, LESS THAN 24 CONSECUTIVE HOURS, CONTINUOUS POSITIVE AIRWAY PRESSURE: ICD-10-PCS | Performed by: STUDENT IN AN ORGANIZED HEALTH CARE EDUCATION/TRAINING PROGRAM

## 2023-08-20 PROCEDURE — 71045 X-RAY EXAM CHEST 1 VIEW: CPT

## 2023-08-20 PROCEDURE — 96366 THER/PROPH/DIAG IV INF ADDON: CPT

## 2023-08-20 PROCEDURE — 2700000000 HC OXYGEN THERAPY PER DAY

## 2023-08-20 PROCEDURE — 84484 ASSAY OF TROPONIN QUANT: CPT

## 2023-08-20 RX ORDER — ASPIRIN 81 MG/1
81 TABLET, CHEWABLE ORAL DAILY
Status: DISCONTINUED | OUTPATIENT
Start: 2023-08-21 | End: 2023-08-24 | Stop reason: HOSPADM

## 2023-08-20 RX ORDER — IPRATROPIUM BROMIDE AND ALBUTEROL SULFATE 2.5; .5 MG/3ML; MG/3ML
2 SOLUTION RESPIRATORY (INHALATION) ONCE
Status: COMPLETED | OUTPATIENT
Start: 2023-08-20 | End: 2023-08-20

## 2023-08-20 RX ORDER — METHYLPREDNISOLONE SODIUM SUCCINATE 40 MG/ML
40 INJECTION, POWDER, LYOPHILIZED, FOR SOLUTION INTRAMUSCULAR; INTRAVENOUS 2 TIMES DAILY
Status: COMPLETED | OUTPATIENT
Start: 2023-08-20 | End: 2023-08-21

## 2023-08-20 RX ORDER — ONDANSETRON 4 MG/1
4 TABLET, ORALLY DISINTEGRATING ORAL EVERY 8 HOURS PRN
Status: DISCONTINUED | OUTPATIENT
Start: 2023-08-20 | End: 2023-08-24 | Stop reason: HOSPADM

## 2023-08-20 RX ORDER — SENNA AND DOCUSATE SODIUM 50; 8.6 MG/1; MG/1
1 TABLET, FILM COATED ORAL 2 TIMES DAILY
Status: DISCONTINUED | OUTPATIENT
Start: 2023-08-20 | End: 2023-08-24 | Stop reason: HOSPADM

## 2023-08-20 RX ORDER — ACETAMINOPHEN 650 MG/1
650 SUPPOSITORY RECTAL EVERY 6 HOURS PRN
Status: DISCONTINUED | OUTPATIENT
Start: 2023-08-20 | End: 2023-08-24 | Stop reason: HOSPADM

## 2023-08-20 RX ORDER — ONDANSETRON 2 MG/ML
4 INJECTION INTRAMUSCULAR; INTRAVENOUS EVERY 6 HOURS PRN
Status: DISCONTINUED | OUTPATIENT
Start: 2023-08-20 | End: 2023-08-24 | Stop reason: HOSPADM

## 2023-08-20 RX ORDER — MAGNESIUM SULFATE IN WATER 40 MG/ML
2000 INJECTION, SOLUTION INTRAVENOUS PRN
Status: DISCONTINUED | OUTPATIENT
Start: 2023-08-20 | End: 2023-08-24 | Stop reason: HOSPADM

## 2023-08-20 RX ORDER — SODIUM CHLORIDE 9 MG/ML
INJECTION, SOLUTION INTRAVENOUS PRN
Status: DISCONTINUED | OUTPATIENT
Start: 2023-08-20 | End: 2023-08-24 | Stop reason: HOSPADM

## 2023-08-20 RX ORDER — BUPRENORPHINE AND NALOXONE 8; 2 MG/1; MG/1
1 FILM, SOLUBLE BUCCAL; SUBLINGUAL 3 TIMES DAILY
Status: DISCONTINUED | OUTPATIENT
Start: 2023-08-20 | End: 2023-08-24 | Stop reason: HOSPADM

## 2023-08-20 RX ORDER — PREGABALIN 100 MG/1
100 CAPSULE ORAL 3 TIMES DAILY
Status: DISCONTINUED | OUTPATIENT
Start: 2023-08-20 | End: 2023-08-24 | Stop reason: HOSPADM

## 2023-08-20 RX ORDER — DEXTROAMPHETAMINE SACCHARATE, AMPHETAMINE ASPARTATE MONOHYDRATE, DEXTROAMPHETAMINE SULFATE AND AMPHETAMINE SULFATE 7.5; 7.5; 7.5; 7.5 MG/1; MG/1; MG/1; MG/1
30 CAPSULE, EXTENDED RELEASE ORAL DAILY
Status: CANCELLED | OUTPATIENT
Start: 2023-08-21

## 2023-08-20 RX ORDER — TRAZODONE HYDROCHLORIDE 50 MG/1
150 TABLET ORAL NIGHTLY
Status: DISCONTINUED | OUTPATIENT
Start: 2023-08-20 | End: 2023-08-24 | Stop reason: HOSPADM

## 2023-08-20 RX ORDER — TRAMADOL HYDROCHLORIDE 50 MG/1
50 TABLET ORAL 2 TIMES DAILY PRN
Status: DISCONTINUED | OUTPATIENT
Start: 2023-08-20 | End: 2023-08-24 | Stop reason: HOSPADM

## 2023-08-20 RX ORDER — IPRATROPIUM BROMIDE AND ALBUTEROL SULFATE 2.5; .5 MG/3ML; MG/3ML
1 SOLUTION RESPIRATORY (INHALATION)
Status: DISCONTINUED | OUTPATIENT
Start: 2023-08-20 | End: 2023-08-24 | Stop reason: HOSPADM

## 2023-08-20 RX ORDER — ENOXAPARIN SODIUM 100 MG/ML
40 INJECTION SUBCUTANEOUS EVERY EVENING
Status: DISCONTINUED | OUTPATIENT
Start: 2023-08-21 | End: 2023-08-24 | Stop reason: HOSPADM

## 2023-08-20 RX ORDER — ACETAMINOPHEN 325 MG/1
650 TABLET ORAL EVERY 6 HOURS PRN
Status: DISCONTINUED | OUTPATIENT
Start: 2023-08-20 | End: 2023-08-24 | Stop reason: HOSPADM

## 2023-08-20 RX ORDER — IPRATROPIUM BROMIDE AND ALBUTEROL SULFATE 2.5; .5 MG/3ML; MG/3ML
3 SOLUTION RESPIRATORY (INHALATION) ONCE
Status: DISCONTINUED | OUTPATIENT
Start: 2023-08-20 | End: 2023-08-20

## 2023-08-20 RX ORDER — BUDESONIDE AND FORMOTEROL FUMARATE DIHYDRATE 80; 4.5 UG/1; UG/1
2 AEROSOL RESPIRATORY (INHALATION)
Status: DISCONTINUED | OUTPATIENT
Start: 2023-08-20 | End: 2023-08-24 | Stop reason: HOSPADM

## 2023-08-20 RX ORDER — POLYETHYLENE GLYCOL 3350 17 G/17G
17 POWDER, FOR SOLUTION ORAL DAILY PRN
Status: DISCONTINUED | OUTPATIENT
Start: 2023-08-20 | End: 2023-08-24 | Stop reason: HOSPADM

## 2023-08-20 RX ORDER — POTASSIUM CHLORIDE 7.45 MG/ML
10 INJECTION INTRAVENOUS PRN
Status: DISCONTINUED | OUTPATIENT
Start: 2023-08-20 | End: 2023-08-24 | Stop reason: HOSPADM

## 2023-08-20 RX ORDER — MAGNESIUM SULFATE IN WATER 40 MG/ML
2000 INJECTION, SOLUTION INTRAVENOUS ONCE
Status: COMPLETED | OUTPATIENT
Start: 2023-08-20 | End: 2023-08-20

## 2023-08-20 RX ORDER — NICOTINE 21 MG/24HR
1 PATCH, TRANSDERMAL 24 HOURS TRANSDERMAL DAILY PRN
Status: DISCONTINUED | OUTPATIENT
Start: 2023-08-20 | End: 2023-08-24 | Stop reason: HOSPADM

## 2023-08-20 RX ORDER — SODIUM CHLORIDE 0.9 % (FLUSH) 0.9 %
5-40 SYRINGE (ML) INJECTION EVERY 12 HOURS SCHEDULED
Status: DISCONTINUED | OUTPATIENT
Start: 2023-08-20 | End: 2023-08-24 | Stop reason: HOSPADM

## 2023-08-20 RX ORDER — QUETIAPINE FUMARATE 100 MG/1
100 TABLET, FILM COATED ORAL NIGHTLY
Status: DISCONTINUED | OUTPATIENT
Start: 2023-08-20 | End: 2023-08-24 | Stop reason: HOSPADM

## 2023-08-20 RX ORDER — SODIUM CHLORIDE 0.9 % (FLUSH) 0.9 %
5-40 SYRINGE (ML) INJECTION PRN
Status: DISCONTINUED | OUTPATIENT
Start: 2023-08-20 | End: 2023-08-24 | Stop reason: HOSPADM

## 2023-08-20 RX ADMIN — TRAZODONE HYDROCHLORIDE 150 MG: 50 TABLET ORAL at 23:58

## 2023-08-20 RX ADMIN — METHYLPREDNISOLONE SODIUM SUCCINATE 40 MG: 40 INJECTION INTRAMUSCULAR; INTRAVENOUS at 20:34

## 2023-08-20 RX ADMIN — AMITRIPTYLINE HYDROCHLORIDE 75 MG: 50 TABLET, FILM COATED ORAL at 23:59

## 2023-08-20 RX ADMIN — AZITHROMYCIN MONOHYDRATE 500 MG: 500 INJECTION, POWDER, LYOPHILIZED, FOR SOLUTION INTRAVENOUS at 20:37

## 2023-08-20 RX ADMIN — SENNOSIDES AND DOCUSATE SODIUM 1 TABLET: 50; 8.6 TABLET ORAL at 23:58

## 2023-08-20 RX ADMIN — MAGNESIUM SULFATE HEPTAHYDRATE 2000 MG: 40 INJECTION, SOLUTION INTRAVENOUS at 18:21

## 2023-08-20 RX ADMIN — PREGABALIN 100 MG: 100 CAPSULE ORAL at 23:58

## 2023-08-20 RX ADMIN — IPRATROPIUM BROMIDE AND ALBUTEROL SULFATE 2 DOSE: 2.5; .5 SOLUTION RESPIRATORY (INHALATION) at 18:24

## 2023-08-20 RX ADMIN — QUETIAPINE FUMARATE 100 MG: 100 TABLET ORAL at 23:58

## 2023-08-20 ASSESSMENT — LIFESTYLE VARIABLES
HOW MANY STANDARD DRINKS CONTAINING ALCOHOL DO YOU HAVE ON A TYPICAL DAY: PATIENT DOES NOT DRINK
HOW OFTEN DO YOU HAVE A DRINK CONTAINING ALCOHOL: NEVER

## 2023-08-20 ASSESSMENT — PAIN - FUNCTIONAL ASSESSMENT: PAIN_FUNCTIONAL_ASSESSMENT: NONE - DENIES PAIN

## 2023-08-20 NOTE — PROGRESS NOTES
08/20/23 1817   NIV Type   $NIV $Daily Charge   NIV Started/Stopped On   Equipment Type v60   Mode Bilevel   Mask Type Full face mask   Mask Size Medium   Bonnet size Medium   Assessment   Respirations 14   SpO2 93 %   Comfort Level Good   Using Accessory Muscles No   Mask Compliance Good   Skin Assessment Clean, dry, & intact   Settings/Measurements   PIP Observed 12 cm H20   IPAP 12 cmH20   CPAP/EPAP 5 cmH2O   Vt (Measured) 536 mL   Rate Ordered 12   Insp Rise Time (%) 3 %   FiO2  100 %   I Time/ I Time % 1.25 s   Minute Volume (L/min) 7.6 Liters   Mask Leak (lpm) 30 lpm   Patient's Home Machine No   Alarm Settings   Alarms On Y   Low Pressure (cmH2O) 5 cmH2O   High Pressure (cmH2O) 25 cmH2O   Delay Alarm 20 sec(s)   RR Low (bpm) 12   RR High (bpm) 40 br/min   Patient Observation   Observations placed on BiPAP

## 2023-08-20 NOTE — H&P
History and Physical      Name:  Jaren Saleem /Age/Sex: 1960  (63 y.o. female)   MRN & CSN:  [id number] & [id number] Encounter Date/Time: 2023 7:59 PM EDT   Location:  [facility] PCP: Samson Castro MD       Hospital Day: 1    Assessment and Plan:     Patient is a 12year-old female who presented with SOB. # Acute hypoxemic hypercapnic respiratory failure secondary to acute exacerbation of severe COPD   - Endorsed worsening SOB with increased productive cough of brown sputum for 3 days. Currently non-smoker. Compliant with home inhalers. On 3-4L NC at baseline. No sick contacts. PFTs in 2016 with severe obstruction.   - Requiring BPAP 60% FiO2 in ED, VBG 7.34/. CXR nonacute. RVP negative. - Continue steroids, Azithro, Dulera and DuoNebs. Continue supportive care. Acapella. Goal SpO2 of 88-92%. # KING  - Continue BPAP qhs. # Hyperglycemia  - Recent use of steroids.  - Follow-up A1c. # Chronic pain   - Continue home Suboxone, OARRS appropriate. # Major depressive disorder  - Continue home psychotropic medications. # Class II obesity  - BMI 37.8.  - Advised on importance of lifestyle modifications. Checklist:  Advanced directive: full  Diet: NPO while on BPAP, cardiac  DVT ppx: Lovenox    Disposition: admit to inpatient. Estimated discharge: 3-5 day(s). Current living situation: home. Expected disposition: home. Spoke with ED provider who recommended admission for the patient and I agree with that plan. Personally reviewed lab studies and imaging. EKG interpreted personally and results as stated above. Imaging that was interpreted personally and results as stated above.     History of Present Illness:     Chief Complaint: shortness of breath    Patient is a 82-year-old female with a PMHx of severe COPD with chronic hypoxic respiratory failure (3-4L NC), sleep apnea, MDD and class II obesity who presented to the ED with worsening SOB with increased productive cough traZODone (DESYREL) 150 MG tablet Take 2 tablets by mouth nightly    Historical Provider, MD   sennosides-docusate sodium (SENOKOT-S) 8.6-50 MG tablet Take 1 tablet by mouth 2 times daily 1/17/22   LAUREANO Wong MD   albuterol sulfate  (90 Base) MCG/ACT inhaler USE 2 PUFFS BY MOUTH EVERY 6 HOURS AS NEEDED SHAKE WELL 11/29/21   Stefany Ortiz MD   ipratropium-albuterol (DUONEB) 0.5-2.5 (3) MG/3ML SOLN nebulizer solution Inhale 3 mLs into the lungs every 6 hours as needed for Shortness of Breath 7/14/21 10/26/21  Renée Tran MD   BiPAP Machine MISC by Does not apply route Use nightly    Historical Provider, MD   aspirin 81 MG chewable tablet Take 1 tablet by mouth daily    Historical Provider, MD   Multiple Vitamins-Minerals (WOMENS MULTIVITAMIN PO) Take 1 tablet by mouth daily    Historical Provider, MD   amphetamine-dextroamphetamine (ADDERALL XR) 30 MG extended release capsule Take 1 capsule by mouth daily. 9/8/20   Tez Dobbins MD   OXYGEN Inhale 4 L into the lungs continuous     Historical Provider, MD   buprenorphine-naloxone (SUBOXONE) 8-2 MG FILM SL film Place 1 Film under the tongue 3 times daily.     Tez Dobbins MD       Medications:     Medications:    magnesium sulfate  2,000 mg IntraVENous Once    azithromycin  500 mg IntraVENous Once        Infusions:       PRN Meds:        Data:     CBC:   Recent Labs     08/20/23  1810   WBC 10.6*   HGB 13.0      MCV 94.2   RDW 12.3   LYMPHOPCT 43.0   MONOPCT 11.8*   BASOPCT 0.6   MONOSABS 1.3   LYMPHSABS 4.6   EOSABS 0.5   BASOSABS 0.1     CMP:    Recent Labs     08/20/23  1810      K 4.5   CL 95*   CO2 37*   BUN 7   CREATININE 0.5*   GLUCOSE 187*   LABALBU 3.9   CALCIUM 9.3   BILITOT 0.2   ALKPHOS 71   AST 30   ALT 32     Lipids:   Lab Results   Component Value Date/Time    CHOL 239 03/30/2021 04:35 PM    HDL 53 03/30/2021 04:35 PM    TRIG 142 03/30/2021 04:35 PM     Hemoglobin A1C:   Lab Results   Component Value

## 2023-08-20 NOTE — ED NOTES
Report given to [facility].       Ritchie Stovall RN  08/20/23 4850

## 2023-08-20 NOTE — ED TRIAGE NOTES
Patient to trauma 1 via EMS with c/o shortness breath that started approx. 3 days ago. Patient was in the 80's on EMS arrival. Patient was given breathing treatment and solumedrol en route to hospital. Resps mildly labored but even.

## 2023-08-21 LAB
ALBUMIN SERPL-MCNC: 3.6 GM/DL (ref 3.4–5)
ALP BLD-CCNC: 63 IU/L (ref 40–128)
ALT SERPL-CCNC: 30 U/L (ref 10–40)
ANION GAP SERPL CALCULATED.3IONS-SCNC: 10 MMOL/L (ref 4–16)
AST SERPL-CCNC: 24 IU/L (ref 15–37)
BASOPHILS ABSOLUTE: 0 K/CU MM
BASOPHILS RELATIVE PERCENT: 0.1 % (ref 0–1)
BILIRUB SERPL-MCNC: 0.2 MG/DL (ref 0–1)
BUN SERPL-MCNC: 8 MG/DL (ref 6–23)
CALCIUM SERPL-MCNC: 9 MG/DL (ref 8.3–10.6)
CHLORIDE BLD-SCNC: 98 MMOL/L (ref 99–110)
CO2: 32 MMOL/L (ref 21–32)
CREAT SERPL-MCNC: 0.5 MG/DL (ref 0.6–1.1)
DIFFERENTIAL TYPE: ABNORMAL
EOSINOPHILS ABSOLUTE: 0 K/CU MM
EOSINOPHILS RELATIVE PERCENT: 0 % (ref 0–3)
ESTIMATED AVERAGE GLUCOSE: 146 MG/DL
GFR SERPL CREATININE-BSD FRML MDRD: >60 ML/MIN/1.73M2
GLUCOSE BLD-MCNC: 186 MG/DL (ref 70–99)
GLUCOSE BLD-MCNC: 224 MG/DL (ref 70–99)
GLUCOSE BLD-MCNC: 246 MG/DL (ref 70–99)
GLUCOSE BLD-MCNC: 337 MG/DL (ref 70–99)
GLUCOSE SERPL-MCNC: 259 MG/DL (ref 70–99)
HBA1C MFR BLD: 6.7 % (ref 4.2–6.3)
HCT VFR BLD CALC: 41.2 % (ref 37–47)
HEMOGLOBIN: 12.3 GM/DL (ref 12.5–16)
IMMATURE NEUTROPHIL %: 0.6 % (ref 0–0.43)
INR BLD: 1 INDEX
LYMPHOCYTES ABSOLUTE: 0.9 K/CU MM
LYMPHOCYTES RELATIVE PERCENT: 11.1 % (ref 24–44)
MCH RBC QN AUTO: 27.8 PG (ref 27–31)
MCHC RBC AUTO-ENTMCNC: 29.9 % (ref 32–36)
MCV RBC AUTO: 93.2 FL (ref 78–100)
MONOCYTES ABSOLUTE: 0.1 K/CU MM
MONOCYTES RELATIVE PERCENT: 0.8 % (ref 0–4)
NUCLEATED RBC %: 0 %
PDW BLD-RTO: 12 % (ref 11.7–14.9)
PLATELET # BLD: 186 K/CU MM (ref 140–440)
PMV BLD AUTO: 9.8 FL (ref 7.5–11.1)
POTASSIUM SERPL-SCNC: 4.7 MMOL/L (ref 3.5–5.1)
PROCALCITONIN SERPL-MCNC: 0.04 NG/ML
PROTHROMBIN TIME: 13.8 SECONDS (ref 11.7–14.5)
RBC # BLD: 4.42 M/CU MM (ref 4.2–5.4)
SEGMENTED NEUTROPHILS ABSOLUTE COUNT: 6.8 K/CU MM
SEGMENTED NEUTROPHILS RELATIVE PERCENT: 87.4 % (ref 36–66)
SODIUM BLD-SCNC: 140 MMOL/L (ref 135–145)
TOTAL IMMATURE NEUTOROPHIL: 0.05 K/CU MM
TOTAL NUCLEATED RBC: 0 K/CU MM
TOTAL PROTEIN: 5.9 GM/DL (ref 6.4–8.2)
WBC # BLD: 7.8 K/CU MM (ref 4–10.5)

## 2023-08-21 PROCEDURE — 6370000000 HC RX 637 (ALT 250 FOR IP): Performed by: NURSE PRACTITIONER

## 2023-08-21 PROCEDURE — 94660 CPAP INITIATION&MGMT: CPT

## 2023-08-21 PROCEDURE — 85610 PROTHROMBIN TIME: CPT

## 2023-08-21 PROCEDURE — 6360000002 HC RX W HCPCS: Performed by: STUDENT IN AN ORGANIZED HEALTH CARE EDUCATION/TRAINING PROGRAM

## 2023-08-21 PROCEDURE — 2060000000 HC ICU INTERMEDIATE R&B

## 2023-08-21 PROCEDURE — 2700000000 HC OXYGEN THERAPY PER DAY

## 2023-08-21 PROCEDURE — 94761 N-INVAS EAR/PLS OXIMETRY MLT: CPT

## 2023-08-21 PROCEDURE — 82962 GLUCOSE BLOOD TEST: CPT

## 2023-08-21 PROCEDURE — 6370000000 HC RX 637 (ALT 250 FOR IP): Performed by: STUDENT IN AN ORGANIZED HEALTH CARE EDUCATION/TRAINING PROGRAM

## 2023-08-21 PROCEDURE — 36415 COLL VENOUS BLD VENIPUNCTURE: CPT

## 2023-08-21 PROCEDURE — 83036 HEMOGLOBIN GLYCOSYLATED A1C: CPT

## 2023-08-21 PROCEDURE — 85025 COMPLETE CBC W/AUTO DIFF WBC: CPT

## 2023-08-21 PROCEDURE — 80053 COMPREHEN METABOLIC PANEL: CPT

## 2023-08-21 PROCEDURE — 94640 AIRWAY INHALATION TREATMENT: CPT

## 2023-08-21 PROCEDURE — 82805 BLOOD GASES W/O2 SATURATION: CPT

## 2023-08-21 PROCEDURE — 84145 PROCALCITONIN (PCT): CPT

## 2023-08-21 PROCEDURE — 2580000003 HC RX 258: Performed by: STUDENT IN AN ORGANIZED HEALTH CARE EDUCATION/TRAINING PROGRAM

## 2023-08-21 RX ORDER — DEXTROSE MONOHYDRATE 100 MG/ML
INJECTION, SOLUTION INTRAVENOUS CONTINUOUS PRN
Status: DISCONTINUED | OUTPATIENT
Start: 2023-08-21 | End: 2023-08-24 | Stop reason: HOSPADM

## 2023-08-21 RX ORDER — INSULIN LISPRO 100 [IU]/ML
0-8 INJECTION, SOLUTION INTRAVENOUS; SUBCUTANEOUS
Status: DISCONTINUED | OUTPATIENT
Start: 2023-08-21 | End: 2023-08-21

## 2023-08-21 RX ORDER — INSULIN LISPRO 100 [IU]/ML
0-4 INJECTION, SOLUTION INTRAVENOUS; SUBCUTANEOUS NIGHTLY
Status: DISCONTINUED | OUTPATIENT
Start: 2023-08-21 | End: 2023-08-24 | Stop reason: HOSPADM

## 2023-08-21 RX ORDER — INSULIN LISPRO 100 [IU]/ML
0-4 INJECTION, SOLUTION INTRAVENOUS; SUBCUTANEOUS NIGHTLY
Status: DISCONTINUED | OUTPATIENT
Start: 2023-08-21 | End: 2023-08-21

## 2023-08-21 RX ORDER — INSULIN LISPRO 100 [IU]/ML
0-4 INJECTION, SOLUTION INTRAVENOUS; SUBCUTANEOUS
Status: DISCONTINUED | OUTPATIENT
Start: 2023-08-21 | End: 2023-08-24 | Stop reason: HOSPADM

## 2023-08-21 RX ORDER — GLUCAGON 1 MG/ML
1 KIT INJECTION PRN
Status: DISCONTINUED | OUTPATIENT
Start: 2023-08-21 | End: 2023-08-24 | Stop reason: HOSPADM

## 2023-08-21 RX ORDER — DEXTROSE MONOHYDRATE 100 MG/ML
INJECTION, SOLUTION INTRAVENOUS CONTINUOUS PRN
Status: DISCONTINUED | OUTPATIENT
Start: 2023-08-21 | End: 2023-08-21

## 2023-08-21 RX ORDER — GLUCAGON 1 MG/ML
1 KIT INJECTION PRN
Status: DISCONTINUED | OUTPATIENT
Start: 2023-08-21 | End: 2023-08-21

## 2023-08-21 RX ADMIN — PREGABALIN 100 MG: 100 CAPSULE ORAL at 21:23

## 2023-08-21 RX ADMIN — SENNOSIDES AND DOCUSATE SODIUM 1 TABLET: 50; 8.6 TABLET ORAL at 21:23

## 2023-08-21 RX ADMIN — INSULIN LISPRO 4 UNITS: 100 INJECTION, SOLUTION INTRAVENOUS; SUBCUTANEOUS at 02:01

## 2023-08-21 RX ADMIN — TRAZODONE HYDROCHLORIDE 150 MG: 50 TABLET ORAL at 21:23

## 2023-08-21 RX ADMIN — PREGABALIN 100 MG: 100 CAPSULE ORAL at 14:20

## 2023-08-21 RX ADMIN — SODIUM CHLORIDE, PRESERVATIVE FREE 10 ML: 5 INJECTION INTRAVENOUS at 08:05

## 2023-08-21 RX ADMIN — SODIUM CHLORIDE, PRESERVATIVE FREE 10 ML: 5 INJECTION INTRAVENOUS at 00:23

## 2023-08-21 RX ADMIN — METHYLPREDNISOLONE SODIUM SUCCINATE 40 MG: 40 INJECTION INTRAMUSCULAR; INTRAVENOUS at 08:05

## 2023-08-21 RX ADMIN — METHYLPREDNISOLONE SODIUM SUCCINATE 40 MG: 40 INJECTION INTRAMUSCULAR; INTRAVENOUS at 21:22

## 2023-08-21 RX ADMIN — ASPIRIN 81 MG: 81 TABLET, CHEWABLE ORAL at 08:05

## 2023-08-21 RX ADMIN — AZITHROMYCIN MONOHYDRATE 500 MG: 500 INJECTION, POWDER, LYOPHILIZED, FOR SOLUTION INTRAVENOUS at 21:22

## 2023-08-21 RX ADMIN — IPRATROPIUM BROMIDE AND ALBUTEROL SULFATE 1 DOSE: 2.5; .5 SOLUTION RESPIRATORY (INHALATION) at 15:21

## 2023-08-21 RX ADMIN — BUDESONIDE AND FORMOTEROL FUMARATE DIHYDRATE 2 PUFF: 80; 4.5 AEROSOL RESPIRATORY (INHALATION) at 20:44

## 2023-08-21 RX ADMIN — INSULIN LISPRO 2 UNITS: 100 INJECTION, SOLUTION INTRAVENOUS; SUBCUTANEOUS at 08:04

## 2023-08-21 RX ADMIN — SENNOSIDES AND DOCUSATE SODIUM 1 TABLET: 50; 8.6 TABLET ORAL at 08:04

## 2023-08-21 RX ADMIN — BUDESONIDE AND FORMOTEROL FUMARATE DIHYDRATE 2 PUFF: 80; 4.5 AEROSOL RESPIRATORY (INHALATION) at 08:15

## 2023-08-21 RX ADMIN — AMITRIPTYLINE HYDROCHLORIDE 75 MG: 50 TABLET, FILM COATED ORAL at 21:23

## 2023-08-21 RX ADMIN — BUPRENORPHINE AND NALOXONE 1 FILM: 8; 2 FILM BUCCAL; SUBLINGUAL at 21:23

## 2023-08-21 RX ADMIN — SODIUM CHLORIDE, PRESERVATIVE FREE 10 ML: 5 INJECTION INTRAVENOUS at 21:22

## 2023-08-21 RX ADMIN — IPRATROPIUM BROMIDE AND ALBUTEROL SULFATE 1 DOSE: 2.5; .5 SOLUTION RESPIRATORY (INHALATION) at 08:15

## 2023-08-21 RX ADMIN — PREGABALIN 100 MG: 100 CAPSULE ORAL at 08:05

## 2023-08-21 RX ADMIN — BUPRENORPHINE AND NALOXONE 1 FILM: 8; 2 FILM BUCCAL; SUBLINGUAL at 14:20

## 2023-08-21 RX ADMIN — BUPRENORPHINE AND NALOXONE 1 FILM: 8; 2 FILM BUCCAL; SUBLINGUAL at 08:05

## 2023-08-21 RX ADMIN — IPRATROPIUM BROMIDE AND ALBUTEROL SULFATE 1 DOSE: 2.5; .5 SOLUTION RESPIRATORY (INHALATION) at 20:44

## 2023-08-21 RX ADMIN — QUETIAPINE FUMARATE 100 MG: 100 TABLET ORAL at 21:23

## 2023-08-21 NOTE — CARE COORDINATION
08/21/23 1130   Service Assessment   Patient Orientation Alert and Oriented   Cognition Alert   History Provided By Patient   Primary Caregiver Self   Support Systems Children;Family Members   PCP Verified by CM Yes   Last Visit to PCP Within last 3 months   Prior Functional Level Independent in ADLs/IADLs;Housework; Shopping   Current Functional Level Independent in ADLs/IADLs;Housework; Shopping   Can patient return to prior living arrangement Yes   Ability to make needs known: Good   Family able to assist with home care needs: Yes   Financial Resources Medicaid; Medicare   Social/Functional History   Lives With Alone   Type of Home Senior housing apartment   Home Layout One level   Home Access Elevator;Level entry   [address] chair   [address], rolling; Orpha Laud  (Pt does not use walker or wheelchair)   [address] Needs assistance   Homemaking Responsibilities Yes   Ambulation Assistance Independent   Transfer Assistance Independent   Active  Yes   Occupation On disability   Discharge Planning   Type of Residence Apartment   Living Arrangements Alone   Current Services Prior To Admission Home Bipap; Oxygen Therapy  (4 L O2)   Type of [address]   Patient expects to be discharged to: Apartment   One/Two Story Residence One story   History of falls? 1     Pt has insurance and a PCP. Pt lives alone in a senior apartment. Pt is independent with ADL's and drives. Pt has good family support. Pt uses home O2 @ 4L, provided by Cherie Olmos. Also, uses a BiPAP, Has a Rollator, wheelchair, walker and a shower chair. [facility] provides an aide who is there five hours a day for housekeeping and shopping/errands. Discharge plan is home with [facility].

## 2023-08-21 NOTE — PROGRESS NOTES
Call initiated by:    Pharmacy  Call addressed around: 8/20/2023 11:59 PM      Reason for call: Drug interaction    Vitals:    08/20/23 2130   BP:    Pulse: 91   Resp:    Temp:    SpO2:        Concern for  Suboxone/tramadol interaction precipitating withdrawal symptoms        Orders placed: Hold tramadol for now and defer to daytime rounding physician        Casey Schirmer, APRN - CNP

## 2023-08-21 NOTE — ED NOTES
Report received from [facility], care assumed at this time.       Aime Elise RN  08/20/23 2003

## 2023-08-21 NOTE — PROGRESS NOTES
V2.0  [facility] Hospitalist Progress Note      Name:  Gilbert Guardado /Age/Sex: 1960  (63 y.o. female)   MRN & CSN:  [id number] & [id number] Encounter Date/Time: 2023 11:53 AM EDT    Location:  [address] [place] [address] PCP: Isatu Gallego MD       Hospital Day: 2    Assessment and Plan:   Patient is a 12year-old female who presented with SOB. # Acute hypoxemic hypercapnic respiratory failure secondary to acute exacerbation of severe COPD   - Endorsed worsening SOB with increased productive cough of brown sputum for 3 days. Currently non-smoker. Compliant with home inhalers. On 3-4L NC at baseline. No sick contacts. PFTs in 2016 with severe obstruction.   - Requiring BPAP 60% FiO2 in ED, VBG 7.34/84. CXR nonacute. RVP negative. - Continue steroids, Azithro, Dulera and DuoNebs. Continue supportive care. Acapella. Goal SpO2 of 88-92%. -Patient is improving. Still wheezing. Would need 2 to 3 days of IV steroids and breathing treatments prior to plan for discharge     # KING  - Continue BPAP qhs. # Hyperglycemia  - Recent use of steroids.  - Follow-up A1c. # Chronic pain   - Continue home Suboxone, OARRS appropriate. # Major depressive disorder  - Continue home psychotropic medications. # Class II obesity  - BMI 37.8.  - Advised on importance of lifestyle modifications. Comment: Please note this report has been produced using speech recognition software and may contain errors related to that system including errors in grammar, punctuation, and spelling, as well as words and phrases that may be inappropriate. If there are any questions or concerns please feel free to contact the dictating provider for clarification. Diet ADULT DIET; Regular;  No Added Salt (3-4 gm)   DVT Prophylaxis [] Lovenox, []  Heparin, [] SCDs, [] Ambulation,  [] Eliquis, [] Xarelto  [] Coumadin   Code Status Full Code   Disposition From: Home  Expected Disposition: home  Estimated Date of Discharge: 2-3 days  Patient

## 2023-08-21 NOTE — ED NOTES
ED TO INPATIENT SBAR HANDOFF    Patient Name: Doreen Balderas   :  1960  63 y.o. Preferred Name  Catrina Blunt   Family/Caregiver Present no   Restraints no   C-SSRS: Risk of Suicide: No Risk  Sitter no   Sepsis Risk Score Sepsis Risk Score: 2.16      Situation  Chief Complaint   Patient presents with    Shortness of Breath     Patient 80% on EMS arrival. Patient increased to 90's after placed on CPAP via EMS. Brief Description of Patient's Condition:   Shortness of Breath (Patient 80% on EMS arrival. Patient increased to 90's after placed on CPAP via EMS. )   Mental Status: oriented  Arrived from: home    Imaging:   XR CHEST PORTABLE   Final Result   No new acute cardiopulmonary findings.            Abnormal labs:   Abnormal Labs Reviewed   CBC WITH AUTO DIFFERENTIAL - Abnormal; Notable for the following components:       Result Value    WBC 10.6 (*)     MCHC 29.7 (*)     Monocytes % 11.8 (*)     Eosinophils % 4.7 (*)     All other components within normal limits   COMPREHENSIVE METABOLIC PANEL - Abnormal; Notable for the following components:    Chloride 95 (*)     CO2 37 (*)     Creatinine 0.5 (*)     Glucose 187 (*)     All other components within normal limits   BLOOD GAS, VENOUS - Abnormal; Notable for the following components:    pCO2, [place] 84 (*)     pO2, [place] 158 (*)     Base Exc, Mixed 15.3 (*)     HCO3, Venous 45.3 (*)     O2 Sat, [place] 95.1 (*)     All other components within normal limits   BLOOD GAS, VENOUS - Abnormal; Notable for the following components:    pCO2, [place] 66 (*)     pO2, [place] 172 (*)     Base Exc, Mixed 15 (*)     HCO3, Venous 42.8 (*)     O2 Sat, [place]deep 94.8 (*)     All other components within normal limits       Background  History:   Past Medical History:   Diagnosis Date    ADHD     COPD (chronic obstructive pulmonary disease) ([address])     COVID-19     Drug addiction in remission ([address])     recovering addict    Hep C w/o coma, chronic (HCC)     Pacemaker     Sleep apnea     TIA

## 2023-08-21 NOTE — ED PROVIDER NOTES
DETECTED    Influenza A H3 PCR NOT DETECTED NOT DETECTED    Influenza B by PCR NOT DETECTED NOT DETECTED    Parainfluenza 1 PCR NOT DETECTED NOT DETECTED    Parainfluenza 2 PCR NOT DETECTED NOT DETECTED    Parainfluenza 3 PCR NOT DETECTED NOT DETECTED    Parainfluenza 4 PCR NOT DETECTED NOT DETECTED    RSV PCR NOT DETECTED NOT DETECTED    Bordetella parapertussis by PCR NOT DETECTED NOT DETECTED    B Pertussis by PCR NOT DETECTED NOT DETECTED    Chlamydophila Pneumonia PCR NOT DETECTED NOT DETECTED    Mycoplasma pneumo by PCR NOT DETECTED NOT DETECTED   CBC with Auto Differential   Result Value Ref Range    WBC 10.6 (H) 4.0 - 10.5 K/CU MM    RBC 4.65 4.2 - 5.4 M/CU MM    Hemoglobin 13.0 12.5 - 16.0 GM/DL    Hematocrit 43.8 37 - 47 %    MCV 94.2 78 - 100 FL    MCH 28.0 27 - 31 PG    MCHC 29.7 (L) 32.0 - 36.0 %    RDW 12.3 11.7 - 14.9 %    Platelets 463 042 - 275 K/CU MM    MPV 10.0 7.5 - 11.1 FL    Differential Type AUTOMATED DIFFERENTIAL     Segs Relative 39.7 36 - 66 %    Lymphocytes % 43.0 24 - 44 %    Monocytes % 11.8 (H) 0 - 4 %    Eosinophils % 4.7 (H) 0 - 3 %    Basophils % 0.6 0 - 1 %    Segs Absolute 4.2 K/CU MM    Lymphocytes Absolute 4.6 K/CU MM    Monocytes Absolute 1.3 K/CU MM    Eosinophils Absolute 0.5 K/CU MM    Basophils Absolute 0.1 K/CU MM    Nucleated RBC % 0.0 %    Total Nucleated RBC 0.0 K/CU MM    Total Immature Neutrophil 0.02 K/CU MM    Immature Neutrophil % 0.2 0 - 0.43 %   Comprehensive Metabolic Panel   Result Value Ref Range    Sodium 139 135 - 145 MMOL/L    Potassium 4.5 3.5 - 5.1 MMOL/L    Chloride 95 (L) 99 - 110 mMol/L    CO2 37 (H) 21 - 32 MMOL/L    BUN 7 6 - 23 MG/DL    Creatinine 0.5 (L) 0.6 - 1.1 MG/DL    Est, Glom Filt Rate >60 >60 mL/min/1.73m2    Glucose 187 (H) 70 - 99 MG/DL    Calcium 9.3 8.3 - 10.6 MG/DL    Albumin 3.9 3.4 - 5.0 GM/DL    Total Protein 7.0 6.4 - 8.2 GM/DL    Total Bilirubin 0.2 0.0 - 1.0 MG/DL    ALT 32 10 - 40 U/L    AST 30 15 - 37 IU/L    Alkaline ipratropium 0.5 mg-albuterol 2.5 mg (DUONEB) nebulizer solution 2 Dose (2 Doses Inhalation Given 8/20/23 1824)       Independent Imaging Interpretation by me: CXR without PTX per my read. Chronic conditions affecting care: COPD    Discussion with Other Profesionals : None    Social Determinants : None    Records Reviewed : Inpatient Notes including most recent admission for COPD exacerbation in March of this year    Disposition Considerations (tests considered but not done, Shared Decision Making, Pt Expectation of Test or Tx.):   Patient admitted. I discussed specific signs and symptoms on when to return to the emergency department as well as the need for close outpatient follow-up. Questions sought and answered with the patient. They voice understanding and agree with plan. I am the Primary Clinician of Record. Is this patient to be included in the SEP-1 Core Measure due to severe sepsis or septic shock? No   Exclusion criteria - the patient is NOT to be included for SEP-1 Core Measure due to:  Viral etiology found or highly suspected (including COVID-19) without concomitant bacterial infection      CRITICAL CARE NOTE:  There was a high probability of clinically significant life-threatening deterioration of the patient's condition requiring my urgent intervention due to respiratory failure with hypoxemia which is acute on chronic secondary to underlying COPD. Noninvasive positive pressure ventilation, supplemental oxygen administration, IV antibiotic administration, IV magnesium sulfate administration, telemetry monitoring and frequent rechecks throughout ED course was performed to address this. Total critical care time is 35 minutes. This includes vital sign monitoring, pulse oximetry monitoring, telemetry monitoring, clinical response to the IV medications, reviewing the nursing notes, consultation time, dictation/documentation time, and interpretation of the lab work.  This time excludes

## 2023-08-21 NOTE — PLAN OF CARE
Problem: Discharge Planning  Goal: Discharge to home or other facility with appropriate resources  Outcome: Progressing  Flowsheets (Taken 8/20/2023 2130)  Discharge to home or other facility with appropriate resources: Identify barriers to discharge with patient and caregiver     Problem: Safety - Adult  Goal: Free from fall injury  Outcome: Progressing     Problem: Chronic Conditions and Co-morbidities  Goal: Patient's chronic conditions and co-morbidity symptoms are monitored and maintained or improved  Outcome: Progressing     Problem: Respiratory - Adult  Goal: Achieves optimal ventilation and oxygenation  Outcome: Progressing     Problem: Skin/Tissue Integrity - Adult  Goal: Skin integrity remains intact  Outcome: Progressing  Goal: Oral mucous membranes remain intact  Outcome: Progressing     Problem: Infection - Adult  Goal: Absence of infection at discharge  Outcome: Progressing  Goal: Absence of infection during hospitalization  Outcome: Progressing  Goal: Absence of fever/infection during anticipated neutropenic period  Outcome: Progressing     Problem: Hematologic - Adult  Goal: Maintains hematologic stability  Outcome: Progressing

## 2023-08-21 NOTE — ED NOTES
Patient provided with box lunch at this time. Approved by Dr. Tesfaye Hendricks and respiratory to take off mask and place patient on 6L O2 NC while eating.       Maxine Joy RN  08/20/23 2114       Maxine Joy RN  08/20/23 2115

## 2023-08-21 NOTE — CARE COORDINATION
CM - Room # TR-1 [facility] criteria for COPD  reviewed at this time, criteria supports the INPATIENT admission

## 2023-08-22 LAB
ESTIMATED AVERAGE GLUCOSE: 148 MG/DL
GLUCOSE BLD-MCNC: 205 MG/DL (ref 70–99)
GLUCOSE BLD-MCNC: 246 MG/DL (ref 70–99)
GLUCOSE BLD-MCNC: 316 MG/DL (ref 70–99)
GLUCOSE BLD-MCNC: 354 MG/DL (ref 70–99)
HBA1C MFR BLD: 6.8 % (ref 4.2–6.3)

## 2023-08-22 PROCEDURE — 94640 AIRWAY INHALATION TREATMENT: CPT

## 2023-08-22 PROCEDURE — 1200000000 HC SEMI PRIVATE

## 2023-08-22 PROCEDURE — 6370000000 HC RX 637 (ALT 250 FOR IP): Performed by: STUDENT IN AN ORGANIZED HEALTH CARE EDUCATION/TRAINING PROGRAM

## 2023-08-22 PROCEDURE — 82962 GLUCOSE BLOOD TEST: CPT

## 2023-08-22 PROCEDURE — 94669 MECHANICAL CHEST WALL OSCILL: CPT

## 2023-08-22 PROCEDURE — 36415 COLL VENOUS BLD VENIPUNCTURE: CPT

## 2023-08-22 PROCEDURE — 2580000003 HC RX 258: Performed by: STUDENT IN AN ORGANIZED HEALTH CARE EDUCATION/TRAINING PROGRAM

## 2023-08-22 PROCEDURE — 2700000000 HC OXYGEN THERAPY PER DAY

## 2023-08-22 PROCEDURE — 94761 N-INVAS EAR/PLS OXIMETRY MLT: CPT

## 2023-08-22 PROCEDURE — 83036 HEMOGLOBIN GLYCOSYLATED A1C: CPT

## 2023-08-22 PROCEDURE — 6370000000 HC RX 637 (ALT 250 FOR IP): Performed by: NURSE PRACTITIONER

## 2023-08-22 PROCEDURE — 6370000000 HC RX 637 (ALT 250 FOR IP): Performed by: INTERNAL MEDICINE

## 2023-08-22 PROCEDURE — 6360000002 HC RX W HCPCS: Performed by: STUDENT IN AN ORGANIZED HEALTH CARE EDUCATION/TRAINING PROGRAM

## 2023-08-22 PROCEDURE — 94660 CPAP INITIATION&MGMT: CPT

## 2023-08-22 PROCEDURE — 2500000003 HC RX 250 WO HCPCS: Performed by: INTERNAL MEDICINE

## 2023-08-22 RX ORDER — INSULIN LISPRO 100 [IU]/ML
8 INJECTION, SOLUTION INTRAVENOUS; SUBCUTANEOUS ONCE
Status: COMPLETED | OUTPATIENT
Start: 2023-08-22 | End: 2023-08-22

## 2023-08-22 RX ORDER — PREDNISONE 20 MG/1
40 TABLET ORAL DAILY
Status: COMPLETED | OUTPATIENT
Start: 2023-08-22 | End: 2023-08-24

## 2023-08-22 RX ADMIN — TRAZODONE HYDROCHLORIDE 150 MG: 50 TABLET ORAL at 20:35

## 2023-08-22 RX ADMIN — MICONAZOLE NITRATE: 2 POWDER TOPICAL at 13:28

## 2023-08-22 RX ADMIN — BUDESONIDE AND FORMOTEROL FUMARATE DIHYDRATE 2 PUFF: 80; 4.5 AEROSOL RESPIRATORY (INHALATION) at 19:51

## 2023-08-22 RX ADMIN — INSULIN LISPRO 8 UNITS: 100 INJECTION, SOLUTION INTRAVENOUS; SUBCUTANEOUS at 20:35

## 2023-08-22 RX ADMIN — BUDESONIDE AND FORMOTEROL FUMARATE DIHYDRATE 2 PUFF: 80; 4.5 AEROSOL RESPIRATORY (INHALATION) at 08:18

## 2023-08-22 RX ADMIN — ASPIRIN 81 MG: 81 TABLET, CHEWABLE ORAL at 07:43

## 2023-08-22 RX ADMIN — QUETIAPINE FUMARATE 100 MG: 100 TABLET ORAL at 20:36

## 2023-08-22 RX ADMIN — IPRATROPIUM BROMIDE AND ALBUTEROL SULFATE 1 DOSE: 2.5; .5 SOLUTION RESPIRATORY (INHALATION) at 11:21

## 2023-08-22 RX ADMIN — PREGABALIN 100 MG: 100 CAPSULE ORAL at 20:36

## 2023-08-22 RX ADMIN — BUPRENORPHINE AND NALOXONE 1 FILM: 8; 2 FILM BUCCAL; SUBLINGUAL at 07:42

## 2023-08-22 RX ADMIN — BUPRENORPHINE AND NALOXONE 1 FILM: 8; 2 FILM BUCCAL; SUBLINGUAL at 13:28

## 2023-08-22 RX ADMIN — PREGABALIN 100 MG: 100 CAPSULE ORAL at 13:28

## 2023-08-22 RX ADMIN — AMITRIPTYLINE HYDROCHLORIDE 75 MG: 50 TABLET, FILM COATED ORAL at 20:36

## 2023-08-22 RX ADMIN — IPRATROPIUM BROMIDE AND ALBUTEROL SULFATE 1 DOSE: 2.5; .5 SOLUTION RESPIRATORY (INHALATION) at 15:21

## 2023-08-22 RX ADMIN — PREDNISONE 40 MG: 20 TABLET ORAL at 09:05

## 2023-08-22 RX ADMIN — SENNOSIDES AND DOCUSATE SODIUM 1 TABLET: 50; 8.6 TABLET ORAL at 07:43

## 2023-08-22 RX ADMIN — SENNOSIDES AND DOCUSATE SODIUM 1 TABLET: 50; 8.6 TABLET ORAL at 20:36

## 2023-08-22 RX ADMIN — AZITHROMYCIN MONOHYDRATE 500 MG: 500 INJECTION, POWDER, LYOPHILIZED, FOR SOLUTION INTRAVENOUS at 20:34

## 2023-08-22 RX ADMIN — IPRATROPIUM BROMIDE AND ALBUTEROL SULFATE 1 DOSE: 2.5; .5 SOLUTION RESPIRATORY (INHALATION) at 08:17

## 2023-08-22 RX ADMIN — PREGABALIN 100 MG: 100 CAPSULE ORAL at 07:43

## 2023-08-22 RX ADMIN — SODIUM CHLORIDE, PRESERVATIVE FREE 10 ML: 5 INJECTION INTRAVENOUS at 20:35

## 2023-08-22 RX ADMIN — INSULIN LISPRO 1 UNITS: 100 INJECTION, SOLUTION INTRAVENOUS; SUBCUTANEOUS at 07:43

## 2023-08-22 RX ADMIN — IPRATROPIUM BROMIDE AND ALBUTEROL SULFATE 1 DOSE: 2.5; .5 SOLUTION RESPIRATORY (INHALATION) at 19:50

## 2023-08-22 RX ADMIN — INSULIN LISPRO 4 UNITS: 100 INJECTION, SOLUTION INTRAVENOUS; SUBCUTANEOUS at 20:34

## 2023-08-22 RX ADMIN — INSULIN LISPRO 1 UNITS: 100 INJECTION, SOLUTION INTRAVENOUS; SUBCUTANEOUS at 11:36

## 2023-08-22 RX ADMIN — SODIUM CHLORIDE, PRESERVATIVE FREE 10 ML: 5 INJECTION INTRAVENOUS at 07:43

## 2023-08-22 RX ADMIN — INSULIN LISPRO 3 UNITS: 100 INJECTION, SOLUTION INTRAVENOUS; SUBCUTANEOUS at 15:52

## 2023-08-22 RX ADMIN — ENOXAPARIN SODIUM 40 MG: 100 INJECTION SUBCUTANEOUS at 17:12

## 2023-08-22 ASSESSMENT — PAIN SCALES - GENERAL: PAINLEVEL_OUTOF10: 0

## 2023-08-22 NOTE — PROGRESS NOTES
08/22/23 0016   NIV Type   $NIV $Daily Charge   NIV Started/Stopped On   Equipment Type v60   Mode Bilevel   Mask Type Full face mask   Mask Size Medium   Bonnet size Medium   Assessment   Pulse 68   Respirations (!) 9   SpO2 94 %   Comfort Level Good   Using Accessory Muscles No   Mask Compliance Good   Skin Protection for O2 Device No   Settings/Measurements   PIP Observed 12 cm H20   IPAP 12 cmH20   CPAP/EPAP 5 cmH2O   Vt (Measured) 467 mL   Rate Ordered 12   FiO2  50 %   I Time/ I Time % 1.25 s   Minute Volume (L/min) 7.3 Liters   Mask Leak (lpm) 28 lpm   Patient's Home Machine No   Alarm Settings   Alarms On Y   Low Pressure (cmH2O) 5 cmH2O   High Pressure (cmH2O) 25 cmH2O   Delay Alarm 20 sec(s)   Apnea (secs) 20 secs   RR Low (bpm) 12   RR High (bpm) 40 br/min

## 2023-08-22 NOTE — PROGRESS NOTES
08/22/23 0402   NIV Type   NIV Started/Stopped On   Equipment Type v60   Mode Bilevel   Mask Type Full face mask   Mask Size Medium   Bonnet size Medium   Assessment   Pulse 71   Respirations 21   /88   SpO2 95 %   Comfort Level Good   Using Accessory Muscles No   Mask Compliance Good   Skin Protection for O2 Device No   Settings/Measurements   PIP Observed 14 cm H20   IPAP 12 cmH20   CPAP/EPAP 5 cmH2O   Vt (Measured) 1060 mL   Rate Ordered 12   Insp Rise Time (%) 3 %   FiO2  50 %   I Time/ I Time % 1.25 s   Minute Volume (L/min) 14.2 Liters   Mask Leak (lpm) 34 lpm   Patient's Home Machine No   Alarm Settings   Alarms On Y   Low Pressure (cmH2O) 5 cmH2O   High Pressure (cmH2O) 25 cmH2O   Delay Alarm 20 sec(s)   Apnea (secs) 20 secs   RR Low (bpm) 12   RR High (bpm) 40 br/min

## 2023-08-23 LAB
GLUCOSE BLD-MCNC: 133 MG/DL (ref 70–99)
GLUCOSE BLD-MCNC: 281 MG/DL (ref 70–99)
GLUCOSE BLD-MCNC: 318 MG/DL (ref 70–99)
GLUCOSE BLD-MCNC: 336 MG/DL (ref 70–99)

## 2023-08-23 PROCEDURE — 1200000000 HC SEMI PRIVATE

## 2023-08-23 PROCEDURE — 94669 MECHANICAL CHEST WALL OSCILL: CPT

## 2023-08-23 PROCEDURE — 6360000002 HC RX W HCPCS: Performed by: STUDENT IN AN ORGANIZED HEALTH CARE EDUCATION/TRAINING PROGRAM

## 2023-08-23 PROCEDURE — 82962 GLUCOSE BLOOD TEST: CPT

## 2023-08-23 PROCEDURE — 6370000000 HC RX 637 (ALT 250 FOR IP): Performed by: STUDENT IN AN ORGANIZED HEALTH CARE EDUCATION/TRAINING PROGRAM

## 2023-08-23 PROCEDURE — 94660 CPAP INITIATION&MGMT: CPT

## 2023-08-23 PROCEDURE — 94640 AIRWAY INHALATION TREATMENT: CPT

## 2023-08-23 PROCEDURE — 2700000000 HC OXYGEN THERAPY PER DAY

## 2023-08-23 PROCEDURE — 2580000003 HC RX 258: Performed by: STUDENT IN AN ORGANIZED HEALTH CARE EDUCATION/TRAINING PROGRAM

## 2023-08-23 PROCEDURE — 6370000000 HC RX 637 (ALT 250 FOR IP): Performed by: INTERNAL MEDICINE

## 2023-08-23 PROCEDURE — 94761 N-INVAS EAR/PLS OXIMETRY MLT: CPT

## 2023-08-23 RX ADMIN — MICONAZOLE NITRATE: 2 POWDER TOPICAL at 09:46

## 2023-08-23 RX ADMIN — BUPRENORPHINE AND NALOXONE 1 FILM: 8; 2 FILM BUCCAL; SUBLINGUAL at 14:57

## 2023-08-23 RX ADMIN — IPRATROPIUM BROMIDE AND ALBUTEROL SULFATE 1 DOSE: 2.5; .5 SOLUTION RESPIRATORY (INHALATION) at 15:12

## 2023-08-23 RX ADMIN — ASPIRIN 81 MG: 81 TABLET, CHEWABLE ORAL at 09:43

## 2023-08-23 RX ADMIN — BUDESONIDE AND FORMOTEROL FUMARATE DIHYDRATE 2 PUFF: 80; 4.5 AEROSOL RESPIRATORY (INHALATION) at 19:58

## 2023-08-23 RX ADMIN — INSULIN LISPRO 2 UNITS: 100 INJECTION, SOLUTION INTRAVENOUS; SUBCUTANEOUS at 12:21

## 2023-08-23 RX ADMIN — PREGABALIN 100 MG: 100 CAPSULE ORAL at 20:22

## 2023-08-23 RX ADMIN — SODIUM CHLORIDE, PRESERVATIVE FREE 10 ML: 5 INJECTION INTRAVENOUS at 09:44

## 2023-08-23 RX ADMIN — IPRATROPIUM BROMIDE AND ALBUTEROL SULFATE 1 DOSE: 2.5; .5 SOLUTION RESPIRATORY (INHALATION) at 19:57

## 2023-08-23 RX ADMIN — ENOXAPARIN SODIUM 40 MG: 100 INJECTION SUBCUTANEOUS at 17:44

## 2023-08-23 RX ADMIN — SENNOSIDES AND DOCUSATE SODIUM 1 TABLET: 50; 8.6 TABLET ORAL at 20:22

## 2023-08-23 RX ADMIN — BUPRENORPHINE AND NALOXONE 1 FILM: 8; 2 FILM BUCCAL; SUBLINGUAL at 09:43

## 2023-08-23 RX ADMIN — PREGABALIN 100 MG: 100 CAPSULE ORAL at 09:57

## 2023-08-23 RX ADMIN — SODIUM CHLORIDE, PRESERVATIVE FREE 10 ML: 5 INJECTION INTRAVENOUS at 20:23

## 2023-08-23 RX ADMIN — TRAZODONE HYDROCHLORIDE 150 MG: 50 TABLET ORAL at 20:22

## 2023-08-23 RX ADMIN — BUPRENORPHINE AND NALOXONE 1 FILM: 8; 2 FILM BUCCAL; SUBLINGUAL at 20:22

## 2023-08-23 RX ADMIN — IPRATROPIUM BROMIDE AND ALBUTEROL SULFATE 1 DOSE: 2.5; .5 SOLUTION RESPIRATORY (INHALATION) at 07:27

## 2023-08-23 RX ADMIN — SENNOSIDES AND DOCUSATE SODIUM 1 TABLET: 50; 8.6 TABLET ORAL at 09:43

## 2023-08-23 RX ADMIN — BUDESONIDE AND FORMOTEROL FUMARATE DIHYDRATE 2 PUFF: 80; 4.5 AEROSOL RESPIRATORY (INHALATION) at 07:28

## 2023-08-23 RX ADMIN — QUETIAPINE FUMARATE 100 MG: 100 TABLET ORAL at 20:22

## 2023-08-23 RX ADMIN — PREDNISONE 40 MG: 20 TABLET ORAL at 09:42

## 2023-08-23 RX ADMIN — IPRATROPIUM BROMIDE AND ALBUTEROL SULFATE 1 DOSE: 2.5; .5 SOLUTION RESPIRATORY (INHALATION) at 11:26

## 2023-08-23 RX ADMIN — AMITRIPTYLINE HYDROCHLORIDE 75 MG: 50 TABLET, FILM COATED ORAL at 20:22

## 2023-08-23 RX ADMIN — PREGABALIN 100 MG: 100 CAPSULE ORAL at 14:57

## 2023-08-23 RX ADMIN — INSULIN LISPRO 3 UNITS: 100 INJECTION, SOLUTION INTRAVENOUS; SUBCUTANEOUS at 16:24

## 2023-08-23 RX ADMIN — INSULIN LISPRO 4 UNITS: 100 INJECTION, SOLUTION INTRAVENOUS; SUBCUTANEOUS at 20:22

## 2023-08-23 NOTE — PLAN OF CARE
Problem: Discharge Planning  Goal: Discharge to home or other facility with appropriate resources  Outcome: Progressing     Problem: Safety - Adult  Goal: Free from fall injury  Outcome: Progressing     Problem: Chronic Conditions and Co-morbidities  Goal: Patient's chronic conditions and co-morbidity symptoms are monitored and maintained or improved  Outcome: Progressing     Problem: Respiratory - Adult  Goal: Achieves optimal ventilation and oxygenation  Outcome: Progressing     Problem: Skin/Tissue Integrity - Adult  Goal: Skin integrity remains intact  Outcome: Progressing  Goal: Oral mucous membranes remain intact  Outcome: Progressing     Problem: Infection - Adult  Goal: Absence of infection at discharge  Outcome: Progressing  Goal: Absence of infection during hospitalization  Outcome: Progressing  Goal: Absence of fever/infection during anticipated neutropenic period  Outcome: Progressing     Problem: Hematologic - Adult  Goal: Maintains hematologic stability  Outcome: Progressing     Problem: Pain  Goal: Verbalizes/displays adequate comfort level or baseline comfort level  Outcome: Progressing     Problem: Cardiovascular - Adult  Goal: Maintains optimal cardiac output and hemodynamic stability  Outcome: Progressing

## 2023-08-23 NOTE — PROGRESS NOTES
Pt ambulated in the gilman with 4 L O2, (her baseline). Pt made it half-way down gilman and needed to return to room due to O2 dropping into the low 80's. Pt returned to bed, currently on 4 L. O2 returned back to 92%.

## 2023-08-23 NOTE — PROGRESS NOTES
Hospitalist Progress Note      Name:  Karina Smalls /Age/Sex: 1960  (63 y.o. female)   MRN & CSN:  [id number] & [id number] Admission Date/Time: 2023  6:05 PM   Location:  -A PCP: Raad De Oliveira MD         Hospital Day: 4    Assessment and Plan:   Karina Smalls is a 63 y.o.  female  who presents with COPD exacerbation ([address])    # Acute hypoxemic hypercapnic respiratory failure secondary to acute exacerbation of severe COPD   - Previously required Bipap on 5L NC this AM (4L baseline)  -Smoking history of 30-pack-year  -Ocean spray for the nose  -Completed IV steroids and will begin PO Prednisone today  -Continue nebs and long acting inhalers  -Patient appears to be deconditioned. When attempting to walk the patient patient became hypoxic on 4L O2, PT/OT consulted. # KING  - Continue BPAP qhs. # Type II DM  - Recent use of steroids.  - HgbA1C yesterday 6.8; will need to be sent with diabetic meds on d/c with PCP f/u     # Chronic pain   - Continue home Suboxone, OARRS appropriate. # Major depressive disorder  - Continue home psychotropic medications. # Class II obesity  - BMI 37.8.  - Advised on importance of lifestyle modifications. Diet ADULT DIET; Regular; No Added Salt (3-4 gm)   DVT Prophylaxis [] Lovenox, []  Heparin, [] SCDs, [] Ambulation   GI Prophylaxis [] PPI,  [] H2 Blocker,  [] Carafate,  [] Diet/Tube Feeds   Code Status Full Code   Disposition Patient requires continued admission due to COPD exacerbation, acute on chronic hypoxemic/hypercapnic respiratory failure   MDM [] Low, [] Moderate,[]  High  Patient's risk as above due to      History of Present Illness:     Resting comfortably and improved but not at baseline per patient    Objective:      Intake/Output Summary (Last 24 hours) at 2023 1401  Last data filed at 2023 1224  Gross per 24 hour   Intake 480 ml   Output --   Net 480 ml      Vitals:   Vitals:    23 1350   BP:    Pulse: (!) 104   Resp:    Temp:    SpO2: 91%     Physical Exam:   GEN Awake female, sitting upright in bed in no apparent distress. Appears given age. EYES Pupils are equally round. No scleral erythema, discharge, or conjunctivitis. NECK Supple, no apparent thyromegaly or masses. RESP Expiratory wheezing this AM;   Symmetric chest movement while on 5L NC  CARDIO/VASC S1/S2 auscultated. Regular rate without appreciable murmurs, rubs, or gallops. No JVD or carotid bruits.      Medications:   Medications:    predniSONE  40 mg Oral Daily    miconazole   Topical BID    insulin lispro  0-4 Units SubCUTAneous TID WC    insulin lispro  0-4 Units SubCUTAneous Nightly    amitriptyline  75 mg Oral Nightly    ipratropium 0.5 mg-albuterol 2.5 mg  1 Dose Inhalation 4x Daily RT    aspirin  81 mg Oral Daily    buprenorphine-naloxone  1 Film SubLINGual TID    budesonide-formoterol  2 puff Inhalation BID RT    pregabalin  100 mg Oral TID    QUEtiapine  100 mg Oral Nightly    sennosides-docusate sodium  1 tablet Oral BID    traZODone  150 mg Oral Nightly    sodium chloride flush  5-40 mL IntraVENous 2 times per day    enoxaparin  40 mg SubCUTAneous QPM      Infusions:    dextrose      sodium chloride       PRN Meds: sodium chloride, 1 spray, Q6H PRN  glucose, 4 tablet, PRN  dextrose bolus, 125 mL, PRN   Or  dextrose bolus, 250 mL, PRN  glucagon (rDNA), 1 mg, PRN  dextrose, , Continuous PRN  [Held by provider] traMADol, 50 mg, BID PRN  sodium chloride flush, 5-40 mL, PRN  sodium chloride, , PRN  potassium chloride, 10 mEq, PRN  magnesium sulfate, 2,000 mg, PRN  ondansetron, 4 mg, Q8H PRN   Or  ondansetron, 4 mg, Q6H PRN  polyethylene glycol, 17 g, Daily PRN  nicotine, 1 patch, Daily PRN  acetaminophen, 650 mg, Q6H PRN   Or  acetaminophen, 650 mg, Q6H PRN        Electronically signed by Maisha Dimas MD on 8/23/2023 at 2:01 PM

## 2023-08-24 VITALS
TEMPERATURE: 98.8 F | WEIGHT: 233.69 LBS | DIASTOLIC BLOOD PRESSURE: 60 MMHG | BODY MASS INDEX: 39.9 KG/M2 | HEIGHT: 64 IN | RESPIRATION RATE: 18 BRPM | OXYGEN SATURATION: 92 % | HEART RATE: 76 BPM | SYSTOLIC BLOOD PRESSURE: 102 MMHG

## 2023-08-24 LAB
GLUCOSE BLD-MCNC: 120 MG/DL (ref 70–99)
GLUCOSE BLD-MCNC: 254 MG/DL (ref 70–99)
GLUCOSE BLD-MCNC: 338 MG/DL (ref 70–99)

## 2023-08-24 PROCEDURE — 2700000000 HC OXYGEN THERAPY PER DAY

## 2023-08-24 PROCEDURE — 97162 PT EVAL MOD COMPLEX 30 MIN: CPT

## 2023-08-24 PROCEDURE — 82962 GLUCOSE BLOOD TEST: CPT

## 2023-08-24 PROCEDURE — 6370000000 HC RX 637 (ALT 250 FOR IP): Performed by: STUDENT IN AN ORGANIZED HEALTH CARE EDUCATION/TRAINING PROGRAM

## 2023-08-24 PROCEDURE — 6370000000 HC RX 637 (ALT 250 FOR IP): Performed by: INTERNAL MEDICINE

## 2023-08-24 PROCEDURE — 94660 CPAP INITIATION&MGMT: CPT

## 2023-08-24 PROCEDURE — 2580000003 HC RX 258: Performed by: STUDENT IN AN ORGANIZED HEALTH CARE EDUCATION/TRAINING PROGRAM

## 2023-08-24 PROCEDURE — 6360000002 HC RX W HCPCS: Performed by: STUDENT IN AN ORGANIZED HEALTH CARE EDUCATION/TRAINING PROGRAM

## 2023-08-24 PROCEDURE — 94640 AIRWAY INHALATION TREATMENT: CPT

## 2023-08-24 PROCEDURE — 94664 DEMO&/EVAL PT USE INHALER: CPT

## 2023-08-24 PROCEDURE — 94010 BREATHING CAPACITY TEST: CPT

## 2023-08-24 PROCEDURE — 94761 N-INVAS EAR/PLS OXIMETRY MLT: CPT

## 2023-08-24 RX ORDER — NICOTINE 21 MG/24HR
1 PATCH, TRANSDERMAL 24 HOURS TRANSDERMAL DAILY PRN
Qty: 30 PATCH | Refills: 3 | Status: SHIPPED | OUTPATIENT
Start: 2023-08-24

## 2023-08-24 RX ADMIN — PREDNISONE 40 MG: 20 TABLET ORAL at 08:29

## 2023-08-24 RX ADMIN — BUDESONIDE AND FORMOTEROL FUMARATE DIHYDRATE 2 PUFF: 80; 4.5 AEROSOL RESPIRATORY (INHALATION) at 08:24

## 2023-08-24 RX ADMIN — IPRATROPIUM BROMIDE AND ALBUTEROL SULFATE 1 DOSE: 2.5; .5 SOLUTION RESPIRATORY (INHALATION) at 16:06

## 2023-08-24 RX ADMIN — SENNOSIDES AND DOCUSATE SODIUM 1 TABLET: 50; 8.6 TABLET ORAL at 08:28

## 2023-08-24 RX ADMIN — IPRATROPIUM BROMIDE AND ALBUTEROL SULFATE 1 DOSE: 2.5; .5 SOLUTION RESPIRATORY (INHALATION) at 08:13

## 2023-08-24 RX ADMIN — BUPRENORPHINE AND NALOXONE 1 FILM: 8; 2 FILM BUCCAL; SUBLINGUAL at 14:19

## 2023-08-24 RX ADMIN — BUPRENORPHINE AND NALOXONE 1 FILM: 8; 2 FILM BUCCAL; SUBLINGUAL at 08:29

## 2023-08-24 RX ADMIN — PREGABALIN 100 MG: 100 CAPSULE ORAL at 14:19

## 2023-08-24 RX ADMIN — INSULIN LISPRO 3 UNITS: 100 INJECTION, SOLUTION INTRAVENOUS; SUBCUTANEOUS at 17:49

## 2023-08-24 RX ADMIN — INSULIN LISPRO 2 UNITS: 100 INJECTION, SOLUTION INTRAVENOUS; SUBCUTANEOUS at 11:41

## 2023-08-24 RX ADMIN — ASPIRIN 81 MG: 81 TABLET, CHEWABLE ORAL at 08:28

## 2023-08-24 RX ADMIN — MICONAZOLE NITRATE: 2 POWDER TOPICAL at 08:29

## 2023-08-24 RX ADMIN — SODIUM CHLORIDE, PRESERVATIVE FREE 10 ML: 5 INJECTION INTRAVENOUS at 08:28

## 2023-08-24 RX ADMIN — ENOXAPARIN SODIUM 40 MG: 100 INJECTION SUBCUTANEOUS at 17:49

## 2023-08-24 RX ADMIN — PREGABALIN 100 MG: 100 CAPSULE ORAL at 08:29

## 2023-08-24 ASSESSMENT — COPD QUESTIONNAIRES
QUESTION5_HOMEACTIVITIES: 5
CAT_TOTALSCORE: 36
QUESTION1_COUGHFREQUENCY: 5
QUESTION7_SLEEPQUALITY: 4
QUESTION3_CHESTTIGHTNESS: 5
GOLD_GRADE: 4
QUESTION4_WALKINCLINE: 3
GOLD_GROUP: GROUP E
QUESTION6_LEAVINGHOUSE: 5
TOTAL_EXACERBATIONS_PASTYEAR: 3
QUESTION8_ENERGYLEVEL: 4
QUESTION2_CHESTPHLEGM: 5

## 2023-08-24 ASSESSMENT — PULMONARY FUNCTION TESTS
FEV1 (%PREDICTED): 27
POST BRONCHODILATOR FEV1/FVC: 43
PIF_VALUE: 80

## 2023-08-24 NOTE — PROGRESS NOTES
Budesonide/glycopyrrolate/formoterol fumarate Jose Luis Hewitt) [facility]     Electronically signed by Lizett Barrow RCP on 8/24/23 at 12:49 PM EDT

## 2023-08-24 NOTE — CONSULTS
Patient Education     Understanding Anxiety Disorders    Almost everyone gets nervous now and then. It’s normal to have knots in your stomach before a test, or for your heart to race on a first date. But an anxiety disorder is much more than a case of nerves. In fact, its symptoms may be overwhelming. But treatment can relieve many of these symptoms. Talking to your healthcare provider is the first step.  What are anxiety disorders?  An anxiety disorder causes intense feelings of panic and fear. These feelings may arise for no apparent reason. And they tend to recur again and again. They may prevent you from coping with life and cause you great distress. As a result, you may avoid anything that triggers your fear. In extreme cases, you may never leave the house. Anxiety disorders may cause other symptoms, such as:  · Obsessive thoughts you can’t control  · Constant nightmares or painful thoughts of the past  · Nausea, sweating, and muscle tension  · Trouble sleeping or concentrating  What causes anxiety disorders?  Anxiety disorders tend to run in families. For some people, childhood abuse or neglect may play a role. For others, stressful life events or trauma may trigger anxiety disorders. Anxiety can trigger low self-esteem and poor coping skills.  Common anxiety disorders  · Panic disorder. This causes an intense fear of being in danger.  · Phobias. These are extreme fears of certain objects, places, or events.  · Obsessive-compulsive disorder. This causes you to have unwanted thoughts and urges. You also may perform certain actions over and over.  · Posttraumatic stress disorder. This occurs in people who have survived a terrible ordeal. It can cause nightmares and flashbacks about the event.  · Generalized anxiety disorder. This causes constant worry that can greatly disrupt your life.   Getting better  You may believe that nothing can help you. Or, you might fear what others may think. But most anxiety  and participatory, and follows commands appropriately. Cardiac and Pulmonary Tolerance:  tolerant of activity and recovers quickly with rest period  Neuro screen:  [employer]  Musculoskeletal screen:  [facility] and power. Mobility skills and general balance:    Ambulation:  yes    Ambulation distance, pattern, device:  minimal household distance (50+ feet) , no device  Ambulation assistance:  supervision  Bed Mobility, supine-sit, and sitting balance: independent. Sit-stand and immediate static standing balance:  modified independent. Stand-pivot:  supervision. Yenni's Egress Test (of mobility from lying in bed through walking to bathroom):   Pass -- S -- mobility per SELF from supine to sit, then to stand one minute, then walk into bathroom. [facility] 6 Clicks Inpatient Mobility:   (Ambulation in room is defined as 10-30 feet.)  AM-[facility] Basic Mobility - Inpatient   How much help is needed turning from your back to your side while in a flat bed without using bedrails?: None  How much help is needed moving from lying on your back to sitting on the side of a flat bed without using bedrails?: None  How much help is needed moving to and from a bed to a chair?: None  How much help is needed standing up from a chair using your arms?: None  How much help is needed walking in hospital room?: None  How much help is needed climbing 3-5 steps with a railing?: None  AM-PAC Inpatient Mobility Raw Score : 24  AM-PAC Inpatient T-Scale Score : 61.14  Mobility Inpatient CMS 0-100% Score: 0  Mobility Inpatient CMS G-Code Modifier : CH  Assessment:  Patient admitted for COPD Exac with evolving medical features and stable/uncomplicated rehabilitative features. No new functional mobility impairments. Pulm status is chronic, encourage continued O2 use at home as usual.  Unlikely to be able to overcome COPD effects by training.   Patient performed well with physical therapy evaluation and does not require ongoing acute care physical symptoms can be eased. Having an anxiety disorder is nothing to be ashamed of. Most people do best with treatment that combines medicine and therapy. These aren’t cures. But they can help you live a healthier life.  Date Last Reviewed: 2/1/2017  © 3335-9498 [employer]. [address] [place] [address], [place] [address]. All rights reserved. This information is not intended as a substitute for professional medical care. Always follow your healthcare professional's instructions.            therapy service. Prognosis: good. Clinical decision making:  medium complexity:  hx 1-2 personal factors/comorbidities, exam tests and measures for 2-3 elements of body/structures/functions/activity limitation/participation restriction, evolving presentation    Discharge recommendations:    Support home independently. .   could consider [facility] [address] services as well. Encourage patient engage in intervals of effort/training, DIScourage overuse d/t baseline COPD issues. Plan:  Discharge from acute care physical therapy service. Mobility homework for patient and nurse:    change position frequently, sit at edge of bed 4-6 times daily, out of bed for meals, accompany to bathroom for voiding, and ambulate 4-6 times daily, with least restrictive device. Patient requires supervision for mobility with nursing. Time in:  1351  Time out:  1359  Timed treatment minutes:  0  Total treatment time:  8  Electronically signed by:   Jaya Pratt, PT  8/24/2023, 1:58 PM

## 2023-08-24 NOTE — PROGRESS NOTES
08/24/23 1153   Encounter Summary   Encounter Overview/Reason  Initial Encounter   Service Provided For: Patient   Referral/Consult From: [address] Children;Family members   Last Encounter  08/24/23  (patient was in good spirit during the visit.  patient would appreciate follow up visits, she said.)   Complexity of Encounter Low   Begin Time 1115   End Time  1130   Total Time Calculated 15 min   Spiritual/Emotional needs   Type Spiritual Support   Grief, Loss, and Adjustments   Type Adjustment to illness   Assessment/Intervention/Outcome   Assessment Coping   Intervention Active listening;Empowerment;Nurtured Hope;Sustaining Presence/Ministry of presence   Outcome Coping;Encouraged;Expressed Gratitude   Plan and Referrals   Plan/Referrals No future visits requested

## 2023-08-24 NOTE — CARE COORDINATION
Pt on discharge home. Called/faxed info to Kathryn at [facility]. She confirmed pt is active with them. No other needs identified at this time. 26 Pt now complaining she can not ambulate. Therapy evals not completed yet. WB message requesting evals/recs. Spoke with pt about SNU and she does not know where she would like to go . CM awaiting therapy evals. 1 PT states pt able to [facility].. Faxed order/AVS to [facility]  Updated intake on need for therapy at discharge.

## 2023-08-24 NOTE — PROGRESS NOTES
Outpatient Pharmacy Progress Note for Meds-to-Beds    Total number of Prescriptions Filled: 4  The following medications were dispensed to the patient during the discharge process:  metFORMIN  miconazole  Nicotine  Spiriva inhaler    Additional Documentation:  Medication(s) were delivered to the patient's room prior to discharge      Thank you for letting us serve your patients.   [address]    [address] [place] [address], [address] [place] [address]    Phone: [phone]    Fax: [phone]

## 2023-08-24 NOTE — PROGRESS NOTES
08/24/23 1153   Encounter Summary   Encounter Overview/Reason  Initial Encounter   Service Provided For: Patient   Referral/Consult From: [address] Children;Family members   Last Encounter  08/24/23   Complexity of Encounter Low   Begin Time 1115   End Time  1130   Total Time Calculated 15 min   Spiritual/Emotional needs   Type Spiritual Support   Grief, Loss, and Adjustments   Type Adjustment to illness   Assessment/Intervention/Outcome   Assessment Coping   Intervention Active listening;Empowerment;Nurtured Hope;Sustaining Presence/Ministry of presence   Outcome Coping;Encouraged;Expressed Gratitude   Plan and Referrals   Plan/Referrals No future visits requested

## 2023-08-24 NOTE — PROGRESS NOTES
4 Eyes Skin Assessment     NAME:  Karina Smalls  YOB: 1960  MEDICAL RECORD NUMBER:  [id number]    The patient is being assessed for  Transfer to New Unit    I agree that at least one RN has performed a thorough Head to Toe Skin Assessment on the patient. ALL assessment sites listed below have been assessed. Areas assessed by both nurses:    Head, Face, Ears, Shoulders, Back, Chest, Arms, Elbows, Hands, Sacrum. Buttock, Coccyx, Ischium, Legs. Feet and Heels, and Under Medical Devices         Does the Patient have a Wound? No noted wound(s)     She has some redness and moist under the left breast,on micotin powder application.   Clif Prevention initiated by RN: Yes  Wound Care Orders initiated by RN: No    Pressure Injury (Stage 3,4, Unstageable, DTI, NWPT, and Complex wounds) if present, place Wound referral order by RN under : No    New Ostomies, if present place, Ostomy referral order under : No     Nurse 1 eSignature: Electronically signed by Ana Laura Boyd RN on 8/24/23 at 1:46 AM EDT    **SHARE this note so that the co-signing nurse can place an eSignature**    Nurse 2 eSignature: Electronically signed by Sindy Cranker, RN on 8/24/23 at 3:49 AM EDT

## 2023-11-03 ENCOUNTER — HOSPITAL ENCOUNTER (OUTPATIENT)
Age: 63
Discharge: HOME | End: 2023-11-03
Payer: MEDICARE

## 2023-11-03 ENCOUNTER — HOSPITAL ENCOUNTER (OUTPATIENT)
Dept: GENERAL RADIOLOGY | Age: 63
Discharge: HOME | End: 2023-11-03
Payer: MEDICARE

## 2023-11-03 DIAGNOSIS — M25.512 CHRONIC LEFT SHOULDER PAIN: ICD-10-CM

## 2023-11-03 DIAGNOSIS — G89.29 CHRONIC LEFT SHOULDER PAIN: ICD-10-CM

## 2023-11-03 PROCEDURE — 73030 X-RAY EXAM OF SHOULDER: CPT

## 2023-12-21 ENCOUNTER — APPOINTMENT (OUTPATIENT)
Dept: GENERAL RADIOLOGY | Age: 63
DRG: 190 | End: 2023-12-21
Payer: MEDICARE

## 2023-12-21 ENCOUNTER — HOSPITAL ENCOUNTER (INPATIENT)
Age: 63
LOS: 6 days | Discharge: HOME | DRG: 190 | End: 2023-12-27
Attending: EMERGENCY MEDICINE | Admitting: STUDENT IN AN ORGANIZED HEALTH CARE EDUCATION/TRAINING PROGRAM
Payer: MEDICARE

## 2023-12-21 DIAGNOSIS — J44.1 COPD EXACERBATION (HCC): Primary | ICD-10-CM

## 2023-12-21 DIAGNOSIS — R09.02 HYPOXIA: ICD-10-CM

## 2023-12-21 DIAGNOSIS — R06.00 DYSPNEA, UNSPECIFIED TYPE: ICD-10-CM

## 2023-12-21 DIAGNOSIS — E87.29 RESPIRATORY ACIDOSIS: ICD-10-CM

## 2023-12-21 LAB
ALBUMIN SERPL-MCNC: 3.7 GM/DL (ref 3.4–5)
ALP BLD-CCNC: 64 IU/L (ref 40–129)
ALT SERPL-CCNC: 20 U/L (ref 10–40)
ANION GAP SERPL CALCULATED.3IONS-SCNC: 7 MMOL/L (ref 7–16)
AST SERPL-CCNC: 20 IU/L (ref 15–37)
BASE EXCESS MIXED: 13.6 (ref 0–3)
BASOPHILS ABSOLUTE: 0.1 K/CU MM
BASOPHILS RELATIVE PERCENT: 0.6 % (ref 0–1)
BILIRUB SERPL-MCNC: 0.3 MG/DL (ref 0–1)
BUN SERPL-MCNC: 7 MG/DL (ref 6–23)
CALCIUM SERPL-MCNC: 9 MG/DL (ref 8.3–10.6)
CHLORIDE BLD-SCNC: 99 MMOL/L (ref 99–110)
CO2: 38 MMOL/L (ref 21–32)
COMMENT: ABNORMAL
CREAT SERPL-MCNC: 0.5 MG/DL (ref 0.6–1.1)
DIFFERENTIAL TYPE: ABNORMAL
EOSINOPHILS ABSOLUTE: 0.4 K/CU MM
EOSINOPHILS RELATIVE PERCENT: 5.7 % (ref 0–3)
GFR SERPL CREATININE-BSD FRML MDRD: >60 ML/MIN/1.73M2
GLUCOSE BLD-MCNC: 164 MG/DL (ref 70–99)
GLUCOSE SERPL-MCNC: 160 MG/DL (ref 70–99)
HCO3 VENOUS: 44.3 MMOL/L (ref 22–29)
HCT VFR BLD CALC: 47 % (ref 37–47)
HEMOGLOBIN: 13.8 GM/DL (ref 12.5–16)
IMMATURE NEUTROPHIL %: 0.4 % (ref 0–0.43)
INFLUENZA A ANTIGEN: NOT DETECTED
INFLUENZA B ANTIGEN: NOT DETECTED
LACTATE: 1 MMOL/L (ref 0.5–1.9)
LYMPHOCYTES ABSOLUTE: 2.3 K/CU MM
LYMPHOCYTES RELATIVE PERCENT: 29.7 % (ref 24–44)
MAGNESIUM: 1.5 MG/DL (ref 1.8–2.4)
MCH RBC QN AUTO: 28.3 PG (ref 27–31)
MCHC RBC AUTO-ENTMCNC: 29.4 % (ref 32–36)
MCV RBC AUTO: 96.3 FL (ref 78–100)
MONOCYTES ABSOLUTE: 0.7 K/CU MM
MONOCYTES RELATIVE PERCENT: 9.5 % (ref 0–4)
NUCLEATED RBC %: 0 %
O2 SAT, VEN: 94 % (ref 50–70)
PCO2, VEN: 90 MMHG (ref 41–51)
PDW BLD-RTO: 12.5 % (ref 11.7–14.9)
PH VENOUS: 7.3 (ref 7.32–7.43)
PLATELET # BLD: 201 K/CU MM (ref 140–440)
PMV BLD AUTO: 10.2 FL (ref 7.5–11.1)
PO2, VEN: 170 MMHG (ref 28–48)
POTASSIUM SERPL-SCNC: 4.1 MMOL/L (ref 3.5–5.1)
PRO-BNP: 59.31 PG/ML
RBC # BLD: 4.88 M/CU MM (ref 4.2–5.4)
SARS-COV-2 RDRP RESP QL NAA+PROBE: NOT DETECTED
SEGMENTED NEUTROPHILS ABSOLUTE COUNT: 4.2 K/CU MM
SEGMENTED NEUTROPHILS RELATIVE PERCENT: 54.1 % (ref 36–66)
SODIUM BLD-SCNC: 144 MMOL/L (ref 135–145)
SOURCE: NORMAL
TOTAL IMMATURE NEUTOROPHIL: 0.03 K/CU MM
TOTAL NUCLEATED RBC: 0 K/CU MM
TOTAL PROTEIN: 7.1 GM/DL (ref 6.4–8.2)
TROPONIN, HIGH SENSITIVITY: 20 NG/L (ref 0–14)
WBC # BLD: 7.8 K/CU MM (ref 4–10.5)

## 2023-12-21 PROCEDURE — 2580000003 HC RX 258: Performed by: EMERGENCY MEDICINE

## 2023-12-21 PROCEDURE — 99285 EMERGENCY DEPT VISIT HI MDM: CPT

## 2023-12-21 PROCEDURE — 85025 COMPLETE CBC W/AUTO DIFF WBC: CPT

## 2023-12-21 PROCEDURE — 94660 CPAP INITIATION&MGMT: CPT

## 2023-12-21 PROCEDURE — 96374 THER/PROPH/DIAG INJ IV PUSH: CPT

## 2023-12-21 PROCEDURE — 87502 INFLUENZA DNA AMP PROBE: CPT

## 2023-12-21 PROCEDURE — 82962 GLUCOSE BLOOD TEST: CPT

## 2023-12-21 PROCEDURE — 5A09557 ASSISTANCE WITH RESPIRATORY VENTILATION, GREATER THAN 96 CONSECUTIVE HOURS, CONTINUOUS POSITIVE AIRWAY PRESSURE: ICD-10-PCS | Performed by: INTERNAL MEDICINE

## 2023-12-21 PROCEDURE — 94640 AIRWAY INHALATION TREATMENT: CPT

## 2023-12-21 PROCEDURE — 87040 BLOOD CULTURE FOR BACTERIA: CPT

## 2023-12-21 PROCEDURE — 2580000003 HC RX 258: Performed by: STUDENT IN AN ORGANIZED HEALTH CARE EDUCATION/TRAINING PROGRAM

## 2023-12-21 PROCEDURE — 83880 ASSAY OF NATRIURETIC PEPTIDE: CPT

## 2023-12-21 PROCEDURE — 82805 BLOOD GASES W/O2 SATURATION: CPT

## 2023-12-21 PROCEDURE — 6360000002 HC RX W HCPCS: Performed by: EMERGENCY MEDICINE

## 2023-12-21 PROCEDURE — 87086 URINE CULTURE/COLONY COUNT: CPT

## 2023-12-21 PROCEDURE — 80053 COMPREHEN METABOLIC PANEL: CPT

## 2023-12-21 PROCEDURE — 93005 ELECTROCARDIOGRAM TRACING: CPT | Performed by: EMERGENCY MEDICINE

## 2023-12-21 PROCEDURE — 6370000000 HC RX 637 (ALT 250 FOR IP): Performed by: EMERGENCY MEDICINE

## 2023-12-21 PROCEDURE — 87150 DNA/RNA AMPLIFIED PROBE: CPT

## 2023-12-21 PROCEDURE — 82803 BLOOD GASES ANY COMBINATION: CPT

## 2023-12-21 PROCEDURE — 87635 SARS-COV-2 COVID-19 AMP PRB: CPT

## 2023-12-21 PROCEDURE — 84484 ASSAY OF TROPONIN QUANT: CPT

## 2023-12-21 PROCEDURE — 81003 URINALYSIS AUTO W/O SCOPE: CPT

## 2023-12-21 PROCEDURE — 2060000000 HC ICU INTERMEDIATE R&B

## 2023-12-21 PROCEDURE — 6370000000 HC RX 637 (ALT 250 FOR IP): Performed by: STUDENT IN AN ORGANIZED HEALTH CARE EDUCATION/TRAINING PROGRAM

## 2023-12-21 PROCEDURE — 2700000000 HC OXYGEN THERAPY PER DAY

## 2023-12-21 PROCEDURE — 96361 HYDRATE IV INFUSION ADD-ON: CPT

## 2023-12-21 PROCEDURE — 6360000002 HC RX W HCPCS: Performed by: STUDENT IN AN ORGANIZED HEALTH CARE EDUCATION/TRAINING PROGRAM

## 2023-12-21 PROCEDURE — 83735 ASSAY OF MAGNESIUM: CPT

## 2023-12-21 PROCEDURE — 71045 X-RAY EXAM CHEST 1 VIEW: CPT

## 2023-12-21 PROCEDURE — 83605 ASSAY OF LACTIC ACID: CPT

## 2023-12-21 RX ORDER — METHYLPREDNISOLONE SODIUM SUCCINATE 125 MG/2ML
125 INJECTION, POWDER, LYOPHILIZED, FOR SOLUTION INTRAMUSCULAR; INTRAVENOUS ONCE
Status: COMPLETED | OUTPATIENT
Start: 2023-12-21 | End: 2023-12-21

## 2023-12-21 RX ORDER — ENOXAPARIN SODIUM 100 MG/ML
40 INJECTION SUBCUTANEOUS EVERY EVENING
Status: DISCONTINUED | OUTPATIENT
Start: 2023-12-21 | End: 2023-12-27 | Stop reason: HOSPADM

## 2023-12-21 RX ORDER — POLYETHYLENE GLYCOL 3350 17 G/17G
17 POWDER, FOR SOLUTION ORAL DAILY PRN
Status: DISCONTINUED | OUTPATIENT
Start: 2023-12-21 | End: 2023-12-27 | Stop reason: HOSPADM

## 2023-12-21 RX ORDER — ACETAMINOPHEN 325 MG/1
650 TABLET ORAL EVERY 6 HOURS PRN
Status: DISCONTINUED | OUTPATIENT
Start: 2023-12-21 | End: 2023-12-27 | Stop reason: HOSPADM

## 2023-12-21 RX ORDER — BUPRENORPHINE AND NALOXONE 8; 2 MG/1; MG/1
1 FILM, SOLUBLE BUCCAL; SUBLINGUAL 3 TIMES DAILY
Status: DISCONTINUED | OUTPATIENT
Start: 2023-12-21 | End: 2023-12-27 | Stop reason: HOSPADM

## 2023-12-21 RX ORDER — 0.9 % SODIUM CHLORIDE 0.9 %
1000 INTRAVENOUS SOLUTION INTRAVENOUS ONCE
Status: COMPLETED | OUTPATIENT
Start: 2023-12-21 | End: 2023-12-21

## 2023-12-21 RX ORDER — IPRATROPIUM BROMIDE AND ALBUTEROL SULFATE 2.5; .5 MG/3ML; MG/3ML
1 SOLUTION RESPIRATORY (INHALATION) ONCE
Status: COMPLETED | OUTPATIENT
Start: 2023-12-21 | End: 2023-12-21

## 2023-12-21 RX ORDER — AZITHROMYCIN 250 MG/1
500 TABLET, FILM COATED ORAL DAILY
Status: COMPLETED | OUTPATIENT
Start: 2023-12-21 | End: 2023-12-23

## 2023-12-21 RX ORDER — GUAIFENESIN/DEXTROMETHORPHAN 100-10MG/5
5 SYRUP ORAL EVERY 4 HOURS PRN
Status: DISCONTINUED | OUTPATIENT
Start: 2023-12-21 | End: 2023-12-27 | Stop reason: HOSPADM

## 2023-12-21 RX ORDER — PREDNISONE 20 MG/1
40 TABLET ORAL DAILY
Status: DISCONTINUED | OUTPATIENT
Start: 2023-12-24 | End: 2023-12-25

## 2023-12-21 RX ORDER — ONDANSETRON 2 MG/ML
4 INJECTION INTRAMUSCULAR; INTRAVENOUS EVERY 6 HOURS PRN
Status: DISCONTINUED | OUTPATIENT
Start: 2023-12-21 | End: 2023-12-27 | Stop reason: HOSPADM

## 2023-12-21 RX ORDER — GLUCAGON 1 MG/ML
1 KIT INJECTION PRN
Status: DISCONTINUED | OUTPATIENT
Start: 2023-12-21 | End: 2023-12-27 | Stop reason: HOSPADM

## 2023-12-21 RX ORDER — SODIUM CHLORIDE 9 MG/ML
INJECTION, SOLUTION INTRAVENOUS PRN
Status: DISCONTINUED | OUTPATIENT
Start: 2023-12-21 | End: 2023-12-27 | Stop reason: HOSPADM

## 2023-12-21 RX ORDER — DEXTROSE MONOHYDRATE 100 MG/ML
INJECTION, SOLUTION INTRAVENOUS CONTINUOUS PRN
Status: DISCONTINUED | OUTPATIENT
Start: 2023-12-21 | End: 2023-12-27 | Stop reason: HOSPADM

## 2023-12-21 RX ORDER — NAPROXEN 250 MG/1
250 TABLET ORAL
Status: DISPENSED | OUTPATIENT
Start: 2023-12-21 | End: 2023-12-22

## 2023-12-21 RX ORDER — ACETAMINOPHEN 650 MG/1
650 SUPPOSITORY RECTAL EVERY 6 HOURS PRN
Status: DISCONTINUED | OUTPATIENT
Start: 2023-12-21 | End: 2023-12-27 | Stop reason: HOSPADM

## 2023-12-21 RX ORDER — PREGABALIN 50 MG/1
100 CAPSULE ORAL 3 TIMES DAILY
Status: DISCONTINUED | OUTPATIENT
Start: 2023-12-21 | End: 2023-12-21

## 2023-12-21 RX ORDER — IPRATROPIUM BROMIDE AND ALBUTEROL SULFATE 2.5; .5 MG/3ML; MG/3ML
1 SOLUTION RESPIRATORY (INHALATION)
Status: DISCONTINUED | OUTPATIENT
Start: 2023-12-21 | End: 2023-12-25

## 2023-12-21 RX ORDER — INSULIN LISPRO 100 [IU]/ML
0-4 INJECTION, SOLUTION INTRAVENOUS; SUBCUTANEOUS NIGHTLY
Status: DISCONTINUED | OUTPATIENT
Start: 2023-12-21 | End: 2023-12-27 | Stop reason: HOSPADM

## 2023-12-21 RX ORDER — BUDESONIDE AND FORMOTEROL FUMARATE DIHYDRATE 160; 4.5 UG/1; UG/1
2 AEROSOL RESPIRATORY (INHALATION)
Status: DISCONTINUED | OUTPATIENT
Start: 2023-12-21 | End: 2023-12-27 | Stop reason: HOSPADM

## 2023-12-21 RX ORDER — SODIUM CHLORIDE 0.9 % (FLUSH) 0.9 %
5-40 SYRINGE (ML) INJECTION EVERY 12 HOURS SCHEDULED
Status: DISCONTINUED | OUTPATIENT
Start: 2023-12-21 | End: 2023-12-27 | Stop reason: HOSPADM

## 2023-12-21 RX ORDER — ASPIRIN 81 MG/1
81 TABLET, CHEWABLE ORAL DAILY
Status: DISCONTINUED | OUTPATIENT
Start: 2023-12-22 | End: 2023-12-27 | Stop reason: HOSPADM

## 2023-12-21 RX ORDER — ONDANSETRON 4 MG/1
4 TABLET, ORALLY DISINTEGRATING ORAL EVERY 8 HOURS PRN
Status: DISCONTINUED | OUTPATIENT
Start: 2023-12-21 | End: 2023-12-27 | Stop reason: HOSPADM

## 2023-12-21 RX ORDER — QUETIAPINE FUMARATE 25 MG/1
100 TABLET, FILM COATED ORAL NIGHTLY
Status: DISCONTINUED | OUTPATIENT
Start: 2023-12-21 | End: 2023-12-27 | Stop reason: HOSPADM

## 2023-12-21 RX ORDER — DEXTROAMPHETAMINE SACCHARATE, AMPHETAMINE ASPARTATE MONOHYDRATE, DEXTROAMPHETAMINE SULFATE AND AMPHETAMINE SULFATE 7.5; 7.5; 7.5; 7.5 MG/1; MG/1; MG/1; MG/1
30 CAPSULE, EXTENDED RELEASE ORAL DAILY
Status: DISCONTINUED | OUTPATIENT
Start: 2023-12-22 | End: 2023-12-27 | Stop reason: HOSPADM

## 2023-12-21 RX ORDER — TRAZODONE HYDROCHLORIDE 50 MG/1
300 TABLET ORAL NIGHTLY
Status: DISCONTINUED | OUTPATIENT
Start: 2023-12-21 | End: 2023-12-27 | Stop reason: HOSPADM

## 2023-12-21 RX ORDER — SODIUM CHLORIDE 0.9 % (FLUSH) 0.9 %
5-40 SYRINGE (ML) INJECTION PRN
Status: DISCONTINUED | OUTPATIENT
Start: 2023-12-21 | End: 2023-12-27 | Stop reason: HOSPADM

## 2023-12-21 RX ORDER — PREGABALIN 75 MG/1
150 CAPSULE ORAL 3 TIMES DAILY
Status: DISCONTINUED | OUTPATIENT
Start: 2023-12-21 | End: 2023-12-27 | Stop reason: HOSPADM

## 2023-12-21 RX ORDER — GUAIFENESIN 600 MG/1
600 TABLET, EXTENDED RELEASE ORAL 2 TIMES DAILY
Status: DISCONTINUED | OUTPATIENT
Start: 2023-12-21 | End: 2023-12-27 | Stop reason: HOSPADM

## 2023-12-21 RX ORDER — SODIUM CHLORIDE 450 MG/100ML
INJECTION, SOLUTION INTRAVENOUS CONTINUOUS
Status: DISCONTINUED | OUTPATIENT
Start: 2023-12-21 | End: 2023-12-22

## 2023-12-21 RX ORDER — INSULIN LISPRO 100 [IU]/ML
0-4 INJECTION, SOLUTION INTRAVENOUS; SUBCUTANEOUS
Status: DISCONTINUED | OUTPATIENT
Start: 2023-12-21 | End: 2023-12-27 | Stop reason: HOSPADM

## 2023-12-21 RX ADMIN — SODIUM CHLORIDE, PRESERVATIVE FREE 10 ML: 5 INJECTION INTRAVENOUS at 21:41

## 2023-12-21 RX ADMIN — BUDESONIDE AND FORMOTEROL FUMARATE DIHYDRATE 2 PUFF: 160; 4.5 AEROSOL RESPIRATORY (INHALATION) at 19:56

## 2023-12-21 RX ADMIN — TRAZODONE HYDROCHLORIDE 300 MG: 50 TABLET ORAL at 21:40

## 2023-12-21 RX ADMIN — ENOXAPARIN SODIUM 40 MG: 100 INJECTION SUBCUTANEOUS at 21:40

## 2023-12-21 RX ADMIN — AZITHROMYCIN DIHYDRATE 500 MG: 250 TABLET ORAL at 21:40

## 2023-12-21 RX ADMIN — QUETIAPINE FUMARATE 100 MG: 25 TABLET ORAL at 21:40

## 2023-12-21 RX ADMIN — SODIUM CHLORIDE: 4.5 INJECTION, SOLUTION INTRAVENOUS at 22:30

## 2023-12-21 RX ADMIN — GUAIFENESIN 600 MG: 600 TABLET, EXTENDED RELEASE ORAL at 21:40

## 2023-12-21 RX ADMIN — METHYLPREDNISOLONE SODIUM SUCCINATE 125 MG: 125 INJECTION INTRAMUSCULAR; INTRAVENOUS at 16:34

## 2023-12-21 RX ADMIN — IPRATROPIUM BROMIDE AND ALBUTEROL SULFATE 1 DOSE: 2.5; .5 SOLUTION RESPIRATORY (INHALATION) at 16:31

## 2023-12-21 RX ADMIN — PREGABALIN 150 MG: 75 CAPSULE ORAL at 21:40

## 2023-12-21 RX ADMIN — IPRATROPIUM BROMIDE AND ALBUTEROL SULFATE 1 DOSE: 2.5; .5 SOLUTION RESPIRATORY (INHALATION) at 16:33

## 2023-12-21 RX ADMIN — SODIUM CHLORIDE 1000 ML: 9 INJECTION, SOLUTION INTRAVENOUS at 16:25

## 2023-12-21 RX ADMIN — IPRATROPIUM BROMIDE AND ALBUTEROL SULFATE 1 DOSE: 2.5; .5 SOLUTION RESPIRATORY (INHALATION) at 19:55

## 2023-12-21 RX ADMIN — IPRATROPIUM BROMIDE AND ALBUTEROL SULFATE 1 DOSE: 2.5; .5 SOLUTION RESPIRATORY (INHALATION) at 16:32

## 2023-12-21 RX ADMIN — BUPRENORPHINE AND NALOXONE 1 FILM: 8; 2 FILM BUCCAL; SUBLINGUAL at 21:40

## 2023-12-21 RX ADMIN — IPRATROPIUM BROMIDE AND ALBUTEROL SULFATE 1 DOSE: 2.5; .5 SOLUTION RESPIRATORY (INHALATION) at 23:44

## 2023-12-21 NOTE — ED TRIAGE NOTES
Pt arrives via EMS from home with c/o SOB for several days, Pt SPO2 70% upon EMS arrival, Pt placed on 15L NRB, rebound to 95%, Pt does wear O2 at home, Pt is A&O x4, significant work of breathing noted

## 2023-12-21 NOTE — CARE COORDINATION
[facility] criteria for COPD exacerbation reviewed at this time, criteria supports Inpatient Admission.  [place],RN/CM

## 2023-12-21 NOTE — ED NOTES
ED TO INPATIENT SBAR HANDOFF    Patient Name: Jt Schmitt   :  1960  66 y.o. Preferred Name  Briana Quan  Family/Caregiver Present yes   Restraints no   C-SSRS: Risk of Suicide: No Risk  Sitter no   Sepsis Risk Score Sepsis Risk Score: 1.77      Situation  Chief Complaint   Patient presents with    Shortness of Breath     Brief Description of Patient's Condition: Pt presenting to the ED for shortness of breath, COPD exacerbation was at home without home O2 on.    Mental Status: oriented, alert, coherent, logical, thought processes intact, and able to concentrate and follow conversation  Arrived from: home    Imaging:   XR CHEST PORTABLE    (Results Pending)     Abnormal labs:   Abnormal Labs Reviewed   CBC WITH AUTO DIFFERENTIAL - Abnormal; Notable for the following components:       Result Value    MCHC 29.4 (*)     Monocytes % 9.5 (*)     Eosinophils % 5.7 (*)     All other components within normal limits   COMPREHENSIVE METABOLIC PANEL - Abnormal; Notable for the following components:    CO2 38 (*)     Glucose 160 (*)     Creatinine 0.5 (*)     All other components within normal limits   TROPONIN - Abnormal; Notable for the following components:    Troponin, High Sensitivity 20 (*)     All other components within normal limits   BLOOD GAS, VENOUS - Abnormal; Notable for the following components:    pH, Navdeep 7.30 (*)     pCO2, Navdeep 90 (*)     pO2, Navdeep 170 (*)     Base Exc, Mixed 13.6 (*)     HCO3, Venous 44.3 (*)     O2 Sat, Navdeep 94.0 (*)     All other components within normal limits   MAGNESIUM - Abnormal; Notable for the following components:    Magnesium 1.5 (*)     All other components within normal limits       Background  History:   Past Medical History:   Diagnosis Date    ADHD     COPD (chronic obstructive pulmonary disease) ([facility])     COVID-19     Drug addiction in remission ([facility])     recovering addict    Hep C w/o coma, chronic (HCC)     Pacemaker     Sleep apnea     TIA (transient ischemic

## 2023-12-21 NOTE — ED PROVIDER NOTES
Social History     Socioeconomic History    Marital status:      Spouse name: Not on file    Number of children: Not on file    Years of education: Not on file    Highest education level: Not on file   Occupational History    Not on file   Tobacco Use    Smoking status: Former     Current packs/day: 0.00     Average packs/day: 1 pack/day for 40.0 years (40.0 ttl pk-yrs)     Types: Cigarettes     Start date: 1976     Quit date: 2016     Years since quittin.3    Smokeless tobacco: Never   Substance and Sexual Activity    Alcohol use: Not Currently     Comment: occasional caffeine     Drug use: Not Currently     Comment: heroine    Sexual activity: Not on file   Other Topics Concern    Not on file   Social History Narrative    Not on file     Social Determinants of Health     Financial Resource Strain: Not on file   Food Insecurity: Not on file   Transportation Needs: Not on file   Physical Activity: Not on file   Stress: Not on file   Social Connections: Not on file   Intimate Partner Violence: Not on file   Housing Stability: Not on file     No current facility-administered medications for this encounter.      Current Outpatient Medications   Medication Sig Dispense Refill    predniSONE (DELTASONE) 10 MG tablet Take 1 tablet by mouth daily 40mg daily for 2 days, 20 mg daily for 2 days, 10 mg daily for 2 days, 5 mg daily for 2 days 15 tablet 0    doxycycline hyclate (VIBRAMYCIN) 100 MG capsule Take 1 capsule by mouth daily 10 capsule 0    predniSONE (DELTASONE) 10 MG tablet Take 1 tablet by mouth daily 40mg daily for 2 days, 20 mg daily for 2 days, 10 mg daily for 2 days, 5 mg daily for 2 days 15 tablet 0    azithromycin (ZITHROMAX) 250 MG tablet Take 1 tablet by mouth daily Take 500 mg once, then 250 mg daily for 4 days then stop 6 tablet 0    DULERA 100-5 MCG/ACT inhaler Inhale 2 puffs into the lungs 2 times daily 1 each 5    albuterol sulfate HFA (VENTOLIN HFA) 108 (90 Base) MCG/ACT inhaler

## 2023-12-22 LAB
ANION GAP SERPL CALCULATED.3IONS-SCNC: 4 MMOL/L (ref 7–16)
BASOPHILS ABSOLUTE: 0 K/CU MM
BASOPHILS RELATIVE PERCENT: 0.1 % (ref 0–1)
BUN SERPL-MCNC: 8 MG/DL (ref 6–23)
CALCIUM SERPL-MCNC: 8.1 MG/DL (ref 8.3–10.6)
CHLORIDE BLD-SCNC: 99 MMOL/L (ref 99–110)
CO2: 38 MMOL/L (ref 21–32)
CREAT SERPL-MCNC: 0.5 MG/DL (ref 0.6–1.1)
CRP SERPL HS-MCNC: 3 MG/L
DIFFERENTIAL TYPE: ABNORMAL
EKG ATRIAL RATE: 108 BPM
EKG DIAGNOSIS: NORMAL
EKG P AXIS: 78 DEGREES
EKG P-R INTERVAL: 170 MS
EKG Q-T INTERVAL: 346 MS
EKG QRS DURATION: 82 MS
EKG QTC CALCULATION (BAZETT): 463 MS
EKG R AXIS: 60 DEGREES
EKG T AXIS: 73 DEGREES
EKG VENTRICULAR RATE: 108 BPM
EOSINOPHILS ABSOLUTE: 0 K/CU MM
EOSINOPHILS RELATIVE PERCENT: 0 % (ref 0–3)
GFR SERPL CREATININE-BSD FRML MDRD: >60 ML/MIN/1.73M2
GLUCOSE BLD-MCNC: 140 MG/DL (ref 70–99)
GLUCOSE BLD-MCNC: 217 MG/DL (ref 70–99)
GLUCOSE BLD-MCNC: 241 MG/DL (ref 70–99)
GLUCOSE BLD-MCNC: 253 MG/DL (ref 70–99)
GLUCOSE SERPL-MCNC: 260 MG/DL (ref 70–99)
HCT VFR BLD CALC: 41.6 % (ref 37–47)
HEMOGLOBIN: 12.2 GM/DL (ref 12.5–16)
IMMATURE NEUTROPHIL %: 0.4 % (ref 0–0.43)
LYMPHOCYTES ABSOLUTE: 0.7 K/CU MM
LYMPHOCYTES RELATIVE PERCENT: 9.4 % (ref 24–44)
MCH RBC QN AUTO: 28 PG (ref 27–31)
MCHC RBC AUTO-ENTMCNC: 29.3 % (ref 32–36)
MCV RBC AUTO: 95.4 FL (ref 78–100)
MONOCYTES ABSOLUTE: 0.1 K/CU MM
MONOCYTES RELATIVE PERCENT: 0.7 % (ref 0–4)
NUCLEATED RBC %: 0 %
PDW BLD-RTO: 12.3 % (ref 11.7–14.9)
PLATELET # BLD: 164 K/CU MM (ref 140–440)
PMV BLD AUTO: 10.6 FL (ref 7.5–11.1)
POTASSIUM SERPL-SCNC: 4.3 MMOL/L (ref 3.5–5.1)
PROCALCITONIN SERPL-MCNC: 0.04 NG/ML
RBC # BLD: 4.36 M/CU MM (ref 4.2–5.4)
SEGMENTED NEUTROPHILS ABSOLUTE COUNT: 6.4 K/CU MM
SEGMENTED NEUTROPHILS RELATIVE PERCENT: 89.4 % (ref 36–66)
SODIUM BLD-SCNC: 141 MMOL/L (ref 135–145)
TOTAL IMMATURE NEUTOROPHIL: 0.03 K/CU MM
TOTAL NUCLEATED RBC: 0 K/CU MM
WBC # BLD: 7.1 K/CU MM (ref 4–10.5)

## 2023-12-22 PROCEDURE — 6370000000 HC RX 637 (ALT 250 FOR IP): Performed by: STUDENT IN AN ORGANIZED HEALTH CARE EDUCATION/TRAINING PROGRAM

## 2023-12-22 PROCEDURE — 2580000003 HC RX 258: Performed by: STUDENT IN AN ORGANIZED HEALTH CARE EDUCATION/TRAINING PROGRAM

## 2023-12-22 PROCEDURE — 36415 COLL VENOUS BLD VENIPUNCTURE: CPT

## 2023-12-22 PROCEDURE — 6360000002 HC RX W HCPCS: Performed by: STUDENT IN AN ORGANIZED HEALTH CARE EDUCATION/TRAINING PROGRAM

## 2023-12-22 PROCEDURE — 85025 COMPLETE CBC W/AUTO DIFF WBC: CPT

## 2023-12-22 PROCEDURE — 86140 C-REACTIVE PROTEIN: CPT

## 2023-12-22 PROCEDURE — 2700000000 HC OXYGEN THERAPY PER DAY

## 2023-12-22 PROCEDURE — 94640 AIRWAY INHALATION TREATMENT: CPT

## 2023-12-22 PROCEDURE — 82803 BLOOD GASES ANY COMBINATION: CPT

## 2023-12-22 PROCEDURE — 93010 ELECTROCARDIOGRAM REPORT: CPT | Performed by: INTERNAL MEDICINE

## 2023-12-22 PROCEDURE — 82962 GLUCOSE BLOOD TEST: CPT

## 2023-12-22 PROCEDURE — 36600 WITHDRAWAL OF ARTERIAL BLOOD: CPT

## 2023-12-22 PROCEDURE — 2060000000 HC ICU INTERMEDIATE R&B

## 2023-12-22 PROCEDURE — 80048 BASIC METABOLIC PNL TOTAL CA: CPT

## 2023-12-22 PROCEDURE — 94761 N-INVAS EAR/PLS OXIMETRY MLT: CPT

## 2023-12-22 PROCEDURE — 84145 PROCALCITONIN (PCT): CPT

## 2023-12-22 RX ORDER — MAGNESIUM SULFATE IN WATER 40 MG/ML
2000 INJECTION, SOLUTION INTRAVENOUS ONCE
Status: COMPLETED | OUTPATIENT
Start: 2023-12-22 | End: 2023-12-22

## 2023-12-22 RX ADMIN — METHYLPREDNISOLONE SODIUM SUCCINATE 40 MG: 40 INJECTION, POWDER, FOR SOLUTION INTRAMUSCULAR; INTRAVENOUS at 06:11

## 2023-12-22 RX ADMIN — IPRATROPIUM BROMIDE AND ALBUTEROL SULFATE 1 DOSE: 2.5; .5 SOLUTION RESPIRATORY (INHALATION) at 08:35

## 2023-12-22 RX ADMIN — BUDESONIDE AND FORMOTEROL FUMARATE DIHYDRATE 2 PUFF: 160; 4.5 AEROSOL RESPIRATORY (INHALATION) at 08:51

## 2023-12-22 RX ADMIN — IPRATROPIUM BROMIDE AND ALBUTEROL SULFATE 1 DOSE: 2.5; .5 SOLUTION RESPIRATORY (INHALATION) at 12:49

## 2023-12-22 RX ADMIN — METHYLPREDNISOLONE SODIUM SUCCINATE 40 MG: 40 INJECTION, POWDER, FOR SOLUTION INTRAMUSCULAR; INTRAVENOUS at 20:33

## 2023-12-22 RX ADMIN — ENOXAPARIN SODIUM 40 MG: 100 INJECTION SUBCUTANEOUS at 16:54

## 2023-12-22 RX ADMIN — AZITHROMYCIN DIHYDRATE 500 MG: 250 TABLET ORAL at 09:00

## 2023-12-22 RX ADMIN — IPRATROPIUM BROMIDE AND ALBUTEROL SULFATE 1 DOSE: 2.5; .5 SOLUTION RESPIRATORY (INHALATION) at 03:25

## 2023-12-22 RX ADMIN — SODIUM CHLORIDE, PRESERVATIVE FREE 10 ML: 5 INJECTION INTRAVENOUS at 20:29

## 2023-12-22 RX ADMIN — PREGABALIN 150 MG: 75 CAPSULE ORAL at 08:05

## 2023-12-22 RX ADMIN — ASPIRIN 81 MG: 81 TABLET, CHEWABLE ORAL at 08:05

## 2023-12-22 RX ADMIN — GUAIFENESIN 600 MG: 600 TABLET, EXTENDED RELEASE ORAL at 08:05

## 2023-12-22 RX ADMIN — MAGNESIUM SULFATE HEPTAHYDRATE 2000 MG: 40 INJECTION, SOLUTION INTRAVENOUS at 08:08

## 2023-12-22 RX ADMIN — INSULIN LISPRO 1 UNITS: 100 INJECTION, SOLUTION INTRAVENOUS; SUBCUTANEOUS at 16:54

## 2023-12-22 RX ADMIN — METHYLPREDNISOLONE SODIUM SUCCINATE 40 MG: 40 INJECTION, POWDER, FOR SOLUTION INTRAMUSCULAR; INTRAVENOUS at 14:22

## 2023-12-22 RX ADMIN — BUDESONIDE AND FORMOTEROL FUMARATE DIHYDRATE 2 PUFF: 160; 4.5 AEROSOL RESPIRATORY (INHALATION) at 20:24

## 2023-12-22 RX ADMIN — INSULIN LISPRO 2 UNITS: 100 INJECTION, SOLUTION INTRAVENOUS; SUBCUTANEOUS at 12:05

## 2023-12-22 RX ADMIN — IPRATROPIUM BROMIDE AND ALBUTEROL SULFATE 1 DOSE: 2.5; .5 SOLUTION RESPIRATORY (INHALATION) at 16:44

## 2023-12-22 RX ADMIN — QUETIAPINE FUMARATE 100 MG: 25 TABLET ORAL at 20:28

## 2023-12-22 RX ADMIN — GUAIFENESIN 600 MG: 600 TABLET, EXTENDED RELEASE ORAL at 20:28

## 2023-12-22 RX ADMIN — BUPRENORPHINE AND NALOXONE 1 FILM: 8; 2 FILM BUCCAL; SUBLINGUAL at 20:28

## 2023-12-22 RX ADMIN — TRAZODONE HYDROCHLORIDE 100 MG: 50 TABLET ORAL at 20:28

## 2023-12-22 RX ADMIN — IPRATROPIUM BROMIDE AND ALBUTEROL SULFATE 1 DOSE: 2.5; .5 SOLUTION RESPIRATORY (INHALATION) at 20:24

## 2023-12-22 NOTE — CARE COORDINATION
12/22/23 0924   Service Assessment   Patient Orientation Alert and Oriented   Cognition Alert   History Provided By Medical Record; Patient   Primary [facility]   Patient's Healthcare Decision Maker is: Legal Next of [facility]   PCP Verified by CM Yes   Last Visit to PCP Within last 3 months   Prior Functional Level Independent in ADLs/IADLs   Can patient return to prior living arrangement Yes   [facility]     Patient is from home- ([address] [facility] subsidized [address]/older apartment with elevator) She has a PCP and insurance that assist with Rx when needed. CM jayce remain available should needs arise.  Segundo Mcmahon, RN

## 2023-12-23 LAB
ALBUMIN SERPL-MCNC: 3.3 GM/DL (ref 3.4–5)
ALP BLD-CCNC: 59 IU/L (ref 40–129)
ALT SERPL-CCNC: 15 U/L (ref 10–40)
ANION GAP SERPL CALCULATED.3IONS-SCNC: 4 MMOL/L (ref 7–16)
AST SERPL-CCNC: 14 IU/L (ref 15–37)
BASE EXCESS MIXED: 14.9 (ref 0–3)
BASOPHILS ABSOLUTE: 0 K/CU MM
BASOPHILS RELATIVE PERCENT: 0.1 % (ref 0–1)
BILIRUB SERPL-MCNC: 0.1 MG/DL (ref 0–1)
BUN SERPL-MCNC: 15 MG/DL (ref 6–23)
CALCIUM SERPL-MCNC: 8.3 MG/DL (ref 8.3–10.6)
CHLORIDE BLD-SCNC: 100 MMOL/L (ref 99–110)
CO2: 37 MMOL/L (ref 21–32)
COMMENT: ABNORMAL
CREAT SERPL-MCNC: 0.5 MG/DL (ref 0.6–1.1)
DIFFERENTIAL TYPE: ABNORMAL
EOSINOPHILS ABSOLUTE: 0 K/CU MM
EOSINOPHILS RELATIVE PERCENT: 0 % (ref 0–3)
GFR SERPL CREATININE-BSD FRML MDRD: >60 ML/MIN/1.73M2
GLUCOSE BLD-MCNC: 148 MG/DL (ref 70–99)
GLUCOSE BLD-MCNC: 180 MG/DL (ref 70–99)
GLUCOSE BLD-MCNC: 255 MG/DL (ref 70–99)
GLUCOSE BLD-MCNC: 266 MG/DL (ref 70–99)
GLUCOSE BLD-MCNC: 288 MG/DL (ref 70–99)
GLUCOSE SERPL-MCNC: 267 MG/DL (ref 70–99)
HCO3 VENOUS: 43.8 MMOL/L (ref 22–29)
HCT VFR BLD CALC: 39.1 % (ref 37–47)
HEMOGLOBIN: 11.4 GM/DL (ref 12.5–16)
IMMATURE NEUTROPHIL %: 0.6 % (ref 0–0.43)
LYMPHOCYTES ABSOLUTE: 0.9 K/CU MM
LYMPHOCYTES RELATIVE PERCENT: 7 % (ref 24–44)
MCH RBC QN AUTO: 28 PG (ref 27–31)
MCHC RBC AUTO-ENTMCNC: 29.2 % (ref 32–36)
MCV RBC AUTO: 96.1 FL (ref 78–100)
MONOCYTES ABSOLUTE: 0.5 K/CU MM
MONOCYTES RELATIVE PERCENT: 3.8 % (ref 0–4)
NUCLEATED RBC %: 0 %
O2 SAT, VEN: 94.4 % (ref 50–70)
PCO2, VEN: 74 MMHG (ref 41–51)
PDW BLD-RTO: 12.4 % (ref 11.7–14.9)
PH VENOUS: 7.38 (ref 7.32–7.43)
PLATELET # BLD: 174 K/CU MM (ref 140–440)
PMV BLD AUTO: 11 FL (ref 7.5–11.1)
PO2, VEN: 185 MMHG (ref 28–48)
POTASSIUM SERPL-SCNC: 4.7 MMOL/L (ref 3.5–5.1)
RBC # BLD: 4.07 M/CU MM (ref 4.2–5.4)
SEGMENTED NEUTROPHILS ABSOLUTE COUNT: 11.8 K/CU MM
SEGMENTED NEUTROPHILS RELATIVE PERCENT: 88.5 % (ref 36–66)
SODIUM BLD-SCNC: 141 MMOL/L (ref 135–145)
TOTAL IMMATURE NEUTOROPHIL: 0.08 K/CU MM
TOTAL NUCLEATED RBC: 0 K/CU MM
TOTAL PROTEIN: 5.6 GM/DL (ref 6.4–8.2)
WBC # BLD: 13.3 K/CU MM (ref 4–10.5)

## 2023-12-23 PROCEDURE — 82962 GLUCOSE BLOOD TEST: CPT

## 2023-12-23 PROCEDURE — 82805 BLOOD GASES W/O2 SATURATION: CPT

## 2023-12-23 PROCEDURE — 94640 AIRWAY INHALATION TREATMENT: CPT

## 2023-12-23 PROCEDURE — 6360000002 HC RX W HCPCS: Performed by: STUDENT IN AN ORGANIZED HEALTH CARE EDUCATION/TRAINING PROGRAM

## 2023-12-23 PROCEDURE — 85025 COMPLETE CBC W/AUTO DIFF WBC: CPT

## 2023-12-23 PROCEDURE — 80053 COMPREHEN METABOLIC PANEL: CPT

## 2023-12-23 PROCEDURE — 2700000000 HC OXYGEN THERAPY PER DAY

## 2023-12-23 PROCEDURE — 6370000000 HC RX 637 (ALT 250 FOR IP): Performed by: STUDENT IN AN ORGANIZED HEALTH CARE EDUCATION/TRAINING PROGRAM

## 2023-12-23 PROCEDURE — 2580000003 HC RX 258: Performed by: STUDENT IN AN ORGANIZED HEALTH CARE EDUCATION/TRAINING PROGRAM

## 2023-12-23 PROCEDURE — 2060000000 HC ICU INTERMEDIATE R&B

## 2023-12-23 PROCEDURE — 94660 CPAP INITIATION&MGMT: CPT

## 2023-12-23 PROCEDURE — 36415 COLL VENOUS BLD VENIPUNCTURE: CPT

## 2023-12-23 RX ADMIN — METHYLPREDNISOLONE SODIUM SUCCINATE 40 MG: 40 INJECTION, POWDER, FOR SOLUTION INTRAMUSCULAR; INTRAVENOUS at 08:03

## 2023-12-23 RX ADMIN — IPRATROPIUM BROMIDE AND ALBUTEROL SULFATE 1 DOSE: 2.5; .5 SOLUTION RESPIRATORY (INHALATION) at 09:10

## 2023-12-23 RX ADMIN — ENOXAPARIN SODIUM 40 MG: 100 INJECTION SUBCUTANEOUS at 17:25

## 2023-12-23 RX ADMIN — IPRATROPIUM BROMIDE AND ALBUTEROL SULFATE 1 DOSE: 2.5; .5 SOLUTION RESPIRATORY (INHALATION) at 13:09

## 2023-12-23 RX ADMIN — ASPIRIN 81 MG: 81 TABLET, CHEWABLE ORAL at 08:03

## 2023-12-23 RX ADMIN — METHYLPREDNISOLONE SODIUM SUCCINATE 40 MG: 40 INJECTION, POWDER, FOR SOLUTION INTRAMUSCULAR; INTRAVENOUS at 21:09

## 2023-12-23 RX ADMIN — IPRATROPIUM BROMIDE AND ALBUTEROL SULFATE 1 DOSE: 2.5; .5 SOLUTION RESPIRATORY (INHALATION) at 04:12

## 2023-12-23 RX ADMIN — SODIUM CHLORIDE, PRESERVATIVE FREE 10 ML: 5 INJECTION INTRAVENOUS at 21:16

## 2023-12-23 RX ADMIN — BUPRENORPHINE AND NALOXONE 1 FILM: 8; 2 FILM BUCCAL; SUBLINGUAL at 14:10

## 2023-12-23 RX ADMIN — BUDESONIDE AND FORMOTEROL FUMARATE DIHYDRATE 2 PUFF: 160; 4.5 AEROSOL RESPIRATORY (INHALATION) at 09:11

## 2023-12-23 RX ADMIN — IPRATROPIUM BROMIDE AND ALBUTEROL SULFATE 1 DOSE: 2.5; .5 SOLUTION RESPIRATORY (INHALATION) at 00:09

## 2023-12-23 RX ADMIN — GUAIFENESIN 600 MG: 600 TABLET, EXTENDED RELEASE ORAL at 21:10

## 2023-12-23 RX ADMIN — METHYLPREDNISOLONE SODIUM SUCCINATE 40 MG: 40 INJECTION, POWDER, FOR SOLUTION INTRAMUSCULAR; INTRAVENOUS at 14:10

## 2023-12-23 RX ADMIN — BUPRENORPHINE AND NALOXONE 1 FILM: 8; 2 FILM BUCCAL; SUBLINGUAL at 21:10

## 2023-12-23 RX ADMIN — AZITHROMYCIN DIHYDRATE 500 MG: 250 TABLET ORAL at 08:03

## 2023-12-23 RX ADMIN — TRAZODONE HYDROCHLORIDE 100 MG: 50 TABLET ORAL at 21:10

## 2023-12-23 RX ADMIN — SODIUM CHLORIDE, PRESERVATIVE FREE 10 ML: 5 INJECTION INTRAVENOUS at 08:03

## 2023-12-23 RX ADMIN — BUPRENORPHINE AND NALOXONE 1 FILM: 8; 2 FILM BUCCAL; SUBLINGUAL at 08:03

## 2023-12-23 RX ADMIN — IPRATROPIUM BROMIDE AND ALBUTEROL SULFATE 1 DOSE: 2.5; .5 SOLUTION RESPIRATORY (INHALATION) at 21:53

## 2023-12-23 RX ADMIN — INSULIN LISPRO 1 UNITS: 100 INJECTION, SOLUTION INTRAVENOUS; SUBCUTANEOUS at 11:50

## 2023-12-23 RX ADMIN — QUETIAPINE FUMARATE 100 MG: 25 TABLET ORAL at 21:10

## 2023-12-23 RX ADMIN — GUAIFENESIN 600 MG: 600 TABLET, EXTENDED RELEASE ORAL at 08:03

## 2023-12-23 RX ADMIN — INSULIN LISPRO 2 UNITS: 100 INJECTION, SOLUTION INTRAVENOUS; SUBCUTANEOUS at 17:24

## 2023-12-24 LAB
ALBUMIN SERPL-MCNC: 3.5 GM/DL (ref 3.4–5)
ALP BLD-CCNC: 60 IU/L (ref 40–128)
ALT SERPL-CCNC: 15 U/L (ref 10–40)
ANION GAP SERPL CALCULATED.3IONS-SCNC: 3 MMOL/L (ref 7–16)
AST SERPL-CCNC: 10 IU/L (ref 15–37)
BASE EXCESS MIXED: 15.5 (ref 0–3)
BASOPHILS ABSOLUTE: 0 K/CU MM
BASOPHILS RELATIVE PERCENT: 0.1 % (ref 0–1)
BILIRUB SERPL-MCNC: 0.2 MG/DL (ref 0–1)
BUN SERPL-MCNC: 17 MG/DL (ref 6–23)
CALCIUM SERPL-MCNC: 8.3 MG/DL (ref 8.3–10.6)
CHLORIDE BLD-SCNC: 99 MMOL/L (ref 99–110)
CO2: 41 MMOL/L (ref 21–32)
COMMENT: ABNORMAL
CREAT SERPL-MCNC: 0.5 MG/DL (ref 0.6–1.1)
CULTURE: ABNORMAL
CULTURE: ABNORMAL
DIFFERENTIAL TYPE: ABNORMAL
EOSINOPHILS ABSOLUTE: 0 K/CU MM
EOSINOPHILS RELATIVE PERCENT: 0 % (ref 0–3)
GFR SERPL CREATININE-BSD FRML MDRD: >60 ML/MIN/1.73M2
GLUCOSE BLD-MCNC: 176 MG/DL (ref 70–99)
GLUCOSE BLD-MCNC: 247 MG/DL (ref 70–99)
GLUCOSE BLD-MCNC: 269 MG/DL (ref 70–99)
GLUCOSE BLD-MCNC: 282 MG/DL (ref 70–99)
GLUCOSE SERPL-MCNC: 212 MG/DL (ref 70–99)
HCO3 VENOUS: 45.3 MMOL/L (ref 22–29)
HCT VFR BLD CALC: 40.1 % (ref 37–47)
HEMOGLOBIN: 11.7 GM/DL (ref 12.5–16)
IMMATURE NEUTROPHIL %: 0.5 % (ref 0–0.43)
LYMPHOCYTES ABSOLUTE: 1 K/CU MM
LYMPHOCYTES RELATIVE PERCENT: 8 % (ref 24–44)
Lab: ABNORMAL
MCH RBC QN AUTO: 27.9 PG (ref 27–31)
MCHC RBC AUTO-ENTMCNC: 29.2 % (ref 32–36)
MCV RBC AUTO: 95.5 FL (ref 78–100)
MONOCYTES ABSOLUTE: 0.4 K/CU MM
MONOCYTES RELATIVE PERCENT: 3.6 % (ref 0–4)
NUCLEATED RBC %: 0 %
O2 SAT, VEN: 94.3 % (ref 50–70)
PCO2, VEN: 82 MMHG (ref 41–51)
PDW BLD-RTO: 12.4 % (ref 11.7–14.9)
PH VENOUS: 7.35 (ref 7.32–7.43)
PLATELET # BLD: 180 K/CU MM (ref 140–440)
PMV BLD AUTO: 10.8 FL (ref 7.5–11.1)
PO2, VEN: 110 MMHG (ref 28–48)
POTASSIUM SERPL-SCNC: 5 MMOL/L (ref 3.5–5.1)
RBC # BLD: 4.2 M/CU MM (ref 4.2–5.4)
SEGMENTED NEUTROPHILS ABSOLUTE COUNT: 10.5 K/CU MM
SEGMENTED NEUTROPHILS RELATIVE PERCENT: 87.8 % (ref 36–66)
SODIUM BLD-SCNC: 143 MMOL/L (ref 135–145)
SPECIMEN: ABNORMAL
TOTAL IMMATURE NEUTOROPHIL: 0.06 K/CU MM
TOTAL NUCLEATED RBC: 0 K/CU MM
TOTAL PROTEIN: 5.6 GM/DL (ref 6.4–8.2)
WBC # BLD: 11.9 K/CU MM (ref 4–10.5)

## 2023-12-24 PROCEDURE — 2700000000 HC OXYGEN THERAPY PER DAY

## 2023-12-24 PROCEDURE — 85025 COMPLETE CBC W/AUTO DIFF WBC: CPT

## 2023-12-24 PROCEDURE — 2060000000 HC ICU INTERMEDIATE R&B

## 2023-12-24 PROCEDURE — 36415 COLL VENOUS BLD VENIPUNCTURE: CPT

## 2023-12-24 PROCEDURE — 2580000003 HC RX 258: Performed by: STUDENT IN AN ORGANIZED HEALTH CARE EDUCATION/TRAINING PROGRAM

## 2023-12-24 PROCEDURE — 6370000000 HC RX 637 (ALT 250 FOR IP): Performed by: STUDENT IN AN ORGANIZED HEALTH CARE EDUCATION/TRAINING PROGRAM

## 2023-12-24 PROCEDURE — 94761 N-INVAS EAR/PLS OXIMETRY MLT: CPT

## 2023-12-24 PROCEDURE — 6360000002 HC RX W HCPCS: Performed by: STUDENT IN AN ORGANIZED HEALTH CARE EDUCATION/TRAINING PROGRAM

## 2023-12-24 PROCEDURE — 82962 GLUCOSE BLOOD TEST: CPT

## 2023-12-24 PROCEDURE — 94640 AIRWAY INHALATION TREATMENT: CPT

## 2023-12-24 PROCEDURE — 80053 COMPREHEN METABOLIC PANEL: CPT

## 2023-12-24 PROCEDURE — 87040 BLOOD CULTURE FOR BACTERIA: CPT

## 2023-12-24 PROCEDURE — 82805 BLOOD GASES W/O2 SATURATION: CPT

## 2023-12-24 RX ADMIN — IPRATROPIUM BROMIDE AND ALBUTEROL SULFATE 1 DOSE: 2.5; .5 SOLUTION RESPIRATORY (INHALATION) at 15:19

## 2023-12-24 RX ADMIN — IPRATROPIUM BROMIDE AND ALBUTEROL SULFATE 1 DOSE: 2.5; .5 SOLUTION RESPIRATORY (INHALATION) at 00:35

## 2023-12-24 RX ADMIN — ENOXAPARIN SODIUM 40 MG: 100 INJECTION SUBCUTANEOUS at 16:23

## 2023-12-24 RX ADMIN — IPRATROPIUM BROMIDE AND ALBUTEROL SULFATE 1 DOSE: 2.5; .5 SOLUTION RESPIRATORY (INHALATION) at 07:23

## 2023-12-24 RX ADMIN — GUAIFENESIN 600 MG: 600 TABLET, EXTENDED RELEASE ORAL at 22:12

## 2023-12-24 RX ADMIN — SODIUM CHLORIDE, PRESERVATIVE FREE 10 ML: 5 INJECTION INTRAVENOUS at 08:52

## 2023-12-24 RX ADMIN — BUDESONIDE AND FORMOTEROL FUMARATE DIHYDRATE 2 PUFF: 160; 4.5 AEROSOL RESPIRATORY (INHALATION) at 20:44

## 2023-12-24 RX ADMIN — SODIUM CHLORIDE, PRESERVATIVE FREE 10 ML: 5 INJECTION INTRAVENOUS at 22:12

## 2023-12-24 RX ADMIN — BUDESONIDE AND FORMOTEROL FUMARATE DIHYDRATE 2 PUFF: 160; 4.5 AEROSOL RESPIRATORY (INHALATION) at 07:23

## 2023-12-24 RX ADMIN — INSULIN LISPRO 2 UNITS: 100 INJECTION, SOLUTION INTRAVENOUS; SUBCUTANEOUS at 16:23

## 2023-12-24 RX ADMIN — ASPIRIN 81 MG: 81 TABLET, CHEWABLE ORAL at 08:51

## 2023-12-24 RX ADMIN — QUETIAPINE FUMARATE 100 MG: 25 TABLET ORAL at 22:12

## 2023-12-24 RX ADMIN — PREDNISONE 40 MG: 20 TABLET ORAL at 08:51

## 2023-12-24 RX ADMIN — BUPRENORPHINE AND NALOXONE 1 FILM: 8; 2 FILM BUCCAL; SUBLINGUAL at 15:53

## 2023-12-24 RX ADMIN — IPRATROPIUM BROMIDE AND ALBUTEROL SULFATE 1 DOSE: 2.5; .5 SOLUTION RESPIRATORY (INHALATION) at 20:44

## 2023-12-24 RX ADMIN — GUAIFENESIN 600 MG: 600 TABLET, EXTENDED RELEASE ORAL at 08:51

## 2023-12-24 RX ADMIN — IPRATROPIUM BROMIDE AND ALBUTEROL SULFATE 1 DOSE: 2.5; .5 SOLUTION RESPIRATORY (INHALATION) at 03:29

## 2023-12-24 RX ADMIN — BUPRENORPHINE AND NALOXONE 1 FILM: 8; 2 FILM BUCCAL; SUBLINGUAL at 08:50

## 2023-12-24 RX ADMIN — TRAZODONE HYDROCHLORIDE 100 MG: 50 TABLET ORAL at 22:12

## 2023-12-24 RX ADMIN — BUPRENORPHINE AND NALOXONE 1 FILM: 8; 2 FILM BUCCAL; SUBLINGUAL at 22:12

## 2023-12-25 ENCOUNTER — APPOINTMENT (OUTPATIENT)
Dept: GENERAL RADIOLOGY | Age: 63
DRG: 190 | End: 2023-12-25
Payer: MEDICARE

## 2023-12-25 LAB
ALBUMIN SERPL-MCNC: 3.3 GM/DL (ref 3.4–5)
ALP BLD-CCNC: 55 IU/L (ref 40–128)
ALT SERPL-CCNC: 43 U/L (ref 10–40)
ANION GAP SERPL CALCULATED.3IONS-SCNC: 4 MMOL/L (ref 7–16)
AST SERPL-CCNC: 43 IU/L (ref 15–37)
BASOPHILS ABSOLUTE: 0 K/CU MM
BASOPHILS RELATIVE PERCENT: 0.1 % (ref 0–1)
BILIRUB SERPL-MCNC: 0.2 MG/DL (ref 0–1)
BUN SERPL-MCNC: 16 MG/DL (ref 6–23)
CALCIUM SERPL-MCNC: 8.3 MG/DL (ref 8.3–10.6)
CHLORIDE BLD-SCNC: 99 MMOL/L (ref 99–110)
CO2: 40 MMOL/L (ref 21–32)
CREAT SERPL-MCNC: 0.5 MG/DL (ref 0.6–1.1)
DIFFERENTIAL TYPE: ABNORMAL
EOSINOPHILS ABSOLUTE: 0 K/CU MM
EOSINOPHILS RELATIVE PERCENT: 0.4 % (ref 0–3)
GFR SERPL CREATININE-BSD FRML MDRD: >60 ML/MIN/1.73M2
GLUCOSE BLD-MCNC: 270 MG/DL (ref 70–99)
GLUCOSE BLD-MCNC: 367 MG/DL (ref 70–99)
GLUCOSE BLD-MCNC: 390 MG/DL (ref 70–99)
GLUCOSE BLD-MCNC: 99 MG/DL (ref 70–99)
GLUCOSE SERPL-MCNC: 135 MG/DL (ref 70–99)
HCT VFR BLD CALC: 41.8 % (ref 37–47)
HEMOGLOBIN: 12.2 GM/DL (ref 12.5–16)
IMMATURE NEUTROPHIL %: 0.5 % (ref 0–0.43)
LYMPHOCYTES ABSOLUTE: 1.8 K/CU MM
LYMPHOCYTES RELATIVE PERCENT: 22.6 % (ref 24–44)
MCH RBC QN AUTO: 28.1 PG (ref 27–31)
MCHC RBC AUTO-ENTMCNC: 29.2 % (ref 32–36)
MCV RBC AUTO: 96.3 FL (ref 78–100)
MONOCYTES ABSOLUTE: 1 K/CU MM
MONOCYTES RELATIVE PERCENT: 11.9 % (ref 0–4)
NUCLEATED RBC %: 0 %
PDW BLD-RTO: 12.4 % (ref 11.7–14.9)
PLATELET # BLD: 166 K/CU MM (ref 140–440)
PMV BLD AUTO: 10.5 FL (ref 7.5–11.1)
POTASSIUM SERPL-SCNC: 4.2 MMOL/L (ref 3.5–5.1)
RBC # BLD: 4.34 M/CU MM (ref 4.2–5.4)
SEGMENTED NEUTROPHILS ABSOLUTE COUNT: 5.2 K/CU MM
SEGMENTED NEUTROPHILS RELATIVE PERCENT: 64.5 % (ref 36–66)
SODIUM BLD-SCNC: 143 MMOL/L (ref 135–145)
TOTAL IMMATURE NEUTOROPHIL: 0.04 K/CU MM
TOTAL NUCLEATED RBC: 0 K/CU MM
TOTAL PROTEIN: 5.2 GM/DL (ref 6.4–8.2)
WBC # BLD: 8.1 K/CU MM (ref 4–10.5)

## 2023-12-25 PROCEDURE — 94640 AIRWAY INHALATION TREATMENT: CPT

## 2023-12-25 PROCEDURE — 2060000000 HC ICU INTERMEDIATE R&B

## 2023-12-25 PROCEDURE — 6360000002 HC RX W HCPCS: Performed by: STUDENT IN AN ORGANIZED HEALTH CARE EDUCATION/TRAINING PROGRAM

## 2023-12-25 PROCEDURE — 6370000000 HC RX 637 (ALT 250 FOR IP): Performed by: STUDENT IN AN ORGANIZED HEALTH CARE EDUCATION/TRAINING PROGRAM

## 2023-12-25 PROCEDURE — 36415 COLL VENOUS BLD VENIPUNCTURE: CPT

## 2023-12-25 PROCEDURE — 85025 COMPLETE CBC W/AUTO DIFF WBC: CPT

## 2023-12-25 PROCEDURE — 94761 N-INVAS EAR/PLS OXIMETRY MLT: CPT

## 2023-12-25 PROCEDURE — 2580000003 HC RX 258: Performed by: INTERNAL MEDICINE

## 2023-12-25 PROCEDURE — 71045 X-RAY EXAM CHEST 1 VIEW: CPT

## 2023-12-25 PROCEDURE — 82962 GLUCOSE BLOOD TEST: CPT

## 2023-12-25 PROCEDURE — 94664 DEMO&/EVAL PT USE INHALER: CPT

## 2023-12-25 PROCEDURE — 82805 BLOOD GASES W/O2 SATURATION: CPT

## 2023-12-25 PROCEDURE — 2700000000 HC OXYGEN THERAPY PER DAY

## 2023-12-25 PROCEDURE — 2580000003 HC RX 258: Performed by: STUDENT IN AN ORGANIZED HEALTH CARE EDUCATION/TRAINING PROGRAM

## 2023-12-25 PROCEDURE — 80053 COMPREHEN METABOLIC PANEL: CPT

## 2023-12-25 PROCEDURE — 6360000002 HC RX W HCPCS: Performed by: INTERNAL MEDICINE

## 2023-12-25 PROCEDURE — 94660 CPAP INITIATION&MGMT: CPT

## 2023-12-25 RX ORDER — IPRATROPIUM BROMIDE AND ALBUTEROL SULFATE 2.5; .5 MG/3ML; MG/3ML
1 SOLUTION RESPIRATORY (INHALATION)
Status: DISCONTINUED | OUTPATIENT
Start: 2023-12-26 | End: 2023-12-27 | Stop reason: HOSPADM

## 2023-12-25 RX ORDER — PANTOPRAZOLE SODIUM 40 MG/1
40 TABLET, DELAYED RELEASE ORAL
Status: DISCONTINUED | OUTPATIENT
Start: 2023-12-25 | End: 2023-12-27 | Stop reason: HOSPADM

## 2023-12-25 RX ADMIN — BUDESONIDE AND FORMOTEROL FUMARATE DIHYDRATE 2 PUFF: 160; 4.5 AEROSOL RESPIRATORY (INHALATION) at 19:51

## 2023-12-25 RX ADMIN — BUPRENORPHINE AND NALOXONE 1 FILM: 8; 2 FILM BUCCAL; SUBLINGUAL at 08:22

## 2023-12-25 RX ADMIN — IPRATROPIUM BROMIDE AND ALBUTEROL SULFATE 1 DOSE: 2.5; .5 SOLUTION RESPIRATORY (INHALATION) at 19:50

## 2023-12-25 RX ADMIN — ASPIRIN 81 MG: 81 TABLET, CHEWABLE ORAL at 08:22

## 2023-12-25 RX ADMIN — IPRATROPIUM BROMIDE AND ALBUTEROL SULFATE 1 DOSE: 2.5; .5 SOLUTION RESPIRATORY (INHALATION) at 15:25

## 2023-12-25 RX ADMIN — IPRATROPIUM BROMIDE AND ALBUTEROL SULFATE 1 DOSE: 2.5; .5 SOLUTION RESPIRATORY (INHALATION) at 00:01

## 2023-12-25 RX ADMIN — SODIUM CHLORIDE, PRESERVATIVE FREE 10 ML: 5 INJECTION INTRAVENOUS at 08:23

## 2023-12-25 RX ADMIN — IPRATROPIUM BROMIDE AND ALBUTEROL SULFATE 1 DOSE: 2.5; .5 SOLUTION RESPIRATORY (INHALATION) at 07:47

## 2023-12-25 RX ADMIN — PREDNISONE 40 MG: 20 TABLET ORAL at 08:22

## 2023-12-25 RX ADMIN — QUETIAPINE FUMARATE 100 MG: 25 TABLET ORAL at 21:11

## 2023-12-25 RX ADMIN — IPRATROPIUM BROMIDE AND ALBUTEROL SULFATE 1 DOSE: 2.5; .5 SOLUTION RESPIRATORY (INHALATION) at 11:10

## 2023-12-25 RX ADMIN — IPRATROPIUM BROMIDE AND ALBUTEROL SULFATE 1 DOSE: 2.5; .5 SOLUTION RESPIRATORY (INHALATION) at 03:46

## 2023-12-25 RX ADMIN — METHYLPREDNISOLONE SODIUM SUCCINATE 40 MG: 40 INJECTION, POWDER, FOR SOLUTION INTRAMUSCULAR; INTRAVENOUS at 18:06

## 2023-12-25 RX ADMIN — ENOXAPARIN SODIUM 40 MG: 100 INJECTION SUBCUTANEOUS at 18:05

## 2023-12-25 RX ADMIN — BUPRENORPHINE AND NALOXONE 1 FILM: 8; 2 FILM BUCCAL; SUBLINGUAL at 13:06

## 2023-12-25 RX ADMIN — BUPRENORPHINE AND NALOXONE 1 FILM: 8; 2 FILM BUCCAL; SUBLINGUAL at 21:11

## 2023-12-25 RX ADMIN — BUDESONIDE AND FORMOTEROL FUMARATE DIHYDRATE 2 PUFF: 160; 4.5 AEROSOL RESPIRATORY (INHALATION) at 07:45

## 2023-12-25 RX ADMIN — TRAZODONE HYDROCHLORIDE 300 MG: 50 TABLET ORAL at 21:11

## 2023-12-25 RX ADMIN — METHYLPREDNISOLONE SODIUM SUCCINATE 40 MG: 40 INJECTION, POWDER, FOR SOLUTION INTRAMUSCULAR; INTRAVENOUS at 13:06

## 2023-12-25 RX ADMIN — INSULIN LISPRO 4 UNITS: 100 INJECTION, SOLUTION INTRAVENOUS; SUBCUTANEOUS at 18:05

## 2023-12-25 RX ADMIN — GUAIFENESIN 600 MG: 600 TABLET, EXTENDED RELEASE ORAL at 21:11

## 2023-12-25 RX ADMIN — AZITHROMYCIN MONOHYDRATE 500 MG: 500 INJECTION, POWDER, LYOPHILIZED, FOR SOLUTION INTRAVENOUS at 13:09

## 2023-12-25 RX ADMIN — GUAIFENESIN 600 MG: 600 TABLET, EXTENDED RELEASE ORAL at 08:22

## 2023-12-25 NOTE — CONSULTS
[address], [address] [place] [address]                                  CONSULTATION    PATIENT NAME: Richa Peters                     :        1960  MED REC NO:   [id number]                          ROOM:         ACCOUNT NO:   [de-identified]                           ADMIT DATE: 2023  PROVIDER:     Chitra Miller MD    CONSULT DATE:  2023    HISTORY OF PRESENT ILLNESS:  The patient is a 21-year-old lady with  multiple medical problems including COPD, chronic respiratory failure on  home oxygen, ADHD, obstructive sleep apnea, who was admitted through the  emergency room with complaints of increasing shortness of breath, cough  productive of whitish to slightly yellowish-tinged phlegm and wheezing. In the emergency room, she was hypoxic. She was given breathing  treatments, steroids with some improvement, but she continued to have  shortness of breath, so it was decided to admit her for aggressive  therapy. PAST MEDICAL HISTORY:  Significant for COPD, chronic respiratory failure  on home oxygen, obstructive sleep apnea, ADHD. PAST SURGICAL HISTORY:  Remarkable for  section,  cholecystectomy, pacemaker insertion. FAMILY HISTORY:  Reveals that her mother had cancer. Father had history  of alcohol abuse. SOCIAL HISTORY:  Reveals that she quit smoking in 2016, but used to  smoke a pack per day for 40 years. No history of alcohol or drug abuse. MEDICATIONS:  Reviewed. ALLERGIES:  She has no known allergies. REVIEW OF SYSTEMS:  10 to 14-point review of systems were reviewed and  are negative, except for what is mentioned in the history of present  illness. PHYSICAL EXAMINATION:  GENERAL:  The patient is alert, oriented x3, in no acute respiratory  distress. VITAL SIGNS:  Her blood pressure is 108/55 mmHg, pulse of 73 per minute  and respiratory rate of 14 per minute. She is afebrile.

## 2023-12-26 LAB
ALBUMIN SERPL-MCNC: 3.6 GM/DL (ref 3.4–5)
ALP BLD-CCNC: 59 IU/L (ref 40–129)
ALT SERPL-CCNC: 58 U/L (ref 10–40)
ANION GAP SERPL CALCULATED.3IONS-SCNC: 7 MMOL/L (ref 7–16)
AST SERPL-CCNC: 25 IU/L (ref 15–37)
BASOPHILS ABSOLUTE: 0 K/CU MM
BASOPHILS RELATIVE PERCENT: 0.2 % (ref 0–1)
BILIRUB SERPL-MCNC: 0.2 MG/DL (ref 0–1)
BUN SERPL-MCNC: 17 MG/DL (ref 6–23)
CALCIUM SERPL-MCNC: 8.7 MG/DL (ref 8.3–10.6)
CHLORIDE BLD-SCNC: 97 MMOL/L (ref 99–110)
CO2: 37 MMOL/L (ref 21–32)
CREAT SERPL-MCNC: 0.6 MG/DL (ref 0.6–1.1)
CULTURE: NORMAL
DIFFERENTIAL TYPE: ABNORMAL
EOSINOPHILS ABSOLUTE: 0 K/CU MM
EOSINOPHILS RELATIVE PERCENT: 0 % (ref 0–3)
GFR SERPL CREATININE-BSD FRML MDRD: >60 ML/MIN/1.73M2
GLUCOSE BLD-MCNC: 166 MG/DL (ref 70–99)
GLUCOSE BLD-MCNC: 180 MG/DL (ref 70–99)
GLUCOSE BLD-MCNC: 233 MG/DL (ref 70–99)
GLUCOSE BLD-MCNC: 312 MG/DL (ref 70–99)
GLUCOSE SERPL-MCNC: 187 MG/DL (ref 70–99)
HCT VFR BLD CALC: 42.1 % (ref 37–47)
HEMOGLOBIN: 12.7 GM/DL (ref 12.5–16)
IMMATURE NEUTROPHIL %: 0.9 % (ref 0–0.43)
LYMPHOCYTES ABSOLUTE: 1.3 K/CU MM
LYMPHOCYTES RELATIVE PERCENT: 13.5 % (ref 24–44)
Lab: NORMAL
MAGNESIUM: 2.2 MG/DL (ref 1.8–2.4)
MCH RBC QN AUTO: 28.2 PG (ref 27–31)
MCHC RBC AUTO-ENTMCNC: 30.2 % (ref 32–36)
MCV RBC AUTO: 93.3 FL (ref 78–100)
MONOCYTES ABSOLUTE: 0.9 K/CU MM
MONOCYTES RELATIVE PERCENT: 9.4 % (ref 0–4)
NUCLEATED RBC %: 0 %
PDW BLD-RTO: 12.4 % (ref 11.7–14.9)
PHOSPHORUS: 3.2 MG/DL (ref 2.5–4.9)
PLATELET # BLD: 188 K/CU MM (ref 140–440)
PMV BLD AUTO: 10.8 FL (ref 7.5–11.1)
POTASSIUM SERPL-SCNC: 4.8 MMOL/L (ref 3.5–5.1)
PRO-BNP: 236.6 PG/ML
PROCALCITONIN SERPL-MCNC: 0.03 NG/ML
RBC # BLD: 4.51 M/CU MM (ref 4.2–5.4)
SEGMENTED NEUTROPHILS ABSOLUTE COUNT: 7.2 K/CU MM
SEGMENTED NEUTROPHILS RELATIVE PERCENT: 76 % (ref 36–66)
SODIUM BLD-SCNC: 141 MMOL/L (ref 135–145)
SPECIMEN: NORMAL
TOTAL IMMATURE NEUTOROPHIL: 0.09 K/CU MM
TOTAL NUCLEATED RBC: 0 K/CU MM
TOTAL PROTEIN: 6 GM/DL (ref 6.4–8.2)
WBC # BLD: 9.5 K/CU MM (ref 4–10.5)

## 2023-12-26 PROCEDURE — 84100 ASSAY OF PHOSPHORUS: CPT

## 2023-12-26 PROCEDURE — 80053 COMPREHEN METABOLIC PANEL: CPT

## 2023-12-26 PROCEDURE — 94660 CPAP INITIATION&MGMT: CPT

## 2023-12-26 PROCEDURE — 87181 SC STD AGAR DILUTION PER AGT: CPT

## 2023-12-26 PROCEDURE — 94640 AIRWAY INHALATION TREATMENT: CPT

## 2023-12-26 PROCEDURE — 87186 SC STD MICRODIL/AGAR DIL: CPT

## 2023-12-26 PROCEDURE — 6360000002 HC RX W HCPCS: Performed by: INTERNAL MEDICINE

## 2023-12-26 PROCEDURE — 94761 N-INVAS EAR/PLS OXIMETRY MLT: CPT

## 2023-12-26 PROCEDURE — 6370000000 HC RX 637 (ALT 250 FOR IP): Performed by: STUDENT IN AN ORGANIZED HEALTH CARE EDUCATION/TRAINING PROGRAM

## 2023-12-26 PROCEDURE — 6360000002 HC RX W HCPCS: Performed by: STUDENT IN AN ORGANIZED HEALTH CARE EDUCATION/TRAINING PROGRAM

## 2023-12-26 PROCEDURE — 83880 ASSAY OF NATRIURETIC PEPTIDE: CPT

## 2023-12-26 PROCEDURE — 94669 MECHANICAL CHEST WALL OSCILL: CPT

## 2023-12-26 PROCEDURE — 36415 COLL VENOUS BLD VENIPUNCTURE: CPT

## 2023-12-26 PROCEDURE — 2060000000 HC ICU INTERMEDIATE R&B

## 2023-12-26 PROCEDURE — 83735 ASSAY OF MAGNESIUM: CPT

## 2023-12-26 PROCEDURE — 85025 COMPLETE CBC W/AUTO DIFF WBC: CPT

## 2023-12-26 PROCEDURE — 87205 SMEAR GRAM STAIN: CPT

## 2023-12-26 PROCEDURE — 2580000003 HC RX 258: Performed by: INTERNAL MEDICINE

## 2023-12-26 PROCEDURE — 87070 CULTURE OTHR SPECIMN AEROBIC: CPT

## 2023-12-26 PROCEDURE — 82962 GLUCOSE BLOOD TEST: CPT

## 2023-12-26 PROCEDURE — 6370000000 HC RX 637 (ALT 250 FOR IP): Performed by: INTERNAL MEDICINE

## 2023-12-26 PROCEDURE — 87077 CULTURE AEROBIC IDENTIFY: CPT

## 2023-12-26 PROCEDURE — 84145 PROCALCITONIN (PCT): CPT

## 2023-12-26 PROCEDURE — 87147 CULTURE TYPE IMMUNOLOGIC: CPT

## 2023-12-26 PROCEDURE — 2580000003 HC RX 258: Performed by: STUDENT IN AN ORGANIZED HEALTH CARE EDUCATION/TRAINING PROGRAM

## 2023-12-26 PROCEDURE — 2700000000 HC OXYGEN THERAPY PER DAY

## 2023-12-26 RX ADMIN — METHYLPREDNISOLONE SODIUM SUCCINATE 40 MG: 40 INJECTION, POWDER, FOR SOLUTION INTRAMUSCULAR; INTRAVENOUS at 00:14

## 2023-12-26 RX ADMIN — IPRATROPIUM BROMIDE AND ALBUTEROL SULFATE 1 DOSE: 2.5; .5 SOLUTION RESPIRATORY (INHALATION) at 20:10

## 2023-12-26 RX ADMIN — ASPIRIN 81 MG: 81 TABLET, CHEWABLE ORAL at 08:26

## 2023-12-26 RX ADMIN — BUPRENORPHINE AND NALOXONE 1 FILM: 8; 2 FILM BUCCAL; SUBLINGUAL at 21:06

## 2023-12-26 RX ADMIN — BUDESONIDE AND FORMOTEROL FUMARATE DIHYDRATE 2 PUFF: 160; 4.5 AEROSOL RESPIRATORY (INHALATION) at 20:11

## 2023-12-26 RX ADMIN — SODIUM CHLORIDE, PRESERVATIVE FREE 10 ML: 5 INJECTION INTRAVENOUS at 21:07

## 2023-12-26 RX ADMIN — BUDESONIDE AND FORMOTEROL FUMARATE DIHYDRATE 2 PUFF: 160; 4.5 AEROSOL RESPIRATORY (INHALATION) at 07:52

## 2023-12-26 RX ADMIN — GUAIFENESIN 600 MG: 600 TABLET, EXTENDED RELEASE ORAL at 08:26

## 2023-12-26 RX ADMIN — SODIUM CHLORIDE, PRESERVATIVE FREE 10 ML: 5 INJECTION INTRAVENOUS at 08:26

## 2023-12-26 RX ADMIN — BUPRENORPHINE AND NALOXONE 1 FILM: 8; 2 FILM BUCCAL; SUBLINGUAL at 17:58

## 2023-12-26 RX ADMIN — IPRATROPIUM BROMIDE AND ALBUTEROL SULFATE 1 DOSE: 2.5; .5 SOLUTION RESPIRATORY (INHALATION) at 07:52

## 2023-12-26 RX ADMIN — BUPRENORPHINE AND NALOXONE 1 FILM: 8; 2 FILM BUCCAL; SUBLINGUAL at 08:26

## 2023-12-26 RX ADMIN — QUETIAPINE FUMARATE 100 MG: 25 TABLET ORAL at 21:07

## 2023-12-26 RX ADMIN — INSULIN LISPRO 3 UNITS: 100 INJECTION, SOLUTION INTRAVENOUS; SUBCUTANEOUS at 13:53

## 2023-12-26 RX ADMIN — METHYLPREDNISOLONE SODIUM SUCCINATE 40 MG: 40 INJECTION, POWDER, FOR SOLUTION INTRAMUSCULAR; INTRAVENOUS at 08:26

## 2023-12-26 RX ADMIN — AZITHROMYCIN MONOHYDRATE 500 MG: 500 INJECTION, POWDER, LYOPHILIZED, FOR SOLUTION INTRAVENOUS at 13:53

## 2023-12-26 RX ADMIN — PANTOPRAZOLE SODIUM 40 MG: 40 TABLET, DELAYED RELEASE ORAL at 08:26

## 2023-12-26 RX ADMIN — GUAIFENESIN AND DEXTROMETHORPHAN 5 ML: 100; 10 SYRUP ORAL at 21:06

## 2023-12-26 RX ADMIN — IPRATROPIUM BROMIDE AND ALBUTEROL SULFATE 1 DOSE: 2.5; .5 SOLUTION RESPIRATORY (INHALATION) at 11:03

## 2023-12-26 RX ADMIN — TRAZODONE HYDROCHLORIDE 300 MG: 50 TABLET ORAL at 21:07

## 2023-12-26 RX ADMIN — METHYLPREDNISOLONE SODIUM SUCCINATE 20 MG: 40 INJECTION, POWDER, FOR SOLUTION INTRAMUSCULAR; INTRAVENOUS at 21:06

## 2023-12-26 RX ADMIN — INSULIN LISPRO 1 UNITS: 100 INJECTION, SOLUTION INTRAVENOUS; SUBCUTANEOUS at 17:59

## 2023-12-26 RX ADMIN — ENOXAPARIN SODIUM 40 MG: 100 INJECTION SUBCUTANEOUS at 17:59

## 2023-12-26 RX ADMIN — GUAIFENESIN 600 MG: 600 TABLET, EXTENDED RELEASE ORAL at 21:07

## 2023-12-26 RX ADMIN — IPRATROPIUM BROMIDE AND ALBUTEROL SULFATE 1 DOSE: 2.5; .5 SOLUTION RESPIRATORY (INHALATION) at 15:17

## 2023-12-27 VITALS
WEIGHT: 220.02 LBS | BODY MASS INDEX: 37.56 KG/M2 | DIASTOLIC BLOOD PRESSURE: 97 MMHG | RESPIRATION RATE: 18 BRPM | SYSTOLIC BLOOD PRESSURE: 116 MMHG | HEIGHT: 64 IN | HEART RATE: 84 BPM | OXYGEN SATURATION: 90 % | TEMPERATURE: 98.1 F

## 2023-12-27 PROBLEM — J44.1 COPD EXACERBATION (HCC): Status: RESOLVED | Noted: 2021-07-09 | Resolved: 2023-12-27

## 2023-12-27 LAB
ALBUMIN SERPL-MCNC: 3.4 GM/DL (ref 3.4–5)
ALP BLD-CCNC: 62 IU/L (ref 40–128)
ALT SERPL-CCNC: 43 U/L (ref 10–40)
ANION GAP SERPL CALCULATED.3IONS-SCNC: 3 MMOL/L (ref 7–16)
AST SERPL-CCNC: 15 IU/L (ref 15–37)
BASOPHILS ABSOLUTE: 0 K/CU MM
BASOPHILS RELATIVE PERCENT: 0.1 % (ref 0–1)
BILIRUB SERPL-MCNC: 0.2 MG/DL (ref 0–1)
BUN SERPL-MCNC: 17 MG/DL (ref 6–23)
CALCIUM SERPL-MCNC: 8.5 MG/DL (ref 8.3–10.6)
CHLORIDE BLD-SCNC: 96 MMOL/L (ref 99–110)
CO2: 40 MMOL/L (ref 21–32)
CREAT SERPL-MCNC: 0.4 MG/DL (ref 0.6–1.1)
DIFFERENTIAL TYPE: ABNORMAL
EOSINOPHILS ABSOLUTE: 0 K/CU MM
EOSINOPHILS RELATIVE PERCENT: 0.1 % (ref 0–3)
GFR SERPL CREATININE-BSD FRML MDRD: >60 ML/MIN/1.73M2
GLUCOSE BLD-MCNC: 156 MG/DL (ref 70–99)
GLUCOSE BLD-MCNC: 268 MG/DL (ref 70–99)
GLUCOSE SERPL-MCNC: 246 MG/DL (ref 70–99)
HCT VFR BLD CALC: 40.4 % (ref 37–47)
HEMOGLOBIN: 12.3 GM/DL (ref 12.5–16)
IMMATURE NEUTROPHIL %: 1.3 % (ref 0–0.43)
LYMPHOCYTES ABSOLUTE: 1 K/CU MM
LYMPHOCYTES RELATIVE PERCENT: 11.4 % (ref 24–44)
MCH RBC QN AUTO: 28 PG (ref 27–31)
MCHC RBC AUTO-ENTMCNC: 30.4 % (ref 32–36)
MCV RBC AUTO: 92 FL (ref 78–100)
MONOCYTES ABSOLUTE: 0.7 K/CU MM
MONOCYTES RELATIVE PERCENT: 8.8 % (ref 0–4)
NUCLEATED RBC %: 0 %
PDW BLD-RTO: 12.4 % (ref 11.7–14.9)
PLATELET # BLD: 186 K/CU MM (ref 140–440)
PMV BLD AUTO: 10.5 FL (ref 7.5–11.1)
POTASSIUM SERPL-SCNC: 4.7 MMOL/L (ref 3.5–5.1)
RBC # BLD: 4.39 M/CU MM (ref 4.2–5.4)
SEGMENTED NEUTROPHILS ABSOLUTE COUNT: 6.6 K/CU MM
SEGMENTED NEUTROPHILS RELATIVE PERCENT: 78.3 % (ref 36–66)
SODIUM BLD-SCNC: 139 MMOL/L (ref 135–145)
TOTAL IMMATURE NEUTOROPHIL: 0.11 K/CU MM
TOTAL NUCLEATED RBC: 0 K/CU MM
TOTAL PROTEIN: 5.4 GM/DL (ref 6.4–8.2)
WBC # BLD: 8.4 K/CU MM (ref 4–10.5)

## 2023-12-27 PROCEDURE — 82962 GLUCOSE BLOOD TEST: CPT

## 2023-12-27 PROCEDURE — 2580000003 HC RX 258: Performed by: STUDENT IN AN ORGANIZED HEALTH CARE EDUCATION/TRAINING PROGRAM

## 2023-12-27 PROCEDURE — 6370000000 HC RX 637 (ALT 250 FOR IP): Performed by: STUDENT IN AN ORGANIZED HEALTH CARE EDUCATION/TRAINING PROGRAM

## 2023-12-27 PROCEDURE — 80053 COMPREHEN METABOLIC PANEL: CPT

## 2023-12-27 PROCEDURE — 85025 COMPLETE CBC W/AUTO DIFF WBC: CPT

## 2023-12-27 PROCEDURE — 94640 AIRWAY INHALATION TREATMENT: CPT

## 2023-12-27 PROCEDURE — 6360000002 HC RX W HCPCS: Performed by: INTERNAL MEDICINE

## 2023-12-27 PROCEDURE — 2700000000 HC OXYGEN THERAPY PER DAY

## 2023-12-27 PROCEDURE — 36415 COLL VENOUS BLD VENIPUNCTURE: CPT

## 2023-12-27 PROCEDURE — 6370000000 HC RX 637 (ALT 250 FOR IP): Performed by: INTERNAL MEDICINE

## 2023-12-27 PROCEDURE — 2500000003 HC RX 250 WO HCPCS: Performed by: STUDENT IN AN ORGANIZED HEALTH CARE EDUCATION/TRAINING PROGRAM

## 2023-12-27 PROCEDURE — 94669 MECHANICAL CHEST WALL OSCILL: CPT

## 2023-12-27 PROCEDURE — 94150 VITAL CAPACITY TEST: CPT

## 2023-12-27 PROCEDURE — 94761 N-INVAS EAR/PLS OXIMETRY MLT: CPT

## 2023-12-27 PROCEDURE — 94660 CPAP INITIATION&MGMT: CPT

## 2023-12-27 RX ORDER — PREDNISONE 50 MG/1
50 TABLET ORAL DAILY
Qty: 5 TABLET | Refills: 0 | Status: SHIPPED | OUTPATIENT
Start: 2023-12-27 | End: 2024-01-01

## 2023-12-27 RX ORDER — AZITHROMYCIN 500 MG/1
500 TABLET, FILM COATED ORAL DAILY
Qty: 1 TABLET | Refills: 0 | Status: SHIPPED | OUTPATIENT
Start: 2023-12-27 | End: 2023-12-28

## 2023-12-27 RX ADMIN — PANTOPRAZOLE SODIUM 40 MG: 40 TABLET, DELAYED RELEASE ORAL at 09:19

## 2023-12-27 RX ADMIN — BUPRENORPHINE AND NALOXONE 1 FILM: 8; 2 FILM BUCCAL; SUBLINGUAL at 09:19

## 2023-12-27 RX ADMIN — IPRATROPIUM BROMIDE AND ALBUTEROL SULFATE 1 DOSE: 2.5; .5 SOLUTION RESPIRATORY (INHALATION) at 10:59

## 2023-12-27 RX ADMIN — INSULIN LISPRO 2 UNITS: 100 INJECTION, SOLUTION INTRAVENOUS; SUBCUTANEOUS at 11:42

## 2023-12-27 RX ADMIN — ASPIRIN 81 MG: 81 TABLET, CHEWABLE ORAL at 09:20

## 2023-12-27 RX ADMIN — MICONAZOLE NITRATE: 2 POWDER TOPICAL at 09:19

## 2023-12-27 RX ADMIN — IPRATROPIUM BROMIDE AND ALBUTEROL SULFATE 1 DOSE: 2.5; .5 SOLUTION RESPIRATORY (INHALATION) at 07:01

## 2023-12-27 RX ADMIN — GUAIFENESIN 600 MG: 600 TABLET, EXTENDED RELEASE ORAL at 09:19

## 2023-12-27 RX ADMIN — BUDESONIDE AND FORMOTEROL FUMARATE DIHYDRATE 2 PUFF: 160; 4.5 AEROSOL RESPIRATORY (INHALATION) at 07:01

## 2023-12-27 RX ADMIN — SODIUM CHLORIDE, PRESERVATIVE FREE 10 ML: 5 INJECTION INTRAVENOUS at 09:20

## 2023-12-27 RX ADMIN — METHYLPREDNISOLONE SODIUM SUCCINATE 20 MG: 40 INJECTION, POWDER, FOR SOLUTION INTRAMUSCULAR; INTRAVENOUS at 09:19

## 2023-12-27 NOTE — PLAN OF CARE
Bed alarm on, assistance by staff and walker when getting out of bed.
Problem: Discharge Planning  Goal: Discharge to home or other facility with appropriate resources  Outcome: Adequate for Discharge     Problem: Pain  Goal: Verbalizes/displays adequate comfort level or baseline comfort level  Outcome: Adequate for Discharge     Problem: Safety - Adult  Goal: Free from fall injury  Outcome: Adequate for Discharge     Problem: Neurosensory - Adult  Goal: Achieves stable or improved neurological status  Outcome: Adequate for Discharge  Goal: Achieves maximal functionality and self care  Outcome: Adequate for Discharge     Problem: Respiratory - Adult  Goal: Achieves optimal ventilation and oxygenation  Outcome: Adequate for Discharge     Problem: Cardiovascular - Adult  Goal: Maintains optimal cardiac output and hemodynamic stability  Outcome: Adequate for Discharge  Goal: Absence of cardiac dysrhythmias or at baseline  Outcome: Adequate for Discharge     Problem: Skin/Tissue Integrity - Adult  Goal: Skin integrity remains intact  Outcome: Adequate for Discharge  Goal: Incisions, wounds, or drain sites healing without S/S of infection  Outcome: Adequate for Discharge  Goal: Oral mucous membranes remain intact  Outcome: Adequate for Discharge     Problem: Musculoskeletal - Adult  Goal: Return mobility to safest level of function  Outcome: Adequate for Discharge  Goal: Return ADL status to a safe level of function  Outcome: Adequate for Discharge     Problem: Gastrointestinal - Adult  Goal: Minimal or absence of nausea and vomiting  Outcome: Adequate for Discharge  Goal: Maintains or returns to baseline bowel function  Outcome: Adequate for Discharge  Goal: Maintains adequate nutritional intake  Outcome: Adequate for Discharge     Problem: Genitourinary - Adult  Goal: Absence of urinary retention  Outcome: Adequate for Discharge     Problem: Infection - Adult  Goal: Absence of infection at discharge  Outcome: Adequate for Discharge  Goal: Absence of infection during
Problem: Discharge Planning  Goal: Discharge to home or other facility with appropriate resources  Outcome: Progressing     Problem: Pain  Goal: Verbalizes/displays adequate comfort level or baseline comfort level  Outcome: Progressing     Problem: Safety - Adult  Goal: Free from fall injury  Outcome: Progressing
Problem: Discharge Planning  Goal: Discharge to home or other facility with appropriate resources  Outcome: Progressing     Problem: Pain  Goal: Verbalizes/displays adequate comfort level or baseline comfort level  Outcome: Progressing  Flowsheets (Taken 12/26/2023 5547)  Verbalizes/displays adequate comfort level or baseline comfort level:   Encourage patient to monitor pain and request assistance   Assess pain using appropriate pain scale   Administer analgesics based on type and severity of pain and evaluate response   Implement non-pharmacological measures as appropriate and evaluate response     Problem: Safety - Adult  Goal: Free from fall injury  Outcome: Progressing     Problem: Neurosensory - Adult  Goal: Achieves stable or improved neurological status  Outcome: Progressing  Goal: Achieves maximal functionality and self care  Outcome: Progressing     Problem: Respiratory - Adult  Goal: Achieves optimal ventilation and oxygenation  Outcome: Progressing     Problem: Cardiovascular - Adult  Goal: Maintains optimal cardiac output and hemodynamic stability  Outcome: Progressing  Goal: Absence of cardiac dysrhythmias or at baseline  Outcome: Progressing     Problem: Skin/Tissue Integrity - Adult  Goal: Skin integrity remains intact  Outcome: Progressing  Goal: Incisions, wounds, or drain sites healing without S/S of infection  Outcome: Progressing  Goal: Oral mucous membranes remain intact  Outcome: Progressing     Problem: Musculoskeletal - Adult  Goal: Return mobility to safest level of function  Outcome: Progressing  Goal: Return ADL status to a safe level of function  Outcome: Progressing     Problem: Gastrointestinal - Adult  Goal: Minimal or absence of nausea and vomiting  Outcome: Progressing  Goal: Maintains or returns to baseline bowel function  Outcome: Progressing  Goal: Maintains adequate nutritional intake  Outcome: Progressing     Problem: Genitourinary - Adult  Goal: Absence of urinary
infection at discharge  Outcome: Progressing  Goal: Absence of infection during hospitalization  Outcome: Progressing     Problem: Metabolic/Fluid and Electrolytes - Adult  Goal: Electrolytes maintained within normal limits  Outcome: Progressing  Goal: Hemodynamic stability and optimal renal function maintained  Outcome: Progressing  Goal: Glucose maintained within prescribed range  Outcome: Progressing     Problem: Hematologic - Adult  Goal: Maintains hematologic stability  Outcome: Progressing     Problem: Skin/Tissue Integrity  Goal: Absence of new skin breakdown  Description: 1. Monitor for areas of redness and/or skin breakdown  2. Assess vascular access sites hourly  3. Every 4-6 hours minimum:  Change oxygen saturation probe site  4. Every 4-6 hours:  If on nasal continuous positive airway pressure, respiratory therapy assess nares and determine need for appliance change or resting period.   Outcome: Progressing

## 2023-12-27 NOTE — DISCHARGE INSTRUCTIONS
Please follow up with your pulmonologist and PCP on discharge and ensure that you take your medications as prescribed  Continue to use your oxygen and BiPAP as ordered.

## 2023-12-29 LAB
CULTURE: NORMAL
CULTURE: NORMAL
Lab: NORMAL
Lab: NORMAL
SPECIMEN: NORMAL
SPECIMEN: NORMAL

## 2023-12-30 LAB
CULTURE: ABNORMAL
GRAM SMEAR: ABNORMAL
Lab: ABNORMAL
SPECIMEN: ABNORMAL

## 2024-04-23 ENCOUNTER — HOSPITAL ENCOUNTER (INPATIENT)
Age: 64
LOS: 3 days | Discharge: HOME | DRG: 190 | End: 2024-04-27
Attending: EMERGENCY MEDICINE | Admitting: STUDENT IN AN ORGANIZED HEALTH CARE EDUCATION/TRAINING PROGRAM
Payer: MEDICARE

## 2024-04-23 ENCOUNTER — APPOINTMENT (OUTPATIENT)
Dept: GENERAL RADIOLOGY | Age: 64
DRG: 190 | End: 2024-04-23
Payer: MEDICARE

## 2024-04-23 DIAGNOSIS — J44.1 COPD EXACERBATION (HCC): ICD-10-CM

## 2024-04-23 DIAGNOSIS — R06.00 DYSPNEA, UNSPECIFIED TYPE: Primary | ICD-10-CM

## 2024-04-23 DIAGNOSIS — E87.29 RESPIRATORY ACIDEMIA: ICD-10-CM

## 2024-04-23 LAB
ALBUMIN SERPL-MCNC: 4 GM/DL (ref 3.4–5)
ALP BLD-CCNC: 87 IU/L (ref 40–129)
ALT SERPL-CCNC: 15 U/L (ref 10–40)
ANION GAP SERPL CALCULATED.3IONS-SCNC: 6 MMOL/L (ref 7–16)
AST SERPL-CCNC: 18 IU/L (ref 15–37)
BASE EXCESS MIXED: 13.7 (ref 0–3)
BASOPHILS ABSOLUTE: 0.1 K/CU MM
BASOPHILS RELATIVE PERCENT: 0.6 % (ref 0–1)
BILIRUB SERPL-MCNC: 0.1 MG/DL (ref 0–1)
BILIRUBIN URINE: NEGATIVE MG/DL
BLOOD, URINE: NEGATIVE
BUN SERPL-MCNC: 8 MG/DL (ref 6–23)
CALCIUM SERPL-MCNC: 8.8 MG/DL (ref 8.3–10.6)
CHLORIDE BLD-SCNC: 95 MMOL/L (ref 99–110)
CLARITY: CLEAR
CO2: 36 MMOL/L (ref 21–32)
COLOR: YELLOW
COMMENT UA: NORMAL
COMMENT: ABNORMAL
CREAT SERPL-MCNC: 0.4 MG/DL (ref 0.6–1.1)
DIFFERENTIAL TYPE: ABNORMAL
EOSINOPHILS ABSOLUTE: 0.5 K/CU MM
EOSINOPHILS RELATIVE PERCENT: 4.2 % (ref 0–3)
GFR SERPL CREATININE-BSD FRML MDRD: >90 ML/MIN/1.73M2
GLUCOSE SERPL-MCNC: 123 MG/DL (ref 70–99)
GLUCOSE, URINE: NEGATIVE MG/DL
HCO3 VENOUS: 44.7 MMOL/L (ref 22–29)
HCT VFR BLD CALC: 43.8 % (ref 37–47)
HEMOGLOBIN: 13.4 GM/DL (ref 12.5–16)
IMMATURE NEUTROPHIL %: 0.3 % (ref 0–0.43)
KETONES, URINE: NEGATIVE MG/DL
LACTATE: 0.7 MMOL/L (ref 0.5–1.9)
LEUKOCYTE ESTERASE, URINE: NEGATIVE
LYMPHOCYTES ABSOLUTE: 3.8 K/CU MM
LYMPHOCYTES RELATIVE PERCENT: 29.8 % (ref 24–44)
MAGNESIUM: 1.9 MG/DL (ref 1.8–2.4)
MCH RBC QN AUTO: 28.2 PG (ref 27–31)
MCHC RBC AUTO-ENTMCNC: 30.6 % (ref 32–36)
MCV RBC AUTO: 92.2 FL (ref 78–100)
MONOCYTES ABSOLUTE: 1 K/CU MM
MONOCYTES RELATIVE PERCENT: 8.1 % (ref 0–4)
NEUTROPHILS RELATIVE PERCENT: 57 % (ref 36–66)
NITRITE URINE, QUANTITATIVE: NEGATIVE
NUCLEATED RBC %: 0 %
O2 SAT, VEN: 94.6 % (ref 50–70)
PCO2, VEN: 93 MMHG (ref 41–51)
PDW BLD-RTO: 12.3 % (ref 11.7–14.9)
PH VENOUS: 7.29 (ref 7.32–7.43)
PH, URINE: 6 (ref 5–8)
PLATELET # BLD: 290 K/CU MM (ref 140–440)
PMV BLD AUTO: 9.4 FL (ref 7.5–11.1)
PO2, VEN: 124 MMHG (ref 28–48)
POTASSIUM SERPL-SCNC: 3.9 MMOL/L (ref 3.5–5.1)
PRO-BNP: 126.6 PG/ML
PROTEIN UA: NEGATIVE MG/DL
RBC # BLD: 4.75 M/CU MM (ref 4.2–5.4)
SEGMENTED NEUTROPHILS ABSOLUTE COUNT: 7.4 K/CU MM
SODIUM BLD-SCNC: 137 MMOL/L (ref 135–145)
SPECIFIC GRAVITY UA: <1.005 (ref 1–1.03)
TOTAL IMMATURE NEUTOROPHIL: 0.04 K/CU MM
TOTAL NUCLEATED RBC: 0 K/CU MM
TOTAL PROTEIN: 7.6 GM/DL (ref 6.4–8.2)
TROPONIN, HIGH SENSITIVITY: 12 NG/L (ref 0–14)
UROBILINOGEN, URINE: 0.2 MG/DL (ref 0.2–1)
WBC # BLD: 12.9 K/CU MM (ref 4–10.5)

## 2024-04-23 PROCEDURE — 93005 ELECTROCARDIOGRAM TRACING: CPT | Performed by: EMERGENCY MEDICINE

## 2024-04-23 PROCEDURE — 2580000003 HC RX 258: Performed by: EMERGENCY MEDICINE

## 2024-04-23 PROCEDURE — 84484 ASSAY OF TROPONIN QUANT: CPT

## 2024-04-23 PROCEDURE — 94660 CPAP INITIATION&MGMT: CPT

## 2024-04-23 PROCEDURE — 96361 HYDRATE IV INFUSION ADD-ON: CPT

## 2024-04-23 PROCEDURE — 99285 EMERGENCY DEPT VISIT HI MDM: CPT

## 2024-04-23 PROCEDURE — 81003 URINALYSIS AUTO W/O SCOPE: CPT

## 2024-04-23 PROCEDURE — 87086 URINE CULTURE/COLONY COUNT: CPT

## 2024-04-23 PROCEDURE — 83735 ASSAY OF MAGNESIUM: CPT

## 2024-04-23 PROCEDURE — 87502 INFLUENZA DNA AMP PROBE: CPT

## 2024-04-23 PROCEDURE — 87635 SARS-COV-2 COVID-19 AMP PRB: CPT

## 2024-04-23 PROCEDURE — 6360000002 HC RX W HCPCS: Performed by: EMERGENCY MEDICINE

## 2024-04-23 PROCEDURE — 94640 AIRWAY INHALATION TREATMENT: CPT

## 2024-04-23 PROCEDURE — 71045 X-RAY EXAM CHEST 1 VIEW: CPT

## 2024-04-23 PROCEDURE — 83605 ASSAY OF LACTIC ACID: CPT

## 2024-04-23 PROCEDURE — 96374 THER/PROPH/DIAG INJ IV PUSH: CPT

## 2024-04-23 PROCEDURE — 6370000000 HC RX 637 (ALT 250 FOR IP): Performed by: EMERGENCY MEDICINE

## 2024-04-23 PROCEDURE — 85025 COMPLETE CBC W/AUTO DIFF WBC: CPT

## 2024-04-23 PROCEDURE — 80053 COMPREHEN METABOLIC PANEL: CPT

## 2024-04-23 PROCEDURE — 83880 ASSAY OF NATRIURETIC PEPTIDE: CPT

## 2024-04-23 PROCEDURE — 87040 BLOOD CULTURE FOR BACTERIA: CPT

## 2024-04-23 PROCEDURE — 82805 BLOOD GASES W/O2 SATURATION: CPT

## 2024-04-23 RX ORDER — IPRATROPIUM BROMIDE AND ALBUTEROL SULFATE 2.5; .5 MG/3ML; MG/3ML
1 SOLUTION RESPIRATORY (INHALATION) ONCE
Status: COMPLETED | OUTPATIENT
Start: 2024-04-23 | End: 2024-04-23

## 2024-04-23 RX ORDER — LEVOFLOXACIN 5 MG/ML
500 INJECTION, SOLUTION INTRAVENOUS ONCE
Status: COMPLETED | OUTPATIENT
Start: 2024-04-23 | End: 2024-04-24

## 2024-04-23 RX ORDER — 0.9 % SODIUM CHLORIDE 0.9 %
1000 INTRAVENOUS SOLUTION INTRAVENOUS ONCE
Status: COMPLETED | OUTPATIENT
Start: 2024-04-23 | End: 2024-04-24

## 2024-04-23 RX ADMIN — WATER 125 MG: 1 INJECTION INTRAMUSCULAR; INTRAVENOUS; SUBCUTANEOUS at 23:02

## 2024-04-23 RX ADMIN — IPRATROPIUM BROMIDE AND ALBUTEROL SULFATE 1 DOSE: 2.5; .5 SOLUTION RESPIRATORY (INHALATION) at 23:15

## 2024-04-23 RX ADMIN — SODIUM CHLORIDE 1000 ML: 9 INJECTION, SOLUTION INTRAVENOUS at 23:02

## 2024-04-23 RX ADMIN — SODIUM CHLORIDE 1000 ML: 9 INJECTION, SOLUTION INTRAVENOUS at 23:01

## 2024-04-23 RX ADMIN — IPRATROPIUM BROMIDE AND ALBUTEROL SULFATE 1 DOSE: 2.5; .5 SOLUTION RESPIRATORY (INHALATION) at 23:05

## 2024-04-23 RX ADMIN — IPRATROPIUM BROMIDE AND ALBUTEROL SULFATE 1 DOSE: 2.5; .5 SOLUTION RESPIRATORY (INHALATION) at 23:10

## 2024-04-23 ASSESSMENT — ENCOUNTER SYMPTOMS
GASTROINTESTINAL NEGATIVE: 1
EYES NEGATIVE: 1
WHEEZING: 1
SHORTNESS OF BREATH: 1
ALLERGIC/IMMUNOLOGIC NEGATIVE: 1
COUGH: 1

## 2024-04-24 PROBLEM — J44.1 COPD EXACERBATION (HCC): Status: ACTIVE | Noted: 2024-04-24

## 2024-04-24 LAB
ALBUMIN SERPL-MCNC: 4 GM/DL (ref 3.4–5)
ALP BLD-CCNC: 73 IU/L (ref 40–128)
ALT SERPL-CCNC: 13 U/L (ref 10–40)
ANION GAP SERPL CALCULATED.3IONS-SCNC: 9 MMOL/L (ref 7–16)
AST SERPL-CCNC: 17 IU/L (ref 15–37)
B PARAP IS1001 DNA NPH QL NAA+NON-PROBE: NOT DETECTED
B PERT.PT PRMT NPH QL NAA+NON-PROBE: NOT DETECTED
BASE EXCESS MIXED: 7.7 (ref 0–3)
BASE EXCESS MIXED: 9.6 (ref 0–3)
BASOPHILS ABSOLUTE: 0 K/CU MM
BASOPHILS RELATIVE PERCENT: 0.2 % (ref 0–1)
BILIRUB SERPL-MCNC: 0.2 MG/DL (ref 0–1)
BUN SERPL-MCNC: 7 MG/DL (ref 6–23)
C PNEUM DNA NPH QL NAA+NON-PROBE: NOT DETECTED
CALCIUM SERPL-MCNC: 8.2 MG/DL (ref 8.3–10.6)
CARBON MONOXIDE, BLOOD: 2.3 % (ref 0–5)
CHLORIDE BLD-SCNC: 100 MMOL/L (ref 99–110)
CO2 CONTENT: 41.9 MMOL/L (ref 21–32)
CO2: 31 MMOL/L (ref 21–32)
COMMENT: ABNORMAL
COMMENT: ABNORMAL
CREAT SERPL-MCNC: 0.4 MG/DL (ref 0.6–1.1)
DIFFERENTIAL TYPE: ABNORMAL
EKG ATRIAL RATE: 122 BPM
EKG DIAGNOSIS: NORMAL
EKG P AXIS: 75 DEGREES
EKG P-R INTERVAL: 142 MS
EKG Q-T INTERVAL: 320 MS
EKG QRS DURATION: 80 MS
EKG QTC CALCULATION (BAZETT): 456 MS
EKG R AXIS: 69 DEGREES
EKG T AXIS: 69 DEGREES
EKG VENTRICULAR RATE: 122 BPM
EOSINOPHILS ABSOLUTE: 0 K/CU MM
EOSINOPHILS RELATIVE PERCENT: 0 % (ref 0–3)
FLUAV H1 2009 PAN RNA NPH NAA+NON-PROBE: NOT DETECTED
FLUAV H1 RNA NPH QL NAA+NON-PROBE: NOT DETECTED
FLUAV H3 RNA NPH QL NAA+NON-PROBE: NOT DETECTED
FLUAV RNA NPH QL NAA+NON-PROBE: NOT DETECTED
FLUBV RNA NPH QL NAA+NON-PROBE: NOT DETECTED
GFR SERPL CREATININE-BSD FRML MDRD: >90 ML/MIN/1.73M2
GLUCOSE SERPL-MCNC: 162 MG/DL (ref 70–99)
HADV DNA NPH QL NAA+NON-PROBE: NOT DETECTED
HCO3 ARTERIAL: 39.4 MMOL/L (ref 21–28)
HCO3 VENOUS: 37.6 MMOL/L (ref 22–29)
HCOV 229E RNA NPH QL NAA+NON-PROBE: NOT DETECTED
HCOV HKU1 RNA NPH QL NAA+NON-PROBE: NOT DETECTED
HCOV NL63 RNA NPH QL NAA+NON-PROBE: NOT DETECTED
HCOV OC43 RNA NPH QL NAA+NON-PROBE: NOT DETECTED
HCT VFR BLD CALC: 41.1 % (ref 37–47)
HEMOGLOBIN: 12.5 GM/DL (ref 12.5–16)
HMPV RNA NPH QL NAA+NON-PROBE: NOT DETECTED
HPIV1 RNA NPH QL NAA+NON-PROBE: NOT DETECTED
HPIV2 RNA NPH QL NAA+NON-PROBE: NOT DETECTED
HPIV3 RNA NPH QL NAA+NON-PROBE: NOT DETECTED
HPIV4 RNA NPH QL NAA+NON-PROBE: NOT DETECTED
IMMATURE NEUTROPHIL %: 0.8 % (ref 0–0.43)
INFLUENZA A ANTIGEN: NOT DETECTED
INFLUENZA B ANTIGEN: NOT DETECTED
INR BLD: 1 INDEX
LYMPHOCYTES ABSOLUTE: 1.3 K/CU MM
LYMPHOCYTES RELATIVE PERCENT: 12.7 % (ref 24–44)
M PNEUMO DNA NPH QL NAA+NON-PROBE: NOT DETECTED
MCH RBC QN AUTO: 28.4 PG (ref 27–31)
MCHC RBC AUTO-ENTMCNC: 30.4 % (ref 32–36)
MCV RBC AUTO: 93.4 FL (ref 78–100)
METHEMOGLOBIN ARTERIAL: 1.5 %
MONOCYTES ABSOLUTE: 0.1 K/CU MM
MONOCYTES RELATIVE PERCENT: 0.5 % (ref 0–4)
NEUTROPHILS RELATIVE PERCENT: 85.8 % (ref 36–66)
NUCLEATED RBC %: 0 %
O2 SAT, VEN: 93.4 % (ref 50–70)
O2 SATURATION: 95.2 % (ref 94–98)
PCO2 ARTERIAL: 80 MMHG (ref 35–48)
PCO2, VEN: 80 MMHG (ref 41–51)
PDW BLD-RTO: 12.2 % (ref 11.7–14.9)
PH BLOOD: 7.3 (ref 7.35–7.45)
PH VENOUS: 7.28 (ref 7.32–7.43)
PLATELET # BLD: 245 K/CU MM (ref 140–440)
PMV BLD AUTO: 9.7 FL (ref 7.5–11.1)
PO2 ARTERIAL: 91 MMHG (ref 83–108)
PO2, VEN: 74 MMHG (ref 28–48)
POTASSIUM SERPL-SCNC: 4.4 MMOL/L (ref 3.5–5.1)
PROTHROMBIN TIME: 13.4 SECONDS (ref 11.7–14.5)
RBC # BLD: 4.4 M/CU MM (ref 4.2–5.4)
RSV RNA NPH QL NAA+NON-PROBE: NOT DETECTED
RV+EV RNA NPH QL NAA+NON-PROBE: NOT DETECTED
SARS-COV-2 RDRP RESP QL NAA+PROBE: NOT DETECTED
SARS-COV-2 RNA NPH QL NAA+NON-PROBE: NOT DETECTED
SEGMENTED NEUTROPHILS ABSOLUTE COUNT: 8.4 K/CU MM
SODIUM BLD-SCNC: 140 MMOL/L (ref 135–145)
SOURCE: NORMAL
TOTAL IMMATURE NEUTOROPHIL: 0.08 K/CU MM
TOTAL NUCLEATED RBC: 0 K/CU MM
TOTAL PROTEIN: 6.4 GM/DL (ref 6.4–8.2)
WBC # BLD: 9.8 K/CU MM (ref 4–10.5)

## 2024-04-24 PROCEDURE — 93010 ELECTROCARDIOGRAM REPORT: CPT | Performed by: INTERNAL MEDICINE

## 2024-04-24 PROCEDURE — 36415 COLL VENOUS BLD VENIPUNCTURE: CPT

## 2024-04-24 PROCEDURE — 85610 PROTHROMBIN TIME: CPT

## 2024-04-24 PROCEDURE — 6370000000 HC RX 637 (ALT 250 FOR IP): Performed by: STUDENT IN AN ORGANIZED HEALTH CARE EDUCATION/TRAINING PROGRAM

## 2024-04-24 PROCEDURE — 94660 CPAP INITIATION&MGMT: CPT

## 2024-04-24 PROCEDURE — 36600 WITHDRAWAL OF ARTERIAL BLOOD: CPT

## 2024-04-24 PROCEDURE — 2140000000 HC CCU INTERMEDIATE R&B

## 2024-04-24 PROCEDURE — 94761 N-INVAS EAR/PLS OXIMETRY MLT: CPT

## 2024-04-24 PROCEDURE — 80053 COMPREHEN METABOLIC PANEL: CPT

## 2024-04-24 PROCEDURE — 6360000002 HC RX W HCPCS: Performed by: STUDENT IN AN ORGANIZED HEALTH CARE EDUCATION/TRAINING PROGRAM

## 2024-04-24 PROCEDURE — 6360000002 HC RX W HCPCS: Performed by: FAMILY MEDICINE

## 2024-04-24 PROCEDURE — 5A09357 ASSISTANCE WITH RESPIRATORY VENTILATION, LESS THAN 24 CONSECUTIVE HOURS, CONTINUOUS POSITIVE AIRWAY PRESSURE: ICD-10-PCS | Performed by: STUDENT IN AN ORGANIZED HEALTH CARE EDUCATION/TRAINING PROGRAM

## 2024-04-24 PROCEDURE — 0202U NFCT DS 22 TRGT SARS-COV-2: CPT

## 2024-04-24 PROCEDURE — 85025 COMPLETE CBC W/AUTO DIFF WBC: CPT

## 2024-04-24 PROCEDURE — 2700000000 HC OXYGEN THERAPY PER DAY

## 2024-04-24 PROCEDURE — 2580000003 HC RX 258: Performed by: STUDENT IN AN ORGANIZED HEALTH CARE EDUCATION/TRAINING PROGRAM

## 2024-04-24 PROCEDURE — 82803 BLOOD GASES ANY COMBINATION: CPT

## 2024-04-24 PROCEDURE — 6360000002 HC RX W HCPCS: Performed by: EMERGENCY MEDICINE

## 2024-04-24 PROCEDURE — 82805 BLOOD GASES W/O2 SATURATION: CPT

## 2024-04-24 PROCEDURE — 94640 AIRWAY INHALATION TREATMENT: CPT

## 2024-04-24 RX ORDER — IPRATROPIUM BROMIDE AND ALBUTEROL SULFATE 2.5; .5 MG/3ML; MG/3ML
1 SOLUTION RESPIRATORY (INHALATION)
Status: DISCONTINUED | OUTPATIENT
Start: 2024-04-24 | End: 2024-04-24

## 2024-04-24 RX ORDER — ACETAMINOPHEN 650 MG/1
650 SUPPOSITORY RECTAL EVERY 6 HOURS PRN
Status: DISCONTINUED | OUTPATIENT
Start: 2024-04-24 | End: 2024-04-28 | Stop reason: HOSPADM

## 2024-04-24 RX ORDER — LEVOFLOXACIN 5 MG/ML
500 INJECTION, SOLUTION INTRAVENOUS EVERY 24 HOURS
Status: DISCONTINUED | OUTPATIENT
Start: 2024-04-25 | End: 2024-04-28 | Stop reason: HOSPADM

## 2024-04-24 RX ORDER — PREGABALIN 100 MG/1
100 CAPSULE ORAL 3 TIMES DAILY
Status: DISCONTINUED | OUTPATIENT
Start: 2024-04-24 | End: 2024-04-24

## 2024-04-24 RX ORDER — ACETAMINOPHEN 325 MG/1
650 TABLET ORAL EVERY 6 HOURS PRN
Status: DISCONTINUED | OUTPATIENT
Start: 2024-04-24 | End: 2024-04-28 | Stop reason: HOSPADM

## 2024-04-24 RX ORDER — ASPIRIN 81 MG/1
81 TABLET, CHEWABLE ORAL DAILY
Status: DISCONTINUED | OUTPATIENT
Start: 2024-04-24 | End: 2024-04-28 | Stop reason: HOSPADM

## 2024-04-24 RX ORDER — SODIUM CHLORIDE 0.9 % (FLUSH) 0.9 %
5-40 SYRINGE (ML) INJECTION EVERY 12 HOURS SCHEDULED
Status: DISCONTINUED | OUTPATIENT
Start: 2024-04-24 | End: 2024-04-28 | Stop reason: HOSPADM

## 2024-04-24 RX ORDER — ONDANSETRON 2 MG/ML
4 INJECTION INTRAMUSCULAR; INTRAVENOUS EVERY 6 HOURS PRN
Status: DISCONTINUED | OUTPATIENT
Start: 2024-04-24 | End: 2024-04-28 | Stop reason: HOSPADM

## 2024-04-24 RX ORDER — ENOXAPARIN SODIUM 100 MG/ML
40 INJECTION SUBCUTANEOUS DAILY
Status: DISCONTINUED | OUTPATIENT
Start: 2024-04-24 | End: 2024-04-28 | Stop reason: HOSPADM

## 2024-04-24 RX ORDER — BUPRENORPHINE AND NALOXONE 8; 2 MG/1; MG/1
1 FILM, SOLUBLE BUCCAL; SUBLINGUAL 3 TIMES DAILY
Status: DISCONTINUED | OUTPATIENT
Start: 2024-04-24 | End: 2024-04-28 | Stop reason: HOSPADM

## 2024-04-24 RX ORDER — PREDNISONE 20 MG/1
40 TABLET ORAL DAILY
Status: DISCONTINUED | OUTPATIENT
Start: 2024-04-24 | End: 2024-04-26

## 2024-04-24 RX ORDER — POTASSIUM CHLORIDE 7.45 MG/ML
10 INJECTION INTRAVENOUS PRN
Status: DISCONTINUED | OUTPATIENT
Start: 2024-04-24 | End: 2024-04-28 | Stop reason: HOSPADM

## 2024-04-24 RX ORDER — SODIUM CHLORIDE 0.9 % (FLUSH) 0.9 %
5-40 SYRINGE (ML) INJECTION PRN
Status: DISCONTINUED | OUTPATIENT
Start: 2024-04-24 | End: 2024-04-28 | Stop reason: HOSPADM

## 2024-04-24 RX ORDER — POLYETHYLENE GLYCOL 3350 17 G/17G
17 POWDER, FOR SOLUTION ORAL DAILY PRN
Status: DISCONTINUED | OUTPATIENT
Start: 2024-04-24 | End: 2024-04-28 | Stop reason: HOSPADM

## 2024-04-24 RX ORDER — PROCHLORPERAZINE EDISYLATE 5 MG/ML
10 INJECTION INTRAMUSCULAR; INTRAVENOUS ONCE
Status: COMPLETED | OUTPATIENT
Start: 2024-04-24 | End: 2024-04-24

## 2024-04-24 RX ORDER — DEXTROAMPHETAMINE SACCHARATE, AMPHETAMINE ASPARTATE MONOHYDRATE, DEXTROAMPHETAMINE SULFATE AND AMPHETAMINE SULFATE 7.5; 7.5; 7.5; 7.5 MG/1; MG/1; MG/1; MG/1
30 CAPSULE, EXTENDED RELEASE ORAL DAILY
Status: DISCONTINUED | OUTPATIENT
Start: 2024-04-24 | End: 2024-04-28 | Stop reason: HOSPADM

## 2024-04-24 RX ORDER — IPRATROPIUM BROMIDE AND ALBUTEROL SULFATE 2.5; .5 MG/3ML; MG/3ML
1 SOLUTION RESPIRATORY (INHALATION) EVERY 4 HOURS
Status: DISCONTINUED | OUTPATIENT
Start: 2024-04-24 | End: 2024-04-28 | Stop reason: HOSPADM

## 2024-04-24 RX ORDER — TRAZODONE HYDROCHLORIDE 50 MG/1
300 TABLET ORAL NIGHTLY
Status: DISCONTINUED | OUTPATIENT
Start: 2024-04-24 | End: 2024-04-28 | Stop reason: HOSPADM

## 2024-04-24 RX ORDER — ONDANSETRON 4 MG/1
4 TABLET, ORALLY DISINTEGRATING ORAL EVERY 8 HOURS PRN
Status: DISCONTINUED | OUTPATIENT
Start: 2024-04-24 | End: 2024-04-28 | Stop reason: HOSPADM

## 2024-04-24 RX ORDER — SODIUM CHLORIDE 9 MG/ML
INJECTION, SOLUTION INTRAVENOUS PRN
Status: DISCONTINUED | OUTPATIENT
Start: 2024-04-24 | End: 2024-04-28 | Stop reason: HOSPADM

## 2024-04-24 RX ORDER — MAGNESIUM SULFATE IN WATER 40 MG/ML
2000 INJECTION, SOLUTION INTRAVENOUS PRN
Status: DISCONTINUED | OUTPATIENT
Start: 2024-04-24 | End: 2024-04-28 | Stop reason: HOSPADM

## 2024-04-24 RX ORDER — BUDESONIDE AND FORMOTEROL FUMARATE DIHYDRATE 160; 4.5 UG/1; UG/1
2 AEROSOL RESPIRATORY (INHALATION)
Status: DISCONTINUED | OUTPATIENT
Start: 2024-04-24 | End: 2024-04-28 | Stop reason: HOSPADM

## 2024-04-24 RX ORDER — POTASSIUM CHLORIDE 20 MEQ/1
40 TABLET, EXTENDED RELEASE ORAL PRN
Status: DISCONTINUED | OUTPATIENT
Start: 2024-04-24 | End: 2024-04-28 | Stop reason: HOSPADM

## 2024-04-24 RX ADMIN — POLYETHYLENE GLYCOL 3350 17 G: 17 POWDER, FOR SOLUTION ORAL at 21:12

## 2024-04-24 RX ADMIN — IPRATROPIUM BROMIDE AND ALBUTEROL SULFATE 1 DOSE: .5; 2.5 SOLUTION RESPIRATORY (INHALATION) at 10:00

## 2024-04-24 RX ADMIN — SALINE NASAL SPRAY 2 SPRAY: 1.5 SOLUTION NASAL at 17:07

## 2024-04-24 RX ADMIN — SODIUM CHLORIDE, PRESERVATIVE FREE 10 ML: 5 INJECTION INTRAVENOUS at 08:42

## 2024-04-24 RX ADMIN — TRAZODONE HYDROCHLORIDE 300 MG: 50 TABLET ORAL at 20:35

## 2024-04-24 RX ADMIN — BUPRENORPHINE AND NALOXONE 1 FILM: 8; 2 FILM BUCCAL; SUBLINGUAL at 17:31

## 2024-04-24 RX ADMIN — PREDNISONE 40 MG: 20 TABLET ORAL at 08:42

## 2024-04-24 RX ADMIN — BUDESONIDE AND FORMOTEROL FUMARATE DIHYDRATE 2 PUFF: 160; 4.5 AEROSOL RESPIRATORY (INHALATION) at 19:31

## 2024-04-24 RX ADMIN — SODIUM CHLORIDE, PRESERVATIVE FREE 10 ML: 5 INJECTION INTRAVENOUS at 20:37

## 2024-04-24 RX ADMIN — IPRATROPIUM BROMIDE AND ALBUTEROL SULFATE 1 DOSE: .5; 2.5 SOLUTION RESPIRATORY (INHALATION) at 15:04

## 2024-04-24 RX ADMIN — ENOXAPARIN SODIUM 40 MG: 100 INJECTION SUBCUTANEOUS at 09:41

## 2024-04-24 RX ADMIN — ONDANSETRON 4 MG: 2 INJECTION INTRAMUSCULAR; INTRAVENOUS at 03:43

## 2024-04-24 RX ADMIN — BUPRENORPHINE AND NALOXONE 1 FILM: 8; 2 FILM BUCCAL; SUBLINGUAL at 20:35

## 2024-04-24 RX ADMIN — LEVOFLOXACIN 500 MG: 5 INJECTION, SOLUTION INTRAVENOUS at 02:37

## 2024-04-24 RX ADMIN — SALINE NASAL SPRAY 2 SPRAY: 1.5 SOLUTION NASAL at 20:37

## 2024-04-24 RX ADMIN — BUDESONIDE AND FORMOTEROL FUMARATE DIHYDRATE 2 PUFF: 160; 4.5 AEROSOL RESPIRATORY (INHALATION) at 10:03

## 2024-04-24 RX ADMIN — PROCHLORPERAZINE EDISYLATE 10 MG: 5 INJECTION INTRAMUSCULAR; INTRAVENOUS at 04:25

## 2024-04-24 RX ADMIN — ACETAMINOPHEN 650 MG: 325 TABLET ORAL at 20:37

## 2024-04-24 RX ADMIN — IPRATROPIUM BROMIDE AND ALBUTEROL SULFATE 1 DOSE: .5; 2.5 SOLUTION RESPIRATORY (INHALATION) at 19:31

## 2024-04-24 RX ADMIN — ASPIRIN 81 MG: 81 TABLET, CHEWABLE ORAL at 08:42

## 2024-04-24 ASSESSMENT — PAIN SCALES - GENERAL: PAINLEVEL_OUTOF10: 9

## 2024-04-24 ASSESSMENT — PAIN DESCRIPTION - DESCRIPTORS: DESCRIPTORS: ACHING;THROBBING

## 2024-04-24 ASSESSMENT — PAIN DESCRIPTION - ONSET: ONSET: ON-GOING

## 2024-04-24 ASSESSMENT — PAIN DESCRIPTION - FREQUENCY: FREQUENCY: CONTINUOUS

## 2024-04-24 ASSESSMENT — PAIN DESCRIPTION - LOCATION: LOCATION: ARM;SHOULDER

## 2024-04-24 ASSESSMENT — PAIN DESCRIPTION - PAIN TYPE: TYPE: CHRONIC PAIN

## 2024-04-24 ASSESSMENT — PAIN DESCRIPTION - ORIENTATION: ORIENTATION: LEFT

## 2024-04-24 NOTE — PLAN OF CARE
Problem: Discharge Planning  Goal: Discharge to home or other facility with appropriate resources  4/24/2024 1012 by Beth Srinivasan RN  Outcome: Progressing  4/24/2024 0554 by Mimi Case RN  Outcome: Progressing     Problem: Safety - Adult  Goal: Free from fall injury  4/24/2024 1012 by Beth Srinivasan, RN  Outcome: Progressing  4/24/2024 0554 by Mimi Case RN  Outcome: Progressing

## 2024-04-24 NOTE — CARE COORDINATION
Attempted to see pt for d/c planning.  Pt is resting in bed with eyes closed and is on bi-pap. CM will attempt to see pt tomorrow for d/c planning.  TE

## 2024-04-24 NOTE — PROGRESS NOTES
4 Eyes Skin Assessment     NAME:  Kalee Chicas  YOB: 1960  MEDICAL RECORD NUMBER:  [id number]    The patient is being assessed for  Admission    I agree that at least one RN has performed a thorough Head to Toe Skin Assessment on the patient. ALL assessment sites listed below have been assessed.      Areas assessed by both nurses:    Head, Face, Ears, Shoulders, Back, Chest, Arms, Elbows, Hands, Sacrum. Buttock, Coccyx, Ischium, and Legs. Feet and Heels        Does the Patient have a Wound? No noted wound(s)       Clif Prevention initiated by RN: Yes  Wound Care Orders initiated by RN: No    Pressure Injury (Stage 3,4, Unstageable, DTI, NWPT, and Complex wounds) if present, place Wound referral order by RN under : No    New Ostomies, if present place, Ostomy referral order under : No     Nurse 1 eSignature: Electronically signed by Mimi Case RN on 4/24/24 at 5:57 AM EDT    **SHARE this note so that the co-signing nurse can place an eSignature**    Nurse 2 eSignature: Electronically signed by Alejandrina Betts RN on 4/24/24 at 6:50 AM EDT

## 2024-04-24 NOTE — ED NOTES
ED TO INPATIENT SBAR HANDOFF    Patient Name: Kalee Chicas   :  1960  63 y.o.   Preferred Name  Kalee  Family/Caregiver Present no   Restraints no   C-SSRS: Risk of Suicide: No Risk  Sitter no   Sepsis Risk Score Sepsis Risk Score: 1.42      Situation  Chief Complaint   Patient presents with    Shortness of Breath     Brief Description of Patient's Condition: Kalee Chicas is a 63 y.o. female that presents with complaint of shortness of breath.  Patient came by EMS complaint of shortness of breath.  She has a history of COPD she wears 4 L of oxygen all the time BiPAP at night she started getting short of breath today she has been coughing up green sputum.  Denies any fevers nausea vomiting chest pain just cough shortness of breath wheezing.  No other questions or concerns.  On arrival she is tachypneic, tachycardic. She is now on bipap and feeling much better.   Mental Status: oriented, alert, and coherent  Arrived from: home    Imaging:   XR CHEST PORTABLE   Final Result      Mild pulmonary vascular congestion.      Electronically signed by MD Eugenio Quinteros        Abnormal labs:   Abnormal Labs Reviewed   CBC WITH AUTO DIFFERENTIAL - Abnormal; Notable for the following components:       Result Value    WBC 12.9 (*)     MCHC 30.6 (*)     Monocytes % 8.1 (*)     Eosinophils % 4.2 (*)     All other components within normal limits   COMPREHENSIVE METABOLIC PANEL - Abnormal; Notable for the following components:    Chloride 95 (*)     CO2 36 (*)     Anion Gap 6 (*)     Glucose 123 (*)     Creatinine 0.4 (*)     All other components within normal limits   BLOOD GAS, VENOUS - Abnormal; Notable for the following components:    pH, [place]deep 7.29 (*)     pCO2, Navdeep 93 (*)     pO2, Navdeep 124 (*)     Base Exc, Mixed 13.7 (*)     HCO3, Venous 44.7 (*)     O2 Sat, Navdeep 94.6 (*)     All other components within normal limits   BLOOD GAS, VENOUS - Abnormal; Notable for the following components:    pH, Navdeep 7.28 (*)     pCO2, Navdeep

## 2024-04-24 NOTE — ED PROVIDER NOTES
[facility]      TRIAGE CHIEF COMPLAINT:   Shortness of Breath      [place]:  Kalee Chicas is a 63 y.o. female that presents with complaint of shortness of breath.  Patient came by EMS complaint of shortness of breath.  She has a history of COPD she wears 4 L of oxygen all the time BiPAP at night she started getting short of breath today she has been coughing up green sputum.  Denies any fevers nausea vomiting chest pain just cough shortness of breath wheezing.  No other questions or concerns.  On arrival she is tachypneic, tachycardic.    REVIEW OF SYSTEMS:  At least 10 systems reviewed and otherwise acutely negative except as in the [place].    Review of Systems   Constitutional:  Positive for fatigue.   HENT:  Positive for congestion.    Eyes: Negative.    Respiratory:  Positive for cough, shortness of breath and wheezing.    Cardiovascular:  Positive for palpitations.   Gastrointestinal: Negative.    Endocrine: Negative.    Genitourinary: Negative.    Musculoskeletal: Negative.    Skin: Negative.    Allergic/Immunologic: Negative.    Neurological: Negative.    Hematological: Negative.    Psychiatric/Behavioral: Negative.     All other systems reviewed and are negative.      Past Medical History:   Diagnosis Date    ADHD 2006    COPD (chronic obstructive pulmonary disease) (HCC)     COVID-19     Drug addiction in remission (HCC)     recovering addict    Hep C w/o coma, chronic (HCC)     Pacemaker     Sleep apnea     TIA (transient ischemic attack)     per patient \"several mini strokes\"     Past Surgical History:   Procedure Laterality Date     SECTION      CHOLECYSTECTOMY  2010    done in [place]    PACEMAKER INSERTION       Family History   Problem Relation Age of Onset    Cancer Mother         unsure of type    Thyroid Cancer Mother     Alcohol Abuse Father     Other Sister         passed in car accident    Lupus Brother     Coronary Art Dis Brother      Social History  not apply route Use nightly      aspirin 81 MG chewable tablet Take 1 tablet by mouth daily      Multiple Vitamins-Minerals (WOMENS MULTIVITAMIN PO) Take 1 tablet by mouth daily      amphetamine-dextroamphetamine (ADDERALL XR) 30 MG extended release capsule Take 1 capsule by mouth daily.      OXYGEN Inhale 4 L into the lungs continuous       buprenorphine-naloxone (SUBOXONE) 8-2 MG FILM SL film Place 1 Film under the tongue 3 times daily.        No Known Allergies  Current Facility-Administered Medications   Medication Dose Route Frequency Provider Last Rate Last Admin    sodium chloride 0.9 % bolus 1,000 mL  1,000 mL IntraVENous Once Ti Wilkerson DO 1,000 mL/hr at 04/23/24 2302 1,000 mL at 04/23/24 2302    sodium chloride 0.9 % bolus 1,000 mL  1,000 mL IntraVENous Once Ti Wilkerson DO 1,000 mL/hr at 04/23/24 2301 1,000 mL at 04/23/24 2301    levoFLOXacin (LEVAQUIN) 500 MG/100ML infusion 500 mg  500 mg IntraVENous Once Ti Wilkerson DO         Current Outpatient Medications   Medication Sig Dispense Refill    predniSONE (DELTASONE) 10 MG tablet Take 1 tablet by mouth daily 40mg daily for 2 days, 20 mg daily for 2 days, 10 mg daily for 2 days, 5 mg daily for 2 days 15 tablet 0    DULERA 100-5 MCG/ACT inhaler INHALE 2 PUFFS INTO THE LUNGS 2 TIMES DAILY 1 each 5    predniSONE (DELTASONE) 10 MG tablet Take 1 tablet by mouth daily 40mg daily for 2 days, 20 mg daily for 2 days, 10 mg daily for 2 days, 5 mg daily for 2 days 15 tablet 0    azithromycin (ZITHROMAX) 250 MG tablet Take 1 tablet by mouth daily Take 500 mg once, then 250 mg daily for 4 days then stop 6 tablet 0    albuterol sulfate HFA (VENTOLIN HFA) 108 (90 Base) MCG/ACT inhaler Inhale 2 puffs into the lungs 4 times daily as needed for Wheezing 54 g 5    nicotine (NICODERM CQ) 21 MG/24HR Place 1 patch onto the skin daily as needed (Nicotine craving) 30 patch 3    miconazole (MICOTIN) 2 % powder Apply topically 2 times daily. 45 g 1    metFORMIN

## 2024-04-24 NOTE — CONSULTS
Subjective:   CHIEF COMPLAINT / HPI:  63 year old female admitted with worsening sob and thjis is accompanied by wheeze. She has cough with yellow sputum. She denies any fever or hemoptysis.      Past Medical History:  Past Medical History:   Diagnosis Date    ADHD 2006    COPD (chronic obstructive pulmonary disease) ([facility])     COVID-19     Drug addiction in remission ([facility])     recovering addict    Hep C w/o coma, chronic ([facility])     Pacemaker     Sleep apnea     TIA (transient ischemic attack)     per patient \"several mini strokes\"       Past Surgical History:        Procedure Laterality Date     SECTION      CHOLECYSTECTOMY      done in [place]    PACEMAKER INSERTION         Current Medications:    Current Facility-Administered Medications: amphetamine-dextroamphetamine (ADDERALL XR) extended release capsule 30 mg  ++NON FORMULARY++  (Patient Supplied), 30 mg, Oral, Daily  aspirin chewable tablet 81 mg, 81 mg, Oral, Daily  buprenorphine-naloxone (SUBOXONE) 8-2 MG SL film 1 Film, 1 Film, SubLINGual, TID  pregabalin (LYRICA) capsule 100 mg, 100 mg, Oral, TID  budesonide-formoterol (SYMBICORT) 160-4.5 MCG/ACT inhaler 2 puff, 2 puff, Inhalation, BID RT  traZODone (DESYREL) tablet 300 mg, 300 mg, Oral, Nightly  sodium chloride flush 0.9 % injection 5-40 mL, 5-40 mL, IntraVENous, 2 times per day  sodium chloride flush 0.9 % injection 5-40 mL, 5-40 mL, IntraVENous, PRN  0.9 % sodium chloride infusion, , IntraVENous, PRN  potassium chloride (KLOR-CON M) extended release tablet 40 mEq, 40 mEq, Oral, PRN **OR** potassium bicarb-citric acid (EFFER-K) effervescent tablet 40 mEq, 40 mEq, Oral, PRN **OR** potassium chloride 10 mEq/100 mL IVPB (Peripheral Line), 10 mEq, IntraVENous, PRN  potassium chloride 10 mEq/100 mL IVPB (Peripheral Line), 10 mEq, IntraVENous, PRN  magnesium sulfate 2000 mg in 50 mL IVPB premix, 2,000 mg, IntraVENous, PRN  ondansetron (ZOFRAN-ODT) disintegrating tablet 4 mg, 4 mg, Oral,  Thyroid Cancer Mother     Alcohol Abuse Father     Other Sister         passed in car accident    Lupus Brother     Coronary Art Dis Brother        Immunization:    There is no immunization history on file for this patient.      REVIEW OF SYSTEMS:    CONSTITUTIONAL:  negative for fevers, chills, diaphoresis, activity change, appetite change, fatigue, night sweats and unexpected weight change.   EYES:  negative for blurred vision, eye discharge, visual disturbance and icterus  HEENT:  negative for hearing loss, tinnitus, ear drainage, sinus pressure, nasal congestion, epistaxis and snoring  RESPIRATORY:  See HPI  CARDIOVASCULAR:  negative for chest pain, palpitations, exertional chest pressure/discomfort, edema, syncope  GASTROINTESTINAL:  negative for nausea, vomiting, diarrhea, constipation, blood in stool and abdominal pain  GENITOURINARY:  negative for frequency, dysuria and hematuria  HEMATOLOGIC/LYMPHATIC:  negative for easy bruising, bleeding and lymphadenopathy  ALLERGIC/IMMUNOLOGIC:  negative for recurrent infections, angioedema, anaphylaxis and drug reactions  ENDOCRINE:  negative for weight changes and diabetic symptoms including polyuria, polydipsia and polyphagia    MUSCULOSKELETAL:  negative for  pain, joint swelling, decreased range of motion and muscle weakness  NEUROLOGICAL:  negative for headaches, slurred speech, unilateral weakness  PSYCHIATRIC/BEHAVIORAL: negative for hallucinations, behavioral problems, confusion and agitation.     Objective:   PHYSICAL EXAM:      VITALS:    Vitals:    04/24/24 0725 04/24/24 0839 04/24/24 0940 04/24/24 1142   BP:  129/66  (!) 119/49   Pulse: 69 72  82   Resp: 12 14  10   Temp:  98.5 °F (36.9 °C)  97.9 °F (36.6 °C)   TempSrc:  Oral  Axillary   SpO2: 96% 98% 99% 94%   Weight:       Height:             CONSTITUTIONAL:  awake, alert, cooperative, no apparent distress, and appears stated age  NECK:  Supple, symmetrical, trachea midline, no adenopathy, thyroid

## 2024-04-24 NOTE — H&P
History and Physical      Name:  Kalee Chicas /Age/Sex: 1960  (63 y.o. female)   MRN & CSN:  [id number] & [id number] Encounter Date/Time: 2024 12:11 AM EDT   Location:  [facility] PCP: Chu Caban MD       Hospital Day: 2    Assessment and Plan:     Patient is a 63 y.o. female who presented with SOB     Acute COPD exacerbation   Acute on chronic Hypoxemic and hypercapnic Respiratory Failure   - On admit, hemodynamically stable,  in mild respiratory distress, BIPAP (4L NC at baseline)   - VBG .   - CXR without opacities/consolidation.   - Received 125mg solumedrol, Duonebs in ED    - Continue steroids   - Levaquin 500mg daily   - Continue supportive care.   - DuoNebs  - RVP pending      KING  - Continue BPAP qhs.      Chronic pain   - Continue home Suboxone, OARRS appropriate.     Major depressive disorder  - Continue home psychotropic medications.    Checklist:  Advanced directive: full, daughter ncole is HCPOA,   Diet: cardiac  DVT ppx: Lovenox      Disposition: admit to inpatient.  Estimated discharge: 4 day(s).  Current living situation: home.  Expected disposition: home.    Spoke with ED provider who recommended admission for the patient and I agree with that plan.  Personally reviewed lab studies and imaging.  EKG interpreted personally and results as stated above.  Imaging that was interpreted personally and results as stated above.    History of Present Illness:     Chief Complaint:    Chief Complaint   Patient presents with    Shortness of Breath       Patient is a 63 y.o. female with a PMHx of severe COPD on 4L NC, KING, obesity  who presented to the ED with SOB. Patient reports she is on baseline 4L NC and began experiencing sudden onset SOB associated with increased cough and sputum production. She states she was satting  93% on 4L and was SOB so switched to BIPAP with no relief of SOB. Denied any F/C, HA, dizziness, presyncope, syncope, CP, N/V, abdominal pain,  MD Rina       Medications:     Medications:    levofloxacin  500 mg IntraVENous Once        Infusions:       PRN Meds:        Data:     CBC:   Recent Labs     04/23/24 2249   WBC 12.9*   HGB 13.4      MCV 92.2   RDW 12.3   LYMPHOPCT 29.8   MONOPCT 8.1*   BASOPCT 0.6   MONOSABS 1.0   LYMPHSABS 3.8   EOSABS 0.5   BASOSABS 0.1     CMP:    Recent Labs     04/23/24  2249      K 3.9   CL 95*   CO2 36*   BUN 8   CREATININE 0.4*   GLUCOSE 123*   CALCIUM 8.8   BILITOT 0.1   ALKPHOS 87   AST 18   ALT 15     Lipids:   Lab Results   Component Value Date/Time    CHOL 239 03/30/2021 04:35 PM    HDL 53 03/30/2021 04:35 PM    TRIG 142 03/30/2021 04:35 PM     Hemoglobin A1C:   Lab Results   Component Value Date/Time    LABA1C 6.8 08/22/2023 03:22 AM     TSH: No results found for: \"TSH\"  Troponin:   Lab Results   Component Value Date/Time    TROPONINT <0.010 08/20/2023 06:10 PM    TROPONINT <0.010 03/01/2023 02:22 PM    TROPONINT <0.010 03/25/2022 11:10 PM     BNP:   Recent Labs     04/23/24 2249   PROBNP 126.6     Lactic Acid: No results for input(s): \"LACTA\" in the last 72 hours.  UA:  Lab Results   Component Value Date/Time    NITRU NEGATIVE 04/23/2024 11:30 PM    COLORU YELLOW 04/23/2024 11:30 PM    WBCUA 1 01/05/2022 08:25 PM    RBCUA <1 01/05/2022 08:25 PM    MUCUS RARE 01/05/2022 08:25 PM    TRICHOMONAS NONE SEEN 01/05/2022 08:25 PM    BACTERIA NEGATIVE 01/05/2022 08:25 PM    CLARITYU CLEAR 04/23/2024 11:30 PM    SPECGRAV <1.005 04/23/2024 11:30 PM    LEUKOCYTESUR NEGATIVE 04/23/2024 11:30 PM    UROBILINOGEN 0.2 04/23/2024 11:30 PM    BILIRUBINUR NEGATIVE 04/23/2024 11:30 PM    BLOODU NEGATIVE 04/23/2024 11:30 PM    KETUA NEGATIVE 04/23/2024 11:30 PM     Urine Cultures: No results found for: \"LABURIN\"  Blood Cultures: No results found for: \"BC\"  No results found for: \"BLOODCULT2\"  Organism: No results found for: \"ORG\"    Radiology results:  XR CHEST PORTABLE   Final Result      Mild pulmonary vascular

## 2024-04-25 LAB
ALBUMIN SERPL-MCNC: 3.6 GM/DL (ref 3.4–5)
ALP BLD-CCNC: 65 IU/L (ref 40–128)
ALT SERPL-CCNC: 12 U/L (ref 10–40)
ANION GAP SERPL CALCULATED.3IONS-SCNC: 5 MMOL/L (ref 7–16)
AST SERPL-CCNC: 14 IU/L (ref 15–37)
BASOPHILS ABSOLUTE: 0.1 K/CU MM
BASOPHILS RELATIVE PERCENT: 0.4 % (ref 0–1)
BILIRUB SERPL-MCNC: 0.2 MG/DL (ref 0–1)
BUN SERPL-MCNC: 11 MG/DL (ref 6–23)
CALCIUM SERPL-MCNC: 8.4 MG/DL (ref 8.3–10.6)
CHLORIDE BLD-SCNC: 99 MMOL/L (ref 99–110)
CO2: 36 MMOL/L (ref 21–32)
CREAT SERPL-MCNC: 0.4 MG/DL (ref 0.6–1.1)
CULTURE: NORMAL
DIFFERENTIAL TYPE: ABNORMAL
EOSINOPHILS ABSOLUTE: 0 K/CU MM
EOSINOPHILS RELATIVE PERCENT: 0.3 % (ref 0–3)
GFR SERPL CREATININE-BSD FRML MDRD: >90 ML/MIN/1.73M2
GLUCOSE SERPL-MCNC: 115 MG/DL (ref 70–99)
HCT VFR BLD CALC: 37.9 % (ref 37–47)
HEMOGLOBIN: 11.4 GM/DL (ref 12.5–16)
IMMATURE NEUTROPHIL %: 0.4 % (ref 0–0.43)
LYMPHOCYTES ABSOLUTE: 2.6 K/CU MM
LYMPHOCYTES RELATIVE PERCENT: 18.8 % (ref 24–44)
Lab: NORMAL
MCH RBC QN AUTO: 28.1 PG (ref 27–31)
MCHC RBC AUTO-ENTMCNC: 30.1 % (ref 32–36)
MCV RBC AUTO: 93.6 FL (ref 78–100)
MONOCYTES ABSOLUTE: 1.4 K/CU MM
MONOCYTES RELATIVE PERCENT: 10.2 % (ref 0–4)
NEUTROPHILS RELATIVE PERCENT: 69.9 % (ref 36–66)
NUCLEATED RBC %: 0 %
PDW BLD-RTO: 12.3 % (ref 11.7–14.9)
PLATELET # BLD: 236 K/CU MM (ref 140–440)
PMV BLD AUTO: 9.7 FL (ref 7.5–11.1)
POTASSIUM SERPL-SCNC: 4.4 MMOL/L (ref 3.5–5.1)
RBC # BLD: 4.05 M/CU MM (ref 4.2–5.4)
SEGMENTED NEUTROPHILS ABSOLUTE COUNT: 9.8 K/CU MM
SODIUM BLD-SCNC: 140 MMOL/L (ref 135–145)
SPECIMEN: NORMAL
TOTAL IMMATURE NEUTOROPHIL: 0.06 K/CU MM
TOTAL NUCLEATED RBC: 0 K/CU MM
TOTAL PROTEIN: 5.8 GM/DL (ref 6.4–8.2)
WBC # BLD: 14 K/CU MM (ref 4–10.5)

## 2024-04-25 PROCEDURE — 6360000002 HC RX W HCPCS: Performed by: STUDENT IN AN ORGANIZED HEALTH CARE EDUCATION/TRAINING PROGRAM

## 2024-04-25 PROCEDURE — 2580000003 HC RX 258: Performed by: STUDENT IN AN ORGANIZED HEALTH CARE EDUCATION/TRAINING PROGRAM

## 2024-04-25 PROCEDURE — 94660 CPAP INITIATION&MGMT: CPT

## 2024-04-25 PROCEDURE — 6360000002 HC RX W HCPCS: Performed by: FAMILY MEDICINE

## 2024-04-25 PROCEDURE — 80053 COMPREHEN METABOLIC PANEL: CPT

## 2024-04-25 PROCEDURE — 85025 COMPLETE CBC W/AUTO DIFF WBC: CPT

## 2024-04-25 PROCEDURE — 2140000000 HC CCU INTERMEDIATE R&B

## 2024-04-25 PROCEDURE — 6370000000 HC RX 637 (ALT 250 FOR IP): Performed by: STUDENT IN AN ORGANIZED HEALTH CARE EDUCATION/TRAINING PROGRAM

## 2024-04-25 PROCEDURE — 36415 COLL VENOUS BLD VENIPUNCTURE: CPT

## 2024-04-25 PROCEDURE — 94640 AIRWAY INHALATION TREATMENT: CPT

## 2024-04-25 RX ORDER — MAGNESIUM SULFATE IN WATER 40 MG/ML
2000 INJECTION, SOLUTION INTRAVENOUS ONCE
Status: COMPLETED | OUTPATIENT
Start: 2024-04-25 | End: 2024-04-25

## 2024-04-25 RX ADMIN — ASPIRIN 81 MG: 81 TABLET, CHEWABLE ORAL at 09:42

## 2024-04-25 RX ADMIN — IPRATROPIUM BROMIDE AND ALBUTEROL SULFATE 1 DOSE: .5; 2.5 SOLUTION RESPIRATORY (INHALATION) at 08:33

## 2024-04-25 RX ADMIN — SODIUM CHLORIDE, PRESERVATIVE FREE 10 ML: 5 INJECTION INTRAVENOUS at 21:18

## 2024-04-25 RX ADMIN — BUPRENORPHINE AND NALOXONE 1 FILM: 8; 2 FILM BUCCAL; SUBLINGUAL at 10:10

## 2024-04-25 RX ADMIN — BUDESONIDE AND FORMOTEROL FUMARATE DIHYDRATE 2 PUFF: 160; 4.5 AEROSOL RESPIRATORY (INHALATION) at 21:58

## 2024-04-25 RX ADMIN — IPRATROPIUM BROMIDE AND ALBUTEROL SULFATE 1 DOSE: .5; 2.5 SOLUTION RESPIRATORY (INHALATION) at 15:57

## 2024-04-25 RX ADMIN — ENOXAPARIN SODIUM 40 MG: 100 INJECTION SUBCUTANEOUS at 09:42

## 2024-04-25 RX ADMIN — IPRATROPIUM BROMIDE AND ALBUTEROL SULFATE 1 DOSE: .5; 2.5 SOLUTION RESPIRATORY (INHALATION) at 03:58

## 2024-04-25 RX ADMIN — PREDNISONE 40 MG: 20 TABLET ORAL at 09:42

## 2024-04-25 RX ADMIN — BUDESONIDE AND FORMOTEROL FUMARATE DIHYDRATE 2 PUFF: 160; 4.5 AEROSOL RESPIRATORY (INHALATION) at 08:42

## 2024-04-25 RX ADMIN — IPRATROPIUM BROMIDE AND ALBUTEROL SULFATE 1 DOSE: .5; 2.5 SOLUTION RESPIRATORY (INHALATION) at 11:05

## 2024-04-25 RX ADMIN — LEVOFLOXACIN 500 MG: 5 INJECTION, SOLUTION INTRAVENOUS at 05:54

## 2024-04-25 RX ADMIN — POLYETHYLENE GLYCOL 3350 17 G: 17 POWDER, FOR SOLUTION ORAL at 21:15

## 2024-04-25 RX ADMIN — IPRATROPIUM BROMIDE AND ALBUTEROL SULFATE 1 DOSE: .5; 2.5 SOLUTION RESPIRATORY (INHALATION) at 21:58

## 2024-04-25 RX ADMIN — SODIUM CHLORIDE, PRESERVATIVE FREE 10 ML: 5 INJECTION INTRAVENOUS at 09:42

## 2024-04-25 RX ADMIN — BUPRENORPHINE AND NALOXONE 1 FILM: 8; 2 FILM BUCCAL; SUBLINGUAL at 21:15

## 2024-04-25 RX ADMIN — MAGNESIUM SULFATE HEPTAHYDRATE 2000 MG: 40 INJECTION, SOLUTION INTRAVENOUS at 00:45

## 2024-04-25 RX ADMIN — TRAZODONE HYDROCHLORIDE 300 MG: 50 TABLET ORAL at 21:15

## 2024-04-25 NOTE — CARE COORDINATION
.CM met with pt for d/c planning.  Introduced self and updated white board. Explained reason for visit.  Pt lives alone ins a senior apartment building and requires assistance with bathing, dressing and household chores d/t her fibromyalgia. She states that she has an aide 5 days a week for a total of 40 hrs a week from [facility]. ADL's.  Pt uses a rollator for mobility. Pt  has a PCP and insurance.  She is able to afford her medication. [facility] offered and pt declined. Pt receives meals once a week for the entire week.   Pt denies any d/c needs at this time.   D/c plan is home with her  aides and family support.  Notify CM if any d/c needs arise.  TE     04/25/24 4308   Service Assessment   Patient Orientation Alert and Oriented   Cognition Alert   History Provided By Patient   Primary Caregiver Other (Comment)  (caregiver from [place])   Support Systems Children;Meals on Wheels;Home Care Staff   Patient's Healthcare Decision Maker is:   (SELF)   PCP Verified by CM Yes   Prior Functional Level Assistance with the following:;Bathing;Dressing   Current Functional Level Assistance with the following:;Bathing;Dressing;Toileting   Can patient return to prior living arrangement Yes   Ability to make needs known: Good   Family able to assist with home care needs: Yes   Financial Resources Medicare;Medicaid   Social/Functional History   Lives With Alone   Type of Home Senior housing apartment   Home Layout One level   Home Access Level entry;Elevator   Bathroom Toilet Standard   Bathroom Equipment Shower chair   Bathroom Accessibility Accessible   Home Equipment Oxygen;Rollator;Walker, rolling;Wheelchair-manual   Receives Help From Personal care attendant   Ambulation Assistance Independent   Transfer Assistance Independent   Active  No   Patient's  Info FAMILY PROVIDES TRANSPORTATION   Mode of Transportation Car   Discharge Planning   Type of Residence Apartment   Living Arrangements Alone   Current Services

## 2024-04-25 NOTE — PROGRESS NOTES
redemonstrated. Bony thorax appears unchanged.     Mild pulmonary vascular congestion. Electronically signed by MD Eugenio Quinteros      CBC:   Recent Labs     04/23/24 2249 04/24/24  0928 04/25/24  0509   WBC 12.9* 9.8 14.0*   HGB 13.4 12.5 11.4*    245 236     BMP:    Recent Labs     04/23/24 2249 04/24/24  0928 04/25/24  0509    140 140   K 3.9 4.4 4.4   CL 95* 100 99   CO2 36* 31 36*   BUN 8 7 11   CREATININE 0.4* 0.4* 0.4*   GLUCOSE 123* 162* 115*     Hepatic:   Recent Labs     04/23/24 2249 04/24/24  0928 04/25/24  0509   AST 18 17 14*   ALT 15 13 12   BILITOT 0.1 0.2 0.2   ALKPHOS 87 73 65     Lipids:   Lab Results   Component Value Date/Time    CHOL 239 03/30/2021 04:35 PM    HDL 53 03/30/2021 04:35 PM    TRIG 142 03/30/2021 04:35 PM     Hemoglobin A1C:   Lab Results   Component Value Date/Time    LABA1C 6.8 08/22/2023 03:22 AM     TSH: No results found for: \"TSH\"  Troponin:   Lab Results   Component Value Date/Time    TROPONINT <0.010 08/20/2023 06:10 PM    TROPONINT <0.010 03/01/2023 02:22 PM    TROPONINT <0.010 03/25/2022 11:10 PM     Lactic Acid: No results for input(s): \"LACTA\" in the last 72 hours.  BNP:   Recent Labs     04/23/24 2249   PROBNP 126.6     UA:  Lab Results   Component Value Date/Time    NITRU NEGATIVE 04/23/2024 11:30 PM    COLORU YELLOW 04/23/2024 11:30 PM    WBCUA 1 01/05/2022 08:25 PM    RBCUA <1 01/05/2022 08:25 PM    MUCUS RARE 01/05/2022 08:25 PM    TRICHOMONAS NONE SEEN 01/05/2022 08:25 PM    BACTERIA NEGATIVE 01/05/2022 08:25 PM    CLARITYU CLEAR 04/23/2024 11:30 PM    SPECGRAV <1.005 04/23/2024 11:30 PM    LEUKOCYTESUR NEGATIVE 04/23/2024 11:30 PM    UROBILINOGEN 0.2 04/23/2024 11:30 PM    BILIRUBINUR NEGATIVE 04/23/2024 11:30 PM    BLOODU NEGATIVE 04/23/2024 11:30 PM    KETUA NEGATIVE 04/23/2024 11:30 PM     Urine Cultures: No results found for: \"LABURIN\"  Blood Cultures: No results found for: \"BC\"  No results found for: \"BLOODCULT2\"  Organism: No results found  for: \"ORG\"      Electronically signed by Ventura Lundy MD on 4/25/2024 at 8:43 AM

## 2024-04-25 NOTE — PROGRESS NOTES
pulmonary      SUBJECTIVE:  seen early am and sleeping     OBJECTIVE    VITALS:  BP (!) 103/54   Pulse 78   Temp 98.6 °F (37 °C) (Axillary)   Resp 14   Ht 1.626 m (5' 4\")   Wt 94.7 kg (208 lb 11.2 oz)   SpO2 97%   BMI 35.82 kg/m²   HEAD AND FACE EXAM:  No throat injection, no active exudate,no thrush  NECK EXAM;No JVD, no masses, symmetrical  CHEST EXAM; Expansion equal and symmetrical, no masses  LUNG EXAM; Good breath sounds bilaterally. There are expiratory wheezes both lungs, there are crackles at both lung bases  CARDIOVASCULAR EXAM: Positive S1 and S2, no S3 or S4, no clicks ,no murmurs            LABS   Lab Results   Component Value Date    WBC 14.0 (H) 04/25/2024    HGB 11.4 (L) 04/25/2024    HCT 37.9 04/25/2024    MCV 93.6 04/25/2024     04/25/2024     Lab Results   Component Value Date    CREATININE 0.4 (L) 04/25/2024    BUN 11 04/25/2024     04/25/2024    K 4.4 04/25/2024    CL 99 04/25/2024    CO2 36 (H) 04/25/2024     Lab Results   Component Value Date    INR 1.0 04/24/2024    PROTIME 13.4 04/24/2024          Lab Results   Component Value Date/Time    PHOS 3.2 12/26/2023 06:00 AM    PHOS 4.0 03/10/2023 12:06 AM    PHOS 2.6 05/24/2021 03:50 AM        Recent Labs     04/24/24  0845   PH 7.30*   PO2ART 91   JGL7IDG 80.0*   O2SAT 95.2         Wt Readings from Last 3 Encounters:   04/25/24 94.7 kg (208 lb 11.2 oz)   12/26/23 99.8 kg (220 lb 0.3 oz)   10/25/23 105.7 kg (233 lb)               ASSESMENT  Ac on ch resp failure  Ac copd  rashad        PLAN  Bd rx  Steroids  On levaquin  Continue bipap as pt uses it at home  Slowly increase activity    4/25/2024  Lior Villeda MD, M.D.

## 2024-04-25 NOTE — PLAN OF CARE
Problem: Discharge Planning  Goal: Discharge to home or other facility with appropriate resources  4/25/2024 0944 by Lori Allison RN  Outcome: Progressing  4/25/2024 0055 by Nikkie Medrano RN  Outcome: Progressing  4/25/2024 0054 by Nikkie Medrano RN  Outcome: Progressing     Problem: Safety - Adult  Goal: Free from fall injury  4/25/2024 0944 by Lori Allison RN  Outcome: Progressing  4/25/2024 0055 by Nikkie Medrano RN  Outcome: Progressing  4/25/2024 0054 by Nikkie Medrano RN  Outcome: Progressing     Problem: ABCDS Injury Assessment  Goal: Absence of physical injury  4/25/2024 0944 by Lori Allison RN  Outcome: Progressing  4/25/2024 0055 by Nikkie Medrano RN  Outcome: Progressing  4/25/2024 0054 by Nikkie Medrano RN  Outcome: Progressing     Problem: Pain  Goal: Verbalizes/displays adequate comfort level or baseline comfort level  4/25/2024 0944 by Lori Allison RN  Outcome: Progressing  4/25/2024 0055 by Nikkie Medrano RN  Outcome: Progressing  4/25/2024 0054 by Nikkie Medrano RN  Outcome: Progressing

## 2024-04-26 LAB
ALBUMIN SERPL-MCNC: 3.7 GM/DL (ref 3.4–5)
ALP BLD-CCNC: 62 IU/L (ref 40–128)
ALT SERPL-CCNC: 13 U/L (ref 10–40)
ANION GAP SERPL CALCULATED.3IONS-SCNC: 4 MMOL/L (ref 7–16)
AST SERPL-CCNC: 14 IU/L (ref 15–37)
BASE EXCESS MIXED: 19.2 (ref 0–3)
BASOPHILS ABSOLUTE: 0.1 K/CU MM
BASOPHILS RELATIVE PERCENT: 0.4 % (ref 0–1)
BILIRUB SERPL-MCNC: 0.2 MG/DL (ref 0–1)
BUN SERPL-MCNC: 16 MG/DL (ref 6–23)
CALCIUM SERPL-MCNC: 8.6 MG/DL (ref 8.3–10.6)
CHLORIDE BLD-SCNC: 99 MMOL/L (ref 99–110)
CO2: 39 MMOL/L (ref 21–32)
COMMENT: ABNORMAL
CREAT SERPL-MCNC: 0.5 MG/DL (ref 0.6–1.1)
DIFFERENTIAL TYPE: ABNORMAL
EOSINOPHILS ABSOLUTE: 0 K/CU MM
EOSINOPHILS RELATIVE PERCENT: 0.3 % (ref 0–3)
GFR SERPL CREATININE-BSD FRML MDRD: >90 ML/MIN/1.73M2
GLUCOSE SERPL-MCNC: 149 MG/DL (ref 70–99)
HCO3 VENOUS: 49.1 MMOL/L (ref 22–29)
HCT VFR BLD CALC: 37.5 % (ref 37–47)
HEMOGLOBIN: 11.2 GM/DL (ref 12.5–16)
IMMATURE NEUTROPHIL %: 0.6 % (ref 0–0.43)
LYMPHOCYTES ABSOLUTE: 2.5 K/CU MM
LYMPHOCYTES RELATIVE PERCENT: 22.3 % (ref 24–44)
MCH RBC QN AUTO: 28.4 PG (ref 27–31)
MCHC RBC AUTO-ENTMCNC: 29.9 % (ref 32–36)
MCV RBC AUTO: 94.9 FL (ref 78–100)
MONOCYTES ABSOLUTE: 1.1 K/CU MM
MONOCYTES RELATIVE PERCENT: 9.6 % (ref 0–4)
NEUTROPHILS RELATIVE PERCENT: 66.8 % (ref 36–66)
NUCLEATED RBC %: 0 %
O2 SAT, VEN: 90.6 % (ref 50–70)
PCO2, VEN: 83 MMHG (ref 41–51)
PDW BLD-RTO: 12.2 % (ref 11.7–14.9)
PH VENOUS: 7.38 (ref 7.32–7.43)
PLATELET # BLD: 248 K/CU MM (ref 140–440)
PMV BLD AUTO: 9.5 FL (ref 7.5–11.1)
PO2, VEN: 59 MMHG (ref 28–48)
POTASSIUM SERPL-SCNC: 4.5 MMOL/L (ref 3.5–5.1)
RBC # BLD: 3.95 M/CU MM (ref 4.2–5.4)
SEGMENTED NEUTROPHILS ABSOLUTE COUNT: 7.5 K/CU MM
SODIUM BLD-SCNC: 142 MMOL/L (ref 135–145)
TOTAL IMMATURE NEUTOROPHIL: 0.07 K/CU MM
TOTAL NUCLEATED RBC: 0 K/CU MM
TOTAL PROTEIN: 6 GM/DL (ref 6.4–8.2)
WBC # BLD: 11.2 K/CU MM (ref 4–10.5)

## 2024-04-26 PROCEDURE — 2580000003 HC RX 258: Performed by: STUDENT IN AN ORGANIZED HEALTH CARE EDUCATION/TRAINING PROGRAM

## 2024-04-26 PROCEDURE — 6360000002 HC RX W HCPCS: Performed by: STUDENT IN AN ORGANIZED HEALTH CARE EDUCATION/TRAINING PROGRAM

## 2024-04-26 PROCEDURE — 6370000000 HC RX 637 (ALT 250 FOR IP): Performed by: STUDENT IN AN ORGANIZED HEALTH CARE EDUCATION/TRAINING PROGRAM

## 2024-04-26 PROCEDURE — 80053 COMPREHEN METABOLIC PANEL: CPT

## 2024-04-26 PROCEDURE — 94618 PULMONARY STRESS TESTING: CPT

## 2024-04-26 PROCEDURE — 85025 COMPLETE CBC W/AUTO DIFF WBC: CPT

## 2024-04-26 PROCEDURE — 94640 AIRWAY INHALATION TREATMENT: CPT

## 2024-04-26 PROCEDURE — 94660 CPAP INITIATION&MGMT: CPT

## 2024-04-26 PROCEDURE — 2140000000 HC CCU INTERMEDIATE R&B

## 2024-04-26 PROCEDURE — 36415 COLL VENOUS BLD VENIPUNCTURE: CPT

## 2024-04-26 PROCEDURE — 82805 BLOOD GASES W/O2 SATURATION: CPT

## 2024-04-26 RX ADMIN — IPRATROPIUM BROMIDE AND ALBUTEROL SULFATE 1 DOSE: .5; 2.5 SOLUTION RESPIRATORY (INHALATION) at 15:32

## 2024-04-26 RX ADMIN — ASPIRIN 81 MG: 81 TABLET, CHEWABLE ORAL at 08:57

## 2024-04-26 RX ADMIN — IPRATROPIUM BROMIDE AND ALBUTEROL SULFATE 1 DOSE: .5; 2.5 SOLUTION RESPIRATORY (INHALATION) at 11:54

## 2024-04-26 RX ADMIN — TRAZODONE HYDROCHLORIDE 300 MG: 50 TABLET ORAL at 20:34

## 2024-04-26 RX ADMIN — PREDNISONE 40 MG: 20 TABLET ORAL at 08:57

## 2024-04-26 RX ADMIN — IPRATROPIUM BROMIDE AND ALBUTEROL SULFATE 1 DOSE: .5; 2.5 SOLUTION RESPIRATORY (INHALATION) at 08:08

## 2024-04-26 RX ADMIN — ENOXAPARIN SODIUM 40 MG: 100 INJECTION SUBCUTANEOUS at 08:57

## 2024-04-26 RX ADMIN — BUDESONIDE AND FORMOTEROL FUMARATE DIHYDRATE 2 PUFF: 160; 4.5 AEROSOL RESPIRATORY (INHALATION) at 21:47

## 2024-04-26 RX ADMIN — BUPRENORPHINE AND NALOXONE 1 FILM: 8; 2 FILM BUCCAL; SUBLINGUAL at 20:35

## 2024-04-26 RX ADMIN — SODIUM CHLORIDE, PRESERVATIVE FREE 10 ML: 5 INJECTION INTRAVENOUS at 08:57

## 2024-04-26 RX ADMIN — IPRATROPIUM BROMIDE AND ALBUTEROL SULFATE 1 DOSE: .5; 2.5 SOLUTION RESPIRATORY (INHALATION) at 04:53

## 2024-04-26 RX ADMIN — BUDESONIDE AND FORMOTEROL FUMARATE DIHYDRATE 2 PUFF: 160; 4.5 AEROSOL RESPIRATORY (INHALATION) at 08:07

## 2024-04-26 RX ADMIN — WATER 40 MG: 1 INJECTION INTRAMUSCULAR; INTRAVENOUS; SUBCUTANEOUS at 20:34

## 2024-04-26 RX ADMIN — LEVOFLOXACIN 500 MG: 5 INJECTION, SOLUTION INTRAVENOUS at 05:44

## 2024-04-26 RX ADMIN — IPRATROPIUM BROMIDE AND ALBUTEROL SULFATE 1 DOSE: .5; 2.5 SOLUTION RESPIRATORY (INHALATION) at 21:45

## 2024-04-26 RX ADMIN — IPRATROPIUM BROMIDE AND ALBUTEROL SULFATE 1 DOSE: .5; 2.5 SOLUTION RESPIRATORY (INHALATION) at 01:31

## 2024-04-26 RX ADMIN — SODIUM CHLORIDE, PRESERVATIVE FREE 10 ML: 5 INJECTION INTRAVENOUS at 20:35

## 2024-04-26 RX ADMIN — BUPRENORPHINE AND NALOXONE 1 FILM: 8; 2 FILM BUCCAL; SUBLINGUAL at 08:57

## 2024-04-26 RX ADMIN — BUPRENORPHINE AND NALOXONE 1 FILM: 8; 2 FILM BUCCAL; SUBLINGUAL at 15:27

## 2024-04-26 NOTE — PROGRESS NOTES
Patient was seen in hospital for   COPD.  I am prescribing oxygen because the diagnosis and testing requires the patient to have oxygen in the home.  Conditions will improve or be benefited by oxygen use.  The patient is able to perform good mobility and therefore requires the use of a portable oxygen system for ambulation.

## 2024-04-26 NOTE — PROGRESS NOTES
pulmonary      SUBJECTIVE:  doing better     OBJECTIVE    VITALS:  /66   Pulse 83   Temp 98.4 °F (36.9 °C) (Oral)   Resp 15   Ht 1.626 m (5' 4\")   Wt 94.8 kg (209 lb)   SpO2 96%   BMI 35.87 kg/m²   HEAD AND FACE EXAM:  No throat injection, no active exudate,no thrush  NECK EXAM;No JVD, no masses, symmetrical  CHEST EXAM; Expansion equal and symmetrical, no masses  LUNG EXAM; Good breath sounds bilaterally. There are expiratory wheezes both lungs, there are crackles at both lung bases  CARDIOVASCULAR EXAM: Positive S1 and S2, no S3 or S4, no clicks ,no murmurs  RIGHT AND LEFT LOWER EXTRIMITY EXAM: No edema, no swelling, no inflamation  CNS EXAM: Alert and oriented X3          LABS   Lab Results   Component Value Date    WBC 11.2 (H) 04/26/2024    HGB 11.2 (L) 04/26/2024    HCT 37.5 04/26/2024    MCV 94.9 04/26/2024     04/26/2024     Lab Results   Component Value Date    CREATININE 0.5 (L) 04/26/2024    BUN 16 04/26/2024     04/26/2024    K 4.5 04/26/2024    CL 99 04/26/2024    CO2 39 (H) 04/26/2024     Lab Results   Component Value Date    INR 1.0 04/24/2024    PROTIME 13.4 04/24/2024          Lab Results   Component Value Date/Time    PHOS 3.2 12/26/2023 06:00 AM    PHOS 4.0 03/10/2023 12:06 AM    PHOS 2.6 05/24/2021 03:50 AM        Recent Labs     04/24/24  0845   PH 7.30*   PO2ART 91   SDN8UYM 80.0*   O2SAT 95.2         Wt Readings from Last 3 Encounters:   04/26/24 94.8 kg (209 lb)   12/26/23 99.8 kg (220 lb 0.3 oz)   10/25/23 105.7 kg (233 lb)               ASSESMENT  Ac on ch resp failure  Ac copd  rashad        PLAN  Bd rx  Steroids  Antibx  Increase activity. If well tolerated the hopefully home soon    4/26/2024  Lior Villeda MD, MAISHA.

## 2024-04-26 NOTE — PLAN OF CARE
Problem: Discharge Planning  Goal: Discharge to home or other facility with appropriate resources  4/26/2024 1053 by Lori Allison RN  Outcome: Progressing  4/26/2024 0313 by Mayo Engle RN  Outcome: Progressing     Problem: Safety - Adult  Goal: Free from fall injury  4/26/2024 1053 by Lori Allison RN  Outcome: Progressing  4/26/2024 0313 by Mayo Engle RN  Outcome: Progressing     Problem: ABCDS Injury Assessment  Goal: Absence of physical injury  4/26/2024 1053 by Lori Allison RN  Outcome: Progressing  4/26/2024 0313 by Mayo Engle RN  Outcome: Progressing     Problem: Pain  Goal: Verbalizes/displays adequate comfort level or baseline comfort level  4/26/2024 1053 by Lori Allison RN  Outcome: Progressing  4/26/2024 0313 by Mayo Engle RN  Outcome: Progressing

## 2024-04-26 NOTE — PROGRESS NOTES
V2.0    [facility] [place]      Name:  Kalee Chicas /Age/Sex: 1960  (63 y.o. female)   MRN & CSN:  [id number] & [id number] Encounter Date/Time: 2024 8:43 AM EDT   Location:  [address] [place] [address] PCP: Chu Caban MD     Attending:Ventura Lundy MD       Hospital Day: 4    Assessment and Recommendations   Kalee Chicas is a 63 y.o. female with pmh of severe COPD on baseline 4 L per nasal cannula, KING, obesity who presents with COPD exacerbation (HCC)      Plan:   Acute on chronic COPD exacerbation:  Acute on chronic hypoxemic and hypercapnic respiratory failure likely in the setting of #1:  Hemodynamically stable, in mild respiratory distress on admission, status post BiPAP  -VBG with pH 7.29/pCO2 93  -Chest x-ray without opacity/consolidation  -Status post Solu-Medrol, DuoNebs in the ED  -Currently on steroids, Levaquin and DuoNebs  -Respiratory viral panel unremarkable  -O2 as needed, keep oxygen levels 88-92%  -6-minute walk test prior to discharge    KING-BiPAP nightly  Chronic pain-continue home meds  Major depressive disorder-continue home medications      Diet ADULT DIET; Regular   DVT Prophylaxis [x] Lovenox, []  Heparin, [] SCDs, [] Ambulation,  [] Eliquis, [] Xarelto  [] Coumadin   Code Status Full Code   Disposition From: Home  Expected Disposition: Home  Estimated Date of Discharge: 2 days  Patient requires continued admission due to COPD exacerbation   Surrogate Decision Maker/ POA Self/child     Personally reviewed Lab Studies and Imaging     Subjective:     Chief Complaint: Shortness of breath    aKlee Chicas is a 63 y.o. female who presents with COPD exacerbation      Review of Systems:      Pertinent positives and negatives discussed in HPI    Objective:   No intake or output data in the 24 hours ending 24 1405     Vitals:   Vitals:    24 0455 24 0535 24 0858 24 1115   BP:   109/66 109/66   Pulse: 78 73 83 (!) 125   Resp: 15 16 15 26   Temp:   98.4

## 2024-04-26 NOTE — PROGRESS NOTES
4/26/2024 1:43 PM  Patient Room #: 3105/3105-A  Patient Name: Kalee Chicas    (Step 1 Done by RN if possible otherwise call Pulmonary Diagnostics)  Place patient on room air at rest for at least 30 minutes.  If patient falls below 88% before 30 minutes then you can record the level and stop.  Record room air saturation level _86_ %.  If patient is at 88% or below, they will qualify for home oxygen and you can stop.  If level does not fall below 88%, fill in level above. If indicated continue to Step 2.   Signature:___Teddy Ceballos RRT__ Date: _04/26/2024__  (Step 2&3 Done by P)  Ambulate patient on room air until saturation falls below 89%.  Record level of room air saturation with ambulation___ %.  Next, place patient back on _6__lpm oxygen and ambulate, record level _92_%.  (Note:  this level must show improvement from room air level done with ambulation.)  If patient’s saturation on room air with ambulation is 88% or below AND patient shows improvement with oxygen during ambulation, they will qualify for home oxygen and you can stop.  If patient does not drop below 89%, then patient should have an overnight oximetry trending on room air to see if level falls below 88%.  Complete level in Step 3 below.    Room air overnight oximetry level 88 % for___  cumulative minutes.  If patient’s room air oxygen level is below <89% for any amount of time they will qualify for nocturnal home oxygen.        (Attach Night Trending Report)    Complete order below: Diagnosis:_COPD__  Home oxygen at:  Length of Need: ?X Lifetime ? 3 Months     _6__lpm or __%   via  [x] nasal cannula  []mask  [] other         [x]continuous [x]  with activity  [x]  Nocturnal   [x] Portable Tanks [x]  Concentrator  [x] Conserving Device        Therapist Signature:__Teddy Ceballos RRT_____     Date:  _04/26/2024__  Physician Signature:  __Electronically Signed in EMR_    Date:___  Physician Printed Name:  ____Ventura Lundy MD  NPI:

## 2024-04-26 NOTE — PROGRESS NOTES
Pt already has Home Oxygen and Home BiPap with [facility] (). Pt uses 4 lpm at home.  Pt must have her home oxygen brought in for discharge.

## 2024-04-26 NOTE — PROGRESS NOTES
Pt not willing to walk for Home Oxygen Evaluation at this time.  Previous walk by RN demonstrated that the pt needs more than  4 lpm Oxygen, but another walk will need to be done to demonstrate that more oxygen, and how much more oxygen, will benefit the pt.  This RT will try to walk the pt later today.

## 2024-04-26 NOTE — PROGRESS NOTES
4/26/2024 12:41 PM  Patient Room #: 3105/3105-A  Patient Name: Kalee Chicas    (Step 1 Done by RN if possible otherwise call Pulmonary Diagnostics)  Place patient on room air at rest for at least 30 minutes.  If patient falls below 88% before 30 minutes then you can record the level and stop.  Record room air saturation level __ %.  If patient is at 88% or below, they will qualify for home oxygen and you can stop.  If level does not fall below 88%, fill in level above. If indicated continue to Step 2.   Signature:_____ Date: ___  (Step 2&3 Done by P)  Ambulate patient on room air until saturation falls below 89%.  Record level of room air saturation with ambulation___ %.  Next, place patient back on ___lpm oxygen and ambulate, record level __%.  (Note:  this level must show improvement from room air level done with ambulation.)  If patient’s saturation on room air with ambulation is 88% or below AND patient shows improvement with oxygen during ambulation, they will qualify for home oxygen and you can stop.  If patient does not drop below 89%, then patient should have an overnight oximetry trending on room air to see if level falls below 88%.  Complete level in Step 3 below.    Room air overnight oximetry level 88 % for_OSA__  cumulative minutes.  If patient’s room air oxygen level is below <89% for any amount of time they will qualify for nocturnal home oxygen.        (Attach Night Trending Report)    Complete order below: Diagnosis:_COPD___  Home oxygen at:  Length of Need: ? Lifetime ? 3 Months     ___lpm or __%   via  [] nasal cannula  []mask  [] other         []continuous []  with activity  []  Nocturnal   [] Portable Tanks []  Concentrator  [] Conserving Device        Therapist Signature:_______     Date:  ___  Physician Signature:  __Electronically Signed in EMR_    Date:___  Physician Printed Name:  ____Ventura Lundy MD  NPI:  [id number]   [] Patient Qualifies      [] Patient Does NOT

## 2024-04-26 NOTE — PROGRESS NOTES
Pt's SpO2 at rest was 93% on 4L NC. Ambulated pt in [place] and her approx 50 ft SpO2 dropped to 84% on 4L  NC. Pt had several short breaks during ambulation. Pt needed to hold onto safety bars in g[place]. Pt returned to chair and recovered to 89% on 4L after one minute. Pt recovered to 93% after three minutes.

## 2024-04-26 NOTE — PROGRESS NOTES
Pt has worn the Bipap mask off and on throughout the night.      RRT never was able to catch the pt on the Bipap to chart the setting's and the different #'s to record in her chart.    Pt has had a hard time trying to sleep.

## 2024-04-26 NOTE — PROGRESS NOTES
Ambulated pt on 4 lpm oxygen via NC.  SpO2 dropped to 86%.  Ambulated pt on 6 lpm via NC. SpO2 was 92%.

## 2024-04-27 VITALS
BODY MASS INDEX: 34.88 KG/M2 | HEIGHT: 64 IN | OXYGEN SATURATION: 100 % | WEIGHT: 204.3 LBS | DIASTOLIC BLOOD PRESSURE: 70 MMHG | SYSTOLIC BLOOD PRESSURE: 133 MMHG | TEMPERATURE: 98.5 F | RESPIRATION RATE: 16 BRPM | HEART RATE: 89 BPM

## 2024-04-27 PROCEDURE — 94640 AIRWAY INHALATION TREATMENT: CPT

## 2024-04-27 PROCEDURE — 6360000002 HC RX W HCPCS: Performed by: STUDENT IN AN ORGANIZED HEALTH CARE EDUCATION/TRAINING PROGRAM

## 2024-04-27 PROCEDURE — 94761 N-INVAS EAR/PLS OXIMETRY MLT: CPT

## 2024-04-27 PROCEDURE — 6370000000 HC RX 637 (ALT 250 FOR IP): Performed by: STUDENT IN AN ORGANIZED HEALTH CARE EDUCATION/TRAINING PROGRAM

## 2024-04-27 PROCEDURE — 2700000000 HC OXYGEN THERAPY PER DAY

## 2024-04-27 PROCEDURE — 82805 BLOOD GASES W/O2 SATURATION: CPT

## 2024-04-27 PROCEDURE — 36415 COLL VENOUS BLD VENIPUNCTURE: CPT

## 2024-04-27 PROCEDURE — 2580000003 HC RX 258: Performed by: STUDENT IN AN ORGANIZED HEALTH CARE EDUCATION/TRAINING PROGRAM

## 2024-04-27 RX ORDER — LEVOFLOXACIN 500 MG/1
500 TABLET, FILM COATED ORAL DAILY
Qty: 2 TABLET | Refills: 0 | Status: SHIPPED | OUTPATIENT
Start: 2024-04-27 | End: 2024-04-29

## 2024-04-27 RX ADMIN — BUPRENORPHINE AND NALOXONE 1 FILM: 8; 2 FILM BUCCAL; SUBLINGUAL at 20:03

## 2024-04-27 RX ADMIN — POLYETHYLENE GLYCOL 3350 17 G: 17 POWDER, FOR SOLUTION ORAL at 08:19

## 2024-04-27 RX ADMIN — SODIUM CHLORIDE, PRESERVATIVE FREE 10 ML: 5 INJECTION INTRAVENOUS at 08:14

## 2024-04-27 RX ADMIN — LEVOFLOXACIN 500 MG: 5 INJECTION, SOLUTION INTRAVENOUS at 05:59

## 2024-04-27 RX ADMIN — SODIUM CHLORIDE, PRESERVATIVE FREE 10 ML: 5 INJECTION INTRAVENOUS at 20:03

## 2024-04-27 RX ADMIN — BUPRENORPHINE AND NALOXONE 1 FILM: 8; 2 FILM BUCCAL; SUBLINGUAL at 08:13

## 2024-04-27 RX ADMIN — ENOXAPARIN SODIUM 40 MG: 100 INJECTION SUBCUTANEOUS at 08:19

## 2024-04-27 RX ADMIN — IPRATROPIUM BROMIDE AND ALBUTEROL SULFATE 1 DOSE: .5; 2.5 SOLUTION RESPIRATORY (INHALATION) at 08:31

## 2024-04-27 RX ADMIN — IPRATROPIUM BROMIDE AND ALBUTEROL SULFATE 1 DOSE: .5; 2.5 SOLUTION RESPIRATORY (INHALATION) at 15:05

## 2024-04-27 RX ADMIN — BUDESONIDE AND FORMOTEROL FUMARATE DIHYDRATE 2 PUFF: 160; 4.5 AEROSOL RESPIRATORY (INHALATION) at 08:31

## 2024-04-27 RX ADMIN — ACETAMINOPHEN 650 MG: 325 TABLET ORAL at 08:19

## 2024-04-27 RX ADMIN — IPRATROPIUM BROMIDE AND ALBUTEROL SULFATE 1 DOSE: .5; 2.5 SOLUTION RESPIRATORY (INHALATION) at 12:02

## 2024-04-27 RX ADMIN — WATER 40 MG: 1 INJECTION INTRAMUSCULAR; INTRAVENOUS; SUBCUTANEOUS at 20:03

## 2024-04-27 RX ADMIN — ASPIRIN 81 MG: 81 TABLET, CHEWABLE ORAL at 08:13

## 2024-04-27 RX ADMIN — WATER 40 MG: 1 INJECTION INTRAMUSCULAR; INTRAVENOUS; SUBCUTANEOUS at 08:13

## 2024-04-27 ASSESSMENT — PAIN DESCRIPTION - ORIENTATION
ORIENTATION: LEFT
ORIENTATION: LEFT

## 2024-04-27 ASSESSMENT — PAIN DESCRIPTION - LOCATION
LOCATION: SHOULDER
LOCATION: SHOULDER

## 2024-04-27 ASSESSMENT — PAIN SCALES - GENERAL
PAINLEVEL_OUTOF10: 9
PAINLEVEL_OUTOF10: 10
PAINLEVEL_OUTOF10: 5

## 2024-04-27 ASSESSMENT — PAIN - FUNCTIONAL ASSESSMENT: PAIN_FUNCTIONAL_ASSESSMENT: ACTIVITIES ARE NOT PREVENTED

## 2024-04-27 ASSESSMENT — PAIN DESCRIPTION - DESCRIPTORS: DESCRIPTORS: ACHING

## 2024-04-27 NOTE — PROGRESS NOTES
V2.0    [facility] [place]      Name:  Kalee Chicas /Age/Sex: 1960  (63 y.o. female)   MRN & CSN:  [id number] & [id number] Encounter Date/Time: 2024 8:43 AM EDT   Location:  [address] [place] [address] PCP: Chu Caban MD     Attending:Ventura Lundy MD       Hospital Day: 5    Assessment and Recommendations   Kalee Chicas is a 63 y.o. female with pmh of severe COPD on baseline 4 L per nasal cannula, KING, obesity who presents with COPD exacerbation (HCC)      Plan:   Acute on chronic COPD exacerbation:  Acute on chronic hypoxemic and hypercapnic respiratory failure likely in the setting of #1:  Hemodynamically stable, in mild respiratory distress on admission, status post BiPAP  -VBG with pH 7.29/pCO2 93  -Chest x-ray without opacity/consolidation  -Status post Solu-Medrol, DuoNebs in the ED  -Currently on steroids, Levaquin and DuoNebs  -Respiratory viral panel unremarkable  -O2 as needed, keep oxygen levels 88-92%  -6-minute walk test prior to discharge    KING-BiPAP nightly  Chronic pain-continue home meds  Major depressive disorder-continue home medications    Patient was seen in hospital for   COPD.  I am prescribing oxygen because the diagnosis and testing requires the patient to have oxygen in the home.  Conditions will improve or be benefited by oxygen use.  The patient is able to perform good mobility and therefore requires the use of a portable oxygen system for ambulation.      Diet ADULT DIET; Regular   DVT Prophylaxis [x] Lovenox, []  Heparin, [] SCDs, [] Ambulation,  [] Eliquis, [] Xarelto  [] Coumadin   Code Status Full Code   Disposition From: Home  Expected Disposition: Home  Estimated Date of Discharge: 2 days  Patient requires continued admission due to COPD exacerbation   Surrogate Decision Maker/ POA Self/child     Personally reviewed Lab Studies and Imaging     Subjective:     Chief Complaint: Shortness of breath    Kalee Chicas is a 63 y.o. female who presents with COPD  [place] NEGATIVE 04/23/2024 11:30 PM     Urine Cultures: No results found for: \"LABURIN\"  Blood Cultures: No results found for: \"BC\"  No results found for: \"BLOODCULT2\"  Organism: No results found for: \"ORG\"      Electronically signed by Ventura Lundy MD on 4/27/2024 at 11:12 AM

## 2024-04-27 NOTE — PROGRESS NOTES
Spoke with Natalie at [facility] regarding pt new 6L requirement.  They are her current oxigen provider and understand her requirement has gone up from 4L to 6L.  They will bring her a tank to discharge home with and will change her concentrator out at home.  On-call  for [facility] will touch base with me, however, he does understand she is to be discharged home today.

## 2024-04-27 NOTE — DISCHARGE SUMMARY
V2.0  Discharge Summary    Name:  Kalee Chicas /Age/Sex: 1960 (63 y.o. female)   Admit Date: 2024  Discharge Date: 24    MRN & CSN:  [id number] & [id number] Encounter Date and Time 24 2:14 PM EDT    Attending:  Ventura Lundy MD Discharging Provider: Ventura Lundy MD       Hospital Course:     Brief HPI: As per admitting physician, \"Patient is a 63 y.o. female with a PMHx of severe COPD on 4L NC, KING, obesity  who presented to the ED with SOB. Patient reports she is on baseline 4L NC and began experiencing sudden onset SOB associated with increased cough and sputum production. She states she was satting  93% on 4L and was SOB so switched to BIPAP with no relief of SOB. Denied any F/C, HA, dizziness, presyncope, syncope, CP, N/V, abdominal pain, bleeding (hemoptysis / hematemesis, hematuria, BRBPR), C/D, or changes in urinary habits. \"    Patient was admitted for acute hypoxemic and hypercapnic respiratory failure in the setting of acute COPD exacerbation.  Patient's VBG's initially with pH 7.29/pCO2 93 improving to 7.38/pCO2 low 80s.  Patient's vital signs remained stable with as needed oxygen and patient was able to be weaned to baseline oxygen 4 L/min, however home O2 eval revealed that on ambulation, patient required 6 L/min which is higher than patient's 4 L and patient's home O2 will have to be increased.  Patient was started on Levaquin which patient received for 3 days and I will discharge patient with 2 more days.  RVP was unremarkable.  Patient was given DuoNebs as needed and IV Solu-Medrol and will discharge patient on prednisone taper.  Pulmonologist on board while inpatient and patient is improving, okay to DC patient per pulm.    Brief Problem Based Course:     Acute on chronic COPD exacerbation:  Acute on chronic hypoxemic and hypercapnic respiratory failure likely in the setting of #1:  Hemodynamically stable, in mild respiratory distress on admission, status post

## 2024-04-27 NOTE — PLAN OF CARE
Problem: Discharge Planning  Goal: Discharge to home or other facility with appropriate resources  4/27/2024 1352 by Galina Bradley RN  Outcome: Progressing  4/27/2024 0040 by Wai Mosqueda RN  Outcome: Progressing     Problem: Safety - Adult  Goal: Free from fall injury  4/27/2024 1352 by Galina Bradley RN  Outcome: Progressing  4/27/2024 0040 by Wai Mosqueda RN  Outcome: Progressing     Problem: ABCDS Injury Assessment  Goal: Absence of physical injury  4/27/2024 1352 by Galina Bradley RN  Outcome: Progressing  4/27/2024 0040 by Wai Mosqueda RN  Outcome: Progressing     Problem: Pain  Goal: Verbalizes/displays adequate comfort level or baseline comfort level  4/27/2024 1352 by Galina Bradley RN  Outcome: Progressing  4/27/2024 0040 by Wai Mosqueda RN  Outcome: Progressing

## 2024-04-27 NOTE — PROGRESS NOTES
Just spoke with Deloris at [facility].  Portable oxygen still not arrive at pt room.  Deloris said they contacted [facility] at least 6 times today, however, it looks as though [facility] is the on-.  Deloris said [facility] should be here within 1 hour.  If not, call Marnie back at [phone].

## 2024-04-27 NOTE — PROGRESS NOTES
Discharge order in for this patient. Patient needs Home O2 for 6L that will be delivered by [facility]. Yasemin RT in touch with [facility] and waiting for a return call with ETA.     Dr Lundy notified.

## 2024-04-27 NOTE — PLAN OF CARE
Problem: Discharge Planning  Goal: Discharge to home or other facility with appropriate resources  4/27/2024 0040 by Wai Mosqueda RN  Outcome: Progressing  4/26/2024 1053 by Lori Allison RN  Outcome: Progressing     Problem: Safety - Adult  Goal: Free from fall injury  4/27/2024 0040 by Wai Mosqueda RN  Outcome: Progressing  4/26/2024 1053 by Lori Allison RN  Outcome: Progressing     Problem: ABCDS Injury Assessment  Goal: Absence of physical injury  4/27/2024 0040 by Wai Mosqueda RN  Outcome: Progressing  4/26/2024 1053 by Lori Allison RN  Outcome: Progressing     Problem: Pain  Goal: Verbalizes/displays adequate comfort level or baseline comfort level  4/27/2024 0040 by Wai Mosqueda RN  Outcome: Progressing  4/26/2024 1053 by Lori Allison RN  Outcome: Progressing

## 2024-04-27 NOTE — PROGRESS NOTES
04/26/24 2147   NIV Type   NIV Started/Stopped On   Equipment Type v60   Mode Bilevel   Mask Type Full face mask   Mask Size Small   Bonnet size Small   Assessment   Pulse 73   Respirations 15   SpO2 94 %   Comfort Level Good   Using Accessory Muscles No   Mask Compliance Good   Skin Protection for O2 Device N/A   Settings/Measurements   PIP Observed 16 cm H20   IPAP 15 cmH20   CPAP/EPAP 5 cmH2O   Vt (Measured) 530 mL   Rate Ordered 12   FiO2  40 %   I Time/ I Time % 1 s   Minute Volume (L/min) 8.5 Liters   Mask Leak (lpm) 0 lpm   Patient's Home Machine No   Alarm Settings   Alarms On Y   Low Pressure (cmH2O) 3 cmH2O   High Pressure (cmH2O) 20 cmH2O   Delay Alarm 20 sec(s)   Apnea (secs) 20 secs   RR Low (bpm) 12   RR High (bpm) 40 br/min   Patient Observation   Observations Pt is sleeping while on the Bipap machine.

## 2024-04-27 NOTE — PROGRESS NOTES
Pulmonary and Critical Care  Progress Note    Subjective:   The patient has improved  Shortness of breath has improved  Chest pain none.  Addressing respiratory complaints Patient is negative for  hemoptysis and cyanosis  CONSTITUTIONAL:  negative for fevers and chills      Past Medical History:     has a past medical history of ADHD, COPD (chronic obstructive pulmonary disease) ([facility]), COVID-19, Drug addiction in remission ([facility]), Hep C w/o coma, chronic ([facility]), Pacemaker, Sleep apnea, and TIA (transient ischemic attack).   has a past surgical history that includes  section (); Cholecystectomy (); and Pacemaker insertion ().   reports that she quit smoking about 7 years ago. Her smoking use included cigarettes. She started smoking about 47 years ago. She has a 40.0 pack-year smoking history. She has never used smokeless tobacco. She reports that she does not currently use alcohol. She reports that she does not currently use drugs.  Family history:  family history includes Alcohol Abuse in her father; Cancer in her mother; Coronary Art Dis in her brother; Lupus in her brother; Other in her sister; Thyroid Cancer in her mother.    No Known Allergies  Social History:    Reviewed; no changes    Objective:   PHYSICAL EXAM:        VITALS:  BP (!) 145/86   Pulse 93   Temp 99 °F (37.2 °C) (Oral)   Resp 18   Ht 1.626 m (5' 4\")   Wt 92.7 kg (204 lb 4.8 oz)   SpO2 92%   BMI 35.07 kg/m²     24HR INTAKE/OUTPUT:    Intake/Output Summary (Last 24 hours) at 2024 1403  Last data filed at 2024 0814  Gross per 24 hour   Intake 370 ml   Output --   Net 370 ml       CONSTITUTIONAL:  awake, alert, cooperative, no apparent distress, and appears stated age  LUNGS:  decreased breath sounds  CARDIOVASCULAR:  normal S1 and S2 and negative JVD  ABD:Abdomen soft, non-tender. BS normal. No masses,  No organomegaly  NEURO:Alert.  DATA:    CBC:  Recent Labs     24  0509 24  0126   WBC 14.0* 11.2*    RBC 4.05* 3.95*   HGB 11.4* 11.2*   HCT 37.9 37.5    248   MCV 93.6 94.9   MCH 28.1 28.4   MCHC 30.1* 29.9*   RDW 12.3 12.2      BMP:  Recent Labs     04/25/24  0509 04/26/24  0126    142   K 4.4 4.5   CL 99 99   CO2 36* 39*   BUN 11 16   CREATININE 0.4* 0.5*   CALCIUM 8.4 8.6   GLUCOSE 115* 149*      ABG:  No results for input(s): \"PH\", \"PO2ART\", \"BLG8NJU\", \"HCO3\", \"BEART\", \"O2SAT\" in the last 72 hours.  Lab Results   Component Value Date    PROBNP 126.6 04/23/2024    PROBNP 236.6 12/26/2023    PROBNP 59.31 12/21/2023     No results found for: \"CULTRESP\"    Radiology Review:  Pertinent images / reports were reviewed as a part of this visit.    Assessment:     Patient Active Problem List   Diagnosis    Chronic respiratory failure (HCC)    COPD, severe (HCC)    Obstructive sleep apnea    Pneumonia    Chronic hepatitis C without hepatic coma (HCC)    S/P placement of cardiac pacemaker    Gastroesophageal reflux disease without esophagitis    H/O drug abuse (HCC)    Pulmonary nodules    ADHD (attention deficit hyperactivity disorder)    Pulmonary emphysema (HCC)    SOB (shortness of breath)    Leg swelling    Acute on chronic respiratory failure with hypoxia and hypercapnia (HCC)    COVID-19    Acute exacerbation of chronic obstructive pulmonary disease (COPD) (HCC)    Chronic rhinitis    Fall at home, initial encounter    Asterixis    Hypercapnia    Traumatic closed nondisplaced fracture of distal end of right fibula, initial encounter    Uncontrolled pain    Generalized weakness    Gait disturbance    Myoclonus    Near syncope    Moderate protein-calorie malnutrition (HCC)    Delirium due to another medical condition    Bipolar affective disorder, currently depressed, moderate (HCC)    COPD exacerbation (HCC)       Plan:   1. Overall the patient has improved  2. Wean FiO2.  3. Inc. Activity.    Eugenio Silva MD   4/27/2024  2:03 PM

## 2024-04-28 LAB
CULTURE: NORMAL
CULTURE: NORMAL
Lab: NORMAL
Lab: NORMAL
SPECIMEN: NORMAL
SPECIMEN: NORMAL

## 2024-04-28 NOTE — PLAN OF CARE
Problem: Discharge Planning  Goal: Discharge to home or other facility with appropriate resources  4/27/2024 1538 by Glenny Guerrero RN  Outcome: Adequate for Discharge  4/27/2024 1352 by Galina Bradley RN  Outcome: Progressing     Problem: Safety - Adult  Goal: Free from fall injury  4/27/2024 1538 by Glenny Guerrero RN  Outcome: Adequate for Discharge  4/27/2024 1352 by Galina Bradley RN  Outcome: Progressing     Problem: ABCDS Injury Assessment  Goal: Absence of physical injury  4/27/2024 1538 by Glenny Guerrero RN  Outcome: Adequate for Discharge  4/27/2024 1352 by Galina Bradley RN  Outcome: Progressing     Problem: Pain  Goal: Verbalizes/displays adequate comfort level or baseline comfort level  4/27/2024 1538 by Glenny Guerrero RN  Outcome: Adequate for Discharge  4/27/2024 1352 by Galina Bradley RN  Outcome: Progressing

## 2024-04-29 LAB
CULTURE: NORMAL
CULTURE: NORMAL
Lab: NORMAL
Lab: NORMAL
SPECIMEN: NORMAL
SPECIMEN: NORMAL

## 2024-05-31 ENCOUNTER — HOSPITAL ENCOUNTER (OUTPATIENT)
Dept: PULMONOLOGY | Age: 64
Discharge: HOME | End: 2024-05-31
Attending: INTERNAL MEDICINE

## 2024-06-27 ENCOUNTER — APPOINTMENT (OUTPATIENT)
Dept: GENERAL RADIOLOGY | Age: 64
DRG: 190 | End: 2024-06-27
Payer: MEDICARE

## 2024-06-27 ENCOUNTER — HOSPITAL ENCOUNTER (INPATIENT)
Age: 64
LOS: 3 days | Discharge: HOME HEALTH | DRG: 190 | End: 2024-06-30
Attending: EMERGENCY MEDICINE
Payer: MEDICARE

## 2024-06-27 ENCOUNTER — APPOINTMENT (OUTPATIENT)
Dept: NON INVASIVE DIAGNOSTICS | Age: 64
DRG: 190 | End: 2024-06-27
Attending: STUDENT IN AN ORGANIZED HEALTH CARE EDUCATION/TRAINING PROGRAM
Payer: MEDICARE

## 2024-06-27 DIAGNOSIS — J44.1 COPD EXACERBATION (HCC): ICD-10-CM

## 2024-06-27 DIAGNOSIS — R06.02 SHORTNESS OF BREATH: ICD-10-CM

## 2024-06-27 DIAGNOSIS — I50.9 ACUTE CONGESTIVE HEART FAILURE, UNSPECIFIED HEART FAILURE TYPE (HCC): Primary | ICD-10-CM

## 2024-06-27 DIAGNOSIS — J90 PLEURAL EFFUSION, LEFT: ICD-10-CM

## 2024-06-27 DIAGNOSIS — J18.9 COMMUNITY ACQUIRED PNEUMONIA OF LEFT LOWER LOBE OF LUNG: ICD-10-CM

## 2024-06-27 LAB
ALBUMIN SERPL-MCNC: 3.5 GM/DL (ref 3.4–5)
ALP BLD-CCNC: 71 IU/L (ref 40–129)
ALT SERPL-CCNC: 10 U/L (ref 10–40)
ANION GAP SERPL CALCULATED.3IONS-SCNC: 0 MMOL/L (ref 7–16)
AST SERPL-CCNC: 12 IU/L (ref 15–37)
BASOPHILS ABSOLUTE: 0 K/CU MM
BASOPHILS RELATIVE PERCENT: 0.4 % (ref 0–1)
BILIRUB SERPL-MCNC: 0.2 MG/DL (ref 0–1)
BUN SERPL-MCNC: 11 MG/DL (ref 6–23)
CALCIUM SERPL-MCNC: 9.4 MG/DL (ref 8.3–10.6)
CHLORIDE BLD-SCNC: 91 MMOL/L (ref 99–110)
CO2: 49 MMOL/L (ref 21–32)
CREAT SERPL-MCNC: 0.5 MG/DL (ref 0.6–1.1)
DIFFERENTIAL TYPE: ABNORMAL
ECHO AO ROOT DIAM: 2.5 CM
ECHO AO ROOT INDEX: 1.28 CM/M2
ECHO AV AREA PEAK VELOCITY: 2.8 CM2
ECHO AV AREA VTI: 2.9 CM2
ECHO AV AREA/BSA PEAK VELOCITY: 1.4 CM2/M2
ECHO AV AREA/BSA VTI: 1.5 CM2/M2
ECHO AV MEAN GRADIENT: 6 MMHG
ECHO AV MEAN VELOCITY: 1.2 M/S
ECHO AV PEAK GRADIENT: 12 MMHG
ECHO AV PEAK VELOCITY: 1.7 M/S
ECHO AV VELOCITY RATIO: 0.76
ECHO AV VTI: 28.9 CM
ECHO BSA: 2.02 M2
ECHO IVC PROX: 1.8 CM
ECHO LA DIAMETER INDEX: 1.28 CM/M2
ECHO LA DIAMETER: 2.5 CM
ECHO LA TO AORTIC ROOT RATIO: 1
ECHO LV E' LATERAL VELOCITY: 11 CM/S
ECHO LV E' SEPTAL VELOCITY: 9 CM/S
ECHO LV EDV A4C: 116 ML
ECHO LV EDV INDEX A4C: 59 ML/M2
ECHO LV EJECTION FRACTION A4C: 68 %
ECHO LV ESV A4C: 37 ML
ECHO LV ESV INDEX A4C: 19 ML/M2
ECHO LV FRACTIONAL SHORTENING: 35 % (ref 28–44)
ECHO LV INTERNAL DIMENSION DIASTOLE INDEX: 2.19 CM/M2
ECHO LV INTERNAL DIMENSION DIASTOLIC: 4.3 CM (ref 3.9–5.3)
ECHO LV INTERNAL DIMENSION SYSTOLIC INDEX: 1.43 CM/M2
ECHO LV INTERNAL DIMENSION SYSTOLIC: 2.8 CM
ECHO LV IVSD: 0.8 CM (ref 0.6–0.9)
ECHO LV MASS 2D: 114.2 G (ref 67–162)
ECHO LV MASS INDEX 2D: 58.2 G/M2 (ref 43–95)
ECHO LV POSTERIOR WALL DIASTOLIC: 0.9 CM (ref 0.6–0.9)
ECHO LV RELATIVE WALL THICKNESS RATIO: 0.42
ECHO LVOT AREA: 3.8 CM2
ECHO LVOT AV VTI INDEX: 0.79
ECHO LVOT DIAM: 2.2 CM
ECHO LVOT MEAN GRADIENT: 3 MMHG
ECHO LVOT PEAK GRADIENT: 6 MMHG
ECHO LVOT PEAK VELOCITY: 1.3 M/S
ECHO LVOT STROKE VOLUME INDEX: 44.4 ML/M2
ECHO LVOT SV: 87 ML
ECHO LVOT VTI: 22.9 CM
ECHO MV A VELOCITY: 0.98 M/S
ECHO MV E VELOCITY: 0.9 M/S
ECHO MV E/A RATIO: 0.92
ECHO MV E/E' LATERAL: 8.18
ECHO MV E/E' RATIO (AVERAGED): 9.09
ECHO MV E/E' SEPTAL: 10
ECHO RV MID DIMENSION: 3.2 CM
EOSINOPHILS ABSOLUTE: 0.9 K/CU MM
EOSINOPHILS RELATIVE PERCENT: 11.3 % (ref 0–3)
GFR, ESTIMATED: >90 ML/MIN/1.73M2
GLUCOSE SERPL-MCNC: 142 MG/DL (ref 70–99)
HCT VFR BLD CALC: 40.7 % (ref 37–47)
HEMOGLOBIN: 12.1 GM/DL (ref 12.5–16)
IMMATURE NEUTROPHIL %: 0.4 % (ref 0–0.43)
LYMPHOCYTES ABSOLUTE: 2.2 K/CU MM
LYMPHOCYTES RELATIVE PERCENT: 29.4 % (ref 24–44)
MAGNESIUM: 1.8 MG/DL (ref 1.8–2.4)
MCH RBC QN AUTO: 28.4 PG (ref 27–31)
MCHC RBC AUTO-ENTMCNC: 29.7 % (ref 32–36)
MCV RBC AUTO: 95.5 FL (ref 78–100)
MONOCYTES ABSOLUTE: 1 K/CU MM
MONOCYTES RELATIVE PERCENT: 12.7 % (ref 0–4)
NEUTROPHILS ABSOLUTE: 3.5 K/CU MM
NEUTROPHILS RELATIVE PERCENT: 45.8 % (ref 36–66)
NUCLEATED RBC %: 0 %
PDW BLD-RTO: 11.9 % (ref 11.7–14.9)
PLATELET # BLD: 172 K/CU MM (ref 140–440)
PMV BLD AUTO: 9.9 FL (ref 7.5–11.1)
POTASSIUM SERPL-SCNC: 4 MMOL/L (ref 3.5–5.1)
PRO-BNP: 56.14 PG/ML
RBC # BLD: 4.26 M/CU MM (ref 4.2–5.4)
SODIUM BLD-SCNC: 140 MMOL/L (ref 135–145)
TOTAL IMMATURE NEUTOROPHIL: 0.03 K/CU MM
TOTAL NUCLEATED RBC: 0 K/CU MM
TOTAL PROTEIN: 6.7 GM/DL (ref 6.4–8.2)
TROPONIN, HIGH SENSITIVITY: 14 NG/L (ref 0–14)
TROPONIN, HIGH SENSITIVITY: 17 NG/L (ref 0–14)
WBC # BLD: 7.6 K/CU MM (ref 4–10.5)

## 2024-06-27 PROCEDURE — 2580000003 HC RX 258: Performed by: STUDENT IN AN ORGANIZED HEALTH CARE EDUCATION/TRAINING PROGRAM

## 2024-06-27 PROCEDURE — 6370000000 HC RX 637 (ALT 250 FOR IP): Performed by: EMERGENCY MEDICINE

## 2024-06-27 PROCEDURE — 93306 TTE W/DOPPLER COMPLETE: CPT | Performed by: INTERNAL MEDICINE

## 2024-06-27 PROCEDURE — 2580000003 HC RX 258: Performed by: EMERGENCY MEDICINE

## 2024-06-27 PROCEDURE — 36415 COLL VENOUS BLD VENIPUNCTURE: CPT

## 2024-06-27 PROCEDURE — 94640 AIRWAY INHALATION TREATMENT: CPT

## 2024-06-27 PROCEDURE — 6360000002 HC RX W HCPCS: Performed by: EMERGENCY MEDICINE

## 2024-06-27 PROCEDURE — 1200000000 HC SEMI PRIVATE

## 2024-06-27 PROCEDURE — 6370000000 HC RX 637 (ALT 250 FOR IP): Performed by: FAMILY MEDICINE

## 2024-06-27 PROCEDURE — 99285 EMERGENCY DEPT VISIT HI MDM: CPT

## 2024-06-27 PROCEDURE — 93005 ELECTROCARDIOGRAM TRACING: CPT | Performed by: EMERGENCY MEDICINE

## 2024-06-27 PROCEDURE — 94761 N-INVAS EAR/PLS OXIMETRY MLT: CPT

## 2024-06-27 PROCEDURE — 83880 ASSAY OF NATRIURETIC PEPTIDE: CPT

## 2024-06-27 PROCEDURE — 93306 TTE W/DOPPLER COMPLETE: CPT

## 2024-06-27 PROCEDURE — 2700000000 HC OXYGEN THERAPY PER DAY

## 2024-06-27 PROCEDURE — 96374 THER/PROPH/DIAG INJ IV PUSH: CPT

## 2024-06-27 PROCEDURE — 71045 X-RAY EXAM CHEST 1 VIEW: CPT

## 2024-06-27 PROCEDURE — 83735 ASSAY OF MAGNESIUM: CPT

## 2024-06-27 PROCEDURE — 84484 ASSAY OF TROPONIN QUANT: CPT

## 2024-06-27 PROCEDURE — 80053 COMPREHEN METABOLIC PANEL: CPT

## 2024-06-27 PROCEDURE — 6360000002 HC RX W HCPCS: Performed by: STUDENT IN AN ORGANIZED HEALTH CARE EDUCATION/TRAINING PROGRAM

## 2024-06-27 PROCEDURE — 99222 1ST HOSP IP/OBS MODERATE 55: CPT | Performed by: INTERNAL MEDICINE

## 2024-06-27 PROCEDURE — 85025 COMPLETE CBC W/AUTO DIFF WBC: CPT

## 2024-06-27 RX ORDER — MAGNESIUM SULFATE IN WATER 40 MG/ML
2000 INJECTION, SOLUTION INTRAVENOUS PRN
Status: DISCONTINUED | OUTPATIENT
Start: 2024-06-27 | End: 2024-06-30 | Stop reason: HOSPADM

## 2024-06-27 RX ORDER — POTASSIUM CHLORIDE 20 MEQ/1
40 TABLET, EXTENDED RELEASE ORAL PRN
Status: DISCONTINUED | OUTPATIENT
Start: 2024-06-27 | End: 2024-06-30 | Stop reason: HOSPADM

## 2024-06-27 RX ORDER — POTASSIUM CHLORIDE 7.45 MG/ML
10 INJECTION INTRAVENOUS PRN
Status: DISCONTINUED | OUTPATIENT
Start: 2024-06-27 | End: 2024-06-30 | Stop reason: HOSPADM

## 2024-06-27 RX ORDER — SEMAGLUTIDE 0.68 MG/ML
0.25 INJECTION, SOLUTION SUBCUTANEOUS WEEKLY
Status: ON HOLD | COMMUNITY
Start: 2024-05-22 | End: 2024-06-28 | Stop reason: HOSPADM

## 2024-06-27 RX ORDER — SODIUM CHLORIDE 0.9 % (FLUSH) 0.9 %
5-40 SYRINGE (ML) INJECTION EVERY 12 HOURS SCHEDULED
Status: DISCONTINUED | OUTPATIENT
Start: 2024-06-27 | End: 2024-06-30 | Stop reason: HOSPADM

## 2024-06-27 RX ORDER — IPRATROPIUM BROMIDE AND ALBUTEROL SULFATE 2.5; .5 MG/3ML; MG/3ML
1 SOLUTION RESPIRATORY (INHALATION)
Status: COMPLETED | OUTPATIENT
Start: 2024-06-27 | End: 2024-06-27

## 2024-06-27 RX ORDER — IPRATROPIUM BROMIDE AND ALBUTEROL SULFATE 2.5; .5 MG/3ML; MG/3ML
1 SOLUTION RESPIRATORY (INHALATION) EVERY 4 HOURS PRN
Status: DISCONTINUED | OUTPATIENT
Start: 2024-06-27 | End: 2024-06-28

## 2024-06-27 RX ORDER — SODIUM CHLORIDE 9 MG/ML
INJECTION, SOLUTION INTRAVENOUS PRN
Status: DISCONTINUED | OUTPATIENT
Start: 2024-06-27 | End: 2024-06-30 | Stop reason: HOSPADM

## 2024-06-27 RX ORDER — SODIUM CHLORIDE 0.9 % (FLUSH) 0.9 %
5-40 SYRINGE (ML) INJECTION PRN
Status: DISCONTINUED | OUTPATIENT
Start: 2024-06-27 | End: 2024-06-30 | Stop reason: HOSPADM

## 2024-06-27 RX ORDER — FUROSEMIDE 10 MG/ML
40 INJECTION INTRAMUSCULAR; INTRAVENOUS DAILY
Status: DISCONTINUED | OUTPATIENT
Start: 2024-06-27 | End: 2024-06-27

## 2024-06-27 RX ORDER — ACETAMINOPHEN 650 MG/1
650 SUPPOSITORY RECTAL EVERY 6 HOURS PRN
Status: DISCONTINUED | OUTPATIENT
Start: 2024-06-27 | End: 2024-06-30 | Stop reason: HOSPADM

## 2024-06-27 RX ORDER — DULOXETIN HYDROCHLORIDE 30 MG/1
30 CAPSULE, DELAYED RELEASE ORAL 2 TIMES DAILY
COMMUNITY
Start: 2024-05-16

## 2024-06-27 RX ORDER — CELECOXIB 200 MG/1
200 CAPSULE ORAL 2 TIMES DAILY PRN
Status: ON HOLD | COMMUNITY
Start: 2024-06-17 | End: 2024-06-28 | Stop reason: HOSPADM

## 2024-06-27 RX ORDER — ENOXAPARIN SODIUM 100 MG/ML
40 INJECTION SUBCUTANEOUS DAILY
Status: DISCONTINUED | OUTPATIENT
Start: 2024-06-27 | End: 2024-06-30 | Stop reason: HOSPADM

## 2024-06-27 RX ORDER — ONDANSETRON 4 MG/1
4 TABLET, ORALLY DISINTEGRATING ORAL EVERY 8 HOURS PRN
Status: DISCONTINUED | OUTPATIENT
Start: 2024-06-27 | End: 2024-06-30 | Stop reason: HOSPADM

## 2024-06-27 RX ORDER — ONDANSETRON 2 MG/ML
4 INJECTION INTRAMUSCULAR; INTRAVENOUS EVERY 6 HOURS PRN
Status: DISCONTINUED | OUTPATIENT
Start: 2024-06-27 | End: 2024-06-30 | Stop reason: HOSPADM

## 2024-06-27 RX ORDER — POLYETHYLENE GLYCOL 3350 17 G/17G
17 POWDER, FOR SOLUTION ORAL DAILY PRN
Status: DISCONTINUED | OUTPATIENT
Start: 2024-06-27 | End: 2024-06-30 | Stop reason: HOSPADM

## 2024-06-27 RX ORDER — HYDROXYZINE HYDROCHLORIDE 25 MG/1
25 TABLET, FILM COATED ORAL NIGHTLY PRN
COMMUNITY
Start: 2024-05-29

## 2024-06-27 RX ORDER — TRAMADOL HYDROCHLORIDE 50 MG/1
50 TABLET ORAL ONCE
Status: COMPLETED | OUTPATIENT
Start: 2024-06-27 | End: 2024-06-27

## 2024-06-27 RX ORDER — FUROSEMIDE 10 MG/ML
40 INJECTION INTRAMUSCULAR; INTRAVENOUS DAILY
Status: DISCONTINUED | OUTPATIENT
Start: 2024-06-28 | End: 2024-06-27

## 2024-06-27 RX ORDER — ACETAMINOPHEN 325 MG/1
650 TABLET ORAL EVERY 6 HOURS PRN
Status: DISCONTINUED | OUTPATIENT
Start: 2024-06-27 | End: 2024-06-30 | Stop reason: HOSPADM

## 2024-06-27 RX ORDER — FUROSEMIDE 10 MG/ML
60 INJECTION INTRAMUSCULAR; INTRAVENOUS ONCE
Status: COMPLETED | OUTPATIENT
Start: 2024-06-27 | End: 2024-06-27

## 2024-06-27 RX ADMIN — ENOXAPARIN SODIUM 40 MG: 100 INJECTION SUBCUTANEOUS at 16:37

## 2024-06-27 RX ADMIN — FUROSEMIDE 60 MG: 10 INJECTION, SOLUTION INTRAMUSCULAR; INTRAVENOUS at 16:39

## 2024-06-27 RX ADMIN — SODIUM CHLORIDE, PRESERVATIVE FREE 10 ML: 5 INJECTION INTRAVENOUS at 20:07

## 2024-06-27 RX ADMIN — IPRATROPIUM BROMIDE AND ALBUTEROL SULFATE 1 DOSE: 2.5; .5 SOLUTION RESPIRATORY (INHALATION) at 21:10

## 2024-06-27 RX ADMIN — WATER 125 MG: 1 INJECTION INTRAMUSCULAR; INTRAVENOUS; SUBCUTANEOUS at 07:48

## 2024-06-27 RX ADMIN — TRAMADOL HYDROCHLORIDE 50 MG: 50 TABLET ORAL at 23:25

## 2024-06-27 RX ADMIN — IPRATROPIUM BROMIDE AND ALBUTEROL SULFATE 1 DOSE: .5; 2.5 SOLUTION RESPIRATORY (INHALATION) at 07:27

## 2024-06-27 ASSESSMENT — PAIN DESCRIPTION - ORIENTATION
ORIENTATION: LEFT

## 2024-06-27 ASSESSMENT — PAIN DESCRIPTION - PAIN TYPE
TYPE: ACUTE PAIN
TYPE: CHRONIC PAIN
TYPE: ACUTE PAIN
TYPE: CHRONIC PAIN

## 2024-06-27 ASSESSMENT — PAIN DESCRIPTION - DESCRIPTORS
DESCRIPTORS: ACHING

## 2024-06-27 ASSESSMENT — PAIN DESCRIPTION - ONSET
ONSET: ON-GOING

## 2024-06-27 ASSESSMENT — PAIN DESCRIPTION - LOCATION
LOCATION: SHOULDER
LOCATION: ARM
LOCATION: SHOULDER
LOCATION: ARM

## 2024-06-27 ASSESSMENT — PAIN DESCRIPTION - FREQUENCY
FREQUENCY: CONTINUOUS
FREQUENCY: CONTINUOUS

## 2024-06-27 ASSESSMENT — PAIN - FUNCTIONAL ASSESSMENT
PAIN_FUNCTIONAL_ASSESSMENT: NONE - DENIES PAIN
PAIN_FUNCTIONAL_ASSESSMENT: ACTIVITIES ARE NOT PREVENTED
PAIN_FUNCTIONAL_ASSESSMENT: PREVENTS OR INTERFERES WITH MANY ACTIVE NOT PASSIVE ACTIVITIES
PAIN_FUNCTIONAL_ASSESSMENT: ACTIVITIES ARE NOT PREVENTED
PAIN_FUNCTIONAL_ASSESSMENT: PREVENTS OR INTERFERES WITH MANY ACTIVE NOT PASSIVE ACTIVITIES

## 2024-06-27 ASSESSMENT — PAIN SCALES - GENERAL
PAINLEVEL_OUTOF10: 0
PAINLEVEL_OUTOF10: 5
PAINLEVEL_OUTOF10: 7
PAINLEVEL_OUTOF10: 5
PAINLEVEL_OUTOF10: 7

## 2024-06-27 ASSESSMENT — PAIN SCALES - WONG BAKER: WONGBAKER_NUMERICALRESPONSE: NO HURT

## 2024-06-27 NOTE — ED NOTES
Medication History  [facility]    Patient Name: Kalee Chicas 1960     Medication history has been completed by: Meri Duarte [facility]    Source(s) of information: patient and insurance claims     Primary Care Physician: Chu Caban MD     Pharmacy: [facility]    Allergies as of 06/27/2024    (No Known Allergies)        Prior to Admission medications    Medication Sig Start Date End Date Taking? Authorizing Provider   DULoxetine (CYMBALTA) 30 MG extended release capsule Take 1 capsule by mouth 2 times daily 5/16/24  Yes Provider, MD Kalen   hydrOXYzine HCl (ATARAX) 25 MG tablet Take 1 tablet by mouth nightly as needed 5/29/24  Yes Provider, MD Kalen   OZEMPIC, 0.25 OR 0.5 MG/DOSE, 2 MG/3ML SOPN Inject 0.25 mg into the skin once a week Takes on Monday 5/22/24  Yes Provider, MD Kalen   celecoxib (CELEBREX) 200 MG capsule Take 1 capsule by mouth 2 times daily as needed 6/17/24  Yes Provider, MD Kalen   predniSONE (DELTASONE) 10 MG tablet Take 1 tablet by mouth daily 40mg daily for 2 days, 20 mg daily for 2 days, 10 mg daily for 2 days, 5 mg daily for 2 days 6/18/24   Lior Villeda MD   Budeson-Glycopyrrol-Formoterol (BREZTRI AEROSPHERE) 160-9-4.8 MCG/ACT AERO Inhale 2 puffs into the lungs 2 times daily 5/9/24   Lior Villeda MD   sodium chloride (OCEAN, BABY AYR) 0.65 % nasal spray 2 sprays by Nasal route as needed for Congestion 4/27/24   Ventura Lundy MD   DULERA 100-5 MCG/ACT inhaler INHALE 2 PUFFS INTO THE LUNGS 2 TIMES DAILY  Patient not taking: Reported on 6/27/2024 2/8/24   Lior Villeda MD   albuterol sulfate HFA (VENTOLIN HFA) 108 (90 Base) MCG/ACT inhaler Inhale 2 puffs into the lungs 4 times daily as needed for Wheezing 9/1/23   Lior Villeda MD   nicotine (NICODERM CQ) 21 MG/24HR Place 1 patch onto the skin daily as needed (Nicotine craving) 8/24/23   Brent Sebastian MD   miconazole (MICOTIN) 2 % powder Apply topically 2 times daily.

## 2024-06-27 NOTE — CONSULTS
CARDIOLOGY CONSULT NOTE         Reason for consultation: Possible heart failure      Primary care physician: Chu Caban MD      Chief Complaints :  Chief Complaint   Patient presents with    Shortness of Breath        History of present illness:Kalee is a 63 y.o.year old female who has prior history of COPD, chronic respiratory failure, depression who is now admitted with shortness of breath for 20 days.  Currently on my evaluation she feels better.  She denies any chest discomfort.  She also denies any orthopnea PND.    Review of Systems:     All systems negative except as marked.        Physical Examination:    Vitals:    06/27/24 1811   BP: 121/62   Pulse: 87   Resp: 14   Temp: 98.2 °F (36.8 °C)   SpO2: 93%        General Appearance:  No distress, conversant    Constitutional:  No acute distress, non-toxic appearance.    HENT:  Normocephalic, Atraumatic,   Eyes:  PERRL, EOMI, Conjunctiva normal, No discharge.   Respiratory:  No respiratory distress, No wheezing  Cardiovascular: S1, S2, no murmurs, gallops. JVD wnl  Abdomen /GI:   Soft, No tenderness   Genitourinary: No costovertebral angle tenderness   Musculoskeletal:  No edema, no tenderness, no deformities.   Integument:  Well hydrated, no rash   Neurologic:  Alert & oriented x 3, no focal deficits noted       Medical decision making and Data review:    Lab Review   Recent Labs     06/27/24  0744   WBC 7.6   HGB 12.1*   HCT 40.7         Recent Labs     06/27/24  0744      K 4.0   CL 91*   CO2 49*   BUN 11   CREATININE 0.5*     Recent Labs     06/27/24  0744   AST 12*   ALT 10   BILITOT 0.2   ALKPHOS 71     No results for input(s): \"TROPONINT\" in the last 72 hours.    Recent Labs     06/27/24  1830   PROBNP 56.14     Lab Results   Component Value Date    INR 1.0 04/24/2024    PROTIME 13.4 04/24/2024       EKG: (reviewed by myself): Her ECG shows sinus rhythm without any significant ischemic changes.    ECHO:(reviewed by myself): Her

## 2024-06-27 NOTE — ED TRIAGE NOTES
Pt is here by EMS for SOB that has been ongoing since yesterday. Pt is on 6-Lpm oxygen at home for COPD.

## 2024-06-27 NOTE — H&P
History and Physical 24        NAME: Kalee Chicas  : 1960  MRN: [id number]      Assessment/Plan:  Kalee Chicas is a 63 y.o. female with a history of COPD, chronic respiratory failure, KING, depression who presented to [facility] 2024 with shortness of breath for the past 2 days    Acute shortness of breath with difficulty breathing possibly secondary from new onset congestive heart failure  Last echo done in 2022 showed EF of 50 to 55%  Chest x-ray done showed pulmonary vascular hypertension with pulmonary interstitial edema consistent with congestive heart failure  2D echo has been ordered  1 dose of Lasix 60 mill 1 in the emergency room  40 mg IV Lasix started daily  Low salt diet   Strict Is and Os  Cardiology has been consulted    Chronic respiratory failure with hypoxia patient is on 6 L nasal cannula oxygen at home.  She was previously on 4 L which has increased to 6 L due to her COPD    COPD without exacerbation  Patient is on Symbicort at home.    History of depression    Chronic pain patient is on Suboxone at home    DVT Prophylaxis: lovenox  Code Status/Surrogate Decision Maker: Full code      Current living situation: Home  Expected Disposition: Home  Estimated discharge date: 2-3 days      Chief Complaint:    Shortness of breath    History of Present Illness:    63-year-old female with known history of COPD chronic respiratory failure with hypoxia and hypercapnia presented to the hospital with shortness of breath over the past 2 days with pleuritic left-sided chest pain and nonproductive cough.  Upon presentation to the emergency room patient was hypotensive was saturating 95% on 6 L nasal cannula oxygen which is her baseline oxygenation blood work revealed elevated high-sensitivity troponin chest x-ray was done that showed findings of pulmonary vascular hypertension and pulmonary interstitial edema consistent with congestive heart failure.  There is also small left-sided  <0.010 03/01/2023 02:22 PM    TROPONINT <0.010 03/25/2022 11:10 PM     Lactic Acid: No results for input(s): \"LACTA\" in the last 72 hours.  BNP: No results for input(s): \"PROBNP\" in the last 72 hours.  UA:  Lab Results   Component Value Date/Time    NITRU NEGATIVE 04/23/2024 11:30 PM    COLORU YELLOW 04/23/2024 11:30 PM    PHUR 6.0 04/23/2024 11:30 PM    WBCUA 1 01/05/2022 08:25 PM    RBCUA <1 01/05/2022 08:25 PM    MUCUS RARE 01/05/2022 08:25 PM    TRICHOMONAS NONE SEEN 01/05/2022 08:25 PM    BACTERIA NEGATIVE 01/05/2022 08:25 PM    CLARITYU CLEAR 04/23/2024 11:30 PM    LEUKOCYTESUR NEGATIVE 04/23/2024 11:30 PM    UROBILINOGEN 0.2 04/23/2024 11:30 PM    BILIRUBINUR NEGATIVE 04/23/2024 11:30 PM    BLOODU NEGATIVE 04/23/2024 11:30 PM    GLUCOSEU NEGATIVE 04/23/2024 11:30 PM    KETUA NEGATIVE 04/23/2024 11:30 PM     Urine Cultures: No results found for: \"LABURIN\"  Blood Cultures: No results found for: \"BC\"  No results found for: \"BLOODCULT2\"  Organism: No results found for: \"ORG\"          Electronically signed by Alejandrina Dang MD on 6/27/2024 at 1:14 PM

## 2024-06-27 NOTE — PLAN OF CARE
Problem: Discharge Planning  Goal: Discharge to home or other facility with appropriate resources  Outcome: Progressing     Problem: Pain  Goal: Verbalizes/displays adequate comfort level or baseline comfort level  Outcome: Progressing  Flowsheets (Taken 6/27/2024 1511)  Verbalizes/displays adequate comfort level or baseline comfort level:   Encourage patient to monitor pain and request assistance   Assess pain using appropriate pain scale     Problem: Safety - Adult  Goal: Free from fall injury  Outcome: Progressing

## 2024-06-27 NOTE — ED NOTES
1319 perfect serve message sent to Orville SMITH taking calls for Dr Chong.      1321 Orville SMITH acknowledged perfect serve message. Added to treatment team.

## 2024-06-27 NOTE — CONSULTS
Nutrition Education    Educated on Heart Failure Guidelines  Learners: Patient  Readiness: Acceptance  Method: Explanation and Handout  Response: Verbalizes Understanding; Pt admitted w/ HF, was being transferred to inpatient floor, spoke briefly w/ her regarding low sodium diet. She endorses compliance at home, did not want full discussion but denies any questions. Provided low sodium diet education handouts w/ contact information.     Citlalli Delarosa RD  Contact Number: [phone]

## 2024-06-27 NOTE — ED NOTES
Emergency Department Encounter    Patient: Kalee Chicas  MRN: [id number]  : 1960  Date of Evaluation: 2024  ED Provider:  Rick Yeboah MD    Triage Chief Complaint:   Shortness of Breath    [place]:  Kalee Chicas is a 63 y.o. female  known history of hypercapnic hypoxic respiratory failure presenting with increasing shortness of breath over the past 2 days.  She also has pleuritic left-sided l mid back pain along with cough that is nonproductive for the past several days.  There is no rest chest pain or exertional chest pain.  ROS - see HPI, below listed is current ROS at time of my eval:  General:  No fevers, no chills, no weakness  Eyes:  No recent vison changes, no discharge  ENT:  No sore throat, no nasal congestion, no hearing changes  Cardiovascular:  No chest pain, no palpitations  Gastrointestinal:  No pain, no nausea, no vomiting, no diarrhea  Musculoskeletal:  No muscle pain, no joint pain  Genitourinary:  No dysuria, no hematuria  Extremities:  admits to leg edema , no pain    Past Medical History:   Diagnosis Date    ADHD 2006    COPD (chronic obstructive pulmonary disease) ([facility])     COVID-19     Drug addiction in remission ([facility])     recovering addict    Hep C w/o coma, chronic ([facility])     Pacemaker     Sleep apnea     TIA (transient ischemic attack)     per patient \"several mini strokes\"     Past Surgical History:   Procedure Laterality Date     SECTION      CHOLECYSTECTOMY  2010    done in [place]    PACEMAKER INSERTION       Family History   Problem Relation Age of Onset    Cancer Mother         unsure of type    Thyroid Cancer Mother     Alcohol Abuse Father     Other Sister         passed in car accident    Lupus Brother     Coronary Art Dis Brother      Social History     Socioeconomic History    Marital status:      Spouse name: Not on file    Number of children: Not on file    Years of education: Not on file    Highest education level: Not on file  27 - 31 PG    MCHC 29.7 (L) 32.0 - 36.0 %    RDW 11.9 11.7 - 14.9 %    Platelets 172 140 - 440 K/CU MM    MPV 9.9 7.5 - 11.1 FL    Differential Type AUTOMATED DIFFERENTIAL     Neutrophils % 45.8 36 - 66 %    Lymphocytes % 29.4 24 - 44 %    Monocytes % 12.7 (H) 0 - 4 %    Eosinophils % 11.3 (H) 0 - 3 %    Basophils % 0.4 0 - 1 %    Neutrophils Absolute 3.5 K/CU MM    Lymphocytes Absolute 2.2 K/CU MM    Monocytes Absolute 1.0 K/CU MM    Eosinophils Absolute 0.9 K/CU MM    Basophils Absolute 0.0 K/CU MM    Nucleated RBC % 0.0 %    Total Nucleated RBC 0.0 K/CU MM    Total Immature Neutrophil 0.03 K/CU MM    Immature Neutrophil % 0.4 0 - 0.43 %   Comprehensive Metabolic Panel   Result Value Ref Range    Sodium 140 135 - 145 MMOL/L    Potassium 4.0 3.5 - 5.1 MMOL/L    Chloride 91 (L) 99 - 110 mMol/L    CO2 49 (H) 21 - 32 MMOL/L    Anion Gap 0 (L) 7 - 16    Glucose 142 (H) 70 - 99 MG/DL    BUN 11 6 - 23 MG/DL    Creatinine 0.5 (L) 0.6 - 1.1 MG/DL    Est, Glom Filt Rate >90 >60 mL/min/1.73m2    Calcium 9.4 8.3 - 10.6 MG/DL    Total Protein 6.7 6.4 - 8.2 GM/DL    Albumin 3.5 3.4 - 5.0 GM/DL    Total Bilirubin 0.2 0.0 - 1.0 MG/DL    Alkaline Phosphatase 71 40 - 129 IU/L    ALT 10 10 - 40 U/L    AST 12 (L) 15 - 37 IU/L   Magnesium   Result Value Ref Range    Magnesium 1.8 1.8 - 2.4 mg/dl   Troponin   Result Value Ref Range    Troponin, High Sensitivity 17 (H) 0 - 14 ng/L   EKG 12 Lead   Result Value Ref Range    Ventricular Rate 80 BPM    Atrial Rate 80 BPM    P-R Interval 156 ms    QRS Duration 88 ms    Q-T Interval 394 ms    QTc Calculation (Bazett) 454 ms    P Axis 72 degrees    R Axis -2 degrees    T Axis 47 degrees    Diagnosis       Normal sinus rhythm  Normal ECG  When compared with ECG of 23-APR-2024 22:54,  Vent. rate has decreased BY  42 BPM  Questionable change in QRS axis        Radiographs (if obtained):  Radiologist's Report Reviewed:  XR CHEST PORTABLE    Result Date: 6/27/2024  Chest X-ray INDICATION: Shortness

## 2024-06-27 NOTE — PROGRESS NOTES
4 Eyes Skin Assessment     NAME:  Kalee Chicas  YOB: 1960  MEDICAL RECORD NUMBER:  [id number]    The patient is being assessed for  Admission    I agree that at least one RN has performed a thorough Head to Toe Skin Assessment on the patient. ALL assessment sites listed below have been assessed.      Areas assessed by both nurses:    Head, Face, Ears, Shoulders, Back, Chest, Arms, Elbows, Hands, Sacrum. Buttock, Coccyx, Ischium, Legs. Feet and Heels, and Under Medical Devices         Does the Patient have a Wound? No noted wound(s)       Clif Prevention initiated by RN: Yes  Wound Care Orders initiated by RN: No    Pressure Injury (Stage 3,4, Unstageable, DTI, NWPT, and Complex wounds) if present, place Wound referral order by RN under : No    New Ostomies, if present place, Ostomy referral order under : No     Nurse 1 eSignature: Electronically signed by Blanca Osorio RN on 6/27/24 at 3:28 PM EDT    **SHARE this note so that the co-signing nurse can place an eSignature**    Nurse 2 eSignature: Electronically signed by Anna Yanez RN on 6/27/24 at 6:52 PM EDT

## 2024-06-27 NOTE — ED NOTES
ED TO INPATIENT SBAR HANDOFF    Patient Name: Kalee Chicas   :  1960  63 y.o.   Preferred Name    Family/Caregiver Present no   Restraints no   C-SSRS: Risk of Suicide: No Risk  Sitter no   Sepsis Risk Score        Situation  Chief Complaint   Patient presents with    Shortness of Breath     Brief Description of Patient's Condition: Pt is here by EMS for SOB that has been ongoing since yesterday. Pt is on 6-Lpm oxygen at home for COPD   Mental Status: oriented  Arrived from: home    Imaging:   XR CHEST PORTABLE   Final Result   Congestive heart failure with left basilar atelectasis and a left   pleural effusion.         Electronically signed by Jose Coe        Abnormal labs:   Abnormal Labs Reviewed   CBC WITH AUTO DIFFERENTIAL - Abnormal; Notable for the following components:       Result Value    Hemoglobin 12.1 (*)     MCHC 29.7 (*)     Monocytes % 12.7 (*)     Eosinophils % 11.3 (*)     All other components within normal limits   COMPREHENSIVE METABOLIC PANEL - Abnormal; Notable for the following components:    Chloride 91 (*)     CO2 49 (*)     Anion Gap 0 (*)     Glucose 142 (*)     Creatinine 0.5 (*)     AST 12 (*)     All other components within normal limits   TROPONIN - Abnormal; Notable for the following components:    Troponin, High Sensitivity 17 (*)     All other components within normal limits        Background  History:   Past Medical History:   Diagnosis Date    ADHD     COPD (chronic obstructive pulmonary disease) ([facility])     COVID-19     Drug addiction in remission ([facility])     recovering addict    Hep C w/o coma, chronic ([facility])     Pacemaker     Sleep apnea     TIA (transient ischemic attack)     per patient \"several mini strokes\"       Assessment    Vitals: MEWS Score: 2  Level of Consciousness: Alert (0)   Vitals:    24 1102 24 1117 24 1149 24 1216   BP: (!) 94/48 (!) 99/43 (!) 82/44 (!) 89/45   Pulse: 83 87 86 72   Resp:  14 12   Temp:       TempSrc:

## 2024-06-28 LAB
ANION GAP SERPL CALCULATED.3IONS-SCNC: 5 MMOL/L (ref 7–16)
BASE EXCESS MIXED: 26.2 (ref 0–3)
BUN SERPL-MCNC: 16 MG/DL (ref 6–23)
CALCIUM SERPL-MCNC: 9.1 MG/DL (ref 8.3–10.6)
CHLORIDE BLD-SCNC: 90 MMOL/L (ref 99–110)
CHOLEST SERPL-MCNC: 197 MG/DL
CO2: 45 MMOL/L (ref 21–32)
COMMENT: ABNORMAL
CREAT SERPL-MCNC: 0.7 MG/DL (ref 0.6–1.1)
EKG ATRIAL RATE: 80 BPM
EKG DIAGNOSIS: NORMAL
EKG P AXIS: 72 DEGREES
EKG P-R INTERVAL: 156 MS
EKG Q-T INTERVAL: 394 MS
EKG QRS DURATION: 88 MS
EKG QTC CALCULATION (BAZETT): 454 MS
EKG R AXIS: -2 DEGREES
EKG T AXIS: 47 DEGREES
EKG VENTRICULAR RATE: 80 BPM
GFR, ESTIMATED: >90 ML/MIN/1.73M2
GLUCOSE SERPL-MCNC: 131 MG/DL (ref 70–99)
HCO3 VENOUS: 58.8 MMOL/L (ref 22–29)
HDLC SERPL-MCNC: 43 MG/DL
LDLC SERPL CALC-MCNC: 126 MG/DL
MAGNESIUM: 1.8 MG/DL (ref 1.8–2.4)
O2 SAT, VEN: 89.6 % (ref 50–70)
PCO2 VENOUS: 109 MMHG (ref 41–51)
PH VENOUS: 7.34 (ref 7.32–7.43)
PO2 VENOUS: 58 MMHG (ref 28–48)
POTASSIUM SERPL-SCNC: 4.5 MMOL/L (ref 3.5–5.1)
SODIUM BLD-SCNC: 140 MMOL/L (ref 135–145)
TRIGL SERPL-MCNC: 139 MG/DL

## 2024-06-28 PROCEDURE — 80061 LIPID PANEL: CPT

## 2024-06-28 PROCEDURE — 93010 ELECTROCARDIOGRAM REPORT: CPT | Performed by: INTERNAL MEDICINE

## 2024-06-28 PROCEDURE — 94640 AIRWAY INHALATION TREATMENT: CPT

## 2024-06-28 PROCEDURE — 2700000000 HC OXYGEN THERAPY PER DAY

## 2024-06-28 PROCEDURE — 94761 N-INVAS EAR/PLS OXIMETRY MLT: CPT

## 2024-06-28 PROCEDURE — 94664 DEMO&/EVAL PT USE INHALER: CPT

## 2024-06-28 PROCEDURE — 6370000000 HC RX 637 (ALT 250 FOR IP): Performed by: STUDENT IN AN ORGANIZED HEALTH CARE EDUCATION/TRAINING PROGRAM

## 2024-06-28 PROCEDURE — 1200000000 HC SEMI PRIVATE

## 2024-06-28 PROCEDURE — 82805 BLOOD GASES W/O2 SATURATION: CPT

## 2024-06-28 PROCEDURE — 80048 BASIC METABOLIC PNL TOTAL CA: CPT

## 2024-06-28 PROCEDURE — 6370000000 HC RX 637 (ALT 250 FOR IP): Performed by: FAMILY MEDICINE

## 2024-06-28 PROCEDURE — 2580000003 HC RX 258: Performed by: STUDENT IN AN ORGANIZED HEALTH CARE EDUCATION/TRAINING PROGRAM

## 2024-06-28 PROCEDURE — 36415 COLL VENOUS BLD VENIPUNCTURE: CPT

## 2024-06-28 PROCEDURE — 83735 ASSAY OF MAGNESIUM: CPT

## 2024-06-28 PROCEDURE — 6360000002 HC RX W HCPCS: Performed by: STUDENT IN AN ORGANIZED HEALTH CARE EDUCATION/TRAINING PROGRAM

## 2024-06-28 PROCEDURE — 89220 SPUTUM SPECIMEN COLLECTION: CPT

## 2024-06-28 RX ORDER — ASPIRIN 81 MG/1
81 TABLET, CHEWABLE ORAL DAILY
Status: DISCONTINUED | OUTPATIENT
Start: 2024-06-28 | End: 2024-06-30 | Stop reason: HOSPADM

## 2024-06-28 RX ORDER — HYDROXYZINE HYDROCHLORIDE 25 MG/1
25 TABLET, FILM COATED ORAL NIGHTLY PRN
Status: DISCONTINUED | OUTPATIENT
Start: 2024-06-28 | End: 2024-06-30 | Stop reason: HOSPADM

## 2024-06-28 RX ORDER — LEVOFLOXACIN 500 MG/1
500 TABLET, FILM COATED ORAL DAILY
Status: DISCONTINUED | OUTPATIENT
Start: 2024-06-28 | End: 2024-06-30 | Stop reason: HOSPADM

## 2024-06-28 RX ORDER — LEVOFLOXACIN 500 MG/1
500 TABLET, FILM COATED ORAL DAILY
Qty: 4 TABLET | Refills: 0 | Status: SHIPPED | OUTPATIENT
Start: 2024-06-29 | End: 2024-06-30

## 2024-06-28 RX ORDER — PREDNISONE 20 MG/1
40 TABLET ORAL DAILY
Qty: 8 TABLET | Refills: 0 | Status: SHIPPED | OUTPATIENT
Start: 2024-06-29 | End: 2024-06-30

## 2024-06-28 RX ORDER — BUDESONIDE AND FORMOTEROL FUMARATE DIHYDRATE 160; 4.5 UG/1; UG/1
2 AEROSOL RESPIRATORY (INHALATION)
Status: DISCONTINUED | OUTPATIENT
Start: 2024-06-28 | End: 2024-06-30 | Stop reason: HOSPADM

## 2024-06-28 RX ORDER — IPRATROPIUM BROMIDE AND ALBUTEROL SULFATE 2.5; .5 MG/3ML; MG/3ML
1 SOLUTION RESPIRATORY (INHALATION)
Status: DISCONTINUED | OUTPATIENT
Start: 2024-06-28 | End: 2024-06-30

## 2024-06-28 RX ORDER — PREDNISONE 20 MG/1
40 TABLET ORAL DAILY
Status: DISCONTINUED | OUTPATIENT
Start: 2024-06-28 | End: 2024-06-30 | Stop reason: HOSPADM

## 2024-06-28 RX ORDER — DULOXETIN HYDROCHLORIDE 30 MG/1
30 CAPSULE, DELAYED RELEASE ORAL 2 TIMES DAILY
Status: DISCONTINUED | OUTPATIENT
Start: 2024-06-28 | End: 2024-06-30 | Stop reason: HOSPADM

## 2024-06-28 RX ADMIN — SALINE NASAL SPRAY 1 SPRAY: 1.5 SOLUTION NASAL at 13:48

## 2024-06-28 RX ADMIN — IPRATROPIUM BROMIDE AND ALBUTEROL SULFATE 1 DOSE: 2.5; .5 SOLUTION RESPIRATORY (INHALATION) at 15:33

## 2024-06-28 RX ADMIN — SODIUM CHLORIDE, PRESERVATIVE FREE 10 ML: 5 INJECTION INTRAVENOUS at 11:05

## 2024-06-28 RX ADMIN — ENOXAPARIN SODIUM 40 MG: 100 INJECTION SUBCUTANEOUS at 11:05

## 2024-06-28 RX ADMIN — PREDNISONE 40 MG: 20 TABLET ORAL at 11:05

## 2024-06-28 RX ADMIN — IPRATROPIUM BROMIDE AND ALBUTEROL SULFATE 1 DOSE: 2.5; .5 SOLUTION RESPIRATORY (INHALATION) at 00:12

## 2024-06-28 RX ADMIN — LEVOFLOXACIN 500 MG: 500 TABLET, FILM COATED ORAL at 11:05

## 2024-06-28 RX ADMIN — BUDESONIDE AND FORMOTEROL FUMARATE DIHYDRATE 2 PUFF: 160; 4.5 AEROSOL RESPIRATORY (INHALATION) at 19:55

## 2024-06-28 RX ADMIN — DULOXETINE HYDROCHLORIDE 30 MG: 30 CAPSULE, DELAYED RELEASE ORAL at 22:09

## 2024-06-28 RX ADMIN — IPRATROPIUM BROMIDE AND ALBUTEROL SULFATE 1 DOSE: 2.5; .5 SOLUTION RESPIRATORY (INHALATION) at 11:18

## 2024-06-28 RX ADMIN — IPRATROPIUM BROMIDE AND ALBUTEROL SULFATE 1 DOSE: 2.5; .5 SOLUTION RESPIRATORY (INHALATION) at 19:55

## 2024-06-28 RX ADMIN — ASPIRIN 81 MG: 81 TABLET, CHEWABLE ORAL at 11:05

## 2024-06-28 RX ADMIN — DULOXETINE HYDROCHLORIDE 30 MG: 30 CAPSULE, DELAYED RELEASE ORAL at 11:05

## 2024-06-28 RX ADMIN — IPRATROPIUM BROMIDE AND ALBUTEROL SULFATE 1 DOSE: 2.5; .5 SOLUTION RESPIRATORY (INHALATION) at 23:40

## 2024-06-28 RX ADMIN — IPRATROPIUM BROMIDE AND ALBUTEROL SULFATE 1 DOSE: 2.5; .5 SOLUTION RESPIRATORY (INHALATION) at 04:35

## 2024-06-28 RX ADMIN — IPRATROPIUM BROMIDE AND ALBUTEROL SULFATE 1 DOSE: 2.5; .5 SOLUTION RESPIRATORY (INHALATION) at 07:10

## 2024-06-28 RX ADMIN — BUDESONIDE AND FORMOTEROL FUMARATE DIHYDRATE 2 PUFF: 160; 4.5 AEROSOL RESPIRATORY (INHALATION) at 11:24

## 2024-06-28 RX ADMIN — SALINE NASAL SPRAY 1 SPRAY: 1.5 SOLUTION NASAL at 16:28

## 2024-06-28 RX ADMIN — SODIUM CHLORIDE, PRESERVATIVE FREE 10 ML: 5 INJECTION INTRAVENOUS at 22:10

## 2024-06-28 ASSESSMENT — PAIN DESCRIPTION - LOCATION: LOCATION: ARM;CHEST

## 2024-06-28 ASSESSMENT — PAIN SCALES - WONG BAKER
WONGBAKER_NUMERICALRESPONSE: NO HURT
WONGBAKER_NUMERICALRESPONSE: NO HURT

## 2024-06-28 ASSESSMENT — PAIN - FUNCTIONAL ASSESSMENT: PAIN_FUNCTIONAL_ASSESSMENT: INTOLERABLE, UNABLE TO DO ANY ACTIVE OR PASSIVE ACTIVITIES

## 2024-06-28 ASSESSMENT — PAIN DESCRIPTION - ORIENTATION: ORIENTATION: LEFT

## 2024-06-28 ASSESSMENT — PAIN SCALES - GENERAL: PAINLEVEL_OUTOF10: 7

## 2024-06-28 ASSESSMENT — PAIN DESCRIPTION - DESCRIPTORS: DESCRIPTORS: ACHING;SHOOTING

## 2024-06-28 NOTE — PLAN OF CARE
Problem: Discharge Planning  Goal: Discharge to home or other facility with appropriate resources  6/28/2024 0302 by Garrett Vasquez RN  Outcome: Progressing  6/27/2024 1627 by Blanca Osorio RN  Outcome: Progressing     Problem: Pain  Goal: Verbalizes/displays adequate comfort level or baseline comfort level  6/28/2024 0302 by Garrett Vasquez RN  Outcome: Progressing  6/27/2024 1627 by Blanca Osorio RN  Outcome: Progressing  Flowsheets (Taken 6/27/2024 1511)  Verbalizes/displays adequate comfort level or baseline comfort level:   Encourage patient to monitor pain and request assistance   Assess pain using appropriate pain scale     Problem: Safety - Adult  Goal: Free from fall injury  6/28/2024 0302 by Garrett Vasquez RN  Outcome: Progressing  6/27/2024 1627 by Blanca Osorio RN  Outcome: Progressing

## 2024-06-28 NOTE — ED PROVIDER NOTES
EKG shows NSR @ 80. Normal axis with good R wave progression. No ST elevation or depression. No ectopy. No acute change from prior.     Vy Milian,   06/27/24 2037

## 2024-06-28 NOTE — CARE COORDINATION
CM reviewed pt’s medical record and discussed pt in IDR. CM introduced self and explained the role of case management. CM in to see pt to initiate D/C planning. Pt is cooperative to assessment. CM discussed PT/OT process/recommendations. Pt agrees to the need for [facility] for Aide services at [place]. Pt currently has aide services through [facility]. Pt denies any other DC needs.     Plan for discharge is home alone with [facility] for aide services and referral made with [facility] for skilled therapy services. Please notify CM if any new DC plans arise.     The patient's individualized treatment goals were discussed and they are in agreement with the transitional plan of care. The patient was provided with a choice in provider and understands the freedom of choice list that was provided to them. The patient and/or family verbalize understanding of treatment preferences and quality data associated with providers.       06/28/24 1550   Service Assessment   Patient Orientation Alert and Oriented   Cognition Alert   History Provided By Patient   Primary Caregiver Self  (has [facility])   Support Systems Home Care Staff   Patient's Healthcare Decision Maker is: Legal Next of Kin   PCP Verified by CM Yes   Last Visit to PCP Within last 3 months   Prior Functional Level Assistance with the following:;Bathing;Dressing;Mobility   Current Functional Level Assistance with the following:;Bathing;Dressing;Toileting;Mobility   Can patient return to prior living arrangement Yes   Ability to make needs known: Good   Family able to assist with home care needs: Yes   Would you like for me to discuss the discharge plan with any other family members/significant others, and if so, who? No   Financial Resources Medicaid;Medicare   Community Resources ECF/Home Care   Social/Functional History   Lives With Alone   Type of Home Senior housing apartment   Home Layout One level   Home Access Level entry;Elevator   Bathroom Shower/Tub  Tub/Shower unit   Bathroom Toilet Standard   Bathroom Equipment Shower chair   Bathroom Accessibility Accessible   Home Equipment Oxygen;Rollator;Walker - Rolling;Wheelchair - Manual   ADL Assistance Independent   Transfer Assistance Independent   Active  No   Patient's  Info FAMILY PROVIDES TRANSPORTATION   Discharge Planning   Type of Residence House   Living Arrangements Alone   Current Services Prior To Admission Home Care   Type of Home Care Services Aide Services   Patient expects to be discharged to: Apartment

## 2024-06-28 NOTE — PROGRESS NOTES
Outpatient Pharmacy Progress Note for Meds-to-Beds    Total number of Prescriptions Filled: 2    Additional Documentation:  Medications given to nurse at [facility] to provide to nurse Arredondo to provide to patient due to contact precautions      Thank you for letting us serve your patients.  [facility] [place] [id number]    Phone: [phone]    Fax: [phone]

## 2024-06-28 NOTE — PROGRESS NOTES
Heart failure education consult received. Attempted to meet with patient for education. Patient sleeping soundly at time of visit. I will attempt to see patient prior to discharge.     Admitting diagnosis- Heart failure  Cardiologist- Arlette  Heart Failure Education Nurse consulted- yes   Ejection fraction  55-60% as of 6/27/24  Pro BNP- 56.14 on 6/27  Hospital follow up appt-  PFT on 7/17  PCP- Arsh

## 2024-06-28 NOTE — PROGRESS NOTES
V2.0    [facility] [place]      Name:  Kalee Chicas /Age/Sex: 1960  (63 y.o. female)   MRN & CSN:  [id number] & [id number] Encounter Date/Time: 2024 3:53 PM EDT   Location:  [address] [place] [address] PCP: Chu Caban MD     Attending:Alejandrina Dang MD       Hospital Day: 2    Assessment and Recommendations   Kalee Chicas is a 63 y.o. female with a history of COPD, chronic respiratory failure, KING, depression who presented to [facility] 2024 with shortness of breath for the past 2 days     Acute shortness of breath with difficulty breathing possibly secondary from new onset congestive heart failure-ruled out vs COPD exacerbation  Last echo done in 2022 showed EF of 50 to 55%  Chest x-ray done showed pulmonary vascular hypertension with pulmonary interstitial edema consistent with congestive heart failure  2D echo has been ordered  1 dose of Lasix 60 mill 1 in the emergency room  2D echo done EF 55-60%  Diuretics discontinued    Chronic respiratory failure with hypoxia patient is on 6 L nasal cannula oxygen at home.  She was previously on 4 L which has increased to 6 L due to her COPD     COPD exacerbation  Patient is on Symbicort at home.  Started on steroids and duo-nebs     History of depression     Chronic pain patient is on Suboxone at home      Diet ADULT DIET; Regular; No Added Salt (3-4 gm)   DVT Prophylaxis [x] Lovenox, []  Heparin, [] SCDs, [] Ambulation,  [] Eliquis, [] Xarelto  [] Coumadin   Code Status Full Code   Disposition From: Home  Expected Disposition: Home  Estimated Date of Discharge: another 24 hours  Patient requires continued admission due to shortness of breath   Surrogate Decision Maker/ POA       Personally reviewed Lab Studies and Imaging     EKG interpreted personally and results normal sinus rhythm    Imaging that was interpreted personally includes CXR and results show Congestive heart failure with left basilar atelectasis and a left pleural effusion.    Drugs  Or  acetaminophen, 650 mg, Q6H PRN  potassium chloride, 40 mEq, PRN   Or  potassium alternative oral replacement, 40 mEq, PRN   Or  potassium chloride, 10 mEq, PRN  magnesium sulfate, 2,000 mg, PRN        Labs and Imaging   Echo (TTE) complete (PRN contrast/bubble/strain/3D)    Result Date: 6/27/2024    Technically difficult exam due to patient body habitus.   Left Ventricle: Normal left ventricular systolic function with a visually estimated EF of 55 - 60%.   No significant valvular disease.   Pericardium: No pericardial effusion.     XR CHEST PORTABLE    Result Date: 6/27/2024  Chest X-ray INDICATION: Shortness of breath COMPARISON: Frontal chest, 4/23/2024 TECHNIQUE: AP/PA view of the chest was obtained. FINDINGS: There is cardiomegaly, pulmonary venous hypertension and pulmonary interstitial edema consistent with congestive heart failure. There is airspace disease in the left lung base consistent with atelectasis or pneumonia. There may be a small left pleural effusion. There is a dual-lead pacemaker. The bony thorax is intact.      Congestive heart failure with left basilar atelectasis and a left pleural effusion. Electronically signed by Jose Coe      CBC:   Recent Labs     06/27/24  0744   WBC 7.6   HGB 12.1*        BMP:    Recent Labs     06/27/24  0744 06/28/24  0201    140   K 4.0 4.5   CL 91* 90*   CO2 49* 45*   BUN 11 16   CREATININE 0.5* 0.7   GLUCOSE 142* 131*     Hepatic:   Recent Labs     06/27/24  0744   AST 12*   ALT 10   BILITOT 0.2   ALKPHOS 71     Lipids:   Lab Results   Component Value Date/Time    CHOL 197 06/28/2024 02:01 AM    HDL 43 06/28/2024 02:01 AM    TRIG 139 06/28/2024 02:01 AM     Hemoglobin A1C:   Lab Results   Component Value Date/Time    LABA1C 6.8 08/22/2023 03:22 AM     TSH: No results found for: \"TSH\"  Troponin:   Lab Results   Component Value Date/Time    TROPONINT <0.010 08/20/2023 06:10 PM    TROPONINT <0.010 03/01/2023 02:22 PM    TROPONINT <0.010

## 2024-06-28 NOTE — PROGRESS NOTES
06/28/24 1042   Encounter Summary   Encounter Overview/Reason Initial Encounter   Service Provided For Patient   Referral/Consult From [facility]   Support System Family members   Last Encounter  06/28/24  (Calm and coping, continue support as needed.)   Complexity of Encounter Low   Begin Time 1035   End Time  1043   Total Time Calculated 8 min   Spiritual/Emotional needs   Type Spiritual Support   Assessment/Intervention/Outcome   Assessment Unable to assess   Plan and Referrals   Plan/Referrals Continue Support (comment)

## 2024-06-29 PROBLEM — I50.9 ACUTE CONGESTIVE HEART FAILURE (HCC): Status: ACTIVE | Noted: 2024-06-29

## 2024-06-29 LAB
ANION GAP SERPL CALCULATED.3IONS-SCNC: 2 MMOL/L (ref 7–16)
BASOPHILS ABSOLUTE: 0 K/CU MM
BASOPHILS RELATIVE PERCENT: 0.3 % (ref 0–1)
BUN SERPL-MCNC: 17 MG/DL (ref 6–23)
CALCIUM SERPL-MCNC: 9.2 MG/DL (ref 8.3–10.6)
CHLORIDE BLD-SCNC: 93 MMOL/L (ref 99–110)
CO2: 45 MMOL/L (ref 21–32)
CREAT SERPL-MCNC: 0.4 MG/DL (ref 0.6–1.1)
DIFFERENTIAL TYPE: ABNORMAL
EOSINOPHILS ABSOLUTE: 0.1 K/CU MM
EOSINOPHILS RELATIVE PERCENT: 0.8 % (ref 0–3)
GFR, ESTIMATED: >90 ML/MIN/1.73M2
GLUCOSE SERPL-MCNC: 125 MG/DL (ref 70–99)
HCT VFR BLD CALC: 37.9 % (ref 37–47)
HEMOGLOBIN: 11.5 GM/DL (ref 12.5–16)
IMMATURE NEUTROPHIL %: 0.3 % (ref 0–0.43)
LYMPHOCYTES ABSOLUTE: 1.9 K/CU MM
LYMPHOCYTES RELATIVE PERCENT: 21.5 % (ref 24–44)
MAGNESIUM: 1.7 MG/DL (ref 1.8–2.4)
MCH RBC QN AUTO: 28.3 PG (ref 27–31)
MCHC RBC AUTO-ENTMCNC: 30.3 % (ref 32–36)
MCV RBC AUTO: 93.3 FL (ref 78–100)
MONOCYTES ABSOLUTE: 1 K/CU MM
MONOCYTES RELATIVE PERCENT: 11.4 % (ref 0–4)
NEUTROPHILS ABSOLUTE: 5.9 K/CU MM
NEUTROPHILS RELATIVE PERCENT: 65.7 % (ref 36–66)
NUCLEATED RBC %: 0 %
PDW BLD-RTO: 12 % (ref 11.7–14.9)
PLATELET # BLD: 184 K/CU MM (ref 140–440)
PMV BLD AUTO: 10 FL (ref 7.5–11.1)
POTASSIUM SERPL-SCNC: 4.1 MMOL/L (ref 3.5–5.1)
RBC # BLD: 4.06 M/CU MM (ref 4.2–5.4)
SODIUM BLD-SCNC: 140 MMOL/L (ref 135–145)
TOTAL IMMATURE NEUTOROPHIL: 0.03 K/CU MM
TOTAL NUCLEATED RBC: 0 K/CU MM
WBC # BLD: 9 K/CU MM (ref 4–10.5)

## 2024-06-29 PROCEDURE — 99222 1ST HOSP IP/OBS MODERATE 55: CPT | Performed by: INTERNAL MEDICINE

## 2024-06-29 PROCEDURE — 85025 COMPLETE CBC W/AUTO DIFF WBC: CPT

## 2024-06-29 PROCEDURE — 94761 N-INVAS EAR/PLS OXIMETRY MLT: CPT

## 2024-06-29 PROCEDURE — 6360000002 HC RX W HCPCS: Performed by: STUDENT IN AN ORGANIZED HEALTH CARE EDUCATION/TRAINING PROGRAM

## 2024-06-29 PROCEDURE — 80048 BASIC METABOLIC PNL TOTAL CA: CPT

## 2024-06-29 PROCEDURE — 6370000000 HC RX 637 (ALT 250 FOR IP): Performed by: STUDENT IN AN ORGANIZED HEALTH CARE EDUCATION/TRAINING PROGRAM

## 2024-06-29 PROCEDURE — 1200000000 HC SEMI PRIVATE

## 2024-06-29 PROCEDURE — 2580000003 HC RX 258: Performed by: STUDENT IN AN ORGANIZED HEALTH CARE EDUCATION/TRAINING PROGRAM

## 2024-06-29 PROCEDURE — 2700000000 HC OXYGEN THERAPY PER DAY

## 2024-06-29 PROCEDURE — 94640 AIRWAY INHALATION TREATMENT: CPT

## 2024-06-29 PROCEDURE — 36415 COLL VENOUS BLD VENIPUNCTURE: CPT

## 2024-06-29 PROCEDURE — 83735 ASSAY OF MAGNESIUM: CPT

## 2024-06-29 RX ADMIN — SODIUM CHLORIDE, PRESERVATIVE FREE 10 ML: 5 INJECTION INTRAVENOUS at 20:08

## 2024-06-29 RX ADMIN — ACETAMINOPHEN 650 MG: 325 TABLET ORAL at 20:07

## 2024-06-29 RX ADMIN — BUDESONIDE AND FORMOTEROL FUMARATE DIHYDRATE 2 PUFF: 160; 4.5 AEROSOL RESPIRATORY (INHALATION) at 07:39

## 2024-06-29 RX ADMIN — DULOXETINE HYDROCHLORIDE 30 MG: 30 CAPSULE, DELAYED RELEASE ORAL at 08:45

## 2024-06-29 RX ADMIN — IPRATROPIUM BROMIDE AND ALBUTEROL SULFATE 1 DOSE: 2.5; .5 SOLUTION RESPIRATORY (INHALATION) at 03:24

## 2024-06-29 RX ADMIN — IPRATROPIUM BROMIDE AND ALBUTEROL SULFATE 1 DOSE: 2.5; .5 SOLUTION RESPIRATORY (INHALATION) at 11:20

## 2024-06-29 RX ADMIN — ENOXAPARIN SODIUM 40 MG: 100 INJECTION SUBCUTANEOUS at 08:45

## 2024-06-29 RX ADMIN — PREDNISONE 40 MG: 20 TABLET ORAL at 08:45

## 2024-06-29 RX ADMIN — SODIUM CHLORIDE, PRESERVATIVE FREE 5 ML: 5 INJECTION INTRAVENOUS at 08:47

## 2024-06-29 RX ADMIN — ASPIRIN 81 MG: 81 TABLET, CHEWABLE ORAL at 08:45

## 2024-06-29 RX ADMIN — IPRATROPIUM BROMIDE AND ALBUTEROL SULFATE 1 DOSE: 2.5; .5 SOLUTION RESPIRATORY (INHALATION) at 20:27

## 2024-06-29 RX ADMIN — IPRATROPIUM BROMIDE AND ALBUTEROL SULFATE 1 DOSE: 2.5; .5 SOLUTION RESPIRATORY (INHALATION) at 15:35

## 2024-06-29 RX ADMIN — IPRATROPIUM BROMIDE AND ALBUTEROL SULFATE 1 DOSE: 2.5; .5 SOLUTION RESPIRATORY (INHALATION) at 07:33

## 2024-06-29 RX ADMIN — LEVOFLOXACIN 500 MG: 500 TABLET, FILM COATED ORAL at 08:45

## 2024-06-29 RX ADMIN — BUDESONIDE AND FORMOTEROL FUMARATE DIHYDRATE 2 PUFF: 160; 4.5 AEROSOL RESPIRATORY (INHALATION) at 20:28

## 2024-06-29 RX ADMIN — DULOXETINE HYDROCHLORIDE 30 MG: 30 CAPSULE, DELAYED RELEASE ORAL at 20:06

## 2024-06-29 ASSESSMENT — PAIN SCALES - GENERAL
PAINLEVEL_OUTOF10: 3
PAINLEVEL_OUTOF10: 3
PAINLEVEL_OUTOF10: 5
PAINLEVEL_OUTOF10: 3
PAINLEVEL_OUTOF10: 3

## 2024-06-29 ASSESSMENT — PAIN SCALES - WONG BAKER
WONGBAKER_NUMERICALRESPONSE: HURTS A LITTLE BIT
WONGBAKER_NUMERICALRESPONSE: NO HURT
WONGBAKER_NUMERICALRESPONSE: NO HURT
WONGBAKER_NUMERICALRESPONSE: HURTS A LITTLE BIT
WONGBAKER_NUMERICALRESPONSE: HURTS A LITTLE BIT
WONGBAKER_NUMERICALRESPONSE: NO HURT
WONGBAKER_NUMERICALRESPONSE: HURTS A LITTLE BIT
WONGBAKER_NUMERICALRESPONSE: NO HURT
WONGBAKER_NUMERICALRESPONSE: NO HURT
WONGBAKER_NUMERICALRESPONSE: HURTS A LITTLE BIT
WONGBAKER_NUMERICALRESPONSE: NO HURT

## 2024-06-29 ASSESSMENT — PAIN DESCRIPTION - ORIENTATION: ORIENTATION: LEFT

## 2024-06-29 ASSESSMENT — PAIN DESCRIPTION - LOCATION: LOCATION: ARM

## 2024-06-29 ASSESSMENT — PAIN DESCRIPTION - DESCRIPTORS: DESCRIPTORS: ACHING;DISCOMFORT

## 2024-06-29 ASSESSMENT — PAIN - FUNCTIONAL ASSESSMENT: PAIN_FUNCTIONAL_ASSESSMENT: ACTIVITIES ARE NOT PREVENTED

## 2024-06-29 NOTE — PROGRESS NOTES
V2.0    [facility] [place] Progress Note      Name:  Kalee Chicas /Age/Sex: 1960  (63 y.o. female)   MRN & CSN:  [id number] & [id number] Encounter Date/Time: 2024 3:53 PM EDT   Location:  [address] [place] [address] PCP: Chu Caban MD     Attending:Jonathan Santos MD       Hospital Day: 3    Assessment and Recommendations   Kalee Chicas is a 63 y.o. female with a history of COPD, chronic respiratory failure, KING, depression who presented to [facility] 2024 with shortness of breath for the past 2 days     Acute shortness of breath with difficulty breathing possibly secondary from new onset congestive heart failure-ruled out vs COPD exacerbation  Last echo done in 2022 showed EF of 50 to 55%  Chest x-ray done showed pulmonary vascular hypertension with pulmonary interstitial edema consistent with congestive heart failure  More likely COPD  The patient is improving currently on baseline 4 L nasal cannula  Requesting to discuss with pulmonology currently on Levaquin inhalers and steroids.  May consider discharge tomorrow    Chronic respiratory failure with hypoxia patient is on 6 L nasal cannula oxygen at home.  She was previously on 4 L which has increased to 6 L due to her COPD     COPD exacerbation  Patient is on Symbicort at home.  Started on steroids and duo-nebs     History of depression     Chronic pain patient is on Suboxone at home      Diet ADULT DIET; Regular; No Added Salt (3-4 gm)   DVT Prophylaxis [x] Lovenox, []  Heparin, [] SCDs, [] Ambulation,  [] Eliquis, [] Xarelto  [] Coumadin   Code Status Full Code   Disposition From: Home  Expected Disposition: Home  Estimated Date of Discharge: another 24 hours  Patient requires continued admission due to shortness of breath   Surrogate Decision Maker/ POA       Personally reviewed Lab Studies and Imaging     EKG interpreted personally and results normal sinus rhythm    Imaging that was interpreted personally includes CXR and results show Congestive  heart failure with left basilar atelectasis and a left pleural effusion.    Drugs that require monitoring for toxicity include enoxaparin and the method of monitoring was CBC        Subjective:     Chief Complaint:     Kalee Chicas is a 63 y.o. female who presents with shortness of breath    Patient seen at bedside states that her breathing got better   More likely COPD  The patient is improving currently on baseline 4 L nasal cannula  Requesting to discuss with pulmonology currently on Levaquin inhalers and steroids.  May consider discharge tomorrow  Review of Systems:      Pertinent positives and negatives discussed in HPI    Objective:     Intake/Output Summary (Last 24 hours) at 6/29/2024 1006  Last data filed at 6/28/2024 1105  Gross per 24 hour   Intake 10 ml   Output --   Net 10 ml        Vitals:   Vitals:    06/29/24 0733 06/29/24 0743 06/29/24 0800 06/29/24 0809   BP:   120/62    Pulse: 75  88 98   Resp: 14  15    Temp:   98.7 °F (37.1 °C)    TempSrc:   Oral    SpO2: 93% 94% 93% 91%   Weight:       Height:             Physical Exam:      General: NAD  Eyes: EOMI  ENT: neck supple  Cardiovascular: Regular rate.  Respiratory: Clear to auscultation  Gastrointestinal: Soft, non tender  Genitourinary: no suprapubic tenderness  Musculoskeletal: No edema  Skin: warm, dry  Neuro: Alert.  Psych: Mood appropriate.         Medications:   Medications:    ipratropium 0.5 mg-albuterol 2.5 mg  1 Dose Inhalation Q4H RT    DULoxetine  30 mg Oral BID    budesonide-formoterol  2 puff Inhalation BID RT    And    tiotropium  2 puff Inhalation Daily RT    aspirin  81 mg Oral Daily    predniSONE  40 mg Oral Daily    levoFLOXacin  500 mg Oral Daily    sodium chloride flush  5-40 mL IntraVENous 2 times per day    enoxaparin  40 mg SubCUTAneous Daily      Infusions:    sodium chloride       PRN Meds: hydrOXYzine HCl, 25 mg, Nightly PRN  sodium chloride, 1 spray, PRN  sodium chloride flush, 5-40 mL, PRN  sodium chloride, ,  08/22/2023 03:22 AM     TSH: No results found for: \"TSH\"  Troponin:   Lab Results   Component Value Date/Time    TROPONINT <0.010 08/20/2023 06:10 PM    TROPONINT <0.010 03/01/2023 02:22 PM    TROPONINT <0.010 03/25/2022 11:10 PM     Lactic Acid: No results for input(s): \"LACTA\" in the last 72 hours.  BNP:   Recent Labs     06/27/24  1830   PROBNP 56.14       UA:  Lab Results   Component Value Date/Time    NITRU NEGATIVE 04/23/2024 11:30 PM    COLORU YELLOW 04/23/2024 11:30 PM    PHUR 6.0 04/23/2024 11:30 PM    WBCUA 1 01/05/2022 08:25 PM    RBCUA <1 01/05/2022 08:25 PM    MUCUS RARE 01/05/2022 08:25 PM    TRICHOMONAS NONE SEEN 01/05/2022 08:25 PM    BACTERIA NEGATIVE 01/05/2022 08:25 PM    CLARITYU CLEAR 04/23/2024 11:30 PM    LEUKOCYTESUR NEGATIVE 04/23/2024 11:30 PM    UROBILINOGEN 0.2 04/23/2024 11:30 PM    BILIRUBINUR NEGATIVE 04/23/2024 11:30 PM    BLOODU NEGATIVE 04/23/2024 11:30 PM    GLUCOSEU NEGATIVE 04/23/2024 11:30 PM    KETUA NEGATIVE 04/23/2024 11:30 PM     Urine Cultures: No results found for: \"LABURIN\"  Blood Cultures: No results found for: \"BC\"  No results found for: \"BLOODCULT2\"  Organism: No results found for: \"ORG\"      Electronically signed by Jonathan Santos MD on 6/29/2024 at 10:06 AM

## 2024-06-29 NOTE — CONSULTS
Pulmonary Consult Note      Reason for Consult:     COPD exacerbation      Requesting Physician:     Alejandrina Dang MD       Subjective:   CHIEF COMPLAINT :SOB    Patient Active Problem List    Diagnosis Date Noted    Delirium due to another medical condition 03/06/2023     Priority: Medium    Bipolar affective disorder, currently depressed, moderate ([facility]) 03/06/2023     Priority: Medium    Heart failure ([facility]) 06/27/2024    COPD exacerbation ([facility]) 04/24/2024    Uncontrolled pain     Generalized weakness     Gait disturbance     Myoclonus     Near syncope     Moderate protein-calorie malnutrition (HCC)     Traumatic closed nondisplaced fracture of distal end of right fibula, initial encounter 01/11/2022    Asterixis     Hypercapnia     Chronic rhinitis 01/05/2022    Fall at home, initial encounter 01/05/2022    Acute exacerbation of chronic obstructive pulmonary disease (COPD) ([facility]) 06/26/2021    Acute on chronic respiratory failure with hypoxia and hypercapnia ([facility]) 05/23/2021    COVID-19 05/23/2021    Pulmonary emphysema ([facility]) 05/18/2021    SOB (shortness of breath) 05/18/2021    Leg swelling 05/18/2021    Chronic hepatitis C without hepatic coma ([facility]) 05/17/2021     Overview Note:     Patient saw GI specialists and cleared it on her own      Gastroesophageal reflux disease without esophagitis 05/17/2021    H/O drug abuse ([facility]) 05/17/2021     Overview Note:     Prior heroine      Pulmonary nodules 05/17/2021     Overview Note:     Needs f/u 11/2021      ADHD (attention deficit hyperactivity disorder) 05/17/2021    Pneumonia 05/02/2021    Chronic respiratory failure (HCC) 08/16/2017    COPD, severe ([facility]) 08/16/2017    Obstructive sleep apnea 08/16/2017     Overview Note:     Uses biPAP at night      S/P placement of cardiac pacemaker 2005     Overview Note:     Has never had replacement battery.          HPI:                The patient is a 63 y.o. female with significant past medical history of ADHD, OUD, KING,  diaphoresis, activity change, appetite change, fatigue, night sweats and unexpected weight change.   EYES:  negative for blurred vision, eye discharge, visual disturbance and icterus  HEENT:  negative for hearing loss, tinnitus, ear drainage, sinus pressure, nasal congestion, epistaxis and snoring  RESPIRATORY:  See HPI  CARDIOVASCULAR:  negative for chest pain, palpitations, exertional chest pressure/discomfort, edema, syncope  GASTROINTESTINAL:  negative for nausea, vomiting, diarrhea, constipation, blood in stool and abdominal pain  GENITOURINARY:  negative for frequency, dysuria, urinary incontinence, decreased urine volume, and hematuria  HEMATOLOGIC/LYMPHATIC:  negative for easy bruising, bleeding and lymphadenopathy  ALLERGIC/IMMUNOLOGIC:  negative for recurrent infections, angioedema, anaphylaxis and drug reactions  ENDOCRINE:  negative for weight changes and diabetic symptoms including polyuria, polydipsia and polyphagia  MUSCULOSKELETAL:  negative for  pain, joint swelling, decreased range of motion and muscle weakness  NEUROLOGICAL:  negative for headaches, slurred speech, unilateral weakness  PSYCHIATRIC/BEHAVIORAL: negative for hallucinations, behavioral problems, confusion and agitation.     Objective:   PHYSICAL EXAM:      VITALS:  /62   Pulse 98   Temp 98.7 °F (37.1 °C) (Oral)   Resp 15   Ht 1.626 m (5' 4\")   Wt 91 kg (200 lb 9.9 oz)   SpO2 91%   BMI 34.44 kg/m²   24HR INTAKE/OUTPUT:    Intake/Output Summary (Last 24 hours) at 2024 1108  Last data filed at 2024 1047  Gross per 24 hour   Intake 480 ml   Output --   Net 480 ml     CURRENT PULSE OXIMETRY:  SpO2: 91 %  24HR PULSE OXIMETRY RANGE:  SpO2  Av.7 %  Min: 91 %  Max: 97 %    CONSTITUTIONAL:  awake, alert, cooperative, no apparent distress, and appears stated age  NECK:  Supple, symmetrical, trachea midline, no adenopathy, thyroid symmetric, not enlarged and no tenderness, skin normal  LUNGS:  decreased air entry

## 2024-06-29 NOTE — PLAN OF CARE
Problem: Discharge Planning  Goal: Discharge to home or other facility with appropriate resources  6/29/2024 0100 by Renu Vergara RN  Outcome: Progressing  6/28/2024 1737 by Maria Elena Gutierrez RN  Outcome: Progressing     Problem: Pain  Goal: Verbalizes/displays adequate comfort level or baseline comfort level  6/29/2024 0100 by Renu Vergara RN  Outcome: Progressing  6/28/2024 1737 by Maria Elena Gutierrez RN  Outcome: Progressing     Problem: Safety - Adult  Goal: Free from fall injury  6/29/2024 0100 by Renu Vergara RN  Outcome: Progressing  6/28/2024 1737 by Maria Elena Gutierrez RN  Outcome: Progressing

## 2024-06-30 VITALS
DIASTOLIC BLOOD PRESSURE: 61 MMHG | WEIGHT: 200.84 LBS | HEIGHT: 64 IN | TEMPERATURE: 98 F | HEART RATE: 86 BPM | OXYGEN SATURATION: 94 % | SYSTOLIC BLOOD PRESSURE: 106 MMHG | RESPIRATION RATE: 12 BRPM | BODY MASS INDEX: 34.29 KG/M2

## 2024-06-30 LAB
ANION GAP SERPL CALCULATED.3IONS-SCNC: 7 MMOL/L (ref 7–16)
BUN SERPL-MCNC: 17 MG/DL (ref 6–23)
CALCIUM SERPL-MCNC: 8.8 MG/DL (ref 8.3–10.6)
CHLORIDE BLD-SCNC: 95 MMOL/L (ref 99–110)
CO2: 37 MMOL/L (ref 21–32)
CREAT SERPL-MCNC: 0.5 MG/DL (ref 0.6–1.1)
GFR, ESTIMATED: >90 ML/MIN/1.73M2
GLUCOSE SERPL-MCNC: 147 MG/DL (ref 70–99)
MAGNESIUM: 2.1 MG/DL (ref 1.8–2.4)
POTASSIUM SERPL-SCNC: 4.4 MMOL/L (ref 3.5–5.1)
PRO-BNP: 53.2 PG/ML
SODIUM BLD-SCNC: 139 MMOL/L (ref 135–145)

## 2024-06-30 PROCEDURE — 6370000000 HC RX 637 (ALT 250 FOR IP): Performed by: STUDENT IN AN ORGANIZED HEALTH CARE EDUCATION/TRAINING PROGRAM

## 2024-06-30 PROCEDURE — 2580000003 HC RX 258: Performed by: STUDENT IN AN ORGANIZED HEALTH CARE EDUCATION/TRAINING PROGRAM

## 2024-06-30 PROCEDURE — 94640 AIRWAY INHALATION TREATMENT: CPT

## 2024-06-30 PROCEDURE — 36415 COLL VENOUS BLD VENIPUNCTURE: CPT

## 2024-06-30 PROCEDURE — 99232 SBSQ HOSP IP/OBS MODERATE 35: CPT | Performed by: INTERNAL MEDICINE

## 2024-06-30 PROCEDURE — 2700000000 HC OXYGEN THERAPY PER DAY

## 2024-06-30 PROCEDURE — 6360000002 HC RX W HCPCS: Performed by: STUDENT IN AN ORGANIZED HEALTH CARE EDUCATION/TRAINING PROGRAM

## 2024-06-30 PROCEDURE — 83735 ASSAY OF MAGNESIUM: CPT

## 2024-06-30 PROCEDURE — 94761 N-INVAS EAR/PLS OXIMETRY MLT: CPT

## 2024-06-30 PROCEDURE — 80048 BASIC METABOLIC PNL TOTAL CA: CPT

## 2024-06-30 PROCEDURE — 83880 ASSAY OF NATRIURETIC PEPTIDE: CPT

## 2024-06-30 RX ORDER — PREDNISONE 20 MG/1
40 TABLET ORAL DAILY
Qty: 8 TABLET | Refills: 0 | Status: SHIPPED | OUTPATIENT
Start: 2024-06-30 | End: 2024-07-04

## 2024-06-30 RX ORDER — IPRATROPIUM BROMIDE AND ALBUTEROL SULFATE 2.5; .5 MG/3ML; MG/3ML
1 SOLUTION RESPIRATORY (INHALATION)
Status: DISCONTINUED | OUTPATIENT
Start: 2024-06-30 | End: 2024-06-30 | Stop reason: HOSPADM

## 2024-06-30 RX ORDER — IPRATROPIUM BROMIDE AND ALBUTEROL SULFATE 2.5; .5 MG/3ML; MG/3ML
1 SOLUTION RESPIRATORY (INHALATION) EVERY 4 HOURS PRN
Status: DISCONTINUED | OUTPATIENT
Start: 2024-06-30 | End: 2024-06-30 | Stop reason: HOSPADM

## 2024-06-30 RX ORDER — LEVOFLOXACIN 500 MG/1
500 TABLET, FILM COATED ORAL DAILY
Qty: 4 TABLET | Refills: 0 | Status: SHIPPED | OUTPATIENT
Start: 2024-06-30 | End: 2024-07-04

## 2024-06-30 RX ADMIN — BUDESONIDE AND FORMOTEROL FUMARATE DIHYDRATE 2 PUFF: 160; 4.5 AEROSOL RESPIRATORY (INHALATION) at 07:02

## 2024-06-30 RX ADMIN — ENOXAPARIN SODIUM 40 MG: 100 INJECTION SUBCUTANEOUS at 10:40

## 2024-06-30 RX ADMIN — LEVOFLOXACIN 500 MG: 500 TABLET, FILM COATED ORAL at 10:40

## 2024-06-30 RX ADMIN — PREDNISONE 40 MG: 20 TABLET ORAL at 10:40

## 2024-06-30 RX ADMIN — ASPIRIN 81 MG: 81 TABLET, CHEWABLE ORAL at 10:40

## 2024-06-30 RX ADMIN — IPRATROPIUM BROMIDE AND ALBUTEROL SULFATE 1 DOSE: 2.5; .5 SOLUTION RESPIRATORY (INHALATION) at 07:00

## 2024-06-30 RX ADMIN — DULOXETINE HYDROCHLORIDE 30 MG: 30 CAPSULE, DELAYED RELEASE ORAL at 10:40

## 2024-06-30 RX ADMIN — SODIUM CHLORIDE, PRESERVATIVE FREE 5 ML: 5 INJECTION INTRAVENOUS at 10:41

## 2024-06-30 ASSESSMENT — PAIN SCALES - WONG BAKER
WONGBAKER_NUMERICALRESPONSE: HURTS A LITTLE BIT

## 2024-06-30 NOTE — RT PROTOCOL NOTE
RT Inhaler-Nebulizer Bronchodilator Protocol Note    There is a bronchodilator order in the chart from a provider indicating to follow the RT Bronchodilator Protocol and there is an “Initiate RT Inhaler-Nebulizer Bronchodilator Protocol” order as well (see protocol at bottom of note).    CXR Findings:  No results found.    The findings from the last RT Protocol Assessment were as follows:   History Pulmonary Disease: Chronic pulmonary disease  Respiratory Pattern: Regular pattern and RR 12-20 bpm  Breath Sounds: Slightly diminished and/or crackles  Cough: Strong, spontaneous, non-productive  Indication for Bronchodilator Therapy: On home bronchodilators (txs PRN at home for wheezing/shortness of breath)  Bronchodilator Assessment Score: 4    Aerosolized bronchodilator medication orders have been revised according to the RT Inhaler-Nebulizer Bronchodilator Protocol below.    Respiratory Therapist to perform RT Therapy Protocol Assessment initially then follow the protocol.  Repeat RT Therapy Protocol Assessment PRN for score 0-3 or on second treatment, BID, and PRN for scores above 3.    No Indications - adjust the frequency to every 6 hours PRN wheezing or bronchospasm, if no treatments needed after 48 hours then discontinue using Per Protocol order mode.     If indication present, adjust the RT bronchodilator orders based on the Bronchodilator Assessment Score as indicated below.  Use Inhaler orders unless patient has one or more of the following: on home nebulizer, not able to hold breath for 10 seconds, is not alert and oriented, cannot activate and use MDI correctly, or respiratory rate 25 breaths per minute or more, then use the equivalent nebulizer order(s) with same Frequency and PRN reasons based on the score.  If a patient is on this medication at home then do not decrease Frequency below that used at home.    0-3 - enter or revise RT bronchodilator order(s) to equivalent RT Bronchodilator order with  Frequency of every 4 hours PRN for wheezing or increased work of breathing using Per Protocol order mode.        4-6 - enter or revise RT Bronchodilator order(s) to two equivalent RT bronchodilator orders with one order with BID Frequency and one order with Frequency of every 4 hours PRN wheezing or increased work of breathing using Per Protocol order mode.        7-10 - enter or revise RT Bronchodilator order(s) to two equivalent RT bronchodilator orders with one order with TID Frequency and one order with Frequency of every 4 hours PRN wheezing or increased work of breathing using Per Protocol order mode.       11-13 - enter or revise RT Bronchodilator order(s) to one equivalent RT bronchodilator order with QID Frequency and an Albuterol order with Frequency of every 4 hours PRN wheezing or increased work of breathing using Per Protocol order mode.      Greater than 13 - enter or revise RT Bronchodilator order(s) to one equivalent RT bronchodilator order with every 4 hours Frequency and an Albuterol order with Frequency of every 2 hours PRN wheezing or increased work of breathing using Per Protocol order mode.     RT to enter RT Home Evaluation for COPD & MDI Assessment order using Per Protocol order mode.    Electronically signed by Che Mckeon RCP on 6/30/2024 at 3:40 PM

## 2024-06-30 NOTE — DISCHARGE INSTR - DIET

## 2024-06-30 NOTE — PROGRESS NOTES
Pulmonary and Critical Care  Progress Note      VITALS:  /61   Pulse 86   Temp 98 °F (36.7 °C) (Oral)   Resp 12   Ht 1.626 m (5' 4\")   Wt 91.1 kg (200 lb 13.4 oz)   SpO2 94%   BMI 34.47 kg/m²     Subjective:   CHIEF COMPLAINT :SOB     HPI:                The patient is a 63 y.o. female is sitting in the bed. She is in mild resp distress    Objective:   PHYSICAL EXAM:    LUNGS:Occasional basal crackles  Abd-soft, BS+,NT  Ext- no pedal edema  CVS-s1s2, no murmurs      DATA:    CBC:  Recent Labs     06/29/24  0328   WBC 9.0   RBC 4.06*   HGB 11.5*   HCT 37.9      MCV 93.3   MCH 28.3   MCHC 30.3*   RDW 12.0      BMP:  Recent Labs     06/28/24  0201 06/29/24  0328 06/30/24  0215    140 139   K 4.5 4.1 4.4   CL 90* 93* 95*   CO2 45* 45* 37*   BUN 16 17 17   CREATININE 0.7 0.4* 0.5*   CALCIUM 9.1 9.2 8.8   GLUCOSE 131* 125* 147*      ABG:  No results for input(s): \"PH\", \"PO2ART\", \"DQG8TXX\", \"HCO3\", \"BEART\", \"O2SAT\" in the last 72 hours.  BNP  No results found for: \"BNP\"   D-Dimer:  Lab Results   Component Value Date    DDIMER 745 (H) 02/06/2022      Radiology: None      Assessment/Plan     Patient Active Problem List    Diagnosis Date Noted    Delirium due to another medical condition 03/06/2023     Priority: Medium    Bipolar affective disorder, currently depressed, moderate (HCC) 03/06/2023     Priority: Medium    Acute congestive heart failure (HCC) 06/29/2024    Heart failure (HCC) 06/27/2024    COPD exacerbation (HCC) 04/24/2024    Uncontrolled pain     Generalized weakness     Gait disturbance     Myoclonus     Near syncope     Moderate protein-calorie malnutrition (HCC)     Traumatic closed nondisplaced fracture of distal end of right fibula, initial encounter 01/11/2022    Asterixis     Hypercapnia     Chronic rhinitis 01/05/2022    Fall at home, initial encounter 01/05/2022    Acute exacerbation of chronic obstructive pulmonary disease (COPD) ([facility]) 06/26/2021    Acute on chronic

## 2024-06-30 NOTE — DISCHARGE SUMMARY
V2.0  Discharge Summary    Name:  Kalee Chicas /Age/Sex: 1960 (63 y.o. female)   Admit Date: 2024  Discharge Date: 24    MRN & CSN:  [id number] & [id number] Encounter Date and Time 24 10:34 AM EDT    Attending:  Jonathan Santos MD Discharging Provider: Jonathan Santos MD       Hospital Course:     Brief HPI: Kalee Chicas is a 63 y.o. female with a history of COPD, chronic respiratory failure, KING, depression who presented to [facility] 2024 with shortness of breath for the past 2 days     Acute shortness of breath with difficulty breathing possibly secondary from new onset congestive heart failure-ruled out vs COPD exacerbation  Last echo done in 2022 showed EF of 50 to 55%  Chest x-ray done showed pulmonary vascular hypertension with pulmonary interstitial edema consistent with congestive heart failure  More likely COPD  The patient is improving currently on baseline 4 L nasal cannula  Requesting to discuss with pulmonology currently on Levaquin inhalers and steroids.    Medically stable for discharge with recommendation to follow-up with PCP and cardiology outpatient  We will DC with 3 days of antibiotics and 3 days of steroids medically stable for discharge with recommendation to follow-up with pulmonology outpatient    Chronic respiratory failure with hypoxia patient was on 4 L nasal cannula oxygen at home.  Currently on home regimen     COPD exacerbation  Patient is on Symbicort at home.  Started on steroids and duo-nebs     History of depression     Chronic pain patient is on Suboxone at home     Medical Decision Making:  The following items were considered in medical decision making:  Discussion of patient care with other providers  Reviewed clinical lab tests if any  Reviewed radiology tests if any  Reviewed other diagnostic tests/interventions  Independent review of radiologic images if any  Microbiology cultures and other micro tests if any    The patient expressed

## 2024-07-02 NOTE — PROGRESS NOTES
Physician Progress Note      PATIENT:               DIEUDONNE MCGUIRE  CSN #:                  [id number]  :                       1960  ADMIT DATE:       2024 6:55 AM  DISCH DATE:        2024 5:43 PM  RESPONDING  PROVIDER #:        Jonathan Da Silva MD          QUERY TEXT:    Patient admitted with COPD exacerbation. Noted documentation of Acute on   Chronic Hypoxic Hypercapnic resp failure in Pulmonology Consult , PN    and Chronic respiratory failure with hypoxia in H&P, IM PN , Discharge   Summary.  If possible, please document in progress notes and discharge summary if you   are evaluating and /or treating any of the following:    The medical record reflects the following:  Risk Factors: COPD exacerbation  Clinical Indicators: H&P, IM PN , Discharge Summary-Chronic respiratory   failure with hypoxia patient was on 4 L nasal cannula oxygen at home,   Pulmonology Consult , PN -Acute on Chronic Hypoxic Hypercapnic resp   failure  SPO2-91, 92,  RR-24  PCO2-80  Treatment: 4-6 L NC      Thank you,  PIYUSH Colón CDS  Options provided:  -- Acute on Chronic Hypoxic Hypercapnic resp failure confirmed  -- Chronic respiratory failure with hypoxia confirmed and acute respiratory   failure ruled out  -- Other - I will add my own diagnosis  -- Disagree - Not applicable / Not valid  -- Disagree - Clinically unable to determine / Unknown  -- Refer to Clinical Documentation Reviewer    PROVIDER RESPONSE TEXT:    After study, Acute on Chronic Hypoxic Hypercapnic resp failure confirmed    Query created by: Keara Berrios on 2024 6:51 AM      Electronically signed by:  Jonathan Da Silva MD 2024 9:17 AM

## 2024-07-17 ENCOUNTER — HOSPITAL ENCOUNTER (OUTPATIENT)
Dept: PULMONOLOGY | Age: 64
Discharge: HOME | End: 2024-07-17
Attending: INTERNAL MEDICINE

## 2024-09-13 ENCOUNTER — HOSPITAL ENCOUNTER (OUTPATIENT)
Dept: PULMONOLOGY | Age: 64
Discharge: HOME | End: 2024-09-13
Attending: INTERNAL MEDICINE

## 2024-10-04 ENCOUNTER — HOSPITAL ENCOUNTER (INPATIENT)
Age: 64
LOS: 2 days | Discharge: HOME | DRG: 190 | End: 2024-10-07
Attending: EMERGENCY MEDICINE | Admitting: STUDENT IN AN ORGANIZED HEALTH CARE EDUCATION/TRAINING PROGRAM
Payer: MEDICARE

## 2024-10-04 DIAGNOSIS — J44.1 COPD EXACERBATION (HCC): Primary | ICD-10-CM

## 2024-10-04 PROCEDURE — 84484 ASSAY OF TROPONIN QUANT: CPT

## 2024-10-04 PROCEDURE — 83880 ASSAY OF NATRIURETIC PEPTIDE: CPT

## 2024-10-04 PROCEDURE — 96365 THER/PROPH/DIAG IV INF INIT: CPT

## 2024-10-04 PROCEDURE — 80053 COMPREHEN METABOLIC PANEL: CPT

## 2024-10-04 PROCEDURE — 99285 EMERGENCY DEPT VISIT HI MDM: CPT

## 2024-10-04 PROCEDURE — 85025 COMPLETE CBC W/AUTO DIFF WBC: CPT

## 2024-10-04 PROCEDURE — 93005 ELECTROCARDIOGRAM TRACING: CPT | Performed by: EMERGENCY MEDICINE

## 2024-10-04 RX ORDER — IPRATROPIUM BROMIDE AND ALBUTEROL SULFATE 2.5; .5 MG/3ML; MG/3ML
1 SOLUTION RESPIRATORY (INHALATION) EVERY 20 MIN
Status: COMPLETED | OUTPATIENT
Start: 2024-10-05 | End: 2024-10-05

## 2024-10-04 RX ORDER — ALBUTEROL SULFATE 0.83 MG/ML
SOLUTION RESPIRATORY (INHALATION)
Status: DISPENSED
Start: 2024-10-04 | End: 2024-10-05

## 2024-10-04 ASSESSMENT — PAIN - FUNCTIONAL ASSESSMENT: PAIN_FUNCTIONAL_ASSESSMENT: 0-10

## 2024-10-04 ASSESSMENT — PAIN SCALES - GENERAL: PAINLEVEL_OUTOF10: 0

## 2024-10-05 ENCOUNTER — APPOINTMENT (OUTPATIENT)
Dept: GENERAL RADIOLOGY | Age: 64
DRG: 190 | End: 2024-10-05
Attending: EMERGENCY MEDICINE
Payer: MEDICARE

## 2024-10-05 LAB
ALBUMIN SERPL-MCNC: 3.5 G/DL (ref 3.4–5)
ALBUMIN SERPL-MCNC: 3.7 G/DL (ref 3.4–5)
ALBUMIN/GLOB SERPL: 1.3 {RATIO} (ref 1.1–2.2)
ALBUMIN/GLOB SERPL: 1.4 {RATIO} (ref 1.1–2.2)
ALP SERPL-CCNC: 60 U/L (ref 40–129)
ALP SERPL-CCNC: 67 U/L (ref 40–129)
ALT SERPL-CCNC: 17 U/L (ref 10–40)
ALT SERPL-CCNC: 21 U/L (ref 10–40)
ANION GAP SERPL CALCULATED.3IONS-SCNC: 12 MMOL/L (ref 9–17)
ANION GAP SERPL CALCULATED.3IONS-SCNC: 8 MMOL/L (ref 9–17)
ARTERIAL PATENCY WRIST A: ABNORMAL
AST SERPL-CCNC: 19 U/L (ref 15–37)
AST SERPL-CCNC: 22 U/L (ref 15–37)
B PARAP IS1001 DNA NPH QL NAA+NON-PROBE: NOT DETECTED
B PERT DNA SPEC QL NAA+PROBE: NOT DETECTED
BASOPHILS # BLD: 0.03 K/UL
BASOPHILS # BLD: 0.06 K/UL
BASOPHILS NFR BLD: 0 % (ref 0–1)
BASOPHILS NFR BLD: 1 % (ref 0–1)
BILIRUB SERPL-MCNC: <0.2 MG/DL (ref 0–1)
BILIRUB SERPL-MCNC: <0.2 MG/DL (ref 0–1)
BNP SERPL-MCNC: 69 PG/ML (ref 0–125)
BODY TEMPERATURE: 37
BUN SERPL-MCNC: 17 MG/DL (ref 7–20)
BUN SERPL-MCNC: 18 MG/DL (ref 7–20)
C PNEUM DNA NPH QL NAA+NON-PROBE: NOT DETECTED
CALCIUM SERPL-MCNC: 8.5 MG/DL (ref 8.3–10.6)
CALCIUM SERPL-MCNC: 8.9 MG/DL (ref 8.3–10.6)
CHLORIDE SERPL-SCNC: 90 MMOL/L (ref 99–110)
CHLORIDE SERPL-SCNC: 96 MMOL/L (ref 99–110)
CO2 SERPL-SCNC: 37 MMOL/L (ref 21–32)
CO2 SERPL-SCNC: 37 MMOL/L (ref 21–32)
COHGB MFR BLD: 0.6 % (ref 0.5–1.5)
COHGB MFR BLD: 0.6 % (ref 0.5–1.5)
COHGB MFR BLD: 0.9 % (ref 0.5–1.5)
CREAT SERPL-MCNC: 0.6 MG/DL (ref 0.6–1.2)
CREAT SERPL-MCNC: 0.7 MG/DL (ref 0.6–1.2)
EKG ATRIAL RATE: 97 BPM
EKG DIAGNOSIS: NORMAL
EKG P AXIS: 79 DEGREES
EKG P-R INTERVAL: 146 MS
EKG Q-T INTERVAL: 316 MS
EKG QRS DURATION: 86 MS
EKG QTC CALCULATION (BAZETT): 401 MS
EKG R AXIS: 45 DEGREES
EKG T AXIS: 66 DEGREES
EKG VENTRICULAR RATE: 97 BPM
EOSINOPHIL # BLD: 0.01 K/UL
EOSINOPHIL # BLD: 0.33 K/UL
EOSINOPHILS RELATIVE PERCENT: 0 % (ref 0–3)
EOSINOPHILS RELATIVE PERCENT: 3 % (ref 0–3)
ERYTHROCYTE [DISTWIDTH] IN BLOOD BY AUTOMATED COUNT: 12.8 % (ref 11.7–14.9)
ERYTHROCYTE [DISTWIDTH] IN BLOOD BY AUTOMATED COUNT: 12.9 % (ref 11.7–14.9)
FLUAV RNA NPH QL NAA+NON-PROBE: NOT DETECTED
FLUBV RNA NPH QL NAA+NON-PROBE: NOT DETECTED
GFR, ESTIMATED: 89 ML/MIN/1.73M2
GFR, ESTIMATED: >90 ML/MIN/1.73M2
GLUCOSE BLD-MCNC: 227 MG/DL (ref 74–99)
GLUCOSE BLD-MCNC: 237 MG/DL (ref 74–99)
GLUCOSE BLD-MCNC: 261 MG/DL (ref 74–99)
GLUCOSE BLD-MCNC: 301 MG/DL (ref 74–99)
GLUCOSE SERPL-MCNC: 179 MG/DL (ref 74–99)
GLUCOSE SERPL-MCNC: 334 MG/DL (ref 74–99)
HADV DNA NPH QL NAA+NON-PROBE: NOT DETECTED
HCO3 VENOUS: 34.7 MMOL/L (ref 22–29)
HCO3 VENOUS: 38.4 MMOL/L (ref 22–29)
HCO3 VENOUS: 42.1 MMOL/L (ref 22–29)
HCOV 229E RNA NPH QL NAA+NON-PROBE: NOT DETECTED
HCOV HKU1 RNA NPH QL NAA+NON-PROBE: NOT DETECTED
HCOV NL63 RNA NPH QL NAA+NON-PROBE: NOT DETECTED
HCOV OC43 RNA NPH QL NAA+NON-PROBE: NOT DETECTED
HCT VFR BLD AUTO: 38.9 % (ref 37–47)
HCT VFR BLD AUTO: 41.9 % (ref 37–47)
HGB BLD-MCNC: 11.8 G/DL (ref 12.5–16)
HGB BLD-MCNC: 12.6 G/DL (ref 12.5–16)
HMPV RNA NPH QL NAA+NON-PROBE: NOT DETECTED
HPIV1 RNA NPH QL NAA+NON-PROBE: NOT DETECTED
HPIV2 RNA NPH QL NAA+NON-PROBE: NOT DETECTED
HPIV3 RNA NPH QL NAA+NON-PROBE: NOT DETECTED
HPIV4 RNA NPH QL NAA+NON-PROBE: NOT DETECTED
IMM GRANULOCYTES # BLD AUTO: 0.04 K/UL
IMM GRANULOCYTES # BLD AUTO: 0.06 K/UL
IMM GRANULOCYTES NFR BLD: 1 %
IMM GRANULOCYTES NFR BLD: 1 %
INR PPP: 1
L PNEUMO1 AG UR QL IA.RAPID: NEGATIVE
LYMPHOCYTES NFR BLD: 0.61 K/UL
LYMPHOCYTES NFR BLD: 2.76 K/UL
LYMPHOCYTES RELATIVE PERCENT: 23 % (ref 24–44)
LYMPHOCYTES RELATIVE PERCENT: 7 % (ref 24–44)
M PNEUMO DNA NPH QL NAA+NON-PROBE: NOT DETECTED
MCH RBC QN AUTO: 27.6 PG (ref 27–31)
MCH RBC QN AUTO: 27.6 PG (ref 27–31)
MCHC RBC AUTO-ENTMCNC: 30.1 G/DL (ref 32–36)
MCHC RBC AUTO-ENTMCNC: 30.3 G/DL (ref 32–36)
MCV RBC AUTO: 90.9 FL (ref 78–100)
MCV RBC AUTO: 91.7 FL (ref 78–100)
METHEMOGLOBIN: 0.4 % (ref 0.5–1.5)
MICROORGANISM SPEC CULT: NORMAL
MICROORGANISM SPEC CULT: NORMAL
MONOCYTES NFR BLD: 0.08 K/UL
MONOCYTES NFR BLD: 1 % (ref 0–4)
MONOCYTES NFR BLD: 1.25 K/UL
MONOCYTES NFR BLD: 10 % (ref 0–4)
MRSA, DNA, NASAL: DETECTED
NEUTROPHILS NFR BLD: 64 % (ref 36–66)
NEUTROPHILS NFR BLD: 91 % (ref 36–66)
NEUTS SEG NFR BLD: 7.76 K/UL
NEUTS SEG NFR BLD: 7.92 K/UL
OXYHGB MFR BLD: 90.4 %
OXYHGB MFR BLD: 91.5 %
OXYHGB MFR BLD: 94.6 %
PCO2 VENOUS: 67.6 MM HG (ref 38–54)
PCO2 VENOUS: 69.3 MM HG (ref 38–54)
PCO2 VENOUS: 78.1 MM HG (ref 38–54)
PH VENOUS: 7.33 (ref 7.32–7.43)
PH VENOUS: 7.35 (ref 7.32–7.43)
PH VENOUS: 7.36 (ref 7.32–7.43)
PLATELET # BLD AUTO: 208 K/UL (ref 140–440)
PLATELET # BLD AUTO: 252 K/UL (ref 140–440)
PMV BLD AUTO: 9.4 FL (ref 7.5–11.1)
PMV BLD AUTO: 9.7 FL (ref 7.5–11.1)
PO2 VENOUS: 61 MM HG (ref 23–48)
PO2 VENOUS: 64.5 MM HG (ref 23–48)
PO2 VENOUS: 80.6 MM HG (ref 23–48)
POSITIVE BASE EXCESS, VEN: 10 MMOL/L (ref 0–3)
POSITIVE BASE EXCESS, VEN: 12.7 MMOL/L (ref 0–3)
POSITIVE BASE EXCESS, VEN: 6.4 MMOL/L (ref 0–3)
POTASSIUM SERPL-SCNC: 3.8 MMOL/L (ref 3.5–5.1)
POTASSIUM SERPL-SCNC: 4 MMOL/L (ref 3.5–5.1)
PROT SERPL-MCNC: 6.3 G/DL (ref 6.4–8.2)
PROT SERPL-MCNC: 6.3 G/DL (ref 6.4–8.2)
PROTHROMBIN TIME: 13.3 SEC (ref 11.7–14.5)
RBC # BLD AUTO: 4.28 M/UL (ref 4.2–5.4)
RBC # BLD AUTO: 4.57 M/UL (ref 4.2–5.4)
RSV RNA NPH QL NAA+NON-PROBE: NOT DETECTED
RV+EV RNA NPH QL NAA+NON-PROBE: NOT DETECTED
S PNEUM AG SPEC QL: NEGATIVE
SARS-COV-2 RNA NPH QL NAA+NON-PROBE: NOT DETECTED
SERVICE CMNT-IMP: NORMAL
SERVICE CMNT-IMP: NORMAL
SODIUM SERPL-SCNC: 139 MMOL/L (ref 136–145)
SODIUM SERPL-SCNC: 141 MMOL/L (ref 136–145)
SPECIMEN DESCRIPTION: ABNORMAL
SPECIMEN DESCRIPTION: NORMAL
SPECIMEN SOURCE: NORMAL
TEXT FOR RESPIRATORY: ABNORMAL
TROPONIN I SERPL HS-MCNC: 27 NG/L (ref 0–14)
TROPONIN I SERPL HS-MCNC: 28 NG/L (ref 0–14)
WBC OTHER # BLD: 12.2 K/UL (ref 4–10.5)
WBC OTHER # BLD: 8.7 K/UL (ref 4–10.5)

## 2024-10-05 PROCEDURE — 71045 X-RAY EXAM CHEST 1 VIEW: CPT

## 2024-10-05 PROCEDURE — G0378 HOSPITAL OBSERVATION PER HR: HCPCS

## 2024-10-05 PROCEDURE — 96372 THER/PROPH/DIAG INJ SC/IM: CPT

## 2024-10-05 PROCEDURE — 96365 THER/PROPH/DIAG IV INF INIT: CPT

## 2024-10-05 PROCEDURE — 94761 N-INVAS EAR/PLS OXIMETRY MLT: CPT

## 2024-10-05 PROCEDURE — 82805 BLOOD GASES W/O2 SATURATION: CPT

## 2024-10-05 PROCEDURE — 2580000003 HC RX 258: Performed by: INTERNAL MEDICINE

## 2024-10-05 PROCEDURE — 0202U NFCT DS 22 TRGT SARS-COV-2: CPT

## 2024-10-05 PROCEDURE — 87040 BLOOD CULTURE FOR BACTERIA: CPT

## 2024-10-05 PROCEDURE — 2580000003 HC RX 258: Performed by: EMERGENCY MEDICINE

## 2024-10-05 PROCEDURE — 96375 TX/PRO/DX INJ NEW DRUG ADDON: CPT

## 2024-10-05 PROCEDURE — 96367 TX/PROPH/DG ADDL SEQ IV INF: CPT

## 2024-10-05 PROCEDURE — 96366 THER/PROPH/DIAG IV INF ADDON: CPT

## 2024-10-05 PROCEDURE — 6360000002 HC RX W HCPCS: Performed by: EMERGENCY MEDICINE

## 2024-10-05 PROCEDURE — 94640 AIRWAY INHALATION TREATMENT: CPT

## 2024-10-05 PROCEDURE — 85610 PROTHROMBIN TIME: CPT

## 2024-10-05 PROCEDURE — 6370000000 HC RX 637 (ALT 250 FOR IP): Performed by: EMERGENCY MEDICINE

## 2024-10-05 PROCEDURE — 2140000000 HC CCU INTERMEDIATE R&B

## 2024-10-05 PROCEDURE — 87641 MR-STAPH DNA AMP PROBE: CPT

## 2024-10-05 PROCEDURE — 87899 AGENT NOS ASSAY W/OPTIC: CPT

## 2024-10-05 PROCEDURE — 2580000003 HC RX 258: Performed by: STUDENT IN AN ORGANIZED HEALTH CARE EDUCATION/TRAINING PROGRAM

## 2024-10-05 PROCEDURE — 6370000000 HC RX 637 (ALT 250 FOR IP): Performed by: STUDENT IN AN ORGANIZED HEALTH CARE EDUCATION/TRAINING PROGRAM

## 2024-10-05 PROCEDURE — 82962 GLUCOSE BLOOD TEST: CPT

## 2024-10-05 PROCEDURE — 6360000002 HC RX W HCPCS: Performed by: STUDENT IN AN ORGANIZED HEALTH CARE EDUCATION/TRAINING PROGRAM

## 2024-10-05 PROCEDURE — 84484 ASSAY OF TROPONIN QUANT: CPT

## 2024-10-05 PROCEDURE — 36415 COLL VENOUS BLD VENIPUNCTURE: CPT

## 2024-10-05 PROCEDURE — 2700000000 HC OXYGEN THERAPY PER DAY

## 2024-10-05 PROCEDURE — 94660 CPAP INITIATION&MGMT: CPT

## 2024-10-05 PROCEDURE — 6360000002 HC RX W HCPCS: Performed by: INTERNAL MEDICINE

## 2024-10-05 PROCEDURE — 87449 NOS EACH ORGANISM AG IA: CPT

## 2024-10-05 PROCEDURE — 6370000000 HC RX 637 (ALT 250 FOR IP): Performed by: INTERNAL MEDICINE

## 2024-10-05 RX ORDER — BUPRENORPHINE AND NALOXONE 8; 2 MG/1; MG/1
1 FILM, SOLUBLE BUCCAL; SUBLINGUAL 3 TIMES DAILY
Status: DISCONTINUED | OUTPATIENT
Start: 2024-10-05 | End: 2024-10-07 | Stop reason: HOSPADM

## 2024-10-05 RX ORDER — ONDANSETRON 4 MG/1
4 TABLET, ORALLY DISINTEGRATING ORAL EVERY 8 HOURS PRN
Status: DISCONTINUED | OUTPATIENT
Start: 2024-10-05 | End: 2024-10-07 | Stop reason: HOSPADM

## 2024-10-05 RX ORDER — NICOTINE 21 MG/24HR
1 PATCH, TRANSDERMAL 24 HOURS TRANSDERMAL DAILY PRN
Status: DISCONTINUED | OUTPATIENT
Start: 2024-10-05 | End: 2024-10-07 | Stop reason: HOSPADM

## 2024-10-05 RX ORDER — POTASSIUM CHLORIDE 7.45 MG/ML
10 INJECTION INTRAVENOUS PRN
Status: DISCONTINUED | OUTPATIENT
Start: 2024-10-05 | End: 2024-10-07 | Stop reason: HOSPADM

## 2024-10-05 RX ORDER — MAGNESIUM SULFATE IN WATER 40 MG/ML
2000 INJECTION, SOLUTION INTRAVENOUS PRN
Status: DISCONTINUED | OUTPATIENT
Start: 2024-10-05 | End: 2024-10-07 | Stop reason: HOSPADM

## 2024-10-05 RX ORDER — CHLORHEXIDINE GLUCONATE 40 MG/ML
SOLUTION TOPICAL DAILY
Status: DISCONTINUED | OUTPATIENT
Start: 2024-10-05 | End: 2024-10-07 | Stop reason: HOSPADM

## 2024-10-05 RX ORDER — POLYETHYLENE GLYCOL 3350 17 G/17G
17 POWDER, FOR SOLUTION ORAL DAILY PRN
Status: DISCONTINUED | OUTPATIENT
Start: 2024-10-05 | End: 2024-10-07 | Stop reason: HOSPADM

## 2024-10-05 RX ORDER — DEXTROSE MONOHYDRATE 100 MG/ML
INJECTION, SOLUTION INTRAVENOUS CONTINUOUS PRN
Status: DISCONTINUED | OUTPATIENT
Start: 2024-10-05 | End: 2024-10-07 | Stop reason: HOSPADM

## 2024-10-05 RX ORDER — INSULIN LISPRO 100 [IU]/ML
0-4 INJECTION, SOLUTION INTRAVENOUS; SUBCUTANEOUS
Status: DISCONTINUED | OUTPATIENT
Start: 2024-10-05 | End: 2024-10-07 | Stop reason: HOSPADM

## 2024-10-05 RX ORDER — ACETAMINOPHEN 650 MG/1
650 SUPPOSITORY RECTAL EVERY 6 HOURS PRN
Status: DISCONTINUED | OUTPATIENT
Start: 2024-10-05 | End: 2024-10-07 | Stop reason: HOSPADM

## 2024-10-05 RX ORDER — FUROSEMIDE 10 MG/ML
60 INJECTION INTRAMUSCULAR; INTRAVENOUS ONCE
Status: COMPLETED | OUTPATIENT
Start: 2024-10-05 | End: 2024-10-05

## 2024-10-05 RX ORDER — SODIUM CHLORIDE 0.9 % (FLUSH) 0.9 %
5-40 SYRINGE (ML) INJECTION EVERY 12 HOURS SCHEDULED
Status: DISCONTINUED | OUTPATIENT
Start: 2024-10-05 | End: 2024-10-07 | Stop reason: HOSPADM

## 2024-10-05 RX ORDER — CHLORHEXIDINE GLUCONATE ORAL RINSE 1.2 MG/ML
15 SOLUTION DENTAL 2 TIMES DAILY
Status: DISCONTINUED | OUTPATIENT
Start: 2024-10-05 | End: 2024-10-07 | Stop reason: HOSPADM

## 2024-10-05 RX ORDER — LEVOFLOXACIN 5 MG/ML
750 INJECTION, SOLUTION INTRAVENOUS ONCE
Status: COMPLETED | OUTPATIENT
Start: 2024-10-05 | End: 2024-10-05

## 2024-10-05 RX ORDER — HYDROXYZINE HYDROCHLORIDE 25 MG/1
25 TABLET, FILM COATED ORAL NIGHTLY PRN
Status: DISCONTINUED | OUTPATIENT
Start: 2024-10-05 | End: 2024-10-07 | Stop reason: HOSPADM

## 2024-10-05 RX ORDER — POTASSIUM CHLORIDE 1500 MG/1
40 TABLET, EXTENDED RELEASE ORAL PRN
Status: DISCONTINUED | OUTPATIENT
Start: 2024-10-05 | End: 2024-10-07 | Stop reason: HOSPADM

## 2024-10-05 RX ORDER — ONDANSETRON 2 MG/ML
4 INJECTION INTRAMUSCULAR; INTRAVENOUS EVERY 6 HOURS PRN
Status: DISCONTINUED | OUTPATIENT
Start: 2024-10-05 | End: 2024-10-07 | Stop reason: HOSPADM

## 2024-10-05 RX ORDER — IPRATROPIUM BROMIDE AND ALBUTEROL SULFATE 2.5; .5 MG/3ML; MG/3ML
1 SOLUTION RESPIRATORY (INHALATION)
Status: DISCONTINUED | OUTPATIENT
Start: 2024-10-05 | End: 2024-10-07 | Stop reason: HOSPADM

## 2024-10-05 RX ORDER — LEVOFLOXACIN 5 MG/ML
750 INJECTION, SOLUTION INTRAVENOUS EVERY 24 HOURS
Status: DISCONTINUED | OUTPATIENT
Start: 2024-10-05 | End: 2024-10-07 | Stop reason: HOSPADM

## 2024-10-05 RX ORDER — ASPIRIN 81 MG/1
81 TABLET, CHEWABLE ORAL DAILY
Status: DISCONTINUED | OUTPATIENT
Start: 2024-10-05 | End: 2024-10-07 | Stop reason: HOSPADM

## 2024-10-05 RX ORDER — SODIUM CHLORIDE 9 MG/ML
INJECTION, SOLUTION INTRAVENOUS PRN
Status: DISCONTINUED | OUTPATIENT
Start: 2024-10-05 | End: 2024-10-07 | Stop reason: HOSPADM

## 2024-10-05 RX ORDER — ACETAMINOPHEN 325 MG/1
650 TABLET ORAL EVERY 6 HOURS PRN
Status: DISCONTINUED | OUTPATIENT
Start: 2024-10-05 | End: 2024-10-07 | Stop reason: HOSPADM

## 2024-10-05 RX ORDER — BUDESONIDE AND FORMOTEROL FUMARATE DIHYDRATE 160; 4.5 UG/1; UG/1
2 AEROSOL RESPIRATORY (INHALATION)
Status: DISCONTINUED | OUTPATIENT
Start: 2024-10-05 | End: 2024-10-05

## 2024-10-05 RX ORDER — BUDESONIDE AND FORMOTEROL FUMARATE DIHYDRATE 160; 4.5 UG/1; UG/1
2 AEROSOL RESPIRATORY (INHALATION)
Status: DISCONTINUED | OUTPATIENT
Start: 2024-10-05 | End: 2024-10-07 | Stop reason: HOSPADM

## 2024-10-05 RX ORDER — GLUCAGON 1 MG/ML
1 KIT INJECTION PRN
Status: DISCONTINUED | OUTPATIENT
Start: 2024-10-05 | End: 2024-10-07 | Stop reason: HOSPADM

## 2024-10-05 RX ORDER — PREDNISONE 20 MG/1
40 TABLET ORAL DAILY
Status: DISCONTINUED | OUTPATIENT
Start: 2024-10-05 | End: 2024-10-07 | Stop reason: HOSPADM

## 2024-10-05 RX ORDER — ENOXAPARIN SODIUM 100 MG/ML
40 INJECTION SUBCUTANEOUS DAILY
Status: DISCONTINUED | OUTPATIENT
Start: 2024-10-05 | End: 2024-10-07 | Stop reason: HOSPADM

## 2024-10-05 RX ORDER — MUPIROCIN 20 MG/G
OINTMENT TOPICAL 2 TIMES DAILY
Status: DISCONTINUED | OUTPATIENT
Start: 2024-10-05 | End: 2024-10-07 | Stop reason: HOSPADM

## 2024-10-05 RX ORDER — INSULIN LISPRO 100 [IU]/ML
0-4 INJECTION, SOLUTION INTRAVENOUS; SUBCUTANEOUS NIGHTLY
Status: DISCONTINUED | OUTPATIENT
Start: 2024-10-05 | End: 2024-10-07 | Stop reason: HOSPADM

## 2024-10-05 RX ORDER — TRAZODONE HYDROCHLORIDE 50 MG/1
300 TABLET, FILM COATED ORAL NIGHTLY
Status: DISCONTINUED | OUTPATIENT
Start: 2024-10-05 | End: 2024-10-07 | Stop reason: HOSPADM

## 2024-10-05 RX ORDER — DULOXETIN HYDROCHLORIDE 30 MG/1
30 CAPSULE, DELAYED RELEASE ORAL 2 TIMES DAILY
Status: DISCONTINUED | OUTPATIENT
Start: 2024-10-05 | End: 2024-10-07 | Stop reason: HOSPADM

## 2024-10-05 RX ORDER — SODIUM CHLORIDE 0.9 % (FLUSH) 0.9 %
5-40 SYRINGE (ML) INJECTION PRN
Status: DISCONTINUED | OUTPATIENT
Start: 2024-10-05 | End: 2024-10-07 | Stop reason: HOSPADM

## 2024-10-05 RX ADMIN — Medication 15 ML: at 21:41

## 2024-10-05 RX ADMIN — MUPIROCIN: 20 OINTMENT TOPICAL at 21:41

## 2024-10-05 RX ADMIN — FUROSEMIDE 60 MG: 10 INJECTION, SOLUTION INTRAMUSCULAR; INTRAVENOUS at 01:37

## 2024-10-05 RX ADMIN — BUDESONIDE AND FORMOTEROL FUMARATE DIHYDRATE 2 PUFF: 160; 4.5 AEROSOL RESPIRATORY (INHALATION) at 10:51

## 2024-10-05 RX ADMIN — TRAZODONE HYDROCHLORIDE 300 MG: 50 TABLET ORAL at 21:41

## 2024-10-05 RX ADMIN — ASPIRIN 81 MG: 81 TABLET, CHEWABLE ORAL at 08:58

## 2024-10-05 RX ADMIN — LEVOFLOXACIN 750 MG: 750 INJECTION, SOLUTION INTRAVENOUS at 21:41

## 2024-10-05 RX ADMIN — BUPRENORPHINE AND NALOXONE 1 FILM: 8; 2 FILM BUCCAL; SUBLINGUAL at 08:57

## 2024-10-05 RX ADMIN — PREDNISONE 40 MG: 20 TABLET ORAL at 08:58

## 2024-10-05 RX ADMIN — Medication 15 ML: at 14:12

## 2024-10-05 RX ADMIN — LEVOFLOXACIN 750 MG: 5 INJECTION, SOLUTION INTRAVENOUS at 01:42

## 2024-10-05 RX ADMIN — IPRATROPIUM BROMIDE AND ALBUTEROL SULFATE 1 DOSE: .5; 2.5 SOLUTION RESPIRATORY (INHALATION) at 00:28

## 2024-10-05 RX ADMIN — WATER 125 MG: 1 INJECTION INTRAMUSCULAR; INTRAVENOUS; SUBCUTANEOUS at 00:13

## 2024-10-05 RX ADMIN — INSULIN LISPRO 3 UNITS: 100 INJECTION, SOLUTION INTRAVENOUS; SUBCUTANEOUS at 09:34

## 2024-10-05 RX ADMIN — CHLORHEXIDINE GLUCONATE 118 ML: 4 SOLUTION TOPICAL at 14:12

## 2024-10-05 RX ADMIN — IPRATROPIUM BROMIDE AND ALBUTEROL SULFATE 1 DOSE: .5; 2.5 SOLUTION RESPIRATORY (INHALATION) at 00:00

## 2024-10-05 RX ADMIN — ENOXAPARIN SODIUM 40 MG: 100 INJECTION SUBCUTANEOUS at 08:58

## 2024-10-05 RX ADMIN — VANCOMYCIN HYDROCHLORIDE 2000 MG: 5 INJECTION, POWDER, LYOPHILIZED, FOR SOLUTION INTRAVENOUS at 15:15

## 2024-10-05 RX ADMIN — INSULIN LISPRO 1 UNITS: 100 INJECTION, SOLUTION INTRAVENOUS; SUBCUTANEOUS at 17:11

## 2024-10-05 RX ADMIN — IPRATROPIUM BROMIDE AND ALBUTEROL SULFATE 1 DOSE: 2.5; .5 SOLUTION RESPIRATORY (INHALATION) at 10:50

## 2024-10-05 RX ADMIN — IPRATROPIUM BROMIDE AND ALBUTEROL SULFATE 1 DOSE: .5; 2.5 SOLUTION RESPIRATORY (INHALATION) at 00:27

## 2024-10-05 RX ADMIN — BUDESONIDE AND FORMOTEROL FUMARATE DIHYDRATE 2 PUFF: 160; 4.5 AEROSOL RESPIRATORY (INHALATION) at 19:46

## 2024-10-05 RX ADMIN — INSULIN LISPRO 1 UNITS: 100 INJECTION, SOLUTION INTRAVENOUS; SUBCUTANEOUS at 12:30

## 2024-10-05 RX ADMIN — SODIUM CHLORIDE, PRESERVATIVE FREE 10 ML: 5 INJECTION INTRAVENOUS at 08:58

## 2024-10-05 RX ADMIN — SODIUM CHLORIDE, PRESERVATIVE FREE 10 ML: 5 INJECTION INTRAVENOUS at 21:42

## 2024-10-05 RX ADMIN — IPRATROPIUM BROMIDE AND ALBUTEROL SULFATE 1 DOSE: 2.5; .5 SOLUTION RESPIRATORY (INHALATION) at 19:47

## 2024-10-05 RX ADMIN — BUPRENORPHINE AND NALOXONE 1 FILM: 8; 2 FILM BUCCAL; SUBLINGUAL at 14:12

## 2024-10-05 RX ADMIN — SALINE NASAL SPRAY 1 SPRAY: 1.5 SOLUTION NASAL at 09:26

## 2024-10-05 RX ADMIN — MUPIROCIN: 20 OINTMENT TOPICAL at 14:12

## 2024-10-05 RX ADMIN — BUPRENORPHINE AND NALOXONE 1 FILM: 8; 2 FILM BUCCAL; SUBLINGUAL at 21:42

## 2024-10-05 RX ADMIN — DULOXETINE HYDROCHLORIDE 30 MG: 30 CAPSULE, DELAYED RELEASE ORAL at 21:41

## 2024-10-05 RX ADMIN — DULOXETINE HYDROCHLORIDE 30 MG: 30 CAPSULE, DELAYED RELEASE ORAL at 08:57

## 2024-10-05 NOTE — PLAN OF CARE
Problem: Chronic Conditions and Co-morbidities  Goal: Patient's chronic conditions and co-morbidity symptoms are monitored and maintained or improved  Outcome: Not Progressing     Problem: Discharge Planning  Goal: Discharge to home or other facility with appropriate resources  Outcome: Not Progressing     Problem: Chronic Conditions and Co-morbidities  Goal: Patient's chronic conditions and co-morbidity symptoms are monitored and maintained or improved  Outcome: Not Progressing     Problem: Discharge Planning  Goal: Discharge to home or other facility with appropriate resources  Outcome: Not Progressing

## 2024-10-05 NOTE — ED PROVIDER NOTES
Emergency Department Encounter    Patient: Kalee Chicas  MRN: [id number]  : 1960  Date of Evaluation: 10/6/2024  ED Provider:  Rick Yeboah MD    Triage Chief Complaint:   Shortness of Breath (Since Monday, wears 6L NC at baseline)    [place]:  Kalee Chicas is a 63 y.o. female that presents known history of COPD presenting with exacerbation this evening.  Paramedics brought her to the hospital stating her saturation was 95%.  And they had given her 1 round of DuoNeb.  Patient denies any chest pain, no fever.    ROS - see HPI, below listed is current ROS at time of my eval:  All systems reviewed and negative except as above.     Past Medical History:   Diagnosis Date    ADHD 2006    COPD (chronic obstructive pulmonary disease) (HCC)     COVID-19     Drug addiction in remission (HCC)     recovering addict    Hep C w/o coma, chronic ([facility])     Pacemaker     Sleep apnea     TIA (transient ischemic attack)     per patient \"several mini strokes\"     Past Surgical History:   Procedure Laterality Date     SECTION      CHOLECYSTECTOMY  2010    done in [place]    PACEMAKER INSERTION       Family History   Problem Relation Age of Onset    Cancer Mother         unsure of type    Thyroid Cancer Mother     Alcohol Abuse Father     Other Sister         passed in car accident    Lupus Brother     Coronary Art Dis Brother      Social History     Socioeconomic History    Marital status:      Spouse name: Not on file    Number of children: Not on file    Years of education: Not on file    Highest education level: Not on file   Occupational History    Not on file   Tobacco Use    Smoking status: Former     Current packs/day: 0.00     Average packs/day: 1 pack/day for 40.0 years (40.0 ttl pk-yrs)     Types: Cigarettes     Start date: 1976     Quit date: 2016     Years since quittin.1    Smokeless tobacco: Never   Substance and Sexual Activity    Alcohol use: Not Currently     Comment:  range)   potassium chloride 10 mEq/100 mL IVPB (Peripheral Line) (has no administration in time range)   magnesium sulfate 2000 mg in 50 mL IVPB premix (has no administration in time range)   ondansetron (ZOFRAN-ODT) disintegrating tablet 4 mg (has no administration in time range)     Or   ondansetron (ZOFRAN) injection 4 mg (has no administration in time range)   polyethylene glycol (GLYCOLAX) packet 17 g (has no administration in time range)   acetaminophen (TYLENOL) tablet 650 mg (has no administration in time range)     Or   acetaminophen (TYLENOL) suppository 650 mg (has no administration in time range)   enoxaparin (LOVENOX) injection 40 mg (40 mg SubCUTAneous Given 10/6/24 0921)   levoFLOXacin (LEVAQUIN) 750 MG/150ML infusion 750 mg (0 mg IntraVENous Stopped 10/5/24 2311)   predniSONE (DELTASONE) tablet 40 mg (40 mg Oral Given 10/6/24 0922)   budesonide-formoterol (SYMBICORT) 160-4.5 MCG/ACT inhaler 2 puff (2 puffs Inhalation Given 10/6/24 0738)   insulin lispro (HUMALOG,ADMELOG) injection vial 0-4 Units (1 Units SubCUTAneous Given 10/6/24 1715)   insulin lispro (HUMALOG,ADMELOG) injection vial 0-4 Units ( SubCUTAneous Not Given 10/5/24 2147)   glucose chewable tablet 16 g (has no administration in time range)   dextrose bolus 10% 125 mL (has no administration in time range)     Or   dextrose bolus 10% 250 mL (has no administration in time range)   glucagon injection 1 mg (has no administration in time range)   dextrose 10 % infusion (has no administration in time range)   sodium chloride (OCEAN, BABY AYR) 0.65 % nasal spray 1 spray (1 spray Each Nostril Given 10/5/24 0926)   mupirocin (BACTROBAN) 2 % ointment ( Each Nostril Given 10/6/24 0921)   chlorhexidine (PERIDEX) 0.12 % solution 15 mL (15 mLs Mouth/Throat Given 10/6/24 0922)   chlorhexidine gluconate (ANTISEPTIC SKIN CLEANSER) 4 % solution (118 mLs Topical Given 10/6/24 0924)   vancomycin (VANCOCIN) 2,000 mg in sodium chloride 0.9 % 500 mL IVPB (0 mg

## 2024-10-05 NOTE — ED NOTES
ED TO INPATIENT SBAR HANDOFF    Patient Name: Kalee Chicas   :  1960  63 y.o.   Preferred Name  Kalee  Family/Caregiver Present no   Restraints no   C-SSRS: Risk of Suicide: No Risk  Sitter no   Sepsis Risk Score        Situation  Chief Complaint   Patient presents with    Shortness of Breath     Since Monday, wears 6L NC at baseline     Brief Description of Patient's Condition: hx of copd, sob x5days and continued to get worse, patient is ambultory, on 6l at home.   Mental Status: oriented, alert, coherent, logical, thought processes intact, and able to concentrate and follow conversation  Arrived from: [facility]    Imaging:   XR CHEST PORTABLE    (Results Pending)     Abnormal labs:   Abnormal Labs Reviewed   CBC WITH AUTO DIFFERENTIAL - Abnormal; Notable for the following components:       Result Value    WBC 12.2 (*)     MCHC 30.1 (*)     Lymphocytes % 23 (*)     Monocytes % 10 (*)     Immature Granulocytes % 1 (*)     All other components within normal limits   COMPREHENSIVE METABOLIC PANEL - Abnormal; Notable for the following components:    Chloride 96 (*)     CO2 37 (*)     Anion Gap 8 (*)     Glucose 179 (*)     Total Protein 6.3 (*)     All other components within normal limits   TROPONIN - Abnormal; Notable for the following components:    Troponin, High Sensitivity 28 (*)     All other components within normal limits        Background  History:   Past Medical History:   Diagnosis Date    ADHD     COPD (chronic obstructive pulmonary disease) ([facility])     COVID-19     Drug addiction in remission ([facility])     recovering addict    Hep C w/o coma, chronic ([facility])     Pacemaker     Sleep apnea     TIA (transient ischemic attack)     per patient \"several mini strokes\"       Assessment    Vitals:    Level of Consciousness: Alert (0)   Vitals:    10/05/24 0107 10/05/24 0115 10/05/24 0135 10/05/24 0203   BP:  127/73 93/78 (!) 111/55   Pulse: 98  94 82   Resp: 17  20 19   Temp:       TempSrc:       SpO2: 96%

## 2024-10-05 NOTE — PROGRESS NOTES
Patient seen and examined in the AM. Patient admitted in the AM today by my colleague and I agree with their assessment and plan with the addition to the following:     SOB is a little better.   On 4L/min currently - which is her baseliine     Acute COPD Exacerbation. Viral panel -ve. Continue steroids.   Suspected MRSA CAP. Continue levofloxacin. Start vanc. F/U cultures   MRSA colonization. Bactroban ointment, peridex and chlorhexidine.   KING on BiPAP nightly and daytime naps   Chronic pain   MDD      Electronically signed by Robbin Phillips MD on 10/5/2024 at 1:48 PM

## 2024-10-05 NOTE — PROGRESS NOTES
PHARMACY VANCOMYCIN MONITORING SERVICE  Pharmacy consulted by Dr. Robbin Phillips MD for monitoring and adjustment.    Indication for treatment: Vancomycin indication: CAP with risk factors  Goal trough: Trough Goal: 15-20 mcg/mL  AUC/IVAN: 400-600    Risk Factors for MRSA Identified:   Documented isolation of MRSA within 1 year     Pertinent Laboratory Values:   Temp Readings from Last 3 Encounters:   10/05/24 98.1 °F (36.7 °C) (Oral)   06/30/24 98 °F (36.7 °C) (Oral)   04/27/24 98.5 °F (36.9 °C) (Temporal)     Recent Labs     10/04/24  2358 10/05/24  0526   WBC 12.2* 8.7     Recent Labs     10/04/24  2358 10/05/24  0526   BUN 17 18   CREATININE 0.6 0.7     Estimated Creatinine Clearance: 92 mL/min (based on SCr of 0.7 mg/dL).    Intake/Output Summary (Last 24 hours) at 10/5/2024 1347  Last data filed at 10/5/2024 0907  Gross per 24 hour   Intake 389.93 ml   Output --   Net 389.93 ml     Last Encounter Weight:  Wt Readings from Last 3 Encounters:   10/04/24 90.7 kg (200 lb)   08/12/24 88.5 kg (195 lb)   06/30/24 91.1 kg (200 lb 13.4 oz)       Pertinent Cultures:   Date    Source    Results  10/05   Blood    NG at 7 hours  10/05   MRSA Nasal   POSITIVE    Vancomycin level:   TROUGH:  No results for input(s): \"VANCOTROUGH\" in the last 72 hours.  RANDOM:  No results for input(s): \"VANCORANDOM\" in the last 72 hours.    Assessment:  HPI: Patient is a 63 y.o. female with a PMHx of severe COPD on 6L NC, KING, obesity  who presented to the ED with SOB. Patient reports she is on baseline 6L NC and began experiencing worsening SOB associated with increased cough and sputum production x 1 week   SCr, BUN, and urine output:   Scr 0.7 mg/dL today, baseline appears to be 0.4 mg/dL.  BUN WNL  UOP not documented  Day(s) of therapy: 1 / 7  Vancomycin concentration: TBD    Plan:  Will give a loading dose of vancomycin 2000 mg IV x ONCE followed by vancomycin 1000 mg IV q12hr.   Level Monday AM  Pharmacy will continue to monitor  patient and adjust therapy as indicated    VANCOMYCIN CONCENTRATION SCHEDULED FOR 10/07 @ 1000    Thank you for the consult.  Vannessa Matias RPH  10/5/2024 1:47 PM

## 2024-10-05 NOTE — PROGRESS NOTES
4 Eyes Skin Assessment     NAME:  Kalee Chicas  YOB: 1960  MEDICAL RECORD NUMBER:  [id number]    The patient is being assessed for  Admission    I agree that at least one RN has performed a thorough Head to Toe Skin Assessment on the patient. ALL assessment sites listed below have been assessed.      Areas assessed by both nurses:    Head, Face, Ears, Shoulders, Back, Chest, Arms, Elbows, Hands, Sacrum. Buttock, Coccyx, Ischium, Legs. Feet and Heels, and Under Medical Devices         Does the Patient have a Wound? No noted wound(s)       Clif Prevention initiated by RN: No  Wound Care Orders initiated by RN: No    Pressure Injury (Stage 3,4, Unstageable, DTI, NWPT, and Complex wounds) if present, place Wound referral order by RN under : No    New Ostomies, if present place, Ostomy referral order under : No     Nurse 1 eSignature: Electronically signed by Rick Bennett RN on 10/5/24 at 6:12 AM EDT    **SHARE this note so that the co-signing nurse can place an eSignature**    Nurse 2 eSignature: Electronically signed by Lito Denise RN on 10/5/24 at 6:14 AM EDT

## 2024-10-05 NOTE — H&P
History and Physical      Name:  Kalee Chicas /Age/Sex: 1960  (63 y.o. female)   MRN & CSN:  [id number] & [id number] Encounter Date/Time: 10/5/2024 12:11 AM EDT   Location:  ED26/ED-26 PCP: Chu Caban MD       Hospital Day: 2    Assessment and Plan:     Patient is a 63 y.o. female who presented with SOB     Acute COPD exacerbation 2/2 CAP  Chronic Hypoxemic and hypercapnic Respiratory Failure   - On admit, hemodynamically stable,  in mild respiratory distress, BIPAP (4L NC at baseline)   - CXR with LLL opacity    - Received 125mg solumedrol, Duonebs in ED    - Continue steroids   - Levaquin 750mg daily   - Continue supportive care.   - DuoNebs  - RVP pending      KING  - Continue BPAP qhs.      Chronic pain   - Continue home Suboxone, OARRS appropriate.     Major depressive disorder  - Continue home psychotropic medications.    Checklist:  Advanced directive: full, daughter ncole is HCPOA,   Diet: cardiac  DVT ppx: Lovenox      Disposition: admit to inpatient.  Estimated discharge: 4 day(s).  Current living situation: home.  Expected disposition: home.    Spoke with ED provider who recommended admission for the patient and I agree with that plan.  Personally reviewed lab studies and imaging.  EKG interpreted personally and results as stated above.  Imaging that was interpreted personally and results as stated above.    History of Present Illness:     Chief Complaint:    Chief Complaint   Patient presents with    Shortness of Breath     Since Monday, wears 6L NC at baseline       Patient is a 63 y.o. female with a PMHx of severe COPD on 6L NC, KING, obesity  who presented to the ED with SOB. Patient reports she is on baseline 6L NC and began experiencing worsening SOB associated with increased cough and sputum production x 1 week. Seen pulmonologist and was precribed steroids and abx with no relief. Currently also having left sided pain.  Denied any F/C, HA, dizziness, presyncope, syncope,  <0.010 03/25/2022 11:10 PM     BNP:   Recent Labs     10/04/24  2358   PROBNP 69     Lactic Acid: No results for input(s): \"LACTA\" in the last 72 hours.  UA:  Lab Results   Component Value Date/Time    NITRU NEGATIVE 04/23/2024 11:30 PM    COLORU YELLOW 04/23/2024 11:30 PM    PHUR 6.0 04/23/2024 11:30 PM    WBCUA 1 01/05/2022 08:25 PM    RBCUA <1 01/05/2022 08:25 PM    MUCUS RARE 01/05/2022 08:25 PM    TRICHOMONAS NONE SEEN 01/05/2022 08:25 PM    BACTERIA NEGATIVE 01/05/2022 08:25 PM    CLARITYU CLEAR 04/23/2024 11:30 PM    LEUKOCYTESUR NEGATIVE 04/23/2024 11:30 PM    UROBILINOGEN 0.2 04/23/2024 11:30 PM    BILIRUBINUR NEGATIVE 04/23/2024 11:30 PM    BLOODU NEGATIVE 04/23/2024 11:30 PM    GLUCOSEU NEGATIVE 04/23/2024 11:30 PM    KETUA NEGATIVE 04/23/2024 11:30 PM     Urine Cultures: No results found for: \"LABURIN\"  Blood Cultures: No results found for: \"BC\"  No results found for: \"BLOODCULT2\"  Organism: No results found for: \"ORG\"    Radiology results:  XR CHEST PORTABLE    (Results Pending)       Kalie Norman MD  10/05/24 2:25 AM

## 2024-10-06 VITALS
SYSTOLIC BLOOD PRESSURE: 106 MMHG | TEMPERATURE: 98 F | DIASTOLIC BLOOD PRESSURE: 45 MMHG | BODY MASS INDEX: 34.15 KG/M2 | HEART RATE: 71 BPM | WEIGHT: 200 LBS | OXYGEN SATURATION: 94 % | HEIGHT: 64 IN | RESPIRATION RATE: 16 BRPM

## 2024-10-06 LAB
ALBUMIN SERPL-MCNC: 3.2 G/DL (ref 3.4–5)
ALBUMIN/GLOB SERPL: 1.4 {RATIO} (ref 1.1–2.2)
ALP SERPL-CCNC: 59 U/L (ref 40–129)
ALT SERPL-CCNC: 14 U/L (ref 10–40)
ANION GAP SERPL CALCULATED.3IONS-SCNC: 8 MMOL/L (ref 9–17)
AST SERPL-CCNC: 15 U/L (ref 15–37)
BASOPHILS # BLD: 0.03 K/UL
BASOPHILS NFR BLD: 0 % (ref 0–1)
BILIRUB SERPL-MCNC: <0.2 MG/DL (ref 0–1)
BUN SERPL-MCNC: 18 MG/DL (ref 7–20)
CALCIUM SERPL-MCNC: 8.4 MG/DL (ref 8.3–10.6)
CHLORIDE SERPL-SCNC: 96 MMOL/L (ref 99–110)
CO2 SERPL-SCNC: 36 MMOL/L (ref 21–32)
CREAT SERPL-MCNC: 0.5 MG/DL (ref 0.6–1.2)
EOSINOPHIL # BLD: 0.04 K/UL
EOSINOPHILS RELATIVE PERCENT: 0 % (ref 0–3)
ERYTHROCYTE [DISTWIDTH] IN BLOOD BY AUTOMATED COUNT: 12.7 % (ref 11.7–14.9)
GFR, ESTIMATED: >90 ML/MIN/1.73M2
GLUCOSE BLD-MCNC: 105 MG/DL (ref 74–99)
GLUCOSE BLD-MCNC: 221 MG/DL (ref 74–99)
GLUCOSE BLD-MCNC: 276 MG/DL (ref 74–99)
GLUCOSE BLD-MCNC: 312 MG/DL (ref 74–99)
GLUCOSE SERPL-MCNC: 139 MG/DL (ref 74–99)
HCT VFR BLD AUTO: 35.8 % (ref 37–47)
HGB BLD-MCNC: 10.7 G/DL (ref 12.5–16)
IMM GRANULOCYTES # BLD AUTO: 0.07 K/UL
IMM GRANULOCYTES NFR BLD: 1 %
LYMPHOCYTES NFR BLD: 1.92 K/UL
LYMPHOCYTES RELATIVE PERCENT: 13 % (ref 24–44)
MCH RBC QN AUTO: 27.6 PG (ref 27–31)
MCHC RBC AUTO-ENTMCNC: 29.9 G/DL (ref 32–36)
MCV RBC AUTO: 92.5 FL (ref 78–100)
MONOCYTES NFR BLD: 1.62 K/UL
MONOCYTES NFR BLD: 11 % (ref 0–4)
NEUTROPHILS NFR BLD: 74 % (ref 36–66)
NEUTS SEG NFR BLD: 10.64 K/UL
PLATELET # BLD AUTO: 208 K/UL (ref 140–440)
PMV BLD AUTO: 10 FL (ref 7.5–11.1)
POTASSIUM SERPL-SCNC: 3.9 MMOL/L (ref 3.5–5.1)
PROT SERPL-MCNC: 5.5 G/DL (ref 6.4–8.2)
RBC # BLD AUTO: 3.87 M/UL (ref 4.2–5.4)
SODIUM SERPL-SCNC: 140 MMOL/L (ref 136–145)
WBC OTHER # BLD: 14.3 K/UL (ref 4–10.5)

## 2024-10-06 PROCEDURE — 94660 CPAP INITIATION&MGMT: CPT

## 2024-10-06 PROCEDURE — 36415 COLL VENOUS BLD VENIPUNCTURE: CPT

## 2024-10-06 PROCEDURE — 6360000002 HC RX W HCPCS: Performed by: INTERNAL MEDICINE

## 2024-10-06 PROCEDURE — 82962 GLUCOSE BLOOD TEST: CPT

## 2024-10-06 PROCEDURE — 2580000003 HC RX 258: Performed by: INTERNAL MEDICINE

## 2024-10-06 PROCEDURE — 80053 COMPREHEN METABOLIC PANEL: CPT

## 2024-10-06 PROCEDURE — G0378 HOSPITAL OBSERVATION PER HR: HCPCS

## 2024-10-06 PROCEDURE — 2580000003 HC RX 258: Performed by: STUDENT IN AN ORGANIZED HEALTH CARE EDUCATION/TRAINING PROGRAM

## 2024-10-06 PROCEDURE — 6370000000 HC RX 637 (ALT 250 FOR IP): Performed by: INTERNAL MEDICINE

## 2024-10-06 PROCEDURE — 2700000000 HC OXYGEN THERAPY PER DAY

## 2024-10-06 PROCEDURE — 94761 N-INVAS EAR/PLS OXIMETRY MLT: CPT

## 2024-10-06 PROCEDURE — 6360000002 HC RX W HCPCS: Performed by: STUDENT IN AN ORGANIZED HEALTH CARE EDUCATION/TRAINING PROGRAM

## 2024-10-06 PROCEDURE — 85025 COMPLETE CBC W/AUTO DIFF WBC: CPT

## 2024-10-06 PROCEDURE — 6370000000 HC RX 637 (ALT 250 FOR IP): Performed by: STUDENT IN AN ORGANIZED HEALTH CARE EDUCATION/TRAINING PROGRAM

## 2024-10-06 PROCEDURE — 94640 AIRWAY INHALATION TREATMENT: CPT

## 2024-10-06 PROCEDURE — 96366 THER/PROPH/DIAG IV INF ADDON: CPT

## 2024-10-06 PROCEDURE — 2140000000 HC CCU INTERMEDIATE R&B

## 2024-10-06 PROCEDURE — 96372 THER/PROPH/DIAG INJ SC/IM: CPT

## 2024-10-06 RX ADMIN — BUDESONIDE AND FORMOTEROL FUMARATE DIHYDRATE 2 PUFF: 160; 4.5 AEROSOL RESPIRATORY (INHALATION) at 07:38

## 2024-10-06 RX ADMIN — DULOXETINE HYDROCHLORIDE 30 MG: 30 CAPSULE, DELAYED RELEASE ORAL at 09:22

## 2024-10-06 RX ADMIN — SODIUM CHLORIDE, PRESERVATIVE FREE 10 ML: 5 INJECTION INTRAVENOUS at 20:41

## 2024-10-06 RX ADMIN — BUPRENORPHINE AND NALOXONE 1 FILM: 8; 2 FILM BUCCAL; SUBLINGUAL at 20:40

## 2024-10-06 RX ADMIN — SODIUM CHLORIDE, PRESERVATIVE FREE 10 ML: 5 INJECTION INTRAVENOUS at 09:22

## 2024-10-06 RX ADMIN — ASPIRIN 81 MG: 81 TABLET, CHEWABLE ORAL at 09:22

## 2024-10-06 RX ADMIN — IPRATROPIUM BROMIDE AND ALBUTEROL SULFATE 1 DOSE: 2.5; .5 SOLUTION RESPIRATORY (INHALATION) at 19:06

## 2024-10-06 RX ADMIN — MUPIROCIN: 20 OINTMENT TOPICAL at 20:41

## 2024-10-06 RX ADMIN — IPRATROPIUM BROMIDE AND ALBUTEROL SULFATE 1 DOSE: 2.5; .5 SOLUTION RESPIRATORY (INHALATION) at 15:34

## 2024-10-06 RX ADMIN — Medication 15 ML: at 09:22

## 2024-10-06 RX ADMIN — Medication 15 ML: at 20:40

## 2024-10-06 RX ADMIN — TRAZODONE HYDROCHLORIDE 300 MG: 50 TABLET ORAL at 20:40

## 2024-10-06 RX ADMIN — VANCOMYCIN HYDROCHLORIDE 1000 MG: 1 INJECTION, POWDER, LYOPHILIZED, FOR SOLUTION INTRAVENOUS at 04:27

## 2024-10-06 RX ADMIN — BUPRENORPHINE AND NALOXONE 1 FILM: 8; 2 FILM BUCCAL; SUBLINGUAL at 09:22

## 2024-10-06 RX ADMIN — ENOXAPARIN SODIUM 40 MG: 100 INJECTION SUBCUTANEOUS at 09:21

## 2024-10-06 RX ADMIN — IPRATROPIUM BROMIDE AND ALBUTEROL SULFATE 1 DOSE: 2.5; .5 SOLUTION RESPIRATORY (INHALATION) at 11:34

## 2024-10-06 RX ADMIN — INSULIN LISPRO 1 UNITS: 100 INJECTION, SOLUTION INTRAVENOUS; SUBCUTANEOUS at 17:15

## 2024-10-06 RX ADMIN — BUPRENORPHINE AND NALOXONE 1 FILM: 8; 2 FILM BUCCAL; SUBLINGUAL at 13:50

## 2024-10-06 RX ADMIN — LEVOFLOXACIN 750 MG: 750 INJECTION, SOLUTION INTRAVENOUS at 22:07

## 2024-10-06 RX ADMIN — MUPIROCIN: 20 OINTMENT TOPICAL at 09:21

## 2024-10-06 RX ADMIN — DULOXETINE HYDROCHLORIDE 30 MG: 30 CAPSULE, DELAYED RELEASE ORAL at 20:41

## 2024-10-06 RX ADMIN — PREDNISONE 40 MG: 20 TABLET ORAL at 09:22

## 2024-10-06 RX ADMIN — INSULIN LISPRO 2 UNITS: 100 INJECTION, SOLUTION INTRAVENOUS; SUBCUTANEOUS at 13:49

## 2024-10-06 RX ADMIN — INSULIN LISPRO 4 UNITS: 100 INJECTION, SOLUTION INTRAVENOUS; SUBCUTANEOUS at 20:41

## 2024-10-06 RX ADMIN — IPRATROPIUM BROMIDE AND ALBUTEROL SULFATE 1 DOSE: 2.5; .5 SOLUTION RESPIRATORY (INHALATION) at 07:36

## 2024-10-06 RX ADMIN — CHLORHEXIDINE GLUCONATE 118 ML: 4 SOLUTION TOPICAL at 09:24

## 2024-10-06 RX ADMIN — VANCOMYCIN HYDROCHLORIDE 1000 MG: 1 INJECTION, POWDER, LYOPHILIZED, FOR SOLUTION INTRAVENOUS at 13:52

## 2024-10-06 NOTE — PROGRESS NOTES
In-Patient Progress Note    Patient:  Kalee Chicas 63 y.o. female MRN: [id number]     Date of Service: 10/6/2024    Hospital Day: 3      Chief complaint: had concerns including Shortness of Breath (Since Monday, wears 6L NC at baseline).      Subjective   Patient seen and examined in the morning. Patient states she feels a little better. SOB is a little better.       See ROS    I have reviewed all pertinent PMHx, PSHx, FamHx, SocialHx, medications, and allergies and updated history as appropriate. I have reviewed images as appropriate.     Assessment and Plan   Kalee Chicas, a 63 y.o. female, with a history of  severe COPD on 6L NC, KING on BiPAP, chronic pain, MDD, obesity  was admitted on 10/4/2024 with complaints of had concerns including Shortness of Breath (Since Monday, wears 6L NC at baseline).    Assessment and Plan:  Acute COPD exacerbation 2/2 CAP  Likely MRSA Pneumonia   MRSA Colonization   Chronic Hypoxemic and hypercapnic Respiratory Failure   - On admit, hemodynamically stable,  in mild respiratory distress, BIPAP initially and was weaned to 6L/min.   - Baseline is 4L NC. Now down to baseline O2   - CXR with LLL opacity    - Received 125mg solumedrol, Duonebs in ED  - On PO steroids, levaquin 750mg daily and on vanc   - F/U cultures   - De colonize      KING  - Continue BPAP qhs and PRN      Chronic pain   - Continue home Suboxone, OARRS appropriate.     Major depressive disorder  - Continue home psychotropic medications.        # Peptic ulcer prophylaxis: -   # DVT Prophylaxis: Lovenox       Expected Disposition: Likely home   Estimated discharge date: 10/7/2024 if she is feeling better. Ambulate today.     Personally reviewed Lab Studies and Imaging     Drugs that require monitoring for toxicity include humalog and the method of monitoring was POCT glucose. Watch for hypoglycemia.   Drugs that require monitoring for toxicity include monitor lovenox and aspirin and the method of monitoring was  effort is normal. Tachypnea present. No respiratory distress.      Breath sounds: Normal breath sounds. Decreased air movement and transmitted upper airway sounds present. No stridor. No decreased breath sounds, wheezing, rhonchi or rales.   Abdominal:      General: Bowel sounds are normal. There is no distension.      Palpations: Abdomen is soft. There is no mass.      Tenderness: There is no abdominal tenderness. There is no guarding or rebound.      Hernia: No hernia is present.   Musculoskeletal:      Right lower leg: No edema.      Left lower leg: No edema.   Skin:     General: Skin is warm.      Capillary Refill: Capillary refill takes less than 2 seconds.   Neurological:      Mental Status: She is alert and oriented to person, place, and time.   Psychiatric:         Mood and Affect: Mood normal.           Current Medications      aspirin  81 mg Oral Daily    buprenorphine-naloxone  1 Film SubLINGual TID    DULoxetine  30 mg Oral BID    ipratropium 0.5 mg-albuterol 2.5 mg  1 Dose Inhalation 4x Daily RT    traZODone  300 mg Oral Nightly    sodium chloride flush  5-40 mL IntraVENous 2 times per day    enoxaparin  40 mg SubCUTAneous Daily    levofloxacin  750 mg IntraVENous Q24H    predniSONE  40 mg Oral Daily    budesonide-formoterol  2 puff Inhalation BID RT    insulin lispro  0-4 Units SubCUTAneous TID WC    insulin lispro  0-4 Units SubCUTAneous Nightly    mupirocin   Each Nostril BID    chlorhexidine  15 mL Mouth/Throat BID    chlorhexidine gluconate   Topical Daily    vancomycin  1,000 mg IntraVENous Q12H         Labs and Imaging Studies   Laboratory findings:  XR CHEST PORTABLE    Result Date: 10/5/2024  Chest X-ray INDICATION: Shortness of breath COMPARISON: Multiple prior examinations, most recently dated 6/27/2024 TECHNIQUE: AP/PA view of the chest was obtained. FINDINGS: The lungs are well aerated. Mild pulmonary vascular congestion. Hazy bibasilar airspace disease more confluent on the left. There is

## 2024-10-06 NOTE — PROGRESS NOTES
PHARMACY VANCOMYCIN MONITORING SERVICE  Pharmacy consulted by Dr. Robbin Phillips MD for monitoring and adjustment.    Indication for treatment: Vancomycin indication: CAP with risk factors  Goal trough: Trough Goal: 15-20 mcg/mL  AUC/IVAN: 400-600    Risk Factors for MRSA Identified:   Documented isolation of MRSA within 1 year     Pertinent Laboratory Values:   Temp Readings from Last 3 Encounters:   10/05/24 97.2 °F (36.2 °C) (Oral)   06/30/24 98 °F (36.7 °C) (Oral)   04/27/24 98.5 °F (36.9 °C) (Temporal)     Recent Labs     10/04/24  2358 10/05/24  0526 10/06/24  0259   WBC 12.2* 8.7 14.3*     Recent Labs     10/04/24  2358 10/05/24  0526 10/06/24  0259   BUN 17 18 18   CREATININE 0.6 0.7 0.5*     Estimated Creatinine Clearance: 129 mL/min (A) (based on SCr of 0.5 mg/dL (L)).  No intake or output data in the 24 hours ending 10/06/24 1106    Last Encounter Weight:  Wt Readings from Last 3 Encounters:   10/04/24 90.7 kg (200 lb)   08/12/24 88.5 kg (195 lb)   06/30/24 91.1 kg (200 lb 13.4 oz)       Pertinent Cultures:   Date    Source    Results  10/05   Blood    NG at 7 hours  10/05   MRSA Nasal   POSITIVE    Vancomycin level:   TROUGH:  No results for input(s): \"VANCOTROUGH\" in the last 72 hours.  RANDOM:  No results for input(s): \"VANCORANDOM\" in the last 72 hours.    Assessment:  HPI: Patient is a 63 y.o. female with a PMHx of severe COPD on 6L NC, KING, obesity  who presented to the ED with SOB. Patient reports she is on baseline 6L NC and began experiencing worsening SOB associated with increased cough and sputum production x 1 week   SCr, BUN, and urine output:   Scr 0.5 mg/dL today, baseline appears to be 0.4 mg/dL.  BUN WNL  UOP not documented  Day(s) of therapy: 2 / 7  Vancomycin concentration: TBD    Plan:  Renal functions stable today. Continue current dose of vancomycin 1000 mg IV q12hr. Predicted AUC of 431 mg/L*hr and ssTrough of 11.6 mg/L.   Level tomorrow AM.  Pharmacy will continue to monitor  patient and adjust therapy as indicated    VANCOMYCIN CONCENTRATION SCHEDULED FOR 10/07 @ 1000    Thank you for the consult.  Vannessa Matias RPH  10/6/2024 11:06 AM

## 2024-10-06 NOTE — PROGRESS NOTES
VBG results:    RRT could not get it to cross over.    Ph 7.347    pCO2  85.5    P02   52.1    HCO3   45.8    BE (B)  16.1    ctCO2   48.5      tHb    12.8    FO2Hb   85.8    FCO2 Hb   0.2    FMetHb   0.3

## 2024-10-07 VITALS
RESPIRATION RATE: 13 BRPM | HEIGHT: 64 IN | TEMPERATURE: 98.4 F | OXYGEN SATURATION: 95 % | BODY MASS INDEX: 34.15 KG/M2 | HEART RATE: 71 BPM | DIASTOLIC BLOOD PRESSURE: 46 MMHG | WEIGHT: 200 LBS | SYSTOLIC BLOOD PRESSURE: 115 MMHG

## 2024-10-07 LAB
ALBUMIN SERPL-MCNC: 3.2 G/DL (ref 3.4–5)
ALBUMIN/GLOB SERPL: 1.5 {RATIO} (ref 1.1–2.2)
ALP SERPL-CCNC: 57 U/L (ref 40–129)
ALT SERPL-CCNC: 13 U/L (ref 10–40)
ANION GAP SERPL CALCULATED.3IONS-SCNC: 4 MMOL/L (ref 9–17)
ARTERIAL PATENCY WRIST A: ABNORMAL
AST SERPL-CCNC: 14 U/L (ref 15–37)
BASOPHILS # BLD: 0.02 K/UL
BASOPHILS NFR BLD: 0 % (ref 0–1)
BILIRUB SERPL-MCNC: <0.2 MG/DL (ref 0–1)
BODY TEMPERATURE: 37
BUN SERPL-MCNC: 18 MG/DL (ref 7–20)
CALCIUM SERPL-MCNC: 8.6 MG/DL (ref 8.3–10.6)
CHLORIDE SERPL-SCNC: 99 MMOL/L (ref 99–110)
CO2 SERPL-SCNC: 38 MMOL/L (ref 21–32)
COHGB MFR BLD: 0.3 % (ref 0.5–1.5)
CREAT SERPL-MCNC: 0.4 MG/DL (ref 0.6–1.2)
EKG ATRIAL RATE: 97 BPM
EKG DIAGNOSIS: NORMAL
EKG P AXIS: 79 DEGREES
EKG P-R INTERVAL: 146 MS
EKG Q-T INTERVAL: 316 MS
EKG QRS DURATION: 86 MS
EKG QTC CALCULATION (BAZETT): 401 MS
EKG R AXIS: 45 DEGREES
EKG T AXIS: 66 DEGREES
EKG VENTRICULAR RATE: 97 BPM
EOSINOPHIL # BLD: 0.08 K/UL
EOSINOPHILS RELATIVE PERCENT: 1 % (ref 0–3)
ERYTHROCYTE [DISTWIDTH] IN BLOOD BY AUTOMATED COUNT: 12.6 % (ref 11.7–14.9)
GFR, ESTIMATED: >90 ML/MIN/1.73M2
GLUCOSE BLD-MCNC: 124 MG/DL (ref 74–99)
GLUCOSE BLD-MCNC: 260 MG/DL (ref 74–99)
GLUCOSE BLD-MCNC: 271 MG/DL (ref 74–99)
GLUCOSE SERPL-MCNC: 74 MG/DL (ref 74–99)
HCO3 VENOUS: 41 MMOL/L (ref 22–29)
HCT VFR BLD AUTO: 35.3 % (ref 37–47)
HGB BLD-MCNC: 10.7 G/DL (ref 12.5–16)
IMM GRANULOCYTES # BLD AUTO: 0.11 K/UL
IMM GRANULOCYTES NFR BLD: 1 %
LYMPHOCYTES NFR BLD: 1.94 K/UL
LYMPHOCYTES RELATIVE PERCENT: 18 % (ref 24–44)
MCH RBC QN AUTO: 28.2 PG (ref 27–31)
MCHC RBC AUTO-ENTMCNC: 30.3 G/DL (ref 32–36)
MCV RBC AUTO: 92.9 FL (ref 78–100)
METHEMOGLOBIN: 0.4 % (ref 0.5–1.5)
MONOCYTES NFR BLD: 1.13 K/UL
MONOCYTES NFR BLD: 11 % (ref 0–4)
NEUTROPHILS NFR BLD: 70 % (ref 36–66)
NEUTS SEG NFR BLD: 7.49 K/UL
OXYHGB MFR BLD: 80.8 %
PCO2 VENOUS: 79.8 MM HG (ref 38–54)
PH VENOUS: 7.33 (ref 7.32–7.43)
PLATELET # BLD AUTO: 201 K/UL (ref 140–440)
PMV BLD AUTO: 9.9 FL (ref 7.5–11.1)
PO2 VENOUS: 44.8 MM HG (ref 23–48)
POSITIVE BASE EXCESS, VEN: 12 MMOL/L (ref 0–3)
POTASSIUM SERPL-SCNC: 4.4 MMOL/L (ref 3.5–5.1)
PROT SERPL-MCNC: 5.4 G/DL (ref 6.4–8.2)
RBC # BLD AUTO: 3.8 M/UL (ref 4.2–5.4)
SODIUM SERPL-SCNC: 141 MMOL/L (ref 136–145)
TEXT FOR RESPIRATORY: ABNORMAL
WBC OTHER # BLD: 10.8 K/UL (ref 4–10.5)

## 2024-10-07 PROCEDURE — 94640 AIRWAY INHALATION TREATMENT: CPT

## 2024-10-07 PROCEDURE — 6360000002 HC RX W HCPCS: Performed by: INTERNAL MEDICINE

## 2024-10-07 PROCEDURE — 94660 CPAP INITIATION&MGMT: CPT

## 2024-10-07 PROCEDURE — 85025 COMPLETE CBC W/AUTO DIFF WBC: CPT

## 2024-10-07 PROCEDURE — 94664 DEMO&/EVAL PT USE INHALER: CPT

## 2024-10-07 PROCEDURE — G0378 HOSPITAL OBSERVATION PER HR: HCPCS

## 2024-10-07 PROCEDURE — 82805 BLOOD GASES W/O2 SATURATION: CPT

## 2024-10-07 PROCEDURE — 94761 N-INVAS EAR/PLS OXIMETRY MLT: CPT

## 2024-10-07 PROCEDURE — 6360000002 HC RX W HCPCS: Performed by: STUDENT IN AN ORGANIZED HEALTH CARE EDUCATION/TRAINING PROGRAM

## 2024-10-07 PROCEDURE — 80053 COMPREHEN METABOLIC PANEL: CPT

## 2024-10-07 PROCEDURE — 96372 THER/PROPH/DIAG INJ SC/IM: CPT

## 2024-10-07 PROCEDURE — 82962 GLUCOSE BLOOD TEST: CPT

## 2024-10-07 PROCEDURE — 96366 THER/PROPH/DIAG IV INF ADDON: CPT

## 2024-10-07 PROCEDURE — 93010 ELECTROCARDIOGRAM REPORT: CPT | Performed by: INTERNAL MEDICINE

## 2024-10-07 PROCEDURE — 2580000003 HC RX 258: Performed by: INTERNAL MEDICINE

## 2024-10-07 PROCEDURE — 6370000000 HC RX 637 (ALT 250 FOR IP): Performed by: STUDENT IN AN ORGANIZED HEALTH CARE EDUCATION/TRAINING PROGRAM

## 2024-10-07 PROCEDURE — 6370000000 HC RX 637 (ALT 250 FOR IP): Performed by: INTERNAL MEDICINE

## 2024-10-07 PROCEDURE — 2580000003 HC RX 258: Performed by: STUDENT IN AN ORGANIZED HEALTH CARE EDUCATION/TRAINING PROGRAM

## 2024-10-07 PROCEDURE — 36415 COLL VENOUS BLD VENIPUNCTURE: CPT

## 2024-10-07 PROCEDURE — 2700000000 HC OXYGEN THERAPY PER DAY

## 2024-10-07 RX ORDER — CHLORHEXIDINE GLUCONATE 40 MG/ML
SOLUTION TOPICAL DAILY
Qty: 535 ML | Refills: 0 | Status: SHIPPED | OUTPATIENT
Start: 2024-10-07 | End: 2024-10-10

## 2024-10-07 RX ORDER — MUPIROCIN 20 MG/G
OINTMENT TOPICAL
Qty: 15 G | Refills: 0 | Status: SHIPPED | OUTPATIENT
Start: 2024-10-07

## 2024-10-07 RX ORDER — LEVOFLOXACIN 5 MG/ML
750 INJECTION, SOLUTION INTRAVENOUS EVERY 24 HOURS
Qty: 300 ML | Refills: 0 | Status: SHIPPED | OUTPATIENT
Start: 2024-10-07 | End: 2024-10-07 | Stop reason: HOSPADM

## 2024-10-07 RX ORDER — CHLORHEXIDINE GLUCONATE ORAL RINSE 1.2 MG/ML
15 SOLUTION DENTAL 2 TIMES DAILY
Qty: 150 ML | Refills: 0 | Status: SHIPPED | OUTPATIENT
Start: 2024-10-07 | End: 2024-10-12

## 2024-10-07 RX ORDER — LEVOFLOXACIN 500 MG/1
500 TABLET, FILM COATED ORAL DAILY
Qty: 2 TABLET | Refills: 0 | Status: SHIPPED | OUTPATIENT
Start: 2024-10-07 | End: 2024-10-09

## 2024-10-07 RX ORDER — PREDNISONE 20 MG/1
40 TABLET ORAL DAILY
Qty: 6 TABLET | Refills: 0 | Status: SHIPPED | OUTPATIENT
Start: 2024-10-07 | End: 2024-10-10

## 2024-10-07 RX ORDER — DOXYCYCLINE 100 MG/1
100 CAPSULE ORAL DAILY
Qty: 5 CAPSULE | Refills: 0 | Status: SHIPPED | OUTPATIENT
Start: 2024-10-07 | End: 2024-10-12

## 2024-10-07 RX ADMIN — VANCOMYCIN HYDROCHLORIDE 1000 MG: 1 INJECTION, POWDER, LYOPHILIZED, FOR SOLUTION INTRAVENOUS at 03:00

## 2024-10-07 RX ADMIN — MUPIROCIN: 20 OINTMENT TOPICAL at 09:38

## 2024-10-07 RX ADMIN — PREDNISONE 40 MG: 20 TABLET ORAL at 09:38

## 2024-10-07 RX ADMIN — ENOXAPARIN SODIUM 40 MG: 100 INJECTION SUBCUTANEOUS at 09:43

## 2024-10-07 RX ADMIN — DULOXETINE HYDROCHLORIDE 30 MG: 30 CAPSULE, DELAYED RELEASE ORAL at 09:38

## 2024-10-07 RX ADMIN — IPRATROPIUM BROMIDE AND ALBUTEROL SULFATE 1 DOSE: 2.5; .5 SOLUTION RESPIRATORY (INHALATION) at 11:19

## 2024-10-07 RX ADMIN — INSULIN LISPRO 2 UNITS: 100 INJECTION, SOLUTION INTRAVENOUS; SUBCUTANEOUS at 17:35

## 2024-10-07 RX ADMIN — BUPRENORPHINE AND NALOXONE 1 FILM: 8; 2 FILM BUCCAL; SUBLINGUAL at 14:10

## 2024-10-07 RX ADMIN — BUDESONIDE AND FORMOTEROL FUMARATE DIHYDRATE 2 PUFF: 160; 4.5 AEROSOL RESPIRATORY (INHALATION) at 08:03

## 2024-10-07 RX ADMIN — IPRATROPIUM BROMIDE AND ALBUTEROL SULFATE 1 DOSE: 2.5; .5 SOLUTION RESPIRATORY (INHALATION) at 15:18

## 2024-10-07 RX ADMIN — SODIUM CHLORIDE, PRESERVATIVE FREE 10 ML: 5 INJECTION INTRAVENOUS at 09:39

## 2024-10-07 RX ADMIN — ASPIRIN 81 MG: 81 TABLET, CHEWABLE ORAL at 09:38

## 2024-10-07 RX ADMIN — BUPRENORPHINE AND NALOXONE 1 FILM: 8; 2 FILM BUCCAL; SUBLINGUAL at 09:43

## 2024-10-07 RX ADMIN — INSULIN LISPRO 2 UNITS: 100 INJECTION, SOLUTION INTRAVENOUS; SUBCUTANEOUS at 12:39

## 2024-10-07 RX ADMIN — VANCOMYCIN HYDROCHLORIDE 1000 MG: 1 INJECTION, POWDER, LYOPHILIZED, FOR SOLUTION INTRAVENOUS at 14:11

## 2024-10-07 RX ADMIN — IPRATROPIUM BROMIDE AND ALBUTEROL SULFATE 1 DOSE: 2.5; .5 SOLUTION RESPIRATORY (INHALATION) at 08:02

## 2024-10-07 RX ADMIN — Medication 15 ML: at 09:44

## 2024-10-07 RX ADMIN — CHLORHEXIDINE GLUCONATE 118 ML: 4 SOLUTION TOPICAL at 09:38

## 2024-10-07 NOTE — PLAN OF CARE
Problem: Chronic Conditions and Co-morbidities  Goal: Patient's chronic conditions and co-morbidity symptoms are monitored and maintained or improved  10/7/2024 1815 by Karyn Pérez, RN  Outcome: Completed  10/7/2024 1328 by Karyn Pérez, RN  Outcome: Progressing     Problem: Discharge Planning  Goal: Discharge to home or other facility with appropriate resources  10/7/2024 1815 by Karyn Pérez, RN  Outcome: Completed  10/7/2024 1328 by Karyn Pérez, RN  Outcome: Progressing     Problem: Safety - Adult  Goal: Free from fall injury  10/7/2024 1815 by Karyn Pérez RN  Outcome: Completed  10/7/2024 1328 by Karyn Pérez, RN  Outcome: Progressing

## 2024-10-07 NOTE — DISCHARGE SUMMARY
Please use this note as a progress note for the day if the patient does not discharge today      Discharge Summary    Name:  Kalee Chicas /Age/Sex: 1960  (63 y.o. female)   MRN & CSN:  [id number] & [id number] Admission Date/Time: 10/4/2024 11:38 PM   Attending:  Robbin Phillips MD Discharging Physician: Robbin Phillips MD       Admission Diagnosis:   Acute COPD exacerbation 2/2 CAP  Chronic Hypoxemic and hypercapnic Respiratory Failure   KING   Chronic Pain   MDD    Discharge Diagnosis, hospital course, assessment and plan:  Kalee Chicas is a 63 y.o.  female  who presents with COPD exacerbation (HCC)    Patient admitted on 10/4/2024 11:38 PM    Per H+P:  Patient is a 63 y.o. female with a PMHx of severe COPD on 6L NC, KING, obesity  who presented to the ED with SOB. Patient reports she is on baseline 6L NC and began experiencing worsening SOB associated with increased cough and sputum production x 1 week. Seen pulmonologist and was precribed steroids and abx with no relief. Currently also having left sided pain.  Denied any F/C, HA, dizziness, presyncope, syncope, CP, N/V, abdominal pain, bleeding (hemoptysis / hematemesis, hematuria, BRBPR), C/D, or changes in urinary habits.        Acute COPD exacerbation 2/2 CAP  Likely MRSA Pneumonia, cannot rule out Gm -ve pneumonia  MRSA Colonization   Chronic Hypoxemic and hypercapnic Respiratory Failure   - On admit, hemodynamically stable,  in mild respiratory distress, BIPAP initially and was weaned to 6L/min.   - Baseline is 4L NC. Now down to baseline O2   - CXR with LLL opacity    - Received 125mg solumedrol, Duonebs in ED  - On PO steroids, levaquin 750mg daily.   - Change vanc to doxy.   - De colonize      KING  - Continue BPAP qhs and PRN      Chronic pain   - Continue home Suboxone, OARRS appropriate.     Major depressive disorder  - Continue home medications.      Patient stable enough to be discharged home with [facility] today.     The patient  [address] [place] [address]      Phone: [phone]   levoFLOXacin 500 MG tablet       These medications were sent to [id number] - [facility] [address] [place] [address] [phone] - F [phone]  [address] [facility] [address]      Phone: [phone]   chlorhexidine 0.12 % solution  chlorhexidine gluconate 4 % Soln external solution  doxycycline hyclate 100 MG capsule  mupirocin 2 % ointment  predniSONE 20 MG tablet         Objective Findings at Discharge:       BMP/CBC  Recent Labs     10/05/24  0526 10/06/24  0259 10/07/24  0036    140 141   K 4.0 3.9 4.4   CL 90* 96* 99   CO2 37* 36* 38*   BUN 18 18 18   CREATININE 0.7 0.5* 0.4*   WBC 8.7 14.3* 10.8*   HCT 38.9 35.8* 35.3*    208 201       Results       Procedure Component Value Units Date/Time    Resp Viral Panel [[id number]] Collected: 10/05/24 0900    Order Status: Completed Specimen: Nasopharyngeal Swab Updated: 10/05/24 1019     Specimen Description .NASOPHARYNGEAL SWAB     Adenovirus PCR Not Detected     Coronavirus 229E PCR Not Detected     Coronavirus HKU1 PCR Not Detected     Coronavirus NL63 PCR Not Detected     Coronavirus OC43 PCR Not Detected     SARS-CoV-2, PCR Not Detected     Human Metapneumovirus PCR Not Detected     Rhino/Enterovirus PCR Not Detected     Influenza A by PCR Not Detected     Influenza B by PCR Not Detected     Parainfluenza 1 PCR Not Detected     Parainfluenza 2 PCR Not Detected     Parainfluenza 3 PCR Not Detected     Parainfluenza 4 PCR Not Detected     Resp Syncytial Virus PCR Not Detected     Bordetella parapertussis by PCR Not Detected     B Pertussis by PCR Not Detected     Chlamydia pneumoniae By PCR Not Detected     Mycoplasma pneumo by PCR Not Detected     Comment: Performed by multiplexed nucleic acid assay.       Culture, Respiratory (with Gram Stain) [[id number]]     Order Status: No result Specimen: Sputum Expectorated     Strep Pneumoniae Antigen

## 2024-10-07 NOTE — CARE COORDINATION
CM reviewed pt’s medical record and discussed pt in IDR. CM introduced self and explained the role of case management. CM in to see pt to initiate D/C planning. PTA pt received HHC for Aide services. Pt refusing Skilled Nursing Services due to having a Nurse once monthly come to her apartment. Pt is alert, oriented, and kind. Pt is cooperative with CM assessment. Pt agrees to non-skilled HHC for aide services. Pt denies having any other DC needs. Plan for DC is home alone with [facility] for aide services and family support. Pt's daughter will pick-up pt when she gets off of work. The patient's individualized treatment goals were discussed and they are in agreement with the transitional plan of care. The patient was provided with a choice in provider and understands the freedom of choice list that was provided to them. The patient and/or family verbalize understanding of treatment preferences and quality data associated with providers.          10/07/24 0148   Service Assessment   Patient Orientation Alert and Oriented   Cognition Alert   History Provided By Patient;Medical Record   Primary Caregiver Self  (Has [facility])   Support Systems Children;Other (Comment)  (Home Care staff)   Patient's Healthcare Decision Maker is: Legal Next of Kin   PCP Verified by CM Yes   Last Visit to PCP Within last 3 months   Prior Functional Level Assistance with the following:;Bathing;Dressing;Toileting;Mobility   Current Functional Level Assistance with the following:;Bathing;Dressing;Toileting;Mobility   Can patient return to prior living arrangement Yes   Ability to make needs known: Good   Family able to assist with home care needs: Yes  (daughter)   Would you like for me to discuss the discharge plan with any other family members/significant others, and if so, who? No   Financial Resources Medicaid;Medicare   Community Resources ECF/Home Care   Social/Functional History   Lives With Alone   Type of Home Senior housing

## 2024-10-07 NOTE — PROGRESS NOTES
10/07/24 1206   Encounter Summary   Encounter Overview/Reason Loneliness/Social Isolation   Service Provided For Patient   Referral/Consult From Patient;Nurse   Support System Family members   Last Encounter  10/07/24  (PT calm and coping well today, continue to provide support as needed.)   Complexity of Encounter Moderate   Begin Time 1200   End Time  1207   Total Time Calculated 7 min   Spiritual/Emotional needs   Type Spiritual Support;Emotional Distress   Assessment/Intervention/Outcome   Assessment Concerns with suffering;Coping;Loneliness   Intervention Active listening;Discussed illness injury and it’s impact;Sustaining Presence/Ministry of presence;Prayer (assurance of)/[place]   Outcome Coping   Plan and Referrals   Plan/Referrals Continue Support (comment)

## 2024-10-07 NOTE — PLAN OF CARE
Problem: Chronic Conditions and Co-morbidities  Goal: Patient's chronic conditions and co-morbidity symptoms are monitored and maintained or improved  10/7/2024 1328 by Karyn Pérez, RN  Outcome: Progressing  10/7/2024 0226 by Ana Laura Luciano, RN  Outcome: Progressing     Problem: Discharge Planning  Goal: Discharge to home or other facility with appropriate resources  10/7/2024 1328 by Karyn Pérez, RN  Outcome: Progressing  10/7/2024 0226 by Ana Laura Luciano, RN  Outcome: Progressing     Problem: Safety - Adult  Goal: Free from fall injury  Outcome: Progressing

## 2024-10-07 NOTE — DISCHARGE INSTRUCTIONS
Internal Medicine Discharge Instruction    Discharge to:  Home  Diet: ADULT DIET; Regular  Activity: As tolerated       Be compliant with medications  Please take medications as prescribed   Please use the nasal ointment, mouth wash and body wash as prescribed         Electronically signed by Robbin Phillips MD on 10/7/2024 at 7:46 AM

## 2024-10-07 NOTE — PROGRESS NOTES
PHARMACY VANCOMYCIN MONITORING SERVICE  Pharmacy consulted by Dr. Robbin Phillips MD for monitoring and adjustment.    Indication for treatment: Vancomycin indication: CAP with risk factors  Goal trough: Trough Goal: 15-20 mcg/mL  AUC/IVAN: 400-600    Risk Factors for MRSA Identified:   Documented isolation of MRSA within 1 year     Pertinent Laboratory Values:   Temp Readings from Last 3 Encounters:   10/07/24 98.4 °F (36.9 °C)   06/30/24 98 °F (36.7 °C) (Oral)   04/27/24 98.5 °F (36.9 °C) (Temporal)     Recent Labs     10/05/24  0526 10/06/24  0259 10/07/24  0036   WBC 8.7 14.3* 10.8*     Recent Labs     10/05/24  0526 10/06/24  0259 10/07/24  0036   BUN 18 18 18   CREATININE 0.7 0.5* 0.4*     Estimated Creatinine Clearance: 161 mL/min (A) (based on SCr of 0.4 mg/dL (L)).    Intake/Output Summary (Last 24 hours) at 10/7/2024 1516  Last data filed at 10/7/2024 0500  Gross per 24 hour   Intake 715.94 ml   Output --   Net 715.94 ml       Last Encounter Weight:  Wt Readings from Last 3 Encounters:   10/07/24 90.7 kg (200 lb)   08/12/24 88.5 kg (195 lb)   06/30/24 91.1 kg (200 lb 13.4 oz)       Pertinent Cultures:   Date    Source    Results  10/05   Blood    NG x2 days  10/05   MRSA Nasal   Positive    Vancomycin level:   TROUGH:  No results for input(s): \"VANCOTROUGH\" in the last 72 hours.  RANDOM:  No results for input(s): \"VANCORANDOM\" in the last 72 hours.    Assessment:  HPI: Patient is a 63 y.o. female with a PMHx of severe COPD on 6L NC, KING, obesity  who presented to the ED with SOB. Patient reports she is on baseline 6L NC and began experiencing worsening SOB associated with increased cough and sputum production x 1 week   SCr, BUN, and urine output:   Scr 0.4 mg/dL  BUN 18  UOP not documented  Day(s) of therapy: 3 / 7  Vancomycin concentration:   10/07: vancomycin level was cancelled by lab/provider as patient is to be discharged today. Discharge summary is posted.    Plan:  Level ordered for today was

## 2024-10-11 LAB
MICROORGANISM SPEC CULT: NORMAL
MICROORGANISM SPEC CULT: NORMAL
SERVICE CMNT-IMP: NORMAL
SERVICE CMNT-IMP: NORMAL
SPECIMEN DESCRIPTION: NORMAL
SPECIMEN DESCRIPTION: NORMAL

## 2024-11-14 ENCOUNTER — HOSPITAL ENCOUNTER (INPATIENT)
Age: 64
LOS: 5 days | Discharge: HOME HEALTH | DRG: 871 | End: 2024-11-19
Attending: EMERGENCY MEDICINE | Admitting: INTERNAL MEDICINE
Payer: MEDICARE

## 2024-11-14 ENCOUNTER — APPOINTMENT (OUTPATIENT)
Dept: GENERAL RADIOLOGY | Age: 64
DRG: 871 | End: 2024-11-14
Payer: MEDICARE

## 2024-11-14 DIAGNOSIS — J96.20 ACUTE ON CHRONIC RESPIRATORY FAILURE, UNSPECIFIED WHETHER WITH HYPOXIA OR HYPERCAPNIA: ICD-10-CM

## 2024-11-14 DIAGNOSIS — A41.9 SEPTICEMIA (HCC): ICD-10-CM

## 2024-11-14 DIAGNOSIS — J44.1 COPD EXACERBATION (HCC): Primary | ICD-10-CM

## 2024-11-14 DIAGNOSIS — J18.9 PNEUMONIA OF LEFT LOWER LOBE DUE TO INFECTIOUS ORGANISM: ICD-10-CM

## 2024-11-14 LAB
ALBUMIN SERPL-MCNC: 3.6 G/DL (ref 3.4–5)
ALBUMIN/GLOB SERPL: 1.1 {RATIO} (ref 1.1–2.2)
ALP SERPL-CCNC: 82 U/L (ref 40–129)
ALT SERPL-CCNC: 14 U/L (ref 10–40)
ANION GAP SERPL CALCULATED.3IONS-SCNC: 7 MMOL/L (ref 9–17)
ARTERIAL PATENCY WRIST A: ABNORMAL
AST SERPL-CCNC: 17 U/L (ref 15–37)
B PARAP IS1001 DNA NPH QL NAA+NON-PROBE: NOT DETECTED
B PERT DNA SPEC QL NAA+PROBE: NOT DETECTED
BASOPHILS # BLD: 0.08 K/UL
BASOPHILS NFR BLD: 1 % (ref 0–1)
BILIRUB SERPL-MCNC: <0.2 MG/DL (ref 0–1)
BILIRUB UR QL STRIP: NEGATIVE
BODY TEMPERATURE: 37
BUN SERPL-MCNC: 11 MG/DL (ref 7–20)
C PNEUM DNA NPH QL NAA+NON-PROBE: NOT DETECTED
CALCIUM SERPL-MCNC: 9.4 MG/DL (ref 8.3–10.6)
CHLORIDE SERPL-SCNC: 94 MMOL/L (ref 99–110)
CLARITY UR: CLEAR
CO2 SERPL-SCNC: 39 MMOL/L (ref 21–32)
COHGB MFR BLD: 0.1 % (ref 0.5–1.5)
COHGB MFR BLD: 0.4 % (ref 0.5–1.5)
COHGB MFR BLD: 0.4 % (ref 0.5–1.5)
COLOR UR: YELLOW
COMMENT: ABNORMAL
CREAT SERPL-MCNC: 0.5 MG/DL (ref 0.6–1.2)
EKG ATRIAL RATE: 89 BPM
EKG DIAGNOSIS: NORMAL
EKG P AXIS: 75 DEGREES
EKG P-R INTERVAL: 150 MS
EKG Q-T INTERVAL: 372 MS
EKG QRS DURATION: 90 MS
EKG QTC CALCULATION (BAZETT): 452 MS
EKG R AXIS: 13 DEGREES
EKG T AXIS: 54 DEGREES
EKG VENTRICULAR RATE: 89 BPM
EOSINOPHIL # BLD: 0.83 K/UL
EOSINOPHILS RELATIVE PERCENT: 5 % (ref 0–3)
ERYTHROCYTE [DISTWIDTH] IN BLOOD BY AUTOMATED COUNT: 12.2 % (ref 11.7–14.9)
FLUAV RNA NPH QL NAA+NON-PROBE: NOT DETECTED
FLUBV RNA NPH QL NAA+NON-PROBE: NOT DETECTED
GFR, ESTIMATED: >90 ML/MIN/1.73M2
GLUCOSE SERPL-MCNC: 153 MG/DL (ref 74–99)
GLUCOSE UR STRIP-MCNC: NEGATIVE MG/DL
HADV DNA NPH QL NAA+NON-PROBE: NOT DETECTED
HCO3 VENOUS: 37.1 MMOL/L (ref 22–29)
HCO3 VENOUS: 40.4 MMOL/L (ref 22–29)
HCO3 VENOUS: 45.3 MMOL/L (ref 22–29)
HCOV 229E RNA NPH QL NAA+NON-PROBE: NOT DETECTED
HCOV HKU1 RNA NPH QL NAA+NON-PROBE: NOT DETECTED
HCOV NL63 RNA NPH QL NAA+NON-PROBE: NOT DETECTED
HCOV OC43 RNA NPH QL NAA+NON-PROBE: NOT DETECTED
HCT VFR BLD AUTO: 39.7 % (ref 37–47)
HGB BLD-MCNC: 12.2 G/DL (ref 12.5–16)
HGB UR QL STRIP.AUTO: NEGATIVE
HMPV RNA NPH QL NAA+NON-PROBE: NOT DETECTED
HPIV1 RNA NPH QL NAA+NON-PROBE: NOT DETECTED
HPIV2 RNA NPH QL NAA+NON-PROBE: NOT DETECTED
HPIV3 RNA NPH QL NAA+NON-PROBE: NOT DETECTED
HPIV4 RNA NPH QL NAA+NON-PROBE: NOT DETECTED
IMM GRANULOCYTES # BLD AUTO: 0.11 K/UL
IMM GRANULOCYTES NFR BLD: 1 %
KETONES UR STRIP-MCNC: NEGATIVE MG/DL
LACTATE BLDV-SCNC: 0.8 MMOL/L (ref 0.4–2)
LACTATE BLDV-SCNC: 1.9 MMOL/L (ref 0.4–2)
LACTATE BLDV-SCNC: 2.1 MMOL/L (ref 0.4–2)
LEUKOCYTE ESTERASE UR QL STRIP: NEGATIVE
LYMPHOCYTES NFR BLD: 3.45 K/UL
LYMPHOCYTES RELATIVE PERCENT: 22 % (ref 24–44)
M PNEUMO DNA NPH QL NAA+NON-PROBE: NOT DETECTED
MCH RBC QN AUTO: 28.2 PG (ref 27–31)
MCHC RBC AUTO-ENTMCNC: 30.7 G/DL (ref 32–36)
MCV RBC AUTO: 91.9 FL (ref 78–100)
METHEMOGLOBIN: 0.4 % (ref 0.5–1.5)
METHEMOGLOBIN: 0.5 % (ref 0.5–1.5)
METHEMOGLOBIN: 0.5 % (ref 0.5–1.5)
MONOCYTES NFR BLD: 1.34 K/UL
MONOCYTES NFR BLD: 8 % (ref 0–4)
MRSA, DNA, NASAL: NOT DETECTED
NEUTROPHILS NFR BLD: 64 % (ref 36–66)
NEUTS SEG NFR BLD: 10.1 K/UL
NITRITE UR QL STRIP: NEGATIVE
OXYHGB MFR BLD: 55.7 %
OXYHGB MFR BLD: 94.8 %
OXYHGB MFR BLD: 95.3 %
PCO2 VENOUS: 77 MM HG (ref 38–54)
PCO2 VENOUS: 93.6 MM HG (ref 38–54)
PCO2 VENOUS: 97 MM HG (ref 38–54)
PH UR STRIP: 7 [PH] (ref 5–8)
PH VENOUS: 7.24 (ref 7.32–7.43)
PH VENOUS: 7.3 (ref 7.32–7.43)
PH VENOUS: 7.3 (ref 7.32–7.43)
PLATELET # BLD AUTO: 277 K/UL (ref 140–440)
PMV BLD AUTO: 9 FL (ref 7.5–11.1)
PO2 VENOUS: 31.4 MM HG (ref 23–48)
PO2 VENOUS: 79.9 MM HG (ref 23–48)
PO2 VENOUS: 88.6 MM HG (ref 23–48)
POSITIVE BASE EXCESS, VEN: 14.6 MMOL/L (ref 0–3)
POSITIVE BASE EXCESS, VEN: 8.1 MMOL/L (ref 0–3)
POSITIVE BASE EXCESS, VEN: 9.4 MMOL/L (ref 0–3)
POTASSIUM SERPL-SCNC: 4 MMOL/L (ref 3.5–5.1)
PROCALCITONIN SERPL-MCNC: 0.04 NG/ML
PROT SERPL-MCNC: 6.9 G/DL (ref 6.4–8.2)
PROT UR STRIP-MCNC: NEGATIVE MG/DL
RBC # BLD AUTO: 4.32 M/UL (ref 4.2–5.4)
RSV RNA NPH QL NAA+NON-PROBE: NOT DETECTED
RV+EV RNA NPH QL NAA+NON-PROBE: NOT DETECTED
SARS-COV-2 RNA NPH QL NAA+NON-PROBE: NOT DETECTED
SODIUM SERPL-SCNC: 140 MMOL/L (ref 136–145)
SP GR UR STRIP: <1.005 (ref 1–1.03)
SPECIMEN DESCRIPTION: NORMAL
SPECIMEN DESCRIPTION: NORMAL
TEXT FOR RESPIRATORY: ABNORMAL
UROBILINOGEN UR STRIP-ACNC: 0.2 EU/DL (ref 0–1)
WBC OTHER # BLD: 15.9 K/UL (ref 4–10.5)

## 2024-11-14 PROCEDURE — 96365 THER/PROPH/DIAG IV INF INIT: CPT

## 2024-11-14 PROCEDURE — 6360000002 HC RX W HCPCS: Performed by: EMERGENCY MEDICINE

## 2024-11-14 PROCEDURE — 93005 ELECTROCARDIOGRAM TRACING: CPT | Performed by: EMERGENCY MEDICINE

## 2024-11-14 PROCEDURE — 6360000002 HC RX W HCPCS: Performed by: NURSE PRACTITIONER

## 2024-11-14 PROCEDURE — 2580000003 HC RX 258: Performed by: NURSE PRACTITIONER

## 2024-11-14 PROCEDURE — 81003 URINALYSIS AUTO W/O SCOPE: CPT

## 2024-11-14 PROCEDURE — 82805 BLOOD GASES W/O2 SATURATION: CPT

## 2024-11-14 PROCEDURE — 83605 ASSAY OF LACTIC ACID: CPT

## 2024-11-14 PROCEDURE — 94761 N-INVAS EAR/PLS OXIMETRY MLT: CPT

## 2024-11-14 PROCEDURE — 2700000000 HC OXYGEN THERAPY PER DAY

## 2024-11-14 PROCEDURE — 96375 TX/PRO/DX INJ NEW DRUG ADDON: CPT

## 2024-11-14 PROCEDURE — 0202U NFCT DS 22 TRGT SARS-COV-2: CPT

## 2024-11-14 PROCEDURE — 36415 COLL VENOUS BLD VENIPUNCTURE: CPT

## 2024-11-14 PROCEDURE — 2580000003 HC RX 258: Performed by: EMERGENCY MEDICINE

## 2024-11-14 PROCEDURE — 87899 AGENT NOS ASSAY W/OPTIC: CPT

## 2024-11-14 PROCEDURE — 6370000000 HC RX 637 (ALT 250 FOR IP): Performed by: NURSE PRACTITIONER

## 2024-11-14 PROCEDURE — 87641 MR-STAPH DNA AMP PROBE: CPT

## 2024-11-14 PROCEDURE — 87040 BLOOD CULTURE FOR BACTERIA: CPT

## 2024-11-14 PROCEDURE — 99285 EMERGENCY DEPT VISIT HI MDM: CPT

## 2024-11-14 PROCEDURE — 2140000000 HC CCU INTERMEDIATE R&B

## 2024-11-14 PROCEDURE — 93010 ELECTROCARDIOGRAM REPORT: CPT | Performed by: INTERNAL MEDICINE

## 2024-11-14 PROCEDURE — 80053 COMPREHEN METABOLIC PANEL: CPT

## 2024-11-14 PROCEDURE — 6360000002 HC RX W HCPCS: Performed by: INTERNAL MEDICINE

## 2024-11-14 PROCEDURE — 94640 AIRWAY INHALATION TREATMENT: CPT

## 2024-11-14 PROCEDURE — 85025 COMPLETE CBC W/AUTO DIFF WBC: CPT

## 2024-11-14 PROCEDURE — 6370000000 HC RX 637 (ALT 250 FOR IP): Performed by: EMERGENCY MEDICINE

## 2024-11-14 PROCEDURE — 6370000000 HC RX 637 (ALT 250 FOR IP): Performed by: INTERNAL MEDICINE

## 2024-11-14 PROCEDURE — 71045 X-RAY EXAM CHEST 1 VIEW: CPT

## 2024-11-14 PROCEDURE — 84145 PROCALCITONIN (PCT): CPT

## 2024-11-14 PROCEDURE — 87086 URINE CULTURE/COLONY COUNT: CPT

## 2024-11-14 PROCEDURE — 2580000003 HC RX 258: Performed by: INTERNAL MEDICINE

## 2024-11-14 PROCEDURE — 87449 NOS EACH ORGANISM AG IA: CPT

## 2024-11-14 RX ORDER — HYDROXYZINE HYDROCHLORIDE 25 MG/1
25 TABLET, FILM COATED ORAL 2 TIMES DAILY PRN
Status: DISCONTINUED | OUTPATIENT
Start: 2024-11-14 | End: 2024-11-19 | Stop reason: HOSPADM

## 2024-11-14 RX ORDER — POLYETHYLENE GLYCOL 3350 17 G/17G
17 POWDER, FOR SOLUTION ORAL DAILY PRN
Status: DISCONTINUED | OUTPATIENT
Start: 2024-11-14 | End: 2024-11-19 | Stop reason: HOSPADM

## 2024-11-14 RX ORDER — TRAZODONE HYDROCHLORIDE 50 MG/1
300 TABLET, FILM COATED ORAL NIGHTLY
Status: DISCONTINUED | OUTPATIENT
Start: 2024-11-14 | End: 2024-11-19 | Stop reason: HOSPADM

## 2024-11-14 RX ORDER — ASPIRIN 81 MG/1
81 TABLET, CHEWABLE ORAL DAILY
Status: DISCONTINUED | OUTPATIENT
Start: 2024-11-14 | End: 2024-11-19 | Stop reason: HOSPADM

## 2024-11-14 RX ORDER — SODIUM CHLORIDE 0.9 % (FLUSH) 0.9 %
5-40 SYRINGE (ML) INJECTION EVERY 12 HOURS SCHEDULED
Status: DISCONTINUED | OUTPATIENT
Start: 2024-11-14 | End: 2024-11-19 | Stop reason: HOSPADM

## 2024-11-14 RX ORDER — VANCOMYCIN 1.25 G/250ML
1750 INJECTION, SOLUTION INTRAVENOUS ONCE
Status: COMPLETED | OUTPATIENT
Start: 2024-11-14 | End: 2024-11-14

## 2024-11-14 RX ORDER — ACETAMINOPHEN 650 MG/1
650 SUPPOSITORY RECTAL EVERY 6 HOURS PRN
Status: DISCONTINUED | OUTPATIENT
Start: 2024-11-14 | End: 2024-11-19 | Stop reason: HOSPADM

## 2024-11-14 RX ORDER — ONDANSETRON 2 MG/ML
4 INJECTION INTRAMUSCULAR; INTRAVENOUS EVERY 6 HOURS PRN
Status: DISCONTINUED | OUTPATIENT
Start: 2024-11-14 | End: 2024-11-19 | Stop reason: HOSPADM

## 2024-11-14 RX ORDER — ACETAMINOPHEN 325 MG/1
650 TABLET ORAL EVERY 6 HOURS PRN
Status: DISCONTINUED | OUTPATIENT
Start: 2024-11-14 | End: 2024-11-19 | Stop reason: HOSPADM

## 2024-11-14 RX ORDER — PREDNISONE 20 MG/1
40 TABLET ORAL DAILY
Status: COMPLETED | OUTPATIENT
Start: 2024-11-15 | End: 2024-11-19

## 2024-11-14 RX ORDER — IPRATROPIUM BROMIDE AND ALBUTEROL SULFATE 2.5; .5 MG/3ML; MG/3ML
1 SOLUTION RESPIRATORY (INHALATION) ONCE
Status: COMPLETED | OUTPATIENT
Start: 2024-11-14 | End: 2024-11-14

## 2024-11-14 RX ORDER — ENOXAPARIN SODIUM 100 MG/ML
30 INJECTION SUBCUTANEOUS 2 TIMES DAILY
Status: DISCONTINUED | OUTPATIENT
Start: 2024-11-14 | End: 2024-11-19 | Stop reason: HOSPADM

## 2024-11-14 RX ORDER — ALBUTEROL SULFATE 0.83 MG/ML
2.5 SOLUTION RESPIRATORY (INHALATION) ONCE
Status: COMPLETED | OUTPATIENT
Start: 2024-11-14 | End: 2024-11-14

## 2024-11-14 RX ORDER — VARENICLINE TARTRATE 0.5 MG/1
1 TABLET, FILM COATED ORAL 2 TIMES DAILY
Status: DISCONTINUED | OUTPATIENT
Start: 2024-11-15 | End: 2024-11-19 | Stop reason: HOSPADM

## 2024-11-14 RX ORDER — BUPRENORPHINE HYDROCHLORIDE AND NALOXONE HYDROCHLORIDE DIHYDRATE 8; 2 MG/1; MG/1
1 TABLET SUBLINGUAL 3 TIMES DAILY
Status: DISCONTINUED | OUTPATIENT
Start: 2024-11-14 | End: 2024-11-16

## 2024-11-14 RX ORDER — DEXTROAMPHETAMINE SACCHARATE, AMPHETAMINE ASPARTATE MONOHYDRATE, DEXTROAMPHETAMINE SULFATE AND AMPHETAMINE SULFATE 7.5; 7.5; 7.5; 7.5 MG/1; MG/1; MG/1; MG/1
30 CAPSULE, EXTENDED RELEASE ORAL EVERY MORNING
COMMUNITY
Start: 2024-11-01

## 2024-11-14 RX ORDER — ONDANSETRON 4 MG/1
4 TABLET, ORALLY DISINTEGRATING ORAL EVERY 8 HOURS PRN
Status: DISCONTINUED | OUTPATIENT
Start: 2024-11-14 | End: 2024-11-19 | Stop reason: HOSPADM

## 2024-11-14 RX ORDER — DEXTROAMPHETAMINE SACCHARATE, AMPHETAMINE ASPARTATE MONOHYDRATE, DEXTROAMPHETAMINE SULFATE AND AMPHETAMINE SULFATE 7.5; 7.5; 7.5; 7.5 MG/1; MG/1; MG/1; MG/1
30 CAPSULE, EXTENDED RELEASE ORAL EVERY MORNING
Status: DISCONTINUED | OUTPATIENT
Start: 2024-11-15 | End: 2024-11-19 | Stop reason: HOSPADM

## 2024-11-14 RX ORDER — VARENICLINE TARTRATE 0.5 MG/1
1 TABLET, FILM COATED ORAL 2 TIMES DAILY
Status: DISCONTINUED | OUTPATIENT
Start: 2024-11-14 | End: 2024-11-14

## 2024-11-14 RX ORDER — VARENICLINE TARTRATE 0.5 (11)-1
1 KIT ORAL 2 TIMES DAILY
COMMUNITY
Start: 2024-10-31

## 2024-11-14 RX ORDER — VANCOMYCIN 1.5 G/300ML
1500 INJECTION, SOLUTION INTRAVENOUS EVERY 12 HOURS
Status: DISCONTINUED | OUTPATIENT
Start: 2024-11-15 | End: 2024-11-16

## 2024-11-14 RX ORDER — BUDESONIDE AND FORMOTEROL FUMARATE DIHYDRATE 160; 4.5 UG/1; UG/1
2 AEROSOL RESPIRATORY (INHALATION)
Status: DISCONTINUED | OUTPATIENT
Start: 2024-11-14 | End: 2024-11-19 | Stop reason: HOSPADM

## 2024-11-14 RX ORDER — SODIUM CHLORIDE 9 MG/ML
INJECTION, SOLUTION INTRAVENOUS CONTINUOUS
Status: DISCONTINUED | OUTPATIENT
Start: 2024-11-14 | End: 2024-11-15

## 2024-11-14 RX ORDER — SODIUM CHLORIDE 0.9 % (FLUSH) 0.9 %
5-40 SYRINGE (ML) INJECTION PRN
Status: DISCONTINUED | OUTPATIENT
Start: 2024-11-14 | End: 2024-11-19 | Stop reason: HOSPADM

## 2024-11-14 RX ORDER — IPRATROPIUM BROMIDE AND ALBUTEROL SULFATE 2.5; .5 MG/3ML; MG/3ML
1 SOLUTION RESPIRATORY (INHALATION)
Status: DISCONTINUED | OUTPATIENT
Start: 2024-11-14 | End: 2024-11-18

## 2024-11-14 RX ORDER — SODIUM CHLORIDE 9 MG/ML
INJECTION, SOLUTION INTRAVENOUS PRN
Status: DISCONTINUED | OUTPATIENT
Start: 2024-11-14 | End: 2024-11-19 | Stop reason: HOSPADM

## 2024-11-14 RX ADMIN — BUPRENORPHINE HYDROCHLORIDE AND NALOXONE HYDROCHLORIDE DIHYDRATE 1 TABLET: 8; 2 TABLET SUBLINGUAL at 14:44

## 2024-11-14 RX ADMIN — IPRATROPIUM BROMIDE AND ALBUTEROL SULFATE 1 DOSE: .5; 2.5 SOLUTION RESPIRATORY (INHALATION) at 21:04

## 2024-11-14 RX ADMIN — CEFEPIME 2000 MG: 2 INJECTION, POWDER, FOR SOLUTION INTRAVENOUS at 22:05

## 2024-11-14 RX ADMIN — ENOXAPARIN SODIUM 30 MG: 100 INJECTION SUBCUTANEOUS at 21:59

## 2024-11-14 RX ADMIN — WATER 40 MG: 1 INJECTION INTRAMUSCULAR; INTRAVENOUS; SUBCUTANEOUS at 05:19

## 2024-11-14 RX ADMIN — VANCOMYCIN 1750 MG: 1.25 INJECTION, SOLUTION INTRAVENOUS at 08:41

## 2024-11-14 RX ADMIN — AZITHROMYCIN MONOHYDRATE 500 MG: 500 INJECTION, POWDER, LYOPHILIZED, FOR SOLUTION INTRAVENOUS at 06:06

## 2024-11-14 RX ADMIN — CEFEPIME 2000 MG: 2 INJECTION, POWDER, FOR SOLUTION INTRAVENOUS at 14:46

## 2024-11-14 RX ADMIN — SODIUM CHLORIDE: 9 INJECTION, SOLUTION INTRAVENOUS at 17:19

## 2024-11-14 RX ADMIN — IPRATROPIUM BROMIDE AND ALBUTEROL SULFATE 1 DOSE: .5; 2.5 SOLUTION RESPIRATORY (INHALATION) at 17:17

## 2024-11-14 RX ADMIN — SODIUM CHLORIDE, PRESERVATIVE FREE 10 ML: 5 INJECTION INTRAVENOUS at 22:00

## 2024-11-14 RX ADMIN — SODIUM CHLORIDE, PRESERVATIVE FREE 10 ML: 5 INJECTION INTRAVENOUS at 11:15

## 2024-11-14 RX ADMIN — BUPRENORPHINE HYDROCHLORIDE AND NALOXONE HYDROCHLORIDE DIHYDRATE 1 TABLET: 8; 2 TABLET SUBLINGUAL at 21:59

## 2024-11-14 RX ADMIN — TRAZODONE HYDROCHLORIDE 300 MG: 50 TABLET ORAL at 21:59

## 2024-11-14 RX ADMIN — IPRATROPIUM BROMIDE AND ALBUTEROL SULFATE 1 DOSE: .5; 2.5 SOLUTION RESPIRATORY (INHALATION) at 05:50

## 2024-11-14 RX ADMIN — IPRATROPIUM BROMIDE AND ALBUTEROL SULFATE 1 DOSE: .5; 2.5 SOLUTION RESPIRATORY (INHALATION) at 06:11

## 2024-11-14 RX ADMIN — ASPIRIN 81 MG: 81 TABLET, CHEWABLE ORAL at 14:44

## 2024-11-14 RX ADMIN — CEFEPIME 2000 MG: 2 INJECTION, POWDER, FOR SOLUTION INTRAVENOUS at 06:09

## 2024-11-14 RX ADMIN — ENOXAPARIN SODIUM 30 MG: 100 INJECTION SUBCUTANEOUS at 11:15

## 2024-11-14 RX ADMIN — HYDROXYZINE HYDROCHLORIDE 25 MG: 25 TABLET ORAL at 14:44

## 2024-11-14 RX ADMIN — ALBUTEROL SULFATE 2.5 MG: 2.5 SOLUTION RESPIRATORY (INHALATION) at 06:13

## 2024-11-14 ASSESSMENT — PAIN DESCRIPTION - DESCRIPTORS: DESCRIPTORS: ACHING

## 2024-11-14 ASSESSMENT — LIFESTYLE VARIABLES
HOW OFTEN DO YOU HAVE A DRINK CONTAINING ALCOHOL: NEVER
HOW MANY STANDARD DRINKS CONTAINING ALCOHOL DO YOU HAVE ON A TYPICAL DAY: PATIENT DOES NOT DRINK
HOW MANY STANDARD DRINKS CONTAINING ALCOHOL DO YOU HAVE ON A TYPICAL DAY: PATIENT DOES NOT DRINK

## 2024-11-14 ASSESSMENT — PAIN DESCRIPTION - LOCATION: LOCATION: BACK

## 2024-11-14 ASSESSMENT — PAIN SCALES - GENERAL
PAINLEVEL_OUTOF10: 6
PAINLEVEL_OUTOF10: 0

## 2024-11-14 ASSESSMENT — PAIN DESCRIPTION - ORIENTATION: ORIENTATION: LEFT

## 2024-11-14 ASSESSMENT — PAIN - FUNCTIONAL ASSESSMENT: PAIN_FUNCTIONAL_ASSESSMENT: 0-10

## 2024-11-14 ASSESSMENT — PAIN DESCRIPTION - PAIN TYPE: TYPE: ACUTE PAIN

## 2024-11-14 NOTE — H&P
V2.0  History and Physical      Name:  Kalee Chicas /Age/Sex: 1960  (64 y.o. female)   MRN & CSN:  [id number] & [id number] Encounter Date/Time: 2024 8:55 AM EST   Location:  [facility] PCP: Chu Caban MD       Hospital Day: 1    Assessment and Plan:   Kalee Chicas is a 64 y.o. female with a pmh of COPD, KING, opioid use disorder, ADHD, tobacco dependency, chronic pain, and obesity who presents with Acute on chronic respiratory failure with hypoxia and hypercapnia    Hospital Problems             Last Modified POA    * (Principal) Acute on chronic respiratory failure with hypoxia and hypercapnia 2024 Yes       Plan:  Acute on chronic respiratory failure with hypoxia and hypercapnic 2/2 PNA and COPD  # Sepsis: criteria met- Leukocytosis, tachycardic, elevated lactate, and PNA.   # Chronic hypoxia on 6 liters  CXR: LLP  -Received Cefepime, Vanco, and azithromycin IV in the ED  -Continue Cefepime and Vancomycin IV empirically  -Hydration  -Check Pro-Frank, ESR, CRP  -Trend lactate  -Respiratory panel negative  -Blood culture and UA  pending      Opioid use disorder  -OARRS reviewed. Last script on 24 for 30 days.   -Continue Suboxone 8-2 mg TID    ADHD  -OARRS reviewed. Last script on 24 for 30 days.   -Continue Adderal    KING  -Continue BPAP qhs.    Tobacco cessation  -Continue Chantix       Major depressive disorder/Anxiety  -Trazodone and hydroxyzine     Class II obesity. Body mass index is 38.62  Complicating assessment and treatment. Placing patient at risk for multiple co-morbidities as well as early death and contributing to the patient's presentation.   -  on weight loss and appropriate lifestyle modifications    Disposition:   Current Living situation: Home  Expected Disposition: Home  Estimated D/C: 2-3 days    Diet ADULT DIET; Regular   DVT Prophylaxis [x] Lovenox, []  Heparin, [] SCDs, [] Ambulation,  [] Eliquis, [] Xarelto, [] Coumadin   Code Status

## 2024-11-14 NOTE — ED PROVIDER NOTES
provider specified.    DISCHARGE MEDICATIONS:  New Prescriptions    No medications on file       ED Provider Disposition Time  DISPOSITION             Appropriate personal protective equipment was worn during the patient's evaluation.  These included surgical, eye protection, surgical mask or in 95 respirator and gloves.  The patient was also placed in a surgical mask for the prevention of possible spread of respiratory viral illnesses.    The Patient was instructed to read the package inserts with any medication that was prescribed.  Major potential reactions and medication interactions were discussed.  The Patient understands that there are numerous possible adverse reactions not covered.    The patient was also instructed to arrange follow-up with his or her primary care provider for review of any pending labwork or incidental findings on any radiology results that were obtained.  All efforts were made to discuss any incidental findings that require further monitoring.      Controlled Substances Monitoring:          No data to display                (Please note that portions of this note were completed with a voice recognition program.  Efforts were made to edit the dictations but occasionally words are mis-transcribed.)    Kandy Atkins MD (electronically signed)  Attending Emergency Physician           Kandy Hughes MD  11/14/24 0626       Kandy Hughes MD  11/14/24 0657

## 2024-11-14 NOTE — ED NOTES
Called 3N twice to notify SBAR in, no answer  
ED TO INPATIENT SBAR HANDOFF    Patient Name: Kalee Chicas   :  1960  64 y.o.   Preferred Name  Kalee  Family/Caregiver Present no   Restraints no   C-SSRS: Risk of Suicide: No Risk  Sitter no   Sepsis Risk Score        Situation  Chief Complaint   Patient presents with    Shortness of Breath     Started yesterday     Brief Description of Patient's Condition: Pt present to ED via ambulance with sever Shortness of breath. Pt reports it started yesterday and has progressively gotten worse. A family member called EMS  for pt as she stated she was unable to breath.   Mental Status: oriented, alert, coherent, logical, and thought processes intact  Arrived from: home    Imaging:   XR CHEST PORTABLE   Final Result   1. Left lower lobe pneumonia.         Electronically signed by Jesús Morrison        Abnormal labs:   Abnormal Labs Reviewed   COMPREHENSIVE METABOLIC PANEL - Abnormal; Notable for the following components:       Result Value    Chloride 94 (*)     CO2 39 (*)     Anion Gap 7 (*)     Glucose 153 (*)     Creatinine 0.5 (*)     All other components within normal limits   CBC WITH AUTO DIFFERENTIAL - Abnormal; Notable for the following components:    WBC 15.9 (*)     Hemoglobin 12.2 (*)     MCHC 30.7 (*)     Lymphocytes % 22 (*)     Monocytes % 8 (*)     Eosinophils % 5 (*)     Immature Granulocytes % 1 (*)     All other components within normal limits   URINALYSIS - Abnormal; Notable for the following components:    Specific Gravity, UA <1.005 (*)     All other components within normal limits   BLOOD GAS, VENOUS - Abnormal; Notable for the following components:    pH, [place] 7.303 (*)     pCO2, [place] 93.6 (*)     PO2, [place] 88.6 (*)     HCO3, Venous 45.3 (*)     Positive Base Excess, Navdeep 14.6 (*)     Carboxyhemoglobin 0.4 (*)     All other components within normal limits   BLOOD GAS, VENOUS - Abnormal; Notable for the following components:    pH, Navdeep 7.237 (*)     pCO2, Navdeep 97.0 (*)     HCO3, Venous 40.4 (*)  
Report provided to Jenna DE LEÓN   
HFA (VENTOLIN HFA) 108 (90 Base) MCG/ACT inhaler Inhale 2 puffs into the lungs 4 times daily as needed for Wheezing 7/8/24   Lior Villeda MD   sodium chloride (OCEAN, BABY AYR) 0.65 % nasal spray 2 sprays by Nasal route as needed for Congestion 4/27/24   Ventura Lundy MD   BiPAP Machine MISC by Does not apply route Use nightly    Provider, MD Kalen   OXYGEN Inhale 4 L into the lungs continuous     Provider, MD Kalen     Medications added or changed (ex. new medication, dosage change, interval change, formulation change):  Adderall XR (added)  Varenicline 1 mg BID (added)    Medications removed from list (include reason, ex. noncompliance, medication cost, therapy complete etc.):   Azithromycin therapy complete  Prednisone therapy complete  Albuterol inhaler duplicate therapies flagged for review.    Comments:  Medication list reviewed with patient and insurance claims verified.  Claims for pregabalin patient reports not taking.  Claims for celcoxib patient reports not taking.    To my knowledge the above medication history is accurate as of 11/14/2024 8:30 AM.   Meri Duarte CP   11/14/2024 8:30 AM

## 2024-11-15 LAB
ANION GAP SERPL CALCULATED.3IONS-SCNC: 5 MMOL/L (ref 9–17)
ARTERIAL PATENCY WRIST A: ABNORMAL
BASOPHILS # BLD: 0.05 K/UL
BASOPHILS NFR BLD: 0 % (ref 0–1)
BODY TEMPERATURE: 37
BUN SERPL-MCNC: 17 MG/DL (ref 7–20)
CALCIUM SERPL-MCNC: 8.6 MG/DL (ref 8.3–10.6)
CHLORIDE SERPL-SCNC: 98 MMOL/L (ref 99–110)
CO2 SERPL-SCNC: 37 MMOL/L (ref 21–32)
COHGB MFR BLD: 0.3 % (ref 0.5–1.5)
CREAT SERPL-MCNC: 0.5 MG/DL (ref 0.6–1.2)
DATE LAST DOSE: ABNORMAL
EOSINOPHIL # BLD: 0.14 K/UL
EOSINOPHILS RELATIVE PERCENT: 1 % (ref 0–3)
ERYTHROCYTE [DISTWIDTH] IN BLOOD BY AUTOMATED COUNT: 12.2 % (ref 11.7–14.9)
GFR, ESTIMATED: >90 ML/MIN/1.73M2
GLUCOSE SERPL-MCNC: 228 MG/DL (ref 74–99)
HCO3 VENOUS: 40.2 MMOL/L (ref 22–29)
HCT VFR BLD AUTO: 32.9 % (ref 37–47)
HGB BLD-MCNC: 10.1 G/DL (ref 12.5–16)
IMM GRANULOCYTES # BLD AUTO: 0.16 K/UL
IMM GRANULOCYTES NFR BLD: 1 %
L PNEUMO1 AG UR QL IA.RAPID: NEGATIVE
LYMPHOCYTES NFR BLD: 2.12 K/UL
LYMPHOCYTES RELATIVE PERCENT: 14 % (ref 24–44)
MCH RBC QN AUTO: 28.1 PG (ref 27–31)
MCHC RBC AUTO-ENTMCNC: 30.7 G/DL (ref 32–36)
MCV RBC AUTO: 91.4 FL (ref 78–100)
METHEMOGLOBIN: 0.5 % (ref 0.5–1.5)
MICROORGANISM SPEC CULT: NORMAL
MONOCYTES NFR BLD: 1.08 K/UL
MONOCYTES NFR BLD: 7 % (ref 0–4)
NEUTROPHILS NFR BLD: 77 % (ref 36–66)
NEUTS SEG NFR BLD: 11.75 K/UL
OXYHGB MFR BLD: 91.8 %
PCO2 VENOUS: 82.5 MM HG (ref 38–54)
PH VENOUS: 7.31 (ref 7.32–7.43)
PLATELET # BLD AUTO: 214 K/UL (ref 140–440)
PMV BLD AUTO: 9.2 FL (ref 7.5–11.1)
PO2 VENOUS: 67.6 MM HG (ref 23–48)
POSITIVE BASE EXCESS, VEN: 11 MMOL/L (ref 0–3)
POTASSIUM SERPL-SCNC: 4.5 MMOL/L (ref 3.5–5.1)
RBC # BLD AUTO: 3.6 M/UL (ref 4.2–5.4)
S PNEUM AG SPEC QL: NEGATIVE
SERVICE CMNT-IMP: NORMAL
SODIUM SERPL-SCNC: 139 MMOL/L (ref 136–145)
SPECIMEN DESCRIPTION: NORMAL
SPECIMEN SOURCE: NORMAL
TEXT FOR RESPIRATORY: ABNORMAL
TME LAST DOSE: ABNORMAL H
VANCOMYCIN DOSE: ABNORMAL MG
VANCOMYCIN SERPL-MCNC: 34.4 UG/ML (ref 10–20)
WBC OTHER # BLD: 15.3 K/UL (ref 4–10.5)

## 2024-11-15 PROCEDURE — 6360000002 HC RX W HCPCS: Performed by: NURSE PRACTITIONER

## 2024-11-15 PROCEDURE — 36415 COLL VENOUS BLD VENIPUNCTURE: CPT

## 2024-11-15 PROCEDURE — 6370000000 HC RX 637 (ALT 250 FOR IP): Performed by: NURSE PRACTITIONER

## 2024-11-15 PROCEDURE — 85025 COMPLETE CBC W/AUTO DIFF WBC: CPT

## 2024-11-15 PROCEDURE — 80048 BASIC METABOLIC PNL TOTAL CA: CPT

## 2024-11-15 PROCEDURE — 82805 BLOOD GASES W/O2 SATURATION: CPT

## 2024-11-15 PROCEDURE — 2140000000 HC CCU INTERMEDIATE R&B

## 2024-11-15 PROCEDURE — 80202 ASSAY OF VANCOMYCIN: CPT

## 2024-11-15 PROCEDURE — 2700000000 HC OXYGEN THERAPY PER DAY

## 2024-11-15 PROCEDURE — 94660 CPAP INITIATION&MGMT: CPT

## 2024-11-15 PROCEDURE — 94761 N-INVAS EAR/PLS OXIMETRY MLT: CPT

## 2024-11-15 PROCEDURE — 92610 EVALUATE SWALLOWING FUNCTION: CPT

## 2024-11-15 PROCEDURE — 2580000003 HC RX 258: Performed by: NURSE PRACTITIONER

## 2024-11-15 PROCEDURE — 6370000000 HC RX 637 (ALT 250 FOR IP): Performed by: INTERNAL MEDICINE

## 2024-11-15 PROCEDURE — 94640 AIRWAY INHALATION TREATMENT: CPT

## 2024-11-15 PROCEDURE — 2580000003 HC RX 258: Performed by: INTERNAL MEDICINE

## 2024-11-15 PROCEDURE — 5A09357 ASSISTANCE WITH RESPIRATORY VENTILATION, LESS THAN 24 CONSECUTIVE HOURS, CONTINUOUS POSITIVE AIRWAY PRESSURE: ICD-10-PCS | Performed by: INTERNAL MEDICINE

## 2024-11-15 PROCEDURE — 6360000002 HC RX W HCPCS: Performed by: INTERNAL MEDICINE

## 2024-11-15 PROCEDURE — 6360000002 HC RX W HCPCS: Performed by: HOSPITALIST

## 2024-11-15 RX ADMIN — IPRATROPIUM BROMIDE AND ALBUTEROL SULFATE 1 DOSE: .5; 2.5 SOLUTION RESPIRATORY (INHALATION) at 11:48

## 2024-11-15 RX ADMIN — PREDNISONE 40 MG: 20 TABLET ORAL at 09:00

## 2024-11-15 RX ADMIN — VANCOMYCIN 1500 MG: 1.5 INJECTION, SOLUTION INTRAVENOUS at 15:50

## 2024-11-15 RX ADMIN — BUPRENORPHINE HYDROCHLORIDE AND NALOXONE HYDROCHLORIDE DIHYDRATE 1 TABLET: 8; 2 TABLET SUBLINGUAL at 09:10

## 2024-11-15 RX ADMIN — CEFEPIME 2000 MG: 2 INJECTION, POWDER, FOR SOLUTION INTRAVENOUS at 21:53

## 2024-11-15 RX ADMIN — IPRATROPIUM BROMIDE AND ALBUTEROL SULFATE 1 DOSE: .5; 2.5 SOLUTION RESPIRATORY (INHALATION) at 08:08

## 2024-11-15 RX ADMIN — HYDROXYZINE HYDROCHLORIDE 25 MG: 25 TABLET ORAL at 09:11

## 2024-11-15 RX ADMIN — SALINE NASAL SPRAY 2 SPRAY: 1.5 SOLUTION NASAL at 06:06

## 2024-11-15 RX ADMIN — CEFEPIME 2000 MG: 2 INJECTION, POWDER, FOR SOLUTION INTRAVENOUS at 15:51

## 2024-11-15 RX ADMIN — ASPIRIN 81 MG: 81 TABLET, CHEWABLE ORAL at 09:00

## 2024-11-15 RX ADMIN — IPRATROPIUM BROMIDE AND ALBUTEROL SULFATE 1 DOSE: .5; 2.5 SOLUTION RESPIRATORY (INHALATION) at 16:33

## 2024-11-15 RX ADMIN — CEFEPIME 2000 MG: 2 INJECTION, POWDER, FOR SOLUTION INTRAVENOUS at 06:15

## 2024-11-15 RX ADMIN — TRAZODONE HYDROCHLORIDE 300 MG: 50 TABLET ORAL at 21:53

## 2024-11-15 RX ADMIN — BUDESONIDE AND FORMOTEROL FUMARATE DIHYDRATE 2 PUFF: 160; 4.5 AEROSOL RESPIRATORY (INHALATION) at 21:08

## 2024-11-15 RX ADMIN — ENOXAPARIN SODIUM 30 MG: 100 INJECTION SUBCUTANEOUS at 21:55

## 2024-11-15 RX ADMIN — BUDESONIDE AND FORMOTEROL FUMARATE DIHYDRATE 2 PUFF: 160; 4.5 AEROSOL RESPIRATORY (INHALATION) at 08:15

## 2024-11-15 RX ADMIN — VANCOMYCIN 1500 MG: 1.5 INJECTION, SOLUTION INTRAVENOUS at 02:19

## 2024-11-15 RX ADMIN — BUPRENORPHINE HYDROCHLORIDE AND NALOXONE HYDROCHLORIDE DIHYDRATE 1 TABLET: 8; 2 TABLET SUBLINGUAL at 15:48

## 2024-11-15 RX ADMIN — IPRATROPIUM BROMIDE AND ALBUTEROL SULFATE 1 DOSE: .5; 2.5 SOLUTION RESPIRATORY (INHALATION) at 21:08

## 2024-11-15 RX ADMIN — ACETAMINOPHEN 650 MG: 325 TABLET ORAL at 21:52

## 2024-11-15 RX ADMIN — SODIUM CHLORIDE, PRESERVATIVE FREE 10 ML: 5 INJECTION INTRAVENOUS at 09:00

## 2024-11-15 RX ADMIN — BUPRENORPHINE HYDROCHLORIDE AND NALOXONE HYDROCHLORIDE DIHYDRATE 1 TABLET: 8; 2 TABLET SUBLINGUAL at 21:54

## 2024-11-15 RX ADMIN — ONDANSETRON 4 MG: 2 INJECTION INTRAMUSCULAR; INTRAVENOUS at 21:51

## 2024-11-15 RX ADMIN — SODIUM CHLORIDE: 9 INJECTION, SOLUTION INTRAVENOUS at 06:16

## 2024-11-15 RX ADMIN — ENOXAPARIN SODIUM 30 MG: 100 INJECTION SUBCUTANEOUS at 09:00

## 2024-11-15 NOTE — CARE COORDINATION
Chart reviewed. Pt is from home. Pt has aides. Pt has PCP and insurance to assist with medical expenses. Pt is INDP in the room. No needs identified at this time. Cm to remain available if any specific needs arise.    11/15/24 3363   Service Assessment   Patient Orientation Alert and Oriented   Cognition Alert   History Provided By Medical Record   Primary Caregiver Self   Accompanied By/Relationship N/A   Support Systems Children;Family Members   Patient's Healthcare Decision Maker is: Legal Next of Kin   PCP Verified by CM Yes   Last Visit to PCP   (Unknown)   Prior Functional Level Independent in ADLs/IADLs   Current Functional Level Independent in ADLs/IADLs   Can patient return to prior living arrangement Yes   Ability to make needs known: Good   Family able to assist with home care needs: Yes   Would you like for me to discuss the discharge plan with any other family members/significant others, and if so, who? Yes  (Legal next of kin and family as needed)   Financial Resources Medicare;Medicaid   Community Resources ECF/Home Care   CM/SW Referral Other (see comment)  (Discharge planning assessment)   Condition of Participation: Discharge Planning   The Patient and/or Patient Representative was provided with a Choice of Provider? Patient   The Patient and/Or Patient Representative agree with the Discharge Plan? Yes   Freedom of Choice list was provided with basic dialogue that supports the patient's individualized plan of care/goals, treatment preferences, and shares the quality data associated with the providers?  Yes

## 2024-11-16 LAB
ARTERIAL PATENCY WRIST A: ABNORMAL
BODY TEMPERATURE: 37
COHGB MFR BLD: 0.5 % (ref 0.5–1.5)
GLUCOSE BLD-MCNC: 255 MG/DL (ref 74–99)
GLUCOSE BLD-MCNC: 285 MG/DL (ref 74–99)
HCO3 VENOUS: 35.8 MMOL/L (ref 22–29)
METHEMOGLOBIN: 0.4 % (ref 0.5–1.5)
OXYHGB MFR BLD: 88.6 %
PCO2 VENOUS: 81.5 MM HG (ref 38–54)
PH VENOUS: 7.26 (ref 7.32–7.43)
PO2 VENOUS: 57 MM HG (ref 23–48)
POSITIVE BASE EXCESS, VEN: 5.9 MMOL/L (ref 0–3)
TEXT FOR RESPIRATORY: ABNORMAL

## 2024-11-16 PROCEDURE — 2700000000 HC OXYGEN THERAPY PER DAY

## 2024-11-16 PROCEDURE — 82962 GLUCOSE BLOOD TEST: CPT

## 2024-11-16 PROCEDURE — 6370000000 HC RX 637 (ALT 250 FOR IP): Performed by: INTERNAL MEDICINE

## 2024-11-16 PROCEDURE — 82805 BLOOD GASES W/O2 SATURATION: CPT

## 2024-11-16 PROCEDURE — 2140000000 HC CCU INTERMEDIATE R&B

## 2024-11-16 PROCEDURE — 6360000002 HC RX W HCPCS: Performed by: INTERNAL MEDICINE

## 2024-11-16 PROCEDURE — 2580000003 HC RX 258: Performed by: INTERNAL MEDICINE

## 2024-11-16 PROCEDURE — 94640 AIRWAY INHALATION TREATMENT: CPT

## 2024-11-16 PROCEDURE — 6360000002 HC RX W HCPCS: Performed by: NURSE PRACTITIONER

## 2024-11-16 PROCEDURE — 94660 CPAP INITIATION&MGMT: CPT

## 2024-11-16 PROCEDURE — 2580000003 HC RX 258: Performed by: NURSE PRACTITIONER

## 2024-11-16 PROCEDURE — 94761 N-INVAS EAR/PLS OXIMETRY MLT: CPT

## 2024-11-16 PROCEDURE — 6370000000 HC RX 637 (ALT 250 FOR IP): Performed by: NURSE PRACTITIONER

## 2024-11-16 PROCEDURE — 6360000002 HC RX W HCPCS: Performed by: HOSPITALIST

## 2024-11-16 PROCEDURE — 36415 COLL VENOUS BLD VENIPUNCTURE: CPT

## 2024-11-16 RX ORDER — MAGNESIUM SULFATE IN WATER 40 MG/ML
2000 INJECTION, SOLUTION INTRAVENOUS PRN
Status: DISCONTINUED | OUTPATIENT
Start: 2024-11-16 | End: 2024-11-19 | Stop reason: HOSPADM

## 2024-11-16 RX ORDER — BUPRENORPHINE AND NALOXONE 2; .5 MG/1; MG/1
2 FILM, SOLUBLE BUCCAL; SUBLINGUAL 3 TIMES DAILY
Status: DISCONTINUED | OUTPATIENT
Start: 2024-11-16 | End: 2024-11-18

## 2024-11-16 RX ORDER — GLUCAGON 1 MG/ML
1 KIT INJECTION PRN
Status: DISCONTINUED | OUTPATIENT
Start: 2024-11-16 | End: 2024-11-19 | Stop reason: HOSPADM

## 2024-11-16 RX ORDER — INSULIN GLARGINE 100 [IU]/ML
0.05 INJECTION, SOLUTION SUBCUTANEOUS DAILY
Status: DISCONTINUED | OUTPATIENT
Start: 2024-11-16 | End: 2024-11-19 | Stop reason: HOSPADM

## 2024-11-16 RX ORDER — DEXTROSE MONOHYDRATE 100 MG/ML
INJECTION, SOLUTION INTRAVENOUS CONTINUOUS PRN
Status: DISCONTINUED | OUTPATIENT
Start: 2024-11-16 | End: 2024-11-19 | Stop reason: HOSPADM

## 2024-11-16 RX ORDER — INSULIN LISPRO 100 [IU]/ML
0-4 INJECTION, SOLUTION INTRAVENOUS; SUBCUTANEOUS
Status: DISCONTINUED | OUTPATIENT
Start: 2024-11-16 | End: 2024-11-19 | Stop reason: HOSPADM

## 2024-11-16 RX ADMIN — INSULIN GLARGINE 5 UNITS: 100 INJECTION, SOLUTION SUBCUTANEOUS at 12:56

## 2024-11-16 RX ADMIN — PREDNISONE 40 MG: 20 TABLET ORAL at 10:13

## 2024-11-16 RX ADMIN — ENOXAPARIN SODIUM 30 MG: 100 INJECTION SUBCUTANEOUS at 10:12

## 2024-11-16 RX ADMIN — ONDANSETRON 4 MG: 2 INJECTION INTRAMUSCULAR; INTRAVENOUS at 22:17

## 2024-11-16 RX ADMIN — ACETAMINOPHEN 650 MG: 325 TABLET ORAL at 22:16

## 2024-11-16 RX ADMIN — VANCOMYCIN 1500 MG: 1.5 INJECTION, SOLUTION INTRAVENOUS at 05:17

## 2024-11-16 RX ADMIN — ENOXAPARIN SODIUM 30 MG: 100 INJECTION SUBCUTANEOUS at 22:17

## 2024-11-16 RX ADMIN — SODIUM CHLORIDE: 9 INJECTION, SOLUTION INTRAVENOUS at 10:28

## 2024-11-16 RX ADMIN — ASPIRIN 81 MG: 81 TABLET, CHEWABLE ORAL at 10:13

## 2024-11-16 RX ADMIN — SODIUM CHLORIDE, PRESERVATIVE FREE 10 ML: 5 INJECTION INTRAVENOUS at 10:19

## 2024-11-16 RX ADMIN — TRAZODONE HYDROCHLORIDE 300 MG: 50 TABLET ORAL at 22:17

## 2024-11-16 RX ADMIN — CEFEPIME 2000 MG: 2 INJECTION, POWDER, FOR SOLUTION INTRAVENOUS at 10:31

## 2024-11-16 RX ADMIN — INSULIN LISPRO 2 UNITS: 100 INJECTION, SOLUTION INTRAVENOUS; SUBCUTANEOUS at 17:48

## 2024-11-16 RX ADMIN — BUDESONIDE AND FORMOTEROL FUMARATE DIHYDRATE 2 PUFF: 160; 4.5 AEROSOL RESPIRATORY (INHALATION) at 08:29

## 2024-11-16 RX ADMIN — BUDESONIDE AND FORMOTEROL FUMARATE DIHYDRATE 2 PUFF: 160; 4.5 AEROSOL RESPIRATORY (INHALATION) at 19:46

## 2024-11-16 RX ADMIN — ACETAMINOPHEN 650 MG: 325 TABLET ORAL at 05:14

## 2024-11-16 RX ADMIN — HYDROXYZINE HYDROCHLORIDE 25 MG: 25 TABLET ORAL at 22:17

## 2024-11-16 RX ADMIN — INSULIN LISPRO 2 UNITS: 100 INJECTION, SOLUTION INTRAVENOUS; SUBCUTANEOUS at 22:27

## 2024-11-16 RX ADMIN — IPRATROPIUM BROMIDE AND ALBUTEROL SULFATE 1 DOSE: .5; 2.5 SOLUTION RESPIRATORY (INHALATION) at 12:32

## 2024-11-16 RX ADMIN — IPRATROPIUM BROMIDE AND ALBUTEROL SULFATE 1 DOSE: .5; 2.5 SOLUTION RESPIRATORY (INHALATION) at 19:45

## 2024-11-16 RX ADMIN — IPRATROPIUM BROMIDE AND ALBUTEROL SULFATE 1 DOSE: .5; 2.5 SOLUTION RESPIRATORY (INHALATION) at 08:25

## 2024-11-16 RX ADMIN — CEFEPIME 2000 MG: 2 INJECTION, POWDER, FOR SOLUTION INTRAVENOUS at 17:55

## 2024-11-16 RX ADMIN — BUPRENORPHINE AND NALOXONE 2 FILM: 2; .5 FILM, SOLUBLE BUCCAL; SUBLINGUAL at 12:58

## 2024-11-16 ASSESSMENT — PAIN SCALES - GENERAL
PAINLEVEL_OUTOF10: 3
PAINLEVEL_OUTOF10: 0
PAINLEVEL_OUTOF10: 0
PAINLEVEL_OUTOF10: 8

## 2024-11-16 ASSESSMENT — PAIN DESCRIPTION - DESCRIPTORS: DESCRIPTORS: ACHING

## 2024-11-16 ASSESSMENT — PAIN DESCRIPTION - LOCATION: LOCATION: BACK

## 2024-11-16 NOTE — PLAN OF CARE

## 2024-11-17 ENCOUNTER — APPOINTMENT (OUTPATIENT)
Dept: GENERAL RADIOLOGY | Age: 64
DRG: 871 | End: 2024-11-17
Attending: INTERNAL MEDICINE
Payer: MEDICARE

## 2024-11-17 LAB
ALBUMIN SERPL-MCNC: 3 G/DL (ref 3.4–5)
ALBUMIN/GLOB SERPL: 1.1 {RATIO} (ref 1.1–2.2)
ALP SERPL-CCNC: 62 U/L (ref 40–129)
ALT SERPL-CCNC: 9 U/L (ref 10–40)
ANION GAP SERPL CALCULATED.3IONS-SCNC: 3 MMOL/L (ref 9–17)
ARTERIAL PATENCY WRIST A: ABNORMAL
AST SERPL-CCNC: 14 U/L (ref 15–37)
BASOPHILS # BLD: 0.1 K/UL
BASOPHILS NFR BLD: 1 % (ref 0–1)
BILIRUB SERPL-MCNC: <0.2 MG/DL (ref 0–1)
BODY TEMPERATURE: 37
BUN SERPL-MCNC: 20 MG/DL (ref 7–20)
CALCIUM SERPL-MCNC: 8.8 MG/DL (ref 8.3–10.6)
CHLORIDE SERPL-SCNC: 97 MMOL/L (ref 99–110)
CO2 SERPL-SCNC: 40 MMOL/L (ref 21–32)
COHGB MFR BLD: 0.2 % (ref 0.5–1.5)
CREAT SERPL-MCNC: 0.5 MG/DL (ref 0.6–1.2)
EOSINOPHIL # BLD: 0.2 K/UL
EOSINOPHILS RELATIVE PERCENT: 1 % (ref 0–3)
ERYTHROCYTE [DISTWIDTH] IN BLOOD BY AUTOMATED COUNT: 12.3 % (ref 11.7–14.9)
EST. AVERAGE GLUCOSE BLD GHB EST-MCNC: 158 MG/DL
GFR, ESTIMATED: >90 ML/MIN/1.73M2
GLUCOSE BLD-MCNC: 130 MG/DL (ref 74–99)
GLUCOSE BLD-MCNC: 167 MG/DL (ref 74–99)
GLUCOSE BLD-MCNC: 228 MG/DL (ref 74–99)
GLUCOSE BLD-MCNC: 314 MG/DL (ref 74–99)
GLUCOSE SERPL-MCNC: 155 MG/DL (ref 74–99)
HBA1C MFR BLD: 7.1 % (ref 4.2–6.3)
HCO3 VENOUS: 46.8 MMOL/L (ref 22–29)
HCT VFR BLD AUTO: 33.9 % (ref 37–47)
HGB BLD-MCNC: 10.1 G/DL (ref 12.5–16)
IMM GRANULOCYTES # BLD AUTO: 0.23 K/UL
IMM GRANULOCYTES NFR BLD: 2 %
LYMPHOCYTES NFR BLD: 2.45 K/UL
LYMPHOCYTES RELATIVE PERCENT: 17 % (ref 24–44)
MAGNESIUM SERPL-MCNC: 2 MG/DL (ref 1.8–2.4)
MCH RBC QN AUTO: 27.9 PG (ref 27–31)
MCHC RBC AUTO-ENTMCNC: 29.8 G/DL (ref 32–36)
MCV RBC AUTO: 93.6 FL (ref 78–100)
METHEMOGLOBIN: 0.5 % (ref 0.5–1.5)
MONOCYTES NFR BLD: 1.19 K/UL
MONOCYTES NFR BLD: 9 % (ref 0–4)
NEUTROPHILS NFR BLD: 70 % (ref 36–66)
NEUTS SEG NFR BLD: 9.89 K/UL
OXYHGB MFR BLD: 97.5 %
PCO2 VENOUS: 87.5 MM HG (ref 38–54)
PH VENOUS: 7.35 (ref 7.32–7.43)
PHOSPHATE SERPL-MCNC: 3.1 MG/DL (ref 2.5–4.9)
PLATELET # BLD AUTO: 228 K/UL (ref 140–440)
PMV BLD AUTO: 9.6 FL (ref 7.5–11.1)
PO2 VENOUS: 111.7 MM HG (ref 23–48)
POSITIVE BASE EXCESS, VEN: 17.3 MMOL/L (ref 0–3)
POTASSIUM SERPL-SCNC: 4.9 MMOL/L (ref 3.5–5.1)
PROCALCITONIN SERPL-MCNC: 0.04 NG/ML
PROT SERPL-MCNC: 5.6 G/DL (ref 6.4–8.2)
RBC # BLD AUTO: 3.62 M/UL (ref 4.2–5.4)
SODIUM SERPL-SCNC: 140 MMOL/L (ref 136–145)
TEXT FOR RESPIRATORY: ABNORMAL
WBC OTHER # BLD: 14.1 K/UL (ref 4–10.5)

## 2024-11-17 PROCEDURE — 84100 ASSAY OF PHOSPHORUS: CPT

## 2024-11-17 PROCEDURE — 2580000003 HC RX 258: Performed by: INTERNAL MEDICINE

## 2024-11-17 PROCEDURE — 82962 GLUCOSE BLOOD TEST: CPT

## 2024-11-17 PROCEDURE — 83735 ASSAY OF MAGNESIUM: CPT

## 2024-11-17 PROCEDURE — 2700000000 HC OXYGEN THERAPY PER DAY

## 2024-11-17 PROCEDURE — 2140000000 HC CCU INTERMEDIATE R&B

## 2024-11-17 PROCEDURE — 6360000002 HC RX W HCPCS: Performed by: INTERNAL MEDICINE

## 2024-11-17 PROCEDURE — 94660 CPAP INITIATION&MGMT: CPT

## 2024-11-17 PROCEDURE — 82805 BLOOD GASES W/O2 SATURATION: CPT

## 2024-11-17 PROCEDURE — 6370000000 HC RX 637 (ALT 250 FOR IP): Performed by: INTERNAL MEDICINE

## 2024-11-17 PROCEDURE — 94761 N-INVAS EAR/PLS OXIMETRY MLT: CPT

## 2024-11-17 PROCEDURE — 84145 PROCALCITONIN (PCT): CPT

## 2024-11-17 PROCEDURE — 94640 AIRWAY INHALATION TREATMENT: CPT

## 2024-11-17 PROCEDURE — 2580000003 HC RX 258: Performed by: NURSE PRACTITIONER

## 2024-11-17 PROCEDURE — 36415 COLL VENOUS BLD VENIPUNCTURE: CPT

## 2024-11-17 PROCEDURE — 97165 OT EVAL LOW COMPLEX 30 MIN: CPT

## 2024-11-17 PROCEDURE — 83036 HEMOGLOBIN GLYCOSYLATED A1C: CPT

## 2024-11-17 PROCEDURE — 6360000002 HC RX W HCPCS: Performed by: NURSE PRACTITIONER

## 2024-11-17 PROCEDURE — 6370000000 HC RX 637 (ALT 250 FOR IP): Performed by: NURSE PRACTITIONER

## 2024-11-17 PROCEDURE — 71045 X-RAY EXAM CHEST 1 VIEW: CPT

## 2024-11-17 PROCEDURE — 97535 SELF CARE MNGMENT TRAINING: CPT

## 2024-11-17 PROCEDURE — 80053 COMPREHEN METABOLIC PANEL: CPT

## 2024-11-17 PROCEDURE — 85025 COMPLETE CBC W/AUTO DIFF WBC: CPT

## 2024-11-17 PROCEDURE — 97530 THERAPEUTIC ACTIVITIES: CPT

## 2024-11-17 RX ADMIN — IPRATROPIUM BROMIDE AND ALBUTEROL SULFATE 1 DOSE: .5; 2.5 SOLUTION RESPIRATORY (INHALATION) at 16:51

## 2024-11-17 RX ADMIN — CEFEPIME 2000 MG: 2 INJECTION, POWDER, FOR SOLUTION INTRAVENOUS at 05:52

## 2024-11-17 RX ADMIN — INSULIN GLARGINE 5 UNITS: 100 INJECTION, SOLUTION SUBCUTANEOUS at 08:58

## 2024-11-17 RX ADMIN — HYDROXYZINE HYDROCHLORIDE 25 MG: 25 TABLET ORAL at 17:50

## 2024-11-17 RX ADMIN — PREDNISONE 40 MG: 20 TABLET ORAL at 08:57

## 2024-11-17 RX ADMIN — IPRATROPIUM BROMIDE AND ALBUTEROL SULFATE 1 DOSE: .5; 2.5 SOLUTION RESPIRATORY (INHALATION) at 12:03

## 2024-11-17 RX ADMIN — CEFEPIME 2000 MG: 2 INJECTION, POWDER, FOR SOLUTION INTRAVENOUS at 10:42

## 2024-11-17 RX ADMIN — ASPIRIN 81 MG: 81 TABLET, CHEWABLE ORAL at 08:57

## 2024-11-17 RX ADMIN — BUDESONIDE AND FORMOTEROL FUMARATE DIHYDRATE 2 PUFF: 160; 4.5 AEROSOL RESPIRATORY (INHALATION) at 19:55

## 2024-11-17 RX ADMIN — IPRATROPIUM BROMIDE AND ALBUTEROL SULFATE 1 DOSE: .5; 2.5 SOLUTION RESPIRATORY (INHALATION) at 08:34

## 2024-11-17 RX ADMIN — INSULIN LISPRO 3 UNITS: 100 INJECTION, SOLUTION INTRAVENOUS; SUBCUTANEOUS at 17:10

## 2024-11-17 RX ADMIN — SODIUM CHLORIDE: 9 INJECTION, SOLUTION INTRAVENOUS at 10:41

## 2024-11-17 RX ADMIN — ACETAMINOPHEN 650 MG: 325 TABLET ORAL at 21:34

## 2024-11-17 RX ADMIN — TRAZODONE HYDROCHLORIDE 300 MG: 50 TABLET ORAL at 21:34

## 2024-11-17 RX ADMIN — SODIUM CHLORIDE, PRESERVATIVE FREE 10 ML: 5 INJECTION INTRAVENOUS at 21:35

## 2024-11-17 RX ADMIN — IPRATROPIUM BROMIDE AND ALBUTEROL SULFATE 1 DOSE: .5; 2.5 SOLUTION RESPIRATORY (INHALATION) at 19:55

## 2024-11-17 RX ADMIN — ENOXAPARIN SODIUM 30 MG: 100 INJECTION SUBCUTANEOUS at 21:33

## 2024-11-17 RX ADMIN — SODIUM CHLORIDE: 9 INJECTION, SOLUTION INTRAVENOUS at 18:26

## 2024-11-17 RX ADMIN — CEFEPIME 2000 MG: 2 INJECTION, POWDER, FOR SOLUTION INTRAVENOUS at 18:28

## 2024-11-17 RX ADMIN — BUDESONIDE AND FORMOTEROL FUMARATE DIHYDRATE 2 PUFF: 160; 4.5 AEROSOL RESPIRATORY (INHALATION) at 08:34

## 2024-11-17 RX ADMIN — ENOXAPARIN SODIUM 30 MG: 100 INJECTION SUBCUTANEOUS at 08:58

## 2024-11-17 RX ADMIN — INSULIN LISPRO 1 UNITS: 100 INJECTION, SOLUTION INTRAVENOUS; SUBCUTANEOUS at 12:37

## 2024-11-17 ASSESSMENT — PAIN DESCRIPTION - DESCRIPTORS: DESCRIPTORS: ACHING;DISCOMFORT;THROBBING

## 2024-11-17 ASSESSMENT — PAIN DESCRIPTION - ORIENTATION: ORIENTATION: LEFT

## 2024-11-17 ASSESSMENT — PAIN DESCRIPTION - LOCATION: LOCATION: ARM

## 2024-11-17 NOTE — PLAN OF CARE
Problem: Safety - Adult  Goal: Free from fall injury  11/17/2024 1323 by Eligio Arauz RN  Outcome: Progressing  11/16/2024 2352 by KATHLEEN CASTORENA  Outcome: Progressing     Problem: Chronic Conditions and Co-morbidities  Goal: Patient's chronic conditions and co-morbidity symptoms are monitored and maintained or improved  11/17/2024 1323 by Eligio Arauz RN  Outcome: Progressing  11/16/2024 2352 by KATHLEEN CASTORENA  Outcome: Progressing     Problem: Discharge Planning  Goal: Discharge to home or other facility with appropriate resources  11/17/2024 1323 by Eligio Arauz RN  Outcome: Progressing  11/16/2024 2352 by KATHLEEN CASTORENA  Outcome: Progressing     Problem: Pain  Goal: Verbalizes/displays adequate comfort level or baseline comfort level  11/17/2024 1323 by Eligio Arauz RN  Outcome: Progressing  11/16/2024 2352 by KATHLEEN CASTORENA  Outcome: Progressing     Problem: Skin/Tissue Integrity  Goal: Absence of new skin breakdown  Description: 1.  Monitor for areas of redness and/or skin breakdown  2.  Assess vascular access sites hourly  3.  Every 4-6 hours minimum:  Change oxygen saturation probe site  4.  Every 4-6 hours:  If on nasal continuous positive airway pressure, respiratory therapy assess nares and determine need for appliance change or resting period.  11/17/2024 1323 by Eligio Arauz RN  Outcome: Progressing  11/16/2024 2352 by KATHLEEN CASTORENA  Outcome: Progressing

## 2024-11-17 NOTE — PLAN OF CARE
Problem: Safety - Adult  Goal: Free from fall injury  11/16/2024 2352 by KATHLEEN CASTORENA  Outcome: Progressing  11/16/2024 1424 by Leeann Eid, RN  Outcome: Progressing     Problem: Chronic Conditions and Co-morbidities  Goal: Patient's chronic conditions and co-morbidity symptoms are monitored and maintained or improved  11/16/2024 2352 by KATHLEEN CASTORENA  Outcome: Progressing  11/16/2024 1424 by Leeann Eid RN  Outcome: Progressing     Problem: Discharge Planning  Goal: Discharge to home or other facility with appropriate resources  11/16/2024 2352 by KATHLEEN CASTORENA  Outcome: Progressing  11/16/2024 1424 by Leeann Eid RN  Outcome: Progressing     Problem: Pain  Goal: Verbalizes/displays adequate comfort level or baseline comfort level  11/16/2024 2352 by KATHLEEN CASTORENA  Outcome: Progressing  11/16/2024 1424 by Leeann Eid RN  Outcome: Progressing     Problem: Skin/Tissue Integrity  Goal: Absence of new skin breakdown  Description: 1.  Monitor for areas of redness and/or skin breakdown  2.  Assess vascular access sites hourly  3.  Every 4-6 hours minimum:  Change oxygen saturation probe site  4.  Every 4-6 hours:  If on nasal continuous positive airway pressure, respiratory therapy assess nares and determine need for appliance change or resting period.  11/16/2024 2352 by KATHLEEN CASTORENA  Outcome: Progressing  11/16/2024 1424 by Leeann Eid, RN  Outcome: Progressing

## 2024-11-18 LAB
ALBUMIN SERPL-MCNC: 3.4 G/DL (ref 3.4–5)
ALBUMIN/GLOB SERPL: 1.2 {RATIO} (ref 1.1–2.2)
ALP SERPL-CCNC: 63 U/L (ref 40–129)
ALT SERPL-CCNC: 12 U/L (ref 10–40)
ANION GAP SERPL CALCULATED.3IONS-SCNC: 6 MMOL/L (ref 9–17)
ARTERIAL PATENCY WRIST A: ABNORMAL
AST SERPL-CCNC: 16 U/L (ref 15–37)
BASOPHILS # BLD: 0.08 K/UL
BASOPHILS NFR BLD: 1 % (ref 0–1)
BILIRUB SERPL-MCNC: <0.2 MG/DL (ref 0–1)
BODY TEMPERATURE: 37
BUN SERPL-MCNC: 13 MG/DL (ref 7–20)
CALCIUM SERPL-MCNC: 9.3 MG/DL (ref 8.3–10.6)
CHLORIDE SERPL-SCNC: 95 MMOL/L (ref 99–110)
CO2 SERPL-SCNC: 41 MMOL/L (ref 21–32)
COHGB MFR BLD: 0.3 % (ref 0.5–1.5)
CREAT SERPL-MCNC: 0.4 MG/DL (ref 0.6–1.2)
EOSINOPHIL # BLD: 0.22 K/UL
EOSINOPHILS RELATIVE PERCENT: 2 % (ref 0–3)
ERYTHROCYTE [DISTWIDTH] IN BLOOD BY AUTOMATED COUNT: 12.4 % (ref 11.7–14.9)
GFR, ESTIMATED: >90 ML/MIN/1.73M2
GLUCOSE BLD-MCNC: 116 MG/DL (ref 74–99)
GLUCOSE BLD-MCNC: 148 MG/DL (ref 74–99)
GLUCOSE BLD-MCNC: 188 MG/DL (ref 74–99)
GLUCOSE BLD-MCNC: 272 MG/DL (ref 74–99)
GLUCOSE SERPL-MCNC: 168 MG/DL (ref 74–99)
HCO3 VENOUS: 37.3 MMOL/L (ref 22–29)
HCT VFR BLD AUTO: 36.7 % (ref 37–47)
HGB BLD-MCNC: 11.1 G/DL (ref 12.5–16)
IMM GRANULOCYTES # BLD AUTO: 0.22 K/UL
IMM GRANULOCYTES NFR BLD: 2 %
LYMPHOCYTES NFR BLD: 3.07 K/UL
LYMPHOCYTES RELATIVE PERCENT: 24 % (ref 24–44)
MAGNESIUM SERPL-MCNC: 1.9 MG/DL (ref 1.8–2.4)
MCH RBC QN AUTO: 27.8 PG (ref 27–31)
MCHC RBC AUTO-ENTMCNC: 30.2 G/DL (ref 32–36)
MCV RBC AUTO: 92 FL (ref 78–100)
METHEMOGLOBIN: 0.3 % (ref 0.5–1.5)
MONOCYTES NFR BLD: 1.12 K/UL
MONOCYTES NFR BLD: 9 % (ref 0–4)
NEUTROPHILS NFR BLD: 63 % (ref 36–66)
NEUTS SEG NFR BLD: 8.02 K/UL
OXYHGB MFR BLD: 90.4 %
PCO2 VENOUS: 66.4 MM HG (ref 38–54)
PH VENOUS: 7.37 (ref 7.32–7.43)
PHOSPHATE SERPL-MCNC: 3.6 MG/DL (ref 2.5–4.9)
PLATELET # BLD AUTO: 265 K/UL (ref 140–440)
PMV BLD AUTO: 9.5 FL (ref 7.5–11.1)
PO2 VENOUS: 59.2 MM HG (ref 23–48)
POSITIVE BASE EXCESS, VEN: 9.6 MMOL/L (ref 0–3)
POTASSIUM SERPL-SCNC: 3.9 MMOL/L (ref 3.5–5.1)
PROCALCITONIN SERPL-MCNC: 0.04 NG/ML
PROT SERPL-MCNC: 6.2 G/DL (ref 6.4–8.2)
RBC # BLD AUTO: 3.99 M/UL (ref 4.2–5.4)
SODIUM SERPL-SCNC: 141 MMOL/L (ref 136–145)
TEXT FOR RESPIRATORY: ABNORMAL
WBC OTHER # BLD: 12.7 K/UL (ref 4–10.5)

## 2024-11-18 PROCEDURE — 36415 COLL VENOUS BLD VENIPUNCTURE: CPT

## 2024-11-18 PROCEDURE — 6370000000 HC RX 637 (ALT 250 FOR IP): Performed by: INTERNAL MEDICINE

## 2024-11-18 PROCEDURE — 80053 COMPREHEN METABOLIC PANEL: CPT

## 2024-11-18 PROCEDURE — 2580000003 HC RX 258: Performed by: NURSE PRACTITIONER

## 2024-11-18 PROCEDURE — 84145 PROCALCITONIN (PCT): CPT

## 2024-11-18 PROCEDURE — 6360000002 HC RX W HCPCS: Performed by: INTERNAL MEDICINE

## 2024-11-18 PROCEDURE — 2580000003 HC RX 258: Performed by: INTERNAL MEDICINE

## 2024-11-18 PROCEDURE — 83735 ASSAY OF MAGNESIUM: CPT

## 2024-11-18 PROCEDURE — 82962 GLUCOSE BLOOD TEST: CPT

## 2024-11-18 PROCEDURE — 2140000000 HC CCU INTERMEDIATE R&B

## 2024-11-18 PROCEDURE — 6360000002 HC RX W HCPCS: Performed by: NURSE PRACTITIONER

## 2024-11-18 PROCEDURE — 97162 PT EVAL MOD COMPLEX 30 MIN: CPT

## 2024-11-18 PROCEDURE — 84100 ASSAY OF PHOSPHORUS: CPT

## 2024-11-18 PROCEDURE — 82805 BLOOD GASES W/O2 SATURATION: CPT

## 2024-11-18 PROCEDURE — 85025 COMPLETE CBC W/AUTO DIFF WBC: CPT

## 2024-11-18 PROCEDURE — 94761 N-INVAS EAR/PLS OXIMETRY MLT: CPT

## 2024-11-18 PROCEDURE — 2700000000 HC OXYGEN THERAPY PER DAY

## 2024-11-18 PROCEDURE — 97116 GAIT TRAINING THERAPY: CPT

## 2024-11-18 PROCEDURE — 6370000000 HC RX 637 (ALT 250 FOR IP): Performed by: NURSE PRACTITIONER

## 2024-11-18 PROCEDURE — 97535 SELF CARE MNGMENT TRAINING: CPT

## 2024-11-18 PROCEDURE — 94640 AIRWAY INHALATION TREATMENT: CPT

## 2024-11-18 PROCEDURE — 97530 THERAPEUTIC ACTIVITIES: CPT

## 2024-11-18 RX ORDER — ALBUTEROL SULFATE 90 UG/1
2 INHALANT RESPIRATORY (INHALATION)
Status: DISCONTINUED | OUTPATIENT
Start: 2024-11-18 | End: 2024-11-19

## 2024-11-18 RX ORDER — BUPRENORPHINE AND NALOXONE 2; .5 MG/1; MG/1
2 FILM, SOLUBLE BUCCAL; SUBLINGUAL 3 TIMES DAILY
Status: DISCONTINUED | OUTPATIENT
Start: 2024-11-18 | End: 2024-11-19 | Stop reason: HOSPADM

## 2024-11-18 RX ORDER — ALBUTEROL SULFATE 90 UG/1
2 INHALANT RESPIRATORY (INHALATION) EVERY 4 HOURS PRN
Status: DISCONTINUED | OUTPATIENT
Start: 2024-11-18 | End: 2024-11-19

## 2024-11-18 RX ADMIN — HYDROXYZINE HYDROCHLORIDE 25 MG: 25 TABLET ORAL at 22:13

## 2024-11-18 RX ADMIN — PREDNISONE 40 MG: 20 TABLET ORAL at 08:43

## 2024-11-18 RX ADMIN — ALBUTEROL SULFATE 2 PUFF: 90 AEROSOL, METERED RESPIRATORY (INHALATION) at 21:36

## 2024-11-18 RX ADMIN — BUDESONIDE AND FORMOTEROL FUMARATE DIHYDRATE 2 PUFF: 160; 4.5 AEROSOL RESPIRATORY (INHALATION) at 08:30

## 2024-11-18 RX ADMIN — TRAZODONE HYDROCHLORIDE 300 MG: 50 TABLET ORAL at 22:13

## 2024-11-18 RX ADMIN — BUPRENORPHINE AND NALOXONE 2 FILM: 2; .5 FILM, SOLUBLE BUCCAL; SUBLINGUAL at 13:07

## 2024-11-18 RX ADMIN — SODIUM CHLORIDE: 9 INJECTION, SOLUTION INTRAVENOUS at 18:37

## 2024-11-18 RX ADMIN — POLYETHYLENE GLYCOL (3350) 17 G: 17 POWDER, FOR SOLUTION ORAL at 22:20

## 2024-11-18 RX ADMIN — INSULIN LISPRO 1 UNITS: 100 INJECTION, SOLUTION INTRAVENOUS; SUBCUTANEOUS at 11:51

## 2024-11-18 RX ADMIN — ENOXAPARIN SODIUM 30 MG: 100 INJECTION SUBCUTANEOUS at 08:42

## 2024-11-18 RX ADMIN — CEFEPIME 2000 MG: 2 INJECTION, POWDER, FOR SOLUTION INTRAVENOUS at 10:31

## 2024-11-18 RX ADMIN — BUPRENORPHINE AND NALOXONE 2 FILM: 2; .5 FILM, SOLUBLE BUCCAL; SUBLINGUAL at 22:14

## 2024-11-18 RX ADMIN — INSULIN LISPRO 2 UNITS: 100 INJECTION, SOLUTION INTRAVENOUS; SUBCUTANEOUS at 16:55

## 2024-11-18 RX ADMIN — ASPIRIN 81 MG: 81 TABLET, CHEWABLE ORAL at 08:43

## 2024-11-18 RX ADMIN — CEFEPIME 2000 MG: 2 INJECTION, POWDER, FOR SOLUTION INTRAVENOUS at 18:38

## 2024-11-18 RX ADMIN — INSULIN GLARGINE 5 UNITS: 100 INJECTION, SOLUTION SUBCUTANEOUS at 08:42

## 2024-11-18 RX ADMIN — IPRATROPIUM BROMIDE AND ALBUTEROL SULFATE 1 DOSE: .5; 2.5 SOLUTION RESPIRATORY (INHALATION) at 08:30

## 2024-11-18 RX ADMIN — SODIUM CHLORIDE, PRESERVATIVE FREE 10 ML: 5 INJECTION INTRAVENOUS at 22:24

## 2024-11-18 RX ADMIN — BUDESONIDE AND FORMOTEROL FUMARATE DIHYDRATE 2 PUFF: 160; 4.5 AEROSOL RESPIRATORY (INHALATION) at 21:36

## 2024-11-18 RX ADMIN — BUPRENORPHINE AND NALOXONE 2 FILM: 2; .5 FILM, SOLUBLE BUCCAL; SUBLINGUAL at 08:43

## 2024-11-18 RX ADMIN — ENOXAPARIN SODIUM 30 MG: 100 INJECTION SUBCUTANEOUS at 22:20

## 2024-11-18 RX ADMIN — ACETAMINOPHEN 650 MG: 325 TABLET ORAL at 11:51

## 2024-11-18 RX ADMIN — SODIUM CHLORIDE: 9 INJECTION, SOLUTION INTRAVENOUS at 10:30

## 2024-11-18 RX ADMIN — SODIUM CHLORIDE, PRESERVATIVE FREE 10 ML: 5 INJECTION INTRAVENOUS at 08:43

## 2024-11-18 RX ADMIN — CEFEPIME 2000 MG: 2 INJECTION, POWDER, FOR SOLUTION INTRAVENOUS at 02:47

## 2024-11-18 ASSESSMENT — PAIN DESCRIPTION - LOCATION
LOCATION: BACK
LOCATION: BACK

## 2024-11-18 ASSESSMENT — PAIN SCALES - WONG BAKER: WONGBAKER_NUMERICALRESPONSE: NO HURT

## 2024-11-18 ASSESSMENT — PAIN SCALES - GENERAL
PAINLEVEL_OUTOF10: 4
PAINLEVEL_OUTOF10: 3
PAINLEVEL_OUTOF10: 0

## 2024-11-18 ASSESSMENT — PAIN DESCRIPTION - DESCRIPTORS
DESCRIPTORS: ACHING
DESCRIPTORS: ACHING

## 2024-11-18 ASSESSMENT — PAIN DESCRIPTION - ORIENTATION
ORIENTATION: POSTERIOR
ORIENTATION: POSTERIOR

## 2024-11-18 NOTE — RT PROTOCOL NOTE
RT Inhaler-Nebulizer Bronchodilator Protocol Note    There is a bronchodilator order in the chart from a provider indicating to follow the RT Bronchodilator Protocol and there is an “Initiate RT Inhaler-Nebulizer Bronchodilator Protocol” order as well (see protocol at bottom of note).    CXR Findings:  XR CHEST PORTABLE    Result Date: 11/17/2024  1. Diffuse bronchiectasis consistent with chronic interstitial lung disease. 2. Atelectasis or pneumonia in the left lung base. 3. No significant change. Electronically signed by Jose Coe      The findings from the last RT Protocol Assessment were as follows:   History Pulmonary Disease: Chronic pulmonary disease  Respiratory Pattern: Regular pattern and RR 12-20 bpm  Breath Sounds: Slightly diminished and/or crackles  Cough: Strong, spontaneous, non-productive  Indication for Bronchodilator Therapy: On home bronchodilators (albuterol mdi at home PRN FOR WHEEZES)  Bronchodilator Assessment Score: 4    Aerosolized bronchodilator medication orders have been revised according to the RT Inhaler-Nebulizer Bronchodilator Protocol below.    Respiratory Therapist to perform RT Therapy Protocol Assessment initially then follow the protocol.  Repeat RT Therapy Protocol Assessment PRN for score 0-3 or on second treatment, BID, and PRN for scores above 3.    No Indications - adjust the frequency to every 6 hours PRN wheezing or bronchospasm, if no treatments needed after 48 hours then discontinue using Per Protocol order mode.     If indication present, adjust the RT bronchodilator orders based on the Bronchodilator Assessment Score as indicated below.  Use Inhaler orders unless patient has one or more of the following: on home nebulizer, not able to hold breath for 10 seconds, is not alert and oriented, cannot activate and use MDI correctly, or respiratory rate 25 breaths per minute or more, then use the equivalent nebulizer order(s) with same Frequency and PRN reasons based on

## 2024-11-18 NOTE — CARE COORDINATION
Kalee WILLIAN Aviva requires the assistance of a wheeled walker to successfully complete daily living tasks such as: bathing, toileting, dressing and grooming.  A wheeled walker is necessary due to the patient's unsteady gait, upper body weakness, inability to  an ambulation device, and can ambulate only by pushing a walker instead of a lesser assistive device such as a cane, crutch, or standard walker.     Electronically signed by Theodora Mohr RN on 11/18/2024 at 2:04 PM

## 2024-11-18 NOTE — CARE COORDINATION
Met with pt for f/u visit for d/c planning.  Introduced self, updated wb and explained role of CM.  Pt states that she is still planning to return home alone with aides provided by [facility].  Pt has requested a wheeled walker and does not have a DME store preference. Informed her of the hospital affiliation with [facility] and she is agreeable for referral to be made for walker.  [facility] offered and pt is agreeable.  [facility] list offered. Noted that pt has had [facility] in the past.  She is agreeable to have [facility] for [facility].  D/c plan is home alone with [facility].  Referral made to [place] via confidential VM and to [facility].  Pt will  walker from store when discharged. She denies any other d/c needs.  Notify CM if any new d/c needs arise.  TE

## 2024-11-18 NOTE — CONSULTS
[facility] ACUTE CARE PHYSICAL THERAPY EVALUATION  Kalee Chicas, 1960, [id number]-A, 2024    History  [place]:  The primary encounter diagnosis was COPD exacerbation (HCC). Diagnoses of Septicemia (HCC), Acute on chronic respiratory failure, unspecified whether with hypoxia or hypercapnia, and Pneumonia of left lower lobe due to infectious organism were also pertinent to this visit.  Patient  has a past medical history of ADHD, COPD (chronic obstructive pulmonary disease) (HCC), COVID-19, Drug addiction in remission (HCC), Hep C w/o coma, chronic (HCC), Pacemaker, Sleep apnea, and TIA (transient ischemic attack).  Patient  has a past surgical history that includes  section (); Cholecystectomy (); and Pacemaker insertion ().    Recommendation: Facility for moderate post-acute rehabilitation, anticipate 1-2 hours per day and 5 days per week vs home with HH PT, RW use, and initial 24/ supervision/assist    Durable Medical Equipment: Rolling Walker    Subjective:  Patient states: \"I have that rollator but it's not steady enough\".    Pain:  denies.    Communication with other providers:  RN approval for PT session  Restrictions: general precautions, contact precautions, falls    Home Setup/Prior level of function  Social/Functional History  Lives With: Alone  Type of Home: Apartment  Home Layout: One level  Home Access: Level entry  Bathroom Shower/Tub: Tub/Shower unit  Bathroom Toilet: Standard  Bathroom Equipment: Shower chair, Grab bars in shower  Bathroom Accessibility: Accessible  Home Equipment: Rollator, Oxygen (4-6L O2 at baseline)  Has the patient had two or more falls in the past year or any fall with injury in the past year?: No  Receives Help From: Personal care attendant (Grandson, 7 days a week 30 hours a week + family for bathing/dressing and high level IADLs + driving)  ADL Assistance: Needs assistance  Homemaking Assistance: Needs assistance  Ambulation 
PRN  traZODone (DESYREL) tablet 300 mg, 300 mg, Oral, Nightly  sodium chloride (OCEAN, BABY AYR) 0.65 % nasal spray 2 spray, 2 spray, Nasal, PRN  ceFEPIme (MAXIPIME) 2,000 mg in sodium chloride 0.9 % 50 mL IVPB (mini-bag), 2,000 mg, IntraVENous, Q8H  varenicline (CHANTIX) tablet 1 mg (Patient Supplied), 1 mg, Oral, BID  0.9 % sodium chloride infusion, , IntraVENous, Continuous  budesonide-formoterol (SYMBICORT) 160-4.5 MCG/ACT inhaler 2 puff, 2 puff, Inhalation, BID RT **AND** tiotropium (SPIRIVA RESPIMAT) 2.5 MCG/ACT inhaler 2 puff, 2 puff, Inhalation, Daily RT  vancomycin (VANCOCIN) 1500 mg in 300 mL IVPB, 1,500 mg, IntraVENous, Q12H    No Known Allergies    Social History:    Social History     Socioeconomic History    Marital status:    Tobacco Use    Smoking status: Former     Current packs/day: 0.00     Average packs/day: 1 pack/day for 40.0 years (40.0 ttl pk-yrs)     Types: Cigarettes     Start date: 1976     Quit date: 2016     Years since quittin.2    Smokeless tobacco: Never   Substance and Sexual Activity    Alcohol use: Not Currently     Comment: occasional caffeine     Drug use: Not Currently     Comment: heroine     Social Determinants of Health     Food Insecurity: No Food Insecurity (2024)    Hunger Vital Sign     Worried About Running Out of Food in the Last Year: Never true     Ran Out of Food in the Last Year: Never true   Transportation Needs: No Transportation Needs (2024)    PRAPARE - Transportation     Lack of Transportation (Medical): No     Lack of Transportation (Non-Medical): No   Intimate Partner Violence: Not At Risk (2024)    Received from [facility]    Humiliation, Afraid, Rape, and Kick questionnaire     Fear of Current or Ex-Partner: No     Emotionally Abused: No     Physically Abused: No     Sexually Abused: No   Housing Stability: Low Risk  (2024)    Housing Stability Vital Sign     Unable to Pay for Housing in the Last Year: No

## 2024-11-18 NOTE — PLAN OF CARE
Problem: Safety - Adult  Goal: Free from fall injury  11/18/2024 1041 by Eligio Arauz RN  Outcome: Progressing  11/18/2024 0332 by Marina Leija RN  Outcome: Progressing     Problem: Chronic Conditions and Co-morbidities  Goal: Patient's chronic conditions and co-morbidity symptoms are monitored and maintained or improved  11/18/2024 1041 by Eligio Arauz RN  Outcome: Progressing  11/18/2024 0332 by Marina Leija RN  Outcome: Progressing     Problem: Discharge Planning  Goal: Discharge to home or other facility with appropriate resources  11/18/2024 1041 by Eligio Arauz RN  Outcome: Progressing  11/18/2024 0332 by Marina Leija RN  Outcome: Progressing     Problem: Pain  Goal: Verbalizes/displays adequate comfort level or baseline comfort level  Outcome: Progressing     Problem: Skin/Tissue Integrity  Goal: Absence of new skin breakdown  Description: 1.  Monitor for areas of redness and/or skin breakdown  2.  Assess vascular access sites hourly  3.  Every 4-6 hours minimum:  Change oxygen saturation probe site  4.  Every 4-6 hours:  If on nasal continuous positive airway pressure, respiratory therapy assess nares and determine need for appliance change or resting period.  11/18/2024 1041 by Eligio Arauz RN  Outcome: Progressing  11/18/2024 0332 by Marina Leija RN  Outcome: Progressing

## 2024-11-19 VITALS
TEMPERATURE: 99.1 F | HEIGHT: 64 IN | RESPIRATION RATE: 23 BRPM | WEIGHT: 225 LBS | DIASTOLIC BLOOD PRESSURE: 66 MMHG | HEART RATE: 103 BPM | BODY MASS INDEX: 38.41 KG/M2 | OXYGEN SATURATION: 99 % | SYSTOLIC BLOOD PRESSURE: 141 MMHG

## 2024-11-19 LAB
ALBUMIN SERPL-MCNC: 3 G/DL (ref 3.4–5)
ALBUMIN/GLOB SERPL: 1.2 {RATIO} (ref 1.1–2.2)
ALP SERPL-CCNC: 53 U/L (ref 40–129)
ALT SERPL-CCNC: 19 U/L (ref 10–40)
ANION GAP SERPL CALCULATED.3IONS-SCNC: 5 MMOL/L (ref 9–17)
ARTERIAL PATENCY WRIST A: ABNORMAL
AST SERPL-CCNC: 25 U/L (ref 15–37)
BASOPHILS # BLD: 0.06 K/UL
BASOPHILS NFR BLD: 1 % (ref 0–1)
BILIRUB SERPL-MCNC: <0.2 MG/DL (ref 0–1)
BODY TEMPERATURE: 37
BUN SERPL-MCNC: 14 MG/DL (ref 7–20)
CALCIUM SERPL-MCNC: 9.3 MG/DL (ref 8.3–10.6)
CHLORIDE SERPL-SCNC: 95 MMOL/L (ref 99–110)
CO2 SERPL-SCNC: 38 MMOL/L (ref 21–32)
COHGB MFR BLD: 0 % (ref 0.5–1.5)
CREAT SERPL-MCNC: 0.5 MG/DL (ref 0.6–1.2)
EOSINOPHIL # BLD: 0.38 K/UL
EOSINOPHILS RELATIVE PERCENT: 4 % (ref 0–3)
ERYTHROCYTE [DISTWIDTH] IN BLOOD BY AUTOMATED COUNT: 12.5 % (ref 11.7–14.9)
GFR, ESTIMATED: >90 ML/MIN/1.73M2
GLUCOSE BLD-MCNC: 169 MG/DL (ref 74–99)
GLUCOSE BLD-MCNC: 250 MG/DL (ref 74–99)
GLUCOSE SERPL-MCNC: 155 MG/DL (ref 74–99)
HCO3 VENOUS: 42.5 MMOL/L (ref 22–29)
HCT VFR BLD AUTO: 36.4 % (ref 37–47)
HGB BLD-MCNC: 10.9 G/DL (ref 12.5–16)
IMM GRANULOCYTES # BLD AUTO: 0.13 K/UL
IMM GRANULOCYTES NFR BLD: 1 %
LYMPHOCYTES NFR BLD: 2.82 K/UL
LYMPHOCYTES RELATIVE PERCENT: 27 % (ref 24–44)
MAGNESIUM SERPL-MCNC: 2 MG/DL (ref 1.8–2.4)
MCH RBC QN AUTO: 27.6 PG (ref 27–31)
MCHC RBC AUTO-ENTMCNC: 29.9 G/DL (ref 32–36)
MCV RBC AUTO: 92.2 FL (ref 78–100)
METHEMOGLOBIN: 0.4 % (ref 0.5–1.5)
MICROORGANISM SPEC CULT: NORMAL
MICROORGANISM SPEC CULT: NORMAL
MONOCYTES NFR BLD: 1.06 K/UL
MONOCYTES NFR BLD: 10 % (ref 0–4)
NEUTROPHILS NFR BLD: 58 % (ref 36–66)
NEUTS SEG NFR BLD: 6.11 K/UL
OXYHGB MFR BLD: 90.2 %
PCO2 VENOUS: 74.8 MM HG (ref 38–54)
PH VENOUS: 7.37 (ref 7.32–7.43)
PHOSPHATE SERPL-MCNC: 4.2 MG/DL (ref 2.5–4.9)
PLATELET # BLD AUTO: 231 K/UL (ref 140–440)
PMV BLD AUTO: 9.6 FL (ref 7.5–11.1)
PO2 VENOUS: 60.1 MM HG (ref 23–48)
POSITIVE BASE EXCESS, VEN: 14.1 MMOL/L (ref 0–3)
POTASSIUM SERPL-SCNC: 4.6 MMOL/L (ref 3.5–5.1)
PROT SERPL-MCNC: 5.6 G/DL (ref 6.4–8.2)
RBC # BLD AUTO: 3.95 M/UL (ref 4.2–5.4)
SERVICE CMNT-IMP: NORMAL
SERVICE CMNT-IMP: NORMAL
SODIUM SERPL-SCNC: 138 MMOL/L (ref 136–145)
SPECIMEN DESCRIPTION: NORMAL
SPECIMEN DESCRIPTION: NORMAL
TEXT FOR RESPIRATORY: ABNORMAL
WBC OTHER # BLD: 10.6 K/UL (ref 4–10.5)

## 2024-11-19 PROCEDURE — 6370000000 HC RX 637 (ALT 250 FOR IP): Performed by: NURSE PRACTITIONER

## 2024-11-19 PROCEDURE — 94640 AIRWAY INHALATION TREATMENT: CPT

## 2024-11-19 PROCEDURE — 6370000000 HC RX 637 (ALT 250 FOR IP): Performed by: FAMILY MEDICINE

## 2024-11-19 PROCEDURE — 2500000003 HC RX 250 WO HCPCS: Performed by: STUDENT IN AN ORGANIZED HEALTH CARE EDUCATION/TRAINING PROGRAM

## 2024-11-19 PROCEDURE — 94761 N-INVAS EAR/PLS OXIMETRY MLT: CPT

## 2024-11-19 PROCEDURE — 6360000002 HC RX W HCPCS: Performed by: NURSE PRACTITIONER

## 2024-11-19 PROCEDURE — 83735 ASSAY OF MAGNESIUM: CPT

## 2024-11-19 PROCEDURE — 6370000000 HC RX 637 (ALT 250 FOR IP): Performed by: INTERNAL MEDICINE

## 2024-11-19 PROCEDURE — 2580000003 HC RX 258: Performed by: NURSE PRACTITIONER

## 2024-11-19 PROCEDURE — 82805 BLOOD GASES W/O2 SATURATION: CPT

## 2024-11-19 PROCEDURE — 82947 ASSAY GLUCOSE BLOOD QUANT: CPT

## 2024-11-19 PROCEDURE — 2700000000 HC OXYGEN THERAPY PER DAY

## 2024-11-19 PROCEDURE — 80053 COMPREHEN METABOLIC PANEL: CPT

## 2024-11-19 PROCEDURE — 2580000003 HC RX 258: Performed by: INTERNAL MEDICINE

## 2024-11-19 PROCEDURE — 6360000002 HC RX W HCPCS: Performed by: INTERNAL MEDICINE

## 2024-11-19 PROCEDURE — 85025 COMPLETE CBC W/AUTO DIFF WBC: CPT

## 2024-11-19 PROCEDURE — 84100 ASSAY OF PHOSPHORUS: CPT

## 2024-11-19 PROCEDURE — 94660 CPAP INITIATION&MGMT: CPT

## 2024-11-19 PROCEDURE — 6370000000 HC RX 637 (ALT 250 FOR IP): Performed by: STUDENT IN AN ORGANIZED HEALTH CARE EDUCATION/TRAINING PROGRAM

## 2024-11-19 PROCEDURE — 36415 COLL VENOUS BLD VENIPUNCTURE: CPT

## 2024-11-19 RX ORDER — PANTOPRAZOLE SODIUM 40 MG/1
40 TABLET, DELAYED RELEASE ORAL
Qty: 30 TABLET | Refills: 3 | Status: SHIPPED | OUTPATIENT
Start: 2024-11-20

## 2024-11-19 RX ORDER — IPRATROPIUM BROMIDE AND ALBUTEROL SULFATE 2.5; .5 MG/3ML; MG/3ML
1 SOLUTION RESPIRATORY (INHALATION)
Status: DISCONTINUED | OUTPATIENT
Start: 2024-11-19 | End: 2024-11-19 | Stop reason: HOSPADM

## 2024-11-19 RX ORDER — CALCIUM CARBONATE 500 MG/1
1000 TABLET, CHEWABLE ORAL 3 TIMES DAILY PRN
Status: DISCONTINUED | OUTPATIENT
Start: 2024-11-19 | End: 2024-11-19 | Stop reason: HOSPADM

## 2024-11-19 RX ORDER — POLYETHYLENE GLYCOL 3350 17 G/17G
17 POWDER, FOR SOLUTION ORAL DAILY PRN
Qty: 527 G | Refills: 1 | Status: SHIPPED | OUTPATIENT
Start: 2024-11-19 | End: 2025-01-20

## 2024-11-19 RX ORDER — TRAZODONE HYDROCHLORIDE 150 MG/1
150 TABLET ORAL NIGHTLY
Qty: 30 TABLET | Refills: 0 | Status: SHIPPED
Start: 2024-11-19 | End: 2024-12-19

## 2024-11-19 RX ORDER — PANTOPRAZOLE SODIUM 40 MG/1
40 TABLET, DELAYED RELEASE ORAL
Status: DISCONTINUED | OUTPATIENT
Start: 2024-11-19 | End: 2024-11-19 | Stop reason: HOSPADM

## 2024-11-19 RX ADMIN — ENOXAPARIN SODIUM 30 MG: 100 INJECTION SUBCUTANEOUS at 08:51

## 2024-11-19 RX ADMIN — CEFEPIME 2000 MG: 2 INJECTION, POWDER, FOR SOLUTION INTRAVENOUS at 02:38

## 2024-11-19 RX ADMIN — PREDNISONE 40 MG: 20 TABLET ORAL at 08:51

## 2024-11-19 RX ADMIN — INSULIN GLARGINE 5 UNITS: 100 INJECTION, SOLUTION SUBCUTANEOUS at 08:51

## 2024-11-19 RX ADMIN — ALBUTEROL SULFATE 2 PUFF: 90 AEROSOL, METERED RESPIRATORY (INHALATION) at 08:09

## 2024-11-19 RX ADMIN — BUPRENORPHINE AND NALOXONE 2 FILM: 2; .5 FILM, SOLUBLE BUCCAL; SUBLINGUAL at 08:51

## 2024-11-19 RX ADMIN — BUDESONIDE AND FORMOTEROL FUMARATE DIHYDRATE 2 PUFF: 160; 4.5 AEROSOL RESPIRATORY (INHALATION) at 08:55

## 2024-11-19 RX ADMIN — CALCIUM CARBONATE 1000 MG: 500 TABLET, CHEWABLE ORAL at 02:34

## 2024-11-19 RX ADMIN — SODIUM CHLORIDE, PRESERVATIVE FREE 10 ML: 5 INJECTION INTRAVENOUS at 08:52

## 2024-11-19 RX ADMIN — ASPIRIN 81 MG: 81 TABLET, CHEWABLE ORAL at 08:51

## 2024-11-19 RX ADMIN — MICONAZOLE NITRATE: 2 POWDER TOPICAL at 13:09

## 2024-11-19 RX ADMIN — PANTOPRAZOLE SODIUM 40 MG: 40 TABLET, DELAYED RELEASE ORAL at 06:31

## 2024-11-19 RX ADMIN — INSULIN LISPRO 2 UNITS: 100 INJECTION, SOLUTION INTRAVENOUS; SUBCUTANEOUS at 13:08

## 2024-11-19 RX ADMIN — IPRATROPIUM BROMIDE AND ALBUTEROL SULFATE 1 DOSE: .5; 2.5 SOLUTION RESPIRATORY (INHALATION) at 09:17

## 2024-11-19 ASSESSMENT — PAIN SCALES - GENERAL
PAINLEVEL_OUTOF10: 0
PAINLEVEL_OUTOF10: 8

## 2024-11-19 ASSESSMENT — PAIN - FUNCTIONAL ASSESSMENT: PAIN_FUNCTIONAL_ASSESSMENT: ACTIVITIES ARE NOT PREVENTED

## 2024-11-19 ASSESSMENT — PAIN SCALES - WONG BAKER
WONGBAKER_NUMERICALRESPONSE: NO HURT

## 2024-11-19 ASSESSMENT — PAIN DESCRIPTION - PAIN TYPE: TYPE: CHRONIC PAIN

## 2024-11-19 ASSESSMENT — PAIN DESCRIPTION - DESCRIPTORS: DESCRIPTORS: ACHING

## 2024-11-19 ASSESSMENT — PAIN DESCRIPTION - FREQUENCY: FREQUENCY: CONTINUOUS

## 2024-11-19 ASSESSMENT — PAIN DESCRIPTION - ONSET: ONSET: ON-GOING

## 2024-11-19 ASSESSMENT — PAIN DESCRIPTION - ORIENTATION: ORIENTATION: LEFT

## 2024-11-19 ASSESSMENT — PAIN DESCRIPTION - LOCATION: LOCATION: ARM

## 2024-11-19 NOTE — DISCHARGE INSTRUCTIONS
- please schedule an appointment to see your PCP  - please schedule an appointment to see your pulmonologist   - please take medications as prescribed

## 2024-11-19 NOTE — PROGRESS NOTES
V2.0    [facility] [place] Progress Note      Name:  Kalee Chicas /Age/Sex: 1960  (64 y.o. female)   MRN & CSN:  [id number] & [id number] Encounter Date/Time: 2024 8:41 PM EST   Location:  [address] [place] [address]- PCP: Chu Caban MD     Attending:Ashu Mcmahan MD       Hospital Day: 4    Assessment and Recommendations   Kalee Chicas is a 64 y.o. female       -Held buprenorphine today due to hypercapnia-May need to restart tomorrow  -More awake today      -Called this morning with low blood pressures  -Also called and asked about the Suboxone subsequently I requested a venous blood gas and cut the Suboxone dose in half-her normal doses 8/2 mg 3 times a day and today she only had 1 dose-4 mg at about 1 PM  -Venous blood gas showed hypercapnia and acidosis which was worse than the day prior  -After I reviewed the venous blood gas I completely held Suboxone  -BiPAP then applied during the day and all of these interventions must have helped as by the evening she felt better and no longer wanted to wear the BiPAP    November 15  -Still hypercapnic today  -Continue BiPAP  -Not ready for discharge      Acute on chronic respiratory failure with hypoxia and hypercapnic 2/2 PNA and COPD  # Sepsis: criteria met- Leukocytosis, tachycardic, elevated lactate, and PNA.   # Chronic hypoxia on 6 liters  CXR: LLP  -Received Cefepime, Vanco, and azithromycin IV in the ED  -Continue Cefepime and Vancomycin IV empirically  -Hydration  -Check Pro-Frank, ESR, CRP  -Trend lactate  -Respiratory panel negative  -Blood culture and UA  pending        Opioid use disorder  -OARRS reviewed. Last script on 24 for 30 days.   -Continue Suboxone 8-2 mg TID     ADHD  -OARRS reviewed. Last script on 24 for 30 days.   -Continue Adderal     KING  -Continue BPAP qhs.     Tobacco cessation  -Continue Chantix        Major depressive disorder/Anxiety  -Trazodone and hydroxyzine     Class II obesity. Body mass 
    V2.0    [facility] [place]      Name:  Kalee Chicas /Age/Sex: 1960  (64 y.o. female)   MRN & CSN:  [id number] & [id number] Encounter Date/Time: 2024 8:41 PM EST   Location:  [address] [place] [address]- PCP: Chu Caban MD     Attending:Ashu Mcmahan MD       Hospital Day: 3    Assessment and Recommendations   Kalee Chicas is a 64 y.o. female       -Called this morning with low blood pressures  -Also called and asked about the Suboxone subsequently I requested a venous blood gas and cut the Suboxone dose in half-her normal doses 8/2 mg 3 times a day and today she only had 1 dose-4 mg at about 1 PM  -Venous blood gas showed hypercapnia and acidosis which was worse than the day prior  -After I reviewed the venous blood gas I completely held Suboxone  -BiPAP then applied during the day and all of these interventions must have helped as by the evening she felt better and no longer wanted to wear the BiPAP    November 15  -Still hypercapnic today  -Continue BiPAP  -Not ready for discharge      Acute on chronic respiratory failure with hypoxia and hypercapnic 2/2 PNA and COPD  # Sepsis: criteria met- Leukocytosis, tachycardic, elevated lactate, and PNA.   # Chronic hypoxia on 6 liters  CXR: LLP  -Received Cefepime, Vanco, and azithromycin IV in the ED  -Continue Cefepime and Vancomycin IV empirically  -Hydration  -Check Pro-Frank, ESR, CRP  -Trend lactate  -Respiratory panel negative  -Blood culture and UA  pending        Opioid use disorder  -OARRS reviewed. Last script on 24 for 30 days.   -Continue Suboxone 8-2 mg TID     ADHD  -OARRS reviewed. Last script on 24 for 30 days.   -Continue Adderal     KING  -Continue BPAP qhs.     Tobacco cessation  -Continue Chantix        Major depressive disorder/Anxiety  -Trazodone and hydroxyzine     Class II obesity. Body mass index is 38.62  Complicating assessment and treatment. Placing patient at risk for multiple co-morbidities as 
    V2.0    [facility] [place]      Name:  Kalee Chicas /Age/Sex: 1960  (64 y.o. female)   MRN & CSN:  [id number] & [id number] Encounter Date/Time: 11/15/2024 8:41 PM EST   Location:  [address] [place] [address] PCP: Chu Caban MD     Attending:Ashu Mcmahan MD       Hospital Day: 2    Assessment and Recommendations   Kalee Chicas is a 64 y.o. female     November 15  -Still hypercapnic today  -Continue BiPAP  -Not ready for discharge      Acute on chronic respiratory failure with hypoxia and hypercapnic 2/2 PNA and COPD  # Sepsis: criteria met- Leukocytosis, tachycardic, elevated lactate, and PNA.   # Chronic hypoxia on 6 liters  CXR: LLP  -Received Cefepime, Vanco, and azithromycin IV in the ED  -Continue Cefepime and Vancomycin IV empirically  -Hydration  -Check Pro-Frank, ESR, CRP  -Trend lactate  -Respiratory panel negative  -Blood culture and UA  pending        Opioid use disorder  -OARRS reviewed. Last script on 24 for 30 days.   -Continue Suboxone 8-2 mg TID     ADHD  -OARRS reviewed. Last script on 24 for 30 days.   -Continue Adderal     KING  -Continue BPAP qhs.     Tobacco cessation  -Continue Chantix        Major depressive disorder/Anxiety  -Trazodone and hydroxyzine     Class II obesity. Body mass index is 38.62  Complicating assessment and treatment. Placing patient at risk for multiple co-morbidities as well as early death and contributing to the patient's presentation.   -  on weight loss and appropriate lifestyle modifications      Diet ADULT DIET; Regular   DVT Prophylaxis [x] Lovenox, []  Heparin, [] SCDs, [] Ambulation,  [] Eliquis, [] Xarelto  [] Coumadin   Code Status Full Code   Disposition From: Home  Expected Disposition: Home  Estimated Date of Discharge: In about 2 days  Patient requires continued admission due to respiratory issues   Surrogate Decision Maker/ KYLIE Maldonado, her child, fulfills this role per chart     Subjective:     No dyspnea at 
  Physician Progress Note      PATIENT:               DIEUDONNE MCGUIRE  CSN #:                  [id number]  :                       1960  ADMIT DATE:       2024 4:40 AM  DISCH DATE:  RESPONDING  PROVIDER #:        Ashu LINARES MD          QUERY TEXT:    Pt admitted with Acute on chronic respiratory failure.  Pt noted to have   Pneumonia. If possible, please document in the progress notes and discharge   summary if you are evaluating and/or treating any of the following:    Note: CAP and HCAP indicate where the pneumonia was acquired, not a specific   type.    The medical record reflects the following:  Risk Factors: COPD home oxygen therapy Pneumonia  Clinical Indicators: CXR \"CXR Left lower lobe pneumonia.\" Per Speech   evaluation \"Impression Dysphagia Diagnosis: Concerns for esophageal stage   dysphagia; Suspected needs further assessment\"  Treatment: IV Vanco and Cefepime and cultures  Options provided:  -- Treating as Gram negative pneumonia  -- Treating as Gram positive pneumonia  -- Aspiration pneumonia  -- Treating as Gram negative pneumonia and or gram positive pneumonia  -- Other - I will add my own diagnosis  -- Disagree - Not applicable / Not valid  -- Disagree - Clinically unable to determine / Unknown  -- Refer to Clinical Documentation Reviewer    PROVIDER RESPONSE TEXT:    This patient's pneumonia is being treated as gram negative pneumonia.    Query created by: Winnie Wray on 2024 11:06 AM      Electronically signed by:  Ashu LINARES MD 2024 12:11 PM          
 Latest Reference Range & Units 11/15/24 03:00   pH, Navdeep 7.320 - 7.430  7.306 (L)   pCO2, Navdeep 38 - 54 mm Hg 82.5 (H)   pO2, Navdeep 23 - 48 mm Hg 67.6 (H)   Bicarbonate, Venous 22 - 29 mmol/L 40.2 (H)   Positive Base Excess, Navdeep 0 - 3 mmol/L 11.0 (H)   (L): Data is abnormally low  (H): Data is abnormally high      RN notified  
4 Eyes Skin Assessment     NAME:  Kalee Chicas  YOB: 1960  MEDICAL RECORD NUMBER:  [id number]    The patient is being assessed for  Admission    I agree that at least one RN has performed a thorough Head to Toe Skin Assessment on the patient. ALL assessment sites listed below have been assessed.      Areas assessed by both nurses:    Head, Face, Ears, Shoulders, Back, Chest, Arms, Elbows, Hands, Sacrum. Buttock, Coccyx, Ischium, Legs. Feet and Heels, and Under Medical Devices         Does the Patient have a Wound? No noted wound(s)       Clif Prevention initiated by RN: No  Wound Care Orders initiated by RN: No    Pressure Injury (Stage 3,4, Unstageable, DTI, NWPT, and Complex wounds) if present, place Wound referral order by RN under : No    New Ostomies, if present place, Ostomy referral order under : No     Nurse 1 eSignature: Electronically signed by Patti Knight RN on 11/14/24 at 4:52 PM EST    **SHARE this note so that the co-signing nurse can place an eSignature**    Nurse 2 eSignature: Electronically signed by Glenny Guerrero RN on 11/14/24 at 4:54 PM EST    
Bipap settings adjusted due to vbg results, RN notified. New settings 20/5/R24/0.40  
Occupational Therapy      Occupational Therapy Treatment Note    Name: Kalee Chicas MRN: [id number] :   1960   Date:  2024   Admission Date: 2024 Room:  [address] [place] [address]-     Primary Problem:  Acute on chronic respiratory failure    Restrictions/Precautions:          General Precautions, Fall Risk, Contact Precautions    Communication with other providers: Nursing handoff    Subjective:  Patient states:  \"I need to use the bathroom\"  Pain:   No    Objective:    Observation: Pt received supine in bed, agreeable to therapy   Objective Measures:  4.5L of 02; 02 saturation decreased ~88% during functional mobility; increased WFL supine in bed w/ instruction in pursed lip breathing    Treatment, including education:  Therapeutic Activity Training:   Therapeutic activity training was instructed today.  Cues were given for safety, sequence, UE/LE placement, awareness, and balance.    Activities performed today included bed mobility training, sup-sit, sit-stand, functional mobility, stand to sit, sit to supine, pursed lip breathing    Self Care Training:   Cues were given for safety, sequence, UE/LE placement, visual cues, and balance.    Activities performed today included grooming, LB bathing/dressing, toileting, toilet transfer    Supine to sit Supervision, sitting EOB Supervision, STS from EOB up to RW SBA, functional mobility to/from bathroom w/ RW SBA, LB dressing threading underwear/pants in stand SBA, toilet transfer SBA to/from standard commode, LB bathing washing balbina area/buttocks SBA. Pt return to EOB from RW to EOB SBA, sit to supine Supervision.    Pt supine in bed, bed alarm on, call light at side, nursing notified       Assessment / Impression:    Patient's tolerance of treatment: Well  Adverse Reaction: None  Significant change in status and impact: Improved from initial evaluation  Barriers to improvement: None noted      Plan for Next Session:    Continue OT POC    Time in:  1358  Time 
Occupational Therapy  [facility] ACUTE CARE OCCUPATIONAL THERAPY EVALUATION  Kalee Chicas, 1960, 3108/3108-A, 2024    History  [place]:  The primary encounter diagnosis was COPD exacerbation ([facility]). Diagnoses of Septicemia (HCC), Acute on chronic respiratory failure, unspecified whether with hypoxia or hypercapnia, and Pneumonia of left lower lobe due to infectious organism were also pertinent to this visit.  Patient  has a past medical history of ADHD, COPD (chronic obstructive pulmonary disease) (HCC), COVID-19, Drug addiction in remission (HCC), Hep C w/o coma, chronic (HCC), Pacemaker, Sleep apnea, and TIA (transient ischemic attack).  Patient  has a past surgical history that includes  section (); Cholecystectomy (); and Pacemaker insertion ().    Subjective:  Patient states:  \"I need to get out of bed and get my strength back\".    Pain:  No.    Communication with other providers:  Handoff to RN  Restrictions: Contact Precautions, General Precautions, Fall Risk    Home Setup/Prior level of function  Social/Functional History  Lives With: Alone  Type of Home: Apartment  Home Layout: One level  Bathroom Shower/Tub: Tub/Shower unit  Bathroom Toilet: Standard  Bathroom Equipment: Shower chair  Bathroom Accessibility: Accessible  Home Equipment: Walker - 4-Wheeled  Has the patient had two or more falls in the past year or any fall with injury in the past year?: No  Receives Help From: Personal care attendant (7 days a week 30 horus a week + family for bathing/dressing and high level IADLs + driving)  ADL Assistance: Needs assistance  Homemaking Assistance: Needs assistance  Ambulation Assistance: Independent  Transfer Assistance: Independent  Active : No  Patient's  Info: FAMILY PROVIDES TRANSPORTATION    Examination of body systems (includes body structures/functions, activity/participation limitations):  Observation:  Supine in bed upon arrival, agreeable to 
Outpatient Pharmacy Progress Note for Meds-to-Beds    Total number of Prescriptions Filled: 2    Additional Documentation:  Medication(s) were delivered to the patient's room prior to discharge      Thank you for letting us serve your patients.  [address] [address]    Phone: [phone]    Fax: [phone]        
PHARMACY VANCOMYCIN MONITORING SERVICE  Pharmacy consulted by Dr. Herberth Santos MD for monitoring and adjustment.    Indication for treatment: Vancomycin indication: CAP with risk factors  Goal trough: Trough Goal: 15-20 mcg/mL  AUC/IVAN: 400-600    Risk Factors for MRSA Identified:   Documented isolation of MRSA within 1 year , Hospitalization within the past 90 days, Received IV antibiotics within the past 90 days    Pertinent Laboratory Values:   Temp Readings from Last 3 Encounters:   11/14/24 98 °F (36.7 °C) (Oral)   10/07/24 98.4 °F (36.9 °C)   06/30/24 98 °F (36.7 °C) (Oral)     Recent Labs     11/14/24  0500 11/15/24  0248   WBC 15.9* 15.3*     Recent Labs     11/14/24  0500 11/15/24  0248   BUN 11 17   CREATININE 0.5* 0.5*     Estimated Creatinine Clearance: 136 mL/min (A) (based on SCr of 0.5 mg/dL (L)).    Intake/Output Summary (Last 24 hours) at 11/15/2024 0524  Last data filed at 11/15/2024 0146  Gross per 24 hour   Intake 455 ml   Output 500 ml   Net -45 ml     Last Encounter Weight:  Wt Readings from Last 3 Encounters:   11/14/24 102.1 kg (225 lb)   10/07/24 90.7 kg (200 lb)   08/12/24 88.5 kg (195 lb)       Pertinent Cultures:   Date    Source    Results  11/14   RESP Panel   None detected  11/14   Blood    No growth 1 day  11/14   Urine    In process  11/14   MRSA by PCR  Not detected  11/14   Respiratory   To be collected  11/14   Legionella   Collected  11/14   Strep Pneumo   Collected    Assessment:  HPI: 64 y.o. female with history of COPD (on 6 L at bedtime), KING, opioid use disorder, and obesity presented to ED with respirory distress.  SCr = 0.5, BUN = 17, and limited I/O data  Day(s) of therapy: 1 of 7  Vancomycin concentration:   11/16 - To be collected    Plan:  Vancomycin 1,750 mg IV given in ED; Follow with Vancomycin 1,500 mg IV Q 12 Hours  Steady state prediction: AUC: 539, Trough: 11.3  Pharmacy will continue to monitor patient and adjust therapy as indicated    VANCOMYCIN CONCENTRATION 
Patient is refusing to wear bipap at this time. Patient educated importance of bipap. Still refusing. Provider notified. Patient on 4L o2 at this time.     Mavis Kelley RN   
Patient is resting inn bed with eyes closed. Breathing even and unlabored. Will continue to monitor.  
Pt on and off bipap all night, pt is alert and aware of critical vbg results and that she needs to wear bipap and says she understands  
Pt placed on bipap 15/5 50%  
RENAL DOSE ADJUSTMENT MADE PER P/T PROTOCOL    PREVIOUS ORDER:  Cefepime 2g IV q12hr     INDICATION:  CAP     Estimated Creatinine Clearance: 136 mL/min (A) (based on SCr of 0.5 mg/dL (L)).  Recent Labs     11/14/24  0500   BUN 11   CREATININE 0.5*          Cefepime - Extended Infusion (4-hour infusion) - Preferred Dosing Strategy    All indications - Loading dose of 2000 milligrams x 1 over 30 minutes or via IV push. Maintenance dose  should begin at the next regularly scheduled dosing interval based on indication/renal function.    Renal Function (CrCl mL/min)  >= 60 30 - 59   11 - 29 <= 10, HD   PD CRRT   Intra-abdominal infections, Skin and soft tissue infections, Urinary tract infections  []2000mg q12h []2000mg q24h []1000mg q24h []500mg q24h []1000mg q24h []2000mg q24h   Bacteremia, CNS infections, Cystic fibrosis, Diabetic foot infections, Endocarditis, Febrile neutropenia, Healthcareassociated infections, Osteomyelitis/joint infections,   Pneumonia, Sepsis, BMI > 40*  [x]2000mg q8h []2000mg q12h []1000mg q12h []1000mg q24h []1000mg q24h []2000mg q12h          NEW RENALLY ADJUSTED ORDER:  Cefepime 2 g IV load x once + cefepime 2g IV q8h     Enzo Aleman RPH  11/14/2024 1:40 PM      
Starting BIPAP after reviewing VBG  
patient in bed, no new pain voiced, will continue with pan of care.   
pulmonary      SUBJECTIVE:  doing better     OBJECTIVE    VITALS:  BP (!) 139/58   Pulse 86   Temp 97.1 °F (36.2 °C) (Axillary)   Resp 18   Ht 1.626 m (5' 4\")   Wt 102.1 kg (225 lb)   SpO2 98%   BMI 38.62 kg/m²   HEAD AND FACE EXAM:  No throat injection, no active exudate,no thrush  NECK EXAM;No JVD, no masses, symmetrical  CHEST EXAM; Expansion equal and symmetrical, no masses  LUNG EXAM; Good breath sounds bilaterally. There are expiratory wheezes both lungs, there are crackles at both lung bases  CARDIOVASCULAR EXAM: Positive S1 and S2, no S3 or S4, no clicks ,no murmurs  RIGHT AND LEFT LOWER EXTRIMITY EXAM: No edema, no swelling, no inflamation  CNS EXAM: Alert and oriented X3          LABS   Lab Results   Component Value Date    WBC 12.7 (H) 11/18/2024    HGB 11.1 (L) 11/18/2024    HCT 36.7 (L) 11/18/2024    MCV 92.0 11/18/2024     11/18/2024     Lab Results   Component Value Date    CREATININE 0.4 (L) 11/18/2024    BUN 13 11/18/2024     11/18/2024    K 3.9 11/18/2024    CL 95 (L) 11/18/2024    CO2 41 (H) 11/18/2024     Lab Results   Component Value Date    INR 1.0 10/05/2024    PROTIME 13.3 10/05/2024          Lab Results   Component Value Date/Time    PHOS 3.6 11/18/2024 03:19 AM    PHOS 3.1 11/17/2024 01:56 AM    PHOS 3.2 12/26/2023 06:00 AM      No results for input(s): \"PH\", \"PO2ART\", \"LKY2RYW\", \"HCO3\", \"BEART\", \"O2SAT\" in the last 72 hours.      Wt Readings from Last 3 Encounters:   11/14/24 102.1 kg (225 lb)   10/07/24 90.7 kg (200 lb)   08/12/24 88.5 kg (195 lb)               ASSESMENT  Ac on ch resp failure  Ac copd  pneumonia        PLAN  Cpm  Cont o2  Pap at night      11/18/2024  Lior Villeda MD, M.D.  
pulmonary      SUBJECTIVE:  seen early am and sleeping     OBJECTIVE    VITALS:  BP (!) 110/58   Pulse 69   Temp 98.6 °F (37 °C) (Oral)   Resp 16   Ht 1.626 m (5' 4\")   Wt 102.1 kg (225 lb)   SpO2 95%   BMI 38.62 kg/m²   HEAD AND FACE EXAM:  No throat injection, no active exudate,no thrush  NECK EXAM;No JVD, no masses, symmetrical  CHEST EXAM; Expansion equal and symmetrical, no masses  LUNG EXAM; Good breath sounds bilaterally. There are expiratory wheezes both lungs, there are crackles at both lung bases  CARDIOVASCULAR EXAM: Positive S1 and S2, no S3 or S4, no clicks ,no murmurs  RIGHT AND LEFT LOWER EXTRIMITY EXAM: No edema, no swelling, no inflamation  CNS EXAM: Alert and oriented X3          LABS   Lab Results   Component Value Date    WBC 14.1 (H) 11/17/2024    HGB 10.1 (L) 11/17/2024    HCT 33.9 (L) 11/17/2024    MCV 93.6 11/17/2024     11/17/2024     Lab Results   Component Value Date    CREATININE 0.5 (L) 11/17/2024    BUN 20 11/17/2024     11/17/2024    K 4.9 11/17/2024    CL 97 (L) 11/17/2024    CO2 40 (H) 11/17/2024     Lab Results   Component Value Date    INR 1.0 10/05/2024    PROTIME 13.3 10/05/2024          Lab Results   Component Value Date/Time    PHOS 3.1 11/17/2024 01:56 AM    PHOS 3.2 12/26/2023 06:00 AM    PHOS 4.0 03/10/2023 12:06 AM      No results for input(s): \"PH\", \"PO2ART\", \"GMS4QVC\", \"HCO3\", \"BEART\", \"O2SAT\" in the last 72 hours.      Wt Readings from Last 3 Encounters:   11/14/24 102.1 kg (225 lb)   10/07/24 90.7 kg (200 lb)   08/12/24 88.5 kg (195 lb)               ASSESMENT  Ac on ch resp failure  Ac copd  Lll pneumonia  rashad        PLAN  Antibx  Bd rx  Steroids  Pap at night    11/17/2024  Lior Villeda MD, M.D.  
pulmonary      SUBJECTIVE:  seen early am and sleeping     OBJECTIVE    VITALS:  BP (!) 113/59   Pulse 74   Temp 98 °F (36.7 °C) (Oral)   Resp 11   Ht 1.626 m (5' 4\")   Wt 102.1 kg (225 lb)   SpO2 99%   BMI 38.62 kg/m²   HEAD AND FACE EXAM:  No throat injection, no active exudate,no thrush  NECK EXAM;No JVD, no masses, symmetrical  CHEST EXAM; Expansion equal and symmetrical, no masses  LUNG EXAM; Good breath sounds bilaterally. There are expiratory wheezes both lungs, there are crackles at both lung bases  CARDIOVASCULAR EXAM: Positive S1 and S2, no S3 or S4, no clicks ,no murmurs  RIGHT AND LEFT LOWER EXTRIMITY EXAM: No edema, no swelling, no inflamation            LABS   Lab Results   Component Value Date    WBC 10.6 (H) 11/19/2024    HGB 10.9 (L) 11/19/2024    HCT 36.4 (L) 11/19/2024    MCV 92.2 11/19/2024     11/19/2024     Lab Results   Component Value Date    CREATININE 0.5 (L) 11/19/2024    BUN 14 11/19/2024     11/19/2024    K 4.6 11/19/2024    CL 95 (L) 11/19/2024    CO2 38 (H) 11/19/2024     Lab Results   Component Value Date    INR 1.0 10/05/2024    PROTIME 13.3 10/05/2024          Lab Results   Component Value Date/Time    PHOS 4.2 11/19/2024 06:24 AM    PHOS 3.6 11/18/2024 03:19 AM    PHOS 3.1 11/17/2024 01:56 AM      No results for input(s): \"PH\", \"PO2ART\", \"PXR9ZHB\", \"HCO3\", \"BEART\", \"O2SAT\" in the last 72 hours.      Wt Readings from Last 3 Encounters:   11/14/24 102.1 kg (225 lb)   10/07/24 90.7 kg (200 lb)   08/12/24 88.5 kg (195 lb)               ASSESMENT  Ac on ch resp failure  Ac copd  pneumonia        PLAN  Bd rx  O2 adm  If dc then fu as outpt    11/19/2024  Lior Villeda MD, M.D.  
pulmonary      SUBJECTIVE:  she feels better     OBJECTIVE    VITALS:  BP (!) 96/40   Pulse 87   Temp 98 °F (36.7 °C) (Oral)   Resp 11   Ht 1.626 m (5' 4\")   Wt 102.1 kg (225 lb)   SpO2 98%   BMI 38.62 kg/m²   HEAD AND FACE EXAM:  No throat injection, no active exudate,no thrush  NECK EXAM;No JVD, no masses, symmetrical  CHEST EXAM; Expansion equal and symmetrical, no masses  LUNG EXAM; Good breath sounds bilaterally. There are expiratory wheezes both lungs, there are crackles at both lung bases  CARDIOVASCULAR EXAM: Positive S1 and S2, no S3 or S4, no clicks ,no murmurs  RIGHT AND LEFT LOWER EXTRIMITY EXAM: No edema, no swelling, no inflamation  CNS EXAM: Alert and oriented X3          LABS   Lab Results   Component Value Date    WBC 15.3 (H) 11/15/2024    HGB 10.1 (L) 11/15/2024    HCT 32.9 (L) 11/15/2024    MCV 91.4 11/15/2024     11/15/2024     Lab Results   Component Value Date    CREATININE 0.5 (L) 11/15/2024    BUN 17 11/15/2024     11/15/2024    K 4.5 11/15/2024    CL 98 (L) 11/15/2024    CO2 37 (H) 11/15/2024     Lab Results   Component Value Date    INR 1.0 10/05/2024    PROTIME 13.3 10/05/2024          Lab Results   Component Value Date/Time    PHOS 3.2 12/26/2023 06:00 AM    PHOS 4.0 03/10/2023 12:06 AM    PHOS 2.6 05/24/2021 03:50 AM      No results for input(s): \"PH\", \"PO2ART\", \"CLC9MQX\", \"HCO3\", \"BEART\", \"O2SAT\" in the last 72 hours.      Wt Readings from Last 3 Encounters:   11/14/24 102.1 kg (225 lb)   10/07/24 90.7 kg (200 lb)   08/12/24 88.5 kg (195 lb)               ASSESMENT  Ac on ch hypoxic and hyeprcapneic resp fail;ure  Ac copd  Lll pneumonia  rashad        PLAN  O2 adm  Cont bipap  Bd rx  steroids    11/16/2024  Lior Villeda MD, MFinnD.  
vancomycin 1500 mg IV q12h  Regimen predicting ssUAC of 571 mg/L*hr (actualBW)  Pharmacy will continue to monitor patient and adjust therapy as indicated    VANCOMYCIN CONCENTRATION SCHEDULED FOR 11/15/2024 @ 0000    Thank you for the consult.  Melina Lam RPH  11/15/2024 9:43 AM    
131/65   Pulse:  73     Resp: 18 16 16    Temp:  98.1 °F (36.7 °C)  97.8 °F (36.6 °C)   TempSrc:  Oral  Oral   SpO2:       Weight:       Height:             Physical Exam:      Physical Exam  Constitutional:       Appearance: She is not diaphoretic.   Pulmonary:      Effort: No respiratory distress.      Breath sounds: No stridor.   Skin:     Coloration: Skin is not jaundiced.           Medications:   Medications:    buprenorphine-naloxone  2 Film SubLINGual TID    albuterol sulfate HFA  2 puff Inhalation BID RT    insulin glargine  0.05 Units/kg SubCUTAneous Daily    insulin lispro  0-4 Units SubCUTAneous 4x Daily AC & HS    sodium chloride flush  5-40 mL IntraVENous 2 times per day    enoxaparin  30 mg SubCUTAneous BID    predniSONE  40 mg Oral Daily    amphetamine-dextroamphetamine  30 mg Oral QAM    aspirin  81 mg Oral Daily    traZODone  300 mg Oral Nightly    cefepime  2,000 mg IntraVENous Q8H    varenicline  1 mg Oral BID    budesonide-formoterol  2 puff Inhalation BID RT      Infusions:    dextrose      sodium chloride 25 mL/hr at 11/18/24 1030     PRN Meds: albuterol sulfate HFA, 2 puff, Q4H PRN  magnesium sulfate, 2,000 mg, PRN  glucose, 4 tablet, PRN  dextrose bolus, 125 mL, PRN   Or  dextrose bolus, 250 mL, PRN  glucagon (rDNA), 1 mg, PRN  dextrose, , Continuous PRN  sodium chloride flush, 5-40 mL, PRN  sodium chloride, , PRN  ondansetron, 4 mg, Q8H PRN   Or  ondansetron, 4 mg, Q6H PRN  polyethylene glycol, 17 g, Daily PRN  acetaminophen, 650 mg, Q6H PRN   Or  acetaminophen, 650 mg, Q6H PRN  hydrOXYzine HCl, 25 mg, BID PRN  sodium chloride, 2 spray, PRN        Labs and Imaging   XR CHEST PORTABLE    Result Date: 11/14/2024  AP UPRIGHT PORTABLE CHEST TIME: 0525 hours CLINICAL INFORMATION: Shortness of breath. Concern for infectious process. COMPARISON: 10/5/2024 chest. Lungs: Left lower lobe airspace consolidation. Pleura: No thickening, effusion, or pneumothorax. Heart: Normal in size and configuration. 
Time  SLP Individual Minutes  Time In: 1537  Time Out: 1551  Minutes: 14          Asiya Gold MA, CF-SLP 11/15/2024

## 2024-11-19 NOTE — PLAN OF CARE
Problem: Safety - Adult  Goal: Free from fall injury  11/19/2024 1041 by Mohamud Moore RN  Outcome: Progressing  11/19/2024 0313 by Mayo Engle RN  Outcome: Progressing     Problem: Chronic Conditions and Co-morbidities  Goal: Patient's chronic conditions and co-morbidity symptoms are monitored and maintained or improved  11/19/2024 1041 by Mohamud Moore RN  Outcome: Progressing  11/19/2024 0313 by Mayo Engle RN  Outcome: Progressing     Problem: Discharge Planning  Goal: Discharge to home or other facility with appropriate resources  11/19/2024 1041 by Mohamud Moore RN  Outcome: Progressing  11/19/2024 0313 by Mayo Engle RN  Outcome: Progressing     Problem: Pain  Goal: Verbalizes/displays adequate comfort level or baseline comfort level  11/19/2024 1041 by Mohamud Moore RN  Outcome: Progressing  11/19/2024 0313 by Mayo Engle RN  Outcome: Progressing     Problem: Skin/Tissue Integrity  Goal: Absence of new skin breakdown  Description: 1.  Monitor for areas of redness and/or skin breakdown  2.  Assess vascular access sites hourly  3.  Every 4-6 hours minimum:  Change oxygen saturation probe site  4.  Every 4-6 hours:  If on nasal continuous positive airway pressure, respiratory therapy assess nares and determine need for appliance change or resting period.  11/19/2024 1041 by Mohamud Moore RN  Outcome: Progressing  11/19/2024 0313 by Mayo Engle RN  Outcome: Progressing

## 2024-11-21 NOTE — DISCHARGE SUMMARY
Discharge Summary    Name:  Kalee Chicas /Age/Sex: 1960  (64 y.o. female)   MRN & CSN:  [id number] & [id number] Admission Date/Time: 2024  4:40 AM  Discharge Date/Time: 2024  2:38 PM   Attending:  No att. providers found Discharging Physician: Cornell Olvera MD     Hospital Course:   Kalee Chicas is a 64 y.o.  female  who presents with Acute on chronic respiratory failure with hypoxia and hypercapnia    HPI  \"Kalee Chicas is a 64 y.o. female with pmh of COPD, KING, opioid use disorder, ADHD, tobacco dependency, chronic pain, and obesity who presents with respiratory distress.  Patient has a history of COPD and is chronically on 6 L at baseline.  She also states she does use BiPAP at bedtime.  Patient states she has been progressively getting worse and becoming more short of breath at home.  She tried the BiPAP with oxygen without any improvement.  She was put on nonrebreather with EMS.  She was admitted last month for similar symptoms and was diagnosed with COPD exacerbation and pneumonia.  she denies fever, chills, chest pain, abdominal pain, and/V, or urinary symptoms.In the ED she was found to be toxic with hypercapnic.  Imaging concerning for pneumonia.  Labs concerning for sepsis.  Will admit for IV antibiotic.     \"       The following problems have been addressed during this hospitalization.    Acute on chronic respiratory failure with hypoxia and hypercapnic 2/2 PNA and COPD  Sepsis: criteria met- Leukocytosis, tachycardic, elevated lactate, and PNA.    Chronic hypoxia on 6 liters  CXR: [facility].   Respiratory panel negative  Blood culture and UA  negative  Completed cefepime regemin. Vanc d/rodolfo as MRSA is negative    concern pt is retaining CO2 and becoming letharegic on current suboxaone dose; held for few days resumed at lower dose. Hospitalist Dr. Mcmahan reached to pt's PCP regarding the dose and advised him that pt may need to have her subaxone dose decreased; will defer to PCP/pain

## 2024-12-25 ENCOUNTER — HOSPITAL ENCOUNTER (INPATIENT)
Age: 64
LOS: 4 days | Discharge: HOME | DRG: 871 | End: 2024-12-29
Attending: STUDENT IN AN ORGANIZED HEALTH CARE EDUCATION/TRAINING PROGRAM | Admitting: STUDENT IN AN ORGANIZED HEALTH CARE EDUCATION/TRAINING PROGRAM
Payer: MEDICARE

## 2024-12-25 PROBLEM — A41.9 SEVERE SEPSIS (HCC): Status: ACTIVE | Noted: 2024-12-25

## 2024-12-25 PROBLEM — R65.20 SEVERE SEPSIS (HCC): Status: ACTIVE | Noted: 2024-12-25

## 2024-12-25 LAB
ALBUMIN SERPL-MCNC: 3.3 G/DL (ref 3.4–5)
ALBUMIN/GLOB SERPL: 1.1 {RATIO} (ref 1.1–2.2)
ALP SERPL-CCNC: 67 U/L (ref 40–129)
ALT SERPL-CCNC: 11 U/L (ref 10–40)
ANION GAP SERPL CALCULATED.3IONS-SCNC: 6 MMOL/L (ref 9–17)
ARTERIAL PATENCY WRIST A: NO
AST SERPL-CCNC: 20 U/L (ref 15–37)
BASOPHILS # BLD: 0.01 K/UL
BASOPHILS NFR BLD: 0 % (ref 0–1)
BILIRUB SERPL-MCNC: <0.2 MG/DL (ref 0–1)
BILIRUB UR QL STRIP: NEGATIVE
BODY TEMPERATURE: 37
BUN SERPL-MCNC: 13 MG/DL (ref 7–20)
CALCIUM SERPL-MCNC: 8.9 MG/DL (ref 8.3–10.6)
CHLORIDE SERPL-SCNC: 95 MMOL/L (ref 99–110)
CLARITY UR: CLEAR
CO2 SERPL-SCNC: 38 MMOL/L (ref 21–32)
COHGB MFR BLD: 0.3 % (ref 0.5–1.5)
COLOR UR: YELLOW
COMMENT: ABNORMAL
CREAT SERPL-MCNC: 0.5 MG/DL (ref 0.6–1.2)
EOSINOPHIL # BLD: 0 K/UL
EOSINOPHILS RELATIVE PERCENT: 0 % (ref 0–3)
ERYTHROCYTE [DISTWIDTH] IN BLOOD BY AUTOMATED COUNT: 12 % (ref 11.7–14.9)
GFR, ESTIMATED: >90 ML/MIN/1.73M2
GLUCOSE SERPL-MCNC: 176 MG/DL (ref 74–99)
GLUCOSE UR STRIP-MCNC: 100 MG/DL
HCO3 VENOUS: 36.9 MMOL/L (ref 22–29)
HCT VFR BLD AUTO: 35.7 % (ref 37–47)
HGB BLD-MCNC: 10.8 G/DL (ref 12.5–16)
HGB UR QL STRIP.AUTO: NEGATIVE
IMM GRANULOCYTES # BLD AUTO: 0.07 K/UL
IMM GRANULOCYTES NFR BLD: 1 %
INFLUENZA A BY PCR: NOT DETECTED
INFLUENZA B BY PCR: NOT DETECTED
INR PPP: 1.1
KETONES UR STRIP-MCNC: NEGATIVE MG/DL
LACTATE BLDV-SCNC: 0.8 MMOL/L (ref 0.4–2)
LEUKOCYTE ESTERASE UR QL STRIP: NEGATIVE
LYMPHOCYTES NFR BLD: 1.11 K/UL
LYMPHOCYTES RELATIVE PERCENT: 14 % (ref 24–44)
MCH RBC QN AUTO: 27.6 PG (ref 27–31)
MCHC RBC AUTO-ENTMCNC: 30.3 G/DL (ref 32–36)
MCV RBC AUTO: 91.3 FL (ref 78–100)
METHEMOGLOBIN: 0.5 % (ref 0.5–1.5)
MONOCYTES NFR BLD: 0.16 K/UL
MONOCYTES NFR BLD: 2 % (ref 0–4)
MRSA, DNA, NASAL: NOT DETECTED
NEUTROPHILS NFR BLD: 83 % (ref 36–66)
NEUTS SEG NFR BLD: 6.72 K/UL
NITRITE UR QL STRIP: NEGATIVE
OXYHGB MFR BLD: 96.3 %
PCO2 VENOUS: 77.9 MM HG (ref 38–54)
PH UR STRIP: 6 [PH] (ref 5–8)
PH VENOUS: 7.29 (ref 7.32–7.43)
PLATELET # BLD AUTO: 228 K/UL (ref 140–440)
PMV BLD AUTO: 9.8 FL (ref 7.5–11.1)
PO2 VENOUS: 98.8 MM HG (ref 23–48)
POSITIVE BASE EXCESS, VEN: 7.8 MMOL/L (ref 0–3)
POTASSIUM SERPL-SCNC: 4.5 MMOL/L (ref 3.5–5.1)
PROT SERPL-MCNC: 6.3 G/DL (ref 6.4–8.2)
PROT UR STRIP-MCNC: NEGATIVE MG/DL
PROTHROMBIN TIME: 14.1 SEC (ref 11.7–14.5)
RBC # BLD AUTO: 3.91 M/UL (ref 4.2–5.4)
SARS-COV-2 RNA RESP QL NAA+PROBE: NOT DETECTED
SODIUM SERPL-SCNC: 138 MMOL/L (ref 136–145)
SP GR UR STRIP: 1.01 (ref 1–1.03)
SPECIMEN DESCRIPTION: NORMAL
SPECIMEN DESCRIPTION: NORMAL
TEXT FOR RESPIRATORY: ABNORMAL
UROBILINOGEN UR STRIP-ACNC: 0.2 EU/DL (ref 0–1)
WBC OTHER # BLD: 8.1 K/UL (ref 4–10.5)

## 2024-12-25 PROCEDURE — 87635 SARS-COV-2 COVID-19 AMP PRB: CPT

## 2024-12-25 PROCEDURE — 87502 INFLUENZA DNA AMP PROBE: CPT

## 2024-12-25 PROCEDURE — 6370000000 HC RX 637 (ALT 250 FOR IP): Performed by: STUDENT IN AN ORGANIZED HEALTH CARE EDUCATION/TRAINING PROGRAM

## 2024-12-25 PROCEDURE — 2500000003 HC RX 250 WO HCPCS: Performed by: STUDENT IN AN ORGANIZED HEALTH CARE EDUCATION/TRAINING PROGRAM

## 2024-12-25 PROCEDURE — 2060000000 HC ICU INTERMEDIATE R&B

## 2024-12-25 PROCEDURE — 94660 CPAP INITIATION&MGMT: CPT

## 2024-12-25 PROCEDURE — 81003 URINALYSIS AUTO W/O SCOPE: CPT

## 2024-12-25 PROCEDURE — 5A0945A ASSISTANCE WITH RESPIRATORY VENTILATION, 24-96 CONSECUTIVE HOURS, HIGH NASAL FLOW/VELOCITY: ICD-10-PCS | Performed by: INTERNAL MEDICINE

## 2024-12-25 PROCEDURE — 83605 ASSAY OF LACTIC ACID: CPT

## 2024-12-25 PROCEDURE — 94761 N-INVAS EAR/PLS OXIMETRY MLT: CPT

## 2024-12-25 PROCEDURE — 36415 COLL VENOUS BLD VENIPUNCTURE: CPT

## 2024-12-25 PROCEDURE — 6360000002 HC RX W HCPCS: Performed by: STUDENT IN AN ORGANIZED HEALTH CARE EDUCATION/TRAINING PROGRAM

## 2024-12-25 PROCEDURE — 2500000003 HC RX 250 WO HCPCS: Performed by: NURSE PRACTITIONER

## 2024-12-25 PROCEDURE — 85025 COMPLETE CBC W/AUTO DIFF WBC: CPT

## 2024-12-25 PROCEDURE — 94640 AIRWAY INHALATION TREATMENT: CPT

## 2024-12-25 PROCEDURE — 87641 MR-STAPH DNA AMP PROBE: CPT

## 2024-12-25 PROCEDURE — 85610 PROTHROMBIN TIME: CPT

## 2024-12-25 PROCEDURE — 82805 BLOOD GASES W/O2 SATURATION: CPT

## 2024-12-25 PROCEDURE — 2700000000 HC OXYGEN THERAPY PER DAY

## 2024-12-25 PROCEDURE — 5A09457 ASSISTANCE WITH RESPIRATORY VENTILATION, 24-96 CONSECUTIVE HOURS, CONTINUOUS POSITIVE AIRWAY PRESSURE: ICD-10-PCS | Performed by: INTERNAL MEDICINE

## 2024-12-25 PROCEDURE — 2580000003 HC RX 258: Performed by: STUDENT IN AN ORGANIZED HEALTH CARE EDUCATION/TRAINING PROGRAM

## 2024-12-25 PROCEDURE — 80053 COMPREHEN METABOLIC PANEL: CPT

## 2024-12-25 RX ORDER — PANTOPRAZOLE SODIUM 40 MG/1
40 TABLET, DELAYED RELEASE ORAL
Status: DISCONTINUED | OUTPATIENT
Start: 2024-12-25 | End: 2024-12-29 | Stop reason: HOSPADM

## 2024-12-25 RX ORDER — ACETAMINOPHEN 325 MG/1
650 TABLET ORAL EVERY 6 HOURS PRN
Status: DISCONTINUED | OUTPATIENT
Start: 2024-12-25 | End: 2024-12-29 | Stop reason: HOSPADM

## 2024-12-25 RX ORDER — BUDESONIDE AND FORMOTEROL FUMARATE DIHYDRATE 160; 4.5 UG/1; UG/1
2 AEROSOL RESPIRATORY (INHALATION)
Status: DISCONTINUED | OUTPATIENT
Start: 2024-12-25 | End: 2024-12-29 | Stop reason: HOSPADM

## 2024-12-25 RX ORDER — POLYETHYLENE GLYCOL 3350 17 G/17G
17 POWDER, FOR SOLUTION ORAL DAILY PRN
Status: DISCONTINUED | OUTPATIENT
Start: 2024-12-25 | End: 2024-12-29 | Stop reason: HOSPADM

## 2024-12-25 RX ORDER — ENOXAPARIN SODIUM 100 MG/ML
40 INJECTION SUBCUTANEOUS DAILY
Status: DISCONTINUED | OUTPATIENT
Start: 2024-12-25 | End: 2024-12-29 | Stop reason: HOSPADM

## 2024-12-25 RX ORDER — ONDANSETRON 4 MG/1
4 TABLET, ORALLY DISINTEGRATING ORAL EVERY 8 HOURS PRN
Status: DISCONTINUED | OUTPATIENT
Start: 2024-12-25 | End: 2024-12-29 | Stop reason: HOSPADM

## 2024-12-25 RX ORDER — SODIUM CHLORIDE 9 MG/ML
INJECTION, SOLUTION INTRAVENOUS PRN
Status: DISCONTINUED | OUTPATIENT
Start: 2024-12-25 | End: 2024-12-29 | Stop reason: HOSPADM

## 2024-12-25 RX ORDER — MAGNESIUM SULFATE IN WATER 40 MG/ML
2000 INJECTION, SOLUTION INTRAVENOUS PRN
Status: DISCONTINUED | OUTPATIENT
Start: 2024-12-25 | End: 2024-12-29 | Stop reason: HOSPADM

## 2024-12-25 RX ORDER — SODIUM CHLORIDE 0.9 % (FLUSH) 0.9 %
5-40 SYRINGE (ML) INJECTION PRN
Status: DISCONTINUED | OUTPATIENT
Start: 2024-12-25 | End: 2024-12-29 | Stop reason: HOSPADM

## 2024-12-25 RX ORDER — ACETAMINOPHEN 650 MG/1
650 SUPPOSITORY RECTAL EVERY 6 HOURS PRN
Status: DISCONTINUED | OUTPATIENT
Start: 2024-12-25 | End: 2024-12-29 | Stop reason: HOSPADM

## 2024-12-25 RX ORDER — HYDROXYZINE HYDROCHLORIDE 25 MG/1
25 TABLET, FILM COATED ORAL 2 TIMES DAILY PRN
Status: DISCONTINUED | OUTPATIENT
Start: 2024-12-25 | End: 2024-12-29 | Stop reason: HOSPADM

## 2024-12-25 RX ORDER — FLUTICASONE PROPIONATE 50 MCG
2 SPRAY, SUSPENSION (ML) NASAL DAILY PRN
Status: DISCONTINUED | OUTPATIENT
Start: 2024-12-25 | End: 2024-12-29 | Stop reason: HOSPADM

## 2024-12-25 RX ORDER — POTASSIUM CHLORIDE 1500 MG/1
40 TABLET, EXTENDED RELEASE ORAL PRN
Status: DISCONTINUED | OUTPATIENT
Start: 2024-12-25 | End: 2024-12-29 | Stop reason: HOSPADM

## 2024-12-25 RX ORDER — SODIUM CHLORIDE 0.9 % (FLUSH) 0.9 %
5-40 SYRINGE (ML) INJECTION EVERY 12 HOURS SCHEDULED
Status: DISCONTINUED | OUTPATIENT
Start: 2024-12-25 | End: 2024-12-29 | Stop reason: HOSPADM

## 2024-12-25 RX ORDER — IPRATROPIUM BROMIDE AND ALBUTEROL SULFATE 2.5; .5 MG/3ML; MG/3ML
1 SOLUTION RESPIRATORY (INHALATION)
Status: DISCONTINUED | OUTPATIENT
Start: 2024-12-25 | End: 2024-12-29 | Stop reason: HOSPADM

## 2024-12-25 RX ORDER — POTASSIUM CHLORIDE 7.45 MG/ML
10 INJECTION INTRAVENOUS PRN
Status: DISCONTINUED | OUTPATIENT
Start: 2024-12-25 | End: 2024-12-29 | Stop reason: HOSPADM

## 2024-12-25 RX ORDER — ONDANSETRON 2 MG/ML
4 INJECTION INTRAMUSCULAR; INTRAVENOUS EVERY 6 HOURS PRN
Status: DISCONTINUED | OUTPATIENT
Start: 2024-12-25 | End: 2024-12-29 | Stop reason: HOSPADM

## 2024-12-25 RX ORDER — DEXTROAMPHETAMINE SACCHARATE, AMPHETAMINE ASPARTATE MONOHYDRATE, DEXTROAMPHETAMINE SULFATE AND AMPHETAMINE SULFATE 7.5; 7.5; 7.5; 7.5 MG/1; MG/1; MG/1; MG/1
30 CAPSULE, EXTENDED RELEASE ORAL EVERY MORNING
Status: DISCONTINUED | OUTPATIENT
Start: 2024-12-25 | End: 2024-12-29 | Stop reason: HOSPADM

## 2024-12-25 RX ORDER — PREDNISONE 10 MG/1
40 TABLET ORAL DAILY
Status: COMPLETED | OUTPATIENT
Start: 2024-12-25 | End: 2024-12-29

## 2024-12-25 RX ORDER — BUPRENORPHINE AND NALOXONE 8; 2 MG/1; MG/1
1 FILM, SOLUBLE BUCCAL; SUBLINGUAL 3 TIMES DAILY
Status: DISCONTINUED | OUTPATIENT
Start: 2024-12-25 | End: 2024-12-29 | Stop reason: HOSPADM

## 2024-12-25 RX ORDER — ALBUTEROL SULFATE 90 UG/1
2 INHALANT RESPIRATORY (INHALATION) EVERY 6 HOURS PRN
Status: DISCONTINUED | OUTPATIENT
Start: 2024-12-25 | End: 2024-12-29 | Stop reason: HOSPADM

## 2024-12-25 RX ORDER — ASPIRIN 81 MG/1
81 TABLET, CHEWABLE ORAL DAILY
Status: DISCONTINUED | OUTPATIENT
Start: 2024-12-25 | End: 2024-12-29 | Stop reason: HOSPADM

## 2024-12-25 RX ORDER — POLYETHYLENE GLYCOL 3350 17 G/17G
17 POWDER, FOR SOLUTION ORAL DAILY PRN
Status: DISCONTINUED | OUTPATIENT
Start: 2024-12-25 | End: 2024-12-25 | Stop reason: SDUPTHER

## 2024-12-25 RX ADMIN — POLYETHYLENE GLYCOL (3350) 17 G: 17 POWDER, FOR SOLUTION ORAL at 09:10

## 2024-12-25 RX ADMIN — PIPERACILLIN AND TAZOBACTAM 3375 MG: 3; .375 INJECTION, POWDER, LYOPHILIZED, FOR SOLUTION INTRAVENOUS at 03:41

## 2024-12-25 RX ADMIN — BUDESONIDE AND FORMOTEROL FUMARATE DIHYDRATE 2 PUFF: 160; 4.5 AEROSOL RESPIRATORY (INHALATION) at 20:13

## 2024-12-25 RX ADMIN — BUPRENORPHINE AND NALOXONE 1 FILM: 8; 2 FILM BUCCAL; SUBLINGUAL at 09:06

## 2024-12-25 RX ADMIN — VANCOMYCIN HYDROCHLORIDE 1250 MG: 1.25 INJECTION, POWDER, LYOPHILIZED, FOR SOLUTION INTRAVENOUS at 16:58

## 2024-12-25 RX ADMIN — PANTOPRAZOLE SODIUM 40 MG: 40 TABLET, DELAYED RELEASE ORAL at 05:40

## 2024-12-25 RX ADMIN — PIPERACILLIN AND TAZOBACTAM 3375 MG: 3; .375 INJECTION, POWDER, LYOPHILIZED, FOR SOLUTION INTRAVENOUS at 18:43

## 2024-12-25 RX ADMIN — SALINE NASAL SPRAY 2 SPRAY: 1.5 SOLUTION NASAL at 11:08

## 2024-12-25 RX ADMIN — IPRATROPIUM BROMIDE AND ALBUTEROL SULFATE 1 DOSE: .5; 3 SOLUTION RESPIRATORY (INHALATION) at 20:12

## 2024-12-25 RX ADMIN — BUPRENORPHINE AND NALOXONE 1 FILM: 8; 2 FILM BUCCAL; SUBLINGUAL at 22:21

## 2024-12-25 RX ADMIN — ENOXAPARIN SODIUM 40 MG: 100 INJECTION SUBCUTANEOUS at 09:07

## 2024-12-25 RX ADMIN — MICONAZOLE NITRATE: 2 POWDER TOPICAL at 22:07

## 2024-12-25 RX ADMIN — IPRATROPIUM BROMIDE AND ALBUTEROL SULFATE 1 DOSE: .5; 3 SOLUTION RESPIRATORY (INHALATION) at 12:05

## 2024-12-25 RX ADMIN — PREDNISONE 40 MG: 10 TABLET ORAL at 09:07

## 2024-12-25 RX ADMIN — VANCOMYCIN HYDROCHLORIDE 2250 MG: 1.25 INJECTION, POWDER, LYOPHILIZED, FOR SOLUTION INTRAVENOUS at 03:42

## 2024-12-25 RX ADMIN — ASPIRIN 81 MG: 81 TABLET, CHEWABLE ORAL at 09:07

## 2024-12-25 RX ADMIN — PIPERACILLIN AND TAZOBACTAM 3375 MG: 3; .375 INJECTION, POWDER, LYOPHILIZED, FOR SOLUTION INTRAVENOUS at 11:12

## 2024-12-25 RX ADMIN — SODIUM CHLORIDE 100 ML: 9 INJECTION, SOLUTION INTRAVENOUS at 03:40

## 2024-12-25 RX ADMIN — MICONAZOLE NITRATE: 2 POWDER TOPICAL at 09:08

## 2024-12-25 RX ADMIN — IPRATROPIUM BROMIDE AND ALBUTEROL SULFATE 1 DOSE: .5; 3 SOLUTION RESPIRATORY (INHALATION) at 15:37

## 2024-12-25 RX ADMIN — SODIUM CHLORIDE, PRESERVATIVE FREE 10 ML: 5 INJECTION INTRAVENOUS at 09:07

## 2024-12-25 ASSESSMENT — PAIN SCALES - GENERAL
PAINLEVEL_OUTOF10: 0
PAINLEVEL_OUTOF10: 0

## 2024-12-25 NOTE — PLAN OF CARE
Problem: Chronic Conditions and Co-morbidities  Goal: Patient's chronic conditions and co-morbidity symptoms are monitored and maintained or improved  Outcome: Progressing  Flowsheets (Taken 12/25/2024 0020)  Care Plan - Patient's Chronic Conditions and Co-Morbidity Symptoms are Monitored and Maintained or Improved: Monitor and assess patient's chronic conditions and comorbid symptoms for stability, deterioration, or improvement     Problem: Discharge Planning  Goal: Discharge to home or other facility with appropriate resources  Outcome: Progressing     Problem: ABCDS Injury Assessment  Goal: Absence of physical injury  Outcome: Progressing     Problem: Safety - Adult  Goal: Free from fall injury  Outcome: Progressing

## 2024-12-25 NOTE — PLAN OF CARE
Problem: Chronic Conditions and Co-morbidities  Goal: Patient's chronic conditions and co-morbidity symptoms are monitored and maintained or improved  12/25/2024 1810 by Kelli Campos RN  Outcome: Progressing  12/25/2024 0653 by Marjorie Schuler RN  Outcome: Progressing  Flowsheets (Taken 12/25/2024 0020)  Care Plan - Patient's Chronic Conditions and Co-Morbidity Symptoms are Monitored and Maintained or Improved: Monitor and assess patient's chronic conditions and comorbid symptoms for stability, deterioration, or improvement     Problem: Discharge Planning  Goal: Discharge to home or other facility with appropriate resources  12/25/2024 1810 by Kelli Campos RN  Outcome: Progressing  12/25/2024 0653 by Marjorie Schuler RN  Outcome: Progressing     Problem: ABCDS Injury Assessment  Goal: Absence of physical injury  12/25/2024 1810 by Kelli Campos RN  Outcome: Progressing  12/25/2024 0653 by Marjorie Schuler RN  Outcome: Progressing

## 2024-12-25 NOTE — H&P
PRN  potassium chloride, 40 mEq, PRN   Or  potassium alternative oral replacement, 40 mEq, PRN   Or  potassium chloride, 10 mEq, PRN  potassium chloride, 10 mEq, PRN  magnesium sulfate, 2,000 mg, PRN  ondansetron, 4 mg, Q8H PRN   Or  ondansetron, 4 mg, Q6H PRN  polyethylene glycol, 17 g, Daily PRN  acetaminophen, 650 mg, Q6H PRN   Or  acetaminophen, 650 mg, Q6H PRN        Data:     CBC:   No results for input(s): \"WBC\", \"HGB\", \"PLT\", \"MCV\", \"RDW\", \"BANDSPCT\", \"BLASTSPCT\", \"METASPCT\", \"LYMPHOPCT\", \"PROMYELOPCT\", \"MONOPCT\", \"MYELOPCT\", \"EOSPCT\", \"BASOPCT\", \"MONOSABS\", \"LYMPHSABS\", \"EOSABS\", \"BASOSABS\" in the last 72 hours.    Invalid input(s): \"SEGSPCTL\", \"ATYLMREL\"    CMP:    No results for input(s): \"NA\", \"K\", \"CL\", \"CO2\", \"BUN\", \"CREATININE\", \"GFRAA\", \"GLUCOSE\", \"LABALBU\", \"CALCIUM\", \"BILITOT\", \"ALKPHOS\", \"AST\", \"ALT\" in the last 72 hours.    Lipids:   Lab Results   Component Value Date/Time    CHOL 197 06/28/2024 02:01 AM    HDL 43 06/28/2024 02:01 AM    TRIG 139 06/28/2024 02:01 AM     Hemoglobin A1C:   Lab Results   Component Value Date/Time    LABA1C 7.1 11/17/2024 01:56 AM     TSH: No results found for: \"TSH\"  Troponin:   Lab Results   Component Value Date/Time    TROPONINT <0.010 08/20/2023 06:10 PM    TROPONINT <0.010 03/01/2023 02:22 PM    TROPONINT <0.010 03/25/2022 11:10 PM     BNP:   No results for input(s): \"PROBNP\" in the last 72 hours.    Lactic Acid: No results for input(s): \"LACTA\" in the last 72 hours.  UA:  Lab Results   Component Value Date/Time    NITRU NEGATIVE 11/14/2024 05:15 AM    COLORU Yellow 11/14/2024 05:15 AM    PHUR 7.0 11/14/2024 05:15 AM    WBCUA 1 01/05/2022 08:25 PM    RBCUA <1 01/05/2022 08:25 PM    MUCUS RARE 01/05/2022 08:25 PM    TRICHOMONAS NONE SEEN 01/05/2022 08:25 PM    BACTERIA NEGATIVE 01/05/2022 08:25 PM    CLARITYU CLEAR 04/23/2024 11:30 PM    LEUKOCYTESUR NEGATIVE 11/14/2024 05:15 AM    UROBILINOGEN 0.2 11/14/2024 05:15 AM    BILIRUBINUR NEGATIVE 11/14/2024 05:15 AM

## 2024-12-26 LAB
ALBUMIN SERPL-MCNC: 3.2 G/DL (ref 3.4–5)
ALBUMIN/GLOB SERPL: 1.1 {RATIO} (ref 1.1–2.2)
ALP SERPL-CCNC: 60 U/L (ref 40–129)
ALT SERPL-CCNC: 9 U/L (ref 10–40)
ANION GAP SERPL CALCULATED.3IONS-SCNC: 5 MMOL/L (ref 9–17)
ARTERIAL PATENCY WRIST A: ABNORMAL
AST SERPL-CCNC: 21 U/L (ref 15–37)
B PARAP IS1001 DNA NPH QL NAA+NON-PROBE: NOT DETECTED
B PERT DNA SPEC QL NAA+PROBE: NOT DETECTED
BASOPHILS # BLD: 0.04 K/UL
BASOPHILS NFR BLD: 0 % (ref 0–1)
BILIRUB SERPL-MCNC: <0.2 MG/DL (ref 0–1)
BODY TEMPERATURE: 37
BUN SERPL-MCNC: 13 MG/DL (ref 7–20)
C PNEUM DNA NPH QL NAA+NON-PROBE: NOT DETECTED
CALCIUM SERPL-MCNC: 8.9 MG/DL (ref 8.3–10.6)
CHLORIDE SERPL-SCNC: 99 MMOL/L (ref 99–110)
CO2 SERPL-SCNC: 38 MMOL/L (ref 21–32)
COHGB MFR BLD: 0.3 % (ref 0.5–1.5)
CREAT SERPL-MCNC: 0.5 MG/DL (ref 0.6–1.2)
EOSINOPHIL # BLD: 0.07 K/UL
EOSINOPHILS RELATIVE PERCENT: 1 % (ref 0–3)
ERYTHROCYTE [DISTWIDTH] IN BLOOD BY AUTOMATED COUNT: 12.3 % (ref 11.7–14.9)
FLUAV RNA NPH QL NAA+NON-PROBE: NOT DETECTED
FLUBV RNA NPH QL NAA+NON-PROBE: NOT DETECTED
GAS FLOW.O2 O2 DELIVERY SYS: ABNORMAL L/MIN
GFR, ESTIMATED: >90 ML/MIN/1.73M2
GLUCOSE SERPL-MCNC: 182 MG/DL (ref 74–99)
HADV DNA NPH QL NAA+NON-PROBE: NOT DETECTED
HCO3 VENOUS: 39.7 MMOL/L (ref 22–29)
HCOV 229E RNA NPH QL NAA+NON-PROBE: NOT DETECTED
HCOV HKU1 RNA NPH QL NAA+NON-PROBE: NOT DETECTED
HCOV NL63 RNA NPH QL NAA+NON-PROBE: NOT DETECTED
HCOV OC43 RNA NPH QL NAA+NON-PROBE: NOT DETECTED
HCT VFR BLD AUTO: 32.8 % (ref 37–47)
HGB BLD-MCNC: 10.1 G/DL (ref 12.5–16)
HMPV RNA NPH QL NAA+NON-PROBE: NOT DETECTED
HPIV1 RNA NPH QL NAA+NON-PROBE: NOT DETECTED
HPIV2 RNA NPH QL NAA+NON-PROBE: NOT DETECTED
HPIV3 RNA NPH QL NAA+NON-PROBE: NOT DETECTED
HPIV4 RNA NPH QL NAA+NON-PROBE: NOT DETECTED
IMM GRANULOCYTES # BLD AUTO: 0.06 K/UL
IMM GRANULOCYTES NFR BLD: 1 %
LYMPHOCYTES NFR BLD: 1.91 K/UL
LYMPHOCYTES RELATIVE PERCENT: 18 % (ref 24–44)
M PNEUMO DNA NPH QL NAA+NON-PROBE: NOT DETECTED
MCH RBC QN AUTO: 27.9 PG (ref 27–31)
MCHC RBC AUTO-ENTMCNC: 30.8 G/DL (ref 32–36)
MCV RBC AUTO: 90.6 FL (ref 78–100)
METHEMOGLOBIN: 0.6 % (ref 0.5–1.5)
MONOCYTES NFR BLD: 1.21 K/UL
MONOCYTES NFR BLD: 11 % (ref 0–4)
NEUTROPHILS NFR BLD: 69 % (ref 36–66)
NEUTS SEG NFR BLD: 7.38 K/UL
OXYHGB MFR BLD: 88.1 %
PCO2 VENOUS: 76.9 MM HG (ref 38–54)
PH VENOUS: 7.33 (ref 7.32–7.43)
PLATELET # BLD AUTO: 213 K/UL (ref 140–440)
PMV BLD AUTO: 10 FL (ref 7.5–11.1)
PO2 VENOUS: 57.3 MM HG (ref 23–48)
POSITIVE BASE EXCESS, VEN: 10.9 MMOL/L (ref 0–3)
POTASSIUM SERPL-SCNC: 4.2 MMOL/L (ref 3.5–5.1)
PROT SERPL-MCNC: 5.9 G/DL (ref 6.4–8.2)
RBC # BLD AUTO: 3.62 M/UL (ref 4.2–5.4)
RSV RNA NPH QL NAA+NON-PROBE: NOT DETECTED
RV+EV RNA NPH QL NAA+NON-PROBE: NOT DETECTED
SARS-COV-2 RNA NPH QL NAA+NON-PROBE: NOT DETECTED
SODIUM SERPL-SCNC: 142 MMOL/L (ref 136–145)
SPECIMEN DESCRIPTION: NORMAL
TEXT FOR RESPIRATORY: ABNORMAL
WBC OTHER # BLD: 10.7 K/UL (ref 4–10.5)

## 2024-12-26 PROCEDURE — 94761 N-INVAS EAR/PLS OXIMETRY MLT: CPT

## 2024-12-26 PROCEDURE — 87205 SMEAR GRAM STAIN: CPT

## 2024-12-26 PROCEDURE — 2700000000 HC OXYGEN THERAPY PER DAY

## 2024-12-26 PROCEDURE — 0202U NFCT DS 22 TRGT SARS-COV-2: CPT

## 2024-12-26 PROCEDURE — 6370000000 HC RX 637 (ALT 250 FOR IP): Performed by: STUDENT IN AN ORGANIZED HEALTH CARE EDUCATION/TRAINING PROGRAM

## 2024-12-26 PROCEDURE — 94660 CPAP INITIATION&MGMT: CPT

## 2024-12-26 PROCEDURE — 2060000000 HC ICU INTERMEDIATE R&B

## 2024-12-26 PROCEDURE — 2580000003 HC RX 258: Performed by: STUDENT IN AN ORGANIZED HEALTH CARE EDUCATION/TRAINING PROGRAM

## 2024-12-26 PROCEDURE — 36415 COLL VENOUS BLD VENIPUNCTURE: CPT

## 2024-12-26 PROCEDURE — 82805 BLOOD GASES W/O2 SATURATION: CPT

## 2024-12-26 PROCEDURE — 2500000003 HC RX 250 WO HCPCS: Performed by: STUDENT IN AN ORGANIZED HEALTH CARE EDUCATION/TRAINING PROGRAM

## 2024-12-26 PROCEDURE — 6370000000 HC RX 637 (ALT 250 FOR IP): Performed by: INTERNAL MEDICINE

## 2024-12-26 PROCEDURE — 87077 CULTURE AEROBIC IDENTIFY: CPT

## 2024-12-26 PROCEDURE — 6360000002 HC RX W HCPCS: Performed by: STUDENT IN AN ORGANIZED HEALTH CARE EDUCATION/TRAINING PROGRAM

## 2024-12-26 PROCEDURE — 87186 SC STD MICRODIL/AGAR DIL: CPT

## 2024-12-26 PROCEDURE — 94640 AIRWAY INHALATION TREATMENT: CPT

## 2024-12-26 PROCEDURE — 87070 CULTURE OTHR SPECIMN AEROBIC: CPT

## 2024-12-26 PROCEDURE — 80053 COMPREHEN METABOLIC PANEL: CPT

## 2024-12-26 PROCEDURE — 85025 COMPLETE CBC W/AUTO DIFF WBC: CPT

## 2024-12-26 RX ORDER — GUAIFENESIN 600 MG/1
600 TABLET, EXTENDED RELEASE ORAL 2 TIMES DAILY
Status: DISCONTINUED | OUTPATIENT
Start: 2024-12-26 | End: 2024-12-29 | Stop reason: HOSPADM

## 2024-12-26 RX ADMIN — PANTOPRAZOLE SODIUM 40 MG: 40 TABLET, DELAYED RELEASE ORAL at 08:35

## 2024-12-26 RX ADMIN — ENOXAPARIN SODIUM 40 MG: 100 INJECTION SUBCUTANEOUS at 08:35

## 2024-12-26 RX ADMIN — IPRATROPIUM BROMIDE AND ALBUTEROL SULFATE 1 DOSE: .5; 3 SOLUTION RESPIRATORY (INHALATION) at 07:13

## 2024-12-26 RX ADMIN — BUPRENORPHINE AND NALOXONE 1 FILM: 8; 2 FILM BUCCAL; SUBLINGUAL at 08:35

## 2024-12-26 RX ADMIN — PREDNISONE 40 MG: 10 TABLET ORAL at 08:35

## 2024-12-26 RX ADMIN — BUDESONIDE AND FORMOTEROL FUMARATE DIHYDRATE 2 PUFF: 160; 4.5 AEROSOL RESPIRATORY (INHALATION) at 07:13

## 2024-12-26 RX ADMIN — BUDESONIDE AND FORMOTEROL FUMARATE DIHYDRATE 2 PUFF: 160; 4.5 AEROSOL RESPIRATORY (INHALATION) at 19:33

## 2024-12-26 RX ADMIN — VANCOMYCIN HYDROCHLORIDE 1250 MG: 1.25 INJECTION, POWDER, LYOPHILIZED, FOR SOLUTION INTRAVENOUS at 16:43

## 2024-12-26 RX ADMIN — IPRATROPIUM BROMIDE AND ALBUTEROL SULFATE 1 DOSE: .5; 3 SOLUTION RESPIRATORY (INHALATION) at 19:33

## 2024-12-26 RX ADMIN — SODIUM CHLORIDE, PRESERVATIVE FREE 10 ML: 5 INJECTION INTRAVENOUS at 08:35

## 2024-12-26 RX ADMIN — MICONAZOLE NITRATE: 2 POWDER TOPICAL at 21:01

## 2024-12-26 RX ADMIN — BUPRENORPHINE AND NALOXONE 1 FILM: 8; 2 FILM BUCCAL; SUBLINGUAL at 16:40

## 2024-12-26 RX ADMIN — BUPRENORPHINE AND NALOXONE 1 FILM: 8; 2 FILM BUCCAL; SUBLINGUAL at 21:01

## 2024-12-26 RX ADMIN — SODIUM CHLORIDE, PRESERVATIVE FREE 10 ML: 5 INJECTION INTRAVENOUS at 21:02

## 2024-12-26 RX ADMIN — GUAIFENESIN 600 MG: 600 TABLET ORAL at 21:01

## 2024-12-26 RX ADMIN — MICONAZOLE NITRATE: 2 POWDER TOPICAL at 08:36

## 2024-12-26 RX ADMIN — VANCOMYCIN HYDROCHLORIDE 1250 MG: 1.25 INJECTION, POWDER, LYOPHILIZED, FOR SOLUTION INTRAVENOUS at 05:22

## 2024-12-26 RX ADMIN — PIPERACILLIN AND TAZOBACTAM 3375 MG: 3; .375 INJECTION, POWDER, LYOPHILIZED, FOR SOLUTION INTRAVENOUS at 18:35

## 2024-12-26 RX ADMIN — PIPERACILLIN AND TAZOBACTAM 3375 MG: 3; .375 INJECTION, POWDER, LYOPHILIZED, FOR SOLUTION INTRAVENOUS at 03:58

## 2024-12-26 RX ADMIN — GUAIFENESIN 600 MG: 600 TABLET ORAL at 08:35

## 2024-12-26 RX ADMIN — IPRATROPIUM BROMIDE AND ALBUTEROL SULFATE 1 DOSE: .5; 3 SOLUTION RESPIRATORY (INHALATION) at 11:36

## 2024-12-26 RX ADMIN — PIPERACILLIN AND TAZOBACTAM 3375 MG: 3; .375 INJECTION, POWDER, LYOPHILIZED, FOR SOLUTION INTRAVENOUS at 11:35

## 2024-12-26 RX ADMIN — ASPIRIN 81 MG: 81 TABLET, CHEWABLE ORAL at 08:35

## 2024-12-26 RX ADMIN — IPRATROPIUM BROMIDE AND ALBUTEROL SULFATE 1 DOSE: .5; 3 SOLUTION RESPIRATORY (INHALATION) at 15:15

## 2024-12-26 ASSESSMENT — PAIN SCALES - GENERAL: PAINLEVEL_OUTOF10: 0

## 2024-12-26 NOTE — PLAN OF CARE
Problem: Chronic Conditions and Co-morbidities  Goal: Patient's chronic conditions and co-morbidity symptoms are monitored and maintained or improved  12/26/2024 0641 by James Carbone RN  Outcome: Progressing  12/25/2024 1810 by Kelli Campos RN  Outcome: Progressing     Problem: Discharge Planning  Goal: Discharge to home or other facility with appropriate resources  12/26/2024 0641 by James Carbone RN  Outcome: Progressing  Flowsheets (Taken 12/26/2024 0641)  Discharge to home or other facility with appropriate resources:   Identify barriers to discharge with patient and caregiver   Refer to discharge planning if patient needs post-hospital services based on physician order or complex needs related to functional status, cognitive ability or social support system   Arrange for interpreters to assist at discharge as needed   Arrange for needed discharge resources and transportation as appropriate  12/25/2024 1810 by Kelli Campos RN  Outcome: Progressing     Problem: ABCDS Injury Assessment  Goal: Absence of physical injury  12/26/2024 0641 by James Carbone RN  Outcome: Progressing  Flowsheets (Taken 12/26/2024 0641)  Absence of Physical Injury: Implement safety measures based on patient assessment  12/25/2024 1810 by Kelli Campos RN  Outcome: Progressing     Problem: Safety - Adult  Goal: Free from fall injury  12/26/2024 0641 by James Carbone RN  Outcome: Progressing  Flowsheets (Taken 12/26/2024 0641)  Free From Fall Injury:   Instruct family/caregiver on patient safety   Based on caregiver fall risk screen, instruct family/caregiver to ask for assistance with transferring infant if caregiver noted to have fall risk factors  12/25/2024 1810 by Kelli Campos RN  Outcome: Progressing

## 2024-12-27 LAB
25(OH)D3 SERPL-MCNC: 20 NG/ML (ref 30–150)
ALBUMIN SERPL-MCNC: 3.5 G/DL (ref 3.4–5)
ALBUMIN/GLOB SERPL: 1.3 {RATIO} (ref 1.1–2.2)
ALP SERPL-CCNC: 60 U/L (ref 40–129)
ALT SERPL-CCNC: 11 U/L (ref 10–40)
ANION GAP SERPL CALCULATED.3IONS-SCNC: 4 MMOL/L (ref 9–17)
ARTERIAL PATENCY WRIST A: NO
AST SERPL-CCNC: 20 U/L (ref 15–37)
BASOPHILS # BLD: 0.05 K/UL
BASOPHILS NFR BLD: 0 % (ref 0–1)
BILIRUB SERPL-MCNC: <0.2 MG/DL (ref 0–1)
BNP SERPL-MCNC: 555 PG/ML (ref 0–125)
BODY TEMPERATURE: 37
BUN SERPL-MCNC: 12 MG/DL (ref 7–20)
CALCIUM SERPL-MCNC: 9.3 MG/DL (ref 8.3–10.6)
CHLORIDE SERPL-SCNC: 97 MMOL/L (ref 99–110)
CO2 SERPL-SCNC: 42 MMOL/L (ref 21–32)
COHGB MFR BLD: 0.3 % (ref 0.5–1.5)
CREAT SERPL-MCNC: 0.5 MG/DL (ref 0.6–1.2)
DATE LAST DOSE: NORMAL
DATE, STOOL #1: ABNORMAL
EOSINOPHIL # BLD: 0.05 K/UL
EOSINOPHILS RELATIVE PERCENT: 0 % (ref 0–3)
ERYTHROCYTE [DISTWIDTH] IN BLOOD BY AUTOMATED COUNT: 12.3 % (ref 11.7–14.9)
GFR, ESTIMATED: >90 ML/MIN/1.73M2
GLUCOSE SERPL-MCNC: 142 MG/DL (ref 74–99)
HCO3 VENOUS: 41.6 MMOL/L (ref 22–29)
HCT VFR BLD AUTO: 34.3 % (ref 37–47)
HEMOCCULT SP1 STL QL: POSITIVE
HGB BLD-MCNC: 10.2 G/DL (ref 12.5–16)
IMM GRANULOCYTES # BLD AUTO: 0.1 K/UL
IMM GRANULOCYTES NFR BLD: 1 %
LYMPHOCYTES NFR BLD: 2.07 K/UL
LYMPHOCYTES RELATIVE PERCENT: 18 % (ref 24–44)
MAGNESIUM SERPL-MCNC: 1.8 MG/DL (ref 1.8–2.4)
MCH RBC QN AUTO: 27.4 PG (ref 27–31)
MCHC RBC AUTO-ENTMCNC: 29.7 G/DL (ref 32–36)
MCV RBC AUTO: 92.2 FL (ref 78–100)
METHEMOGLOBIN: 0.5 % (ref 0.5–1.5)
MONOCYTES NFR BLD: 1.19 K/UL
MONOCYTES NFR BLD: 10 % (ref 0–4)
NEUTROPHILS NFR BLD: 70 % (ref 36–66)
NEUTS SEG NFR BLD: 8.04 K/UL
OXYHGB MFR BLD: 84.5 %
PCO2 VENOUS: 76.3 MM HG (ref 38–54)
PH VENOUS: 7.35 (ref 7.32–7.43)
PHOSPHATE SERPL-MCNC: 3 MG/DL (ref 2.5–4.9)
PLATELET # BLD AUTO: 265 K/UL (ref 140–440)
PMV BLD AUTO: 10 FL (ref 7.5–11.1)
PO2 VENOUS: 51.1 MM HG (ref 23–48)
POSITIVE BASE EXCESS, VEN: 13.3 MMOL/L (ref 0–3)
POTASSIUM SERPL-SCNC: 3.9 MMOL/L (ref 3.5–5.1)
PROCALCITONIN SERPL-MCNC: 0.04 NG/ML
PROT SERPL-MCNC: 6.3 G/DL (ref 6.4–8.2)
RBC # BLD AUTO: 3.72 M/UL (ref 4.2–5.4)
SODIUM SERPL-SCNC: 143 MMOL/L (ref 136–145)
TEXT FOR RESPIRATORY: ABNORMAL
TIME, STOOL #1: 1405
TME LAST DOSE: NORMAL H
VANCOMYCIN DOSE: NORMAL MG
VANCOMYCIN SERPL-MCNC: 12.7 UG/ML (ref 10–20)
WBC OTHER # BLD: 11.5 K/UL (ref 4–10.5)

## 2024-12-27 PROCEDURE — 80202 ASSAY OF VANCOMYCIN: CPT

## 2024-12-27 PROCEDURE — 84100 ASSAY OF PHOSPHORUS: CPT

## 2024-12-27 PROCEDURE — 82306 VITAMIN D 25 HYDROXY: CPT

## 2024-12-27 PROCEDURE — 2700000000 HC OXYGEN THERAPY PER DAY

## 2024-12-27 PROCEDURE — 94761 N-INVAS EAR/PLS OXIMETRY MLT: CPT

## 2024-12-27 PROCEDURE — 82805 BLOOD GASES W/O2 SATURATION: CPT

## 2024-12-27 PROCEDURE — 2500000003 HC RX 250 WO HCPCS: Performed by: STUDENT IN AN ORGANIZED HEALTH CARE EDUCATION/TRAINING PROGRAM

## 2024-12-27 PROCEDURE — 94660 CPAP INITIATION&MGMT: CPT

## 2024-12-27 PROCEDURE — 6370000000 HC RX 637 (ALT 250 FOR IP): Performed by: STUDENT IN AN ORGANIZED HEALTH CARE EDUCATION/TRAINING PROGRAM

## 2024-12-27 PROCEDURE — 85025 COMPLETE CBC W/AUTO DIFF WBC: CPT

## 2024-12-27 PROCEDURE — 84145 PROCALCITONIN (PCT): CPT

## 2024-12-27 PROCEDURE — 83735 ASSAY OF MAGNESIUM: CPT

## 2024-12-27 PROCEDURE — 6370000000 HC RX 637 (ALT 250 FOR IP): Performed by: INTERNAL MEDICINE

## 2024-12-27 PROCEDURE — 80053 COMPREHEN METABOLIC PANEL: CPT

## 2024-12-27 PROCEDURE — 6360000002 HC RX W HCPCS: Performed by: STUDENT IN AN ORGANIZED HEALTH CARE EDUCATION/TRAINING PROGRAM

## 2024-12-27 PROCEDURE — 94640 AIRWAY INHALATION TREATMENT: CPT

## 2024-12-27 PROCEDURE — 2060000000 HC ICU INTERMEDIATE R&B

## 2024-12-27 PROCEDURE — 6370000000 HC RX 637 (ALT 250 FOR IP): Performed by: FAMILY MEDICINE

## 2024-12-27 PROCEDURE — 82270 OCCULT BLOOD FECES: CPT

## 2024-12-27 PROCEDURE — 94664 DEMO&/EVAL PT USE INHALER: CPT

## 2024-12-27 PROCEDURE — 83880 ASSAY OF NATRIURETIC PEPTIDE: CPT

## 2024-12-27 PROCEDURE — 2580000003 HC RX 258: Performed by: STUDENT IN AN ORGANIZED HEALTH CARE EDUCATION/TRAINING PROGRAM

## 2024-12-27 PROCEDURE — 36415 COLL VENOUS BLD VENIPUNCTURE: CPT

## 2024-12-27 PROCEDURE — 2500000003 HC RX 250 WO HCPCS: Performed by: INTERNAL MEDICINE

## 2024-12-27 PROCEDURE — 2580000003 HC RX 258: Performed by: INTERNAL MEDICINE

## 2024-12-27 RX ORDER — TRAZODONE HYDROCHLORIDE 50 MG/1
150 TABLET, FILM COATED ORAL NIGHTLY PRN
Status: DISCONTINUED | OUTPATIENT
Start: 2024-12-27 | End: 2024-12-29 | Stop reason: HOSPADM

## 2024-12-27 RX ORDER — VITAMIN B COMPLEX
1000 TABLET ORAL DAILY
Status: DISCONTINUED | OUTPATIENT
Start: 2024-12-27 | End: 2024-12-29 | Stop reason: HOSPADM

## 2024-12-27 RX ADMIN — PIPERACILLIN AND TAZOBACTAM 3375 MG: 3; .375 INJECTION, POWDER, LYOPHILIZED, FOR SOLUTION INTRAVENOUS at 09:54

## 2024-12-27 RX ADMIN — PREDNISONE 40 MG: 10 TABLET ORAL at 09:50

## 2024-12-27 RX ADMIN — SODIUM CHLORIDE, PRESERVATIVE FREE 10 ML: 5 INJECTION INTRAVENOUS at 21:02

## 2024-12-27 RX ADMIN — BUDESONIDE AND FORMOTEROL FUMARATE DIHYDRATE 2 PUFF: 160; 4.5 AEROSOL RESPIRATORY (INHALATION) at 07:45

## 2024-12-27 RX ADMIN — IPRATROPIUM BROMIDE AND ALBUTEROL SULFATE 1 DOSE: .5; 3 SOLUTION RESPIRATORY (INHALATION) at 10:45

## 2024-12-27 RX ADMIN — Medication 1000 UNITS: at 15:49

## 2024-12-27 RX ADMIN — GUAIFENESIN 600 MG: 600 TABLET ORAL at 09:50

## 2024-12-27 RX ADMIN — SULFAMETHOXAZOLE AND TRIMETHOPRIM 352 MG: 80; 16 INJECTION, SOLUTION, CONCENTRATE INTRAVENOUS at 16:39

## 2024-12-27 RX ADMIN — TRAZODONE HYDROCHLORIDE 150 MG: 50 TABLET ORAL at 00:38

## 2024-12-27 RX ADMIN — IPRATROPIUM BROMIDE AND ALBUTEROL SULFATE 1 DOSE: .5; 3 SOLUTION RESPIRATORY (INHALATION) at 21:26

## 2024-12-27 RX ADMIN — BUPRENORPHINE AND NALOXONE 1 FILM: 8; 2 FILM BUCCAL; SUBLINGUAL at 21:01

## 2024-12-27 RX ADMIN — PANTOPRAZOLE SODIUM 40 MG: 40 TABLET, DELAYED RELEASE ORAL at 06:16

## 2024-12-27 RX ADMIN — MICONAZOLE NITRATE: 2 POWDER TOPICAL at 21:04

## 2024-12-27 RX ADMIN — SODIUM CHLORIDE, PRESERVATIVE FREE 10 ML: 5 INJECTION INTRAVENOUS at 09:50

## 2024-12-27 RX ADMIN — TRAZODONE HYDROCHLORIDE 150 MG: 50 TABLET ORAL at 22:49

## 2024-12-27 RX ADMIN — IPRATROPIUM BROMIDE AND ALBUTEROL SULFATE 1 DOSE: .5; 3 SOLUTION RESPIRATORY (INHALATION) at 07:44

## 2024-12-27 RX ADMIN — ASPIRIN 81 MG: 81 TABLET, CHEWABLE ORAL at 09:50

## 2024-12-27 RX ADMIN — PIPERACILLIN AND TAZOBACTAM 3375 MG: 3; .375 INJECTION, POWDER, LYOPHILIZED, FOR SOLUTION INTRAVENOUS at 02:18

## 2024-12-27 RX ADMIN — ENOXAPARIN SODIUM 40 MG: 100 INJECTION SUBCUTANEOUS at 09:50

## 2024-12-27 RX ADMIN — VANCOMYCIN HYDROCHLORIDE 1250 MG: 1.25 INJECTION, POWDER, LYOPHILIZED, FOR SOLUTION INTRAVENOUS at 04:23

## 2024-12-27 RX ADMIN — BUPRENORPHINE AND NALOXONE 1 FILM: 8; 2 FILM BUCCAL; SUBLINGUAL at 09:50

## 2024-12-27 RX ADMIN — GUAIFENESIN 600 MG: 600 TABLET ORAL at 21:01

## 2024-12-27 RX ADMIN — MICONAZOLE NITRATE: 2 POWDER TOPICAL at 09:51

## 2024-12-27 RX ADMIN — HYDROXYZINE HYDROCHLORIDE 25 MG: 25 TABLET ORAL at 00:38

## 2024-12-27 RX ADMIN — BUPRENORPHINE AND NALOXONE 1 FILM: 8; 2 FILM BUCCAL; SUBLINGUAL at 15:49

## 2024-12-27 RX ADMIN — BUDESONIDE AND FORMOTEROL FUMARATE DIHYDRATE 2 PUFF: 160; 4.5 AEROSOL RESPIRATORY (INHALATION) at 21:26

## 2024-12-27 RX ADMIN — HYDROXYZINE HYDROCHLORIDE 25 MG: 25 TABLET ORAL at 22:49

## 2024-12-27 ASSESSMENT — PAIN SCALES - GENERAL
PAINLEVEL_OUTOF10: 0
PAINLEVEL_OUTOF10: 0

## 2024-12-27 NOTE — PLAN OF CARE
Problem: Chronic Conditions and Co-morbidities  Goal: Patient's chronic conditions and co-morbidity symptoms are monitored and maintained or improved  12/26/2024 2212 by Jovanny Epperson RN  Outcome: Progressing  12/26/2024 1754 by Cindi Oates RN  Outcome: Progressing     Problem: Discharge Planning  Goal: Discharge to home or other facility with appropriate resources  12/26/2024 2212 by Jovanny Epperson RN  Outcome: Progressing  12/26/2024 1754 by Cindi Oates RN  Outcome: Progressing     Problem: ABCDS Injury Assessment  Goal: Absence of physical injury  12/26/2024 2212 by Jovanny Epperson RN  Outcome: Progressing  12/26/2024 1754 by Cindi Oates RN  Outcome: Progressing     Problem: Safety - Adult  Goal: Free from fall injury  12/26/2024 2212 by Jovanny Epperson RN  Outcome: Progressing  12/26/2024 1754 by Cindi Oates RN  Outcome: Progressing

## 2024-12-27 NOTE — CARE COORDINATION
.Chart reviewed. The patient is from home alone with aide support and plans to return. She is active with [facility] for aides and gets 30 hours a week. She says that [facility] is no longer active. The patient has a PCP and insurance and can afford and take her medications as prescribed. The patient does not have reliable transportation. She said the car her and her daughter use is broken down. The patient  says she is mostly independent independent in ADL's. She has a walker and a wheelchair. She is going to call some family members to see if someone can pick her up when she is medically cleared.    The patient is on oxygen 5 liters at home and she is on 5 liters here. [facility]     Cm is following.    12/27/24 4448   Service Assessment   Patient Orientation Alert and Oriented   Cognition Alert   History Provided By Patient   Primary Caregiver Self   Support Systems Children   Patient's Healthcare Decision Maker is: Legal Next of Kin   PCP Verified by CM Yes   Prior Functional Level Assistance with the following:;Mobility  (uses a wheelchair and rollator at home.)   Current Functional Level Assistance with the following:;Other (see comment)  (per hospital policy)   Can patient return to prior living arrangement Yes   Ability to make needs known: Good   Family able to assist with home care needs: Yes   Would you like for me to discuss the discharge plan with any other family members/significant others, and if so, who? No   Social/Functional History   Active  No   Patient's  Info family provides transporation   Discharge Planning   Type of Residence Apartment   Living Arrangements Alone  (daughter helps a lot)   Current Services Prior To Admission Home Care  (active with [facility])   Potential Assistance Needed Home Care;Durable Medical Equipment   Potential DME Needed Oxygen Therapy (Comment)  (is on home oxygen 6 liters, [employer])   DME Ordered? No   Potential

## 2024-12-28 LAB
MICROORGANISM SPEC CULT: ABNORMAL
MICROORGANISM SPEC CULT: ABNORMAL
MICROORGANISM/AGENT SPEC: ABNORMAL
SPECIMEN DESCRIPTION: ABNORMAL

## 2024-12-28 PROCEDURE — 6370000000 HC RX 637 (ALT 250 FOR IP): Performed by: STUDENT IN AN ORGANIZED HEALTH CARE EDUCATION/TRAINING PROGRAM

## 2024-12-28 PROCEDURE — 2580000003 HC RX 258: Performed by: INTERNAL MEDICINE

## 2024-12-28 PROCEDURE — 2500000003 HC RX 250 WO HCPCS: Performed by: INTERNAL MEDICINE

## 2024-12-28 PROCEDURE — 6370000000 HC RX 637 (ALT 250 FOR IP): Performed by: INTERNAL MEDICINE

## 2024-12-28 PROCEDURE — 2060000000 HC ICU INTERMEDIATE R&B

## 2024-12-28 PROCEDURE — 94761 N-INVAS EAR/PLS OXIMETRY MLT: CPT

## 2024-12-28 PROCEDURE — 6370000000 HC RX 637 (ALT 250 FOR IP): Performed by: FAMILY MEDICINE

## 2024-12-28 PROCEDURE — 94660 CPAP INITIATION&MGMT: CPT

## 2024-12-28 PROCEDURE — 2700000000 HC OXYGEN THERAPY PER DAY

## 2024-12-28 PROCEDURE — 6360000002 HC RX W HCPCS: Performed by: STUDENT IN AN ORGANIZED HEALTH CARE EDUCATION/TRAINING PROGRAM

## 2024-12-28 PROCEDURE — 94640 AIRWAY INHALATION TREATMENT: CPT

## 2024-12-28 PROCEDURE — 2500000003 HC RX 250 WO HCPCS: Performed by: STUDENT IN AN ORGANIZED HEALTH CARE EDUCATION/TRAINING PROGRAM

## 2024-12-28 RX ADMIN — BUDESONIDE AND FORMOTEROL FUMARATE DIHYDRATE 2 PUFF: 160; 4.5 AEROSOL RESPIRATORY (INHALATION) at 19:26

## 2024-12-28 RX ADMIN — IPRATROPIUM BROMIDE AND ALBUTEROL SULFATE 1 DOSE: .5; 3 SOLUTION RESPIRATORY (INHALATION) at 19:25

## 2024-12-28 RX ADMIN — SODIUM CHLORIDE, PRESERVATIVE FREE 10 ML: 5 INJECTION INTRAVENOUS at 08:00

## 2024-12-28 RX ADMIN — Medication 1000 UNITS: at 08:00

## 2024-12-28 RX ADMIN — ENOXAPARIN SODIUM 40 MG: 100 INJECTION SUBCUTANEOUS at 07:59

## 2024-12-28 RX ADMIN — GUAIFENESIN 600 MG: 600 TABLET ORAL at 08:00

## 2024-12-28 RX ADMIN — HYDROXYZINE HYDROCHLORIDE 25 MG: 25 TABLET ORAL at 16:34

## 2024-12-28 RX ADMIN — PANTOPRAZOLE SODIUM 40 MG: 40 TABLET, DELAYED RELEASE ORAL at 05:53

## 2024-12-28 RX ADMIN — IPRATROPIUM BROMIDE AND ALBUTEROL SULFATE 1 DOSE: .5; 3 SOLUTION RESPIRATORY (INHALATION) at 16:22

## 2024-12-28 RX ADMIN — MICONAZOLE NITRATE: 2 POWDER TOPICAL at 20:07

## 2024-12-28 RX ADMIN — PREDNISONE 40 MG: 10 TABLET ORAL at 07:59

## 2024-12-28 RX ADMIN — TRAZODONE HYDROCHLORIDE 150 MG: 50 TABLET ORAL at 20:15

## 2024-12-28 RX ADMIN — MICONAZOLE NITRATE: 2 POWDER TOPICAL at 16:32

## 2024-12-28 RX ADMIN — SULFAMETHOXAZOLE AND TRIMETHOPRIM 352 MG: 80; 16 INJECTION, SOLUTION, CONCENTRATE INTRAVENOUS at 20:21

## 2024-12-28 RX ADMIN — GUAIFENESIN 600 MG: 600 TABLET ORAL at 20:16

## 2024-12-28 RX ADMIN — IPRATROPIUM BROMIDE AND ALBUTEROL SULFATE 1 DOSE: .5; 3 SOLUTION RESPIRATORY (INHALATION) at 07:08

## 2024-12-28 RX ADMIN — IPRATROPIUM BROMIDE AND ALBUTEROL SULFATE 1 DOSE: .5; 3 SOLUTION RESPIRATORY (INHALATION) at 11:54

## 2024-12-28 RX ADMIN — BUPRENORPHINE AND NALOXONE 1 FILM: 8; 2 FILM BUCCAL; SUBLINGUAL at 20:15

## 2024-12-28 RX ADMIN — SODIUM CHLORIDE, PRESERVATIVE FREE 10 ML: 5 INJECTION INTRAVENOUS at 20:07

## 2024-12-28 RX ADMIN — BUDESONIDE AND FORMOTEROL FUMARATE DIHYDRATE 2 PUFF: 160; 4.5 AEROSOL RESPIRATORY (INHALATION) at 07:09

## 2024-12-28 RX ADMIN — BUPRENORPHINE AND NALOXONE 1 FILM: 8; 2 FILM BUCCAL; SUBLINGUAL at 07:59

## 2024-12-28 RX ADMIN — BUPRENORPHINE AND NALOXONE 1 FILM: 8; 2 FILM BUCCAL; SUBLINGUAL at 16:31

## 2024-12-28 RX ADMIN — ASPIRIN 81 MG: 81 TABLET, CHEWABLE ORAL at 08:00

## 2024-12-28 NOTE — PLAN OF CARE
Problem: Chronic Conditions and Co-morbidities  Goal: Patient's chronic conditions and co-morbidity symptoms are monitored and maintained or improved  12/27/2024 2204 by Katherine Hart RN  Outcome: Progressing  12/27/2024 1042 by Cindi Oates RN  Outcome: Progressing     Problem: Discharge Planning  Goal: Discharge to home or other facility with appropriate resources  12/27/2024 2204 by Katherine Hart RN  Outcome: Progressing  12/27/2024 1042 by Cindi Oates RN  Outcome: Progressing     Problem: ABCDS Injury Assessment  Goal: Absence of physical injury  12/27/2024 2204 by Katherine Hart RN  Outcome: Progressing  12/27/2024 1042 by Cindi Oates RN  Outcome: Progressing     Problem: Safety - Adult  Goal: Free from fall injury  12/27/2024 2204 by Katherine Hart RN  Outcome: Progressing  12/27/2024 1042 by Cindi Oates RN  Outcome: Progressing

## 2024-12-29 ENCOUNTER — APPOINTMENT (OUTPATIENT)
Dept: GENERAL RADIOLOGY | Age: 64
DRG: 871 | End: 2024-12-29
Attending: STUDENT IN AN ORGANIZED HEALTH CARE EDUCATION/TRAINING PROGRAM
Payer: MEDICARE

## 2024-12-29 VITALS
OXYGEN SATURATION: 90 % | RESPIRATION RATE: 18 BRPM | DIASTOLIC BLOOD PRESSURE: 58 MMHG | BODY MASS INDEX: 34.66 KG/M2 | HEIGHT: 64 IN | SYSTOLIC BLOOD PRESSURE: 172 MMHG | TEMPERATURE: 98.1 F | WEIGHT: 203 LBS | HEART RATE: 102 BPM

## 2024-12-29 LAB
ANION GAP SERPL CALCULATED.3IONS-SCNC: 4 MMOL/L (ref 9–17)
BUN SERPL-MCNC: 13 MG/DL (ref 7–20)
CALCIUM SERPL-MCNC: 9 MG/DL (ref 8.3–10.6)
CHLORIDE SERPL-SCNC: 98 MMOL/L (ref 99–110)
CO2 SERPL-SCNC: 39 MMOL/L (ref 21–32)
CREAT SERPL-MCNC: 0.5 MG/DL (ref 0.6–1.2)
GFR, ESTIMATED: >90 ML/MIN/1.73M2
GLUCOSE SERPL-MCNC: 153 MG/DL (ref 74–99)
POTASSIUM SERPL-SCNC: 4.2 MMOL/L (ref 3.5–5.1)
SODIUM SERPL-SCNC: 141 MMOL/L (ref 136–145)

## 2024-12-29 PROCEDURE — 6370000000 HC RX 637 (ALT 250 FOR IP): Performed by: INTERNAL MEDICINE

## 2024-12-29 PROCEDURE — 80048 BASIC METABOLIC PNL TOTAL CA: CPT

## 2024-12-29 PROCEDURE — 36415 COLL VENOUS BLD VENIPUNCTURE: CPT

## 2024-12-29 PROCEDURE — 2500000003 HC RX 250 WO HCPCS: Performed by: INTERNAL MEDICINE

## 2024-12-29 PROCEDURE — 6370000000 HC RX 637 (ALT 250 FOR IP): Performed by: STUDENT IN AN ORGANIZED HEALTH CARE EDUCATION/TRAINING PROGRAM

## 2024-12-29 PROCEDURE — 2500000003 HC RX 250 WO HCPCS: Performed by: STUDENT IN AN ORGANIZED HEALTH CARE EDUCATION/TRAINING PROGRAM

## 2024-12-29 PROCEDURE — 71045 X-RAY EXAM CHEST 1 VIEW: CPT

## 2024-12-29 PROCEDURE — 2580000003 HC RX 258: Performed by: INTERNAL MEDICINE

## 2024-12-29 PROCEDURE — 2700000000 HC OXYGEN THERAPY PER DAY

## 2024-12-29 PROCEDURE — 94640 AIRWAY INHALATION TREATMENT: CPT

## 2024-12-29 PROCEDURE — 82805 BLOOD GASES W/O2 SATURATION: CPT

## 2024-12-29 PROCEDURE — 94761 N-INVAS EAR/PLS OXIMETRY MLT: CPT

## 2024-12-29 PROCEDURE — 6360000002 HC RX W HCPCS: Performed by: STUDENT IN AN ORGANIZED HEALTH CARE EDUCATION/TRAINING PROGRAM

## 2024-12-29 RX ORDER — SULFAMETHOXAZOLE AND TRIMETHOPRIM 800; 160 MG/1; MG/1
2 TABLET ORAL EVERY 12 HOURS SCHEDULED
Status: DISCONTINUED | OUTPATIENT
Start: 2024-12-29 | End: 2024-12-29 | Stop reason: HOSPADM

## 2024-12-29 RX ORDER — SULFAMETHOXAZOLE AND TRIMETHOPRIM 800; 160 MG/1; MG/1
2 TABLET ORAL EVERY 12 HOURS SCHEDULED
Qty: 40 TABLET | Refills: 0 | Status: SHIPPED | OUTPATIENT
Start: 2024-12-29 | End: 2025-01-08

## 2024-12-29 RX ORDER — GUAIFENESIN 600 MG/1
600 TABLET, EXTENDED RELEASE ORAL 2 TIMES DAILY
Qty: 21 TABLET | Refills: 0 | Status: SHIPPED | OUTPATIENT
Start: 2024-12-29

## 2024-12-29 RX ADMIN — SODIUM CHLORIDE, PRESERVATIVE FREE 10 ML: 5 INJECTION INTRAVENOUS at 09:06

## 2024-12-29 RX ADMIN — IPRATROPIUM BROMIDE AND ALBUTEROL SULFATE 1 DOSE: .5; 3 SOLUTION RESPIRATORY (INHALATION) at 15:42

## 2024-12-29 RX ADMIN — SULFAMETHOXAZOLE AND TRIMETHOPRIM 352 MG: 80; 16 INJECTION, SOLUTION, CONCENTRATE INTRAVENOUS at 09:18

## 2024-12-29 RX ADMIN — HYDROXYZINE HYDROCHLORIDE 25 MG: 25 TABLET ORAL at 05:41

## 2024-12-29 RX ADMIN — MICONAZOLE NITRATE: 2 POWDER TOPICAL at 09:07

## 2024-12-29 RX ADMIN — GUAIFENESIN 600 MG: 600 TABLET ORAL at 09:05

## 2024-12-29 RX ADMIN — BUDESONIDE AND FORMOTEROL FUMARATE DIHYDRATE 2 PUFF: 160; 4.5 AEROSOL RESPIRATORY (INHALATION) at 07:17

## 2024-12-29 RX ADMIN — ENOXAPARIN SODIUM 40 MG: 100 INJECTION SUBCUTANEOUS at 09:05

## 2024-12-29 RX ADMIN — IPRATROPIUM BROMIDE AND ALBUTEROL SULFATE 1 DOSE: .5; 3 SOLUTION RESPIRATORY (INHALATION) at 07:17

## 2024-12-29 RX ADMIN — BUPRENORPHINE AND NALOXONE 1 FILM: 8; 2 FILM BUCCAL; SUBLINGUAL at 15:24

## 2024-12-29 RX ADMIN — ASPIRIN 81 MG: 81 TABLET, CHEWABLE ORAL at 09:05

## 2024-12-29 RX ADMIN — PANTOPRAZOLE SODIUM 40 MG: 40 TABLET, DELAYED RELEASE ORAL at 05:39

## 2024-12-29 RX ADMIN — PREDNISONE 40 MG: 10 TABLET ORAL at 09:05

## 2024-12-29 RX ADMIN — IPRATROPIUM BROMIDE AND ALBUTEROL SULFATE 1 DOSE: .5; 3 SOLUTION RESPIRATORY (INHALATION) at 11:16

## 2024-12-29 RX ADMIN — Medication 1000 UNITS: at 09:05

## 2024-12-29 RX ADMIN — BUPRENORPHINE AND NALOXONE 1 FILM: 8; 2 FILM BUCCAL; SUBLINGUAL at 09:05

## 2024-12-29 NOTE — DISCHARGE SUMMARY
Discharge Summary    Name:  Kalee Chicas /Age/Sex: 1960  (64 y.o. female)   MRN & CSN:  [id number] & [id number] Admission Date/Time: 2024 12:13 AM   Attending:  Naomi Olson MD Discharging Physician: Naomi Olson MD       Discharge Diagnosis:  Severe sepsis  Acute on chronic hypoxemic hypercapnic respiratory failure  COPD exacerbation  Bronchiectasis  KING  Major depressive disorder      Discharge Exam  Physical Exam  Vitals:    24 0721 24 0815 24 1118 24 1200   BP:    (!) 172/58   Pulse:  87  (!) 102   Resp:    18   Temp:    98.1 °F (36.7 °C)   TempSrc:    Oral   SpO2: 97%  96% 96%   Weight:       Height:          General: NAD  Eyes: EOMI  ENT: neck supple  Cardiovascular: Regular rate.  Respiratory: Bilateral severely diminished air entry, no wheeze, bilateral rhonchi  Gastrointestinal: Soft, non tender  Genitourinary: no suprapubic tenderness  Musculoskeletal: No edema  Skin: warm, dry  Neuro: Alert.  Psych: Mood appropriate.     Hospital Course:   Kalee Chicas is a 64 y.o.  female  who presents with Severe sepsis (HCC)      Severe Sepsis secondary to aspiration PNA  Acute on Chronic Hypoxemic Hypercapnic RF  COPDE  Bronchiectasis   - Hemodynamically stable, afebrile, leukocytosis of 13. in mild respiratory distress, vbg 7.27/89 placed on BIPAP (6L NC at baseline)   - CT chest shows Bilateral lower lobe bronchiectasis, consolidation with few tree-in-bud opacities bilaterally, possible secretions within the distal airways.   - Received dexamethasone, cefepime and flagyl, Duonebs in ED  -Sputum cultures growing Stenotrophomonas maltophilia.  Antibiotics switched to high-dose Bactrim upon discharge        KING  - Continue BPAP qhs.      Chronic pain   - Continue home Suboxone, OARRS appropriate.     Major depressive disorder  - Continue home psychotropic medications.    The patient expressed appropriate understanding of and agreement with the discharge

## 2024-12-29 NOTE — PROGRESS NOTES
V2.0  [facility] Hospitalist Progress Note      Name:  Kalee Chicas /Age/Sex: 1960  (64 y.o. female)   MRN & CSN:  [id number] & [id number] Encounter Date/Time: 2024 1:01 PM EST    Location:  -A PCP: Chu Caban MD       Hospital Day: 4    Assessment and Plan:   Kalee Chicas is a 64 y.o. female with pmh of  who presents with Severe sepsis (HCC)      Plan:    Severe Sepsis secondary to aspiration PNA  Acute on Chronic Hypoxemic Hypercapnic RF  COPDE  Bronchiectasis   - Hemodynamically stable, afebrile, leukocytosis of 13. in mild respiratory distress, vbg 7. placed on BIPAP (6L NC at baseline)   - CT chest shows Bilateral lower lobe bronchiectasis, consolidation with few tree-in-bud opacities bilaterally, possible secretions within the distal airways.   - Received dexamethasone, cefepime and flagyl, Duonebs in ED  -Sputum cultures growing Stenotrophomonas maltophilia.  Antibiotics switched to IV Bactrim 2024.     KING  - Continue BPAP qhs.      Chronic pain   - Continue home Suboxone, OARRS appropriate.     Major depressive disorder  - Continue home psychotropic medications.         Diet ADULT DIET; Regular   DVT Prophylaxis [] Lovenox, []  Heparin, [] SCDs, [] Ambulation,  [] Eliquis, [] Xarelto  [] Coumadin   Code Status Full Code   Disposition From:   Expected Disposition:   Estimated Date of Discharge:   Patient requires continued admission due to    Surrogate Decision Maker/ POA      Personally reviewed Lab Studies and Imaging         Subjective:     Chief Complaint: No chief complaint on file.       Kalee Chicas is a 64 y.o. female who presents with bloody sputum.    Seen and examined in the morning.  Doing well today.  Was wearing BiPAP this morning.  Antibiotics switched to IV Bactrim yesterday.  Will continue that today.  Most likely home tomorrow.         Review of Systems:    Review of Systems    Negative if not mentioned above    Objective: 
    V2.0  [facility] [place] Hospitalist Progress Note      Name:  Kalee Chicas /Age/Sex: 1960  (64 y.o. female)   MRN & CSN:  [id number] & [id number] Encounter Date/Time: 2024 1:01 PM EST    Location:  -A PCP: Chu Caban MD       Hospital Day: 2    Assessment and Plan:   Kalee Chicas is a 64 y.o. female with pmh of  who presents with Severe sepsis (HCC)      Plan:    Severe Sepsis secondary to aspiration PNA  Acute on Chronic Hypoxemic Hypercapnic RF  COPDE  Bronchiectasis   - Hemodynamically stable, afebrile, leukocytosis of 13. in mild respiratory distress, vbg 7./89 placed on BIPAP (6L NC at baseline)   - CT chest shows Bilateral lower lobe bronchiectasis, consolidation with few tree-in-bud opacities bilaterally, possible secretions within the distal airways.   - Received dexamethasone, cefepime and flagyl, Duonebs in ED     - Continue vanc and zosyn due to recent hospitalization, abx use and aspiration   - Continue steroids   - Follow-up cultures and inflammatory markers.   - Continue supportive care.   - DuoNebs  - Will need prolonged course of abx due to bronchiectasis   -VBG appears improved today.  Continue BiPAP use, especially overnight.     KING  - Continue BPAP qhs.      Chronic pain   - Continue home Suboxone, OARRS appropriate.     Major depressive disorder  - Continue home psychotropic medications.         Diet ADULT DIET; Regular   DVT Prophylaxis [] Lovenox, []  Heparin, [] SCDs, [] Ambulation,  [] Eliquis, [] Xarelto  [] Coumadin   Code Status Full Code   Disposition From:   Expected Disposition:   Estimated Date of Discharge:   Patient requires continued admission due to    Surrogate Decision Maker/ POA      Personally reviewed Lab Studies and Imaging         Subjective:     Chief Complaint: No chief complaint on file.       Kalee Chicas is a 64 y.o. female who presents with bloody sputum.    Seen and examined in the morning.  Appears much better clinically.  Having 
    V2.0  [facility] [place] Hospitalist Progress Note      Name:  Kalee Chicas /Age/Sex: 1960  (64 y.o. female)   MRN & CSN:  [id number] & [id number] Encounter Date/Time: 2024 1:01 PM EST    Location:  -A PCP: Chu Caban MD       Hospital Day: 3    Assessment and Plan:   Kalee Chicas is a 64 y.o. female with pmh of  who presents with Severe sepsis (HCC)      Plan:    Severe Sepsis secondary to aspiration PNA  Acute on Chronic Hypoxemic Hypercapnic RF  COPDE  Bronchiectasis   - Hemodynamically stable, afebrile, leukocytosis of 13. in mild respiratory distress, vbg 7. placed on BIPAP (6L NC at baseline)   - CT chest shows Bilateral lower lobe bronchiectasis, consolidation with few tree-in-bud opacities bilaterally, possible secretions within the distal airways.   - Received dexamethasone, cefepime and flagyl, Duonebs in ED     - Continue vanc and zosyn due to recent hospitalization, abx use and aspiration   - Continue steroids   - Follow-up cultures and inflammatory markers.   - Continue supportive care.   - DuoNebs  - Will need prolonged course of abx due to bronchiectasis   -VBG improved.  Continue BiPAP use, especially overnight.  -Patient has BiPAP at home as well.     KING  - Continue BPAP qhs.      Chronic pain   - Continue home Suboxone, OARRS appropriate.     Major depressive disorder  - Continue home psychotropic medications.         Diet ADULT DIET; Regular   DVT Prophylaxis [] Lovenox, []  Heparin, [] SCDs, [] Ambulation,  [] Eliquis, [] Xarelto  [] Coumadin   Code Status Full Code   Disposition From:   Expected Disposition:   Estimated Date of Discharge:   Patient requires continued admission due to    Surrogate Decision Maker/ POA      Personally reviewed Lab Studies and Imaging         Subjective:     Chief Complaint: No chief complaint on file.       Kalee Chicas is a 64 y.o. female who presents with bloody sputum.    Seen and examined in the morning.  Appears better 
   12/25/24 1543   NIV Type   NIV Started/Stopped On   Equipment Type v60   Mode Bilevel   Mask Type Full face mask   Assessment   Pulse 74   Respirations 16   Skin Protection for O2 Device No   Settings/Measurements   IPAP 18 cmH20   CPAP/EPAP 5 cmH2O   Vt (Measured) 424 mL   Rate Ordered 16   Insp Rise Time (%) 3 %   FiO2  40 %   I Time/ I Time % 1 s   Minute Volume (L/min) 8.4 Liters   Mask Leak (lpm) 33 lpm   Patient's Home Machine No   Alarm Settings   Alarms On Y   Low Pressure (cmH2O) 5 cmH2O   High Pressure (cmH2O) 25 cmH2O   Apnea (secs) 20 secs   RR Low (bpm) 14   RR High (bpm) 40 br/min       
   12/26/24 1139   Encounter Summary   Encounter Overview/Reason Loneliness/Social Isolation   Encounter Code  Assessment by  services   Service Provided For Patient   Referral/Consult From Patient;Nurse   Support System Family members   Last Encounter  12/26/24  (Loneliness refer, Pt alert and receiving treatment at time of visit, calm and coping, prayers offered.)   Complexity of Encounter Low   Begin Time 1127   End Time  1139   Total Time Calculated 12 min   Spiritual/Emotional needs   Type Spiritual Support;Emotional Distress   Assessment/Intervention/Outcome   Assessment Calm   Intervention Sustaining Presence/Ministry of presence   Outcome Coping   Plan and Referrals   Plan/Referrals Continue Support (comment)       
   12/26/24 1520   NIV Type   NIV Started/Stopped On   Equipment Type v60   Mode Bilevel   Mask Type Full face mask   Mask Size Medium   Assessment   Pulse 78   Heart Rate Source Monitor   Respirations 22   SpO2 99 %   Level of Consciousness 0   Comfort Level Good   Using Accessory Muscles No   Mask Compliance Good   Breath Sounds   Respiratory Pattern Regular   Breath Sounds Bilateral Diminished   Settings/Measurements   IPAP 18 cmH20   CPAP/EPAP 5 cmH2O   Vt (Measured) 659 mL   Rate Ordered 16   Insp Rise Time (%) 3 %   FiO2  40 %   I Time/ I Time % 1 s   Minute Volume (L/min) 10.2 Liters   Mask Leak (lpm) 0 lpm   Patient's Home Machine No   Alarm Settings   Alarms On Y   Low Pressure (cmH2O) 5 cmH2O   High Pressure (cmH2O) 25 cmH2O   Delay Alarm 20 sec(s)   Apnea (secs) 20 secs   RR Low (bpm) 14   RR High (bpm) 40 br/min       
4 Eyes Skin Assessment     NAME:  Kalee Chicas  YOB: 1960  MEDICAL RECORD NUMBER:  [id number]    The patient is being assessed for  Admission    I agree that at least one RN has performed a thorough Head to Toe Skin Assessment on the patient. ALL assessment sites listed below have been assessed.      Areas assessed by both nurses:    Head, Face, Ears, Shoulders, Back, Chest, Arms, Elbows, Hands, Sacrum. Buttock, Coccyx, Ischium, Legs. Feet and Heels, and Under Medical Devices         Does the Patient have a Wound? No noted wound(s)       Clif Prevention initiated by RN: Yes  Wound Care Orders initiated by RN: No    Pressure Injury (Stage 3,4, Unstageable, DTI, NWPT, and Complex wounds) if present, place Wound referral order by RN under : No    New Ostomies, if present place, Ostomy referral order under : No     Nurse 1 eSignature: Electronically signed by Marjorie Schuler RN on 12/25/24 at 5:48 AM EST    **SHARE this note so that the co-signing nurse can place an eSignature**    Nurse 2 eSignature: Electronically signed by Jessica Bowman RN on 12/25/24 at 5:49 AM EST    
Be advised that family turned off bipap.  Respiratory has been notified.   
Discharge plan & instructions reviewed with pt including new medications and follow up appointments. Verbalized understanding.  Arrangements made for family to pick pt up this evening.   
PHARMACY VANCOMYCIN MONITORING SERVICE  Pharmacy consulted by Dr. Nomran for monitoring and adjustment.    Indication for treatment: Vancomycin indication: Other: Sepsis secondary to aspiration pneumonia  Goal trough: Trough Goal: 15-20 mcg/mL  AUC/IVAN: 400-600    Risk Factors for MRSA Identified:   Documented isolation of MRSA within 1 year , Hospitalization within the past 90 days, Received IV antibiotics within the past 90 days    Pertinent Laboratory Values:   Temp Readings from Last 3 Encounters:   12/26/24 98.9 °F (37.2 °C) (Oral)   11/19/24 99.1 °F (37.3 °C) (Oral)   10/07/24 98.4 °F (36.9 °C)     Recent Labs     12/25/24  0540 12/26/24  0545   WBC 8.1 10.7*     Recent Labs     12/25/24  0540 12/26/24  0545   BUN 13 13   CREATININE 0.5* 0.5*     Estimated Creatinine Clearance: 129 mL/min (A) (based on SCr of 0.5 mg/dL (L)).    Intake/Output Summary (Last 24 hours) at 12/26/2024 0844  Last data filed at 12/25/2024 1300  Gross per 24 hour   Intake 370 ml   Output --   Net 370 ml     Last Encounter Weight:  Wt Readings from Last 3 Encounters:   12/25/24 93.4 kg (206 lb)   11/14/24 102.1 kg (225 lb)   10/07/24 90.7 kg (200 lb)       Pertinent Cultures:   Date    Source    Results  12/25   Blood    Collected  12/25   MRSA Nasal   Negative   12/25   Influenza A/B   Negative  12/25   Covid-19   Collected  12/25   Blood     Collected     Vancomycin level:   TROUGH:  No results for input(s): \"VANCOTROUGH\" in the last 72 hours.  RANDOM:  No results for input(s): \"VANCORANDOM\" in the last 72 hours.    Assessment:  HPI: 64 y.o. female with a history of COPD on 6L NC transferred from [place] ED with shortness of breath and cough. Reported to have had an episode of hemoptysis followed by nose bleed.  SCr, BUN, and urine output:   Scr 0.5 mg/dL  BUN WNL  UoP 4x occurrences  Day(s) of therapy: 2 of 7  Vancomycin concentration:   12/27 - to be collected    Plan:  Continue vancomycin 1250 mg IV q12h  Regimen predicts ssAUC 
PHARMACY VANCOMYCIN MONITORING SERVICE  Pharmacy consulted by Dr. [place] for monitoring and adjustment.    Indication for treatment: Vancomycin indication: Other: Sepsis secondary to aspiration pneumonia  Goal trough: Trough Goal: 15-20 mcg/mL  AUC/IVAN: 400-600    Risk Factors for MRSA Identified:   Documented isolation of MRSA within 1 year , Hospitalization within the past 90 days, Received IV antibiotics within the past 90 days    Pertinent Laboratory Values:   Temp Readings from Last 3 Encounters:   12/25/24 97.9 °F (36.6 °C) (Oral)   11/19/24 99.1 °F (37.3 °C) (Oral)   10/07/24 98.4 °F (36.9 °C)     No results for input(s): \"WBC\", \"LACTATE\" in the last 72 hours.  No results for input(s): \"BUN\", \"CREATININE\" in the last 72 hours.  CrCl cannot be calculated (Patient's most recent lab result is older than the maximum 30 days allowed.).  No intake or output data in the 24 hours ending 12/25/24 0542  Last Encounter Weight:  Wt Readings from Last 3 Encounters:   12/25/24 93.4 kg (206 lb)   11/14/24 102.1 kg (225 lb)   10/07/24 90.7 kg (200 lb)       Pertinent Cultures:   Date    Source    Results  12/25   Blood    Collected  12/25   MRSA Nasal   Collected  12/25   Influenza A/B   Negative  12/25   Covid-19   Collected    Vancomycin level:   TROUGH:  No results for input(s): \"VANCOTROUGH\" in the last 72 hours.  RANDOM:  No results for input(s): \"VANCORANDOM\" in the last 72 hours.    Assessment:  HPI: 64 year old female with a history of COPD on 6L NC transferred from [place] ED with shortness of breath and cough. Reported to have had an episode of hemoptysis followed by nose bleed  SCr, BUN, and urine output: Scr = 0.57, BUN = 11 per [facility]  Day(s) of therapy: 1 of 7  Vancomycin concentration: TBD    Plan:  Loading dose of vancomycin administered: 2250 mg. Followed by a scheduled dose of vancomycin 1250 mg every 12 hours.   Predicted trough of 11.4 and AUC: 515 at steady-state  Pharmacy will 
Pulmonary and Critical Care  Progress Note    Subjective:   The patient has improved  Shortness of breath has improved  Chest pain none.  Addressing respiratory complaints Patient is negative for  hemoptysis and cyanosis  CONSTITUTIONAL:  negative for fevers and chills      Past Medical History:     has a past medical history of ADHD, COPD (chronic obstructive pulmonary disease) ([facility]), COVID-19, Drug addiction in remission ([facility]), Hep C w/o coma, chronic ([facility]), Pacemaker, Sleep apnea, and TIA (transient ischemic attack).   has a past surgical history that includes  section (); Cholecystectomy (); and Pacemaker insertion ().   reports that she quit smoking about 8 years ago. Her smoking use included cigarettes. She started smoking about 48 years ago. She has a 40 pack-year smoking history. She has never used smokeless tobacco. She reports that she does not currently use alcohol. She reports that she does not currently use drugs.  Family history:  family history includes Alcohol Abuse in her father; Cancer in her mother; Coronary Art Dis in her brother; Lupus in her brother; Other in her sister; Thyroid Cancer in her mother.    No Known Allergies  Social History:    Reviewed; no changes    Objective:   PHYSICAL EXAM:        VITALS:  BP (!) 115/56   Pulse 74   Temp 98.5 °F (36.9 °C) (Oral)   Resp 25   Ht 1.626 m (5' 4\")   Wt 93.8 kg (206 lb 14.4 oz)   SpO2 96%   BMI 35.51 kg/m²     24HR INTAKE/OUTPUT:    Intake/Output Summary (Last 24 hours) at 2024 1137  Last data filed at 2024 0945  Gross per 24 hour   Intake 1170 ml   Output --   Net 1170 ml       CONSTITUTIONAL:  awake, alert, cooperative, no apparent distress, and appears stated age  LUNGS:  decreased breath sounds  CARDIOVASCULAR:  normal S1 and S2 and negative JVD  ABD:Abdomen soft, non-tender. BS normal. No masses,  No organomegaly  NEURO:Alert.  DATA:    CBC:  Recent Labs     24  0540 24  0545 24  0305 
Pulmonary and Critical Care  Progress Note    Subjective:   The patient is better.  Shortness of breath has improved  Chest pain none.  Addressing respiratory complaints Patient is negative for  hemoptysis and cyanosis  CONSTITUTIONAL:  negative for fevers and chills      Past Medical History:     has a past medical history of ADHD, COPD (chronic obstructive pulmonary disease) ([facility]), COVID-19, Drug addiction in remission ([facility]), Hep C w/o coma, chronic ([facility]), Pacemaker, Sleep apnea, and TIA (transient ischemic attack).   has a past surgical history that includes  section (); Cholecystectomy (); and Pacemaker insertion ().   reports that she quit smoking about 8 years ago. Her smoking use included cigarettes. She started smoking about 48 years ago. She has a 40 pack-year smoking history. She has never used smokeless tobacco. She reports that she does not currently use alcohol. She reports that she does not currently use drugs.  Family history:  family history includes Alcohol Abuse in her father; Cancer in her mother; Coronary Art Dis in her brother; Lupus in her brother; Other in her sister; Thyroid Cancer in her mother.    No Known Allergies  Social History:    Reviewed; no changes    Objective:   PHYSICAL EXAM:        VITALS:  BP (!) 141/75   Pulse 87   Temp 98.4 °F (36.9 °C) (Axillary)   Resp 20   Ht 1.626 m (5' 4\")   Wt 92.1 kg (203 lb)   SpO2 96%   BMI 34.84 kg/m²     24HR INTAKE/OUTPUT:    Intake/Output Summary (Last 24 hours) at 2024 1208  Last data filed at 2024 0600  Gross per 24 hour   Intake 1280 ml   Output --   Net 1280 ml       CONSTITUTIONAL:  awake, alert, cooperative, no apparent distress, and appears stated age  LUNGS:  decreased breath sounds  CARDIOVASCULAR:  normal S1 and S2 and negative JVD  ABD:Abdomen soft, non-tender. BS normal. No masses,  No organomegaly  NEURO:Alert.  DATA:    CBC:  Recent Labs     24  0305   WBC 11.5*   RBC 3.72*   HGB 10.2* 
Spiritual Health History and Assessment/Progress Note  [facility]    Loneliness/Social Isolation,  ,  ,      Name: Kalee Chicas MRN: [id number]    Age: 64 y.o.     Sex: female   Language: English   Baptist: Pentecostalism   Severe sepsis (HCC)     Date: 12/26/2024            Total Time Calculated: 12 min              Spiritual Assessment began in [facility] ICU STEPDOWN        Referral/Consult From: Patient, Nurse   Encounter Overview/Reason: Loneliness/Social Isolation  Service Provided For: Patient    Sol, Belief, Meaning:   Patient identifies as spiritual  Family/Friends No family/friends present      Importance and Influence:  Patient has spiritual/personal beliefs that influence decisions regarding their health  Family/Friends No family/friends present    Community:  Patient feels well-supported. Support system includes: Extended family  Family/Friends No family/friends present    Assessment and Plan of Care:     Patient Interventions include: Affirmed coping skills/support systems  Family/Friends Interventions include: Affirmed coping skills/support systems    Patient Plan of Care: Spiritual Care available upon further referral  Family/Friends Plan of Care: No family/friends present    Electronically signed by KIARA Stephen on 12/26/2024 at 11:40 AM    
12/27/24  0305   WBC 10.7* 11.5*   RBC 3.62* 3.72*   HGB 10.1* 10.2*   HCT 32.8* 34.3*    265   MCV 90.6 92.2   MCH 27.9 27.4   MCHC 30.8* 29.7*   RDW 12.3 12.3      BMP:  Recent Labs     12/26/24  0545 12/27/24  0305    143   K 4.2 3.9   CL 99 97*   CO2 38* 42*   BUN 13 12   CREATININE 0.5* 0.5*   CALCIUM 8.9 9.3   GLUCOSE 182* 142*      ABG:  No results for input(s): \"PH\", \"PO2ART\", \"MDO5FPO\", \"HCO3\", \"BEART\", \"O2SAT\" in the last 72 hours.  Lab Results   Component Value Date    PROBNP 555 (H) 12/27/2024    PROBNP 69 10/04/2024    PROBNP 53.20 06/30/2024     No results found for: \"CULTRESP\"    Radiology Review:  Pertinent images / reports were reviewed as a part of this visit.    Assessment:     Patient Active Problem List   Diagnosis    Chronic respiratory failure    COPD, severe (HCC)    Obstructive sleep apnea    Pneumonia    Chronic hepatitis C without hepatic coma (HCC)    S/P placement of cardiac pacemaker    Gastroesophageal reflux disease without esophagitis    H/O drug abuse (HCC)    Pulmonary nodules    ADHD (attention deficit hyperactivity disorder)    Pulmonary emphysema (HCC)    SOB (shortness of breath)    Leg swelling    Acute on chronic respiratory failure with hypoxia and hypercapnia    COVID-19    Acute exacerbation of chronic obstructive pulmonary disease (COPD) (HCC)    Chronic rhinitis    Fall at home, initial encounter    Asterixis    Hypercapnia    Traumatic closed nondisplaced fracture of distal end of right fibula, initial encounter    Uncontrolled pain    Generalized weakness    Gait disturbance    Myoclonus    Near syncope    Moderate protein-calorie malnutrition (HCC)    Delirium due to another medical condition    Bipolar affective disorder, currently depressed, moderate (HCC)    COPD exacerbation (HCC)    Heart failure (HCC)    Acute congestive heart failure (HCC)    Severe sepsis (HCC)       Plan:   1. Overall the patient has improved  2. Wean FiO2.  3. Inc.

## 2024-12-29 NOTE — PLAN OF CARE
Problem: Chronic Conditions and Co-morbidities  Goal: Patient's chronic conditions and co-morbidity symptoms are monitored and maintained or improved  12/29/2024 0016 by Katherine Hart RN  Outcome: Progressing  12/28/2024 1322 by Cindi Oates RN  Outcome: Progressing     Problem: Discharge Planning  Goal: Discharge to home or other facility with appropriate resources  12/29/2024 0016 by Katherine Hart RN  Outcome: Progressing  12/28/2024 1322 by Cindi Oates RN  Outcome: Progressing     Problem: ABCDS Injury Assessment  Goal: Absence of physical injury  12/29/2024 0016 by Katherine Hart RN  Outcome: Progressing  12/28/2024 1322 by Cindi Oates RN  Outcome: Progressing     Problem: Safety - Adult  Goal: Free from fall injury  12/29/2024 0016 by Katherine Hart RN  Outcome: Progressing  12/28/2024 1322 by Cindi Oates RN  Outcome: Progressing     Problem: Pain  Goal: Verbalizes/displays adequate comfort level or baseline comfort level  12/29/2024 0016 by Katherine Hart RN  Outcome: Progressing  12/28/2024 1322 by Cindi Oates RN  Outcome: Progressing

## 2025-01-04 ENCOUNTER — APPOINTMENT (OUTPATIENT)
Dept: CT IMAGING | Age: 65
End: 2025-01-04
Payer: MEDICARE

## 2025-01-04 ENCOUNTER — HOSPITAL ENCOUNTER (EMERGENCY)
Age: 65
Discharge: ANOTHER HOSPITAL | End: 2025-01-05
Payer: MEDICARE

## 2025-01-04 ENCOUNTER — APPOINTMENT (OUTPATIENT)
Dept: GENERAL RADIOLOGY | Age: 65
End: 2025-01-04
Payer: MEDICARE

## 2025-01-04 VITALS
DIASTOLIC BLOOD PRESSURE: 61 MMHG | OXYGEN SATURATION: 91 % | SYSTOLIC BLOOD PRESSURE: 139 MMHG | RESPIRATION RATE: 16 BRPM | TEMPERATURE: 98.3 F | HEART RATE: 110 BPM

## 2025-01-04 DIAGNOSIS — R04.2 HEMOPTYSIS: ICD-10-CM

## 2025-01-04 DIAGNOSIS — R91.1 PULMONARY NODULE: ICD-10-CM

## 2025-01-04 DIAGNOSIS — I72.9 PSEUDOANEURYSM (HCC): Primary | ICD-10-CM

## 2025-01-04 LAB
ABO + RH BLD: NORMAL
ALBUMIN SERPL-MCNC: 3.6 G/DL (ref 3.4–5)
ALBUMIN/GLOB SERPL: 1.6 {RATIO} (ref 1.1–2.2)
ALP SERPL-CCNC: 64 U/L (ref 40–129)
ALT SERPL-CCNC: 17 U/L (ref 10–40)
ANION GAP SERPL CALCULATED.3IONS-SCNC: 5 MMOL/L (ref 9–17)
AST SERPL-CCNC: 22 U/L (ref 15–37)
BASOPHILS # BLD: 0.05 K/UL
BASOPHILS NFR BLD: 0 % (ref 0–1)
BILIRUB SERPL-MCNC: <0.2 MG/DL (ref 0–1)
BLOOD BANK SAMPLE EXPIRATION: NORMAL
BLOOD GROUP ANTIBODIES SERPL: NEGATIVE
BNP SERPL-MCNC: 78 PG/ML (ref 0–125)
BUN SERPL-MCNC: 12 MG/DL (ref 7–20)
CALCIUM SERPL-MCNC: 9 MG/DL (ref 8.3–10.6)
CHLORIDE SERPL-SCNC: 93 MMOL/L (ref 99–110)
CO2 SERPL-SCNC: 39 MMOL/L (ref 21–32)
CREAT SERPL-MCNC: 0.6 MG/DL (ref 0.6–1.2)
EOSINOPHIL # BLD: 0.81 K/UL
EOSINOPHILS RELATIVE PERCENT: 6 % (ref 0–3)
ERYTHROCYTE [DISTWIDTH] IN BLOOD BY AUTOMATED COUNT: 12.4 % (ref 11.7–14.9)
GFR, ESTIMATED: >90 ML/MIN/1.73M2
GLUCOSE SERPL-MCNC: 147 MG/DL (ref 74–99)
HCT VFR BLD AUTO: 31.1 % (ref 37–47)
HGB BLD-MCNC: 9.3 G/DL (ref 12.5–16)
IMM GRANULOCYTES # BLD AUTO: 0.07 K/UL
IMM GRANULOCYTES NFR BLD: 1 %
INR PPP: 0.9
LIPASE SERPL-CCNC: 20 U/L (ref 13–60)
LYMPHOCYTES NFR BLD: 2.89 K/UL
LYMPHOCYTES RELATIVE PERCENT: 22 % (ref 24–44)
MCH RBC QN AUTO: 27.4 PG (ref 27–31)
MCHC RBC AUTO-ENTMCNC: 29.9 G/DL (ref 32–36)
MCV RBC AUTO: 91.7 FL (ref 78–100)
MONOCYTES NFR BLD: 1.66 K/UL
MONOCYTES NFR BLD: 13 % (ref 0–4)
NEUTROPHILS NFR BLD: 58 % (ref 36–66)
NEUTS SEG NFR BLD: 7.45 K/UL
PLATELET # BLD AUTO: 239 K/UL (ref 140–440)
PMV BLD AUTO: 9.2 FL (ref 7.5–11.1)
POTASSIUM SERPL-SCNC: 4.2 MMOL/L (ref 3.5–5.1)
PROT SERPL-MCNC: 5.8 G/DL (ref 6.4–8.2)
PROTHROMBIN TIME: 12.8 SEC (ref 11.7–14.5)
RBC # BLD AUTO: 3.39 M/UL (ref 4.2–5.4)
SODIUM SERPL-SCNC: 137 MMOL/L (ref 136–145)
TROPONIN I SERPL HS-MCNC: 25 NG/L (ref 0–14)
WBC OTHER # BLD: 12.9 K/UL (ref 4–10.5)

## 2025-01-04 PROCEDURE — 6370000000 HC RX 637 (ALT 250 FOR IP): Performed by: PHYSICIAN ASSISTANT

## 2025-01-04 PROCEDURE — 6360000002 HC RX W HCPCS: Performed by: PHYSICIAN ASSISTANT

## 2025-01-04 PROCEDURE — 99285 EMERGENCY DEPT VISIT HI MDM: CPT

## 2025-01-04 PROCEDURE — 83690 ASSAY OF LIPASE: CPT

## 2025-01-04 PROCEDURE — 83880 ASSAY OF NATRIURETIC PEPTIDE: CPT

## 2025-01-04 PROCEDURE — 86850 RBC ANTIBODY SCREEN: CPT

## 2025-01-04 PROCEDURE — 93005 ELECTROCARDIOGRAM TRACING: CPT | Performed by: PHYSICIAN ASSISTANT

## 2025-01-04 PROCEDURE — 6360000004 HC RX CONTRAST MEDICATION: Performed by: PHYSICIAN ASSISTANT

## 2025-01-04 PROCEDURE — 84484 ASSAY OF TROPONIN QUANT: CPT

## 2025-01-04 PROCEDURE — 2580000003 HC RX 258: Performed by: PHYSICIAN ASSISTANT

## 2025-01-04 PROCEDURE — 80053 COMPREHEN METABOLIC PANEL: CPT

## 2025-01-04 PROCEDURE — 86901 BLOOD TYPING SEROLOGIC RH(D): CPT

## 2025-01-04 PROCEDURE — 85610 PROTHROMBIN TIME: CPT

## 2025-01-04 PROCEDURE — 96374 THER/PROPH/DIAG INJ IV PUSH: CPT

## 2025-01-04 PROCEDURE — 85025 COMPLETE CBC W/AUTO DIFF WBC: CPT

## 2025-01-04 PROCEDURE — 86900 BLOOD TYPING SEROLOGIC ABO: CPT

## 2025-01-04 PROCEDURE — 71045 X-RAY EXAM CHEST 1 VIEW: CPT

## 2025-01-04 PROCEDURE — 94640 AIRWAY INHALATION TREATMENT: CPT

## 2025-01-04 PROCEDURE — 71275 CT ANGIOGRAPHY CHEST: CPT

## 2025-01-04 RX ORDER — IPRATROPIUM BROMIDE AND ALBUTEROL SULFATE 2.5; .5 MG/3ML; MG/3ML
2 SOLUTION RESPIRATORY (INHALATION) ONCE
Status: COMPLETED | OUTPATIENT
Start: 2025-01-04 | End: 2025-01-04

## 2025-01-04 RX ORDER — LORAZEPAM 2 MG/ML
0.5 INJECTION INTRAMUSCULAR ONCE
Status: COMPLETED | OUTPATIENT
Start: 2025-01-05 | End: 2025-01-04

## 2025-01-04 RX ORDER — IOPAMIDOL 755 MG/ML
75 INJECTION, SOLUTION INTRAVASCULAR
Status: COMPLETED | OUTPATIENT
Start: 2025-01-04 | End: 2025-01-04

## 2025-01-04 RX ORDER — 0.9 % SODIUM CHLORIDE 0.9 %
1000 INTRAVENOUS SOLUTION INTRAVENOUS ONCE
Status: COMPLETED | OUTPATIENT
Start: 2025-01-04 | End: 2025-01-04

## 2025-01-04 RX ORDER — MUPIROCIN 20 MG/G
OINTMENT TOPICAL ONCE
Status: COMPLETED | OUTPATIENT
Start: 2025-01-04 | End: 2025-01-04

## 2025-01-04 RX ORDER — ACETAMINOPHEN 500 MG
1000 TABLET ORAL ONCE
Status: COMPLETED | OUTPATIENT
Start: 2025-01-04 | End: 2025-01-04

## 2025-01-04 RX ORDER — ASPIRIN 81 MG/1
324 TABLET, CHEWABLE ORAL ONCE
Status: DISCONTINUED | OUTPATIENT
Start: 2025-01-04 | End: 2025-01-05 | Stop reason: HOSPADM

## 2025-01-04 RX ADMIN — ACETAMINOPHEN 1000 MG: 500 TABLET ORAL at 21:30

## 2025-01-04 RX ADMIN — SODIUM CHLORIDE 1000 ML: 9 INJECTION, SOLUTION INTRAVENOUS at 20:22

## 2025-01-04 RX ADMIN — IOPAMIDOL 75 ML: 755 INJECTION, SOLUTION INTRAVENOUS at 22:03

## 2025-01-04 RX ADMIN — MUPIROCIN: 20 OINTMENT TOPICAL at 21:08

## 2025-01-04 RX ADMIN — IPRATROPIUM BROMIDE AND ALBUTEROL SULFATE 2 DOSE: 2.5; .5 SOLUTION RESPIRATORY (INHALATION) at 23:28

## 2025-01-04 RX ADMIN — LORAZEPAM 0.5 MG: 2 INJECTION INTRAMUSCULAR; INTRAVENOUS at 23:50

## 2025-01-04 ASSESSMENT — PAIN SCALES - GENERAL
PAINLEVEL_OUTOF10: 4
PAINLEVEL_OUTOF10: 0
PAINLEVEL_OUTOF10: 7

## 2025-01-04 ASSESSMENT — PAIN - FUNCTIONAL ASSESSMENT: PAIN_FUNCTIONAL_ASSESSMENT: 0-10

## 2025-01-05 ASSESSMENT — PAIN - FUNCTIONAL ASSESSMENT: PAIN_FUNCTIONAL_ASSESSMENT: 0-10

## 2025-01-05 ASSESSMENT — PAIN SCALES - GENERAL: PAINLEVEL_OUTOF10: 0

## 2025-01-05 NOTE — ED NOTES
[facility]   [address] central room 1200 report number [phone]

## 2025-01-05 NOTE — ED PROVIDER NOTES
ED attending EKG interpretation (I otherwise did not participate in the care of this patient)    EKG Interpretation  Interpreted by me  Compared to 11/14/2024  Rhythm: sinus tachycardia  Rate: tachycardic 112  Axis: normal  Ectopy: none  Conduction: normal  ST Segments: no acute change  T Waves: no acute change  Clinical Impression: Sinus tachycardia     Moon Delgado MD  01/04/25 7827

## 2025-01-05 NOTE — ED PROVIDER NOTES
Triage Chief Complaint:   Hemoptysis (Patient has had multiple episodes since . Patient has had some admissions for the same since then. Unknown where bleeding is coming from. O2 from home. )    [place]:  Today in the ED I had the pleasure of caring for Kalee Chicas who is a 64 y.o. female that presents today to the ED for evaluation for hemoptysis.  Ongoing since .  Pt has hx of chronic respiratory failure on 5-6L at all times.   Pt quit smoking ~1 year ago.     Pt had recent admission here from - for thi. At the time she ws septic, Dx with bronchiectasis.   CT scan showed bronchiectasis.     No hx of alcoholism.      Not on blood thinners.     ROS:  REVIEW OF SYSTEMS    At least 10 systems reviewed      All other review of systems are negative  See HPI and nursing notes for additional information       Past Medical History:   Diagnosis Date    ADHD     COPD (chronic obstructive pulmonary disease) (HCC)     COVID-19     Drug addiction in remission ([facility])     recovering addict    Hep C w/o coma, chronic (HCC)     Pacemaker     Sleep apnea     TIA (transient ischemic attack)     per patient \"several mini strokes\"     Past Surgical History:   Procedure Laterality Date     SECTION      CHOLECYSTECTOMY  2010    done in [place]    PACEMAKER INSERTION       Family History   Problem Relation Age of Onset    Cancer Mother         unsure of type    Thyroid Cancer Mother     Alcohol Abuse Father     Other Sister         passed in car accident    Lupus Brother     Coronary Art Dis Brother      Social History     Socioeconomic History    Marital status:      Spouse name: Not on file    Number of children: Not on file    Years of education: Not on file    Highest education level: Not on file   Occupational History    Not on file   Tobacco Use    Smoking status: Former     Current packs/day: 0.00     Average packs/day: 1 pack/day for 40.0 years (40.0 ttl pk-yrs)     Types: Cigarettes

## 2025-01-05 NOTE — ED NOTES
@ [address] [facility] update room change to Unit CTU 2118 report number [phone]

## 2025-01-05 NOTE — ED NOTES
The following labs were labeled with appropriate pt sticker and tubed to lab:     [x] Blue     [x] Lavender   [] on ice  [x] Green/yellow  [] Green/black [] on ice  [x] Grey  [x] on ice  [] Yellow  [x] Red  [] Pink  [x] Type/ Screen  [] ABG  [] VBG    [] COVID-19 swab    [] Rapid  [] PCR  [] Flu swab  [] Peds Viral Panel     [] Urine Sample  [] Fecal Sample  [] Pelvic Cultures  [] Blood Cultures  [] X 2  [] STREP Cultures  [] Wound Cultures

## 2025-01-05 NOTE — ED NOTES
Patient's insurance will not cover APO-Varenicline but will cover Varenicline Tartrate 1 mg . Responded to call light. Pt advised no needs and stated was not sure why call light was on.

## 2025-01-06 LAB
COHGB MFR BLD: 0.3 % (ref 0.5–1.5)
EKG ATRIAL RATE: 112 BPM
EKG DIAGNOSIS: NORMAL
EKG P AXIS: 73 DEGREES
EKG P-R INTERVAL: 140 MS
EKG Q-T INTERVAL: 334 MS
EKG QRS DURATION: 86 MS
EKG QTC CALCULATION (BAZETT): 455 MS
EKG R AXIS: 28 DEGREES
EKG T AXIS: 66 DEGREES
EKG VENTRICULAR RATE: 112 BPM
HCO3 VENOUS: 37.2 MMOL/L (ref 22–29)
METHEMOGLOBIN: 0.6 % (ref 0.5–1.5)
OXYHGB MFR BLD: 90.3 %
PCO2 VENOUS: 72.1 MM HG (ref 38–54)
PH VENOUS: 7.33 (ref 7.32–7.43)
PO2 VENOUS: 64.3 MM HG (ref 23–48)
POSITIVE BASE EXCESS, VEN: 9.1 MMOL/L (ref 0–3)

## 2025-01-06 PROCEDURE — 93010 ELECTROCARDIOGRAM REPORT: CPT | Performed by: INTERNAL MEDICINE

## 2025-08-02 ENCOUNTER — HOSPITAL ENCOUNTER (INPATIENT)
Age: 65
LOS: 9 days | Discharge: NURSING, REHAB OR LONG-TERM CARE | DRG: 177 | End: 2025-08-11
Attending: STUDENT IN AN ORGANIZED HEALTH CARE EDUCATION/TRAINING PROGRAM | Admitting: STUDENT IN AN ORGANIZED HEALTH CARE EDUCATION/TRAINING PROGRAM
Payer: MEDICARE

## 2025-08-02 ENCOUNTER — APPOINTMENT (OUTPATIENT)
Dept: CT IMAGING | Age: 65
DRG: 177 | End: 2025-08-02
Payer: MEDICARE

## 2025-08-02 ENCOUNTER — APPOINTMENT (OUTPATIENT)
Dept: GENERAL RADIOLOGY | Age: 65
DRG: 177 | End: 2025-08-02
Payer: MEDICARE

## 2025-08-02 DIAGNOSIS — J44.1 COPD EXACERBATION (HCC): Primary | ICD-10-CM

## 2025-08-02 LAB
ALBUMIN SERPL-MCNC: 3.3 G/DL (ref 3.4–5)
ALBUMIN/GLOB SERPL: 1.4 {RATIO} (ref 1.1–2.2)
ALP SERPL-CCNC: 82 U/L (ref 40–129)
ALT SERPL-CCNC: 38 U/L (ref 10–40)
ANION GAP SERPL CALCULATED.3IONS-SCNC: 8 MMOL/L (ref 9–17)
ARTERIAL PATENCY WRIST A: ABNORMAL
AST SERPL-CCNC: 40 U/L (ref 15–37)
B PARAP IS1001 DNA NPH QL NAA+NON-PROBE: NOT DETECTED
B PERT DNA SPEC QL NAA+PROBE: NOT DETECTED
BASOPHILS # BLD: 0.04 K/UL
BASOPHILS NFR BLD: 0 % (ref 0–1)
BILIRUB SERPL-MCNC: <0.2 MG/DL (ref 0–1)
BODY TEMPERATURE: 37
BUN SERPL-MCNC: 10 MG/DL (ref 7–20)
C PNEUM DNA NPH QL NAA+NON-PROBE: NOT DETECTED
CALCIUM SERPL-MCNC: 7.6 MG/DL (ref 8.3–10.6)
CHLORIDE SERPL-SCNC: 104 MMOL/L (ref 99–110)
CO2 SERPL-SCNC: 33 MMOL/L (ref 21–32)
COHGB MFR BLD: 0.5 % (ref 0.5–1.5)
CREAT SERPL-MCNC: 0.4 MG/DL (ref 0.6–1.2)
CRP SERPL HS-MCNC: <3 MG/L (ref 0–5)
EOSINOPHIL # BLD: 0.05 K/UL
EOSINOPHILS RELATIVE PERCENT: 1 % (ref 0–3)
ERYTHROCYTE [DISTWIDTH] IN BLOOD BY AUTOMATED COUNT: 12.6 % (ref 11.7–14.9)
FLUAV RNA NPH QL NAA+NON-PROBE: NOT DETECTED
FLUBV RNA NPH QL NAA+NON-PROBE: NOT DETECTED
GFR, ESTIMATED: >90 ML/MIN/1.73M2
GLUCOSE BLD-MCNC: 181 MG/DL (ref 74–99)
GLUCOSE SERPL-MCNC: 218 MG/DL (ref 74–99)
HADV DNA NPH QL NAA+NON-PROBE: NOT DETECTED
HCO3 VENOUS: 45.4 MMOL/L (ref 22–29)
HCOV 229E RNA NPH QL NAA+NON-PROBE: NOT DETECTED
HCOV HKU1 RNA NPH QL NAA+NON-PROBE: NOT DETECTED
HCOV NL63 RNA NPH QL NAA+NON-PROBE: NOT DETECTED
HCOV OC43 RNA NPH QL NAA+NON-PROBE: NOT DETECTED
HCT VFR BLD AUTO: 42.5 % (ref 37–47)
HGB BLD-MCNC: 12.4 G/DL (ref 12.5–16)
HMPV RNA NPH QL NAA+NON-PROBE: NOT DETECTED
HPIV1 RNA NPH QL NAA+NON-PROBE: NOT DETECTED
HPIV2 RNA NPH QL NAA+NON-PROBE: NOT DETECTED
HPIV3 RNA NPH QL NAA+NON-PROBE: NOT DETECTED
HPIV4 RNA NPH QL NAA+NON-PROBE: NOT DETECTED
IMM GRANULOCYTES # BLD AUTO: 0.04 K/UL
IMM GRANULOCYTES NFR BLD: 0 %
LYMPHOCYTES NFR BLD: 1.92 K/UL
LYMPHOCYTES RELATIVE PERCENT: 18 % (ref 24–44)
M PNEUMO DNA NPH QL NAA+NON-PROBE: NOT DETECTED
MCH RBC QN AUTO: 26.6 PG (ref 27–31)
MCHC RBC AUTO-ENTMCNC: 29.2 G/DL (ref 32–36)
MCV RBC AUTO: 91 FL (ref 78–100)
METHEMOGLOBIN: 0.4 % (ref 0.5–1.5)
MONOCYTES NFR BLD: 0.29 K/UL
MONOCYTES NFR BLD: 3 % (ref 0–5)
NEUTROPHILS NFR BLD: 78 % (ref 36–66)
NEUTS SEG NFR BLD: 8.55 K/UL
OXYHGB MFR BLD: 95.7 %
PCO2 VENOUS: 94.3 MM HG (ref 38–54)
PH VENOUS: 7.3 (ref 7.32–7.43)
PLATELET # BLD AUTO: 254 K/UL (ref 140–440)
PMV BLD AUTO: 10 FL (ref 7.5–11.1)
PO2 VENOUS: 93.5 MM HG (ref 23–48)
POSITIVE BASE EXCESS, VEN: 14.5 MMOL/L (ref 0–3)
POTASSIUM SERPL-SCNC: 3.4 MMOL/L (ref 3.5–5.1)
PROCALCITONIN SERPL-MCNC: 0.05 NG/ML
PROT SERPL-MCNC: 5.6 G/DL (ref 6.4–8.2)
RBC # BLD AUTO: 4.67 M/UL (ref 4.2–5.4)
RSV RNA NPH QL NAA+NON-PROBE: NOT DETECTED
RV+EV RNA NPH QL NAA+NON-PROBE: NOT DETECTED
SARS-COV-2 RNA NPH QL NAA+NON-PROBE: NOT DETECTED
SODIUM SERPL-SCNC: 145 MMOL/L (ref 136–145)
SPECIMEN DESCRIPTION: NORMAL
TEXT FOR RESPIRATORY: ABNORMAL
TROPONIN I SERPL HS-MCNC: 17 NG/L (ref 0–14)
TROPONIN I SERPL HS-MCNC: 21 NG/L (ref 0–14)
WBC OTHER # BLD: 10.9 K/UL (ref 4–10.5)

## 2025-08-02 PROCEDURE — 84145 PROCALCITONIN (PCT): CPT

## 2025-08-02 PROCEDURE — 2700000000 HC OXYGEN THERAPY PER DAY

## 2025-08-02 PROCEDURE — 2140000000 HC CCU INTERMEDIATE R&B

## 2025-08-02 PROCEDURE — 71045 X-RAY EXAM CHEST 1 VIEW: CPT

## 2025-08-02 PROCEDURE — 82962 GLUCOSE BLOOD TEST: CPT

## 2025-08-02 PROCEDURE — 99285 EMERGENCY DEPT VISIT HI MDM: CPT

## 2025-08-02 PROCEDURE — 6370000000 HC RX 637 (ALT 250 FOR IP): Performed by: STUDENT IN AN ORGANIZED HEALTH CARE EDUCATION/TRAINING PROGRAM

## 2025-08-02 PROCEDURE — 6360000002 HC RX W HCPCS: Performed by: STUDENT IN AN ORGANIZED HEALTH CARE EDUCATION/TRAINING PROGRAM

## 2025-08-02 PROCEDURE — 2500000003 HC RX 250 WO HCPCS: Performed by: STUDENT IN AN ORGANIZED HEALTH CARE EDUCATION/TRAINING PROGRAM

## 2025-08-02 PROCEDURE — 6360000004 HC RX CONTRAST MEDICATION: Performed by: STUDENT IN AN ORGANIZED HEALTH CARE EDUCATION/TRAINING PROGRAM

## 2025-08-02 PROCEDURE — 36415 COLL VENOUS BLD VENIPUNCTURE: CPT

## 2025-08-02 PROCEDURE — 85025 COMPLETE CBC W/AUTO DIFF WBC: CPT

## 2025-08-02 PROCEDURE — 71275 CT ANGIOGRAPHY CHEST: CPT

## 2025-08-02 PROCEDURE — 94761 N-INVAS EAR/PLS OXIMETRY MLT: CPT

## 2025-08-02 PROCEDURE — 0202U NFCT DS 22 TRGT SARS-COV-2: CPT

## 2025-08-02 PROCEDURE — 84484 ASSAY OF TROPONIN QUANT: CPT

## 2025-08-02 PROCEDURE — 82805 BLOOD GASES W/O2 SATURATION: CPT

## 2025-08-02 PROCEDURE — 94640 AIRWAY INHALATION TREATMENT: CPT

## 2025-08-02 PROCEDURE — 5A09357 ASSISTANCE WITH RESPIRATORY VENTILATION, LESS THAN 24 CONSECUTIVE HOURS, CONTINUOUS POSITIVE AIRWAY PRESSURE: ICD-10-PCS | Performed by: INTERNAL MEDICINE

## 2025-08-02 PROCEDURE — 6360000002 HC RX W HCPCS

## 2025-08-02 PROCEDURE — 2580000003 HC RX 258: Performed by: STUDENT IN AN ORGANIZED HEALTH CARE EDUCATION/TRAINING PROGRAM

## 2025-08-02 PROCEDURE — 86140 C-REACTIVE PROTEIN: CPT

## 2025-08-02 PROCEDURE — 94660 CPAP INITIATION&MGMT: CPT

## 2025-08-02 PROCEDURE — 80053 COMPREHEN METABOLIC PANEL: CPT

## 2025-08-02 PROCEDURE — 96374 THER/PROPH/DIAG INJ IV PUSH: CPT

## 2025-08-02 PROCEDURE — 93005 ELECTROCARDIOGRAM TRACING: CPT | Performed by: STUDENT IN AN ORGANIZED HEALTH CARE EDUCATION/TRAINING PROGRAM

## 2025-08-02 PROCEDURE — 87641 MR-STAPH DNA AMP PROBE: CPT

## 2025-08-02 RX ORDER — ONDANSETRON 4 MG/1
4 TABLET, ORALLY DISINTEGRATING ORAL EVERY 8 HOURS PRN
Status: DISCONTINUED | OUTPATIENT
Start: 2025-08-02 | End: 2025-08-11 | Stop reason: HOSPADM

## 2025-08-02 RX ORDER — HYDROXYZINE HYDROCHLORIDE 25 MG/1
25 TABLET, FILM COATED ORAL 2 TIMES DAILY PRN
Status: DISCONTINUED | OUTPATIENT
Start: 2025-08-02 | End: 2025-08-10

## 2025-08-02 RX ORDER — ALBUTEROL SULFATE 0.83 MG/ML
SOLUTION RESPIRATORY (INHALATION)
Status: COMPLETED
Start: 2025-08-02 | End: 2025-08-02

## 2025-08-02 RX ORDER — ACETAMINOPHEN 325 MG/1
650 TABLET ORAL EVERY 6 HOURS PRN
Status: DISCONTINUED | OUTPATIENT
Start: 2025-08-02 | End: 2025-08-11 | Stop reason: HOSPADM

## 2025-08-02 RX ORDER — BUPRENORPHINE AND NALOXONE 8; 2 MG/1; MG/1
1 FILM, SOLUBLE BUCCAL; SUBLINGUAL 3 TIMES DAILY
Status: DISCONTINUED | OUTPATIENT
Start: 2025-08-02 | End: 2025-08-11 | Stop reason: HOSPADM

## 2025-08-02 RX ORDER — GUAIFENESIN/DEXTROMETHORPHAN 100-10MG/5
5 SYRUP ORAL EVERY 4 HOURS PRN
Status: DISCONTINUED | OUTPATIENT
Start: 2025-08-02 | End: 2025-08-11 | Stop reason: HOSPADM

## 2025-08-02 RX ORDER — PREDNISONE 20 MG/1
40 TABLET ORAL DAILY
Status: COMPLETED | OUTPATIENT
Start: 2025-08-05 | End: 2025-08-07

## 2025-08-02 RX ORDER — GLUCAGON 1 MG/ML
1 KIT INJECTION PRN
Status: DISCONTINUED | OUTPATIENT
Start: 2025-08-02 | End: 2025-08-11 | Stop reason: HOSPADM

## 2025-08-02 RX ORDER — POTASSIUM CHLORIDE 7.45 MG/ML
10 INJECTION INTRAVENOUS ONCE
Status: COMPLETED | OUTPATIENT
Start: 2025-08-02 | End: 2025-08-02

## 2025-08-02 RX ORDER — SODIUM CHLORIDE 0.9 % (FLUSH) 0.9 %
5-40 SYRINGE (ML) INJECTION EVERY 12 HOURS SCHEDULED
Status: DISCONTINUED | OUTPATIENT
Start: 2025-08-02 | End: 2025-08-11 | Stop reason: HOSPADM

## 2025-08-02 RX ORDER — PANTOPRAZOLE SODIUM 40 MG/1
40 TABLET, DELAYED RELEASE ORAL
Status: DISCONTINUED | OUTPATIENT
Start: 2025-08-03 | End: 2025-08-11 | Stop reason: HOSPADM

## 2025-08-02 RX ORDER — INSULIN LISPRO 100 [IU]/ML
0-4 INJECTION, SOLUTION INTRAVENOUS; SUBCUTANEOUS
Status: DISCONTINUED | OUTPATIENT
Start: 2025-08-02 | End: 2025-08-04

## 2025-08-02 RX ORDER — IPRATROPIUM BROMIDE AND ALBUTEROL SULFATE 2.5; .5 MG/3ML; MG/3ML
1 SOLUTION RESPIRATORY (INHALATION)
Status: DISCONTINUED | OUTPATIENT
Start: 2025-08-02 | End: 2025-08-11 | Stop reason: HOSPADM

## 2025-08-02 RX ORDER — ACETAMINOPHEN 650 MG/1
650 SUPPOSITORY RECTAL EVERY 6 HOURS PRN
Status: DISCONTINUED | OUTPATIENT
Start: 2025-08-02 | End: 2025-08-11 | Stop reason: HOSPADM

## 2025-08-02 RX ORDER — DEXTROSE MONOHYDRATE 100 MG/ML
INJECTION, SOLUTION INTRAVENOUS CONTINUOUS PRN
Status: DISCONTINUED | OUTPATIENT
Start: 2025-08-02 | End: 2025-08-11 | Stop reason: HOSPADM

## 2025-08-02 RX ORDER — POLYETHYLENE GLYCOL 3350 17 G/17G
17 POWDER, FOR SOLUTION ORAL DAILY PRN
Status: DISCONTINUED | OUTPATIENT
Start: 2025-08-02 | End: 2025-08-11 | Stop reason: HOSPADM

## 2025-08-02 RX ORDER — SODIUM CHLORIDE 0.9 % (FLUSH) 0.9 %
5-40 SYRINGE (ML) INJECTION PRN
Status: DISCONTINUED | OUTPATIENT
Start: 2025-08-02 | End: 2025-08-11 | Stop reason: HOSPADM

## 2025-08-02 RX ORDER — IOPAMIDOL 755 MG/ML
75 INJECTION, SOLUTION INTRAVASCULAR
Status: COMPLETED | OUTPATIENT
Start: 2025-08-02 | End: 2025-08-02

## 2025-08-02 RX ORDER — ONDANSETRON 2 MG/ML
4 INJECTION INTRAMUSCULAR; INTRAVENOUS EVERY 6 HOURS PRN
Status: DISCONTINUED | OUTPATIENT
Start: 2025-08-02 | End: 2025-08-11 | Stop reason: HOSPADM

## 2025-08-02 RX ORDER — TRAZODONE HYDROCHLORIDE 50 MG/1
300 TABLET ORAL NIGHTLY
Status: DISCONTINUED | OUTPATIENT
Start: 2025-08-02 | End: 2025-08-11 | Stop reason: HOSPADM

## 2025-08-02 RX ORDER — IPRATROPIUM BROMIDE AND ALBUTEROL SULFATE 2.5; .5 MG/3ML; MG/3ML
2 SOLUTION RESPIRATORY (INHALATION)
Status: COMPLETED | OUTPATIENT
Start: 2025-08-02 | End: 2025-08-02

## 2025-08-02 RX ORDER — ASPIRIN 81 MG/1
81 TABLET, CHEWABLE ORAL DAILY
Status: DISCONTINUED | OUTPATIENT
Start: 2025-08-02 | End: 2025-08-11 | Stop reason: HOSPADM

## 2025-08-02 RX ORDER — ENOXAPARIN SODIUM 100 MG/ML
40 INJECTION SUBCUTANEOUS DAILY
Status: DISCONTINUED | OUTPATIENT
Start: 2025-08-02 | End: 2025-08-11 | Stop reason: HOSPADM

## 2025-08-02 RX ORDER — BUDESONIDE AND FORMOTEROL FUMARATE DIHYDRATE 160; 4.5 UG/1; UG/1
2 AEROSOL RESPIRATORY (INHALATION)
Status: DISCONTINUED | OUTPATIENT
Start: 2025-08-02 | End: 2025-08-11 | Stop reason: HOSPADM

## 2025-08-02 RX ORDER — SODIUM CHLORIDE 9 MG/ML
INJECTION, SOLUTION INTRAVENOUS PRN
Status: DISCONTINUED | OUTPATIENT
Start: 2025-08-02 | End: 2025-08-11 | Stop reason: HOSPADM

## 2025-08-02 RX ADMIN — ASPIRIN 81 MG: 81 TABLET, CHEWABLE ORAL at 20:28

## 2025-08-02 RX ADMIN — ENOXAPARIN SODIUM 40 MG: 100 INJECTION SUBCUTANEOUS at 20:28

## 2025-08-02 RX ADMIN — POTASSIUM CHLORIDE 10 MEQ: 7.46 INJECTION, SOLUTION INTRAVENOUS at 18:07

## 2025-08-02 RX ADMIN — PIPERACILLIN AND TAZOBACTAM 3375 MG: 3; .375 INJECTION, POWDER, LYOPHILIZED, FOR SOLUTION INTRAVENOUS; PARENTERAL at 20:47

## 2025-08-02 RX ADMIN — BUPRENORPHINE AND NALOXONE 1 FILM: 8; 2 FILM BUCCAL; SUBLINGUAL at 20:28

## 2025-08-02 RX ADMIN — IPRATROPIUM BROMIDE AND ALBUTEROL SULFATE 2 DOSE: .5; 2.5 SOLUTION RESPIRATORY (INHALATION) at 14:45

## 2025-08-02 RX ADMIN — SODIUM CHLORIDE, PRESERVATIVE FREE 10 ML: 5 INJECTION INTRAVENOUS at 20:29

## 2025-08-02 RX ADMIN — WATER 125 MG: 1 INJECTION INTRAMUSCULAR; INTRAVENOUS; SUBCUTANEOUS at 14:45

## 2025-08-02 RX ADMIN — IOPAMIDOL 75 ML: 755 INJECTION, SOLUTION INTRAVENOUS at 18:43

## 2025-08-02 RX ADMIN — MICONAZOLE NITRATE: 2 POWDER TOPICAL at 20:28

## 2025-08-02 RX ADMIN — IPRATROPIUM BROMIDE: 0.5 SOLUTION RESPIRATORY (INHALATION) at 16:21

## 2025-08-02 RX ADMIN — INSULIN LISPRO 1 UNITS: 100 INJECTION, SOLUTION INTRAVENOUS; SUBCUTANEOUS at 20:28

## 2025-08-02 RX ADMIN — TRAZODONE HYDROCHLORIDE 300 MG: 50 TABLET ORAL at 20:28

## 2025-08-02 RX ADMIN — SODIUM CHLORIDE: 0.9 INJECTION, SOLUTION INTRAVENOUS at 20:43

## 2025-08-02 RX ADMIN — ALBUTEROL SULFATE: 2.5 SOLUTION RESPIRATORY (INHALATION) at 16:20

## 2025-08-02 RX ADMIN — VANCOMYCIN HYDROCHLORIDE 2250 MG: 1 INJECTION, POWDER, LYOPHILIZED, FOR SOLUTION INTRAVENOUS at 21:29

## 2025-08-02 ASSESSMENT — PAIN DESCRIPTION - LOCATION: LOCATION: ARM

## 2025-08-02 ASSESSMENT — PAIN SCALES - GENERAL
PAINLEVEL_OUTOF10: 0
PAINLEVEL_OUTOF10: 10

## 2025-08-02 ASSESSMENT — PAIN DESCRIPTION - DESCRIPTORS: DESCRIPTORS: DISCOMFORT;ACHING

## 2025-08-02 ASSESSMENT — PAIN DESCRIPTION - ORIENTATION: ORIENTATION: LEFT

## 2025-08-03 LAB
ANION GAP SERPL CALCULATED.3IONS-SCNC: 8 MMOL/L (ref 9–17)
ARTERIAL PATENCY WRIST A: ABNORMAL
ARTERIAL PATENCY WRIST A: ABNORMAL
BASOPHILS # BLD: 0.02 K/UL
BASOPHILS NFR BLD: 0 % (ref 0–1)
BDY SITE: ABNORMAL
BDY SITE: ABNORMAL
BODY TEMPERATURE: 37
BODY TEMPERATURE: 37
BUN SERPL-MCNC: 13 MG/DL (ref 7–20)
CALCIUM SERPL-MCNC: 9.4 MG/DL (ref 8.3–10.6)
CHLORIDE SERPL-SCNC: 97 MMOL/L (ref 99–110)
CO2 SERPL-SCNC: 36 MMOL/L (ref 21–32)
COHGB MFR BLD: 0.3 % (ref 0.5–1.5)
COHGB MFR BLD: 0.3 % (ref 0.5–1.5)
CREAT SERPL-MCNC: 0.5 MG/DL (ref 0.6–1.2)
EOSINOPHIL # BLD: 0 K/UL
EOSINOPHILS RELATIVE PERCENT: 0 % (ref 0–3)
ERYTHROCYTE [DISTWIDTH] IN BLOOD BY AUTOMATED COUNT: 12.5 % (ref 11.7–14.9)
GAS FLOW.O2 O2 DELIVERY SYS: ABNORMAL L/MIN
GAS FLOW.O2 O2 DELIVERY SYS: ABNORMAL L/MIN
GAS FLOW.O2 SETTING OXYMISER: 4 L/MIN
GFR, ESTIMATED: >90 ML/MIN/1.73M2
GLUCOSE BLD-MCNC: 135 MG/DL (ref 74–99)
GLUCOSE BLD-MCNC: 203 MG/DL (ref 74–99)
GLUCOSE BLD-MCNC: 222 MG/DL (ref 74–99)
GLUCOSE BLD-MCNC: 234 MG/DL (ref 74–99)
GLUCOSE SERPL-MCNC: 134 MG/DL (ref 74–99)
HCO3 ARTERIAL: 32.2 MMOL/L (ref 21–28)
HCO3 ARTERIAL: 37 MMOL/L (ref 21–28)
HCT VFR BLD AUTO: 41 % (ref 37–47)
HGB BLD-MCNC: 11.7 G/DL (ref 12.5–16)
IMM GRANULOCYTES # BLD AUTO: 0.03 K/UL
IMM GRANULOCYTES NFR BLD: 0 %
LYMPHOCYTES NFR BLD: 1.95 K/UL
LYMPHOCYTES RELATIVE PERCENT: 23 % (ref 24–44)
MAGNESIUM SERPL-MCNC: 2 MG/DL (ref 1.8–2.4)
MCH RBC QN AUTO: 26.5 PG (ref 27–31)
MCHC RBC AUTO-ENTMCNC: 28.5 G/DL (ref 32–36)
MCV RBC AUTO: 93 FL (ref 78–100)
METHEMOGLOBIN: 0.7 % (ref 0.5–1.5)
METHEMOGLOBIN: 0.9 % (ref 0.5–1.5)
MONOCYTES NFR BLD: 0.76 K/UL
MONOCYTES NFR BLD: 9 % (ref 0–5)
MRSA, DNA, NASAL: NOT DETECTED
NEUTROPHILS NFR BLD: 67 % (ref 36–66)
NEUTS SEG NFR BLD: 5.65 K/UL
OXYHGB MFR BLD: 90.7 %
OXYHGB MFR BLD: 96.7 %
PCO2 ARTERIAL: 50.6 MMHG (ref 32–45)
PCO2 ARTERIAL: 71.1 MMHG (ref 32–45)
PH ARTERIAL: 7.33 (ref 7.35–7.45)
PH ARTERIAL: 7.42 (ref 7.35–7.45)
PLATELET # BLD AUTO: 228 K/UL (ref 140–440)
PMV BLD AUTO: 10.3 FL (ref 7.5–11.1)
PO2 ARTERIAL: 114.3 MMHG (ref 83–108)
PO2 ARTERIAL: 64.7 MMHG (ref 83–108)
POSITIVE BASE EXCESS, ART: 6.7 MMOL/L (ref 0–3)
POSITIVE BASE EXCESS, ART: 8.7 MMOL/L (ref 0–3)
POTASSIUM SERPL-SCNC: 4 MMOL/L (ref 3.5–5.1)
RBC # BLD AUTO: 4.41 M/UL (ref 4.2–5.4)
SODIUM SERPL-SCNC: 141 MMOL/L (ref 136–145)
SPECIMEN DESCRIPTION: NORMAL
TEXT FOR RESPIRATORY: ABNORMAL
WBC OTHER # BLD: 8.4 K/UL (ref 4–10.5)

## 2025-08-03 PROCEDURE — 6370000000 HC RX 637 (ALT 250 FOR IP): Performed by: INTERNAL MEDICINE

## 2025-08-03 PROCEDURE — 80048 BASIC METABOLIC PNL TOTAL CA: CPT

## 2025-08-03 PROCEDURE — 36600 WITHDRAWAL OF ARTERIAL BLOOD: CPT

## 2025-08-03 PROCEDURE — 36415 COLL VENOUS BLD VENIPUNCTURE: CPT

## 2025-08-03 PROCEDURE — 94660 CPAP INITIATION&MGMT: CPT

## 2025-08-03 PROCEDURE — 2700000000 HC OXYGEN THERAPY PER DAY

## 2025-08-03 PROCEDURE — 83735 ASSAY OF MAGNESIUM: CPT

## 2025-08-03 PROCEDURE — 2580000003 HC RX 258: Performed by: STUDENT IN AN ORGANIZED HEALTH CARE EDUCATION/TRAINING PROGRAM

## 2025-08-03 PROCEDURE — 94761 N-INVAS EAR/PLS OXIMETRY MLT: CPT

## 2025-08-03 PROCEDURE — 85025 COMPLETE CBC W/AUTO DIFF WBC: CPT

## 2025-08-03 PROCEDURE — 82962 GLUCOSE BLOOD TEST: CPT

## 2025-08-03 PROCEDURE — 87449 NOS EACH ORGANISM AG IA: CPT

## 2025-08-03 PROCEDURE — 6360000002 HC RX W HCPCS: Performed by: STUDENT IN AN ORGANIZED HEALTH CARE EDUCATION/TRAINING PROGRAM

## 2025-08-03 PROCEDURE — 6370000000 HC RX 637 (ALT 250 FOR IP): Performed by: STUDENT IN AN ORGANIZED HEALTH CARE EDUCATION/TRAINING PROGRAM

## 2025-08-03 PROCEDURE — 2140000000 HC CCU INTERMEDIATE R&B

## 2025-08-03 PROCEDURE — 82805 BLOOD GASES W/O2 SATURATION: CPT

## 2025-08-03 PROCEDURE — 2500000003 HC RX 250 WO HCPCS: Performed by: STUDENT IN AN ORGANIZED HEALTH CARE EDUCATION/TRAINING PROGRAM

## 2025-08-03 PROCEDURE — 94640 AIRWAY INHALATION TREATMENT: CPT

## 2025-08-03 RX ORDER — GUAIFENESIN 600 MG/1
600 TABLET, EXTENDED RELEASE ORAL 2 TIMES DAILY
Status: DISCONTINUED | OUTPATIENT
Start: 2025-08-03 | End: 2025-08-11 | Stop reason: HOSPADM

## 2025-08-03 RX ADMIN — METHYLPREDNISOLONE SODIUM SUCCINATE 40 MG: 40 INJECTION, POWDER, LYOPHILIZED, FOR SOLUTION INTRAMUSCULAR; INTRAVENOUS at 12:09

## 2025-08-03 RX ADMIN — INSULIN LISPRO 1 UNITS: 100 INJECTION, SOLUTION INTRAVENOUS; SUBCUTANEOUS at 12:00

## 2025-08-03 RX ADMIN — HYDROXYZINE HYDROCHLORIDE 25 MG: 25 TABLET ORAL at 09:42

## 2025-08-03 RX ADMIN — ACETAMINOPHEN 650 MG: 325 TABLET ORAL at 22:05

## 2025-08-03 RX ADMIN — PIPERACILLIN AND TAZOBACTAM 3375 MG: 3; .375 INJECTION, POWDER, FOR SOLUTION INTRAVENOUS at 11:52

## 2025-08-03 RX ADMIN — METHYLPREDNISOLONE SODIUM SUCCINATE 40 MG: 40 INJECTION, POWDER, LYOPHILIZED, FOR SOLUTION INTRAMUSCULAR; INTRAVENOUS at 18:26

## 2025-08-03 RX ADMIN — INSULIN LISPRO 1 UNITS: 100 INJECTION, SOLUTION INTRAVENOUS; SUBCUTANEOUS at 18:00

## 2025-08-03 RX ADMIN — ENOXAPARIN SODIUM 40 MG: 100 INJECTION SUBCUTANEOUS at 09:42

## 2025-08-03 RX ADMIN — IPRATROPIUM BROMIDE AND ALBUTEROL SULFATE 1 DOSE: .5; 2.5 SOLUTION RESPIRATORY (INHALATION) at 11:50

## 2025-08-03 RX ADMIN — INSULIN LISPRO 1 UNITS: 100 INJECTION, SOLUTION INTRAVENOUS; SUBCUTANEOUS at 21:43

## 2025-08-03 RX ADMIN — BUPRENORPHINE AND NALOXONE 1 FILM: 8; 2 FILM BUCCAL; SUBLINGUAL at 15:17

## 2025-08-03 RX ADMIN — BUPRENORPHINE AND NALOXONE 1 FILM: 8; 2 FILM BUCCAL; SUBLINGUAL at 21:43

## 2025-08-03 RX ADMIN — BUDESONIDE AND FORMOTEROL FUMARATE DIHYDRATE 2 PUFF: 160; 4.5 AEROSOL RESPIRATORY (INHALATION) at 19:24

## 2025-08-03 RX ADMIN — SODIUM CHLORIDE 1250 MG: 0.9 INJECTION, SOLUTION INTRAVENOUS at 09:47

## 2025-08-03 RX ADMIN — BUPRENORPHINE AND NALOXONE 1 FILM: 8; 2 FILM BUCCAL; SUBLINGUAL at 09:43

## 2025-08-03 RX ADMIN — METHYLPREDNISOLONE SODIUM SUCCINATE 40 MG: 40 INJECTION, POWDER, LYOPHILIZED, FOR SOLUTION INTRAMUSCULAR; INTRAVENOUS at 06:39

## 2025-08-03 RX ADMIN — BUDESONIDE AND FORMOTEROL FUMARATE DIHYDRATE 2 PUFF: 160; 4.5 AEROSOL RESPIRATORY (INHALATION) at 07:08

## 2025-08-03 RX ADMIN — MICONAZOLE NITRATE: 2 POWDER TOPICAL at 09:55

## 2025-08-03 RX ADMIN — ASPIRIN 81 MG: 81 TABLET, CHEWABLE ORAL at 09:42

## 2025-08-03 RX ADMIN — IPRATROPIUM BROMIDE AND ALBUTEROL SULFATE 1 DOSE: .5; 2.5 SOLUTION RESPIRATORY (INHALATION) at 07:04

## 2025-08-03 RX ADMIN — PANTOPRAZOLE SODIUM 40 MG: 40 TABLET, DELAYED RELEASE ORAL at 06:39

## 2025-08-03 RX ADMIN — GUAIFENESIN 600 MG: 600 TABLET ORAL at 15:17

## 2025-08-03 RX ADMIN — IPRATROPIUM BROMIDE AND ALBUTEROL SULFATE 1 DOSE: .5; 2.5 SOLUTION RESPIRATORY (INHALATION) at 16:02

## 2025-08-03 RX ADMIN — SODIUM CHLORIDE, PRESERVATIVE FREE 10 ML: 5 INJECTION INTRAVENOUS at 09:45

## 2025-08-03 RX ADMIN — IPRATROPIUM BROMIDE AND ALBUTEROL SULFATE 1 DOSE: .5; 2.5 SOLUTION RESPIRATORY (INHALATION) at 19:23

## 2025-08-03 RX ADMIN — MICONAZOLE NITRATE: 2 POWDER TOPICAL at 21:45

## 2025-08-03 RX ADMIN — PIPERACILLIN AND TAZOBACTAM 3375 MG: 3; .375 INJECTION, POWDER, FOR SOLUTION INTRAVENOUS at 02:48

## 2025-08-03 RX ADMIN — TRAZODONE HYDROCHLORIDE 300 MG: 50 TABLET ORAL at 21:43

## 2025-08-03 RX ADMIN — PIPERACILLIN AND TAZOBACTAM 3375 MG: 3; .375 INJECTION, POWDER, FOR SOLUTION INTRAVENOUS at 19:31

## 2025-08-03 ASSESSMENT — PAIN DESCRIPTION - FREQUENCY: FREQUENCY: CONTINUOUS

## 2025-08-03 ASSESSMENT — PAIN DESCRIPTION - ORIENTATION
ORIENTATION: LEFT;RIGHT
ORIENTATION: RIGHT;LEFT

## 2025-08-03 ASSESSMENT — PAIN DESCRIPTION - PAIN TYPE: TYPE: CHRONIC PAIN

## 2025-08-03 ASSESSMENT — PAIN DESCRIPTION - LOCATION
LOCATION: ARM;KNEE
LOCATION: ARM;KNEE

## 2025-08-03 ASSESSMENT — PAIN DESCRIPTION - DESCRIPTORS
DESCRIPTORS: ACHING;DISCOMFORT
DESCRIPTORS: ACHING;DISCOMFORT

## 2025-08-03 ASSESSMENT — PAIN - FUNCTIONAL ASSESSMENT: PAIN_FUNCTIONAL_ASSESSMENT: ACTIVITIES ARE NOT PREVENTED

## 2025-08-03 ASSESSMENT — PAIN SCALES - GENERAL
PAINLEVEL_OUTOF10: 9
PAINLEVEL_OUTOF10: 9

## 2025-08-03 ASSESSMENT — PAIN DESCRIPTION - ONSET: ONSET: ON-GOING

## 2025-08-04 PROBLEM — J47.1 BRONCHIECTASIS WITH ACUTE EXACERBATION (HCC): Status: ACTIVE | Noted: 2025-08-04

## 2025-08-04 LAB
ALBUMIN SERPL-MCNC: 3.5 G/DL (ref 3.4–5)
ALBUMIN/GLOB SERPL: 1.2 {RATIO} (ref 1.1–2.2)
ALP SERPL-CCNC: 80 U/L (ref 40–129)
ALT SERPL-CCNC: 22 U/L (ref 10–40)
ANION GAP SERPL CALCULATED.3IONS-SCNC: 7 MMOL/L (ref 9–17)
ARTERIAL PATENCY WRIST A: ABNORMAL
AST SERPL-CCNC: 18 U/L (ref 15–37)
BASOPHILS # BLD: 0.01 K/UL
BASOPHILS NFR BLD: 0 % (ref 0–1)
BILIRUB SERPL-MCNC: <0.2 MG/DL (ref 0–1)
BODY TEMPERATURE: 37
BUN SERPL-MCNC: 17 MG/DL (ref 7–20)
CALCIUM SERPL-MCNC: 9 MG/DL (ref 8.3–10.6)
CHLORIDE SERPL-SCNC: 97 MMOL/L (ref 99–110)
CO2 SERPL-SCNC: 35 MMOL/L (ref 21–32)
COHGB MFR BLD: 1 % (ref 0.5–1.5)
CREAT SERPL-MCNC: 0.7 MG/DL (ref 0.6–1.2)
DATE LAST DOSE: ABNORMAL
EKG ATRIAL RATE: 107 BPM
EKG DIAGNOSIS: NORMAL
EKG P AXIS: 78 DEGREES
EKG P-R INTERVAL: 146 MS
EKG Q-T INTERVAL: 332 MS
EKG QRS DURATION: 86 MS
EKG QTC CALCULATION (BAZETT): 443 MS
EKG R AXIS: 47 DEGREES
EKG T AXIS: 58 DEGREES
EKG VENTRICULAR RATE: 107 BPM
EOSINOPHIL # BLD: 0 K/UL
EOSINOPHILS RELATIVE PERCENT: 0 % (ref 0–3)
ERYTHROCYTE [DISTWIDTH] IN BLOOD BY AUTOMATED COUNT: 12.5 % (ref 11.7–14.9)
GAS FLOW.O2 O2 DELIVERY SYS: ABNORMAL L/MIN
GFR, ESTIMATED: 86 ML/MIN/1.73M2
GLUCOSE BLD-MCNC: 186 MG/DL (ref 74–99)
GLUCOSE BLD-MCNC: 186 MG/DL (ref 74–99)
GLUCOSE BLD-MCNC: 194 MG/DL (ref 74–99)
GLUCOSE BLD-MCNC: 258 MG/DL (ref 74–99)
GLUCOSE BLD-MCNC: 310 MG/DL (ref 74–99)
GLUCOSE SERPL-MCNC: 317 MG/DL (ref 74–99)
HCO3 VENOUS: 43.1 MMOL/L (ref 22–29)
HCT VFR BLD AUTO: 41.1 % (ref 37–47)
HGB BLD-MCNC: 11.9 G/DL (ref 12.5–16)
IMM GRANULOCYTES # BLD AUTO: 0.05 K/UL
IMM GRANULOCYTES NFR BLD: 1 %
LYMPHOCYTES NFR BLD: 0.75 K/UL
LYMPHOCYTES RELATIVE PERCENT: 9 % (ref 24–44)
MAGNESIUM SERPL-MCNC: 2 MG/DL (ref 1.8–2.4)
MCH RBC QN AUTO: 26.3 PG (ref 27–31)
MCHC RBC AUTO-ENTMCNC: 29 G/DL (ref 32–36)
MCV RBC AUTO: 90.9 FL (ref 78–100)
METHEMOGLOBIN: 0.3 % (ref 0.5–1.5)
MONOCYTES NFR BLD: 0.27 K/UL
MONOCYTES NFR BLD: 3 % (ref 0–5)
NEUTROPHILS NFR BLD: 88 % (ref 36–66)
NEUTS SEG NFR BLD: 7.57 K/UL
OXYHGB MFR BLD: 40.3 %
PCO2 VENOUS: 85.5 MM HG (ref 38–54)
PH VENOUS: 7.32 (ref 7.32–7.43)
PHOSPHATE SERPL-MCNC: 2.8 MG/DL (ref 2.5–4.9)
PLATELET # BLD AUTO: 209 K/UL (ref 140–440)
PMV BLD AUTO: 10 FL (ref 7.5–11.1)
PO2 VENOUS: 23.6 MM HG (ref 23–48)
POSITIVE BASE EXCESS, VEN: 13.5 MMOL/L (ref 0–3)
POTASSIUM SERPL-SCNC: 4.3 MMOL/L (ref 3.5–5.1)
PROT SERPL-MCNC: 6.5 G/DL (ref 6.4–8.2)
RBC # BLD AUTO: 4.52 M/UL (ref 4.2–5.4)
SODIUM SERPL-SCNC: 139 MMOL/L (ref 136–145)
TEXT FOR RESPIRATORY: ABNORMAL
TME LAST DOSE: ABNORMAL H
VANCOMYCIN DOSE: ABNORMAL MG
VANCOMYCIN SERPL-MCNC: 35 UG/ML (ref 10–20)
WBC OTHER # BLD: 8.7 K/UL (ref 4–10.5)

## 2025-08-04 PROCEDURE — 2700000000 HC OXYGEN THERAPY PER DAY

## 2025-08-04 PROCEDURE — 6370000000 HC RX 637 (ALT 250 FOR IP): Performed by: INTERNAL MEDICINE

## 2025-08-04 PROCEDURE — 2580000003 HC RX 258: Performed by: STUDENT IN AN ORGANIZED HEALTH CARE EDUCATION/TRAINING PROGRAM

## 2025-08-04 PROCEDURE — 82805 BLOOD GASES W/O2 SATURATION: CPT

## 2025-08-04 PROCEDURE — 6370000000 HC RX 637 (ALT 250 FOR IP): Performed by: STUDENT IN AN ORGANIZED HEALTH CARE EDUCATION/TRAINING PROGRAM

## 2025-08-04 PROCEDURE — 85025 COMPLETE CBC W/AUTO DIFF WBC: CPT

## 2025-08-04 PROCEDURE — 6360000002 HC RX W HCPCS: Performed by: STUDENT IN AN ORGANIZED HEALTH CARE EDUCATION/TRAINING PROGRAM

## 2025-08-04 PROCEDURE — 36415 COLL VENOUS BLD VENIPUNCTURE: CPT

## 2025-08-04 PROCEDURE — 6360000002 HC RX W HCPCS: Performed by: INTERNAL MEDICINE

## 2025-08-04 PROCEDURE — 2580000003 HC RX 258: Performed by: INTERNAL MEDICINE

## 2025-08-04 PROCEDURE — 94640 AIRWAY INHALATION TREATMENT: CPT

## 2025-08-04 PROCEDURE — 82962 GLUCOSE BLOOD TEST: CPT

## 2025-08-04 PROCEDURE — 84100 ASSAY OF PHOSPHORUS: CPT

## 2025-08-04 PROCEDURE — 80202 ASSAY OF VANCOMYCIN: CPT

## 2025-08-04 PROCEDURE — 80053 COMPREHEN METABOLIC PANEL: CPT

## 2025-08-04 PROCEDURE — 99223 1ST HOSP IP/OBS HIGH 75: CPT | Performed by: INTERNAL MEDICINE

## 2025-08-04 PROCEDURE — 2500000003 HC RX 250 WO HCPCS: Performed by: STUDENT IN AN ORGANIZED HEALTH CARE EDUCATION/TRAINING PROGRAM

## 2025-08-04 PROCEDURE — 2140000000 HC CCU INTERMEDIATE R&B

## 2025-08-04 PROCEDURE — 93010 ELECTROCARDIOGRAM REPORT: CPT | Performed by: INTERNAL MEDICINE

## 2025-08-04 PROCEDURE — 83735 ASSAY OF MAGNESIUM: CPT

## 2025-08-04 PROCEDURE — 94761 N-INVAS EAR/PLS OXIMETRY MLT: CPT

## 2025-08-04 RX ORDER — MINOCYCLINE HYDROCHLORIDE 100 MG/1
200 CAPSULE ORAL 2 TIMES DAILY
Status: DISCONTINUED | OUTPATIENT
Start: 2025-08-04 | End: 2025-08-07

## 2025-08-04 RX ORDER — INSULIN LISPRO 100 [IU]/ML
0-16 INJECTION, SOLUTION INTRAVENOUS; SUBCUTANEOUS
Status: DISCONTINUED | OUTPATIENT
Start: 2025-08-04 | End: 2025-08-11 | Stop reason: HOSPADM

## 2025-08-04 RX ORDER — LEVOFLOXACIN 500 MG/1
750 TABLET, FILM COATED ORAL EVERY 24 HOURS
Status: DISCONTINUED | OUTPATIENT
Start: 2025-08-04 | End: 2025-08-07

## 2025-08-04 RX ADMIN — PIPERACILLIN AND TAZOBACTAM 3375 MG: 3; .375 INJECTION, POWDER, FOR SOLUTION INTRAVENOUS at 03:01

## 2025-08-04 RX ADMIN — METHYLPREDNISOLONE SODIUM SUCCINATE 40 MG: 40 INJECTION, POWDER, LYOPHILIZED, FOR SOLUTION INTRAMUSCULAR; INTRAVENOUS at 11:26

## 2025-08-04 RX ADMIN — TRAZODONE HYDROCHLORIDE 300 MG: 50 TABLET ORAL at 20:45

## 2025-08-04 RX ADMIN — BUPRENORPHINE AND NALOXONE 1 FILM: 8; 2 FILM BUCCAL; SUBLINGUAL at 20:45

## 2025-08-04 RX ADMIN — METHYLPREDNISOLONE SODIUM SUCCINATE 40 MG: 40 INJECTION, POWDER, LYOPHILIZED, FOR SOLUTION INTRAMUSCULAR; INTRAVENOUS at 06:34

## 2025-08-04 RX ADMIN — ASPIRIN 81 MG: 81 TABLET, CHEWABLE ORAL at 09:03

## 2025-08-04 RX ADMIN — GUAIFENESIN 600 MG: 600 TABLET ORAL at 09:03

## 2025-08-04 RX ADMIN — INSULIN LISPRO 4 UNITS: 100 INJECTION, SOLUTION INTRAVENOUS; SUBCUTANEOUS at 17:07

## 2025-08-04 RX ADMIN — SODIUM CHLORIDE, PRESERVATIVE FREE 10 ML: 5 INJECTION INTRAVENOUS at 09:01

## 2025-08-04 RX ADMIN — ACETAMINOPHEN 650 MG: 325 TABLET ORAL at 20:45

## 2025-08-04 RX ADMIN — SODIUM CHLORIDE, PRESERVATIVE FREE 10 ML: 5 INJECTION INTRAVENOUS at 20:50

## 2025-08-04 RX ADMIN — MICONAZOLE NITRATE: 2 POWDER TOPICAL at 20:46

## 2025-08-04 RX ADMIN — METHYLPREDNISOLONE SODIUM SUCCINATE 40 MG: 40 INJECTION, POWDER, LYOPHILIZED, FOR SOLUTION INTRAMUSCULAR; INTRAVENOUS at 23:59

## 2025-08-04 RX ADMIN — SODIUM CHLORIDE 1250 MG: 0.9 INJECTION, SOLUTION INTRAVENOUS at 09:02

## 2025-08-04 RX ADMIN — SODIUM CHLORIDE 1250 MG: 0.9 INJECTION, SOLUTION INTRAVENOUS at 00:32

## 2025-08-04 RX ADMIN — BUDESONIDE AND FORMOTEROL FUMARATE DIHYDRATE 2 PUFF: 160; 4.5 AEROSOL RESPIRATORY (INHALATION) at 08:38

## 2025-08-04 RX ADMIN — GUAIFENESIN 600 MG: 600 TABLET ORAL at 20:45

## 2025-08-04 RX ADMIN — ACETAMINOPHEN 650 MG: 325 TABLET ORAL at 04:28

## 2025-08-04 RX ADMIN — ENOXAPARIN SODIUM 40 MG: 100 INJECTION SUBCUTANEOUS at 09:03

## 2025-08-04 RX ADMIN — IPRATROPIUM BROMIDE AND ALBUTEROL SULFATE 1 DOSE: .5; 2.5 SOLUTION RESPIRATORY (INHALATION) at 16:41

## 2025-08-04 RX ADMIN — MEROPENEM 1000 MG: 1 INJECTION, POWDER, FOR SOLUTION INTRAVENOUS at 18:15

## 2025-08-04 RX ADMIN — METHYLPREDNISOLONE SODIUM SUCCINATE 40 MG: 40 INJECTION, POWDER, LYOPHILIZED, FOR SOLUTION INTRAMUSCULAR; INTRAVENOUS at 00:19

## 2025-08-04 RX ADMIN — METHYLPREDNISOLONE SODIUM SUCCINATE 40 MG: 40 INJECTION, POWDER, LYOPHILIZED, FOR SOLUTION INTRAMUSCULAR; INTRAVENOUS at 17:08

## 2025-08-04 RX ADMIN — PIPERACILLIN AND TAZOBACTAM 3375 MG: 3; .375 INJECTION, POWDER, FOR SOLUTION INTRAVENOUS at 11:23

## 2025-08-04 RX ADMIN — LEVOFLOXACIN 750 MG: 500 TABLET, FILM COATED ORAL at 18:15

## 2025-08-04 RX ADMIN — IPRATROPIUM BROMIDE AND ALBUTEROL SULFATE 1 DOSE: .5; 2.5 SOLUTION RESPIRATORY (INHALATION) at 08:38

## 2025-08-04 RX ADMIN — BUPRENORPHINE AND NALOXONE 1 FILM: 8; 2 FILM BUCCAL; SUBLINGUAL at 15:03

## 2025-08-04 RX ADMIN — IPRATROPIUM BROMIDE AND ALBUTEROL SULFATE 1 DOSE: .5; 2.5 SOLUTION RESPIRATORY (INHALATION) at 13:07

## 2025-08-04 RX ADMIN — INSULIN LISPRO 8 UNITS: 100 INJECTION, SOLUTION INTRAVENOUS; SUBCUTANEOUS at 20:44

## 2025-08-04 RX ADMIN — PANTOPRAZOLE SODIUM 40 MG: 40 TABLET, DELAYED RELEASE ORAL at 06:34

## 2025-08-04 RX ADMIN — INSULIN LISPRO 3 UNITS: 100 INJECTION, SOLUTION INTRAVENOUS; SUBCUTANEOUS at 12:31

## 2025-08-04 RX ADMIN — INSULIN LISPRO 1 UNITS: 100 INJECTION, SOLUTION INTRAVENOUS; SUBCUTANEOUS at 09:00

## 2025-08-04 RX ADMIN — BUPRENORPHINE AND NALOXONE 1 FILM: 8; 2 FILM BUCCAL; SUBLINGUAL at 10:28

## 2025-08-04 RX ADMIN — MICONAZOLE NITRATE: 2 POWDER TOPICAL at 09:04

## 2025-08-04 RX ADMIN — MINOCYCLINE HYDROCHLORIDE 200 MG: 100 CAPSULE ORAL at 20:45

## 2025-08-04 ASSESSMENT — PAIN DESCRIPTION - LOCATION
LOCATION: SHOULDER;KNEE
LOCATION: ARM;KNEE
LOCATION: LEG
LOCATION: KNEE;SHOULDER
LOCATION: ARM;KNEE

## 2025-08-04 ASSESSMENT — PAIN DESCRIPTION - ORIENTATION
ORIENTATION: LEFT
ORIENTATION: RIGHT;LEFT
ORIENTATION: LEFT;RIGHT

## 2025-08-04 ASSESSMENT — PAIN SCALES - GENERAL
PAINLEVEL_OUTOF10: 9
PAINLEVEL_OUTOF10: 8
PAINLEVEL_OUTOF10: 0
PAINLEVEL_OUTOF10: 9

## 2025-08-04 ASSESSMENT — PAIN DESCRIPTION - DESCRIPTORS
DESCRIPTORS: ACHING;DISCOMFORT
DESCRIPTORS: ACHING
DESCRIPTORS: ACHING

## 2025-08-04 ASSESSMENT — PAIN SCALES - WONG BAKER
WONGBAKER_NUMERICALRESPONSE: NO HURT
WONGBAKER_NUMERICALRESPONSE: NO HURT

## 2025-08-04 ASSESSMENT — PAIN - FUNCTIONAL ASSESSMENT
PAIN_FUNCTIONAL_ASSESSMENT: ACTIVITIES ARE NOT PREVENTED

## 2025-08-04 ASSESSMENT — PAIN DESCRIPTION - ONSET: ONSET: ON-GOING

## 2025-08-04 ASSESSMENT — PAIN DESCRIPTION - PAIN TYPE
TYPE: CHRONIC PAIN
TYPE: CHRONIC PAIN

## 2025-08-04 ASSESSMENT — PAIN DESCRIPTION - FREQUENCY: FREQUENCY: CONTINUOUS

## 2025-08-05 LAB
ACINETOBACTER CALCOACETICUS-BAUMANNII BY PCR: NOT DETECTED
ADENOVIRUS: NOT DETECTED
ARTERIAL PATENCY WRIST A: ABNORMAL
BODY TEMPERATURE: 37
CHLAMYDIA PNEUMONIAE BY PCR: NOT DETECTED
COHGB MFR BLD: 0.2 % (ref 0.5–1.5)
CORONAVIRUS PCR: NOT DETECTED
ENTEROBACTER CLOACAE COMPLEX BY PCR: NOT DETECTED
ESCHERICHIA COLI BY PCR: NOT DETECTED
GLUCOSE BLD-MCNC: 144 MG/DL (ref 74–99)
GLUCOSE BLD-MCNC: 260 MG/DL (ref 74–99)
GLUCOSE BLD-MCNC: 282 MG/DL (ref 74–99)
GLUCOSE BLD-MCNC: 313 MG/DL (ref 74–99)
GP B STREP DNA SPT NAA+NON-PRB-NCNCRNG: NOT DETECTED
HAEMOPHILUS INFLUENZAE BY PCR: NOT DETECTED
HCO3 VENOUS: 39.8 MMOL/L (ref 22–29)
HUMAN RHINOVIRUS/ENTEROVIRUS BY PCR: NOT DETECTED
INFLUENZA A BY PCR: NOT DETECTED
INFLUENZA B BY PCR: NOT DETECTED
K AEROGENES DNA BAL NAA+NON-PRB-NCNCRNG: NOT DETECTED
K OXYTOCA DNA SPT NAA+NON-PRB-NCNCRNG: NOT DETECTED
K PNEU GRP DNA SPT NAA+NON-PRB-NCNCRNG: NOT DETECTED
LEGIONELLA PNEUMOPHILIA BY PCR: NOT DETECTED
M CATARRHALIS DNA BAL NAA+NON-PRB-NCNCRNG: NOT DETECTED
MAGNESIUM SERPL-MCNC: 2 MG/DL (ref 1.8–2.4)
METAPNEUMOVIRUS BY PCR: NOT DETECTED
METHEMOGLOBIN: 0.4 % (ref 0.5–1.5)
MYCOPLASMA PNEUMONIAE PCR: NOT DETECTED
OXYHGB MFR BLD: 53.3 %
P AERUGINOSA DNA SPT NAA+NON-PRB-NCNCRNG: NOT DETECTED
PARAINFLUENZA VIRUS BY PCR: NOT DETECTED
PCO2 VENOUS: 79.1 MM HG (ref 38–54)
PH VENOUS: 7.32 (ref 7.32–7.43)
PO2 VENOUS: 28.7 MM HG (ref 23–48)
POSITIVE BASE EXCESS, VEN: 10.4 MMOL/L (ref 0–3)
PROTEUS SP DNA BAL NAA+NON-PRB-NCNCRNG: NOT DETECTED
RSV BY PCR: NOT DETECTED
S AUREUS DNA SPT NAA+NON-PRB-NCNCRNG: NOT DETECTED
S MARCESCENS DNA SPT NAA+NON-PRB-NCNCRNG: NOT DETECTED
S PNEUM DNA BAL NAA+NON-PRB-NCNCRNG: NOT DETECTED
S PYO DNA SPT NAA+NON-PRB-NCNCRNG: NOT DETECTED
SOURCE: NORMAL
TEXT FOR RESPIRATORY: ABNORMAL

## 2025-08-05 PROCEDURE — 94640 AIRWAY INHALATION TREATMENT: CPT

## 2025-08-05 PROCEDURE — 2700000000 HC OXYGEN THERAPY PER DAY

## 2025-08-05 PROCEDURE — 99233 SBSQ HOSP IP/OBS HIGH 50: CPT | Performed by: INTERNAL MEDICINE

## 2025-08-05 PROCEDURE — 99222 1ST HOSP IP/OBS MODERATE 55: CPT | Performed by: NURSE PRACTITIONER

## 2025-08-05 PROCEDURE — 82805 BLOOD GASES W/O2 SATURATION: CPT

## 2025-08-05 PROCEDURE — 6370000000 HC RX 637 (ALT 250 FOR IP): Performed by: NURSE PRACTITIONER

## 2025-08-05 PROCEDURE — 87116 MYCOBACTERIA CULTURE: CPT

## 2025-08-05 PROCEDURE — 6370000000 HC RX 637 (ALT 250 FOR IP): Performed by: STUDENT IN AN ORGANIZED HEALTH CARE EDUCATION/TRAINING PROGRAM

## 2025-08-05 PROCEDURE — 6370000000 HC RX 637 (ALT 250 FOR IP): Performed by: INTERNAL MEDICINE

## 2025-08-05 PROCEDURE — 80202 ASSAY OF VANCOMYCIN: CPT

## 2025-08-05 PROCEDURE — 36415 COLL VENOUS BLD VENIPUNCTURE: CPT

## 2025-08-05 PROCEDURE — 87070 CULTURE OTHR SPECIMN AEROBIC: CPT

## 2025-08-05 PROCEDURE — 87077 CULTURE AEROBIC IDENTIFY: CPT

## 2025-08-05 PROCEDURE — 94761 N-INVAS EAR/PLS OXIMETRY MLT: CPT

## 2025-08-05 PROCEDURE — 87633 RESP VIRUS 12-25 TARGETS: CPT

## 2025-08-05 PROCEDURE — 6360000002 HC RX W HCPCS: Performed by: INTERNAL MEDICINE

## 2025-08-05 PROCEDURE — 87186 SC STD MICRODIL/AGAR DIL: CPT

## 2025-08-05 PROCEDURE — 2580000003 HC RX 258: Performed by: INTERNAL MEDICINE

## 2025-08-05 PROCEDURE — 2500000003 HC RX 250 WO HCPCS: Performed by: STUDENT IN AN ORGANIZED HEALTH CARE EDUCATION/TRAINING PROGRAM

## 2025-08-05 PROCEDURE — 83735 ASSAY OF MAGNESIUM: CPT

## 2025-08-05 PROCEDURE — 87015 SPECIMEN INFECT AGNT CONCNTJ: CPT

## 2025-08-05 PROCEDURE — 82962 GLUCOSE BLOOD TEST: CPT

## 2025-08-05 PROCEDURE — 87206 SMEAR FLUORESCENT/ACID STAI: CPT

## 2025-08-05 PROCEDURE — 2140000000 HC CCU INTERMEDIATE R&B

## 2025-08-05 PROCEDURE — 6360000002 HC RX W HCPCS: Performed by: STUDENT IN AN ORGANIZED HEALTH CARE EDUCATION/TRAINING PROGRAM

## 2025-08-05 PROCEDURE — 87205 SMEAR GRAM STAIN: CPT

## 2025-08-05 RX ORDER — ARIPIPRAZOLE 5 MG/1
2.5 TABLET ORAL NIGHTLY
Status: COMPLETED | OUTPATIENT
Start: 2025-08-05 | End: 2025-08-08

## 2025-08-05 RX ORDER — LORAZEPAM 0.5 MG/1
0.5 TABLET ORAL NIGHTLY
Status: COMPLETED | OUTPATIENT
Start: 2025-08-05 | End: 2025-08-09

## 2025-08-05 RX ORDER — CALCIUM CARBONATE 500 MG/1
500 TABLET, CHEWABLE ORAL 3 TIMES DAILY PRN
Status: DISCONTINUED | OUTPATIENT
Start: 2025-08-05 | End: 2025-08-11 | Stop reason: HOSPADM

## 2025-08-05 RX ORDER — ARIPIPRAZOLE 5 MG/1
5 TABLET ORAL NIGHTLY
Status: DISCONTINUED | OUTPATIENT
Start: 2025-08-09 | End: 2025-08-11 | Stop reason: HOSPADM

## 2025-08-05 RX ADMIN — BUDESONIDE AND FORMOTEROL FUMARATE DIHYDRATE 2 PUFF: 160; 4.5 AEROSOL RESPIRATORY (INHALATION) at 08:51

## 2025-08-05 RX ADMIN — INSULIN LISPRO 8 UNITS: 100 INJECTION, SOLUTION INTRAVENOUS; SUBCUTANEOUS at 07:28

## 2025-08-05 RX ADMIN — PANTOPRAZOLE SODIUM 40 MG: 40 TABLET, DELAYED RELEASE ORAL at 06:46

## 2025-08-05 RX ADMIN — LORAZEPAM 0.5 MG: 0.5 TABLET ORAL at 21:07

## 2025-08-05 RX ADMIN — MEROPENEM 1000 MG: 1 INJECTION, POWDER, FOR SOLUTION INTRAVENOUS at 09:05

## 2025-08-05 RX ADMIN — MICONAZOLE NITRATE: 2 POWDER TOPICAL at 10:05

## 2025-08-05 RX ADMIN — MEROPENEM 1000 MG: 1 INJECTION, POWDER, FOR SOLUTION INTRAVENOUS at 02:13

## 2025-08-05 RX ADMIN — MINOCYCLINE HYDROCHLORIDE 200 MG: 100 CAPSULE ORAL at 21:06

## 2025-08-05 RX ADMIN — IPRATROPIUM BROMIDE AND ALBUTEROL SULFATE 1 DOSE: .5; 2.5 SOLUTION RESPIRATORY (INHALATION) at 15:52

## 2025-08-05 RX ADMIN — BUPRENORPHINE AND NALOXONE 1 FILM: 8; 2 FILM BUCCAL; SUBLINGUAL at 08:58

## 2025-08-05 RX ADMIN — IPRATROPIUM BROMIDE AND ALBUTEROL SULFATE 1 DOSE: .5; 2.5 SOLUTION RESPIRATORY (INHALATION) at 12:17

## 2025-08-05 RX ADMIN — GUAIFENESIN 600 MG: 600 TABLET ORAL at 08:58

## 2025-08-05 RX ADMIN — SODIUM CHLORIDE, PRESERVATIVE FREE 10 ML: 5 INJECTION INTRAVENOUS at 21:06

## 2025-08-05 RX ADMIN — LEVOFLOXACIN 750 MG: 500 TABLET, FILM COATED ORAL at 17:40

## 2025-08-05 RX ADMIN — ENOXAPARIN SODIUM 40 MG: 100 INJECTION SUBCUTANEOUS at 08:58

## 2025-08-05 RX ADMIN — INSULIN LISPRO 8 UNITS: 100 INJECTION, SOLUTION INTRAVENOUS; SUBCUTANEOUS at 21:05

## 2025-08-05 RX ADMIN — ASPIRIN 81 MG: 81 TABLET, CHEWABLE ORAL at 08:58

## 2025-08-05 RX ADMIN — BUPRENORPHINE AND NALOXONE 1 FILM: 8; 2 FILM BUCCAL; SUBLINGUAL at 21:06

## 2025-08-05 RX ADMIN — MINOCYCLINE HYDROCHLORIDE 200 MG: 100 CAPSULE ORAL at 08:58

## 2025-08-05 RX ADMIN — PREDNISONE 40 MG: 20 TABLET ORAL at 08:58

## 2025-08-05 RX ADMIN — IPRATROPIUM BROMIDE AND ALBUTEROL SULFATE 1 DOSE: .5; 2.5 SOLUTION RESPIRATORY (INHALATION) at 19:28

## 2025-08-05 RX ADMIN — INSULIN LISPRO 12 UNITS: 100 INJECTION, SOLUTION INTRAVENOUS; SUBCUTANEOUS at 18:00

## 2025-08-05 RX ADMIN — SODIUM CHLORIDE, PRESERVATIVE FREE 10 ML: 5 INJECTION INTRAVENOUS at 09:03

## 2025-08-05 RX ADMIN — BUDESONIDE AND FORMOTEROL FUMARATE DIHYDRATE 2 PUFF: 160; 4.5 AEROSOL RESPIRATORY (INHALATION) at 19:29

## 2025-08-05 RX ADMIN — ACETAMINOPHEN 650 MG: 325 TABLET ORAL at 21:07

## 2025-08-05 RX ADMIN — ARIPIPRAZOLE 2.5 MG: 5 TABLET ORAL at 21:07

## 2025-08-05 RX ADMIN — IPRATROPIUM BROMIDE AND ALBUTEROL SULFATE 1 DOSE: .5; 2.5 SOLUTION RESPIRATORY (INHALATION) at 08:51

## 2025-08-05 RX ADMIN — MEROPENEM 1000 MG: 1 INJECTION, POWDER, FOR SOLUTION INTRAVENOUS at 17:41

## 2025-08-05 RX ADMIN — MICONAZOLE NITRATE: 2 POWDER TOPICAL at 21:07

## 2025-08-05 RX ADMIN — TRAZODONE HYDROCHLORIDE 300 MG: 50 TABLET ORAL at 21:06

## 2025-08-05 RX ADMIN — GUAIFENESIN 600 MG: 600 TABLET ORAL at 21:07

## 2025-08-05 RX ADMIN — BUPRENORPHINE AND NALOXONE 1 FILM: 8; 2 FILM BUCCAL; SUBLINGUAL at 13:55

## 2025-08-05 ASSESSMENT — PAIN DESCRIPTION - PAIN TYPE
TYPE: CHRONIC PAIN

## 2025-08-05 ASSESSMENT — PAIN - FUNCTIONAL ASSESSMENT
PAIN_FUNCTIONAL_ASSESSMENT: ACTIVITIES ARE NOT PREVENTED
PAIN_FUNCTIONAL_ASSESSMENT: PREVENTS OR INTERFERES SOME ACTIVE ACTIVITIES AND ADLS

## 2025-08-05 ASSESSMENT — PAIN SCALES - GENERAL
PAINLEVEL_OUTOF10: 8
PAINLEVEL_OUTOF10: 0
PAINLEVEL_OUTOF10: 7
PAINLEVEL_OUTOF10: 8
PAINLEVEL_OUTOF10: 9
PAINLEVEL_OUTOF10: 7
PAINLEVEL_OUTOF10: 5
PAINLEVEL_OUTOF10: 8

## 2025-08-05 ASSESSMENT — PAIN DESCRIPTION - LOCATION
LOCATION: ARM
LOCATION: SHOULDER;KNEE
LOCATION: SHOULDER;KNEE
LOCATION: KNEE
LOCATION: KNEE;SHOULDER

## 2025-08-05 ASSESSMENT — PAIN SCALES - WONG BAKER
WONGBAKER_NUMERICALRESPONSE: HURTS A LITTLE BIT
WONGBAKER_NUMERICALRESPONSE: NO HURT
WONGBAKER_NUMERICALRESPONSE: HURTS A LITTLE BIT
WONGBAKER_NUMERICALRESPONSE: NO HURT

## 2025-08-05 ASSESSMENT — PAIN DESCRIPTION - DESCRIPTORS
DESCRIPTORS: ACHING

## 2025-08-05 ASSESSMENT — PAIN DESCRIPTION - ONSET: ONSET: ON-GOING

## 2025-08-05 ASSESSMENT — PAIN DESCRIPTION - ORIENTATION
ORIENTATION: RIGHT
ORIENTATION: RIGHT;LEFT

## 2025-08-05 ASSESSMENT — PAIN DESCRIPTION - FREQUENCY: FREQUENCY: CONTINUOUS

## 2025-08-06 LAB
1,3 BETA GLUCAN SER-MCNC: 101 PG/ML
1,3 BETA-D-GLUCAN INTERP: POSITIVE
DATE LAST DOSE: ABNORMAL
GLUCOSE BLD-MCNC: 137 MG/DL (ref 74–99)
GLUCOSE BLD-MCNC: 151 MG/DL (ref 74–99)
GLUCOSE BLD-MCNC: 156 MG/DL (ref 74–99)
GLUCOSE BLD-MCNC: 157 MG/DL (ref 74–99)
GLUCOSE BLD-MCNC: 291 MG/DL (ref 74–99)
TME LAST DOSE: ABNORMAL H
VANCOMYCIN DOSE: ABNORMAL MG
VANCOMYCIN SERPL-MCNC: 4.3 UG/ML (ref 10–20)

## 2025-08-06 PROCEDURE — 6370000000 HC RX 637 (ALT 250 FOR IP): Performed by: STUDENT IN AN ORGANIZED HEALTH CARE EDUCATION/TRAINING PROGRAM

## 2025-08-06 PROCEDURE — 94669 MECHANICAL CHEST WALL OSCILL: CPT

## 2025-08-06 PROCEDURE — 2140000000 HC CCU INTERMEDIATE R&B

## 2025-08-06 PROCEDURE — 6370000000 HC RX 637 (ALT 250 FOR IP): Performed by: NURSE PRACTITIONER

## 2025-08-06 PROCEDURE — 99233 SBSQ HOSP IP/OBS HIGH 50: CPT | Performed by: INTERNAL MEDICINE

## 2025-08-06 PROCEDURE — 6360000002 HC RX W HCPCS: Performed by: STUDENT IN AN ORGANIZED HEALTH CARE EDUCATION/TRAINING PROGRAM

## 2025-08-06 PROCEDURE — 82962 GLUCOSE BLOOD TEST: CPT

## 2025-08-06 PROCEDURE — 94761 N-INVAS EAR/PLS OXIMETRY MLT: CPT

## 2025-08-06 PROCEDURE — 6360000002 HC RX W HCPCS: Performed by: INTERNAL MEDICINE

## 2025-08-06 PROCEDURE — 2580000003 HC RX 258: Performed by: INTERNAL MEDICINE

## 2025-08-06 PROCEDURE — 94664 DEMO&/EVAL PT USE INHALER: CPT

## 2025-08-06 PROCEDURE — 2500000003 HC RX 250 WO HCPCS: Performed by: STUDENT IN AN ORGANIZED HEALTH CARE EDUCATION/TRAINING PROGRAM

## 2025-08-06 PROCEDURE — 2700000000 HC OXYGEN THERAPY PER DAY

## 2025-08-06 PROCEDURE — 6370000000 HC RX 637 (ALT 250 FOR IP): Performed by: INTERNAL MEDICINE

## 2025-08-06 PROCEDURE — 94640 AIRWAY INHALATION TREATMENT: CPT

## 2025-08-06 RX ADMIN — LORAZEPAM 0.5 MG: 0.5 TABLET ORAL at 21:06

## 2025-08-06 RX ADMIN — TRAZODONE HYDROCHLORIDE 300 MG: 50 TABLET ORAL at 21:05

## 2025-08-06 RX ADMIN — ENOXAPARIN SODIUM 40 MG: 100 INJECTION SUBCUTANEOUS at 08:41

## 2025-08-06 RX ADMIN — IPRATROPIUM BROMIDE AND ALBUTEROL SULFATE 1 DOSE: .5; 2.5 SOLUTION RESPIRATORY (INHALATION) at 15:01

## 2025-08-06 RX ADMIN — MINOCYCLINE HYDROCHLORIDE 200 MG: 100 CAPSULE ORAL at 08:40

## 2025-08-06 RX ADMIN — SODIUM CHLORIDE, PRESERVATIVE FREE 10 ML: 5 INJECTION INTRAVENOUS at 08:42

## 2025-08-06 RX ADMIN — IPRATROPIUM BROMIDE AND ALBUTEROL SULFATE 1 DOSE: .5; 2.5 SOLUTION RESPIRATORY (INHALATION) at 12:05

## 2025-08-06 RX ADMIN — BUDESONIDE AND FORMOTEROL FUMARATE DIHYDRATE 2 PUFF: 160; 4.5 AEROSOL RESPIRATORY (INHALATION) at 08:21

## 2025-08-06 RX ADMIN — GUAIFENESIN 600 MG: 600 TABLET ORAL at 08:40

## 2025-08-06 RX ADMIN — BUPRENORPHINE AND NALOXONE 1 FILM: 8; 2 FILM BUCCAL; SUBLINGUAL at 21:07

## 2025-08-06 RX ADMIN — ASPIRIN 81 MG: 81 TABLET, CHEWABLE ORAL at 08:40

## 2025-08-06 RX ADMIN — IPRATROPIUM BROMIDE AND ALBUTEROL SULFATE 1 DOSE: .5; 2.5 SOLUTION RESPIRATORY (INHALATION) at 19:39

## 2025-08-06 RX ADMIN — BUPRENORPHINE AND NALOXONE 1 FILM: 8; 2 FILM BUCCAL; SUBLINGUAL at 08:41

## 2025-08-06 RX ADMIN — LEVOFLOXACIN 750 MG: 500 TABLET, FILM COATED ORAL at 16:19

## 2025-08-06 RX ADMIN — BUPRENORPHINE AND NALOXONE 1 FILM: 8; 2 FILM BUCCAL; SUBLINGUAL at 15:08

## 2025-08-06 RX ADMIN — GUAIFENESIN 600 MG: 600 TABLET ORAL at 21:06

## 2025-08-06 RX ADMIN — MEROPENEM 1000 MG: 1 INJECTION, POWDER, FOR SOLUTION INTRAVENOUS at 17:59

## 2025-08-06 RX ADMIN — INSULIN LISPRO 8 UNITS: 100 INJECTION, SOLUTION INTRAVENOUS; SUBCUTANEOUS at 16:19

## 2025-08-06 RX ADMIN — CALCIUM CARBONATE 500 MG: 500 TABLET, CHEWABLE ORAL at 02:53

## 2025-08-06 RX ADMIN — BUDESONIDE AND FORMOTEROL FUMARATE DIHYDRATE 2 PUFF: 160; 4.5 AEROSOL RESPIRATORY (INHALATION) at 19:40

## 2025-08-06 RX ADMIN — MINOCYCLINE HYDROCHLORIDE 200 MG: 100 CAPSULE ORAL at 21:06

## 2025-08-06 RX ADMIN — MEROPENEM 1000 MG: 1 INJECTION, POWDER, FOR SOLUTION INTRAVENOUS at 01:52

## 2025-08-06 RX ADMIN — PREDNISONE 40 MG: 20 TABLET ORAL at 08:40

## 2025-08-06 RX ADMIN — MEROPENEM 1000 MG: 1 INJECTION, POWDER, FOR SOLUTION INTRAVENOUS at 10:58

## 2025-08-06 RX ADMIN — PANTOPRAZOLE SODIUM 40 MG: 40 TABLET, DELAYED RELEASE ORAL at 06:03

## 2025-08-06 RX ADMIN — MICONAZOLE NITRATE: 2 POWDER TOPICAL at 08:42

## 2025-08-06 RX ADMIN — MICONAZOLE NITRATE: 2 POWDER TOPICAL at 21:08

## 2025-08-06 RX ADMIN — IPRATROPIUM BROMIDE AND ALBUTEROL SULFATE 1 DOSE: .5; 2.5 SOLUTION RESPIRATORY (INHALATION) at 08:20

## 2025-08-06 RX ADMIN — ARIPIPRAZOLE 2.5 MG: 5 TABLET ORAL at 21:06

## 2025-08-06 ASSESSMENT — PAIN DESCRIPTION - LOCATION
LOCATION: SHOULDER
LOCATION: KNEE;LEG

## 2025-08-06 ASSESSMENT — PAIN SCALES - GENERAL
PAINLEVEL_OUTOF10: 9
PAINLEVEL_OUTOF10: 5
PAINLEVEL_OUTOF10: 0

## 2025-08-06 ASSESSMENT — PAIN DESCRIPTION - DESCRIPTORS: DESCRIPTORS: ACHING

## 2025-08-06 ASSESSMENT — PAIN SCALES - WONG BAKER
WONGBAKER_NUMERICALRESPONSE: NO HURT
WONGBAKER_NUMERICALRESPONSE: NO HURT

## 2025-08-06 ASSESSMENT — PAIN DESCRIPTION - ORIENTATION
ORIENTATION: LEFT
ORIENTATION: RIGHT;LEFT

## 2025-08-06 ASSESSMENT — PAIN - FUNCTIONAL ASSESSMENT: PAIN_FUNCTIONAL_ASSESSMENT: ACTIVITIES ARE NOT PREVENTED

## 2025-08-06 ASSESSMENT — PAIN DESCRIPTION - PAIN TYPE: TYPE: CHRONIC PAIN

## 2025-08-07 LAB
ANION GAP SERPL CALCULATED.3IONS-SCNC: 6 MMOL/L (ref 9–17)
ARTERIAL PATENCY WRIST A: ABNORMAL
BODY TEMPERATURE: 37
BUN SERPL-MCNC: 18 MG/DL (ref 7–20)
CALCIUM SERPL-MCNC: 8.4 MG/DL (ref 8.3–10.6)
CHLORIDE SERPL-SCNC: 97 MMOL/L (ref 99–110)
CO2 SERPL-SCNC: 37 MMOL/L (ref 21–32)
COHGB MFR BLD: 0.3 % (ref 0.5–1.5)
CREAT SERPL-MCNC: 0.4 MG/DL (ref 0.6–1.2)
ERYTHROCYTE [DISTWIDTH] IN BLOOD BY AUTOMATED COUNT: 13 % (ref 11.7–14.9)
GAS FLOW.O2 O2 DELIVERY SYS: ABNORMAL L/MIN
GFR, ESTIMATED: >90 ML/MIN/1.73M2
GLUCOSE BLD-MCNC: 134 MG/DL (ref 74–99)
GLUCOSE BLD-MCNC: 152 MG/DL (ref 74–99)
GLUCOSE BLD-MCNC: 202 MG/DL (ref 74–99)
GLUCOSE BLD-MCNC: 261 MG/DL (ref 74–99)
GLUCOSE SERPL-MCNC: 183 MG/DL (ref 74–99)
HCO3 VENOUS: 40.5 MMOL/L (ref 22–29)
HCT VFR BLD AUTO: 35.3 % (ref 37–47)
HGB BLD-MCNC: 10.6 G/DL (ref 12.5–16)
MAGNESIUM SERPL-MCNC: 2 MG/DL (ref 1.8–2.4)
MCH RBC QN AUTO: 26.8 PG (ref 27–31)
MCHC RBC AUTO-ENTMCNC: 30 G/DL (ref 32–36)
MCV RBC AUTO: 89.1 FL (ref 78–100)
METHEMOGLOBIN: 0.4 % (ref 0.5–1.5)
OXYHGB MFR BLD: 95.2 %
PCO2 VENOUS: 70.3 MM HG (ref 38–54)
PH VENOUS: 7.38 (ref 7.32–7.43)
PHOSPHATE SERPL-MCNC: 3 MG/DL (ref 2.5–4.9)
PLATELET # BLD AUTO: 215 K/UL (ref 140–440)
PMV BLD AUTO: 9.8 FL (ref 7.5–11.1)
PO2 VENOUS: 85.8 MM HG (ref 23–48)
POSITIVE BASE EXCESS, VEN: 12.5 MMOL/L (ref 0–3)
POTASSIUM SERPL-SCNC: 3.9 MMOL/L (ref 3.5–5.1)
RBC # BLD AUTO: 3.96 M/UL (ref 4.2–5.4)
SODIUM SERPL-SCNC: 139 MMOL/L (ref 136–145)
TEXT FOR RESPIRATORY: ABNORMAL
WBC OTHER # BLD: 8.1 K/UL (ref 4–10.5)

## 2025-08-07 PROCEDURE — 6360000002 HC RX W HCPCS: Performed by: INTERNAL MEDICINE

## 2025-08-07 PROCEDURE — 94669 MECHANICAL CHEST WALL OSCILL: CPT

## 2025-08-07 PROCEDURE — 83735 ASSAY OF MAGNESIUM: CPT

## 2025-08-07 PROCEDURE — 6370000000 HC RX 637 (ALT 250 FOR IP): Performed by: STUDENT IN AN ORGANIZED HEALTH CARE EDUCATION/TRAINING PROGRAM

## 2025-08-07 PROCEDURE — 6370000000 HC RX 637 (ALT 250 FOR IP): Performed by: INTERNAL MEDICINE

## 2025-08-07 PROCEDURE — 99232 SBSQ HOSP IP/OBS MODERATE 35: CPT | Performed by: INTERNAL MEDICINE

## 2025-08-07 PROCEDURE — 82805 BLOOD GASES W/O2 SATURATION: CPT

## 2025-08-07 PROCEDURE — 84100 ASSAY OF PHOSPHORUS: CPT

## 2025-08-07 PROCEDURE — 82962 GLUCOSE BLOOD TEST: CPT

## 2025-08-07 PROCEDURE — 94761 N-INVAS EAR/PLS OXIMETRY MLT: CPT

## 2025-08-07 PROCEDURE — 2500000003 HC RX 250 WO HCPCS: Performed by: STUDENT IN AN ORGANIZED HEALTH CARE EDUCATION/TRAINING PROGRAM

## 2025-08-07 PROCEDURE — 6360000002 HC RX W HCPCS: Performed by: STUDENT IN AN ORGANIZED HEALTH CARE EDUCATION/TRAINING PROGRAM

## 2025-08-07 PROCEDURE — 85027 COMPLETE CBC AUTOMATED: CPT

## 2025-08-07 PROCEDURE — 80048 BASIC METABOLIC PNL TOTAL CA: CPT

## 2025-08-07 PROCEDURE — 2580000003 HC RX 258: Performed by: INTERNAL MEDICINE

## 2025-08-07 PROCEDURE — 6370000000 HC RX 637 (ALT 250 FOR IP): Performed by: NURSE PRACTITIONER

## 2025-08-07 PROCEDURE — 2140000000 HC CCU INTERMEDIATE R&B

## 2025-08-07 PROCEDURE — 36415 COLL VENOUS BLD VENIPUNCTURE: CPT

## 2025-08-07 PROCEDURE — 94640 AIRWAY INHALATION TREATMENT: CPT

## 2025-08-07 PROCEDURE — 2700000000 HC OXYGEN THERAPY PER DAY

## 2025-08-07 RX ORDER — KETOROLAC TROMETHAMINE 30 MG/ML
15 INJECTION, SOLUTION INTRAMUSCULAR; INTRAVENOUS ONCE
Status: COMPLETED | OUTPATIENT
Start: 2025-08-07 | End: 2025-08-07

## 2025-08-07 RX ADMIN — TRAZODONE HYDROCHLORIDE 300 MG: 50 TABLET ORAL at 20:21

## 2025-08-07 RX ADMIN — MEROPENEM 1000 MG: 1 INJECTION, POWDER, FOR SOLUTION INTRAVENOUS at 02:12

## 2025-08-07 RX ADMIN — ENOXAPARIN SODIUM 40 MG: 100 INJECTION SUBCUTANEOUS at 09:04

## 2025-08-07 RX ADMIN — INSULIN LISPRO 4 UNITS: 100 INJECTION, SOLUTION INTRAVENOUS; SUBCUTANEOUS at 20:22

## 2025-08-07 RX ADMIN — MICONAZOLE NITRATE: 2 POWDER TOPICAL at 20:23

## 2025-08-07 RX ADMIN — LORAZEPAM 0.5 MG: 0.5 TABLET ORAL at 20:22

## 2025-08-07 RX ADMIN — ACETAMINOPHEN 650 MG: 325 TABLET ORAL at 09:14

## 2025-08-07 RX ADMIN — GUAIFENESIN 600 MG: 600 TABLET ORAL at 20:22

## 2025-08-07 RX ADMIN — HYDROXYZINE HYDROCHLORIDE 25 MG: 25 TABLET ORAL at 09:03

## 2025-08-07 RX ADMIN — INSULIN LISPRO 8 UNITS: 100 INJECTION, SOLUTION INTRAVENOUS; SUBCUTANEOUS at 17:31

## 2025-08-07 RX ADMIN — IPRATROPIUM BROMIDE AND ALBUTEROL SULFATE 1 DOSE: .5; 2.5 SOLUTION RESPIRATORY (INHALATION) at 07:35

## 2025-08-07 RX ADMIN — MINOCYCLINE HYDROCHLORIDE 200 MG: 100 CAPSULE ORAL at 09:14

## 2025-08-07 RX ADMIN — PANTOPRAZOLE SODIUM 40 MG: 40 TABLET, DELAYED RELEASE ORAL at 06:04

## 2025-08-07 RX ADMIN — BUPRENORPHINE AND NALOXONE 1 FILM: 8; 2 FILM BUCCAL; SUBLINGUAL at 15:13

## 2025-08-07 RX ADMIN — GUAIFENESIN 600 MG: 600 TABLET ORAL at 09:04

## 2025-08-07 RX ADMIN — SODIUM CHLORIDE, PRESERVATIVE FREE 10 ML: 5 INJECTION INTRAVENOUS at 09:05

## 2025-08-07 RX ADMIN — BUDESONIDE AND FORMOTEROL FUMARATE DIHYDRATE 2 PUFF: 160; 4.5 AEROSOL RESPIRATORY (INHALATION) at 07:35

## 2025-08-07 RX ADMIN — BUPRENORPHINE AND NALOXONE 1 FILM: 8; 2 FILM BUCCAL; SUBLINGUAL at 20:22

## 2025-08-07 RX ADMIN — MICONAZOLE NITRATE: 2 POWDER TOPICAL at 12:01

## 2025-08-07 RX ADMIN — BUPRENORPHINE AND NALOXONE 1 FILM: 8; 2 FILM BUCCAL; SUBLINGUAL at 09:03

## 2025-08-07 RX ADMIN — ARIPIPRAZOLE 2.5 MG: 5 TABLET ORAL at 20:22

## 2025-08-07 RX ADMIN — MEROPENEM 1000 MG: 1 INJECTION, POWDER, FOR SOLUTION INTRAVENOUS at 17:35

## 2025-08-07 RX ADMIN — SODIUM CHLORIDE, PRESERVATIVE FREE 10 ML: 5 INJECTION INTRAVENOUS at 20:23

## 2025-08-07 RX ADMIN — ASPIRIN 81 MG: 81 TABLET, CHEWABLE ORAL at 09:04

## 2025-08-07 RX ADMIN — IPRATROPIUM BROMIDE AND ALBUTEROL SULFATE 1 DOSE: .5; 2.5 SOLUTION RESPIRATORY (INHALATION) at 12:17

## 2025-08-07 RX ADMIN — IPRATROPIUM BROMIDE AND ALBUTEROL SULFATE 1 DOSE: .5; 2.5 SOLUTION RESPIRATORY (INHALATION) at 20:55

## 2025-08-07 RX ADMIN — MEROPENEM 1000 MG: 1 INJECTION, POWDER, FOR SOLUTION INTRAVENOUS at 09:19

## 2025-08-07 RX ADMIN — BUDESONIDE AND FORMOTEROL FUMARATE DIHYDRATE 2 PUFF: 160; 4.5 AEROSOL RESPIRATORY (INHALATION) at 20:55

## 2025-08-07 RX ADMIN — KETOROLAC TROMETHAMINE 15 MG: 30 INJECTION, SOLUTION INTRAMUSCULAR; INTRAVENOUS at 11:57

## 2025-08-07 RX ADMIN — IPRATROPIUM BROMIDE AND ALBUTEROL SULFATE 1 DOSE: .5; 2.5 SOLUTION RESPIRATORY (INHALATION) at 16:17

## 2025-08-07 RX ADMIN — PREDNISONE 40 MG: 20 TABLET ORAL at 09:03

## 2025-08-07 ASSESSMENT — PAIN SCALES - GENERAL
PAINLEVEL_OUTOF10: 9

## 2025-08-07 ASSESSMENT — PAIN DESCRIPTION - LOCATION
LOCATION: ARM
LOCATION: ARM;LEG

## 2025-08-07 ASSESSMENT — PAIN DESCRIPTION - ORIENTATION: ORIENTATION: LEFT

## 2025-08-07 ASSESSMENT — PAIN DESCRIPTION - DESCRIPTORS: DESCRIPTORS: ACHING;DISCOMFORT

## 2025-08-08 LAB
ANION GAP SERPL CALCULATED.3IONS-SCNC: 6 MMOL/L (ref 9–17)
BUN SERPL-MCNC: 21 MG/DL (ref 7–20)
CALCIUM SERPL-MCNC: 8.1 MG/DL (ref 8.3–10.6)
CHLORIDE SERPL-SCNC: 99 MMOL/L (ref 99–110)
CO2 SERPL-SCNC: 36 MMOL/L (ref 21–32)
CREAT SERPL-MCNC: 0.4 MG/DL (ref 0.6–1.2)
ERYTHROCYTE [DISTWIDTH] IN BLOOD BY AUTOMATED COUNT: 13 % (ref 11.7–14.9)
GFR, ESTIMATED: >90 ML/MIN/1.73M2
GLUCOSE BLD-MCNC: 132 MG/DL (ref 74–99)
GLUCOSE BLD-MCNC: 145 MG/DL (ref 74–99)
GLUCOSE BLD-MCNC: 159 MG/DL (ref 74–99)
GLUCOSE BLD-MCNC: 215 MG/DL (ref 74–99)
GLUCOSE SERPL-MCNC: 225 MG/DL (ref 74–99)
HCT VFR BLD AUTO: 35.5 % (ref 37–47)
HGB BLD-MCNC: 10.3 G/DL (ref 12.5–16)
MAGNESIUM SERPL-MCNC: 2 MG/DL (ref 1.8–2.4)
MCH RBC QN AUTO: 26.4 PG (ref 27–31)
MCHC RBC AUTO-ENTMCNC: 29 G/DL (ref 32–36)
MCV RBC AUTO: 91 FL (ref 78–100)
PHOSPHATE SERPL-MCNC: 2.8 MG/DL (ref 2.5–4.9)
PLATELET # BLD AUTO: 219 K/UL (ref 140–440)
PMV BLD AUTO: 10.2 FL (ref 7.5–11.1)
POTASSIUM SERPL-SCNC: 4.2 MMOL/L (ref 3.5–5.1)
RBC # BLD AUTO: 3.9 M/UL (ref 4.2–5.4)
SODIUM SERPL-SCNC: 140 MMOL/L (ref 136–145)
WBC OTHER # BLD: 9.5 K/UL (ref 4–10.5)

## 2025-08-08 PROCEDURE — 2500000003 HC RX 250 WO HCPCS: Performed by: INTERNAL MEDICINE

## 2025-08-08 PROCEDURE — 6360000002 HC RX W HCPCS: Performed by: INTERNAL MEDICINE

## 2025-08-08 PROCEDURE — 2580000003 HC RX 258: Performed by: INTERNAL MEDICINE

## 2025-08-08 PROCEDURE — 99232 SBSQ HOSP IP/OBS MODERATE 35: CPT | Performed by: INTERNAL MEDICINE

## 2025-08-08 PROCEDURE — 6370000000 HC RX 637 (ALT 250 FOR IP): Performed by: STUDENT IN AN ORGANIZED HEALTH CARE EDUCATION/TRAINING PROGRAM

## 2025-08-08 PROCEDURE — 94669 MECHANICAL CHEST WALL OSCILL: CPT

## 2025-08-08 PROCEDURE — 85027 COMPLETE CBC AUTOMATED: CPT

## 2025-08-08 PROCEDURE — 84100 ASSAY OF PHOSPHORUS: CPT

## 2025-08-08 PROCEDURE — 83735 ASSAY OF MAGNESIUM: CPT

## 2025-08-08 PROCEDURE — 97162 PT EVAL MOD COMPLEX 30 MIN: CPT

## 2025-08-08 PROCEDURE — 2500000003 HC RX 250 WO HCPCS: Performed by: STUDENT IN AN ORGANIZED HEALTH CARE EDUCATION/TRAINING PROGRAM

## 2025-08-08 PROCEDURE — 97116 GAIT TRAINING THERAPY: CPT

## 2025-08-08 PROCEDURE — 36415 COLL VENOUS BLD VENIPUNCTURE: CPT

## 2025-08-08 PROCEDURE — 6370000000 HC RX 637 (ALT 250 FOR IP): Performed by: INTERNAL MEDICINE

## 2025-08-08 PROCEDURE — 6360000002 HC RX W HCPCS: Performed by: STUDENT IN AN ORGANIZED HEALTH CARE EDUCATION/TRAINING PROGRAM

## 2025-08-08 PROCEDURE — 80048 BASIC METABOLIC PNL TOTAL CA: CPT

## 2025-08-08 PROCEDURE — 97530 THERAPEUTIC ACTIVITIES: CPT

## 2025-08-08 PROCEDURE — 6370000000 HC RX 637 (ALT 250 FOR IP): Performed by: NURSE PRACTITIONER

## 2025-08-08 PROCEDURE — 97166 OT EVAL MOD COMPLEX 45 MIN: CPT

## 2025-08-08 PROCEDURE — 94640 AIRWAY INHALATION TREATMENT: CPT

## 2025-08-08 PROCEDURE — 2140000000 HC CCU INTERMEDIATE R&B

## 2025-08-08 PROCEDURE — 82962 GLUCOSE BLOOD TEST: CPT

## 2025-08-08 RX ORDER — KETOROLAC TROMETHAMINE 30 MG/ML
15 INJECTION, SOLUTION INTRAMUSCULAR; INTRAVENOUS ONCE
Status: COMPLETED | OUTPATIENT
Start: 2025-08-08 | End: 2025-08-08

## 2025-08-08 RX ORDER — TRAZODONE HYDROCHLORIDE 300 MG/1
300 TABLET ORAL NIGHTLY
Qty: 30 TABLET | Refills: 0 | Status: ON HOLD
Start: 2025-08-08

## 2025-08-08 RX ORDER — LIDOCAINE HYDROCHLORIDE 10 MG/ML
50 INJECTION, SOLUTION INFILTRATION; PERINEURAL ONCE
Status: DISCONTINUED | OUTPATIENT
Start: 2025-08-08 | End: 2025-08-11 | Stop reason: HOSPADM

## 2025-08-08 RX ORDER — SODIUM CHLORIDE 9 MG/ML
INJECTION, SOLUTION INTRAVENOUS PRN
Status: DISCONTINUED | OUTPATIENT
Start: 2025-08-08 | End: 2025-08-11 | Stop reason: HOSPADM

## 2025-08-08 RX ORDER — SODIUM CHLORIDE 0.9 % (FLUSH) 0.9 %
5-40 SYRINGE (ML) INJECTION PRN
Status: DISCONTINUED | OUTPATIENT
Start: 2025-08-08 | End: 2025-08-11 | Stop reason: HOSPADM

## 2025-08-08 RX ORDER — SODIUM CHLORIDE 0.9 % (FLUSH) 0.9 %
5-40 SYRINGE (ML) INJECTION EVERY 12 HOURS SCHEDULED
Status: DISCONTINUED | OUTPATIENT
Start: 2025-08-08 | End: 2025-08-11 | Stop reason: HOSPADM

## 2025-08-08 RX ORDER — CALCIUM CARBONATE 500 MG/1
500 TABLET, CHEWABLE ORAL 3 TIMES DAILY PRN
Qty: 90 TABLET | Refills: 0 | Status: ON HOLD
Start: 2025-08-08 | End: 2025-09-07

## 2025-08-08 RX ORDER — ARIPIPRAZOLE 5 MG/1
TABLET ORAL
Qty: 31 TABLET | Refills: 0 | Status: ON HOLD
Start: 2025-08-08 | End: 2025-09-09

## 2025-08-08 RX ORDER — GUAIFENESIN/DEXTROMETHORPHAN 100-10MG/5
5 SYRUP ORAL EVERY 4 HOURS PRN
Qty: 120 ML | Refills: 0 | Status: ON HOLD
Start: 2025-08-08 | End: 2025-08-18

## 2025-08-08 RX ADMIN — BUPRENORPHINE AND NALOXONE 1 FILM: 8; 2 FILM BUCCAL; SUBLINGUAL at 20:17

## 2025-08-08 RX ADMIN — BUDESONIDE AND FORMOTEROL FUMARATE DIHYDRATE 2 PUFF: 160; 4.5 AEROSOL RESPIRATORY (INHALATION) at 19:18

## 2025-08-08 RX ADMIN — MEROPENEM 1000 MG: 1 INJECTION, POWDER, FOR SOLUTION INTRAVENOUS at 09:42

## 2025-08-08 RX ADMIN — TRAZODONE HYDROCHLORIDE 300 MG: 50 TABLET ORAL at 20:16

## 2025-08-08 RX ADMIN — ONDANSETRON 4 MG: 2 INJECTION INTRAMUSCULAR; INTRAVENOUS at 20:57

## 2025-08-08 RX ADMIN — IPRATROPIUM BROMIDE AND ALBUTEROL SULFATE 1 DOSE: .5; 2.5 SOLUTION RESPIRATORY (INHALATION) at 10:38

## 2025-08-08 RX ADMIN — BUDESONIDE AND FORMOTEROL FUMARATE DIHYDRATE 2 PUFF: 160; 4.5 AEROSOL RESPIRATORY (INHALATION) at 10:38

## 2025-08-08 RX ADMIN — MICONAZOLE NITRATE: 2 POWDER TOPICAL at 09:43

## 2025-08-08 RX ADMIN — BUPRENORPHINE AND NALOXONE 1 FILM: 8; 2 FILM BUCCAL; SUBLINGUAL at 14:37

## 2025-08-08 RX ADMIN — PANTOPRAZOLE SODIUM 40 MG: 40 TABLET, DELAYED RELEASE ORAL at 05:10

## 2025-08-08 RX ADMIN — BUPRENORPHINE AND NALOXONE 1 FILM: 8; 2 FILM BUCCAL; SUBLINGUAL at 09:36

## 2025-08-08 RX ADMIN — MEROPENEM 1000 MG: 1 INJECTION, POWDER, FOR SOLUTION INTRAVENOUS at 16:43

## 2025-08-08 RX ADMIN — INSULIN LISPRO 4 UNITS: 100 INJECTION, SOLUTION INTRAVENOUS; SUBCUTANEOUS at 20:17

## 2025-08-08 RX ADMIN — IPRATROPIUM BROMIDE AND ALBUTEROL SULFATE 1 DOSE: .5; 2.5 SOLUTION RESPIRATORY (INHALATION) at 19:18

## 2025-08-08 RX ADMIN — SODIUM CHLORIDE, PRESERVATIVE FREE 10 ML: 5 INJECTION INTRAVENOUS at 20:22

## 2025-08-08 RX ADMIN — ARIPIPRAZOLE 2.5 MG: 5 TABLET ORAL at 20:16

## 2025-08-08 RX ADMIN — GUAIFENESIN 600 MG: 600 TABLET ORAL at 20:16

## 2025-08-08 RX ADMIN — SODIUM CHLORIDE, PRESERVATIVE FREE 10 ML: 5 INJECTION INTRAVENOUS at 20:18

## 2025-08-08 RX ADMIN — CALCIUM CARBONATE 500 MG: 500 TABLET, CHEWABLE ORAL at 20:53

## 2025-08-08 RX ADMIN — LORAZEPAM 0.5 MG: 0.5 TABLET ORAL at 20:17

## 2025-08-08 RX ADMIN — MICONAZOLE NITRATE: 2 POWDER TOPICAL at 20:23

## 2025-08-08 RX ADMIN — MEROPENEM 1000 MG: 1 INJECTION, POWDER, FOR SOLUTION INTRAVENOUS at 02:30

## 2025-08-08 RX ADMIN — SODIUM CHLORIDE, PRESERVATIVE FREE 10 ML: 5 INJECTION INTRAVENOUS at 09:36

## 2025-08-08 RX ADMIN — IPRATROPIUM BROMIDE AND ALBUTEROL SULFATE 1 DOSE: .5; 2.5 SOLUTION RESPIRATORY (INHALATION) at 15:03

## 2025-08-08 RX ADMIN — ASPIRIN 81 MG: 81 TABLET, CHEWABLE ORAL at 09:35

## 2025-08-08 RX ADMIN — GUAIFENESIN 600 MG: 600 TABLET ORAL at 09:35

## 2025-08-08 RX ADMIN — ENOXAPARIN SODIUM 40 MG: 100 INJECTION SUBCUTANEOUS at 09:35

## 2025-08-08 RX ADMIN — KETOROLAC TROMETHAMINE 15 MG: 30 INJECTION, SOLUTION INTRAMUSCULAR; INTRAVENOUS at 16:42

## 2025-08-08 RX ADMIN — SODIUM CHLORIDE, PRESERVATIVE FREE 10 ML: 5 INJECTION INTRAVENOUS at 09:35

## 2025-08-08 ASSESSMENT — PAIN DESCRIPTION - PAIN TYPE: TYPE: ACUTE PAIN

## 2025-08-08 ASSESSMENT — PAIN DESCRIPTION - LOCATION: LOCATION: SHOULDER;KNEE

## 2025-08-08 ASSESSMENT — PAIN DESCRIPTION - FREQUENCY: FREQUENCY: CONTINUOUS

## 2025-08-08 ASSESSMENT — PAIN DESCRIPTION - ONSET: ONSET: ON-GOING

## 2025-08-08 ASSESSMENT — PAIN DESCRIPTION - ORIENTATION: ORIENTATION: LEFT

## 2025-08-08 ASSESSMENT — PAIN SCALES - GENERAL: PAINLEVEL_OUTOF10: 8

## 2025-08-08 ASSESSMENT — PAIN - FUNCTIONAL ASSESSMENT: PAIN_FUNCTIONAL_ASSESSMENT: ACTIVITIES ARE NOT PREVENTED

## 2025-08-08 ASSESSMENT — PAIN DESCRIPTION - DESCRIPTORS: DESCRIPTORS: ACHING

## 2025-08-09 LAB
ANION GAP SERPL CALCULATED.3IONS-SCNC: 6 MMOL/L (ref 9–17)
BUN SERPL-MCNC: 13 MG/DL (ref 7–20)
CALCIUM SERPL-MCNC: 9.2 MG/DL (ref 8.3–10.6)
CHLORIDE SERPL-SCNC: 95 MMOL/L (ref 99–110)
CO2 SERPL-SCNC: 38 MMOL/L (ref 21–32)
CREAT SERPL-MCNC: 0.6 MG/DL (ref 0.6–1.2)
ERYTHROCYTE [DISTWIDTH] IN BLOOD BY AUTOMATED COUNT: 13.2 % (ref 11.7–14.9)
GFR, ESTIMATED: >90 ML/MIN/1.73M2
GLUCOSE BLD-MCNC: 151 MG/DL (ref 74–99)
GLUCOSE BLD-MCNC: 181 MG/DL (ref 74–99)
GLUCOSE BLD-MCNC: 190 MG/DL (ref 74–99)
GLUCOSE BLD-MCNC: 216 MG/DL (ref 74–99)
GLUCOSE SERPL-MCNC: 171 MG/DL (ref 74–99)
HCT VFR BLD AUTO: 40.7 % (ref 37–47)
HGB BLD-MCNC: 11.9 G/DL (ref 12.5–16)
MAGNESIUM SERPL-MCNC: 2 MG/DL (ref 1.8–2.4)
MCH RBC QN AUTO: 26.7 PG (ref 27–31)
MCHC RBC AUTO-ENTMCNC: 29.2 G/DL (ref 32–36)
MCV RBC AUTO: 91.3 FL (ref 78–100)
PHOSPHATE SERPL-MCNC: 3.7 MG/DL (ref 2.5–4.9)
PLATELET # BLD AUTO: 235 K/UL (ref 140–440)
PMV BLD AUTO: 10 FL (ref 7.5–11.1)
POTASSIUM SERPL-SCNC: 4.4 MMOL/L (ref 3.5–5.1)
RBC # BLD AUTO: 4.46 M/UL (ref 4.2–5.4)
SODIUM SERPL-SCNC: 139 MMOL/L (ref 136–145)
WBC OTHER # BLD: 8.5 K/UL (ref 4–10.5)

## 2025-08-09 PROCEDURE — 2580000003 HC RX 258: Performed by: INTERNAL MEDICINE

## 2025-08-09 PROCEDURE — 2700000000 HC OXYGEN THERAPY PER DAY

## 2025-08-09 PROCEDURE — 6370000000 HC RX 637 (ALT 250 FOR IP): Performed by: NURSE PRACTITIONER

## 2025-08-09 PROCEDURE — 6360000002 HC RX W HCPCS: Performed by: STUDENT IN AN ORGANIZED HEALTH CARE EDUCATION/TRAINING PROGRAM

## 2025-08-09 PROCEDURE — 6360000002 HC RX W HCPCS: Performed by: NURSE PRACTITIONER

## 2025-08-09 PROCEDURE — 80048 BASIC METABOLIC PNL TOTAL CA: CPT

## 2025-08-09 PROCEDURE — 6370000000 HC RX 637 (ALT 250 FOR IP): Performed by: INTERNAL MEDICINE

## 2025-08-09 PROCEDURE — 94640 AIRWAY INHALATION TREATMENT: CPT

## 2025-08-09 PROCEDURE — 2500000003 HC RX 250 WO HCPCS: Performed by: INTERNAL MEDICINE

## 2025-08-09 PROCEDURE — 97110 THERAPEUTIC EXERCISES: CPT | Performed by: PHYSICAL THERAPY ASSISTANT

## 2025-08-09 PROCEDURE — 94761 N-INVAS EAR/PLS OXIMETRY MLT: CPT

## 2025-08-09 PROCEDURE — 94669 MECHANICAL CHEST WALL OSCILL: CPT

## 2025-08-09 PROCEDURE — 97116 GAIT TRAINING THERAPY: CPT | Performed by: PHYSICAL THERAPY ASSISTANT

## 2025-08-09 PROCEDURE — 36415 COLL VENOUS BLD VENIPUNCTURE: CPT

## 2025-08-09 PROCEDURE — 83735 ASSAY OF MAGNESIUM: CPT

## 2025-08-09 PROCEDURE — 94664 DEMO&/EVAL PT USE INHALER: CPT

## 2025-08-09 PROCEDURE — 2500000003 HC RX 250 WO HCPCS: Performed by: STUDENT IN AN ORGANIZED HEALTH CARE EDUCATION/TRAINING PROGRAM

## 2025-08-09 PROCEDURE — 6370000000 HC RX 637 (ALT 250 FOR IP): Performed by: STUDENT IN AN ORGANIZED HEALTH CARE EDUCATION/TRAINING PROGRAM

## 2025-08-09 PROCEDURE — 84100 ASSAY OF PHOSPHORUS: CPT

## 2025-08-09 PROCEDURE — 6360000002 HC RX W HCPCS: Performed by: INTERNAL MEDICINE

## 2025-08-09 PROCEDURE — 2140000000 HC CCU INTERMEDIATE R&B

## 2025-08-09 PROCEDURE — 85027 COMPLETE CBC AUTOMATED: CPT

## 2025-08-09 PROCEDURE — 82962 GLUCOSE BLOOD TEST: CPT

## 2025-08-09 PROCEDURE — 97530 THERAPEUTIC ACTIVITIES: CPT | Performed by: PHYSICAL THERAPY ASSISTANT

## 2025-08-09 RX ORDER — KETOROLAC TROMETHAMINE 30 MG/ML
15 INJECTION, SOLUTION INTRAMUSCULAR; INTRAVENOUS ONCE
Status: COMPLETED | OUTPATIENT
Start: 2025-08-09 | End: 2025-08-09

## 2025-08-09 RX ORDER — FAMOTIDINE 20 MG/1
20 TABLET, FILM COATED ORAL 2 TIMES DAILY PRN
Status: DISCONTINUED | OUTPATIENT
Start: 2025-08-09 | End: 2025-08-11 | Stop reason: HOSPADM

## 2025-08-09 RX ADMIN — SODIUM CHLORIDE, PRESERVATIVE FREE 10 ML: 5 INJECTION INTRAVENOUS at 20:46

## 2025-08-09 RX ADMIN — IPRATROPIUM BROMIDE AND ALBUTEROL SULFATE 1 DOSE: .5; 2.5 SOLUTION RESPIRATORY (INHALATION) at 15:55

## 2025-08-09 RX ADMIN — KETOROLAC TROMETHAMINE 15 MG: 30 INJECTION, SOLUTION INTRAMUSCULAR; INTRAVENOUS at 07:51

## 2025-08-09 RX ADMIN — BUPRENORPHINE AND NALOXONE 1 FILM: 8; 2 FILM BUCCAL; SUBLINGUAL at 14:08

## 2025-08-09 RX ADMIN — LORAZEPAM 0.5 MG: 0.5 TABLET ORAL at 20:45

## 2025-08-09 RX ADMIN — GUAIFENESIN 600 MG: 600 TABLET ORAL at 20:45

## 2025-08-09 RX ADMIN — HYDROXYZINE HYDROCHLORIDE 25 MG: 25 TABLET ORAL at 11:36

## 2025-08-09 RX ADMIN — TRAZODONE HYDROCHLORIDE 300 MG: 50 TABLET ORAL at 20:45

## 2025-08-09 RX ADMIN — FAMOTIDINE 20 MG: 20 TABLET, FILM COATED ORAL at 00:29

## 2025-08-09 RX ADMIN — MEROPENEM 1000 MG: 1 INJECTION, POWDER, FOR SOLUTION INTRAVENOUS at 00:31

## 2025-08-09 RX ADMIN — ASPIRIN 81 MG: 81 TABLET, CHEWABLE ORAL at 07:51

## 2025-08-09 RX ADMIN — IPRATROPIUM BROMIDE AND ALBUTEROL SULFATE 1 DOSE: .5; 2.5 SOLUTION RESPIRATORY (INHALATION) at 20:37

## 2025-08-09 RX ADMIN — SODIUM CHLORIDE, PRESERVATIVE FREE 10 ML: 5 INJECTION INTRAVENOUS at 07:52

## 2025-08-09 RX ADMIN — ARIPIPRAZOLE 5 MG: 5 TABLET ORAL at 20:45

## 2025-08-09 RX ADMIN — MICONAZOLE NITRATE: 2 POWDER TOPICAL at 07:53

## 2025-08-09 RX ADMIN — FAMOTIDINE 20 MG: 20 TABLET, FILM COATED ORAL at 20:49

## 2025-08-09 RX ADMIN — MEROPENEM 1000 MG: 1 INJECTION, POWDER, FOR SOLUTION INTRAVENOUS at 11:26

## 2025-08-09 RX ADMIN — ACETAMINOPHEN 650 MG: 325 TABLET ORAL at 06:32

## 2025-08-09 RX ADMIN — INSULIN LISPRO 4 UNITS: 100 INJECTION, SOLUTION INTRAVENOUS; SUBCUTANEOUS at 16:27

## 2025-08-09 RX ADMIN — ENOXAPARIN SODIUM 40 MG: 100 INJECTION SUBCUTANEOUS at 07:52

## 2025-08-09 RX ADMIN — INSULIN LISPRO 4 UNITS: 100 INJECTION, SOLUTION INTRAVENOUS; SUBCUTANEOUS at 20:45

## 2025-08-09 RX ADMIN — PANTOPRAZOLE SODIUM 40 MG: 40 TABLET, DELAYED RELEASE ORAL at 05:06

## 2025-08-09 RX ADMIN — BUDESONIDE AND FORMOTEROL FUMARATE DIHYDRATE 2 PUFF: 160; 4.5 AEROSOL RESPIRATORY (INHALATION) at 11:37

## 2025-08-09 RX ADMIN — ACETAMINOPHEN 650 MG: 325 TABLET ORAL at 11:36

## 2025-08-09 RX ADMIN — ONDANSETRON 4 MG: 2 INJECTION INTRAMUSCULAR; INTRAVENOUS at 05:09

## 2025-08-09 RX ADMIN — BUPRENORPHINE AND NALOXONE 1 FILM: 8; 2 FILM BUCCAL; SUBLINGUAL at 20:45

## 2025-08-09 RX ADMIN — IPRATROPIUM BROMIDE AND ALBUTEROL SULFATE 1 DOSE: .5; 2.5 SOLUTION RESPIRATORY (INHALATION) at 11:36

## 2025-08-09 RX ADMIN — BUPRENORPHINE AND NALOXONE 1 FILM: 8; 2 FILM BUCCAL; SUBLINGUAL at 07:52

## 2025-08-09 RX ADMIN — GUAIFENESIN 600 MG: 600 TABLET ORAL at 07:51

## 2025-08-09 RX ADMIN — MICONAZOLE NITRATE: 2 POWDER TOPICAL at 20:45

## 2025-08-09 RX ADMIN — BUDESONIDE AND FORMOTEROL FUMARATE DIHYDRATE 2 PUFF: 160; 4.5 AEROSOL RESPIRATORY (INHALATION) at 20:37

## 2025-08-09 RX ADMIN — MEROPENEM 1000 MG: 1 INJECTION, POWDER, FOR SOLUTION INTRAVENOUS at 19:05

## 2025-08-09 ASSESSMENT — PAIN - FUNCTIONAL ASSESSMENT
PAIN_FUNCTIONAL_ASSESSMENT: PREVENTS OR INTERFERES SOME ACTIVE ACTIVITIES AND ADLS
PAIN_FUNCTIONAL_ASSESSMENT: 0-10
PAIN_FUNCTIONAL_ASSESSMENT: PREVENTS OR INTERFERES SOME ACTIVE ACTIVITIES AND ADLS
PAIN_FUNCTIONAL_ASSESSMENT: 0-10
PAIN_FUNCTIONAL_ASSESSMENT: PREVENTS OR INTERFERES SOME ACTIVE ACTIVITIES AND ADLS
PAIN_FUNCTIONAL_ASSESSMENT: 0-10
PAIN_FUNCTIONAL_ASSESSMENT: PREVENTS OR INTERFERES SOME ACTIVE ACTIVITIES AND ADLS
PAIN_FUNCTIONAL_ASSESSMENT: PREVENTS OR INTERFERES SOME ACTIVE ACTIVITIES AND ADLS

## 2025-08-09 ASSESSMENT — PAIN DESCRIPTION - DESCRIPTORS
DESCRIPTORS: ACHING;DISCOMFORT;SORE
DESCRIPTORS: ACHING

## 2025-08-09 ASSESSMENT — PAIN SCALES - WONG BAKER
WONGBAKER_NUMERICALRESPONSE: HURTS EVEN MORE

## 2025-08-09 ASSESSMENT — PAIN DESCRIPTION - DIRECTION
RADIATING_TOWARDS: LOCALIZED

## 2025-08-09 ASSESSMENT — PAIN DESCRIPTION - ORIENTATION
ORIENTATION: RIGHT

## 2025-08-09 ASSESSMENT — PAIN DESCRIPTION - ONSET
ONSET: ON-GOING
ONSET: ON-GOING
ONSET: AWAKENED FROM SLEEP
ONSET: ON-GOING
ONSET: ON-GOING

## 2025-08-09 ASSESSMENT — PAIN DESCRIPTION - LOCATION
LOCATION: KNEE

## 2025-08-09 ASSESSMENT — PAIN DESCRIPTION - FREQUENCY
FREQUENCY: INTERMITTENT

## 2025-08-09 ASSESSMENT — PAIN SCALES - GENERAL
PAINLEVEL_OUTOF10: 10
PAINLEVEL_OUTOF10: 7
PAINLEVEL_OUTOF10: 6
PAINLEVEL_OUTOF10: 4
PAINLEVEL_OUTOF10: 10
PAINLEVEL_OUTOF10: 9
PAINLEVEL_OUTOF10: 7
PAINLEVEL_OUTOF10: 7

## 2025-08-09 ASSESSMENT — PAIN DESCRIPTION - PAIN TYPE
TYPE: CHRONIC PAIN
TYPE: ACUTE PAIN
TYPE: CHRONIC PAIN

## 2025-08-10 LAB
ARTERIAL PATENCY WRIST A: ABNORMAL
BODY TEMPERATURE: 37
COHGB MFR BLD: 0.4 % (ref 0.5–1.5)
GAS FLOW.O2 O2 DELIVERY SYS: ABNORMAL L/MIN
GLUCOSE BLD-MCNC: 154 MG/DL (ref 74–99)
GLUCOSE BLD-MCNC: 166 MG/DL (ref 74–99)
GLUCOSE BLD-MCNC: 180 MG/DL (ref 74–99)
GLUCOSE BLD-MCNC: 239 MG/DL (ref 74–99)
HCO3 VENOUS: 45.1 MMOL/L (ref 22–29)
METHEMOGLOBIN: 0.4 % (ref 0.5–1.5)
OXYHGB MFR BLD: 82.2 %
PCO2 VENOUS: 91.4 MM HG (ref 38–54)
PH VENOUS: 7.31 (ref 7.32–7.43)
PO2 VENOUS: 48.6 MM HG (ref 23–48)
POSITIVE BASE EXCESS, VEN: 14.6 MMOL/L (ref 0–3)
TEXT FOR RESPIRATORY: ABNORMAL

## 2025-08-10 PROCEDURE — 6370000000 HC RX 637 (ALT 250 FOR IP): Performed by: INTERNAL MEDICINE

## 2025-08-10 PROCEDURE — 36415 COLL VENOUS BLD VENIPUNCTURE: CPT

## 2025-08-10 PROCEDURE — 82805 BLOOD GASES W/O2 SATURATION: CPT

## 2025-08-10 PROCEDURE — 82962 GLUCOSE BLOOD TEST: CPT

## 2025-08-10 PROCEDURE — 6370000000 HC RX 637 (ALT 250 FOR IP): Performed by: STUDENT IN AN ORGANIZED HEALTH CARE EDUCATION/TRAINING PROGRAM

## 2025-08-10 PROCEDURE — 2500000003 HC RX 250 WO HCPCS: Performed by: STUDENT IN AN ORGANIZED HEALTH CARE EDUCATION/TRAINING PROGRAM

## 2025-08-10 PROCEDURE — 6360000002 HC RX W HCPCS: Performed by: STUDENT IN AN ORGANIZED HEALTH CARE EDUCATION/TRAINING PROGRAM

## 2025-08-10 PROCEDURE — 2140000000 HC CCU INTERMEDIATE R&B

## 2025-08-10 PROCEDURE — 2580000003 HC RX 258: Performed by: INTERNAL MEDICINE

## 2025-08-10 PROCEDURE — 94669 MECHANICAL CHEST WALL OSCILL: CPT

## 2025-08-10 PROCEDURE — 6370000000 HC RX 637 (ALT 250 FOR IP): Performed by: NURSE PRACTITIONER

## 2025-08-10 PROCEDURE — 94664 DEMO&/EVAL PT USE INHALER: CPT

## 2025-08-10 PROCEDURE — 94761 N-INVAS EAR/PLS OXIMETRY MLT: CPT

## 2025-08-10 PROCEDURE — 6360000002 HC RX W HCPCS: Performed by: INTERNAL MEDICINE

## 2025-08-10 PROCEDURE — 2700000000 HC OXYGEN THERAPY PER DAY

## 2025-08-10 PROCEDURE — 2500000003 HC RX 250 WO HCPCS: Performed by: INTERNAL MEDICINE

## 2025-08-10 PROCEDURE — 94640 AIRWAY INHALATION TREATMENT: CPT

## 2025-08-10 PROCEDURE — 94660 CPAP INITIATION&MGMT: CPT

## 2025-08-10 RX ORDER — MINERAL OIL/HYDROPHIL PETROLAT
OINTMENT (GRAM) TOPICAL 2 TIMES DAILY
Status: DISCONTINUED | OUTPATIENT
Start: 2025-08-10 | End: 2025-08-10 | Stop reason: SDUPTHER

## 2025-08-10 RX ORDER — HYDROXYZINE HYDROCHLORIDE 25 MG/1
25 TABLET, FILM COATED ORAL 2 TIMES DAILY PRN
Status: DISCONTINUED | OUTPATIENT
Start: 2025-08-10 | End: 2025-08-11 | Stop reason: HOSPADM

## 2025-08-10 RX ADMIN — PANTOPRAZOLE SODIUM 40 MG: 40 TABLET, DELAYED RELEASE ORAL at 04:53

## 2025-08-10 RX ADMIN — HYDROXYZINE HYDROCHLORIDE 25 MG: 25 TABLET ORAL at 05:26

## 2025-08-10 RX ADMIN — IPRATROPIUM BROMIDE AND ALBUTEROL SULFATE 1 DOSE: .5; 2.5 SOLUTION RESPIRATORY (INHALATION) at 15:41

## 2025-08-10 RX ADMIN — FAMOTIDINE 20 MG: 20 TABLET, FILM COATED ORAL at 20:43

## 2025-08-10 RX ADMIN — ASPIRIN 81 MG: 81 TABLET, CHEWABLE ORAL at 08:25

## 2025-08-10 RX ADMIN — BUPRENORPHINE AND NALOXONE 1 FILM: 8; 2 FILM BUCCAL; SUBLINGUAL at 08:26

## 2025-08-10 RX ADMIN — DICLOFENAC SODIUM 4 G: 10 GEL TOPICAL at 10:20

## 2025-08-10 RX ADMIN — GUAIFENESIN 600 MG: 600 TABLET ORAL at 08:25

## 2025-08-10 RX ADMIN — TRAZODONE HYDROCHLORIDE 300 MG: 50 TABLET ORAL at 20:31

## 2025-08-10 RX ADMIN — SKIN PROTECTANT: 44 OINTMENT TOPICAL at 11:22

## 2025-08-10 RX ADMIN — MICONAZOLE NITRATE: 2 POWDER TOPICAL at 20:33

## 2025-08-10 RX ADMIN — CALCIUM CARBONATE 500 MG: 500 TABLET, CHEWABLE ORAL at 08:34

## 2025-08-10 RX ADMIN — MICONAZOLE NITRATE: 2 POWDER TOPICAL at 08:29

## 2025-08-10 RX ADMIN — SODIUM CHLORIDE, PRESERVATIVE FREE 10 ML: 5 INJECTION INTRAVENOUS at 08:26

## 2025-08-10 RX ADMIN — DICLOFENAC SODIUM 4 G: 10 GEL TOPICAL at 15:53

## 2025-08-10 RX ADMIN — MEROPENEM 1000 MG: 1 INJECTION, POWDER, FOR SOLUTION INTRAVENOUS at 01:34

## 2025-08-10 RX ADMIN — ACETAMINOPHEN 650 MG: 325 TABLET ORAL at 16:50

## 2025-08-10 RX ADMIN — SKIN PROTECTANT: 44 OINTMENT TOPICAL at 20:32

## 2025-08-10 RX ADMIN — BUDESONIDE AND FORMOTEROL FUMARATE DIHYDRATE 2 PUFF: 160; 4.5 AEROSOL RESPIRATORY (INHALATION) at 19:08

## 2025-08-10 RX ADMIN — MEROPENEM 1000 MG: 1 INJECTION, POWDER, FOR SOLUTION INTRAVENOUS at 17:00

## 2025-08-10 RX ADMIN — IPRATROPIUM BROMIDE AND ALBUTEROL SULFATE 1 DOSE: .5; 2.5 SOLUTION RESPIRATORY (INHALATION) at 19:07

## 2025-08-10 RX ADMIN — GUAIFENESIN 600 MG: 600 TABLET ORAL at 20:32

## 2025-08-10 RX ADMIN — IPRATROPIUM BROMIDE AND ALBUTEROL SULFATE 1 DOSE: .5; 2.5 SOLUTION RESPIRATORY (INHALATION) at 08:00

## 2025-08-10 RX ADMIN — BUPRENORPHINE AND NALOXONE 1 FILM: 8; 2 FILM BUCCAL; SUBLINGUAL at 13:06

## 2025-08-10 RX ADMIN — MEROPENEM 1000 MG: 1 INJECTION, POWDER, FOR SOLUTION INTRAVENOUS at 09:51

## 2025-08-10 RX ADMIN — INSULIN LISPRO 4 UNITS: 100 INJECTION, SOLUTION INTRAVENOUS; SUBCUTANEOUS at 15:55

## 2025-08-10 RX ADMIN — INSULIN LISPRO 4 UNITS: 100 INJECTION, SOLUTION INTRAVENOUS; SUBCUTANEOUS at 20:42

## 2025-08-10 RX ADMIN — ENOXAPARIN SODIUM 40 MG: 100 INJECTION SUBCUTANEOUS at 08:26

## 2025-08-10 RX ADMIN — SODIUM CHLORIDE, PRESERVATIVE FREE 10 ML: 5 INJECTION INTRAVENOUS at 08:30

## 2025-08-10 RX ADMIN — ARIPIPRAZOLE 5 MG: 5 TABLET ORAL at 20:32

## 2025-08-10 RX ADMIN — BUPRENORPHINE AND NALOXONE 1 FILM: 8; 2 FILM BUCCAL; SUBLINGUAL at 20:32

## 2025-08-10 RX ADMIN — SODIUM CHLORIDE, PRESERVATIVE FREE 10 ML: 5 INJECTION INTRAVENOUS at 20:32

## 2025-08-10 RX ADMIN — ACETAMINOPHEN 650 MG: 325 TABLET ORAL at 08:25

## 2025-08-10 RX ADMIN — BUDESONIDE AND FORMOTEROL FUMARATE DIHYDRATE 2 PUFF: 160; 4.5 AEROSOL RESPIRATORY (INHALATION) at 08:01

## 2025-08-10 RX ADMIN — SODIUM CHLORIDE, PRESERVATIVE FREE 10 ML: 5 INJECTION INTRAVENOUS at 20:33

## 2025-08-10 ASSESSMENT — PAIN - FUNCTIONAL ASSESSMENT
PAIN_FUNCTIONAL_ASSESSMENT: PREVENTS OR INTERFERES SOME ACTIVE ACTIVITIES AND ADLS
PAIN_FUNCTIONAL_ASSESSMENT: PREVENTS OR INTERFERES SOME ACTIVE ACTIVITIES AND ADLS
PAIN_FUNCTIONAL_ASSESSMENT: 0-10
PAIN_FUNCTIONAL_ASSESSMENT: PREVENTS OR INTERFERES SOME ACTIVE ACTIVITIES AND ADLS
PAIN_FUNCTIONAL_ASSESSMENT: 0-10
PAIN_FUNCTIONAL_ASSESSMENT: 0-10
PAIN_FUNCTIONAL_ASSESSMENT: PREVENTS OR INTERFERES SOME ACTIVE ACTIVITIES AND ADLS
PAIN_FUNCTIONAL_ASSESSMENT: 0-10
PAIN_FUNCTIONAL_ASSESSMENT: 0-10

## 2025-08-10 ASSESSMENT — PAIN DESCRIPTION - LOCATION
LOCATION: KNEE
LOCATION: SHOULDER
LOCATION: KNEE
LOCATION: SHOULDER
LOCATION: KNEE

## 2025-08-10 ASSESSMENT — PAIN DESCRIPTION - ORIENTATION
ORIENTATION: RIGHT;LEFT
ORIENTATION: RIGHT;LEFT
ORIENTATION: RIGHT

## 2025-08-10 ASSESSMENT — PAIN SCALES - GENERAL
PAINLEVEL_OUTOF10: 5
PAINLEVEL_OUTOF10: 5
PAINLEVEL_OUTOF10: 8
PAINLEVEL_OUTOF10: 8
PAINLEVEL_OUTOF10: 5
PAINLEVEL_OUTOF10: 6
PAINLEVEL_OUTOF10: 6
PAINLEVEL_OUTOF10: 8
PAINLEVEL_OUTOF10: 6
PAINLEVEL_OUTOF10: 8
PAINLEVEL_OUTOF10: 6
PAINLEVEL_OUTOF10: 5
PAINLEVEL_OUTOF10: 8

## 2025-08-10 ASSESSMENT — PAIN SCALES - WONG BAKER: WONGBAKER_NUMERICALRESPONSE: HURTS LITTLE MORE

## 2025-08-10 ASSESSMENT — PAIN DESCRIPTION - DESCRIPTORS
DESCRIPTORS: ACHING

## 2025-08-10 ASSESSMENT — PAIN DESCRIPTION - FREQUENCY: FREQUENCY: INTERMITTENT

## 2025-08-10 ASSESSMENT — PAIN DESCRIPTION - PAIN TYPE: TYPE: CHRONIC PAIN

## 2025-08-10 ASSESSMENT — PAIN DESCRIPTION - ONSET: ONSET: ON-GOING

## 2025-08-11 ENCOUNTER — HOSPITAL ENCOUNTER (INPATIENT)
Age: 65
LOS: 8 days | Discharge: HOME HEALTH | DRG: 947 | End: 2025-08-19
Attending: STUDENT IN AN ORGANIZED HEALTH CARE EDUCATION/TRAINING PROGRAM | Admitting: INTERNAL MEDICINE
Payer: MEDICARE

## 2025-08-11 VITALS
HEIGHT: 64 IN | BODY MASS INDEX: 35.51 KG/M2 | RESPIRATION RATE: 17 BRPM | HEART RATE: 120 BPM | OXYGEN SATURATION: 96 % | WEIGHT: 208 LBS | DIASTOLIC BLOOD PRESSURE: 81 MMHG | TEMPERATURE: 98.2 F | SYSTOLIC BLOOD PRESSURE: 147 MMHG

## 2025-08-11 PROBLEM — R53.81 DEBILITY: Status: ACTIVE | Noted: 2025-08-11

## 2025-08-11 LAB
ARTERIAL PATENCY WRIST A: ABNORMAL
BODY TEMPERATURE: 37
COHGB MFR BLD: 0.9 % (ref 0.5–1.5)
EKG ATRIAL RATE: 112 BPM
EKG DIAGNOSIS: NORMAL
EKG P AXIS: 68 DEGREES
EKG P-R INTERVAL: 136 MS
EKG Q-T INTERVAL: 330 MS
EKG QRS DURATION: 82 MS
EKG QTC CALCULATION (BAZETT): 450 MS
EKG R AXIS: 30 DEGREES
EKG T AXIS: 71 DEGREES
EKG VENTRICULAR RATE: 112 BPM
GLUCOSE BLD-MCNC: 137 MG/DL (ref 74–99)
GLUCOSE BLD-MCNC: 140 MG/DL (ref 74–99)
GLUCOSE BLD-MCNC: 220 MG/DL (ref 74–99)
GLUCOSE BLD-MCNC: 225 MG/DL (ref 74–99)
GLUCOSE BLD-MCNC: 276 MG/DL (ref 74–99)
HCO3 VENOUS: 46 MMOL/L (ref 22–29)
METHEMOGLOBIN: 0.2 % (ref 0.5–1.5)
MICROORGANISM SPEC CULT: ABNORMAL
MICROORGANISM SPEC CULT: ABNORMAL
MICROORGANISM/AGENT SPEC: ABNORMAL
OXYHGB MFR BLD: 56.6 %
PCO2 VENOUS: 87 MM HG (ref 38–54)
PH VENOUS: 7.34 (ref 7.32–7.43)
PO2 VENOUS: 30.1 MM HG (ref 23–48)
POSITIVE BASE EXCESS, VEN: 16.4 MMOL/L (ref 0–3)
SERVICE CMNT-IMP: ABNORMAL
SPECIMEN DESCRIPTION: ABNORMAL
TEXT FOR RESPIRATORY: ABNORMAL

## 2025-08-11 PROCEDURE — 2700000000 HC OXYGEN THERAPY PER DAY

## 2025-08-11 PROCEDURE — 05HF33Z INSERTION OF INFUSION DEVICE INTO LEFT CEPHALIC VEIN, PERCUTANEOUS APPROACH: ICD-10-PCS | Performed by: INTERNAL MEDICINE

## 2025-08-11 PROCEDURE — 82805 BLOOD GASES W/O2 SATURATION: CPT

## 2025-08-11 PROCEDURE — 97110 THERAPEUTIC EXERCISES: CPT

## 2025-08-11 PROCEDURE — 2580000003 HC RX 258: Performed by: INTERNAL MEDICINE

## 2025-08-11 PROCEDURE — 94669 MECHANICAL CHEST WALL OSCILL: CPT

## 2025-08-11 PROCEDURE — 36410 VNPNXR 3YR/> PHY/QHP DX/THER: CPT

## 2025-08-11 PROCEDURE — 94640 AIRWAY INHALATION TREATMENT: CPT

## 2025-08-11 PROCEDURE — 6370000000 HC RX 637 (ALT 250 FOR IP): Performed by: STUDENT IN AN ORGANIZED HEALTH CARE EDUCATION/TRAINING PROGRAM

## 2025-08-11 PROCEDURE — 6370000000 HC RX 637 (ALT 250 FOR IP): Performed by: INTERNAL MEDICINE

## 2025-08-11 PROCEDURE — 2500000003 HC RX 250 WO HCPCS: Performed by: INTERNAL MEDICINE

## 2025-08-11 PROCEDURE — 36415 COLL VENOUS BLD VENIPUNCTURE: CPT

## 2025-08-11 PROCEDURE — 1200000002 HC SEMI PRIVATE SWING BED

## 2025-08-11 PROCEDURE — 93005 ELECTROCARDIOGRAM TRACING: CPT | Performed by: INTERNAL MEDICINE

## 2025-08-11 PROCEDURE — 6360000002 HC RX W HCPCS: Performed by: STUDENT IN AN ORGANIZED HEALTH CARE EDUCATION/TRAINING PROGRAM

## 2025-08-11 PROCEDURE — 97530 THERAPEUTIC ACTIVITIES: CPT

## 2025-08-11 PROCEDURE — 97116 GAIT TRAINING THERAPY: CPT

## 2025-08-11 PROCEDURE — 2500000003 HC RX 250 WO HCPCS: Performed by: STUDENT IN AN ORGANIZED HEALTH CARE EDUCATION/TRAINING PROGRAM

## 2025-08-11 PROCEDURE — 97535 SELF CARE MNGMENT TRAINING: CPT

## 2025-08-11 PROCEDURE — 99232 SBSQ HOSP IP/OBS MODERATE 35: CPT | Performed by: INTERNAL MEDICINE

## 2025-08-11 PROCEDURE — C1751 CATH, INF, PER/CENT/MIDLINE: HCPCS

## 2025-08-11 PROCEDURE — 94761 N-INVAS EAR/PLS OXIMETRY MLT: CPT

## 2025-08-11 PROCEDURE — 6360000002 HC RX W HCPCS: Performed by: INTERNAL MEDICINE

## 2025-08-11 PROCEDURE — 76937 US GUIDE VASCULAR ACCESS: CPT

## 2025-08-11 PROCEDURE — 93010 ELECTROCARDIOGRAM REPORT: CPT | Performed by: INTERNAL MEDICINE

## 2025-08-11 PROCEDURE — 5A09357 ASSISTANCE WITH RESPIRATORY VENTILATION, LESS THAN 24 CONSECUTIVE HOURS, CONTINUOUS POSITIVE AIRWAY PRESSURE: ICD-10-PCS | Performed by: INTERNAL MEDICINE

## 2025-08-11 PROCEDURE — 82962 GLUCOSE BLOOD TEST: CPT

## 2025-08-11 RX ORDER — PANTOPRAZOLE SODIUM 40 MG/1
40 TABLET, DELAYED RELEASE ORAL
Status: DISCONTINUED | OUTPATIENT
Start: 2025-08-12 | End: 2025-08-19 | Stop reason: HOSPADM

## 2025-08-11 RX ORDER — ACETAMINOPHEN 650 MG/1
650 SUPPOSITORY RECTAL EVERY 6 HOURS PRN
Status: CANCELLED | OUTPATIENT
Start: 2025-08-11

## 2025-08-11 RX ORDER — FAMOTIDINE 20 MG/1
20 TABLET, FILM COATED ORAL 2 TIMES DAILY PRN
Status: DISCONTINUED | OUTPATIENT
Start: 2025-08-11 | End: 2025-08-19 | Stop reason: HOSPADM

## 2025-08-11 RX ORDER — DEXTROSE MONOHYDRATE 100 MG/ML
INJECTION, SOLUTION INTRAVENOUS CONTINUOUS PRN
Status: CANCELLED | OUTPATIENT
Start: 2025-08-11

## 2025-08-11 RX ORDER — TRAZODONE HYDROCHLORIDE 50 MG/1
300 TABLET ORAL NIGHTLY
Status: CANCELLED | OUTPATIENT
Start: 2025-08-11

## 2025-08-11 RX ORDER — GLUCAGON 1 MG/ML
1 KIT INJECTION PRN
Status: DISCONTINUED | OUTPATIENT
Start: 2025-08-11 | End: 2025-08-19 | Stop reason: HOSPADM

## 2025-08-11 RX ORDER — HYDROXYZINE HYDROCHLORIDE 25 MG/1
25 TABLET, FILM COATED ORAL 2 TIMES DAILY PRN
Status: DISCONTINUED | OUTPATIENT
Start: 2025-08-11 | End: 2025-08-19 | Stop reason: HOSPADM

## 2025-08-11 RX ORDER — INSULIN LISPRO 100 [IU]/ML
0-16 INJECTION, SOLUTION INTRAVENOUS; SUBCUTANEOUS
Status: DISCONTINUED | OUTPATIENT
Start: 2025-08-12 | End: 2025-08-19 | Stop reason: HOSPADM

## 2025-08-11 RX ORDER — ENOXAPARIN SODIUM 100 MG/ML
40 INJECTION SUBCUTANEOUS DAILY
Status: CANCELLED | OUTPATIENT
Start: 2025-08-12

## 2025-08-11 RX ORDER — TRAZODONE HYDROCHLORIDE 100 MG/1
300 TABLET ORAL NIGHTLY
Status: DISCONTINUED | OUTPATIENT
Start: 2025-08-11 | End: 2025-08-19 | Stop reason: HOSPADM

## 2025-08-11 RX ORDER — ONDANSETRON 2 MG/ML
4 INJECTION INTRAMUSCULAR; INTRAVENOUS EVERY 6 HOURS PRN
Status: CANCELLED | OUTPATIENT
Start: 2025-08-11

## 2025-08-11 RX ORDER — PANTOPRAZOLE SODIUM 40 MG/1
40 TABLET, DELAYED RELEASE ORAL
Status: CANCELLED | OUTPATIENT
Start: 2025-08-12

## 2025-08-11 RX ORDER — HYDROXYZINE HYDROCHLORIDE 25 MG/1
25 TABLET, FILM COATED ORAL 2 TIMES DAILY PRN
Status: CANCELLED | OUTPATIENT
Start: 2025-08-11

## 2025-08-11 RX ORDER — SODIUM CHLORIDE 0.9 % (FLUSH) 0.9 %
5-40 SYRINGE (ML) INJECTION EVERY 12 HOURS SCHEDULED
Status: DISCONTINUED | OUTPATIENT
Start: 2025-08-11 | End: 2025-08-19 | Stop reason: HOSPADM

## 2025-08-11 RX ORDER — CALCIUM CARBONATE 500 MG/1
500 TABLET, CHEWABLE ORAL 3 TIMES DAILY PRN
Status: CANCELLED | OUTPATIENT
Start: 2025-08-11

## 2025-08-11 RX ORDER — POLYETHYLENE GLYCOL 3350 17 G/17G
17 POWDER, FOR SOLUTION ORAL DAILY PRN
Status: CANCELLED | OUTPATIENT
Start: 2025-08-11

## 2025-08-11 RX ORDER — ONDANSETRON 4 MG/1
4 TABLET, ORALLY DISINTEGRATING ORAL EVERY 8 HOURS PRN
Status: DISCONTINUED | OUTPATIENT
Start: 2025-08-11 | End: 2025-08-19 | Stop reason: HOSPADM

## 2025-08-11 RX ORDER — INSULIN LISPRO 100 [IU]/ML
0-16 INJECTION, SOLUTION INTRAVENOUS; SUBCUTANEOUS
Status: CANCELLED | OUTPATIENT
Start: 2025-08-11

## 2025-08-11 RX ORDER — GUAIFENESIN 600 MG/1
600 TABLET, EXTENDED RELEASE ORAL 2 TIMES DAILY
Status: CANCELLED | OUTPATIENT
Start: 2025-08-11

## 2025-08-11 RX ORDER — IPRATROPIUM BROMIDE AND ALBUTEROL SULFATE 2.5; .5 MG/3ML; MG/3ML
1 SOLUTION RESPIRATORY (INHALATION)
Status: DISCONTINUED | OUTPATIENT
Start: 2025-08-12 | End: 2025-08-19 | Stop reason: HOSPADM

## 2025-08-11 RX ORDER — GUAIFENESIN 600 MG/1
600 TABLET, EXTENDED RELEASE ORAL 2 TIMES DAILY
Status: DISCONTINUED | OUTPATIENT
Start: 2025-08-11 | End: 2025-08-19 | Stop reason: HOSPADM

## 2025-08-11 RX ORDER — DEXTROSE MONOHYDRATE 100 MG/ML
INJECTION, SOLUTION INTRAVENOUS CONTINUOUS PRN
Status: DISCONTINUED | OUTPATIENT
Start: 2025-08-11 | End: 2025-08-19 | Stop reason: HOSPADM

## 2025-08-11 RX ORDER — ONDANSETRON 2 MG/ML
4 INJECTION INTRAMUSCULAR; INTRAVENOUS EVERY 6 HOURS PRN
Status: DISCONTINUED | OUTPATIENT
Start: 2025-08-11 | End: 2025-08-19 | Stop reason: HOSPADM

## 2025-08-11 RX ORDER — GUAIFENESIN/DEXTROMETHORPHAN 100-10MG/5
5 SYRUP ORAL EVERY 4 HOURS PRN
Status: CANCELLED | OUTPATIENT
Start: 2025-08-11

## 2025-08-11 RX ORDER — ASPIRIN 81 MG/1
81 TABLET, CHEWABLE ORAL DAILY
Status: DISCONTINUED | OUTPATIENT
Start: 2025-08-12 | End: 2025-08-12 | Stop reason: ALTCHOICE

## 2025-08-11 RX ORDER — MINERAL OIL/HYDROPHIL PETROLAT
OINTMENT (GRAM) TOPICAL 2 TIMES DAILY
Status: DISCONTINUED | OUTPATIENT
Start: 2025-08-11 | End: 2025-08-19 | Stop reason: HOSPADM

## 2025-08-11 RX ORDER — BUPRENORPHINE AND NALOXONE 8; 2 MG/1; MG/1
1 FILM, SOLUBLE BUCCAL; SUBLINGUAL 3 TIMES DAILY
Status: CANCELLED | OUTPATIENT
Start: 2025-08-11

## 2025-08-11 RX ORDER — IPRATROPIUM BROMIDE AND ALBUTEROL SULFATE 2.5; .5 MG/3ML; MG/3ML
1 SOLUTION RESPIRATORY (INHALATION)
Status: CANCELLED | OUTPATIENT
Start: 2025-08-11

## 2025-08-11 RX ORDER — ARIPIPRAZOLE 5 MG/1
5 TABLET ORAL NIGHTLY
Status: CANCELLED | OUTPATIENT
Start: 2025-08-11

## 2025-08-11 RX ORDER — ACETAMINOPHEN 325 MG/1
650 TABLET ORAL EVERY 6 HOURS PRN
Status: CANCELLED | OUTPATIENT
Start: 2025-08-11

## 2025-08-11 RX ORDER — BUDESONIDE AND FORMOTEROL FUMARATE DIHYDRATE 160; 4.5 UG/1; UG/1
2 AEROSOL RESPIRATORY (INHALATION)
Status: DISCONTINUED | OUTPATIENT
Start: 2025-08-11 | End: 2025-08-12

## 2025-08-11 RX ORDER — BUPRENORPHINE AND NALOXONE 8; 2 MG/1; MG/1
1 FILM, SOLUBLE BUCCAL; SUBLINGUAL 3 TIMES DAILY
Status: DISCONTINUED | OUTPATIENT
Start: 2025-08-11 | End: 2025-08-19 | Stop reason: HOSPADM

## 2025-08-11 RX ORDER — CALCIUM CARBONATE 500 MG/1
500 TABLET, CHEWABLE ORAL 3 TIMES DAILY PRN
Status: DISCONTINUED | OUTPATIENT
Start: 2025-08-11 | End: 2025-08-19 | Stop reason: HOSPADM

## 2025-08-11 RX ORDER — SODIUM CHLORIDE 9 MG/ML
INJECTION, SOLUTION INTRAVENOUS PRN
Status: CANCELLED | OUTPATIENT
Start: 2025-08-11

## 2025-08-11 RX ORDER — ARIPIPRAZOLE 5 MG/1
5 TABLET ORAL NIGHTLY
Status: DISCONTINUED | OUTPATIENT
Start: 2025-08-11 | End: 2025-08-19 | Stop reason: HOSPADM

## 2025-08-11 RX ORDER — SODIUM CHLORIDE 9 MG/ML
INJECTION, SOLUTION INTRAVENOUS PRN
Status: DISCONTINUED | OUTPATIENT
Start: 2025-08-11 | End: 2025-08-19 | Stop reason: HOSPADM

## 2025-08-11 RX ORDER — POLYETHYLENE GLYCOL 3350 17 G/17G
17 POWDER, FOR SOLUTION ORAL DAILY PRN
Status: DISCONTINUED | OUTPATIENT
Start: 2025-08-11 | End: 2025-08-19 | Stop reason: HOSPADM

## 2025-08-11 RX ORDER — ACETAMINOPHEN 325 MG/1
650 TABLET ORAL EVERY 6 HOURS PRN
Status: DISCONTINUED | OUTPATIENT
Start: 2025-08-11 | End: 2025-08-19 | Stop reason: HOSPADM

## 2025-08-11 RX ORDER — SODIUM CHLORIDE 0.9 % (FLUSH) 0.9 %
5-40 SYRINGE (ML) INJECTION EVERY 12 HOURS SCHEDULED
Status: CANCELLED | OUTPATIENT
Start: 2025-08-11

## 2025-08-11 RX ORDER — IPRATROPIUM BROMIDE AND ALBUTEROL SULFATE 2.5; .5 MG/3ML; MG/3ML
SOLUTION RESPIRATORY (INHALATION)
Status: DISPENSED
Start: 2025-08-11 | End: 2025-08-12

## 2025-08-11 RX ORDER — ACETAMINOPHEN 650 MG/1
650 SUPPOSITORY RECTAL EVERY 6 HOURS PRN
Status: DISCONTINUED | OUTPATIENT
Start: 2025-08-11 | End: 2025-08-19 | Stop reason: HOSPADM

## 2025-08-11 RX ORDER — GUAIFENESIN/DEXTROMETHORPHAN 100-10MG/5
5 SYRUP ORAL EVERY 4 HOURS PRN
Status: DISCONTINUED | OUTPATIENT
Start: 2025-08-11 | End: 2025-08-19 | Stop reason: HOSPADM

## 2025-08-11 RX ORDER — ASPIRIN 81 MG/1
81 TABLET, CHEWABLE ORAL DAILY
Status: CANCELLED | OUTPATIENT
Start: 2025-08-12

## 2025-08-11 RX ORDER — FAMOTIDINE 20 MG/1
20 TABLET, FILM COATED ORAL 2 TIMES DAILY PRN
Status: CANCELLED | OUTPATIENT
Start: 2025-08-11

## 2025-08-11 RX ORDER — ONDANSETRON 4 MG/1
4 TABLET, ORALLY DISINTEGRATING ORAL EVERY 8 HOURS PRN
Status: CANCELLED | OUTPATIENT
Start: 2025-08-11

## 2025-08-11 RX ORDER — GLUCAGON 1 MG/ML
1 KIT INJECTION PRN
Status: CANCELLED | OUTPATIENT
Start: 2025-08-11

## 2025-08-11 RX ORDER — ENOXAPARIN SODIUM 100 MG/ML
40 INJECTION SUBCUTANEOUS DAILY
Status: DISCONTINUED | OUTPATIENT
Start: 2025-08-12 | End: 2025-08-19 | Stop reason: HOSPADM

## 2025-08-11 RX ORDER — BUDESONIDE AND FORMOTEROL FUMARATE DIHYDRATE 160; 4.5 UG/1; UG/1
2 AEROSOL RESPIRATORY (INHALATION)
Status: CANCELLED | OUTPATIENT
Start: 2025-08-11

## 2025-08-11 RX ADMIN — INSULIN LISPRO 4 UNITS: 100 INJECTION, SOLUTION INTRAVENOUS; SUBCUTANEOUS at 13:35

## 2025-08-11 RX ADMIN — ACETAMINOPHEN 650 MG: 325 TABLET ORAL at 21:06

## 2025-08-11 RX ADMIN — MICONAZOLE NITRATE: 2 POWDER TOPICAL at 09:15

## 2025-08-11 RX ADMIN — TRAZODONE HYDROCHLORIDE 300 MG: 100 TABLET ORAL at 21:06

## 2025-08-11 RX ADMIN — ACETAMINOPHEN 650 MG: 325 TABLET ORAL at 17:02

## 2025-08-11 RX ADMIN — MICONAZOLE NITRATE: 2 POWDER TOPICAL at 21:14

## 2025-08-11 RX ADMIN — INSULIN LISPRO 8 UNITS: 100 INJECTION, SOLUTION INTRAVENOUS; SUBCUTANEOUS at 17:16

## 2025-08-11 RX ADMIN — SODIUM CHLORIDE, PRESERVATIVE FREE 10 ML: 5 INJECTION INTRAVENOUS at 09:15

## 2025-08-11 RX ADMIN — BUDESONIDE AND FORMOTEROL FUMARATE DIHYDRATE 2 PUFF: 160; 4.5 AEROSOL RESPIRATORY (INHALATION) at 07:09

## 2025-08-11 RX ADMIN — BUPRENORPHINE AND NALOXONE 1 FILM: 8; 2 FILM BUCCAL; SUBLINGUAL at 21:05

## 2025-08-11 RX ADMIN — ARIPIPRAZOLE 5 MG: 5 TABLET ORAL at 21:06

## 2025-08-11 RX ADMIN — GUAIFENESIN 600 MG: 600 TABLET, EXTENDED RELEASE ORAL at 21:05

## 2025-08-11 RX ADMIN — IPRATROPIUM BROMIDE AND ALBUTEROL SULFATE 1 DOSE: .5; 2.5 SOLUTION RESPIRATORY (INHALATION) at 07:09

## 2025-08-11 RX ADMIN — MEROPENEM 1000 MG: 1 INJECTION, POWDER, FOR SOLUTION INTRAVENOUS at 01:32

## 2025-08-11 RX ADMIN — PANTOPRAZOLE SODIUM 40 MG: 40 TABLET, DELAYED RELEASE ORAL at 04:49

## 2025-08-11 RX ADMIN — MEROPENEM 1000 MG: 1 INJECTION, POWDER, FOR SOLUTION INTRAVENOUS at 17:06

## 2025-08-11 RX ADMIN — BUPRENORPHINE AND NALOXONE 1 FILM: 8; 2 FILM BUCCAL; SUBLINGUAL at 09:26

## 2025-08-11 RX ADMIN — IPRATROPIUM BROMIDE AND ALBUTEROL SULFATE 1 DOSE: .5; 2.5 SOLUTION RESPIRATORY (INHALATION) at 21:52

## 2025-08-11 RX ADMIN — BUPRENORPHINE AND NALOXONE 1 FILM: 8; 2 FILM BUCCAL; SUBLINGUAL at 14:41

## 2025-08-11 RX ADMIN — GUAIFENESIN 600 MG: 600 TABLET ORAL at 09:14

## 2025-08-11 RX ADMIN — ENOXAPARIN SODIUM 40 MG: 100 INJECTION SUBCUTANEOUS at 09:14

## 2025-08-11 RX ADMIN — MEROPENEM 1000 MG: 1 INJECTION, POWDER, FOR SOLUTION INTRAVENOUS at 10:23

## 2025-08-11 RX ADMIN — SKIN PROTECTANT: 44 OINTMENT TOPICAL at 09:14

## 2025-08-11 RX ADMIN — BUDESONIDE AND FORMOTEROL FUMARATE DIHYDRATE 2 PUFF: 160; 4.5 AEROSOL RESPIRATORY (INHALATION) at 21:50

## 2025-08-11 RX ADMIN — ACETAMINOPHEN 650 MG: 325 TABLET ORAL at 01:31

## 2025-08-11 RX ADMIN — DICLOFENAC SODIUM 4 G: 10 GEL TOPICAL at 09:16

## 2025-08-11 RX ADMIN — HYDROXYZINE HYDROCHLORIDE 25 MG: 25 TABLET, FILM COATED ORAL at 21:05

## 2025-08-11 RX ADMIN — ASPIRIN 81 MG: 81 TABLET, CHEWABLE ORAL at 09:14

## 2025-08-11 RX ADMIN — IPRATROPIUM BROMIDE AND ALBUTEROL SULFATE 1 DOSE: .5; 2.5 SOLUTION RESPIRATORY (INHALATION) at 15:16

## 2025-08-11 RX ADMIN — IPRATROPIUM BROMIDE AND ALBUTEROL SULFATE 1 DOSE: .5; 2.5 SOLUTION RESPIRATORY (INHALATION) at 10:59

## 2025-08-11 RX ADMIN — DICLOFENAC SODIUM 4 G: 10 GEL TOPICAL at 15:05

## 2025-08-11 RX ADMIN — SODIUM CHLORIDE, PRESERVATIVE FREE 10 ML: 5 INJECTION INTRAVENOUS at 09:16

## 2025-08-11 RX ADMIN — ACETAMINOPHEN 650 MG: 325 TABLET ORAL at 09:27

## 2025-08-11 ASSESSMENT — PAIN - FUNCTIONAL ASSESSMENT
PAIN_FUNCTIONAL_ASSESSMENT: PREVENTS OR INTERFERES SOME ACTIVE ACTIVITIES AND ADLS
PAIN_FUNCTIONAL_ASSESSMENT: 0-10
PAIN_FUNCTIONAL_ASSESSMENT: PREVENTS OR INTERFERES SOME ACTIVE ACTIVITIES AND ADLS
PAIN_FUNCTIONAL_ASSESSMENT: 0-10
PAIN_FUNCTIONAL_ASSESSMENT: PREVENTS OR INTERFERES SOME ACTIVE ACTIVITIES AND ADLS
PAIN_FUNCTIONAL_ASSESSMENT: 0-10
PAIN_FUNCTIONAL_ASSESSMENT: PREVENTS OR INTERFERES SOME ACTIVE ACTIVITIES AND ADLS
PAIN_FUNCTIONAL_ASSESSMENT: 0-10
PAIN_FUNCTIONAL_ASSESSMENT: 0-10
PAIN_FUNCTIONAL_ASSESSMENT: PREVENTS OR INTERFERES SOME ACTIVE ACTIVITIES AND ADLS
PAIN_FUNCTIONAL_ASSESSMENT: PREVENTS OR INTERFERES SOME ACTIVE ACTIVITIES AND ADLS

## 2025-08-11 ASSESSMENT — PAIN DESCRIPTION - ONSET
ONSET: ON-GOING

## 2025-08-11 ASSESSMENT — PAIN SCALES - GENERAL
PAINLEVEL_OUTOF10: 5
PAINLEVEL_OUTOF10: 8
PAINLEVEL_OUTOF10: 6
PAINLEVEL_OUTOF10: 6
PAINLEVEL_OUTOF10: 5
PAINLEVEL_OUTOF10: 8
PAINLEVEL_OUTOF10: 6
PAINLEVEL_OUTOF10: 5
PAINLEVEL_OUTOF10: 6
PAINLEVEL_OUTOF10: 5
PAINLEVEL_OUTOF10: 1
PAINLEVEL_OUTOF10: 6
PAINLEVEL_OUTOF10: 6
PAINLEVEL_OUTOF10: 5

## 2025-08-11 ASSESSMENT — PAIN DESCRIPTION - LOCATION
LOCATION: SHOULDER
LOCATION: KNEE
LOCATION: KNEE
LOCATION: SHOULDER
LOCATION: SHOULDER
LOCATION: KNEE
LOCATION: GENERALIZED

## 2025-08-11 ASSESSMENT — PAIN SCALES - WONG BAKER: WONGBAKER_NUMERICALRESPONSE: NO HURT

## 2025-08-11 ASSESSMENT — PAIN DESCRIPTION - DESCRIPTORS
DESCRIPTORS: ACHING

## 2025-08-11 ASSESSMENT — PAIN DESCRIPTION - FREQUENCY
FREQUENCY: INTERMITTENT

## 2025-08-11 ASSESSMENT — PAIN DESCRIPTION - PAIN TYPE
TYPE: CHRONIC PAIN

## 2025-08-11 ASSESSMENT — PAIN DESCRIPTION - ORIENTATION
ORIENTATION: RIGHT;LEFT
ORIENTATION: RIGHT
ORIENTATION: LEFT
ORIENTATION: LEFT
ORIENTATION: RIGHT
ORIENTATION: RIGHT
ORIENTATION: LEFT

## 2025-08-11 ASSESSMENT — PAIN DESCRIPTION - DIRECTION
RADIATING_TOWARDS: LOCALIZED

## 2025-08-12 LAB
GLUCOSE BLD-MCNC: 103 MG/DL (ref 74–99)
GLUCOSE BLD-MCNC: 156 MG/DL (ref 74–99)
GLUCOSE BLD-MCNC: 214 MG/DL (ref 74–99)
GLUCOSE BLD-MCNC: 217 MG/DL (ref 74–99)
MICROORGANISM SPEC CULT: ABNORMAL
MICROORGANISM SPEC CULT: ABNORMAL
MICROORGANISM/AGENT SPEC: ABNORMAL
SPECIMEN DESCRIPTION: ABNORMAL

## 2025-08-12 PROCEDURE — 97162 PT EVAL MOD COMPLEX 30 MIN: CPT

## 2025-08-12 PROCEDURE — 2500000003 HC RX 250 WO HCPCS: Performed by: INTERNAL MEDICINE

## 2025-08-12 PROCEDURE — 97530 THERAPEUTIC ACTIVITIES: CPT

## 2025-08-12 PROCEDURE — 97116 GAIT TRAINING THERAPY: CPT

## 2025-08-12 PROCEDURE — 94640 AIRWAY INHALATION TREATMENT: CPT

## 2025-08-12 PROCEDURE — 6370000000 HC RX 637 (ALT 250 FOR IP): Performed by: INTERNAL MEDICINE

## 2025-08-12 PROCEDURE — 2700000000 HC OXYGEN THERAPY PER DAY

## 2025-08-12 PROCEDURE — 6360000002 HC RX W HCPCS: Performed by: INTERNAL MEDICINE

## 2025-08-12 PROCEDURE — 97166 OT EVAL MOD COMPLEX 45 MIN: CPT

## 2025-08-12 PROCEDURE — 2580000003 HC RX 258: Performed by: INTERNAL MEDICINE

## 2025-08-12 PROCEDURE — 97535 SELF CARE MNGMENT TRAINING: CPT

## 2025-08-12 PROCEDURE — 94761 N-INVAS EAR/PLS OXIMETRY MLT: CPT

## 2025-08-12 PROCEDURE — 82962 GLUCOSE BLOOD TEST: CPT

## 2025-08-12 PROCEDURE — 1200000002 HC SEMI PRIVATE SWING BED

## 2025-08-12 RX ORDER — IPRATROPIUM BROMIDE AND ALBUTEROL SULFATE 2.5; .5 MG/3ML; MG/3ML
SOLUTION RESPIRATORY (INHALATION)
Status: DISPENSED
Start: 2025-08-12 | End: 2025-08-12

## 2025-08-12 RX ORDER — DESVENLAFAXINE 100 MG/1
100 TABLET, EXTENDED RELEASE ORAL DAILY
Status: DISCONTINUED | OUTPATIENT
Start: 2025-08-12 | End: 2025-08-19 | Stop reason: HOSPADM

## 2025-08-12 RX ORDER — ASPIRIN 81 MG/1
81 TABLET ORAL DAILY
Status: DISCONTINUED | OUTPATIENT
Start: 2025-08-12 | End: 2025-08-19 | Stop reason: HOSPADM

## 2025-08-12 RX ORDER — DESVENLAFAXINE 100 MG/1
1 TABLET, EXTENDED RELEASE ORAL DAILY
COMMUNITY
Start: 2025-08-12

## 2025-08-12 RX ADMIN — ACETAMINOPHEN 650 MG: 325 TABLET ORAL at 09:48

## 2025-08-12 RX ADMIN — BUPRENORPHINE AND NALOXONE 1 FILM: 8; 2 FILM BUCCAL; SUBLINGUAL at 20:50

## 2025-08-12 RX ADMIN — Medication: at 03:14

## 2025-08-12 RX ADMIN — SODIUM CHLORIDE, PRESERVATIVE FREE 10 ML: 5 INJECTION INTRAVENOUS at 20:51

## 2025-08-12 RX ADMIN — BUPRENORPHINE AND NALOXONE 1 FILM: 8; 2 FILM BUCCAL; SUBLINGUAL at 16:22

## 2025-08-12 RX ADMIN — IPRATROPIUM BROMIDE AND ALBUTEROL SULFATE 1 DOSE: .5; 2.5 SOLUTION RESPIRATORY (INHALATION) at 07:38

## 2025-08-12 RX ADMIN — IPRATROPIUM BROMIDE AND ALBUTEROL SULFATE 1 DOSE: .5; 2.5 SOLUTION RESPIRATORY (INHALATION) at 20:21

## 2025-08-12 RX ADMIN — MEROPENEM 1000 MG: 1 INJECTION INTRAVENOUS at 01:39

## 2025-08-12 RX ADMIN — GUAIFENESIN 600 MG: 600 TABLET, EXTENDED RELEASE ORAL at 20:50

## 2025-08-12 RX ADMIN — SODIUM CHLORIDE: 0.9 INJECTION, SOLUTION INTRAVENOUS at 01:38

## 2025-08-12 RX ADMIN — ACETAMINOPHEN 650 MG: 325 TABLET ORAL at 03:13

## 2025-08-12 RX ADMIN — INSULIN LISPRO 4 UNITS: 100 INJECTION, SOLUTION INTRAVENOUS; SUBCUTANEOUS at 12:04

## 2025-08-12 RX ADMIN — ACETAMINOPHEN 650 MG: 325 TABLET ORAL at 16:22

## 2025-08-12 RX ADMIN — MEROPENEM 1000 MG: 1 INJECTION INTRAVENOUS at 10:40

## 2025-08-12 RX ADMIN — SODIUM CHLORIDE, PRESERVATIVE FREE 10 ML: 5 INJECTION INTRAVENOUS at 01:40

## 2025-08-12 RX ADMIN — BUPRENORPHINE AND NALOXONE 1 FILM: 8; 2 FILM BUCCAL; SUBLINGUAL at 09:49

## 2025-08-12 RX ADMIN — DESVENLAFAXINE 100 MG: 100 TABLET, EXTENDED RELEASE ORAL at 12:04

## 2025-08-12 RX ADMIN — IPRATROPIUM BROMIDE AND ALBUTEROL SULFATE 1 DOSE: .5; 2.5 SOLUTION RESPIRATORY (INHALATION) at 11:21

## 2025-08-12 RX ADMIN — ASPIRIN 81 MG: 81 TABLET, COATED ORAL at 09:48

## 2025-08-12 RX ADMIN — BUDESONIDE AND FORMOTEROL FUMARATE DIHYDRATE 2 PUFF: 160; 4.5 AEROSOL RESPIRATORY (INHALATION) at 07:36

## 2025-08-12 RX ADMIN — GUAIFENESIN 600 MG: 600 TABLET, EXTENDED RELEASE ORAL at 09:48

## 2025-08-12 RX ADMIN — ARIPIPRAZOLE 5 MG: 5 TABLET ORAL at 20:50

## 2025-08-12 RX ADMIN — IPRATROPIUM BROMIDE AND ALBUTEROL SULFATE 1 DOSE: .5; 2.5 SOLUTION RESPIRATORY (INHALATION) at 02:19

## 2025-08-12 RX ADMIN — TRAZODONE HYDROCHLORIDE 300 MG: 100 TABLET ORAL at 20:50

## 2025-08-12 RX ADMIN — HYDROXYZINE HYDROCHLORIDE 25 MG: 25 TABLET, FILM COATED ORAL at 20:50

## 2025-08-12 RX ADMIN — IPRATROPIUM BROMIDE AND ALBUTEROL SULFATE 1 DOSE: .5; 2.5 SOLUTION RESPIRATORY (INHALATION) at 15:24

## 2025-08-12 RX ADMIN — ENOXAPARIN SODIUM 40 MG: 100 INJECTION SUBCUTANEOUS at 09:47

## 2025-08-12 RX ADMIN — PANTOPRAZOLE SODIUM 40 MG: 40 TABLET, DELAYED RELEASE ORAL at 05:35

## 2025-08-12 RX ADMIN — MEROPENEM 1000 MG: 1 INJECTION INTRAVENOUS at 17:51

## 2025-08-12 RX ADMIN — INSULIN LISPRO 4 UNITS: 100 INJECTION, SOLUTION INTRAVENOUS; SUBCUTANEOUS at 20:50

## 2025-08-12 ASSESSMENT — PAIN - FUNCTIONAL ASSESSMENT
PAIN_FUNCTIONAL_ASSESSMENT: 0-10
PAIN_FUNCTIONAL_ASSESSMENT: 0-10
PAIN_FUNCTIONAL_ASSESSMENT: ACTIVITIES ARE NOT PREVENTED
PAIN_FUNCTIONAL_ASSESSMENT: 0-10
PAIN_FUNCTIONAL_ASSESSMENT: PREVENTS OR INTERFERES SOME ACTIVE ACTIVITIES AND ADLS
PAIN_FUNCTIONAL_ASSESSMENT: ACTIVITIES ARE NOT PREVENTED

## 2025-08-12 ASSESSMENT — PAIN DESCRIPTION - ORIENTATION
ORIENTATION: OTHER (COMMENT)
ORIENTATION: OTHER (COMMENT)
ORIENTATION: RIGHT;LEFT

## 2025-08-12 ASSESSMENT — PAIN SCALES - GENERAL
PAINLEVEL_OUTOF10: 0
PAINLEVEL_OUTOF10: 5
PAINLEVEL_OUTOF10: 0
PAINLEVEL_OUTOF10: 0
PAINLEVEL_OUTOF10: 3
PAINLEVEL_OUTOF10: 3
PAINLEVEL_OUTOF10: 1

## 2025-08-12 ASSESSMENT — PAIN DESCRIPTION - FREQUENCY
FREQUENCY: INTERMITTENT
FREQUENCY: INTERMITTENT

## 2025-08-12 ASSESSMENT — PAIN DESCRIPTION - LOCATION
LOCATION: GENERALIZED

## 2025-08-12 ASSESSMENT — PAIN DESCRIPTION - ONSET
ONSET: ON-GOING
ONSET: ON-GOING

## 2025-08-12 ASSESSMENT — PAIN DESCRIPTION - PAIN TYPE
TYPE: CHRONIC PAIN
TYPE: CHRONIC PAIN

## 2025-08-12 ASSESSMENT — PAIN DESCRIPTION - DESCRIPTORS
DESCRIPTORS: ACHING
DESCRIPTORS: ACHING;SORE
DESCRIPTORS: ACHING

## 2025-08-13 LAB
GLUCOSE BLD-MCNC: 115 MG/DL (ref 74–99)
GLUCOSE BLD-MCNC: 126 MG/DL (ref 74–99)
GLUCOSE BLD-MCNC: 161 MG/DL (ref 74–99)
GLUCOSE BLD-MCNC: 183 MG/DL (ref 74–99)

## 2025-08-13 PROCEDURE — 6370000000 HC RX 637 (ALT 250 FOR IP): Performed by: INTERNAL MEDICINE

## 2025-08-13 PROCEDURE — 97110 THERAPEUTIC EXERCISES: CPT

## 2025-08-13 PROCEDURE — 2700000000 HC OXYGEN THERAPY PER DAY

## 2025-08-13 PROCEDURE — 1200000002 HC SEMI PRIVATE SWING BED

## 2025-08-13 PROCEDURE — 97535 SELF CARE MNGMENT TRAINING: CPT

## 2025-08-13 PROCEDURE — 97530 THERAPEUTIC ACTIVITIES: CPT

## 2025-08-13 PROCEDURE — 94761 N-INVAS EAR/PLS OXIMETRY MLT: CPT

## 2025-08-13 PROCEDURE — 6360000002 HC RX W HCPCS: Performed by: INTERNAL MEDICINE

## 2025-08-13 PROCEDURE — 94640 AIRWAY INHALATION TREATMENT: CPT

## 2025-08-13 PROCEDURE — 82962 GLUCOSE BLOOD TEST: CPT

## 2025-08-13 PROCEDURE — 2580000003 HC RX 258: Performed by: INTERNAL MEDICINE

## 2025-08-13 PROCEDURE — 97116 GAIT TRAINING THERAPY: CPT

## 2025-08-13 PROCEDURE — 2500000003 HC RX 250 WO HCPCS: Performed by: INTERNAL MEDICINE

## 2025-08-13 RX ADMIN — ASPIRIN 81 MG: 81 TABLET, COATED ORAL at 09:38

## 2025-08-13 RX ADMIN — BUPRENORPHINE AND NALOXONE 1 FILM: 8; 2 FILM BUCCAL; SUBLINGUAL at 09:38

## 2025-08-13 RX ADMIN — SODIUM CHLORIDE, PRESERVATIVE FREE 10 ML: 5 INJECTION INTRAVENOUS at 09:41

## 2025-08-13 RX ADMIN — ARIPIPRAZOLE 5 MG: 5 TABLET ORAL at 22:04

## 2025-08-13 RX ADMIN — TRAZODONE HYDROCHLORIDE 300 MG: 100 TABLET ORAL at 22:03

## 2025-08-13 RX ADMIN — PANTOPRAZOLE SODIUM 40 MG: 40 TABLET, DELAYED RELEASE ORAL at 05:46

## 2025-08-13 RX ADMIN — BUPRENORPHINE AND NALOXONE 1 FILM: 8; 2 FILM BUCCAL; SUBLINGUAL at 16:00

## 2025-08-13 RX ADMIN — IPRATROPIUM BROMIDE AND ALBUTEROL SULFATE 1 DOSE: .5; 2.5 SOLUTION RESPIRATORY (INHALATION) at 16:00

## 2025-08-13 RX ADMIN — GUAIFENESIN 600 MG: 600 TABLET, EXTENDED RELEASE ORAL at 09:38

## 2025-08-13 RX ADMIN — ENOXAPARIN SODIUM 40 MG: 100 INJECTION SUBCUTANEOUS at 09:38

## 2025-08-13 RX ADMIN — IPRATROPIUM BROMIDE AND ALBUTEROL SULFATE 1 DOSE: .5; 2.5 SOLUTION RESPIRATORY (INHALATION) at 20:10

## 2025-08-13 RX ADMIN — DESVENLAFAXINE 100 MG: 100 TABLET, EXTENDED RELEASE ORAL at 09:55

## 2025-08-13 RX ADMIN — IPRATROPIUM BROMIDE AND ALBUTEROL SULFATE 1 DOSE: .5; 2.5 SOLUTION RESPIRATORY (INHALATION) at 07:40

## 2025-08-13 RX ADMIN — MEROPENEM 1000 MG: 1 INJECTION INTRAVENOUS at 02:23

## 2025-08-13 RX ADMIN — GUAIFENESIN 600 MG: 600 TABLET, EXTENDED RELEASE ORAL at 22:04

## 2025-08-13 RX ADMIN — ACETAMINOPHEN 650 MG: 325 TABLET ORAL at 05:46

## 2025-08-13 RX ADMIN — IPRATROPIUM BROMIDE AND ALBUTEROL SULFATE 1 DOSE: .5; 2.5 SOLUTION RESPIRATORY (INHALATION) at 11:45

## 2025-08-13 RX ADMIN — MICONAZOLE NITRATE: 2 POWDER TOPICAL at 09:41

## 2025-08-13 RX ADMIN — BUPRENORPHINE AND NALOXONE 1 FILM: 8; 2 FILM BUCCAL; SUBLINGUAL at 22:04

## 2025-08-13 RX ADMIN — MEROPENEM 1000 MG: 1 INJECTION INTRAVENOUS at 18:17

## 2025-08-13 RX ADMIN — MEROPENEM 1000 MG: 1 INJECTION INTRAVENOUS at 09:48

## 2025-08-13 RX ADMIN — MICONAZOLE NITRATE: 2 POWDER TOPICAL at 22:05

## 2025-08-13 ASSESSMENT — PAIN - FUNCTIONAL ASSESSMENT: PAIN_FUNCTIONAL_ASSESSMENT: 0-10

## 2025-08-13 ASSESSMENT — PAIN DESCRIPTION - DESCRIPTORS: DESCRIPTORS: ACHING

## 2025-08-13 ASSESSMENT — PAIN DESCRIPTION - ORIENTATION: ORIENTATION: LOWER

## 2025-08-13 ASSESSMENT — PAIN SCALES - GENERAL
PAINLEVEL_OUTOF10: 0
PAINLEVEL_OUTOF10: 3

## 2025-08-13 ASSESSMENT — PAIN DESCRIPTION - LOCATION: LOCATION: BACK

## 2025-08-14 LAB
GLUCOSE BLD-MCNC: 123 MG/DL (ref 74–99)
GLUCOSE BLD-MCNC: 137 MG/DL (ref 74–99)
GLUCOSE BLD-MCNC: 175 MG/DL (ref 74–99)
GLUCOSE BLD-MCNC: 220 MG/DL (ref 74–99)

## 2025-08-14 PROCEDURE — 2500000003 HC RX 250 WO HCPCS: Performed by: INTERNAL MEDICINE

## 2025-08-14 PROCEDURE — 2700000000 HC OXYGEN THERAPY PER DAY

## 2025-08-14 PROCEDURE — 1200000002 HC SEMI PRIVATE SWING BED

## 2025-08-14 PROCEDURE — 97530 THERAPEUTIC ACTIVITIES: CPT

## 2025-08-14 PROCEDURE — 94640 AIRWAY INHALATION TREATMENT: CPT

## 2025-08-14 PROCEDURE — 97116 GAIT TRAINING THERAPY: CPT

## 2025-08-14 PROCEDURE — 97110 THERAPEUTIC EXERCISES: CPT

## 2025-08-14 PROCEDURE — 2580000003 HC RX 258: Performed by: INTERNAL MEDICINE

## 2025-08-14 PROCEDURE — 6370000000 HC RX 637 (ALT 250 FOR IP): Performed by: INTERNAL MEDICINE

## 2025-08-14 PROCEDURE — 82962 GLUCOSE BLOOD TEST: CPT

## 2025-08-14 PROCEDURE — 94761 N-INVAS EAR/PLS OXIMETRY MLT: CPT

## 2025-08-14 PROCEDURE — 6360000002 HC RX W HCPCS: Performed by: INTERNAL MEDICINE

## 2025-08-14 RX ADMIN — SODIUM CHLORIDE, PRESERVATIVE FREE 10 ML: 5 INJECTION INTRAVENOUS at 09:42

## 2025-08-14 RX ADMIN — IPRATROPIUM BROMIDE AND ALBUTEROL SULFATE 1 DOSE: .5; 2.5 SOLUTION RESPIRATORY (INHALATION) at 11:45

## 2025-08-14 RX ADMIN — GUAIFENESIN 600 MG: 600 TABLET, EXTENDED RELEASE ORAL at 09:28

## 2025-08-14 RX ADMIN — IPRATROPIUM BROMIDE AND ALBUTEROL SULFATE 1 DOSE: .5; 2.5 SOLUTION RESPIRATORY (INHALATION) at 19:50

## 2025-08-14 RX ADMIN — BUPRENORPHINE AND NALOXONE 1 FILM: 8; 2 FILM BUCCAL; SUBLINGUAL at 21:33

## 2025-08-14 RX ADMIN — IPRATROPIUM BROMIDE AND ALBUTEROL SULFATE 1 DOSE: .5; 2.5 SOLUTION RESPIRATORY (INHALATION) at 07:54

## 2025-08-14 RX ADMIN — ACETAMINOPHEN 650 MG: 325 TABLET ORAL at 17:34

## 2025-08-14 RX ADMIN — HYDROXYZINE HYDROCHLORIDE 25 MG: 25 TABLET, FILM COATED ORAL at 21:33

## 2025-08-14 RX ADMIN — ACETAMINOPHEN 650 MG: 325 TABLET ORAL at 01:50

## 2025-08-14 RX ADMIN — MICONAZOLE NITRATE: 2 POWDER TOPICAL at 09:29

## 2025-08-14 RX ADMIN — SODIUM CHLORIDE, PRESERVATIVE FREE 10 ML: 5 INJECTION INTRAVENOUS at 01:51

## 2025-08-14 RX ADMIN — MEROPENEM 1000 MG: 1 INJECTION INTRAVENOUS at 17:22

## 2025-08-14 RX ADMIN — PANTOPRAZOLE SODIUM 40 MG: 40 TABLET, DELAYED RELEASE ORAL at 04:52

## 2025-08-14 RX ADMIN — SODIUM CHLORIDE, PRESERVATIVE FREE 10 ML: 5 INJECTION INTRAVENOUS at 21:33

## 2025-08-14 RX ADMIN — ACETAMINOPHEN 650 MG: 325 TABLET ORAL at 09:41

## 2025-08-14 RX ADMIN — MEROPENEM 1000 MG: 1 INJECTION INTRAVENOUS at 09:36

## 2025-08-14 RX ADMIN — MEROPENEM 1000 MG: 1 INJECTION INTRAVENOUS at 01:54

## 2025-08-14 RX ADMIN — GUAIFENESIN 600 MG: 600 TABLET, EXTENDED RELEASE ORAL at 21:33

## 2025-08-14 RX ADMIN — BUPRENORPHINE AND NALOXONE 1 FILM: 8; 2 FILM BUCCAL; SUBLINGUAL at 09:28

## 2025-08-14 RX ADMIN — DESVENLAFAXINE 100 MG: 100 TABLET, EXTENDED RELEASE ORAL at 09:42

## 2025-08-14 RX ADMIN — ASPIRIN 81 MG: 81 TABLET, COATED ORAL at 09:28

## 2025-08-14 RX ADMIN — TRAZODONE HYDROCHLORIDE 300 MG: 100 TABLET ORAL at 21:33

## 2025-08-14 RX ADMIN — ENOXAPARIN SODIUM 40 MG: 100 INJECTION SUBCUTANEOUS at 09:28

## 2025-08-14 RX ADMIN — IPRATROPIUM BROMIDE AND ALBUTEROL SULFATE 1 DOSE: .5; 2.5 SOLUTION RESPIRATORY (INHALATION) at 14:50

## 2025-08-14 RX ADMIN — BUPRENORPHINE AND NALOXONE 1 FILM: 8; 2 FILM BUCCAL; SUBLINGUAL at 15:29

## 2025-08-14 RX ADMIN — ARIPIPRAZOLE 5 MG: 5 TABLET ORAL at 21:33

## 2025-08-14 ASSESSMENT — PAIN DESCRIPTION - PAIN TYPE: TYPE: ACUTE PAIN

## 2025-08-14 ASSESSMENT — PAIN - FUNCTIONAL ASSESSMENT
PAIN_FUNCTIONAL_ASSESSMENT: 0-10
PAIN_FUNCTIONAL_ASSESSMENT: 0-10
PAIN_FUNCTIONAL_ASSESSMENT: ACTIVITIES ARE NOT PREVENTED
PAIN_FUNCTIONAL_ASSESSMENT: ACTIVITIES ARE NOT PREVENTED
PAIN_FUNCTIONAL_ASSESSMENT: 0-10
PAIN_FUNCTIONAL_ASSESSMENT: 0-10

## 2025-08-14 ASSESSMENT — PAIN DESCRIPTION - FREQUENCY: FREQUENCY: CONTINUOUS

## 2025-08-14 ASSESSMENT — PAIN DESCRIPTION - ONSET: ONSET: GRADUAL

## 2025-08-14 ASSESSMENT — PAIN SCALES - GENERAL
PAINLEVEL_OUTOF10: 8
PAINLEVEL_OUTOF10: 0
PAINLEVEL_OUTOF10: 0
PAINLEVEL_OUTOF10: 3
PAINLEVEL_OUTOF10: 6
PAINLEVEL_OUTOF10: 0
PAINLEVEL_OUTOF10: 0

## 2025-08-14 ASSESSMENT — PAIN DESCRIPTION - LOCATION
LOCATION: ARM
LOCATION: HEAD
LOCATION: GENERALIZED

## 2025-08-14 ASSESSMENT — PAIN DESCRIPTION - ORIENTATION
ORIENTATION: RIGHT;LEFT
ORIENTATION: LEFT
ORIENTATION: ANTERIOR;POSTERIOR

## 2025-08-14 ASSESSMENT — PAIN DESCRIPTION - DESCRIPTORS
DESCRIPTORS: ACHING
DESCRIPTORS: ACHING
DESCRIPTORS: DISCOMFORT

## 2025-08-14 ASSESSMENT — PAIN DESCRIPTION - DIRECTION: RADIATING_TOWARDS: NO

## 2025-08-15 LAB
ANION GAP SERPL CALCULATED.3IONS-SCNC: 5 MMOL/L (ref 9–17)
BUN SERPL-MCNC: 8 MG/DL (ref 7–20)
CALCIUM SERPL-MCNC: 8.9 MG/DL (ref 8.3–10.6)
CHLORIDE SERPL-SCNC: 97 MMOL/L (ref 99–110)
CO2 SERPL-SCNC: 42 MMOL/L (ref 21–32)
CREAT SERPL-MCNC: 0.3 MG/DL (ref 0.6–1.2)
ERYTHROCYTE [DISTWIDTH] IN BLOOD BY AUTOMATED COUNT: 13.6 % (ref 11.7–14.9)
GFR, ESTIMATED: >90 ML/MIN/1.73M2
GLUCOSE BLD-MCNC: 145 MG/DL (ref 74–99)
GLUCOSE BLD-MCNC: 146 MG/DL (ref 74–99)
GLUCOSE BLD-MCNC: 174 MG/DL (ref 74–99)
GLUCOSE BLD-MCNC: 177 MG/DL (ref 74–99)
GLUCOSE SERPL-MCNC: 184 MG/DL (ref 74–99)
HCT VFR BLD AUTO: 33.5 % (ref 37–47)
HGB BLD-MCNC: 9.6 G/DL (ref 12.5–16)
MCH RBC QN AUTO: 27.2 PG (ref 27–31)
MCHC RBC AUTO-ENTMCNC: 28.7 G/DL (ref 32–36)
MCV RBC AUTO: 94.9 FL (ref 78–100)
PLATELET # BLD AUTO: 169 K/UL (ref 140–440)
PMV BLD AUTO: 9.8 FL (ref 7.5–11.1)
POTASSIUM SERPL-SCNC: 3.9 MMOL/L (ref 3.5–5.1)
RBC # BLD AUTO: 3.53 M/UL (ref 4.2–5.4)
SODIUM SERPL-SCNC: 144 MMOL/L (ref 136–145)
WBC OTHER # BLD: 6.2 K/UL (ref 4–10.5)

## 2025-08-15 PROCEDURE — 94640 AIRWAY INHALATION TREATMENT: CPT

## 2025-08-15 PROCEDURE — 2500000003 HC RX 250 WO HCPCS: Performed by: INTERNAL MEDICINE

## 2025-08-15 PROCEDURE — 97110 THERAPEUTIC EXERCISES: CPT

## 2025-08-15 PROCEDURE — 6370000000 HC RX 637 (ALT 250 FOR IP): Performed by: INTERNAL MEDICINE

## 2025-08-15 PROCEDURE — 1200000002 HC SEMI PRIVATE SWING BED

## 2025-08-15 PROCEDURE — 97530 THERAPEUTIC ACTIVITIES: CPT

## 2025-08-15 PROCEDURE — 97535 SELF CARE MNGMENT TRAINING: CPT

## 2025-08-15 PROCEDURE — 6360000002 HC RX W HCPCS: Performed by: INTERNAL MEDICINE

## 2025-08-15 PROCEDURE — 80048 BASIC METABOLIC PNL TOTAL CA: CPT

## 2025-08-15 PROCEDURE — 82962 GLUCOSE BLOOD TEST: CPT

## 2025-08-15 PROCEDURE — 94761 N-INVAS EAR/PLS OXIMETRY MLT: CPT

## 2025-08-15 PROCEDURE — 36415 COLL VENOUS BLD VENIPUNCTURE: CPT

## 2025-08-15 PROCEDURE — 85027 COMPLETE CBC AUTOMATED: CPT

## 2025-08-15 PROCEDURE — 94660 CPAP INITIATION&MGMT: CPT

## 2025-08-15 PROCEDURE — 2580000003 HC RX 258: Performed by: INTERNAL MEDICINE

## 2025-08-15 RX ADMIN — ACETAMINOPHEN 650 MG: 325 TABLET ORAL at 14:12

## 2025-08-15 RX ADMIN — PANTOPRAZOLE SODIUM 40 MG: 40 TABLET, DELAYED RELEASE ORAL at 05:16

## 2025-08-15 RX ADMIN — GUAIFENESIN 600 MG: 600 TABLET, EXTENDED RELEASE ORAL at 09:34

## 2025-08-15 RX ADMIN — MEROPENEM 1000 MG: 1 INJECTION INTRAVENOUS at 09:46

## 2025-08-15 RX ADMIN — DESVENLAFAXINE 100 MG: 100 TABLET, EXTENDED RELEASE ORAL at 09:46

## 2025-08-15 RX ADMIN — SODIUM CHLORIDE, PRESERVATIVE FREE 10 ML: 5 INJECTION INTRAVENOUS at 09:35

## 2025-08-15 RX ADMIN — BUPRENORPHINE AND NALOXONE 1 FILM: 8; 2 FILM BUCCAL; SUBLINGUAL at 20:44

## 2025-08-15 RX ADMIN — BUPRENORPHINE AND NALOXONE 1 FILM: 8; 2 FILM BUCCAL; SUBLINGUAL at 09:35

## 2025-08-15 RX ADMIN — IPRATROPIUM BROMIDE AND ALBUTEROL SULFATE 1 DOSE: .5; 2.5 SOLUTION RESPIRATORY (INHALATION) at 11:40

## 2025-08-15 RX ADMIN — MICONAZOLE NITRATE: 2 POWDER TOPICAL at 09:35

## 2025-08-15 RX ADMIN — DICLOFENAC SODIUM 4 G: 10 GEL TOPICAL at 14:07

## 2025-08-15 RX ADMIN — GUAIFENESIN 600 MG: 600 TABLET, EXTENDED RELEASE ORAL at 20:44

## 2025-08-15 RX ADMIN — ENOXAPARIN SODIUM 40 MG: 100 INJECTION SUBCUTANEOUS at 09:34

## 2025-08-15 RX ADMIN — IPRATROPIUM BROMIDE AND ALBUTEROL SULFATE 1 DOSE: .5; 2.5 SOLUTION RESPIRATORY (INHALATION) at 16:05

## 2025-08-15 RX ADMIN — MEROPENEM 1000 MG: 1 INJECTION INTRAVENOUS at 02:31

## 2025-08-15 RX ADMIN — SODIUM CHLORIDE, PRESERVATIVE FREE 10 ML: 5 INJECTION INTRAVENOUS at 20:45

## 2025-08-15 RX ADMIN — IPRATROPIUM BROMIDE AND ALBUTEROL SULFATE 1 DOSE: .5; 2.5 SOLUTION RESPIRATORY (INHALATION) at 19:14

## 2025-08-15 RX ADMIN — IPRATROPIUM BROMIDE AND ALBUTEROL SULFATE 1 DOSE: .5; 2.5 SOLUTION RESPIRATORY (INHALATION) at 07:40

## 2025-08-15 RX ADMIN — BUPRENORPHINE AND NALOXONE 1 FILM: 8; 2 FILM BUCCAL; SUBLINGUAL at 15:07

## 2025-08-15 RX ADMIN — TRAZODONE HYDROCHLORIDE 300 MG: 100 TABLET ORAL at 20:44

## 2025-08-15 RX ADMIN — MEROPENEM 1000 MG: 1 INJECTION INTRAVENOUS at 17:28

## 2025-08-15 RX ADMIN — MICONAZOLE NITRATE: 2 POWDER TOPICAL at 20:45

## 2025-08-15 RX ADMIN — ARIPIPRAZOLE 5 MG: 5 TABLET ORAL at 20:44

## 2025-08-15 RX ADMIN — ASPIRIN 81 MG: 81 TABLET, COATED ORAL at 09:34

## 2025-08-15 ASSESSMENT — PAIN SCALES - GENERAL
PAINLEVEL_OUTOF10: 0
PAINLEVEL_OUTOF10: 0
PAINLEVEL_OUTOF10: 4
PAINLEVEL_OUTOF10: 1
PAINLEVEL_OUTOF10: 1
PAINLEVEL_OUTOF10: 4

## 2025-08-15 ASSESSMENT — PAIN DESCRIPTION - LOCATION
LOCATION: KNEE
LOCATION: KNEE

## 2025-08-15 ASSESSMENT — PAIN - FUNCTIONAL ASSESSMENT
PAIN_FUNCTIONAL_ASSESSMENT: 0-10
PAIN_FUNCTIONAL_ASSESSMENT: ACTIVITIES ARE NOT PREVENTED
PAIN_FUNCTIONAL_ASSESSMENT: 0-10
PAIN_FUNCTIONAL_ASSESSMENT: ACTIVITIES ARE NOT PREVENTED

## 2025-08-15 ASSESSMENT — PAIN DESCRIPTION - DESCRIPTORS
DESCRIPTORS: ACHING;DISCOMFORT
DESCRIPTORS: ACHING

## 2025-08-15 ASSESSMENT — PAIN DESCRIPTION - ORIENTATION
ORIENTATION: RIGHT
ORIENTATION: RIGHT

## 2025-08-16 LAB
GLUCOSE BLD-MCNC: 137 MG/DL (ref 74–99)
GLUCOSE BLD-MCNC: 154 MG/DL (ref 74–99)
GLUCOSE BLD-MCNC: 195 MG/DL (ref 74–99)
GLUCOSE BLD-MCNC: 206 MG/DL (ref 74–99)

## 2025-08-16 PROCEDURE — 2500000003 HC RX 250 WO HCPCS: Performed by: INTERNAL MEDICINE

## 2025-08-16 PROCEDURE — 6370000000 HC RX 637 (ALT 250 FOR IP): Performed by: INTERNAL MEDICINE

## 2025-08-16 PROCEDURE — 6360000002 HC RX W HCPCS: Performed by: INTERNAL MEDICINE

## 2025-08-16 PROCEDURE — 94761 N-INVAS EAR/PLS OXIMETRY MLT: CPT

## 2025-08-16 PROCEDURE — 97110 THERAPEUTIC EXERCISES: CPT

## 2025-08-16 PROCEDURE — 2700000000 HC OXYGEN THERAPY PER DAY

## 2025-08-16 PROCEDURE — 97530 THERAPEUTIC ACTIVITIES: CPT

## 2025-08-16 PROCEDURE — 2580000003 HC RX 258: Performed by: INTERNAL MEDICINE

## 2025-08-16 PROCEDURE — 82962 GLUCOSE BLOOD TEST: CPT

## 2025-08-16 PROCEDURE — 1200000002 HC SEMI PRIVATE SWING BED

## 2025-08-16 PROCEDURE — 94640 AIRWAY INHALATION TREATMENT: CPT

## 2025-08-16 RX ADMIN — MICONAZOLE NITRATE: 2 POWDER TOPICAL at 19:47

## 2025-08-16 RX ADMIN — IPRATROPIUM BROMIDE AND ALBUTEROL SULFATE 1 DOSE: .5; 2.5 SOLUTION RESPIRATORY (INHALATION) at 11:39

## 2025-08-16 RX ADMIN — TRAZODONE HYDROCHLORIDE 300 MG: 100 TABLET ORAL at 19:46

## 2025-08-16 RX ADMIN — IPRATROPIUM BROMIDE AND ALBUTEROL SULFATE 1 DOSE: .5; 2.5 SOLUTION RESPIRATORY (INHALATION) at 15:19

## 2025-08-16 RX ADMIN — INSULIN LISPRO 4 UNITS: 100 INJECTION, SOLUTION INTRAVENOUS; SUBCUTANEOUS at 17:11

## 2025-08-16 RX ADMIN — MEROPENEM 1000 MG: 1 INJECTION INTRAVENOUS at 01:46

## 2025-08-16 RX ADMIN — ASPIRIN 81 MG: 81 TABLET, COATED ORAL at 08:49

## 2025-08-16 RX ADMIN — BUPRENORPHINE AND NALOXONE 1 FILM: 8; 2 FILM BUCCAL; SUBLINGUAL at 19:46

## 2025-08-16 RX ADMIN — PANTOPRAZOLE SODIUM 40 MG: 40 TABLET, DELAYED RELEASE ORAL at 04:49

## 2025-08-16 RX ADMIN — BUPRENORPHINE AND NALOXONE 1 FILM: 8; 2 FILM BUCCAL; SUBLINGUAL at 08:49

## 2025-08-16 RX ADMIN — IPRATROPIUM BROMIDE AND ALBUTEROL SULFATE 1 DOSE: .5; 2.5 SOLUTION RESPIRATORY (INHALATION) at 07:10

## 2025-08-16 RX ADMIN — HYDROXYZINE HYDROCHLORIDE 25 MG: 25 TABLET, FILM COATED ORAL at 19:46

## 2025-08-16 RX ADMIN — ARIPIPRAZOLE 5 MG: 5 TABLET ORAL at 19:46

## 2025-08-16 RX ADMIN — MEROPENEM 1000 MG: 1 INJECTION INTRAVENOUS at 18:26

## 2025-08-16 RX ADMIN — GUAIFENESIN 600 MG: 600 TABLET, EXTENDED RELEASE ORAL at 08:49

## 2025-08-16 RX ADMIN — DESVENLAFAXINE 100 MG: 100 TABLET, EXTENDED RELEASE ORAL at 08:54

## 2025-08-16 RX ADMIN — SODIUM CHLORIDE, PRESERVATIVE FREE 10 ML: 5 INJECTION INTRAVENOUS at 19:46

## 2025-08-16 RX ADMIN — ACETAMINOPHEN 650 MG: 325 TABLET ORAL at 19:46

## 2025-08-16 RX ADMIN — ENOXAPARIN SODIUM 40 MG: 100 INJECTION SUBCUTANEOUS at 08:49

## 2025-08-16 RX ADMIN — BUPRENORPHINE AND NALOXONE 1 FILM: 8; 2 FILM BUCCAL; SUBLINGUAL at 15:08

## 2025-08-16 RX ADMIN — GUAIFENESIN 600 MG: 600 TABLET, EXTENDED RELEASE ORAL at 19:46

## 2025-08-16 RX ADMIN — MICONAZOLE NITRATE: 2 POWDER TOPICAL at 08:48

## 2025-08-16 RX ADMIN — MEROPENEM 1000 MG: 1 INJECTION INTRAVENOUS at 10:55

## 2025-08-16 RX ADMIN — SODIUM CHLORIDE, PRESERVATIVE FREE 10 ML: 5 INJECTION INTRAVENOUS at 08:48

## 2025-08-16 RX ADMIN — IPRATROPIUM BROMIDE AND ALBUTEROL SULFATE 1 DOSE: .5; 2.5 SOLUTION RESPIRATORY (INHALATION) at 19:26

## 2025-08-16 RX ADMIN — INSULIN LISPRO 4 UNITS: 100 INJECTION, SOLUTION INTRAVENOUS; SUBCUTANEOUS at 12:11

## 2025-08-16 ASSESSMENT — PAIN - FUNCTIONAL ASSESSMENT
PAIN_FUNCTIONAL_ASSESSMENT: ACTIVITIES ARE NOT PREVENTED
PAIN_FUNCTIONAL_ASSESSMENT: 0-10

## 2025-08-16 ASSESSMENT — PAIN DESCRIPTION - ORIENTATION: ORIENTATION: MID

## 2025-08-16 ASSESSMENT — PAIN DESCRIPTION - PAIN TYPE: TYPE: ACUTE PAIN

## 2025-08-16 ASSESSMENT — PAIN DESCRIPTION - ONSET: ONSET: ON-GOING

## 2025-08-16 ASSESSMENT — PAIN SCALES - GENERAL
PAINLEVEL_OUTOF10: 3
PAINLEVEL_OUTOF10: 0

## 2025-08-16 ASSESSMENT — PAIN DESCRIPTION - FREQUENCY: FREQUENCY: CONTINUOUS

## 2025-08-16 ASSESSMENT — PAIN DESCRIPTION - LOCATION: LOCATION: HEAD

## 2025-08-16 ASSESSMENT — PAIN DESCRIPTION - DESCRIPTORS: DESCRIPTORS: ACHING

## 2025-08-17 LAB
GLUCOSE BLD-MCNC: 143 MG/DL (ref 74–99)
GLUCOSE BLD-MCNC: 154 MG/DL (ref 74–99)
GLUCOSE BLD-MCNC: 163 MG/DL (ref 74–99)
GLUCOSE BLD-MCNC: 165 MG/DL (ref 74–99)

## 2025-08-17 PROCEDURE — 6370000000 HC RX 637 (ALT 250 FOR IP): Performed by: INTERNAL MEDICINE

## 2025-08-17 PROCEDURE — 2580000003 HC RX 258: Performed by: INTERNAL MEDICINE

## 2025-08-17 PROCEDURE — 82962 GLUCOSE BLOOD TEST: CPT

## 2025-08-17 PROCEDURE — 1200000002 HC SEMI PRIVATE SWING BED

## 2025-08-17 PROCEDURE — 6360000002 HC RX W HCPCS: Performed by: INTERNAL MEDICINE

## 2025-08-17 PROCEDURE — 2700000000 HC OXYGEN THERAPY PER DAY

## 2025-08-17 PROCEDURE — 2500000003 HC RX 250 WO HCPCS: Performed by: INTERNAL MEDICINE

## 2025-08-17 PROCEDURE — 94640 AIRWAY INHALATION TREATMENT: CPT

## 2025-08-17 PROCEDURE — 94660 CPAP INITIATION&MGMT: CPT

## 2025-08-17 RX ADMIN — ARIPIPRAZOLE 5 MG: 5 TABLET ORAL at 19:47

## 2025-08-17 RX ADMIN — GUAIFENESIN 600 MG: 600 TABLET, EXTENDED RELEASE ORAL at 19:47

## 2025-08-17 RX ADMIN — MEROPENEM 1000 MG: 1 INJECTION INTRAVENOUS at 02:01

## 2025-08-17 RX ADMIN — TRAZODONE HYDROCHLORIDE 300 MG: 100 TABLET ORAL at 19:47

## 2025-08-17 RX ADMIN — PANTOPRAZOLE SODIUM 40 MG: 40 TABLET, DELAYED RELEASE ORAL at 05:56

## 2025-08-17 RX ADMIN — BUPRENORPHINE AND NALOXONE 1 FILM: 8; 2 FILM BUCCAL; SUBLINGUAL at 19:47

## 2025-08-17 RX ADMIN — ASPIRIN 81 MG: 81 TABLET, COATED ORAL at 08:21

## 2025-08-17 RX ADMIN — IPRATROPIUM BROMIDE AND ALBUTEROL SULFATE 1 DOSE: .5; 2.5 SOLUTION RESPIRATORY (INHALATION) at 19:25

## 2025-08-17 RX ADMIN — IPRATROPIUM BROMIDE AND ALBUTEROL SULFATE 1 DOSE: .5; 2.5 SOLUTION RESPIRATORY (INHALATION) at 14:33

## 2025-08-17 RX ADMIN — IPRATROPIUM BROMIDE AND ALBUTEROL SULFATE 1 DOSE: .5; 2.5 SOLUTION RESPIRATORY (INHALATION) at 07:31

## 2025-08-17 RX ADMIN — GUAIFENESIN 600 MG: 600 TABLET, EXTENDED RELEASE ORAL at 08:20

## 2025-08-17 RX ADMIN — MEROPENEM 1000 MG: 1 INJECTION INTRAVENOUS at 18:05

## 2025-08-17 RX ADMIN — ENOXAPARIN SODIUM 40 MG: 100 INJECTION SUBCUTANEOUS at 08:20

## 2025-08-17 RX ADMIN — SODIUM CHLORIDE, PRESERVATIVE FREE 10 ML: 5 INJECTION INTRAVENOUS at 08:24

## 2025-08-17 RX ADMIN — SODIUM CHLORIDE 20 ML: 0.9 INJECTION, SOLUTION INTRAVENOUS at 18:04

## 2025-08-17 RX ADMIN — MEROPENEM 1000 MG: 1 INJECTION INTRAVENOUS at 10:15

## 2025-08-17 RX ADMIN — DESVENLAFAXINE 100 MG: 100 TABLET, EXTENDED RELEASE ORAL at 08:24

## 2025-08-17 RX ADMIN — MICONAZOLE NITRATE: 2 POWDER TOPICAL at 19:50

## 2025-08-17 RX ADMIN — ACETAMINOPHEN 650 MG: 325 TABLET ORAL at 19:47

## 2025-08-17 RX ADMIN — MICONAZOLE NITRATE: 2 POWDER TOPICAL at 08:21

## 2025-08-17 RX ADMIN — SODIUM CHLORIDE: 0.9 INJECTION, SOLUTION INTRAVENOUS at 10:14

## 2025-08-17 RX ADMIN — BUPRENORPHINE AND NALOXONE 1 FILM: 8; 2 FILM BUCCAL; SUBLINGUAL at 14:57

## 2025-08-17 ASSESSMENT — PAIN DESCRIPTION - ORIENTATION: ORIENTATION: RIGHT;LEFT

## 2025-08-17 ASSESSMENT — PAIN DESCRIPTION - DESCRIPTORS: DESCRIPTORS: DISCOMFORT

## 2025-08-17 ASSESSMENT — PAIN DESCRIPTION - LOCATION: LOCATION: LEG;SHOULDER

## 2025-08-17 ASSESSMENT — PAIN SCALES - GENERAL
PAINLEVEL_OUTOF10: 3
PAINLEVEL_OUTOF10: 0
PAINLEVEL_OUTOF10: 1

## 2025-08-17 ASSESSMENT — PAIN - FUNCTIONAL ASSESSMENT
PAIN_FUNCTIONAL_ASSESSMENT: 0-10
PAIN_FUNCTIONAL_ASSESSMENT: 0-10
PAIN_FUNCTIONAL_ASSESSMENT: ACTIVITIES ARE NOT PREVENTED

## 2025-08-18 LAB
GLUCOSE BLD-MCNC: 132 MG/DL (ref 74–99)
GLUCOSE BLD-MCNC: 150 MG/DL (ref 74–99)
GLUCOSE BLD-MCNC: 169 MG/DL (ref 74–99)
GLUCOSE BLD-MCNC: 172 MG/DL (ref 74–99)

## 2025-08-18 PROCEDURE — 97116 GAIT TRAINING THERAPY: CPT

## 2025-08-18 PROCEDURE — 94640 AIRWAY INHALATION TREATMENT: CPT

## 2025-08-18 PROCEDURE — 2580000003 HC RX 258: Performed by: INTERNAL MEDICINE

## 2025-08-18 PROCEDURE — 2700000000 HC OXYGEN THERAPY PER DAY

## 2025-08-18 PROCEDURE — 6360000002 HC RX W HCPCS: Performed by: INTERNAL MEDICINE

## 2025-08-18 PROCEDURE — 97110 THERAPEUTIC EXERCISES: CPT

## 2025-08-18 PROCEDURE — 6370000000 HC RX 637 (ALT 250 FOR IP): Performed by: INTERNAL MEDICINE

## 2025-08-18 PROCEDURE — 2500000003 HC RX 250 WO HCPCS: Performed by: INTERNAL MEDICINE

## 2025-08-18 PROCEDURE — 82962 GLUCOSE BLOOD TEST: CPT

## 2025-08-18 PROCEDURE — 94761 N-INVAS EAR/PLS OXIMETRY MLT: CPT

## 2025-08-18 PROCEDURE — 1200000002 HC SEMI PRIVATE SWING BED

## 2025-08-18 RX ADMIN — ARIPIPRAZOLE 5 MG: 5 TABLET ORAL at 19:44

## 2025-08-18 RX ADMIN — IPRATROPIUM BROMIDE AND ALBUTEROL SULFATE 1 DOSE: .5; 2.5 SOLUTION RESPIRATORY (INHALATION) at 07:42

## 2025-08-18 RX ADMIN — PANTOPRAZOLE SODIUM 40 MG: 40 TABLET, DELAYED RELEASE ORAL at 05:01

## 2025-08-18 RX ADMIN — ASPIRIN 81 MG: 81 TABLET, COATED ORAL at 08:55

## 2025-08-18 RX ADMIN — MEROPENEM 1000 MG: 1 INJECTION INTRAVENOUS at 09:04

## 2025-08-18 RX ADMIN — GUAIFENESIN 600 MG: 600 TABLET, EXTENDED RELEASE ORAL at 08:55

## 2025-08-18 RX ADMIN — ENOXAPARIN SODIUM 40 MG: 100 INJECTION SUBCUTANEOUS at 08:55

## 2025-08-18 RX ADMIN — IPRATROPIUM BROMIDE AND ALBUTEROL SULFATE 1 DOSE: .5; 2.5 SOLUTION RESPIRATORY (INHALATION) at 19:53

## 2025-08-18 RX ADMIN — MEROPENEM 1000 MG: 1 INJECTION INTRAVENOUS at 01:51

## 2025-08-18 RX ADMIN — IPRATROPIUM BROMIDE AND ALBUTEROL SULFATE 1 DOSE: .5; 2.5 SOLUTION RESPIRATORY (INHALATION) at 10:53

## 2025-08-18 RX ADMIN — BUPRENORPHINE AND NALOXONE 1 FILM: 8; 2 FILM BUCCAL; SUBLINGUAL at 19:44

## 2025-08-18 RX ADMIN — GUAIFENESIN 600 MG: 600 TABLET, EXTENDED RELEASE ORAL at 19:44

## 2025-08-18 RX ADMIN — TRAZODONE HYDROCHLORIDE 300 MG: 100 TABLET ORAL at 19:44

## 2025-08-18 RX ADMIN — BUPRENORPHINE AND NALOXONE 1 FILM: 8; 2 FILM BUCCAL; SUBLINGUAL at 15:57

## 2025-08-18 RX ADMIN — MICONAZOLE NITRATE: 2 POWDER TOPICAL at 08:56

## 2025-08-18 RX ADMIN — MEROPENEM 1000 MG: 1 INJECTION INTRAVENOUS at 17:22

## 2025-08-18 RX ADMIN — DESVENLAFAXINE 100 MG: 100 TABLET, EXTENDED RELEASE ORAL at 08:56

## 2025-08-18 RX ADMIN — IPRATROPIUM BROMIDE AND ALBUTEROL SULFATE 1 DOSE: .5; 2.5 SOLUTION RESPIRATORY (INHALATION) at 14:44

## 2025-08-18 RX ADMIN — SODIUM CHLORIDE, PRESERVATIVE FREE 10 ML: 5 INJECTION INTRAVENOUS at 08:56

## 2025-08-18 RX ADMIN — ACETAMINOPHEN 650 MG: 325 TABLET ORAL at 08:55

## 2025-08-18 RX ADMIN — ACETAMINOPHEN 650 MG: 325 TABLET ORAL at 19:44

## 2025-08-18 RX ADMIN — BUPRENORPHINE AND NALOXONE 1 FILM: 8; 2 FILM BUCCAL; SUBLINGUAL at 08:55

## 2025-08-18 ASSESSMENT — PAIN - FUNCTIONAL ASSESSMENT
PAIN_FUNCTIONAL_ASSESSMENT: ACTIVITIES ARE NOT PREVENTED
PAIN_FUNCTIONAL_ASSESSMENT: WONG-BAKER FACES
PAIN_FUNCTIONAL_ASSESSMENT: 0-10
PAIN_FUNCTIONAL_ASSESSMENT: ACTIVITIES ARE NOT PREVENTED

## 2025-08-18 ASSESSMENT — PAIN SCALES - GENERAL
PAINLEVEL_OUTOF10: 1
PAINLEVEL_OUTOF10: 8
PAINLEVEL_OUTOF10: 3

## 2025-08-18 ASSESSMENT — PAIN DESCRIPTION - PAIN TYPE: TYPE: CHRONIC PAIN

## 2025-08-18 ASSESSMENT — PAIN DESCRIPTION - ORIENTATION
ORIENTATION: ANTERIOR;POSTERIOR
ORIENTATION: LEFT

## 2025-08-18 ASSESSMENT — PAIN DESCRIPTION - FREQUENCY: FREQUENCY: INTERMITTENT

## 2025-08-18 ASSESSMENT — PAIN DESCRIPTION - LOCATION
LOCATION: SHOULDER
LOCATION: HEAD

## 2025-08-18 ASSESSMENT — PAIN DESCRIPTION - ONSET: ONSET: GRADUAL

## 2025-08-18 ASSESSMENT — PAIN DESCRIPTION - DESCRIPTORS
DESCRIPTORS: DISCOMFORT
DESCRIPTORS: DISCOMFORT

## 2025-08-18 ASSESSMENT — PAIN SCALES - WONG BAKER: WONGBAKER_NUMERICALRESPONSE: NO HURT

## 2025-08-19 VITALS
SYSTOLIC BLOOD PRESSURE: 100 MMHG | WEIGHT: 208 LBS | RESPIRATION RATE: 16 BRPM | BODY MASS INDEX: 35.51 KG/M2 | HEIGHT: 64 IN | HEART RATE: 70 BPM | TEMPERATURE: 97.9 F | OXYGEN SATURATION: 94 % | DIASTOLIC BLOOD PRESSURE: 50 MMHG

## 2025-08-19 LAB — GLUCOSE BLD-MCNC: 123 MG/DL (ref 74–99)

## 2025-08-19 PROCEDURE — 6370000000 HC RX 637 (ALT 250 FOR IP): Performed by: INTERNAL MEDICINE

## 2025-08-19 PROCEDURE — 94761 N-INVAS EAR/PLS OXIMETRY MLT: CPT

## 2025-08-19 PROCEDURE — 94660 CPAP INITIATION&MGMT: CPT

## 2025-08-19 PROCEDURE — 94640 AIRWAY INHALATION TREATMENT: CPT

## 2025-08-19 PROCEDURE — 2700000000 HC OXYGEN THERAPY PER DAY

## 2025-08-19 PROCEDURE — 6360000002 HC RX W HCPCS: Performed by: INTERNAL MEDICINE

## 2025-08-19 PROCEDURE — 82962 GLUCOSE BLOOD TEST: CPT

## 2025-08-19 RX ORDER — HYDROXYZINE HYDROCHLORIDE 25 MG/1
25 TABLET, FILM COATED ORAL NIGHTLY PRN
Qty: 30 TABLET | Refills: 0 | Status: SHIPPED | OUTPATIENT
Start: 2025-08-19

## 2025-08-19 RX ADMIN — BUPRENORPHINE AND NALOXONE 1 FILM: 8; 2 FILM BUCCAL; SUBLINGUAL at 09:13

## 2025-08-19 RX ADMIN — IPRATROPIUM BROMIDE AND ALBUTEROL SULFATE 1 DOSE: .5; 2.5 SOLUTION RESPIRATORY (INHALATION) at 07:49

## 2025-08-19 RX ADMIN — DICLOFENAC SODIUM 4 G: 10 GEL TOPICAL at 09:22

## 2025-08-19 RX ADMIN — DESVENLAFAXINE 100 MG: 100 TABLET, EXTENDED RELEASE ORAL at 09:14

## 2025-08-19 RX ADMIN — ENOXAPARIN SODIUM 40 MG: 100 INJECTION SUBCUTANEOUS at 09:14

## 2025-08-19 RX ADMIN — GUAIFENESIN 600 MG: 600 TABLET, EXTENDED RELEASE ORAL at 09:13

## 2025-08-19 RX ADMIN — PANTOPRAZOLE SODIUM 40 MG: 40 TABLET, DELAYED RELEASE ORAL at 05:31

## 2025-08-19 RX ADMIN — ASPIRIN 81 MG: 81 TABLET, COATED ORAL at 09:13

## 2025-08-19 RX ADMIN — IPRATROPIUM BROMIDE AND ALBUTEROL SULFATE 1 DOSE: .5; 2.5 SOLUTION RESPIRATORY (INHALATION) at 10:18

## 2025-08-19 ASSESSMENT — PAIN DESCRIPTION - ONSET: ONSET: ON-GOING

## 2025-08-19 ASSESSMENT — PAIN DESCRIPTION - LOCATION: LOCATION: KNEE

## 2025-08-19 ASSESSMENT — PAIN SCALES - GENERAL
PAINLEVEL_OUTOF10: 0
PAINLEVEL_OUTOF10: 5

## 2025-08-19 ASSESSMENT — PAIN DESCRIPTION - DIRECTION: RADIATING_TOWARDS: NO

## 2025-08-19 ASSESSMENT — PAIN DESCRIPTION - PAIN TYPE: TYPE: CHRONIC PAIN

## 2025-08-19 ASSESSMENT — PAIN DESCRIPTION - ORIENTATION: ORIENTATION: LEFT

## 2025-08-19 ASSESSMENT — PAIN - FUNCTIONAL ASSESSMENT
PAIN_FUNCTIONAL_ASSESSMENT: ACTIVITIES ARE NOT PREVENTED
PAIN_FUNCTIONAL_ASSESSMENT: 0-10

## 2025-08-19 ASSESSMENT — PAIN DESCRIPTION - FREQUENCY: FREQUENCY: INTERMITTENT

## 2025-08-19 ASSESSMENT — PAIN DESCRIPTION - DESCRIPTORS: DESCRIPTORS: ACHING;DISCOMFORT

## 2025-08-20 LAB
MICROORGANISM SPEC CULT: ABNORMAL
MICROORGANISM/AGENT SPEC: ABNORMAL
SPECIMEN DESCRIPTION: ABNORMAL